# Patient Record
Sex: MALE | Race: WHITE | Employment: OTHER | URBAN - METROPOLITAN AREA
[De-identification: names, ages, dates, MRNs, and addresses within clinical notes are randomized per-mention and may not be internally consistent; named-entity substitution may affect disease eponyms.]

---

## 2017-03-01 ENCOUNTER — HOSPITAL ENCOUNTER (OUTPATIENT)
Dept: PAIN MANAGEMENT | Age: 67
Discharge: HOME | End: 2017-03-01
Payer: MEDICARE

## 2017-03-01 DIAGNOSIS — M46.92 CERVICAL SPONDYLITIS (HCC): ICD-10-CM

## 2017-03-01 DIAGNOSIS — M48.061 LUMBAR SPINAL STENOSIS: ICD-10-CM

## 2017-03-01 DIAGNOSIS — M47.896 OTHER OSTEOARTHRITIS OF SPINE, LUMBAR REGION: ICD-10-CM

## 2017-03-01 DIAGNOSIS — Z98.1 S/P CERVICAL SPINAL FUSION: ICD-10-CM

## 2017-03-01 DIAGNOSIS — M47.816 OSTEOARTHRITIS OF LUMBAR SPINE, UNSPECIFIED SPINAL OSTEOARTHRITIS COMPLICATION STATUS: ICD-10-CM

## 2017-03-01 DIAGNOSIS — Z51.81 ENCOUNTER FOR MEDICATION MONITORING: ICD-10-CM

## 2017-03-01 DIAGNOSIS — M47.26 OSTEOARTHRITIS OF SPINE WITH RADICULOPATHY, LUMBAR REGION: Primary | ICD-10-CM

## 2017-03-01 DIAGNOSIS — G89.29 ENCOUNTER FOR CHRONIC PAIN MANAGEMENT: ICD-10-CM

## 2017-03-01 DIAGNOSIS — M51.36 DEGENERATIVE DISC DISEASE, LUMBAR: ICD-10-CM

## 2017-03-01 DIAGNOSIS — M54.16 LUMBAR RADICULOPATHY: ICD-10-CM

## 2017-03-01 PROCEDURE — 99213 OFFICE O/P EST LOW 20 MIN: CPT | Performed by: NURSE PRACTITIONER

## 2017-03-01 PROCEDURE — 99213 OFFICE O/P EST LOW 20 MIN: CPT

## 2017-03-01 RX ORDER — MORPHINE SULFATE 60 MG/1
60 TABLET, FILM COATED, EXTENDED RELEASE ORAL EVERY 8 HOURS
Qty: 90 TABLET | Refills: 0 | Status: SHIPPED | OUTPATIENT
Start: 2017-03-01 | End: 2017-03-31 | Stop reason: SDUPTHER

## 2017-03-01 RX ORDER — OXYCODONE HYDROCHLORIDE AND ACETAMINOPHEN 5; 325 MG/1; MG/1
1 TABLET ORAL EVERY 8 HOURS
Qty: 90 TABLET | Refills: 0 | Status: SHIPPED | OUTPATIENT
Start: 2017-03-01 | End: 2017-03-31 | Stop reason: SDUPTHER

## 2017-03-31 ENCOUNTER — HOSPITAL ENCOUNTER (OUTPATIENT)
Dept: PAIN MANAGEMENT | Age: 67
Discharge: HOME | End: 2017-03-31
Payer: MEDICARE

## 2017-03-31 DIAGNOSIS — M47.816 OSTEOARTHRITIS OF LUMBAR SPINE, UNSPECIFIED SPINAL OSTEOARTHRITIS COMPLICATION STATUS: ICD-10-CM

## 2017-03-31 DIAGNOSIS — M51.36 DEGENERATIVE DISC DISEASE, LUMBAR: ICD-10-CM

## 2017-03-31 DIAGNOSIS — M47.896 OTHER OSTEOARTHRITIS OF SPINE, LUMBAR REGION: ICD-10-CM

## 2017-03-31 DIAGNOSIS — M54.16 LUMBAR RADICULOPATHY: ICD-10-CM

## 2017-03-31 DIAGNOSIS — Z98.1 S/P CERVICAL SPINAL FUSION: ICD-10-CM

## 2017-03-31 DIAGNOSIS — G89.29 ENCOUNTER FOR CHRONIC PAIN MANAGEMENT: ICD-10-CM

## 2017-03-31 DIAGNOSIS — M47.26 OSTEOARTHRITIS OF SPINE WITH RADICULOPATHY, LUMBAR REGION: Primary | ICD-10-CM

## 2017-03-31 DIAGNOSIS — M48.061 LUMBAR SPINAL STENOSIS: ICD-10-CM

## 2017-03-31 DIAGNOSIS — M46.92 CERVICAL SPONDYLITIS (HCC): ICD-10-CM

## 2017-03-31 PROCEDURE — 99213 OFFICE O/P EST LOW 20 MIN: CPT | Performed by: NURSE PRACTITIONER

## 2017-03-31 PROCEDURE — 99213 OFFICE O/P EST LOW 20 MIN: CPT

## 2017-03-31 RX ORDER — MORPHINE SULFATE 60 MG/1
60 TABLET, FILM COATED, EXTENDED RELEASE ORAL EVERY 8 HOURS
Qty: 90 TABLET | Refills: 0 | Status: SHIPPED | OUTPATIENT
Start: 2017-03-31 | End: 2017-04-28 | Stop reason: SDUPTHER

## 2017-03-31 RX ORDER — OXYCODONE HYDROCHLORIDE AND ACETAMINOPHEN 5; 325 MG/1; MG/1
1 TABLET ORAL EVERY 8 HOURS
Qty: 90 TABLET | Refills: 0 | Status: SHIPPED | OUTPATIENT
Start: 2017-03-31 | End: 2017-04-28 | Stop reason: SDUPTHER

## 2017-04-28 ENCOUNTER — HOSPITAL ENCOUNTER (OUTPATIENT)
Dept: PAIN MANAGEMENT | Age: 67
Discharge: HOME | End: 2017-04-28
Payer: MEDICARE

## 2017-04-28 DIAGNOSIS — M51.36 DEGENERATIVE DISC DISEASE, LUMBAR: ICD-10-CM

## 2017-04-28 DIAGNOSIS — M54.16 LUMBAR RADICULOPATHY: ICD-10-CM

## 2017-04-28 DIAGNOSIS — M47.26 OSTEOARTHRITIS OF SPINE WITH RADICULOPATHY, LUMBAR REGION: Primary | ICD-10-CM

## 2017-04-28 DIAGNOSIS — Z98.1 S/P CERVICAL SPINAL FUSION: ICD-10-CM

## 2017-04-28 DIAGNOSIS — M48.061 LUMBAR SPINAL STENOSIS: ICD-10-CM

## 2017-04-28 DIAGNOSIS — M46.92 CERVICAL SPONDYLITIS (HCC): ICD-10-CM

## 2017-04-28 DIAGNOSIS — Z51.81 ENCOUNTER FOR MEDICATION MONITORING: ICD-10-CM

## 2017-04-28 PROCEDURE — 99213 OFFICE O/P EST LOW 20 MIN: CPT | Performed by: NURSE PRACTITIONER

## 2017-04-28 PROCEDURE — 99213 OFFICE O/P EST LOW 20 MIN: CPT

## 2017-04-28 RX ORDER — MORPHINE SULFATE 60 MG/1
60 TABLET, FILM COATED, EXTENDED RELEASE ORAL EVERY 8 HOURS
Qty: 90 TABLET | Refills: 0 | Status: SHIPPED | OUTPATIENT
Start: 2017-04-30 | End: 2017-05-30 | Stop reason: SDUPTHER

## 2017-04-28 RX ORDER — OXYCODONE HYDROCHLORIDE AND ACETAMINOPHEN 5; 325 MG/1; MG/1
1 TABLET ORAL EVERY 8 HOURS
Qty: 90 TABLET | Refills: 0 | Status: SHIPPED | OUTPATIENT
Start: 2017-04-30 | End: 2017-05-30 | Stop reason: SDUPTHER

## 2017-05-10 ENCOUNTER — HOSPITAL ENCOUNTER (OUTPATIENT)
Age: 67
Discharge: HOME | End: 2017-05-10
Payer: MEDICARE

## 2017-05-10 LAB
ABSOLUTE EOS #: 0.04 K/UL (ref 0–0.4)
ABSOLUTE LYMPH #: 1.16 K/UL (ref 1–4.8)
ABSOLUTE MONO #: 0.24 K/UL (ref 0.1–1.3)
ALBUMIN SERPL-MCNC: 4.4 G/DL (ref 3.5–5.2)
ALBUMIN/GLOBULIN RATIO: ABNORMAL (ref 1–2.5)
ALP BLD-CCNC: 121 U/L (ref 40–129)
ALT SERPL-CCNC: 21 U/L (ref 5–41)
ANION GAP SERPL CALCULATED.3IONS-SCNC: 14 MMOL/L (ref 9–17)
AST SERPL-CCNC: 28 U/L
BASOPHILS # BLD: 1 %
BASOPHILS ABSOLUTE: 0.04 K/UL (ref 0–0.2)
BILIRUB SERPL-MCNC: 0.28 MG/DL (ref 0.3–1.2)
BUN BLDV-MCNC: 14 MG/DL (ref 8–23)
BUN/CREAT BLD: ABNORMAL (ref 9–20)
CALCIUM SERPL-MCNC: 9.1 MG/DL (ref 8.6–10.4)
CHLORIDE BLD-SCNC: 100 MMOL/L (ref 98–107)
CHOLESTEROL/HDL RATIO: 5.7
CHOLESTEROL: 102 MG/DL
CO2: 25 MMOL/L (ref 20–31)
CREAT SERPL-MCNC: 0.69 MG/DL (ref 0.7–1.2)
DIFFERENTIAL TYPE: ABNORMAL
EOSINOPHILS RELATIVE PERCENT: 1 %
ESTIMATED AVERAGE GLUCOSE: 140 MG/DL
GFR AFRICAN AMERICAN: >60 ML/MIN
GFR NON-AFRICAN AMERICAN: >60 ML/MIN
GFR SERPL CREATININE-BSD FRML MDRD: ABNORMAL ML/MIN/{1.73_M2}
GFR SERPL CREATININE-BSD FRML MDRD: ABNORMAL ML/MIN/{1.73_M2}
GLUCOSE BLD-MCNC: 153 MG/DL (ref 70–99)
HBA1C MFR BLD: 6.5 % (ref 4–6)
HCT VFR BLD CALC: 35.1 % (ref 41–53)
HDLC SERPL-MCNC: 18 MG/DL
HEMOGLOBIN: 10.8 G/DL (ref 13.5–17.5)
LDL CHOLESTEROL: 52 MG/DL (ref 0–130)
LYMPHOCYTES # BLD: 29 %
MCH RBC QN AUTO: 22.4 PG (ref 26–34)
MCHC RBC AUTO-ENTMCNC: 30.7 G/DL (ref 31–37)
MCV RBC AUTO: 72.9 FL (ref 80–100)
MONOCYTES # BLD: 6 %
MORPHOLOGY: ABNORMAL
PDW BLD-RTO: 16.6 % (ref 11.5–14.9)
PLATELET # BLD: 274 K/UL (ref 150–450)
PLATELET ESTIMATE: ABNORMAL
PMV BLD AUTO: 7.5 FL (ref 6–12)
POTASSIUM SERPL-SCNC: 4.1 MMOL/L (ref 3.7–5.3)
RBC # BLD: 4.81 M/UL (ref 4.5–5.9)
RBC # BLD: ABNORMAL 10*6/UL
SEG NEUTROPHILS: 63 %
SEGMENTED NEUTROPHILS ABSOLUTE COUNT: 2.52 K/UL (ref 1.3–9.1)
SODIUM BLD-SCNC: 139 MMOL/L (ref 135–144)
TOTAL PROTEIN: 8.1 G/DL (ref 6.4–8.3)
TRIGL SERPL-MCNC: 160 MG/DL
TSH SERPL DL<=0.05 MIU/L-ACNC: 0.85 MIU/L (ref 0.3–5)
VLDLC SERPL CALC-MCNC: ABNORMAL MG/DL (ref 1–30)
WBC # BLD: 4 K/UL (ref 3.5–11)
WBC # BLD: ABNORMAL 10*3/UL

## 2017-05-10 PROCEDURE — 84443 ASSAY THYROID STIM HORMONE: CPT

## 2017-05-10 PROCEDURE — 80061 LIPID PANEL: CPT

## 2017-05-10 PROCEDURE — 80053 COMPREHEN METABOLIC PANEL: CPT

## 2017-05-10 PROCEDURE — 85025 COMPLETE CBC W/AUTO DIFF WBC: CPT

## 2017-05-10 PROCEDURE — 83036 HEMOGLOBIN GLYCOSYLATED A1C: CPT

## 2017-05-10 PROCEDURE — 36415 COLL VENOUS BLD VENIPUNCTURE: CPT

## 2017-05-30 ENCOUNTER — HOSPITAL ENCOUNTER (OUTPATIENT)
Dept: PAIN MANAGEMENT | Age: 67
Discharge: HOME | End: 2017-05-30
Payer: MEDICARE

## 2017-05-30 DIAGNOSIS — Z51.81 ENCOUNTER FOR MEDICATION MONITORING: ICD-10-CM

## 2017-05-30 DIAGNOSIS — Z98.1 S/P CERVICAL SPINAL FUSION: ICD-10-CM

## 2017-05-30 DIAGNOSIS — M47.26 OSTEOARTHRITIS OF SPINE WITH RADICULOPATHY, LUMBAR REGION: Primary | ICD-10-CM

## 2017-05-30 DIAGNOSIS — M54.16 LUMBAR RADICULOPATHY: ICD-10-CM

## 2017-05-30 DIAGNOSIS — M48.061 LUMBAR SPINAL STENOSIS: ICD-10-CM

## 2017-05-30 DIAGNOSIS — G89.29 ENCOUNTER FOR CHRONIC PAIN MANAGEMENT: ICD-10-CM

## 2017-05-30 DIAGNOSIS — M51.36 DEGENERATIVE DISC DISEASE, LUMBAR: ICD-10-CM

## 2017-05-30 DIAGNOSIS — M46.92 CERVICAL SPONDYLITIS (HCC): ICD-10-CM

## 2017-05-30 DIAGNOSIS — M47.896 OTHER OSTEOARTHRITIS OF SPINE, LUMBAR REGION: ICD-10-CM

## 2017-05-30 DIAGNOSIS — M47.816 OSTEOARTHRITIS OF LUMBAR SPINE, UNSPECIFIED SPINAL OSTEOARTHRITIS COMPLICATION STATUS: ICD-10-CM

## 2017-05-30 PROCEDURE — 99213 OFFICE O/P EST LOW 20 MIN: CPT | Performed by: NURSE PRACTITIONER

## 2017-05-30 PROCEDURE — 99213 OFFICE O/P EST LOW 20 MIN: CPT

## 2017-05-30 RX ORDER — OXYCODONE HYDROCHLORIDE AND ACETAMINOPHEN 5; 325 MG/1; MG/1
1 TABLET ORAL EVERY 8 HOURS
Qty: 90 TABLET | Refills: 0 | Status: SHIPPED | OUTPATIENT
Start: 2017-05-30 | End: 2017-06-29 | Stop reason: SDUPTHER

## 2017-05-30 RX ORDER — MORPHINE SULFATE 60 MG/1
60 TABLET, FILM COATED, EXTENDED RELEASE ORAL EVERY 8 HOURS
Qty: 90 TABLET | Refills: 0 | Status: SHIPPED | OUTPATIENT
Start: 2017-05-30 | End: 2017-06-29 | Stop reason: SDUPTHER

## 2017-06-29 ENCOUNTER — HOSPITAL ENCOUNTER (OUTPATIENT)
Dept: PAIN MANAGEMENT | Age: 67
Discharge: HOME | End: 2017-06-29
Payer: MEDICARE

## 2017-06-29 DIAGNOSIS — M47.896 OTHER OSTEOARTHRITIS OF SPINE, LUMBAR REGION: ICD-10-CM

## 2017-06-29 DIAGNOSIS — M46.92 CERVICAL SPONDYLITIS (HCC): ICD-10-CM

## 2017-06-29 DIAGNOSIS — M47.26 OSTEOARTHRITIS OF SPINE WITH RADICULOPATHY, LUMBAR REGION: ICD-10-CM

## 2017-06-29 DIAGNOSIS — M51.36 DEGENERATIVE DISC DISEASE, LUMBAR: ICD-10-CM

## 2017-06-29 DIAGNOSIS — M54.16 LUMBAR RADICULOPATHY: ICD-10-CM

## 2017-06-29 DIAGNOSIS — Z98.1 S/P CERVICAL SPINAL FUSION: ICD-10-CM

## 2017-06-29 DIAGNOSIS — Z51.81 ENCOUNTER FOR MEDICATION MONITORING: ICD-10-CM

## 2017-06-29 DIAGNOSIS — G89.29 ENCOUNTER FOR CHRONIC PAIN MANAGEMENT: Primary | ICD-10-CM

## 2017-06-29 DIAGNOSIS — M48.061 LUMBAR SPINAL STENOSIS: ICD-10-CM

## 2017-06-29 DIAGNOSIS — M47.816 OSTEOARTHRITIS OF LUMBAR SPINE, UNSPECIFIED SPINAL OSTEOARTHRITIS COMPLICATION STATUS: ICD-10-CM

## 2017-06-29 PROCEDURE — 80307 DRUG TEST PRSMV CHEM ANLYZR: CPT

## 2017-06-29 PROCEDURE — 99213 OFFICE O/P EST LOW 20 MIN: CPT | Performed by: NURSE PRACTITIONER

## 2017-06-29 PROCEDURE — 99213 OFFICE O/P EST LOW 20 MIN: CPT

## 2017-06-29 RX ORDER — PREGABALIN 75 MG/1
75 CAPSULE ORAL 2 TIMES DAILY
COMMUNITY
End: 2017-08-28

## 2017-06-29 RX ORDER — PYRIDOXINE HCL (VITAMIN B6) 50 MG
50 TABLET ORAL DAILY
COMMUNITY
End: 2017-11-16 | Stop reason: ALTCHOICE

## 2017-06-29 RX ORDER — OXYCODONE HYDROCHLORIDE AND ACETAMINOPHEN 5; 325 MG/1; MG/1
1 TABLET ORAL EVERY 8 HOURS
Qty: 90 TABLET | Refills: 0 | Status: SHIPPED | OUTPATIENT
Start: 2017-06-29 | End: 2017-07-27 | Stop reason: SDUPTHER

## 2017-06-29 RX ORDER — MORPHINE SULFATE 60 MG/1
60 TABLET, FILM COATED, EXTENDED RELEASE ORAL EVERY 8 HOURS
Qty: 90 TABLET | Refills: 0 | Status: SHIPPED | OUTPATIENT
Start: 2017-06-29 | End: 2017-07-27 | Stop reason: SDUPTHER

## 2017-07-04 LAB
6-ACETYLMORPHINE, UR: NOT DETECTED
7-AMINOCLONAZEPAM, URINE: NOT DETECTED
ALPHA-OH-ALPRAZ, URINE: NOT DETECTED
ALPRAZOLAM, URINE: NOT DETECTED
AMPHETAMINES, URINE: NOT DETECTED
BARBITURATES, URINE: NOT DETECTED
BENZOYLECGONINE, UR: NOT DETECTED
BUPRENORPHINE URINE: NOT DETECTED
CARISOPRODOL, UR: NOT DETECTED
CLONAZEPAM, URINE: NOT DETECTED
CODEINE, URINE: NOT DETECTED
CREATININE URINE: 136.9 MG/DL (ref 20–400)
DIAZEPAM, URINE: NOT DETECTED
DRUGS EXPECTED, UR: NORMAL
EER HI RES INTERP UR: NORMAL
ETHYL GLUCURONIDE UR: NOT DETECTED
FENTANYL URINE: NOT DETECTED
HYDROCODONE, URINE: NOT DETECTED
HYDROMORPHONE, URINE: NOT DETECTED
LORAZEPAM, URINE: NOT DETECTED
MARIJUANA METAB, UR: NOT DETECTED
MDA, UR: NOT DETECTED
MDEA, EVE, UR: NOT DETECTED
MDMA URINE: NOT DETECTED
MEPERIDINE METAB, UR: NOT DETECTED
METHADONE, URINE: NOT DETECTED
METHAMPHETAMINE, URINE: NOT DETECTED
METHYLPHENIDATE: NOT DETECTED
MIDAZOLAM, URINE: NOT DETECTED
MORPHINE URINE: PRESENT
NORBUPRENORPHINE, URINE: NOT DETECTED
NORDIAZEPAM, URINE: NOT DETECTED
NORFENTANYL, URINE: NOT DETECTED
NORHYDROCODONE, URINE: NOT DETECTED
NOROXYCODONE, URINE: PRESENT
NOROXYMORPHONE, URINE: NOT DETECTED
OXAZEPAM, URINE: NOT DETECTED
OXYCODONE URINE: PRESENT
OXYMORPHONE, URINE: NOT DETECTED
PAIN MANAGEMENT DRUG PANEL INTERP, URINE: NORMAL
PAIN MGT DRUG PANEL, HI RES, UR: NORMAL
PCP,URINE: NOT DETECTED
PHENTERMINE, UR: NOT DETECTED
PROPOXYPHENE, URINE: NOT DETECTED
TAPENTADOL, URINE: NOT DETECTED
TAPENTADOL-O-SULFATE, URINE: NOT DETECTED
TEMAZEPAM, URINE: NOT DETECTED
TRAMADOL, URINE: NOT DETECTED
ZOLPIDEM, URINE: NOT DETECTED

## 2017-07-27 ENCOUNTER — HOSPITAL ENCOUNTER (OUTPATIENT)
Dept: PAIN MANAGEMENT | Age: 67
Discharge: HOME | End: 2017-07-27
Payer: MEDICARE

## 2017-07-27 VITALS
WEIGHT: 197 LBS | OXYGEN SATURATION: 100 % | BODY MASS INDEX: 29.18 KG/M2 | DIASTOLIC BLOOD PRESSURE: 83 MMHG | HEIGHT: 69 IN | HEART RATE: 107 BPM | SYSTOLIC BLOOD PRESSURE: 170 MMHG | RESPIRATION RATE: 20 BRPM | TEMPERATURE: 98 F

## 2017-07-27 DIAGNOSIS — M48.061 LUMBAR SPINAL STENOSIS: ICD-10-CM

## 2017-07-27 DIAGNOSIS — M51.36 DEGENERATIVE DISC DISEASE, LUMBAR: Primary | ICD-10-CM

## 2017-07-27 DIAGNOSIS — M54.16 LUMBAR RADICULOPATHY: ICD-10-CM

## 2017-07-27 DIAGNOSIS — Z98.1 S/P CERVICAL SPINAL FUSION: ICD-10-CM

## 2017-07-27 DIAGNOSIS — M46.92 CERVICAL SPONDYLITIS (HCC): ICD-10-CM

## 2017-07-27 DIAGNOSIS — M47.816 OSTEOARTHRITIS OF LUMBAR SPINE, UNSPECIFIED SPINAL OSTEOARTHRITIS COMPLICATION STATUS: ICD-10-CM

## 2017-07-27 DIAGNOSIS — G89.29 ENCOUNTER FOR CHRONIC PAIN MANAGEMENT: ICD-10-CM

## 2017-07-27 PROCEDURE — 99214 OFFICE O/P EST MOD 30 MIN: CPT | Performed by: PAIN MEDICINE

## 2017-07-27 PROCEDURE — 99213 OFFICE O/P EST LOW 20 MIN: CPT

## 2017-07-27 RX ORDER — OXYCODONE HYDROCHLORIDE AND ACETAMINOPHEN 5; 325 MG/1; MG/1
1 TABLET ORAL EVERY 8 HOURS
Qty: 90 TABLET | Refills: 0 | Status: SHIPPED | OUTPATIENT
Start: 2017-07-29 | End: 2017-08-28 | Stop reason: SDUPTHER

## 2017-07-27 RX ORDER — MORPHINE SULFATE 60 MG/1
60 TABLET, FILM COATED, EXTENDED RELEASE ORAL EVERY 8 HOURS
Qty: 90 TABLET | Refills: 0 | Status: SHIPPED | OUTPATIENT
Start: 2017-07-29 | End: 2017-08-28 | Stop reason: SDUPTHER

## 2017-07-27 ASSESSMENT — ENCOUNTER SYMPTOMS
BLURRED VISION: 0
SORE THROAT: 1
BACK PAIN: 1
SHORTNESS OF BREATH: 0
NAUSEA: 0
COUGH: 0
CONSTIPATION: 0
BOWEL INCONTINENCE: 0
VOMITING: 0
ABDOMINAL PAIN: 0
PHOTOPHOBIA: 0
GASTROINTESTINAL NEGATIVE: 1

## 2017-07-27 ASSESSMENT — PAIN SCALES - GENERAL: PAINLEVEL_OUTOF10: 4

## 2017-07-27 ASSESSMENT — PAIN DESCRIPTION - LOCATION: LOCATION: BACK;NECK;FOOT

## 2017-07-27 ASSESSMENT — PAIN DESCRIPTION - PAIN TYPE: TYPE: CHRONIC PAIN

## 2017-07-27 ASSESSMENT — PAIN DESCRIPTION - DESCRIPTORS: DESCRIPTORS: ACHING;BURNING

## 2017-07-27 ASSESSMENT — PAIN DESCRIPTION - ORIENTATION: ORIENTATION: RIGHT;LEFT

## 2017-08-28 ENCOUNTER — HOSPITAL ENCOUNTER (OUTPATIENT)
Dept: PAIN MANAGEMENT | Age: 67
Discharge: HOME | End: 2017-08-28
Payer: MEDICARE

## 2017-08-28 VITALS
WEIGHT: 197 LBS | RESPIRATION RATE: 20 BRPM | DIASTOLIC BLOOD PRESSURE: 71 MMHG | OXYGEN SATURATION: 96 % | SYSTOLIC BLOOD PRESSURE: 143 MMHG | HEART RATE: 103 BPM | BODY MASS INDEX: 29.18 KG/M2 | TEMPERATURE: 98.3 F | HEIGHT: 69 IN

## 2017-08-28 DIAGNOSIS — G89.29 ENCOUNTER FOR CHRONIC PAIN MANAGEMENT: ICD-10-CM

## 2017-08-28 DIAGNOSIS — Z98.1 S/P CERVICAL SPINAL FUSION: ICD-10-CM

## 2017-08-28 DIAGNOSIS — M47.26 OSTEOARTHRITIS OF SPINE WITH RADICULOPATHY, LUMBAR REGION: ICD-10-CM

## 2017-08-28 DIAGNOSIS — M54.16 LUMBAR RADICULOPATHY: ICD-10-CM

## 2017-08-28 DIAGNOSIS — M51.36 DEGENERATIVE DISC DISEASE, LUMBAR: Primary | ICD-10-CM

## 2017-08-28 DIAGNOSIS — M48.061 LUMBAR SPINAL STENOSIS: ICD-10-CM

## 2017-08-28 DIAGNOSIS — M46.92 CERVICAL SPONDYLITIS (HCC): ICD-10-CM

## 2017-08-28 DIAGNOSIS — M47.816 OSTEOARTHRITIS OF LUMBAR SPINE, UNSPECIFIED SPINAL OSTEOARTHRITIS COMPLICATION STATUS: ICD-10-CM

## 2017-08-28 DIAGNOSIS — Z51.81 ENCOUNTER FOR MEDICATION MONITORING: ICD-10-CM

## 2017-08-28 PROCEDURE — 99213 OFFICE O/P EST LOW 20 MIN: CPT | Performed by: NURSE PRACTITIONER

## 2017-08-28 PROCEDURE — 99213 OFFICE O/P EST LOW 20 MIN: CPT

## 2017-08-28 RX ORDER — OXYCODONE HYDROCHLORIDE AND ACETAMINOPHEN 5; 325 MG/1; MG/1
1 TABLET ORAL EVERY 8 HOURS
Qty: 90 TABLET | Refills: 0 | Status: SHIPPED | OUTPATIENT
Start: 2017-08-28 | End: 2017-09-27 | Stop reason: SDUPTHER

## 2017-08-28 RX ORDER — NEEDLES, FILTER 19GX1 1/2"
NEEDLE, DISPOSABLE MISCELLANEOUS
Refills: 4 | COMMUNITY
Start: 2017-06-03 | End: 2021-01-08 | Stop reason: SDUPTHER

## 2017-08-28 RX ORDER — MORPHINE SULFATE 60 MG/1
60 TABLET, FILM COATED, EXTENDED RELEASE ORAL EVERY 8 HOURS
Qty: 90 TABLET | Refills: 0 | Status: SHIPPED | OUTPATIENT
Start: 2017-08-28 | End: 2017-09-27 | Stop reason: SDUPTHER

## 2017-08-28 ASSESSMENT — ENCOUNTER SYMPTOMS
EYES NEGATIVE: 1
RESPIRATORY NEGATIVE: 1
BACK PAIN: 1
GASTROINTESTINAL NEGATIVE: 1

## 2017-09-27 ENCOUNTER — HOSPITAL ENCOUNTER (OUTPATIENT)
Dept: PAIN MANAGEMENT | Age: 67
Discharge: HOME | End: 2017-09-27
Payer: MEDICARE

## 2017-09-27 VITALS
HEIGHT: 69 IN | RESPIRATION RATE: 20 BRPM | OXYGEN SATURATION: 96 % | DIASTOLIC BLOOD PRESSURE: 79 MMHG | BODY MASS INDEX: 29.18 KG/M2 | HEART RATE: 107 BPM | WEIGHT: 197 LBS | TEMPERATURE: 98 F | SYSTOLIC BLOOD PRESSURE: 107 MMHG

## 2017-09-27 DIAGNOSIS — M47.816 OSTEOARTHRITIS OF LUMBAR SPINE, UNSPECIFIED SPINAL OSTEOARTHRITIS COMPLICATION STATUS: ICD-10-CM

## 2017-09-27 DIAGNOSIS — M51.36 DEGENERATIVE DISC DISEASE, LUMBAR: Primary | ICD-10-CM

## 2017-09-27 DIAGNOSIS — M47.26 OSTEOARTHRITIS OF SPINE WITH RADICULOPATHY, LUMBAR REGION: ICD-10-CM

## 2017-09-27 DIAGNOSIS — Z98.1 S/P CERVICAL SPINAL FUSION: ICD-10-CM

## 2017-09-27 DIAGNOSIS — M46.92 CERVICAL SPONDYLITIS (HCC): ICD-10-CM

## 2017-09-27 DIAGNOSIS — G89.29 ENCOUNTER FOR CHRONIC PAIN MANAGEMENT: ICD-10-CM

## 2017-09-27 DIAGNOSIS — Z51.81 ENCOUNTER FOR MEDICATION MONITORING: ICD-10-CM

## 2017-09-27 DIAGNOSIS — M54.16 LUMBAR RADICULOPATHY: ICD-10-CM

## 2017-09-27 PROCEDURE — 99213 OFFICE O/P EST LOW 20 MIN: CPT | Performed by: NURSE PRACTITIONER

## 2017-09-27 PROCEDURE — 99213 OFFICE O/P EST LOW 20 MIN: CPT

## 2017-09-27 RX ORDER — MORPHINE SULFATE 60 MG/1
60 TABLET, FILM COATED, EXTENDED RELEASE ORAL EVERY 8 HOURS
Qty: 90 TABLET | Refills: 0 | Status: SHIPPED | OUTPATIENT
Start: 2017-09-27 | End: 2017-10-27 | Stop reason: SDUPTHER

## 2017-09-27 RX ORDER — OXYCODONE HYDROCHLORIDE AND ACETAMINOPHEN 5; 325 MG/1; MG/1
1 TABLET ORAL EVERY 8 HOURS
Qty: 90 TABLET | Refills: 0 | Status: SHIPPED | OUTPATIENT
Start: 2017-09-27 | End: 2017-10-27 | Stop reason: SDUPTHER

## 2017-09-27 ASSESSMENT — ENCOUNTER SYMPTOMS
BACK PAIN: 1
GASTROINTESTINAL NEGATIVE: 1
RESPIRATORY NEGATIVE: 1
EYES NEGATIVE: 1

## 2017-10-23 ENCOUNTER — HOSPITAL ENCOUNTER (OUTPATIENT)
Age: 67
Discharge: HOME | End: 2017-10-23
Payer: MEDICARE

## 2017-10-23 LAB
ESTIMATED AVERAGE GLUCOSE: 131 MG/DL
HBA1C MFR BLD: 6.2 % (ref 4–6)

## 2017-10-23 PROCEDURE — 83036 HEMOGLOBIN GLYCOSYLATED A1C: CPT

## 2017-10-23 PROCEDURE — 36415 COLL VENOUS BLD VENIPUNCTURE: CPT

## 2017-10-27 ENCOUNTER — HOSPITAL ENCOUNTER (OUTPATIENT)
Dept: PAIN MANAGEMENT | Age: 67
Discharge: HOME | End: 2017-10-27
Payer: MEDICARE

## 2017-10-27 VITALS
HEART RATE: 101 BPM | DIASTOLIC BLOOD PRESSURE: 66 MMHG | WEIGHT: 197 LBS | HEIGHT: 69 IN | BODY MASS INDEX: 29.18 KG/M2 | RESPIRATION RATE: 18 BRPM | SYSTOLIC BLOOD PRESSURE: 107 MMHG

## 2017-10-27 DIAGNOSIS — M51.36 DEGENERATIVE DISC DISEASE, LUMBAR: Primary | Chronic | ICD-10-CM

## 2017-10-27 DIAGNOSIS — M48.061 SPINAL STENOSIS OF LUMBAR REGION WITHOUT NEUROGENIC CLAUDICATION: Chronic | ICD-10-CM

## 2017-10-27 DIAGNOSIS — M47.816 LUMBAR SPONDYLOSIS: Chronic | ICD-10-CM

## 2017-10-27 DIAGNOSIS — Z51.81 ENCOUNTER FOR MEDICATION MONITORING: Chronic | ICD-10-CM

## 2017-10-27 DIAGNOSIS — M47.26 OSTEOARTHRITIS OF SPINE WITH RADICULOPATHY, LUMBAR REGION: ICD-10-CM

## 2017-10-27 DIAGNOSIS — M54.16 LUMBAR RADICULOPATHY: Chronic | ICD-10-CM

## 2017-10-27 PROCEDURE — 99213 OFFICE O/P EST LOW 20 MIN: CPT | Performed by: NURSE PRACTITIONER

## 2017-10-27 PROCEDURE — 99213 OFFICE O/P EST LOW 20 MIN: CPT

## 2017-10-27 RX ORDER — OXYCODONE HYDROCHLORIDE AND ACETAMINOPHEN 5; 325 MG/1; MG/1
1 TABLET ORAL EVERY 8 HOURS
Qty: 90 TABLET | Refills: 0 | Status: SHIPPED | OUTPATIENT
Start: 2017-10-27 | End: 2017-11-16 | Stop reason: SDUPTHER

## 2017-10-27 RX ORDER — MORPHINE SULFATE 60 MG/1
60 TABLET, FILM COATED, EXTENDED RELEASE ORAL EVERY 8 HOURS
Qty: 90 TABLET | Refills: 0 | Status: SHIPPED | OUTPATIENT
Start: 2017-10-27 | End: 2017-11-16 | Stop reason: SDUPTHER

## 2017-10-27 ASSESSMENT — ENCOUNTER SYMPTOMS
SHORTNESS OF BREATH: 0
CONSTIPATION: 0
BACK PAIN: 1
COUGH: 0

## 2017-10-27 NOTE — PROGRESS NOTES
Porfirio Kwok  Review of OARRS does not show any aberrant prescription behavior. Medication is helping the patient stay active. Patient denies any side effects and reports adequate analgesia. No sign of misuse/abuse. Past Medical History:   Diagnosis Date    Arthritis     osteoarthritis    Hyperlipidemia        Past Surgical History:   Procedure Laterality Date    BACK SURGERY  1977    cervical spine three times    DENTAL SURGERY  10/2015    all teeth extracted    SHOULDER SURGERY Right        Allergies   Allergen Reactions    Iron      Pill- constipation, abdominal pain         Current Outpatient Prescriptions:     LYRICA 150 MG capsule, Take 1 capsule by mouth 2 times daily, Disp: 60 capsule, Rfl: 2    morphine (MS CONTIN) 60 MG extended release tablet, Take 1 tablet by mouth every 8 hours . , Disp: 90 tablet, Rfl: 0    oxyCODONE-acetaminophen (PERCOCET) 5-325 MG per tablet, Take 1 tablet by mouth every 8 hours . , Disp: 90 tablet, Rfl: 0    BD INTEGRA SYRINGE 25G X 1\" 3 ML MISC, , Disp: , Rfl: 4    pyridoxine (B-6) 50 MG tablet, Take 50 mg by mouth daily, Disp: , Rfl:     pantoprazole (PROTONIX) 40 MG tablet, TK 1 T PO  QD, Disp: , Rfl: 0    cyanocobalamin 1000 MCG/ML injection, Inject 1,000 mcg into the muscle once Once a week for 4 weeks than monthly started 9-1-16, Disp: , Rfl:     gemfibrozil (LOPID) 600 MG tablet, Take 600 mg by mouth 2 times daily (before meals). , Disp: , Rfl:     Family History   Problem Relation Age of Onset    Diabetes Mother     Heart Disease Father        Social History     Social History    Marital status:      Spouse name: N/A    Number of children: N/A    Years of education: N/A     Occupational History    disabled      Social History Main Topics    Smoking status: Former Smoker     Packs/day: 0.50     Years: 45.00     Types: Cigarettes    Smokeless tobacco: Never Used      Comment: on chantix 11-6-15   12-7-15 quit 2 weeks ago    Alcohol use No    Drug use: No    Sexual activity: Not on file     Other Topics Concern    Not on file     Social History Narrative    No narrative on file       Review of Systems:  Review of Systems   Constitution: Negative for chills and fever. Cardiovascular: Negative for chest pain. Respiratory: Negative for cough and shortness of breath. Musculoskeletal: Positive for arthritis and back pain. Negative for falls. Gastrointestinal: Negative for constipation. Neurological: Positive for numbness and paresthesias. Numbness down thighs, burning tingling in feet         Physical Exam:  /66   Pulse 101   Resp 18   Ht 5' 9\" (1.753 m)   Wt 197 lb (89.4 kg)   BMI 29.09 kg/m²     Physical Exam   Constitutional: He is oriented to person, place, and time and well-developed, well-nourished, and in no distress. Cardiovascular: Normal rate. Pulmonary/Chest: Effort normal.   Musculoskeletal: Normal range of motion. Lumbar back: He exhibits tenderness and pain. He exhibits normal range of motion. Neurological: He is alert and oriented to person, place, and time. Gait normal.   Skin: Skin is warm and dry. Psychiatric: Affect normal.       Record/Diagnostics Review:    Scanned into media   Lumbar MRI 2017:  Multilevel degenerative changes. Similar degeneration of central canal and neural foraminal narrowing is present without significant interval changes from 2013 comparison. 2013 MRI:  Multilevel degenerative changes. Severe bilsy foraminal narrowing.  Disc bulge also extends laterally, impinging on bilat L5 nerve roots    Assessment:  Problem List Items Addressed This Visit     Degenerative disc disease, lumbar - Primary (Chronic)    Relevant Medications    morphine (MS CONTIN) 60 MG extended release tablet    oxyCODONE-acetaminophen (PERCOCET) 5-325 MG per tablet    Lumbar spondylosis (Chronic)    Relevant Medications    morphine (MS CONTIN) 60 MG extended release tablet    oxyCODONE-acetaminophen (PERCOCET) 5-325 MG per tablet    Lumbar radiculopathy (Chronic)    Relevant Medications    LYRICA 150 MG capsule    Lumbar spinal stenosis (Chronic)    Encounter for medication monitoring (Chronic)    Osteoarthritis of spine with radiculopathy, lumbar region    Relevant Medications    LYRICA 150 MG capsule    morphine (MS CONTIN) 60 MG extended release tablet    oxyCODONE-acetaminophen (PERCOCET) 5-325 MG per tablet      Other Visit Diagnoses    None. Treatment Plan:  DISCUSSION: Treatment options discussed with patient and all questions answered to patient's satisfaction. TREATMENT OPTIONS:     Continue opioid therapy  Contract requirements met. Satisfactory pain management plan. Medication helps with personal goals and self care needs. Patient is stable on current regimen of meds and medication is effectively managing pain, we will continue current medications without changes. As always, we encourage daily stretching and strengthening exercises, and recommend minimizing use of pain medications unless patient cannot get through daily activities due to pain.   Follow up appointment made

## 2017-11-16 ENCOUNTER — HOSPITAL ENCOUNTER (OUTPATIENT)
Dept: PAIN MANAGEMENT | Age: 67
Discharge: HOME | End: 2017-11-16
Payer: MEDICARE

## 2017-11-16 VITALS
OXYGEN SATURATION: 95 % | SYSTOLIC BLOOD PRESSURE: 108 MMHG | HEIGHT: 69 IN | RESPIRATION RATE: 16 BRPM | DIASTOLIC BLOOD PRESSURE: 69 MMHG | TEMPERATURE: 98.4 F | WEIGHT: 197 LBS | HEART RATE: 109 BPM | BODY MASS INDEX: 29.18 KG/M2

## 2017-11-16 DIAGNOSIS — M54.16 LUMBAR RADICULOPATHY: ICD-10-CM

## 2017-11-16 DIAGNOSIS — Z98.1 S/P CERVICAL SPINAL FUSION: ICD-10-CM

## 2017-11-16 DIAGNOSIS — M47.26 OSTEOARTHRITIS OF SPINE WITH RADICULOPATHY, LUMBAR REGION: ICD-10-CM

## 2017-11-16 DIAGNOSIS — Z51.81 ENCOUNTER FOR MEDICATION MONITORING: ICD-10-CM

## 2017-11-16 DIAGNOSIS — M48.061 SPINAL STENOSIS OF LUMBAR REGION WITHOUT NEUROGENIC CLAUDICATION: ICD-10-CM

## 2017-11-16 DIAGNOSIS — M46.92 CERVICAL SPONDYLITIS (HCC): ICD-10-CM

## 2017-11-16 DIAGNOSIS — G89.29 ENCOUNTER FOR CHRONIC PAIN MANAGEMENT: ICD-10-CM

## 2017-11-16 DIAGNOSIS — M47.816 LUMBAR SPONDYLOSIS: ICD-10-CM

## 2017-11-16 DIAGNOSIS — M51.36 DEGENERATIVE DISC DISEASE, LUMBAR: Primary | ICD-10-CM

## 2017-11-16 DIAGNOSIS — M47.816 OSTEOARTHRITIS OF LUMBAR SPINE, UNSPECIFIED SPINAL OSTEOARTHRITIS COMPLICATION STATUS: ICD-10-CM

## 2017-11-16 PROCEDURE — 99213 OFFICE O/P EST LOW 20 MIN: CPT

## 2017-11-16 PROCEDURE — 99213 OFFICE O/P EST LOW 20 MIN: CPT | Performed by: NURSE PRACTITIONER

## 2017-11-16 RX ORDER — MORPHINE SULFATE 60 MG/1
60 TABLET, FILM COATED, EXTENDED RELEASE ORAL EVERY 8 HOURS
Qty: 90 TABLET | Refills: 0 | Status: SHIPPED | OUTPATIENT
Start: 2017-11-26 | End: 2017-12-20 | Stop reason: SDUPTHER

## 2017-11-16 RX ORDER — OXYCODONE HYDROCHLORIDE AND ACETAMINOPHEN 5; 325 MG/1; MG/1
1 TABLET ORAL EVERY 8 HOURS
Qty: 90 TABLET | Refills: 0 | Status: SHIPPED | OUTPATIENT
Start: 2017-11-26 | End: 2017-12-20 | Stop reason: SDUPTHER

## 2017-11-16 ASSESSMENT — ENCOUNTER SYMPTOMS
RESPIRATORY NEGATIVE: 1
GASTROINTESTINAL NEGATIVE: 1
BACK PAIN: 1

## 2017-11-16 NOTE — PROGRESS NOTES
[facility]  Progress  Note    Patient is here today to review medication contract. Chief Complaint: back pain        HPI:   Pt c/o low back pain for many years. There is no known injury or surgery to the area. Pain radiates down both legs, worse on right.side. last PT was over 4 years ago and he is not interested in repeating due to inefficacy. He also had a LESI in 2013 that did not help. He does home exercises every morning. No Ed visits. Sleep is fair,    Back Pain   This is a chronic problem. The current episode started more than 1 year ago. The problem occurs intermittently. The problem is unchanged. The pain is present in the lumbar spine. Quality: sharp at times. Radiates to: down legs. The pain is at a severity of 4/10. The pain is moderate. The pain is the same all the time. Exacerbated by: cold weather, movement  Stiffness is present all day. (Numbness feet) He has tried analgesics and bed rest for the symptoms. The treatment provided mild relief. Possible side effects, risk of tolerance and or dependence and alternative treatments discussed    Obtaining appropriate analgesic effect of treatment   No signs of potential drug abuse or diversion identified    Treatment goals:  Decrease pain to a 2  Functional status:       Aberrancy  None   Analgesia  Pain 4  Adverse  Effects : Bm daily, not on RX  ADL;s :  Home exercises, house hold tasks, dishes, vacuuming    Patient denies any new neurological symptoms. No bowel or bladder incontinence, no weakness, and no falling. Pill count: appropriate 32 ls contin and 32 percocet  Controlled Substances Monitoring:     Attestation: The Prescription Monitoring Report for this patient was reviewed today. VEE Blanchard)  Documentation: Existing medication contract., No signs of potential drug abuse or diversion identified. (VEE Blanchard)    Review of OARRS does not show any aberrant prescription behavior.  Medication is helping the and recommend minimizing use of pain medications unless patient cannot get through daily activities due to pain.   Follow up appointment made for 4 weeks

## 2017-12-20 ENCOUNTER — HOSPITAL ENCOUNTER (OUTPATIENT)
Dept: PAIN MANAGEMENT | Age: 67
Discharge: HOME | End: 2017-12-20
Payer: MEDICARE

## 2017-12-20 VITALS
RESPIRATION RATE: 16 BRPM | OXYGEN SATURATION: 97 % | TEMPERATURE: 98.4 F | HEART RATE: 92 BPM | SYSTOLIC BLOOD PRESSURE: 122 MMHG | BODY MASS INDEX: 29.18 KG/M2 | WEIGHT: 197 LBS | HEIGHT: 69 IN | DIASTOLIC BLOOD PRESSURE: 78 MMHG

## 2017-12-20 DIAGNOSIS — M54.16 LUMBAR RADICULOPATHY: ICD-10-CM

## 2017-12-20 DIAGNOSIS — M47.26 OSTEOARTHRITIS OF SPINE WITH RADICULOPATHY, LUMBAR REGION: ICD-10-CM

## 2017-12-20 DIAGNOSIS — M46.92 CERVICAL SPONDYLITIS (HCC): ICD-10-CM

## 2017-12-20 DIAGNOSIS — Z51.81 ENCOUNTER FOR MEDICATION MONITORING: ICD-10-CM

## 2017-12-20 DIAGNOSIS — M47.816 LUMBAR SPONDYLOSIS: ICD-10-CM

## 2017-12-20 DIAGNOSIS — M48.061 SPINAL STENOSIS OF LUMBAR REGION WITHOUT NEUROGENIC CLAUDICATION: ICD-10-CM

## 2017-12-20 DIAGNOSIS — M47.816 OSTEOARTHRITIS OF LUMBAR SPINE, UNSPECIFIED SPINAL OSTEOARTHRITIS COMPLICATION STATUS: ICD-10-CM

## 2017-12-20 DIAGNOSIS — G89.29 ENCOUNTER FOR CHRONIC PAIN MANAGEMENT: ICD-10-CM

## 2017-12-20 DIAGNOSIS — M51.36 DEGENERATIVE DISC DISEASE, LUMBAR: Primary | ICD-10-CM

## 2017-12-20 PROCEDURE — 99213 OFFICE O/P EST LOW 20 MIN: CPT

## 2017-12-20 PROCEDURE — 99213 OFFICE O/P EST LOW 20 MIN: CPT | Performed by: NURSE PRACTITIONER

## 2017-12-20 RX ORDER — OXYCODONE HYDROCHLORIDE AND ACETAMINOPHEN 5; 325 MG/1; MG/1
1 TABLET ORAL EVERY 8 HOURS
Qty: 90 TABLET | Refills: 0 | Status: SHIPPED | OUTPATIENT
Start: 2017-12-26 | End: 2018-01-25 | Stop reason: SDUPTHER

## 2017-12-20 RX ORDER — MORPHINE SULFATE 60 MG/1
60 TABLET, FILM COATED, EXTENDED RELEASE ORAL EVERY 8 HOURS
Qty: 90 TABLET | Refills: 0 | Status: SHIPPED | OUTPATIENT
Start: 2017-12-26 | End: 2018-01-25 | Stop reason: SDUPTHER

## 2017-12-20 ASSESSMENT — ENCOUNTER SYMPTOMS
BACK PAIN: 1
RESPIRATORY NEGATIVE: 1
GASTROINTESTINAL NEGATIVE: 1

## 2017-12-20 NOTE — PROGRESS NOTES
BILLY VICKERS CNP) Yogi Vickers, APRN)  Chronic Pain: Treatment objectives documented - patient is progressing appropriately. , Functional status reviewed - continues with improved or maintaining ADL's., Dose reduction has been attempted., Reviewed the patient's functional status and documentation, including the 4A's of chronic pain treatment. (Bryan Flores, APRN)  Morphine equivalent dose as reported on OARRS: 202.50  Review of OARRS does not show any aberrant prescription behavior. Medication is helping the patient stay active. Patient denies any side effects and reports adequate analgesia. No sign of misuse/abuse. Past Medical History:   Diagnosis Date    Arthritis     osteoarthritis    Hyperlipidemia        Past Surgical History:   Procedure Laterality Date    BACK SURGERY  1977    cervical spine three times    DENTAL SURGERY  10/2015    all teeth extracted    SHOULDER SURGERY Right        Allergies   Allergen Reactions    Iron      Pill- constipation, abdominal pain         Current Outpatient Prescriptions:     [START ON 12/26/2017] oxyCODONE-acetaminophen (PERCOCET) 5-325 MG per tablet, Take 1 tablet by mouth every 8 hours . Earliest Fill Date: 12/26/17, Disp: 90 tablet, Rfl: 0    [START ON 12/26/2017] morphine (MS CONTIN) 60 MG extended release tablet, Take 1 tablet by mouth every 8 hours . Earliest Fill Date: 12/26/17, Disp: 90 tablet, Rfl: 0    LYRICA 150 MG capsule, Take 1 capsule by mouth 2 times daily, Disp: 60 capsule, Rfl: 2    pantoprazole (PROTONIX) 40 MG tablet, TK 1 T PO  QD, Disp: , Rfl: 0    cyanocobalamin 1000 MCG/ML injection, Inject 1,000 mcg into the muscle once Once a week for 4 weeks than monthly started 9-1-16, Disp: , Rfl:     gemfibrozil (LOPID) 600 MG tablet, Take 600 mg by mouth 2 times daily (before meals). , Disp: , Rfl:     BD INTEGRA SYRINGE 25G X 1\" 3 ML MISC, , Disp: , Rfl: 4    Family History   Problem Relation Age of Onset    Diabetes Mother     Heart Disease Father Incoming Lab Results From [facility]     Component Results     Component Value Ref Range & Units Status Collected Lab   Pain Management Drug Panel Interp, Urine Consistent   Final 06/29/2017  8:30 AM [facility]   (NOTE)   ________________________________________________________________   DRUGS EXPECTED:   OXYCODONE   MORPHINE [6/29/17]   ________________________________________________________________   CONSISTENT with medications provided:   OXYCODONE: based on oxycodone, noroxycodone   MORPHINE : based on morphine   ________________________________________________________________   INTERPRETIVE INFORMATION:Pain Mgt [place], High Res/EMIT, Ur, Interp   Interpretation depends on accuracy and completeness of patient   medication information submitted by client. 6-Acetylmorphine, Ur Not Detected   Final 06/29/2017  8:30 AM [facility]   7-Aminoclonazepam, Urine Not Detected   Final 06/29/2017  8:30 AM [facility]   Alpha-OH-Alpraz, Urine Not Detected   Final 06/29/2017  8:30 AM [facility]   Alprazolam, Urine Not Detected   Final 06/29/2017  8:30 AM [facility]   Amphetamines, urine Not Detected   Final 06/29/2017  8:30 AM [facility]   Barbiturates, Ur Not Detected   Final 06/29/2017  8:30 AM [facility]   Benzoylecgonine, Ur Not Detected   Final 06/29/2017  8:30 AM [facility]   Buprenorphine Urine Not Detected   Final 06/29/2017  8:30 AM [facility]   Carisoprodol, Ur Not Detected   Final 06/29/2017  8:30 AM [facility]   (NOTE)   The carisoprodol immunoassay has cross-reactivity to carisoprodol   and meprobamate.     Clonazepam, Urine Not Detected   Final 06/29/2017  8:30 AM [facility]   Codeine, Urine Not Detected   Final 06/29/2017  8:30 AM [facility]   MDA, Ur Not Detected   Final 06/29/2017  8:30 AM [facility]   Diazepam, Urine Not Detected   Final 06/29/2017  8:30 AM [facility]   Ethyl Glucuronide Ur Not Detected   Final 06/29/2017  8:30 AM [facility]   Fentanyl, Ur Not Detected   Final 06/29/2017  8:30 AM [facility]   Hydrocodone, Urine Not Detected   Final 06/29/2017 Expected, Ur   Final 06/29/2017  8:30 AM [facility]   OXYCODONE, MORPHINE [id number]    Creatinine, Ur 136.9  20.0 - 400.0 mg/dL Final 06/29/2017  8:30 AM [facility]   Pain Mgt Drug Panel, Hi Res, Ur See Below   Final 06/29/2017  8:30 AM [facility]   (NOTE)   Methodology: Qualitative Enzyme Immunoassay and Qualitative Liquid   Chromatography-Time of Flight-Mass Spectrometry, Quantitative   Spectrophotometry   The absence of expected drug(s) and/or drug metabolite(s) may   indicate non-compliance, inappropriate timing of specimen   collection relative to drug administration, poor drug absorption,   diluted/adulterated urine, or limitations of testing. The   concentration must be greater than or equal to the cutoff to be   reported as present.  If specific drug concentrations are   required, contact the laboratory within two weeks of specimen   collection to request quantification by a second analytical   technique. Interpretive questions should be directed to the   laboratory. Results based on immunoassay detection that do not match clinical   expectations should be   interpreted with caution. Confirmatory testing by mass   spectrometry for immunoassay-based results is available, if   ordered within two weeks of specimen collection. Additional   charges apply. For medical purposes only; not valid for forensic use. This test was developed and its performance characteristics   determined by [employer]. The U.S. Food and Drug   Administration has not approved or cleared this test; however, FDA   clearance or approval is not currently required for clinical use. The results are not intended to be used as the sole means for   clinical diagnosis or patient management decisions. EER Hi Res Interp Ur See Note   Final 06/29/2017  8:30 AM [facility]   (NOTE)   Access [employer] Enhanced Report using either link below:   -Direct access: [email or web address]   -Enter Username, Password: [email or web address]   Username: [email or web address]   Password: [email or web address]   Performed by [place] [id number], [id number] [facility] [phone]   www. Aleena Gilbert MD, Lab. Director   Performed at [address] [place] [address] Dr Juana Tran. [address] [phone]    Testing Performed By       Assessment:    Problem List Items Addressed This Visit     Degenerative disc disease, lumbar - Primary (Chronic)    Relevant Medications    oxyCODONE-acetaminophen (PERCOCET) 5-325 MG per tablet (Start on 12/26/2017)    morphine (MS CONTIN) 60 MG extended release tablet (Start on 12/26/2017)    Lumbar spondylosis (Chronic)    Relevant Medications    oxyCODONE-acetaminophen (PERCOCET) 5-325 MG per tablet (Start on 12/26/2017)    morphine (MS CONTIN) 60 MG extended release tablet (Start on 12/26/2017)    Lumbar radiculopathy (Chronic)    Encounter for medication monitoring (Chronic)    Cervical spondylitis (HCC) (Chronic)    Relevant Medications    oxyCODONE-acetaminophen (PERCOCET) 5-325 MG per tablet (Start on 12/26/2017)    morphine (MS CONTIN) 60 MG extended release tablet (Start on 12/26/2017)    Spinal stenosis of lumbar region without neurogenic claudication (Chronic)    Osteoarthritis of spine with radiculopathy, lumbar region    Relevant Medications    oxyCODONE-acetaminophen (PERCOCET) 5-325 MG per tablet (Start on 12/26/2017)    morphine (MS CONTIN) 60 MG extended release tablet (Start on 12/26/2017)    Encounter for chronic pain management      Other Visit Diagnoses    None.        Treatment Plan:  DISCUSSION: Treatment options discussed with patient and all questions answered to patient's satisfaction.       [x] Ill effects of being on chronic pain medications such as sleep disturbances, respiratory depression, hormonal changes, withdrawal symptoms,  chronic opioid dependence and tolerance as well as risk of taking opioids with Benzodiazepines and taking opioids along with  alcohol, were discussed with patient. I had asked the patient to minimize medication use and utilize pain medications only for uncontrolled rest pain or pain with exertional activities. I advised patient not to self escalate pain medications without consulting with us. At each of patient's future visits we will try to taper pain medications, while adjusting the adjunct medications, and re-evaluating for Physical Therapy to improve spinal and joint strength. We will continue to have discussions to decrease pain medications as tolerated.      TREATMENT OPTIONS:   Return in 4 weeks  Continue opioid therapy. Script written for percocet, ms contin  [facility] handout prescription opioids  Contract requirements met. Satisfactory pain management plan. Medication helps with personal goals and self care needs. Patient is stable on current regimen of meds and medication is effectively managing pain, we will continue current medications without changes. As always, we encourage daily stretching and strengthening exercises, and recommend minimizing use of pain medications unless patient cannot get through daily activities due to pain.   Follow up appointment made for 4 weeks

## 2018-01-25 ENCOUNTER — HOSPITAL ENCOUNTER (OUTPATIENT)
Dept: PAIN MANAGEMENT | Age: 68
Discharge: HOME | End: 2018-01-25
Payer: MEDICARE

## 2018-01-25 VITALS
RESPIRATION RATE: 17 BRPM | WEIGHT: 196 LBS | TEMPERATURE: 98.4 F | HEART RATE: 102 BPM | DIASTOLIC BLOOD PRESSURE: 77 MMHG | SYSTOLIC BLOOD PRESSURE: 118 MMHG | OXYGEN SATURATION: 95 % | HEIGHT: 69 IN | BODY MASS INDEX: 29.03 KG/M2

## 2018-01-25 DIAGNOSIS — M47.816 LUMBAR SPONDYLOSIS: ICD-10-CM

## 2018-01-25 DIAGNOSIS — M46.92 CERVICAL SPONDYLITIS (HCC): ICD-10-CM

## 2018-01-25 DIAGNOSIS — M54.16 LUMBAR RADICULOPATHY: ICD-10-CM

## 2018-01-25 DIAGNOSIS — M47.26 OSTEOARTHRITIS OF SPINE WITH RADICULOPATHY, LUMBAR REGION: ICD-10-CM

## 2018-01-25 DIAGNOSIS — G89.29 ENCOUNTER FOR CHRONIC PAIN MANAGEMENT: ICD-10-CM

## 2018-01-25 DIAGNOSIS — M51.36 DEGENERATIVE DISC DISEASE, LUMBAR: ICD-10-CM

## 2018-01-25 DIAGNOSIS — Z98.1 S/P CERVICAL SPINAL FUSION: ICD-10-CM

## 2018-01-25 DIAGNOSIS — Z51.81 ENCOUNTER FOR MEDICATION MONITORING: ICD-10-CM

## 2018-01-25 DIAGNOSIS — M48.061 SPINAL STENOSIS OF LUMBAR REGION WITHOUT NEUROGENIC CLAUDICATION: ICD-10-CM

## 2018-01-25 DIAGNOSIS — M47.816 OSTEOARTHRITIS OF LUMBAR SPINE, UNSPECIFIED SPINAL OSTEOARTHRITIS COMPLICATION STATUS: Primary | ICD-10-CM

## 2018-01-25 PROCEDURE — 99213 OFFICE O/P EST LOW 20 MIN: CPT

## 2018-01-25 PROCEDURE — 99214 OFFICE O/P EST MOD 30 MIN: CPT | Performed by: PAIN MEDICINE

## 2018-01-25 RX ORDER — MORPHINE SULFATE 60 MG/1
60 TABLET, FILM COATED, EXTENDED RELEASE ORAL EVERY 8 HOURS
Qty: 90 TABLET | Refills: 0 | Status: SHIPPED | OUTPATIENT
Start: 2018-01-25 | End: 2018-02-22 | Stop reason: SDUPTHER

## 2018-01-25 RX ORDER — OXYCODONE HYDROCHLORIDE AND ACETAMINOPHEN 5; 325 MG/1; MG/1
1 TABLET ORAL EVERY 8 HOURS
Qty: 90 TABLET | Refills: 0 | Status: SHIPPED | OUTPATIENT
Start: 2018-01-25 | End: 2018-02-22 | Stop reason: SDUPTHER

## 2018-01-25 ASSESSMENT — ENCOUNTER SYMPTOMS
SHORTNESS OF BREATH: 0
BLURRED VISION: 0
NAUSEA: 0
HEARTBURN: 1
SINUS PAIN: 0
BACK PAIN: 1
COUGH: 0
BOWEL INCONTINENCE: 0
EYES NEGATIVE: 1
ABDOMINAL PAIN: 0
PHOTOPHOBIA: 0
VOMITING: 0
RESPIRATORY NEGATIVE: 1

## 2018-01-25 ASSESSMENT — PAIN DESCRIPTION - DIRECTION: RADIATING_TOWARDS: RIGHT LEG WORSE THAN LEFT

## 2018-01-25 ASSESSMENT — PAIN SCALES - GENERAL: PAINLEVEL_OUTOF10: 4

## 2018-01-25 ASSESSMENT — PAIN DESCRIPTION - ORIENTATION: ORIENTATION: RIGHT;LEFT

## 2018-01-25 ASSESSMENT — PAIN DESCRIPTION - PAIN TYPE: TYPE: CHRONIC PAIN

## 2018-01-25 ASSESSMENT — PAIN DESCRIPTION - FREQUENCY: FREQUENCY: CONTINUOUS

## 2018-01-25 ASSESSMENT — PAIN DESCRIPTION - ONSET: ONSET: ON-GOING

## 2018-01-25 ASSESSMENT — PAIN DESCRIPTION - PROGRESSION: CLINICAL_PROGRESSION: NOT CHANGED

## 2018-01-25 ASSESSMENT — PAIN DESCRIPTION - LOCATION: LOCATION: BACK;LEG

## 2018-01-25 ASSESSMENT — PAIN DESCRIPTION - DESCRIPTORS: DESCRIPTORS: CONSTANT;ACHING;NUMBNESS

## 2018-01-25 NOTE — PROGRESS NOTES
current activities of daily living ar possible due to medications and would like to continue them. OARRS compliant? yes  Concern for prescription abuse?no    Current Pain Assessment  Pain Assessment  Pain Assessment: 0-10  Pain Level: 4  Pain Type: Chronic pain  Pain Location: Back, Leg  Pain Orientation: Right, Left  Pain Radiating Towards: right leg worse than left  Pain Descriptors: Constant, Aching, Numbness  Pain Frequency: Continuous  Pain Onset: On-going  Clinical Progression: Not changed  Effect of Pain on Daily Activities: Pain increases w/bending & lifting  Patient's Stated Pain Goal: 2 (decrease pain & increase activiity)  Pain Intervention(s): Medication (see eMar), Rest, Repositioned                    ADVERSE MEDICATION EFFECTS:   Constipation: no  Bowel Regimen: Yes  Diet: common adult  Appetite:  ok  Sedation:  no  Urinary Retention: no    FOCUSED PAIN SCALE:  Highest : 5  Lowest :4  Average: Range-4  When and What  was your last procedure:    LESI 2013  Was your procedure effective:  no    ACTIVITY/SOCIAL/EMOTIONAL:  Sleep Pattern: 6 hours per night. difficulty falling asleep and nightime awakenings  Energy Level:  Tired/Fatigued  Currently attending Physical Therapy:  No  Home Exercises: never none  Mobility: walking increases the pain  Currently seeing a Psychiatrist or Psychologist:  No  Emotional Issues: normal   Mood: appropriate     ABERRANT BEHAVIORS SINCE LAST VISIT:  Have you ever been treated in another [address] [place] [address], years ago  Refills for prescriptions appropriate: yes  Lost rx/pills: no  Taking more medication than prescribed:  no  Are you receiving PAIN medications from  other doctors: no  Last Urine/Serum Drug Screen :6/29/17  Was Serum/UDS as anticipated?   yes  Brought pill bottles in :yes   Was Pill count appropriate? :yes   Are currently pregnant? not applicable  Recent ER visits: No             Past Medical History      Diagnosis Date    Arthritis     osteoarthritis  Spinal stenosis of lumbar region without neurogenic claudication    Encounter for chronic pain management        ASSESSMENT    Frankie Azevedo is a 74 y.o. male with     1. Degenerative disc disease, lumbar    2. Osteoarthritis of lumbar spine, unspecified spinal osteoarthritis complication status    3. Osteoarthritis of spine with radiculopathy, lumbar region    4. Encounter for medication monitoring    5. Encounter for chronic pain management    6. Lumbar spondylosis    7. Lumbar radiculopathy    8. Spinal stenosis of lumbar region without neurogenic claudication    9. Cervical spondylitis ([facility] [address]    10. S/P cervical spinal fusion           PLAN    We will continue current pain medications  Current medications are being tolerated without any Adverse side effects. Orders Placed This Encounter   Medications    oxyCODONE-acetaminophen (PERCOCET) 5-325 MG per tablet     Sig: Take 1 tablet by mouth every 8 hours for 30 days. Earliest Fill Date: 1/25/18     Dispense:  90 tablet     Refill:  0    morphine (MS CONTIN) 60 MG extended release tablet     Sig: Take 1 tablet by mouth every 8 hours for 30 days. Earliest Fill Date: 1/25/18     Dispense:  90 tablet     Refill:  0    LYRICA 150 MG capsule     Sig: Take 1 capsule by mouth 2 times daily for 30 days. Dispense:  60 capsule     Refill:  2     Urine drug screens have been appropriate. No aberrant activity noted. Analgesia is achieved. Activities of daily living are possible because of medications. Safe use of medications explained to patient. Counselling/Preventive measures for pain  Control:    [x]  Spine strengthening exercises are discussed with patient in detail. [x] Ill effects of being on chronic pain medications such as sleep disturbances, hormonal changes, withdrawal symptoms,  chronic opioid dependence and tolerance were discussed with patient.  I had asked the patient to minimize medication use and utilize pain medications only for uncontrolled rest pain or pain with exertional activities. I advised patient not to self escalate pain medications without consulting with us. At each of patient's future visits we will try to taper pain medications, while adjusting the adjunct medications, and re-evaluating for Physical Therapy to improve spinal and joint strength. We will continue to have discussions to decrease pain medications as tolerated. We discussed the same at today's visit and have not been to implement it, as the patient's pain is somewhat under control with current medications. Orders Placed This Encounter   Procedures    MRI Lumbar Spine Wo Contrast     Standing Status:   Future     Standing Expiration Date:   1/25/2019     Patient is complaining of increasing lumbar pain. He is on high doses of narcotics. He did physical therapy in the past without much pain relief. We also tried lumbar epidural steroid injections without much relief of pain. Hence at this time we'll order an MRI of the lumbar spine to assess the spine properly and then depending on the results we will plan further treatment. I also advised the patient that we'll be trying to decrease the dose of 4 is narcotics in future unless the MRI shows otherwise. Patient was also told if he does not want to come down on his medications in the [facility] physician to write his pain medications. We have decreased his morphine from 100mg. to 60 mg the past.  Decision Making Process : Patient's health history and referral records thoroughly reviewed before focused physical examination and discussion with patient. Over 50% of today's visit is spent on examining the patient and counseling. Level of complexity of date to be reviewed is Moderate. The chart date reviewed include the following: Imaging Reports. Summary of Care. Time spent reviewing with patient the below reports:   Medication safety, Treatment options.     Level of diagnosis and management options of

## 2018-02-16 ENCOUNTER — HOSPITAL ENCOUNTER (OUTPATIENT)
Age: 68
Discharge: HOME | End: 2018-02-16
Payer: MEDICARE

## 2018-02-16 ENCOUNTER — HOSPITAL ENCOUNTER (OUTPATIENT)
Dept: MRI IMAGING | Age: 68
Discharge: HOME | End: 2018-02-18
Payer: MEDICARE

## 2018-02-16 DIAGNOSIS — M47.816 OSTEOARTHRITIS OF LUMBAR SPINE, UNSPECIFIED SPINAL OSTEOARTHRITIS COMPLICATION STATUS: ICD-10-CM

## 2018-02-16 DIAGNOSIS — M51.36 DEGENERATIVE DISC DISEASE, LUMBAR: ICD-10-CM

## 2018-02-16 DIAGNOSIS — M54.16 LUMBAR RADICULOPATHY: ICD-10-CM

## 2018-02-16 DIAGNOSIS — M47.26 OSTEOARTHRITIS OF SPINE WITH RADICULOPATHY, LUMBAR REGION: ICD-10-CM

## 2018-02-16 DIAGNOSIS — M47.816 LUMBAR SPONDYLOSIS: ICD-10-CM

## 2018-02-16 DIAGNOSIS — Z51.81 ENCOUNTER FOR MEDICATION MONITORING: ICD-10-CM

## 2018-02-16 DIAGNOSIS — M48.061 SPINAL STENOSIS OF LUMBAR REGION WITHOUT NEUROGENIC CLAUDICATION: ICD-10-CM

## 2018-02-16 LAB
ABSOLUTE EOS #: 0.06 K/UL (ref 0–0.4)
ABSOLUTE IMMATURE GRANULOCYTE: ABNORMAL K/UL (ref 0–0.3)
ABSOLUTE LYMPH #: 1.77 K/UL (ref 1–4.8)
ABSOLUTE MONO #: 0.61 K/UL (ref 0.1–1.3)
BASOPHILS # BLD: 1 % (ref 0–2)
BASOPHILS ABSOLUTE: 0.06 K/UL (ref 0–0.2)
CREATININE URINE: 226.6 MG/DL (ref 39–259)
DIFFERENTIAL TYPE: ABNORMAL
EOSINOPHILS RELATIVE PERCENT: 1 % (ref 0–4)
HCT VFR BLD CALC: 28 % (ref 41–53)
HEMOGLOBIN: 7.9 G/DL (ref 13.5–17.5)
IMMATURE GRANULOCYTES: ABNORMAL %
LYMPHOCYTES # BLD: 29 % (ref 24–44)
MCH RBC QN AUTO: 17.4 PG (ref 26–34)
MCHC RBC AUTO-ENTMCNC: 28.2 G/DL (ref 31–37)
MCV RBC AUTO: 61.9 FL (ref 80–100)
MICROALBUMIN/CREAT 24H UR: 98 MG/L
MICROALBUMIN/CREAT UR-RTO: 43 MCG/MG CREAT
MONOCYTES # BLD: 10 % (ref 1–7)
MORPHOLOGY: ABNORMAL
NRBC AUTOMATED: ABNORMAL PER 100 WBC
PDW BLD-RTO: 19.3 % (ref 11.5–14.9)
PLATELET # BLD: 295 K/UL (ref 150–450)
PLATELET ESTIMATE: ABNORMAL
PMV BLD AUTO: 8.7 FL (ref 6–12)
RBC # BLD: 4.53 M/UL (ref 4.5–5.9)
RBC # BLD: ABNORMAL 10*6/UL
SEG NEUTROPHILS: 59 % (ref 36–66)
SEGMENTED NEUTROPHILS ABSOLUTE COUNT: 3.6 K/UL (ref 1.3–9.1)
WBC # BLD: 6.1 K/UL (ref 3.5–11)
WBC # BLD: ABNORMAL 10*3/UL

## 2018-02-16 PROCEDURE — 36415 COLL VENOUS BLD VENIPUNCTURE: CPT

## 2018-02-16 PROCEDURE — 72148 MRI LUMBAR SPINE W/O DYE: CPT

## 2018-02-16 PROCEDURE — 82043 UR ALBUMIN QUANTITATIVE: CPT

## 2018-02-16 PROCEDURE — 83036 HEMOGLOBIN GLYCOSYLATED A1C: CPT

## 2018-02-16 PROCEDURE — 85025 COMPLETE CBC W/AUTO DIFF WBC: CPT

## 2018-02-16 PROCEDURE — 82570 ASSAY OF URINE CREATININE: CPT

## 2018-02-18 LAB
ESTIMATED AVERAGE GLUCOSE: 114 MG/DL
HBA1C MFR BLD: 5.6 % (ref 4–6)

## 2018-02-19 LAB — PATHOLOGIST REVIEW: NORMAL

## 2018-02-22 ENCOUNTER — HOSPITAL ENCOUNTER (OUTPATIENT)
Dept: PAIN MANAGEMENT | Age: 68
Discharge: HOME | End: 2018-02-22
Payer: MEDICARE

## 2018-02-22 VITALS
SYSTOLIC BLOOD PRESSURE: 121 MMHG | RESPIRATION RATE: 20 BRPM | BODY MASS INDEX: 29.03 KG/M2 | TEMPERATURE: 97.8 F | WEIGHT: 196 LBS | HEIGHT: 69 IN | DIASTOLIC BLOOD PRESSURE: 78 MMHG | HEART RATE: 109 BPM | OXYGEN SATURATION: 98 %

## 2018-02-22 DIAGNOSIS — M48.061 SPINAL STENOSIS OF LUMBAR REGION WITHOUT NEUROGENIC CLAUDICATION: ICD-10-CM

## 2018-02-22 DIAGNOSIS — M47.816 OSTEOARTHRITIS OF LUMBAR SPINE, UNSPECIFIED SPINAL OSTEOARTHRITIS COMPLICATION STATUS: ICD-10-CM

## 2018-02-22 DIAGNOSIS — M46.92 CERVICAL SPONDYLITIS (HCC): ICD-10-CM

## 2018-02-22 DIAGNOSIS — M51.36 DEGENERATIVE DISC DISEASE, LUMBAR: ICD-10-CM

## 2018-02-22 DIAGNOSIS — Z51.81 ENCOUNTER FOR MEDICATION MONITORING: ICD-10-CM

## 2018-02-22 DIAGNOSIS — M47.26 OSTEOARTHRITIS OF SPINE WITH RADICULOPATHY, LUMBAR REGION: ICD-10-CM

## 2018-02-22 DIAGNOSIS — Z98.1 S/P CERVICAL SPINAL FUSION: ICD-10-CM

## 2018-02-22 DIAGNOSIS — G89.29 ENCOUNTER FOR CHRONIC PAIN MANAGEMENT: ICD-10-CM

## 2018-02-22 DIAGNOSIS — M47.816 LUMBAR SPONDYLOSIS: ICD-10-CM

## 2018-02-22 DIAGNOSIS — M54.16 LUMBAR RADICULOPATHY: ICD-10-CM

## 2018-02-22 PROCEDURE — 99214 OFFICE O/P EST MOD 30 MIN: CPT | Performed by: PAIN MEDICINE

## 2018-02-22 PROCEDURE — 99213 OFFICE O/P EST LOW 20 MIN: CPT

## 2018-02-22 RX ORDER — OXYCODONE HYDROCHLORIDE AND ACETAMINOPHEN 5; 325 MG/1; MG/1
1 TABLET ORAL EVERY 8 HOURS
Qty: 90 TABLET | Refills: 0 | Status: SHIPPED | OUTPATIENT
Start: 2018-02-24 | End: 2018-03-21 | Stop reason: SDUPTHER

## 2018-02-22 RX ORDER — MORPHINE SULFATE 60 MG/1
60 TABLET, FILM COATED, EXTENDED RELEASE ORAL EVERY 8 HOURS
Qty: 90 TABLET | Refills: 0 | Status: SHIPPED | OUTPATIENT
Start: 2018-02-24 | End: 2018-03-21 | Stop reason: SDUPTHER

## 2018-02-22 ASSESSMENT — ENCOUNTER SYMPTOMS
EYES NEGATIVE: 1
BACK PAIN: 1
ABDOMINAL PAIN: 0
VOMITING: 0
SORE THROAT: 0
PHOTOPHOBIA: 0
NAUSEA: 0
BLURRED VISION: 0
BOWEL INCONTINENCE: 0
RESPIRATORY NEGATIVE: 1
GASTROINTESTINAL NEGATIVE: 1
SHORTNESS OF BREATH: 0
SINUS PAIN: 0
COUGH: 0

## 2018-02-22 ASSESSMENT — PAIN DESCRIPTION - PROGRESSION: CLINICAL_PROGRESSION: NOT CHANGED

## 2018-02-22 ASSESSMENT — PAIN DESCRIPTION - ORIENTATION: ORIENTATION: RIGHT;LEFT;LOWER

## 2018-02-22 ASSESSMENT — PAIN DESCRIPTION - PAIN TYPE: TYPE: CHRONIC PAIN

## 2018-02-22 ASSESSMENT — PAIN DESCRIPTION - DESCRIPTORS: DESCRIPTORS: ACHING;NUMBNESS

## 2018-02-22 ASSESSMENT — PAIN DESCRIPTION - ONSET: ONSET: ON-GOING

## 2018-02-22 ASSESSMENT — PAIN DESCRIPTION - FREQUENCY: FREQUENCY: CONTINUOUS

## 2018-02-22 ASSESSMENT — PAIN SCALES - GENERAL: PAINLEVEL_OUTOF10: 3

## 2018-02-22 ASSESSMENT — PAIN DESCRIPTION - LOCATION: LOCATION: BACK;FOOT

## 2018-02-22 NOTE — PROGRESS NOTES
supple. No tracheal deviation present. No thyromegaly present. Cardiovascular: Normal rate and regular rhythm. Pulmonary/Chest: Effort normal. No respiratory distress. Abdominal: Soft. He exhibits no distension. Musculoskeletal: He exhibits no edema or deformity. Cervical back: He exhibits decreased range of motion, tenderness, pain and spasm. He exhibits no bony tenderness, no swelling and no deformity. Neurological: He is alert and oriented to person, place, and time. No cranial nerve deficit or sensory deficit. He exhibits normal muscle tone. Coordination and gait normal.   Reflex Scores:       Patellar reflexes are 1+ on the right side and 1+ on the left side. Achilles reflexes are 1+ on the right side and 1+ on the left side. Pain along the left thumb and hand. Skin: Skin is warm, dry and intact. Psychiatric: He has a normal mood and affect. His speech is normal and behavior is normal. Judgment and thought content normal. Cognition and memory are normal.   Nursing note and vitals reviewed. Right Ankle Exam     Tenderness   The patient is experiencing tenderness in the medial malleolus. Muscle Strength   The patient has normal right ankle strength. Left Ankle Exam     Muscle Strength   The patient has normal left ankle strength. Right Knee Exam     Muscle Strength     The patient has normal right knee strength. Left Knee Exam     Muscle Strength     The patient has normal left knee strength. Right Hip Exam     Muscle Strength   The patient has normal right hip strength. Left Hip Exam     Muscle Strength   The patient has normal left hip strength. Back Exam     Tenderness   The patient is experiencing tenderness in the lumbar, cervical and sacroiliac. Range of Motion   Back extension: Limited and painful. Back flexion: Limited and painful. Back lateral bend right: Limited and painful.    Back lateral bend left: Limited and painful. Back rotation right: Limited and painful. Back rotation left: Limited and painful. Muscle Strength   The patient has normal back strength. Tests   Straight leg raise right: positive  Straight leg raise left: negative    Reflexes   Patellar: normal    Other   Sensation: normal  Gait: normal   Erythema: no back redness  Scars: absent    Comments:  Mild thoracic kyphosis present and scoliosis absent  Alignment of her shoulders, scapulae and iliac crests--symmetric  Paraspinal tenderness:Present  Lumbar lordosis-----------Decreased  Movements of the lumbar spine indicated above are diminished range with pain   Facet loading---  : Right side--    Pain-Moderate                                   Left side----   Pain  Moderate  Phi's test -------------- Right side---negative                                   Left side-----negative  Examination of the cervical spine revealed loss of cervical lordosis. There is tenderness on palpation bilaterally in the paraspinal area. There is a well-healed scar both anteriorly and posteriorly from his previous surgeries. Movements of the cervical spine are restricted and painful. Nurses Notes and Vital Signs reviewed.     DATA  Labs:  Benzodiazepine Screen, Urine   Date Value Ref Range Status   09/12/2014 POSITIVE (A) NEG Final     Comment:           (Positive cutoff 200 ng/mL)                    2/16/2018  5:44 PM - Raimundo, [facility] Incoming Lab Results From [facility]     Component Results     Component Value Ref Range & Units Status Collected Lab   WBC 6.1  3.5 - 11.0 k/uL Final 02/16/2018  4:45  [facility]   RBC 4.53  4.5 - 5.9 m/uL Final 02/16/2018  4:45  [facility] [address] [facility]   Hemoglobin 7.9   13.5 - 17.5 g/dL Final 02/16/2018  4:45  [facility] [address] [facility]   Hematocrit 28.0   41 - 53 % Final 02/16/2018  4:45  [facility]   MCV 61.9   80 - 100 fL Final 02/16/2018  4:45  [facility] [address] [facility] approximately 3.1 cm.  Dedicated   imaging of the aorta is recommended.       L1-L2: Diffuse disc bulging is noted with a small proximal foraminal   protrusion on the right.  Annular tear is noted.  Similar findings were noted   on the prior.  Facet hypertrophic changes are noted.       L2-L3: Minimal disc bulging is noted diffusely. Luzmaria Child is a questionable   small left foraminal protrusion laterally.  There is minimal narrowing of the   left foramen.  Facet hypertrophic changes are noted.  Findings are stable       L3-L4: Mild disc bulging is noted diffusely.  Minimal endplate and mild facet   hypertrophic changes are noted.  There is borderline mild proximal foraminal   narrowing on the left.  This is stable.       L4-L5: Mild disc bulging is noted.  A small inferior foraminal protrusion on   the right is noted.  There is a slightly larger broad-based protrusion into   the left foramen.  Bilateral foraminal narrowing is noted.  Findings not   appear grossly changed.  Facet hypertrophic changes are noted       L5-S1: Minimal disc bulging is noted.  Facet hypertrophic changes are noted.       SI joint degenerative changes are noted.           Impression   No significant interval change in the multifocal degenerative disc disease   throughout the lumbar spine.  See above for details of each level       Suggested aortic aneurysm.  Dedicated imaging of the aorta is recommended       RECOMMENDATIONS:   Managing Abdominal Aortic Aneurysms       2.6-2.9 cm: Every 5 years*       3.0-3.4 cm: Every 3 years.       3.5-3.9 cm: Every 1 year.       4.0-4.4 cm: Every 1 year. Recommend vascular consultation.       4.5-5.4 cm: Every 6 months.  Recommend vascular consultation.       Greater than or equal to 5.5 cm: Referral to vascular surgeon.       *For abdominal aortas with maximum diameter of 2.6-2.9 cm meeting criteria   for AAA (>50% of proximal normal segment).       Reference:                   Patient Active Problem List

## 2018-03-14 ENCOUNTER — HOSPITAL ENCOUNTER (OUTPATIENT)
Dept: INPATIENT UNIT | Age: 68
Setting detail: THERAPIES SERIES
Discharge: HOME | End: 2018-03-14
Payer: MEDICARE

## 2018-03-14 VITALS
DIASTOLIC BLOOD PRESSURE: 71 MMHG | HEART RATE: 86 BPM | RESPIRATION RATE: 16 BRPM | SYSTOLIC BLOOD PRESSURE: 122 MMHG | TEMPERATURE: 99 F | OXYGEN SATURATION: 97 %

## 2018-03-14 PROCEDURE — 86900 BLOOD TYPING SEROLOGIC ABO: CPT

## 2018-03-14 PROCEDURE — 86901 BLOOD TYPING SEROLOGIC RH(D): CPT

## 2018-03-14 PROCEDURE — 86850 RBC ANTIBODY SCREEN: CPT

## 2018-03-14 PROCEDURE — 6370000000 HC RX 637 (ALT 250 FOR IP): Performed by: INTERNAL MEDICINE

## 2018-03-14 PROCEDURE — 96374 THER/PROPH/DIAG INJ IV PUSH: CPT

## 2018-03-14 PROCEDURE — 86920 COMPATIBILITY TEST SPIN: CPT

## 2018-03-14 PROCEDURE — 6360000002 HC RX W HCPCS: Performed by: INTERNAL MEDICINE

## 2018-03-14 PROCEDURE — 36415 COLL VENOUS BLD VENIPUNCTURE: CPT

## 2018-03-14 PROCEDURE — 36430 TRANSFUSION BLD/BLD COMPNT: CPT

## 2018-03-14 PROCEDURE — P9016 RBC LEUKOCYTES REDUCED: HCPCS

## 2018-03-14 RX ORDER — DIPHENHYDRAMINE HCL 25 MG
25 TABLET ORAL SEE ADMIN INSTRUCTIONS
Status: COMPLETED | OUTPATIENT
Start: 2018-03-14 | End: 2018-03-14

## 2018-03-14 RX ORDER — ACETAMINOPHEN 325 MG/1
650 TABLET ORAL SEE ADMIN INSTRUCTIONS
Status: COMPLETED | OUTPATIENT
Start: 2018-03-14 | End: 2018-03-14

## 2018-03-14 RX ORDER — FUROSEMIDE 10 MG/ML
20 INJECTION INTRAMUSCULAR; INTRAVENOUS PRN
Status: DISCONTINUED | OUTPATIENT
Start: 2018-03-14 | End: 2018-03-15 | Stop reason: HOSPADM

## 2018-03-14 RX ORDER — 0.9 % SODIUM CHLORIDE 0.9 %
250 INTRAVENOUS SOLUTION INTRAVENOUS ONCE
Status: DISCONTINUED | OUTPATIENT
Start: 2018-03-14 | End: 2018-03-15 | Stop reason: HOSPADM

## 2018-03-14 RX ADMIN — DIPHENHYDRAMINE HCL 25 MG: 25 TABLET ORAL at 09:53

## 2018-03-14 RX ADMIN — FUROSEMIDE 20 MG: 10 INJECTION, SOLUTION INTRAVENOUS at 12:36

## 2018-03-14 RX ADMIN — ACETAMINOPHEN 650 MG: 325 TABLET, FILM COATED ORAL at 09:53

## 2018-03-14 ASSESSMENT — PAIN DESCRIPTION - PAIN TYPE: TYPE: CHRONIC PAIN

## 2018-03-14 ASSESSMENT — PAIN SCALES - GENERAL
PAINLEVEL_OUTOF10: 3
PAINLEVEL_OUTOF10: 0

## 2018-03-14 ASSESSMENT — PAIN DESCRIPTION - LOCATION: LOCATION: BACK

## 2018-03-15 LAB
ABO/RH: NORMAL
ANTIBODY SCREEN: NEGATIVE
ARM BAND NUMBER: NORMAL
BLD PROD TYP BPU: NORMAL
BLD PROD TYP BPU: NORMAL
BLOOD BANK COMMENT: NORMAL
CROSSMATCH RESULT: NORMAL
CROSSMATCH RESULT: NORMAL
DISPENSE STATUS BLOOD BANK: NORMAL
DISPENSE STATUS BLOOD BANK: NORMAL
EXPIRATION DATE: NORMAL
TRANSFUSION STATUS: NORMAL
TRANSFUSION STATUS: NORMAL
UNIT DIVISION: 0
UNIT DIVISION: 0
UNIT NUMBER: NORMAL
UNIT NUMBER: NORMAL

## 2018-03-19 RX ORDER — EPINEPHRINE 1 MG/ML
0.3 INJECTION, SOLUTION, CONCENTRATE INTRAVENOUS PRN
Status: CANCELLED | OUTPATIENT
Start: 2018-03-19

## 2018-03-19 RX ORDER — 0.9 % SODIUM CHLORIDE 0.9 %
10 VIAL (ML) INJECTION ONCE
Status: CANCELLED | OUTPATIENT
Start: 2018-03-19 | End: 2018-03-19

## 2018-03-19 RX ORDER — HEPARIN SODIUM (PORCINE) LOCK FLUSH IV SOLN 100 UNIT/ML 100 UNIT/ML
500 SOLUTION INTRAVENOUS PRN
Status: CANCELLED | OUTPATIENT
Start: 2018-03-19

## 2018-03-19 RX ORDER — SODIUM CHLORIDE 0.9 % (FLUSH) 0.9 %
10 SYRINGE (ML) INJECTION PRN
Status: CANCELLED | OUTPATIENT
Start: 2018-03-19

## 2018-03-19 RX ORDER — SODIUM CHLORIDE 9 MG/ML
100 INJECTION, SOLUTION INTRAVENOUS CONTINUOUS
Status: CANCELLED | OUTPATIENT
Start: 2018-03-19

## 2018-03-19 RX ORDER — SODIUM CHLORIDE 0.9 % (FLUSH) 0.9 %
5 SYRINGE (ML) INJECTION PRN
Status: CANCELLED | OUTPATIENT
Start: 2018-03-19

## 2018-03-19 RX ORDER — SODIUM CHLORIDE 9 MG/ML
INJECTION, SOLUTION INTRAVENOUS ONCE
Status: CANCELLED | OUTPATIENT
Start: 2018-03-19 | End: 2018-03-19

## 2018-03-19 RX ORDER — DIPHENHYDRAMINE HYDROCHLORIDE 50 MG/ML
50 INJECTION INTRAMUSCULAR; INTRAVENOUS ONCE
Status: CANCELLED | OUTPATIENT
Start: 2018-03-19 | End: 2018-03-19

## 2018-03-19 RX ORDER — METHYLPREDNISOLONE SODIUM SUCCINATE 125 MG/2ML
125 INJECTION, POWDER, LYOPHILIZED, FOR SOLUTION INTRAMUSCULAR; INTRAVENOUS ONCE
Status: CANCELLED | OUTPATIENT
Start: 2018-03-19 | End: 2018-03-19

## 2018-03-21 ENCOUNTER — HOSPITAL ENCOUNTER (OUTPATIENT)
Dept: PAIN MANAGEMENT | Age: 68
Discharge: HOME | End: 2018-03-21
Payer: MEDICARE

## 2018-03-21 VITALS
OXYGEN SATURATION: 97 % | RESPIRATION RATE: 16 BRPM | DIASTOLIC BLOOD PRESSURE: 68 MMHG | HEART RATE: 110 BPM | TEMPERATURE: 98.8 F | SYSTOLIC BLOOD PRESSURE: 105 MMHG | BODY MASS INDEX: 28.58 KG/M2 | WEIGHT: 193 LBS | HEIGHT: 69 IN

## 2018-03-21 DIAGNOSIS — M48.061 SPINAL STENOSIS OF LUMBAR REGION WITHOUT NEUROGENIC CLAUDICATION: ICD-10-CM

## 2018-03-21 DIAGNOSIS — G89.29 ENCOUNTER FOR CHRONIC PAIN MANAGEMENT: ICD-10-CM

## 2018-03-21 DIAGNOSIS — Z98.1 S/P CERVICAL SPINAL FUSION: ICD-10-CM

## 2018-03-21 DIAGNOSIS — M47.26 OSTEOARTHRITIS OF SPINE WITH RADICULOPATHY, LUMBAR REGION: ICD-10-CM

## 2018-03-21 DIAGNOSIS — M54.16 LUMBAR RADICULOPATHY: ICD-10-CM

## 2018-03-21 DIAGNOSIS — M51.36 DEGENERATIVE DISC DISEASE, LUMBAR: Primary | ICD-10-CM

## 2018-03-21 DIAGNOSIS — M46.92 CERVICAL SPONDYLITIS (HCC): ICD-10-CM

## 2018-03-21 DIAGNOSIS — Z51.81 ENCOUNTER FOR MEDICATION MONITORING: ICD-10-CM

## 2018-03-21 DIAGNOSIS — M47.816 OSTEOARTHRITIS OF LUMBAR SPINE, UNSPECIFIED SPINAL OSTEOARTHRITIS COMPLICATION STATUS: ICD-10-CM

## 2018-03-21 DIAGNOSIS — M47.816 LUMBAR SPONDYLOSIS: ICD-10-CM

## 2018-03-21 PROCEDURE — 80307 DRUG TEST PRSMV CHEM ANLYZR: CPT

## 2018-03-21 PROCEDURE — 99213 OFFICE O/P EST LOW 20 MIN: CPT | Performed by: NURSE PRACTITIONER

## 2018-03-21 PROCEDURE — 99213 OFFICE O/P EST LOW 20 MIN: CPT

## 2018-03-21 RX ORDER — OXYCODONE HYDROCHLORIDE AND ACETAMINOPHEN 5; 325 MG/1; MG/1
1 TABLET ORAL EVERY 8 HOURS
Qty: 90 TABLET | Refills: 0 | Status: SHIPPED | OUTPATIENT
Start: 2018-03-21 | End: 2018-04-23 | Stop reason: SDUPTHER

## 2018-03-21 RX ORDER — MORPHINE SULFATE 60 MG/1
60 TABLET, FILM COATED, EXTENDED RELEASE ORAL EVERY 8 HOURS
Qty: 90 TABLET | Refills: 0 | Status: SHIPPED | OUTPATIENT
Start: 2018-03-26 | End: 2018-04-23 | Stop reason: SDUPTHER

## 2018-03-21 ASSESSMENT — ENCOUNTER SYMPTOMS
RESPIRATORY NEGATIVE: 1
GASTROINTESTINAL NEGATIVE: 1
BACK PAIN: 1

## 2018-03-21 NOTE — PROGRESS NOTES
identified. (Vlad Moran, APRN)  Chronic Pain: Treatment objectives documented - patient is progressing appropriately. , Functional status reviewed - continues with improved or maintaining ADL's., Reviewed the patient's functional status and documentation. , Dose reduction has been attempted. (Vlad Moran, APRN)  Review of [place] does not show any aberrant prescription behavior. Medication is helping the patient stay active. Patient denies any side effects and reports adequate analgesia. No sign of misuse/abuse. When was the last UDS: 6-29-17  Was the UDS appropriate: yes  Record/Diagnostics Review:      As above, I did review the imaging    7/4/2017  8:26 AM - Raimundo, [facility] Incoming Lab Results From [facility]     Component Results     Component Value Ref Range & Units Status Collected Lab   Pain Management Drug Panel Interp, Urine Consistent   Final 06/29/2017  8:30 AM [facility]   (NOTE)   ________________________________________________________________   DRUGS EXPECTED:   OXYCODONE   MORPHINE [6/29/17]   ________________________________________________________________   CONSISTENT with medications provided:   OXYCODONE: based on oxycodone, noroxycodone   MORPHINE : based on morphine   ________________________________________________________________   INTERPRETIVE INFORMATION:Pain Mgt Grigsby, High Res/EMIT, Ur, Interp   Interpretation depends on accuracy and completeness of patient   medication information submitted by client.     6-Acetylmorphine, Ur Not Detected   Final 06/29/2017  8:30 AM [facility]   7-Aminoclonazepam, Urine Not Detected   Final 06/29/2017  8:30 AM [facility]   Alpha-OH-Alpraz, Urine Not Detected   Final 06/29/2017  8:30 AM [facility]   Alprazolam, Urine Not Detected   Final 06/29/2017  8:30 AM [facility]   Amphetamines, urine Not Detected   Final 06/29/2017  8:30 AM [facility]   Barbiturates, Ur Not Detected   Final 06/29/2017  8:30 AM [facility]   Benzoylecgonine, Ur Not Detected   [facility]29[facility]2017  [facility] Bup[facility]orphine Urine Not Detected   Final 06/29/2017  8:30 AM [facility]   Carisoprodol, Ur Not Detected   Final 06/29/2017  8:30 AM [facility]   (NOTE)   The carisoprodol immunoassay has cross-reactivity to carisoprodol   and meprobamate.     Clonazepam, Urine Not Detected   Final 06/29/2017  8:30 AM [facility]   Codeine, Urine Not Detected   Final 06/29/2017  8:30 AM [facility]   MDA, Ur Not Detected   Final 06/29/2017  8:30 AM [facility]   Diazepam, Urine Not Detected   Final 06/29/2017  8:30 AM [facility]   Ethyl Glucuronide Ur Not Detected   Final 06/29/2017  8:30 AM [facility]   Fentanyl, Ur Not Detected   Final 06/29/2017  8:30 AM [facility]   Hydrocodone, Urine Not Detected   Final 06/29/2017  8:30 AM [facility]   Hydromorphone, Urine Not Detected   Final 06/29/2017  8:30 AM [facility]   Lorazepam, Urine Not Detected   Final 06/29/2017  8:30 AM [facility]   Marijuana Metab, Ur Not Detected   Final 06/29/2017  8:30 AM [facility]   MDEA, JAZMIN, Ur Not Detected   Final 06/29/2017  8:30 AM [facility]   MDMA URINE Not Detected   Final 06/29/2017  8:30 AM [facility]   Meperidine Metab, Ur Not Detected   Final 06/29/2017  8:30 AM [facility]   Methadone, Urine Not Detected   Final 06/29/2017  8:30 AM [facility]   Methamphetamine, Urine Not Detected   Final 06/29/2017  8:30 AM [facility]   Methylphenidate Not Detected   Final 06/29/2017  8:30 AM [facility]   Midazolam, Urine Not Detected   Final 06/29/2017  8:30 AM [facility]   Morphine Urine Present   Final 06/29/2017  8:30 AM [facility]   Norbuprenorphine, Urine Not Detected   Final 06/29/2017  8:30 AM [facility]   Nordiazepam, Urine Not Detected   Final 06/29/2017  8:30 AM [facility]   Norfentanyl, Urine Not Detected   Final 06/29/2017  8:30 AM [facility]   NORHYDROCODONE, URINE Not Detected   Final 06/29/2017  8:30 AM [facility]   Noroxycodone, Urine Present   Final 06/29/2017  8:30 AM [facility]   NOROXYMORPHONE, URINE Not Detected   Final 06/29/2017  8:30 AM [facility]   Oxazepam, Urine Not Detected   [facility]29[facility]2017  [facility]. will increase dose of lyrica from 2 to 3 times a day due to increased burning, numbness feet    UDT this vist, had percocet and ms contin this am at 7am     TREATMENT OPTIONS:   Return in 4 weeks  Contract requirements met. Continue opioid therapy. Script written for percocet, ms contin, lyrica  Patient is stable on current regimen of meds and medication is effectively managing pain, we will continue current medications without changes. As always, we encourage daily stretching and strengthening exercises, and recommend minimizing use of pain medications unless patient cannot get through daily activities due to pain.   Follow up appointment made for 4 weeks

## 2018-03-27 ENCOUNTER — HOSPITAL ENCOUNTER (OUTPATIENT)
Dept: INPATIENT UNIT | Age: 68
Setting detail: THERAPIES SERIES
Discharge: HOME | End: 2018-03-27
Payer: MEDICARE

## 2018-03-27 VITALS
SYSTOLIC BLOOD PRESSURE: 123 MMHG | HEART RATE: 94 BPM | RESPIRATION RATE: 16 BRPM | TEMPERATURE: 97.8 F | OXYGEN SATURATION: 96 % | DIASTOLIC BLOOD PRESSURE: 69 MMHG

## 2018-03-27 DIAGNOSIS — D50.8 OTHER IRON DEFICIENCY ANEMIA: ICD-10-CM

## 2018-03-27 LAB
6-ACETYLMORPHINE, UR: NOT DETECTED
7-AMINOCLONAZEPAM, URINE: NOT DETECTED
ALPHA-OH-ALPRAZ, URINE: NOT DETECTED
ALPRAZOLAM, URINE: NOT DETECTED
AMPHETAMINES, URINE: NOT DETECTED
BARBITURATES, URINE: NOT DETECTED
BENZOYLECGONINE, UR: NOT DETECTED
BUPRENORPHINE URINE: NOT DETECTED
CARISOPRODOL, UR: NOT DETECTED
CLONAZEPAM, URINE: NOT DETECTED
CODEINE, URINE: NOT DETECTED
CREATININE URINE: 197 MG/DL (ref 20–400)
DIAZEPAM, URINE: NOT DETECTED
DRUGS EXPECTED, UR: NORMAL
EER HI RES INTERP UR: NORMAL
ETHYL GLUCURONIDE UR: NOT DETECTED
FENTANYL URINE: NOT DETECTED
HYDROCODONE, URINE: NOT DETECTED
HYDROMORPHONE, URINE: NOT DETECTED
LORAZEPAM, URINE: NOT DETECTED
MARIJUANA METAB, UR: NOT DETECTED
MDA, UR: NOT DETECTED
MDEA, EVE, UR: NOT DETECTED
MDMA URINE: NOT DETECTED
MEPERIDINE METAB, UR: NOT DETECTED
METHADONE, URINE: NOT DETECTED
METHAMPHETAMINE, URINE: NOT DETECTED
METHYLPHENIDATE: NOT DETECTED
MIDAZOLAM, URINE: NOT DETECTED
MORPHINE URINE: PRESENT
NORBUPRENORPHINE, URINE: NOT DETECTED
NORDIAZEPAM, URINE: NOT DETECTED
NORFENTANYL, URINE: NOT DETECTED
NORHYDROCODONE, URINE: NOT DETECTED
NOROXYCODONE, URINE: PRESENT
NOROXYMORPHONE, URINE: NOT DETECTED
OXAZEPAM, URINE: NOT DETECTED
OXYCODONE URINE: PRESENT
OXYMORPHONE, URINE: NOT DETECTED
PAIN MANAGEMENT DRUG PANEL INTERP, URINE: NORMAL
PAIN MGT DRUG PANEL, HI RES, UR: NORMAL
PCP,URINE: NOT DETECTED
PHENTERMINE, UR: NOT DETECTED
PROPOXYPHENE, URINE: NOT DETECTED
TAPENTADOL, URINE: NOT DETECTED
TAPENTADOL-O-SULFATE, URINE: NOT DETECTED
TEMAZEPAM, URINE: NOT DETECTED
TRAMADOL, URINE: NOT DETECTED
ZOLPIDEM, URINE: NOT DETECTED

## 2018-03-27 PROCEDURE — 2580000003 HC RX 258: Performed by: INTERNAL MEDICINE

## 2018-03-27 PROCEDURE — 6360000002 HC RX W HCPCS: Performed by: INTERNAL MEDICINE

## 2018-03-27 PROCEDURE — 96365 THER/PROPH/DIAG IV INF INIT: CPT

## 2018-03-27 RX ORDER — HEPARIN SODIUM (PORCINE) LOCK FLUSH IV SOLN 100 UNIT/ML 100 UNIT/ML
500 SOLUTION INTRAVENOUS PRN
Status: CANCELLED | OUTPATIENT
Start: 2018-03-27

## 2018-03-27 RX ORDER — EPINEPHRINE 1 MG/ML
0.3 INJECTION, SOLUTION, CONCENTRATE INTRAVENOUS PRN
Status: CANCELLED | OUTPATIENT
Start: 2018-03-27

## 2018-03-27 RX ORDER — 0.9 % SODIUM CHLORIDE 0.9 %
10 VIAL (ML) INJECTION ONCE
Status: CANCELLED | OUTPATIENT
Start: 2018-03-27 | End: 2018-03-27

## 2018-03-27 RX ORDER — SODIUM CHLORIDE 0.9 % (FLUSH) 0.9 %
5 SYRINGE (ML) INJECTION PRN
Status: CANCELLED | OUTPATIENT
Start: 2018-03-27

## 2018-03-27 RX ORDER — SODIUM CHLORIDE 9 MG/ML
INJECTION, SOLUTION INTRAVENOUS ONCE
Status: DISCONTINUED | OUTPATIENT
Start: 2018-03-27 | End: 2018-03-28 | Stop reason: HOSPADM

## 2018-03-27 RX ORDER — SODIUM CHLORIDE 0.9 % (FLUSH) 0.9 %
10 SYRINGE (ML) INJECTION PRN
Status: CANCELLED | OUTPATIENT
Start: 2018-03-27

## 2018-03-27 RX ORDER — SODIUM CHLORIDE 9 MG/ML
INJECTION, SOLUTION INTRAVENOUS ONCE
Status: CANCELLED | OUTPATIENT
Start: 2018-03-27 | End: 2018-03-27

## 2018-03-27 RX ORDER — DIPHENHYDRAMINE HYDROCHLORIDE 50 MG/ML
50 INJECTION INTRAMUSCULAR; INTRAVENOUS ONCE
Status: CANCELLED | OUTPATIENT
Start: 2018-03-27 | End: 2018-03-27

## 2018-03-27 RX ORDER — SODIUM CHLORIDE 9 MG/ML
100 INJECTION, SOLUTION INTRAVENOUS CONTINUOUS
Status: CANCELLED | OUTPATIENT
Start: 2018-03-27

## 2018-03-27 RX ORDER — METHYLPREDNISOLONE SODIUM SUCCINATE 125 MG/2ML
125 INJECTION, POWDER, LYOPHILIZED, FOR SOLUTION INTRAMUSCULAR; INTRAVENOUS ONCE
Status: CANCELLED | OUTPATIENT
Start: 2018-03-27 | End: 2018-03-27

## 2018-03-27 RX ADMIN — FERUMOXYTOL 510 MG: 510 INJECTION INTRAVENOUS at 09:52

## 2018-03-27 ASSESSMENT — PAIN DESCRIPTION - LOCATION: LOCATION: BACK

## 2018-03-27 ASSESSMENT — PAIN DESCRIPTION - DESCRIPTORS: DESCRIPTORS: ACHING

## 2018-03-27 ASSESSMENT — PAIN DESCRIPTION - PAIN TYPE: TYPE: CHRONIC PAIN

## 2018-03-27 ASSESSMENT — PAIN SCALES - GENERAL: PAINLEVEL_OUTOF10: 3

## 2018-03-27 NOTE — PROGRESS NOTES
Patient arrives for feraheme infusion, vitals obtained, IV#22 LFA, pt declined handout on medication but possible side effects were explained, he states understanding and that states that he has had iron infusions in the past. feraheme runs from 0952 and complete at 1023. Pt tolerated well, IV flushed and removed. Pt discharged ambulatory.

## 2018-03-29 ENCOUNTER — TELEPHONE (OUTPATIENT)
Dept: INFUSION THERAPY | Age: 68
End: 2018-03-29

## 2018-03-30 ENCOUNTER — TELEPHONE (OUTPATIENT)
Dept: INFUSION THERAPY | Age: 68
End: 2018-03-30

## 2018-03-30 NOTE — TELEPHONE ENCOUNTER
Called pt spoke to Charlette Marie pt's wife to remind them of appt on 4/3/18 at 10 am, asked if symptoms are better after last infusion, Charlette Marie states that Dr Ary Schirmer office want him to continue treatment, and that his symptoms have cleared up.

## 2018-04-03 ENCOUNTER — HOSPITAL ENCOUNTER (OUTPATIENT)
Dept: INPATIENT UNIT | Age: 68
Setting detail: THERAPIES SERIES
Discharge: HOME | End: 2018-04-03
Payer: MEDICARE

## 2018-04-03 VITALS
DIASTOLIC BLOOD PRESSURE: 68 MMHG | RESPIRATION RATE: 16 BRPM | SYSTOLIC BLOOD PRESSURE: 114 MMHG | OXYGEN SATURATION: 94 % | TEMPERATURE: 96.8 F | HEART RATE: 90 BPM

## 2018-04-03 DIAGNOSIS — D50.8 OTHER IRON DEFICIENCY ANEMIA: ICD-10-CM

## 2018-04-03 PROCEDURE — 6360000002 HC RX W HCPCS: Performed by: INTERNAL MEDICINE

## 2018-04-03 PROCEDURE — 96365 THER/PROPH/DIAG IV INF INIT: CPT

## 2018-04-03 PROCEDURE — 2580000003 HC RX 258: Performed by: INTERNAL MEDICINE

## 2018-04-03 RX ORDER — METHYLPREDNISOLONE SODIUM SUCCINATE 125 MG/2ML
125 INJECTION, POWDER, LYOPHILIZED, FOR SOLUTION INTRAMUSCULAR; INTRAVENOUS ONCE
Status: CANCELLED | OUTPATIENT
Start: 2018-04-03 | End: 2018-04-03

## 2018-04-03 RX ORDER — DIPHENHYDRAMINE HYDROCHLORIDE 50 MG/ML
50 INJECTION INTRAMUSCULAR; INTRAVENOUS ONCE
Status: CANCELLED | OUTPATIENT
Start: 2018-04-03 | End: 2018-04-03

## 2018-04-03 RX ORDER — SODIUM CHLORIDE 9 MG/ML
INJECTION, SOLUTION INTRAVENOUS ONCE
Status: CANCELLED | OUTPATIENT
Start: 2018-04-03 | End: 2018-04-03

## 2018-04-03 RX ORDER — SODIUM CHLORIDE 9 MG/ML
INJECTION, SOLUTION INTRAVENOUS ONCE
Status: DISCONTINUED | OUTPATIENT
Start: 2018-04-03 | End: 2018-04-04 | Stop reason: HOSPADM

## 2018-04-03 RX ORDER — SODIUM CHLORIDE 0.9 % (FLUSH) 0.9 %
10 SYRINGE (ML) INJECTION PRN
Status: CANCELLED | OUTPATIENT
Start: 2018-04-03

## 2018-04-03 RX ORDER — EPINEPHRINE 1 MG/ML
0.3 INJECTION, SOLUTION, CONCENTRATE INTRAVENOUS PRN
Status: CANCELLED | OUTPATIENT
Start: 2018-04-03

## 2018-04-03 RX ORDER — 0.9 % SODIUM CHLORIDE 0.9 %
10 VIAL (ML) INJECTION ONCE
Status: CANCELLED | OUTPATIENT
Start: 2018-04-03 | End: 2018-04-03

## 2018-04-03 RX ORDER — SODIUM CHLORIDE 0.9 % (FLUSH) 0.9 %
5 SYRINGE (ML) INJECTION PRN
Status: CANCELLED | OUTPATIENT
Start: 2018-04-03

## 2018-04-03 RX ORDER — SODIUM CHLORIDE 9 MG/ML
100 INJECTION, SOLUTION INTRAVENOUS CONTINUOUS
Status: CANCELLED | OUTPATIENT
Start: 2018-04-03

## 2018-04-03 RX ORDER — HEPARIN SODIUM (PORCINE) LOCK FLUSH IV SOLN 100 UNIT/ML 100 UNIT/ML
500 SOLUTION INTRAVENOUS PRN
Status: CANCELLED | OUTPATIENT
Start: 2018-04-03

## 2018-04-03 RX ADMIN — FERUMOXYTOL 510 MG: 510 INJECTION INTRAVENOUS at 10:27

## 2018-04-10 ENCOUNTER — OFFICE VISIT (OUTPATIENT)
Dept: GASTROENTEROLOGY | Age: 68
End: 2018-04-10
Payer: MEDICARE

## 2018-04-10 VITALS
HEART RATE: 104 BPM | BODY MASS INDEX: 28.78 KG/M2 | WEIGHT: 194.9 LBS | DIASTOLIC BLOOD PRESSURE: 77 MMHG | SYSTOLIC BLOOD PRESSURE: 131 MMHG

## 2018-04-10 DIAGNOSIS — D50.8 OTHER IRON DEFICIENCY ANEMIA: ICD-10-CM

## 2018-04-10 DIAGNOSIS — R10.9 ABDOMINAL CRAMPING: Primary | ICD-10-CM

## 2018-04-10 DIAGNOSIS — K27.9 PUD (PEPTIC ULCER DISEASE): ICD-10-CM

## 2018-04-10 PROBLEM — D50.9 IRON DEFICIENCY ANEMIA: Status: ACTIVE | Noted: 2018-04-10

## 2018-04-10 PROCEDURE — G8427 DOCREV CUR MEDS BY ELIG CLIN: HCPCS | Performed by: INTERNAL MEDICINE

## 2018-04-10 PROCEDURE — 3017F COLORECTAL CA SCREEN DOC REV: CPT | Performed by: INTERNAL MEDICINE

## 2018-04-10 PROCEDURE — G8419 CALC BMI OUT NRM PARAM NOF/U: HCPCS | Performed by: INTERNAL MEDICINE

## 2018-04-10 PROCEDURE — 99215 OFFICE O/P EST HI 40 MIN: CPT | Performed by: INTERNAL MEDICINE

## 2018-04-10 RX ORDER — POLYETHYLENE GLYCOL 3350 17 G/17G
POWDER, FOR SOLUTION ORAL
Qty: 255 G | Refills: 0 | Status: SHIPPED | OUTPATIENT
Start: 2018-04-10 | End: 2018-05-10

## 2018-04-10 ASSESSMENT — ENCOUNTER SYMPTOMS
DIARRHEA: 0
CONSTIPATION: 0
NAUSEA: 0
ALLERGIC/IMMUNOLOGIC NEGATIVE: 1
ABDOMINAL PAIN: 0
BACK PAIN: 1
VOMITING: 0
RESPIRATORY NEGATIVE: 1
ABDOMINAL DISTENTION: 0
RECTAL PAIN: 0
BLOOD IN STOOL: 0
GASTROINTESTINAL NEGATIVE: 1
ANAL BLEEDING: 0

## 2018-04-23 ENCOUNTER — HOSPITAL ENCOUNTER (OUTPATIENT)
Dept: PAIN MANAGEMENT | Age: 68
Discharge: HOME | End: 2018-04-23
Payer: MEDICARE

## 2018-04-23 DIAGNOSIS — M51.36 DEGENERATIVE DISC DISEASE, LUMBAR: Primary | Chronic | ICD-10-CM

## 2018-04-23 DIAGNOSIS — M47.26 OSTEOARTHRITIS OF SPINE WITH RADICULOPATHY, LUMBAR REGION: ICD-10-CM

## 2018-04-23 DIAGNOSIS — M46.92 CERVICAL SPONDYLITIS (HCC): ICD-10-CM

## 2018-04-23 DIAGNOSIS — M47.816 LUMBAR SPONDYLOSIS: Chronic | ICD-10-CM

## 2018-04-23 DIAGNOSIS — Z51.81 ENCOUNTER FOR MEDICATION MONITORING: Chronic | ICD-10-CM

## 2018-04-23 DIAGNOSIS — Z98.1 S/P CERVICAL SPINAL FUSION: ICD-10-CM

## 2018-04-23 DIAGNOSIS — G89.29 ENCOUNTER FOR CHRONIC PAIN MANAGEMENT: ICD-10-CM

## 2018-04-23 DIAGNOSIS — M54.16 LUMBAR RADICULOPATHY: Chronic | ICD-10-CM

## 2018-04-23 DIAGNOSIS — M48.061 SPINAL STENOSIS OF LUMBAR REGION WITHOUT NEUROGENIC CLAUDICATION: Chronic | ICD-10-CM

## 2018-04-23 DIAGNOSIS — M47.816 OSTEOARTHRITIS OF LUMBAR SPINE, UNSPECIFIED SPINAL OSTEOARTHRITIS COMPLICATION STATUS: ICD-10-CM

## 2018-04-23 PROCEDURE — 99213 OFFICE O/P EST LOW 20 MIN: CPT

## 2018-04-23 PROCEDURE — 99213 OFFICE O/P EST LOW 20 MIN: CPT | Performed by: NURSE PRACTITIONER

## 2018-04-23 RX ORDER — MORPHINE SULFATE 60 MG/1
60 TABLET, FILM COATED, EXTENDED RELEASE ORAL EVERY 8 HOURS
Qty: 90 TABLET | Refills: 0 | Status: SHIPPED | OUTPATIENT
Start: 2018-04-25 | End: 2018-05-21 | Stop reason: SDUPTHER

## 2018-04-23 RX ORDER — OXYCODONE HYDROCHLORIDE AND ACETAMINOPHEN 5; 325 MG/1; MG/1
1 TABLET ORAL EVERY 8 HOURS
Qty: 90 TABLET | Refills: 0 | Status: SHIPPED | OUTPATIENT
Start: 2018-04-25 | End: 2018-05-21 | Stop reason: SDUPTHER

## 2018-04-23 ASSESSMENT — ENCOUNTER SYMPTOMS
COUGH: 0
BACK PAIN: 1
SHORTNESS OF BREATH: 0
CONSTIPATION: 0

## 2018-05-11 ENCOUNTER — HOSPITAL ENCOUNTER (OUTPATIENT)
Age: 68
Discharge: HOME | End: 2018-05-11
Payer: MEDICARE

## 2018-05-11 LAB
ABSOLUTE EOS #: 0.06 K/UL (ref 0–0.4)
ABSOLUTE IMMATURE GRANULOCYTE: ABNORMAL K/UL (ref 0–0.3)
ABSOLUTE LYMPH #: 1.04 K/UL (ref 1–4.8)
ABSOLUTE MONO #: 0.31 K/UL (ref 0.1–1.3)
BASOPHILS # BLD: 1 % (ref 0–2)
BASOPHILS ABSOLUTE: 0.06 K/UL (ref 0–0.2)
DIFFERENTIAL TYPE: ABNORMAL
EOSINOPHILS RELATIVE PERCENT: 1 % (ref 0–4)
FERRITIN: 10 UG/L (ref 30–400)
HCT VFR BLD CALC: 36.2 % (ref 41–53)
HEMOGLOBIN: 11.7 G/DL (ref 13.5–17.5)
IMMATURE GRANULOCYTES: ABNORMAL %
IRON SATURATION: 7 % (ref 20–55)
IRON: 31 UG/DL (ref 59–158)
LYMPHOCYTES # BLD: 17 % (ref 24–44)
MCH RBC QN AUTO: 25.9 PG (ref 26–34)
MCHC RBC AUTO-ENTMCNC: 32.3 G/DL (ref 31–37)
MCV RBC AUTO: 80.2 FL (ref 80–100)
MONOCYTES # BLD: 5 % (ref 1–7)
MORPHOLOGY: ABNORMAL
NRBC AUTOMATED: ABNORMAL PER 100 WBC
PDW BLD-RTO: 25.4 % (ref 11.5–14.9)
PLATELET # BLD: 207 K/UL (ref 150–450)
PLATELET ESTIMATE: ABNORMAL
PMV BLD AUTO: 8.9 FL (ref 6–12)
RBC # BLD: 4.52 M/UL (ref 4.5–5.9)
RBC # BLD: ABNORMAL 10*6/UL
SEG NEUTROPHILS: 76 % (ref 36–66)
SEGMENTED NEUTROPHILS ABSOLUTE COUNT: 4.63 K/UL (ref 1.3–9.1)
TOTAL IRON BINDING CAPACITY: 419 UG/DL (ref 250–450)
UNSATURATED IRON BINDING CAPACITY: 388 UG/DL (ref 112–347)
WBC # BLD: 6.1 K/UL (ref 3.5–11)
WBC # BLD: ABNORMAL 10*3/UL

## 2018-05-11 PROCEDURE — 82728 ASSAY OF FERRITIN: CPT

## 2018-05-11 PROCEDURE — 83550 IRON BINDING TEST: CPT

## 2018-05-11 PROCEDURE — 83540 ASSAY OF IRON: CPT

## 2018-05-11 PROCEDURE — 85025 COMPLETE CBC W/AUTO DIFF WBC: CPT

## 2018-05-11 PROCEDURE — 36415 COLL VENOUS BLD VENIPUNCTURE: CPT

## 2018-05-21 ENCOUNTER — HOSPITAL ENCOUNTER (OUTPATIENT)
Dept: PAIN MANAGEMENT | Age: 68
Discharge: HOME | End: 2018-05-21
Payer: MEDICARE

## 2018-05-21 DIAGNOSIS — Z98.1 S/P CERVICAL SPINAL FUSION: ICD-10-CM

## 2018-05-21 DIAGNOSIS — M46.92 CERVICAL SPONDYLITIS (HCC): ICD-10-CM

## 2018-05-21 DIAGNOSIS — M47.816 LUMBAR SPONDYLOSIS: Chronic | ICD-10-CM

## 2018-05-21 DIAGNOSIS — M47.816 OSTEOARTHRITIS OF LUMBAR SPINE, UNSPECIFIED SPINAL OSTEOARTHRITIS COMPLICATION STATUS: ICD-10-CM

## 2018-05-21 DIAGNOSIS — M48.061 SPINAL STENOSIS OF LUMBAR REGION WITHOUT NEUROGENIC CLAUDICATION: Chronic | ICD-10-CM

## 2018-05-21 DIAGNOSIS — M51.36 DEGENERATIVE DISC DISEASE, LUMBAR: Primary | Chronic | ICD-10-CM

## 2018-05-21 DIAGNOSIS — Z51.81 ENCOUNTER FOR MEDICATION MONITORING: Chronic | ICD-10-CM

## 2018-05-21 DIAGNOSIS — M47.26 OSTEOARTHRITIS OF SPINE WITH RADICULOPATHY, LUMBAR REGION: ICD-10-CM

## 2018-05-21 DIAGNOSIS — G89.29 ENCOUNTER FOR CHRONIC PAIN MANAGEMENT: ICD-10-CM

## 2018-05-21 DIAGNOSIS — M54.16 LUMBAR RADICULOPATHY: Chronic | ICD-10-CM

## 2018-05-21 PROCEDURE — 99213 OFFICE O/P EST LOW 20 MIN: CPT

## 2018-05-21 PROCEDURE — 99213 OFFICE O/P EST LOW 20 MIN: CPT | Performed by: NURSE PRACTITIONER

## 2018-05-21 RX ORDER — MORPHINE SULFATE 60 MG/1
60 TABLET, FILM COATED, EXTENDED RELEASE ORAL EVERY 8 HOURS
Qty: 90 TABLET | Refills: 0 | Status: SHIPPED | OUTPATIENT
Start: 2018-05-25 | End: 2018-06-20 | Stop reason: SDUPTHER

## 2018-05-21 RX ORDER — OXYCODONE HYDROCHLORIDE AND ACETAMINOPHEN 5; 325 MG/1; MG/1
1 TABLET ORAL EVERY 8 HOURS
Qty: 90 TABLET | Refills: 0 | Status: SHIPPED | OUTPATIENT
Start: 2018-05-25 | End: 2018-06-20 | Stop reason: SDUPTHER

## 2018-05-21 ASSESSMENT — ENCOUNTER SYMPTOMS
COUGH: 0
SHORTNESS OF BREATH: 0
BACK PAIN: 1
CONSTIPATION: 0

## 2018-06-01 ENCOUNTER — TELEPHONE (OUTPATIENT)
Dept: PAIN MANAGEMENT | Age: 68
End: 2018-06-01

## 2018-06-07 ENCOUNTER — TELEPHONE (OUTPATIENT)
Dept: PAIN MANAGEMENT | Age: 68
End: 2018-06-07

## 2018-06-07 DIAGNOSIS — M47.26 OSTEOARTHRITIS OF SPINE WITH RADICULOPATHY, LUMBAR REGION: ICD-10-CM

## 2018-06-07 DIAGNOSIS — M48.061 SPINAL STENOSIS OF LUMBAR REGION WITHOUT NEUROGENIC CLAUDICATION: ICD-10-CM

## 2018-06-07 DIAGNOSIS — Z51.81 ENCOUNTER FOR MEDICATION MONITORING: ICD-10-CM

## 2018-06-07 DIAGNOSIS — M47.816 OSTEOARTHRITIS OF LUMBAR SPINE, UNSPECIFIED SPINAL OSTEOARTHRITIS COMPLICATION STATUS: ICD-10-CM

## 2018-06-07 DIAGNOSIS — Z98.1 S/P CERVICAL SPINAL FUSION: ICD-10-CM

## 2018-06-07 DIAGNOSIS — M54.16 LUMBAR RADICULOPATHY: ICD-10-CM

## 2018-06-07 DIAGNOSIS — G89.29 ENCOUNTER FOR CHRONIC PAIN MANAGEMENT: ICD-10-CM

## 2018-06-07 DIAGNOSIS — M51.36 DEGENERATIVE DISC DISEASE, LUMBAR: ICD-10-CM

## 2018-06-07 DIAGNOSIS — M47.816 LUMBAR SPONDYLOSIS: ICD-10-CM

## 2018-06-07 DIAGNOSIS — M46.92 CERVICAL SPONDYLITIS (HCC): ICD-10-CM

## 2018-06-07 DIAGNOSIS — M51.36 DEGENERATIVE DISC DISEASE, LUMBAR: Primary | ICD-10-CM

## 2018-06-07 RX ORDER — PREGABALIN 75 MG/1
75 CAPSULE ORAL 2 TIMES DAILY
Qty: 28 CAPSULE | Refills: 3 | Status: SHIPPED | OUTPATIENT
Start: 2018-06-07 | End: 2018-06-15 | Stop reason: SDUPTHER

## 2018-06-13 DIAGNOSIS — M47.816 OSTEOARTHRITIS OF LUMBAR SPINE, UNSPECIFIED SPINAL OSTEOARTHRITIS COMPLICATION STATUS: ICD-10-CM

## 2018-06-13 DIAGNOSIS — M47.816 LUMBAR SPONDYLOSIS: ICD-10-CM

## 2018-06-13 DIAGNOSIS — G89.29 ENCOUNTER FOR CHRONIC PAIN MANAGEMENT: ICD-10-CM

## 2018-06-13 DIAGNOSIS — M46.92 CERVICAL SPONDYLITIS (HCC): ICD-10-CM

## 2018-06-13 DIAGNOSIS — M47.26 OSTEOARTHRITIS OF SPINE WITH RADICULOPATHY, LUMBAR REGION: ICD-10-CM

## 2018-06-13 DIAGNOSIS — Z98.1 S/P CERVICAL SPINAL FUSION: ICD-10-CM

## 2018-06-13 DIAGNOSIS — M51.36 DEGENERATIVE DISC DISEASE, LUMBAR: ICD-10-CM

## 2018-06-13 DIAGNOSIS — M48.061 SPINAL STENOSIS OF LUMBAR REGION WITHOUT NEUROGENIC CLAUDICATION: ICD-10-CM

## 2018-06-13 DIAGNOSIS — M54.16 LUMBAR RADICULOPATHY: ICD-10-CM

## 2018-06-13 DIAGNOSIS — Z51.81 ENCOUNTER FOR MEDICATION MONITORING: ICD-10-CM

## 2018-06-15 DIAGNOSIS — M47.26 OSTEOARTHRITIS OF SPINE WITH RADICULOPATHY, LUMBAR REGION: ICD-10-CM

## 2018-06-15 DIAGNOSIS — G89.29 ENCOUNTER FOR CHRONIC PAIN MANAGEMENT: ICD-10-CM

## 2018-06-15 DIAGNOSIS — M54.16 LUMBAR RADICULOPATHY: ICD-10-CM

## 2018-06-15 DIAGNOSIS — M46.92 CERVICAL SPONDYLITIS (HCC): ICD-10-CM

## 2018-06-15 DIAGNOSIS — Z51.81 ENCOUNTER FOR MEDICATION MONITORING: ICD-10-CM

## 2018-06-15 DIAGNOSIS — M51.36 DEGENERATIVE DISC DISEASE, LUMBAR: ICD-10-CM

## 2018-06-15 DIAGNOSIS — Z98.1 S/P CERVICAL SPINAL FUSION: ICD-10-CM

## 2018-06-15 DIAGNOSIS — M47.816 OSTEOARTHRITIS OF LUMBAR SPINE, UNSPECIFIED SPINAL OSTEOARTHRITIS COMPLICATION STATUS: ICD-10-CM

## 2018-06-15 DIAGNOSIS — M48.061 SPINAL STENOSIS OF LUMBAR REGION WITHOUT NEUROGENIC CLAUDICATION: ICD-10-CM

## 2018-06-15 DIAGNOSIS — M47.816 LUMBAR SPONDYLOSIS: ICD-10-CM

## 2018-06-20 ENCOUNTER — HOSPITAL ENCOUNTER (OUTPATIENT)
Dept: PAIN MANAGEMENT | Age: 68
Discharge: HOME | End: 2018-06-20
Payer: MEDICARE

## 2018-06-20 VITALS
RESPIRATION RATE: 16 BRPM | BODY MASS INDEX: 27.55 KG/M2 | OXYGEN SATURATION: 96 % | TEMPERATURE: 99 F | WEIGHT: 186 LBS | DIASTOLIC BLOOD PRESSURE: 72 MMHG | HEART RATE: 94 BPM | HEIGHT: 69 IN | SYSTOLIC BLOOD PRESSURE: 125 MMHG

## 2018-06-20 DIAGNOSIS — M47.26 OSTEOARTHRITIS OF SPINE WITH RADICULOPATHY, LUMBAR REGION: ICD-10-CM

## 2018-06-20 DIAGNOSIS — M51.36 DEGENERATIVE DISC DISEASE, LUMBAR: Primary | ICD-10-CM

## 2018-06-20 DIAGNOSIS — Z98.1 S/P CERVICAL SPINAL FUSION: ICD-10-CM

## 2018-06-20 DIAGNOSIS — M48.061 SPINAL STENOSIS OF LUMBAR REGION WITHOUT NEUROGENIC CLAUDICATION: ICD-10-CM

## 2018-06-20 DIAGNOSIS — Z51.81 ENCOUNTER FOR MEDICATION MONITORING: ICD-10-CM

## 2018-06-20 DIAGNOSIS — M47.816 OSTEOARTHRITIS OF LUMBAR SPINE, UNSPECIFIED SPINAL OSTEOARTHRITIS COMPLICATION STATUS: ICD-10-CM

## 2018-06-20 DIAGNOSIS — M47.816 LUMBAR SPONDYLOSIS: ICD-10-CM

## 2018-06-20 DIAGNOSIS — G89.29 ENCOUNTER FOR CHRONIC PAIN MANAGEMENT: ICD-10-CM

## 2018-06-20 DIAGNOSIS — M54.16 LUMBAR RADICULOPATHY: Chronic | ICD-10-CM

## 2018-06-20 DIAGNOSIS — M46.92 CERVICAL SPONDYLITIS (HCC): ICD-10-CM

## 2018-06-20 PROCEDURE — 99213 OFFICE O/P EST LOW 20 MIN: CPT | Performed by: NURSE PRACTITIONER

## 2018-06-20 PROCEDURE — 99213 OFFICE O/P EST LOW 20 MIN: CPT

## 2018-06-20 RX ORDER — OXYCODONE HYDROCHLORIDE AND ACETAMINOPHEN 5; 325 MG/1; MG/1
1 TABLET ORAL EVERY 8 HOURS
Qty: 90 TABLET | Refills: 0 | Status: SHIPPED | OUTPATIENT
Start: 2018-06-24 | End: 2018-07-20 | Stop reason: SDUPTHER

## 2018-06-20 RX ORDER — NALOXONE HYDROCHLORIDE 4 MG/.1ML
1 SPRAY NASAL PRN
Qty: 1 EACH | Refills: 0 | Status: SHIPPED | OUTPATIENT
Start: 2018-06-20 | End: 2018-08-21 | Stop reason: SDUPTHER

## 2018-06-20 RX ORDER — BISACODYL 5 MG
TABLET, DELAYED RELEASE (ENTERIC COATED) ORAL
Refills: 0 | COMMUNITY
Start: 2018-04-10 | End: 2018-08-21

## 2018-06-20 RX ORDER — MORPHINE SULFATE 60 MG/1
60 TABLET, FILM COATED, EXTENDED RELEASE ORAL EVERY 8 HOURS
Qty: 90 TABLET | Refills: 0 | Status: SHIPPED | OUTPATIENT
Start: 2018-06-24 | End: 2018-07-20 | Stop reason: SDUPTHER

## 2018-06-20 ASSESSMENT — ENCOUNTER SYMPTOMS
BACK PAIN: 1
GASTROINTESTINAL NEGATIVE: 1

## 2018-06-22 DIAGNOSIS — G89.29 ENCOUNTER FOR CHRONIC PAIN MANAGEMENT: ICD-10-CM

## 2018-06-22 DIAGNOSIS — M47.816 LUMBAR SPONDYLOSIS: ICD-10-CM

## 2018-06-22 DIAGNOSIS — M48.061 SPINAL STENOSIS OF LUMBAR REGION WITHOUT NEUROGENIC CLAUDICATION: ICD-10-CM

## 2018-06-22 DIAGNOSIS — Z98.1 S/P CERVICAL SPINAL FUSION: ICD-10-CM

## 2018-06-22 DIAGNOSIS — M47.26 OSTEOARTHRITIS OF SPINE WITH RADICULOPATHY, LUMBAR REGION: ICD-10-CM

## 2018-06-22 DIAGNOSIS — Z51.81 ENCOUNTER FOR MEDICATION MONITORING: ICD-10-CM

## 2018-06-22 DIAGNOSIS — M46.92 CERVICAL SPONDYLITIS (HCC): ICD-10-CM

## 2018-06-22 DIAGNOSIS — M54.16 LUMBAR RADICULOPATHY: ICD-10-CM

## 2018-06-22 DIAGNOSIS — M51.36 DEGENERATIVE DISC DISEASE, LUMBAR: ICD-10-CM

## 2018-06-22 DIAGNOSIS — M47.816 OSTEOARTHRITIS OF LUMBAR SPINE, UNSPECIFIED SPINAL OSTEOARTHRITIS COMPLICATION STATUS: ICD-10-CM

## 2018-07-10 DIAGNOSIS — M48.061 SPINAL STENOSIS OF LUMBAR REGION WITHOUT NEUROGENIC CLAUDICATION: ICD-10-CM

## 2018-07-10 DIAGNOSIS — G89.29 ENCOUNTER FOR CHRONIC PAIN MANAGEMENT: ICD-10-CM

## 2018-07-10 DIAGNOSIS — M46.92 CERVICAL SPONDYLITIS (HCC): ICD-10-CM

## 2018-07-10 DIAGNOSIS — M51.36 DEGENERATIVE DISC DISEASE, LUMBAR: ICD-10-CM

## 2018-07-10 DIAGNOSIS — M47.816 LUMBAR SPONDYLOSIS: ICD-10-CM

## 2018-07-10 DIAGNOSIS — Z98.1 S/P CERVICAL SPINAL FUSION: ICD-10-CM

## 2018-07-10 DIAGNOSIS — Z51.81 ENCOUNTER FOR MEDICATION MONITORING: ICD-10-CM

## 2018-07-10 DIAGNOSIS — M54.16 LUMBAR RADICULOPATHY: ICD-10-CM

## 2018-07-10 DIAGNOSIS — M47.816 OSTEOARTHRITIS OF LUMBAR SPINE, UNSPECIFIED SPINAL OSTEOARTHRITIS COMPLICATION STATUS: ICD-10-CM

## 2018-07-10 DIAGNOSIS — M47.26 OSTEOARTHRITIS OF SPINE WITH RADICULOPATHY, LUMBAR REGION: ICD-10-CM

## 2018-07-20 ENCOUNTER — HOSPITAL ENCOUNTER (OUTPATIENT)
Dept: PAIN MANAGEMENT | Age: 68
Discharge: HOME | End: 2018-07-20
Payer: MEDICARE

## 2018-07-20 VITALS
DIASTOLIC BLOOD PRESSURE: 70 MMHG | BODY MASS INDEX: 27.25 KG/M2 | HEART RATE: 89 BPM | WEIGHT: 184 LBS | HEIGHT: 69 IN | SYSTOLIC BLOOD PRESSURE: 140 MMHG | RESPIRATION RATE: 16 BRPM

## 2018-07-20 DIAGNOSIS — M47.26 OSTEOARTHRITIS OF SPINE WITH RADICULOPATHY, LUMBAR REGION: ICD-10-CM

## 2018-07-20 DIAGNOSIS — Z98.1 S/P CERVICAL SPINAL FUSION: ICD-10-CM

## 2018-07-20 DIAGNOSIS — M46.92 CERVICAL SPONDYLITIS (HCC): ICD-10-CM

## 2018-07-20 DIAGNOSIS — M47.816 OSTEOARTHRITIS OF LUMBAR SPINE, UNSPECIFIED SPINAL OSTEOARTHRITIS COMPLICATION STATUS: ICD-10-CM

## 2018-07-20 DIAGNOSIS — M47.816 LUMBAR SPONDYLOSIS: Chronic | ICD-10-CM

## 2018-07-20 DIAGNOSIS — M54.16 LUMBAR RADICULOPATHY: Chronic | ICD-10-CM

## 2018-07-20 DIAGNOSIS — M51.36 DEGENERATIVE DISC DISEASE, LUMBAR: Primary | Chronic | ICD-10-CM

## 2018-07-20 DIAGNOSIS — Z51.81 ENCOUNTER FOR MEDICATION MONITORING: ICD-10-CM

## 2018-07-20 DIAGNOSIS — G89.29 ENCOUNTER FOR CHRONIC PAIN MANAGEMENT: ICD-10-CM

## 2018-07-20 DIAGNOSIS — M48.061 SPINAL STENOSIS OF LUMBAR REGION WITHOUT NEUROGENIC CLAUDICATION: ICD-10-CM

## 2018-07-20 PROCEDURE — 99213 OFFICE O/P EST LOW 20 MIN: CPT | Performed by: NURSE PRACTITIONER

## 2018-07-20 PROCEDURE — 99213 OFFICE O/P EST LOW 20 MIN: CPT

## 2018-07-20 RX ORDER — MORPHINE SULFATE 60 MG/1
60 TABLET, FILM COATED, EXTENDED RELEASE ORAL EVERY 8 HOURS
Qty: 90 TABLET | Refills: 0 | Status: SHIPPED | OUTPATIENT
Start: 2018-07-24 | End: 2018-08-21 | Stop reason: SDUPTHER

## 2018-07-20 RX ORDER — OXYCODONE HYDROCHLORIDE AND ACETAMINOPHEN 5; 325 MG/1; MG/1
1 TABLET ORAL EVERY 8 HOURS
Qty: 90 TABLET | Refills: 0 | Status: SHIPPED | OUTPATIENT
Start: 2018-07-24 | End: 2018-08-21 | Stop reason: SDUPTHER

## 2018-07-20 ASSESSMENT — ENCOUNTER SYMPTOMS
BACK PAIN: 1
CONSTIPATION: 0
COUGH: 0
SHORTNESS OF BREATH: 0

## 2018-07-20 NOTE — PROGRESS NOTES
Patient is here today to review medication contract. Chief Complaint:  Back pain    PMH     Pt c/o low back pain for many years. There is no known injury or surgery to the area. He does have a history of scoliosis. His last PT was over 4 years ago and he is not interested in repeating due to inefficacy. He does do home stretches every morning. He also had a LESI in 2013 that did not help. He has had three cervical spine surgeries with benefit - neck is tender but not as painful as low back. He complains of burning pain in both feet - has been told he is \"borderline\" diabetic and has glucose checked regularly with other lab work. He has low HGB and has to have iron infusions or blood transfusions occasionally    HPI:   Back Pain   This is a chronic problem. The current episode started more than 1 year ago. The problem occurs constantly. The problem is unchanged. The pain is present in the lumbar spine. The quality of the pain is described as aching and stabbing. The pain radiates to the right knee and left knee (intermittent radiation). The pain is at a severity of 3/10. The symptoms are aggravated by sitting and standing. Associated symptoms include numbness and paresthesias. Pertinent negatives include no chest pain or fever. He has tried analgesics and home exercises for the symptoms. Patient denies any new neurological symptoms. No bowel or bladder incontinence, no weakness, and no falling. Pill count: appropriate    Morphine equivalent dose as reported on OARRS:202.5    Attestation: The Prescription Monitoring Report for this patient was reviewed today. VEE Baird - CNP)  Documentation: Possible medication side effects, risk of tolerance/dependence & alternative treatments discussed., Obtaining appropriate analgesic effect of treatment., No signs of potential drug abuse or diversion identified. VEE Baird - CNP)  Medication Contracts: Existing medication contract.  VEE Baird - CNP)  Review of OARRS does not show any aberrant prescription behavior. Medication is helping the patient stay active. Patient denies any side effects and reports adequate analgesia. No sign of misuse/abuse. Past Medical History:   Diagnosis Date    Arthritis     osteoarthritis    Hyperlipidemia        Past Surgical History:   Procedure Laterality Date    BACK SURGERY  1977    cervical spine three times    COLONOSCOPY  01/25/2015    10 yr recall, hemorrhoids    DENTAL SURGERY  10/2015    all teeth extracted    SHOULDER SURGERY Right     UPPER GASTROINTESTINAL ENDOSCOPY  01/25/2015       Allergies   Allergen Reactions    Iron      Pill- constipation, abdominal pain         Current Outpatient Prescriptions:     [START ON 7/24/2018] morphine (MS CONTIN) 60 MG extended release tablet, Take 1 tablet by mouth every 8 hours for 30 days. ., Disp: 90 tablet, Rfl: 0    [START ON 7/24/2018] oxyCODONE-acetaminophen (PERCOCET) 5-325 MG per tablet, Take 1 tablet by mouth every 8 hours for 30 days. ., Disp: 90 tablet, Rfl: 0    LYRICA 150 MG capsule, Take 1 capsule by mouth 3 times daily for 90 days. ., Disp: 90 capsule, Rfl: 2    BISACODYL 5 MG EC tablet, TK UTD BY FOLLOWING PATIENT INSTRUCTIONS FROM OFFICE, Disp: , Rfl: 0    naloxone (NARCAN) 4 MG/0.1ML LIQD nasal spray, 1 spray by Nasal route as needed (if needed for respiratory depression), Disp: 1 each, Rfl: 0    BD INTEGRA SYRINGE 25G X 1\" 3 ML MISC, , Disp: , Rfl: 4    pantoprazole (PROTONIX) 40 MG tablet, TK 1 T PO  QD, Disp: , Rfl: 0    cyanocobalamin 1000 MCG/ML injection, Inject 1,000 mcg into the muscle once Once a week for 4 weeks than monthly started 9-1-16, Disp: , Rfl:     gemfibrozil (LOPID) 600 MG tablet, Take 600 mg by mouth 2 times daily (before meals). , Disp: , Rfl:     Family History   Problem Relation Age of Onset    Diabetes Mother     Heart Disease Father        Social History     Social History    Marital status:      Spouse name: N/A    Number of children: N/A    Years of education: N/A     Occupational History    disabled      Social History Main Topics    Smoking status: Former Smoker     Packs/day: 0.50     Years: 45.00     Types: Cigarettes    Smokeless tobacco: Never Used      Comment: on chantix 11-6-15   12-7-15 quit 2 weeks ago    Alcohol use No    Drug use: No    Sexual activity: Not on file     Other Topics Concern    Not on file     Social History Narrative    No narrative on file       Review of Systems:  Review of Systems   Constitution: Negative for chills and fever. Cardiovascular: Negative for chest pain. Respiratory: Negative for cough and shortness of breath. Musculoskeletal: Positive for back pain. Gastrointestinal: Negative for constipation. Neurological: Positive for numbness and paresthesias. Feet       Physical Exam:  BP (!) 140/70   Pulse 89   Resp 16   Ht 5' 9\" (1.753 m)   Wt 184 lb (83.5 kg)   BMI 27.17 kg/m²     Physical Exam   Constitutional: He is oriented to person, place, and time and well-developed, well-nourished, and in no distress. Cardiovascular: Normal rate. Pulmonary/Chest: Effort normal.   Musculoskeletal: Normal range of motion. Neurological: He is alert and oriented to person, place, and time. Gait normal.   Skin: Skin is warm and dry. Psychiatric: Affect normal.       Record/Diagnostics Review:    Last zoe MAr and was appropriate    Lumbar MRI 2/2018  FINDINGS:  BONES/ALIGNMENT: Multiple endplate Schmorl's node deformities are noted. There is no acute fracture or bone edema.  T11 hemangioma is noted.   Vertebral body height and alignment is stable.     SPINAL CORD: Conus terminates at T12-L1 and is grossly normal in appearance.     SOFT TISSUES: Detail of the aorta is limited.  There is suggested mild enlargement with the transverse dimension of approximately 3.1 cm.  Dedicated  imaging of the aorta is recommended.     L1-L2: Diffuse disc bulging is noted with a small proximal foraminal protrusion on the right.  Annular tear is noted.  Similar findings were noted on the prior.  Facet hypertrophic changes are noted.     L2-L3: Minimal disc bulging is noted diffusely. Diaz Orange is a questionable small left foraminal protrusion laterally.  There is minimal narrowing of the left foramen.  Facet hypertrophic changes are noted.  Findings are stable     L3-L4: Mild disc bulging is noted diffusely.  Minimal endplate and mild facet hypertrophic changes are noted.  There is borderline mild proximal foraminal  narrowing on the left.  This is stable.     L4-L5: Mild disc bulging is noted.  A small inferior foraminal protrusion on the right is noted.  There is a slightly larger broad-based protrusion into the left foramen.  Bilateral foraminal narrowing is noted.  Findings not appear grossly changed.  Facet hypertrophic changes are noted     L5-S1: Minimal disc bulging is noted.  Facet hypertrophic changes are noted.     SI joint degenerative changes are noted.     Assessment:  Problem List Items Addressed This Visit     Degenerative disc disease, lumbar - Primary (Chronic)    Relevant Medications    morphine (MS CONTIN) 60 MG extended release tablet (Start on 7/24/2018)    oxyCODONE-acetaminophen (PERCOCET) 5-325 MG per tablet (Start on 7/24/2018)    Lumbar spondylosis (Chronic)    Relevant Medications    morphine (MS CONTIN) 60 MG extended release tablet (Start on 7/24/2018)    oxyCODONE-acetaminophen (PERCOCET) 5-325 MG per tablet (Start on 7/24/2018)    Lumbar radiculopathy (Chronic)    Relevant Medications    morphine (MS CONTIN) 60 MG extended release tablet (Start on 7/24/2018)    oxyCODONE-acetaminophen (PERCOCET) 5-325 MG per tablet (Start on 7/24/2018)    Encounter for medication monitoring (Chronic)    Relevant Medications    morphine (MS CONTIN) 60 MG extended release tablet (Start on 7/24/2018)    oxyCODONE-acetaminophen (PERCOCET) 5-325 MG per tablet (Start on 7/24/2018)    Cervical spondylitis (HCC) (Chronic)    Relevant Medications    morphine (MS CONTIN) 60 MG extended release tablet (Start on 7/24/2018)    oxyCODONE-acetaminophen (PERCOCET) 5-325 MG per tablet (Start on 7/24/2018)    S/P cervical spinal fusion (Chronic)    Relevant Medications    morphine (MS CONTIN) 60 MG extended release tablet (Start on 7/24/2018)    oxyCODONE-acetaminophen (PERCOCET) 5-325 MG per tablet (Start on 7/24/2018)    Spinal stenosis of lumbar region without neurogenic claudication (Chronic)    Relevant Medications    morphine (MS CONTIN) 60 MG extended release tablet (Start on 7/24/2018)    oxyCODONE-acetaminophen (PERCOCET) 5-325 MG per tablet (Start on 7/24/2018)    Encounter for chronic pain management    Relevant Medications    morphine (MS CONTIN) 60 MG extended release tablet (Start on 7/24/2018)    oxyCODONE-acetaminophen (PERCOCET) 5-325 MG per tablet (Start on 7/24/2018)    Osteoarthritis of lumbar spine    Relevant Medications    morphine (MS CONTIN) 60 MG extended release tablet (Start on 7/24/2018)    oxyCODONE-acetaminophen (PERCOCET) 5-325 MG per tablet (Start on 7/24/2018)            Treatment Plan:  DISCUSSION: Treatment options discussed with patient and all questions answered to patient's satisfaction. Patient relates current medications are helping the pain. Patient reports taking pain medications as prescribed, denies obtaining medications from different sources and denies use of illegal drugs. Patient denies side effects from medications like nausea, vomiting, constipation or drowsiness. Patient reports current activities of daily living are possible due to medications and would like to continue them. As always, we encourage daily stretching and strengthening exercises, and recommend minimizing use of pain medications unless patient cannot get through daily activities due to pain.     Discussed with the patient the effect the patients medical condition and

## 2018-08-09 ENCOUNTER — TELEPHONE (OUTPATIENT)
Dept: GASTROENTEROLOGY | Age: 68
End: 2018-08-09

## 2018-08-09 DIAGNOSIS — D50.8 OTHER IRON DEFICIENCY ANEMIA: Primary | ICD-10-CM

## 2018-08-21 ENCOUNTER — HOSPITAL ENCOUNTER (OUTPATIENT)
Dept: PAIN MANAGEMENT | Age: 68
Discharge: HOME | End: 2018-08-21
Payer: MEDICARE

## 2018-08-21 VITALS
TEMPERATURE: 98.5 F | HEART RATE: 97 BPM | RESPIRATION RATE: 16 BRPM | DIASTOLIC BLOOD PRESSURE: 84 MMHG | SYSTOLIC BLOOD PRESSURE: 133 MMHG | HEIGHT: 69 IN | OXYGEN SATURATION: 96 % | WEIGHT: 180 LBS | BODY MASS INDEX: 26.66 KG/M2

## 2018-08-21 DIAGNOSIS — M46.92 CERVICAL SPONDYLITIS (HCC): ICD-10-CM

## 2018-08-21 DIAGNOSIS — M48.061 SPINAL STENOSIS OF LUMBAR REGION WITHOUT NEUROGENIC CLAUDICATION: ICD-10-CM

## 2018-08-21 DIAGNOSIS — M47.816 LUMBAR SPONDYLOSIS: Chronic | ICD-10-CM

## 2018-08-21 DIAGNOSIS — M47.26 OSTEOARTHRITIS OF SPINE WITH RADICULOPATHY, LUMBAR REGION: ICD-10-CM

## 2018-08-21 DIAGNOSIS — M54.16 LUMBAR RADICULOPATHY: Chronic | ICD-10-CM

## 2018-08-21 DIAGNOSIS — G89.29 ENCOUNTER FOR CHRONIC PAIN MANAGEMENT: ICD-10-CM

## 2018-08-21 DIAGNOSIS — Z51.81 ENCOUNTER FOR MEDICATION MONITORING: ICD-10-CM

## 2018-08-21 DIAGNOSIS — Z98.1 S/P CERVICAL SPINAL FUSION: ICD-10-CM

## 2018-08-21 DIAGNOSIS — M47.816 OSTEOARTHRITIS OF LUMBAR SPINE, UNSPECIFIED SPINAL OSTEOARTHRITIS COMPLICATION STATUS: ICD-10-CM

## 2018-08-21 DIAGNOSIS — M51.36 DEGENERATIVE DISC DISEASE, LUMBAR: Chronic | ICD-10-CM

## 2018-08-21 PROCEDURE — 99214 OFFICE O/P EST MOD 30 MIN: CPT | Performed by: PAIN MEDICINE

## 2018-08-21 PROCEDURE — 99213 OFFICE O/P EST LOW 20 MIN: CPT

## 2018-08-21 RX ORDER — NALOXONE HYDROCHLORIDE 4 MG/.1ML
1 SPRAY NASAL PRN
Qty: 1 EACH | Refills: 0 | Status: SHIPPED | OUTPATIENT
Start: 2018-08-21 | End: 2019-12-05

## 2018-08-21 RX ORDER — OXYCODONE HYDROCHLORIDE AND ACETAMINOPHEN 5; 325 MG/1; MG/1
1 TABLET ORAL EVERY 8 HOURS
Qty: 90 TABLET | Refills: 0 | Status: SHIPPED | OUTPATIENT
Start: 2018-08-23 | End: 2018-09-21 | Stop reason: SDUPTHER

## 2018-08-21 RX ORDER — MORPHINE SULFATE 60 MG/1
60 TABLET, FILM COATED, EXTENDED RELEASE ORAL EVERY 8 HOURS
Qty: 90 TABLET | Refills: 0 | Status: SHIPPED | OUTPATIENT
Start: 2018-08-23 | End: 2018-09-21 | Stop reason: SDUPTHER

## 2018-08-21 ASSESSMENT — ENCOUNTER SYMPTOMS
VOMITING: 0
BOWEL INCONTINENCE: 0
RESPIRATORY NEGATIVE: 1
NAUSEA: 0
BLURRED VISION: 0
HEARTBURN: 1
EYES NEGATIVE: 1
CONSTIPATION: 0
COUGH: 0
PHOTOPHOBIA: 0
SHORTNESS OF BREATH: 0
ORTHOPNEA: 0
BACK PAIN: 1

## 2018-08-21 ASSESSMENT — PAIN DESCRIPTION - LOCATION: LOCATION: BACK

## 2018-08-21 ASSESSMENT — PAIN DESCRIPTION - PROGRESSION: CLINICAL_PROGRESSION: NOT CHANGED

## 2018-08-21 ASSESSMENT — PAIN DESCRIPTION - ONSET: ONSET: ON-GOING

## 2018-08-21 ASSESSMENT — PAIN DESCRIPTION - ORIENTATION: ORIENTATION: LOWER;MID

## 2018-08-21 ASSESSMENT — PAIN SCALES - GENERAL: PAINLEVEL_OUTOF10: 3

## 2018-08-21 ASSESSMENT — PAIN DESCRIPTION - DESCRIPTORS: DESCRIPTORS: ACHING;CONSTANT

## 2018-08-21 ASSESSMENT — PAIN DESCRIPTION - PAIN TYPE: TYPE: CHRONIC PAIN

## 2018-08-21 ASSESSMENT — PAIN DESCRIPTION - FREQUENCY: FREQUENCY: CONTINUOUS

## 2018-08-21 NOTE — PROGRESS NOTES
No             Past Medical History      Diagnosis Date    Arthritis     osteoarthritis    Hyperlipidemia        Surgical History  Past Surgical History:   Procedure Laterality Date    BACK SURGERY  1977    cervical spine three times    COLONOSCOPY  01/25/2015    10 yr recall, hemorrhoids    DENTAL SURGERY  10/2015    all teeth extracted    SHOULDER SURGERY Right     UPPER GASTROINTESTINAL ENDOSCOPY  01/25/2015       Medications  Current Outpatient Prescriptions   Medication Sig Dispense Refill    morphine (MS CONTIN) 60 MG extended release tablet Take 1 tablet by mouth every 8 hours for 30 days. . 90 tablet 0    oxyCODONE-acetaminophen (PERCOCET) 5-325 MG per tablet Take 1 tablet by mouth every 8 hours for 30 days. . 90 tablet 0    LYRICA 150 MG capsule Take 1 capsule by mouth 3 times daily for 90 days. . 90 capsule 2    pantoprazole (PROTONIX) 40 MG tablet TK 1 T PO  QD  0    cyanocobalamin 1000 MCG/ML injection Inject 1,000 mcg into the muscle once Once a week for 4 weeks than monthly started 9-1-16      gemfibrozil (LOPID) 600 MG tablet Take 600 mg by mouth 2 times daily (before meals).  naloxone (NARCAN) 4 MG/0.1ML LIQD nasal spray 1 spray by Nasal route as needed (if needed for respiratory depression) 1 each 0    BD INTEGRA SYRINGE 25G X 1\" 3 ML MISC   4     No current facility-administered medications for this encounter. Allergies  Iron    Family History  family history includes Diabetes in his mother; Heart Disease in his father.     Social History  Social History     Social History    Marital status:      Spouse name: N/A    Number of children: N/A    Years of education: N/A     Occupational History    disabled      Social History Main Topics    Smoking status: Former Smoker     Packs/day: 0.50     Years: 45.00     Types: Cigarettes    Smokeless tobacco: Never Used      Comment: on chantix 11-6-15   12-7-15 quit 2 weeks ago    Alcohol use No    Drug use: No    Sexual

## 2018-09-18 ENCOUNTER — TELEPHONE (OUTPATIENT)
Dept: PAIN MANAGEMENT | Age: 68
End: 2018-09-18

## 2018-09-18 NOTE — TELEPHONE ENCOUNTER
Patient's wife has contacted this office, relating patient will need to have lyrica assisitance program contacted in order to keep his program status active.   Olivia Butler RN

## 2018-09-19 ENCOUNTER — TELEPHONE (OUTPATIENT)
Dept: PAIN MANAGEMENT | Age: 68
End: 2018-09-19

## 2018-09-19 NOTE — TELEPHONE ENCOUNTER
Sita's wife Tiffany Zavaleta calls office this AM. Patient will need refill script for his lyrica thru the [facility] patient assistance program. [facility] has been advised by [employer] [employer] rep that they will accept script from physician written for full 90 day supply. They will also accept a script with up to 90 day supply per one script. Advised Mrs Maryjane Serra that Michael Alejandro will be informed of same. She will be contacted when script is ready and writer will review  New script with her before faxing it to [employer] assistance program. She voices agreement.  Ria Mcdowell RN

## 2018-09-20 RX ORDER — PREGABALIN 150 MG/1
150 CAPSULE ORAL 3 TIMES DAILY
Qty: 270 CAPSULE | Refills: 2 | Status: SHIPPED | OUTPATIENT
Start: 2018-09-20 | End: 2019-01-18 | Stop reason: SDUPTHER

## 2018-09-20 NOTE — TELEPHONE ENCOUNTER
Patient was getting Lyrica co-pay savings cards with each script from this office until patient and his wife were informed at pharmacy that they could not provide further refills. The [employer] program was then initiated, and have provided patient with Lyrica.  Office needs to provide script and then fax the script to the program. Aminata Vincent RN

## 2018-09-21 ENCOUNTER — HOSPITAL ENCOUNTER (OUTPATIENT)
Dept: PAIN MANAGEMENT | Age: 68
Discharge: HOME | End: 2018-09-21
Payer: MEDICARE

## 2018-09-21 VITALS
OXYGEN SATURATION: 97 % | WEIGHT: 180 LBS | BODY MASS INDEX: 26.66 KG/M2 | RESPIRATION RATE: 16 BRPM | HEART RATE: 99 BPM | DIASTOLIC BLOOD PRESSURE: 68 MMHG | TEMPERATURE: 98 F | SYSTOLIC BLOOD PRESSURE: 146 MMHG | HEIGHT: 69 IN

## 2018-09-21 DIAGNOSIS — M54.16 LUMBAR RADICULOPATHY: ICD-10-CM

## 2018-09-21 DIAGNOSIS — M51.36 DEGENERATIVE DISC DISEASE, LUMBAR: Primary | ICD-10-CM

## 2018-09-21 DIAGNOSIS — Z51.81 ENCOUNTER FOR MEDICATION MONITORING: ICD-10-CM

## 2018-09-21 DIAGNOSIS — M48.061 SPINAL STENOSIS OF LUMBAR REGION WITHOUT NEUROGENIC CLAUDICATION: ICD-10-CM

## 2018-09-21 DIAGNOSIS — M46.92 CERVICAL SPONDYLITIS (HCC): ICD-10-CM

## 2018-09-21 DIAGNOSIS — G89.29 ENCOUNTER FOR CHRONIC PAIN MANAGEMENT: ICD-10-CM

## 2018-09-21 DIAGNOSIS — M47.816 LUMBAR SPONDYLOSIS: Chronic | ICD-10-CM

## 2018-09-21 DIAGNOSIS — Z98.1 S/P CERVICAL SPINAL FUSION: ICD-10-CM

## 2018-09-21 DIAGNOSIS — M47.816 OSTEOARTHRITIS OF LUMBAR SPINE, UNSPECIFIED SPINAL OSTEOARTHRITIS COMPLICATION STATUS: ICD-10-CM

## 2018-09-21 DIAGNOSIS — M47.26 OSTEOARTHRITIS OF SPINE WITH RADICULOPATHY, LUMBAR REGION: ICD-10-CM

## 2018-09-21 PROCEDURE — 99213 OFFICE O/P EST LOW 20 MIN: CPT | Performed by: NURSE PRACTITIONER

## 2018-09-21 PROCEDURE — 99213 OFFICE O/P EST LOW 20 MIN: CPT

## 2018-09-21 RX ORDER — MORPHINE SULFATE 60 MG/1
60 TABLET, FILM COATED, EXTENDED RELEASE ORAL EVERY 8 HOURS
Qty: 90 TABLET | Refills: 0 | Status: SHIPPED | OUTPATIENT
Start: 2018-09-22 | End: 2018-10-19 | Stop reason: SDUPTHER

## 2018-09-21 RX ORDER — OXYCODONE HYDROCHLORIDE AND ACETAMINOPHEN 5; 325 MG/1; MG/1
1 TABLET ORAL EVERY 8 HOURS
Qty: 90 TABLET | Refills: 0 | Status: SHIPPED | OUTPATIENT
Start: 2018-09-22 | End: 2018-10-19 | Stop reason: SDUPTHER

## 2018-09-21 ASSESSMENT — ENCOUNTER SYMPTOMS
BACK PAIN: 1
GASTROINTESTINAL NEGATIVE: 1
RESPIRATORY NEGATIVE: 1

## 2018-09-21 NOTE — PROGRESS NOTES
any aberrant prescription behavior. Medication is helping the patient stay active. Patient denies any side effects and reports adequate analgesia. No sign of misuse/abuse. When was the last UDS:   3-21-18          Was the UDS appropriate:yes      Record/Diagnostics Review:      As above, I did review the imaging    3/27/2018 11:04 AM - Raimundo, Darynpn Incoming Lab Results From [employer]     Component Results     Component Value Ref Range & Units Status Collected Lab   Pain Management Drug Panel Interp, Urine Consistent   Final 03/21/2018 10:00 AM [facility]   (NOTE)   ________________________________________________________________   DRUGS EXPECTED:   MORPHINE [03/21/18]   OXYCODONE [03/21/18]   ________________________________________________________________   CONSISTENT with medications provided:   MORPHINE : based on morphine   OXYCODONE : based on oxycodone, noroxycodone   ________________________________________________________________   INTERPRETIVE INFORMATION: Pain Mgt [place], [place] Spec/EMIT, Ur,                            Interp   Interpretation depends on accuracy and completeness of patient   medication information submitted by client. 6-Acetylmorphine, Ur Not Detected   Final 03/21/2018 10:00 AM [facility]   7-Aminoclonazepam, Urine Not Detected   Final 03/21/2018 10:00 AM [facility]   Alpha-OH-Alpraz, Urine Not Detected   Final 03/21/2018 10:00 AM [facility]   Alprazolam, Urine Not Detected   Final 03/21/2018 10:00 AM [facility]   Amphetamines, urine Not Detected   Final 03/21/2018 10:00 AM [facility]   Barbiturates, Ur Not Detected   Final 03/21/2018 10:00 AM [facility]   Benzoylecgonine, Ur Not Detected   Final 03/21/2018 10:00 AM [facility]   Buprenorphine Urine Not Detected   Final 03/21/2018 10:00 AM [facility]   Carisoprodol, Ur Not Detected   Final 03/21/2018 10:00 AM [facility]   (NOTE)   The carisoprodol immunoassay has cross-reactivity to carisoprodol   and meprobamate.     Clonazepam, Urine Not Detected   Final 03/21/2018 10:00 AM [facility]   Codeine, Urine as the sole means for   clinical diagnosis or patient management decisions. EER Hi Res Interp Ur See Note   Final 03/21/2018 10:00 AM [facility]   (NOTE)   Access [facility] Enhanced Report using either link below:   -Direct access: [email or web address]   -Enter Username, Password: [email or web address]   Username: [email or web address]   Password: [email or web address]   Performed by [facility] [address], [address] [facility] [phone]   www. Nelly Piedra MD, Lab. Director   Performed at [facility]: STEFANIA TAYLOR [address] [place] [address], [address] [place] [address] [phone]            Past Medical History:   Diagnosis Date    Arthritis     osteoarthritis    Hyperlipidemia        Past Surgical History:   Procedure Laterality Date    BACK SURGERY  1977    cervical spine three times    COLONOSCOPY  01/25/2015    10 yr recall, hemorrhoids    DENTAL SURGERY  10/2015    all teeth extracted    SHOULDER SURGERY Right     UPPER GASTROINTESTINAL ENDOSCOPY  01/25/2015       Allergies   Allergen Reactions    Iron      Pill- constipation, abdominal pain         Current Outpatient Prescriptions:     [START ON 9/22/2018] morphine (MS CONTIN) 60 MG extended release tablet, Take 1 tablet by mouth every 8 hours for 30 days. Arminda Mew Date: 9/22/18, Disp: 90 tablet, Rfl: 0    [START ON 9/22/2018] oxyCODONE-acetaminophen (PERCOCET) 5-325 MG per tablet, Take 1 tablet by mouth every 8 hours for 30 days. Arminda Mew Date: 9/22/18, Disp: 90 tablet, Rfl: 0    pregabalin (LYRICA) 150 MG capsule, Take 1 capsule by mouth 3 times daily for 90 days. ., Disp: 270 capsule, Rfl: 2    pantoprazole (PROTONIX) 40 MG tablet, TK 1 T PO  QD, Disp: , Rfl: 0    cyanocobalamin 1000 MCG/ML injection, Inject 1,000 mcg into the muscle once Once a week for 4 weeks than monthly started 9-1-16, Disp: , Rfl:     gemfibrozil (LOPID) 600 MG tablet, Take 600 mg by mouth 2 times daily (before meals). , Disp: , Rfl:     naloxone (NARCAN) 4 MG/0.1ML LIQD nasal spray, 1 spray by Nasal route as needed (if needed for respiratory depression), Disp: 1 each, Rfl: 0    BD INTEGRA SYRINGE 25G X 1\" 3 ML MISC, , Disp: , Rfl: 4    Family History   Problem Relation Age of Onset    Diabetes Mother     Heart Disease Father        Social History     Social History    Marital status:      Spouse name: N/A    Number of children: N/A    Years of education: N/A     Occupational History    disabled      Social History Main Topics    Smoking status: Former Smoker     Packs/day: 0.50     Years: 45.00     Types: Cigarettes    Smokeless tobacco: Never Used      Comment: on chantix 11-6-15   12-7-15 quit 2 weeks ago    Alcohol use No    Drug use: No    Sexual activity: Not on file     Other Topics Concern    Not on file     Social History Narrative    No narrative on file       Review of Systems:  Review of Systems   Constitution: Negative. HENT: Negative. Eyes:        Glasses   Cardiovascular: Negative. Respiratory: Negative. Endocrine: Negative. Hematologic/Lymphatic: Negative. Skin: Positive for dry skin. Musculoskeletal: Positive for back pain and neck pain. Gastrointestinal: Negative. Genitourinary: Negative. Neurological: Positive for dizziness. Psychiatric/Behavioral: Negative. Physical Exam:  BP (!) 146/68   Pulse 99   Temp 98 °F (36.7 °C) (Oral)   Resp 16   Ht 5' 9\" (1.753 m)   Wt 180 lb (81.6 kg)   SpO2 97%   BMI 26.58 kg/m²     Physical Exam   Constitutional: He is oriented to person, place, and time and well-developed, well-nourished, and in no distress. Neck: Neck supple. Pulmonary/Chest: Effort normal.   Musculoskeletal:        Lumbar back: He exhibits decreased range of motion and tenderness. Back:    Posterior cervical scar   Neurological: He is alert and oriented to person, place, and time.    Reflex Scores:       Patellar reflexes are 1+ on the right side and 1+ on the left

## 2018-09-25 ENCOUNTER — TELEPHONE (OUTPATIENT)
Dept: PAIN MANAGEMENT | Age: 68
End: 2018-09-25

## 2018-10-19 ENCOUNTER — HOSPITAL ENCOUNTER (OUTPATIENT)
Dept: PAIN MANAGEMENT | Age: 68
Discharge: HOME | End: 2018-10-19
Payer: MEDICARE

## 2018-10-19 VITALS
RESPIRATION RATE: 16 BRPM | HEIGHT: 69 IN | BODY MASS INDEX: 27.4 KG/M2 | WEIGHT: 185 LBS | HEART RATE: 108 BPM | SYSTOLIC BLOOD PRESSURE: 139 MMHG | TEMPERATURE: 98.5 F | DIASTOLIC BLOOD PRESSURE: 80 MMHG | OXYGEN SATURATION: 95 %

## 2018-10-19 DIAGNOSIS — M47.26 OSTEOARTHRITIS OF SPINE WITH RADICULOPATHY, LUMBAR REGION: ICD-10-CM

## 2018-10-19 DIAGNOSIS — M54.16 LUMBAR RADICULOPATHY: ICD-10-CM

## 2018-10-19 DIAGNOSIS — M48.061 SPINAL STENOSIS OF LUMBAR REGION WITHOUT NEUROGENIC CLAUDICATION: ICD-10-CM

## 2018-10-19 DIAGNOSIS — M47.816 LUMBAR SPONDYLOSIS: ICD-10-CM

## 2018-10-19 DIAGNOSIS — Z98.1 S/P CERVICAL SPINAL FUSION: ICD-10-CM

## 2018-10-19 DIAGNOSIS — Z51.81 ENCOUNTER FOR MEDICATION MONITORING: ICD-10-CM

## 2018-10-19 DIAGNOSIS — M46.92 CERVICAL SPONDYLITIS (HCC): ICD-10-CM

## 2018-10-19 DIAGNOSIS — M51.36 DEGENERATIVE DISC DISEASE, LUMBAR: Primary | ICD-10-CM

## 2018-10-19 DIAGNOSIS — M47.816 OSTEOARTHRITIS OF LUMBAR SPINE, UNSPECIFIED SPINAL OSTEOARTHRITIS COMPLICATION STATUS: ICD-10-CM

## 2018-10-19 DIAGNOSIS — G89.29 ENCOUNTER FOR CHRONIC PAIN MANAGEMENT: ICD-10-CM

## 2018-10-19 PROCEDURE — 99213 OFFICE O/P EST LOW 20 MIN: CPT

## 2018-10-19 PROCEDURE — 99213 OFFICE O/P EST LOW 20 MIN: CPT | Performed by: NURSE PRACTITIONER

## 2018-10-19 RX ORDER — OXYCODONE HYDROCHLORIDE AND ACETAMINOPHEN 5; 325 MG/1; MG/1
1 TABLET ORAL EVERY 8 HOURS
Qty: 90 TABLET | Refills: 0 | Status: SHIPPED | OUTPATIENT
Start: 2018-10-22 | End: 2018-11-16 | Stop reason: SDUPTHER

## 2018-10-19 RX ORDER — MORPHINE SULFATE 60 MG/1
60 TABLET, FILM COATED, EXTENDED RELEASE ORAL EVERY 8 HOURS
Qty: 90 TABLET | Refills: 0 | Status: SHIPPED | OUTPATIENT
Start: 2018-10-22 | End: 2018-11-16 | Stop reason: SDUPTHER

## 2018-10-19 ASSESSMENT — ENCOUNTER SYMPTOMS
RESPIRATORY NEGATIVE: 1
GASTROINTESTINAL NEGATIVE: 1
BACK PAIN: 1

## 2018-11-16 ENCOUNTER — HOSPITAL ENCOUNTER (OUTPATIENT)
Dept: PAIN MANAGEMENT | Age: 68
Discharge: HOME | End: 2018-11-16
Payer: MEDICARE

## 2018-11-16 VITALS
RESPIRATION RATE: 16 BRPM | HEIGHT: 69 IN | WEIGHT: 185 LBS | BODY MASS INDEX: 27.4 KG/M2 | DIASTOLIC BLOOD PRESSURE: 80 MMHG | HEART RATE: 110 BPM | SYSTOLIC BLOOD PRESSURE: 138 MMHG

## 2018-11-16 DIAGNOSIS — Z51.81 ENCOUNTER FOR MEDICATION MONITORING: Chronic | ICD-10-CM

## 2018-11-16 DIAGNOSIS — M47.816 OSTEOARTHRITIS OF LUMBAR SPINE, UNSPECIFIED SPINAL OSTEOARTHRITIS COMPLICATION STATUS: ICD-10-CM

## 2018-11-16 DIAGNOSIS — M48.061 SPINAL STENOSIS OF LUMBAR REGION WITHOUT NEUROGENIC CLAUDICATION: ICD-10-CM

## 2018-11-16 DIAGNOSIS — M47.816 LUMBAR SPONDYLOSIS: Chronic | ICD-10-CM

## 2018-11-16 DIAGNOSIS — M54.16 LUMBAR RADICULOPATHY: Chronic | ICD-10-CM

## 2018-11-16 DIAGNOSIS — Z98.1 S/P CERVICAL SPINAL FUSION: ICD-10-CM

## 2018-11-16 DIAGNOSIS — G89.29 ENCOUNTER FOR CHRONIC PAIN MANAGEMENT: ICD-10-CM

## 2018-11-16 DIAGNOSIS — M51.36 DEGENERATIVE DISC DISEASE, LUMBAR: Primary | Chronic | ICD-10-CM

## 2018-11-16 DIAGNOSIS — M47.26 OSTEOARTHRITIS OF SPINE WITH RADICULOPATHY, LUMBAR REGION: ICD-10-CM

## 2018-11-16 DIAGNOSIS — M46.92 CERVICAL SPONDYLITIS (HCC): ICD-10-CM

## 2018-11-16 PROCEDURE — 99213 OFFICE O/P EST LOW 20 MIN: CPT

## 2018-11-16 PROCEDURE — 99213 OFFICE O/P EST LOW 20 MIN: CPT | Performed by: NURSE PRACTITIONER

## 2018-11-16 RX ORDER — MORPHINE SULFATE 60 MG/1
60 TABLET, FILM COATED, EXTENDED RELEASE ORAL EVERY 8 HOURS
Qty: 90 TABLET | Refills: 0 | Status: SHIPPED | OUTPATIENT
Start: 2018-11-21 | End: 2018-12-18 | Stop reason: SDUPTHER

## 2018-11-16 RX ORDER — OXYCODONE HYDROCHLORIDE AND ACETAMINOPHEN 5; 325 MG/1; MG/1
1 TABLET ORAL EVERY 8 HOURS
Qty: 90 TABLET | Refills: 0 | Status: SHIPPED | OUTPATIENT
Start: 2018-11-21 | End: 2018-12-18 | Stop reason: SDUPTHER

## 2018-11-16 ASSESSMENT — ENCOUNTER SYMPTOMS
BACK PAIN: 1
CONSTIPATION: 0
COUGH: 0
SHORTNESS OF BREATH: 0

## 2018-11-16 NOTE — PROGRESS NOTES
[facility]  Progress Note      Patient is here today to review medication contract. Chief Complaint:  Back pain    PMH    Pt c/o low back pain for many years. There is no known injury or surgery to the area. He does have a history of scoliosis. His last PT was over 4 years ago and he is not interested in repeating due to inefficacy. He does do home stretches every morning. He also had a LESI in 2013 that did not help. He has had three cervical spine surgeries with benefit - neck is tender but not as painful as low back. He complains of burning pain in both feet - has been told he is \"borderline\" diabetic and has glucose checked regularly with other lab work. He has low HGB and has to have iron infusions or blood transfusions occasionally He stays active babysitting grandchildren 2-3 times a week. HPI:   Back Pain   This is a chronic problem. The current episode started more than 1 year ago. The problem occurs constantly. The problem is unchanged. The pain is present in the lumbar spine. The quality of the pain is described as aching. The pain radiates to the left foot and right foot. The pain is at a severity of 5/10. The pain is moderate. The pain is worse during the day. The symptoms are aggravated by bending, sitting, standing and position. Associated symptoms include numbness and paresthesias. Pertinent negatives include no chest pain or fever. (Feet) Risk factors include lack of exercise. He has tried analgesics and bed rest for the symptoms. The treatment provided mild relief. Patient denies any new neurological symptoms. Nobowel or bladder incontinence, no weakness, and no falling. Pill count: appropriate    Morphine equivalent dose as reported on OARRS: 202.5    Pt does have narcan as a precaution for accidental overdose due his high MED, and has been instructed on its proper use      Attestation: The Prescription Monitoring Report for this patient was reviewed today. gemfibrozil (LOPID) 600 MG tablet, Take 600 mg by mouth 2 times daily (before meals). , Disp: , Rfl:     Family History   Problem Relation Age of Onset    Diabetes Mother     Heart Disease Father        Social History     Social History    Marital status:      Spouse name: N/A    Number of children: N/A    Years of education: N/A     Occupational History    disabled      Social History Main Topics    Smoking status: Former Smoker     Packs/day: 0.50     Years: 45.00     Types: Cigarettes    Smokeless tobacco: Never Used      Comment: on chantix 11-6-15   12-7-15 quit 2 weeks ago    Alcohol use No    Drug use: No    Sexual activity: Not on file     Other Topics Concern    Not on file     Social History Narrative    No narrative on file       Review of Systems:  Review of Systems   Constitution: Negative for chills and fever. Cardiovascular: Negative for chest pain. Respiratory: Negative for cough and shortness of breath. Musculoskeletal: Positive for back pain. Gastrointestinal: Negative for constipation. Neurological: Positive for numbness and paresthesias. Physical Exam:  /80   Pulse 110   Resp 16   Ht 5' 9\" (1.753 m)   Wt 185 lb (83.9 kg)   BMI 27.32 kg/m²     Physical Exam   Constitutional: He is oriented to person, place, and time. He appears well-developed. Cardiovascular: Normal rate. Pulmonary/Chest: Effort normal.   Abdominal: Normal appearance. Musculoskeletal:        Cervical back: He exhibits decreased range of motion and tenderness. Lumbar back: He exhibits tenderness. Neurological: He is alert and oriented to person, place, and time. Skin: Skin is warm. Psychiatric: He has a normal mood and affect. His behavior is normal. Thought content normal.       Record/Diagnostics Review:    Last zoe Mar and was appropriate     Lumbar MRI 2/2018  FINDINGS:  BONES/ALIGNMENT: Multiple endplate Schmorl's node deformities are noted.  There is no acute nausea, vomiting, constipation or drowsiness. Patient reports current activities of daily living are possible due to medications and would like to continue them. As always, we encourage daily stretching and strengthening exercises, and recommend minimizing use of pain medications unless patient cannot get through daily activities due to pain. Discussed with the patient the effect the patients medical condition and opioid medication may have on the patients ability to safely operate a vehicle. Pt verbalized understanding. TREATMENT OPTIONS:     Contract requirements met. Continue opioid therapy.  Script written for [place] ER percocet  Follow up appointment made for 4 weeks

## 2018-12-07 RX ORDER — EPINEPHRINE 1 MG/ML
0.3 INJECTION, SOLUTION, CONCENTRATE INTRAVENOUS PRN
Status: CANCELLED | OUTPATIENT
Start: 2018-12-07

## 2018-12-07 RX ORDER — SODIUM CHLORIDE 0.9 % (FLUSH) 0.9 %
5 SYRINGE (ML) INJECTION PRN
Status: CANCELLED | OUTPATIENT
Start: 2018-12-07

## 2018-12-07 RX ORDER — METHYLPREDNISOLONE SODIUM SUCCINATE 125 MG/2ML
125 INJECTION, POWDER, LYOPHILIZED, FOR SOLUTION INTRAMUSCULAR; INTRAVENOUS ONCE
Status: CANCELLED | OUTPATIENT
Start: 2018-12-07 | End: 2018-12-07

## 2018-12-07 RX ORDER — SODIUM CHLORIDE 0.9 % (FLUSH) 0.9 %
10 SYRINGE (ML) INJECTION PRN
Status: CANCELLED | OUTPATIENT
Start: 2018-12-07

## 2018-12-07 RX ORDER — 0.9 % SODIUM CHLORIDE 0.9 %
10 VIAL (ML) INJECTION ONCE
Status: CANCELLED | OUTPATIENT
Start: 2018-12-07 | End: 2018-12-07

## 2018-12-07 RX ORDER — SODIUM CHLORIDE 9 MG/ML
INJECTION, SOLUTION INTRAVENOUS ONCE
Status: CANCELLED | OUTPATIENT
Start: 2018-12-07 | End: 2018-12-07

## 2018-12-07 RX ORDER — SODIUM CHLORIDE 9 MG/ML
100 INJECTION, SOLUTION INTRAVENOUS CONTINUOUS
Status: CANCELLED | OUTPATIENT
Start: 2018-12-07

## 2018-12-07 RX ORDER — HEPARIN SODIUM (PORCINE) LOCK FLUSH IV SOLN 100 UNIT/ML 100 UNIT/ML
500 SOLUTION INTRAVENOUS PRN
Status: CANCELLED | OUTPATIENT
Start: 2018-12-07

## 2018-12-07 RX ORDER — SODIUM CHLORIDE 9 MG/ML
INJECTION, SOLUTION INTRAVENOUS CONTINUOUS
Status: CANCELLED | OUTPATIENT
Start: 2018-12-07

## 2018-12-07 RX ORDER — DIPHENHYDRAMINE HYDROCHLORIDE 50 MG/ML
50 INJECTION INTRAMUSCULAR; INTRAVENOUS ONCE
Status: CANCELLED | OUTPATIENT
Start: 2018-12-07 | End: 2018-12-07

## 2018-12-07 RX ORDER — DIPHENHYDRAMINE HYDROCHLORIDE 50 MG/ML
25 INJECTION INTRAMUSCULAR; INTRAVENOUS ONCE
Status: CANCELLED | OUTPATIENT
Start: 2018-12-07 | End: 2018-12-07

## 2018-12-11 ENCOUNTER — HOSPITAL ENCOUNTER (OUTPATIENT)
Dept: ONCOLOGY | Age: 68
Discharge: HOME | End: 2018-12-11
Attending: INTERNAL MEDICINE | Admitting: INTERNAL MEDICINE
Payer: MEDICARE

## 2018-12-11 VITALS
HEART RATE: 91 BPM | OXYGEN SATURATION: 96 % | RESPIRATION RATE: 16 BRPM | TEMPERATURE: 97.5 F | SYSTOLIC BLOOD PRESSURE: 129 MMHG | DIASTOLIC BLOOD PRESSURE: 75 MMHG

## 2018-12-11 DIAGNOSIS — D50.8 OTHER IRON DEFICIENCY ANEMIA: ICD-10-CM

## 2018-12-11 LAB — BLOOD BANK SPECIMEN: NORMAL

## 2018-12-11 PROCEDURE — 36430 TRANSFUSION BLD/BLD COMPNT: CPT

## 2018-12-11 PROCEDURE — P9016 RBC LEUKOCYTES REDUCED: HCPCS

## 2018-12-11 PROCEDURE — 86850 RBC ANTIBODY SCREEN: CPT

## 2018-12-11 PROCEDURE — 36415 COLL VENOUS BLD VENIPUNCTURE: CPT

## 2018-12-11 PROCEDURE — 6360000002 HC RX W HCPCS: Performed by: INTERNAL MEDICINE

## 2018-12-11 PROCEDURE — 96374 THER/PROPH/DIAG INJ IV PUSH: CPT

## 2018-12-11 PROCEDURE — 86901 BLOOD TYPING SEROLOGIC RH(D): CPT

## 2018-12-11 PROCEDURE — 6370000000 HC RX 637 (ALT 250 FOR IP): Performed by: INTERNAL MEDICINE

## 2018-12-11 PROCEDURE — 86920 COMPATIBILITY TEST SPIN: CPT

## 2018-12-11 PROCEDURE — 86900 BLOOD TYPING SEROLOGIC ABO: CPT

## 2018-12-11 RX ORDER — 0.9 % SODIUM CHLORIDE 0.9 %
250 INTRAVENOUS SOLUTION INTRAVENOUS ONCE
Status: DISCONTINUED | OUTPATIENT
Start: 2018-12-11 | End: 2018-12-11 | Stop reason: HOSPADM

## 2018-12-11 RX ORDER — FUROSEMIDE 10 MG/ML
20 INJECTION INTRAMUSCULAR; INTRAVENOUS ONCE
Status: COMPLETED | OUTPATIENT
Start: 2018-12-11 | End: 2018-12-11

## 2018-12-11 RX ORDER — ACETAMINOPHEN 325 MG/1
650 TABLET ORAL ONCE
Status: COMPLETED | OUTPATIENT
Start: 2018-12-11 | End: 2018-12-11

## 2018-12-11 RX ORDER — DIPHENHYDRAMINE HCL 25 MG
25 TABLET ORAL ONCE
Status: COMPLETED | OUTPATIENT
Start: 2018-12-11 | End: 2018-12-11

## 2018-12-11 RX ADMIN — DIPHENHYDRAMINE HCL 25 MG: 25 TABLET ORAL at 10:19

## 2018-12-11 RX ADMIN — FUROSEMIDE 20 MG: 10 INJECTION, SOLUTION INTRAMUSCULAR; INTRAVENOUS at 13:50

## 2018-12-11 RX ADMIN — ACETAMINOPHEN 650 MG: 325 TABLET ORAL at 10:19

## 2018-12-11 ASSESSMENT — PAIN SCALES - GENERAL
PAINLEVEL_OUTOF10: 0

## 2018-12-12 LAB
ABO/RH: NORMAL
ANTIBODY SCREEN: NEGATIVE
ARM BAND NUMBER: NORMAL
BLD PROD TYP BPU: NORMAL
BLD PROD TYP BPU: NORMAL
CROSSMATCH RESULT: NORMAL
CROSSMATCH RESULT: NORMAL
DISPENSE STATUS BLOOD BANK: NORMAL
DISPENSE STATUS BLOOD BANK: NORMAL
EXPIRATION DATE: NORMAL
TRANSFUSION STATUS: NORMAL
TRANSFUSION STATUS: NORMAL
UNIT DIVISION: 0
UNIT DIVISION: 0
UNIT NUMBER: NORMAL
UNIT NUMBER: NORMAL

## 2018-12-18 ENCOUNTER — HOSPITAL ENCOUNTER (OUTPATIENT)
Dept: PAIN MANAGEMENT | Age: 68
Discharge: HOME | End: 2018-12-18
Payer: MEDICARE

## 2018-12-18 VITALS
WEIGHT: 186 LBS | TEMPERATURE: 98.5 F | RESPIRATION RATE: 16 BRPM | BODY MASS INDEX: 27.55 KG/M2 | OXYGEN SATURATION: 95 % | HEART RATE: 110 BPM | SYSTOLIC BLOOD PRESSURE: 132 MMHG | HEIGHT: 69 IN | DIASTOLIC BLOOD PRESSURE: 74 MMHG

## 2018-12-18 DIAGNOSIS — Z51.81 ENCOUNTER FOR MEDICATION MONITORING: ICD-10-CM

## 2018-12-18 DIAGNOSIS — M47.816 LUMBAR SPONDYLOSIS: ICD-10-CM

## 2018-12-18 DIAGNOSIS — M54.16 LUMBAR RADICULOPATHY: ICD-10-CM

## 2018-12-18 DIAGNOSIS — M46.92 CERVICAL SPONDYLITIS (HCC): ICD-10-CM

## 2018-12-18 DIAGNOSIS — M47.816 OSTEOARTHRITIS OF LUMBAR SPINE, UNSPECIFIED SPINAL OSTEOARTHRITIS COMPLICATION STATUS: ICD-10-CM

## 2018-12-18 DIAGNOSIS — M47.26 OSTEOARTHRITIS OF SPINE WITH RADICULOPATHY, LUMBAR REGION: ICD-10-CM

## 2018-12-18 DIAGNOSIS — M51.36 DEGENERATIVE DISC DISEASE, LUMBAR: Primary | ICD-10-CM

## 2018-12-18 DIAGNOSIS — M48.061 SPINAL STENOSIS OF LUMBAR REGION WITHOUT NEUROGENIC CLAUDICATION: ICD-10-CM

## 2018-12-18 DIAGNOSIS — G89.29 ENCOUNTER FOR CHRONIC PAIN MANAGEMENT: ICD-10-CM

## 2018-12-18 DIAGNOSIS — Z98.1 S/P CERVICAL SPINAL FUSION: ICD-10-CM

## 2018-12-18 PROCEDURE — 99213 OFFICE O/P EST LOW 20 MIN: CPT | Performed by: NURSE PRACTITIONER

## 2018-12-18 PROCEDURE — 99213 OFFICE O/P EST LOW 20 MIN: CPT

## 2018-12-18 RX ORDER — MORPHINE SULFATE 60 MG/1
60 TABLET, FILM COATED, EXTENDED RELEASE ORAL EVERY 8 HOURS
Qty: 90 TABLET | Refills: 0 | Status: SHIPPED | OUTPATIENT
Start: 2018-12-21 | End: 2019-01-18 | Stop reason: SDUPTHER

## 2018-12-18 RX ORDER — OXYCODONE HYDROCHLORIDE AND ACETAMINOPHEN 5; 325 MG/1; MG/1
1 TABLET ORAL EVERY 8 HOURS
Qty: 90 TABLET | Refills: 0 | Status: SHIPPED | OUTPATIENT
Start: 2018-12-21 | End: 2019-01-18 | Stop reason: SDUPTHER

## 2018-12-18 ASSESSMENT — ENCOUNTER SYMPTOMS
RESPIRATORY NEGATIVE: 1
BACK PAIN: 1
HEARTBURN: 1

## 2018-12-18 NOTE — PROGRESS NOTES
[facility] PROGRESS NOTE      Patient here today to review Medication Agreement    Chief Complaint:low back pain      HPI: He c/o low back pain which has not changed. He has no history of lumbar surgery but has had 3 cervical surgeries  He states his gall bladder surgery was cancelled due lto low HGB, he received 2 units of blood and will be getting an iron transfusion then he will have to schedule the surgery. Sleep is fair. He has had no ED visits. Back Pain   This is a chronic problem. The current episode started more than 1 year ago. The problem occurs constantly. The problem is unchanged. The pain is present in the lumbar spine. Quality: dull. Radiates to: mostly right leg. The pain is at a severity of 3/10. The pain is mild. Worse during: evening. Exacerbated by: certain position. Stiffness is present all day. Associated symptoms include numbness. (Right leg and feet) He has tried analgesics for the symptoms. The treatment provided mild relief. Patient denies any new neurological symptoms. No bowel or bladder incontinence, no weakness, and no falling. Treatment goals:  Functional status: keep current level of pain      Aberrancy:none     Analgesia:pain 3    Adverse  Effects :hector BM daily    ADL;s :waks ,         Pill count: appropriate forgot Narcan, reminded to bring each visit    Morphine equivalent dose as reported on OARRS:202.50  Attestation: The Prescription Monitoring Report for this patient was reviewed today. VEE Ghosh - CNP)  Documentation: Obtaining appropriate analgesic effect of treatment., No signs of potential drug abuse or diversion identified. VEE Ghosh - CNP)  Chronic Pain: Treatment objectives documented - patient is progressing appropriately. , Functional status reviewed - continues with improved or maintaining ADL's., Reviewed the patient's functional status and documentation. , Dose reduction has been attempted.  VEE Ghosh - Disp: , Rfl:     gemfibrozil (LOPID) 600 MG tablet, Take 600 mg by mouth 2 times daily (before meals). , Disp: , Rfl:     naloxone (NARCAN) 4 MG/0.1ML LIQD nasal spray, 1 spray by Nasal route as needed (if needed for respiratory depression), Disp: 1 each, Rfl: 0    BD INTEGRA SYRINGE 25G X 1\" 3 ML MISC, , Disp: , Rfl: 4    Family History   Problem Relation Age of Onset    Diabetes Mother     Heart Disease Father        Social History     Social History    Marital status:      Spouse name: N/A    Number of children: N/A    Years of education: N/A     Occupational History    disabled      Social History Main Topics    Smoking status: Former Smoker     Packs/day: 0.50     Years: 45.00     Types: Cigarettes    Smokeless tobacco: Never Used      Comment: on chantix 11-6-15   12-7-15 quit 2 weeks ago    Alcohol use No    Drug use: No    Sexual activity: Not on file     Other Topics Concern    Not on file     Social History Narrative    No narrative on file       Review of Systems:  Review of Systems   Constitution: Negative. HENT: Negative. Eyes:        Glasses   Cardiovascular: Negative. Respiratory: Negative. Endocrine: Negative. Hematologic/Lymphatic: Negative. Skin: Negative. Musculoskeletal: Positive for back pain and joint pain. Gastrointestinal: Positive for heartburn. Genitourinary: Negative. Neurological: Positive for numbness. Psychiatric/Behavioral: Negative. Physical Exam:  /74   Pulse 110   Temp 98.5 °F (36.9 °C) (Oral)   Resp 16   Ht 5' 9\" (1.753 m)   Wt 186 lb (84.4 kg)   SpO2 95%   BMI 27.47 kg/m²     Physical Exam   Constitutional: He appears well-developed. HENT:   Head: Normocephalic. Neck: Normal range of motion. Neck supple. Pulmonary/Chest: Effort normal.   Musculoskeletal:        Lumbar back: He exhibits tenderness. Cervical scar   Neurological: He is alert. He has normal strength.    Reflex Scores:       Patellar

## 2019-01-03 ENCOUNTER — TELEPHONE (OUTPATIENT)
Dept: ONCOLOGY | Age: 69
End: 2019-01-03

## 2019-01-03 ENCOUNTER — HOSPITAL ENCOUNTER (OUTPATIENT)
Dept: INFUSION THERAPY | Age: 69
Discharge: HOME | End: 2019-01-03
Payer: MEDICARE

## 2019-01-03 VITALS
BODY MASS INDEX: 27.53 KG/M2 | DIASTOLIC BLOOD PRESSURE: 82 MMHG | RESPIRATION RATE: 17 BRPM | SYSTOLIC BLOOD PRESSURE: 139 MMHG | HEART RATE: 102 BPM | TEMPERATURE: 97.7 F | WEIGHT: 186.4 LBS

## 2019-01-03 DIAGNOSIS — D50.8 OTHER IRON DEFICIENCY ANEMIA: ICD-10-CM

## 2019-01-03 PROCEDURE — 96365 THER/PROPH/DIAG IV INF INIT: CPT

## 2019-01-03 PROCEDURE — 2580000003 HC RX 258: Performed by: INTERNAL MEDICINE

## 2019-01-03 PROCEDURE — 6370000000 HC RX 637 (ALT 250 FOR IP): Performed by: INTERNAL MEDICINE

## 2019-01-03 PROCEDURE — 96366 THER/PROPH/DIAG IV INF ADDON: CPT

## 2019-01-03 PROCEDURE — 6360000002 HC RX W HCPCS: Performed by: INTERNAL MEDICINE

## 2019-01-03 RX ORDER — 0.9 % SODIUM CHLORIDE 0.9 %
10 VIAL (ML) INJECTION ONCE
Status: CANCELLED | OUTPATIENT
Start: 2019-01-03 | End: 2019-01-03

## 2019-01-03 RX ORDER — SODIUM CHLORIDE 9 MG/ML
INJECTION, SOLUTION INTRAVENOUS ONCE
Status: CANCELLED | OUTPATIENT
Start: 2019-01-03 | End: 2019-01-03

## 2019-01-03 RX ORDER — METHYLPREDNISOLONE SODIUM SUCCINATE 125 MG/2ML
125 INJECTION, POWDER, LYOPHILIZED, FOR SOLUTION INTRAMUSCULAR; INTRAVENOUS ONCE
Status: CANCELLED | OUTPATIENT
Start: 2019-01-03 | End: 2019-01-03

## 2019-01-03 RX ORDER — EPINEPHRINE 1 MG/ML
0.3 INJECTION, SOLUTION, CONCENTRATE INTRAVENOUS PRN
Status: CANCELLED | OUTPATIENT
Start: 2019-01-03

## 2019-01-03 RX ORDER — DIPHENHYDRAMINE HCL 25 MG
25 TABLET ORAL ONCE
Status: COMPLETED | OUTPATIENT
Start: 2019-01-03 | End: 2019-01-03

## 2019-01-03 RX ORDER — DIPHENHYDRAMINE HYDROCHLORIDE 50 MG/ML
50 INJECTION INTRAMUSCULAR; INTRAVENOUS ONCE
Status: CANCELLED | OUTPATIENT
Start: 2019-01-03 | End: 2019-01-03

## 2019-01-03 RX ORDER — SODIUM CHLORIDE 9 MG/ML
INJECTION, SOLUTION INTRAVENOUS ONCE
Status: COMPLETED | OUTPATIENT
Start: 2019-01-03 | End: 2019-01-03

## 2019-01-03 RX ORDER — SODIUM CHLORIDE 0.9 % (FLUSH) 0.9 %
5 SYRINGE (ML) INJECTION PRN
Status: CANCELLED | OUTPATIENT
Start: 2019-01-03

## 2019-01-03 RX ORDER — SODIUM CHLORIDE 0.9 % (FLUSH) 0.9 %
10 SYRINGE (ML) INJECTION PRN
Status: CANCELLED | OUTPATIENT
Start: 2019-01-03

## 2019-01-03 RX ORDER — DIPHENHYDRAMINE HYDROCHLORIDE 50 MG/ML
25 INJECTION INTRAMUSCULAR; INTRAVENOUS ONCE
Status: CANCELLED | OUTPATIENT
Start: 2019-01-03 | End: 2019-01-03

## 2019-01-03 RX ORDER — SODIUM CHLORIDE 9 MG/ML
INJECTION, SOLUTION INTRAVENOUS CONTINUOUS
Status: CANCELLED | OUTPATIENT
Start: 2019-01-03

## 2019-01-03 RX ORDER — HEPARIN SODIUM (PORCINE) LOCK FLUSH IV SOLN 100 UNIT/ML 100 UNIT/ML
500 SOLUTION INTRAVENOUS PRN
Status: CANCELLED | OUTPATIENT
Start: 2019-01-03

## 2019-01-03 RX ADMIN — DIPHENHYDRAMINE HCL 25 MG: 25 TABLET ORAL at 08:30

## 2019-01-03 RX ADMIN — SODIUM CHLORIDE: 9 INJECTION, SOLUTION INTRAVENOUS at 08:21

## 2019-01-03 RX ADMIN — IRON DEXTRAN 25 MG: 50 INJECTION INTRAMUSCULAR; INTRAVENOUS at 08:30

## 2019-01-03 RX ADMIN — IRON DEXTRAN 975 MG: 50 INJECTION INTRAMUSCULAR; INTRAVENOUS at 09:35

## 2019-01-09 ENCOUNTER — HOSPITAL ENCOUNTER (OUTPATIENT)
Age: 69
Discharge: HOME | End: 2019-01-09
Payer: MEDICARE

## 2019-01-09 LAB
ABSOLUTE EOS #: 0.05 K/UL (ref 0–0.4)
ABSOLUTE IMMATURE GRANULOCYTE: ABNORMAL K/UL (ref 0–0.3)
ABSOLUTE LYMPH #: 1.38 K/UL (ref 1–4.8)
ABSOLUTE MONO #: 0.36 K/UL (ref 0.1–1.3)
BASOPHILS # BLD: 1 % (ref 0–2)
BASOPHILS ABSOLUTE: 0.05 K/UL (ref 0–0.2)
DIFFERENTIAL TYPE: ABNORMAL
EOSINOPHILS RELATIVE PERCENT: 1 % (ref 0–4)
HCT VFR BLD CALC: 29.7 % (ref 41–53)
HEMOGLOBIN: 8.9 G/DL (ref 13.5–17.5)
IMMATURE GRANULOCYTES: ABNORMAL %
LYMPHOCYTES # BLD: 27 % (ref 24–44)
MCH RBC QN AUTO: 20.3 PG (ref 26–34)
MCHC RBC AUTO-ENTMCNC: 30 G/DL (ref 31–37)
MCV RBC AUTO: 67.8 FL (ref 80–100)
MONOCYTES # BLD: 7 % (ref 1–7)
MORPHOLOGY: ABNORMAL
NRBC AUTOMATED: ABNORMAL PER 100 WBC
PDW BLD-RTO: 25 % (ref 11.5–14.9)
PLATELET # BLD: 247 K/UL (ref 150–450)
PLATELET ESTIMATE: ABNORMAL
PMV BLD AUTO: 8.6 FL (ref 6–12)
RBC # BLD: 4.38 M/UL (ref 4.5–5.9)
RBC # BLD: ABNORMAL 10*6/UL
SEG NEUTROPHILS: 64 % (ref 36–66)
SEGMENTED NEUTROPHILS ABSOLUTE COUNT: 3.26 K/UL (ref 1.3–9.1)
WBC # BLD: 5.1 K/UL (ref 3.5–11)
WBC # BLD: ABNORMAL 10*3/UL

## 2019-01-09 PROCEDURE — 85025 COMPLETE CBC W/AUTO DIFF WBC: CPT

## 2019-01-09 PROCEDURE — 36415 COLL VENOUS BLD VENIPUNCTURE: CPT

## 2019-01-17 ENCOUNTER — TELEPHONE (OUTPATIENT)
Dept: ONCOLOGY | Age: 69
End: 2019-01-17

## 2019-01-18 ENCOUNTER — HOSPITAL ENCOUNTER (OUTPATIENT)
Dept: PAIN MANAGEMENT | Age: 69
Discharge: HOME | End: 2019-01-18
Payer: MEDICARE

## 2019-01-18 VITALS
BODY MASS INDEX: 27.55 KG/M2 | HEART RATE: 94 BPM | DIASTOLIC BLOOD PRESSURE: 72 MMHG | HEIGHT: 69 IN | RESPIRATION RATE: 16 BRPM | WEIGHT: 186 LBS | SYSTOLIC BLOOD PRESSURE: 137 MMHG

## 2019-01-18 DIAGNOSIS — M47.816 OSTEOARTHRITIS OF LUMBAR SPINE, UNSPECIFIED SPINAL OSTEOARTHRITIS COMPLICATION STATUS: ICD-10-CM

## 2019-01-18 DIAGNOSIS — Z51.81 ENCOUNTER FOR MEDICATION MONITORING: Chronic | ICD-10-CM

## 2019-01-18 DIAGNOSIS — M51.36 DEGENERATIVE DISC DISEASE, LUMBAR: Primary | Chronic | ICD-10-CM

## 2019-01-18 DIAGNOSIS — M47.26 OSTEOARTHRITIS OF SPINE WITH RADICULOPATHY, LUMBAR REGION: ICD-10-CM

## 2019-01-18 DIAGNOSIS — M48.061 SPINAL STENOSIS OF LUMBAR REGION WITHOUT NEUROGENIC CLAUDICATION: Chronic | ICD-10-CM

## 2019-01-18 DIAGNOSIS — G89.29 ENCOUNTER FOR CHRONIC PAIN MANAGEMENT: ICD-10-CM

## 2019-01-18 DIAGNOSIS — M47.816 LUMBAR SPONDYLOSIS: Chronic | ICD-10-CM

## 2019-01-18 DIAGNOSIS — M46.92 CERVICAL SPONDYLITIS (HCC): ICD-10-CM

## 2019-01-18 DIAGNOSIS — Z98.1 S/P CERVICAL SPINAL FUSION: ICD-10-CM

## 2019-01-18 DIAGNOSIS — M54.16 LUMBAR RADICULOPATHY: Chronic | ICD-10-CM

## 2019-01-18 PROCEDURE — 99213 OFFICE O/P EST LOW 20 MIN: CPT | Performed by: NURSE PRACTITIONER

## 2019-01-18 PROCEDURE — 99213 OFFICE O/P EST LOW 20 MIN: CPT

## 2019-01-18 RX ORDER — OXYCODONE HYDROCHLORIDE AND ACETAMINOPHEN 5; 325 MG/1; MG/1
1 TABLET ORAL EVERY 8 HOURS
Qty: 90 TABLET | Refills: 0 | Status: SHIPPED | OUTPATIENT
Start: 2019-01-20 | End: 2019-02-14 | Stop reason: SDUPTHER

## 2019-01-18 RX ORDER — MORPHINE SULFATE 60 MG/1
60 TABLET, FILM COATED, EXTENDED RELEASE ORAL EVERY 8 HOURS
Qty: 90 TABLET | Refills: 0 | Status: SHIPPED | OUTPATIENT
Start: 2019-01-20 | End: 2019-01-23 | Stop reason: SDUPTHER

## 2019-01-18 RX ORDER — PREGABALIN 150 MG/1
150 CAPSULE ORAL 3 TIMES DAILY
Qty: 90 CAPSULE | Refills: 0 | Status: SHIPPED | OUTPATIENT
Start: 2019-01-18 | End: 2019-02-14 | Stop reason: SDUPTHER

## 2019-01-18 ASSESSMENT — ENCOUNTER SYMPTOMS
COUGH: 0
CONSTIPATION: 0
BACK PAIN: 1
SHORTNESS OF BREATH: 0

## 2019-01-24 ENCOUNTER — TELEPHONE (OUTPATIENT)
Dept: PAIN MANAGEMENT | Age: 69
End: 2019-01-24

## 2019-02-06 ENCOUNTER — HOSPITAL ENCOUNTER (OUTPATIENT)
Age: 69
Discharge: HOME | End: 2019-02-06
Payer: MEDICARE

## 2019-02-06 LAB
ABSOLUTE EOS #: 0.06 K/UL (ref 0–0.4)
ABSOLUTE IMMATURE GRANULOCYTE: ABNORMAL K/UL (ref 0–0.3)
ABSOLUTE LYMPH #: 1.45 K/UL (ref 1–4.8)
ABSOLUTE MONO #: 0.35 K/UL (ref 0.1–1.3)
BASOPHILS # BLD: 1 % (ref 0–2)
BASOPHILS ABSOLUTE: 0.06 K/UL (ref 0–0.2)
DIFFERENTIAL TYPE: ABNORMAL
EOSINOPHILS RELATIVE PERCENT: 1 % (ref 0–4)
FERRITIN: 18 UG/L (ref 30–400)
HCT VFR BLD CALC: 34 % (ref 41–53)
HEMOGLOBIN: 10.7 G/DL (ref 13.5–17.5)
IMMATURE GRANULOCYTES: ABNORMAL %
IRON SATURATION: 7 % (ref 20–55)
IRON: 27 UG/DL (ref 59–158)
LYMPHOCYTES # BLD: 25 % (ref 24–44)
MCH RBC QN AUTO: 23.7 PG (ref 26–34)
MCHC RBC AUTO-ENTMCNC: 31.5 G/DL (ref 31–37)
MCV RBC AUTO: 75.3 FL (ref 80–100)
MONOCYTES # BLD: 6 % (ref 1–7)
MORPHOLOGY: ABNORMAL
NRBC AUTOMATED: ABNORMAL PER 100 WBC
PDW BLD-RTO: 26.8 % (ref 11.5–14.9)
PLATELET # BLD: 253 K/UL (ref 150–450)
PLATELET ESTIMATE: ABNORMAL
PMV BLD AUTO: 8.7 FL (ref 6–12)
RBC # BLD: 4.51 M/UL (ref 4.5–5.9)
RBC # BLD: ABNORMAL 10*6/UL
SEG NEUTROPHILS: 67 % (ref 36–66)
SEGMENTED NEUTROPHILS ABSOLUTE COUNT: 3.88 K/UL (ref 1.3–9.1)
TOTAL IRON BINDING CAPACITY: 380 UG/DL (ref 250–450)
UNSATURATED IRON BINDING CAPACITY: 353 UG/DL (ref 112–347)
WBC # BLD: 5.8 K/UL (ref 3.5–11)
WBC # BLD: ABNORMAL 10*3/UL

## 2019-02-06 PROCEDURE — 83540 ASSAY OF IRON: CPT

## 2019-02-06 PROCEDURE — 36415 COLL VENOUS BLD VENIPUNCTURE: CPT

## 2019-02-06 PROCEDURE — 82728 ASSAY OF FERRITIN: CPT

## 2019-02-06 PROCEDURE — 85025 COMPLETE CBC W/AUTO DIFF WBC: CPT

## 2019-02-06 PROCEDURE — 83550 IRON BINDING TEST: CPT

## 2019-02-14 ENCOUNTER — HOSPITAL ENCOUNTER (OUTPATIENT)
Dept: PAIN MANAGEMENT | Age: 69
Discharge: HOME | End: 2019-02-14
Payer: MEDICARE

## 2019-02-14 VITALS
HEART RATE: 113 BPM | RESPIRATION RATE: 16 BRPM | HEIGHT: 69 IN | DIASTOLIC BLOOD PRESSURE: 74 MMHG | TEMPERATURE: 98.4 F | WEIGHT: 186 LBS | SYSTOLIC BLOOD PRESSURE: 133 MMHG | OXYGEN SATURATION: 95 % | BODY MASS INDEX: 27.55 KG/M2

## 2019-02-14 DIAGNOSIS — M47.26 OSTEOARTHRITIS OF SPINE WITH RADICULOPATHY, LUMBAR REGION: ICD-10-CM

## 2019-02-14 DIAGNOSIS — M51.36 DEGENERATIVE DISC DISEASE, LUMBAR: Chronic | ICD-10-CM

## 2019-02-14 DIAGNOSIS — M47.816 OSTEOARTHRITIS OF LUMBAR SPINE, UNSPECIFIED SPINAL OSTEOARTHRITIS COMPLICATION STATUS: ICD-10-CM

## 2019-02-14 DIAGNOSIS — G89.29 ENCOUNTER FOR CHRONIC PAIN MANAGEMENT: ICD-10-CM

## 2019-02-14 DIAGNOSIS — M48.061 SPINAL STENOSIS OF LUMBAR REGION WITHOUT NEUROGENIC CLAUDICATION: Chronic | ICD-10-CM

## 2019-02-14 DIAGNOSIS — Z51.81 ENCOUNTER FOR MEDICATION MONITORING: Chronic | ICD-10-CM

## 2019-02-14 DIAGNOSIS — M46.92 CERVICAL SPONDYLITIS (HCC): ICD-10-CM

## 2019-02-14 DIAGNOSIS — Z98.1 S/P CERVICAL SPINAL FUSION: ICD-10-CM

## 2019-02-14 DIAGNOSIS — M54.16 LUMBAR RADICULOPATHY: Chronic | ICD-10-CM

## 2019-02-14 DIAGNOSIS — M47.816 LUMBAR SPONDYLOSIS: Chronic | ICD-10-CM

## 2019-02-14 PROCEDURE — 80307 DRUG TEST PRSMV CHEM ANLYZR: CPT

## 2019-02-14 PROCEDURE — 99214 OFFICE O/P EST MOD 30 MIN: CPT | Performed by: PAIN MEDICINE

## 2019-02-14 PROCEDURE — 99213 OFFICE O/P EST LOW 20 MIN: CPT

## 2019-02-14 RX ORDER — MORPHINE SULFATE 60 MG/1
60 TABLET, FILM COATED, EXTENDED RELEASE ORAL EVERY 8 HOURS
Qty: 80 TABLET | Refills: 0 | Status: SHIPPED | OUTPATIENT
Start: 2019-02-17 | End: 2019-02-14 | Stop reason: SDUPTHER

## 2019-02-14 RX ORDER — PREGABALIN 150 MG/1
150 CAPSULE ORAL 3 TIMES DAILY
Qty: 90 CAPSULE | Refills: 0 | Status: SHIPPED | OUTPATIENT
Start: 2019-02-18 | End: 2019-03-15 | Stop reason: SDUPTHER

## 2019-02-14 RX ORDER — OXYCODONE HYDROCHLORIDE AND ACETAMINOPHEN 5; 325 MG/1; MG/1
1 TABLET ORAL EVERY 8 HOURS
Qty: 90 TABLET | Refills: 0 | Status: SHIPPED | OUTPATIENT
Start: 2019-02-17 | End: 2019-03-15 | Stop reason: SDUPTHER

## 2019-02-14 RX ORDER — MORPHINE SULFATE 60 MG/1
60 TABLET, FILM COATED, EXTENDED RELEASE ORAL EVERY 8 HOURS
Qty: 80 TABLET | Refills: 0 | Status: SHIPPED | OUTPATIENT
Start: 2019-02-18 | End: 2019-02-14 | Stop reason: SDUPTHER

## 2019-02-14 RX ORDER — MORPHINE SULFATE 60 MG/1
60 TABLET, FILM COATED, EXTENDED RELEASE ORAL EVERY 8 HOURS
Qty: 90 TABLET | Refills: 0 | Status: SHIPPED | OUTPATIENT
Start: 2019-02-18 | End: 2019-03-15 | Stop reason: SDUPTHER

## 2019-02-14 ASSESSMENT — ENCOUNTER SYMPTOMS
GASTROINTESTINAL NEGATIVE: 1
EYE PAIN: 0
ALLERGIC/IMMUNOLOGIC NEGATIVE: 1
COUGH: 0
ABDOMINAL PAIN: 0
BACK PAIN: 1
BOWEL INCONTINENCE: 0
RESPIRATORY NEGATIVE: 1
CONSTIPATION: 0
DIARRHEA: 0
SHORTNESS OF BREATH: 0
PHOTOPHOBIA: 0
NAUSEA: 0
EYES NEGATIVE: 1

## 2019-02-14 ASSESSMENT — PAIN DESCRIPTION - ORIENTATION: ORIENTATION: LOWER;LEFT;RIGHT

## 2019-02-14 ASSESSMENT — PAIN DESCRIPTION - PROGRESSION: CLINICAL_PROGRESSION: NOT CHANGED

## 2019-02-14 ASSESSMENT — PAIN DESCRIPTION - FREQUENCY: FREQUENCY: CONTINUOUS

## 2019-02-14 ASSESSMENT — PAIN SCALES - GENERAL: PAINLEVEL_OUTOF10: 3

## 2019-02-14 ASSESSMENT — PAIN DESCRIPTION - ONSET: ONSET: ON-GOING

## 2019-02-14 ASSESSMENT — PAIN DESCRIPTION - LOCATION: LOCATION: BACK;LEG

## 2019-02-14 ASSESSMENT — PAIN DESCRIPTION - DESCRIPTORS: DESCRIPTORS: ACHING;CONSTANT

## 2019-02-14 ASSESSMENT — PAIN DESCRIPTION - PAIN TYPE: TYPE: CHRONIC PAIN

## 2019-02-18 LAB
6-ACETYLMORPHINE, UR: NOT DETECTED
7-AMINOCLONAZEPAM, URINE: NOT DETECTED
ALPHA-OH-ALPRAZ, URINE: NOT DETECTED
ALPRAZOLAM, URINE: NOT DETECTED
AMPHETAMINES, URINE: NOT DETECTED
BARBITURATES, URINE: NOT DETECTED
BENZOYLECGONINE, UR: NOT DETECTED
BUPRENORPHINE URINE: NOT DETECTED
CARISOPRODOL, UR: NOT DETECTED
CLONAZEPAM, URINE: NOT DETECTED
CODEINE, URINE: NOT DETECTED
CREATININE URINE: 125.8 MG/DL (ref 20–400)
DIAZEPAM, URINE: NOT DETECTED
DRUGS EXPECTED, UR: NORMAL
EER HI RES INTERP UR: NORMAL
ETHYL GLUCURONIDE UR: NOT DETECTED
FENTANYL URINE: NOT DETECTED
HYDROCODONE, URINE: NOT DETECTED
HYDROMORPHONE, URINE: PRESENT
LORAZEPAM, URINE: NOT DETECTED
MARIJUANA METAB, UR: NOT DETECTED
MDA, UR: NOT DETECTED
MDEA, EVE, UR: NOT DETECTED
MDMA URINE: NOT DETECTED
MEPERIDINE METAB, UR: NOT DETECTED
METHADONE, URINE: NOT DETECTED
METHAMPHETAMINE, URINE: NOT DETECTED
METHYLPHENIDATE: NOT DETECTED
MIDAZOLAM, URINE: NOT DETECTED
MORPHINE URINE: PRESENT
NORBUPRENORPHINE, URINE: NOT DETECTED
NORDIAZEPAM, URINE: NOT DETECTED
NORFENTANYL, URINE: NOT DETECTED
NORHYDROCODONE, URINE: NOT DETECTED
NOROXYCODONE, URINE: PRESENT
NOROXYMORPHONE, URINE: PRESENT
OXAZEPAM, URINE: NOT DETECTED
OXYCODONE URINE: PRESENT
OXYMORPHONE, URINE: NOT DETECTED
PAIN MANAGEMENT DRUG PANEL INTERP, URINE: NORMAL
PAIN MGT DRUG PANEL, HI RES, UR: NORMAL
PCP,URINE: NOT DETECTED
PHENTERMINE, UR: NOT DETECTED
PROPOXYPHENE, URINE: NOT DETECTED
TAPENTADOL, URINE: NOT DETECTED
TAPENTADOL-O-SULFATE, URINE: NOT DETECTED
TEMAZEPAM, URINE: NOT DETECTED
TRAMADOL, URINE: NOT DETECTED
ZOLPIDEM, URINE: NOT DETECTED

## 2019-03-05 ENCOUNTER — HOSPITAL ENCOUNTER (OUTPATIENT)
Age: 69
Discharge: HOME | End: 2019-03-05
Payer: MEDICARE

## 2019-03-05 LAB
ANION GAP SERPL CALCULATED.3IONS-SCNC: 10 MMOL/L (ref 9–17)
BUN BLDV-MCNC: 17 MG/DL (ref 8–23)
BUN/CREAT BLD: ABNORMAL (ref 9–20)
CALCIUM SERPL-MCNC: 9.1 MG/DL (ref 8.6–10.4)
CHLORIDE BLD-SCNC: 102 MMOL/L (ref 98–107)
CO2: 28 MMOL/L (ref 20–31)
CREAT SERPL-MCNC: 0.65 MG/DL (ref 0.7–1.2)
GFR AFRICAN AMERICAN: >60 ML/MIN
GFR NON-AFRICAN AMERICAN: >60 ML/MIN
GFR SERPL CREATININE-BSD FRML MDRD: ABNORMAL ML/MIN/{1.73_M2}
GFR SERPL CREATININE-BSD FRML MDRD: ABNORMAL ML/MIN/{1.73_M2}
GLUCOSE BLD-MCNC: 105 MG/DL (ref 70–99)
HCT VFR BLD CALC: 32.9 % (ref 41–53)
HEMOGLOBIN: 10.2 G/DL (ref 13.5–17.5)
INR BLD: 1
MCH RBC QN AUTO: 23.1 PG (ref 26–34)
MCHC RBC AUTO-ENTMCNC: 30.9 G/DL (ref 31–37)
MCV RBC AUTO: 74.7 FL (ref 80–100)
NRBC AUTOMATED: ABNORMAL PER 100 WBC
PARTIAL THROMBOPLASTIN TIME: 30.7 SEC (ref 24–36)
PDW BLD-RTO: 20.3 % (ref 11.5–14.9)
PLATELET # BLD: 255 K/UL (ref 150–450)
PMV BLD AUTO: 8.1 FL (ref 6–12)
POTASSIUM SERPL-SCNC: 4.6 MMOL/L (ref 3.7–5.3)
PROTHROMBIN TIME: 13.4 SEC (ref 11.8–14.6)
RBC # BLD: 4.4 M/UL (ref 4.5–5.9)
SODIUM BLD-SCNC: 140 MMOL/L (ref 135–144)
WBC # BLD: 4.3 K/UL (ref 3.5–11)

## 2019-03-05 PROCEDURE — 80048 BASIC METABOLIC PNL TOTAL CA: CPT

## 2019-03-05 PROCEDURE — 85027 COMPLETE CBC AUTOMATED: CPT

## 2019-03-05 PROCEDURE — 85730 THROMBOPLASTIN TIME PARTIAL: CPT

## 2019-03-05 PROCEDURE — 36415 COLL VENOUS BLD VENIPUNCTURE: CPT

## 2019-03-05 PROCEDURE — 85610 PROTHROMBIN TIME: CPT

## 2019-03-15 ENCOUNTER — HOSPITAL ENCOUNTER (OUTPATIENT)
Dept: PAIN MANAGEMENT | Age: 69
Discharge: HOME | End: 2019-03-15
Payer: MEDICARE

## 2019-03-15 VITALS
HEIGHT: 69 IN | BODY MASS INDEX: 27.55 KG/M2 | HEART RATE: 100 BPM | SYSTOLIC BLOOD PRESSURE: 136 MMHG | WEIGHT: 186 LBS | RESPIRATION RATE: 16 BRPM | DIASTOLIC BLOOD PRESSURE: 79 MMHG

## 2019-03-15 DIAGNOSIS — M47.816 LUMBAR SPONDYLOSIS: Chronic | ICD-10-CM

## 2019-03-15 DIAGNOSIS — M48.061 SPINAL STENOSIS OF LUMBAR REGION WITHOUT NEUROGENIC CLAUDICATION: Chronic | ICD-10-CM

## 2019-03-15 DIAGNOSIS — Z98.1 S/P CERVICAL SPINAL FUSION: ICD-10-CM

## 2019-03-15 DIAGNOSIS — M51.36 DEGENERATIVE DISC DISEASE, LUMBAR: Primary | Chronic | ICD-10-CM

## 2019-03-15 DIAGNOSIS — G89.29 ENCOUNTER FOR CHRONIC PAIN MANAGEMENT: ICD-10-CM

## 2019-03-15 DIAGNOSIS — M46.92 CERVICAL SPONDYLITIS (HCC): ICD-10-CM

## 2019-03-15 DIAGNOSIS — Z51.81 ENCOUNTER FOR MEDICATION MONITORING: Chronic | ICD-10-CM

## 2019-03-15 DIAGNOSIS — M47.816 OSTEOARTHRITIS OF LUMBAR SPINE, UNSPECIFIED SPINAL OSTEOARTHRITIS COMPLICATION STATUS: ICD-10-CM

## 2019-03-15 DIAGNOSIS — M54.16 LUMBAR RADICULOPATHY: Chronic | ICD-10-CM

## 2019-03-15 DIAGNOSIS — M47.26 OSTEOARTHRITIS OF SPINE WITH RADICULOPATHY, LUMBAR REGION: ICD-10-CM

## 2019-03-15 PROCEDURE — 99213 OFFICE O/P EST LOW 20 MIN: CPT | Performed by: NURSE PRACTITIONER

## 2019-03-15 PROCEDURE — 99213 OFFICE O/P EST LOW 20 MIN: CPT

## 2019-03-15 RX ORDER — OXYCODONE HYDROCHLORIDE AND ACETAMINOPHEN 5; 325 MG/1; MG/1
1 TABLET ORAL EVERY 8 HOURS
Qty: 90 TABLET | Refills: 0 | Status: SHIPPED | OUTPATIENT
Start: 2019-03-20 | End: 2019-04-16 | Stop reason: SDUPTHER

## 2019-03-15 RX ORDER — MORPHINE SULFATE 60 MG/1
60 TABLET, FILM COATED, EXTENDED RELEASE ORAL EVERY 8 HOURS
Qty: 90 TABLET | Refills: 0 | Status: SHIPPED | OUTPATIENT
Start: 2019-03-20 | End: 2019-04-16 | Stop reason: SDUPTHER

## 2019-03-15 RX ORDER — PREGABALIN 150 MG/1
150 CAPSULE ORAL 3 TIMES DAILY
Qty: 90 CAPSULE | Refills: 0 | Status: SHIPPED | OUTPATIENT
Start: 2019-03-20 | End: 2019-04-16 | Stop reason: SDUPTHER

## 2019-03-15 ASSESSMENT — ENCOUNTER SYMPTOMS
COUGH: 0
CONSTIPATION: 0
BACK PAIN: 1
SHORTNESS OF BREATH: 0

## 2019-04-16 ENCOUNTER — HOSPITAL ENCOUNTER (OUTPATIENT)
Dept: PAIN MANAGEMENT | Age: 69
Discharge: HOME | End: 2019-04-16
Payer: MEDICARE

## 2019-04-16 VITALS
BODY MASS INDEX: 28.58 KG/M2 | DIASTOLIC BLOOD PRESSURE: 87 MMHG | HEART RATE: 106 BPM | TEMPERATURE: 98.2 F | HEIGHT: 69 IN | SYSTOLIC BLOOD PRESSURE: 145 MMHG | RESPIRATION RATE: 20 BRPM | WEIGHT: 193 LBS | OXYGEN SATURATION: 96 %

## 2019-04-16 DIAGNOSIS — M51.36 DEGENERATIVE DISC DISEASE, LUMBAR: Chronic | ICD-10-CM

## 2019-04-16 DIAGNOSIS — M46.92 CERVICAL SPONDYLITIS (HCC): Chronic | ICD-10-CM

## 2019-04-16 DIAGNOSIS — M54.16 LUMBAR RADICULOPATHY: Chronic | ICD-10-CM

## 2019-04-16 DIAGNOSIS — M47.896 OTHER OSTEOARTHRITIS OF SPINE, LUMBAR REGION: ICD-10-CM

## 2019-04-16 DIAGNOSIS — M47.816 LUMBAR SPONDYLOSIS: Chronic | ICD-10-CM

## 2019-04-16 DIAGNOSIS — Z98.1 S/P CERVICAL SPINAL FUSION: Chronic | ICD-10-CM

## 2019-04-16 DIAGNOSIS — G89.29 ENCOUNTER FOR CHRONIC PAIN MANAGEMENT: ICD-10-CM

## 2019-04-16 DIAGNOSIS — Z51.81 ENCOUNTER FOR MEDICATION MONITORING: ICD-10-CM

## 2019-04-16 DIAGNOSIS — M47.26 OSTEOARTHRITIS OF SPINE WITH RADICULOPATHY, LUMBAR REGION: ICD-10-CM

## 2019-04-16 DIAGNOSIS — M48.061 SPINAL STENOSIS OF LUMBAR REGION WITHOUT NEUROGENIC CLAUDICATION: Chronic | ICD-10-CM

## 2019-04-16 DIAGNOSIS — M47.816 OSTEOARTHRITIS OF LUMBAR SPINE, UNSPECIFIED SPINAL OSTEOARTHRITIS COMPLICATION STATUS: Primary | ICD-10-CM

## 2019-04-16 PROCEDURE — 99213 OFFICE O/P EST LOW 20 MIN: CPT

## 2019-04-16 PROCEDURE — 99213 OFFICE O/P EST LOW 20 MIN: CPT | Performed by: NURSE PRACTITIONER

## 2019-04-16 RX ORDER — OXYCODONE HYDROCHLORIDE AND ACETAMINOPHEN 5; 325 MG/1; MG/1
1 TABLET ORAL EVERY 8 HOURS
Qty: 90 TABLET | Refills: 0 | Status: SHIPPED | OUTPATIENT
Start: 2019-04-19 | End: 2019-05-16 | Stop reason: SDUPTHER

## 2019-04-16 RX ORDER — MORPHINE SULFATE 60 MG/1
60 TABLET, FILM COATED, EXTENDED RELEASE ORAL EVERY 8 HOURS
Qty: 90 TABLET | Refills: 0 | Status: SHIPPED | OUTPATIENT
Start: 2019-04-19 | End: 2019-05-16 | Stop reason: SDUPTHER

## 2019-04-16 RX ORDER — PREGABALIN 150 MG/1
150 CAPSULE ORAL 3 TIMES DAILY
Qty: 90 CAPSULE | Refills: 2 | Status: SHIPPED | OUTPATIENT
Start: 2019-04-24 | End: 2019-06-07 | Stop reason: SDUPTHER

## 2019-04-16 ASSESSMENT — ENCOUNTER SYMPTOMS: BACK PAIN: 1

## 2019-04-16 NOTE — PROGRESS NOTES
Magda 89 PROGRESS NOTE      Patient here today to review Medication Agreement    Chief Complaint:  Low back pain      HPI: He c/o low back pain which has not changed. No history lumbar surgery, He has had 3 cervical surgeries. He is doing some walking. He had cholecystecomy done  And umbilical hernia repair/last month. He is sleeping well. Back Pain   This is a chronic problem. The current episode started more than 1 year ago. The problem occurs constantly. The problem is unchanged. The pain is present in the lumbar spine. Quality: sharp. Radiates to: down legs. The pain is at a severity of 3/10. The pain is worse during the night. Exacerbated by: one position too long. Stiffness is present: varies. Associated symptoms include numbness. (Feet) He has tried analgesics for the symptoms. The treatment provided mild relief. Patient denies any new neurological symptoms. No bowel or bladder incontinence, no weakness, and no falling.           Treatment goals:  Functional status: keep current level of pain or below        Aberrancy:none      Analgesia:pain 3     Adverse  Effects :none BM daily     ADL;s :walks ,             Pill count:  NOT appropriate   Short #6 percocet, states did not get any pain medication post-op after cholecystectomy  , Brought narcan    Morphine equivalent dose as reported on OARRS: 212.50  Attestation: The Prescription Monitoring Report for this patient was reviewed today. (VEE Thayer CNP)  Chronic Pain Routine Monitoring: Obtaining appropriate analgesic effect of treatment., No signs of potential drug abuse or diversion identified: otherwise, see note documentation VEE Thayer - CNP)  Chronic Pain > 80 MEDD: Obtained or confirmed a written medication contract was on file. VEE Thayer - CNP)  Review of[place]ARRS does not show any aberrant prescription behavior. Medication is helping the patient stay active.  Patient denies any side effects and reports adequate analgesia. No sign of misuse/abuse. When was thelast UDS:  2-14-19           Was the UDS appropriate:yes      Record/Diagnostics Review:      As above, I did review the imaging        2/18/2019  7:36 PM - Raimundo, Conchita Incoming Lab Results From [facility]     Component Value Ref Range & Units Status Collected Lab   Pain Management Drug Panel Interp, Urine Consistent   Final 02/14/2019  9:45 AM [facility]   (NOTE)   ________________________________________________________________   DRUGS EXPECTED:   MORPHINE [2-]   PERCOCET (OXYCODONE) [2-]   ________________________________________________________________   CONSISTENT with medications provided:   MORPHINE : based on morphine, hydromorphone   PERCOCET (OXYCODONE) : based on oxycodone, noroxycodone,   noroxymorphone   ________________________________________________________________   Drugs Not Included in this Assay:   Acetaminophen   ________________________________________________________________   INTERPRETIVE INFORMATION: Pain Mgt [place], Mass Spec/EMIT, Ur,                            Interp   Interpretation depends on accuracy and completeness of patient   medication information submitted by client. 6-Acetylmorphine, Ur Not Detected   Final 02/14/2019  9:45 AM [facility]   7-Aminoclonazepam, Urine Not Detected   Final 02/14/2019  9:45 AM [facility]   Alpha-OH-Alpraz, Urine Not Detected   Final 02/14/2019  9:45 AM [facility]   Alprazolam, Urine Not Detected   Final 02/14/2019  9:45 AM [facility]   Amphetamines, urine Not Detected   Final 02/14/2019  9:45 AM [facility]   Barbiturates, Ur Not Detected   Final 02/14/2019  9:45 AM [facility]   Benzoylecgonine, Ur Not Detected   Final 02/14/2019  9:45 AM [facility]   Buprenorphine Urine Not Detected   Final 02/14/2019  9:45 AM [facility]   Carisoprodol, Ur Not Detected   Final 02/14/2019  9:45 AM [facility]   (NOTE)   The carisoprodol immunoassay has cross-reactivity to carisoprodol   and meprobamate.     Clonazepam, Urine Not Detected   Final 02/14/2019  9:45 AM [facility]   Codeine, Urine Not Detected   Final 02/14/2019  9:45 AM [facility]   MDA, Ur Not Detected   Final 02/14/2019  9:45 AM [facility]   Diazepam, Urine Not Detected   Final 02/14/2019  9:45 AM [facility]   Ethyl Glucuronide Ur Not Detected   Final 02/14/2019  9:45 AM [facility]   Fentanyl, Ur Not Detected   Final 02/14/2019  9:45 AM [facility]   Hydrocodone, Urine Not Detected   Final 02/14/2019  9:45 AM [facility]   Hydromorphone, Urine Present   Final 02/14/2019  9:45 AM [facility]   Lorazepam, Urine Not Detected   Final 02/14/2019  9:45 AM [facility]   Marijuana Metab, Ur Not Detected   Final 02/14/2019  9:45 AM [facility]   MDEA, JAZMIN, Ur Not Detected   Final 02/14/2019  9:45 AM [facility]   MDMA, Urine Not Detected   Final 02/14/2019  9:45 AM [facility]   Meperidine Metab, Ur Not Detected   Final 02/14/2019  9:45 AM [facility]   Methadone, Urine Not Detected   Final 02/14/2019  9:45 AM [facility]   Methamphetamine, Urine Not Detected   Final 02/14/2019  9:45 AM [facility]   Methylphenidate Not Detected   Final 02/14/2019  9:45 AM [facility]   Midazolam, Urine Not Detected   Final 02/14/2019  9:45 AM [facility]   Morphine Urine Present   Final 02/14/2019  9:45 AM [facility]   Norbuprenorphine, Urine Not Detected   Final 02/14/2019  9:45 AM [facility]   Nordiazepam, Urine Not Detected   Final 02/14/2019  9:45 AM [facility]   Norfentanyl, Urine Not Detected   Final 02/14/2019  9:45 AM [facility]   NORHYDROCODONE, URINE Not Detected   Final 02/14/2019  9:45 AM [facility]   Noroxycodone, Urine Present   Final 02/14/2019  9:45 AM [facility]   NOROXYMORPHONE, URINE Present   Final 02/14/2019  9:45 AM [facility]   Oxazepam, Urine Not Detected   Final 02/14/2019  9:45 AM [facility]   Oxycodone Urine Present   Final 02/14/2019  9:45 AM [facility]   Oxymorphone, Urine Not Detected   Final 02/14/2019  9:45 AM [facility]   PCP, Urine Not Detected   Final 02/14/2019  9:45 AM [facility]   Phentermine, Ur Not Detected   Final 02/14/2019  9:45 AM [facility]   Propoxyphene, Urine Not Detected   Final 02/14/2019  9:45 AM [facility]   Tapentadol-O-Sulfate, Urine Not Detected Final 02/14/2019  9:45 AM [facility]   Tapentadol, Urine Not Detected   Final 02/14/2019  9:45 AM [facility]   Temazepam, Urine Not Detected   Final 02/14/2019  9:45 AM [facility]   Tramadol, Urine Not Detected   Final 02/14/2019  9:45 AM [facility]   Zolpidem, Urine Not Detected   Final 02/14/2019  9:45 AM [facility]   Drugs Expected, Ur   Final 02/14/2019  9:45 AM [facility] [address] Lab   MORPHINE ER 2/14/2019 0700    PERCOCET 2/14/2019 0700   Creatinine, Ur 125.8  20.0 - 400.0 mg/dL Final 02/14/2019  9:45 AM [facility]   Pain Mgt Drug Panel, Hi Res, Ur See Below   Final 02/14/2019  9:45 AM [facility]   (NOTE)   Methodology: Qualitative Enzyme Immunoassay and Qualitative Liquid   Chromatography-Time of Flight-Mass Spectrometry or Tandem Mass   Spectrometry, Quantitative Spectrophotometry   The absence of expected drug(s) and/or drug metabolite(s) may   indicate non-compliance, inappropriate timing of specimen   collection relative to drug administration, poor drug absorption,   diluted/adulterated urine, or limitations of testing. The   concentration must be greater than or equal to the cutoff to be   reported as present.  If specific drug concentrations are   required, contact the laboratory within two weeks of specimen   collection to request quantification by a second analytical   technique. Interpretive questions should be directed to the   laboratory. Results based on immunoassay detection that do not match clinical   expectations should be   interpreted with caution. Confirmatory testing by mass   spectrometry for immunoassay-based results is available, if   ordered within two weeks of specimen collection. Additional   charges apply. For medical purposes only; not valid for forensic use. This test was developed and its performance characteristics   determined by [employer]. The U.S. Food and Drug   Administration has not approved or cleared this test; however, FDA   clearance or approval is not currently required for clinical use. The results are not intended to be used as the sole means for   clinical diagnosis or patient management decisions. EER Hi Res Interp Ur See Note   Final 02/14/2019  9:45 AM [facility]   (NOTE)   Access [facility] Enhanced Report using either link below:   -Direct access: [email or web address]   -Enter Username, Password: [email or web address]   Username: [email or web address]   Password: [email or web address]   Performed by [facility] [place] [address], [address] [facility] [phone]   www. Henrry Dyson MD, Lab. Director      Past Medical History:   Diagnosis Date    Anemia     Arthritis     osteoarthritis    Hyperlipidemia        Past Surgical History:   Procedure Laterality Date    BACK SURGERY  1977    cervical spine three times    CHOLECYSTECTOMY  03/22/2019    COLONOSCOPY  01/25/2015    10 yr recall, hemorrhoids    DENTAL SURGERY  10/2015    all teeth extracted    SHOULDER SURGERY Right     UMBILICAL HERNIA REPAIR  3022-19    UPPER GASTROINTESTINAL ENDOSCOPY  01/25/2015       Allergies   Allergen Reactions    Iron      Pill- constipation, abdominal pain         Current Outpatient Medications:     [START ON 4/19/2019] oxyCODONE-acetaminophen (PERCOCET) 5-325 MG per tablet, Take 1 tablet by mouth every 8 hours for 30 days. , Disp: 90 tablet, Rfl: 0    [START ON 4/19/2019] morphine (MS CONTIN) 60 MG extended release tablet, Take 1 tablet by mouth every 8 hours for 30 days. , Disp: 90 tablet, Rfl: 0    [START ON 4/24/2019] pregabalin (LYRICA) 150 MG capsule, Take 1 capsule by mouth 3 times daily for 30 days. , Disp: 90 capsule, Rfl: 2    pantoprazole (PROTONIX) 40 MG tablet, TK 1 T PO  QD, Disp: , Rfl: 0    cyanocobalamin 1000 MCG/ML injection, Inject 1,000 mcg into the muscle once Once a week for 4 weeks than monthly started 9-1-16, Disp: , Rfl:     gemfibrozil (LOPID) 600 MG tablet, Take 600 mg by mouth 2 times daily (before meals). , Disp: , Rfl:     naloxone (NARCAN) 4 MG/0.1ML LIQD nasal spray, 1 spray by Nasal route as needed (if needed for respiratory depression), Disp: 1 each, Rfl: 0    BD INTEGRA SYRINGE 25G X 1\" 3 ML MISC, , Disp: , Rfl: 4    Family History   Problem Relation Age of Onset    Diabetes Mother     Heart Disease Father        Social History     Socioeconomic History    Marital status:      Spouse name: Not on file    Number of children: Not on file    Years of education: Not on file    Highest education level: Not on file   Occupational History    Occupation: disabled   Social Needs    Financial resource strain: Not on file    Food insecurity:     Worry: Not on file     Inability: Not on file    Transportation needs:     Medical: Not on file     Non-medical: Not on file   Tobacco Use    Smoking status: Former Smoker     Packs/day: 0.50     Years: 45.00     Pack years: 22.50     Types: Cigarettes    Smokeless tobacco: Never Used    Tobacco comment: on chantix 11-6-15   12-7-15 quit 2 weeks ago   Substance and Sexual Activity    Alcohol use: No    Drug use: No    Sexual activity: Not on file   Lifestyle    Physical activity:     Days per week: Not on file     Minutes per session: Not on file    Stress: Not on file   Relationships    Social connections:     Talks on phone: Not on file     Gets together: Not on file     Attends Taoist service: Not on file     Active member of club or organization: Not on file     Attends meetings of clubs or organizations: Not on file     Relationship status: Not on file    Intimate partner violence:     Fear of current or ex partner: Not on file     Emotionally abused: Not on file     Physically abused: Not on file     Forced sexual activity: Not on file   Other Topics Concern    Not on file   Social History Narrative    Not on file       Review of Systems:  Review of Systems   Constitution: Negative. HENT: Negative. Eyes:        Glasses   Cardiovascular: Negative.     Endocrine:        Borderline diabetic Hematologic/Lymphatic: Negative. Skin: Negative. Musculoskeletal: Positive for back pain. Gastrointestinal:        Recent surgery choecystectomy   Genitourinary: Negative. Neurological: Positive for numbness. Psychiatric/Behavioral: Negative. Physical Exam:  BP (!) 145/87   Pulse 106   Temp 98.2 °F (36.8 °C) (Oral)   Resp 20   Ht 5' 9\" (1.753 m)   Wt 193 lb (87.5 kg)   SpO2 96%   BMI 28.50 kg/m²     Physical Exam   Constitutional: He appears well-developed. HENT:   Head: Normocephalic. Neck: Neck supple. Pulmonary/Chest: Effort normal.   Abdominal: Soft. Musculoskeletal:        Lumbar back: He exhibits decreased range of motion and tenderness. Cervical scar   Neurological: He is alert. He has normal strength. A sensory deficit is present. Reflex Scores:       Patellar reflexes are 1+ on the right side and 1+ on the left side. Achilles reflexes are 1+ on the right side and 1+ on the left side. Skin:        Psychiatric: He has a normal mood and affect.  His speech is normal and behavior is normal. Judgment and thought content normal. Cognition and memory are normal.         Assessment:    Problem List Items Addressed This Visit     S/P cervical spinal fusion (Chronic)    Relevant Medications    oxyCODONE-acetaminophen (PERCOCET) 5-325 MG per tablet (Start on 4/19/2019)    morphine (MS CONTIN) 60 MG extended release tablet (Start on 4/19/2019)    Osteoarthritis of spine with radiculopathy, lumbar region    Relevant Medications    oxyCODONE-acetaminophen (PERCOCET) 5-325 MG per tablet (Start on 4/19/2019)    morphine (MS CONTIN) 60 MG extended release tablet (Start on 4/19/2019)    pregabalin (LYRICA) 150 MG capsule (Start on 4/24/2019)    Osteoarthritis of lumbar spine - Primary    Relevant Medications    oxyCODONE-acetaminophen (PERCOCET) 5-325 MG per tablet (Start on 4/19/2019)    morphine (MS CONTIN) 60 MG extended release tablet (Start on 4/19/2019)    pregabalin (LYRICA) 150 MG capsule (Start on 4/24/2019)    Lumbar spondylosis (Chronic)    Relevant Medications    oxyCODONE-acetaminophen (PERCOCET) 5-325 MG per tablet (Start on 4/19/2019)    morphine (MS CONTIN) 60 MG extended release tablet (Start on 4/19/2019)    pregabalin (LYRICA) 150 MG capsule (Start on 4/24/2019)    Lumbar spinal stenosis (Chronic)    Relevant Medications    oxyCODONE-acetaminophen (PERCOCET) 5-325 MG per tablet (Start on 4/19/2019)    morphine (MS CONTIN) 60 MG extended release tablet (Start on 4/19/2019)    pregabalin (LYRICA) 150 MG capsule (Start on 4/24/2019)    Lumbar radiculopathy (Chronic)    Relevant Medications    oxyCODONE-acetaminophen (PERCOCET) 5-325 MG per tablet (Start on 4/19/2019)    morphine (MS CONTIN) 60 MG extended release tablet (Start on 4/19/2019)    pregabalin (LYRICA) 150 MG capsule (Start on 4/24/2019)    Encounter for medication monitoring (Chronic)    Relevant Medications    oxyCODONE-acetaminophen (PERCOCET) 5-325 MG per tablet (Start on 4/19/2019)    morphine (MS CONTIN) 60 MG extended release tablet (Start on 4/19/2019)    Encounter for chronic pain management    Relevant Medications    oxyCODONE-acetaminophen (PERCOCET) 5-325 MG per tablet (Start on 4/19/2019)    morphine (MS CONTIN) 60 MG extended release tablet (Start on 4/19/2019)    pregabalin (LYRICA) 150 MG capsule (Start on 4/24/2019)    Degenerative disc disease, lumbar (Chronic)    Relevant Medications    oxyCODONE-acetaminophen (PERCOCET) 5-325 MG per tablet (Start on 4/19/2019)    morphine (MS CONTIN) 60 MG extended release tablet (Start on 4/19/2019)    pregabalin (LYRICA) 150 MG capsule (Start on 4/24/2019)    Cervical spondylitis (Chronic)    Relevant Medications    oxyCODONE-acetaminophen (PERCOCET) 5-325 MG per tablet (Start on 4/19/2019)    morphine (MS CONTIN) 60 MG extended release tablet (Start on 4/19/2019)              Treatment Plan:  DISCUSSION: Treatment options discussed withpatient and all questions answered to patient's satisfaction. Possible side effects, risk of tolerance and or dependence and alternative treatments discussed    Obtaining appropriate analgesic effect of treatment   No signs of potential drug abuse or diversion identified    [x] Ill effects of being on chronic pain medications such as sleep disturbances, respiratory depression, hormonal changes, withdrawal symptoms, chronic opioid dependence and tolerance as well as risk of taking opioids with Benzodiazepines and taking opioids along with alcohol,  werediscussed with patient. I had asked the patient to minimize medication use and utilize pain medications only for uncontrolled rest pain or pain with exertional activities. I advised patient not to self-escalate painmedications without consulting with us. At each of patient's future visits we will try to taper pain medications, while adjusting the adjunct medications, and re-evaluating for Physical Therapy to improve spinal andjoint strength. We will continue to have discussions to decrease pain medications as tolerated. Counseled patient on effects their pain medication and /or their medical condition mayhave on their  ability to drive or operate machinery. Instructed not to drive or operate machinery if drowsy     I also discussed with the patient regarding the dangers of combining narcotic pain medication with tranquilizers, alcohol or illegal drugs or taking the medication any way other than prescribed. The dangers were discussed  including respiratory depression and death. Patient was told to tell  all  physicians regarding the medications he is getting from pain clinic. Patient is warned not to take any unprescribed medications over-the-countermedications that can depress breathing . Patient is advised to talk to the pharmacist or physicians if planning to take any over-the-counter medications before  takeing them.  Patient is strongly advised to avoid tranquilizers or relaxants, illegal drugs  or any medications that can depress breathing  Patient is also advised to tell us if there is any changes in their medications from other physicians.         TREATMENT OPTIONS:   Medication agreement updated  Return in 4 weeks  Medication Agreement Requirements Met  Continue Opioid therapy  Script written for ms continbrady  Follow up appointment made

## 2019-04-24 ENCOUNTER — HOSPITAL ENCOUNTER (OUTPATIENT)
Age: 69
Discharge: HOME | End: 2019-04-24
Payer: MEDICARE

## 2019-04-24 LAB
CHOLESTEROL/HDL RATIO: 4.9
CHOLESTEROL: 112 MG/DL
CREATININE URINE: 144.6 MG/DL (ref 39–259)
ESTIMATED AVERAGE GLUCOSE: 134 MG/DL
HBA1C MFR BLD: 6.3 % (ref 4–6)
HDLC SERPL-MCNC: 23 MG/DL
HEPATITIS C ANTIBODY: REACTIVE
LDL CHOLESTEROL: 59 MG/DL (ref 0–130)
MICROALBUMIN/CREAT 24H UR: 65 MG/L
MICROALBUMIN/CREAT UR-RTO: 45 MCG/MG CREAT
TRIGL SERPL-MCNC: 148 MG/DL
VLDLC SERPL CALC-MCNC: ABNORMAL MG/DL (ref 1–30)

## 2019-04-24 PROCEDURE — 36415 COLL VENOUS BLD VENIPUNCTURE: CPT

## 2019-04-24 PROCEDURE — 82570 ASSAY OF URINE CREATININE: CPT

## 2019-04-24 PROCEDURE — 82043 UR ALBUMIN QUANTITATIVE: CPT

## 2019-04-24 PROCEDURE — 80061 LIPID PANEL: CPT

## 2019-04-24 PROCEDURE — 86803 HEPATITIS C AB TEST: CPT

## 2019-04-24 PROCEDURE — 83036 HEMOGLOBIN GLYCOSYLATED A1C: CPT

## 2019-05-16 ENCOUNTER — HOSPITAL ENCOUNTER (OUTPATIENT)
Dept: PAIN MANAGEMENT | Age: 69
Discharge: HOME | End: 2019-05-16
Payer: MEDICARE

## 2019-05-16 VITALS
OXYGEN SATURATION: 95 % | RESPIRATION RATE: 20 BRPM | HEART RATE: 97 BPM | WEIGHT: 193 LBS | BODY MASS INDEX: 28.58 KG/M2 | HEIGHT: 69 IN | TEMPERATURE: 98.2 F | DIASTOLIC BLOOD PRESSURE: 82 MMHG | SYSTOLIC BLOOD PRESSURE: 134 MMHG

## 2019-05-16 DIAGNOSIS — Z98.1 S/P CERVICAL SPINAL FUSION: Chronic | ICD-10-CM

## 2019-05-16 DIAGNOSIS — M47.816 LUMBAR SPONDYLOSIS: Chronic | ICD-10-CM

## 2019-05-16 DIAGNOSIS — M48.061 SPINAL STENOSIS OF LUMBAR REGION WITHOUT NEUROGENIC CLAUDICATION: Chronic | ICD-10-CM

## 2019-05-16 DIAGNOSIS — G89.29 ENCOUNTER FOR CHRONIC PAIN MANAGEMENT: ICD-10-CM

## 2019-05-16 DIAGNOSIS — M47.816 OSTEOARTHRITIS OF LUMBAR SPINE, UNSPECIFIED SPINAL OSTEOARTHRITIS COMPLICATION STATUS: ICD-10-CM

## 2019-05-16 DIAGNOSIS — M54.16 LUMBAR RADICULOPATHY: Chronic | ICD-10-CM

## 2019-05-16 DIAGNOSIS — M47.26 OSTEOARTHRITIS OF SPINE WITH RADICULOPATHY, LUMBAR REGION: ICD-10-CM

## 2019-05-16 DIAGNOSIS — Z51.81 ENCOUNTER FOR MEDICATION MONITORING: Chronic | ICD-10-CM

## 2019-05-16 DIAGNOSIS — M51.36 DEGENERATIVE DISC DISEASE, LUMBAR: Primary | Chronic | ICD-10-CM

## 2019-05-16 DIAGNOSIS — M46.92 CERVICAL SPONDYLITIS (HCC): Chronic | ICD-10-CM

## 2019-05-16 PROCEDURE — 99213 OFFICE O/P EST LOW 20 MIN: CPT

## 2019-05-16 PROCEDURE — 99213 OFFICE O/P EST LOW 20 MIN: CPT | Performed by: NURSE PRACTITIONER

## 2019-05-16 RX ORDER — MORPHINE SULFATE 60 MG/1
60 TABLET, FILM COATED, EXTENDED RELEASE ORAL EVERY 8 HOURS
Qty: 90 TABLET | Refills: 0 | Status: SHIPPED | OUTPATIENT
Start: 2019-05-19 | End: 2019-06-17 | Stop reason: DRUGHIGH

## 2019-05-16 RX ORDER — OXYCODONE HYDROCHLORIDE AND ACETAMINOPHEN 5; 325 MG/1; MG/1
1 TABLET ORAL EVERY 8 HOURS
Qty: 90 TABLET | Refills: 0 | Status: SHIPPED | OUTPATIENT
Start: 2019-05-19 | End: 2019-06-17 | Stop reason: SDUPTHER

## 2019-05-16 ASSESSMENT — ENCOUNTER SYMPTOMS
BACK PAIN: 1
RESPIRATORY NEGATIVE: 1
GASTROINTESTINAL NEGATIVE: 1

## 2019-05-16 NOTE — PROGRESS NOTES
Magda 89 PROGRESS NOTE      Patient here today to review Medication Agreement    Chief Complaint:  Back pain      HPI: he c/o low back pain radiating down legs. No history lumbar surgery. Pt has not helped. He states injections did not help. He has history cervical surgeries. He states sleep well. No Ed visits. Back Pain   This is a chronic problem. The current episode started more than 1 year ago. The problem occurs constantly. The problem is unchanged. The pain is present in the lumbar spine. The quality of the pain is described as aching. Radiates to: down legs. The pain is at a severity of 3/10. The pain is mild. The pain is worse during the night (am). The symptoms are aggravated by standing. Stiffness is present all day. Paresthesias: numbness feet. Risk factors include sedentary lifestyle. He has tried bed rest and analgesics for the symptoms. The treatment provided mild relief. Patient denies any new neurological symptoms. No bowel or bladder incontinence, no weakness, and no falling. Treatment goals:  Functional status: keep pain current level or below      Aberrancy:   Any alcoholic beverages   no    Any illegal drugs   no      Analgesia:pain 3      Adverse  Effects :BM daily    ADL;s :home exercises        Pill count:  NOT appropriate short # 5 percocet  Brought narcan    Morphine equivalent dose as reported on OARRS: 202.50  Attestation: The Prescription Monitoring Report for this patient was reviewed today. (VEE Randhawa CNP)  Chronic Pain Routine Monitoring: Obtaining appropriate analgesic effect of treatment., No signs of potential drug abuse or diversion identified: otherwise, see note documentation VEE Randhawa CNP)  Chronic Pain > 80 MEDD: Obtained or confirmed a written medication contract was on file. VEE Randhawa CNP)  Review of[place]ARRS does not show any aberrant prescription behavior. Medication is helping the patient stay active. Patient denies any side effects and reports adequate analgesia. No sign of misuse/abuse.             When was thelast UDS:  2-14-19           Was the UDS appropriate:yes        Record/Diagnostics Review:       As above, I did review the imaging           2/18/2019  7:36 PM - Raimundo, Darynpn Incoming Lab Results From [facility]      Component Value Ref Range & Units Status Collected Lab   Pain Management Drug Panel Interp, Urine Consistent    Final 02/14/2019  9:45 AM [facility]   (NOTE)   ________________________________________________________________   DRUGS EXPECTED:   MORPHINE [2-]   PERCOCET (OXYCODONE) [2-]   ________________________________________________________________   CONSISTENT with medications provided:   MORPHINE : based on morphine, hydromorphone   PERCOCET (OXYCODONE) : based on oxycodone, noroxycodone,   noroxymorphone   ________________________________________________________________   Drugs Not Included in this Assay:   Acetaminophen   ________________________________________________________________   INTERPRETIVE INFORMATION: Pain Mgt [place], Mass Spec/EMIT, Ur,                            Interp   Interpretation depends on accuracy and completeness of patient   medication information submitted by client.     6-Acetylmorphine, Ur Not Detected    Final 02/14/2019  9:45 AM [facility]   7-Aminoclonazepam, Urine Not Detected    Final 02/14/2019  9:45 AM [facility]   Alpha-OH-Alpraz, Urine Not Detected    Final 02/14/2019  9:45 AM [facility]   Alprazolam, Urine Not Detected    Final 02/14/2019  9:45 AM [facility]   Amphetamines, urine Not Detected    Final 02/14/2019  9:45 AM [facility]   Barbiturates, Ur Not Detected    Final 02/14/2019  9:45 AM [facility]   Benzoylecgonine, Ur Not Detected    Final 02/14/2019  9:45 AM [facility]   Buprenorphine Urine Not Detected    Final 02/14/2019  9:45 AM [facility]   Carisoprodol, Ur Not Detected    Final 02/14/2019  9:45 AM [facility]   (NOTE)   The carisoprodol immunoassay has cross-reactivity to carisoprodol   and meprobamate.     Clonazepam, Urine Not Detected    Final 02/14/2019  9:45 AM [facility]   Codeine, Urine Not Detected    Final 02/14/2019  9:45 AM [facility]   MDA, Ur Not Detected    Final 02/14/2019  9:45 AM [facility]   Diazepam, Urine Not Detected    Final 02/14/2019  9:45 AM [facility]   Ethyl Glucuronide Ur Not Detected    Final 02/14/2019  9:45 AM [facility]   Fentanyl, Ur Not Detected    Final 02/14/2019  9:45 AM [facility]   Hydrocodone, Urine Not Detected    Final 02/14/2019  9:45 AM [facility]   Hydromorphone, Urine Present    Final 02/14/2019  9:45 AM [facility]   Lorazepam, Urine Not Detected    Final 02/14/2019  9:45 AM [facility]   Marijuana Metab, Ur Not Detected    Final 02/14/2019  9:45 AM [facility]   MDEA, JAZMIN, Ur Not Detected    Final 02/14/2019  9:45 AM [facility]   MDMA, Urine Not Detected    Final 02/14/2019  9:45 AM [facility]   Meperidine Metab, Ur Not Detected    Final 02/14/2019  9:45 AM [facility]   Methadone, Urine Not Detected    Final 02/14/2019  9:45 AM [facility]   Methamphetamine, Urine Not Detected    Final 02/14/2019  9:45 AM [facility]   Methylphenidate Not Detected    Final 02/14/2019  9:45 AM [facility]   Midazolam, Urine Not Detected    Final 02/14/2019  9:45 AM [facility]   Morphine Urine Present    Final 02/14/2019  9:45 AM [facility]   Norbuprenorphine, Urine Not Detected    Final 02/14/2019  9:45 AM [facility]   Nordiazepam, Urine Not Detected    Final 02/14/2019  9:45 AM [facility]   Norfentanyl, Urine Not Detected    Final 02/14/2019  9:45 AM [facility]   NORHYDROCODONE, URINE Not Detected    Final 02/14/2019  9:45 AM [facility]   Noroxycodone, Urine Present    Final 02/14/2019  9:45 AM [facility]   NOROXYMORPHONE, URINE Present    Final 02/14/2019  9:45 AM [facility]   Oxazepam, Urine Not Detected    Final 02/14/2019  9:45 AM [facility]   Oxycodone Urine Present    Final 02/14/2019  9:45 AM [facility]   Oxymorphone, Urine Not Detected    Final 02/14/2019  9:45 AM [facility]   PCP, Urine Not Detected    Final 02/14/2019  9:45 AM [facility]   Phentermine, Ur Not Detected    Final 02/14/2019  9:45 AM [facility]   Propoxyphene, Urine Not Detected    Final 02/14/2019  9:45 AM [facility]   Tapentadol-O-Sulfate, Urine Not Detected    Final 02/14/2019  9:45 AM [facility]   Tapentadol, Urine Not Detected    Final 02/14/2019  9:45 AM [facility]   Temazepam, Urine Not Detected    Final 02/14/2019  9:45 AM [facility]   Tramadol, Urine Not Detected    Final 02/14/2019  9:45 AM [facility]   Zolpidem, Urine Not Detected    Final 02/14/2019  9:45 AM [facility]   Drugs Expected, Ur     Final 02/14/2019  9:45 AM [facility] [address] Lab   MORPHINE ER 2/14/2019 0700    PERCOCET 2/14/2019 0700   Creatinine, Ur 125.8  20.0 - 400.0 mg/dL Final 02/14/2019  9:45 AM [facility]   Pain Mgt Drug Panel, Hi Res, Ur See Below    Final 02/14/2019  9:45 AM [facility]   (NOTE)   Methodology: Qualitative Enzyme Immunoassay and Qualitative Liquid   Chromatography-Time of Flight-Mass Spectrometry or Tandem Mass   Spectrometry, Quantitative Spectrophotometry   The absence of expected drug(s) and/or drug metabolite(s) may   indicate non-compliance, inappropriate timing of specimen   collection relative to drug administration, poor drug absorption,   diluted/adulterated urine, or limitations of testing. The   concentration must be greater than or equal to the cutoff to be   reported as present.  If specific drug concentrations are   required, contact the laboratory within two weeks of specimen   collection to request quantification by a second analytical   technique. Interpretive questions should be directed to the   laboratory. Results based on immunoassay detection that do not match clinical   expectations should be   interpreted with caution. Confirmatory testing by mass   spectrometry for immunoassay-based results is available, if   ordered within two weeks of specimen collection. Additional   charges apply. For medical purposes only; not valid for forensic use. This test was developed and its performance characteristics   determined by [employer]. The U.S.  Food and Drug   Administration has not approved or cleared this test; however, FDA   clearance or approval is not currently required for clinical use. The results are not intended to be used as the sole means for   clinical diagnosis or patient management decisions. EER Hi Res Interp Ur See Note    Final 02/14/2019  9:45 AM [facility]   (NOTE)   Access [facility] Enhanced Report using either link below:   -Direct access: [email or web address]   -Enter Username, Password: [email or web address]   Username: [email or web address]   Password: [email or web address]   Performed by [place] [address], [id number] [facility] [phone]   www. Bard Iván MD, Lab. Director                   Past Medical History:   Diagnosis Date    Anemia     Arthritis     osteoarthritis    Hyperlipidemia        Past Surgical History:   Procedure Laterality Date    BACK SURGERY  1977    cervical spine three times    CHOLECYSTECTOMY  03/22/2019    COLONOSCOPY  01/25/2015    10 yr recall, hemorrhoids    DENTAL SURGERY  10/2015    all teeth extracted    SHOULDER SURGERY Right     UMBILICAL HERNIA REPAIR  3022-19    UPPER GASTROINTESTINAL ENDOSCOPY  01/25/2015       Allergies   Allergen Reactions    Iron      Pill- constipation, abdominal pain         Current Outpatient Medications:     [START ON 5/19/2019] oxyCODONE-acetaminophen (PERCOCET) 5-325 MG per tablet, Take 1 tablet by mouth every 8 hours for 30 days. , Disp: 90 tablet, Rfl: 0    [START ON 5/19/2019] morphine (MS CONTIN) 60 MG extended release tablet, Take 1 tablet by mouth every 8 hours for 30 days. , Disp: 90 tablet, Rfl: 0    pregabalin (LYRICA) 150 MG capsule, Take 1 capsule by mouth 3 times daily for 30 days. , Disp: 90 capsule, Rfl: 2    pantoprazole (PROTONIX) 40 MG tablet, TK 1 T PO  QD, Disp: , Rfl: 0    cyanocobalamin 1000 MCG/ML injection, Inject 1,000 mcg into the muscle once Once a week for 4 weeks than monthly started 9-1-16, Disp: , Rfl:     gemfibrozil (LOPID) 600 MG tablet, Take 600 mg by mouth 2 times daily (before meals). , Disp: , Rfl:     naloxone (NARCAN) 4 MG/0.1ML LIQD nasal spray, 1 spray by Nasal route as needed (if needed for respiratory depression), Disp: 1 each, Rfl: 0    BD INTEGRA SYRINGE 25G X 1\" 3 ML MISC, , Disp: , Rfl: 4    Family History   Problem Relation Age of Onset    Diabetes Mother     Heart Disease Father        Social History     Socioeconomic History    Marital status:      Spouse name: Not on file    Number of children: Not on file    Years of education: Not on file    Highest education level: Not on file   Occupational History    Occupation: disabled   Social Needs    Financial resource strain: Not on file    Food insecurity:     Worry: Not on file     Inability: Not on file    Transportation needs:     Medical: Not on file     Non-medical: Not on file   Tobacco Use    Smoking status: Former Smoker     Packs/day: 0.50     Years: 45.00     Pack years: 22.50     Types: Cigarettes    Smokeless tobacco: Never Used    Tobacco comment: on chantix 11-6-15   12-7-15 quit 2 weeks ago   Substance and Sexual Activity    Alcohol use: No    Drug use: No    Sexual activity: Not on file   Lifestyle    Physical activity:     Days per week: Not on file     Minutes per session: Not on file    Stress: Not on file   Relationships    Social connections:     Talks on phone: Not on file     Gets together: Not on file     Attends Christian service: Not on file     Active member of club or organization: Not on file     Attends meetings of clubs or organizations: Not on file     Relationship status: Not on file    Intimate partner violence:     Fear of current or ex partner: Not on file     Emotionally abused: Not on file     Physically abused: Not on file     Forced sexual activity: Not on file   Other Topics Concern    Not on file   Social History Narrative    Not on file       Review of Systems:  Review of Systems   Constitution: Negative.    Eyes:        Glasses as sleep disturbances, respiratory depression, hormonal changes, withdrawal symptoms, chronic opioid dependence and tolerance as well as risk of taking opioids with Benzodiazepines and taking opioids along with alcohol,  werediscussed with patient. I had asked the patient to minimize medication use and utilize pain medications only for uncontrolled rest pain or pain with exertional activities. I advised patient not to self-escalate painmedications without consulting with us. At each of patient's future visits we will try to taper pain medications, while adjusting the adjunct medications, and re-evaluating for Physical Therapy to improve spinal andjoint strength. We will continue to have discussions to decrease pain medications as tolerated. Counseled patient on effects their pain medication and /or their medical condition mayhave on their  ability to drive or operate machinery. Instructed not to drive or operate machinery if drowsy     I also discussed with the patient regarding the dangers of combining narcotic pain medication with tranquilizers, alcohol or illegal drugs or taking the medication any way other than prescribed. The dangers were discussed  including respiratory depression and death. Patient was told to tell  all  physicians regarding the medications he is getting from pain clinic. Patient is warned not to take any unprescribed medications over-the-countermedications that can depress breathing . Patient is advised to talk to the pharmacist or physicians if planning to take any over-the-counter medications before  takeing them. Patient is strongly advised to avoid tranquilizers or  relaxants, illegal drugs  or any medications that can depress breathing  Patient is also advised to tell us if there is any changes in their medications from other physicians.     Patient was given a verbal warning not to self escalate pain medications, risks of respiratory depression or death discussed, another issue may result in discharge from the pain clinic  He states called here was haing problems after his hernia repair and gallbladder surgery and  States whoever he talked to knew he was taking extra     I told him not to be short again on his pain medication  TREATMENT OPTIONS:     Return in 4 weeks  Medication Agreement Requirements Met  Continue Opioid therapy  Script written for ms cesar abreu  Follow up appointment made

## 2019-06-07 DIAGNOSIS — M48.061 SPINAL STENOSIS OF LUMBAR REGION WITHOUT NEUROGENIC CLAUDICATION: Chronic | ICD-10-CM

## 2019-06-07 DIAGNOSIS — M54.16 LUMBAR RADICULOPATHY: Chronic | ICD-10-CM

## 2019-06-07 DIAGNOSIS — G89.29 ENCOUNTER FOR CHRONIC PAIN MANAGEMENT: ICD-10-CM

## 2019-06-07 DIAGNOSIS — M47.26 OSTEOARTHRITIS OF SPINE WITH RADICULOPATHY, LUMBAR REGION: ICD-10-CM

## 2019-06-07 DIAGNOSIS — M51.36 DEGENERATIVE DISC DISEASE, LUMBAR: Chronic | ICD-10-CM

## 2019-06-07 DIAGNOSIS — M47.816 OSTEOARTHRITIS OF LUMBAR SPINE, UNSPECIFIED SPINAL OSTEOARTHRITIS COMPLICATION STATUS: ICD-10-CM

## 2019-06-07 RX ORDER — PREGABALIN 150 MG/1
150 CAPSULE ORAL 3 TIMES DAILY
Qty: 90 CAPSULE | Refills: 2 | Status: SHIPPED | OUTPATIENT
Start: 2019-06-07 | End: 2019-08-16 | Stop reason: SDUPTHER

## 2019-06-17 ENCOUNTER — HOSPITAL ENCOUNTER (OUTPATIENT)
Dept: PAIN MANAGEMENT | Age: 69
Discharge: HOME | End: 2019-06-17
Payer: MEDICARE

## 2019-06-17 VITALS
RESPIRATION RATE: 18 BRPM | OXYGEN SATURATION: 94 % | HEART RATE: 112 BPM | DIASTOLIC BLOOD PRESSURE: 71 MMHG | SYSTOLIC BLOOD PRESSURE: 130 MMHG | TEMPERATURE: 99.8 F

## 2019-06-17 DIAGNOSIS — M46.92 CERVICAL SPONDYLITIS (HCC): Chronic | ICD-10-CM

## 2019-06-17 DIAGNOSIS — Z98.1 S/P CERVICAL SPINAL FUSION: Chronic | ICD-10-CM

## 2019-06-17 DIAGNOSIS — M51.36 DEGENERATIVE DISC DISEASE, LUMBAR: Chronic | ICD-10-CM

## 2019-06-17 DIAGNOSIS — M47.26 OSTEOARTHRITIS OF SPINE WITH RADICULOPATHY, LUMBAR REGION: Primary | ICD-10-CM

## 2019-06-17 DIAGNOSIS — G89.29 ENCOUNTER FOR CHRONIC PAIN MANAGEMENT: ICD-10-CM

## 2019-06-17 DIAGNOSIS — M47.816 LUMBAR SPONDYLOSIS: Chronic | ICD-10-CM

## 2019-06-17 DIAGNOSIS — M54.16 LUMBAR RADICULOPATHY: Chronic | ICD-10-CM

## 2019-06-17 DIAGNOSIS — Z51.81 ENCOUNTER FOR MEDICATION MONITORING: Chronic | ICD-10-CM

## 2019-06-17 DIAGNOSIS — M48.061 SPINAL STENOSIS OF LUMBAR REGION WITHOUT NEUROGENIC CLAUDICATION: Chronic | ICD-10-CM

## 2019-06-17 DIAGNOSIS — M47.816 OSTEOARTHRITIS OF LUMBAR SPINE, UNSPECIFIED SPINAL OSTEOARTHRITIS COMPLICATION STATUS: ICD-10-CM

## 2019-06-17 PROCEDURE — 99213 OFFICE O/P EST LOW 20 MIN: CPT

## 2019-06-17 PROCEDURE — 99214 OFFICE O/P EST MOD 30 MIN: CPT | Performed by: NURSE PRACTITIONER

## 2019-06-17 RX ORDER — MORPHINE SULFATE 60 MG/1
60 TABLET, FILM COATED, EXTENDED RELEASE ORAL 2 TIMES DAILY
Qty: 60 TABLET | Refills: 0 | Status: SHIPPED | OUTPATIENT
Start: 2019-06-18 | End: 2019-07-15 | Stop reason: SDUPTHER

## 2019-06-17 RX ORDER — MORPHINE SULFATE 30 MG/1
30 TABLET, FILM COATED, EXTENDED RELEASE ORAL ONCE
Qty: 30 TABLET | Refills: 0 | Status: SHIPPED | OUTPATIENT
Start: 2019-06-17 | End: 2019-06-17

## 2019-06-17 RX ORDER — MORPHINE SULFATE 30 MG/1
30 TABLET, FILM COATED, EXTENDED RELEASE ORAL EVERY 6 HOURS
Qty: 120 TABLET | Refills: 0 | Status: SHIPPED | OUTPATIENT
Start: 2019-06-18 | End: 2019-06-17

## 2019-06-17 RX ORDER — MORPHINE SULFATE 30 MG/1
30 TABLET, FILM COATED, EXTENDED RELEASE ORAL DAILY
Qty: 30 TABLET | Refills: 0 | Status: SHIPPED | OUTPATIENT
Start: 2019-06-17 | End: 2019-07-15 | Stop reason: SDUPTHER

## 2019-06-17 RX ORDER — OXYCODONE HYDROCHLORIDE AND ACETAMINOPHEN 5; 325 MG/1; MG/1
1 TABLET ORAL EVERY 8 HOURS
Qty: 90 TABLET | Refills: 0 | Status: SHIPPED | OUTPATIENT
Start: 2019-06-18 | End: 2019-07-15 | Stop reason: SDUPTHER

## 2019-06-17 ASSESSMENT — ENCOUNTER SYMPTOMS
SHORTNESS OF BREATH: 0
CONSTIPATION: 0
BACK PAIN: 1
COUGH: 0

## 2019-06-17 NOTE — PROGRESS NOTES
status. , No signs of potential drug abuse or diversion identified., Obtaining appropriate analgesic effect of treatment. Romeo Tobias, APRN - CNP)  Acute Pain Prescriptions: Prescription exceeds daily limit for a specific reason. See comments or note., Severe pain not adequately treated with lower dose. Arnaldocyndy Wilmar Tobias, APRN - CNP)  Chronic Pain > 80 MEDD: Obtained or confirmed \"Medication Contract\" on file. Godfrey Santiago, APRN - CNP)      Past Medical History:   Diagnosis Date    Anemia     Arthritis     osteoarthritis    Hyperlipidemia        Past Surgical History:   Procedure Laterality Date    BACK SURGERY  1977    cervical spine three times    CHOLECYSTECTOMY  03/22/2019    COLONOSCOPY  01/25/2015    10 yr recall, hemorrhoids    DENTAL SURGERY  10/2015    all teeth extracted    SHOULDER SURGERY Right     UMBILICAL HERNIA REPAIR  3022-19    UPPER GASTROINTESTINAL ENDOSCOPY  01/25/2015       Allergies   Allergen Reactions    Iron      Pill- constipation, abdominal pain         Current Outpatient Medications:     pregabalin (LYRICA) 150 MG capsule, Take 1 capsule by mouth 3 times daily for 30 days. , Disp: 90 capsule, Rfl: 2    oxyCODONE-acetaminophen (PERCOCET) 5-325 MG per tablet, Take 1 tablet by mouth every 8 hours for 30 days. , Disp: 90 tablet, Rfl: 0    morphine (MS CONTIN) 60 MG extended release tablet, Take 1 tablet by mouth every 8 hours for 30 days. , Disp: 90 tablet, Rfl: 0    naloxone (NARCAN) 4 MG/0.1ML LIQD nasal spray, 1 spray by Nasal route as needed (if needed for respiratory depression), Disp: 1 each, Rfl: 0    BD INTEGRA SYRINGE 25G X 1\" 3 ML MISC, , Disp: , Rfl: 4    pantoprazole (PROTONIX) 40 MG tablet, TK 1 T PO  QD, Disp: , Rfl: 0    cyanocobalamin 1000 MCG/ML injection, Inject 1,000 mcg into the muscle once Once a week for 4 weeks than monthly started 9-1-16, Disp: , Rfl:     gemfibrozil (LOPID) 600 MG tablet, Take 600 mg by mouth 2 times daily (before meals). , Disp: , Rfl:     Family History   Problem Relation Age of Onset    Diabetes Mother     Heart Disease Father        Social History     Socioeconomic History    Marital status:      Spouse name: Not on file    Number of children: Not on file    Years of education: Not on file    Highest education level: Not on file   Occupational History    Occupation: disabled   Social Needs    Financial resource strain: Not on file    Food insecurity:     Worry: Not on file     Inability: Not on file    Transportation needs:     Medical: Not on file     Non-medical: Not on file   Tobacco Use    Smoking status: Former Smoker     Packs/day: 0.50     Years: 45.00     Pack years: 22.50     Types: Cigarettes    Smokeless tobacco: Never Used    Tobacco comment: on chantix 11-6-15   12-7-15 quit 2 weeks ago   Substance and Sexual Activity    Alcohol use: No    Drug use: No    Sexual activity: Not on file   Lifestyle    Physical activity:     Days per week: Not on file     Minutes per session: Not on file    Stress: Not on file   Relationships    Social connections:     Talks on phone: Not on file     Gets together: Not on file     Attends Rastafarian service: Not on file     Active member of club or organization: Not on file     Attends meetings of clubs or organizations: Not on file     Relationship status: Not on file    Intimate partner violence:     Fear of current or ex partner: Not on file     Emotionally abused: Not on file     Physically abused: Not on file     Forced sexual activity: Not on file   Other Topics Concern    Not on file   Social History Narrative    Not on file       Review of Systems:  Review of Systems   Constitution: Negative for chills and fever. Cardiovascular: Negative for chest pain and irregular heartbeat. Respiratory: Negative for cough and shortness of breath. Musculoskeletal: Positive for back pain. Gastrointestinal: Negative for constipation. Neurological: Negative for disturbances in coordination and loss of balance. Physical Exam:  /71   Pulse 112   Temp 99.8 °F (37.7 °C) (Oral)   Resp 18   SpO2 94%     Physical Exam   Constitutional: He is oriented to person, place, and time. HENT:   Head: Normocephalic. Eyes: EOM are normal.   Neck: Normal range of motion. Pulmonary/Chest: Effort normal.   Musculoskeletal: Normal range of motion. Lumbar back: He exhibits tenderness and pain. Neurological: He is alert and oriented to person, place, and time. Skin: Skin is warm and dry.        Record/Diagnostics Review:    Last zoe 2/2019 and was appropriate     Assessment:  Problem List Items Addressed This Visit     Degenerative disc disease, lumbar (Chronic)    Relevant Medications    oxyCODONE-acetaminophen (PERCOCET) 5-325 MG per tablet (Start on 6/18/2019)    morphine (MS CONTIN) 60 MG extended release tablet (Start on 6/18/2019)    morphine (MS CONTIN) 30 MG extended release tablet    Lumbar spondylosis (Chronic)    Relevant Medications    oxyCODONE-acetaminophen (PERCOCET) 5-325 MG per tablet (Start on 6/18/2019)    morphine (MS CONTIN) 60 MG extended release tablet (Start on 6/18/2019)    morphine (MS CONTIN) 30 MG extended release tablet    Lumbar radiculopathy (Chronic)    Relevant Medications    oxyCODONE-acetaminophen (PERCOCET) 5-325 MG per tablet (Start on 6/18/2019)    morphine (MS CONTIN) 60 MG extended release tablet (Start on 6/18/2019)    Lumbar spinal stenosis (Chronic)    Relevant Medications    oxyCODONE-acetaminophen (PERCOCET) 5-325 MG per tablet (Start on 6/18/2019)    Encounter for medication monitoring (Chronic)    Relevant Medications    oxyCODONE-acetaminophen (PERCOCET) 5-325 MG per tablet (Start on 6/18/2019)    Cervical spondylitis (HCC) (Chronic)    Relevant Medications    oxyCODONE-acetaminophen (PERCOCET) 5-325 MG per tablet (Start on 6/18/2019)    S/P cervical spinal fusion (Chronic)    Relevant

## 2019-07-15 ENCOUNTER — HOSPITAL ENCOUNTER (OUTPATIENT)
Dept: PAIN MANAGEMENT | Age: 69
Discharge: HOME | End: 2019-07-15
Payer: MEDICARE

## 2019-07-15 VITALS
DIASTOLIC BLOOD PRESSURE: 77 MMHG | OXYGEN SATURATION: 97 % | HEART RATE: 99 BPM | RESPIRATION RATE: 18 BRPM | SYSTOLIC BLOOD PRESSURE: 147 MMHG | TEMPERATURE: 98.3 F

## 2019-07-15 DIAGNOSIS — M47.816 LUMBAR SPONDYLOSIS: Chronic | ICD-10-CM

## 2019-07-15 DIAGNOSIS — M47.26 OSTEOARTHRITIS OF SPINE WITH RADICULOPATHY, LUMBAR REGION: ICD-10-CM

## 2019-07-15 DIAGNOSIS — M46.92 CERVICAL SPONDYLITIS (HCC): Chronic | ICD-10-CM

## 2019-07-15 DIAGNOSIS — M48.061 SPINAL STENOSIS OF LUMBAR REGION WITHOUT NEUROGENIC CLAUDICATION: Chronic | ICD-10-CM

## 2019-07-15 DIAGNOSIS — M51.36 DEGENERATIVE DISC DISEASE, LUMBAR: Primary | Chronic | ICD-10-CM

## 2019-07-15 DIAGNOSIS — Z98.1 S/P CERVICAL SPINAL FUSION: Chronic | ICD-10-CM

## 2019-07-15 DIAGNOSIS — M54.16 LUMBAR RADICULOPATHY: Chronic | ICD-10-CM

## 2019-07-15 DIAGNOSIS — G89.29 ENCOUNTER FOR CHRONIC PAIN MANAGEMENT: ICD-10-CM

## 2019-07-15 DIAGNOSIS — M47.816 OSTEOARTHRITIS OF LUMBAR SPINE, UNSPECIFIED SPINAL OSTEOARTHRITIS COMPLICATION STATUS: ICD-10-CM

## 2019-07-15 DIAGNOSIS — Z51.81 ENCOUNTER FOR MEDICATION MONITORING: Chronic | ICD-10-CM

## 2019-07-15 PROCEDURE — 99213 OFFICE O/P EST LOW 20 MIN: CPT

## 2019-07-15 PROCEDURE — 99213 OFFICE O/P EST LOW 20 MIN: CPT | Performed by: NURSE PRACTITIONER

## 2019-07-15 RX ORDER — MORPHINE SULFATE 60 MG/1
60 TABLET, FILM COATED, EXTENDED RELEASE ORAL 2 TIMES DAILY
Qty: 60 TABLET | Refills: 0 | Status: SHIPPED | OUTPATIENT
Start: 2019-07-18 | End: 2019-08-15 | Stop reason: SDUPTHER

## 2019-07-15 RX ORDER — MORPHINE SULFATE 30 MG/1
30 TABLET, FILM COATED, EXTENDED RELEASE ORAL DAILY
Qty: 30 TABLET | Refills: 0 | Status: SHIPPED | OUTPATIENT
Start: 2019-07-18 | End: 2019-08-15

## 2019-07-15 RX ORDER — OXYCODONE HYDROCHLORIDE AND ACETAMINOPHEN 5; 325 MG/1; MG/1
1 TABLET ORAL EVERY 8 HOURS
Qty: 90 TABLET | Refills: 0 | Status: SHIPPED | OUTPATIENT
Start: 2019-07-18 | End: 2019-08-15 | Stop reason: SDUPTHER

## 2019-07-15 ASSESSMENT — ENCOUNTER SYMPTOMS
COUGH: 0
SHORTNESS OF BREATH: 0
CONSTIPATION: 0
BACK PAIN: 1

## 2019-07-15 NOTE — PROGRESS NOTES
analgesic effect of treatment. Haylee Tobias, APRN - CNP)  Acute Pain Prescriptions: Prescription exceeds daily limit for a specific reason. See comments or note., Severe pain not adequately treated with lower dose. Haylee Tobias, APRN - CNP)  Chronic Pain > 80 MEDD: Obtained or confirmed \"Medication Contract\" on file., Co-prescribed Naloxone. Silvana Mar, APRN - CNP)      Past Medical History:   Diagnosis Date    Anemia     Arthritis     osteoarthritis    Hyperlipidemia        Past Surgical History:   Procedure Laterality Date    BACK SURGERY  1977    cervical spine three times    CHOLECYSTECTOMY  03/22/2019    COLONOSCOPY  01/25/2015    10 yr recall, hemorrhoids    DENTAL SURGERY  10/2015    all teeth extracted    SHOULDER SURGERY Right     UMBILICAL HERNIA REPAIR  3022-19    UPPER GASTROINTESTINAL ENDOSCOPY  01/25/2015       Allergies   Allergen Reactions    Iron      Pill- constipation, abdominal pain         Current Outpatient Medications:     oxyCODONE-acetaminophen (PERCOCET) 5-325 MG per tablet, Take 1 tablet by mouth every 8 hours for 30 days. , Disp: 90 tablet, Rfl: 0    morphine (MS CONTIN) 60 MG extended release tablet, Take 1 tablet by mouth 2 times daily for 30 days. , Disp: 60 tablet, Rfl: 0    morphine (MS CONTIN) 30 MG extended release tablet, Take 1 tablet by mouth daily for 30 days. , Disp: 30 tablet, Rfl: 0    pregabalin (LYRICA) 150 MG capsule, Take 1 capsule by mouth 3 times daily for 30 days. , Disp: 90 capsule, Rfl: 2    naloxone (NARCAN) 4 MG/0.1ML LIQD nasal spray, 1 spray by Nasal route as needed (if needed for respiratory depression), Disp: 1 each, Rfl: 0    BD INTEGRA SYRINGE 25G X 1\" 3 ML MISC, , Disp: , Rfl: 4    pantoprazole (PROTONIX) 40 MG tablet, TK 1 T PO  QD, Disp: , Rfl: 0    cyanocobalamin 1000 MCG/ML injection, Inject 1,000 mcg into the muscle once Once a week for 4 weeks than monthly started 9-1-16, Disp: , Rfl:     gemfibrozil Gastrointestinal: Negative for constipation. Neurological: Positive for paresthesias and tingling. Negative for disturbances in coordination, loss of balance and numbness. Physical Exam:  BP (!) 147/77   Pulse 99   Temp 98.3 °F (36.8 °C) (Oral)   Resp 18   SpO2 97%     Physical Exam   Constitutional: He is oriented to person, place, and time. HENT:   Head: Normocephalic. Eyes: EOM are normal.   Neck: Normal range of motion. Pulmonary/Chest: Effort normal.   Musculoskeletal: Normal range of motion. Lumbar back: He exhibits tenderness and pain. Neurological: He is alert and oriented to person, place, and time. Skin: Skin is warm and dry. Record/Diagnostics Review:    Last zoe 2/2019 and was appropriate     FINDINGS:   BONES/ALIGNMENT: Multiple endplate Schmorl's node deformities are noted. There is no acute fracture or bone edema.  T11 hemangioma is noted.    Vertebral body height and alignment is stable.       SPINAL CORD: Conus terminates at T12-L1 and is grossly normal in appearance.       SOFT TISSUES: Detail of the aorta is limited.  There is suggested mild   enlargement with the transverse dimension of approximately 3.1 cm.  Dedicated   imaging of the aorta is recommended.       L1-L2: Diffuse disc bulging is noted with a small proximal foraminal   protrusion on the right.  Annular tear is noted.  Similar findings were noted   on the prior.  Facet hypertrophic changes are noted.       L2-L3: Minimal disc bulging is noted diffusely. [place]ade is a questionable   small left foraminal protrusion laterally.  There is minimal narrowing of the   left foramen.  Facet hypertrophic changes are noted.  Findings are stable       L3-L4: Mild disc bulging is noted diffusely.  Minimal endplate and mild facet   hypertrophic changes are noted.  There is borderline mild proximal foraminal   narrowing on the left.  This is stable.       L4-L5: Mild disc bulging is noted.  A small inferior

## 2019-07-20 ENCOUNTER — HOSPITAL ENCOUNTER (OUTPATIENT)
Age: 69
Discharge: HOME | End: 2019-07-20
Payer: MEDICARE

## 2019-07-20 PROCEDURE — 87522 HEPATITIS C REVRS TRNSCRPJ: CPT

## 2019-07-20 PROCEDURE — 36415 COLL VENOUS BLD VENIPUNCTURE: CPT

## 2019-07-23 LAB
DIRECT EXAM: ABNORMAL
Lab: ABNORMAL
SPECIMEN DESCRIPTION: ABNORMAL

## 2019-07-25 ENCOUNTER — TELEPHONE (OUTPATIENT)
Dept: GASTROENTEROLOGY | Age: 69
End: 2019-07-25

## 2019-08-15 ENCOUNTER — HOSPITAL ENCOUNTER (OUTPATIENT)
Dept: PAIN MANAGEMENT | Age: 69
Discharge: HOME | End: 2019-08-15
Payer: MEDICARE

## 2019-08-15 VITALS
RESPIRATION RATE: 16 BRPM | SYSTOLIC BLOOD PRESSURE: 130 MMHG | TEMPERATURE: 98.3 F | HEIGHT: 69 IN | BODY MASS INDEX: 29.03 KG/M2 | HEART RATE: 104 BPM | DIASTOLIC BLOOD PRESSURE: 73 MMHG | WEIGHT: 196 LBS | OXYGEN SATURATION: 95 %

## 2019-08-15 DIAGNOSIS — M47.816 OSTEOARTHRITIS OF LUMBAR SPINE, UNSPECIFIED SPINAL OSTEOARTHRITIS COMPLICATION STATUS: ICD-10-CM

## 2019-08-15 DIAGNOSIS — M51.36 DEGENERATIVE DISC DISEASE, LUMBAR: Primary | Chronic | ICD-10-CM

## 2019-08-15 DIAGNOSIS — Z98.1 S/P CERVICAL SPINAL FUSION: Chronic | ICD-10-CM

## 2019-08-15 DIAGNOSIS — M47.26 OSTEOARTHRITIS OF SPINE WITH RADICULOPATHY, LUMBAR REGION: ICD-10-CM

## 2019-08-15 DIAGNOSIS — M48.061 SPINAL STENOSIS OF LUMBAR REGION WITHOUT NEUROGENIC CLAUDICATION: Chronic | ICD-10-CM

## 2019-08-15 DIAGNOSIS — G89.29 ENCOUNTER FOR CHRONIC PAIN MANAGEMENT: ICD-10-CM

## 2019-08-15 DIAGNOSIS — Z51.81 ENCOUNTER FOR MEDICATION MONITORING: Chronic | ICD-10-CM

## 2019-08-15 DIAGNOSIS — M47.816 LUMBAR SPONDYLOSIS: Chronic | ICD-10-CM

## 2019-08-15 DIAGNOSIS — M46.92 CERVICAL SPONDYLITIS (HCC): Chronic | ICD-10-CM

## 2019-08-15 DIAGNOSIS — D50.8 OTHER IRON DEFICIENCY ANEMIA: ICD-10-CM

## 2019-08-15 DIAGNOSIS — M54.16 LUMBAR RADICULOPATHY: Chronic | ICD-10-CM

## 2019-08-15 PROCEDURE — 99213 OFFICE O/P EST LOW 20 MIN: CPT

## 2019-08-15 PROCEDURE — 99213 OFFICE O/P EST LOW 20 MIN: CPT | Performed by: NURSE PRACTITIONER

## 2019-08-15 RX ORDER — MORPHINE SULFATE 60 MG/1
60 TABLET, FILM COATED, EXTENDED RELEASE ORAL 2 TIMES DAILY
Qty: 60 TABLET | Refills: 0 | Status: SHIPPED | OUTPATIENT
Start: 2019-08-17 | End: 2019-09-10 | Stop reason: SDUPTHER

## 2019-08-15 RX ORDER — MORPHINE SULFATE 15 MG/1
15 TABLET, FILM COATED, EXTENDED RELEASE ORAL DAILY
Qty: 30 TABLET | Refills: 0 | Status: SHIPPED | OUTPATIENT
Start: 2019-08-17 | End: 2019-09-10 | Stop reason: SDUPTHER

## 2019-08-15 RX ORDER — OXYCODONE HYDROCHLORIDE AND ACETAMINOPHEN 5; 325 MG/1; MG/1
1 TABLET ORAL EVERY 8 HOURS
Qty: 90 TABLET | Refills: 0 | Status: SHIPPED | OUTPATIENT
Start: 2019-08-17 | End: 2019-09-10 | Stop reason: SDUPTHER

## 2019-08-15 ASSESSMENT — ENCOUNTER SYMPTOMS: BACK PAIN: 1

## 2019-08-15 NOTE — PROGRESS NOTES
Final 02/14/2019  9:45 AM [facility]   Propoxyphene, Urine Not Detected   Final 02/14/2019  9:45 AM [facility]   Tapentadol-O-Sulfate, Urine Not Detected   Final 02/14/2019  9:45 AM [facility]   Tapentadol, Urine Not Detected   Final 02/14/2019  9:45 AM [facility]   Temazepam, Urine Not Detected   Final 02/14/2019  9:45 AM [facility]   Tramadol, Urine Not Detected   Final 02/14/2019  9:45 AM [facility]   Zolpidem, Urine Not Detected   Final 02/14/2019  9:45 AM [facility]   Drugs Expected, Ur   Final 02/14/2019  9:45 AM [facility] [address] [facility]   MORPHINE ER 2/14/2019 0700    PERCOCET 2/14/2019 0700   Creatinine, Ur 125.8  20.0 - 400.0 mg/dL Final 02/14/2019  9:45 AM [facility]   Pain Mgt Drug Panel, Hi Res, Ur See Below   Final 02/14/2019  9:45 AM [facility]   (NOTE)   Methodology: Qualitative Enzyme Immunoassay and Qualitative Liquid   Chromatography-Time of Flight-Mass Spectrometry or Tandem Mass   Spectrometry, Quantitative Spectrophotometry   The absence of expected drug(s) and/or drug metabolite(s) may   indicate non-compliance, inappropriate timing of specimen   collection relative to drug administration, poor drug absorption,   diluted/adulterated urine, or limitations of testing. The   concentration must be greater than or equal to the cutoff to be   reported as present.  If specific drug concentrations are   required, contact the laboratory within two weeks of specimen   collection to request quantification by a second analytical   technique. Interpretive questions should be directed to the   laboratory. Results based on immunoassay detection that do not match clinical   expectations should be   interpreted with caution. Confirmatory testing by mass   spectrometry for immunoassay-based results is available, if   ordered within two weeks of specimen collection. Additional   charges apply. For medical purposes only; not valid for forensic use. This test was developed and its performance characteristics   determined by [facility]. The U.[place].  Food and Drug   Administration has not approved or cleared this test; however, FDA   clearance or approval is not currently required for clinical use. The results are not intended to be used as the sole means for   clinical diagnosis or patient management decisions. EER Hi Res Interp Ur See Note   Final 02/14/2019  9:45 AM [facility]   (NOTE)   Access [facility] Enhanced Report using either link below:   -Direct access: [email or web address]   -Enter Username, Password: [email or web address]   Username: [email or web address]   Password: [email or web address]   Performed by [place] [phone]   www. Calin Zaidi MD, Lab. Director          Past Medical History:   Diagnosis Date    Anemia     Arthritis     osteoarthritis    Hyperlipidemia        Past Surgical History:   Procedure Laterality Date    BACK SURGERY  1977    cervical spine three times    CHOLECYSTECTOMY  03/22/2019    COLONOSCOPY  01/25/2015    10 yr recall, hemorrhoids    DENTAL SURGERY  10/2015    all teeth extracted    SHOULDER SURGERY Right     UMBILICAL HERNIA REPAIR  3022-19    UPPER GASTROINTESTINAL ENDOSCOPY  01/25/2015       Allergies   Allergen Reactions    Iron      Pill- constipation, abdominal pain         Current Outpatient Medications:     oxyCODONE-acetaminophen (PERCOCET) 5-325 MG per tablet, Take 1 tablet by mouth every 8 hours for 30 days. , Disp: 90 tablet, Rfl: 0    morphine (MS CONTIN) 60 MG extended release tablet, Take 1 tablet by mouth 2 times daily for 30 days. , Disp: 60 tablet, Rfl: 0    morphine (MS CONTIN) 30 MG extended release tablet, Take 1 tablet by mouth daily for 30 days. , Disp: 30 tablet, Rfl: 0    pregabalin (LYRICA) 150 MG capsule, Take 1 capsule by mouth 3 times daily for 30 days. , Disp: 90 capsule, Rfl: 2    pantoprazole (PROTONIX) 40 MG tablet, TK 1 T PO  QD, Disp: , Rfl: 0    cyanocobalamin 1000 MCG/ML injection, Inject 1,000 mcg into the muscle once Once a week for 4 weeks than monthly started 9-1-16, Disp: , Rfl:     gemfibrozil (LOPID) 600 MG tablet, Take 600 mg by mouth 2 times daily (before meals). , Disp: , Rfl:     naloxone (NARCAN) 4 MG/0.1ML LIQD nasal spray, 1 spray by Nasal route as needed (if needed for respiratory depression), Disp: 1 each, Rfl: 0    BD INTEGRA SYRINGE 25G X 1\" 3 ML MISC, , Disp: , Rfl: 4    Family History   Problem Relation Age of Onset    Diabetes Mother     Heart Disease Father        Social History     Socioeconomic History    Marital status:      Spouse name: Not on file    Number of children: Not on file    Years of education: Not on file    Highest education level: Not on file   Occupational History    Occupation: disabled   Social Needs    Financial resource strain: Not on file    Food insecurity:     Worry: Not on file     Inability: Not on file    Transportation needs:     Medical: Not on file     Non-medical: Not on file   Tobacco Use    Smoking status: Former Smoker     Packs/day: 0.50     Years: 45.00     Pack years: 22.50     Types: Cigarettes    Smokeless tobacco: Never Used    Tobacco comment: on chantix 11-6-15   12-7-15 quit 2 weeks ago   Substance and Sexual Activity    Alcohol use: No    Drug use: No    Sexual activity: Not on file   Lifestyle    Physical activity:     Days per week: Not on file     Minutes per session: Not on file    Stress: Not on file   Relationships    Social connections:     Talks on phone: Not on file     Gets together: Not on file     Attends [employer] service: Not on file     Active member of club or organization: Not on file     Attends meetings of clubs or organizations: Not on file     Relationship status: Not on file    Intimate partner violence:     Fear of current or ex partner: Not on file     Emotionally abused: Not on file     Physically abused: Not on file     Forced sexual activity: Not on file   Other Topics Concern    Not on file   Social History

## 2019-08-16 RX ORDER — PREGABALIN 150 MG/1
150 CAPSULE ORAL 3 TIMES DAILY
Qty: 270 CAPSULE | Refills: 1 | Status: SHIPPED | OUTPATIENT
Start: 2019-08-16 | End: 2019-08-30 | Stop reason: SDUPTHER

## 2019-08-20 ENCOUNTER — HOSPITAL ENCOUNTER (OUTPATIENT)
Age: 69
Discharge: HOME | End: 2019-08-20
Payer: MEDICARE

## 2019-08-20 LAB
ABSOLUTE EOS #: 0.06 K/UL (ref 0–0.4)
ABSOLUTE IMMATURE GRANULOCYTE: ABNORMAL K/UL (ref 0–0.3)
ABSOLUTE LYMPH #: 1.77 K/UL (ref 1–4.8)
ABSOLUTE MONO #: 0.43 K/UL (ref 0.1–1.3)
BASOPHILS # BLD: 1 % (ref 0–2)
BASOPHILS ABSOLUTE: 0.06 K/UL (ref 0–0.2)
DIFFERENTIAL TYPE: ABNORMAL
EOSINOPHILS RELATIVE PERCENT: 1 % (ref 0–4)
FERRITIN: 5 UG/L (ref 30–400)
HCT VFR BLD CALC: 29.1 % (ref 41–53)
HEMOGLOBIN: 8.1 G/DL (ref 13.5–17.5)
IMMATURE GRANULOCYTES: ABNORMAL %
IRON SATURATION: 6 % (ref 20–55)
IRON: 29 UG/DL (ref 59–158)
LYMPHOCYTES # BLD: 29 % (ref 24–44)
MCH RBC QN AUTO: 17.2 PG (ref 26–34)
MCHC RBC AUTO-ENTMCNC: 27.7 G/DL (ref 31–37)
MCV RBC AUTO: 62.3 FL (ref 80–100)
MONOCYTES # BLD: 7 % (ref 1–7)
MORPHOLOGY: ABNORMAL
NRBC AUTOMATED: ABNORMAL PER 100 WBC
PDW BLD-RTO: 20.1 % (ref 11.5–14.9)
PLATELET # BLD: 290 K/UL (ref 150–450)
PLATELET ESTIMATE: ABNORMAL
PMV BLD AUTO: 8.7 FL (ref 6–12)
RBC # BLD: 4.68 M/UL (ref 4.5–5.9)
RBC # BLD: ABNORMAL 10*6/UL
SEG NEUTROPHILS: 62 % (ref 36–66)
SEGMENTED NEUTROPHILS ABSOLUTE COUNT: 3.78 K/UL (ref 1.3–9.1)
TOTAL IRON BINDING CAPACITY: 474 UG/DL (ref 250–450)
UNSATURATED IRON BINDING CAPACITY: 445 UG/DL (ref 112–347)
WBC # BLD: 6.1 K/UL (ref 3.5–11)
WBC # BLD: ABNORMAL 10*3/UL

## 2019-08-20 PROCEDURE — 85025 COMPLETE CBC W/AUTO DIFF WBC: CPT

## 2019-08-20 PROCEDURE — 36415 COLL VENOUS BLD VENIPUNCTURE: CPT

## 2019-08-20 PROCEDURE — 82728 ASSAY OF FERRITIN: CPT

## 2019-08-20 PROCEDURE — 83540 ASSAY OF IRON: CPT

## 2019-08-20 PROCEDURE — 83550 IRON BINDING TEST: CPT

## 2019-08-22 LAB — PATHOLOGIST REVIEW: NORMAL

## 2019-08-28 DIAGNOSIS — R10.9 ABDOMINAL CRAMPING: ICD-10-CM

## 2019-08-28 DIAGNOSIS — K27.9 PUD (PEPTIC ULCER DISEASE): ICD-10-CM

## 2019-08-28 DIAGNOSIS — D50.8 OTHER IRON DEFICIENCY ANEMIA: ICD-10-CM

## 2019-08-30 DIAGNOSIS — G89.29 ENCOUNTER FOR CHRONIC PAIN MANAGEMENT: ICD-10-CM

## 2019-08-30 DIAGNOSIS — M47.816 OSTEOARTHRITIS OF LUMBAR SPINE, UNSPECIFIED SPINAL OSTEOARTHRITIS COMPLICATION STATUS: ICD-10-CM

## 2019-08-30 DIAGNOSIS — M48.061 SPINAL STENOSIS OF LUMBAR REGION WITHOUT NEUROGENIC CLAUDICATION: Chronic | ICD-10-CM

## 2019-08-30 DIAGNOSIS — M54.16 LUMBAR RADICULOPATHY: Chronic | ICD-10-CM

## 2019-08-30 DIAGNOSIS — M51.36 DEGENERATIVE DISC DISEASE, LUMBAR: Chronic | ICD-10-CM

## 2019-08-30 DIAGNOSIS — M47.26 OSTEOARTHRITIS OF SPINE WITH RADICULOPATHY, LUMBAR REGION: ICD-10-CM

## 2019-08-30 RX ORDER — SODIUM CHLORIDE 9 MG/ML
INJECTION, SOLUTION INTRAVENOUS CONTINUOUS
Status: CANCELLED | OUTPATIENT
Start: 2019-08-30

## 2019-08-30 RX ORDER — METHYLPREDNISOLONE SODIUM SUCCINATE 125 MG/2ML
125 INJECTION, POWDER, LYOPHILIZED, FOR SOLUTION INTRAMUSCULAR; INTRAVENOUS ONCE
Status: CANCELLED | OUTPATIENT
Start: 2019-08-30

## 2019-08-30 RX ORDER — DIPHENHYDRAMINE HYDROCHLORIDE 50 MG/ML
50 INJECTION INTRAMUSCULAR; INTRAVENOUS ONCE
Status: CANCELLED | OUTPATIENT
Start: 2019-08-30

## 2019-08-30 RX ORDER — SODIUM CHLORIDE 0.9 % (FLUSH) 0.9 %
5 SYRINGE (ML) INJECTION PRN
Status: CANCELLED | OUTPATIENT
Start: 2019-08-30

## 2019-08-30 RX ORDER — EPINEPHRINE 1 MG/ML
0.3 INJECTION, SOLUTION, CONCENTRATE INTRAVENOUS PRN
Status: CANCELLED | OUTPATIENT
Start: 2019-08-30

## 2019-08-30 RX ORDER — SODIUM CHLORIDE 0.9 % (FLUSH) 0.9 %
10 SYRINGE (ML) INJECTION PRN
Status: CANCELLED | OUTPATIENT
Start: 2019-08-30

## 2019-08-30 RX ORDER — HEPARIN SODIUM (PORCINE) LOCK FLUSH IV SOLN 100 UNIT/ML 100 UNIT/ML
500 SOLUTION INTRAVENOUS PRN
Status: CANCELLED | OUTPATIENT
Start: 2019-08-30

## 2019-08-30 RX ORDER — PREGABALIN 150 MG/1
150 CAPSULE ORAL 3 TIMES DAILY
Qty: 270 CAPSULE | Refills: 2 | Status: SHIPPED | OUTPATIENT
Start: 2019-08-30 | End: 2019-12-02 | Stop reason: SDUPTHER

## 2019-09-03 ENCOUNTER — TELEPHONE (OUTPATIENT)
Dept: INFUSION THERAPY | Age: 69
End: 2019-09-03

## 2019-09-03 ENCOUNTER — HOSPITAL ENCOUNTER (OUTPATIENT)
Age: 69
Discharge: HOME | End: 2019-09-03
Payer: MEDICARE

## 2019-09-03 ENCOUNTER — OFFICE VISIT (OUTPATIENT)
Dept: GASTROENTEROLOGY | Age: 69
End: 2019-09-03
Payer: MEDICARE

## 2019-09-03 VITALS
WEIGHT: 205.3 LBS | DIASTOLIC BLOOD PRESSURE: 74 MMHG | SYSTOLIC BLOOD PRESSURE: 118 MMHG | HEART RATE: 128 BPM | BODY MASS INDEX: 30.32 KG/M2

## 2019-09-03 DIAGNOSIS — B18.2 HEP C W/O COMA, CHRONIC (HCC): ICD-10-CM

## 2019-09-03 DIAGNOSIS — K57.30 DIVERTICULOSIS OF COLON: ICD-10-CM

## 2019-09-03 DIAGNOSIS — K27.9 PUD (PEPTIC ULCER DISEASE): ICD-10-CM

## 2019-09-03 DIAGNOSIS — D50.8 OTHER IRON DEFICIENCY ANEMIA: Primary | ICD-10-CM

## 2019-09-03 PROCEDURE — 86850 RBC ANTIBODY SCREEN: CPT

## 2019-09-03 PROCEDURE — G8417 CALC BMI ABV UP PARAM F/U: HCPCS | Performed by: INTERNAL MEDICINE

## 2019-09-03 PROCEDURE — 86901 BLOOD TYPING SEROLOGIC RH(D): CPT

## 2019-09-03 PROCEDURE — 36415 COLL VENOUS BLD VENIPUNCTURE: CPT

## 2019-09-03 PROCEDURE — G8427 DOCREV CUR MEDS BY ELIG CLIN: HCPCS | Performed by: INTERNAL MEDICINE

## 2019-09-03 PROCEDURE — 86900 BLOOD TYPING SEROLOGIC ABO: CPT

## 2019-09-03 PROCEDURE — 99214 OFFICE O/P EST MOD 30 MIN: CPT | Performed by: INTERNAL MEDICINE

## 2019-09-03 PROCEDURE — 86920 COMPATIBILITY TEST SPIN: CPT

## 2019-09-03 RX ORDER — FUROSEMIDE 10 MG/ML
20 INJECTION INTRAMUSCULAR; INTRAVENOUS ONCE
Status: CANCELLED | OUTPATIENT
Start: 2019-09-03 | End: 2019-09-03

## 2019-09-03 RX ORDER — 0.9 % SODIUM CHLORIDE 0.9 %
250 INTRAVENOUS SOLUTION INTRAVENOUS ONCE
Status: CANCELLED | OUTPATIENT
Start: 2019-09-03 | End: 2019-09-03

## 2019-09-03 ASSESSMENT — ENCOUNTER SYMPTOMS
VOMITING: 0
RECTAL PAIN: 0
BACK PAIN: 1
ALLERGIC/IMMUNOLOGIC NEGATIVE: 1
NAUSEA: 0
CONSTIPATION: 0
DIARRHEA: 1
BLOOD IN STOOL: 0
TROUBLE SWALLOWING: 0
RESPIRATORY NEGATIVE: 1
ANAL BLEEDING: 0
ABDOMINAL PAIN: 1
ABDOMINAL DISTENTION: 0

## 2019-09-03 NOTE — PROGRESS NOTES
their success rates were also explained to him in detail. The side effect profile of these medicines were explained to him in detail as well  The significance of quitting Alcohol was also explained     he was explained about the importance of copmplaince  with the treatment and regular follow up    Will order necessary blood tests and Imaging studies for further work up   The patient has verbalized understanding and agreement to these. Pt was advised in detail about some life style and dietary modifications. He was advised about avoidance of caffeine, nicotine and chocolate. Pt was also told to stay away from any kind of fast foods, soda pops. He was also advised to avoid lots of spices, grease and fried food etc.     Instructions were also given about trying to arrange the timing, quality and quantity of food. Instructions were given about using ample amount of fiber including dietary and supplemental fiber either metamucil, bennafiber or citrucell etc.  Pt was advised about drinking ample amount of water without any colors or chemicals. Stress was given about regular exercise. Pt has verbalized understanding and agreement to these modifications.       More than half of patient's clinic visit time was spent in counseling about lifestyle and dietary modifications  Patient's  questions were answered in this regard as well  The patient has verbalized understanding and agreement

## 2019-09-04 ENCOUNTER — HOSPITAL ENCOUNTER (OUTPATIENT)
Dept: INFUSION THERAPY | Age: 69
Discharge: HOME | End: 2019-09-04
Payer: MEDICARE

## 2019-09-04 VITALS
DIASTOLIC BLOOD PRESSURE: 76 MMHG | TEMPERATURE: 98.3 F | HEART RATE: 101 BPM | RESPIRATION RATE: 16 BRPM | SYSTOLIC BLOOD PRESSURE: 155 MMHG

## 2019-09-04 PROCEDURE — 6370000000 HC RX 637 (ALT 250 FOR IP): Performed by: INTERNAL MEDICINE

## 2019-09-04 PROCEDURE — 36430 TRANSFUSION BLD/BLD COMPNT: CPT

## 2019-09-04 PROCEDURE — 86900 BLOOD TYPING SEROLOGIC ABO: CPT

## 2019-09-04 PROCEDURE — 2580000003 HC RX 258: Performed by: INTERNAL MEDICINE

## 2019-09-04 PROCEDURE — P9016 RBC LEUKOCYTES REDUCED: HCPCS

## 2019-09-04 RX ORDER — ACETAMINOPHEN 325 MG/1
650 TABLET ORAL ONCE
Status: COMPLETED | OUTPATIENT
Start: 2019-09-04 | End: 2019-09-04

## 2019-09-04 RX ORDER — DIPHENHYDRAMINE HCL 25 MG
25 TABLET ORAL ONCE
Status: COMPLETED | OUTPATIENT
Start: 2019-09-04 | End: 2019-09-04

## 2019-09-04 RX ORDER — 0.9 % SODIUM CHLORIDE 0.9 %
250 INTRAVENOUS SOLUTION INTRAVENOUS ONCE
Status: COMPLETED | OUTPATIENT
Start: 2019-09-04 | End: 2019-09-04

## 2019-09-04 RX ORDER — SODIUM, POTASSIUM,MAG SULFATES 17.5-3.13G
SOLUTION, RECONSTITUTED, ORAL ORAL
Qty: 1 BOTTLE | Refills: 0 | Status: ON HOLD | OUTPATIENT
Start: 2019-09-04 | End: 2019-10-08 | Stop reason: ALTCHOICE

## 2019-09-04 RX ADMIN — ACETAMINOPHEN 650 MG: 325 TABLET ORAL at 08:44

## 2019-09-04 RX ADMIN — SODIUM CHLORIDE 250 ML: 9 INJECTION, SOLUTION INTRAVENOUS at 08:40

## 2019-09-04 RX ADMIN — DIPHENHYDRAMINE HCL 25 MG: 25 TABLET ORAL at 08:44

## 2019-09-04 ASSESSMENT — PAIN DESCRIPTION - PAIN TYPE: TYPE: CHRONIC PAIN

## 2019-09-04 ASSESSMENT — PAIN DESCRIPTION - LOCATION: LOCATION: BACK

## 2019-09-04 ASSESSMENT — PAIN SCALES - GENERAL
PAINLEVEL_OUTOF10: 3
PAINLEVEL_OUTOF10: 3

## 2019-09-04 ASSESSMENT — PAIN DESCRIPTION - ORIENTATION: ORIENTATION: LOWER

## 2019-09-04 NOTE — PROGRESS NOTES
Pt here for 1 unit PRBC's, ordered per Dr. Eleni Stallings for hgb=8.1. Transfusion consent form signed by pt. Pre-meds given and blood transfusion initiated as directed. Transfusion completed without incident and pt d/c'd in stable condition. Pt has jim ordered and writer notified Lauryn Yates (infusion ). [facility] will send to [facility], pt informed will hear from  once approved.

## 2019-09-05 LAB
ABO/RH: NORMAL
ANTIBODY SCREEN: NEGATIVE
ARM BAND NUMBER: NORMAL
BLD PROD TYP BPU: NORMAL
CROSSMATCH RESULT: NORMAL
DISPENSE STATUS BLOOD BANK: NORMAL
EXPIRATION DATE: NORMAL
TRANSFUSION STATUS: NORMAL
UNIT DIVISION: 0
UNIT NUMBER: NORMAL

## 2019-09-09 ENCOUNTER — HOSPITAL ENCOUNTER (OUTPATIENT)
Dept: INFUSION THERAPY | Age: 69
Discharge: HOME | End: 2019-09-09
Payer: MEDICARE

## 2019-09-09 VITALS
DIASTOLIC BLOOD PRESSURE: 86 MMHG | TEMPERATURE: 99 F | HEART RATE: 100 BPM | RESPIRATION RATE: 16 BRPM | SYSTOLIC BLOOD PRESSURE: 145 MMHG

## 2019-09-09 DIAGNOSIS — D50.0 IRON DEFICIENCY ANEMIA DUE TO CHRONIC BLOOD LOSS: Primary | ICD-10-CM

## 2019-09-09 PROCEDURE — 96365 THER/PROPH/DIAG IV INF INIT: CPT

## 2019-09-09 PROCEDURE — 6360000002 HC RX W HCPCS: Performed by: INTERNAL MEDICINE

## 2019-09-09 PROCEDURE — 2580000003 HC RX 258: Performed by: INTERNAL MEDICINE

## 2019-09-09 RX ORDER — SODIUM CHLORIDE 0.9 % (FLUSH) 0.9 %
5 SYRINGE (ML) INJECTION PRN
Status: CANCELLED | OUTPATIENT
Start: 2019-09-16

## 2019-09-09 RX ORDER — METHYLPREDNISOLONE SODIUM SUCCINATE 125 MG/2ML
125 INJECTION, POWDER, LYOPHILIZED, FOR SOLUTION INTRAMUSCULAR; INTRAVENOUS ONCE
Status: CANCELLED | OUTPATIENT
Start: 2019-09-16

## 2019-09-09 RX ORDER — SODIUM CHLORIDE 9 MG/ML
INJECTION, SOLUTION INTRAVENOUS CONTINUOUS
Status: CANCELLED | OUTPATIENT
Start: 2019-09-16

## 2019-09-09 RX ORDER — DIPHENHYDRAMINE HYDROCHLORIDE 50 MG/ML
50 INJECTION INTRAMUSCULAR; INTRAVENOUS ONCE
Status: CANCELLED | OUTPATIENT
Start: 2019-09-16

## 2019-09-09 RX ORDER — SODIUM CHLORIDE 0.9 % (FLUSH) 0.9 %
10 SYRINGE (ML) INJECTION PRN
Status: CANCELLED | OUTPATIENT
Start: 2019-09-16

## 2019-09-09 RX ORDER — HEPARIN SODIUM (PORCINE) LOCK FLUSH IV SOLN 100 UNIT/ML 100 UNIT/ML
500 SOLUTION INTRAVENOUS PRN
Status: CANCELLED | OUTPATIENT
Start: 2019-09-16

## 2019-09-09 RX ORDER — EPINEPHRINE 1 MG/ML
0.3 INJECTION, SOLUTION, CONCENTRATE INTRAVENOUS PRN
Status: CANCELLED | OUTPATIENT
Start: 2019-09-16

## 2019-09-09 RX ORDER — SODIUM CHLORIDE 9 MG/ML
INJECTION, SOLUTION INTRAVENOUS CONTINUOUS
Status: DISCONTINUED | OUTPATIENT
Start: 2019-09-09 | End: 2019-09-10 | Stop reason: HOSPADM

## 2019-09-09 RX ADMIN — FERUMOXYTOL 510 MG: 510 INJECTION INTRAVENOUS at 13:24

## 2019-09-09 RX ADMIN — SODIUM CHLORIDE: 9 INJECTION, SOLUTION INTRAVENOUS at 13:24

## 2019-09-10 ENCOUNTER — HOSPITAL ENCOUNTER (OUTPATIENT)
Age: 69
Discharge: HOME | End: 2019-09-10
Payer: MEDICARE

## 2019-09-10 ENCOUNTER — HOSPITAL ENCOUNTER (OUTPATIENT)
Dept: PAIN MANAGEMENT | Age: 69
Discharge: HOME | End: 2019-09-10
Payer: MEDICARE

## 2019-09-10 ENCOUNTER — HOSPITAL ENCOUNTER (OUTPATIENT)
Dept: ULTRASOUND IMAGING | Age: 69
Discharge: HOME | End: 2019-09-12
Payer: MEDICARE

## 2019-09-10 VITALS
OXYGEN SATURATION: 94 % | TEMPERATURE: 97.5 F | DIASTOLIC BLOOD PRESSURE: 58 MMHG | SYSTOLIC BLOOD PRESSURE: 115 MMHG | BODY MASS INDEX: 30.36 KG/M2 | RESPIRATION RATE: 20 BRPM | HEART RATE: 104 BPM | HEIGHT: 69 IN | WEIGHT: 205 LBS

## 2019-09-10 DIAGNOSIS — M48.061 SPINAL STENOSIS OF LUMBAR REGION WITHOUT NEUROGENIC CLAUDICATION: ICD-10-CM

## 2019-09-10 DIAGNOSIS — M54.16 LUMBAR RADICULOPATHY: ICD-10-CM

## 2019-09-10 DIAGNOSIS — Z51.81 ENCOUNTER FOR MEDICATION MONITORING: ICD-10-CM

## 2019-09-10 DIAGNOSIS — M47.816 OSTEOARTHRITIS OF LUMBAR SPINE, UNSPECIFIED SPINAL OSTEOARTHRITIS COMPLICATION STATUS: ICD-10-CM

## 2019-09-10 DIAGNOSIS — M47.26 OSTEOARTHRITIS OF SPINE WITH RADICULOPATHY, LUMBAR REGION: ICD-10-CM

## 2019-09-10 DIAGNOSIS — M51.36 DEGENERATIVE DISC DISEASE, LUMBAR: Primary | ICD-10-CM

## 2019-09-10 DIAGNOSIS — B18.2 HEP C W/O COMA, CHRONIC (HCC): ICD-10-CM

## 2019-09-10 DIAGNOSIS — G89.29 ENCOUNTER FOR CHRONIC PAIN MANAGEMENT: ICD-10-CM

## 2019-09-10 DIAGNOSIS — M47.816 LUMBAR SPONDYLOSIS: ICD-10-CM

## 2019-09-10 DIAGNOSIS — M46.92 CERVICAL SPONDYLITIS (HCC): ICD-10-CM

## 2019-09-10 DIAGNOSIS — Z98.1 S/P CERVICAL SPINAL FUSION: ICD-10-CM

## 2019-09-10 LAB
ABSOLUTE EOS #: 0.05 K/UL (ref 0–0.4)
ABSOLUTE IMMATURE GRANULOCYTE: ABNORMAL K/UL (ref 0–0.3)
ABSOLUTE LYMPH #: 1.53 K/UL (ref 1–4.8)
ABSOLUTE MONO #: 0.41 K/UL (ref 0.1–1.3)
AFP: 1.2 UG/L
ALBUMIN SERPL-MCNC: 4.6 G/DL (ref 3.5–5.2)
ALBUMIN/GLOBULIN RATIO: ABNORMAL (ref 1–2.5)
ALP BLD-CCNC: 81 U/L (ref 40–129)
ALPHA-1 ANTITRYPSIN: 192 MG/DL (ref 90–200)
ALT SERPL-CCNC: 14 U/L (ref 5–41)
ANION GAP SERPL CALCULATED.3IONS-SCNC: 14 MMOL/L (ref 9–17)
AST SERPL-CCNC: 17 U/L
BASOPHILS # BLD: 1 % (ref 0–2)
BASOPHILS ABSOLUTE: 0.05 K/UL (ref 0–0.2)
BILIRUB SERPL-MCNC: 0.38 MG/DL (ref 0.3–1.2)
BUN BLDV-MCNC: 22 MG/DL (ref 8–23)
BUN/CREAT BLD: ABNORMAL (ref 9–20)
CALCIUM SERPL-MCNC: 9.2 MG/DL (ref 8.6–10.4)
CERULOPLASMIN: 25 MG/DL (ref 15–30)
CHLORIDE BLD-SCNC: 102 MMOL/L (ref 98–107)
CO2: 23 MMOL/L (ref 20–31)
CREAT SERPL-MCNC: 0.98 MG/DL (ref 0.7–1.2)
DIFFERENTIAL TYPE: ABNORMAL
EOSINOPHILS RELATIVE PERCENT: 1 % (ref 0–4)
FERRITIN: 62 UG/L (ref 30–400)
GFR AFRICAN AMERICAN: >60 ML/MIN
GFR NON-AFRICAN AMERICAN: >60 ML/MIN
GFR SERPL CREATININE-BSD FRML MDRD: ABNORMAL ML/MIN/{1.73_M2}
GFR SERPL CREATININE-BSD FRML MDRD: ABNORMAL ML/MIN/{1.73_M2}
GLUCOSE BLD-MCNC: 140 MG/DL (ref 70–99)
HAV AB SERPL IA-ACNC: NONREACTIVE
HBV SURFACE AB TITR SER: 182.6 MIU/ML
HCT VFR BLD CALC: 33.8 % (ref 41–53)
HEMOGLOBIN: 9.9 G/DL (ref 13.5–17.5)
HEPATITIS B CORE TOTAL ANTIBODY: REACTIVE
HEPATITIS B SURFACE ANTIGEN: NONREACTIVE
IMMATURE GRANULOCYTES: ABNORMAL %
LYMPHOCYTES # BLD: 30 % (ref 24–44)
MCH RBC QN AUTO: 18.6 PG (ref 26–34)
MCHC RBC AUTO-ENTMCNC: 29.2 G/DL (ref 31–37)
MCV RBC AUTO: 63.6 FL (ref 80–100)
MONOCYTES # BLD: 8 % (ref 1–7)
MORPHOLOGY: ABNORMAL
NRBC AUTOMATED: ABNORMAL PER 100 WBC
PATHOLOGIST REVIEW: NORMAL
PDW BLD-RTO: 21.3 % (ref 11.5–14.9)
PLATELET # BLD: 314 K/UL (ref 150–450)
PLATELET ESTIMATE: ABNORMAL
PMV BLD AUTO: 8.1 FL (ref 6–12)
POTASSIUM SERPL-SCNC: 4.1 MMOL/L (ref 3.7–5.3)
RBC # BLD: 5.31 M/UL (ref 4.5–5.9)
RBC # BLD: ABNORMAL 10*6/UL
SEG NEUTROPHILS: 60 % (ref 36–66)
SEGMENTED NEUTROPHILS ABSOLUTE COUNT: 3.06 K/UL (ref 1.3–9.1)
SODIUM BLD-SCNC: 139 MMOL/L (ref 135–144)
TOTAL PROTEIN: 8.1 G/DL (ref 6.4–8.3)
WBC # BLD: 5.1 K/UL (ref 3.5–11)
WBC # BLD: ABNORMAL 10*3/UL

## 2019-09-10 PROCEDURE — 86708 HEPATITIS A ANTIBODY: CPT

## 2019-09-10 PROCEDURE — 85025 COMPLETE CBC W/AUTO DIFF WBC: CPT

## 2019-09-10 PROCEDURE — 82390 ASSAY OF CERULOPLASMIN: CPT

## 2019-09-10 PROCEDURE — 82728 ASSAY OF FERRITIN: CPT

## 2019-09-10 PROCEDURE — 87902 NFCT AGT GNTYP ALYS HEP C: CPT

## 2019-09-10 PROCEDURE — 84450 TRANSFERASE (AST) (SGOT): CPT

## 2019-09-10 PROCEDURE — 86704 HEP B CORE ANTIBODY TOTAL: CPT

## 2019-09-10 PROCEDURE — 83883 ASSAY NEPHELOMETRY NOT SPEC: CPT

## 2019-09-10 PROCEDURE — 82103 ALPHA-1-ANTITRYPSIN TOTAL: CPT

## 2019-09-10 PROCEDURE — 86317 IMMUNOASSAY INFECTIOUS AGENT: CPT

## 2019-09-10 PROCEDURE — 84520 ASSAY OF UREA NITROGEN: CPT

## 2019-09-10 PROCEDURE — 83516 IMMUNOASSAY NONANTIBODY: CPT

## 2019-09-10 PROCEDURE — 87340 HEPATITIS B SURFACE AG IA: CPT

## 2019-09-10 PROCEDURE — 82105 ALPHA-FETOPROTEIN SERUM: CPT

## 2019-09-10 PROCEDURE — 36415 COLL VENOUS BLD VENIPUNCTURE: CPT

## 2019-09-10 PROCEDURE — 82977 ASSAY OF GGT: CPT

## 2019-09-10 PROCEDURE — 87522 HEPATITIS C REVRS TRNSCRPJ: CPT

## 2019-09-10 PROCEDURE — 76705 ECHO EXAM OF ABDOMEN: CPT

## 2019-09-10 PROCEDURE — 99213 OFFICE O/P EST LOW 20 MIN: CPT

## 2019-09-10 PROCEDURE — 84460 ALANINE AMINO (ALT) (SGPT): CPT

## 2019-09-10 PROCEDURE — 86038 ANTINUCLEAR ANTIBODIES: CPT

## 2019-09-10 PROCEDURE — 80053 COMPREHEN METABOLIC PANEL: CPT

## 2019-09-10 PROCEDURE — 99214 OFFICE O/P EST MOD 30 MIN: CPT | Performed by: PAIN MEDICINE

## 2019-09-10 RX ORDER — MORPHINE SULFATE 15 MG/1
15 TABLET, FILM COATED, EXTENDED RELEASE ORAL DAILY
Qty: 30 TABLET | Refills: 0 | Status: ON HOLD | OUTPATIENT
Start: 2019-09-16 | End: 2019-10-08

## 2019-09-10 RX ORDER — OXYCODONE HYDROCHLORIDE AND ACETAMINOPHEN 5; 325 MG/1; MG/1
1 TABLET ORAL EVERY 8 HOURS
Qty: 90 TABLET | Refills: 0 | Status: SHIPPED | OUTPATIENT
Start: 2019-09-16 | End: 2019-10-11 | Stop reason: SDUPTHER

## 2019-09-10 RX ORDER — HYDROXYZINE PAMOATE 25 MG/1
25 CAPSULE ORAL 2 TIMES DAILY PRN
Qty: 60 CAPSULE | Refills: 0 | Status: SHIPPED | OUTPATIENT
Start: 2019-09-10 | End: 2019-10-10

## 2019-09-10 RX ORDER — MORPHINE SULFATE 60 MG/1
60 TABLET, FILM COATED, EXTENDED RELEASE ORAL 2 TIMES DAILY
Qty: 60 TABLET | Refills: 0 | Status: SHIPPED | OUTPATIENT
Start: 2019-09-16 | End: 2019-10-11 | Stop reason: SDUPTHER

## 2019-09-10 ASSESSMENT — ACTIVITIES OF DAILY LIVING (ADL): EFFECT OF PAIN ON DAILY ACTIVITIES: PAIN INCREASES WITH WALKING, BENDING

## 2019-09-10 ASSESSMENT — ENCOUNTER SYMPTOMS
EYES NEGATIVE: 1
NAUSEA: 1
SHORTNESS OF BREATH: 0
SORE THROAT: 0
ABDOMINAL PAIN: 0
BLOOD IN STOOL: 0
EYE PAIN: 0
ALLERGIC/IMMUNOLOGIC NEGATIVE: 1
PHOTOPHOBIA: 0
DIARRHEA: 1
BOWEL INCONTINENCE: 0
BACK PAIN: 1
COUGH: 0

## 2019-09-10 ASSESSMENT — PAIN DESCRIPTION - PROGRESSION: CLINICAL_PROGRESSION: GRADUALLY WORSENING

## 2019-09-10 ASSESSMENT — PAIN SCALES - GENERAL: PAINLEVEL_OUTOF10: 4

## 2019-09-10 ASSESSMENT — PAIN DESCRIPTION - ORIENTATION: ORIENTATION: LOWER;RIGHT;LEFT

## 2019-09-10 ASSESSMENT — PAIN DESCRIPTION - ONSET: ONSET: ON-GOING

## 2019-09-10 ASSESSMENT — PAIN - FUNCTIONAL ASSESSMENT: PAIN_FUNCTIONAL_ASSESSMENT: PREVENTS OR INTERFERES SOME ACTIVE ACTIVITIES AND ADLS

## 2019-09-10 ASSESSMENT — PAIN DESCRIPTION - PAIN TYPE: TYPE: CHRONIC PAIN

## 2019-09-10 ASSESSMENT — PAIN DESCRIPTION - FREQUENCY: FREQUENCY: CONTINUOUS

## 2019-09-10 ASSESSMENT — PAIN DESCRIPTION - DESCRIPTORS: DESCRIPTORS: ACHING

## 2019-09-10 ASSESSMENT — PAIN DESCRIPTION - LOCATION: LOCATION: BACK

## 2019-09-10 NOTE — PROGRESS NOTES
lumbar injection    Was your procedure effective:  no    ACTIVITY/SOCIAL/EMOTIONAL:  Sleep Pattern: 4 hours per night. nightime awakenings  Energy Level:  Tired/Fatigued  Currently attending Physical Therapy:  No  Home Exercises: never none  Mobility: pain increases with activity/fatigue  Do you use assistive devices? No  Have you fallen in the last 30 days?:  No  Currently seeing a Psychiatrist or Psychologist:  No  Emotional Issues: normal   Mood: appropriate     ABERRANT BEHAVIORS SINCE LAST VISIT:  Have you ever been treated in another Pain Clinic no  Refills for prescriptions appropriate: no. Short #2 Percocet, brought medication in July bottle. Patient states left August at home and does not have any medication left in bottle  Lost rx/pills:no  Taking more medication than prescribed:  Yes. Patient states took few extra with MS Contin 15mg dose decreased  Are you receiving PAIN medications from  other doctors: no  Last Urine/Serum Drug Screen : 2-2019  Was Serum/UDS as anticipated? yes  Brought pill bottles in :yes   Was Pill count appropriate? :no. Short #2 Percocet   Are currently pregnant? no  Recent ER visits: No   recent transfusion for anemia             Past Medical History      Diagnosis Date    Anemia     Arthritis     osteoarthritis    [address] C w/o coma, chronic ([facility] [address]     Hyperlipidemia        Surgical History  Past Surgical History:   Procedure Laterality Date    BACK SURGERY  1977    cervical spine three times    CHOLECYSTECTOMY  03/22/2019    COLONOSCOPY  01/25/2015    10 yr recall, hemorrhoids    DENTAL SURGERY  10/2015    all teeth extracted    SHOULDER SURGERY Right     UMBILICAL HERNIA REPAIR  3022-19    UPPER GASTROINTESTINAL ENDOSCOPY  01/25/2015       Medications  Current Outpatient Medications   Medication Sig Dispense Refill    [START ON 9/16/2019] oxyCODONE-acetaminophen (PERCOCET) 5-325 MG per tablet Take 1 tablet by mouth every 8 hours for 30 days.  90 tablet 0    [START ON 2.6-2.9 cm meeting criteria   for AAA (>50% of proximal normal segment). Patient Active Problem List   Diagnosis    Degenerative disc disease, lumbar    Lumbar spondylosis    Lumbar radiculopathy    Lumbar spinal stenosis    Encounter for medication monitoring    Cervical spondylitis (HCC)    S/P cervical spinal fusion    Anemia    GERD (gastroesophageal reflux disease)    Osteoarthritis of spine with radiculopathy, lumbar region    Encounter for chronic pain management    Osteoarthritis of lumbar spine    Iron deficiency anemia    Hep C w/o coma, chronic ([address]        ASSESSMENT    Oscar Rod is a 81 y.o. male with     1. Degenerative disc disease, lumbar    2. Osteoarthritis of lumbar spine, unspecified spinal osteoarthritis complication status    3. Osteoarthritis of spine with radiculopathy, lumbar region    4. Lumbar radiculopathy    5. Spinal stenosis of lumbar region without neurogenic claudication    6. Encounter for chronic pain management    7. Lumbar spondylosis    8. Encounter for medication monitoring    9. Cervical spondylitis ([facility] [address]    10. S/P cervical spinal fusion           PLAN    We will continue current pain medications  Current medications are being tolerated without any Adverse side effects. Patient is experiencing some withdrawal symptoms at night especially. Hence we will give him some Vistaril to help to cope up with the withdrawal.  Orders Placed This Encounter   Medications    oxyCODONE-acetaminophen (PERCOCET) 5-325 MG per tablet     Sig: Take 1 tablet by mouth every 8 hours for 30 days. Dispense:  90 tablet     Refill:  0     Reduce doses taken as pain becomes manageable    morphine (MS CONTIN) 60 MG extended release tablet     Sig: Take 1 tablet by mouth 2 times daily for 30 days.      Dispense:  60 tablet     Refill:  0     Reduce doses taken as pain becomes manageable    morphine (MS CONTIN) 15 MG extended release tablet     Sig: Take 1 tablet by mouth over-the-counter medications before  takeing them. Patient is strongly advised to avoid tranquilizers or  Relaxants for any medications that can depress breathing or recreational drugs. Patient is also advised to tell us if there is any changes in his medications from other physicians. We discussed the same at today's visit and have not been to implement it, as the patient's pain is not under control with current medications. At this time patient is experiencing withdrawal effects from the medication still. Is also having other health problems which is making the effort to decrease the dose of narcotics lower difficult for him to withstand. His will continue current medications for now and gave him some Vistaril to help with the anxiety and insomnia. Once that he is more stable will try to decrease the dose further. Decision Making Process : Patient's health history and referral records thoroughly reviewed before focused physical examination and discussion with patient. I have spent 20 Over 50% of today's visit is spent on examining the patient and counseling and coordinating the care. Level of complexity of date to be reviewed is Moderate. The chart date reviewed include the following: Imaging Reports. Summary of Care. Time spent reviewing with patient the below reports:   Medication safety, Treatment options. Level of diagnosis and management options of this case is multiple: involving the following management options: Interventions as needed, medication management as appropriate, future visits, activity modification, heat/ice as needed, Urine drug screen as required. [x]The patient's questions were answered to the best of my abilities. This note was created using voice recognition software. There may be inaccuracies of transcription  that are inadvertently overlooked prior to the signature. There is any questions about the transcription please contact me.     Return in  4 weeks  with CNP  for further plan of treatment.     Electronically signed by James Jon MD on 9/10/2019 at 8:03 PM

## 2019-09-11 LAB — ANTI-NUCLEAR ANTIBODY (ANA): ABNORMAL

## 2019-09-12 LAB
MITOCHONDRIAL ANTIBODY: 7.8 UNITS (ref 0–20)
SMOOTH MUSCLE ANTIBODY: 9 UNITS (ref 0–19)

## 2019-09-13 LAB
ALANINE AMINOTRANSFERASE, FIBROMETER: 17 U/L (ref 5–50)
ALPHA-2-MACROGLOBULIN, FIBROMETER: 253 MG/DL (ref 131–293)
ASPARTATE AMINOTRANSFERASE, FIBROMETER: 25 U/L (ref 9–50)
CIRRHOMETER PATIENT SCORE: 0.01
EER FIBROMETER REPORT: ABNORMAL
FIBROMETER INTERPRETATION: ABNORMAL
FIBROMETER PATIENT SCORE: 0.46
FIBROMETER PLATELET COUNT: 306
FIBROMETER PROTHROMBIN INDEX: 95 % (ref 90–120)
FIBROSIS METAVIR CLASSIFICATION: ABNORMAL
GAMMA GLUTAMYL TRANSFERASE, FIBROMETER: 25 U/L (ref 7–51)
INFLAMETER METAVIR CLASSIFICATION: ABNORMAL
INFLAMETER PATIENT SCORE: 0.23
UREA NITROGEN, FIBROMETER: 21 MG/DL (ref 7–20)

## 2019-09-14 LAB
DIRECT EXAM: ABNORMAL
DIRECT EXAM: ABNORMAL
HCV QUANTITATIVE: NORMAL
HEPATITIS C GENOTYPE: NORMAL
Lab: ABNORMAL
SPECIMEN DESCRIPTION: ABNORMAL

## 2019-09-16 ENCOUNTER — HOSPITAL ENCOUNTER (OUTPATIENT)
Age: 69
Setting detail: SPECIMEN
Discharge: HOME | End: 2019-09-16
Payer: MEDICARE

## 2019-09-16 ENCOUNTER — HOSPITAL ENCOUNTER (OUTPATIENT)
Dept: INFUSION THERAPY | Age: 69
Discharge: HOME | End: 2019-09-16
Payer: MEDICARE

## 2019-09-16 VITALS
TEMPERATURE: 98.4 F | HEART RATE: 106 BPM | DIASTOLIC BLOOD PRESSURE: 99 MMHG | SYSTOLIC BLOOD PRESSURE: 155 MMHG | RESPIRATION RATE: 16 BRPM

## 2019-09-16 DIAGNOSIS — D50.8 OTHER IRON DEFICIENCY ANEMIA: ICD-10-CM

## 2019-09-16 DIAGNOSIS — B18.2 HEP C W/O COMA, CHRONIC (HCC): ICD-10-CM

## 2019-09-16 DIAGNOSIS — D50.0 IRON DEFICIENCY ANEMIA DUE TO CHRONIC BLOOD LOSS: Primary | ICD-10-CM

## 2019-09-16 DIAGNOSIS — K57.30 DIVERTICULOSIS OF COLON: ICD-10-CM

## 2019-09-16 DIAGNOSIS — K27.9 PUD (PEPTIC ULCER DISEASE): ICD-10-CM

## 2019-09-16 LAB
DATE, STOOL #1: ABNORMAL
DATE, STOOL #2: ABNORMAL
DATE, STOOL #3: ABNORMAL
HEMOCCULT SP1 STL QL: POSITIVE
HEMOCCULT SP2 STL QL: POSITIVE
HEMOCCULT SP3 STL QL: POSITIVE
TIME, STOOL #1: ABNORMAL
TIME, STOOL #2: ABNORMAL
TIME, STOOL #3: ABNORMAL

## 2019-09-16 PROCEDURE — 96365 THER/PROPH/DIAG IV INF INIT: CPT

## 2019-09-16 PROCEDURE — G0328 FECAL BLOOD SCRN IMMUNOASSAY: HCPCS

## 2019-09-16 PROCEDURE — 2580000003 HC RX 258: Performed by: INTERNAL MEDICINE

## 2019-09-16 PROCEDURE — 6360000002 HC RX W HCPCS: Performed by: INTERNAL MEDICINE

## 2019-09-16 RX ORDER — SODIUM CHLORIDE 9 MG/ML
INJECTION, SOLUTION INTRAVENOUS CONTINUOUS
Status: DISCONTINUED | OUTPATIENT
Start: 2019-09-16 | End: 2019-09-16 | Stop reason: HOSPADM

## 2019-09-16 RX ORDER — METHYLPREDNISOLONE SODIUM SUCCINATE 125 MG/2ML
125 INJECTION, POWDER, LYOPHILIZED, FOR SOLUTION INTRAMUSCULAR; INTRAVENOUS ONCE
Status: CANCELLED | OUTPATIENT
Start: 2019-09-16

## 2019-09-16 RX ORDER — HEPARIN SODIUM (PORCINE) LOCK FLUSH IV SOLN 100 UNIT/ML 100 UNIT/ML
500 SOLUTION INTRAVENOUS PRN
Status: CANCELLED | OUTPATIENT
Start: 2019-09-16

## 2019-09-16 RX ORDER — SODIUM CHLORIDE 0.9 % (FLUSH) 0.9 %
5 SYRINGE (ML) INJECTION PRN
Status: CANCELLED | OUTPATIENT
Start: 2019-09-16

## 2019-09-16 RX ORDER — DIPHENHYDRAMINE HYDROCHLORIDE 50 MG/ML
50 INJECTION INTRAMUSCULAR; INTRAVENOUS ONCE
Status: CANCELLED | OUTPATIENT
Start: 2019-09-16

## 2019-09-16 RX ORDER — EPINEPHRINE 1 MG/ML
0.3 INJECTION, SOLUTION, CONCENTRATE INTRAVENOUS PRN
Status: CANCELLED | OUTPATIENT
Start: 2019-09-16

## 2019-09-16 RX ORDER — SODIUM CHLORIDE 9 MG/ML
INJECTION, SOLUTION INTRAVENOUS CONTINUOUS
Status: CANCELLED | OUTPATIENT
Start: 2019-09-16

## 2019-09-16 RX ORDER — SODIUM CHLORIDE 0.9 % (FLUSH) 0.9 %
10 SYRINGE (ML) INJECTION PRN
Status: CANCELLED | OUTPATIENT
Start: 2019-09-16

## 2019-09-16 RX ADMIN — FERUMOXYTOL 510 MG: 510 INJECTION INTRAVENOUS at 09:37

## 2019-09-16 RX ADMIN — SODIUM CHLORIDE: 9 INJECTION, SOLUTION INTRAVENOUS at 09:36

## 2019-09-16 NOTE — PROGRESS NOTES
Pt here for #2 of 2 Fereheme. Tolerated infusion without incident. Pt kept for 30 min observation, no s/s of reaction noted and pt d/c'd in stable condition. Pt will return 10/3/19 for office visit with Dr. Stacy Alcazar.

## 2019-09-17 ENCOUNTER — TELEPHONE (OUTPATIENT)
Dept: GASTROENTEROLOGY | Age: 69
End: 2019-09-17

## 2019-09-17 DIAGNOSIS — B18.2 HEP C W/O COMA, CHRONIC (HCC): Primary | ICD-10-CM

## 2019-09-24 ENCOUNTER — NURSE ONLY (OUTPATIENT)
Dept: GASTROENTEROLOGY | Age: 69
End: 2019-09-24
Payer: MEDICARE

## 2019-09-24 VITALS
RESPIRATION RATE: 12 BRPM | SYSTOLIC BLOOD PRESSURE: 161 MMHG | TEMPERATURE: 98 F | DIASTOLIC BLOOD PRESSURE: 85 MMHG | HEART RATE: 70 BPM

## 2019-09-24 DIAGNOSIS — B18.2 HEP C W/O COMA, CHRONIC (HCC): Primary | ICD-10-CM

## 2019-09-24 PROCEDURE — 90632 HEPA VACCINE ADULT IM: CPT | Performed by: INTERNAL MEDICINE

## 2019-09-24 PROCEDURE — 90471 IMMUNIZATION ADMIN: CPT | Performed by: INTERNAL MEDICINE

## 2019-10-02 ENCOUNTER — TELEPHONE (OUTPATIENT)
Dept: GASTROENTEROLOGY | Age: 69
End: 2019-10-02

## 2019-10-03 ENCOUNTER — OFFICE VISIT (OUTPATIENT)
Dept: ONCOLOGY | Age: 69
End: 2019-10-03
Payer: MEDICARE

## 2019-10-03 VITALS
TEMPERATURE: 98.3 F | DIASTOLIC BLOOD PRESSURE: 89 MMHG | HEART RATE: 114 BPM | WEIGHT: 208.3 LBS | BODY MASS INDEX: 30.76 KG/M2 | SYSTOLIC BLOOD PRESSURE: 132 MMHG

## 2019-10-03 DIAGNOSIS — D50.8 OTHER IRON DEFICIENCY ANEMIA: ICD-10-CM

## 2019-10-03 PROCEDURE — 99214 OFFICE O/P EST MOD 30 MIN: CPT | Performed by: INTERNAL MEDICINE

## 2019-10-03 PROCEDURE — G8417 CALC BMI ABV UP PARAM F/U: HCPCS | Performed by: INTERNAL MEDICINE

## 2019-10-03 PROCEDURE — 4040F PNEUMOC VAC/ADMIN/RCVD: CPT | Performed by: INTERNAL MEDICINE

## 2019-10-03 PROCEDURE — 3017F COLORECTAL CA SCREEN DOC REV: CPT | Performed by: INTERNAL MEDICINE

## 2019-10-03 PROCEDURE — G8484 FLU IMMUNIZE NO ADMIN: HCPCS | Performed by: INTERNAL MEDICINE

## 2019-10-03 PROCEDURE — 1036F TOBACCO NON-USER: CPT | Performed by: INTERNAL MEDICINE

## 2019-10-03 PROCEDURE — 1123F ACP DISCUSS/DSCN MKR DOCD: CPT | Performed by: INTERNAL MEDICINE

## 2019-10-03 PROCEDURE — 99211 OFF/OP EST MAY X REQ PHY/QHP: CPT | Performed by: INTERNAL MEDICINE

## 2019-10-03 PROCEDURE — G8427 DOCREV CUR MEDS BY ELIG CLIN: HCPCS | Performed by: INTERNAL MEDICINE

## 2019-10-08 ENCOUNTER — ANESTHESIA EVENT (OUTPATIENT)
Dept: ENDOSCOPY | Age: 69
End: 2019-10-08
Payer: MEDICARE

## 2019-10-08 ENCOUNTER — ANESTHESIA (OUTPATIENT)
Dept: ENDOSCOPY | Age: 69
End: 2019-10-08
Payer: MEDICARE

## 2019-10-08 ENCOUNTER — HOSPITAL ENCOUNTER (OUTPATIENT)
Age: 69
Setting detail: OUTPATIENT SURGERY
Discharge: HOME | End: 2019-10-08
Attending: INTERNAL MEDICINE | Admitting: INTERNAL MEDICINE
Payer: MEDICARE

## 2019-10-08 VITALS
TEMPERATURE: 97 F | SYSTOLIC BLOOD PRESSURE: 138 MMHG | BODY MASS INDEX: 30.07 KG/M2 | DIASTOLIC BLOOD PRESSURE: 84 MMHG | HEIGHT: 69 IN | RESPIRATION RATE: 20 BRPM | WEIGHT: 203 LBS | HEART RATE: 80 BPM | OXYGEN SATURATION: 95 %

## 2019-10-08 VITALS — OXYGEN SATURATION: 97 % | SYSTOLIC BLOOD PRESSURE: 114 MMHG | DIASTOLIC BLOOD PRESSURE: 68 MMHG

## 2019-10-08 PROCEDURE — 88305 TISSUE EXAM BY PATHOLOGIST: CPT

## 2019-10-08 PROCEDURE — 3700000000 HC ANESTHESIA ATTENDED CARE: Performed by: INTERNAL MEDICINE

## 2019-10-08 PROCEDURE — 7100000000 HC PACU RECOVERY - FIRST 15 MIN: Performed by: INTERNAL MEDICINE

## 2019-10-08 PROCEDURE — 43239 EGD BIOPSY SINGLE/MULTIPLE: CPT | Performed by: INTERNAL MEDICINE

## 2019-10-08 PROCEDURE — 45380 COLONOSCOPY AND BIOPSY: CPT | Performed by: INTERNAL MEDICINE

## 2019-10-08 PROCEDURE — 2500000003 HC RX 250 WO HCPCS

## 2019-10-08 PROCEDURE — 2580000003 HC RX 258: Performed by: ANESTHESIOLOGY

## 2019-10-08 PROCEDURE — 7100000010 HC PHASE II RECOVERY - FIRST 15 MIN: Performed by: INTERNAL MEDICINE

## 2019-10-08 PROCEDURE — 3700000001 HC ADD 15 MINUTES (ANESTHESIA): Performed by: INTERNAL MEDICINE

## 2019-10-08 PROCEDURE — 7100000011 HC PHASE II RECOVERY - ADDTL 15 MIN: Performed by: INTERNAL MEDICINE

## 2019-10-08 PROCEDURE — 3609017100 HC EGD: Performed by: INTERNAL MEDICINE

## 2019-10-08 PROCEDURE — 2709999900 HC NON-CHARGEABLE SUPPLY: Performed by: INTERNAL MEDICINE

## 2019-10-08 PROCEDURE — 3609010300 HC COLONOSCOPY W/BIOPSY SINGLE/MULTIPLE: Performed by: INTERNAL MEDICINE

## 2019-10-08 PROCEDURE — 7100000031 HC ASPR PHASE II RECOVERY - ADDTL 15 MIN: Performed by: INTERNAL MEDICINE

## 2019-10-08 PROCEDURE — 7100000030 HC ASPR PHASE II RECOVERY - FIRST 15 MIN: Performed by: INTERNAL MEDICINE

## 2019-10-08 PROCEDURE — 3609012400 HC EGD TRANSORAL BIOPSY SINGLE/MULTIPLE: Performed by: INTERNAL MEDICINE

## 2019-10-08 PROCEDURE — 6360000002 HC RX W HCPCS

## 2019-10-08 PROCEDURE — 7100000001 HC PACU RECOVERY - ADDTL 15 MIN: Performed by: INTERNAL MEDICINE

## 2019-10-08 RX ORDER — SODIUM CHLORIDE, SODIUM LACTATE, POTASSIUM CHLORIDE, CALCIUM CHLORIDE 600; 310; 30; 20 MG/100ML; MG/100ML; MG/100ML; MG/100ML
INJECTION, SOLUTION INTRAVENOUS CONTINUOUS
Status: DISCONTINUED | OUTPATIENT
Start: 2019-10-08 | End: 2019-10-08 | Stop reason: HOSPADM

## 2019-10-08 RX ORDER — LIDOCAINE HYDROCHLORIDE 10 MG/ML
INJECTION, SOLUTION EPIDURAL; INFILTRATION; INTRACAUDAL; PERINEURAL PRN
Status: DISCONTINUED | OUTPATIENT
Start: 2019-10-08 | End: 2019-10-08 | Stop reason: SDUPTHER

## 2019-10-08 RX ORDER — PROPOFOL 10 MG/ML
INJECTION, EMULSION INTRAVENOUS PRN
Status: DISCONTINUED | OUTPATIENT
Start: 2019-10-08 | End: 2019-10-08 | Stop reason: SDUPTHER

## 2019-10-08 RX ADMIN — LIDOCAINE HYDROCHLORIDE 50 MG: 10 INJECTION, SOLUTION EPIDURAL; INFILTRATION; INTRACAUDAL at 09:08

## 2019-10-08 RX ADMIN — PROPOFOL 400 MG: 10 INJECTION, EMULSION INTRAVENOUS at 09:08

## 2019-10-08 RX ADMIN — SODIUM CHLORIDE, POTASSIUM CHLORIDE, SODIUM LACTATE AND CALCIUM CHLORIDE: 600; 310; 30; 20 INJECTION, SOLUTION INTRAVENOUS at 07:04

## 2019-10-08 ASSESSMENT — PULMONARY FUNCTION TESTS
PIF_VALUE: 1

## 2019-10-08 ASSESSMENT — PAIN DESCRIPTION - DESCRIPTORS: DESCRIPTORS: CRAMPING

## 2019-10-08 ASSESSMENT — PAIN SCALES - GENERAL: PAINLEVEL_OUTOF10: 0

## 2019-10-08 ASSESSMENT — PAIN DESCRIPTION - LOCATION: LOCATION: ABDOMEN

## 2019-10-08 ASSESSMENT — PAIN - FUNCTIONAL ASSESSMENT: PAIN_FUNCTIONAL_ASSESSMENT: 0-10

## 2019-10-08 ASSESSMENT — ENCOUNTER SYMPTOMS: STRIDOR: 0

## 2019-10-09 LAB — SURGICAL PATHOLOGY REPORT: NORMAL

## 2019-10-10 ENCOUNTER — HOSPITAL ENCOUNTER (OUTPATIENT)
Dept: CT IMAGING | Age: 69
Discharge: HOME | End: 2019-10-12
Payer: MEDICARE

## 2019-10-10 ENCOUNTER — HOSPITAL ENCOUNTER (OUTPATIENT)
Age: 69
Discharge: HOME | End: 2019-10-10
Payer: MEDICARE

## 2019-10-10 DIAGNOSIS — I71.40 ABDOMINAL AORTIC ANEURYSM WITHOUT RUPTURE: ICD-10-CM

## 2019-10-10 LAB
CREAT SERPL-MCNC: 0.61 MG/DL (ref 0.7–1.2)
GFR AFRICAN AMERICAN: >60 ML/MIN
GFR NON-AFRICAN AMERICAN: >60 ML/MIN
GFR SERPL CREATININE-BSD FRML MDRD: ABNORMAL ML/MIN/{1.73_M2}
GFR SERPL CREATININE-BSD FRML MDRD: ABNORMAL ML/MIN/{1.73_M2}

## 2019-10-10 PROCEDURE — 6360000004 HC RX CONTRAST MEDICATION: Performed by: SURGERY

## 2019-10-10 PROCEDURE — 82565 ASSAY OF CREATININE: CPT

## 2019-10-10 PROCEDURE — 2580000003 HC RX 258: Performed by: SURGERY

## 2019-10-10 PROCEDURE — 74174 CTA ABD&PLVS W/CONTRAST: CPT

## 2019-10-10 PROCEDURE — 36415 COLL VENOUS BLD VENIPUNCTURE: CPT

## 2019-10-10 RX ORDER — 0.9 % SODIUM CHLORIDE 0.9 %
80 INTRAVENOUS SOLUTION INTRAVENOUS ONCE
Status: COMPLETED | OUTPATIENT
Start: 2019-10-10 | End: 2019-10-10

## 2019-10-10 RX ORDER — SODIUM CHLORIDE 0.9 % (FLUSH) 0.9 %
10 SYRINGE (ML) INJECTION PRN
Status: DISCONTINUED | OUTPATIENT
Start: 2019-10-10 | End: 2019-10-13 | Stop reason: HOSPADM

## 2019-10-10 RX ADMIN — IOVERSOL 75 ML: 741 INJECTION INTRA-ARTERIAL; INTRAVENOUS at 10:24

## 2019-10-10 RX ADMIN — SODIUM CHLORIDE 80 ML: 9 INJECTION, SOLUTION INTRAVENOUS at 10:23

## 2019-10-10 RX ADMIN — Medication 10 ML: at 10:24

## 2019-10-11 ENCOUNTER — HOSPITAL ENCOUNTER (OUTPATIENT)
Dept: PAIN MANAGEMENT | Age: 69
Discharge: HOME | End: 2019-10-11
Payer: MEDICARE

## 2019-10-11 VITALS
DIASTOLIC BLOOD PRESSURE: 96 MMHG | OXYGEN SATURATION: 96 % | HEART RATE: 101 BPM | SYSTOLIC BLOOD PRESSURE: 151 MMHG | RESPIRATION RATE: 16 BRPM

## 2019-10-11 DIAGNOSIS — M47.816 LUMBAR SPONDYLOSIS: Primary | Chronic | ICD-10-CM

## 2019-10-11 DIAGNOSIS — G89.29 ENCOUNTER FOR CHRONIC PAIN MANAGEMENT: ICD-10-CM

## 2019-10-11 DIAGNOSIS — M54.16 LUMBAR RADICULOPATHY: Chronic | ICD-10-CM

## 2019-10-11 DIAGNOSIS — Z51.81 ENCOUNTER FOR MEDICATION MONITORING: Chronic | ICD-10-CM

## 2019-10-11 DIAGNOSIS — M47.816 OSTEOARTHRITIS OF LUMBAR SPINE, UNSPECIFIED SPINAL OSTEOARTHRITIS COMPLICATION STATUS: ICD-10-CM

## 2019-10-11 DIAGNOSIS — M51.36 DEGENERATIVE DISC DISEASE, LUMBAR: Chronic | ICD-10-CM

## 2019-10-11 DIAGNOSIS — Z98.1 S/P CERVICAL SPINAL FUSION: ICD-10-CM

## 2019-10-11 DIAGNOSIS — M46.92 CERVICAL SPONDYLITIS (HCC): Chronic | ICD-10-CM

## 2019-10-11 DIAGNOSIS — M47.26 OSTEOARTHRITIS OF SPINE WITH RADICULOPATHY, LUMBAR REGION: ICD-10-CM

## 2019-10-11 DIAGNOSIS — M48.061 SPINAL STENOSIS OF LUMBAR REGION WITHOUT NEUROGENIC CLAUDICATION: ICD-10-CM

## 2019-10-11 PROCEDURE — 99213 OFFICE O/P EST LOW 20 MIN: CPT | Performed by: NURSE PRACTITIONER

## 2019-10-11 PROCEDURE — 99213 OFFICE O/P EST LOW 20 MIN: CPT

## 2019-10-11 RX ORDER — MORPHINE SULFATE 15 MG/1
15 TABLET, FILM COATED, EXTENDED RELEASE ORAL DAILY
Qty: 30 TABLET | Refills: 0 | Status: SHIPPED | OUTPATIENT
Start: 2019-10-16 | End: 2019-11-13 | Stop reason: SDUPTHER

## 2019-10-11 RX ORDER — MORPHINE SULFATE 60 MG/1
60 TABLET, FILM COATED, EXTENDED RELEASE ORAL 2 TIMES DAILY
Qty: 60 TABLET | Refills: 0 | Status: SHIPPED | OUTPATIENT
Start: 2019-10-16 | End: 2019-11-13 | Stop reason: SDUPTHER

## 2019-10-11 RX ORDER — OXYCODONE HYDROCHLORIDE AND ACETAMINOPHEN 5; 325 MG/1; MG/1
1 TABLET ORAL EVERY 8 HOURS
Qty: 90 TABLET | Refills: 0 | Status: SHIPPED | OUTPATIENT
Start: 2019-10-16 | End: 2019-11-13 | Stop reason: SDUPTHER

## 2019-10-11 ASSESSMENT — ENCOUNTER SYMPTOMS
CONSTIPATION: 0
BACK PAIN: 1
SHORTNESS OF BREATH: 0
COUGH: 0

## 2019-11-07 ENCOUNTER — HOSPITAL ENCOUNTER (OUTPATIENT)
Age: 69
Discharge: HOME | End: 2019-11-07
Payer: MEDICARE

## 2019-11-07 DIAGNOSIS — D50.8 OTHER IRON DEFICIENCY ANEMIA: ICD-10-CM

## 2019-11-07 LAB
ABSOLUTE EOS #: 0.06 K/UL (ref 0–0.4)
ABSOLUTE IMMATURE GRANULOCYTE: ABNORMAL K/UL (ref 0–0.3)
ABSOLUTE LYMPH #: 1.65 K/UL (ref 1–4.8)
ABSOLUTE MONO #: 0.31 K/UL (ref 0.1–1.3)
ALBUMIN SERPL-MCNC: 4.3 G/DL (ref 3.5–5.2)
ALBUMIN/GLOBULIN RATIO: ABNORMAL (ref 1–2.5)
ALP BLD-CCNC: 115 U/L (ref 40–129)
ALT SERPL-CCNC: 34 U/L (ref 5–41)
ANION GAP SERPL CALCULATED.3IONS-SCNC: 14 MMOL/L (ref 9–17)
AST SERPL-CCNC: 28 U/L
BASOPHILS # BLD: 1 % (ref 0–2)
BASOPHILS ABSOLUTE: 0.06 K/UL (ref 0–0.2)
BILIRUB SERPL-MCNC: 0.41 MG/DL (ref 0.3–1.2)
BUN BLDV-MCNC: 14 MG/DL (ref 8–23)
BUN/CREAT BLD: ABNORMAL (ref 9–20)
CALCIUM SERPL-MCNC: 9.4 MG/DL (ref 8.6–10.4)
CHLORIDE BLD-SCNC: 100 MMOL/L (ref 98–107)
CO2: 25 MMOL/L (ref 20–31)
CREAT SERPL-MCNC: 0.66 MG/DL (ref 0.7–1.2)
DIFFERENTIAL TYPE: ABNORMAL
EOSINOPHILS RELATIVE PERCENT: 1 % (ref 0–4)
FERRITIN: 12 UG/L (ref 30–400)
GFR AFRICAN AMERICAN: >60 ML/MIN
GFR NON-AFRICAN AMERICAN: >60 ML/MIN
GFR SERPL CREATININE-BSD FRML MDRD: ABNORMAL ML/MIN/{1.73_M2}
GFR SERPL CREATININE-BSD FRML MDRD: ABNORMAL ML/MIN/{1.73_M2}
GLUCOSE BLD-MCNC: 240 MG/DL (ref 70–99)
HCT VFR BLD CALC: 39.8 % (ref 41–53)
HEMOGLOBIN: 13.1 G/DL (ref 13.5–17.5)
IMMATURE GRANULOCYTES: ABNORMAL %
IRON SATURATION: 8 % (ref 20–55)
IRON: 32 UG/DL (ref 59–158)
LYMPHOCYTES # BLD: 27 % (ref 24–44)
MCH RBC QN AUTO: 26.5 PG (ref 26–34)
MCHC RBC AUTO-ENTMCNC: 33 G/DL (ref 31–37)
MCV RBC AUTO: 80.2 FL (ref 80–100)
MONOCYTES # BLD: 5 % (ref 1–7)
MORPHOLOGY: ABNORMAL
MORPHOLOGY: ABNORMAL
NRBC AUTOMATED: ABNORMAL PER 100 WBC
PDW BLD-RTO: 22.5 % (ref 11.5–14.9)
PLATELET # BLD: 237 K/UL (ref 150–450)
PLATELET ESTIMATE: ABNORMAL
PMV BLD AUTO: 8.1 FL (ref 6–12)
POTASSIUM SERPL-SCNC: 4.7 MMOL/L (ref 3.7–5.3)
RBC # BLD: 4.95 M/UL (ref 4.5–5.9)
RBC # BLD: ABNORMAL 10*6/UL
SEG NEUTROPHILS: 66 % (ref 36–66)
SEGMENTED NEUTROPHILS ABSOLUTE COUNT: 4.02 K/UL (ref 1.3–9.1)
SODIUM BLD-SCNC: 139 MMOL/L (ref 135–144)
TOTAL IRON BINDING CAPACITY: 382 UG/DL (ref 250–450)
TOTAL PROTEIN: 7.8 G/DL (ref 6.4–8.3)
UNSATURATED IRON BINDING CAPACITY: 350 UG/DL (ref 112–347)
WBC # BLD: 6.1 K/UL (ref 3.5–11)
WBC # BLD: ABNORMAL 10*3/UL

## 2019-11-07 PROCEDURE — 85025 COMPLETE CBC W/AUTO DIFF WBC: CPT

## 2019-11-07 PROCEDURE — 80053 COMPREHEN METABOLIC PANEL: CPT

## 2019-11-07 PROCEDURE — 83550 IRON BINDING TEST: CPT

## 2019-11-07 PROCEDURE — 83540 ASSAY OF IRON: CPT

## 2019-11-07 PROCEDURE — 82728 ASSAY OF FERRITIN: CPT

## 2019-11-07 PROCEDURE — 36415 COLL VENOUS BLD VENIPUNCTURE: CPT

## 2019-11-11 PROBLEM — K63.5 COLON POLYPS: Status: ACTIVE | Noted: 2019-10-08

## 2019-11-13 ENCOUNTER — HOSPITAL ENCOUNTER (OUTPATIENT)
Dept: PAIN MANAGEMENT | Age: 69
Discharge: HOME | End: 2019-11-13
Payer: MEDICARE

## 2019-11-13 ENCOUNTER — HOSPITAL ENCOUNTER (OUTPATIENT)
Age: 69
Discharge: HOME | End: 2019-11-13
Payer: MEDICARE

## 2019-11-13 VITALS
OXYGEN SATURATION: 95 % | BODY MASS INDEX: 31.1 KG/M2 | RESPIRATION RATE: 16 BRPM | SYSTOLIC BLOOD PRESSURE: 153 MMHG | TEMPERATURE: 99.3 F | WEIGHT: 210 LBS | DIASTOLIC BLOOD PRESSURE: 99 MMHG | HEIGHT: 69 IN

## 2019-11-13 DIAGNOSIS — B18.2 HEP C W/O COMA, CHRONIC (HCC): ICD-10-CM

## 2019-11-13 DIAGNOSIS — M54.16 LUMBAR RADICULOPATHY: Chronic | ICD-10-CM

## 2019-11-13 DIAGNOSIS — G89.29 ENCOUNTER FOR CHRONIC PAIN MANAGEMENT: ICD-10-CM

## 2019-11-13 DIAGNOSIS — M46.92 CERVICAL SPONDYLITIS (HCC): Chronic | ICD-10-CM

## 2019-11-13 DIAGNOSIS — Z98.1 S/P CERVICAL SPINAL FUSION: Chronic | ICD-10-CM

## 2019-11-13 DIAGNOSIS — M48.061 SPINAL STENOSIS OF LUMBAR REGION WITHOUT NEUROGENIC CLAUDICATION: Chronic | ICD-10-CM

## 2019-11-13 DIAGNOSIS — M51.36 DEGENERATIVE DISC DISEASE, LUMBAR: Primary | Chronic | ICD-10-CM

## 2019-11-13 DIAGNOSIS — M47.816 OSTEOARTHRITIS OF LUMBAR SPINE, UNSPECIFIED SPINAL OSTEOARTHRITIS COMPLICATION STATUS: ICD-10-CM

## 2019-11-13 DIAGNOSIS — Z51.81 ENCOUNTER FOR MEDICATION MONITORING: Chronic | ICD-10-CM

## 2019-11-13 DIAGNOSIS — M47.26 OSTEOARTHRITIS OF SPINE WITH RADICULOPATHY, LUMBAR REGION: ICD-10-CM

## 2019-11-13 DIAGNOSIS — M47.816 LUMBAR SPONDYLOSIS: Chronic | ICD-10-CM

## 2019-11-13 LAB
ALBUMIN SERPL-MCNC: 4.5 G/DL (ref 3.5–5.2)
ALBUMIN/GLOBULIN RATIO: NORMAL (ref 1–2.5)
ALP BLD-CCNC: 119 U/L (ref 40–129)
ALT SERPL-CCNC: 33 U/L (ref 5–41)
AST SERPL-CCNC: 25 U/L
BILIRUB SERPL-MCNC: 0.39 MG/DL (ref 0.3–1.2)
BILIRUBIN DIRECT: 0.13 MG/DL
BILIRUBIN, INDIRECT: 0.26 MG/DL (ref 0–1)
GLOBULIN: NORMAL G/DL (ref 1.5–3.8)
TOTAL PROTEIN: 8 G/DL (ref 6.4–8.3)

## 2019-11-13 PROCEDURE — 99213 OFFICE O/P EST LOW 20 MIN: CPT | Performed by: NURSE PRACTITIONER

## 2019-11-13 PROCEDURE — 99213 OFFICE O/P EST LOW 20 MIN: CPT

## 2019-11-13 PROCEDURE — 36415 COLL VENOUS BLD VENIPUNCTURE: CPT

## 2019-11-13 PROCEDURE — 80076 HEPATIC FUNCTION PANEL: CPT

## 2019-11-13 RX ORDER — MORPHINE SULFATE 15 MG/1
15 TABLET, FILM COATED, EXTENDED RELEASE ORAL DAILY
Qty: 30 TABLET | Refills: 0 | Status: SHIPPED | OUTPATIENT
Start: 2019-11-13 | End: 2019-12-12 | Stop reason: SDUPTHER

## 2019-11-13 RX ORDER — GLECAPREVIR AND PIBRENTASVIR 40; 100 MG/1; MG/1
TABLET, FILM COATED ORAL
COMMUNITY
Start: 2019-11-04 | End: 2019-12-12

## 2019-11-13 RX ORDER — MORPHINE SULFATE 60 MG/1
60 TABLET, FILM COATED, EXTENDED RELEASE ORAL 2 TIMES DAILY
Qty: 60 TABLET | Refills: 0 | Status: SHIPPED | OUTPATIENT
Start: 2019-11-15 | End: 2019-12-12 | Stop reason: SDUPTHER

## 2019-11-13 RX ORDER — OXYCODONE HYDROCHLORIDE AND ACETAMINOPHEN 5; 325 MG/1; MG/1
1 TABLET ORAL EVERY 8 HOURS
Qty: 90 TABLET | Refills: 0 | Status: SHIPPED | OUTPATIENT
Start: 2019-11-15 | End: 2019-12-12 | Stop reason: SDUPTHER

## 2019-11-13 ASSESSMENT — ENCOUNTER SYMPTOMS
RESPIRATORY NEGATIVE: 1
GASTROINTESTINAL NEGATIVE: 1
ROS SKIN COMMENTS: FOREHEAD
BACK PAIN: 1

## 2019-11-14 ENCOUNTER — TELEPHONE (OUTPATIENT)
Dept: ONCOLOGY | Age: 69
End: 2019-11-14

## 2019-11-14 ENCOUNTER — OFFICE VISIT (OUTPATIENT)
Dept: ONCOLOGY | Age: 69
End: 2019-11-14
Payer: MEDICARE

## 2019-11-14 VITALS
DIASTOLIC BLOOD PRESSURE: 100 MMHG | HEART RATE: 120 BPM | SYSTOLIC BLOOD PRESSURE: 176 MMHG | WEIGHT: 208.8 LBS | BODY MASS INDEX: 30.83 KG/M2 | TEMPERATURE: 97.8 F

## 2019-11-14 DIAGNOSIS — D50.0 IRON DEFICIENCY ANEMIA DUE TO CHRONIC BLOOD LOSS: ICD-10-CM

## 2019-11-14 PROCEDURE — 99211 OFF/OP EST MAY X REQ PHY/QHP: CPT

## 2019-11-14 PROCEDURE — 99211 OFF/OP EST MAY X REQ PHY/QHP: CPT | Performed by: INTERNAL MEDICINE

## 2019-11-14 PROCEDURE — G8427 DOCREV CUR MEDS BY ELIG CLIN: HCPCS | Performed by: INTERNAL MEDICINE

## 2019-11-14 PROCEDURE — 1036F TOBACCO NON-USER: CPT | Performed by: INTERNAL MEDICINE

## 2019-11-14 PROCEDURE — 1123F ACP DISCUSS/DSCN MKR DOCD: CPT | Performed by: INTERNAL MEDICINE

## 2019-11-14 PROCEDURE — G8484 FLU IMMUNIZE NO ADMIN: HCPCS | Performed by: INTERNAL MEDICINE

## 2019-11-14 PROCEDURE — 3017F COLORECTAL CA SCREEN DOC REV: CPT | Performed by: INTERNAL MEDICINE

## 2019-11-14 PROCEDURE — 99214 OFFICE O/P EST MOD 30 MIN: CPT | Performed by: INTERNAL MEDICINE

## 2019-11-14 PROCEDURE — 4040F PNEUMOC VAC/ADMIN/RCVD: CPT | Performed by: INTERNAL MEDICINE

## 2019-11-14 PROCEDURE — G8417 CALC BMI ABV UP PARAM F/U: HCPCS | Performed by: INTERNAL MEDICINE

## 2019-11-29 ENCOUNTER — HOSPITAL ENCOUNTER (OUTPATIENT)
Age: 69
Discharge: HOME | End: 2019-11-29
Payer: MEDICARE

## 2019-11-29 DIAGNOSIS — D50.0 IRON DEFICIENCY ANEMIA DUE TO CHRONIC BLOOD LOSS: ICD-10-CM

## 2019-11-29 LAB
ABSOLUTE EOS #: 0.06 K/UL (ref 0–0.4)
ABSOLUTE IMMATURE GRANULOCYTE: ABNORMAL K/UL (ref 0–0.3)
ABSOLUTE LYMPH #: 1.32 K/UL (ref 1–4.8)
ABSOLUTE MONO #: 0.36 K/UL (ref 0.1–1.3)
BASOPHILS # BLD: 1 % (ref 0–2)
BASOPHILS ABSOLUTE: 0.06 K/UL (ref 0–0.2)
DIFFERENTIAL TYPE: ABNORMAL
EOSINOPHILS RELATIVE PERCENT: 1 % (ref 0–4)
FERRITIN: 8 UG/L (ref 30–400)
HCT VFR BLD CALC: 37.1 % (ref 41–53)
HEMOGLOBIN: 12.1 G/DL (ref 13.5–17.5)
IMMATURE GRANULOCYTES: ABNORMAL %
IRON SATURATION: 7 % (ref 20–55)
IRON: 31 UG/DL (ref 59–158)
LYMPHOCYTES # BLD: 22 % (ref 24–44)
MCH RBC QN AUTO: 25.3 PG (ref 26–34)
MCHC RBC AUTO-ENTMCNC: 32.5 G/DL (ref 31–37)
MCV RBC AUTO: 77.9 FL (ref 80–100)
MONOCYTES # BLD: 6 % (ref 1–7)
MORPHOLOGY: ABNORMAL
NRBC AUTOMATED: ABNORMAL PER 100 WBC
PDW BLD-RTO: 16.4 % (ref 11.5–14.9)
PLATELET # BLD: 250 K/UL (ref 150–450)
PLATELET ESTIMATE: ABNORMAL
PMV BLD AUTO: 7.6 FL (ref 6–12)
RBC # BLD: 4.76 M/UL (ref 4.5–5.9)
RBC # BLD: ABNORMAL 10*6/UL
SEG NEUTROPHILS: 70 % (ref 36–66)
SEGMENTED NEUTROPHILS ABSOLUTE COUNT: 4.2 K/UL (ref 1.3–9.1)
TOTAL IRON BINDING CAPACITY: 418 UG/DL (ref 250–450)
UNSATURATED IRON BINDING CAPACITY: 387 UG/DL (ref 112–347)
WBC # BLD: 6 K/UL (ref 3.5–11)
WBC # BLD: ABNORMAL 10*3/UL

## 2019-11-29 PROCEDURE — 82728 ASSAY OF FERRITIN: CPT

## 2019-11-29 PROCEDURE — 36415 COLL VENOUS BLD VENIPUNCTURE: CPT

## 2019-11-29 PROCEDURE — 85025 COMPLETE CBC W/AUTO DIFF WBC: CPT

## 2019-11-29 PROCEDURE — 83540 ASSAY OF IRON: CPT

## 2019-11-29 PROCEDURE — 83550 IRON BINDING TEST: CPT

## 2019-12-02 ENCOUNTER — TELEPHONE (OUTPATIENT)
Dept: PAIN MANAGEMENT | Age: 69
End: 2019-12-02

## 2019-12-02 DIAGNOSIS — G89.29 ENCOUNTER FOR CHRONIC PAIN MANAGEMENT: ICD-10-CM

## 2019-12-02 DIAGNOSIS — M48.061 SPINAL STENOSIS OF LUMBAR REGION WITHOUT NEUROGENIC CLAUDICATION: Chronic | ICD-10-CM

## 2019-12-02 DIAGNOSIS — M54.16 LUMBAR RADICULOPATHY: Chronic | ICD-10-CM

## 2019-12-02 DIAGNOSIS — M47.26 OSTEOARTHRITIS OF SPINE WITH RADICULOPATHY, LUMBAR REGION: ICD-10-CM

## 2019-12-02 DIAGNOSIS — M47.816 OSTEOARTHRITIS OF LUMBAR SPINE, UNSPECIFIED SPINAL OSTEOARTHRITIS COMPLICATION STATUS: ICD-10-CM

## 2019-12-02 DIAGNOSIS — M51.36 DEGENERATIVE DISC DISEASE, LUMBAR: Chronic | ICD-10-CM

## 2019-12-02 RX ORDER — PREGABALIN 150 MG/1
150 CAPSULE ORAL 3 TIMES DAILY
Qty: 270 CAPSULE | Refills: 2 | Status: SHIPPED | OUTPATIENT
Start: 2019-12-02 | End: 2020-03-11 | Stop reason: SDUPTHER

## 2019-12-03 ENCOUNTER — OFFICE VISIT (OUTPATIENT)
Dept: GASTROENTEROLOGY | Age: 69
End: 2019-12-03
Payer: MEDICARE

## 2019-12-03 VITALS
HEART RATE: 125 BPM | WEIGHT: 209.9 LBS | BODY MASS INDEX: 31 KG/M2 | SYSTOLIC BLOOD PRESSURE: 170 MMHG | DIASTOLIC BLOOD PRESSURE: 104 MMHG

## 2019-12-03 DIAGNOSIS — E66.8 MODERATE OBESITY: ICD-10-CM

## 2019-12-03 DIAGNOSIS — D36.9 TUBULAR ADENOMA: ICD-10-CM

## 2019-12-03 DIAGNOSIS — B18.2 HEP C W/O COMA, CHRONIC (HCC): Primary | ICD-10-CM

## 2019-12-03 PROCEDURE — G8427 DOCREV CUR MEDS BY ELIG CLIN: HCPCS | Performed by: INTERNAL MEDICINE

## 2019-12-03 PROCEDURE — 3017F COLORECTAL CA SCREEN DOC REV: CPT | Performed by: INTERNAL MEDICINE

## 2019-12-03 PROCEDURE — 99214 OFFICE O/P EST MOD 30 MIN: CPT | Performed by: INTERNAL MEDICINE

## 2019-12-03 PROCEDURE — G8417 CALC BMI ABV UP PARAM F/U: HCPCS | Performed by: INTERNAL MEDICINE

## 2019-12-03 PROCEDURE — 1036F TOBACCO NON-USER: CPT | Performed by: INTERNAL MEDICINE

## 2019-12-03 PROCEDURE — G8484 FLU IMMUNIZE NO ADMIN: HCPCS | Performed by: INTERNAL MEDICINE

## 2019-12-03 PROCEDURE — 1123F ACP DISCUSS/DSCN MKR DOCD: CPT | Performed by: INTERNAL MEDICINE

## 2019-12-03 PROCEDURE — 4040F PNEUMOC VAC/ADMIN/RCVD: CPT | Performed by: INTERNAL MEDICINE

## 2019-12-03 ASSESSMENT — ENCOUNTER SYMPTOMS
ABDOMINAL DISTENTION: 0
ALLERGIC/IMMUNOLOGIC NEGATIVE: 1
BACK PAIN: 1
TROUBLE SWALLOWING: 0
BLOOD IN STOOL: 0
GASTROINTESTINAL NEGATIVE: 1
CONSTIPATION: 0
ANAL BLEEDING: 0
DIARRHEA: 0
RECTAL PAIN: 0
VOMITING: 0
NAUSEA: 0
ABDOMINAL PAIN: 0
RESPIRATORY NEGATIVE: 1

## 2019-12-05 ENCOUNTER — OFFICE VISIT (OUTPATIENT)
Dept: ONCOLOGY | Age: 69
End: 2019-12-05
Payer: MEDICARE

## 2019-12-05 ENCOUNTER — TELEPHONE (OUTPATIENT)
Dept: ONCOLOGY | Age: 69
End: 2019-12-05

## 2019-12-05 VITALS
SYSTOLIC BLOOD PRESSURE: 163 MMHG | DIASTOLIC BLOOD PRESSURE: 96 MMHG | BODY MASS INDEX: 31.04 KG/M2 | HEART RATE: 125 BPM | WEIGHT: 210.2 LBS | TEMPERATURE: 98.8 F

## 2019-12-05 DIAGNOSIS — D50.8 OTHER IRON DEFICIENCY ANEMIA: ICD-10-CM

## 2019-12-05 PROCEDURE — 4040F PNEUMOC VAC/ADMIN/RCVD: CPT | Performed by: INTERNAL MEDICINE

## 2019-12-05 PROCEDURE — 1036F TOBACCO NON-USER: CPT | Performed by: INTERNAL MEDICINE

## 2019-12-05 PROCEDURE — G8484 FLU IMMUNIZE NO ADMIN: HCPCS | Performed by: INTERNAL MEDICINE

## 2019-12-05 PROCEDURE — 3017F COLORECTAL CA SCREEN DOC REV: CPT | Performed by: INTERNAL MEDICINE

## 2019-12-05 PROCEDURE — G8417 CALC BMI ABV UP PARAM F/U: HCPCS | Performed by: INTERNAL MEDICINE

## 2019-12-05 PROCEDURE — G8427 DOCREV CUR MEDS BY ELIG CLIN: HCPCS | Performed by: INTERNAL MEDICINE

## 2019-12-05 PROCEDURE — 99214 OFFICE O/P EST MOD 30 MIN: CPT | Performed by: INTERNAL MEDICINE

## 2019-12-05 PROCEDURE — 1123F ACP DISCUSS/DSCN MKR DOCD: CPT | Performed by: INTERNAL MEDICINE

## 2019-12-05 PROCEDURE — 99211 OFF/OP EST MAY X REQ PHY/QHP: CPT | Performed by: INTERNAL MEDICINE

## 2019-12-05 RX ORDER — METHYLPREDNISOLONE SODIUM SUCCINATE 125 MG/2ML
125 INJECTION, POWDER, LYOPHILIZED, FOR SOLUTION INTRAMUSCULAR; INTRAVENOUS ONCE
Status: CANCELLED | OUTPATIENT
Start: 2019-12-05

## 2019-12-05 RX ORDER — SODIUM CHLORIDE 9 MG/ML
INJECTION, SOLUTION INTRAVENOUS CONTINUOUS
Status: CANCELLED | OUTPATIENT
Start: 2019-12-05

## 2019-12-05 RX ORDER — DIPHENHYDRAMINE HYDROCHLORIDE 50 MG/ML
50 INJECTION INTRAMUSCULAR; INTRAVENOUS ONCE
Status: CANCELLED | OUTPATIENT
Start: 2019-12-05

## 2019-12-05 RX ORDER — SODIUM CHLORIDE 0.9 % (FLUSH) 0.9 %
10 SYRINGE (ML) INJECTION PRN
Status: CANCELLED | OUTPATIENT
Start: 2019-12-05

## 2019-12-05 RX ORDER — SODIUM CHLORIDE 0.9 % (FLUSH) 0.9 %
5 SYRINGE (ML) INJECTION PRN
Status: CANCELLED | OUTPATIENT
Start: 2019-12-05

## 2019-12-05 RX ORDER — HEPARIN SODIUM (PORCINE) LOCK FLUSH IV SOLN 100 UNIT/ML 100 UNIT/ML
500 SOLUTION INTRAVENOUS PRN
Status: CANCELLED | OUTPATIENT
Start: 2019-12-05

## 2019-12-05 RX ORDER — EPINEPHRINE 1 MG/ML
0.3 INJECTION, SOLUTION, CONCENTRATE INTRAVENOUS PRN
Status: CANCELLED | OUTPATIENT
Start: 2019-12-05

## 2019-12-10 ENCOUNTER — HOSPITAL ENCOUNTER (OUTPATIENT)
Dept: INPATIENT UNIT | Age: 69
Setting detail: THERAPIES SERIES
Discharge: HOME | End: 2019-12-10
Payer: MEDICARE

## 2019-12-10 VITALS
DIASTOLIC BLOOD PRESSURE: 99 MMHG | HEART RATE: 113 BPM | SYSTOLIC BLOOD PRESSURE: 172 MMHG | RESPIRATION RATE: 18 BRPM | TEMPERATURE: 98.1 F | OXYGEN SATURATION: 97 %

## 2019-12-10 DIAGNOSIS — D50.8 IRON DEFICIENCY ANEMIA SECONDARY TO INADEQUATE DIETARY IRON INTAKE: Primary | ICD-10-CM

## 2019-12-10 PROCEDURE — 2580000003 HC RX 258: Performed by: INTERNAL MEDICINE

## 2019-12-10 PROCEDURE — 96365 THER/PROPH/DIAG IV INF INIT: CPT

## 2019-12-10 PROCEDURE — 6360000002 HC RX W HCPCS: Performed by: INTERNAL MEDICINE

## 2019-12-10 RX ORDER — SODIUM CHLORIDE 0.9 % (FLUSH) 0.9 %
5 SYRINGE (ML) INJECTION PRN
Status: CANCELLED | OUTPATIENT
Start: 2019-12-17

## 2019-12-10 RX ORDER — METHYLPREDNISOLONE SODIUM SUCCINATE 125 MG/2ML
125 INJECTION, POWDER, LYOPHILIZED, FOR SOLUTION INTRAMUSCULAR; INTRAVENOUS ONCE
Status: CANCELLED | OUTPATIENT
Start: 2019-12-17

## 2019-12-10 RX ORDER — SODIUM CHLORIDE 0.9 % (FLUSH) 0.9 %
10 SYRINGE (ML) INJECTION PRN
Status: CANCELLED | OUTPATIENT
Start: 2019-12-17

## 2019-12-10 RX ORDER — HEPARIN SODIUM (PORCINE) LOCK FLUSH IV SOLN 100 UNIT/ML 100 UNIT/ML
500 SOLUTION INTRAVENOUS PRN
Status: CANCELLED | OUTPATIENT
Start: 2019-12-17

## 2019-12-10 RX ORDER — DIPHENHYDRAMINE HYDROCHLORIDE 50 MG/ML
50 INJECTION INTRAMUSCULAR; INTRAVENOUS ONCE
Status: CANCELLED | OUTPATIENT
Start: 2019-12-17

## 2019-12-10 RX ORDER — SODIUM CHLORIDE 9 MG/ML
INJECTION, SOLUTION INTRAVENOUS CONTINUOUS
Status: ACTIVE | OUTPATIENT
Start: 2019-12-10 | End: 2019-12-10

## 2019-12-10 RX ORDER — SODIUM CHLORIDE 9 MG/ML
INJECTION, SOLUTION INTRAVENOUS CONTINUOUS
Status: CANCELLED | OUTPATIENT
Start: 2019-12-17

## 2019-12-10 RX ORDER — EPINEPHRINE 1 MG/ML
0.3 INJECTION, SOLUTION, CONCENTRATE INTRAVENOUS PRN
Status: CANCELLED | OUTPATIENT
Start: 2019-12-17

## 2019-12-10 RX ADMIN — FERUMOXYTOL 510 MG: 510 INJECTION INTRAVENOUS at 10:18

## 2019-12-10 RX ADMIN — SODIUM CHLORIDE: 9 INJECTION, SOLUTION INTRAVENOUS at 10:14

## 2019-12-12 ENCOUNTER — HOSPITAL ENCOUNTER (OUTPATIENT)
Dept: PAIN MANAGEMENT | Age: 69
Discharge: HOME | End: 2019-12-12
Payer: MEDICARE

## 2019-12-12 VITALS
BODY MASS INDEX: 31.1 KG/M2 | SYSTOLIC BLOOD PRESSURE: 152 MMHG | HEIGHT: 69 IN | WEIGHT: 210 LBS | HEART RATE: 111 BPM | DIASTOLIC BLOOD PRESSURE: 106 MMHG | OXYGEN SATURATION: 95 % | TEMPERATURE: 98.5 F

## 2019-12-12 DIAGNOSIS — M54.16 LUMBAR RADICULOPATHY: Chronic | ICD-10-CM

## 2019-12-12 DIAGNOSIS — Z51.81 ENCOUNTER FOR MEDICATION MONITORING: Chronic | ICD-10-CM

## 2019-12-12 DIAGNOSIS — G89.29 ENCOUNTER FOR CHRONIC PAIN MANAGEMENT: ICD-10-CM

## 2019-12-12 DIAGNOSIS — M48.061 SPINAL STENOSIS OF LUMBAR REGION WITHOUT NEUROGENIC CLAUDICATION: Chronic | ICD-10-CM

## 2019-12-12 DIAGNOSIS — M51.36 DEGENERATIVE DISC DISEASE, LUMBAR: Primary | Chronic | ICD-10-CM

## 2019-12-12 DIAGNOSIS — Z98.1 S/P CERVICAL SPINAL FUSION: Chronic | ICD-10-CM

## 2019-12-12 DIAGNOSIS — M47.816 OSTEOARTHRITIS OF LUMBAR SPINE, UNSPECIFIED SPINAL OSTEOARTHRITIS COMPLICATION STATUS: ICD-10-CM

## 2019-12-12 DIAGNOSIS — M47.816 LUMBAR SPONDYLOSIS: Chronic | ICD-10-CM

## 2019-12-12 DIAGNOSIS — M47.26 OSTEOARTHRITIS OF SPINE WITH RADICULOPATHY, LUMBAR REGION: ICD-10-CM

## 2019-12-12 DIAGNOSIS — M46.92 CERVICAL SPONDYLITIS (HCC): Chronic | ICD-10-CM

## 2019-12-12 PROCEDURE — 99213 OFFICE O/P EST LOW 20 MIN: CPT | Performed by: NURSE PRACTITIONER

## 2019-12-12 PROCEDURE — 99213 OFFICE O/P EST LOW 20 MIN: CPT

## 2019-12-12 RX ORDER — MORPHINE SULFATE 60 MG/1
60 TABLET, FILM COATED, EXTENDED RELEASE ORAL 2 TIMES DAILY
Qty: 60 TABLET | Refills: 0 | Status: SHIPPED | OUTPATIENT
Start: 2019-12-15 | End: 2020-01-10 | Stop reason: SDUPTHER

## 2019-12-12 RX ORDER — MORPHINE SULFATE 15 MG/1
15 TABLET, FILM COATED, EXTENDED RELEASE ORAL DAILY
Qty: 30 TABLET | Refills: 0 | Status: SHIPPED | OUTPATIENT
Start: 2019-12-15 | End: 2020-01-10 | Stop reason: SDUPTHER

## 2019-12-12 RX ORDER — OXYCODONE HYDROCHLORIDE AND ACETAMINOPHEN 5; 325 MG/1; MG/1
1 TABLET ORAL EVERY 8 HOURS
Qty: 90 TABLET | Refills: 0 | Status: SHIPPED | OUTPATIENT
Start: 2019-12-15 | End: 2020-01-10 | Stop reason: SDUPTHER

## 2019-12-12 ASSESSMENT — ENCOUNTER SYMPTOMS
NAUSEA: 1
RESPIRATORY NEGATIVE: 1
BACK PAIN: 1
DIARRHEA: 1

## 2019-12-19 ENCOUNTER — HOSPITAL ENCOUNTER (OUTPATIENT)
Dept: INPATIENT UNIT | Age: 69
Setting detail: THERAPIES SERIES
Discharge: HOME | End: 2019-12-19
Payer: MEDICARE

## 2019-12-19 VITALS
RESPIRATION RATE: 17 BRPM | HEART RATE: 118 BPM | DIASTOLIC BLOOD PRESSURE: 83 MMHG | TEMPERATURE: 97.4 F | SYSTOLIC BLOOD PRESSURE: 142 MMHG | OXYGEN SATURATION: 93 %

## 2019-12-19 DIAGNOSIS — D50.8 IRON DEFICIENCY ANEMIA SECONDARY TO INADEQUATE DIETARY IRON INTAKE: Primary | ICD-10-CM

## 2019-12-19 PROCEDURE — 6360000002 HC RX W HCPCS: Performed by: INTERNAL MEDICINE

## 2019-12-19 PROCEDURE — 96365 THER/PROPH/DIAG IV INF INIT: CPT

## 2019-12-19 PROCEDURE — 2580000003 HC RX 258: Performed by: INTERNAL MEDICINE

## 2019-12-19 RX ORDER — SODIUM CHLORIDE 9 MG/ML
INJECTION, SOLUTION INTRAVENOUS CONTINUOUS
Status: ACTIVE | OUTPATIENT
Start: 2019-12-19 | End: 2019-12-19

## 2019-12-19 RX ORDER — SODIUM CHLORIDE 9 MG/ML
INJECTION, SOLUTION INTRAVENOUS CONTINUOUS
Status: CANCELLED | OUTPATIENT
Start: 2019-12-19

## 2019-12-19 RX ORDER — DIPHENHYDRAMINE HYDROCHLORIDE 50 MG/ML
50 INJECTION INTRAMUSCULAR; INTRAVENOUS ONCE
Status: CANCELLED | OUTPATIENT
Start: 2019-12-19

## 2019-12-19 RX ORDER — EPINEPHRINE 1 MG/ML
0.3 INJECTION, SOLUTION, CONCENTRATE INTRAVENOUS PRN
Status: CANCELLED | OUTPATIENT
Start: 2019-12-19

## 2019-12-19 RX ORDER — SODIUM CHLORIDE 0.9 % (FLUSH) 0.9 %
10 SYRINGE (ML) INJECTION PRN
Status: CANCELLED | OUTPATIENT
Start: 2019-12-19

## 2019-12-19 RX ORDER — HEPARIN SODIUM (PORCINE) LOCK FLUSH IV SOLN 100 UNIT/ML 100 UNIT/ML
500 SOLUTION INTRAVENOUS PRN
Status: CANCELLED | OUTPATIENT
Start: 2019-12-19

## 2019-12-19 RX ORDER — METHYLPREDNISOLONE SODIUM SUCCINATE 125 MG/2ML
125 INJECTION, POWDER, LYOPHILIZED, FOR SOLUTION INTRAMUSCULAR; INTRAVENOUS ONCE
Status: CANCELLED | OUTPATIENT
Start: 2019-12-19

## 2019-12-19 RX ORDER — SODIUM CHLORIDE 0.9 % (FLUSH) 0.9 %
5 SYRINGE (ML) INJECTION PRN
Status: CANCELLED | OUTPATIENT
Start: 2019-12-19

## 2019-12-19 RX ADMIN — FERUMOXYTOL 510 MG: 510 INJECTION INTRAVENOUS at 10:56

## 2019-12-19 RX ADMIN — SODIUM CHLORIDE: 9 INJECTION, SOLUTION INTRAVENOUS at 10:59

## 2020-01-10 ENCOUNTER — HOSPITAL ENCOUNTER (OUTPATIENT)
Dept: PAIN MANAGEMENT | Age: 70
Discharge: HOME | End: 2020-01-10
Payer: MEDICARE

## 2020-01-10 VITALS
RESPIRATION RATE: 16 BRPM | TEMPERATURE: 98.5 F | WEIGHT: 209 LBS | SYSTOLIC BLOOD PRESSURE: 144 MMHG | HEIGHT: 69 IN | DIASTOLIC BLOOD PRESSURE: 82 MMHG | BODY MASS INDEX: 30.96 KG/M2

## 2020-01-10 PROCEDURE — 99213 OFFICE O/P EST LOW 20 MIN: CPT | Performed by: NURSE PRACTITIONER

## 2020-01-10 PROCEDURE — 99213 OFFICE O/P EST LOW 20 MIN: CPT

## 2020-01-10 RX ORDER — OXYCODONE HYDROCHLORIDE AND ACETAMINOPHEN 5; 325 MG/1; MG/1
1 TABLET ORAL EVERY 8 HOURS
Qty: 90 TABLET | Refills: 0 | Status: SHIPPED | OUTPATIENT
Start: 2020-01-14 | End: 2020-02-12 | Stop reason: SDUPTHER

## 2020-01-10 RX ORDER — MORPHINE SULFATE 60 MG/1
60 TABLET, FILM COATED, EXTENDED RELEASE ORAL 2 TIMES DAILY
Qty: 60 TABLET | Refills: 0 | Status: SHIPPED | OUTPATIENT
Start: 2020-01-14 | End: 2020-02-12 | Stop reason: SDUPTHER

## 2020-01-10 RX ORDER — MORPHINE SULFATE 15 MG/1
15 TABLET, FILM COATED, EXTENDED RELEASE ORAL DAILY
Qty: 30 TABLET | Refills: 0 | Status: SHIPPED | OUTPATIENT
Start: 2020-01-14 | End: 2020-02-13

## 2020-01-10 ASSESSMENT — ENCOUNTER SYMPTOMS
GASTROINTESTINAL NEGATIVE: 1
BACK PAIN: 1
RESPIRATORY NEGATIVE: 1
ROS SKIN COMMENTS: FACE

## 2020-01-10 NOTE — DISCHARGE INSTR - COC
M47.26    Encounter for chronic pain management G89.29    Osteoarthritis of lumbar spine M47.816    Iron deficiency anemia D50.9    Hep C w/o coma, chronic (HCC) B18.2    Colon polyps K63.5    Hepatitis B core antibody positive R76.8       Isolation/Infection:   Isolation          No Isolation        Patient Infection Status     None to display          Nurse Assessment:  Last Vital Signs: There were no vitals taken for this visit. Last documented pain score (0-10 scale):    Last Weight:   Wt Readings from Last 1 Encounters:   12/12/19 210 lb (95.3 kg)     Mental Status:  {IP PT MENTAL STATUS:20030}    IV Access:  { KAMRYN IV ACCESS:830279501}    Nursing Mobility/ADLs:  Walking   {P DME NMAJ:627724142}  Transfer  {P DME IOQQ:147615356}  Bathing  {P DME JWKE:967568945}  Dressing  {P DME SQZD:710348167}  Toileting  {P DME DPLT:949984085}  Feeding  {[facility] DME ALEQ:177358407}  Med Admin  {[facility] DME MZJH:917544955}  Med Delivery   { KAMRYN MED Delivery:987428947}    Wound Care Documentation and Therapy:        Elimination:  Continence:   · Bowel: {YES / JK:33597}  · Bladder: {YES / XZ:48281}  Urinary Catheter: {Urinary Catheter:026846144}   Colostomy/Ileostomy/Ileal Conduit: {YES / CX:79848}       Date of Last BM: ***  No intake or output data in the 24 hours ending 01/10/20 0654  No intake/output data recorded.     Safety Concerns:     [employer] Safety Concerns:676437133}    Impairments/Disabilities:      [employer] Impairments/Disabilities:284482804}    Nutrition Therapy:  Current Nutrition Therapy:   [employer] Diet List:124810377}    Routes of Feeding: {[facility] DME Other Feedings:871146045}  Liquids: {Slp liquid thickness:12515}  Daily Fluid Restriction: {CHP DME Yes amt example:201692971}  Last Modified Barium Swallow with Video (Video Swallowing Test): {Done Not Done BIEX:921110197}    Treatments at the Time of Hospital Discharge:   Respiratory Treatments: ***  Oxygen Therapy:  {Therapy; copd oxygen:73879}  Ventilator:    { CC Vent OKLU:560320522}    Rehab Therapies: {THERAPEUTIC INTERVENTION:0934551174}  Weight Bearing Status/Restrictions: { CC Weight Bearin}  Other Medical Equipment (for information only, NOT a DME order):  {EQUIPMENT:078576604}  Other Treatments: ***    Patient's personal belongings (please select all that are sent with patient):  {[facility] DME Belongings:519589278}    RN SIGNATURE:  {Esignature:714056817}    CASE MANAGEMENT/SOCIAL WORK SECTION    Inpatient Status Date: ***    Readmission Risk Assessment Score:  Readmission Risk              Risk of Unplanned Readmission:        0           Discharging to Facility/ Agency   · Name:   · Address:  · Phone:  · Fax:    Dialysis Facility (if applicable)   · Name:  · Address:  · Dialysis Schedule:  · Phone:  · Fax:    / signature: {Esignature:789738490}    PHYSICIAN SECTION    Prognosis: {Prognosis:8488188658}    Condition at Discharge: [address] [place] [address] Patient Condition:588177863}    Rehab Potential (if transferring to Rehab): {Prognosis:1219096732}    Recommended Labs or Other Treatments After Discharge: ***    Physician Certification: I certify the above information and transfer of Sinai Pollock  is necessary for the continuing treatment of the diagnosis listed and that he requires {Admit to Appropriate Level of Care:28315} for {GREATER/LESS:250187729} 30 days.      Update Admission H&P: {CHP DME Changes in VAMIM:165296908}    PHYSICIAN SIGNATURE:  {Esignature:557790586}

## 2020-01-10 NOTE — PROGRESS NOTES
[facility] 89 PROGRESS NOTE      Patient here today to review Medication Agreement    Chief Complaint:  Back pain      HPI: He c/o back pain which has increased. He is on ms contin 60 mg am and 60 mg in the afternoon and 15 mg pm.   He states his pain has increased since his ms contin night time dose was decreased . No history of lumbar surgery. PT nor injections helped. He has had 3 cervical surgeries. He is not sleeping well. He states not as active this past month. He does very little exercising. He has had no ED visits. Back Pain   This is a chronic problem. The current episode started more than 1 year ago. The problem occurs constantly. The problem has been gradually worsening since onset. The pain is present in the lumbar spine. The quality of the pain is described as aching. Radiates to: down legs. The pain is at a severity of 4/10. The pain is moderate. The pain is worse during the night. Exacerbated by: certain  movements  Associated symptoms include headaches and numbness. Risk factors include sedentary lifestyle. He has tried analgesics for the symptoms. The treatment provided mild relief. Patient denies any new neurological symptoms. No bowel or bladder incontinence, no weakness, and no falling. reatment goals:  Functional status: decrease pain 2        Aberrancy:   Any alcoholic beverages  no     Any illegal drugs   no        Analgesia: pain 4        Adverse  Effects : BM daily,       ADL;s :home exercises  sometimes           Pill count:  NOT  Appropriate  Short # 3 ms contin 15 mg    Morphine equivalent dose as reported on [place]:157  Periodic Controlled Substance Monitoring: Possible medication side effects, risk of tolerance/dependence & alternative treatments discussed., No signs of potential drug abuse or diversion identified. , Assessed functional status., Obtaining appropriate analgesic effect of treatment.  VEE Brown - CNP)  Chronic Pain > 80 MEDD: Co-prescribed Naloxone., Obtained or confirmed \"Medication Contract\" on file. Deneen Ortiz, APRN - CNP)  Review of[place] does not show any aberrant prescription behavior. Medication is helping the patient stay active. Patient denies any side effects and reports adequate analgesia. No sign of misuse/abuse. As above, I did review the imaging       When was thelast UDS:     2-14-19        Was the UDS appropriate:yes        Record/Diagnostics Review:       As above, I did review the imaging     2/18/2019  7:36 PM - Raimundo, [facility] Incoming Lab Results From [employer]      Component Value Ref Range & Units Status Collected Lab   Pain Management Drug Panel Interp, Urine Consistent    Final 02/14/2019  9:45 AM [facility]   (NOTE)   ________________________________________________________________   DRUGS EXPECTED:   MORPHINE [2-]   PERCOCET (OXYCODONE) [2-]   ________________________________________________________________   CONSISTENT with medications provided:   MORPHINE : based on morphine, hydromorphone   PERCOCET (OXYCODONE) : based on oxycodone, noroxycodone,   noroxymorphone   ________________________________________________________________   Drugs Not Included in this Assay:   Acetaminophen   ________________________________________________________________   INTERPRETIVE INFORMATION: Pain Mgt [place], Mass Spec/EMIT, Ur,                            Interp   Interpretation depends on accuracy and completeness of patient   medication information submitted by client.     6-Acetylmorphine, Ur Not Detected    Final 02/14/2019  9:45 AM [facility]   7-Aminoclonazepam, Urine Not Detected    Final 02/14/2019  9:45 AM [facility]   Alpha-OH-Alpraz, Urine Not Detected    Final 02/14/2019  9:45 AM [facility]   Alprazolam, Urine Not Detected    Final 02/14/2019  9:45 AM [facility]   Amphetamines, urine Not Detected    Final 02/14/2019  9:45 AM [facility]   Barbiturates, Ur Not Detected    Final 02/14/2019  9:45 AM [facility]   Benzoylecgonine, Ur Not Detected 02/14/2019  9:45 AM [facility]   Oxymorphone, Urine Not Detected    Final 02/14/2019  9:45 AM [facility]   PCP, Urine Not Detected    Final 02/14/2019  9:45 AM [facility]   Phentermine, Ur Not Detected    Final 02/14/2019  9:45 AM [facility]   Propoxyphene, Urine Not Detected    Final 02/14/2019  9:45 AM [facility]   Tapentadol-O-Sulfate, Urine Not Detected    Final 02/14/2019  9:45 AM [facility]   Tapentadol, Urine Not Detected    Final 02/14/2019  9:45 AM [facility]   Temazepam, Urine Not Detected    Final 02/14/2019  9:45 AM [facility]   Tramadol, Urine Not Detected    Final 02/14/2019  9:45 AM [facility]   Zolpidem, Urine Not Detected    Final 02/14/2019  9:45 AM [facility]   Drugs Expected, Ur     Final 02/14/2019  9:45 AM [facility] [address] Lab   MORPHINE ER 2/14/2019 0700    PERCOCET 2/14/2019 0700   Creatinine, Ur 125.8  20.0 - 400.0 mg/dL Final 02/14/2019  9:45 AM [facility]   Pain Mgt Drug Panel, Hi Res, Ur See Below    Final 02/14/2019  9:45 AM [facility]   (NOTE)   Methodology: Qualitative Enzyme Immunoassay and Qualitative Liquid   Chromatography-Time of Flight-Mass Spectrometry or Tandem Mass   Spectrometry, Quantitative Spectrophotometry   The absence of expected drug(s) and/or drug metabolite(s) may   indicate non-compliance, inappropriate timing of specimen   collection relative to drug administration, poor drug absorption,   diluted/adulterated urine, or limitations of testing. The   concentration must be greater than or equal to the cutoff to be   reported as present.  If specific drug concentrations are   required, contact the laboratory within two weeks of specimen   collection to request quantification by a second analytical   technique. Interpretive questions should be directed to the   laboratory. Results based on immunoassay detection that do not match clinical   expectations should be   interpreted with caution. Confirmatory testing by mass   spectrometry for immunoassay-based results is available, if   ordered within two weeks of specimen collection. release tablet, Take 1 tablet by mouth 2 times daily for 30 days. , Disp: 60 tablet, Rfl: 0    [START ON 1/14/2020] morphine (MS CONTIN) 15 MG extended release tablet, Take 1 tablet by mouth daily for 30 days. Ms contin 60 mg am 15 mg 8 hours later and 60 mg 8 hours later, Disp: 30 tablet, Rfl: 0    pregabalin (LYRICA) 150 MG capsule, Take 1 capsule by mouth 3 times daily for 90 days. , Disp: 270 capsule, Rfl: 2    pantoprazole (PROTONIX) 40 MG tablet, TK 1 T PO  QD, Disp: , Rfl: 0    BD INTEGRA SYRINGE 25G X 1\" 3 ML MISC, , Disp: , Rfl: 4    gemfibrozil (LOPID) 600 MG tablet, Take 600 mg by mouth 2 times daily (before meals). , Disp: , Rfl:     Family History   Problem Relation Age of Onset    Diabetes Mother     Heart Disease Father        Social History     Socioeconomic History    Marital status:      Spouse name: Not on file    Number of children: Not on file    Years of education: Not on file    Highest education level: Not on file   Occupational History    Occupation: disabled   Social Needs    Financial resource strain: Not on file    Food insecurity:     Worry: Not on file     Inability: Not on file    Transportation needs:     Medical: Not on file     Non-medical: Not on file   Tobacco Use    Smoking status: Former Smoker     Packs/day: 0.50     Years: 45.00     Pack years: 22.50     Types: Cigarettes    Smokeless tobacco: Never Used    Tobacco comment: on chantix 11-6-15   12-7-15 quit 2 weeks ago   Substance and Sexual Activity    Alcohol use: No    Drug use: No    Sexual activity: Not on file   Lifestyle    Physical activity:     Days per week: Not on file     Minutes per session: Not on file    Stress: Not on file   Relationships    Social connections:     Talks on phone: Not on file     Gets together: Not on file     Attends [employer] service: Not on file     Active member of club or organization: Not on file     Attends meetings of clubs or organizations: Not on file Relationship status: Not on file    Intimate partner violence:     Fear of current or ex partner: Not on file     Emotionally abused: Not on file     Physically abused: Not on file     Forced sexual activity: Not on file   Other Topics Concern    Not on file   Social History Narrative    Not on file       Review of Systems:  Review of Systems   Constitution: Negative. HENT: Negative. Eyes:        Glasses   Cardiovascular: Negative. Respiratory: Negative. Endocrine:        Borderline diabetic   Hematologic/Lymphatic: Negative. Skin: Positive for rash. face   Musculoskeletal: Positive for back pain and joint pain. Gout: shoulder. Gastrointestinal: Negative. Genitourinary:        Had a few episodes pink colored urine   Neurological: Positive for headaches and numbness. Psychiatric/Behavioral: Negative. Physical Exam:  BP (!) 144/82   Temp 98.5 °F (36.9 °C) (Oral)   Resp 16   Ht 5' 9\" (1.753 m)   Wt 209 lb (94.8 kg)   BMI 30.86 kg/m²     Physical Exam  HENT:      Head: Normocephalic. Pulmonary:      Effort: Pulmonary effort is normal.   Musculoskeletal:      Lumbar back: He exhibits decreased range of motion and tenderness. Comments: Cervical scar   Skin:     General: Skin is warm and dry. Neurological:      Mental Status: He is alert. Sensory: Sensory deficit present. Motor: Motor function is intact. Deep Tendon Reflexes:      Reflex Scores:       Patellar reflexes are 1+ on the right side and 1+ on the left side. Achilles reflexes are 1+ on the right side and 1+ on the left side. Psychiatric:         Attention and Perception: Attention normal.         Mood and Affect: Mood normal.         Speech: Speech normal.         Behavior: Behavior normal.         Thought Content:  Thought content normal.         Cognition and Memory: Cognition normal.         Judgment: Judgment normal.           Assessment:      Problem List Items Addressed This Visit     S/P cervical spinal fusion (Chronic)    Relevant Medications    oxyCODONE-acetaminophen (PERCOCET) 5-325 MG per tablet (Start on 1/14/2020)    Osteoarthritis of spine with radiculopathy, lumbar region    Relevant Medications    oxyCODONE-acetaminophen (PERCOCET) 5-325 MG per tablet (Start on 1/14/2020)    morphine (MS CONTIN) 60 MG extended release tablet (Start on 1/14/2020)    morphine (MS CONTIN) 15 MG extended release tablet (Start on 1/14/2020)    Osteoarthritis of lumbar spine    Relevant Medications    oxyCODONE-acetaminophen (PERCOCET) 5-325 MG per tablet (Start on 1/14/2020)    morphine (MS CONTIN) 60 MG extended release tablet (Start on 1/14/2020)    morphine (MS CONTIN) 15 MG extended release tablet (Start on 1/14/2020)    Lumbar spondylosis - Primary (Chronic)    Relevant Medications    oxyCODONE-acetaminophen (PERCOCET) 5-325 MG per tablet (Start on 1/14/2020)    morphine (MS CONTIN) 60 MG extended release tablet (Start on 1/14/2020)    morphine (MS CONTIN) 15 MG extended release tablet (Start on 1/14/2020)    Lumbar spinal stenosis (Chronic)    Relevant Medications    oxyCODONE-acetaminophen (PERCOCET) 5-325 MG per tablet (Start on 1/14/2020)    morphine (MS CONTIN) 15 MG extended release tablet (Start on 1/14/2020)    Lumbar radiculopathy (Chronic)    Relevant Medications    oxyCODONE-acetaminophen (PERCOCET) 5-325 MG per tablet (Start on 1/14/2020)    morphine (MS CONTIN) 60 MG extended release tablet (Start on 1/14/2020)    morphine (MS CONTIN) 15 MG extended release tablet (Start on 1/14/2020)    GERD (gastroesophageal reflux disease)    Encounter for medication monitoring (Chronic)    Relevant Medications    oxyCODONE-acetaminophen (PERCOCET) 5-325 MG per tablet (Start on 1/14/2020)    Encounter for chronic pain management    Relevant Medications    oxyCODONE-acetaminophen (PERCOCET) 5-325 MG per tablet (Start on 1/14/2020)    Degenerative disc disease, lumbar (Chronic)    Relevant Medications    oxyCODONE-acetaminophen (PERCOCET) 5-325 MG per tablet (Start on 1/14/2020)    morphine (MS CONTIN) 60 MG extended release tablet (Start on 1/14/2020)    morphine (MS CONTIN) 15 MG extended release tablet (Start on 1/14/2020)    Cervical spondylitis (HCC) (Chronic)    Relevant Medications    oxyCODONE-acetaminophen (PERCOCET) 5-325 MG per tablet (Start on 1/14/2020)          Treatment Plan:  DISCUSSION: Treatment options discussed withpatient and all questions answered to patient's satisfaction. Possible side effects, risk of tolerance and or dependence and alternative treatments discussed    Obtaining appropriate analgesic effect of treatment   No signs of potential drug abuse or diversion identified    [x] Ill effects of being on chronic pain medications such as sleep disturbances, respiratory depression, hormonal changes, withdrawal symptoms, chronic opioid dependence and tolerance as well as risk of taking opioids with Benzodiazepines and taking opioids along with alcohol,  werediscussed with patient. I had asked the patient to minimize medication use and utilize pain medications only for uncontrolled rest pain or pain with exertional activities. I advised patient not to self-escalate painmedications without consulting with us. At each of patient's future visits we will try to taper pain medications, while adjusting the adjunct medications, and re-evaluating for Physical Therapy to improve spinal andjoint strength. We will continue to have discussions to decrease pain medications as tolerated. Counseled patient on effects their pain medication and /or their medical condition mayhave on their  ability to drive or operate machinery. Instructed not to drive or operate machinery if drowsy     I also discussed with the patient regarding the dangers of combining narcotic pain medication with tranquilizers, alcohol or illegal drugs or taking the medication any way other than prescribed.  The

## 2020-01-16 ENCOUNTER — HOSPITAL ENCOUNTER (OUTPATIENT)
Age: 70
Discharge: HOME | End: 2020-01-16
Payer: MEDICARE

## 2020-01-16 LAB
ABSOLUTE EOS #: 0.05 K/UL (ref 0–0.4)
ABSOLUTE IMMATURE GRANULOCYTE: ABNORMAL K/UL (ref 0–0.3)
ABSOLUTE LYMPH #: 2.28 K/UL (ref 1–4.8)
ABSOLUTE MONO #: 0.42 K/UL (ref 0.1–1.3)
ALBUMIN SERPL-MCNC: 4.2 G/DL (ref 3.5–5.2)
ALBUMIN/GLOBULIN RATIO: ABNORMAL (ref 1–2.5)
ALP BLD-CCNC: 77 U/L (ref 40–129)
ALT SERPL-CCNC: 21 U/L (ref 5–41)
ANION GAP SERPL CALCULATED.3IONS-SCNC: 13 MMOL/L (ref 9–17)
AST SERPL-CCNC: 18 U/L
BASOPHILS # BLD: 1 % (ref 0–2)
BASOPHILS ABSOLUTE: 0.05 K/UL (ref 0–0.2)
BILIRUB SERPL-MCNC: 0.34 MG/DL (ref 0.3–1.2)
BUN BLDV-MCNC: 18 MG/DL (ref 8–23)
BUN/CREAT BLD: ABNORMAL (ref 9–20)
CALCIUM SERPL-MCNC: 8.7 MG/DL (ref 8.6–10.4)
CHLORIDE BLD-SCNC: 98 MMOL/L (ref 98–107)
CO2: 25 MMOL/L (ref 20–31)
CREAT SERPL-MCNC: 0.83 MG/DL (ref 0.7–1.2)
DIFFERENTIAL TYPE: ABNORMAL
EOSINOPHILS RELATIVE PERCENT: 1 % (ref 0–4)
FERRITIN: 51 UG/L (ref 30–400)
GFR AFRICAN AMERICAN: >60 ML/MIN
GFR NON-AFRICAN AMERICAN: >60 ML/MIN
GFR SERPL CREATININE-BSD FRML MDRD: ABNORMAL ML/MIN/{1.73_M2}
GFR SERPL CREATININE-BSD FRML MDRD: ABNORMAL ML/MIN/{1.73_M2}
GLUCOSE BLD-MCNC: 188 MG/DL (ref 70–99)
HCT VFR BLD CALC: 41.9 % (ref 41–53)
HEMOGLOBIN: 13.8 G/DL (ref 13.5–17.5)
IMMATURE GRANULOCYTES: ABNORMAL %
IRON SATURATION: 24 % (ref 20–55)
IRON: 70 UG/DL (ref 59–158)
LYMPHOCYTES # BLD: 43 % (ref 24–44)
MCH RBC QN AUTO: 27.2 PG (ref 26–34)
MCHC RBC AUTO-ENTMCNC: 32.9 G/DL (ref 31–37)
MCV RBC AUTO: 82.6 FL (ref 80–100)
MONOCYTES # BLD: 8 % (ref 1–7)
MORPHOLOGY: ABNORMAL
NRBC AUTOMATED: ABNORMAL PER 100 WBC
PDW BLD-RTO: 22.1 % (ref 11.5–14.9)
PLATELET # BLD: 241 K/UL (ref 150–450)
PLATELET ESTIMATE: ABNORMAL
PMV BLD AUTO: 7.8 FL (ref 6–12)
POTASSIUM SERPL-SCNC: 4.4 MMOL/L (ref 3.7–5.3)
RBC # BLD: 5.07 M/UL (ref 4.5–5.9)
RBC # BLD: ABNORMAL 10*6/UL
SEG NEUTROPHILS: 47 % (ref 36–66)
SEGMENTED NEUTROPHILS ABSOLUTE COUNT: 2.5 K/UL (ref 1.3–9.1)
SODIUM BLD-SCNC: 136 MMOL/L (ref 135–144)
TOTAL IRON BINDING CAPACITY: 297 UG/DL (ref 250–450)
TOTAL PROTEIN: 7.4 G/DL (ref 6.4–8.3)
UNSATURATED IRON BINDING CAPACITY: 227 UG/DL (ref 112–347)
WBC # BLD: 5.3 K/UL (ref 3.5–11)
WBC # BLD: ABNORMAL 10*3/UL

## 2020-01-16 PROCEDURE — 85025 COMPLETE CBC W/AUTO DIFF WBC: CPT

## 2020-01-16 PROCEDURE — 83550 IRON BINDING TEST: CPT

## 2020-01-16 PROCEDURE — 80053 COMPREHEN METABOLIC PANEL: CPT

## 2020-01-16 PROCEDURE — 82728 ASSAY OF FERRITIN: CPT

## 2020-01-16 PROCEDURE — 36415 COLL VENOUS BLD VENIPUNCTURE: CPT

## 2020-01-16 PROCEDURE — 83540 ASSAY OF IRON: CPT

## 2020-01-23 ENCOUNTER — TELEPHONE (OUTPATIENT)
Dept: ONCOLOGY | Age: 70
End: 2020-01-23

## 2020-01-23 ENCOUNTER — OFFICE VISIT (OUTPATIENT)
Dept: ONCOLOGY | Age: 70
End: 2020-01-23
Payer: MEDICARE

## 2020-01-23 VITALS
WEIGHT: 216.6 LBS | HEART RATE: 117 BPM | SYSTOLIC BLOOD PRESSURE: 137 MMHG | DIASTOLIC BLOOD PRESSURE: 88 MMHG | BODY MASS INDEX: 31.99 KG/M2 | TEMPERATURE: 98.5 F

## 2020-01-23 PROCEDURE — 99213 OFFICE O/P EST LOW 20 MIN: CPT | Performed by: INTERNAL MEDICINE

## 2020-01-23 PROCEDURE — 3017F COLORECTAL CA SCREEN DOC REV: CPT | Performed by: INTERNAL MEDICINE

## 2020-01-23 PROCEDURE — G8417 CALC BMI ABV UP PARAM F/U: HCPCS | Performed by: INTERNAL MEDICINE

## 2020-01-23 PROCEDURE — 1036F TOBACCO NON-USER: CPT | Performed by: INTERNAL MEDICINE

## 2020-01-23 PROCEDURE — G8427 DOCREV CUR MEDS BY ELIG CLIN: HCPCS | Performed by: INTERNAL MEDICINE

## 2020-01-23 PROCEDURE — G8484 FLU IMMUNIZE NO ADMIN: HCPCS | Performed by: INTERNAL MEDICINE

## 2020-01-23 PROCEDURE — 99211 OFF/OP EST MAY X REQ PHY/QHP: CPT | Performed by: INTERNAL MEDICINE

## 2020-01-23 PROCEDURE — 1123F ACP DISCUSS/DSCN MKR DOCD: CPT | Performed by: INTERNAL MEDICINE

## 2020-01-23 PROCEDURE — 4040F PNEUMOC VAC/ADMIN/RCVD: CPT | Performed by: INTERNAL MEDICINE

## 2020-01-23 NOTE — PROGRESS NOTES
[facility] Oncology Progress Note  1/23/2020 2:03 PM  Subjective:   Admit Date: (Not on file)  PCP: Minerva Escobar MD   CC: Iron def anemia, fatigue  Interval History: Patient here for follow up feels well, no fatigue, no melena, hematochezia, BRBPR. S/P IV iron, Hgb up to 13.8 and ferritin is up to 51. States no complaints. Objective:   Vitals: /88   Pulse 117   Temp 98.5 °F (36.9 °C) (Oral)   Wt 216 lb 9.6 oz (98.2 kg)   BMI 31.99 kg/m²   General appearance: alert and cooperative with exam  HEENT: Head: Normocephalic, no lesions, without obvious abnormality. Neck: no adenopathy  Lungs: clear to auscultation bilaterally  Heart: regular rate and rhythm, S1, S2 normal, no murmur, click, rub or gallop  Abdomen: soft, non-tender; bowel sounds normal; no masses,  no organomegaly  Extremities: extremities normal, atraumatic, no cyanosis or edema  Neurologic: Mental status: Alert, oriented, thought content appropriate      CBC-iron studies reviewed    Assessment and Plan:   81year old male with history of bleeding ulcer back in 2015 and iron def, now with iron def anemia, and stool occult stools    -s/p IV iron  -Hgb up to 13.8,ferritin is up to 51  -EGD and colonoscopy 10/2019 did not show active bleeding, still no active source of iron def  -s/p GI in 12/3/19, concern for still blood loss, iron def, no plasns for repeat capsule video study , he is still on treatment for Hep C, states scopes are on hold at this time and focusing on HEP C treatment with Indra Frey.  -repeat iron studies are normal  -transfuse if Hbg is less than 7.0    -REPEAT STUDIES IN 8-12 weeks, wants to follow with Dr. Levon Peterson at [address] [place] [address], close to his home.      Marshall Baer MD

## 2020-02-12 ENCOUNTER — HOSPITAL ENCOUNTER (OUTPATIENT)
Dept: PAIN MANAGEMENT | Age: 70
Discharge: HOME | End: 2020-02-12
Payer: MEDICARE

## 2020-02-12 VITALS
BODY MASS INDEX: 30.51 KG/M2 | RESPIRATION RATE: 17 BRPM | WEIGHT: 206 LBS | HEART RATE: 120 BPM | DIASTOLIC BLOOD PRESSURE: 100 MMHG | SYSTOLIC BLOOD PRESSURE: 133 MMHG | TEMPERATURE: 98.1 F | HEIGHT: 69 IN

## 2020-02-12 PROCEDURE — 99213 OFFICE O/P EST LOW 20 MIN: CPT

## 2020-02-12 PROCEDURE — 99214 OFFICE O/P EST MOD 30 MIN: CPT | Performed by: PAIN MEDICINE

## 2020-02-12 RX ORDER — OXYCODONE HYDROCHLORIDE AND ACETAMINOPHEN 5; 325 MG/1; MG/1
1 TABLET ORAL EVERY 8 HOURS
Qty: 90 TABLET | Refills: 0 | Status: SHIPPED | OUTPATIENT
Start: 2020-02-13 | End: 2020-03-11 | Stop reason: SDUPTHER

## 2020-02-12 RX ORDER — MORPHINE SULFATE 60 MG/1
60 TABLET, FILM COATED, EXTENDED RELEASE ORAL 2 TIMES DAILY
Qty: 60 TABLET | Refills: 0 | Status: SHIPPED | OUTPATIENT
Start: 2020-02-13 | End: 2020-03-11 | Stop reason: SDUPTHER

## 2020-02-12 RX ORDER — HYDROXYZINE PAMOATE 25 MG/1
CAPSULE ORAL
COMMUNITY
End: 2021-04-27 | Stop reason: ALTCHOICE

## 2020-02-12 ASSESSMENT — PAIN DESCRIPTION - LOCATION: LOCATION: KNEE;FOOT

## 2020-02-12 ASSESSMENT — PAIN DESCRIPTION - DESCRIPTORS: DESCRIPTORS: CONSTANT;ACHING

## 2020-02-12 ASSESSMENT — ENCOUNTER SYMPTOMS
SHORTNESS OF BREATH: 0
ALLERGIC/IMMUNOLOGIC NEGATIVE: 1
EYE PAIN: 0
BACK PAIN: 1
RESPIRATORY NEGATIVE: 1
BOWEL INCONTINENCE: 0
NAUSEA: 0
CONSTIPATION: 0
EYE DISCHARGE: 0
COUGH: 0
SINUS PRESSURE: 0
VOMITING: 0
PHOTOPHOBIA: 0
ABDOMINAL PAIN: 0

## 2020-02-12 ASSESSMENT — PAIN DESCRIPTION - ONSET: ONSET: ON-GOING

## 2020-02-12 ASSESSMENT — PAIN DESCRIPTION - FREQUENCY: FREQUENCY: CONTINUOUS

## 2020-02-12 ASSESSMENT — PAIN - FUNCTIONAL ASSESSMENT: PAIN_FUNCTIONAL_ASSESSMENT: PREVENTS OR INTERFERES SOME ACTIVE ACTIVITIES AND ADLS

## 2020-02-12 ASSESSMENT — PAIN DESCRIPTION - ORIENTATION: ORIENTATION: RIGHT;LEFT;LOWER

## 2020-02-12 ASSESSMENT — PAIN DESCRIPTION - PROGRESSION: CLINICAL_PROGRESSION: GRADUALLY WORSENING

## 2020-02-12 ASSESSMENT — PAIN SCALES - GENERAL: PAINLEVEL_OUTOF10: 4

## 2020-02-12 ASSESSMENT — PAIN DESCRIPTION - PAIN TYPE: TYPE: CHRONIC PAIN

## 2020-03-11 ENCOUNTER — HOSPITAL ENCOUNTER (OUTPATIENT)
Dept: PAIN MANAGEMENT | Age: 70
Discharge: HOME | End: 2020-03-11
Payer: MEDICARE

## 2020-03-11 VITALS
WEIGHT: 210 LBS | SYSTOLIC BLOOD PRESSURE: 156 MMHG | TEMPERATURE: 98 F | DIASTOLIC BLOOD PRESSURE: 91 MMHG | HEART RATE: 127 BPM | RESPIRATION RATE: 20 BRPM | OXYGEN SATURATION: 95 % | BODY MASS INDEX: 31.01 KG/M2

## 2020-03-11 PROCEDURE — 80307 DRUG TEST PRSMV CHEM ANLYZR: CPT

## 2020-03-11 PROCEDURE — 99213 OFFICE O/P EST LOW 20 MIN: CPT

## 2020-03-11 PROCEDURE — 99213 OFFICE O/P EST LOW 20 MIN: CPT | Performed by: NURSE PRACTITIONER

## 2020-03-11 RX ORDER — PREGABALIN 150 MG/1
150 CAPSULE ORAL 3 TIMES DAILY
Qty: 90 CAPSULE | Refills: 0 | Status: SHIPPED | OUTPATIENT
Start: 2020-03-15 | End: 2020-04-09 | Stop reason: SDUPTHER

## 2020-03-11 RX ORDER — OXYCODONE HYDROCHLORIDE AND ACETAMINOPHEN 5; 325 MG/1; MG/1
1 TABLET ORAL EVERY 8 HOURS
Qty: 90 TABLET | Refills: 0 | Status: SHIPPED | OUTPATIENT
Start: 2020-03-15 | End: 2020-04-09 | Stop reason: SDUPTHER

## 2020-03-11 RX ORDER — MORPHINE SULFATE 60 MG/1
60 TABLET, FILM COATED, EXTENDED RELEASE ORAL 2 TIMES DAILY
Qty: 60 TABLET | Refills: 0 | Status: SHIPPED | OUTPATIENT
Start: 2020-03-15 | End: 2020-04-09 | Stop reason: SDUPTHER

## 2020-03-11 ASSESSMENT — ENCOUNTER SYMPTOMS
BACK PAIN: 1
DIARRHEA: 1
RESPIRATORY NEGATIVE: 1

## 2020-03-11 NOTE — PROGRESS NOTES
02/14/2019  9:45 AM [facility]   Phentermine, Ur Not Detected    Final 02/14/2019  9:45 AM [facility]   Propoxyphene, Urine Not Detected    Final 02/14/2019  9:45 AM [facility]   Tapentadol-O-Sulfate, Urine Not Detected    Final 02/14/2019  9:45 AM [facility]   Tapentadol, Urine Not Detected    Final 02/14/2019  9:45 AM [facility]   Temazepam, Urine Not Detected    Final 02/14/2019  9:45 AM [facility]   Tramadol, Urine Not Detected    Final 02/14/2019  9:45 AM [facility]   Zolpidem, Urine Not Detected    Final 02/14/2019  9:45 AM [facility]   Drugs Expected, Ur     Final 02/14/2019  9:45 AM [facility] [address] [facility]   MORPHINE ER 2/14/2019 0700    PERCOCET 2/14/2019 0700   Creatinine, Ur 125.8  20.0 - 400.0 mg/dL Final 02/14/2019  9:45 AM [facility]   Pain Mgt Drug Panel, Hi Res, Ur See Below    Final 02/14/2019  9:45 AM [facility]   (NOTE)   Methodology: Qualitative Enzyme Immunoassay and Qualitative Liquid   Chromatography-Time of Flight-Mass Spectrometry or Tandem Mass   Spectrometry, Quantitative Spectrophotometry   The absence of expected drug(s) and/or drug metabolite(s) may   indicate non-compliance, inappropriate timing of specimen   collection relative to drug administration, poor drug absorption,   diluted/adulterated urine, or limitations of testing. The   concentration must be greater than or equal to the cutoff to be   reported as present.  If specific drug concentrations are   required, contact the laboratory within two weeks of specimen   collection to request quantification by a second analytical   technique. Interpretive questions should be directed to the   laboratory. Results based on immunoassay detection that do not match clinical   expectations should be   interpreted with caution. Confirmatory testing by mass   spectrometry for immunoassay-based results is available, if   ordered within two weeks of specimen collection. Additional   charges apply. For medical purposes only; not valid for forensic use.    This test was developed and its performance characteristics   determined by [facility]. The U.S. Food and Drug   Administration has not approved or cleared this test; however, FDA   clearance or approval is not currently required for clinical use. The results are not intended to be used as the sole means for   clinical diagnosis or patient management decisions. EER Hi Res Interp Ur See Note    Final 02/14/2019  9:45 AM [facility]   (NOTE)   Access [facility] Enhanced Report using either link below:   -Direct access: [email or web address]   -Enter Username, Password: [email or web address]   Username: [email or web address]   Password: [email or web address]   Performed by [facility],   [place] [id number], [id number] [facility] [phone]            Past Medical History:   Diagnosis Date    Anemia     Arthritis     osteoarthritis    Colon polyps 10/08/2019    tubular adenoma x2    Hep C w/o coma, chronic (HCC)     Hepatitis B core antibody positive     Hyperlipidemia        Past Surgical History:   Procedure Laterality Date    BACK SURGERY  1977    cervical spine three times    CHOLECYSTECTOMY  03/22/2019    COLONOSCOPY  01/25/2015    10 yr recall, hemorrhoids    COLONOSCOPY N/A 10/8/2019    tubular adenoma x2    DENTAL SURGERY  10/2015    all teeth extracted    SHOULDER SURGERY Right     UMBILICAL HERNIA REPAIR  3022-19    UPPER GASTROINTESTINAL ENDOSCOPY  01/25/2015    UPPER GASTROINTESTINAL ENDOSCOPY N/A 10/8/2019    EGD BIOPSY performed by Jj Paul MD at [facility]       Allergies   Allergen Reactions    Iron      Pill- constipation, abdominal pain    Nsaids          Current Outpatient Medications:     [START ON 3/15/2020] morphine (MS CONTIN) 60 MG extended release tablet, Take 1 tablet by mouth 2 times daily for 30 days. , Disp: 60 tablet, Rfl: 0    [START ON 3/15/2020] oxyCODONE-acetaminophen (PERCOCET) 5-325 MG per tablet, Take 1 tablet by mouth every 8 hours for 30 days. , Disp: 90 tablet, Rfl: 0    [START ON 3/15/2020] Lumbar spondylosis (Chronic)    Relevant Medications    morphine (MS CONTIN) 60 MG extended release tablet (Start on 3/15/2020)    oxyCODONE-acetaminophen (PERCOCET) 5-325 MG per tablet (Start on 3/15/2020)    pregabalin (LYRICA) 150 MG capsule (Start on 3/15/2020)    Lumbar spinal stenosis (Chronic)    Relevant Medications    oxyCODONE-acetaminophen (PERCOCET) 5-325 MG per tablet (Start on 3/15/2020)    pregabalin (LYRICA) 150 MG capsule (Start on 3/15/2020)    Lumbar radiculopathy (Chronic)    Relevant Medications    morphine (MS CONTIN) 60 MG extended release tablet (Start on 3/15/2020)    oxyCODONE-acetaminophen (PERCOCET) 5-325 MG per tablet (Start on 3/15/2020)    pregabalin (LYRICA) 150 MG capsule (Start on 3/15/2020)    Encounter for medication monitoring (Chronic)    Relevant Medications    oxyCODONE-acetaminophen (PERCOCET) 5-325 MG per tablet (Start on 3/15/2020)    Encounter for chronic pain management    Relevant Medications    oxyCODONE-acetaminophen (PERCOCET) 5-325 MG per tablet (Start on 3/15/2020)    pregabalin (LYRICA) 150 MG capsule (Start on 3/15/2020)    Degenerative disc disease, lumbar - Primary (Chronic)    Relevant Medications    morphine (MS CONTIN) 60 MG extended release tablet (Start on 3/15/2020)    oxyCODONE-acetaminophen (PERCOCET) 5-325 MG per tablet (Start on 3/15/2020)    pregabalin (LYRICA) 150 MG capsule (Start on 3/15/2020)    Cervical spondylitis (HCC) (Chronic)    Relevant Medications    oxyCODONE-acetaminophen (PERCOCET) 5-325 MG per tablet (Start on 3/15/2020)            Physical Exam:  BP (!) 156/91   Pulse 127   Temp 98 °F (36.7 °C) (Oral)   Resp 20   Wt 210 lb (95.3 kg)   SpO2 95%   BMI 31.01 kg/m²     Physical Exam  HENT:      Head: Normocephalic. Pulmonary:      Effort: Pulmonary effort is normal.   Musculoskeletal:      Lumbar back: He exhibits decreased range of motion and tenderness.       Comments: Cervical scar   Skin:     General: Skin is warm and moist. Neurological:      Mental Status: He is alert. Sensory: Sensory deficit present. Motor: Motor function is intact. Deep Tendon Reflexes:      Reflex Scores:       Patellar reflexes are 1+ on the right side and 1+ on the left side. Achilles reflexes are 1+ on the right side and 1+ on the left side. Psychiatric:         Attention and Perception: Attention normal.         Speech: Speech normal.         Behavior: Behavior normal.         Thought Content:  Thought content normal.         Cognition and Memory: Cognition normal.         Judgment: Judgment normal.           Assessment:      Problem List Items Addressed This Visit     S/P cervical spinal fusion (Chronic)    Relevant Medications    oxyCODONE-acetaminophen (PERCOCET) 5-325 MG per tablet (Start on 3/15/2020)    Osteoarthritis of spine with radiculopathy, lumbar region    Relevant Medications    morphine (MS CONTIN) 60 MG extended release tablet (Start on 3/15/2020)    oxyCODONE-acetaminophen (PERCOCET) 5-325 MG per tablet (Start on 3/15/2020)    pregabalin (LYRICA) 150 MG capsule (Start on 3/15/2020)    Osteoarthritis of lumbar spine    Relevant Medications    morphine (MS CONTIN) 60 MG extended release tablet (Start on 3/15/2020)    oxyCODONE-acetaminophen (PERCOCET) 5-325 MG per tablet (Start on 3/15/2020)    pregabalin (LYRICA) 150 MG capsule (Start on 3/15/2020)    Lumbar spondylosis (Chronic)    Relevant Medications    morphine (MS CONTIN) 60 MG extended release tablet (Start on 3/15/2020)    oxyCODONE-acetaminophen (PERCOCET) 5-325 MG per tablet (Start on 3/15/2020)    pregabalin (LYRICA) 150 MG capsule (Start on 3/15/2020)    Lumbar spinal stenosis (Chronic)    Relevant Medications    oxyCODONE-acetaminophen (PERCOCET) 5-325 MG per tablet (Start on 3/15/2020)    pregabalin (LYRICA) 150 MG capsule (Start on 3/15/2020)    Lumbar radiculopathy (Chronic)    Relevant Medications    morphine (MS CONTIN) 60 MG extended release medications, and re-evaluating for Physical Therapy to improve spinal andjoint strength. We will continue to have discussions to decrease pain medications as tolerated. Counseled patient on effects their pain medication and /or their medical condition mayhave on their  ability to drive or operate machinery. Instructed not to drive or operate machinery if drowsy     I also discussed with the patient regarding the dangers of combining narcotic pain medication with tranquilizers, alcohol or illegal drugs or taking the medication any way other than prescribed. The dangers were discussed  including respiratory depression and death. Patient was told to tell  all  physicians regarding the medications he is getting from pain clinic. Patient is warned not to take any unprescribed medications over-the-countermedications that can depress breathing . Patient is advised to talk to the pharmacist or physicians if planning to take any over-the-counter medications before  takeing them. Patient is strongly advised to avoid tranquilizers or  relaxants, illegal drugs  or any medications that can depress breathing  Patient is also advised to tell us if there is any changes in their medications from other physicians.     UDT today, had percocet and ms contin at 2: 30 pm    Advised of elevated B/P    TREATMENT OPTIONS:     Return in 4 weeks  Medication Agreement Requirements Met  Continue Opioid therapy  Script written for  Percocet, ms contin, lyrica  Follow up appointment made

## 2020-03-14 LAB
6-ACETYLMORPHINE, UR: NOT DETECTED
7-AMINOCLONAZEPAM, URINE: NOT DETECTED
ALPHA-OH-ALPRAZ, URINE: NOT DETECTED
ALPRAZOLAM, URINE: NOT DETECTED
AMPHETAMINES, URINE: NOT DETECTED
BARBITURATES, URINE: NOT DETECTED
BENZOYLECGONINE, UR: NOT DETECTED
BUPRENORPHINE URINE: NOT DETECTED
CARISOPRODOL, UR: NOT DETECTED
CLONAZEPAM, URINE: NOT DETECTED
CODEINE, URINE: NOT DETECTED
CREATININE URINE: 144.2 MG/DL (ref 20–400)
DIAZEPAM, URINE: NOT DETECTED
DRUGS EXPECTED, UR: NORMAL
EER HI RES INTERP UR: NORMAL
ETHYL GLUCURONIDE UR: NOT DETECTED
FENTANYL URINE: NOT DETECTED
HYDROCODONE, URINE: NOT DETECTED
HYDROMORPHONE, URINE: NOT DETECTED
LORAZEPAM, URINE: NOT DETECTED
MARIJUANA METAB, UR: NOT DETECTED
MDA, UR: NOT DETECTED
MDEA, EVE, UR: NOT DETECTED
MDMA URINE: NOT DETECTED
MEPERIDINE METAB, UR: NOT DETECTED
METHADONE, URINE: NOT DETECTED
METHAMPHETAMINE, URINE: NOT DETECTED
METHYLPHENIDATE: NOT DETECTED
MIDAZOLAM, URINE: NOT DETECTED
MORPHINE URINE: PRESENT
NORBUPRENORPHINE, URINE: NOT DETECTED
NORDIAZEPAM, URINE: NOT DETECTED
NORFENTANYL, URINE: NOT DETECTED
NORHYDROCODONE, URINE: NOT DETECTED
NOROXYCODONE, URINE: NOT DETECTED
NOROXYMORPHONE, URINE: NOT DETECTED
OXAZEPAM, URINE: NOT DETECTED
OXYCODONE URINE: PRESENT
OXYMORPHONE, URINE: NOT DETECTED
PAIN MANAGEMENT DRUG PANEL INTERP, URINE: NORMAL
PAIN MGT DRUG PANEL, HI RES, UR: NORMAL
PCP,URINE: NOT DETECTED
PHENTERMINE, UR: NOT DETECTED
PROPOXYPHENE, URINE: NOT DETECTED
TAPENTADOL, URINE: NOT DETECTED
TAPENTADOL-O-SULFATE, URINE: NOT DETECTED
TEMAZEPAM, URINE: NOT DETECTED
TRAMADOL, URINE: NOT DETECTED
ZOLPIDEM, URINE: NOT DETECTED

## 2020-03-18 ENCOUNTER — HOSPITAL ENCOUNTER (OUTPATIENT)
Age: 70
Discharge: HOME | End: 2020-03-18
Payer: MEDICARE

## 2020-03-18 LAB
ABSOLUTE EOS #: 0 K/UL (ref 0–0.4)
ABSOLUTE IMMATURE GRANULOCYTE: ABNORMAL K/UL (ref 0–0.3)
ABSOLUTE LYMPH #: 1.6 K/UL (ref 1–4.8)
ABSOLUTE MONO #: 0.5 K/UL (ref 0.1–1.3)
ALBUMIN SERPL-MCNC: 4.1 G/DL (ref 3.5–5.2)
ALBUMIN/GLOBULIN RATIO: ABNORMAL (ref 1–2.5)
ALP BLD-CCNC: 86 U/L (ref 40–129)
ALT SERPL-CCNC: 21 U/L (ref 5–41)
ANION GAP SERPL CALCULATED.3IONS-SCNC: 16 MMOL/L (ref 9–17)
AST SERPL-CCNC: 25 U/L
BASOPHILS # BLD: 1 % (ref 0–2)
BASOPHILS ABSOLUTE: 0 K/UL (ref 0–0.2)
BILIRUB SERPL-MCNC: 0.27 MG/DL (ref 0.3–1.2)
BUN BLDV-MCNC: 23 MG/DL (ref 8–23)
BUN/CREAT BLD: ABNORMAL (ref 9–20)
CALCIUM SERPL-MCNC: 9.4 MG/DL (ref 8.6–10.4)
CHLORIDE BLD-SCNC: 99 MMOL/L (ref 98–107)
CO2: 22 MMOL/L (ref 20–31)
CREAT SERPL-MCNC: 0.84 MG/DL (ref 0.7–1.2)
DIFFERENTIAL TYPE: ABNORMAL
EOSINOPHILS RELATIVE PERCENT: 1 % (ref 0–4)
FERRITIN: 10 UG/L (ref 30–400)
GFR AFRICAN AMERICAN: >60 ML/MIN
GFR NON-AFRICAN AMERICAN: >60 ML/MIN
GFR SERPL CREATININE-BSD FRML MDRD: ABNORMAL ML/MIN/{1.73_M2}
GFR SERPL CREATININE-BSD FRML MDRD: ABNORMAL ML/MIN/{1.73_M2}
GLUCOSE BLD-MCNC: 301 MG/DL (ref 70–99)
HCT VFR BLD CALC: 37.9 % (ref 41–53)
HEMOGLOBIN: 12.3 G/DL (ref 13.5–17.5)
IMMATURE GRANULOCYTES: ABNORMAL %
IRON SATURATION: 9 % (ref 20–55)
IRON: 39 UG/DL (ref 59–158)
LYMPHOCYTES # BLD: 22 % (ref 24–44)
MCH RBC QN AUTO: 26.9 PG (ref 26–34)
MCHC RBC AUTO-ENTMCNC: 32.6 G/DL (ref 31–37)
MCV RBC AUTO: 82.4 FL (ref 80–100)
MONOCYTES # BLD: 6 % (ref 1–7)
NRBC AUTOMATED: ABNORMAL PER 100 WBC
PDW BLD-RTO: 14.9 % (ref 11.5–14.9)
PLATELET # BLD: 339 K/UL (ref 150–450)
PLATELET ESTIMATE: ABNORMAL
PMV BLD AUTO: 8.1 FL (ref 6–12)
POTASSIUM SERPL-SCNC: 4.3 MMOL/L (ref 3.7–5.3)
RBC # BLD: 4.59 M/UL (ref 4.5–5.9)
RBC # BLD: ABNORMAL 10*6/UL
SEG NEUTROPHILS: 70 % (ref 36–66)
SEGMENTED NEUTROPHILS ABSOLUTE COUNT: 5.2 K/UL (ref 1.3–9.1)
SODIUM BLD-SCNC: 137 MMOL/L (ref 135–144)
TOTAL IRON BINDING CAPACITY: 427 UG/DL (ref 250–450)
TOTAL PROTEIN: 7.7 G/DL (ref 6.4–8.3)
UNSATURATED IRON BINDING CAPACITY: 388 UG/DL (ref 112–347)
WBC # BLD: 7.4 K/UL (ref 3.5–11)
WBC # BLD: ABNORMAL 10*3/UL

## 2020-03-18 PROCEDURE — 82728 ASSAY OF FERRITIN: CPT

## 2020-03-18 PROCEDURE — 36415 COLL VENOUS BLD VENIPUNCTURE: CPT

## 2020-03-18 PROCEDURE — 83550 IRON BINDING TEST: CPT

## 2020-03-18 PROCEDURE — 85025 COMPLETE CBC W/AUTO DIFF WBC: CPT

## 2020-03-18 PROCEDURE — 83540 ASSAY OF IRON: CPT

## 2020-03-18 PROCEDURE — 80053 COMPREHEN METABOLIC PANEL: CPT

## 2020-03-25 ENCOUNTER — TELEPHONE (OUTPATIENT)
Dept: ONCOLOGY | Age: 70
End: 2020-03-25

## 2020-03-25 ENCOUNTER — HOSPITAL ENCOUNTER (OUTPATIENT)
Age: 70
Discharge: HOME | End: 2020-03-25
Payer: MEDICARE

## 2020-03-25 LAB
ESTIMATED AVERAGE GLUCOSE: 206 MG/DL
HBA1C MFR BLD: 8.8 % (ref 4–6)
TSH SERPL DL<=0.05 MIU/L-ACNC: 1.11 MIU/L (ref 0.3–5)

## 2020-03-25 PROCEDURE — 84443 ASSAY THYROID STIM HORMONE: CPT

## 2020-03-25 PROCEDURE — 36415 COLL VENOUS BLD VENIPUNCTURE: CPT

## 2020-03-25 PROCEDURE — 83036 HEMOGLOBIN GLYCOSYLATED A1C: CPT

## 2020-03-25 NOTE — TELEPHONE ENCOUNTER
Called Patient to see if he was still coming. Wife said she had everything mixed up and thought it was canceled. So I rescheduled him.

## 2020-04-01 ENCOUNTER — OFFICE VISIT (OUTPATIENT)
Dept: ONCOLOGY | Age: 70
End: 2020-04-01
Payer: MEDICARE

## 2020-04-01 ENCOUNTER — TELEPHONE (OUTPATIENT)
Dept: ONCOLOGY | Age: 70
End: 2020-04-01

## 2020-04-01 VITALS
SYSTOLIC BLOOD PRESSURE: 114 MMHG | WEIGHT: 212.2 LBS | TEMPERATURE: 98.2 F | RESPIRATION RATE: 20 BRPM | DIASTOLIC BLOOD PRESSURE: 67 MMHG | BODY MASS INDEX: 31.34 KG/M2 | HEART RATE: 121 BPM

## 2020-04-01 PROCEDURE — 3017F COLORECTAL CA SCREEN DOC REV: CPT | Performed by: INTERNAL MEDICINE

## 2020-04-01 PROCEDURE — 99214 OFFICE O/P EST MOD 30 MIN: CPT | Performed by: INTERNAL MEDICINE

## 2020-04-01 PROCEDURE — 99211 OFF/OP EST MAY X REQ PHY/QHP: CPT | Performed by: INTERNAL MEDICINE

## 2020-04-01 PROCEDURE — 1036F TOBACCO NON-USER: CPT | Performed by: INTERNAL MEDICINE

## 2020-04-01 PROCEDURE — 1123F ACP DISCUSS/DSCN MKR DOCD: CPT | Performed by: INTERNAL MEDICINE

## 2020-04-01 PROCEDURE — G8427 DOCREV CUR MEDS BY ELIG CLIN: HCPCS | Performed by: INTERNAL MEDICINE

## 2020-04-01 PROCEDURE — 4040F PNEUMOC VAC/ADMIN/RCVD: CPT | Performed by: INTERNAL MEDICINE

## 2020-04-01 PROCEDURE — G8417 CALC BMI ABV UP PARAM F/U: HCPCS | Performed by: INTERNAL MEDICINE

## 2020-04-01 RX ORDER — LISINOPRIL AND HYDROCHLOROTHIAZIDE 12.5; 1 MG/1; MG/1
1 TABLET ORAL DAILY
COMMUNITY
End: 2020-08-18 | Stop reason: RX

## 2020-04-01 RX ORDER — SODIUM CHLORIDE 9 MG/ML
INJECTION, SOLUTION INTRAVENOUS CONTINUOUS
Status: CANCELLED | OUTPATIENT
Start: 2020-04-08

## 2020-04-01 RX ORDER — HEPARIN SODIUM (PORCINE) LOCK FLUSH IV SOLN 100 UNIT/ML 100 UNIT/ML
500 SOLUTION INTRAVENOUS PRN
Status: CANCELLED | OUTPATIENT
Start: 2020-04-08

## 2020-04-01 RX ORDER — METHYLPREDNISOLONE SODIUM SUCCINATE 125 MG/2ML
125 INJECTION, POWDER, LYOPHILIZED, FOR SOLUTION INTRAMUSCULAR; INTRAVENOUS ONCE
Status: CANCELLED | OUTPATIENT
Start: 2020-04-08

## 2020-04-01 RX ORDER — SODIUM CHLORIDE 0.9 % (FLUSH) 0.9 %
10 SYRINGE (ML) INJECTION PRN
Status: CANCELLED | OUTPATIENT
Start: 2020-04-08

## 2020-04-01 RX ORDER — EPINEPHRINE 1 MG/ML
0.3 INJECTION, SOLUTION, CONCENTRATE INTRAVENOUS PRN
Status: CANCELLED | OUTPATIENT
Start: 2020-04-08

## 2020-04-01 RX ORDER — SODIUM CHLORIDE 0.9 % (FLUSH) 0.9 %
5 SYRINGE (ML) INJECTION PRN
Status: CANCELLED | OUTPATIENT
Start: 2020-04-08

## 2020-04-01 RX ORDER — DIPHENHYDRAMINE HYDROCHLORIDE 50 MG/ML
50 INJECTION INTRAMUSCULAR; INTRAVENOUS ONCE
Status: CANCELLED | OUTPATIENT
Start: 2020-04-08

## 2020-04-09 RX ORDER — OXYCODONE HYDROCHLORIDE AND ACETAMINOPHEN 5; 325 MG/1; MG/1
1 TABLET ORAL EVERY 8 HOURS
Qty: 90 TABLET | Refills: 0 | Status: SHIPPED | OUTPATIENT
Start: 2020-04-13 | End: 2020-05-11 | Stop reason: SDUPTHER

## 2020-04-09 RX ORDER — PREGABALIN 150 MG/1
150 CAPSULE ORAL 3 TIMES DAILY
Qty: 90 CAPSULE | Refills: 0 | Status: SHIPPED | OUTPATIENT
Start: 2020-04-13 | End: 2020-05-11 | Stop reason: SDUPTHER

## 2020-04-09 RX ORDER — MORPHINE SULFATE 60 MG/1
60 TABLET, FILM COATED, EXTENDED RELEASE ORAL 2 TIMES DAILY
Qty: 60 TABLET | Refills: 0 | Status: SHIPPED | OUTPATIENT
Start: 2020-04-13 | End: 2020-05-11 | Stop reason: SDUPTHER

## 2020-04-15 ENCOUNTER — HOSPITAL ENCOUNTER (OUTPATIENT)
Dept: INFUSION THERAPY | Age: 70
Discharge: HOME | End: 2020-04-15
Payer: MEDICARE

## 2020-04-15 VITALS
SYSTOLIC BLOOD PRESSURE: 120 MMHG | HEART RATE: 120 BPM | DIASTOLIC BLOOD PRESSURE: 67 MMHG | RESPIRATION RATE: 16 BRPM | OXYGEN SATURATION: 95 % | TEMPERATURE: 98.5 F

## 2020-04-15 DIAGNOSIS — D50.8 OTHER IRON DEFICIENCY ANEMIA: Primary | ICD-10-CM

## 2020-04-15 PROCEDURE — 96365 THER/PROPH/DIAG IV INF INIT: CPT

## 2020-04-15 PROCEDURE — 2580000003 HC RX 258: Performed by: INTERNAL MEDICINE

## 2020-04-15 PROCEDURE — 6360000002 HC RX W HCPCS: Performed by: INTERNAL MEDICINE

## 2020-04-15 RX ORDER — DIPHENHYDRAMINE HYDROCHLORIDE 50 MG/ML
50 INJECTION INTRAMUSCULAR; INTRAVENOUS ONCE
Status: CANCELLED | OUTPATIENT
Start: 2020-04-22

## 2020-04-15 RX ORDER — HEPARIN SODIUM (PORCINE) LOCK FLUSH IV SOLN 100 UNIT/ML 100 UNIT/ML
500 SOLUTION INTRAVENOUS PRN
Status: CANCELLED | OUTPATIENT
Start: 2020-04-22

## 2020-04-15 RX ORDER — SODIUM CHLORIDE 9 MG/ML
INJECTION, SOLUTION INTRAVENOUS CONTINUOUS
Status: ACTIVE | OUTPATIENT
Start: 2020-04-15 | End: 2020-04-15

## 2020-04-15 RX ORDER — SODIUM CHLORIDE 9 MG/ML
INJECTION, SOLUTION INTRAVENOUS CONTINUOUS
Status: CANCELLED | OUTPATIENT
Start: 2020-04-22

## 2020-04-15 RX ORDER — SODIUM CHLORIDE 0.9 % (FLUSH) 0.9 %
5 SYRINGE (ML) INJECTION PRN
Status: CANCELLED | OUTPATIENT
Start: 2020-04-22

## 2020-04-15 RX ORDER — SODIUM CHLORIDE 0.9 % (FLUSH) 0.9 %
10 SYRINGE (ML) INJECTION PRN
Status: CANCELLED | OUTPATIENT
Start: 2020-04-22

## 2020-04-15 RX ORDER — EPINEPHRINE 1 MG/ML
0.3 INJECTION, SOLUTION, CONCENTRATE INTRAVENOUS PRN
Status: CANCELLED | OUTPATIENT
Start: 2020-04-22

## 2020-04-15 RX ORDER — METHYLPREDNISOLONE SODIUM SUCCINATE 125 MG/2ML
125 INJECTION, POWDER, LYOPHILIZED, FOR SOLUTION INTRAMUSCULAR; INTRAVENOUS ONCE
Status: CANCELLED | OUTPATIENT
Start: 2020-04-22

## 2020-04-15 RX ADMIN — FERRIC CARBOXYMALTOSE INJECTION 750 MG: 50 INJECTION, SOLUTION INTRAVENOUS at 11:31

## 2020-04-15 RX ADMIN — SODIUM CHLORIDE: 9 INJECTION, SOLUTION INTRAVENOUS at 11:10

## 2020-04-15 NOTE — PROGRESS NOTES
Patient here for injectafer. Vitals stable. He tolerated infusion well and was discharged home in stable condition. He is due to return 4/22 for day 2 of 2 injectafer.

## 2020-04-22 ENCOUNTER — HOSPITAL ENCOUNTER (OUTPATIENT)
Dept: INFUSION THERAPY | Age: 70
Discharge: HOME | End: 2020-04-22
Payer: MEDICARE

## 2020-04-22 VITALS
HEART RATE: 108 BPM | DIASTOLIC BLOOD PRESSURE: 74 MMHG | SYSTOLIC BLOOD PRESSURE: 112 MMHG | RESPIRATION RATE: 16 BRPM | TEMPERATURE: 98.2 F

## 2020-04-22 DIAGNOSIS — D50.8 OTHER IRON DEFICIENCY ANEMIA: Primary | ICD-10-CM

## 2020-04-22 PROCEDURE — 2580000003 HC RX 258: Performed by: INTERNAL MEDICINE

## 2020-04-22 PROCEDURE — 96365 THER/PROPH/DIAG IV INF INIT: CPT

## 2020-04-22 PROCEDURE — 6360000002 HC RX W HCPCS: Performed by: INTERNAL MEDICINE

## 2020-04-22 RX ORDER — SODIUM CHLORIDE 9 MG/ML
INJECTION, SOLUTION INTRAVENOUS CONTINUOUS
Status: CANCELLED | OUTPATIENT
Start: 2020-04-22

## 2020-04-22 RX ORDER — DIPHENHYDRAMINE HYDROCHLORIDE 50 MG/ML
50 INJECTION INTRAMUSCULAR; INTRAVENOUS ONCE
Status: CANCELLED | OUTPATIENT
Start: 2020-04-22

## 2020-04-22 RX ORDER — METHYLPREDNISOLONE SODIUM SUCCINATE 125 MG/2ML
125 INJECTION, POWDER, LYOPHILIZED, FOR SOLUTION INTRAMUSCULAR; INTRAVENOUS ONCE
Status: CANCELLED | OUTPATIENT
Start: 2020-04-22

## 2020-04-22 RX ORDER — HEPARIN SODIUM (PORCINE) LOCK FLUSH IV SOLN 100 UNIT/ML 100 UNIT/ML
500 SOLUTION INTRAVENOUS PRN
Status: CANCELLED | OUTPATIENT
Start: 2020-04-22

## 2020-04-22 RX ORDER — SODIUM CHLORIDE 0.9 % (FLUSH) 0.9 %
5 SYRINGE (ML) INJECTION PRN
Status: CANCELLED | OUTPATIENT
Start: 2020-04-22

## 2020-04-22 RX ORDER — SODIUM CHLORIDE 0.9 % (FLUSH) 0.9 %
10 SYRINGE (ML) INJECTION PRN
Status: CANCELLED | OUTPATIENT
Start: 2020-04-22

## 2020-04-22 RX ORDER — SODIUM CHLORIDE 9 MG/ML
INJECTION, SOLUTION INTRAVENOUS CONTINUOUS
Status: DISCONTINUED | OUTPATIENT
Start: 2020-04-22 | End: 2020-04-23 | Stop reason: HOSPADM

## 2020-04-22 RX ORDER — EPINEPHRINE 1 MG/ML
0.3 INJECTION, SOLUTION, CONCENTRATE INTRAVENOUS PRN
Status: CANCELLED | OUTPATIENT
Start: 2020-04-22

## 2020-04-22 RX ADMIN — FERRIC CARBOXYMALTOSE INJECTION 750 MG: 50 INJECTION, SOLUTION INTRAVENOUS at 14:24

## 2020-04-22 RX ADMIN — SODIUM CHLORIDE: 9 INJECTION, SOLUTION INTRAVENOUS at 14:24

## 2020-04-22 NOTE — PROGRESS NOTES
Pt here for 2 of 2 Injectafer infusions. Pt was treated without incident and discharged in stable condition. Pt will return on 7-22-20 for follow up with Dr Edita Heath.

## 2020-04-30 ENCOUNTER — NURSE ONLY (OUTPATIENT)
Dept: GASTROENTEROLOGY | Age: 70
End: 2020-04-30
Payer: MEDICARE

## 2020-04-30 VITALS — DIASTOLIC BLOOD PRESSURE: 78 MMHG | HEART RATE: 64 BPM | SYSTOLIC BLOOD PRESSURE: 132 MMHG | TEMPERATURE: 98.2 F

## 2020-04-30 PROCEDURE — 90632 HEPA VACCINE ADULT IM: CPT | Performed by: INTERNAL MEDICINE

## 2020-04-30 PROCEDURE — 90471 IMMUNIZATION ADMIN: CPT | Performed by: INTERNAL MEDICINE

## 2020-05-11 ENCOUNTER — HOSPITAL ENCOUNTER (OUTPATIENT)
Dept: PAIN MANAGEMENT | Age: 70
Discharge: HOME | End: 2020-05-11
Payer: MEDICARE

## 2020-05-11 PROCEDURE — 99213 OFFICE O/P EST LOW 20 MIN: CPT | Performed by: NURSE PRACTITIONER

## 2020-05-11 PROCEDURE — 99213 OFFICE O/P EST LOW 20 MIN: CPT

## 2020-05-11 RX ORDER — OXYCODONE HYDROCHLORIDE AND ACETAMINOPHEN 5; 325 MG/1; MG/1
1 TABLET ORAL EVERY 8 HOURS
Qty: 90 TABLET | Refills: 0 | Status: SHIPPED | OUTPATIENT
Start: 2020-05-13 | End: 2020-06-10 | Stop reason: SDUPTHER

## 2020-05-11 RX ORDER — ISOPROPYL ALCOHOL 0.75 G/1
SWAB TOPICAL
COMMUNITY
Start: 2020-04-01 | End: 2020-10-14 | Stop reason: SDUPTHER

## 2020-05-11 RX ORDER — MORPHINE SULFATE 60 MG/1
60 TABLET, FILM COATED, EXTENDED RELEASE ORAL 2 TIMES DAILY
Qty: 60 TABLET | Refills: 0 | Status: SHIPPED | OUTPATIENT
Start: 2020-05-13 | End: 2020-06-10 | Stop reason: SDUPTHER

## 2020-05-11 RX ORDER — PREGABALIN 150 MG/1
150 CAPSULE ORAL 3 TIMES DAILY
Qty: 90 CAPSULE | Refills: 2 | Status: SHIPPED | OUTPATIENT
Start: 2020-05-13 | End: 2020-08-11 | Stop reason: SDUPTHER

## 2020-05-11 RX ORDER — GLUCOSAM/CHON-MSM1/C/MANG/BOSW 500-416.6
TABLET ORAL
COMMUNITY
Start: 2020-04-08 | End: 2020-10-14 | Stop reason: SDUPTHER

## 2020-05-11 RX ORDER — CALCIUM CITRATE/VITAMIN D3 200MG-6.25
TABLET ORAL
COMMUNITY
Start: 2020-04-08 | End: 2020-10-14 | Stop reason: SDUPTHER

## 2020-05-11 ASSESSMENT — ENCOUNTER SYMPTOMS
GASTROINTESTINAL NEGATIVE: 1
BACK PAIN: 1

## 2020-05-11 NOTE — PROGRESS NOTES
denies any side effects and reports adequate analgesia. No sign of misuse/abuse. When was thelast UDS:  3-           Was the UDS appropriate:  NO metabolites of oxycodone detected      Record/Diagnostics Review:      As above, I did review the imaging    3/14/2020 10:00 PM - Raimundo, Conchita Incoming Lab Results From [facility]     Component Value Ref Range & Units Status Collected Lab   Pain Management Drug Panel Interp, Urine Consistent   Final 03/11/2020  3:25 PM [facility]   (NOTE)   ________________________________________________________________   DRUGS EXPECTED:   OXYCODONE [3/11/20]   MORPHINE [3/11/20]   ________________________________________________________________   CONSISTENT with medications provided:   OXYCODONE : based on oxycodone   MORPHINE : based on morphine   ________________________________________________________________   INTERPRETIVE INFORMATION: Pain Mgt [place], Mass Spec/EMIT, Ur,                            Interp   Interpretation depends on accuracy and completeness of patient   medication information submitted by client. 6-Acetylmorphine, Ur Not Detected   Final 03/11/2020  3:25 PM [facility]   7-Aminoclonazepam, Urine Not Detected   Final 03/11/2020  3:25 PM [facility]   Alpha-OH-Alpraz, Urine Not Detected   Final 03/11/2020  3:25 PM [facility]   Alprazolam, Urine Not Detected   Final 03/11/2020  3:25 PM [facility]   Amphetamines, urine Not Detected   Final 03/11/2020  3:25 PM [facility]   Barbiturates, Ur Not Detected   Final 03/11/2020  3:25 PM [facility]   Benzoylecgonine, Ur Not Detected   Final 03/11/2020  3:25 PM [facility]   Buprenorphine Urine Not Detected   Final 03/11/2020  3:25 PM [facility]   Carisoprodol, Ur Not Detected   Final 03/11/2020  3:25 PM [facility]   (NOTE)   The carisoprodol immunoassay has cross-reactivity to carisoprodol   and meprobamate.     Clonazepam, Urine Not Detected   Final 03/11/2020  3:25 PM [facility]   Codeine, Urine Not Detected   Final 03/11/2020  3:25 PM [facility]   MDA, Ur Not Detected   Final 03/11/2020  3:25 PM [facility]   Diazepam, Urine Not Detected   Final 03/11/2020  3:25 PM [facility]   Ethyl Glucuronide Ur Not Detected   Final 03/11/2020  3:25 PM [facility]   Fentanyl, Ur Not Detected   Final 03/11/2020  3:25 PM [facility]   Hydrocodone, Urine Not Detected   Final 03/11/2020  3:25 PM [facility]   Hydromorphone, Urine Not Detected   Final 03/11/2020  3:25 PM [facility]   Lorazepam, Urine Not Detected   Final 03/11/2020  3:25 PM [facility]   Marijuana Metab, Ur Not Detected   Final 03/11/2020  3:25 PM [facility]   MDEA, JAZMIN, Ur Not Detected   Final 03/11/2020  3:25 PM [facility]   MDMA, Urine Not Detected   Final 03/11/2020  3:25 PM [facility]   Meperidine Metab, Ur Not Detected   Final 03/11/2020  3:25 PM [facility]   Methadone, Urine Not Detected   Final 03/11/2020  3:25 PM [facility]   Methamphetamine, Urine Not Detected   Final 03/11/2020  3:25 PM [facility]   Methylphenidate Not Detected   Final 03/11/2020  3:25 PM [facility]   Midazolam, Urine Not Detected   Final 03/11/2020  3:25 PM [facility]   Morphine Urine Present   Final 03/11/2020  3:25 PM [facility]   Norbuprenorphine, Urine Not Detected   Final 03/11/2020  3:25 PM [facility]   Nordiazepam, Urine Not Detected   Final 03/11/2020  3:25 PM [facility]   Norfentanyl, Urine Not Detected   Final 03/11/2020  3:25 PM [facility]   NORHYDROCODONE, URINE Not Detected   Final 03/11/2020  3:25 PM [facility]   Noroxycodone, Urine Not Detected   Final 03/11/2020  3:25 PM [facility]   NOROXYMORPHONE, URINE Not Detected   Final 03/11/2020  3:25 PM [facility]   Oxazepam, Urine Not Detected   Final 03/11/2020  3:25 PM [facility]   Oxycodone Urine Present   Final 03/11/2020  3:25 PM [facility]   Oxymorphone, Urine Not Detected   Final 03/11/2020  3:25 PM [facility]   PCP, Urine Not Detected   Final 03/11/2020  3:25 PM [facility]   Phentermine, Ur Not Detected   Final 03/11/2020  3:25 PM [facility]   Propoxyphene, Urine Not Detected   Final 03/11/2020  3:25 PM [facility]   Tapentadol-O-Sulfate, Urine Not Detected   Final 03/11/2020  3:25 PM [facility]   Tapentadol, Urine Not Detected   Final 03/11/2020  3:25 PM [facility]   Temazepam, Urine Not Detected   Final 03/11/2020  3:25 PM [facility]   Tramadol, Urine Not Detected   Final 03/11/2020  3:25 PM [facility]   Zolpidem, Urine Not Detected   Final 03/11/2020  3:25 PM [facility]   Drugs Expected, Ur   Final 03/11/2020  3:25  [address] [facility]   MORPHINE ON 3/11/20 AT 1430, OXYCODONE ON 3/11/20 AT 1430    Creatinine, Ur 144.2  20.0 - 400.0 mg/dL Final 03/11/2020  3:25 PM [facility]   Pain Mgt Drug Panel, Hi Res, Ur See Below   Final 03/11/2020  3:25 PM [facility]   (NOTE)   Methodology: Qualitative Enzyme Immunoassay and Qualitative Liquid   Chromatography-Time of Flight-Mass Spectrometry or Tandem Mass   Spectrometry, Quantitative Spectrophotometry   The absence of expected drug(s) and/or drug metabolite(s) may   indicate non-compliance, inappropriate timing of specimen   collection relative to drug administration, poor drug absorption,   diluted/adulterated urine, or limitations of testing. The   concentration must be greater than or equal to the cutoff to be   reported as present.  If specific drug concentrations are   required, contact the laboratory within two weeks of specimen   collection to request quantification by a second analytical   technique. Interpretive questions should be directed to the   laboratory. Results based on immunoassay detection that do not match clinical   expectations should be   interpreted with caution. Confirmatory testing by mass   spectrometry for immunoassay-based results is available, if   ordered within two weeks of specimen collection. Additional   charges apply. For medical purposes only; not valid for forensic use. This test was developed and its performance characteristics   determined by [facility]. The U.S. Food and Drug   Administration has not approved or cleared this test; however, FDA   clearance or approval is not currently required for clinical use.    The results are not intended to be used as the sole means for   clinical diagnosis or patient management

## 2020-05-12 ENCOUNTER — OFFICE VISIT (OUTPATIENT)
Dept: FAMILY MEDICINE CLINIC | Age: 70
End: 2020-05-12
Payer: MEDICARE

## 2020-05-12 VITALS
DIASTOLIC BLOOD PRESSURE: 82 MMHG | HEART RATE: 150 BPM | HEIGHT: 69 IN | WEIGHT: 209 LBS | BODY MASS INDEX: 30.96 KG/M2 | SYSTOLIC BLOOD PRESSURE: 124 MMHG | OXYGEN SATURATION: 96 %

## 2020-05-12 PROBLEM — E53.8 VITAMIN B 12 DEFICIENCY: Status: ACTIVE | Noted: 2020-05-12

## 2020-05-12 PROBLEM — G62.9 PERIPHERAL POLYNEUROPATHY: Status: ACTIVE | Noted: 2020-05-12

## 2020-05-12 PROBLEM — I10 ESSENTIAL HYPERTENSION: Status: ACTIVE | Noted: 2020-05-12

## 2020-05-12 PROBLEM — E78.5 HYPERLIPIDEMIA WITH TARGET LDL LESS THAN 100: Status: ACTIVE | Noted: 2020-05-12

## 2020-05-12 PROBLEM — R00.0 TACHYCARDIA: Status: ACTIVE | Noted: 2020-05-12

## 2020-05-12 PROBLEM — E55.9 VITAMIN D DEFICIENCY: Status: ACTIVE | Noted: 2020-05-12

## 2020-05-12 PROBLEM — E11.65 TYPE 2 DIABETES MELLITUS WITH HYPERGLYCEMIA, WITHOUT LONG-TERM CURRENT USE OF INSULIN (HCC): Status: ACTIVE | Noted: 2020-05-12

## 2020-05-12 PROCEDURE — 4040F PNEUMOC VAC/ADMIN/RCVD: CPT | Performed by: FAMILY MEDICINE

## 2020-05-12 PROCEDURE — 3052F HG A1C>EQUAL 8.0%<EQUAL 9.0%: CPT | Performed by: FAMILY MEDICINE

## 2020-05-12 PROCEDURE — G8417 CALC BMI ABV UP PARAM F/U: HCPCS | Performed by: FAMILY MEDICINE

## 2020-05-12 PROCEDURE — 1036F TOBACCO NON-USER: CPT | Performed by: FAMILY MEDICINE

## 2020-05-12 PROCEDURE — G8427 DOCREV CUR MEDS BY ELIG CLIN: HCPCS | Performed by: FAMILY MEDICINE

## 2020-05-12 PROCEDURE — 3017F COLORECTAL CA SCREEN DOC REV: CPT | Performed by: FAMILY MEDICINE

## 2020-05-12 PROCEDURE — 99204 OFFICE O/P NEW MOD 45 MIN: CPT | Performed by: FAMILY MEDICINE

## 2020-05-12 PROCEDURE — 1123F ACP DISCUSS/DSCN MKR DOCD: CPT | Performed by: FAMILY MEDICINE

## 2020-05-12 PROCEDURE — 2022F DILAT RTA XM EVC RTNOPTHY: CPT | Performed by: FAMILY MEDICINE

## 2020-05-12 RX ORDER — PRAVASTATIN SODIUM 40 MG
40 TABLET ORAL EVERY EVENING
Qty: 90 TABLET | Refills: 3 | Status: SHIPPED | OUTPATIENT
Start: 2020-05-12 | End: 2021-05-03

## 2020-05-12 RX ORDER — CANAGLIFLOZIN 100 MG/1
100 TABLET, FILM COATED ORAL
Qty: 90 TABLET | Refills: 5 | Status: SHIPPED | OUTPATIENT
Start: 2020-05-12 | End: 2020-08-18 | Stop reason: DRUGHIGH

## 2020-05-12 ASSESSMENT — ENCOUNTER SYMPTOMS
VOMITING: 0
CONSTIPATION: 0
ABDOMINAL DISTENTION: 0
DIARRHEA: 0
COUGH: 0
ABDOMINAL PAIN: 0
SHORTNESS OF BREATH: 0
BACK PAIN: 1
NAUSEA: 0
CHEST TIGHTNESS: 0
WHEEZING: 0

## 2020-05-12 ASSESSMENT — PATIENT HEALTH QUESTIONNAIRE - PHQ9
2. FEELING DOWN, DEPRESSED OR HOPELESS: 0
SUM OF ALL RESPONSES TO PHQ QUESTIONS 1-9: 0
1. LITTLE INTEREST OR PLEASURE IN DOING THINGS: 0
SUM OF ALL RESPONSES TO PHQ9 QUESTIONS 1 & 2: 0
SUM OF ALL RESPONSES TO PHQ QUESTIONS 1-9: 0

## 2020-05-12 NOTE — PROGRESS NOTES
Chief Complaint   Patient presents with    Established New Doctor    Hypertension    Diabetes    Hyperlipidemia        Prior PCP: at [facility]      Hypertension:    he  is not exercising, but staying active and is adherent to low salt diet. Blood pressure is well controlled at home. Cardiac symptoms none. Patient denies chest pain, chest pressure/discomfort, claudication, dyspnea, exertional chest pressure/discomfort, fatigue, irregular heart beat, lower extremity edema, near-syncope, orthopnea, palpitations, paroxysmal nocturnal dyspnea, syncope and tachypnea. Cardiovascular risk factors: advanced age (older than 54 for men, 72 for women), diabetes mellitus, dyslipidemia, hypertension, male gender and obesity (BMI >= 30 kg/m2). Use of agents associated with hypertension: none. History of target organ damage: none. Patient noticed during the encounter having tachycardia. He reports he is due to anxiety and being around doctors. Also noticed his heart rate being very high 150,  Previously his heart rate was 64      blood pressure is normal. BP controlled. Irineo Nielsen reports compliance with BP medications, and tolerates them well, denies side effects. BP Readings from Last 3 Encounters:   05/12/20 124/82   04/30/20 132/78   04/22/20 112/74        Pulse is Elevated, tachycardia noted    Pulse Readings from Last 3 Encounters:   05/12/20 150   04/30/20 64   04/22/20 108       Diabetes Mellitus Type II, Follow-up: Patient here for follow-up of Type 2 diabetes mellitus. Current symptoms/problems include hyperglycemia and paresthesia of the feet. Symptoms have been present for a few months. Patient was recently diagnosed with diabetes mellitus uncontrolled based on A1c and blood glucose in 300s. Patient reports he has suffered of neuropathy in his feet for a long time. He is on Lyrica for pain management .   Patient reports his \"feet are terribles\", getting a lot of cramps, has sensation of something biting him , pins and needles, and feels like he is stepping on river rocks in the morning. Patient says he does not feel Lyrica helps with the neuropathy. Patient says he did see podiatry in the past and he was told the pulses in his feet are good. Medication compliance:  compliant most of the time  Current diabetic medications include oral agent (monotherapy): Glucophage 1000 mg BID, was recently started on it. Eye exam current (within one year): no  Weight trend: fluctuating a bit  Wt Readings from Last 3 Encounters:   05/12/20 209 lb (94.8 kg)   04/01/20 212 lb 3.2 oz (96.3 kg)   03/11/20 210 lb (95.3 kg)       Prior visit with dietician: no, but he would like to start diabetic education  Current diet: in general, a \"healthy\" diet    Current exercise: no regular exercise  Barriers: impairment:  physical: Chronic pain    Current monitoring regimen: home blood tests - 2 times daily  Home blood sugar records: 216, 191, 203, 209, fasting   Before dinner 169, 163, 145, 185  Average home blood glucose is 189     Any episodes of hypoglycemia? no    Is He on ACE inhibitor or angiotensin II receptor blocker? Yes      reports that he quit smoking about 4 years ago. His smoking use included cigarettes. He has a 22.50 pack-year smoking history. He has never used smokeless tobacco.     Daily Aspirin? No, he has history of anemia for a long time, needing iron infusions and blood transfusions, he was started in the past that he might have bleeding ulcers. He follows with both GI specialist and hematologist oncologist for his anemia.   He does take PPI      Counseling given: Yes  Comment: on chantix 11-6-15   12-7-15        A1c is worsening, uncontrolled diabetes  Lab Results   Component Value Date    LABA1C 8.8 (H) 03/25/2020    LABA1C 6.3 (H) 04/24/2019    LABA1C 5.6 02/16/2018       Urine microabumin is high  Lab Results   Component Value Date    LABMICR 45 (H) 04/24/2019             Hyperlipidemia:  No new myalgias General: Lids are normal. No scleral icterus. Right eye: No discharge. Left eye: No discharge. Conjunctiva/sclera: Conjunctivae normal.   Neck:      Musculoskeletal: Normal range of motion and neck supple. Thyroid: No thyromegaly. Cardiovascular:      Rate and Rhythm: Regular rhythm. Tachycardia present. Pulses:           Dorsalis pedis pulses are 2+ on the right side and 2+ on the left side. Posterior tibial pulses are 2+ on the right side and 2+ on the left side. Heart sounds: Normal heart sounds. No murmur. Comments: Patient says he gets anxious and that is why his heart is racing  Pulmonary:      Effort: Pulmonary effort is normal. No respiratory distress. Breath sounds: Normal breath sounds. No wheezing or rales. Chest:      Chest wall: No tenderness. Abdominal:      General: Bowel sounds are normal. There is no distension. Palpations: Abdomen is soft. There is no hepatomegaly or splenomegaly. Tenderness: There is no abdominal tenderness. Musculoskeletal: Normal range of motion. General: No tenderness. Right lower leg: No edema. Left lower leg: No edema. Right foot: Normal range of motion. No deformity, bunion, Charcot foot, foot drop or prominent metatarsal heads. Left foot: Normal range of motion. No deformity, bunion, Charcot foot, foot drop or prominent metatarsal heads. Feet:      Right foot:      Protective Sensation: 5 sites tested. 0 sites sensed. Skin integrity: Skin integrity normal.      Toenail Condition: Right toenails are normal.      Left foot:      Protective Sensation: 5 sites tested. 0 sites sensed. Skin integrity: Skin integrity normal.      Toenail Condition: Left toenails are normal.      Comments:  On the right great toenail, in the middle,there is a black spot, looks like subunguial hematoma, patient was advised to monitor it and if it \"does not move\", to let me know and we will Reason:   Specialty Services Required     Number of Visits Requested:   1    EKG 12 Lead     Standing Status:   Future     Standing Expiration Date:   5/12/2021     Order Specific Question:   Reason for Exam?     Answer: Tachycardia    HM DIABETES FOOT EXAM         Medications Discontinued During This Encounter   Medication Reason    gemfibrozil (LOPID) 600 MG tablet Alternate therapy         Osmany Valentin received counseling on the following healthy behaviors: nutrition, exercise, medication adherence and weight loss  Reviewed prior labs and health maintenance  Continue current medications, diet and exercise. Discussed use, benefit, and side effects of prescribed medications. Barriers to medication compliance addressed. Patient given educational materials - see patient instructions  Was a self-tracking handout given in paper form or via [employer]? Yes    Requested Prescriptions     Signed Prescriptions Disp Refills    canagliflozin (INVOKANA) 100 MG TABS tablet 90 tablet 5     Sig: Take 1 tablet by mouth every morning (before breakfast) For diabetes    pravastatin (PRAVACHOL) 40 MG tablet 90 tablet 3     Sig: Take 1 tablet by mouth every evening Stop Gemfibrozil       All patient questions answered. Patient voiced understanding. Quality Measures    Body mass index is 30.86 kg/m². Elevated. Weight control planned discussed Healthy diet and regular exercise. BP: 124/82 Blood pressure is normal. Treatment plan consists of No treatment change needed. The patient's past medical, surgical, social, and family history as well as his   current medications and allergies were reviewed as documented in today's encounter. Medications, labs, diagnostic studies, consultations and follow-up as documented in this encounter. Return in about 4 weeks (around 6/9/2020) for Face-to-face, AWM. Patient was seen with total face to face time of 45 minutes due to establishing care.  More than 50% of this visit was

## 2020-05-12 NOTE — PATIENT INSTRUCTIONS
good, quick way to make sure that you have a balanced meal. It also helps you spread carbs throughout the day. ? Divide your plate by types of foods. Put non-starchy vegetables on half the plate, meat or other protein food on one-quarter of the plate, and a grain or starchy vegetable in the final quarter of the plate. To this you can add a small piece of fruit and 1 cup of milk or yogurt, depending on how many carbs you are supposed to eat at a meal.  · Try to eat about the same amount of carbs at each meal. Do not \"save up\" your daily allowance of carbs to eat at one meal.  · Proteins have very little or no carbs per serving. Examples of proteins are beef, chicken, turkey, fish, eggs, tofu, cheese, cottage cheese, and peanut butter. A serving size of meat is 3 ounces, which is about the size of a deck of cards. Examples of meat substitute serving sizes (equal to 1 ounce of meat) are 1/4 cup of cottage cheese, 1 egg, 1 tablespoon of peanut butter, and ½ cup of tofu. How can you eat out and still eat healthy? · Learn to estimate the serving sizes of foods that have carbohydrate. If you measure food at home, it will be easier to estimate the amount in a serving of restaurant food. · If the meal you order has too much carbohydrate (such as potatoes, corn, or baked beans), ask to have a low-carbohydrate food instead. Ask for a salad or green vegetables. · If you use insulin, check your blood sugar before and after eating out to help you plan how much to eat in the future. · If you eat more carbohydrate at a meal than you had planned, take a walk or do other exercise. This will help lower your blood sugar. What else should you know? · Limit saturated fat, such as the fat from meat and dairy products. This is a healthy choice because people who have diabetes are at higher risk of heart disease. So choose lean cuts of meat and nonfat or low-fat dairy products.  Use olive or canola oil instead of butter or shortening weight safely means balancing protein, fat, and carbs with every meal and snack. And low-carb diets don't always provide the vitamins, minerals, and fiber you need. If you have a serious medical condition, talk to your doctor before you try any diet. These conditions include kidney disease, heart disease, type 2 diabetes, high cholesterol, and high blood pressure. If you are pregnant, it may not be safe for your baby if you are on a low-carb diet. How can you lose weight safely? You might have heard that a diet plan helped another person lose weight. But that doesn't mean that it will work for you. It is very hard to stay on a diet that includes lots of big changes in your eating habits. If you want to get to a healthy weight and stay there, making healthy lifestyle changes will often work better than dieting. These steps can help. · Make a plan for change. Work with your doctor to create a plan that is right for you. · See a dietitian. He or she can show you how to make healthy changes in your eating habits. · Manage stress. If you have a lot of stress in your life, it can be hard to focus on making healthy changes to your daily habits. · Track your food and activity. You are likely to do better at losing weight if you keep track of what you eat and what you do. Follow-up care is a key part of your treatment and safety. Be sure to make and go to all appointments, and call your doctor if you are having problems. It's also a good idea to know your test results and keep a list of the medicines you take. Where can you learn more? Go to [email or web address] [email or web address].NOZA. org and sign in to your directworx account. Enter A121 in the [facility] box to learn more about \"Learning About Low-Carbohydrate Diets for Weight Loss. \"     If you do not have an account, please click on the \"Sign Up Now\" link. Current as of: August 21, 2019Content Version: 12.4  © 5556-5063 [employer], [facility].   Care instructions adapted under license by [facility] ([place] campus). If you have questions about a medical condition or this instruction, always ask your healthcare professional. [address] any warranty or liability for your use of this information.

## 2020-05-13 ENCOUNTER — HOSPITAL ENCOUNTER (OUTPATIENT)
Age: 70
Discharge: HOME | End: 2020-05-13
Payer: MEDICARE

## 2020-05-13 ENCOUNTER — TELEPHONE (OUTPATIENT)
Dept: FAMILY MEDICINE CLINIC | Age: 70
End: 2020-05-13

## 2020-05-13 PROBLEM — R94.31 ABNORMAL EKG: Status: ACTIVE | Noted: 2020-05-13

## 2020-05-13 PROCEDURE — 93005 ELECTROCARDIOGRAM TRACING: CPT

## 2020-05-14 LAB
EKG ATRIAL RATE: 114 BPM
EKG P AXIS: 53 DEGREES
EKG P-R INTERVAL: 134 MS
EKG Q-T INTERVAL: 314 MS
EKG QRS DURATION: 80 MS
EKG QTC CALCULATION (BAZETT): 432 MS
EKG R AXIS: 72 DEGREES
EKG T AXIS: 46 DEGREES
EKG VENTRICULAR RATE: 114 BPM

## 2020-05-14 PROCEDURE — 93010 ELECTROCARDIOGRAM REPORT: CPT | Performed by: INTERNAL MEDICINE

## 2020-05-22 ENCOUNTER — OFFICE VISIT (OUTPATIENT)
Dept: GASTROENTEROLOGY | Age: 70
End: 2020-05-22
Payer: MEDICARE

## 2020-05-22 VITALS — WEIGHT: 203.3 LBS | BODY MASS INDEX: 30.02 KG/M2

## 2020-05-22 PROBLEM — R10.30 LOWER ABDOMINAL PAIN: Status: ACTIVE | Noted: 2020-05-22

## 2020-05-22 PROCEDURE — 3017F COLORECTAL CA SCREEN DOC REV: CPT | Performed by: INTERNAL MEDICINE

## 2020-05-22 PROCEDURE — G8417 CALC BMI ABV UP PARAM F/U: HCPCS | Performed by: INTERNAL MEDICINE

## 2020-05-22 PROCEDURE — 99214 OFFICE O/P EST MOD 30 MIN: CPT | Performed by: INTERNAL MEDICINE

## 2020-05-22 PROCEDURE — G8427 DOCREV CUR MEDS BY ELIG CLIN: HCPCS | Performed by: INTERNAL MEDICINE

## 2020-05-22 PROCEDURE — 1036F TOBACCO NON-USER: CPT | Performed by: INTERNAL MEDICINE

## 2020-05-22 PROCEDURE — 1123F ACP DISCUSS/DSCN MKR DOCD: CPT | Performed by: INTERNAL MEDICINE

## 2020-05-22 PROCEDURE — 4040F PNEUMOC VAC/ADMIN/RCVD: CPT | Performed by: INTERNAL MEDICINE

## 2020-05-22 ASSESSMENT — ENCOUNTER SYMPTOMS
BLOOD IN STOOL: 0
VOMITING: 0
NAUSEA: 0
TROUBLE SWALLOWING: 0
ALLERGIC/IMMUNOLOGIC NEGATIVE: 1
RECTAL PAIN: 0
CONSTIPATION: 0
RESPIRATORY NEGATIVE: 1
ANAL BLEEDING: 0
DIARRHEA: 0
ABDOMINAL PAIN: 1
ABDOMINAL DISTENTION: 1
BACK PAIN: 1

## 2020-05-22 NOTE — PROGRESS NOTES
ILLNESS: Fredy Borjas is a 81 y.o. male with a past history remarkable for , referred for evaluation of   Chief Complaint   Patient presents with    Hepatitis C     Finished [place] in Dec 2019    Abdominal Pain     said cramping with diarrhea   . Past Medical,Family, and Social History reviewed and does contribute to the patient presenting condition. Patient's PMH/PSH,SH,PSYCH Hx, MEDs, ALLERGIES, and ROS were all reviewed and updated in the appropriate sections.     PAST MEDICAL HISTORY:  Past Medical History:   Diagnosis Date    Anemia     Arthritis     osteoarthritis    Colon polyps 10/08/2019    tubular adenoma x2    Essential hypertension 5/12/2020    Hep C w/o coma, chronic (HCC)     Hepatitis B core antibody positive     Hyperlipidemia     Type 2 diabetes mellitus with hyperglycemia, without long-term current use of insulin ([facility] [address] 5/12/2020       Past Surgical History:   Procedure Laterality Date    BACK SURGERY  1977    cervical spine three times    CHOLECYSTECTOMY  03/22/2019    COLONOSCOPY  01/25/2015    10 yr recall, hemorrhoids    COLONOSCOPY N/A 10/8/2019    tubular adenoma x2    DENTAL SURGERY  10/2015    all teeth extracted    SHOULDER SURGERY Right     UMBILICAL HERNIA REPAIR  3022-19    UPPER GASTROINTESTINAL ENDOSCOPY  01/25/2015    UPPER GASTROINTESTINAL ENDOSCOPY N/A 10/8/2019    EGD BIOPSY performed by Perla Herrera MD at [address]:    Current Outpatient Medications:     metoprolol tartrate (LOPRESSOR) 25 MG tablet, Take 1 tablet by mouth 2 times daily, Disp: 60 tablet, Rfl: 5    canagliflozin (INVOKANA) 100 MG TABS tablet, Take 1 tablet by mouth every morning (before breakfast) For diabetes, Disp: 90 tablet, Rfl: 5    pravastatin (PRAVACHOL) 40 MG tablet, Take 1 tablet by mouth every evening Stop Gemfibrozil, Disp: 90 tablet, Rfl: 3    metFORMIN (GLUCOPHAGE) 1000 MG tablet, TK 1 T PO BID, Disp: , Rfl:     Alcohol Swabs (B-D SINGLE USE Smoking status: Former Smoker     Packs/day: 0.50     Years: 45.00     Pack years: 22.50     Types: Cigarettes     Last attempt to quit: 2015     Years since quittin.4    Smokeless tobacco: Never Used    Tobacco comment: on chantix 11-6-15   12-7-15   Substance and Sexual Activity    Alcohol use: No    Drug use: No    Sexual activity: Yes     Partners: Female   Lifestyle    Physical activity     Days per week: Not on file     Minutes per session: Not on file    Stress: Not on file   Relationships    Social connections     Talks on phone: Not on file     Gets together: Not on file     Attends Jain service: Not on file     Active member of club or organization: Not on file     Attends meetings of clubs or organizations: Not on file     Relationship status: Not on file    Intimate partner violence     Fear of current or ex partner: Not on file     Emotionally abused: Not on file     Physically abused: Not on file     Forced sexual activity: Not on file   Other Topics Concern    Not on file   Social History Narrative    Not on file         REVIEW OF SYSTEMS:         Review of Systems   Constitutional: Negative. Negative for fatigue and fever. HENT: Negative. Negative for trouble swallowing. Eyes: Positive for visual disturbance. Respiratory: Negative. Cardiovascular: Negative. Gastrointestinal: Positive for abdominal distention and abdominal pain. Negative for anal bleeding, blood in stool, constipation, diarrhea, nausea, rectal pain and vomiting. Endocrine: Negative. Genitourinary: Negative. Musculoskeletal: Positive for back pain. Skin: Negative. Allergic/Immunologic: Negative. Neurological: Positive for headaches. Negative for dizziness, weakness and light-headedness. Hematological: Negative. Psychiatric/Behavioral: Positive for sleep disturbance. The patient is nervous/anxious.         PHYSICAL EXAMINATION: Vital signs reviewed per the nursing

## 2020-06-09 ENCOUNTER — TELEPHONE (OUTPATIENT)
Dept: GASTROENTEROLOGY | Age: 70
End: 2020-06-09

## 2020-06-10 ENCOUNTER — HOSPITAL ENCOUNTER (OUTPATIENT)
Dept: PAIN MANAGEMENT | Age: 70
Discharge: HOME | End: 2020-06-10
Payer: MEDICARE

## 2020-06-10 PROCEDURE — 99213 OFFICE O/P EST LOW 20 MIN: CPT

## 2020-06-10 PROCEDURE — 99442 PR PHYS/QHP TELEPHONE EVALUATION 11-20 MIN: CPT | Performed by: NURSE PRACTITIONER

## 2020-06-10 RX ORDER — MORPHINE SULFATE 60 MG/1
60 TABLET, FILM COATED, EXTENDED RELEASE ORAL 2 TIMES DAILY
Qty: 60 TABLET | Refills: 0 | Status: SHIPPED | OUTPATIENT
Start: 2020-06-12 | End: 2020-07-10 | Stop reason: SDUPTHER

## 2020-06-10 RX ORDER — OXYCODONE HYDROCHLORIDE AND ACETAMINOPHEN 5; 325 MG/1; MG/1
1 TABLET ORAL EVERY 8 HOURS
Qty: 90 TABLET | Refills: 0 | Status: SHIPPED | OUTPATIENT
Start: 2020-06-12 | End: 2020-07-10 | Stop reason: SDUPTHER

## 2020-06-10 ASSESSMENT — ENCOUNTER SYMPTOMS
RESPIRATORY NEGATIVE: 1
BACK PAIN: 1
EYES NEGATIVE: 1
GASTROINTESTINAL NEGATIVE: 1

## 2020-06-10 NOTE — PROGRESS NOTES
[facility]       Allergies   Allergen Reactions    Asa [Aspirin]       iron deficiency anemia , requiring iron infusions and transfusions    Iron      Pill- constipation, abdominal pain    Nsaids       iron deficiency anemia, history of bleeding ulcers          Current Outpatient Medications:     metoprolol tartrate (LOPRESSOR) 25 MG tablet, Take 1 tablet by mouth 2 times daily, Disp: 60 tablet, Rfl: 5    canagliflozin (INVOKANA) 100 MG TABS tablet, Take 1 tablet by mouth every morning (before breakfast) For diabetes, Disp: 90 tablet, Rfl: 5    pravastatin (PRAVACHOL) 40 MG tablet, Take 1 tablet by mouth every evening Stop Gemfibrozil, Disp: 90 tablet, Rfl: 3    metFORMIN (GLUCOPHAGE) 1000 MG tablet, TK 1 T PO BID, Disp: , Rfl:     morphine (MS CONTIN) 60 MG extended release tablet, Take 1 tablet by mouth 2 times daily for 30 days. , Disp: 60 tablet, Rfl: 0    oxyCODONE-acetaminophen (PERCOCET) 5-325 MG per tablet, Take 1 tablet by mouth every 8 hours for 30 days. , Disp: 90 tablet, Rfl: 0    pregabalin (LYRICA) 150 MG capsule, Take 1 capsule by mouth 3 times daily for 90 days. , Disp: 90 capsule, Rfl: 2    lisinopril-hydroCHLOROthiazide (PRINZIDE;ZESTORETIC) 10-12.5 MG per tablet, Take 1 tablet by mouth daily, Disp: , Rfl:     pantoprazole (PROTONIX) 40 MG tablet, TK 1 T PO  QD, Disp: , Rfl: 0    Alcohol Swabs (B-D SINGLE USE SWABS REGULAR) PADS, USE TO CHECK BLOOD SUGAR TWICE A DAY, Disp: , Rfl:     Blood Glucose Monitoring Suppl (TRUE METRIX METER) w/Device KIT, USE AS DIRECTED TO TEST BLOOD SUGAR, Disp: , Rfl:     TRUE METRIX BLOOD GLUCOSE TEST strip, CHECK BLOOD SUGAR BID, Disp: , Rfl:     TRUEplus Lancets 30G MISC, USE TO CHECK BLOOD SUGAR BID, Disp: , Rfl:     hydrOXYzine (VISTARIL) 25 MG capsule, hydroxyzine pamoate 25 mg capsule, Disp: , Rfl:     BD INTEGRA SYRINGE 25G X 1\" 3 ML MISC, , Disp: , Rfl: 4    Family History   Problem Relation Age of Onset    Diabetes Mother     Heart Disease as tolerated. Counseled patient on effects their pain medication and /or their medical condition mayhave on their  ability to drive or operate machinery. Instructed not to drive or operate machinery if drowsy     I also discussed with the patient regarding the dangers of combining narcotic pain medication with tranquilizers, alcohol or illegal drugs or taking the medication any way other than prescribed. The dangers were discussed  including respiratory depression and death. Patient was told to tell  all  physicians regarding the medications he is getting from pain clinic. Patient is warned not to take any unprescribed medications over-the-countermedications that can depress breathing . Patient is advised to talk to the pharmacist or physicians if planning to take any over-the-counter medications before  takeing them. Patient is strongly advised to avoid tranquilizers or  relaxants, illegal drugs  or any medications that can depress breathing  Patient is also advised to tell us if there is any changes in their medications from other physicians.     Location:  patient  at   home    , provider working from home  Phone call: 11 minutes    TREATMENT OPTIONS:       Medication Agreement Requirements Met  Continue Opioid therapy  Script written for ms continbrady  Follow up appointment made

## 2020-06-17 ENCOUNTER — TELEPHONE (OUTPATIENT)
Dept: GASTROENTEROLOGY | Age: 70
End: 2020-06-17

## 2020-06-19 ENCOUNTER — INITIAL CONSULT (OUTPATIENT)
Dept: BARIATRICS/WEIGHT MGMT | Age: 70
End: 2020-06-19
Payer: MEDICARE

## 2020-06-19 VITALS
DIASTOLIC BLOOD PRESSURE: 68 MMHG | HEART RATE: 68 BPM | HEIGHT: 69 IN | SYSTOLIC BLOOD PRESSURE: 112 MMHG | TEMPERATURE: 97.2 F | RESPIRATION RATE: 20 BRPM | WEIGHT: 203 LBS | BODY MASS INDEX: 30.07 KG/M2

## 2020-06-19 PROCEDURE — G8427 DOCREV CUR MEDS BY ELIG CLIN: HCPCS | Performed by: SURGERY

## 2020-06-19 PROCEDURE — 99204 OFFICE O/P NEW MOD 45 MIN: CPT | Performed by: SURGERY

## 2020-06-19 PROCEDURE — 1123F ACP DISCUSS/DSCN MKR DOCD: CPT | Performed by: SURGERY

## 2020-06-19 PROCEDURE — G8417 CALC BMI ABV UP PARAM F/U: HCPCS | Performed by: SURGERY

## 2020-06-19 PROCEDURE — 1036F TOBACCO NON-USER: CPT | Performed by: SURGERY

## 2020-06-19 PROCEDURE — 3017F COLORECTAL CA SCREEN DOC REV: CPT | Performed by: SURGERY

## 2020-06-19 PROCEDURE — 4040F PNEUMOC VAC/ADMIN/RCVD: CPT | Performed by: SURGERY

## 2020-06-19 NOTE — Clinical Note
We are getting [facility] set up for incisional hernia repair.   Thank you,  Stephen Ren

## 2020-06-28 NOTE — PROGRESS NOTES
[facility]  [facility]  [address] [place] [address] [place]  [address] [place] [id number]  Dept: [phone]    ROBOTIC & MINIMALLY INVASIVE SURGERY  PROGRESS NOTE INITIAL EVALUATION     Patient: Caitlin Moreno        Service Date: 6/19/2020      HPI:     CC: Incisional Hernia    The patient is a pleasant 81y.o. year old male  with an incisional hernia after cholecystectomy, who stands Height: 5' 9.02\" (175.3 cm) tall with a weight of Weight: 203 lb (92.1 kg) , resulting in a BMI of Body mass index is 29.96 kg/m². He had cholecystectomy approximately 1 year prior. The incision site at his umbilicus has enlarged. He states it is now causing discomfort. No nausea, vomiting, fevers/chills, diarrhea or constipation. He would like the hernia repair. He does have GERD, however, would like to focus on the incisional hernia at this time. The patient denies  a history of myocardial infarction, deep vein thrombosis, pulmonary embolism, renal failure and stroke.         Medical History:  Past Medical History:   Diagnosis Date    Anemia     Arthritis     osteoarthritis    Colon polyps 10/08/2019    tubular adenoma x2    Essential hypertension 5/12/2020    Hep C w/o coma, chronic (HCC)     Hepatitis B core antibody positive     Hyperlipidemia     Type 2 diabetes mellitus with hyperglycemia, without long-term current use of insulin ([facility] [address] 5/12/2020       Surgical History:  Past Surgical History:   Procedure Laterality Date    BACK SURGERY  1977    cervical spine three times    CHOLECYSTECTOMY  03/22/2019    COLONOSCOPY  01/25/2015    10 yr recall, hemorrhoids    COLONOSCOPY N/A 10/8/2019    tubular adenoma x2    DENTAL SURGERY  10/2015    all teeth extracted    SHOULDER SURGERY Right     UMBILICAL HERNIA REPAIR  3022-19    UPPER GASTROINTESTINAL ENDOSCOPY  01/25/2015    UPPER GASTROINTESTINAL ENDOSCOPY N/A 10/8/2019    EGD BIOPSY performed by Yvette Ramirez MD at [facility] History:      Problem Relation Age of Onset    Diabetes Mother     Heart Disease Father        Social History:   Social History     Tobacco Use    Smoking status: Former Smoker     Packs/day: 0.50     Years: 45.00     Pack years: 22.50     Types: Cigarettes     Last attempt to quit: 2015     Years since quittin.5    Smokeless tobacco: Never Used    Tobacco comment: on chantix 11-6-15   12-7-15   Substance Use Topics    Alcohol use: No    Drug use: No       Current Med List:  Current Outpatient Medications   Medication Sig Dispense Refill    metFORMIN (GLUCOPHAGE) 1000 MG tablet Take 1 tablet by mouth 2 times daily (with meals) 180 tablet 1    morphine (MS CONTIN) 60 MG extended release tablet Take 1 tablet by mouth 2 times daily for 30 days. 60 tablet 0    oxyCODONE-acetaminophen (PERCOCET) 5-325 MG per tablet Take 1 tablet by mouth every 8 hours for 30 days. 90 tablet 0    metoprolol tartrate (LOPRESSOR) 25 MG tablet Take 1 tablet by mouth 2 times daily 60 tablet 5    canagliflozin (INVOKANA) 100 MG TABS tablet Take 1 tablet by mouth every morning (before breakfast) For diabetes 90 tablet 5    pravastatin (PRAVACHOL) 40 MG tablet Take 1 tablet by mouth every evening Stop Gemfibrozil 90 tablet 3    Alcohol Swabs (B-D SINGLE USE SWABS REGULAR) PADS USE TO CHECK BLOOD SUGAR TWICE A DAY      Blood Glucose Monitoring Suppl (TRUE METRIX METER) w/Device KIT USE AS DIRECTED TO TEST BLOOD SUGAR      TRUE METRIX BLOOD GLUCOSE TEST strip CHECK BLOOD SUGAR BID      TRUEplus Lancets 30G MISC USE TO CHECK BLOOD SUGAR BID      pregabalin (LYRICA) 150 MG capsule Take 1 capsule by mouth 3 times daily for 90 days.  90 capsule 2    lisinopril-hydroCHLOROthiazide (PRINZIDE;ZESTORETIC) 10-12.5 MG per tablet Take 1 tablet by mouth daily      hydrOXYzine (VISTARIL) 25 MG capsule hydroxyzine pamoate 25 mg capsule      BD INTEGRA SYRINGE 25G X 1\" 3 ML MISC   4    pantoprazole (PROTONIX) 40 MG tablet TK 1 T PO QD  0     No current facility-administered medications for this visit. Allergies   Allergen Reactions    Asa [Aspirin]       iron deficiency anemia , requiring iron infusions and transfusions    Iron      Pill- constipation, abdominal pain    Nsaids       iron deficiency anemia, history of bleeding ulcers        SOCIAL:      This patient is alone for the evaluation today. [] HIV Risk Factors (i.e.) intravenous drug abuser; at risk sexual behavior; received blood products    [] TB Risk Factors (i.e.) Medically underserved, institutional care, foreign born, endemic area; exposure to active case    [] Hepatitis B&C Risk Factors (i.e.) Received blood transfusion prior to 1992; recreational drug use; high risk sexual behaviors; tattoos or body piercings; contact with blood or needle sticks in the workplace    Comprehension    Ability to grasp concepts and respond to questions:   [x] High   [] Medium   [] Low    Motivation    [x] Asks Questions; eager to learn   [] Needs education   [] Extreme anxiety    [] uncooperative   [] Denies need for education    English Speaking Ability    [x] Speaks English well   [x] Reads English well   [x] Understands spoken english    [x] Understands written English   [x] No need for interpretive support      [] Might benefit from interpretive support   []  required for all services     REVIEW OF SYSTEMS: (Negative unless marked otherwise)   Do you or have you had any of the following?   Cardiovascular YES NO Respiratory YES NO   High Blood Pressure   []   [] COPD   []   []   Heart Attack   []   [] TB/Positive skin Test   []   []   Congestive Heart Failure   []   [] Obstructive Sleep Apnea   []   []   Coronary Artery Disease   []   [] Asthma   []   [x]   Circulation Problems   []   []      Activity Intolerance   []   [] Gastrointestinal YES NO   Peripheral Vascular Disease   []   [] Gastric Problems   []   []        Colorectal problems   []   []   Hematological YES NO Ulcer disease   []   []   Bleeding Tendencies   []   [] Liver disease   []   []   Blood Transfusion last 30d   []   [] Gallstones   []   []   Anemia   [x]   [] Refulx or Heartburn   [x]   []   Blood Clots   []   []      High Cholesterol   []   [] Muscoloskeletal YES NO   High Triglycerides   []   [] Joint Limitations   []   []      Muscle Weakness   []   []   Eyes, Ears, Nose, Throat YES NO Multiple Sclerosis   []   []   Cataracts   []   [] Arthritis   []   []   Glasses   [x]   []      Blurred Vision   []   [] Cancer   []   []   Hearing Aids   []   [] Type:     Ringing in Ears   [x]   []      Difficulty Swallowing   []   [] Encodrine YES NO      Diabetes   [x]   []   Neurological YES NO Thyroid   []   []   Stroke   []   []      Seizure   []   [] Psychiatric Disorder YES NO   Dizziness/Blackouts/Fainting   []   [] Depression   []   []   Memory Impairement   []   [] Bipolar   []   []   Parkinson's   []   [] Anxiety disorder   []   []           Genitourinary/Gyn YES NO Skin Intact   [x]   []   Urinary Infection   []   [] Rash  x   Stones   []   []      Kidney Disease   []   []      Incontinent   []   []                                PRESENT ILLNESS:     Weight Parameters  Weight 203 lb (92.1 kg)   Height 5' 9.02\" (1.753 m)   BMI Body mass index is 29.96 kg/m².    IBW     EBW               IMMUNIZATION STATUS  Immunization History   Administered Date(s) Administered    Hepatitis A Adult (Havrix, Vaqta) 09/24/2019, 04/30/2020    Influenza Virus Vaccine 10/01/2015, 11/01/2016    Pneumococcal Conjugate 13-valent Claudia Julien) 02/02/2016    Zoster Live (Zostavax) 11/01/2015       FALLS ASSESSMENT    [x] LOW RISK FOR FALLS    [] MODERATE RISK FOR FALLS    [] Difficulty walking/selfcare    [] Falls in the past 2 months    [] Suspicion of Clinician    [] Other:      SMOKING CESSATION     [x] Not needed     [] Instructed to stop smoking    [] Pamphlet community resources given     VTE SCREEN    [] Family hx DVT/PE  /   [] Personal hx of DVT/PE    [x] Denies any family or personal hx of DVT/PE    Physician Review    [x] Past medical, family, & social history reviewed and discussed with patient. Review of surgery and post-surgical changes (by surgeon for surgical patients only)    [x] Lifelong diet expectations reviewed with patient    PHYSICAL EXAMINATION:      /68 (Site: Right Upper Arm, Position: Sitting, Cuff Size: Medium Adult)   Pulse 68   Temp 97.2 °F (36.2 °C) (Infrared)   Resp 20   Ht 5' 9.02\" (1.753 m)   Wt 203 lb (92.1 kg)   BMI 29.96 kg/m²     Constitutional:  Vital signs are normal. The patient appears well-developed   HEENT:      Head: Normocephalic. Atraumatic     Eyes: pupils are equal and reactive. No scleral icterus is present. Neck: No mass and no thyromegaly present. Cardiovascular: Normal rate, regular rhythm, S1 normal and S2 normal.  Bilateral pulses present. Pulmonary/Chest: Effort normal and breath sounds normal. No retractions. Abdominal: Soft. Normal appearance. There is no organomegaly. No tenderness. There is no rigidity, no rebound, no guarding and no Bradley's sign. Reducible incisional hernia at umbilicus  Musculoskeletal:      Right lower leg: Normal. No tenderness and no edema. Left lower leg: Normal. No tenderness and no edema. Lymphadenopathy:     No cervical adenopathy, No Exrtemity Adenopathy. Neurological: The patient is alert and oriented. Moving all four extremities equally, sensation grossly intact bilateral.  Skin: Skin is warm, dry and intact. Psychiatric: The patient has a normal mood and affect.  Speech is normal and behavior is normal. Judgment and thought content normal. Cognition and memory are normal.     RECOMMENDATIONS:     We spent a great deal of time discussing the risks and benefits of Robotic/Laparoscopic Incisional Hernia repair with mesh, possible open, including but not limited to injury to intra-abdominal organs, breakdown of the hernia or

## 2020-07-07 ENCOUNTER — HOSPITAL ENCOUNTER (OUTPATIENT)
Age: 70
Discharge: HOME | End: 2020-07-07
Payer: MEDICARE

## 2020-07-07 ENCOUNTER — OFFICE VISIT (OUTPATIENT)
Dept: FAMILY MEDICINE CLINIC | Age: 70
End: 2020-07-07
Payer: MEDICARE

## 2020-07-07 VITALS
HEIGHT: 69 IN | TEMPERATURE: 97.9 F | WEIGHT: 205 LBS | OXYGEN SATURATION: 95 % | BODY MASS INDEX: 30.36 KG/M2 | HEART RATE: 86 BPM | DIASTOLIC BLOOD PRESSURE: 60 MMHG | SYSTOLIC BLOOD PRESSURE: 110 MMHG

## 2020-07-07 PROBLEM — R00.0 SINUS TACHYCARDIA: Status: RESOLVED | Noted: 2020-05-12 | Resolved: 2020-07-07

## 2020-07-07 PROBLEM — R10.30 LOWER ABDOMINAL PAIN: Status: RESOLVED | Noted: 2020-05-22 | Resolved: 2020-07-07

## 2020-07-07 LAB
ABSOLUTE EOS #: 0 K/UL (ref 0–0.4)
ABSOLUTE IMMATURE GRANULOCYTE: ABNORMAL K/UL (ref 0–0.3)
ABSOLUTE LYMPH #: 1.4 K/UL (ref 1–4.8)
ABSOLUTE MONO #: 0.5 K/UL (ref 0.1–1.3)
ALBUMIN SERPL-MCNC: 3.8 G/DL (ref 3.5–5.2)
ALBUMIN/GLOBULIN RATIO: ABNORMAL (ref 1–2.5)
ALP BLD-CCNC: 87 U/L (ref 40–129)
ALT SERPL-CCNC: 16 U/L (ref 5–41)
ANION GAP SERPL CALCULATED.3IONS-SCNC: 11 MMOL/L (ref 9–17)
AST SERPL-CCNC: 18 U/L
BASOPHILS # BLD: 1 % (ref 0–2)
BASOPHILS ABSOLUTE: 0 K/UL (ref 0–0.2)
BILIRUB SERPL-MCNC: 0.25 MG/DL (ref 0.3–1.2)
BUN BLDV-MCNC: 21 MG/DL (ref 8–23)
BUN/CREAT BLD: ABNORMAL (ref 9–20)
CALCIUM SERPL-MCNC: 9.3 MG/DL (ref 8.6–10.4)
CHLORIDE BLD-SCNC: 102 MMOL/L (ref 98–107)
CO2: 25 MMOL/L (ref 20–31)
CREAT SERPL-MCNC: 0.82 MG/DL (ref 0.7–1.2)
DIFFERENTIAL TYPE: ABNORMAL
EOSINOPHILS RELATIVE PERCENT: 1 % (ref 0–4)
FERRITIN: 10 UG/L (ref 30–400)
FOLATE: 10.3 NG/ML
GFR AFRICAN AMERICAN: >60 ML/MIN
GFR NON-AFRICAN AMERICAN: >60 ML/MIN
GFR SERPL CREATININE-BSD FRML MDRD: ABNORMAL ML/MIN/{1.73_M2}
GFR SERPL CREATININE-BSD FRML MDRD: ABNORMAL ML/MIN/{1.73_M2}
GLUCOSE BLD-MCNC: 159 MG/DL (ref 70–99)
HCT VFR BLD CALC: 36.5 % (ref 41–53)
HEMOGLOBIN: 11.5 G/DL (ref 13.5–17.5)
IMMATURE GRANULOCYTES: ABNORMAL %
IRON SATURATION: 8 % (ref 20–55)
IRON: 28 UG/DL (ref 59–158)
LYMPHOCYTES # BLD: 21 % (ref 24–44)
MCH RBC QN AUTO: 25.1 PG (ref 26–34)
MCHC RBC AUTO-ENTMCNC: 31.5 G/DL (ref 31–37)
MCV RBC AUTO: 79.6 FL (ref 80–100)
MONOCYTES # BLD: 7 % (ref 1–7)
NRBC AUTOMATED: ABNORMAL PER 100 WBC
PDW BLD-RTO: 18 % (ref 11.5–14.9)
PLATELET # BLD: 270 K/UL (ref 150–450)
PLATELET ESTIMATE: ABNORMAL
PMV BLD AUTO: 8.3 FL (ref 6–12)
POTASSIUM SERPL-SCNC: 4.4 MMOL/L (ref 3.7–5.3)
RBC # BLD: 4.59 M/UL (ref 4.5–5.9)
RBC # BLD: ABNORMAL 10*6/UL
SEG NEUTROPHILS: 70 % (ref 36–66)
SEGMENTED NEUTROPHILS ABSOLUTE COUNT: 4.9 K/UL (ref 1.3–9.1)
SODIUM BLD-SCNC: 138 MMOL/L (ref 135–144)
TOTAL IRON BINDING CAPACITY: 349 UG/DL (ref 250–450)
TOTAL PROTEIN: 6.7 G/DL (ref 6.4–8.3)
UNSATURATED IRON BINDING CAPACITY: 321 UG/DL (ref 112–347)
VITAMIN B-12: 214 PG/ML (ref 232–1245)
WBC # BLD: 6.8 K/UL (ref 3.5–11)
WBC # BLD: ABNORMAL 10*3/UL

## 2020-07-07 PROCEDURE — 85025 COMPLETE CBC W/AUTO DIFF WBC: CPT

## 2020-07-07 PROCEDURE — 1123F ACP DISCUSS/DSCN MKR DOCD: CPT | Performed by: FAMILY MEDICINE

## 2020-07-07 PROCEDURE — G8417 CALC BMI ABV UP PARAM F/U: HCPCS | Performed by: FAMILY MEDICINE

## 2020-07-07 PROCEDURE — 83550 IRON BINDING TEST: CPT

## 2020-07-07 PROCEDURE — 82728 ASSAY OF FERRITIN: CPT

## 2020-07-07 PROCEDURE — 36415 COLL VENOUS BLD VENIPUNCTURE: CPT

## 2020-07-07 PROCEDURE — 82746 ASSAY OF FOLIC ACID SERUM: CPT

## 2020-07-07 PROCEDURE — 80053 COMPREHEN METABOLIC PANEL: CPT

## 2020-07-07 PROCEDURE — 1036F TOBACCO NON-USER: CPT | Performed by: FAMILY MEDICINE

## 2020-07-07 PROCEDURE — 83540 ASSAY OF IRON: CPT

## 2020-07-07 PROCEDURE — 2022F DILAT RTA XM EVC RTNOPTHY: CPT | Performed by: FAMILY MEDICINE

## 2020-07-07 PROCEDURE — G8427 DOCREV CUR MEDS BY ELIG CLIN: HCPCS | Performed by: FAMILY MEDICINE

## 2020-07-07 PROCEDURE — 82607 VITAMIN B-12: CPT

## 2020-07-07 PROCEDURE — 3052F HG A1C>EQUAL 8.0%<EQUAL 9.0%: CPT | Performed by: FAMILY MEDICINE

## 2020-07-07 PROCEDURE — 4040F PNEUMOC VAC/ADMIN/RCVD: CPT | Performed by: FAMILY MEDICINE

## 2020-07-07 PROCEDURE — 99214 OFFICE O/P EST MOD 30 MIN: CPT | Performed by: FAMILY MEDICINE

## 2020-07-07 PROCEDURE — 3017F COLORECTAL CA SCREEN DOC REV: CPT | Performed by: FAMILY MEDICINE

## 2020-07-07 ASSESSMENT — ENCOUNTER SYMPTOMS
COUGH: 0
ABDOMINAL PAIN: 0
CHEST TIGHTNESS: 0
CONSTIPATION: 0
SHORTNESS OF BREATH: 0
WHEEZING: 0
BACK PAIN: 1
ABDOMINAL DISTENTION: 1
VOMITING: 0
DIARRHEA: 0
NAUSEA: 0

## 2020-07-07 NOTE — PROGRESS NOTES
Chief Complaint   Patient presents with    Hypertension    Other     didnt do lab work     Diabetes       Patient is here to follow-up on chronic  medical problems. Diabetes Mellitus Type II, Follow-up:    Current symptoms/problems include hyperglycemia and paresthesia of the feet. Symptoms have been present for several months. Patient was recently diagnosed with diabetes type 2 when his A1c went up to 8.8 and his home blood glucose as high as 300s. Lab Results   Component Value Date    LABA1C 8.8 (H) 03/25/2020    LABA1C 6.3 (H) 04/24/2019    LABA1C 5.6 02/16/2018       Known diabetic complications: peripheral neuropathy . Patient reports having neuropathy in his feet for a long time for which he has been taking Lyrica for pain management. He feels that Lyrica is not helping anymore. Prior blood work also shows low vitamin B12, he never completed the blood work I have ordered for him. Cardiovascular risk factors: advanced age (older than 54 for men, 72 for women), diabetes mellitus, dyslipidemia, hypertension, male gender and obesity (BMI >= 30 kg/m2)    Medication compliance:  compliant all of the time  Current diabetic medications include oral agents (dual therapy):  Metformin and Invokana    Eye exam current (within one year): yes  Weight trend: stable  Wt Readings from Last 3 Encounters:   07/07/20 205 lb (93 kg)   06/19/20 203 lb (92.1 kg)   05/22/20 203 lb 4.8 oz (92.2 kg)       Prior visit with dietician: no, I did refer him to diabetic education but he never went. Current diet: diabetic, low fat/ cholesterol, low salt  Current exercise: walking, but not exercising    Barriers: impairment:  physical: Chronic pain    Current monitoring regimen: home blood tests - 2 times daily  Home blood sugar records: fasting range: 150s through 180s  Any episodes of hypoglycemia? no    Is He on ACE inhibitor or angiotensin II receptor blocker? Yes      reports that he quit smoking about 4 years ago. His smoking use included cigarettes. He has a 22.50 pack-year smoking history. He has never used smokeless tobacco.     Counseling given: Yes  Comment: on chantix 11-6-15   12-7-15      Daily Aspirin? No, contraindicated, patient has significant history of iron deficiency, requiring iron infusions and transfusions throughout the years, he does have appointment with hematologist.  Will not start him on aspirin due to very high risk of bleeding    A1c is high, uncontrolled diabetes  Lab Results   Component Value Date    LABA1C 8.8 (H) 03/25/2020    LABA1C 6.3 (H) 04/24/2019    LABA1C 5.6 02/16/2018       Urine microabumin is Elevated. Lab Results   Component Value Date    LABMICR 45 (H) 04/24/2019          Hypertension:   Elvia Sierra  is not  exercising, but staying active, and is adherent to low salt diet. Blood pressure is well controlled at home. Cardiac symptoms  fatigue. Patient denies  chest pain, chest pressure/discomfort, claudication, dyspnea, exertional chest pressure/discomfort, irregular heart beat, lower extremity edema, near-syncope, orthopnea, palpitations, paroxysmal nocturnal dyspnea, syncope and tachypnea. Use of agents associated with hypertension: none. History of target organ damage: Likely coronary artery disease. EKG on 5/13/2020 showed possible anterior infarct, she was referred to cardiologist.  Dr. Arteaga Simpler him to do Stress test and [facility], he never scheduled them. Needs clearance for ventral incisional hernia repair      BP controlled. Elvia Sierra reports compliance with BP medications, and tolerates them well, denies side effects. BP Readings from Last 3 Encounters:   07/07/20 110/60   06/19/20 112/68   05/12/20 124/82        Pulse is Normal.    Pulse Readings from Last 3 Encounters:   07/07/20 86   06/19/20 68   05/12/20 150         Hyperlipidemia:  No new myalgias or GI upset on pravastatin (Pravachol). Medication compliance: compliant all of the time.  Patient is  following a low fat, low cholesterol diet. LDL is Normal.    Lab Results   Component Value Date    LDLCHOLESTEROL 59 04/24/2019     Lab Results   Component Value Date    CHOLHDLRATIO 4.9 04/24/2019    CHOLHDLRATIO 5.7 (H) 05/10/2017    CHOLHDLRATIO 8.8 (H) 08/01/2016         Has been on Medicare disabiliy for 7 yrs  Having Medicare since 75 yo    Patient has history of hepatitis C. Patient says he received treatment for hepatitis C and he follows with  [facility]  Denies nausea, vomiting, diarrhea or constipation. Denies abdominal pain. He is load was still high at [id number] on 9/10/2019    Evan Neely has Vitamin D deficiency. Evan Neely  is not taking Vitamin D supplementation   he feels tired. Lab Results   Component Value Date    VITD25 14.6 (L) 04/25/2014     Vitamin B12 deficiency   Evan Neely  is not taking Vitamin B12 supplementation. Evan Neely reports  numbness and tingling , and neuropathy in his feet not controlled by Lyrica anymore, he also says I am always tired . Lab Results   Component Value Date    FRMYSZHH04 581 08/01/2016     Anemia    Patient does follow-up with hematologist oncologist and he has appointment. He denies any black stools or blood in the stool at this time. He does have history of iron infusions and transfusions over the years due to severe anemia. Lab Results   Component Value Date    WBC 6.8 07/07/2020    HGB 11.5 (L) 07/07/2020    HCT 36.5 (L) 07/07/2020    MCV 79.6 (L) 07/07/2020     07/07/2020         Current Outpatient Medications   Medication Sig Dispense Refill    metFORMIN (GLUCOPHAGE) 1000 MG tablet Take 1 tablet by mouth 2 times daily (with meals) 180 tablet 1    morphine (MS CONTIN) 60 MG extended release tablet Take 1 tablet by mouth 2 times daily for 30 days. 60 tablet 0    oxyCODONE-acetaminophen (PERCOCET) 5-325 MG per tablet Take 1 tablet by mouth every 8 hours for 30 days.  90 tablet 0    metoprolol tartrate (LOPRESSOR) 25 MG tablet Take 1 tablet by mouth 2 times daily 60 tablet 5    canagliflozin (INVOKANA) 100 MG TABS tablet Take 1 tablet by mouth every morning (before breakfast) For diabetes 90 tablet 5    pravastatin (PRAVACHOL) 40 MG tablet Take 1 tablet by mouth every evening Stop Gemfibrozil 90 tablet 3    Alcohol Swabs (B-D SINGLE USE SWABS REGULAR) PADS USE TO CHECK BLOOD SUGAR TWICE A DAY      Blood Glucose Monitoring Suppl (TRUE METRIX METER) w/Device KIT USE AS DIRECTED TO TEST BLOOD SUGAR      TRUE METRIX BLOOD GLUCOSE TEST strip CHECK BLOOD SUGAR BID      TRUEplus Lancets 30G MISC USE TO CHECK BLOOD SUGAR BID      pregabalin (LYRICA) 150 MG capsule Take 1 capsule by mouth 3 times daily for 90 days. 90 capsule 2    lisinopril-hydroCHLOROthiazide (PRINZIDE;ZESTORETIC) 10-12.5 MG per tablet Take 1 tablet by mouth daily      hydrOXYzine (VISTARIL) 25 MG capsule hydroxyzine pamoate 25 mg capsule      BD INTEGRA SYRINGE 25G X 1\" 3 ML MISC   4    pantoprazole (PROTONIX) 40 MG tablet TK 1 T PO  QD  0     No current facility-administered medications for this visit. Rest of complaints with corresponding details per ROS      The patient'spast medical, surgical, social, and family history as well as his current medications and allergies were reviewed as documented in today's encounter. Social History     Tobacco Use    Smoking status: Former Smoker     Packs/day: 0.50     Years: 45.00     Pack years: 22.50     Types: Cigarettes     Last attempt to quit: 2015     Years since quittin.5    Smokeless tobacco: Never Used    Tobacco comment: on chantix 11-6-15   12-7-15   Substance Use Topics    Alcohol use: No    Drug use: No       Counseling given: Yes  Comment: on chantix 11-6-15   12-7-15                  Review of Systems   Constitutional: Positive for fatigue and unexpected weight change. Negative for activity change, appetite change, chills, diaphoresis and fever. Eyes: Positive for visual disturbance.    Respiratory: Negative for cough, tenderness. Abdominal:      General: Bowel sounds are normal. There is distension. Palpations: Abdomen is soft. There is no hepatomegaly or splenomegaly. Tenderness: There is no abdominal tenderness. Hernia: A hernia is present. Hernia is present in the ventral area. Comments: Obese abdomen. Large incisional ventral hernia on both sides of the midline surgical scar, reducible, nontender   Musculoskeletal: Normal range of motion. General: No tenderness. Lymphadenopathy:      Cervical: No cervical adenopathy. Skin:     General: Skin is warm and dry. Capillary Refill: Capillary refill takes less than 2 seconds. Findings: No rash. Neurological:      Mental Status: He is alert and oriented to person, place, and time. Deep Tendon Reflexes: Reflexes are normal and symmetric. Psychiatric:         Mood and Affect: Mood normal.         Behavior: Behavior normal.         Thought Content: Thought content normal.         Judgment: Judgment normal.       ASSESSMENT AND PLAN    1. Type 2 diabetes mellitus with hyperglycemia, without long-term current use of insulin (HCC)  Likely Improving   - CBC Auto Differential; Future  - Hemoglobin A1C; Future  - Vitamin B12 & Folate; Future  - Comprehensive Metabolic Panel; Future  - Microalbumin / Creatinine Urine Ratio; Future    -advised home blood glucose testing 1-2 times daily  -daily feet exam, Foot care: advised to wash feet daily, pat dry and apply lotion at night, avoiding between toes. Need to look at feet daily and report to a physician any signs of inflammation or skin damage  -annual dilated eye exam  -Low carb, low fat diet, increase fruits and vegetables, and exercise 4-5 times a day 30-40 minutes a day discussed  -continue current treatment  -continue ACEI and statin      2. Essential hypertension  Well controlled. Continue current treatment. Will recheck labs.    Discussed low salt diet and BP and pulse monitoring daily    - CBC Auto Differential; Future  - Comprehensive Metabolic Panel; Future    3. Hyperlipidemia with target LDL less than 100  Well controlled. Continue current treatment-pravastatin. Will recheck labs. - Lipid Panel; Future    4. Hep C w/o coma, chronic (HCC)  Likely improved  S/p antiviral treatment  Will recheck the load  - Hepatitis C Antibody; Future  Patient is immune against hepatitis B antibodies 182.60 on 9/10/2019    5. Vitamin D deficiency  Likely worsening  - Vitamin D 25 Hydroxy; Future    6. Vitamin B 12 deficiency  Likely worsening  - Vitamin B12 & Folate; Future    7. Anemia, unspecified type  Stable  - Iron and TIBC; Future  - Ferritin; Future  - Path Review, Smear; Future  Check E79 and folic acid levels  Follow-up with new hematologist oncologist as scheduled    We called [facility] cardiology and they advised the patient to call [facility] to schedule his stress test and echo 2D    We gave him contact info  The patient verbalizes understanding and agrees with the plan. Data Unavailable    Orders Placed This Encounter   Procedures    CBC Auto Differential     Standing Status:   Future     Standing Expiration Date:   12/7/2020    Hemoglobin A1C     Standing Status:   Future     Standing Expiration Date:   12/7/2020    Lipid Panel     Standing Status:   Future     Standing Expiration Date:   12/7/2020     Order Specific Question:   Is Patient Fasting?/# of Hours     Answer:   8-10 Hours, water ok to drink    Vitamin B12 & Folate     Standing Status:   Future     Standing Expiration Date:   12/7/2020    Vitamin D 25 Hydroxy     Standing Status:   Future     Standing Expiration Date:   12/7/2020    Iron and TIBC     Standing Status:   Future     Standing Expiration Date:   12/7/2020     Order Specific Question:   Is Patient Fasting? Answer:   yes     Order Specific Question:   No of Hours?      Answer:   8    Ferritin     Standing Status:   Future     Standing than 50% of this visit was counseling and education. Future Appointments   Date Time Provider Pippa Cazares   7/9/2020  8:30 AM STC NUC MED WHOLE BODY INJ RM STCZ NUC MED STC Radiolog   7/9/2020  9:00 AM STC NUC MED PORTABLE TC RM STCZ NUC MED STC Radiolog   7/9/2020 11:00 AM STC STRESS RM A STCZ STRESS [facility]   7/9/2020 12:30 PM STC NUC MED PORTABLE TC RM STCZ NUC MED STC Radiolog   7/10/2020  9:30 AM Rocio Pimentel APRN - CNP ST[facility]Z PAINMGT [facility]   7/22/2020 11:30 AM Emma Garcia MD [address] [place] [address]   8/13/2020 10:00 AM [facility] Gallagher MD [facility]   8/28/2020 12:00 PM Isabella Vidal MD [facility] [address] [facility]   11/10/2020  9:30 AM Romeo Gallagher MD [address]       This note was completed by using the assistance of a speech-recognition program. However, inadvertent computerized transcription errors may be present. Although every effort was made to ensure accuracy, no guarantees can be provided that every mistake has been identified and corrected by editing .     Electronically signed by Romeo Gallagher MD on 7/7/2020 at 7:25 PM

## 2020-07-07 NOTE — PATIENT INSTRUCTIONS
carbohydrate counting. · If you are not sure how to count carbohydrate grams, use the Plate Method to plan meals. It is a good, quick way to make sure that you have a balanced meal. It also helps you spread carbs throughout the day. ? Divide your plate by types of foods. Put non-starchy vegetables on half the plate, meat or other protein food on one-quarter of the plate, and a grain or starchy vegetable in the final quarter of the plate. To this you can add a small piece of fruit and 1 cup of milk or yogurt, depending on how many carbs you are supposed to eat at a meal.  · Try to eat about the same amount of carbs at each meal. Do not \"save up\" your daily allowance of carbs to eat at one meal.  · Proteins have very little or no carbs per serving. Examples of proteins are beef, chicken, turkey, fish, eggs, tofu, cheese, cottage cheese, and peanut butter. A serving size of meat is 3 ounces, which is about the size of a deck of cards. Examples of meat substitute serving sizes (equal to 1 ounce of meat) are 1/4 cup of cottage cheese, 1 egg, 1 tablespoon of peanut butter, and ½ cup of tofu. How can you eat out and still eat healthy? · Learn to estimate the serving sizes of foods that have carbohydrate. If you measure food at home, it will be easier to estimate the amount in a serving of restaurant food. · If the meal you order has too much carbohydrate (such as potatoes, corn, or baked beans), ask to have a low-carbohydrate food instead. Ask for a salad or green vegetables. · If you use insulin, check your blood sugar before and after eating out to help you plan how much to eat in the future. · If you eat more carbohydrate at a meal than you had planned, take a walk or do other exercise. This will help lower your blood sugar. What else should you know? · Limit saturated fat, such as the fat from meat and dairy products.  This is a healthy choice because people who have diabetes are at higher risk of heart disease. So choose lean cuts of meat and nonfat or low-fat dairy products. Use olive or canola oil instead of butter or shortening when cooking. · Don't skip meals. Your blood sugar may drop too low if you skip meals and take insulin or certain medicines for diabetes. · Check with your doctor before you drink alcohol. Alcohol can cause your blood sugar to drop too low. Alcohol can also cause a bad reaction if you take certain diabetes medicines. Follow-up care is a key part of your treatment and safety. Be sure to make and go to all appointments, and call your doctor if you are having problems. It's also a good idea to know your test results and keep a list of the medicines you take. Where can you learn more? Go to https://[employer] org and sign in to your [employer] account. Enter U767 in the [facility] box to learn more about \"Learning About Diabetes Food Guidelines. \"     If you do not have an account, please click on the \"Sign Up Now\" link. Current as of: December 20, 2019               Content Version: 12.5  © 1604-2176 Healthwise, Incorporated. Care instructions adapted under license by [facility] ([facility]). If you have questions about a medical condition or this instruction, always ask your healthcare professional. [address] any warranty or liability for your use of this information.

## 2020-07-07 NOTE — PROGRESS NOTES
Visit Information    Have you changed or started any medications since your last visit including any over-the-counter medicines, vitamins, or herbal medicines? no   Are you having any side effects from any of your medications? -  no  Have you stopped taking any of your medications? Is so, why? -  no    Have you seen any other physician or provider since your last visit? No  Have you had any other diagnostic tests since your last visit? No  Have you been seen in the emergency room and/or had an admission to a hospital since we last saw you? No  Have you had your routine dental cleaning in the past 6 months? no    Have you activated your [employer] account? If not, what are your barriers?  No:      Patient Care Team:  Leann Molina MD as PCP - General (Family Medicine)  Leann Molina MD as PCP - [address] [place] [address]  [facility] Provider  Will Martinez MD as Consulting Physician (Gastroenterology)  Linwood Main MD as Consulting Physician (Pain Management)  Maria Moya MD as Consulting Physician (Hematology and Oncology)  Su Lomax DO as Consulting Physician (Bariatric Surgery)  Michael Godoy DO as Consulting Physician (Cardiology)    Medical History Review  Past Medical, Family, and Social History reviewed and does contribute to the patient presenting condition    Health Maintenance   Topic Date Due    DTaP/Tdap/Td vaccine (1 - Tdap) 08/16/1971    Shingles Vaccine (2 of 3) 12/27/2015    Pneumococcal 65+ years Vaccine (2 of 2 - PPSV23) 08/16/2017    Annual Wellness Visit (AWV)  06/18/2019    Diabetic microalbuminuria test  04/24/2020    Lipid screen  04/24/2020    A1C test (Diabetic or Prediabetic)  06/25/2020    Flu vaccine (1) 09/01/2020    Potassium monitoring  03/18/2021    Creatinine monitoring  03/18/2021    Diabetic foot exam  05/12/2021    Diabetic retinal exam  05/13/2021    Colon cancer screen colonoscopy  10/08/2024    Hepatitis A vaccine  Completed    AAA screen  Completed    Hib vaccine  Aged Out    Meningococcal (ACWY) vaccine  Aged Out

## 2020-07-09 ENCOUNTER — HOSPITAL ENCOUNTER (OUTPATIENT)
Dept: NUCLEAR MEDICINE | Age: 70
Discharge: HOME | End: 2020-07-11
Payer: MEDICARE

## 2020-07-09 ENCOUNTER — HOSPITAL ENCOUNTER (OUTPATIENT)
Dept: NON INVASIVE DIAGNOSTICS | Age: 70
Discharge: HOME | End: 2020-07-09
Payer: MEDICARE

## 2020-07-09 VITALS
OXYGEN SATURATION: 93 % | WEIGHT: 204 LBS | SYSTOLIC BLOOD PRESSURE: 132 MMHG | DIASTOLIC BLOOD PRESSURE: 72 MMHG | HEIGHT: 69 IN | BODY MASS INDEX: 30.21 KG/M2 | HEART RATE: 95 BPM | RESPIRATION RATE: 16 BRPM

## 2020-07-09 LAB
LV EF: 70 %
LVEF MODALITY: NORMAL

## 2020-07-09 PROCEDURE — A9500 TC99M SESTAMIBI: HCPCS | Performed by: INTERNAL MEDICINE

## 2020-07-09 PROCEDURE — 93017 CV STRESS TEST TRACING ONLY: CPT

## 2020-07-09 PROCEDURE — 2580000003 HC RX 258: Performed by: INTERNAL MEDICINE

## 2020-07-09 PROCEDURE — 78452 HT MUSCLE IMAGE SPECT MULT: CPT

## 2020-07-09 PROCEDURE — 6360000002 HC RX W HCPCS: Performed by: INTERNAL MEDICINE

## 2020-07-09 PROCEDURE — 3430000000 HC RX DIAGNOSTIC RADIOPHARMACEUTICAL: Performed by: INTERNAL MEDICINE

## 2020-07-09 RX ORDER — SODIUM CHLORIDE 9 MG/ML
500 INJECTION, SOLUTION INTRAVENOUS CONTINUOUS PRN
Status: ACTIVE | OUTPATIENT
Start: 2020-07-09 | End: 2020-07-09

## 2020-07-09 RX ORDER — AMINOPHYLLINE DIHYDRATE 25 MG/ML
50 INJECTION, SOLUTION INTRAVENOUS PRN
Status: ACTIVE | OUTPATIENT
Start: 2020-07-09 | End: 2020-07-09

## 2020-07-09 RX ORDER — ATROPINE SULFATE 0.1 MG/ML
0.5 INJECTION INTRAVENOUS EVERY 5 MIN PRN
Status: ACTIVE | OUTPATIENT
Start: 2020-07-09 | End: 2020-07-09

## 2020-07-09 RX ORDER — METOPROLOL TARTRATE 5 MG/5ML
5 INJECTION INTRAVENOUS EVERY 5 MIN PRN
Status: ACTIVE | OUTPATIENT
Start: 2020-07-09 | End: 2020-07-09

## 2020-07-09 RX ORDER — ALBUTEROL SULFATE 90 UG/1
2 AEROSOL, METERED RESPIRATORY (INHALATION) PRN
Status: ACTIVE | OUTPATIENT
Start: 2020-07-09 | End: 2020-07-09

## 2020-07-09 RX ORDER — SODIUM CHLORIDE 0.9 % (FLUSH) 0.9 %
10 SYRINGE (ML) INJECTION PRN
Status: ACTIVE | OUTPATIENT
Start: 2020-07-09 | End: 2020-07-09

## 2020-07-09 RX ORDER — NITROGLYCERIN 0.4 MG/1
0.4 TABLET SUBLINGUAL EVERY 5 MIN PRN
Status: ACTIVE | OUTPATIENT
Start: 2020-07-09 | End: 2020-07-09

## 2020-07-09 RX ORDER — SODIUM CHLORIDE 0.9 % (FLUSH) 0.9 %
10 SYRINGE (ML) INJECTION PRN
Status: DISCONTINUED | OUTPATIENT
Start: 2020-07-09 | End: 2020-07-12 | Stop reason: HOSPADM

## 2020-07-09 RX ADMIN — Medication 10 ML: at 08:21

## 2020-07-09 RX ADMIN — Medication 10 ML: at 09:47

## 2020-07-09 RX ADMIN — Medication 10 ML: at 09:30

## 2020-07-09 RX ADMIN — TETRAKIS(2-METHOXYISOBUTYLISOCYANIDE)COPPER(I) TETRAFLUOROBORATE 12.8 MILLICURIE: 1 INJECTION, POWDER, LYOPHILIZED, FOR SOLUTION INTRAVENOUS at 08:21

## 2020-07-09 RX ADMIN — TETRAKIS(2-METHOXYISOBUTYLISOCYANIDE)COPPER(I) TETRAFLUOROBORATE 34.1 MILLICURIE: 1 INJECTION, POWDER, LYOPHILIZED, FOR SOLUTION INTRAVENOUS at 09:48

## 2020-07-09 RX ADMIN — REGADENOSON 0.4 MG: 0.08 INJECTION, SOLUTION INTRAVENOUS at 09:47

## 2020-07-09 NOTE — PROGRESS NOTES
PATIENT RETURNED TO BASELINE, /64, HR 98.  IV REMOVED AND PATIENT TO HAVE FINAL STRESS SCANS TO FOLLOW.

## 2020-07-09 NOTE — PROGRESS NOTES
PATIENT TO STRESS LAB, EXPLAINED LEXISCAN AND CONSENT SIGNED, PATIENT PLACED ON EKG AND VITALS MACHINE, IV FLUSHED WITH NORMAL SALINE, PATIENT DENIES CHEST PAIN AND SHORTNESS OF BREATH AT THIS TIME, PATIENT RESTING IN BED.   /72, HR 95, SPAO2 93% RA, NSR ON MONITOR

## 2020-07-09 NOTE — PROCEDURES
[address] [place], [place] [address]                              CARDIAC STRESS TEST    PATIENT NAME: Jared Hurtado                     :        1950  MED REC NO:   [id number]                              ROOM:  ACCOUNT NO:   [de-identified]                           ADMIT DATE: 2020  PROVIDER:     Victor Manuel Haynes    DATE OF STUDY:  2020    ORDERING PROVIDER:  QUAN ACLDERON DO    INTERPRETING PHYSICIAN:  FAMILIA Roth MD    LEXISCAN STRESS TEST    INDICATION:  CHEST PAIN    INTERPRETATION:  Resting heart rate:  95 bpm.  Resting blood pressure:  132/72 mmHg. Resting EKG:  Normal.  Sinus rhythm. Heart rate of 95 bpm.  Stress heart response:  Normal response. Blood pressure response:  Appropriate. Stress EKGs:  No changes seen. No chest pain during stress. No ischemic Ekg changes. IMPRESSION:  NEGATIVE LEXISCAN STRESS TEST. THE NUCLEAR SCAN TO FOLLOW.       [place]    D: 2020 14:17:44       T: 2020 14:19:42     AS/TEX  Job#: [id number]     Doc#: Unknown    CC:    (Retain this field even if not dictated or not decipherable)

## 2020-07-10 ENCOUNTER — HOSPITAL ENCOUNTER (OUTPATIENT)
Dept: PAIN MANAGEMENT | Age: 70
Discharge: HOME | End: 2020-07-10
Payer: MEDICARE

## 2020-07-10 PROCEDURE — 99213 OFFICE O/P EST LOW 20 MIN: CPT

## 2020-07-10 PROCEDURE — 99213 OFFICE O/P EST LOW 20 MIN: CPT | Performed by: NURSE PRACTITIONER

## 2020-07-10 RX ORDER — MORPHINE SULFATE 60 MG/1
60 TABLET, FILM COATED, EXTENDED RELEASE ORAL 2 TIMES DAILY
Qty: 60 TABLET | Refills: 0 | Status: SHIPPED | OUTPATIENT
Start: 2020-07-12 | End: 2020-08-11 | Stop reason: SDUPTHER

## 2020-07-10 RX ORDER — OXYCODONE HYDROCHLORIDE AND ACETAMINOPHEN 5; 325 MG/1; MG/1
1 TABLET ORAL EVERY 8 HOURS
Qty: 90 TABLET | Refills: 0 | Status: SHIPPED | OUTPATIENT
Start: 2020-07-12 | End: 2020-08-11 | Stop reason: SDUPTHER

## 2020-07-10 ASSESSMENT — ENCOUNTER SYMPTOMS
GASTROINTESTINAL NEGATIVE: 1
BACK PAIN: 1
RESPIRATORY NEGATIVE: 1

## 2020-07-10 NOTE — PROGRESS NOTES
patient stay active. Patient denies any side effects and reports adequate analgesia. No sign of misuse/abuse.         When was thelast UDS:     3-        Was the UDS appropriate:yes      Record/Diagnostics Review:      As above, I did review the imaging    3/14/2020 10:00 PM - Raimundo, Darynpn Incoming Lab Results From [facility]     Component Value Ref Range & Units Status Collected Lab   Pain Management Drug Panel Interp, Urine Consistent   Final 03/11/2020  3:25 PM [facility]   (NOTE)   ________________________________________________________________   DRUGS EXPECTED:   OXYCODONE [3/11/20]   MORPHINE [3/11/20]   ________________________________________________________________   CONSISTENT with medications provided:   OXYCODONE : based on oxycodone   MORPHINE : based on morphine   ________________________________________________________________   INTERPRETIVE INFORMATION: Pain Mgt [place], [place] Spec/EMIT, Ur,                            Interp   Interpretation depends on accuracy and completeness of patient   medication information submitted by            Past Medical History:   Diagnosis Date    Anemia     Arthritis     osteoarthritis    Colon polyps 10/08/2019    tubular adenoma x2    Essential hypertension 5/12/2020    Hep C w/o coma, chronic ([address] 2019    Received antiviral treatment by Dr. Robert Salinas Hepatitis B core antibody positive     Hyperlipidemia     Type 2 diabetes mellitus with hyperglycemia, without long-term current use of insulin ([address] 5/12/2020       Past Surgical History:   Procedure Laterality Date    BACK SURGERY  1977    cervical spine three times    CHOLECYSTECTOMY  03/22/2019    COLONOSCOPY  01/25/2015    10 yr recall, hemorrhoids    COLONOSCOPY N/A 10/8/2019    tubular adenoma x2    DENTAL SURGERY  10/2015    all teeth extracted    SHOULDER SURGERY Right     UMBILICAL HERNIA REPAIR  3022-19    UPPER GASTROINTESTINAL ENDOSCOPY  01/25/2015    UPPER GASTROINTESTINAL ENDOSCOPY N/A 10/8/2019 EGD BIOPSY performed by Gildardo Ramires [facility] at [facility]       Allergies   Allergen Reactions   Christopher Lucie [Aspirin]       iron deficiency anemia , requiring iron infusions and transfusions    Iron      Pill- constipation, abdominal pain    Nsaids       iron deficiency anemia, history of bleeding ulcers          Current Outpatient Medications:     metFORMIN (GLUCOPHAGE) 1000 MG tablet, Take 1 tablet by mouth 2 times daily (with meals), Disp: 180 tablet, Rfl: 1    morphine (MS CONTIN) 60 MG extended release tablet, Take 1 tablet by mouth 2 times daily for 30 days. , Disp: 60 tablet, Rfl: 0    oxyCODONE-acetaminophen (PERCOCET) 5-325 MG per tablet, Take 1 tablet by mouth every 8 hours for 30 days. , Disp: 90 tablet, Rfl: 0    metoprolol tartrate (LOPRESSOR) 25 MG tablet, Take 1 tablet by mouth 2 times daily, Disp: 60 tablet, Rfl: 5    canagliflozin (INVOKANA) 100 MG TABS tablet, Take 1 tablet by mouth every morning (before breakfast) For diabetes, Disp: 90 tablet, Rfl: 5    pravastatin (PRAVACHOL) 40 MG tablet, Take 1 tablet by mouth every evening Stop Gemfibrozil, Disp: 90 tablet, Rfl: 3    Alcohol Swabs (B-D SINGLE USE SWABS REGULAR) PADS, USE TO CHECK BLOOD SUGAR TWICE A DAY, Disp: , Rfl:     TRUE METRIX BLOOD GLUCOSE TEST strip, CHECK BLOOD SUGAR BID, Disp: , Rfl:     pregabalin (LYRICA) 150 MG capsule, Take 1 capsule by mouth 3 times daily for 90 days. , Disp: 90 capsule, Rfl: 2    lisinopril-hydroCHLOROthiazide (PRINZIDE;ZESTORETIC) 10-12.5 MG per tablet, Take 1 tablet by mouth daily, Disp: , Rfl:     pantoprazole (PROTONIX) 40 MG tablet, TK 1 T PO  QD, Disp: , Rfl: 0    Blood Glucose Monitoring Suppl (TRUE METRIX METER) w/Device KIT, USE AS DIRECTED TO TEST BLOOD SUGAR, Disp: , Rfl:     TRUEplus Lancets 30G MISC, USE TO CHECK BLOOD SUGAR BID, Disp: , Rfl:     hydrOXYzine (VISTARIL) 25 MG capsule, hydroxyzine pamoate 25 mg capsule, Disp: , Rfl:     BD INTEGRA SYRINGE 25G X 1\" 3 ML MISC, , Disp: , Rfl: Constitution: Negative. HENT: Negative. Eyes:        Glasses   Cardiovascular: Negative. Respiratory: Negative. Endocrine:        Diabetic   Hematologic/Lymphatic: Negative. Skin: Negative. Musculoskeletal: Positive for back pain and joint pain. Shoulder pain   Gastrointestinal: Negative. Genitourinary: Negative. Neurological: Negative. Psychiatric/Behavioral: Negative. Physical Exam:    Physical Exam  HENT:      Head: Normocephalic. Skin:         Neurological:      Mental Status: He is alert. Psychiatric:         Mood and Affect: Mood normal.         Behavior: Behavior normal.           Assessment:  Problem List Items Addressed This Visit     S/P cervical spinal fusion - Primary (Chronic)    Peripheral polyneuropathy    Osteoarthritis of spine with radiculopathy, lumbar region    Osteoarthritis of lumbar spine    Lumbar spondylosis (Chronic)    Lumbar spinal stenosis (Chronic)    Lumbar radiculopathy (Chronic)    Encounter for medication monitoring (Chronic)    Degenerative disc disease, lumbar (Chronic)    Cervical spondylitis (HCC) (Chronic)                Treatment Plan:  DISCUSSION: Treatment options discussed withpatient and all questions answered to patient's satisfaction. Possible side effects, risk of tolerance and or dependence and alternative treatments discussed    Obtaining appropriate analgesic effect of treatment   No signs of potential drug abuse or diversion identified    [x] Ill effects of being on chronic pain medications such as sleep disturbances, respiratory depression, hormonal changes, withdrawal symptoms, chronic opioid dependence and tolerance as well as risk of taking opioids with Benzodiazepines and taking opioids along with alcohol,  werediscussed with patient. I had asked the patient to minimize medication use and utilize pain medications only for uncontrolled rest pain or pain with exertional activities.  I advised patient not to self-escalate painmedications without consulting with us. At each of patient's future visits we will try to taper pain medications, while adjusting the adjunct medications, and re-evaluating for Physical Therapy to improve spinal andjoint strength. We will continue to have discussions to decrease pain medications as tolerated. Counseled patient on effects their pain medication and /or their medical condition mayhave on their  ability to drive or operate machinery. Instructed not to drive or operate machinery if drowsy     I also discussed with the patient regarding the dangers of combining narcotic pain medication with tranquilizers, alcohol or illegal drugs or taking the medication any way other than prescribed. The dangers were discussed  including respiratory depression and death. Patient was told to tell  all  physicians regarding the medications he is getting from pain clinic. Patient is warned not to take any unprescribed medications over-the-countermedications that can depress breathing . Patient is advised to talk to the pharmacist or physicians if planning to take any over-the-counter medications before  takeing them. Patient is strongly advised to avoid tranquilizers or  relaxants, illegal drugs  or any medications that can depress breathing  Patient is also advised to tell us if there is any changes in their medications from other physicians.     Location:  patient  at  home     , provider working from home    TREATMENT OPTIONS:       Medication Agreement Requirements Met  Continue Opioid therapy  Script written for ms cesar abreu  Follow up appointment made

## 2020-07-13 ENCOUNTER — HOSPITAL ENCOUNTER (OUTPATIENT)
Age: 70
Discharge: HOME | End: 2020-07-13
Payer: MEDICARE

## 2020-07-13 ENCOUNTER — TELEPHONE (OUTPATIENT)
Dept: INFUSION THERAPY | Age: 70
End: 2020-07-13

## 2020-07-13 PROBLEM — E11.29 TYPE 2 DIABETES MELLITUS WITH MICROALBUMINURIA, WITHOUT LONG-TERM CURRENT USE OF INSULIN (HCC): Status: ACTIVE | Noted: 2020-07-13

## 2020-07-13 PROBLEM — R80.9 TYPE 2 DIABETES MELLITUS WITH MICROALBUMINURIA, WITHOUT LONG-TERM CURRENT USE OF INSULIN (HCC): Status: ACTIVE | Noted: 2020-07-13

## 2020-07-13 LAB
ABSOLUTE EOS #: 0.09 K/UL (ref 0–0.4)
ABSOLUTE IMMATURE GRANULOCYTE: ABNORMAL K/UL (ref 0–0.3)
ABSOLUTE LYMPH #: 1.89 K/UL (ref 1–4.8)
ABSOLUTE MONO #: 0.45 K/UL (ref 0.1–1.3)
ALBUMIN SERPL-MCNC: 3.9 G/DL (ref 3.5–5.2)
ALBUMIN/GLOBULIN RATIO: ABNORMAL (ref 1–2.5)
ALP BLD-CCNC: 82 U/L (ref 40–129)
ALT SERPL-CCNC: 16 U/L (ref 5–41)
ANION GAP SERPL CALCULATED.3IONS-SCNC: 9 MMOL/L (ref 9–17)
AST SERPL-CCNC: 16 U/L
BASOPHILS # BLD: 1 % (ref 0–2)
BASOPHILS ABSOLUTE: 0.09 K/UL (ref 0–0.2)
BILIRUB SERPL-MCNC: 0.39 MG/DL (ref 0.3–1.2)
BUN BLDV-MCNC: 21 MG/DL (ref 8–23)
BUN/CREAT BLD: ABNORMAL (ref 9–20)
CALCIUM SERPL-MCNC: 9.2 MG/DL (ref 8.6–10.4)
CHLORIDE BLD-SCNC: 103 MMOL/L (ref 98–107)
CHOLESTEROL/HDL RATIO: 4
CHOLESTEROL: 92 MG/DL
CO2: 26 MMOL/L (ref 20–31)
CREAT SERPL-MCNC: 1.04 MG/DL (ref 0.7–1.2)
CREATININE URINE: 113.9 MG/DL (ref 39–259)
DIFFERENTIAL TYPE: ABNORMAL
EOSINOPHILS RELATIVE PERCENT: 1 % (ref 0–4)
ESTIMATED AVERAGE GLUCOSE: 131 MG/DL
FERRITIN: 8 UG/L (ref 30–400)
FOLATE: 10.6 NG/ML
GFR AFRICAN AMERICAN: >60 ML/MIN
GFR NON-AFRICAN AMERICAN: >60 ML/MIN
GFR SERPL CREATININE-BSD FRML MDRD: ABNORMAL ML/MIN/{1.73_M2}
GFR SERPL CREATININE-BSD FRML MDRD: ABNORMAL ML/MIN/{1.73_M2}
GLUCOSE BLD-MCNC: 134 MG/DL (ref 70–99)
HBA1C MFR BLD: 6.2 % (ref 4–6)
HCT VFR BLD CALC: 34.9 % (ref 41–53)
HDLC SERPL-MCNC: 23 MG/DL
HEMOGLOBIN: 11.2 G/DL (ref 13.5–17.5)
HEPATITIS C ANTIBODY: REACTIVE
IMMATURE GRANULOCYTES: ABNORMAL %
IRON SATURATION: 7 % (ref 20–55)
IRON: 26 UG/DL (ref 59–158)
LDL CHOLESTEROL: 13 MG/DL (ref 0–130)
LYMPHOCYTES # BLD: 21 % (ref 24–44)
MCH RBC QN AUTO: 25.2 PG (ref 26–34)
MCHC RBC AUTO-ENTMCNC: 32 G/DL (ref 31–37)
MCV RBC AUTO: 78.6 FL (ref 80–100)
MICROALBUMIN/CREAT 24H UR: 23 MG/L
MICROALBUMIN/CREAT UR-RTO: 20 MCG/MG CREAT
MONOCYTES # BLD: 5 % (ref 1–7)
MORPHOLOGY: ABNORMAL
NRBC AUTOMATED: ABNORMAL PER 100 WBC
PATHOLOGIST REVIEW: NORMAL
PDW BLD-RTO: 18 % (ref 11.5–14.9)
PLATELET # BLD: 327 K/UL (ref 150–450)
PLATELET ESTIMATE: ABNORMAL
PMV BLD AUTO: 7 FL (ref 6–12)
POTASSIUM SERPL-SCNC: 5.1 MMOL/L (ref 3.7–5.3)
RBC # BLD: 4.45 M/UL (ref 4.5–5.9)
RBC # BLD: ABNORMAL 10*6/UL
SEG NEUTROPHILS: 72 % (ref 36–66)
SEGMENTED NEUTROPHILS ABSOLUTE COUNT: 6.48 K/UL (ref 1.3–9.1)
SODIUM BLD-SCNC: 138 MMOL/L (ref 135–144)
TOTAL IRON BINDING CAPACITY: 357 UG/DL (ref 250–450)
TOTAL PROTEIN: 7.1 G/DL (ref 6.4–8.3)
TRIGL SERPL-MCNC: 279 MG/DL
UNSATURATED IRON BINDING CAPACITY: 331 UG/DL (ref 112–347)
VITAMIN B-12: 218 PG/ML (ref 232–1245)
VITAMIN D 25-HYDROXY: 11.3 NG/ML (ref 30–100)
VLDLC SERPL CALC-MCNC: ABNORMAL MG/DL (ref 1–30)
WBC # BLD: 9 K/UL (ref 3.5–11)
WBC # BLD: ABNORMAL 10*3/UL

## 2020-07-13 PROCEDURE — 82746 ASSAY OF FOLIC ACID SERUM: CPT

## 2020-07-13 PROCEDURE — 82043 UR ALBUMIN QUANTITATIVE: CPT

## 2020-07-13 PROCEDURE — 36415 COLL VENOUS BLD VENIPUNCTURE: CPT

## 2020-07-13 PROCEDURE — 82728 ASSAY OF FERRITIN: CPT

## 2020-07-13 PROCEDURE — 85025 COMPLETE CBC W/AUTO DIFF WBC: CPT

## 2020-07-13 PROCEDURE — 80053 COMPREHEN METABOLIC PANEL: CPT

## 2020-07-13 PROCEDURE — 83540 ASSAY OF IRON: CPT

## 2020-07-13 PROCEDURE — 83550 IRON BINDING TEST: CPT

## 2020-07-13 PROCEDURE — 82570 ASSAY OF URINE CREATININE: CPT

## 2020-07-13 PROCEDURE — 86803 HEPATITIS C AB TEST: CPT

## 2020-07-13 PROCEDURE — 83036 HEMOGLOBIN GLYCOSYLATED A1C: CPT

## 2020-07-13 PROCEDURE — 82306 VITAMIN D 25 HYDROXY: CPT

## 2020-07-13 PROCEDURE — 82607 VITAMIN B-12: CPT

## 2020-07-13 PROCEDURE — 80061 LIPID PANEL: CPT

## 2020-07-13 RX ORDER — ERGOCALCIFEROL 1.25 MG/1
50000 CAPSULE ORAL WEEKLY
Qty: 12 CAPSULE | Refills: 0 | Status: SHIPPED | OUTPATIENT
Start: 2020-07-13 | End: 2020-10-07 | Stop reason: ALTCHOICE

## 2020-07-14 RX ORDER — SODIUM CHLORIDE 0.9 % (FLUSH) 0.9 %
10 SYRINGE (ML) INJECTION PRN
Status: CANCELLED | OUTPATIENT
Start: 2020-07-22

## 2020-07-14 RX ORDER — HEPARIN SODIUM (PORCINE) LOCK FLUSH IV SOLN 100 UNIT/ML 100 UNIT/ML
500 SOLUTION INTRAVENOUS PRN
Status: CANCELLED | OUTPATIENT
Start: 2020-07-22

## 2020-07-14 RX ORDER — SODIUM CHLORIDE 9 MG/ML
INJECTION, SOLUTION INTRAVENOUS CONTINUOUS
Status: CANCELLED | OUTPATIENT
Start: 2020-07-22

## 2020-07-14 RX ORDER — EPINEPHRINE 1 MG/ML
0.3 INJECTION, SOLUTION, CONCENTRATE INTRAVENOUS PRN
Status: CANCELLED | OUTPATIENT
Start: 2020-07-22

## 2020-07-14 RX ORDER — METHYLPREDNISOLONE SODIUM SUCCINATE 125 MG/2ML
125 INJECTION, POWDER, LYOPHILIZED, FOR SOLUTION INTRAMUSCULAR; INTRAVENOUS ONCE
Status: CANCELLED | OUTPATIENT
Start: 2020-07-22

## 2020-07-14 RX ORDER — DIPHENHYDRAMINE HYDROCHLORIDE 50 MG/ML
50 INJECTION INTRAMUSCULAR; INTRAVENOUS ONCE
Status: CANCELLED | OUTPATIENT
Start: 2020-07-22

## 2020-07-14 RX ORDER — SODIUM CHLORIDE 0.9 % (FLUSH) 0.9 %
5 SYRINGE (ML) INJECTION PRN
Status: CANCELLED | OUTPATIENT
Start: 2020-07-22

## 2020-07-20 ENCOUNTER — TELEPHONE (OUTPATIENT)
Dept: FAMILY MEDICINE CLINIC | Age: 70
End: 2020-07-20

## 2020-07-20 NOTE — TELEPHONE ENCOUNTER
Patient's wife called, someone from the office called and spoke to the patient regarding the patient needing to get his B-12 injections scheduled. Patient let the office know that his wife gives him these injections and the office was ok with that. However, the medication & needles were never called into the pharmacy for the patient. Patients pharmacy is [facility] on [address]. Also, pt's August appt is scheduled as a AMW exam, pt's wife states that patients previous pcp billed him for an AMW in Feb or March of this year. Patient does not want Dr Alyssia Rivera not to get paid for an appt if it is not scheduled properly. Please schedule appt type correctly for his appt.

## 2020-07-21 RX ORDER — CYANOCOBALAMIN 1000 UG/ML
1000 INJECTION INTRAMUSCULAR; SUBCUTANEOUS DAILY
Qty: 7 ML | Refills: 0 | Status: SHIPPED | OUTPATIENT
Start: 2020-07-21 | End: 2020-09-21 | Stop reason: DRUGHIGH

## 2020-07-21 RX ORDER — SYRINGE W-NEEDLE,DISPOSAB,3 ML 25GX5/8"
SYRINGE, EMPTY DISPOSABLE MISCELLANEOUS
Qty: 50 EACH | Refills: 2 | Status: SHIPPED | OUTPATIENT
Start: 2020-07-21 | End: 2021-01-08 | Stop reason: SDUPTHER

## 2020-07-21 NOTE — TELEPHONE ENCOUNTER
Pt had Medicare done at [facility]. Wife is going to call over and have it faxed to office.  Pt already picked up RX

## 2020-07-21 NOTE — TELEPHONE ENCOUNTER
Vitamin D was called in but not the B12. Can you please send in a script. Thank you! Wife said she will give those at home. He will need the 25 G syringes as well.      RX: [facility]

## 2020-07-21 NOTE — TELEPHONE ENCOUNTER
Please obtain report for the Medicare visit, it is once a year, where did he have it? Please see prior note in this encounter, and change our next appointment to usual visit and not Medicare      Please let the patient know to  prescription from pharmacy. Requested Prescriptions     Signed Prescriptions Disp Refills    cyanocobalamin 1000 MCG/ML injection 7 mL 0     Sig: Inject 1 mL into the muscle daily for 7 days For 7 days. Call for refill of the next step which will be weekly x4, and then monthly for life     Authorizing Provider: [address], [address], ([address] 3CC/25GX1\") 25G X 1\" 3 ML MISC 50 each 2     Sig: To be used with B12 injections     Authorizing Provider: Birgit Gaitan [address] [place] [address] [phone] Criss Michelle [phone]  [address] [place] [id number]  Phone: [phone] Fax: [phone]    Thank you!         FYI    Future Appointments   Date Time Provider Pippa Rickisti   7/22/2020  9:15 AM Reanna Alicia MD [address] [place] [address]   7/22/2020 10:00 AM [facility] MED ONC CHAIR [address] [facility]   7/29/2020  1:00 PM [facility] MED ONC CHAIR 09 [facility]   8/11/2020  9:30 AM Kavita Patrick, APRN - CNP [id number]   8/13/2020 10:00 AM MD [facility]up [place] [email or web address]   8/28/2020 12:00 PM Desiree Marie MD [facility] [address] [place] [address]   11/10/2020  9:30 AM Juan Sloan MD fp [email or web address]       Controlled Substances Monitoring:

## 2020-07-22 ENCOUNTER — TELEPHONE (OUTPATIENT)
Dept: ONCOLOGY | Age: 70
End: 2020-07-22

## 2020-07-22 ENCOUNTER — OFFICE VISIT (OUTPATIENT)
Dept: ONCOLOGY | Age: 70
End: 2020-07-22
Payer: MEDICARE

## 2020-07-22 ENCOUNTER — HOSPITAL ENCOUNTER (OUTPATIENT)
Dept: INFUSION THERAPY | Age: 70
Discharge: HOME | End: 2020-07-22
Payer: MEDICARE

## 2020-07-22 VITALS
WEIGHT: 205 LBS | DIASTOLIC BLOOD PRESSURE: 65 MMHG | BODY MASS INDEX: 30.27 KG/M2 | SYSTOLIC BLOOD PRESSURE: 102 MMHG | TEMPERATURE: 96.8 F | HEART RATE: 87 BPM

## 2020-07-22 DIAGNOSIS — D50.8 IRON DEFICIENCY ANEMIA SECONDARY TO INADEQUATE DIETARY IRON INTAKE: Primary | ICD-10-CM

## 2020-07-22 PROCEDURE — 1036F TOBACCO NON-USER: CPT | Performed by: INTERNAL MEDICINE

## 2020-07-22 PROCEDURE — G8427 DOCREV CUR MEDS BY ELIG CLIN: HCPCS | Performed by: INTERNAL MEDICINE

## 2020-07-22 PROCEDURE — 3017F COLORECTAL CA SCREEN DOC REV: CPT | Performed by: INTERNAL MEDICINE

## 2020-07-22 PROCEDURE — 4040F PNEUMOC VAC/ADMIN/RCVD: CPT | Performed by: INTERNAL MEDICINE

## 2020-07-22 PROCEDURE — G8417 CALC BMI ABV UP PARAM F/U: HCPCS | Performed by: INTERNAL MEDICINE

## 2020-07-22 PROCEDURE — 1123F ACP DISCUSS/DSCN MKR DOCD: CPT | Performed by: INTERNAL MEDICINE

## 2020-07-22 PROCEDURE — 2580000003 HC RX 258: Performed by: INTERNAL MEDICINE

## 2020-07-22 PROCEDURE — 6360000002 HC RX W HCPCS: Performed by: INTERNAL MEDICINE

## 2020-07-22 PROCEDURE — 96365 THER/PROPH/DIAG IV INF INIT: CPT

## 2020-07-22 PROCEDURE — 99214 OFFICE O/P EST MOD 30 MIN: CPT | Performed by: INTERNAL MEDICINE

## 2020-07-22 RX ORDER — SODIUM CHLORIDE 9 MG/ML
INJECTION, SOLUTION INTRAVENOUS CONTINUOUS
Status: ACTIVE | OUTPATIENT
Start: 2020-07-22 | End: 2020-07-22

## 2020-07-22 RX ORDER — SODIUM CHLORIDE 0.9 % (FLUSH) 0.9 %
5 SYRINGE (ML) INJECTION PRN
Status: CANCELLED | OUTPATIENT
Start: 2020-07-22

## 2020-07-22 RX ORDER — SODIUM CHLORIDE 0.9 % (FLUSH) 0.9 %
10 SYRINGE (ML) INJECTION PRN
Status: CANCELLED | OUTPATIENT
Start: 2020-07-29

## 2020-07-22 RX ORDER — EPINEPHRINE 1 MG/ML
0.3 INJECTION, SOLUTION, CONCENTRATE INTRAVENOUS PRN
Status: CANCELLED | OUTPATIENT
Start: 2020-07-29

## 2020-07-22 RX ORDER — SODIUM CHLORIDE 9 MG/ML
INJECTION, SOLUTION INTRAVENOUS CONTINUOUS
Status: CANCELLED | OUTPATIENT
Start: 2020-07-29

## 2020-07-22 RX ORDER — DIPHENHYDRAMINE HYDROCHLORIDE 50 MG/ML
50 INJECTION INTRAMUSCULAR; INTRAVENOUS ONCE
Status: CANCELLED | OUTPATIENT
Start: 2020-07-29

## 2020-07-22 RX ORDER — DIPHENHYDRAMINE HYDROCHLORIDE 50 MG/ML
50 INJECTION INTRAMUSCULAR; INTRAVENOUS ONCE
Status: CANCELLED | OUTPATIENT
Start: 2020-07-22

## 2020-07-22 RX ORDER — HEPARIN SODIUM (PORCINE) LOCK FLUSH IV SOLN 100 UNIT/ML 100 UNIT/ML
500 SOLUTION INTRAVENOUS PRN
Status: CANCELLED | OUTPATIENT
Start: 2020-07-22

## 2020-07-22 RX ORDER — SODIUM CHLORIDE 0.9 % (FLUSH) 0.9 %
5 SYRINGE (ML) INJECTION PRN
Status: CANCELLED | OUTPATIENT
Start: 2020-07-29

## 2020-07-22 RX ORDER — SODIUM CHLORIDE 9 MG/ML
INJECTION, SOLUTION INTRAVENOUS CONTINUOUS
Status: CANCELLED | OUTPATIENT
Start: 2020-07-22

## 2020-07-22 RX ORDER — METHYLPREDNISOLONE SODIUM SUCCINATE 125 MG/2ML
125 INJECTION, POWDER, LYOPHILIZED, FOR SOLUTION INTRAMUSCULAR; INTRAVENOUS ONCE
Status: CANCELLED | OUTPATIENT
Start: 2020-07-29

## 2020-07-22 RX ORDER — EPINEPHRINE 1 MG/ML
0.3 INJECTION, SOLUTION, CONCENTRATE INTRAVENOUS PRN
Status: CANCELLED | OUTPATIENT
Start: 2020-07-22

## 2020-07-22 RX ORDER — SODIUM CHLORIDE 0.9 % (FLUSH) 0.9 %
10 SYRINGE (ML) INJECTION PRN
Status: CANCELLED | OUTPATIENT
Start: 2020-07-22

## 2020-07-22 RX ORDER — METHYLPREDNISOLONE SODIUM SUCCINATE 125 MG/2ML
125 INJECTION, POWDER, LYOPHILIZED, FOR SOLUTION INTRAMUSCULAR; INTRAVENOUS ONCE
Status: CANCELLED | OUTPATIENT
Start: 2020-07-22

## 2020-07-22 RX ORDER — HEPARIN SODIUM (PORCINE) LOCK FLUSH IV SOLN 100 UNIT/ML 100 UNIT/ML
500 SOLUTION INTRAVENOUS PRN
Status: CANCELLED | OUTPATIENT
Start: 2020-07-29

## 2020-07-22 RX ADMIN — FERRIC CARBOXYMALTOSE INJECTION 750 MG: 50 INJECTION, SOLUTION INTRAVENOUS at 10:40

## 2020-07-22 RX ADMIN — SODIUM CHLORIDE: 9 INJECTION, SOLUTION INTRAVENOUS at 10:36

## 2020-07-22 NOTE — TELEPHONE ENCOUNTER
Pt to get injectafer as planned, return on 9/16/2020 to see Dr Latanya Elder to be done prior to appt, lab slips and occult blood card given to pt w/instruction, pt understood and stated will have done at [facility].

## 2020-07-22 NOTE — PROGRESS NOTES
Pt here for injectafer. Pt was treated without incident and discharged in stable condition. Pt will return 7/29 for injectafer.

## 2020-07-22 NOTE — PROGRESS NOTES
am concerned about his continued bleeding and I am asking him to reschedule GI follow up to be sooner for iron deficiency work up. Given B12 deficiency there may also be a component of malabsorption. Biopsies done in the past did not suggest celiac disease. Patient EGD does not show any varices. I am ordering for stool testing to be done along with additional lab work. Return in 2 months. Andrea Donato MD    This note is created with the assistance of a speech recognition program.  While intending to generate a document that actually reflects the content of the visit, the document can still have some errors including those of syntax and sound a like substitutions which may escape proof reading. It such instances, actual meaning can be extrapolated by contextual diversion.

## 2020-07-23 ENCOUNTER — TELEPHONE (OUTPATIENT)
Dept: ONCOLOGY | Age: 70
End: 2020-07-23

## 2020-07-23 NOTE — TELEPHONE ENCOUNTER
Call received from patient's spouse stating patient had a little n/v and headache last night after his IV iron infusion. Writer spoke directly to patient and he stated he feels much better today. Writer informed patient to drink plenty of fluids and to call office with any further issues. Writer explained that iv iron can cause an upset stomach and elevated BP, which could have been the cause for his headache. Patient communicated understanding.  Benigno Lieberman

## 2020-07-25 ENCOUNTER — HOSPITAL ENCOUNTER (OUTPATIENT)
Age: 70
Discharge: HOME | End: 2020-07-25
Payer: MEDICARE

## 2020-07-25 LAB
ABSOLUTE EOS #: 0 K/UL (ref 0–0.4)
ABSOLUTE IMMATURE GRANULOCYTE: ABNORMAL K/UL (ref 0–0.3)
ABSOLUTE LYMPH #: 1.6 K/UL (ref 1–4.8)
ABSOLUTE MONO #: 0.4 K/UL (ref 0.1–1.3)
ALBUMIN SERPL-MCNC: 3.8 G/DL (ref 3.5–5.2)
ALBUMIN/GLOBULIN RATIO: NORMAL (ref 1–2.5)
ALP BLD-CCNC: 82 U/L (ref 40–129)
ALT SERPL-CCNC: 16 U/L (ref 5–41)
ANION GAP SERPL CALCULATED.3IONS-SCNC: 13 MMOL/L (ref 9–17)
AST SERPL-CCNC: 18 U/L
BASOPHILS # BLD: 1 % (ref 0–2)
BASOPHILS ABSOLUTE: 0.1 K/UL (ref 0–0.2)
BILIRUB SERPL-MCNC: 0.37 MG/DL (ref 0.3–1.2)
BILIRUBIN DIRECT: 0.12 MG/DL
BILIRUBIN, INDIRECT: 0.25 MG/DL (ref 0–1)
BUN BLDV-MCNC: 17 MG/DL (ref 8–23)
CHLORIDE BLD-SCNC: 105 MMOL/L (ref 98–107)
CO2: 21 MMOL/L (ref 20–31)
CREAT SERPL-MCNC: 0.77 MG/DL (ref 0.7–1.2)
DIFFERENTIAL TYPE: ABNORMAL
EOSINOPHILS RELATIVE PERCENT: 1 % (ref 0–4)
GFR AFRICAN AMERICAN: >60 ML/MIN
GFR NON-AFRICAN AMERICAN: >60 ML/MIN
GFR SERPL CREATININE-BSD FRML MDRD: NORMAL ML/MIN/{1.73_M2}
GFR SERPL CREATININE-BSD FRML MDRD: NORMAL ML/MIN/{1.73_M2}
GLOBULIN: NORMAL G/DL (ref 1.5–3.8)
HCT VFR BLD CALC: 36.3 % (ref 41–53)
HEMOGLOBIN: 11.1 G/DL (ref 13.5–17.5)
IMMATURE GRANULOCYTES: ABNORMAL %
LYMPHOCYTES # BLD: 21 % (ref 24–44)
MCH RBC QN AUTO: 24 PG (ref 26–34)
MCHC RBC AUTO-ENTMCNC: 30.7 G/DL (ref 31–37)
MCV RBC AUTO: 78.1 FL (ref 80–100)
MONOCYTES # BLD: 5 % (ref 1–7)
NRBC AUTOMATED: ABNORMAL PER 100 WBC
PDW BLD-RTO: 18.4 % (ref 11.5–14.9)
PLATELET # BLD: 302 K/UL (ref 150–450)
PLATELET ESTIMATE: ABNORMAL
PMV BLD AUTO: 8.3 FL (ref 6–12)
POTASSIUM SERPL-SCNC: 3.7 MMOL/L (ref 3.7–5.3)
RBC # BLD: 4.64 M/UL (ref 4.5–5.9)
RBC # BLD: ABNORMAL 10*6/UL
SEG NEUTROPHILS: 72 % (ref 36–66)
SEGMENTED NEUTROPHILS ABSOLUTE COUNT: 5.5 K/UL (ref 1.3–9.1)
SODIUM BLD-SCNC: 139 MMOL/L (ref 135–144)
TOTAL PROTEIN: 7.1 G/DL (ref 6.4–8.3)
WBC # BLD: 7.6 K/UL (ref 3.5–11)
WBC # BLD: ABNORMAL 10*3/UL

## 2020-07-25 PROCEDURE — 84520 ASSAY OF UREA NITROGEN: CPT

## 2020-07-25 PROCEDURE — 80076 HEPATIC FUNCTION PANEL: CPT

## 2020-07-25 PROCEDURE — 36415 COLL VENOUS BLD VENIPUNCTURE: CPT

## 2020-07-25 PROCEDURE — 87522 HEPATITIS C REVRS TRNSCRPJ: CPT

## 2020-07-25 PROCEDURE — 82565 ASSAY OF CREATININE: CPT

## 2020-07-25 PROCEDURE — 80051 ELECTROLYTE PANEL: CPT

## 2020-07-25 PROCEDURE — 85025 COMPLETE CBC W/AUTO DIFF WBC: CPT

## 2020-07-28 ENCOUNTER — OFFICE VISIT (OUTPATIENT)
Dept: GASTROENTEROLOGY | Age: 70
End: 2020-07-28
Payer: MEDICARE

## 2020-07-28 VITALS — BODY MASS INDEX: 30.16 KG/M2 | WEIGHT: 204.2 LBS

## 2020-07-28 PROBLEM — R10.9 ABDOMINAL DISCOMFORT: Status: ACTIVE | Noted: 2020-07-28

## 2020-07-28 PROBLEM — K58.0 IRRITABLE BOWEL SYNDROME WITH DIARRHEA: Status: ACTIVE | Noted: 2020-07-28

## 2020-07-28 PROBLEM — R19.7 DIARRHEA: Status: ACTIVE | Noted: 2020-07-28

## 2020-07-28 PROBLEM — B18.2 CHRONIC HEPATITIS C WITHOUT HEPATIC COMA (HCC): Status: ACTIVE | Noted: 2020-07-28

## 2020-07-28 PROCEDURE — G8427 DOCREV CUR MEDS BY ELIG CLIN: HCPCS | Performed by: INTERNAL MEDICINE

## 2020-07-28 PROCEDURE — 1123F ACP DISCUSS/DSCN MKR DOCD: CPT | Performed by: INTERNAL MEDICINE

## 2020-07-28 PROCEDURE — 3017F COLORECTAL CA SCREEN DOC REV: CPT | Performed by: INTERNAL MEDICINE

## 2020-07-28 PROCEDURE — 99214 OFFICE O/P EST MOD 30 MIN: CPT | Performed by: INTERNAL MEDICINE

## 2020-07-28 PROCEDURE — 1036F TOBACCO NON-USER: CPT | Performed by: INTERNAL MEDICINE

## 2020-07-28 PROCEDURE — 4040F PNEUMOC VAC/ADMIN/RCVD: CPT | Performed by: INTERNAL MEDICINE

## 2020-07-28 PROCEDURE — G8417 CALC BMI ABV UP PARAM F/U: HCPCS | Performed by: INTERNAL MEDICINE

## 2020-07-28 ASSESSMENT — ENCOUNTER SYMPTOMS
ANAL BLEEDING: 0
BLOOD IN STOOL: 0
NAUSEA: 1
ABDOMINAL PAIN: 1
BACK PAIN: 1
TROUBLE SWALLOWING: 0
RESPIRATORY NEGATIVE: 1
SORE THROAT: 1
CONSTIPATION: 0
VOMITING: 0
DIARRHEA: 1
ALLERGIC/IMMUNOLOGIC NEGATIVE: 1
RECTAL PAIN: 0
ABDOMINAL DISTENTION: 1

## 2020-07-28 NOTE — PROGRESS NOTES
GI OFFICE FOLLOW UP    Skip Robles is a 81 y.o. male evaluated via on 7/28/2020. Consent:  He and/or health care decision maker is aware that that he may receive a bill for this telephone service, depending on his insurance coverage, and has provided verbal consent to proceed: YES      INTERVAL HISTORY:   No referring provider defined for this encounter. Chief Complaint   Patient presents with    Diarrhea     c/o diarrhea daily and abd pain and bloating    Hepatitis C     waiting for RNA is in process       1. Diarrhea, unspecified type    2. Chronic hepatitis C without hepatic coma ([facility] [address]    3. Irritable bowel syndrome with diarrhea    4. Abdominal discomfort      Seen my office as a follow-up  Patient history significant for anemia      In the past had GI work-up done including EGD colonoscopies    Has been followed by hematology oncology as well    Has been treated in my office for hepatitis C    His current PCR is pending    He has some abdominal discomfort had previous gallbladder surgery has developed hernias where laparoscopy was done around his umbilical areas has seen a surgeon currently going under clearance for the surgery? Has been evaluated by Dr. Nicho Brooks in the past    He has received IV iron from hematology    Denies any overt bleeding    Most recent stool for occult blood is pending    Hemoglobin level is between 11 and 12 however      HISTORY OF PRESENT ILLNESS: Rony Oliver is a 81 y.o. male with a past history remarkable for , referred for evaluation of   Chief Complaint   Patient presents with    Diarrhea     c/o diarrhea daily and abd pain and bloating    Hepatitis C     waiting for RNA is in process   . Past Medical,Family, and Social History reviewed and does contribute to the patient presenting condition.     Patient's PMH/PSH,SH,PSYCH Hx, MEDs, ALLERGIES, and ROS were all reviewed and updated in the appropriate sections. PAST MEDICAL HISTORY:  Past Medical History:   Diagnosis Date    Anemia     Arthritis     osteoarthritis    Colon polyps 10/08/2019    tubular adenoma x2    Essential hypertension 5/12/2020    Hep C w/o coma, chronic ([facility] [address] 2019    Received antiviral treatment by Dr. Monalisa Garcias Hepatitis B core antibody positive     Hyperlipidemia     Type 2 diabetes mellitus with hyperglycemia, without long-term current use of insulin ([facility] [address] 5/12/2020    Type 2 diabetes mellitus with microalbuminuria, without long-term current use of insulin ([facility] [address] 7/13/2020       Past Surgical History:   Procedure Laterality Date    BACK SURGERY  1977    cervical spine three times    CHOLECYSTECTOMY  03/22/2019    COLONOSCOPY  01/25/2015    10 yr recall, hemorrhoids    COLONOSCOPY N/A 10/8/2019    tubular adenoma x2    DENTAL SURGERY  10/2015    all teeth extracted    SHOULDER SURGERY Right     UMBILICAL HERNIA REPAIR  3022-19    UPPER GASTROINTESTINAL ENDOSCOPY  01/25/2015    UPPER GASTROINTESTINAL ENDOSCOPY N/A 10/8/2019    EGD BIOPSY performed by Maged Lopez MD at [address]:    Current Outpatient Medications:     cyanocobalamin 1000 MCG/ML injection, Inject 1 mL into the muscle daily for 7 days For 7 days. Call for refill of the next step which will be weekly x4, and then monthly for life, Disp: 7 mL, Rfl: 0    Syringe/Needle, Disp, (SYRINGE 3CC/25GX1\") 25G X 1\" 3 ML MISC, To be used with B12 injections, Disp: 50 each, Rfl: 2    vitamin D (ERGOCALCIFEROL) 1.25 MG (93489 UT) CAPS capsule, Take 1 capsule by mouth once a week, Disp: 12 capsule, Rfl: 0    morphine (MS CONTIN) 60 MG extended release tablet, Take 1 tablet by mouth 2 times daily for 30 days. , Disp: 60 tablet, Rfl: 0    oxyCODONE-acetaminophen (PERCOCET) 5-325 MG per tablet, Take 1 tablet by mouth every 8 hours for 30 days. , Disp: 90 tablet, Rfl: 0    metFORMIN (GLUCOPHAGE) 1000 MG tablet, Take 1 tablet by mouth 2 times daily (with meals), Disp: 180 tablet, Rfl: 1    metoprolol tartrate (LOPRESSOR) 25 MG tablet, Take 1 tablet by mouth 2 times daily, Disp: 60 tablet, Rfl: 5    canagliflozin (INVOKANA) 100 MG TABS tablet, Take 1 tablet by mouth every morning (before breakfast) For diabetes, Disp: 90 tablet, Rfl: 5    pravastatin (PRAVACHOL) 40 MG tablet, Take 1 tablet by mouth every evening Stop Gemfibrozil, Disp: 90 tablet, Rfl: 3    Alcohol Swabs (B-D SINGLE USE SWABS REGULAR) PADS, USE TO CHECK BLOOD SUGAR TWICE A DAY, Disp: , Rfl:     Blood Glucose Monitoring Suppl (TRUE METRIX METER) w/Device KIT, USE AS DIRECTED TO TEST BLOOD SUGAR, Disp: , Rfl:     TRUE METRIX BLOOD GLUCOSE TEST strip, CHECK BLOOD SUGAR BID, Disp: , Rfl:     TRUEplus Lancets 30G MISC, USE TO CHECK BLOOD SUGAR BID, Disp: , Rfl:     pregabalin (LYRICA) 150 MG capsule, Take 1 capsule by mouth 3 times daily for 90 days. , Disp: 90 capsule, Rfl: 2    lisinopril-hydroCHLOROthiazide (PRINZIDE;ZESTORETIC) 10-12.5 MG per tablet, Take 1 tablet by mouth daily, Disp: , Rfl:     hydrOXYzine (VISTARIL) 25 MG capsule, hydroxyzine pamoate 25 mg capsule, Disp: , Rfl:     BD INTEGRA SYRINGE 25G X 1\" 3 ML MISC, , Disp: , Rfl: 4    pantoprazole (PROTONIX) 40 MG tablet, TK 1 T PO  QD, Disp: , Rfl: 0    ALLERGIES:   Allergies   Allergen Reactions    Asa [Aspirin]       iron deficiency anemia , requiring iron infusions and transfusions    Iron      Pill- constipation, abdominal pain    Nsaids       iron deficiency anemia, history of bleeding ulcers        FAMILY HISTORY:       Problem Relation Age of Onset    Diabetes Mother     Heart Disease Father          SOCIAL HISTORY:   Social History     Socioeconomic History    Marital status:      Spouse name: Not on file    Number of children: Not on file    Years of education: Not on file    Highest education level: Not on file   Occupational History    Occupation: Positive for headaches. Negative for dizziness, weakness and light-headedness. Hematological: Negative. Psychiatric/Behavioral: Positive for sleep disturbance. The patient is nervous/anxious. Reviewed and agree  PHYSICAL EXAMINATION: Vital signs reviewed per the nursing documentation. Wt 204 lb 3.2 oz (92.6 kg)   BMI 30.16 kg/m²   Body mass index is 30.16 kg/m². Physical Exam  Nursing note reviewed. Constitutional:       Appearance: He is well-developed. Comments: Anxious    HENT:      Head: Normocephalic and atraumatic. Eyes:      Conjunctiva/sclera: Conjunctivae normal.      Pupils: Pupils are equal, round, and reactive to light. Neck:      Musculoskeletal: Normal range of motion and neck supple. Cardiovascular:      Rate and Rhythm: Normal rate and regular rhythm. Pulmonary:      Effort: Pulmonary effort is normal.      Breath sounds: Normal breath sounds. Abdominal:      General: Bowel sounds are normal.      Palpations: Abdomen is soft. Comments: NON TENDER, NON DISTENTED  LIVER SPLEEN ARE NOT  PALPABLE  BOWEL SOUNDS ARE POSITIVE   Has hernias by the umbilical areas   Genitourinary:     Rectum: Normal.   Musculoskeletal: Normal range of motion. Skin:     General: Skin is warm. Neurological:      Mental Status: He is alert and oriented to person, place, and time. Deep Tendon Reflexes: Reflexes are normal and symmetric.            LABORATORY DATA: Reviewed  Lab Results   Component Value Date    WBC 7.6 07/25/2020    HGB 11.1 (L) 07/25/2020    HCT 36.3 (L) 07/25/2020    MCV 78.1 (L) 07/25/2020     07/25/2020     07/25/2020    K 3.7 07/25/2020     07/25/2020    CO2 21 07/25/2020    BUN 17 07/25/2020    CREATININE 0.77 07/25/2020    LABALBU 3.8 07/25/2020    BILITOT 0.37 07/25/2020    ALKPHOS 82 07/25/2020    AST 18 07/25/2020    ALT 16 07/25/2020    INR 1.0 03/05/2019         Lab Results   Component Value Date    RBC 4.64 07/25/2020    HGB 11.1 (L) 07/25/2020    MCV 78.1 (L) 07/25/2020    MCH 24.0 (L) 07/25/2020    MCHC 30.7 (L) 07/25/2020    RDW 18.4 (H) 07/25/2020    MPV 8.3 07/25/2020    BASOPCT 1 07/25/2020    LYMPHSABS 1.60 07/25/2020    MONOSABS 0.40 07/25/2020    NEUTROABS 5.50 07/25/2020    EOSABS 0.00 07/25/2020    BASOSABS 0.10 07/25/2020         DIAGNOSTIC TESTING:     Nm Myocardial Spect Rest Exercise Or Rx    Result Date: 7/9/2020  EXAMINATION: MYOCARDIAL PERFUSION IMAGING 7/9/2020 8:21 am TECHNIQUE: Rest dose:  12.8 mCi Tc-99m sestamibi intravenously Stress dose:  34.1 mCi Tc-99m sestamibi intravenously Under cardiology supervision, 0.4 mg Lexiscan was infused intravenously prior to injection of the stress dose. SPECT imaging was acquired following injection of the sestamibi. ECG gating was obtained following the stress acquisition. COMPARISON: None Available. HISTORY: ORDERING SYSTEM PROVIDED HISTORY: Abnormal EKG TECHNOLOGIST PROVIDED HISTORY: Reason for Exam: EKG abnormalities Reason for Exam: Shortness of breath Reason for Exam: Pre-op Procedure Type->Rx Reason for Exam: EKG abnormalities, shortness of breath, pre-op Acuity: Unknown Type of Exam: Unknown 73-year-old male with abnormal EKG; preoperative exam; shortness of breath FINDINGS: The patient achieved a maximum heart rate of 120 beats per minute, 78 % of the maximum age predicted heart rate of 153 beats per minute. Perfusion: There is no scintigraphic evidence for a reversible or fixed perfusion defect to suggest reversible ischemia or infarct. Mild thinning of the apical wall. Function: The gated SPECT data demonstrates normal left ventricular size and normal wall motion. Left ventricular ejection fraction:  Greater than 70% TID score:  1.13 (threshold value of 1.39 is used for Lexiscan stress with Tc-99m). There is no stress-induced cavitary dilatation to suggest compensated triple vessel disease.  End diastolic volume:  60BR Scores are visually adjusted to account for potential dilation 4. Small wall motion abnormality involving 1-2 segments and only 1 coronary bed. Low Risk (Less than 1% annual death or MI) 1. Normal or small myocardial perfusion defect at rest or with stress encumbering less than 5% of the myocardium. Assessment  1. Diarrhea, unspecified type    2. Chronic hepatitis C without hepatic coma ([facility] [address]    3. Irritable bowel syndrome with diarrhea    4. Abdominal discomfort        Plan    Check PCR    Check stools for OB X 3    He had EGD and Colonoscopy  Last year    F/u HGB    Iron Rx per Hematology    Hernia surgery w.u being done for clearance    Pt was advised in detail about some life style and dietary modifications. He was advised about avoidance of caffeine, nicotine and chocolate. Pt was also told to stay away from any kind of fast foods, soda pops. He was also advised to avoid lots of spices, grease and fried food etc.     Instructions were also given about trying to arrange the timing, quality and quantity of food. Instructions were given about using ample amount of fiber including dietary and supplemental fiber either metamucil, bennafiber or citrucell etc.  Pt was advised about drinking ample amount of water without any colors or chemicals. Stress was given about regular exercise. Pt has verbalized understanding and agreement to these modifications.       The patient was instructed to start taking some OTC Probiotics products   These are available over the counter at the Pharmacy stores and [employer]  He was explained about the beneficial effects they have in the GI track  They will help to establish the good bacterial kimberli and will help with the digestion and bowel movements  The patient has verbalized understanding and agreement to this plan      I communicated with the patient and/or health care decision maker about   Details of this discussion including any medical advice provided:YES    More than half of patient's clinic visit time was spent in counseling about lifestyle and dietary modifications  Patient's  questions were answered in this regard as well  The patient has verbalized understanding and agreement     I affirm this is a Patient Initiated Episode with an Established Patient who has not had a related appointment within my department in the past 7 days or scheduled within the next 24 hours. Total Time: minutes: 21-30 minutes    Note: not billable if this call serves to triage the patient into an appointment for the relevant concern      Thank you for allowing me to participate in the care of Mr. Willem Swain. For any further questions please do not hesitate to contact me. I have reviewed and agree with the ROS entered by the MA/LPN.          Johnny Paredes MD, [facility]  Board Certified in Gastroenterology and [address] [place] [address] Gastroenterology  Office #: [phone]

## 2020-07-29 ENCOUNTER — TELEPHONE (OUTPATIENT)
Dept: INFUSION THERAPY | Age: 70
End: 2020-07-29

## 2020-07-29 LAB
DIRECT EXAM: NORMAL
Lab: NORMAL
SPECIMEN DESCRIPTION: NORMAL

## 2020-07-29 NOTE — TELEPHONE ENCOUNTER
Pt's wife called in stating pt has been nauseated since receiving iron infusion last week. Had 1 episode of vomiting that night and dry heaves since last night with intermittent headache. States he saw his gastroenterologist yesterday and was told to start probiotics. Instructions given to contact his GI office.

## 2020-07-30 RX ORDER — HEPARIN SODIUM 5000 [USP'U]/ML
5000 INJECTION, SOLUTION INTRAVENOUS; SUBCUTANEOUS ONCE
Status: COMPLETED | OUTPATIENT
Start: 2020-08-07 | End: 2020-08-07

## 2020-07-30 RX ORDER — CYANOCOBALAMIN 1000 UG/ML
1000 INJECTION INTRAMUSCULAR; SUBCUTANEOUS EVERY MORNING
COMMUNITY
End: 2020-09-21

## 2020-07-30 NOTE — PROGRESS NOTES
PAT phone interview completed with pt and his wife, Rocky Nelson. One visitor and masking policy reviewed with both. Surgery and COVID testing date/time/location verified with them. Pt denies any CP or SOB episodes. Wife states that pt does snore while sleeping. Pt has been on narcotic pain medication x 10 years and stated that he must take his morning medication to be able to ambulate the morning of surgery. Will review with anesthesia.

## 2020-07-31 ENCOUNTER — ANESTHESIA EVENT (OUTPATIENT)
Dept: OPERATING ROOM | Age: 70
End: 2020-07-31
Payer: MEDICARE

## 2020-08-03 ENCOUNTER — HOSPITAL ENCOUNTER (OUTPATIENT)
Dept: PREADMISSION TESTING | Age: 70
Setting detail: SPECIMEN
Discharge: HOME | End: 2020-08-07
Payer: MEDICARE

## 2020-08-03 PROCEDURE — U0003 INFECTIOUS AGENT DETECTION BY NUCLEIC ACID (DNA OR RNA); SEVERE ACUTE RESPIRATORY SYNDROME CORONAVIRUS 2 (SARS-COV-2) (CORONAVIRUS DISEASE [COVID-19]), AMPLIFIED PROBE TECHNIQUE, MAKING USE OF HIGH THROUGHPUT TECHNOLOGIES AS DESCRIBED BY CMS-2020-01-R: HCPCS

## 2020-08-05 LAB — SARS-COV-2, NAA: NOT DETECTED

## 2020-08-06 ENCOUNTER — TELEPHONE (OUTPATIENT)
Dept: PRIMARY CARE CLINIC | Age: 70
End: 2020-08-06

## 2020-08-07 ENCOUNTER — HOSPITAL ENCOUNTER (OUTPATIENT)
Age: 70
Setting detail: OBSERVATION
Discharge: HOME | End: 2020-08-08
Attending: SURGERY | Admitting: SURGERY
Payer: MEDICARE

## 2020-08-07 ENCOUNTER — ANESTHESIA (OUTPATIENT)
Dept: OPERATING ROOM | Age: 70
End: 2020-08-07
Payer: MEDICARE

## 2020-08-07 VITALS — SYSTOLIC BLOOD PRESSURE: 148 MMHG | OXYGEN SATURATION: 95 % | DIASTOLIC BLOOD PRESSURE: 84 MMHG | TEMPERATURE: 100.2 F

## 2020-08-07 PROBLEM — Z98.890 STATUS POST HERNIA REPAIR: Status: ACTIVE | Noted: 2020-08-07

## 2020-08-07 PROBLEM — Z87.19 STATUS POST HERNIA REPAIR: Status: ACTIVE | Noted: 2020-08-07

## 2020-08-07 LAB
GLUCOSE BLD-MCNC: 125 MG/DL (ref 75–110)
GLUCOSE BLD-MCNC: 149 MG/DL (ref 75–110)
GLUCOSE BLD-MCNC: 152 MG/DL (ref 75–110)
GLUCOSE BLD-MCNC: 183 MG/DL (ref 75–110)
INR BLD: 0.9
PROTHROMBIN TIME: 9.3 SEC (ref 9.4–12.6)

## 2020-08-07 PROCEDURE — 2580000003 HC RX 258: Performed by: ANESTHESIOLOGY

## 2020-08-07 PROCEDURE — 49655 PR LAP, INCISIONAL HERNIA REPAIR,INCARCERATED: CPT | Performed by: SURGERY

## 2020-08-07 PROCEDURE — 36415 COLL VENOUS BLD VENIPUNCTURE: CPT

## 2020-08-07 PROCEDURE — 6360000002 HC RX W HCPCS

## 2020-08-07 PROCEDURE — C1781 MESH (IMPLANTABLE): HCPCS | Performed by: SURGERY

## 2020-08-07 PROCEDURE — S2900 ROBOTIC SURGICAL SYSTEM: HCPCS | Performed by: SURGERY

## 2020-08-07 PROCEDURE — 6360000002 HC RX W HCPCS: Performed by: ANESTHESIOLOGY

## 2020-08-07 PROCEDURE — 6370000000 HC RX 637 (ALT 250 FOR IP): Performed by: STUDENT IN AN ORGANIZED HEALTH CARE EDUCATION/TRAINING PROGRAM

## 2020-08-07 PROCEDURE — 2500000003 HC RX 250 WO HCPCS: Performed by: ANESTHESIOLOGY

## 2020-08-07 PROCEDURE — 6370000000 HC RX 637 (ALT 250 FOR IP): Performed by: SURGERY

## 2020-08-07 PROCEDURE — 6360000002 HC RX W HCPCS: Performed by: STUDENT IN AN ORGANIZED HEALTH CARE EDUCATION/TRAINING PROGRAM

## 2020-08-07 PROCEDURE — 1210000000 HC MED SURG R&B

## 2020-08-07 PROCEDURE — 7100000001 HC PACU RECOVERY - ADDTL 15 MIN: Performed by: SURGERY

## 2020-08-07 PROCEDURE — C1760 CLOSURE DEV, VASC: HCPCS | Performed by: SURGERY

## 2020-08-07 PROCEDURE — C9290 INJ, BUPIVACAINE LIPOSOME: HCPCS | Performed by: ANESTHESIOLOGY

## 2020-08-07 PROCEDURE — 7100000000 HC PACU RECOVERY - FIRST 15 MIN: Performed by: SURGERY

## 2020-08-07 PROCEDURE — 3600000009 HC SURGERY ROBOT BASE: Performed by: SURGERY

## 2020-08-07 PROCEDURE — 2709999900 HC NON-CHARGEABLE SUPPLY: Performed by: SURGERY

## 2020-08-07 PROCEDURE — 3600000019 HC SURGERY ROBOT ADDTL 15MIN: Performed by: SURGERY

## 2020-08-07 PROCEDURE — 85610 PROTHROMBIN TIME: CPT

## 2020-08-07 PROCEDURE — G0378 HOSPITAL OBSERVATION PER HR: HCPCS

## 2020-08-07 PROCEDURE — 2580000003 HC RX 258: Performed by: STUDENT IN AN ORGANIZED HEALTH CARE EDUCATION/TRAINING PROGRAM

## 2020-08-07 PROCEDURE — 88302 TISSUE EXAM BY PATHOLOGIST: CPT

## 2020-08-07 PROCEDURE — 64488 TAP BLOCK BI INJECTION: CPT | Performed by: ANESTHESIOLOGY

## 2020-08-07 PROCEDURE — 3700000000 HC ANESTHESIA ATTENDED CARE: Performed by: SURGERY

## 2020-08-07 PROCEDURE — 3700000001 HC ADD 15 MINUTES (ANESTHESIA): Performed by: SURGERY

## 2020-08-07 PROCEDURE — 6360000002 HC RX W HCPCS: Performed by: SURGERY

## 2020-08-07 PROCEDURE — 94760 N-INVAS EAR/PLS OXIMETRY 1: CPT

## 2020-08-07 PROCEDURE — 82947 ASSAY GLUCOSE BLOOD QUANT: CPT

## 2020-08-07 DEVICE — MESH SURG DIA6IN UNCOATED M WT MFIL PROPYLENE CIR HYDRGEL: Type: IMPLANTABLE DEVICE | Site: ABDOMEN | Status: FUNCTIONAL

## 2020-08-07 RX ORDER — GLYCOPYRROLATE 1 MG/5 ML
SYRINGE (ML) INTRAVENOUS PRN
Status: DISCONTINUED | OUTPATIENT
Start: 2020-08-07 | End: 2020-08-07 | Stop reason: SDUPTHER

## 2020-08-07 RX ORDER — PROPOFOL 10 MG/ML
INJECTION, EMULSION INTRAVENOUS PRN
Status: DISCONTINUED | OUTPATIENT
Start: 2020-08-07 | End: 2020-08-07 | Stop reason: SDUPTHER

## 2020-08-07 RX ORDER — CYCLOBENZAPRINE HCL 10 MG
10 TABLET ORAL 3 TIMES DAILY
Status: DISCONTINUED | OUTPATIENT
Start: 2020-08-07 | End: 2020-08-08 | Stop reason: HOSPADM

## 2020-08-07 RX ORDER — OXYCODONE HYDROCHLORIDE AND ACETAMINOPHEN 5; 325 MG/1; MG/1
1 TABLET ORAL PRN
Status: DISCONTINUED | OUTPATIENT
Start: 2020-08-07 | End: 2020-08-07

## 2020-08-07 RX ORDER — PREGABALIN 75 MG/1
150 CAPSULE ORAL 3 TIMES DAILY
Status: DISCONTINUED | OUTPATIENT
Start: 2020-08-07 | End: 2020-08-08 | Stop reason: HOSPADM

## 2020-08-07 RX ORDER — FENTANYL CITRATE 50 UG/ML
INJECTION, SOLUTION INTRAMUSCULAR; INTRAVENOUS PRN
Status: DISCONTINUED | OUTPATIENT
Start: 2020-08-07 | End: 2020-08-07 | Stop reason: SDUPTHER

## 2020-08-07 RX ORDER — OXYCODONE HYDROCHLORIDE AND ACETAMINOPHEN 5; 325 MG/1; MG/1
2 TABLET ORAL PRN
Status: DISCONTINUED | OUTPATIENT
Start: 2020-08-07 | End: 2020-08-07

## 2020-08-07 RX ORDER — SODIUM CHLORIDE, SODIUM LACTATE, POTASSIUM CHLORIDE, CALCIUM CHLORIDE 600; 310; 30; 20 MG/100ML; MG/100ML; MG/100ML; MG/100ML
INJECTION, SOLUTION INTRAVENOUS CONTINUOUS
Status: DISCONTINUED | OUTPATIENT
Start: 2020-08-07 | End: 2020-08-07

## 2020-08-07 RX ORDER — DEXAMETHASONE SODIUM PHOSPHATE 10 MG/ML
INJECTION, SOLUTION INTRAMUSCULAR; INTRAVENOUS PRN
Status: DISCONTINUED | OUTPATIENT
Start: 2020-08-07 | End: 2020-08-07 | Stop reason: SDUPTHER

## 2020-08-07 RX ORDER — SODIUM CHLORIDE 0.9 % (FLUSH) 0.9 %
10 SYRINGE (ML) INJECTION PRN
Status: DISCONTINUED | OUTPATIENT
Start: 2020-08-07 | End: 2020-08-07

## 2020-08-07 RX ORDER — PANTOPRAZOLE SODIUM 40 MG/1
40 TABLET, DELAYED RELEASE ORAL
Status: DISCONTINUED | OUTPATIENT
Start: 2020-08-08 | End: 2020-08-08 | Stop reason: HOSPADM

## 2020-08-07 RX ORDER — 0.9 % SODIUM CHLORIDE 0.9 %
500 INTRAVENOUS SOLUTION INTRAVENOUS
Status: DISCONTINUED | OUTPATIENT
Start: 2020-08-07 | End: 2020-08-07

## 2020-08-07 RX ORDER — EPHEDRINE SULFATE/0.9% NACL/PF 50 MG/5 ML
SYRINGE (ML) INTRAVENOUS PRN
Status: DISCONTINUED | OUTPATIENT
Start: 2020-08-07 | End: 2020-08-07 | Stop reason: SDUPTHER

## 2020-08-07 RX ORDER — ONDANSETRON 2 MG/ML
INJECTION INTRAMUSCULAR; INTRAVENOUS PRN
Status: DISCONTINUED | OUTPATIENT
Start: 2020-08-07 | End: 2020-08-07 | Stop reason: SDUPTHER

## 2020-08-07 RX ORDER — PROMETHAZINE HYDROCHLORIDE 25 MG/ML
12.5 INJECTION, SOLUTION INTRAMUSCULAR; INTRAVENOUS
Status: DISCONTINUED | OUTPATIENT
Start: 2020-08-07 | End: 2020-08-07

## 2020-08-07 RX ORDER — CYCLOBENZAPRINE HCL 10 MG
5 TABLET ORAL 3 TIMES DAILY PRN
Qty: 21 TABLET | Refills: 0 | Status: SHIPPED | OUTPATIENT
Start: 2020-08-07 | End: 2020-08-17

## 2020-08-07 RX ORDER — MIDAZOLAM HYDROCHLORIDE 1 MG/ML
2 INJECTION INTRAMUSCULAR; INTRAVENOUS ONCE
Status: CANCELLED | OUTPATIENT
Start: 2020-08-07 | End: 2020-08-07

## 2020-08-07 RX ORDER — ACETAMINOPHEN 500 MG
1000 TABLET ORAL EVERY 8 HOURS SCHEDULED
Status: DISCONTINUED | OUTPATIENT
Start: 2020-08-07 | End: 2020-08-08 | Stop reason: HOSPADM

## 2020-08-07 RX ORDER — MEPERIDINE HYDROCHLORIDE 50 MG/ML
12.5 INJECTION INTRAMUSCULAR; INTRAVENOUS; SUBCUTANEOUS EVERY 5 MIN PRN
Status: DISCONTINUED | OUTPATIENT
Start: 2020-08-07 | End: 2020-08-07

## 2020-08-07 RX ORDER — SODIUM CHLORIDE 0.9 % (FLUSH) 0.9 %
10 SYRINGE (ML) INJECTION PRN
Status: DISCONTINUED | OUTPATIENT
Start: 2020-08-07 | End: 2020-08-08 | Stop reason: HOSPADM

## 2020-08-07 RX ORDER — BUPIVACAINE HYDROCHLORIDE 2.5 MG/ML
INJECTION, SOLUTION EPIDURAL; INFILTRATION; INTRACAUDAL
Status: DISCONTINUED
Start: 2020-08-07 | End: 2020-08-07 | Stop reason: WASHOUT

## 2020-08-07 RX ORDER — LIDOCAINE HYDROCHLORIDE 10 MG/ML
1 INJECTION, SOLUTION EPIDURAL; INFILTRATION; INTRACAUDAL; PERINEURAL
Status: DISCONTINUED | OUTPATIENT
Start: 2020-08-07 | End: 2020-08-07

## 2020-08-07 RX ORDER — MIDAZOLAM HYDROCHLORIDE 1 MG/ML
INJECTION INTRAMUSCULAR; INTRAVENOUS PRN
Status: DISCONTINUED | OUTPATIENT
Start: 2020-08-07 | End: 2020-08-07 | Stop reason: SDUPTHER

## 2020-08-07 RX ORDER — SODIUM CHLORIDE 0.9 % (FLUSH) 0.9 %
10 SYRINGE (ML) INJECTION EVERY 12 HOURS SCHEDULED
Status: DISCONTINUED | OUTPATIENT
Start: 2020-08-07 | End: 2020-08-07

## 2020-08-07 RX ORDER — LISINOPRIL AND HYDROCHLOROTHIAZIDE 12.5; 1 MG/1; MG/1
1 TABLET ORAL DAILY
Status: DISCONTINUED | OUTPATIENT
Start: 2020-08-07 | End: 2020-08-08 | Stop reason: HOSPADM

## 2020-08-07 RX ORDER — DEXTROSE MONOHYDRATE 50 MG/ML
100 INJECTION, SOLUTION INTRAVENOUS PRN
Status: DISCONTINUED | OUTPATIENT
Start: 2020-08-07 | End: 2020-08-08 | Stop reason: HOSPADM

## 2020-08-07 RX ORDER — FENTANYL CITRATE 50 UG/ML
INJECTION, SOLUTION INTRAMUSCULAR; INTRAVENOUS
Status: COMPLETED
Start: 2020-08-07 | End: 2020-08-07

## 2020-08-07 RX ORDER — DEXTROSE MONOHYDRATE 25 G/50ML
12.5 INJECTION, SOLUTION INTRAVENOUS PRN
Status: DISCONTINUED | OUTPATIENT
Start: 2020-08-07 | End: 2020-08-08 | Stop reason: HOSPADM

## 2020-08-07 RX ORDER — FENTANYL CITRATE 50 UG/ML
100 INJECTION, SOLUTION INTRAMUSCULAR; INTRAVENOUS ONCE
Status: CANCELLED | OUTPATIENT
Start: 2020-08-07

## 2020-08-07 RX ORDER — HEPARIN SODIUM 5000 [USP'U]/ML
5000 INJECTION, SOLUTION INTRAVENOUS; SUBCUTANEOUS EVERY 8 HOURS SCHEDULED
Status: DISCONTINUED | OUTPATIENT
Start: 2020-08-07 | End: 2020-08-08 | Stop reason: HOSPADM

## 2020-08-07 RX ORDER — PRAVASTATIN SODIUM 20 MG
40 TABLET ORAL EVERY EVENING
Status: DISCONTINUED | OUTPATIENT
Start: 2020-08-07 | End: 2020-08-08 | Stop reason: HOSPADM

## 2020-08-07 RX ORDER — DIPHENHYDRAMINE HYDROCHLORIDE 50 MG/ML
12.5 INJECTION INTRAMUSCULAR; INTRAVENOUS
Status: DISCONTINUED | OUTPATIENT
Start: 2020-08-07 | End: 2020-08-07

## 2020-08-07 RX ORDER — SODIUM CHLORIDE 0.9 % (FLUSH) 0.9 %
10 SYRINGE (ML) INJECTION EVERY 12 HOURS SCHEDULED
Status: DISCONTINUED | OUTPATIENT
Start: 2020-08-07 | End: 2020-08-08 | Stop reason: HOSPADM

## 2020-08-07 RX ORDER — HEPARIN SODIUM 5000 [USP'U]/ML
INJECTION, SOLUTION INTRAVENOUS; SUBCUTANEOUS
Status: COMPLETED
Start: 2020-08-07 | End: 2020-08-07

## 2020-08-07 RX ORDER — BUPIVACAINE HYDROCHLORIDE 5 MG/ML
INJECTION, SOLUTION EPIDURAL; INTRACAUDAL
Status: COMPLETED
Start: 2020-08-07 | End: 2020-08-07

## 2020-08-07 RX ORDER — ROCURONIUM BROMIDE 10 MG/ML
INJECTION, SOLUTION INTRAVENOUS PRN
Status: DISCONTINUED | OUTPATIENT
Start: 2020-08-07 | End: 2020-08-07 | Stop reason: SDUPTHER

## 2020-08-07 RX ORDER — NEOSTIGMINE METHYLSULFATE 5 MG/5 ML
SYRINGE (ML) INTRAVENOUS PRN
Status: DISCONTINUED | OUTPATIENT
Start: 2020-08-07 | End: 2020-08-07 | Stop reason: SDUPTHER

## 2020-08-07 RX ORDER — MORPHINE SULFATE 2 MG/ML
2 INJECTION, SOLUTION INTRAMUSCULAR; INTRAVENOUS EVERY 5 MIN PRN
Status: DISCONTINUED | OUTPATIENT
Start: 2020-08-07 | End: 2020-08-07

## 2020-08-07 RX ORDER — BUPIVACAINE HYDROCHLORIDE 5 MG/ML
INJECTION, SOLUTION PERINEURAL
Status: DISCONTINUED
Start: 2020-08-07 | End: 2020-08-07 | Stop reason: WASHOUT

## 2020-08-07 RX ORDER — ONDANSETRON 2 MG/ML
4 INJECTION INTRAMUSCULAR; INTRAVENOUS EVERY 6 HOURS PRN
Status: DISCONTINUED | OUTPATIENT
Start: 2020-08-07 | End: 2020-08-08 | Stop reason: HOSPADM

## 2020-08-07 RX ORDER — MIDAZOLAM HYDROCHLORIDE 1 MG/ML
INJECTION INTRAMUSCULAR; INTRAVENOUS
Status: COMPLETED
Start: 2020-08-07 | End: 2020-08-07

## 2020-08-07 RX ORDER — NICOTINE POLACRILEX 4 MG
15 LOZENGE BUCCAL PRN
Status: DISCONTINUED | OUTPATIENT
Start: 2020-08-07 | End: 2020-08-08 | Stop reason: HOSPADM

## 2020-08-07 RX ORDER — BUPIVACAINE HYDROCHLORIDE 5 MG/ML
INJECTION, SOLUTION EPIDURAL; INTRACAUDAL
Status: COMPLETED | OUTPATIENT
Start: 2020-08-07 | End: 2020-08-07

## 2020-08-07 RX ORDER — LIDOCAINE HYDROCHLORIDE 10 MG/ML
INJECTION, SOLUTION EPIDURAL; INFILTRATION; INTRACAUDAL; PERINEURAL PRN
Status: DISCONTINUED | OUTPATIENT
Start: 2020-08-07 | End: 2020-08-07 | Stop reason: SDUPTHER

## 2020-08-07 RX ORDER — OXYCODONE HYDROCHLORIDE AND ACETAMINOPHEN 5; 325 MG/1; MG/1
1 TABLET ORAL EVERY 8 HOURS
Status: DISCONTINUED | OUTPATIENT
Start: 2020-08-07 | End: 2020-08-08 | Stop reason: HOSPADM

## 2020-08-07 RX ORDER — LABETALOL 20 MG/4 ML (5 MG/ML) INTRAVENOUS SYRINGE
5 EVERY 10 MIN PRN
Status: DISCONTINUED | OUTPATIENT
Start: 2020-08-07 | End: 2020-08-07

## 2020-08-07 RX ORDER — PROMETHAZINE HYDROCHLORIDE 25 MG/1
25 TABLET ORAL 4 TIMES DAILY PRN
Qty: 30 TABLET | Refills: 0 | Status: SHIPPED | OUTPATIENT
Start: 2020-08-07 | End: 2020-08-15

## 2020-08-07 RX ORDER — MORPHINE SULFATE 30 MG/1
60 TABLET, FILM COATED, EXTENDED RELEASE ORAL 2 TIMES DAILY
Status: DISCONTINUED | OUTPATIENT
Start: 2020-08-07 | End: 2020-08-08 | Stop reason: HOSPADM

## 2020-08-07 RX ORDER — ONDANSETRON 2 MG/ML
4 INJECTION INTRAMUSCULAR; INTRAVENOUS
Status: DISCONTINUED | OUTPATIENT
Start: 2020-08-07 | End: 2020-08-07

## 2020-08-07 RX ADMIN — ONDANSETRON 4 MG: 2 INJECTION, SOLUTION INTRAMUSCULAR; INTRAVENOUS at 12:53

## 2020-08-07 RX ADMIN — PREGABALIN 150 MG: 75 CAPSULE ORAL at 14:47

## 2020-08-07 RX ADMIN — SODIUM CHLORIDE, PRESERVATIVE FREE 10 ML: 5 INJECTION INTRAVENOUS at 20:21

## 2020-08-07 RX ADMIN — SODIUM CHLORIDE, POTASSIUM CHLORIDE, SODIUM LACTATE AND CALCIUM CHLORIDE: 600; 310; 30; 20 INJECTION, SOLUTION INTRAVENOUS at 09:00

## 2020-08-07 RX ADMIN — CYCLOBENZAPRINE HYDROCHLORIDE 10 MG: 10 TABLET, FILM COATED ORAL at 20:23

## 2020-08-07 RX ADMIN — INSULIN LISPRO 2 UNITS: 100 INJECTION, SOLUTION INTRAVENOUS; SUBCUTANEOUS at 21:01

## 2020-08-07 RX ADMIN — MIDAZOLAM HYDROCHLORIDE 2 MG: 1 INJECTION, SOLUTION INTRAMUSCULAR; INTRAVENOUS at 09:10

## 2020-08-07 RX ADMIN — PROPOFOL 150 MG: 10 INJECTION, EMULSION INTRAVENOUS at 10:51

## 2020-08-07 RX ADMIN — CEFAZOLIN 2 G: 10 INJECTION, POWDER, FOR SOLUTION INTRAVENOUS at 10:57

## 2020-08-07 RX ADMIN — HYDROMORPHONE HYDROCHLORIDE 0.5 MG: 1 INJECTION, SOLUTION INTRAMUSCULAR; INTRAVENOUS; SUBCUTANEOUS at 13:42

## 2020-08-07 RX ADMIN — HEPARIN SODIUM 5000 UNITS: 5000 INJECTION, SOLUTION INTRAVENOUS; SUBCUTANEOUS at 09:23

## 2020-08-07 RX ADMIN — Medication 0.4 MG: at 13:05

## 2020-08-07 RX ADMIN — Medication 0.5 MG: at 13:55

## 2020-08-07 RX ADMIN — ACETAMINOPHEN 1000 MG: 500 TABLET ORAL at 14:47

## 2020-08-07 RX ADMIN — OXYCODONE HYDROCHLORIDE AND ACETAMINOPHEN 1 TABLET: 5; 325 TABLET ORAL at 14:47

## 2020-08-07 RX ADMIN — METOPROLOL TARTRATE 25 MG: 25 TABLET, FILM COATED ORAL at 20:23

## 2020-08-07 RX ADMIN — HYDROMORPHONE HYDROCHLORIDE 0.5 MG: 1 INJECTION, SOLUTION INTRAMUSCULAR; INTRAVENOUS; SUBCUTANEOUS at 14:07

## 2020-08-07 RX ADMIN — INSULIN LISPRO 3 UNITS: 100 INJECTION, SOLUTION INTRAVENOUS; SUBCUTANEOUS at 17:05

## 2020-08-07 RX ADMIN — Medication 0.5 MG: at 14:07

## 2020-08-07 RX ADMIN — FENTANYL CITRATE 100 MCG: 50 INJECTION, SOLUTION INTRAMUSCULAR; INTRAVENOUS at 09:11

## 2020-08-07 RX ADMIN — BUPIVACAINE 20 ML: 13.3 INJECTION, SUSPENSION, LIPOSOMAL INFILTRATION at 09:16

## 2020-08-07 RX ADMIN — OXYCODONE HYDROCHLORIDE AND ACETAMINOPHEN 1 TABLET: 5; 325 TABLET ORAL at 23:12

## 2020-08-07 RX ADMIN — MORPHINE SULFATE 60 MG: 30 TABLET, EXTENDED RELEASE ORAL at 21:56

## 2020-08-07 RX ADMIN — Medication 10 MG: at 11:10

## 2020-08-07 RX ADMIN — HEPARIN SODIUM 5000 UNITS: 5000 INJECTION INTRAVENOUS; SUBCUTANEOUS at 09:23

## 2020-08-07 RX ADMIN — Medication 0.5 MG: at 13:32

## 2020-08-07 RX ADMIN — BENZOCAINE AND MENTHOL 1 LOZENGE: 15; 3.6 LOZENGE ORAL at 21:01

## 2020-08-07 RX ADMIN — HYDROMORPHONE HYDROCHLORIDE 0.5 MG: 1 INJECTION, SOLUTION INTRAMUSCULAR; INTRAVENOUS; SUBCUTANEOUS at 13:55

## 2020-08-07 RX ADMIN — FENTANYL CITRATE 100 MCG: 50 INJECTION INTRAMUSCULAR; INTRAVENOUS at 10:51

## 2020-08-07 RX ADMIN — PRAVASTATIN SODIUM 40 MG: 20 TABLET ORAL at 21:57

## 2020-08-07 RX ADMIN — HYDROMORPHONE HYDROCHLORIDE 0.5 MG: 1 INJECTION, SOLUTION INTRAMUSCULAR; INTRAVENOUS; SUBCUTANEOUS at 20:18

## 2020-08-07 RX ADMIN — BUPIVACAINE HYDROCHLORIDE 20 ML: 5 INJECTION, SOLUTION EPIDURAL; INTRACAUDAL; PERINEURAL at 09:16

## 2020-08-07 RX ADMIN — Medication 2 MG: at 13:06

## 2020-08-07 RX ADMIN — PREGABALIN 150 MG: 75 CAPSULE ORAL at 20:25

## 2020-08-07 RX ADMIN — HYDROMORPHONE HYDROCHLORIDE 0.5 MG: 1 INJECTION, SOLUTION INTRAMUSCULAR; INTRAVENOUS; SUBCUTANEOUS at 17:05

## 2020-08-07 RX ADMIN — CYCLOBENZAPRINE HYDROCHLORIDE 10 MG: 10 TABLET, FILM COATED ORAL at 14:47

## 2020-08-07 RX ADMIN — DEXAMETHASONE SODIUM PHOSPHATE 10 MG: 10 INJECTION, SOLUTION INTRAMUSCULAR; INTRAVENOUS at 11:16

## 2020-08-07 RX ADMIN — ROCURONIUM BROMIDE 50 MG: 10 INJECTION INTRAVENOUS at 10:52

## 2020-08-07 RX ADMIN — HYDROMORPHONE HYDROCHLORIDE 0.5 MG: 1 INJECTION, SOLUTION INTRAMUSCULAR; INTRAVENOUS; SUBCUTANEOUS at 13:32

## 2020-08-07 RX ADMIN — LISINOPRIL AND HYDROCHLOROTHIAZIDE 1 TABLET: 12.5; 1 TABLET ORAL at 14:47

## 2020-08-07 RX ADMIN — ACETAMINOPHEN 1000 MG: 500 TABLET ORAL at 21:56

## 2020-08-07 RX ADMIN — Medication 0.5 MG: at 13:42

## 2020-08-07 RX ADMIN — LIDOCAINE HYDROCHLORIDE 50 MG: 10 INJECTION, SOLUTION EPIDURAL; INFILTRATION; INTRACAUDAL; PERINEURAL at 10:51

## 2020-08-07 RX ADMIN — MIDAZOLAM HYDROCHLORIDE 2 MG: 1 INJECTION, SOLUTION INTRAMUSCULAR; INTRAVENOUS at 10:49

## 2020-08-07 ASSESSMENT — PULMONARY FUNCTION TESTS
PIF_VALUE: 26
PIF_VALUE: 26
PIF_VALUE: 19
PIF_VALUE: 37
PIF_VALUE: 29
PIF_VALUE: 26
PIF_VALUE: 31
PIF_VALUE: 31
PIF_VALUE: 19
PIF_VALUE: 26
PIF_VALUE: 35
PIF_VALUE: 26
PIF_VALUE: 28
PIF_VALUE: 27
PIF_VALUE: 23
PIF_VALUE: 26
PIF_VALUE: 37
PIF_VALUE: 1
PIF_VALUE: 19
PIF_VALUE: 26
PIF_VALUE: 26
PIF_VALUE: 22
PIF_VALUE: 16
PIF_VALUE: 37
PIF_VALUE: 27
PIF_VALUE: 27
PIF_VALUE: 1
PIF_VALUE: 31
PIF_VALUE: 3
PIF_VALUE: 2
PIF_VALUE: 31
PIF_VALUE: 18
PIF_VALUE: 17
PIF_VALUE: 37
PIF_VALUE: 27
PIF_VALUE: 19
PIF_VALUE: 17
PIF_VALUE: 20
PIF_VALUE: 26
PIF_VALUE: 27
PIF_VALUE: 27
PIF_VALUE: 26
PIF_VALUE: 27
PIF_VALUE: 26
PIF_VALUE: 19
PIF_VALUE: 27
PIF_VALUE: 31
PIF_VALUE: 24
PIF_VALUE: 27
PIF_VALUE: 27
PIF_VALUE: 29
PIF_VALUE: 37
PIF_VALUE: 32
PIF_VALUE: 35
PIF_VALUE: 29
PIF_VALUE: 19
PIF_VALUE: 21
PIF_VALUE: 37
PIF_VALUE: 22
PIF_VALUE: 31
PIF_VALUE: 26
PIF_VALUE: 37
PIF_VALUE: 27
PIF_VALUE: 3
PIF_VALUE: 37
PIF_VALUE: 27
PIF_VALUE: 23
PIF_VALUE: 3
PIF_VALUE: 1
PIF_VALUE: 18
PIF_VALUE: 37
PIF_VALUE: 18
PIF_VALUE: 26
PIF_VALUE: 27
PIF_VALUE: 22
PIF_VALUE: 20
PIF_VALUE: 35
PIF_VALUE: 32
PIF_VALUE: 26
PIF_VALUE: 1
PIF_VALUE: 27
PIF_VALUE: 21
PIF_VALUE: 26
PIF_VALUE: 35
PIF_VALUE: 33
PIF_VALUE: 18
PIF_VALUE: 1
PIF_VALUE: 26
PIF_VALUE: 3
PIF_VALUE: 37
PIF_VALUE: 26
PIF_VALUE: 30
PIF_VALUE: 26
PIF_VALUE: 27
PIF_VALUE: 27
PIF_VALUE: 26
PIF_VALUE: 29
PIF_VALUE: 2
PIF_VALUE: 27
PIF_VALUE: 26
PIF_VALUE: 37
PIF_VALUE: 19
PIF_VALUE: 26
PIF_VALUE: 29
PIF_VALUE: 31
PIF_VALUE: 1
PIF_VALUE: 26
PIF_VALUE: 37
PIF_VALUE: 35
PIF_VALUE: 37
PIF_VALUE: 27
PIF_VALUE: 3
PIF_VALUE: 2
PIF_VALUE: 26
PIF_VALUE: 31
PIF_VALUE: 26
PIF_VALUE: 27
PIF_VALUE: 2
PIF_VALUE: 18
PIF_VALUE: 37
PIF_VALUE: 27
PIF_VALUE: 1
PIF_VALUE: 26
PIF_VALUE: 19
PIF_VALUE: 31
PIF_VALUE: 27
PIF_VALUE: 19
PIF_VALUE: 37
PIF_VALUE: 37
PIF_VALUE: 11
PIF_VALUE: 27
PIF_VALUE: 27
PIF_VALUE: 26
PIF_VALUE: 27
PIF_VALUE: 35
PIF_VALUE: 26
PIF_VALUE: 27
PIF_VALUE: 35
PIF_VALUE: 35
PIF_VALUE: 26
PIF_VALUE: 27
PIF_VALUE: 38
PIF_VALUE: 27
PIF_VALUE: 22
PIF_VALUE: 27

## 2020-08-07 ASSESSMENT — PAIN DESCRIPTION - PROGRESSION
CLINICAL_PROGRESSION: NOT CHANGED
CLINICAL_PROGRESSION: NOT CHANGED

## 2020-08-07 ASSESSMENT — PAIN - FUNCTIONAL ASSESSMENT
PAIN_FUNCTIONAL_ASSESSMENT: PREVENTS OR INTERFERES SOME ACTIVE ACTIVITIES AND ADLS
PAIN_FUNCTIONAL_ASSESSMENT: 0-10
PAIN_FUNCTIONAL_ASSESSMENT: PREVENTS OR INTERFERES SOME ACTIVE ACTIVITIES AND ADLS

## 2020-08-07 ASSESSMENT — PAIN DESCRIPTION - LOCATION
LOCATION: ABDOMEN

## 2020-08-07 ASSESSMENT — PAIN DESCRIPTION - PAIN TYPE
TYPE: SURGICAL PAIN

## 2020-08-07 ASSESSMENT — PAIN DESCRIPTION - DESCRIPTORS
DESCRIPTORS: ACHING
DESCRIPTORS: CONSTANT
DESCRIPTORS: ACHING
DESCRIPTORS: CONSTANT

## 2020-08-07 ASSESSMENT — PAIN DESCRIPTION - ORIENTATION
ORIENTATION: MID

## 2020-08-07 ASSESSMENT — PAIN SCALES - GENERAL
PAINLEVEL_OUTOF10: 8
PAINLEVEL_OUTOF10: 10
PAINLEVEL_OUTOF10: 8
PAINLEVEL_OUTOF10: 6
PAINLEVEL_OUTOF10: 8
PAINLEVEL_OUTOF10: 7
PAINLEVEL_OUTOF10: 6
PAINLEVEL_OUTOF10: 4
PAINLEVEL_OUTOF10: 8
PAINLEVEL_OUTOF10: 7
PAINLEVEL_OUTOF10: 8
PAINLEVEL_OUTOF10: 8
PAINLEVEL_OUTOF10: 7
PAINLEVEL_OUTOF10: 10
PAINLEVEL_OUTOF10: 8
PAINLEVEL_OUTOF10: 8
PAINLEVEL_OUTOF10: 7
PAINLEVEL_OUTOF10: 6

## 2020-08-07 ASSESSMENT — PAIN DESCRIPTION - FREQUENCY
FREQUENCY: CONTINUOUS

## 2020-08-07 ASSESSMENT — PAIN DESCRIPTION - ONSET
ONSET: ON-GOING
ONSET: ON-GOING

## 2020-08-07 NOTE — H&P
MD        lidocaine PF 1 % injection 1 mL  1 mL Intradermal Once PRN Fletcher Redman MD        ceFAZolin (ANCEF) IVPB             morphine (PF) injection 2 mg  2 mg Intravenous Q5 Min PRN Fletcher Redman MD        HYDROmorphone (DILAUDID) injection 0.5 mg  0.5 mg Intravenous Q5 Min PRN Fletcher Redman MD        HYDROmorphone (DILAUDID) injection 0.25 mg  0.25 mg Intravenous Q5 Min PRN Fletcher Redman MD        HYDROmorphone (DILAUDID) injection 0.5 mg  0.5 mg Intravenous Q5 Min PRN Fletcher Redman MD        oxyCODONE-acetaminophen (PERCOCET) 5-325 MG per tablet 1 tablet  1 tablet Oral PRN Fletcher Redman MD        Or    oxyCODONE-acetaminophen (PERCOCET) 5-325 MG per tablet 2 tablet  2 tablet Oral PRN Fletcher Redman MD        diphenhydrAMINE (BENADRYL) injection 12.5 mg  12.5 mg Intravenous Once PRN Fletcher Redman MD        0.9 % sodium chloride bolus  500 mL Intravenous Once PRN Fletcher Redman MD        ondansetron (ZOFRAN) injection 4 mg  4 mg Intravenous Once PRN Fletcher Redman MD        promethazine (PHENERGAN) injection 12.5 mg  12.5 mg Intravenous Q15 Min PRN Fletcher Redman MD        labetalol (NORMODYNE;TRANDATE) injection syringe 5 mg  5 mg Intravenous Q10 Min PRN Fletcher Redman MD        meperidine (DEMEROL) injection 12.5 mg  12.5 mg Intravenous Q5 Min PRN Fletcher Redman MD        bupivacaine (PF) (MARCAINE) 0.25 % injection                Objective      Physical Exam  /61   Pulse 80   Temp 99.2 °F (37.3 °C) (Temporal)   Resp 12   Ht 5' 8.5\" (1.74 m)   Wt 202 lb (91.6 kg)   SpO2 98%   BMI 30.27 kg/m²    Constitutional:  Vital signs are normal. The patient appears well-developed and well-nourished. HEENT:   Head: Normocephalic. Atraumatic  Eyes: pupils are equal and reactive. No scleral icterus is present. Neck: No mass and no thyromegaly present.    Cardiovascular: Normal rate, regular rhythm, S1 normal and S2 normal. Pulmonary/Chest: Effort normal and breath sounds normal.   Abdominal: Soft. Normal appearance. There is no organomegaly. No tenderness. There is no rigidity, no rebound, no guarding and no Bradley's sign. Musculoskeletal:        Right lower leg: Normal. No tenderness and no edema. Left lower leg: Normal. No tenderness and no edema. Lymphadenopathy:     No cervical adenopathy, No Exrtemity Adenopathy. Neurological: The patient is alert and oriented. Skin: Skin is warm, dry and intact. Psychiatric: The patient has a normal mood and affect.  Speech is normal and behavior is normal. Judgment and thought content normal. Cognition and memory are normal.     Assessment     Patient Active Problem List   Diagnosis    Degenerative disc disease, lumbar    Lumbar spondylosis    Lumbar radiculopathy    Lumbar spinal stenosis    Encounter for medication monitoring    Cervical spondylitis (HCC)    S/P cervical spinal fusion    Anemia    GERD (gastroesophageal reflux disease)    Osteoarthritis of spine with radiculopathy, lumbar region    Encounter for chronic pain management    Osteoarthritis of lumbar spine    Iron deficiency anemia    Hep C w/o coma, chronic (HCC)    Colon polyps    Hepatitis B core antibody positive    Peripheral polyneuropathy    Essential hypertension    Type 2 diabetes mellitus with hyperglycemia, without long-term current use of insulin (HCC)    Hyperlipidemia with target LDL less than 100    Vitamin D deficiency    Vitamin B 12 deficiency    Abnormal EKG    Type 2 diabetes mellitus with microalbuminuria, without long-term current use of insulin (HCC)    Abdominal discomfort    Irritable bowel syndrome with diarrhea    Chronic hepatitis C without hepatic coma ([facility] [address]    Diarrhea       Plan   Robotic Incisional hernia repair  Consent on chart  Patient had stress test and was cleared

## 2020-08-07 NOTE — ANESTHESIA POSTPROCEDURE EVALUATION
Department of Anesthesiology  Postprocedure Note    Patient: Trinidad Cranker  MRN: [id number]  YOB: 1950  Date of evaluation: 8/7/2020  Time:  1:53 PM     Procedure Summary     Date:  08/07/20 Room / Location:  [facility]    Anesthesia Start:  8361 Anesthesia Stop:  9110    Procedure: HERNIA INCISIONAL REPAIR LAPAROSCOPIC ROBOTIC WITH MESH (N/A ) Diagnosis:  (INCISIONAL HERNIA)    Surgeon:  Zandra Mccullough DO Responsible Provider:  Mary Castañeda MD    Anesthesia Type:  general, regional ASA Status:  3          Anesthesia Type: general, regional    Be Phase I: Be Score: 9    Be Phase II:      Last vitals: Reviewed and per EMR flowsheets.        Anesthesia Post Evaluation    Patient location during evaluation: PACU  Patient participation: complete - patient participated  Level of consciousness: awake and alert  Airway patency: patent  Nausea & Vomiting: no nausea and no vomiting  Complications: no  Cardiovascular status: hemodynamically stable  Respiratory status: nasal cannula and spontaneous ventilation  Hydration status: euvolemic

## 2020-08-07 NOTE — BRIEF OP NOTE
Brief Postoperative Note      Patient: Sonia Tolbert  YOB: 1950  MRN: [id number]    Date of Procedure: 8/7/2020    Pre-Op Diagnosis: INCISIONAL HERNIA    Post-Op Diagnosis: Same       Procedure(s): HERNIA INCISIONAL REPAIR LAPAROSCOPIC ROBOTIC WITH MESH    Surgeon(s):  Erica Abel DO    Assistant:  Resident: Miah Zapata MD    Anesthesia: General    Estimated Blood Loss (mL): 5cc    Fluids: 1L crystalloid intra op    UOP: 1L    Complications: None    Specimens:   ID Type Source Tests Collected by Time Destination   A : hernia sac Tissue Tissue SURGICAL PATHOLOGY Erica Abel DO 8/7/2020 1250        Implants:  Implant Name Type Inv.  Item Serial No.  Lot No. LRB No. Used Action   MESH SURG PATCH LEYLA [address] CIR 6IN Mesh MESH SURG PATCH LEYLA [address] CIR 6IN  [employer] [id number] N/A 1 Implanted         Drains:   Urethral Catheter Non-latex 16 fr (Active)       Findings: Incisional hernia repair with mesh    Electronically signed by Miah Zapata MD on 8/7/2020 at 1:16 PM

## 2020-08-07 NOTE — ANESTHESIA PRE PROCEDURE
Department of Anesthesiology  Preprocedure Note       Name:  Annika Vaca   Age:  81 y.o.  :  1950                                          MRN:  [id number]         Date:  2020      Surgeon: Meron Friday):  eRx Gamez DO    Procedure: Procedure(s): HERNIA INCISIONAL REPAIR LAPAROSCOPIC ROBOTIC WITH MESH    Medications prior to admission:   Prior to Admission medications    Medication Sig Start Date End Date Taking? Authorizing Provider   cyanocobalamin 1000 MCG/ML injection Inject 1,000 mcg into the muscle every morning Every day x7 days (started ) then once a month   Yes Historical Provider, MD   vitamin D (ERGOCALCIFEROL) 1.25 MG (26076 UT) CAPS capsule Take 1 capsule by mouth once a week 20  Yes Jaime Hayes MD   morphine (MS CONTIN) 60 MG extended release tablet Take 1 tablet by mouth 2 times daily for 30 days. 20 Yes VEE Hernandez CNP   oxyCODONE-acetaminophen (PERCOCET) 5-325 MG per tablet Take 1 tablet by mouth every 8 hours for 30 days. 20 Yes VEE Hernandez CNP   metFORMIN (GLUCOPHAGE) 1000 MG tablet Take 1 tablet by mouth 2 times daily (with meals) 20  Yes Jaime Hayes MD   metoprolol tartrate (LOPRESSOR) 25 MG tablet Take 1 tablet by mouth 2 times daily 20  Yes Jaime Hayes MD   canagliflozin (INVOKANA) 100 MG TABS tablet Take 1 tablet by mouth every morning (before breakfast) For diabetes 20  Yes Jaime Hayes MD   pravastatin (PRAVACHOL) 40 MG tablet Take 1 tablet by mouth every evening Stop Gemfibrozil 20  Yes Jaime Hayes MD   pregabalin (LYRICA) 150 MG capsule Take 1 capsule by mouth 3 times daily for 90 days.  20 Yes VEE Hernandez CNP   lisinopril-hydroCHLOROthiazide (PRINZIDE;ZESTORETIC) 10-12.5 MG per tablet Take 1 tablet by mouth daily   Yes Historical Provider, MD   pantoprazole (PROTONIX) 40 MG tablet TK 1 T PO  QD 16  Yes Historical Provider, MD Syringe/Needle, Disp, (SYRINGE 3CC/25GX1\") 25G X 1\" 3 ML MISC To be used with B12 injections 7/21/20   Myna Frankel, MD   Alcohol Swabs (B-D SINGLE USE SWABS REGULAR) PADS USE TO CHECK BLOOD SUGAR TWICE A DAY 4/1/20   Historical Provider, MD   Blood Glucose Monitoring Suppl (TRUE METRIX METER) w/Device KIT USE AS DIRECTED TO TEST BLOOD SUGAR 4/8/20   Historical Provider, MD   TRUE METRIX BLOOD GLUCOSE TEST strip CHECK BLOOD SUGAR BID 4/8/20   Historical Provider, MD   TRUEplus Lancets 30G [address] [facility] USE TO CHECK BLOOD SUGAR BID 4/8/20   Historical Provider, MD   hydrOXYzine (VISTARIL) 25 MG capsule hydroxyzine pamoate 25 mg capsule    Historical Provider, MD   BD INTEGRA SYRINGE 25G X 1\" 3 ML MISC  6/3/17   Historical Provider, MD       Current medications:    Current Facility-Administered Medications   Medication Dose Route Frequency Provider Last Rate Last Dose    ceFAZolin (ANCEF) 2 g in dextrose 5 % 50 mL IVPB  2 g Intravenous Once Peola Reggie, DO        lactated ringers infusion   Intravenous Continuous Chevy Gonzales MD        sodium chloride flush 0.9 % injection 10 mL  10 mL Intravenous 2 times per day Chevy Gonzales MD        sodium chloride flush 0.9 % injection 10 mL  10 mL Intravenous PRN Chevy Gonzales MD        lidocaine PF 1 % injection 1 mL  1 mL Intradermal Once PRN Chevy Gonzales MD        ceFAZolin (ANCEF) IVPB             heparin (porcine) injection 5,000 Units  5,000 Units Subcutaneous Once Peola Reggie, DO           Allergies:     Allergies   Allergen Reactions    Asa [Aspirin]       iron deficiency anemia , requiring iron infusions and transfusions    Iron      Pill- constipation, abdominal pain    Nsaids       iron deficiency anemia, history of bleeding ulcers        Problem List:    Patient Active Problem List   Diagnosis Code    Degenerative disc disease, lumbar M51.36    Lumbar spondylosis M47.816    Lumbar radiculopathy M54.16    Lumbar spinal stenosis M48.061    Encounter for medication monitoring Z51.81    Cervical spondylitis (HCC) M46.92    S/P cervical spinal fusion Z98.1    Anemia D64.9    GERD (gastroesophageal reflux disease) K21.9    Osteoarthritis of spine with radiculopathy, lumbar region M47.26    Encounter for chronic pain management G89.29    Osteoarthritis of lumbar spine M47.816    Iron deficiency anemia D50.9    Hep C w/o coma, chronic (HCC) B18.2    Colon polyps K63.5    Hepatitis B core antibody positive R76.8    Peripheral polyneuropathy G62.9    Essential hypertension I10    Type 2 diabetes mellitus with hyperglycemia, without long-term current use of insulin (HCC) E11.65    Hyperlipidemia with target LDL less than 100 E78.5    Vitamin D deficiency E55.9    Vitamin B 12 deficiency E53.8    Abnormal EKG R94.31    Type 2 diabetes mellitus with microalbuminuria, without long-term current use of insulin (HCC) E11.29, R80.9    Abdominal discomfort R10.9    Irritable bowel syndrome with diarrhea K58.0    Chronic hepatitis C without hepatic coma (HCC) B18.2    Diarrhea R19.7       Past Medical History:        Diagnosis Date    Anemia     Arthritis     osteoarthritis    Colon polyps 10/08/2019    tubular adenoma x2    Essential hypertension 5/12/2020    Hep C w/o coma, chronic ([address] 2019    Received antiviral treatment by Dr. Margarita Stubbs Hepatitis B core antibody positive     History of blood transfusion     Hyperlipidemia     Iron (Fe) deficiency anemia     Type 2 diabetes mellitus with hyperglycemia, without long-term current use of insulin ([address] 5/12/2020    Type 2 diabetes mellitus with microalbuminuria, without long-term current use of insulin ([address] 7/13/2020    Wears dentures     Wears glasses        Past Surgical History:        Procedure Laterality Date    BACK SURGERY  1977    cervical spine three times    CHOLECYSTECTOMY  03/22/2019    COLONOSCOPY  01/25/2015    10 yr recall, hemorrhoids    COLONOSCOPY N/A 10/8/2019    tubular adenoma x2    DENTAL SURGERY  10/2015    all teeth extracted    SHOULDER SURGERY Right     UMBILICAL HERNIA REPAIR  3022-19    UPPER GASTROINTESTINAL ENDOSCOPY  2015    UPPER GASTROINTESTINAL ENDOSCOPY N/A 10/8/2019    EGD BIOPSY performed by Yvan Oconnor MD at [facility]       Social History:    Social History     Tobacco Use    Smoking status: Former Smoker     Packs/day: 0.50     Years: 45.00     Pack years: 22.50     Types: Cigarettes     Last attempt to quit: 2015     Years since quittin.6    Smokeless tobacco: Never Used    Tobacco comment: on chantix 11-6-15   12-7-15   Substance Use Topics    Alcohol use: No                                Counseling given: Not Answered  Comment: on chantix 11-6-15   12-7-15      Vital Signs (Current):   Vitals:    20 1033 20 0800   BP:  126/87   Pulse:  81   Resp:  16   Temp:  99.2 °F (37.3 °C)   TempSrc:  Temporal   SpO2:  96%   Weight: 204 lb (92.5 kg) 202 lb (91.6 kg)   Height: 5' 9\" (1.753 m) 5' 8.5\" (1.74 m)                                              BP Readings from Last 3 Encounters:   20 126/87   20 102/65   20 132/72       NPO Status: Time of last liquid consumption: 600                        Time of last solid consumption:                         Date of last liquid consumption: 20                        Date of last solid food consumption: 20    BMI:   Wt Readings from Last 3 Encounters:   20 202 lb (91.6 kg)   20 204 lb 3.2 oz (92.6 kg)   20 205 lb (93 kg)     Body mass index is 30.27 kg/m².     CBC:   Lab Results   Component Value Date    WBC 7.6 2020    RBC 4.64 2020    HGB 11.1 2020    HCT 36.3 2020    MCV 78.1 2020    RDW 18.4 2020     2020       CMP:   Lab Results   Component Value Date     2020    K 3.7 2020     2020    CO2 21 2020    BUN 17 07/25/2020    CREATININE 0.77 07/25/2020    GFRAA >60 07/25/2020    LABGLOM >60 07/25/2020    GLUCOSE 134 07/13/2020    PROT 7.1 07/25/2020    CALCIUM 9.2 07/13/2020    BILITOT 0.37 07/25/2020    ALKPHOS 82 07/25/2020    AST 18 07/25/2020    ALT 16 07/25/2020       POC Tests:   Recent Labs     08/07/20  0820   POCGLU 125*       Coags:   Lab Results   Component Value Date    PROTIME 13.4 03/05/2019    INR 1.0 03/05/2019    APTT 30.7 03/05/2019       HCG (If Applicable): No results found for: PREGTESTUR, PREGSERUM, HCG, HCGQUANT     ABGs: No results found for: PHART, PO2ART, EZJ0ARJ, JZA1EDN, BEART, M9KELBGW     Type & Screen (If Applicable):  No results found for: LABABO, LABRH    Drug/Infectious Status (If Applicable):  Lab Results   Component Value Date    HEPCAB REACTIVE 07/13/2020       COVID-19 Screening (If Applicable):   Lab Results   Component Value Date    COVID19 Not Detected 08/03/2020         Anesthesia Evaluation  Patient summary reviewed and Nursing notes reviewed  Airway: Mallampati: I  TM distance: >3 FB   Neck ROM: full  Mouth opening: > = 3 FB Dental:    (+) upper dentures  Comment: -LOWER EDENTULOUS    Pulmonary:normal exam    (+) COPD:                            ROS comment: -QUIT SMOKING 2015 - 43 PACK YEARS   Cardiovascular:    (+) hypertension:,       ECG reviewed        Stress test reviewed             ROS comment: -STRESS TEST 2020 - EF-70%, NO ISCHEMIA     Neuro/Psych:                ROS comment: -NUMBNESS TINGLING BILATERAL FEET  -DDD  -SPINAL STENOSIS  -S/P CERVICAL NECK FUSION - GOOD NECK MOBILITY GI/Hepatic/Renal:   (+) hepatitis: C, liver disease:,           Endo/Other:    (+) Diabetes, : arthritis:., .                  ROS comment: -NPO AFTER MIDNIGHT  -ALLERGIES - NSAIDS, IRON Abdominal:           Vascular:           ROS comment: -ANEMIA. Anesthesia Plan      general and regional     ASA 3     (GETA, RECTUS SHEATH BLOCK)  Induction: intravenous.     MIPS:

## 2020-08-08 VITALS
HEART RATE: 82 BPM | RESPIRATION RATE: 16 BRPM | OXYGEN SATURATION: 95 % | BODY MASS INDEX: 29.92 KG/M2 | HEIGHT: 69 IN | SYSTOLIC BLOOD PRESSURE: 109 MMHG | WEIGHT: 202 LBS | DIASTOLIC BLOOD PRESSURE: 68 MMHG | TEMPERATURE: 97.6 F

## 2020-08-08 LAB
ABSOLUTE EOS #: 0 K/UL (ref 0–0.4)
ABSOLUTE IMMATURE GRANULOCYTE: ABNORMAL K/UL (ref 0–0.3)
ABSOLUTE LYMPH #: 1.07 K/UL (ref 1–4.8)
ABSOLUTE MONO #: 0.49 K/UL (ref 0.1–1.2)
BASOPHILS # BLD: 0 % (ref 0–2)
BASOPHILS ABSOLUTE: 0 K/UL (ref 0–0.2)
DIFFERENTIAL TYPE: ABNORMAL
EOSINOPHILS RELATIVE PERCENT: 0 % (ref 1–4)
GLUCOSE BLD-MCNC: 163 MG/DL (ref 75–110)
HCT VFR BLD CALC: 38.6 % (ref 41–53)
HEMOGLOBIN: 12.3 G/DL (ref 13.5–17.5)
IMMATURE GRANULOCYTES: ABNORMAL %
LYMPHOCYTES # BLD: 13 % (ref 24–44)
MCH RBC QN AUTO: 25.4 PG (ref 26–34)
MCHC RBC AUTO-ENTMCNC: 31.8 G/DL (ref 31–37)
MCV RBC AUTO: 79.9 FL (ref 80–100)
MONOCYTES # BLD: 6 % (ref 2–11)
MORPHOLOGY: ABNORMAL
NRBC AUTOMATED: ABNORMAL PER 100 WBC
PDW BLD-RTO: 23.1 % (ref 12.5–15.4)
PLATELET # BLD: 265 K/UL (ref 140–450)
PLATELET ESTIMATE: ABNORMAL
PMV BLD AUTO: 7.6 FL (ref 6–12)
RBC # BLD: 4.83 M/UL (ref 4.5–5.9)
RBC # BLD: ABNORMAL 10*6/UL
SEG NEUTROPHILS: 81 % (ref 36–66)
SEGMENTED NEUTROPHILS ABSOLUTE COUNT: 6.64 K/UL (ref 1.8–7.7)
WBC # BLD: 8.2 K/UL (ref 3.5–11)
WBC # BLD: ABNORMAL 10*3/UL

## 2020-08-08 PROCEDURE — 99024 POSTOP FOLLOW-UP VISIT: CPT | Performed by: SURGERY

## 2020-08-08 PROCEDURE — 6360000002 HC RX W HCPCS: Performed by: STUDENT IN AN ORGANIZED HEALTH CARE EDUCATION/TRAINING PROGRAM

## 2020-08-08 PROCEDURE — 2580000003 HC RX 258: Performed by: STUDENT IN AN ORGANIZED HEALTH CARE EDUCATION/TRAINING PROGRAM

## 2020-08-08 PROCEDURE — 6370000000 HC RX 637 (ALT 250 FOR IP): Performed by: SURGERY

## 2020-08-08 PROCEDURE — 85025 COMPLETE CBC W/AUTO DIFF WBC: CPT

## 2020-08-08 PROCEDURE — 82947 ASSAY GLUCOSE BLOOD QUANT: CPT

## 2020-08-08 PROCEDURE — 96374 THER/PROPH/DIAG INJ IV PUSH: CPT

## 2020-08-08 PROCEDURE — 6370000000 HC RX 637 (ALT 250 FOR IP): Performed by: STUDENT IN AN ORGANIZED HEALTH CARE EDUCATION/TRAINING PROGRAM

## 2020-08-08 PROCEDURE — 36415 COLL VENOUS BLD VENIPUNCTURE: CPT

## 2020-08-08 PROCEDURE — G0378 HOSPITAL OBSERVATION PER HR: HCPCS

## 2020-08-08 PROCEDURE — 96375 TX/PRO/DX INJ NEW DRUG ADDON: CPT

## 2020-08-08 RX ADMIN — HYDROMORPHONE HYDROCHLORIDE 0.5 MG: 1 INJECTION, SOLUTION INTRAMUSCULAR; INTRAVENOUS; SUBCUTANEOUS at 12:34

## 2020-08-08 RX ADMIN — PANTOPRAZOLE SODIUM 40 MG: 40 TABLET, DELAYED RELEASE ORAL at 06:01

## 2020-08-08 RX ADMIN — MORPHINE SULFATE 60 MG: 30 TABLET, EXTENDED RELEASE ORAL at 09:59

## 2020-08-08 RX ADMIN — OXYCODONE HYDROCHLORIDE AND ACETAMINOPHEN 1 TABLET: 5; 325 TABLET ORAL at 07:35

## 2020-08-08 RX ADMIN — PREGABALIN 150 MG: 75 CAPSULE ORAL at 09:59

## 2020-08-08 RX ADMIN — CYCLOBENZAPRINE HYDROCHLORIDE 10 MG: 10 TABLET, FILM COATED ORAL at 09:59

## 2020-08-08 RX ADMIN — Medication 10 ML: at 12:34

## 2020-08-08 RX ADMIN — ACETAMINOPHEN 1000 MG: 500 TABLET ORAL at 06:01

## 2020-08-08 ASSESSMENT — PAIN SCALES - GENERAL
PAINLEVEL_OUTOF10: 8
PAINLEVEL_OUTOF10: 5
PAINLEVEL_OUTOF10: 6
PAINLEVEL_OUTOF10: 7
PAINLEVEL_OUTOF10: 5
PAINLEVEL_OUTOF10: 6

## 2020-08-08 NOTE — PROGRESS NOTES
[facility] [address] ICU  [address] [facility] [address]  Dept: [phone]  Loc: [phone]    8/8/2020    CC: post hernia    History:  81year old male 1 day post robotic incisional hernia repair. Pain controlled. Ambulating, voiding and tolerating diet.   No chest pain or shortness of breath    Review of Systems:    General  Negative for: [] Weight Change   [x] Fatigue   [x] Fevers & Chills    [] Appetite change [] Other:    Positive for: [] Weight Change   [] Fatigue   [] Fevers & Chills    [] Appetite change [] Other:   Cardiac  Negative for: [x] Chest Pain   [] Difficulty Breathing   [] Leg Cramps [x] Edema of Lower Extremeties    [] Left   [] Right      Positive for: [] Chest Pain   [] Difficulty Breathing   [] Leg Cramps [] Edema of Lower Extremeties    [] Left   [] Right   Pulmonary  Negative for: [x] Shortness of Breath [] Wheeze [x] Cough  [] Calf Pain     Positive for: [] Shortness of Breath [] Wheeze [] Cough  [] Calf Pain   Gastro-Intestinal Negative for: [] Heartburn   [] Reflux   [] Dysphagia   [] Melena   [] BRBPR  [x] Vomiting   [x] Abdominal Pain   [x] Diarrhea  [] Hernia    [x] Constipation  [] Other:     Positive for: [] Heartburn   [] Reflux   [] Dysphagia   [] Melena   [] BRBPR  [] Vomiting   [] Abdominal Pain   [] Diarrhea  [] Hernia    [] Constipation  [] Other:    Muskuloskeletal Negative for: [] Joint pain   [] Back pain   [] Knee Pain   [x] Muscle weakness   [x] Edema [] Other:     Positive for: [] Joint pain   [] Back pain   [] Knee Pain   [] Muscle weakness  [] Edema [] Other:    Neurologic Negative for: [x] Syncope   [x] Insomnia   [] Being treated for depression  [] Other:     Positive for: [] Syncope   [] Insomnia   [] Being treated for depression  [] Other:    Skin Negative for: [x] Wound:   [] Open   [] Draining   [] Incisional     [x] Rash   [] Hair Loss  [] Other:     Positive for: [] Wound:   [] Open   [] Draining    [] Incisional  [] Rash   [] Hair Loss  [] Other:      Physical Exam:  /68   Pulse 82   Temp 97.6 °F (36.4 °C) (Oral)   Resp 16   Ht 5' 8.5\" (1.74 m)   Wt 202 lb (91.6 kg)   SpO2 95%   BMI 30.27 kg/m²   Constitutional:  Vital signs are normal. The patient appears well-developed and well-nourished. HEENT:   Head: Normocephalic. Atraumatic  Eyes: pupils are equal and reactive. No scleral icterus is present. Neck: No mass and no thyromegaly present. Cardiovascular: Normal rate, regular rhythm, S1 normal and S2 normal.  Radial pulses present   Pulmonary/Chest: Effort normal and breath sounds normal. No retractions  Abdominal: Soft. Normal appearance. There is no organomegaly. No tenderness. There is no rigidity, no rebound, no guarding and no Bradley's sign. Musculoskeletal:        Right lower leg: Normal. No tenderness and no edema. Left lower leg: Normal. No tenderness and no edema. Neurological: The patient is alert and oriented. Moving all 4 extremities, sensation grossly intact bilateral  Skin: Skin is warm, dry and intact. Incisions CDI  Psychiatric: The patient has a normal mood and affect.  Speech is normal and behavior is normal. Judgment and thought content normal. Cognition and memory are normal.     Assessment:  POD 1 hernia    Plan:  Discharge home

## 2020-08-08 NOTE — DISCHARGE INSTR - ACTIVITY
Use incentive spirometer 10 times hourly while awake to help with coughing and deep breathing   Take short frequent walks around your home, avoid prolonged sitting or lying in bed. Change your position frequently.  Do not lift anything heavier than 10 pounds

## 2020-08-08 NOTE — PLAN OF CARE
Problem: Pain:  Goal: Pain level will decrease  Description: Pain level will decrease  8/8/2020 0329 by Palak Garibay RN  Outcome: Ongoing   Patient c/o abdominal pain this shift. Scheduled and prn pain meds given (See Mar). Will continue to monitor. Problem: Skin Integrity:  Goal: Will show no infection signs and symptoms  Description: Will show no infection signs and symptoms  8/8/2020 0329 by Palak Garibay RN  Outcome: Ongoing   Incision wounds c/d/I and HELDER. Patient able to turn self and hob self regulated. Will continue to monitor. Problem: Falls - Risk of:  Goal: Will remain free from falls  Description: Will remain free from falls  8/8/2020 0329 by Palak Garibay RN  Outcome: Ongoing   Pt assessed as a fall risk this shift. Remains free from falls and accidental injury at this time. Fall precautions in place, including falling star sign and fall risk band on pt. Floor free from obstacles, and bed is locked and in lowest position. Adequate lighting provided. Pt encouraged to call before getting OOB for any need. Bed alarm activated. Will continue to monitor needs during hourly rounding, and reinforce education on use of call light.

## 2020-08-10 LAB — SURGICAL PATHOLOGY REPORT: NORMAL

## 2020-08-11 ENCOUNTER — HOSPITAL ENCOUNTER (OUTPATIENT)
Dept: PAIN MANAGEMENT | Age: 70
Discharge: HOME | End: 2020-08-11
Payer: MEDICARE

## 2020-08-11 PROCEDURE — 99213 OFFICE O/P EST LOW 20 MIN: CPT

## 2020-08-11 PROCEDURE — 99213 OFFICE O/P EST LOW 20 MIN: CPT | Performed by: NURSE PRACTITIONER

## 2020-08-11 RX ORDER — PREGABALIN 150 MG/1
150 CAPSULE ORAL 3 TIMES DAILY
Qty: 90 CAPSULE | Refills: 2 | Status: SHIPPED | OUTPATIENT
Start: 2020-08-11 | End: 2020-11-06 | Stop reason: SDUPTHER

## 2020-08-11 RX ORDER — OXYCODONE HYDROCHLORIDE AND ACETAMINOPHEN 5; 325 MG/1; MG/1
1 TABLET ORAL EVERY 8 HOURS
Qty: 90 TABLET | Refills: 0 | Status: SHIPPED | OUTPATIENT
Start: 2020-08-11 | End: 2020-09-09 | Stop reason: SDUPTHER

## 2020-08-11 RX ORDER — MORPHINE SULFATE 60 MG/1
60 TABLET, FILM COATED, EXTENDED RELEASE ORAL 2 TIMES DAILY
Qty: 60 TABLET | Refills: 0 | Status: SHIPPED | OUTPATIENT
Start: 2020-08-11 | End: 2020-09-09 | Stop reason: SDUPTHER

## 2020-08-11 ASSESSMENT — ENCOUNTER SYMPTOMS
RESPIRATORY NEGATIVE: 1
BACK PAIN: 1
HEARTBURN: 1

## 2020-08-11 NOTE — PROGRESS NOTES
Was the UDS appropriate:yes      Record/Diagnostics Review:      As above, I did review the imaging    3/14/2020 10:00 PM - Raimundo, [facility] Incoming Lab Results From [facility]     Component  Value  Ref Range & Units  Status  Collected  Lab    Pain Management Drug Panel Interp, Urine  Consistent   Final  03/11/2020  3:25 PM  [facility]    (NOTE)   ________________________________________________________________   DRUGS EXPECTED:   OXYCODONE [3/11/20]   MORPHINE [3/11/20]   ________________________________________________________________   CONSISTENT with medications provided:   OXYCODONE : based on oxycodone   MORPHINE : based on morphine   ________________________________________________________________   INTERPRETIVE INFORMATION: Pain Mgt [place], [place] Spec/EMIT, Ur,                            Interp   Interpretation depends on accuracy and completeness of patient   medication information submitted by client.            Past Medical History:   Diagnosis Date    Anemia     Arthritis     osteoarthritis    Colon polyps 10/08/2019    tubular adenoma x2    Essential hypertension 5/12/2020    Hep C w/o coma, chronic ([address] 2019    Received antiviral treatment by Dr. Zulma Crowley Hepatitis B core antibody positive     History of blood transfusion     Hyperlipidemia     Iron (Fe) deficiency anemia     Type 2 diabetes mellitus with hyperglycemia, without long-term current use of insulin ([address] 5/12/2020    Type 2 diabetes mellitus with microalbuminuria, without long-term current use of insulin ([address] 7/13/2020    Wears dentures     Wears glasses        Past Surgical History:   Procedure Laterality Date    BACK SURGERY  1977    cervical spine three times    CHOLECYSTECTOMY  03/22/2019    COLONOSCOPY  01/25/2015    10 yr recall, hemorrhoids    COLONOSCOPY N/A 10/8/2019    tubular adenoma x2    DENTAL SURGERY  10/2015    all teeth extracted    HERNIA REPAIR  08/07/2020    lap robotic with mesh    HERNIA REPAIR N/A 8/7/2020 TK 1 T PO  QD, Disp: , Rfl: 0    cyclobenzaprine (FLEXERIL) 10 MG tablet, Take 0.5 tablets by mouth 3 times daily as needed for Muscle spasms, Disp: 21 tablet, Rfl: 0    promethazine (PHENERGAN) 25 MG tablet, Take 1 tablet by mouth 4 times daily as needed for Nausea, Disp: 30 tablet, Rfl: 0    Syringe/Needle, Disp, (SYRINGE 3CC/25GX1\") 25G X 1\" 3 ML MISC, To be used with B12 injections, Disp: 50 each, Rfl: 2    Alcohol Swabs (B-D SINGLE USE SWABS REGULAR) PADS, USE TO CHECK BLOOD SUGAR TWICE A DAY, Disp: , Rfl:     Blood Glucose Monitoring Suppl (TRUE METRIX METER) w/Device KIT, USE AS DIRECTED TO TEST BLOOD SUGAR, Disp: , Rfl:     TRUE METRIX BLOOD GLUCOSE TEST strip, CHECK BLOOD SUGAR BID, Disp: , Rfl:     TRUEplus Lancets 30G MISC, USE TO CHECK BLOOD SUGAR BID, Disp: , Rfl:     hydrOXYzine (VISTARIL) 25 MG capsule, hydroxyzine pamoate 25 mg capsule, Disp: , Rfl:     BD INTEGRA SYRINGE 25G X 1\" 3 ML MISC, , Disp: , Rfl: 4    Family History   Problem Relation Age of Onset    Diabetes Mother     Heart Disease Father        Social History     Socioeconomic History    Marital status:      Spouse name: Not on file    Number of children: Not on file    Years of education: Not on file    Highest education level: Not on file   Occupational History    Occupation: disabled   Social Needs    Financial resource strain: Not on file    Food insecurity     Worry: Not on file     Inability: Not on file   [employer] needs     Medical: Not on file     Non-medical: Not on file   Tobacco Use    Smoking status: Former Smoker     Packs/day: 0.50     Years: 45.00     Pack years: 22.50     Types: Cigarettes     Last attempt to quit: 2015     Years since quittin.6    Smokeless tobacco: Never Used    Tobacco comment: on chantix 11-6-15   12-7-15   Substance and Sexual Activity    Alcohol use: No    Drug use: No    Sexual activity: Yes     Partners: Female   Lifestyle    Physical activity Days per week: Not on file     Minutes per session: Not on file    Stress: Not on file   Relationships    Social connections     Talks on phone: Not on file     Gets together: Not on file     Attends Religion service: Not on file     Active member of club or organization: Not on file     Attends meetings of clubs or organizations: Not on file     Relationship status: Not on file    Intimate partner violence     Fear of current or ex partner: Not on file     Emotionally abused: Not on file     Physically abused: Not on file     Forced sexual activity: Not on file   Other Topics Concern    Not on file   Social History Narrative    Not on file         Review of Systems:  Review of Systems   Constitution: Negative. HENT: Negative. Eyes:        Glasses   Cardiovascular: Negative. Respiratory: Negative. Endocrine:        Diabetic 120-165 range blood sugar   Hematologic/Lymphatic: Negative. Skin: Negative. Musculoskeletal: Positive for back pain. Gastrointestinal: Positive for heartburn. Genitourinary: Negative. Neurological: Positive for numbness. Psychiatric/Behavioral: Negative. Physical Exam:    Physical Exam  Pulmonary:      Effort: Pulmonary effort is normal.   Skin:         Neurological:      Mental Status: He is alert. Psychiatric:         Mood and Affect: Mood normal.         Thought Content:  Thought content normal.           Assessment:    Problem List     S/P cervical spinal fusion - Primary (Chronic)    Relevant Medications    oxyCODONE-acetaminophen (PERCOCET) 5-325 MG per tablet    Peripheral polyneuropathy    Relevant Medications    hydrOXYzine (VISTARIL) 25 MG capsule    cyclobenzaprine (FLEXERIL) 10 MG tablet    pregabalin (LYRICA) 150 MG capsule    Osteoarthritis of spine with radiculopathy, lumbar region    Relevant Medications    hydrOXYzine (VISTARIL) 25 MG capsule    cyclobenzaprine (FLEXERIL) 10 MG tablet    morphine (MS CONTIN) 60 MG extended release tablet discussed    Obtaining appropriate analgesic effect of treatment   No signs of potential drug abuse or diversion identified    [x] Ill effects of being on chronic pain medications such as sleep disturbances, respiratory depression, hormonal changes, withdrawal symptoms, chronic opioid dependence and tolerance as well as risk of taking opioids with Benzodiazepines and taking opioids along with alcohol,  werediscussed with patient. I had asked the patient to minimize medication use and utilize pain medications only for uncontrolled rest pain or pain with exertional activities. I advised patient not to self-escalate painmedications without consulting with us. At each of patient's future visits we will try to taper pain medications, while adjusting the adjunct medications, and re-evaluating for Physical Therapy to improve spinal andjoint strength. We will continue to have discussions to decrease pain medications as tolerated. Counseled patient on effects their pain medication and /or their medical condition mayhave on their  ability to drive or operate machinery. Instructed not to drive or operate machinery if drowsy     I also discussed with the patient regarding the dangers of combining narcotic pain medication with tranquilizers, alcohol or illegal drugs or taking the medication any way other than prescribed. The dangers were discussed  including respiratory depression and death. Patient was told to tell  all  physicians regarding the medications he is getting from pain clinic. Patient is warned not to take any unprescribed medications over-the-countermedications that can depress breathing . Patient is advised to talk to the pharmacist or physicians if planning to take any over-the-counter medications before  takeing them.  Patient is strongly advised to avoid tranquilizers or  relaxants, illegal drugs  or any medications that can depress breathing  Patient is also advised to tell us if there is any changes in their medications from other physicians.     Location:  patient  at  home   ,provider working from home    TREATMENT OPTIONS:       Medication Agreement Requirements Met  Continue Opioid therapy  Script written for  Lyrica, ms contin, percocet  Follow up appointment made

## 2020-08-18 ENCOUNTER — TELEPHONE (OUTPATIENT)
Dept: FAMILY MEDICINE CLINIC | Age: 70
End: 2020-08-18

## 2020-08-18 RX ORDER — LISINOPRIL 10 MG/1
10 TABLET ORAL DAILY
Qty: 90 TABLET | Refills: 1 | Status: SHIPPED | OUTPATIENT
Start: 2020-08-18 | End: 2020-12-02 | Stop reason: ALTCHOICE

## 2020-08-18 RX ORDER — HYDROCHLOROTHIAZIDE 12.5 MG/1
12.5 CAPSULE, GELATIN COATED ORAL EVERY MORNING
Qty: 90 CAPSULE | Refills: 1 | Status: SHIPPED | OUTPATIENT
Start: 2020-08-18 | End: 2020-12-02 | Stop reason: ALTCHOICE

## 2020-08-18 RX ORDER — GLIPIZIDE 5 MG/1
5 TABLET ORAL EVERY MORNING
Qty: 90 TABLET | Refills: 3 | Status: SHIPPED | OUTPATIENT
Start: 2020-08-18 | End: 2021-04-16 | Stop reason: SDUPTHER

## 2020-08-18 NOTE — TELEPHONE ENCOUNTER
Lab Results   Component Value Date    LABA1C 6.2 (H) 07/13/2020    LABA1C 8.8 (H) 03/25/2020    LABA1C 6.3 (H) 04/24/2019       Continue metformin 100 mg twice a day  Stop Invokana when he finishes the refill    Monitor blood glucose every morning and if the blood glucose goes above 160 to let us know, then we will add glipizide which is a generic  One. I will send prescription at that time, his last A1c was extraordinarily great 6.2.   Stay away from pop, starchy food, sweets, bread or any alcoholic beverages if he drinks any

## 2020-08-18 NOTE — TELEPHONE ENCOUNTER
Also, patient had Medicare visit, please check with patient well and please obtain the report to be scanned in the chart

## 2020-08-18 NOTE — TELEPHONE ENCOUNTER
Wife called in to ask if you could change invokana to something different. It cost $250 for invokana and they are on a fixed income and cannot afford that.  Please advise

## 2020-08-18 NOTE — TELEPHONE ENCOUNTER
Done  Orders Placed This Encounter   Medications    metFORMIN (GLUCOPHAGE) 1000 MG tablet     Sig: Take 1 tablet by mouth 2 times daily (with meals)     Dispense:  180 tablet     Refill:  1    glipiZIDE (GLUCOTROL) 5 MG tablet     Sig: Take 1 tablet by mouth every morning Keep fasting morning blood glucose .   If blood glucose below 80, you need to stop glipizide     Dispense:  90 tablet     Refill:  3

## 2020-08-18 NOTE — TELEPHONE ENCOUNTER
Send to [facility]     Obtained number for office where AMW was done will call and request

## 2020-08-18 NOTE — TELEPHONE ENCOUNTER
Patient is on lisinopril hydrochlorothiazide. I can send in 2 separate prescriptions, one for lisinopril 10 mg and one for hydrochlorothiazide 12.5 mg is that okay? What pharmacy? Check after 5 PM    Current Outpatient Medications on File Prior to Visit   Medication Sig Dispense Refill    morphine (MS CONTIN) 60 MG extended release tablet Take 1 tablet by mouth 2 times daily for 30 days. 60 tablet 0    oxyCODONE-acetaminophen (PERCOCET) 5-325 MG per tablet Take 1 tablet by mouth every 8 hours for 30 days. 90 tablet 0    pregabalin (LYRICA) 150 MG capsule Take 1 capsule by mouth 3 times daily for 90 days.  90 capsule 2    cyanocobalamin 1000 MCG/ML injection Inject 1,000 mcg into the muscle every morning Every day x7 days (started 7/25) then once a month      Syringe/Needle, Disp, (SYRINGE 3CC/25GX1\") 25G X 1\" 3 ML MISC To be used with B12 injections 50 each 2    vitamin D (ERGOCALCIFEROL) 1.25 MG (63687 UT) CAPS capsule Take 1 capsule by mouth once a week 12 capsule 0    metFORMIN (GLUCOPHAGE) 1000 MG tablet Take 1 tablet by mouth 2 times daily (with meals) 180 tablet 1    metoprolol tartrate (LOPRESSOR) 25 MG tablet Take 1 tablet by mouth 2 times daily 60 tablet 5    canagliflozin (INVOKANA) 100 MG TABS tablet Take 1 tablet by mouth every morning (before breakfast) For diabetes 90 tablet 5    pravastatin (PRAVACHOL) 40 MG tablet Take 1 tablet by mouth every evening Stop Gemfibrozil 90 tablet 3    Alcohol Swabs (B-D SINGLE USE SWABS REGULAR) PADS USE TO CHECK BLOOD SUGAR TWICE A DAY      Blood Glucose Monitoring Suppl (TRUE METRIX METER) w/Device KIT USE AS DIRECTED TO TEST BLOOD SUGAR      TRUE METRIX BLOOD GLUCOSE TEST strip CHECK BLOOD SUGAR BID      TRUEplus Lancets 30G MISC USE TO CHECK BLOOD SUGAR BID      lisinopril-hydroCHLOROthiazide (PRINZIDE;ZESTORETIC) 10-12.5 MG per tablet Take 1 tablet by mouth daily      hydrOXYzine (VISTARIL) 25 MG capsule hydroxyzine pamoate 25 mg capsule      BD INTEGRA SYRINGE 25G X 1\" 3 ML MISC   4    pantoprazole (PROTONIX) 40 MG tablet TK 1 T PO  QD  0     No current facility-administered medications on file prior to visit.

## 2020-08-19 ENCOUNTER — OFFICE VISIT (OUTPATIENT)
Dept: BARIATRICS/WEIGHT MGMT | Age: 70
End: 2020-08-19

## 2020-08-19 VITALS
SYSTOLIC BLOOD PRESSURE: 110 MMHG | HEART RATE: 80 BPM | DIASTOLIC BLOOD PRESSURE: 68 MMHG | WEIGHT: 195 LBS | BODY MASS INDEX: 28.88 KG/M2 | TEMPERATURE: 97.2 F | HEIGHT: 69 IN

## 2020-08-19 PROCEDURE — 99024 POSTOP FOLLOW-UP VISIT: CPT | Performed by: SURGERY

## 2020-08-23 NOTE — PROGRESS NOTES
[facility]  [facility] INVASIVE BARIATRIC SURG  [address] [place] [address] [place]  [address]  [facility] [id number]  Dept: [phone]    8/19/2020    CC: Post hernia Repair    History:  78year old male post robotic ventral hernia repair. He is doing well. No nausea, vomiting, fevers/chills. No chest pain or shortness of breath. Ambulating, voiding and having stools.     Review of Systems:    General  Negative for: [] Weight Change   [x] Fatigue   [x] Fevers & Chills    [] Appetite change [] Other:    Positive for: [] Weight Change   [] Fatigue   [] Fevers & Chills    [] Appetite change [] Other:   Cardiac  Negative for: [x] Chest Pain   [x] Difficulty Breathing   [] Leg Cramps [x] Edema of Lower Extremeties    [] Left   [] Right      Positive for: [] Chest Pain   [] Difficulty Breathing   [] Leg Cramps [] Edema of Lower Extremeties    [] Left   [] Right   Pulmonary  Negative for: [x] Shortness of Breath [] Wheeze [x] Cough  [x] Calf Pain     Positive for: [] Shortness of Breath [] Wheeze [] Cough  [] Calf Pain   Gastro-Intestinal Negative for: [] Heartburn   [] Reflux   [] Dysphagia   [] Melena   [] BRBPR  [] Vomiting   [x] Abdominal Pain   [x] Diarrhea  [x] Hernia    [x] Constipation  [] Other:     Positive for: [] Heartburn   [] Reflux   [] Dysphagia   [] Melena   [] BRBPR  [] Vomiting   [] Abdominal Pain   [] Diarrhea  [] Hernia    [] Constipation  [] Other:    Muskuloskeletal Negative for: [] Joint pain   [] Back pain   [] Knee Pain   [x] Muscle weakness   [x] Edema [] Other:     Positive for: [] Joint pain   [] Back pain   [] Knee Pain   [] Muscle weakness  [] Edema [] Other:    Neurologic Negative for: [x] Syncope   [] Insomnia   [] Being treated for depression  [] Other:     Positive for: [] Syncope   [] Insomnia   [] Being treated for depression  [] Other:    Skin Negative for: [x] Wound:   [] Open   [] Draining   [] Incisional     [x] Rash   [] Hair Loss  [] Other:     Positive for: [] Wound:   [] Open   [] Draining    [] Incisional  [] Rash   [] Hair Loss  [] Other:      Physical Exam:  /68   Pulse 80   Temp 97.2 °F (36.2 °C)   Ht 5' 9\" (1.753 m)   Wt 195 lb (88.5 kg)   BMI 28.80 kg/m²     Constitutional:  Vital signs are normal. The patient appears well-developed and well-nourished. HEENT:   Head: Normocephalic. Atraumatic  Eyes: pupils are equal and reactive. No scleral icterus is present. Neck: No mass and no thyromegaly present. Cardiovascular: Normal rate, regular rhythm, S1 normal and S2 normal.  Radial pulses present   Pulmonary/Chest: Effort normal and breath sounds normal. No retractions  Abdominal: Soft. Normal appearance. There is no organomegaly. No tenderness. There is no rigidity, no rebound, no guarding and no Bradley's sign. Musculoskeletal:        Right lower leg: Normal. No tenderness and no edema. Left lower leg: Normal. No tenderness and no edema. Incisions CDI  Neurological: The patient is alert and oriented. Moving all 4 extremities, sensation grossly intact bilateral  Skin: Skin is warm, dry and intact. Psychiatric: The patient has a normal mood and affect.  Speech is normal and behavior is normal. Judgment and thought content normal. Cognition and memory are normal.     Assessment:  1 week post ventral hernia repair    Plan:  No lifting over 20 lbs for 1 month  Do not submerge incisions until healed  With covid scenario he will call back in 1 month if having questions or difficulties  Appears to be recovering well

## 2020-08-25 RX ORDER — PANTOPRAZOLE SODIUM 40 MG/1
40 TABLET, DELAYED RELEASE ORAL DAILY
Qty: 90 TABLET | Refills: 1 | Status: SHIPPED | OUTPATIENT
Start: 2020-08-25 | End: 2021-02-18 | Stop reason: SDUPTHER

## 2020-08-25 NOTE — TELEPHONE ENCOUNTER
Please Approve or Refuse.   Send to Pharmacy per Pt's Request:      Next Visit Date:  9/23/2020   Last Visit Date: 7/7/2020    Hemoglobin A1C (%)   Date Value   07/13/2020 6.2 (H)   03/25/2020 8.8 (H)   04/24/2019 6.3 (H)             ( goal A1C is < 7)   BP Readings from Last 3 Encounters:   08/19/20 110/68   08/08/20 109/68   08/07/20 (!) 148/84          (goal 120/80)  BUN   Date Value Ref Range Status   07/25/2020 17 8 - 23 mg/dL Final     CREATININE   Date Value Ref Range Status   07/25/2020 0.77 0.70 - 1.20 mg/dL Final     Potassium   Date Value Ref Range Status   07/25/2020 3.7 3.7 - 5.3 mmol/L Final

## 2020-09-07 ASSESSMENT — ENCOUNTER SYMPTOMS
RESPIRATORY NEGATIVE: 1
PHOTOPHOBIA: 0
BACK PAIN: 1
SINUS PRESSURE: 0
ABDOMINAL PAIN: 0
EYE DISCHARGE: 0
CONSTIPATION: 0
ALLERGIC/IMMUNOLOGIC NEGATIVE: 1
BOWEL INCONTINENCE: 0
COUGH: 0
EYE PAIN: 0
NAUSEA: 0
SHORTNESS OF BREATH: 0
VOMITING: 0

## 2020-09-07 NOTE — PROGRESS NOTES
None     Non-medical: None   Tobacco Use    Smoking status: Former Smoker     Packs/day: 0.50     Years: 45.00     Pack years: 22.50     Types: Cigarettes     Last attempt to quit: 2015     Years since quittin.7    Smokeless tobacco: Never Used    Tobacco comment: on chantix 11-6-15   12-7-15   Substance and Sexual Activity    Alcohol use: No    Drug use: No    Sexual activity: Yes     Partners: Female   Lifestyle    Physical activity     Days per week: None     Minutes per session: None    Stress: None   Relationships    Social connections     Talks on phone: None     Gets together: None     Attends Gnosticist service: None     Active member of club or organization: None     Attends meetings of clubs or organizations: None     Relationship status: None    Intimate partner violence     Fear of current or ex partner: None     Emotionally abused: None     Physically abused: None     Forced sexual activity: None   Other Topics Concern    None   Social History Narrative    None       Allergies   Allergen Reactions    Asa [Aspirin]       iron deficiency anemia , requiring iron infusions and transfusions    Iron      Pill- constipation, abdominal pain    Nsaids       iron deficiency anemia, history of bleeding ulcers        Current Outpatient Medications on File Prior to Encounter   Medication Sig Dispense Refill    pantoprazole (PROTONIX) 40 MG tablet Take 1 tablet by mouth daily 90 tablet 1    lisinopril (PRINIVIL;ZESTRIL) 10 MG tablet Take 1 tablet by mouth daily 90 tablet 1    hydroCHLOROthiazide (MICROZIDE) 12.5 MG capsule Take 1 capsule by mouth every morning 90 capsule 1    metFORMIN (GLUCOPHAGE) 1000 MG tablet Take 1 tablet by mouth 2 times daily (with meals) 180 tablet 1    glipiZIDE (GLUCOTROL) 5 MG tablet Take 1 tablet by mouth every morning Keep fasting morning blood glucose .   If blood glucose below 80, you need to stop glipizide 90 tablet 3    pregabalin (LYRICA) 150 MG capsule Take 1 capsule by mouth 3 times daily for 90 days. 90 capsule 2    cyanocobalamin 1000 MCG/ML injection Inject 1,000 mcg into the muscle every morning Every day x7 days (started 7/25) then once a month      Syringe/Needle, Disp, (SYRINGE 3CC/25GX1\") 25G X 1\" 3 ML MISC To be used with B12 injections 50 each 2    vitamin D (ERGOCALCIFEROL) 1.25 MG (77016 UT) CAPS capsule Take 1 capsule by mouth once a week 12 capsule 0    metoprolol tartrate (LOPRESSOR) 25 MG tablet Take 1 tablet by mouth 2 times daily 60 tablet 5    pravastatin (PRAVACHOL) 40 MG tablet Take 1 tablet by mouth every evening Stop Gemfibrozil 90 tablet 3    Alcohol Swabs (B-D SINGLE USE SWABS REGULAR) PADS USE TO CHECK BLOOD SUGAR TWICE A DAY      Blood Glucose Monitoring Suppl (TRUE METRIX METER) w/Device KIT USE AS DIRECTED TO TEST BLOOD SUGAR      TRUE METRIX BLOOD GLUCOSE TEST strip CHECK BLOOD SUGAR BID      TRUEplus Lancets 30G MISC USE TO CHECK BLOOD SUGAR BID      hydrOXYzine (VISTARIL) 25 MG capsule hydroxyzine pamoate 25 mg capsule      BD INTEGRA SYRINGE 25G X 1\" 3 ML MISC   4     No current facility-administered medications on file prior to encounter. Review of Systems   Constitutional: Negative. Negative for activity change, appetite change, chills, fatigue and fever. HENT: Positive for postnasal drip. Negative for congestion, dental problem and sinus pressure. Eyes: Negative for photophobia, pain and discharge. Respiratory: Negative. Negative for cough and shortness of breath. Cardiovascular: Positive for leg swelling. Negative for chest pain and palpitations. Gastrointestinal: Negative for abdominal pain, bowel incontinence, constipation, nausea and vomiting. Endocrine: Positive for polydipsia. Negative for cold intolerance and polyuria. Genitourinary: Negative. Negative for bladder incontinence, dysuria, frequency, hematuria and urgency.    Musculoskeletal: Positive for arthralgias, back pain, neck pain and neck stiffness. Skin: Negative. Negative for rash and wound. Allergic/Immunologic: Negative. Negative for food allergies. Neurological: Negative. Negative for tingling, tremors, seizures, weakness, numbness and headaches. Hematological: Negative. Does not bruise/bleed easily. Psychiatric/Behavioral: Negative. Negative for behavioral problems, self-injury, sleep disturbance and suicidal ideas. The patient is not nervous/anxious. Physical Exam  Skin:         Neurological:      Mental Status: He is alert and oriented to person, place, and time. Psychiatric:         Mood and Affect: Mood normal.            DATA:  LAB.:  3/14/2020 10:00 PM - [facility], [facility] Incoming Lab Results From [email or web address]     Component  Value  Ref Range & Units  Status  Collected  Lab    Pain Management Drug Panel Interp, Urine  Consistent   Final  03/11/2020  3:25 PM  [facility]    (NOTE)   ________________________________________________________________   DRUGS EXPECTED:   OXYCODONE [3/11/20]   MORPHINE [3/11/20]   ________________________________________________________________   CONSISTENT with medications provided:   OXYCODONE : based on oxycodone   MORPHINE : based on morphine   ________________________________________________________________        X-Ray reports:  EXAMINATION:    MRI OF THE LUMBAR SPINE WITHOUT CONTRAST, 2/16/2018 9:36 pm        TECHNIQUE:    Multiplanar multisequence MRI of the lumbar spine was performed without the    administration of intravenous contrast.        COMPARISON:    March 2010        HISTORY:    ORDERING SYSTEM PROVIDED HISTORY: Degenerative disc disease, lumbar    TECHNOLOGIST PROVIDED HISTORY:    Ordering Physician Provided Reason for Exam: LUMBAR DDD, LUMBAR    OSTEOARTHRITIS WITH RADICULOPATHY, SPINAL STENOSIS OF LUMBAR REGION WITHOUT    NEUROGENIC CLAUDICATION    Additional signs and symptoms: PATIENT COMPLAINS OF MID TO LOWER BACK PAIN    AND BUTTOCK PAIN.  AND THAT HIS FEET FALL ASLEEP. SYMPTOMS FOR THE PAST 8 TO    10 YEARS. NO KNOWN INJURY. NO PREVIOUS LUMBAR SURGERIES. FINDINGS:    BONES/ALIGNMENT: Multiple endplate Schmorl's node deformities are noted. There is no acute fracture or bone edema. T11 hemangioma is noted. Vertebral body height and alignment is stable. SPINAL CORD: Conus terminates at T12-L1 and is grossly normal in appearance. SOFT TISSUES: Detail of the aorta is limited. There is suggested mild    enlargement with the transverse dimension of approximately 3.1 cm. Dedicated    imaging of the aorta is recommended. L1-L2: Diffuse disc bulging is noted with a small proximal foraminal    protrusion on the right. Annular tear is noted. Similar findings were noted    on the prior. Facet hypertrophic changes are noted. L2-L3: Minimal disc bulging is noted diffusely. There is a questionable    small left foraminal protrusion laterally. There is minimal narrowing of the    left foramen. Facet hypertrophic changes are noted. Findings are stable        L3-L4: Mild disc bulging is noted diffusely. Minimal endplate and mild facet    hypertrophic changes are noted. There is borderline mild proximal foraminal    narrowing on the left. This is stable. L4-L5: Mild disc bulging is noted. A small inferior foraminal protrusion on    the right is noted. There is a slightly larger broad-based protrusion into    the left foramen. Bilateral foraminal narrowing is noted. Findings not    appear grossly changed. Facet hypertrophic changes are noted        L5-S1: Minimal disc bulging is noted. Facet hypertrophic changes are noted. SI joint degenerative changes are noted. Impression    No significant interval change in the multifocal degenerative disc disease    throughout the lumbar spine. See above for details of each level        Suggested aortic aneurysm.   Dedicated imaging of the aorta is recommended Clinical  impression:  1. Lumbar spondylosis    2. Osteoarthritis of lumbar spine, unspecified spinal osteoarthritis complication status    3. Spinal stenosis of lumbar region without neurogenic claudication    4. Lumbar radiculopathy    5. Degenerative disc disease, lumbar    6. Cervical spondylitis ([address]    7. S/P cervical spinal fusion    8. Peripheral polyneuropathy    9. Encounter for chronic pain management    10. Encounter for medication monitoring    11. Osteoarthritis of spine with radiculopathy, lumbar region        Plan of care: We will continue current pain medications  Current medications are being tolerated without any Adverse side effects. Orders Placed This Encounter   Medications    morphine (MS CONTIN) 60 MG extended release tablet     Sig: Take 1 tablet by mouth 2 times daily for 30 days. Dispense:  60 tablet     Refill:  0     Reduce doses taken as pain becomes manageable    oxyCODONE-acetaminophen (PERCOCET) 5-325 MG per tablet     Sig: Take 1 tablet by mouth every 8 hours for 30 days. Dispense:  90 tablet     Refill:  0     Reduce doses taken as pain becomes manageable     Urine drug screens have been appropriate. No aberrant activity noted. Analgesia is achieved. Activities of daily living are possible because of medications. Safe use of medications explained to patient. PDMP Monitoring:    Last PDMP Temo Sherman as Reviewed [facility]):  Review User Review Instant Review Result   Zenia Verma 9/11/2020  6:14 AM Reviewed PDMP [1]     Counselling/Preventive measures for pain  Control:    [x]  Spine strengthening exercises are discussed with patient in detail. [x] Ill effects of being on chronic pain medications such as sleep disturbances, hormonal changes, withdrawal symptoms,  chronic opioid dependence and tolerance were discussed with patient.  I had asked the patient to minimize medication use and utilize pain medications only for uncontrolled rest pain or pain with exertional activities. I advised patient not to self escalate pain medications without consulting with us. At each of patient's future visits we will try to taper pain medications, while adjusting the adjunct medications, and re-evaluating for Physical Therapy to improve spinal and joint strength. We will continue to have discussions to decrease pain medications as tolerated. I also discussed with the patient regarding the dangers of combining narcotic pain medication with tranquilizers, alcohol or illegal drugs or taking the medication any other than prescribed. The dangers including the respiratory depression and death. Patient was told to tell  to all  physicians regarding the medications he is getting from pain clinic. Patient is warned not to take any unprescribed medications over-the-counter medications that can depress breathing . Patient is advised to talk to the pharmacist or physicians if planning to take any over-the-counter medications before  takeing them. Patient is strongly advised to avoid tranquilizers or  Relaxants for any medications that can depress breathing or recreational drugs. Patient is also advised to tell us if there is any changes in his medications from other physicians. We discussed the same at today's visit and have not been to implement it, as the patient's pain is not under control with current medications. Decision Making Process : Patient's health history and referral records thoroughly reviewed before focused physical examination and discussion with patient. I have spent 25 mins. Over 50% of today's visit is spent on examining the patient and counseling and coordinating the care. Level of complexity of date to be reviewed is Moderate. The chart date reviewed include the following: Imaging Reports. Summary of Care. Time spent reviewing with patient the below reports:   Medication safety, Treatment options.     Level of diagnosis and management options of this case

## 2020-09-08 ENCOUNTER — HOSPITAL ENCOUNTER (OUTPATIENT)
Age: 70
Discharge: HOME | End: 2020-09-08
Payer: MEDICARE

## 2020-09-08 LAB
ABSOLUTE EOS #: 0.1 K/UL (ref 0–0.4)
ABSOLUTE IMMATURE GRANULOCYTE: ABNORMAL K/UL (ref 0–0.3)
ABSOLUTE LYMPH #: 1.8 K/UL (ref 1–4.8)
ABSOLUTE MONO #: 0.4 K/UL (ref 0.1–1.3)
ALBUMIN SERPL-MCNC: 4 G/DL (ref 3.5–5.2)
ALBUMIN/GLOBULIN RATIO: ABNORMAL (ref 1–2.5)
ALP BLD-CCNC: 81 U/L (ref 40–129)
ALT SERPL-CCNC: 15 U/L (ref 5–41)
ANION GAP SERPL CALCULATED.3IONS-SCNC: 11 MMOL/L (ref 9–17)
AST SERPL-CCNC: 20 U/L
BASOPHILS # BLD: 1 % (ref 0–2)
BASOPHILS ABSOLUTE: 0.1 K/UL (ref 0–0.2)
BILIRUB SERPL-MCNC: 0.35 MG/DL (ref 0.3–1.2)
BUN BLDV-MCNC: 12 MG/DL (ref 8–23)
BUN/CREAT BLD: ABNORMAL (ref 9–20)
CALCIUM SERPL-MCNC: 9.6 MG/DL (ref 8.6–10.4)
CHLORIDE BLD-SCNC: 103 MMOL/L (ref 98–107)
CO2: 25 MMOL/L (ref 20–31)
CREAT SERPL-MCNC: 0.68 MG/DL (ref 0.7–1.2)
DIFFERENTIAL TYPE: ABNORMAL
EOSINOPHILS RELATIVE PERCENT: 1 % (ref 0–4)
FERRITIN: 33 UG/L (ref 30–400)
FOLATE: 13.1 NG/ML
GFR AFRICAN AMERICAN: >60 ML/MIN
GFR NON-AFRICAN AMERICAN: >60 ML/MIN
GFR SERPL CREATININE-BSD FRML MDRD: ABNORMAL ML/MIN/{1.73_M2}
GFR SERPL CREATININE-BSD FRML MDRD: ABNORMAL ML/MIN/{1.73_M2}
GLUCOSE BLD-MCNC: 168 MG/DL (ref 70–99)
HCT VFR BLD CALC: 41.4 % (ref 41–53)
HEMOGLOBIN: 13 G/DL (ref 13.5–17.5)
IMMATURE GRANULOCYTES: ABNORMAL %
LYMPHOCYTES # BLD: 28 % (ref 24–44)
MCH RBC QN AUTO: 24.8 PG (ref 26–34)
MCHC RBC AUTO-ENTMCNC: 31.3 G/DL (ref 31–37)
MCV RBC AUTO: 79.1 FL (ref 80–100)
MONOCYTES # BLD: 6 % (ref 1–7)
NRBC AUTOMATED: ABNORMAL PER 100 WBC
PDW BLD-RTO: 20.3 % (ref 11.5–14.9)
PLATELET # BLD: 261 K/UL (ref 150–450)
PLATELET ESTIMATE: ABNORMAL
PMV BLD AUTO: 7.5 FL (ref 6–12)
POTASSIUM SERPL-SCNC: 4.3 MMOL/L (ref 3.7–5.3)
RBC # BLD: 5.23 M/UL (ref 4.5–5.9)
RBC # BLD: ABNORMAL 10*6/UL
SEG NEUTROPHILS: 64 % (ref 36–66)
SEGMENTED NEUTROPHILS ABSOLUTE COUNT: 4 K/UL (ref 1.3–9.1)
SODIUM BLD-SCNC: 139 MMOL/L (ref 135–144)
TOTAL PROTEIN: 7.1 G/DL (ref 6.4–8.3)
VITAMIN B-12: 528 PG/ML (ref 232–1245)
WBC # BLD: 6.3 K/UL (ref 3.5–11)
WBC # BLD: ABNORMAL 10*3/UL

## 2020-09-08 PROCEDURE — 83550 IRON BINDING TEST: CPT

## 2020-09-08 PROCEDURE — 82607 VITAMIN B-12: CPT

## 2020-09-08 PROCEDURE — 82746 ASSAY OF FOLIC ACID SERUM: CPT

## 2020-09-08 PROCEDURE — 80053 COMPREHEN METABOLIC PANEL: CPT

## 2020-09-08 PROCEDURE — 85025 COMPLETE CBC W/AUTO DIFF WBC: CPT

## 2020-09-08 PROCEDURE — 82728 ASSAY OF FERRITIN: CPT

## 2020-09-08 PROCEDURE — 83540 ASSAY OF IRON: CPT

## 2020-09-08 PROCEDURE — 36415 COLL VENOUS BLD VENIPUNCTURE: CPT

## 2020-09-09 ENCOUNTER — HOSPITAL ENCOUNTER (OUTPATIENT)
Dept: PAIN MANAGEMENT | Age: 70
Discharge: HOME | End: 2020-09-09
Payer: MEDICARE

## 2020-09-09 LAB
IRON SATURATION: 11 % (ref 20–55)
IRON: 36 UG/DL (ref 59–158)
TOTAL IRON BINDING CAPACITY: 315 UG/DL (ref 250–450)
UNSATURATED IRON BINDING CAPACITY: 279 UG/DL (ref 112–347)

## 2020-09-09 PROCEDURE — 99213 OFFICE O/P EST LOW 20 MIN: CPT

## 2020-09-09 PROCEDURE — 99214 OFFICE O/P EST MOD 30 MIN: CPT | Performed by: PAIN MEDICINE

## 2020-09-09 RX ORDER — MORPHINE SULFATE 60 MG/1
60 TABLET, FILM COATED, EXTENDED RELEASE ORAL 2 TIMES DAILY
Qty: 60 TABLET | Refills: 0 | Status: SHIPPED | OUTPATIENT
Start: 2020-09-10 | End: 2020-10-07 | Stop reason: SDUPTHER

## 2020-09-09 RX ORDER — OXYCODONE HYDROCHLORIDE AND ACETAMINOPHEN 5; 325 MG/1; MG/1
1 TABLET ORAL EVERY 8 HOURS
Qty: 90 TABLET | Refills: 0 | Status: SHIPPED | OUTPATIENT
Start: 2020-09-10 | End: 2020-10-07 | Stop reason: SDUPTHER

## 2020-09-16 ENCOUNTER — TELEPHONE (OUTPATIENT)
Dept: ONCOLOGY | Age: 70
End: 2020-09-16

## 2020-09-16 ENCOUNTER — OFFICE VISIT (OUTPATIENT)
Dept: ONCOLOGY | Age: 70
End: 2020-09-16
Payer: MEDICARE

## 2020-09-16 ENCOUNTER — HOSPITAL ENCOUNTER (OUTPATIENT)
Age: 70
Discharge: HOME | End: 2020-09-16
Payer: MEDICARE

## 2020-09-16 VITALS
WEIGHT: 203 LBS | TEMPERATURE: 97.7 F | DIASTOLIC BLOOD PRESSURE: 80 MMHG | HEART RATE: 81 BPM | SYSTOLIC BLOOD PRESSURE: 128 MMHG | RESPIRATION RATE: 16 BRPM | BODY MASS INDEX: 29.98 KG/M2

## 2020-09-16 DIAGNOSIS — D50.8 IRON DEFICIENCY ANEMIA SECONDARY TO INADEQUATE DIETARY IRON INTAKE: ICD-10-CM

## 2020-09-16 DIAGNOSIS — B18.2 HEP C W/O COMA, CHRONIC (HCC): ICD-10-CM

## 2020-09-16 DIAGNOSIS — D64.9 ANEMIA, UNSPECIFIED TYPE: ICD-10-CM

## 2020-09-16 DIAGNOSIS — K92.2 GASTROINTESTINAL HEMORRHAGE, UNSPECIFIED GASTROINTESTINAL HEMORRHAGE TYPE: ICD-10-CM

## 2020-09-16 DIAGNOSIS — E53.8 B12 DEFICIENCY: ICD-10-CM

## 2020-09-16 PROCEDURE — G8417 CALC BMI ABV UP PARAM F/U: HCPCS | Performed by: INTERNAL MEDICINE

## 2020-09-16 PROCEDURE — 99211 OFF/OP EST MAY X REQ PHY/QHP: CPT | Performed by: INTERNAL MEDICINE

## 2020-09-16 PROCEDURE — 3017F COLORECTAL CA SCREEN DOC REV: CPT | Performed by: INTERNAL MEDICINE

## 2020-09-16 PROCEDURE — 99214 OFFICE O/P EST MOD 30 MIN: CPT | Performed by: INTERNAL MEDICINE

## 2020-09-16 PROCEDURE — G0328 FECAL BLOOD SCRN IMMUNOASSAY: HCPCS

## 2020-09-16 PROCEDURE — 1123F ACP DISCUSS/DSCN MKR DOCD: CPT | Performed by: INTERNAL MEDICINE

## 2020-09-16 PROCEDURE — 4040F PNEUMOC VAC/ADMIN/RCVD: CPT | Performed by: INTERNAL MEDICINE

## 2020-09-16 PROCEDURE — 1036F TOBACCO NON-USER: CPT | Performed by: INTERNAL MEDICINE

## 2020-09-16 PROCEDURE — G8427 DOCREV CUR MEDS BY ELIG CLIN: HCPCS | Performed by: INTERNAL MEDICINE

## 2020-09-16 RX ORDER — SODIUM CHLORIDE 0.9 % (FLUSH) 0.9 %
10 SYRINGE (ML) INJECTION PRN
Status: CANCELLED | OUTPATIENT
Start: 2020-09-16

## 2020-09-16 RX ORDER — SODIUM CHLORIDE 0.9 % (FLUSH) 0.9 %
5 SYRINGE (ML) INJECTION PRN
Status: CANCELLED | OUTPATIENT
Start: 2020-09-16

## 2020-09-16 RX ORDER — HEPARIN SODIUM (PORCINE) LOCK FLUSH IV SOLN 100 UNIT/ML 100 UNIT/ML
500 SOLUTION INTRAVENOUS PRN
Status: CANCELLED | OUTPATIENT
Start: 2020-09-16

## 2020-09-16 RX ORDER — EPINEPHRINE 1 MG/ML
0.3 INJECTION, SOLUTION, CONCENTRATE INTRAVENOUS PRN
Status: CANCELLED | OUTPATIENT
Start: 2020-09-16

## 2020-09-16 RX ORDER — METHYLPREDNISOLONE SODIUM SUCCINATE 125 MG/2ML
125 INJECTION, POWDER, LYOPHILIZED, FOR SOLUTION INTRAMUSCULAR; INTRAVENOUS ONCE
Status: CANCELLED | OUTPATIENT
Start: 2020-09-16

## 2020-09-16 RX ORDER — SODIUM CHLORIDE 9 MG/ML
INJECTION, SOLUTION INTRAVENOUS CONTINUOUS
Status: CANCELLED | OUTPATIENT
Start: 2020-09-16

## 2020-09-16 RX ORDER — DIPHENHYDRAMINE HYDROCHLORIDE 50 MG/ML
50 INJECTION INTRAMUSCULAR; INTRAVENOUS ONCE
Status: CANCELLED | OUTPATIENT
Start: 2020-09-16

## 2020-09-16 NOTE — TELEPHONE ENCOUNTER
Pt was seen today by Dr. José Shoemaker. Per AVS, pt is scheduled for injectafer x2. Follow up scheduled for 12-02-20. Pt will have labs drawn last week of Nov. At [facility].

## 2020-09-16 NOTE — PROGRESS NOTES
Subjective:   PCP: Sophia Hutchinson MD       Chief Complaint   Patient presents with    Follow-up     review status of disease    Diarrhea    Numbness     feet    Pain     back         INTERIM HISTORY: The patient presents for follow up for iron deficiency anemia and to review lab work. He reports he had hernia repair in the interim, he had some soreness from surgery but has fully recovered and is now able to resume activities. He was seen by GI for diarrhea and was started on probiotics about 4 weeks ago, it has decreased in frequency and foul smell is significantly improved. His next GI follow up is next week. He only received 1 dose of IV iron, he was ill for 2nd dose and didn't reschedule. He continues with B12 injections with no issue. General: No fever or night sweats. Weight is stable. ENT: No double or blurred vision, no hearing problem, no dysphagia or sore throat   Respiratory: No chest pain, no shortness of breath, no cough or hemoptysis. Cardiovascular: Denies chest pain, PND or orthopnea. No L E swelling or palpitations. Gastrointestinal: No nausea or vomiting, abdominal pain, or constipation. +hematochezia - resolved; +diarrhea and foul smell improved  Genitourinary: Denies dysuria, hematuria, frequency, urgency or incontinence. Neurological: Denies headaches, decreased LOC, no sensory or motor focal deficits. Musculoskeletal: No arthralgia no back pain or joint swelling. Skin: There are no rashes or bleeding. Psych: Denies hallucinations or intentions to harm self      Objective:   Vitals: /80   Pulse 81   Temp 97.7 °F (36.5 °C) (Oral)   Resp 16   Wt 203 lb (92.1 kg)   BMI 29.98 kg/m²   General appearance: alert and cooperative with exam  HEENT: Head: Normocephalic, no lesions, without obvious abnormality.   Neck: no adenopathy  Lungs: clear to auscultation bilaterally  Heart: regular rate and rhythm, S1, S2 normal, no murmur, click, rub or gallop  Abdomen: soft, non-tender; bowel sounds normal; no masses,  no organomegaly  Extremities: extremities normal, atraumatic, no cyanosis or edema  Neurologic: Mental status: Alert, oriented, thought content appropriate      REVIEW OF LABORATORY DATA:   Lab Results   Component Value Date    WBC 6.3 09/08/2020    HGB 13.0 (L) 09/08/2020    HCT 41.4 09/08/2020    MCV 79.1 (L) 09/08/2020     09/08/2020       Chemistry        Component Value Date/Time     09/08/2020 0944    K 4.3 09/08/2020 0944     09/08/2020 0944    CO2 25 09/08/2020 0944    BUN 12 09/08/2020 0944    CREATININE 0.68 (L) 09/08/2020 0944        Component Value Date/Time    CALCIUM 9.6 09/08/2020 0944    ALKPHOS 81 09/08/2020 0944    AST 20 09/08/2020 0944    ALT 15 09/08/2020 0944    BILITOT 0.35 09/08/2020 0944        Lab Results   Component Value Date    [id number] 470 09/08/2020         Lab Results   Component Value Date    IRON 36 (L) 09/08/2020    TIBC 315 09/08/2020    FERRITIN 33 09/08/2020       Assessment and Plan:   81year old male with history of bleeding ulcer back in 2015 and iron def, now with iron def anemia, and stool occult stools    -s/p IV iron with improvement in iron stores and hemoglobin  -EGD and colonoscopy 10/2019 did not show active bleeding, still no active source of iron def  -s/p GI in 12/3/19, concern for still blood loss, iron def, no plasns for repeat capsule video study, he has completed treatment for hepatitis C with [facility]ad[facility] Neola.  -repeat iron studies are consistent with iron deficiency  -transfuse if Hbg is less than 7.0    -B12 deficiency, patient started on B12 shots 7/2020    PLAN:   His lab work was reviewed and discussed, his HGB has improved but iron remains low and B12 has improved and is in range. His occult stool test from today is pending, I suspect GI bleed based on lab work.  We reivewed his previous GI work up done about a year ago, no active bleeding seen at that time, he continues to follow with GI and will discuss next week. According to patient he did not complete the capsule endoscopy, if that the case would recommend completing capsule endoscopy. we discussed his recent hernia repair, he appears well recovered. Continue with B12 unchanged and I am writing for Injectafer. Return in 10 weeks with repeat lab work. Addendum:    FOBT is +ve recommend GI w/u to evaluate for gi bleed . Renetta Krishna MD    This note is created with the assistance of a speech recognition program.  While intending to generate a document that actually reflects the content of the visit, the document can still have some errors including those of syntax and sound a like substitutions which may escape proof reading. It such instances, actual meaning can be extrapolated by contextual diversion.

## 2020-09-21 RX ORDER — CYANOCOBALAMIN 1000 UG/ML
1000 INJECTION INTRAMUSCULAR; SUBCUTANEOUS
Qty: 4 ML | Refills: 0 | Status: SHIPPED | OUTPATIENT
Start: 2020-09-21 | End: 2021-01-08 | Stop reason: SDUPTHER

## 2020-09-21 NOTE — TELEPHONE ENCOUNTER
Last seen 7/7/20    Next Visit Date:  Future Appointments   Date Time Provider Pippa Debora   9/23/2020  8:00 AM STV PB MED ONC CHAIR 09 [facility] [place]   9/23/2020 11:30 AM Aaliyah Shore MD [facility]   9/24/2020  1:15 PM [facility]   9/30/2020  1:00 PM STV PB MED ONC CHAIR 17 STVZ LINO [place]   10/7/2020 11:30 AM Nuris Verduzco APRN - CNP [address]   11/10/2020  9:30 [facility]   12/2/2020  1:15 PM MD Raghav De Paz Maintenance   Topic Date Due    DTaP/Tdap/Td vaccine (1 - Tdap) 08/16/1971    Shingles Vaccine (2 of 3) 12/27/2015    Pneumococcal 65+ years Vaccine (2 of 2 - PPSV23) 08/16/2017    Annual Wellness Visit (AWV)  06/18/2019    Flu vaccine (1) 09/01/2020    A1C test (Diabetic or Prediabetic)  10/13/2020    Diabetic foot exam  05/12/2021    Diabetic retinal exam  05/13/2021    Lipid screen  07/13/2021    Potassium monitoring  09/08/2021    Creatinine monitoring  09/08/2021    Colon cancer screen colonoscopy  10/08/2024    Hepatitis A vaccine  Completed    AAA screen  Completed    Hib vaccine  Aged Out    Meningococcal (ACWY) vaccine  Aged Out       Hemoglobin A1C (%)   Date Value   07/13/2020 6.2 (H)   03/25/2020 8.8 (H)   04/24/2019 6.3 (H)             ( goal A1C is < 7)   Microalb/Crt.  Ratio (mcg/mg creat)   Date Value   07/13/2020 20 (H)     LDL Cholesterol (mg/dL)   Date Value   07/13/2020 13       (goal LDL is <100)   AST (U/L)   Date Value   09/08/2020 20     ALT (U/L)   Date Value   09/08/2020 15     BUN (mg/dL)   Date Value   09/08/2020 12     BP Readings from Last 3 Encounters:   09/16/20 128/80   08/19/20 110/68   08/08/20 109/68          (goal 120/80)    All Future Testing planned in [facility]  Lab Frequency Next Occurrence   Hepatic Function Panel Once 12/03/2019   BUN & Creatinine Once 12/03/2019   Electrolyte Panel Once 12/03/2019   CBC Auto Differential Once 12/03/2019   Hepatitis B Surface Antibody Once 05/13/2021   CBC Auto Differential Once 09/16/2020   Iron and TIBC Once 09/16/2020   Vitamin B12 & Folate Once 09/16/2020   Comprehensive Metabolic Panel Once 55/24/5108   CBC Auto Differential     Comprehensive Metabolic Panel     CBC Auto Differential     Comprehensive Metabolic Panel                 Patient Active Problem List:     Degenerative disc disease, lumbar     Lumbar spondylosis     Lumbar radiculopathy     Lumbar spinal stenosis     Encounter for medication monitoring     Cervical spondylitis (HCC)     S/P cervical spinal fusion     Anemia     GERD (gastroesophageal reflux disease)     Osteoarthritis of spine with radiculopathy, lumbar region     Encounter for chronic pain management     Osteoarthritis of lumbar spine     Iron deficiency anemia     Hep C w/o coma, chronic (HCC)     Colon polyps     Hepatitis B core antibody positive     Peripheral polyneuropathy     Essential hypertension     Type 2 diabetes mellitus with hyperglycemia, without long-term current use of insulin (HCC)     Hyperlipidemia with target LDL less than 100     Vitamin D deficiency     Vitamin B 12 deficiency     Abnormal EKG     Type 2 diabetes mellitus with microalbuminuria, without long-term current use of insulin (HCC)     Abdominal discomfort     Irritable bowel syndrome with diarrhea     Chronic hepatitis C without hepatic coma (HCC)     Diarrhea     Status post hernia repair     Incisional hernia without obstruction or gangrene

## 2020-09-23 ENCOUNTER — HOSPITAL ENCOUNTER (OUTPATIENT)
Dept: INFUSION THERAPY | Age: 70
Discharge: HOME | End: 2020-09-23
Payer: MEDICARE

## 2020-09-23 ENCOUNTER — TELEMEDICINE (OUTPATIENT)
Dept: FAMILY MEDICINE CLINIC | Age: 70
End: 2020-09-23
Payer: MEDICARE

## 2020-09-23 VITALS
DIASTOLIC BLOOD PRESSURE: 66 MMHG | SYSTOLIC BLOOD PRESSURE: 122 MMHG | HEIGHT: 69 IN | BODY MASS INDEX: 29.77 KG/M2 | WEIGHT: 201 LBS

## 2020-09-23 VITALS
TEMPERATURE: 98.4 F | SYSTOLIC BLOOD PRESSURE: 111 MMHG | HEART RATE: 86 BPM | DIASTOLIC BLOOD PRESSURE: 69 MMHG | RESPIRATION RATE: 18 BRPM

## 2020-09-23 DIAGNOSIS — D50.8 OTHER IRON DEFICIENCY ANEMIA: Primary | ICD-10-CM

## 2020-09-23 PROCEDURE — 6360000002 HC RX W HCPCS: Performed by: INTERNAL MEDICINE

## 2020-09-23 PROCEDURE — 3017F COLORECTAL CA SCREEN DOC REV: CPT | Performed by: FAMILY MEDICINE

## 2020-09-23 PROCEDURE — G0438 PPPS, INITIAL VISIT: HCPCS | Performed by: FAMILY MEDICINE

## 2020-09-23 PROCEDURE — 4040F PNEUMOC VAC/ADMIN/RCVD: CPT | Performed by: FAMILY MEDICINE

## 2020-09-23 PROCEDURE — 1123F ACP DISCUSS/DSCN MKR DOCD: CPT | Performed by: FAMILY MEDICINE

## 2020-09-23 PROCEDURE — 2580000003 HC RX 258: Performed by: INTERNAL MEDICINE

## 2020-09-23 PROCEDURE — 96365 THER/PROPH/DIAG IV INF INIT: CPT

## 2020-09-23 RX ORDER — SODIUM CHLORIDE 9 MG/ML
INJECTION, SOLUTION INTRAVENOUS CONTINUOUS
Status: CANCELLED | OUTPATIENT
Start: 2020-09-30

## 2020-09-23 RX ORDER — EPINEPHRINE 1 MG/ML
0.3 INJECTION, SOLUTION, CONCENTRATE INTRAVENOUS PRN
Status: CANCELLED | OUTPATIENT
Start: 2020-09-30

## 2020-09-23 RX ORDER — METHYLPREDNISOLONE SODIUM SUCCINATE 125 MG/2ML
125 INJECTION, POWDER, LYOPHILIZED, FOR SOLUTION INTRAMUSCULAR; INTRAVENOUS ONCE
Status: CANCELLED | OUTPATIENT
Start: 2020-09-30

## 2020-09-23 RX ORDER — HEPARIN SODIUM (PORCINE) LOCK FLUSH IV SOLN 100 UNIT/ML 100 UNIT/ML
500 SOLUTION INTRAVENOUS PRN
Status: CANCELLED | OUTPATIENT
Start: 2020-09-30

## 2020-09-23 RX ORDER — SODIUM CHLORIDE 0.9 % (FLUSH) 0.9 %
10 SYRINGE (ML) INJECTION PRN
Status: CANCELLED | OUTPATIENT
Start: 2020-09-30

## 2020-09-23 RX ORDER — SODIUM CHLORIDE 0.9 % (FLUSH) 0.9 %
5 SYRINGE (ML) INJECTION PRN
Status: CANCELLED | OUTPATIENT
Start: 2020-09-30

## 2020-09-23 RX ORDER — SODIUM CHLORIDE 9 MG/ML
INJECTION, SOLUTION INTRAVENOUS CONTINUOUS
Status: ACTIVE | OUTPATIENT
Start: 2020-09-23 | End: 2020-09-23

## 2020-09-23 RX ORDER — DIPHENHYDRAMINE HYDROCHLORIDE 50 MG/ML
50 INJECTION INTRAMUSCULAR; INTRAVENOUS ONCE
Status: CANCELLED | OUTPATIENT
Start: 2020-09-30

## 2020-09-23 RX ADMIN — FERRIC CARBOXYMALTOSE INJECTION 750 MG: 50 INJECTION, SOLUTION INTRAVENOUS at 08:15

## 2020-09-23 RX ADMIN — SODIUM CHLORIDE: 9 INJECTION, SOLUTION INTRAVENOUS at 08:02

## 2020-09-23 ASSESSMENT — PATIENT HEALTH QUESTIONNAIRE - PHQ9
SUM OF ALL RESPONSES TO PHQ QUESTIONS 1-9: 0
2. FEELING DOWN, DEPRESSED OR HOPELESS: 0
SUM OF ALL RESPONSES TO PHQ QUESTIONS 1-9: 0
SUM OF ALL RESPONSES TO PHQ9 QUESTIONS 1 & 2: 0
1. LITTLE INTEREST OR PLEASURE IN DOING THINGS: 0

## 2020-09-23 ASSESSMENT — LIFESTYLE VARIABLES: HOW OFTEN DO YOU HAVE A DRINK CONTAINING ALCOHOL: 0

## 2020-09-23 NOTE — PROGRESS NOTES
TELEHEALTH EVALUATION -- Audio (During DZCTM-29 public health emergency) using telephone  Patient's wife, Katty Quintana is present during the encounter      Medicare Annual Wellness Visit  Are Name: Nick Harman Date: 2020   MRN: [id number] Sex: Male   Age: 78 y.o. Ethnicity: Non-/Non    : 1950 Race: Nba Rosas is here for Medicare AWV (Medicare/Depression Screening) and Rash (face)    Screenings for behavioral, psychosocial and functional/safety risks, and cognitive dysfunction are all negative except as indicated below. These results, as well as other patient data from the [address] form, are documented in Flowsheets linked to this Encounter. Allergies   Allergen Reactions    Asa [Aspirin]       iron deficiency anemia , requiring iron infusions and transfusions    Iron      Pill- constipation, abdominal pain    Nsaids       iron deficiency anemia, history of bleeding ulcers        Prior to Visit Medications    Medication Sig Taking? Authorizing Provider   cyanocobalamin 1000 MCG/ML injection Inject 1 mL into the muscle every 7 days Call for next refill which will be monthly for life Yes Leann Molina MD   morphine (MS CONTIN) 60 MG extended release tablet Take 1 tablet by mouth 2 times daily for 30 days. Yes Linwood Main MD   oxyCODONE-acetaminophen (PERCOCET) 5-325 MG per tablet Take 1 tablet by mouth every 8 hours for 30 days.  Yes Linwood Main MD   pantoprazole (PROTONIX) 40 MG tablet Take 1 tablet by mouth daily Yes Leann Molina MD   lisinopril (PRINIVIL;ZESTRIL) 10 MG tablet Take 1 tablet by mouth daily Yes Leann Molina MD   hydroCHLOROthiazide (MICROZIDE) 12.5 MG capsule Take 1 capsule by mouth every morning Yes Leann Molina MD   metFORMIN (GLUCOPHAGE) 1000 MG tablet Take 1 tablet by mouth 2 times daily (with meals) Yes Leann Molina MD   glipiZIDE (GLUCOTROL) 5 MG tablet Take 1 tablet by mouth every morning Keep fasting morning blood glucose . If blood glucose below 80, you need to stop glipizide Yes Tobi Lindquist MD   pregabalin (LYRICA) 150 MG capsule Take 1 capsule by mouth 3 times daily for 90 days.  Yes VEE Moreno - CNP   Syringe/Needle, Disp, (SYRINGE 3CC/25GX1\") 25G X 1\" 3 ML MISC To be used with B12 injections Yes Tobi Lindquist MD   vitamin D (ERGOCALCIFEROL) 1.25 MG (42236 UT) CAPS capsule Take 1 capsule by mouth once a week Yes Tobi Lindquist MD   metoprolol tartrate (LOPRESSOR) 25 MG tablet Take 1 tablet by mouth 2 times daily Yes Tobi Lindquist MD   pravastatin (PRAVACHOL) 40 MG tablet Take 1 tablet by mouth every evening Stop Gemfibrozil Yes Tobi Lindquist MD   Alcohol Swabs (B-D SINGLE USE SWABS REGULAR) PADS USE TO CHECK BLOOD SUGAR TWICE A DAY Yes Historical Provider, MD   Blood Glucose Monitoring Suppl (TRUE METRIX METER) w/Device KIT USE AS DIRECTED TO TEST BLOOD SUGAR Yes Historical Provider, MD   TRUE METRIX BLOOD GLUCOSE TEST strip CHECK BLOOD SUGAR BID Yes Historical Provider, MD   TRUEplus Lancets 30G MISC USE TO CHECK BLOOD SUGAR BID Yes Historical Provider, MD   hydrOXYzine (VISTARIL) 25 MG capsule hydroxyzine pamoate 25 mg capsule Yes Historical Provider, MD   BD INTEGRA SYRINGE 25G X 1\" 3 ML MISC  Yes Historical Provider, MD         Past Medical History:   Diagnosis Date    Anemia     Arthritis     osteoarthritis    Colon polyps 10/08/2019    tubular adenoma x2    Essential hypertension 5/12/2020    Hep C w/o coma, chronic ([facility] 2019    Received antiviral treatment by Dr. Lali Urban Hepatitis B core antibody positive     History of blood transfusion     Hyperlipidemia     Iron (Fe) deficiency anemia     Type 2 diabetes mellitus with hyperglycemia, without long-term current use of insulin ([facility] [address] 5/12/2020    Type 2 diabetes mellitus with microalbuminuria, without long-term current use of insulin ([facility] [address] 7/13/2020    Wears dentures  Wears glasses        Past Surgical History:   Procedure Laterality Date    BACK SURGERY  1977    cervical spine three times    CHOLECYSTECTOMY  03/22/2019    COLONOSCOPY  01/25/2015    10 yr recall, hemorrhoids    COLONOSCOPY N/A 10/8/2019    tubular adenoma x2    DENTAL SURGERY  10/2015    all teeth extracted    HERNIA REPAIR  08/07/2020    lap robotic with mesh    HERNIA REPAIR N/A 8/7/2020    HERNIA INCISIONAL REPAIR LAPAROSCOPIC ROBOTIC WITH MESH performed [facility] Erica Abel DO [facility]     UMBILICAL HERNIA REPAIR  3022-19    UPPER GASTROINTESTINAL ENDOSCOPY  01/25/2015    UPPER GASTROINTESTINAL ENDOSCOPY N/A 10/8/2019    EGD BIOPSY performed by Marcela France MD at [facility]         Family History   Problem Relation Age of Onset    Diabetes Mother     Heart Disease Father        CareTeam (Including outside providers/suppliers regularly involved in providing care):   Patient Care Team:  Alberto Escamilla MD as PCP - General (Family Medicine)  Alberto Escamilla MD as PCP - [facility] Empaneled Provider  Marcela France MD as Consulting Physician (Gastroenterology)  Jennifer Fiore MD as Consulting Physician (Pain Management)  Eura Apley, MD as Consulting Physician (Hematology and Oncology)  Erica Abel DO as Consulting Physician (Bariatric Surgery)  Stephen Salcido DO as Consulting Physician (Cardiology)    Vital signs taken today during the encounter shows overweight per BMI otherwise normal  /66   Ht 5' 9\" (1.753 m)   Wt 201 lb (91.2 kg)   BMI 29.68 kg/m²     Wt Readings from Last 3 Encounters:   09/23/20 201 lb (91.2 kg)   09/16/20 203 lb (92.1 kg)   08/19/20 195 lb (88.5 kg)      Patient-Reported Vitals 9/23/2020   Patient-Reported Weight 201 lbs   Patient-Reported Height 5 ft 9 in   Patient-Reported Systolic 418   Patient-Reported Diastolic 66      Body mass index is 29.68 kg/m².     Based upon direct observation of the patient, evaluation of cognition reveals recent and remote memory intact. Patient and his wife reports rash on the right side of the face, spreading, looks like pimples but it is not acne, did not try anything, comes and goes. He has not changed any soaps or products  I advised hydrocortisone 1% eczema cream from over-the-counter daily or twice a day for 1 week only, if does not improve, to call for an appointment. The patient and his wife agree        Patient's complete Health Risk Assessment and screening values have been reviewed and are found in Flowsheets. The following problems were reviewed today and where indicated follow up appointments were made and/or referrals ordered. Positive Risk Factor Screenings with Interventions:     General Health:  General  In general, how would you say your health is?: Fair  In the past 7 days, have you experienced any of the following? New or Increased Pain, New or Increased Fatigue, Loneliness, Social Isolation, Stress or Anger?: None of These  Do you get the social and emotional support that you need?: Yes(lives with his wife, great nieces, son and grandaughter)  Do you have a Living Will?: (!) No  General Health Risk Interventions:  · No Living Will: referred to [facility] Habits/Nutrition:  Health Habits/Nutrition  Do you exercise for at least 20 minutes 2-3 times per week?: Yes(walking in the morning, stretching)  Have you lost any weight without trying in the past 3 months?: (!) Yes  Do you eat fewer than 2 meals per day?: No  Have you seen a dentist within the past year?: (!) No(I have dentures)  Body mass index is 29.68 kg/m².   Health Habits/Nutrition Interventions:  · Dental exam overdue:  patient declines dental evaluation  · weight loss reported by the patient, apparently the weight has been up and down    Safety:  Safety  Do you have working smoke detectors?: Yes  Have all throw rugs been removed or fastened?: Yes  Do you have non-slip mats or surfaces in all bathtubs/showers?: Yes  Do all of your stairways have a railing or banister?: (!) No(I don't have stairs)  Are your doorways, halls and stairs free of clutter?: Yes  Do you always fasten your seatbelt when you are in a car?: Yes  Safety Interventions:  · Home safety tips provided    Personalized Preventive Plan   Current Health Maintenance Status  Immunization History   Administered Date(s) Administered    Hepatitis A Adult (Havrix, Vaqta) 09/24/2019, 04/30/2020    Influenza Virus Vaccine 10/01/2015, 11/01/2016    Pneumococcal Conjugate 13-valent (Alyson Miranda) 02/02/2016    Zoster Live (Zostavax) 11/01/2015        Health Maintenance   Topic Date Due    DTaP/Tdap/Td vaccine (1 - Tdap) 08/16/1971    Shingles Vaccine (2 of 3) 12/27/2015    Pneumococcal 65+ years Vaccine (2 of 2 - PPSV23) 08/16/2017    Annual Wellness Visit (AWV)  06/18/2019    Flu vaccine (1) 09/01/2020    A1C test (Diabetic or Prediabetic)  10/13/2020    Diabetic foot exam  05/12/2021    Diabetic retinal exam  05/13/2021    Lipid screen  07/13/2021    Potassium monitoring  09/08/2021    Creatinine monitoring  09/08/2021    Colon cancer screen colonoscopy  10/08/2024    Hepatitis A vaccine  Completed    AAA screen  Completed    Hib vaccine  Aged Out    Meningococcal (ACWY) vaccine  Aged Out     Recommendations for [employer] Due: see orders and patient instructions/AVS.  . Recommended screening schedule for the next 5-10 years is provided to the patient in written form: see Patient Instructions/AVS.    Evita Parikh was seen today for medicare awv and rash.     Diagnoses and all orders for this visit:    Routine general medical examination at a health care facility    ACP (advance care planning)       Merc Referral to [facility] Clinical Specialist          Diabetes is greatly improved  Lab Results   Component Value Date    LABA1C 6.2 (H) 07/13/2020    LABA1C 8.8 (H) 03/25/2020    LABA1C 6.3 (H) 04/24/2019          3mo in the office, 30 mins for diabetes and hypertension  Patient needs NURSE VISIT for high-dose flu shot and Pneumovax 23 anytime he can come in      Future Appointments   Date Time Provider Pippa Cazares   9/24/2020  1:15 PM Suzanne Lamb MD [facility]   9/25/2020  2:00 PM SCHEDULE, [facility] fp sc [facility]   9/30/2020  1:00 PM STV PB MED ONC CHAIR 17 [address] [address] [place]   10/7/2020 11:30 AM Lizett Fitzpatrick APRN - CNP [address]   11/10/2020  9:30 AM Mckenna Mcgovern MD [facility]   12/2/2020  1:15 PM Kennedi Zhang MD [address] [place] [address] 22   9/24/2021 11:30 AM Mckenna Mcgovern MD [facility] [place] is a 78 y.o. male being evaluated by a Virtual Visit (phone) encounter to address concerns as mentioned above. A caregiver, his wife, Rex Burgos, was present      Due to this being a TeleHealth encounter (During [facility]- public health emergency), evaluation of the following organ systems was limited: Vitals/Constitutional/EENT/Resp/CV/GI//MS/Neuro/Skin/Heme-Lymph-Imm. Pursuant to the emergency declaration under the [address] [place] [address], [address] [place] [address] authority and the [employer] Act, this Virtual Visit was conducted with patient's (and/or legal guardian's) consent, to reduce the patient's risk of exposure to COVID-19 and provide necessary medical care. The patient (and/or legal guardian) has also been advised to contact this office for worsening conditions or problems, and seek emergency medical treatment and/or call 911 if deemed necessary. Patient identification was verified at the start of the visit: Yes    Services were provided through phone to substitute for in-person clinic visit. Patient and provider were located at their individual homes. --Mckenna Mcgovern MD on 9/23/2020 at 12:17 PM    An electronic signature was used to authenticate this note.

## 2020-09-23 NOTE — PROGRESS NOTES
Pt here for 1 of 2 injectafer infusions. Pt was treated without incident and d/c'd in stable condition. Pt returns 9/30/20 for 2nd infusion.

## 2020-09-23 NOTE — PATIENT INSTRUCTIONS
Personalized Preventive Plan for Yesy Holguin - 9/23/2020  Medicare offers a range of preventive health benefits. Some of the tests and screenings are paid in full while other may be subject to a deductible, co-insurance, and/or copay. Some of these benefits include a comprehensive review of your medical history including lifestyle, illnesses that may run in your family, and various assessments and screenings as appropriate. After reviewing your medical record and screening and assessments performed today your provider may have ordered immunizations, labs, imaging, and/or referrals for you. A list of these orders (if applicable) as well as your Preventive Care list are included within your After Visit Summary for your review. Other Preventive Recommendations:    · A preventive eye exam performed by an eye specialist is recommended every 1-2 years to screen for glaucoma; cataracts, macular degeneration, and other eye disorders. · A preventive dental visit is recommended every 6 months. · Try to get at least 150 minutes of exercise per week or 10,000 steps per day on a pedometer . · Order or download the FREE \"Exercise & Physical Activity: Your Everyday Guide\" from [employer]. Call 2-243.349.9875 or search [employer] online. · You need 8145-8166 mg of calcium and 4294-0213 IU of vitamin D per day. It is possible to meet your calcium requirement with diet alone, but a vitamin D supplement is usually necessary to meet this goal.  · When exposed to the sun, use a sunscreen that protects against both UVA and UVB radiation with an SPF of 30 or greater. Reapply every 2 to 3 hours or after sweating, drying off with a towel, or swimming. · Always wear a seat belt when traveling in a car. Always wear a helmet when riding a bicycle or motorcycle. Heart-Healthy Diet   Sodium, Fat, and Cholesterol Controlled Diet       What Is a Heart Healthy Diet?    A heart-healthy diet is one that limits sodium , certain types of fat , and cholesterol . This type of diet is recommended for:   People with any form of cardiovascular disease (eg, coronary heart disease , peripheral vascular disease , previous heart attack , previous stroke )   People with risk factors for cardiovascular disease, such as high blood pressure , high cholesterol , or diabetes   Anyone who wants to lower their risk of developing cardiovascular disease   Sodium    Sodium is a mineral found in many foods. In general, most people consume much more sodium than they need. Diets high in sodium can increase blood pressure and lead to edema (water retention). On a heart-healthy diet, you should consume no more than 2,300 mg (milligrams) of sodium per dayabout the amount in one teaspoon of table salt. The foods highest in sodium include table salt (about 50% sodium), processed foods, convenience foods, and preserved foods. Cholesterol    Cholesterol is a fat-like, waxy substance in your blood. Our bodies make some cholesterol. It is also found in animal products, with the highest amounts in fatty meat, egg yolks, whole milk, cheese, shellfish, and organ meats. On a heart-healthy diet, you should limit your cholesterol intake to less than 200 mg per day. It is normal and important to have some cholesterol in your bloodstream. But too much cholesterol can cause plaque to build up within your arteries, which can eventually lead to a heart attack or stroke. The two types of cholesterol that are most commonly referred to are:   Low-density lipoprotein (LDL) cholesterol  Also known as bad cholesterol, this is the cholesterol that tends to build up along your arteries. Bad cholesterol levels are increased by eating fats that are saturated or hydrogenated. Optimal level of this cholesterol is less than 100. Over 130 starts to get risky for heart disease.    High-density lipoprotein (HDL) cholesterol  Also known as good cholesterol, this type of cholesterol actually carries cholesterol away from your arteries and may, therefore, help lower your risk of having a heart attack. You want this level to be high (ideally greater than 60). It is a risk to have a level less than 40. You can raise this good cholesterol by eating olive oil, canola oil, avocados, or nuts. Exercise raises this level, too. Fat    Fat is calorie dense and packs a lot of calories into a small amount of food. Even though fats should be limited due to their high calorie content, not all fats are bad. In fact, some fats are quite healthful. Fat can be broken down into four main types. The good-for-you fats are:   Monounsaturated fat  found in oils such as olive and canola, avocados, and nuts and natural nut butters; can decrease cholesterol levels, while keeping levels of HDL cholesterol high   Polyunsaturated fat  found in oils such as safflower, sunflower, soybean, corn, and sesame; can decrease total cholesterol and LDL cholesterol   Omega-3 fatty acids  particularly those found in fatty fish (such as salmon, trout, tuna, mackerel, herring, and sardines); can decrease risk of arrhythmias, decrease triglyceride levels, and slightly lower blood pressure   The fats that you want to limit are:   Saturated fat  found in animal products, many fast foods, and a few vegetables; increases total blood cholesterol, including LDL levels   Animal fats that are saturated include: butter, lard, whole-milk dairy products, meat fat, and poultry skin   Vegetable fats that are saturated include: hydrogenated shortening, palm oil, coconut oil, cocoa butter   Hydrogenated or trans fat  found in margarine and vegetable shortening, most shelf stable snack foods, and fried foods; increases LDL and decreases HDL     It is generally recommended that you limit your total fat for the day to less than 30% of your total calories.  If you follow an 1800-calorie heart healthy diet, for example, this would mean 60 grams of fat or less per day. Saturated fat and trans fat in your diet raises your blood cholesterol the most, much more than dietary cholesterol does. For this reason, on a heart-healthy diet, less than 7% of your calories should come from saturated fat and ideally 0% from trans fat. On an 1800-calorie diet, this translates into less than 14 grams of saturated fat per day, leaving 46 grams of fat to come from mono- and polyunsaturated fats.    Food Choices on a Heart Healthy Diet   Food Category   Foods Recommended   Foods to Avoid   Grains   Breads and rolls without salted tops Most dry and cooked cereals Unsalted crackers and breadsticks Low-sodium or homemade breadcrumbs or stuffing All rice and pastas   Breads, rolls, and crackers with salted tops High-fat baked goods (eg, muffins, donuts, pastries) Quick breads, self-rising flour, and biscuit mixes Regular bread crumbs Instant hot cereals Commercially prepared rice, pasta, or stuffing mixes   Vegetables   Most fresh, frozen, and low-sodium canned vegetables Low-sodium and salt-free vegetable juices Canned vegetables if unsalted or rinsed   Regular canned vegetables and juices, including sauerkraut and pickled vegetables Frozen vegetables with sauces Commercially prepared potato and vegetable mixes   Fruits   Most fresh, frozen, and canned fruits All fruit juices   Fruits processed with salt or sodium   Milk   Nonfat or low-fat (1%) milk Nonfat or low-fat yogurt Cottage cheese, low-fat ricotta, cheeses labeled as low-fat and low-sodium   Whole milk Reduced-fat (2%) milk Malted and chocolate milk Full fat yogurt Most cheeses (unless low-fat and low salt) Buttermilk (no more than 1 cup per week)   Meats and Beans   Lean cuts of fresh or frozen beef, veal, lamb, or pork (look for the word loin) Fresh or frozen poultry without the skin Fresh or frozen fish and some shellfish Egg whites and egg substitutes (Limit whole eggs to three per week) Tofu Nuts or seeds (unsalted, dry-roasted), low-sodium peanut butter Dried peas, beans, and lentils   Any smoked, cured, salted, or canned meat, fish, or poultry (including slater, chipped beef, cold cuts, hot dogs, sausages, sardines, and anchovies) Poultry skins Breaded and/or fried fish or meats Canned peas, beans, and lentils Salted nuts   Fats and Oils   Olive oil and canola oil Low-sodium, low-fat salad dressings and mayonnaise   Butter, margarine, coconut and palm oils, slater fat   Snacks, Sweets, and Condiments   Low-sodium or unsalted versions of broths, soups, soy sauce, and condiments Pepper, herbs, and spices; vinegar, lemon, or lime juice Low-fat frozen desserts (yogurt, sherbet, fruit bars) Sugar, cocoa powder, honey, syrup, jam, and preserves Low-fat, trans-fat free cookies, cakes, and pies Raul and animal crackers, fig bars, danae snaps   High-fat desserts Broth, soups, gravies, and sauces, made from instant mixes or other high-sodium ingredients Salted snack foods Canned olives Meat tenderizers, seasoning salt, and most flavored vinegars   Beverages   Low-sodium carbonated beverages Tea and coffee in moderation Soy milk   Commercially softened water   Suggestions   Make whole grains, fruits, and vegetables the base of your diet. Choose heart-healthy fats such as canola, olive, and flaxseed oil, and foods high in heart-healthy fats, such as nuts, seeds, soybeans, tofu, and fish. Eat fish at least twice per week; the fish highest in omega-3 fatty acids and lowest in mercury include salmon, herring, mackerel, sardines, and canned chunk light tuna. If you eat fish less than twice per week or have high triglycerides, talk to your doctor about taking fish oil supplements. Read food labels.    For products low in fat and cholesterol, look for fat free, low-fat, cholesterol free, saturated fat free, and trans fat freeAlso scan the Nutrition Facts Label, which lists saturated fat, trans fat, and LDL cholesterol   Omega-3 fatty acids  particularly those found in fatty fish (such as salmon, trout, tuna, mackerel, herring, and sardines); can decrease risk of arrhythmias, decrease triglyceride levels, and slightly lower blood pressure   The fats that you want to limit are:   Saturated fat  found in animal products, many fast foods, and a few vegetables; increases total blood cholesterol, including LDL levels   Animal fats that are saturated include: butter, lard, whole-milk dairy products, meat fat, and poultry skin   Vegetable fats that are saturated include: hydrogenated shortening, palm oil, coconut oil, cocoa butter   Hydrogenated or trans fat  found in margarine and vegetable shortening, most shelf stable snack foods, and fried foods; increases LDL and decreases HDL     It is generally recommended that you limit your total fat for the day to less than 30% of your total calories. If you follow an 1800-calorie heart healthy diet, for example, this would mean 60 grams of fat or less per day. Saturated fat and trans fat in your diet raises your blood cholesterol the most, much more than dietary cholesterol does. For this reason, on a heart-healthy diet, less than 7% of your calories should come from saturated fat and ideally 0% from trans fat. On an 1800-calorie diet, this translates into less than 14 grams of saturated fat per day, leaving 46 grams of fat to come from mono- and polyunsaturated fats.    Food Choices on a Heart Healthy Diet   Food Category   Foods Recommended   Foods to Avoid   Grains   Breads and rolls without salted tops Most dry and cooked cereals Unsalted crackers and breadsticks Low-sodium or homemade breadcrumbs or stuffing All rice and pastas   Breads, rolls, and crackers with salted tops High-fat baked goods (eg, muffins, donuts, pastries) Quick breads, self-rising flour, and biscuit mixes Regular bread crumbs Instant hot cereals Commercially prepared rice, pasta, or stuffing mixes Vegetables   Most fresh, frozen, and low-sodium canned vegetables Low-sodium and salt-free vegetable juices Canned vegetables if unsalted or rinsed   Regular canned vegetables and juices, including sauerkraut and pickled vegetables Frozen vegetables with sauces Commercially prepared potato and vegetable mixes   Fruits   Most fresh, frozen, and canned fruits All fruit juices   Fruits processed with salt or sodium   Milk   Nonfat or low-fat (1%) milk Nonfat or low-fat yogurt Cottage cheese, low-fat ricotta, cheeses labeled as low-fat and low-sodium   Whole milk Reduced-fat (2%) milk Malted and chocolate milk Full fat yogurt Most cheeses (unless low-fat and low salt) Buttermilk (no more than 1 cup per week)   Meats and Beans   Lean cuts of fresh or frozen beef, veal, lamb, or pork (look for the word loin) Fresh or frozen poultry without the skin Fresh or frozen fish and some shellfish Egg whites and egg substitutes (Limit whole eggs to three per week) Tofu Nuts or seeds (unsalted, dry-roasted), low-sodium peanut butter Dried peas, beans, and lentils   Any smoked, cured, salted, or canned meat, fish, or poultry (including slater, chipped beef, cold cuts, hot dogs, sausages, sardines, and anchovies) Poultry skins Breaded and/or fried fish or meats Canned peas, beans, and lentils Salted nuts   Fats and Oils   Olive oil and canola oil Low-sodium, low-fat salad dressings and mayonnaise   Butter, margarine, coconut and palm oils, slater fat   Snacks, Sweets, and Condiments   Low-sodium or unsalted versions of broths, soups, soy sauce, and condiments Pepper, herbs, and spices; vinegar, lemon, or lime juice Low-fat frozen desserts (yogurt, sherbet, fruit bars) Sugar, cocoa powder, honey, syrup, jam, and preserves Low-fat, trans-fat free cookies, cakes, and pies Raul and animal crackers, fig bars, danae snaps   High-fat desserts Broth, soups, gravies, and sauces, made from instant mixes or other high-sodium ingredients Salted snack foods Canned olives Meat tenderizers, seasoning salt, and most flavored vinegars   Beverages   Low-sodium carbonated beverages Tea and coffee in moderation Soy milk   Commercially softened water   Suggestions   Make whole grains, fruits, and vegetables the base of your diet. Choose heart-healthy fats such as canola, olive, and flaxseed oil, and foods high in heart-healthy fats, such as nuts, seeds, soybeans, tofu, and fish. Eat fish at least twice per week; the fish highest in omega-3 fatty acids and lowest in mercury include salmon, herring, mackerel, sardines, and canned chunk light tuna. If you eat fish less than twice per week or have high triglycerides, talk to your doctor about taking fish oil supplements. Read food labels. For products low in fat and cholesterol, look for fat free, low-fat, cholesterol free, saturated fat free, and trans fat freeAlso scan the Nutrition Facts Label, which lists saturated fat, trans fat, and cholesterol amounts. For products low in sodium, look for sodium free, very low sodium, low sodium, no added salt, and unsalted   Skip the salt when cooking or at the table; if food needs more flavor, get creative and try out different herbs and spices. Garlic and onion also add substantial flavor to foods. Trim any visible fat off meat and poultry before cooking, and drain the fat off after rodriguez. Use cooking methods that require little or no added fat, such as grilling, boiling, baking, poaching, broiling, roasting, steaming, stir-frying, and sauting. Avoid fast food and convenience food. They tend to be high in saturated and trans fat and have a lot of added salt. Talk to a registered dietitian for individualized diet advice. Last Reviewed: March 2011 Amanda Goodson MS, MPH, RD   Updated: 3/29/2011   ·     Heart-Healthy Diet   Sodium, Fat, and Cholesterol Controlled Diet       What Is a Heart Healthy Diet?    A heart-healthy diet is one that limits sodium , certain types of fat , and cholesterol . This type of diet is recommended for:   People with any form of cardiovascular disease (eg, coronary heart disease , peripheral vascular disease , previous heart attack , previous stroke )   People with risk factors for cardiovascular disease, such as high blood pressure , high cholesterol , or diabetes   Anyone who wants to lower their risk of developing cardiovascular disease   Sodium    Sodium is a mineral found in many foods. In general, most people consume much more sodium than they need. Diets high in sodium can increase blood pressure and lead to edema (water retention). On a heart-healthy diet, you should consume no more than 2,300 mg (milligrams) of sodium per dayabout the amount in one teaspoon of table salt. The foods highest in sodium include table salt (about 50% sodium), processed foods, convenience foods, and preserved foods. Cholesterol    Cholesterol is a fat-like, waxy substance in your blood. Our bodies make some cholesterol. It is also found in animal products, with the highest amounts in fatty meat, egg yolks, whole milk, cheese, shellfish, and organ meats. On a heart-healthy diet, you should limit your cholesterol intake to less than 200 mg per day. It is normal and important to have some cholesterol in your bloodstream. But too much cholesterol can cause plaque to build up within your arteries, which can eventually lead to a heart attack or stroke. The two types of cholesterol that are most commonly referred to are:   Low-density lipoprotein (LDL) cholesterol  Also known as bad cholesterol, this is the cholesterol that tends to build up along your arteries. Bad cholesterol levels are increased by eating fats that are saturated or hydrogenated. Optimal level of this cholesterol is less than 100. Over 130 starts to get risky for heart disease.    High-density lipoprotein (HDL) cholesterol  Also known as good cholesterol, this type of cholesterol actually carries cholesterol away from your arteries and may, therefore, help lower your risk of having a heart attack. You want this level to be high (ideally greater than 60). It is a risk to have a level less than 40. You can raise this good cholesterol by eating olive oil, canola oil, avocados, or nuts. Exercise raises this level, too. Fat    Fat is calorie dense and packs a lot of calories into a small amount of food. Even though fats should be limited due to their high calorie content, not all fats are bad. In fact, some fats are quite healthful. Fat can be broken down into four main types. The good-for-you fats are:   Monounsaturated fat  found in oils such as olive and canola, avocados, and nuts and natural nut butters; can decrease cholesterol levels, while keeping levels of HDL cholesterol high   Polyunsaturated fat  found in oils such as safflower, sunflower, soybean, corn, and sesame; can decrease total cholesterol and LDL cholesterol   Omega-3 fatty acids  particularly those found in fatty fish (such as salmon, trout, tuna, mackerel, herring, and sardines); can decrease risk of arrhythmias, decrease triglyceride levels, and slightly lower blood pressure   The fats that you want to limit are:   Saturated fat  found in animal products, many fast foods, and a few vegetables; increases total blood cholesterol, including LDL levels   Animal fats that are saturated include: butter, lard, whole-milk dairy products, meat fat, and poultry skin   Vegetable fats that are saturated include: hydrogenated shortening, palm oil, coconut oil, cocoa butter   Hydrogenated or trans fat  found in margarine and vegetable shortening, most shelf stable snack foods, and fried foods; increases LDL and decreases HDL     It is generally recommended that you limit your total fat for the day to less than 30% of your total calories.  If you follow an 1800-calorie heart healthy diet, for example, this would mean 60 grams of fat or less per day. Saturated fat and trans fat in your diet raises your blood cholesterol the most, much more than dietary cholesterol does. For this reason, on a heart-healthy diet, less than 7% of your calories should come from saturated fat and ideally 0% from trans fat. On an 1800-calorie diet, this translates into less than 14 grams of saturated fat per day, leaving 46 grams of fat to come from mono- and polyunsaturated fats.    Food Choices on a Heart Healthy Diet   Food Category   Foods Recommended   Foods to Avoid   Grains   Breads and rolls without salted tops Most dry and cooked cereals Unsalted crackers and breadsticks Low-sodium or homemade breadcrumbs or stuffing All rice and pastas   Breads, rolls, and crackers with salted tops High-fat baked goods (eg, muffins, donuts, pastries) Quick breads, self-rising flour, and biscuit mixes Regular bread crumbs Instant hot cereals Commercially prepared rice, pasta, or stuffing mixes   Vegetables   Most fresh, frozen, and low-sodium canned vegetables Low-sodium and salt-free vegetable juices Canned vegetables if unsalted or rinsed   Regular canned vegetables and juices, including sauerkraut and pickled vegetables Frozen vegetables with sauces Commercially prepared potato and vegetable mixes   Fruits   Most fresh, frozen, and canned fruits All fruit juices   Fruits processed with salt or sodium   Milk   Nonfat or low-fat (1%) milk Nonfat or low-fat yogurt Cottage cheese, low-fat ricotta, cheeses labeled as low-fat and low-sodium   Whole milk Reduced-fat (2%) milk Malted and chocolate milk Full fat yogurt Most cheeses (unless low-fat and low salt) Buttermilk (no more than 1 cup per week)   Meats and Beans   Lean cuts of fresh or frozen beef, veal, lamb, or pork (look for the word loin) Fresh or frozen poultry without the skin Fresh or frozen fish and some shellfish Egg whites and egg substitutes (Limit whole eggs to three per week) Tofu Nuts or seeds (unsalted, dry-roasted), low-sodium peanut butter Dried peas, beans, and lentils   Any smoked, cured, salted, or canned meat, fish, or poultry (including slater, chipped beef, cold cuts, hot dogs, sausages, sardines, and anchovies) Poultry skins Breaded and/or fried fish or meats Canned peas, beans, and lentils Salted nuts   Fats and Oils   Olive oil and canola oil Low-sodium, low-fat salad dressings and mayonnaise   Butter, margarine, coconut and palm oils, slater fat   Snacks, Sweets, and Condiments   Low-sodium or unsalted versions of broths, soups, soy sauce, and condiments Pepper, herbs, and spices; vinegar, lemon, or lime juice Low-fat frozen desserts (yogurt, sherbet, fruit bars) Sugar, cocoa powder, honey, syrup, jam, and preserves Low-fat, trans-fat free cookies, cakes, and pies Raul and animal crackers, fig bars, daane snaps   High-fat desserts Broth, soups, gravies, and sauces, made from instant mixes or other high-sodium ingredients Salted snack foods Canned olives Meat tenderizers, seasoning salt, and most flavored vinegars   Beverages   Low-sodium carbonated beverages Tea and coffee in moderation Soy milk   Commercially softened water   Suggestions   Make whole grains, fruits, and vegetables the base of your diet. Choose heart-healthy fats such as canola, olive, and flaxseed oil, and foods high in heart-healthy fats, such as nuts, seeds, soybeans, tofu, and fish. Eat fish at least twice per week; the fish highest in omega-3 fatty acids and lowest in mercury include salmon, herring, mackerel, sardines, and canned chunk light tuna. If you eat fish less than twice per week or have high triglycerides, talk to your doctor about taking fish oil supplements. Read food labels. For products low in fat and cholesterol, look for fat free, low-fat, cholesterol free, saturated fat free, and trans fat freeAlso scan the Nutrition Facts Label, which lists saturated fat, trans fat, and cholesterol amounts. For products low in sodium, look for sodium free, very low sodium, low sodium, no added salt, and unsalted   Skip the salt when cooking or at the table; if food needs more flavor, get creative and try out different herbs and spices. Garlic and onion also add substantial flavor to foods. Trim any visible fat off meat and poultry before cooking, and drain the fat off after rodriguez. Use cooking methods that require little or no added fat, such as grilling, boiling, baking, poaching, broiling, roasting, steaming, stir-frying, and sauting. Avoid fast food and convenience food. They tend to be high in saturated and trans fat and have a lot of added salt. Talk to a registered dietitian for individualized diet advice. Last Reviewed: March 2011 Reese Reis MS, MPH, RD   Updated: 3/29/2011   ·     Preventing Osteoporosis: After Your Visit  Your Care Instructions  Osteoporosis means the bones are weak and thin enough that they can break easily. The older you are, the more likely you are to get osteoporosis. But with plenty of calcium, vitamin D, and exercise, you can help prevent osteoporosis. The preteen and teen years are a key time for bone building. With the help of calcium, vitamin D, and exercise in those early years and beyond, the bones reach their peak density and strength by age 27. After age 27, your bones naturally start to thin and weaken. The stronger your bones are at around age 27, the lower your risk for osteoporosis. But no matter what your age and risk are, your bones still need calcium, vitamin D, and exercise to stay strong. Also avoid smoking, and limit alcohol. Smoking and heavy alcohol use can make your bones thinner. Talk to your doctor about any special risks you might have, such as having a close relative with osteoporosis or taking a medicine that can weaken bones. Your doctor can tell you the best ways to protect your bones from thinning.   Follow-up care is a key part of your treatment and safety. Be sure to make and go to all appointments, and call your doctor if you are having problems. It's also a good idea to know your test results and keep a list of the medicines you take. How can you care for yourself at home? Get enough calcium and vitamin D. The [place] of Medicine recommends adults younger than age 46 need 1,000 mg of calcium and 600 IU of vitamin D each day. Women ages 46 to 79 need 1,200 mg of calcium and 600 IU of vitamin D each day. Men ages 46 to 79 need 1,000 mg of calcium and 600 IU of vitamin D each day. Adults 71 and older need 1,200 mg of calcium and 800 IU of vitamin D each day. Eat foods rich in calcium, like yogurt, cheese, milk, and dark green vegetables. Eat foods rich in vitamin D, like eggs, fatty fish, cereal, and fortified milk. Get some sunshine. Your body uses sunshine to make its own vitamin D. The safest time to be out in the sun is before 10 a.m. or after 3 p.m. Avoid getting sunburned. Sunburn can increase your risk of skin cancer. Talk to your doctor about taking a calcium plus vitamin D supplement. Ask about what type of calcium is right for you, and how much to take at a time. Adults ages 23 to 48 should not get more than 2,500 mg of calcium and 4,000 IU of vitamin D each day, whether it is from supplements and/or food. Adults ages 46 and older should not get more than 2,000 mg of calcium and 4,000 IU of vitamin D each day from supplements and/or food. Get regular bone-building exercise. Weight-bearing and resistance exercises keep bones healthy by working the muscles and bones against gravity. Start out at an exercise level that feels right for you. Add a little at a time until you can do the following:  Do 30 minutes of weight-bearing exercise on most days of the week. Walking, jogging, stair climbing, and dancing are good choices. Do resistance exercises with weights or elastic bands 2 to 3 days a week. Limit alcohol.  Drink no more will make it easy to get the recommended amount of fruits and vegetables each day. Try yogurt topped with fruit and nuts for a snack or healthy dessert. Add lettuce, tomato, cucumber, and onion to sandwiches. Combine a ready-made pizza crust with low-fat mozzarella cheese and lots of vegetable toppings. Try using tomatoes, squash, spinach, broccoli, carrots, cauliflower, and onions. Have a variety of cut-up vegetables with a low-fat dip as an appetizer instead of chips and dip. Sprinkle sunflower seeds or chopped almonds over salads. Or try adding chopped walnuts or almonds to cooked vegetables. Try some vegetarian meals using beans and peas. Add garbanzo or kidney beans to salads. Make burritos and tacos with mashed flynn beans or black beans    © 8971-7868 Healthwise, [place]. Care instructions adapted under license by [facility]. This care instruction is for use with your licensed healthcare professional. If you have questions about a medical condition or this instruction, always ask your healthcare professional. [place] [address] any warranty or liability for your use of this information. Content Version: 9.4.21509; Last Revised: March 15, 2012              ·     High-Fiber Diet     What Is Fiber? Dietary fiber is a form of carbohydrate found in plants that cannot be digested by humans. All plants contain fiber, including fruits, vegetables, grains, and legumes. Fiber is often classified into two categories: soluble and insoluble. Soluble fiber draws water into the bowel and can help slow digestion. Examples of foods that are high in soluble fiber include oatmeal, oat bran, barley, legumes (eg, beans and peas), apples, and strawberries. Insoluble fiber speeds digestion and can add bulk to the stool. Examples of foods that are high in insoluble fiber include whole-wheat products, wheat bran, cauliflower, green beans, and potatoes. Why Follow a High-Fiber Diet? A high-fiber diet is often recommended to prevent and treat constipation , hemorrhoids , diverticulitis , and irritable bowel syndrome . Eating a high-fiber diet can also help improve your cholesterol levels, lower your risk of coronary heart disease , reduce your risk of type 2 diabetes , and lower your weight. For people with type 1 or 2 diabetes, a high-fiber diet can also help stabilize blood sugar levels. How Much Fiber Should I Eat? A high-fiber diet should contain  20-35 grams  of fiber a day. This is actually the amount recommended for the general adult population; however, most Americans eat only 15 grams of fiber per day. Digestion of Fiber   Eating a higher fiber diet than usual can take some getting used to by your body's digestive system. To avoid the side effects of sudden increases in dietary fiber (eg, gas, cramping, bloating, and diarrhea), increase fiber gradually and be sure to drink plenty of fluids every day. Tips for Increasing Fiber Intake   Whenever possible, choose whole grains over refined grains (eg, brown rice instead of white rice, whole-wheat bread instead of white bread). Include a variety of grains in your diet, such as wheat, rye, barley, oats, quinoa, and bulgur. Eat more vegetarian-based meals. Here are some ideas: black bean burgers, eggplant lasagna, and veggie tofu stir-moreno. Choose high-fiber snacks, such as fruits, popcorn, whole-grain crackers, and nuts. Make whole-grain cereal or whole-grain toast part of your daily breakfast regime. When eating out, whether ordering a sandwich or dinner, ask for extra vegetables. When baking, replace part of the white flour with whole-wheat flour. Whole-wheat flour is particularly easy to incorporate into a recipe.     High-Fiber Diet Eating Guide   Food Category   Foods Recommended   Notes   Grains   Whole-grain breads, muffins, bagels, or deedee bread Rye bread Whole-wheat crackers or crisp breads Whole-grain or fiber. Choose snacks with at least 2 grams of fiber per serving. Last Reviewed: March 2011 Souleymane Doty MS, MPH, RD   Updated: 3/29/2011   ·     Safer Sex: After Your Visit  Your Care Instructions  Safer sex is a way to reduce your risk of getting an infection spread through sex. It can also help prevent pregnancy. Most infections that are spread through sex, also called sexually transmitted infections or STIs, can be cured. But some can decrease your chances of getting pregnant if they are not treated early. Others, such as herpes, have no cure. And some, such as HIV, can be deadly. Several products can help you practice safer sex and reduce your chance of STIs. One of the best is a condom. There are condoms for men and for women. The female condom is a tube of soft plastic with a closed end that is placed deep into the vagina. You can use a special rubber sheet (dental dam) for protection during oral sex. Latex gloves can keep your hands from touching blood, semen, or other body fluids that can carry infections. Remember that birth control methods such as diaphragms, IUDs, foams, and birth control pills do not stop you from getting STIs. Follow-up care is a key part of your treatment and safety. Be sure to make and go to all appointments, and call your doctor if you are having problems. Its also a good idea to know your test results and keep a list of the medicines you take. How can you care for yourself at home? Think about getting shots to prevent hepatitis A and hepatitis B. These two diseases can be spread through sex. You also can get hepatitis A if you eat infected food. Use condoms or female condoms each time and every time you have sex. Learn the right way to use a male condom:  Condoms come in several sizes. Make sure you use the right size. A condom that is too small can break easily. A condom that is too big can slip off during sex. Use a new condom each time you have sex.   Be careful not to poke a hole in the condom when you open the wrapper. Squeeze the tip of the condom to keep out air. Pull down the loose skin (foreskin) from the head of an uncircumcised penis. While squeezing the tip of the condom, unroll it all the way down to the base of the firm penis. Never use petroleum jelly (such as Vaseline), grease, hand lotion, baby oil, or anything with oil in it. These products can make holes in the condom. After sex, hold the condom on your penis as you remove your penis from your partner. This will keep semen from spilling out of the condom. Learn to use a female condom:  You can put in a female condom up to 8 hours before sex. Squeeze the smaller ring at the closed end and insert it deep into the vagina. The larger ring at the open end should stay outside the vagina. During sex, make sure the penis goes into the condom. After the penis is removed, close the open end of the condom by twisting it. Remove the condom. Do not use a female condom and male condom at the same time. Do not have sex with anyone who has symptoms of an STI, such as sores on the genitals or mouth. The herpes virus that causes cold sores can spread to and from the penis and vagina. Do not drink a lot of alcohol or use drugs before sex. This can cause you to let down your guard and not practice safer sex. Having one sex partner (who does not have STIs and does not have sex with anyone else) is a sure way to avoid STIs. Talk to your partner before you have sex. Find out if he or she has or is at risk for any STI. Keep in mind that a person may be able to spread an STI even if he or she does not have symptoms. You and your partner may want to get an HIV test. You should get tested again 6 months later. © 5609-6172 Healthwise, Incorporated. Care instructions adapted under license by [facility].  This care instruction is for use with your licensed healthcare professional. If you have questions about a 8:00 AM glass of juice   Live a Healthy Life   Many actions that will keep your body strong will do the same for your mind. For example:   Talk to Your Doctor About Herbs and Supplements    Malnutrition and vitamin deficiencies can impair your mental function. For example, vitamin B12 deficiency can cause a range of symptoms, including confusion. But, what if your nutritional needs are being met? Can herbs and supplements still offer a benefit? Researchers have investigated a range of natural remedies, such as ginkgo , ginseng , and the supplement phosphatidylserine (PS). So far, though, the evidence is inconsistent as to whether these products can improve memory or thinking. If you are interested in taking herbs and supplements, talk to your doctor first because they may interact with other medicines that you are taking. Exercise Regularly    Among the many benefits of regular exercise are increased blood flow to the brain and decreased risk of certain diseases that can interfere with memory function. One study found that even moderate exercise has a beneficial effect. Examples of \"moderate\" exercise include:   Playing 18 holes of golf once a week, without a cart   Playing tennis twice a week   Walking one mile per day   Manage Stress    It can be tough to remember what is important when your mind is cluttered. Make time for relaxation. Choose activities that calm you down, and make it routine. Manage Chronic Conditions    Side effects of high blood pressure , diabetes, and heart disease can interfere with mental function. Many of the lifestyle steps discussed here can help manage these conditions. Strive to eat a healthy diet, exercise regularly, get stress under control, and follow your doctor's advice for your condition. Minimize Medications    Talk to your doctor about the medicines that you take. Some may be unnecessary. Also, healthy lifestyle habits may lower the need for certain drugs.      Last Reviewed: April 2010 Shanna Groves MD   Updated: 4/13/2010   ·     [address] a [facility]       As we get older, changes in balance, gait, strength, vision, hearing, and cognition make even the most youthful senior more prone to accidents. Falls are one of the leading health risks for older people. This increased risk of falling is related to:   Aging process (eg, decreased muscle strength, slowed reflexes)   Higher incidence of chronic health problems (eg, arthritis, diabetes) that may limit mobility, agility or sensory awareness   Side effects of medicine (eg, dizziness, blurred vision)especially medicines like prescription pain medicines and drugs used to treat mental health conditions   Depending on the brittleness of your bones, the consequences of a fall can be serious and long lasting. Home Life   Research by the Association of Aging [facility]) shows that some home accidents among older adults can be prevented by making simple lifestyle changes and basic modifications and repairs to the home environment. Here are some lifestyle changes that experts recommend:   Have your hearing and vision checked regularly. Be sure to wear prescription glasses that are right for you. Speak to your doctor or pharmacist about the possible side effects of your medicines. A number of medicines can cause dizziness. If you have problems with sleep, talk to your doctor. Limit your intake of alcohol. If necessary, use a cane or walker to help maintain your balance. Wear supportive, rubber-soled shoes, even at home. If you live in a region that gets wintry weather, you may want to put special cleats on your shoes to prevent you from slipping on the snow and ice. Exercise regularly to help maintain muscle tone, agility, and balance. Always hold the banister when going up or down stairs. Also, use  bars when getting in or out of the bath or shower, or using the toilet.    To avoid dizziness, get up slowly from a lying down position. Sit up first, dangling your legs for a minute or two before rising to a standing position. Overall Home Safety Check   According to the Consumer Product Safety Commision's \"Older Consumer Home Safety Checklist,\" it is important to check for potential hazards in each room. And remember, proper lighting is an essential factor in home safety. If you cannot see clearly, you are more likely to fall. Important questions to ask yourself include:   Are lamp, electric, extension, and telephone cords placed out of the flow of traffic and maintained in good condition? Have frayed cords been replaced? Are all small rugs and runners slip resistant? If not, you can secure them to the floor with a special double-sided carpet tape. Are smoke detectors properly locatedone on every floor of your home and one outside of every sleeping area? Are they in good working order? Are batteries replaced at least once a year? Do you have a well-maintained carbon monoxide detector outside every sleeping are in your home? Does your furniture layout leave plenty of space to maneuver between and around chairs, tables, beds, and sofas? Are hallways, stairs and passages between rooms well lit? Can you reach a lamp without getting out of bed? Are floor surfaces well maintained? Shag rugs, high-pile carpeting, tile floors, and polished wood floors can be particularly slippery. Stairs should always have handrails and be carpeted or fitted with a non-skid tread. Is your telephone easily reachable. Is the cord safely tucked away? Room by Room   According to the Association of Aging, bathrooms and [place] are the two most potentially hazardous rooms in your home. In the Kitchen    Be sure your stove is in proper working order and always make sure burners and the oven are off before you go out or go to sleep.     Keep pots on the back burners, turn handles away from the front of the stove, and keep stove clean and free of grease build-up. Kitchen ventilation systems and range exhausts should be working properly. Keep flammable objects such as towels and pot holders away from the cooking area except when in use. Make sure kitchen curtains are tied back. Move cords and appliances away from the sink and hot surfaces. If extension cords are needed, install wiring guides so they do not hang over the sink, range, or working areas. Look for coffee pots, kettles and toaster ovens with automatic shut-offs. Keep a mop handy in the kitchen so you can wipe up spills instantly. You should also have a small fire extinguisher. Arrange your kitchen with frequently used items on lower shelves to avoid the need to stand on a stepstool to reach them. Make sure countertops are well-lit to avoid injuries while cutting and preparing food. In the Bathroom    Use a non-slip mat or decals in the tub and shower, since wet, soapy tile or porcelain surfaces are extremely slippery. Make sure bathroom rugs are non-skid or tape them firmly to the floor. Bathtubs should have at least one, preferably two, grab bars, firmly attached to structural supports in the wall. (Do not use built-in soap holders or glass shower doors as grab bars.)    Tub seats fitted with non-slip material on the legs allow you to wash sitting down. For people with limited mobility, bathtub transfer benches allow you to slide safely into the tub. Raised toilet seats and toilet safety rails are helpful for those with knee or hip problems. In the [facility]    Make sure you use a nightlight and that the area around your bed is clear of potential obstacles. Be careful with electric blankets and never go to sleep with a heating pad, which can cause serious burns even if on a low setting. Use fire-resistant mattress covers and pillows, and NEVER smoke in bed. Keep a phone next to the bed that is programmed to dial 911 at the push of a button.       If you have a chronic condition, you may want to sign on with an automatic call-in service. Typically the system includes a small pendant that connects directly to an emergency medical voice-response system. You should also make arrangements to stay in contact with someonefriend, neighbor, family memberon a regular schedule. Fire Prevention   According to the [employer]. (Smoke Alarms for Every) [address] [place] [address], senior citizens are one of the two highest risk groups for death and serious injuries due to residential fires. When cooking, wear short-sleeved items, never a bulky long-sleeved robe. The [facility]'s Safety Checklist for Older Consumers emphasizes the importance of checking basements, garages, workshops and storage areas for fire hazards, such as volatile liquids, piles of old rags or clothing and overloaded circuits. Never smoke in bed or when lying down on a couch or recliner chair. Small portable electric or kerosene heaters are responsible for many home fires and should be used cautiously if at all. If you do use one, be sure to keep them away from flammable materials. In case of fire, make sure you have a pre-established emergency exit plan. Have a professional check your fireplace and other fuel-burning appliances yearly. Helping [employer]   Baby boomers entering the herzog years will continue to see the development of new products to help older adults live safely and independently in spite of age-related changes. Making Life More Livable  , by Isadora Hussein, lists over 1,000 products for \"living well in the mature years,\" such as bathing and mobility aids, household security devices, ergonomically designed knives and peelers, and faucet valves and knobs for temperature control. Medical supply stores and organizations are good sources of information about products that improve your quality of life and insure your safety.      Last Reviewed: November 2009 Alyssia Magallon MD   Updated: 3/7/2011     ·        Advance Care Planning: Care Instructions  Your Care Instructions     It can be hard to live with an illness that cannot be cured. But if your health is getting worse, you may want to make decisions about end-of-life care. Planning for the end of your life does not mean that you are giving up. It is a way to make sure that your wishes are met. Clearly stating your wishes can make it easier for your loved ones. Making plans while you are still able may also ease your mind and make your final days less stressful and more meaningful. Follow-up care is a key part of your treatment and safety. Be sure to make and go to all appointments, and call your doctor if you are having problems. It's also a good idea to know your test results and keep a list of the medicines you take. What can you do to plan for the end of life? You can bring these issues up with your doctor. You do not need to wait until your doctor starts the conversation. You might start with \"I would not be willing to live with . Linn Gross \" When you complete this sentence it helps your doctor understand your wishes. Talk openly and honestly with your doctor. This is the best way to understand the decisions you will need to make as your health changes. Know that you can always change your mind. Ask your doctor about commonly used life-support measures. These include tube feedings, breathing machines, and fluids given through a vein (IV). Understanding these treatments will help you decide whether you want them. You may choose to have these life-supporting treatments for a limited time. This allows a trial period to see whether they will help you. You may also decide that you want your doctor to take only certain measures to keep you alive. It is important to spell out these conditions so that your doctor and family understand them. Talk to your doctor about how long you are likely to live.  He or she may be able to give you an idea of what usually happens with your specific illness. Think about preparing papers that state your wishes. This way there will not be any confusion about what you want. You can change your instructions at any time. Which papers should you prepare? Advance directives are legal papers that tell doctors how you want to be cared for at the end of your life. You do not need a  to write these papers. Ask your doctor or your state health department for information on how to write your advance directives. They may have the forms for each of these types of papers. Make sure your doctor has a copy of these on file, and give a copy to a family member or close friend. Consider a do-not-resuscitate order (DNR). This order asks that no extra treatments be done if your heart stops or you stop breathing. Extra treatments may include cardiopulmonary resuscitation (CPR), electrical shock to restart your heart, or a machine to breathe for you. If you decide to have a DNR order, ask your doctor to explain and write it. Place the order in your home where everyone can easily see it. Consider a living will. A living will explains your wishes about life support and other treatments at the end of your life if you become unable to speak for yourself. Living dixon tell doctors to use or not use treatments that would keep you alive. You must have one or two witnesses or a notary present when you sign this form. A living will may be called something else in your state. Consider a medical power of . This form allows you to name a person to make decisions about your care if you are not able to. Most people ask a close friend or family member. Talk to this person about the kinds of treatments you want and those that you do not want. Make sure this person understands your wishes. A medical power of  may be called something else in your state. These legal papers are simple to change.  Tell your doctor what you want to change, and ask him or her to make a note in your medical file. Give your family updated copies of the papers. Where can you learn more? Go to [email or web address] and sign in to your [employer] account. Enter P184 in the [facility] box to learn more about \"Advance Care Planning: Care Instructions. \"     If you do not have an account, please click on the \"Sign Up Now\" link. Current as of: December 9, 2019               Content Version: 12.5  © 9729-4522 [employer]. Care instructions adapted under license by [employer]. If you have questions about a medical condition or this instruction, always ask your healthcare professional. [address] any warranty or liability for your use of this information. ·        Learning About Living Perroy  What is a living will? A living will, also called a declaration, is a legal form. It tells your family and your doctor your wishes when you can't speak for yourself. It's used by the health professionals who will treat you as you near the end of your life or if you get seriously hurt or ill. If you put your wishes in writing, your loved ones and others will know what kind of care you want. They won't need to guess. This can ease your mind and be helpful to others. And you can change or cancel your living will at any time. A living will is not the same as an estate or property will. An estate will explains what you want to happen with your money and property after you die. How do you use it? A living will is used to describe the kinds of treatment or life support you want as you near the end of your life or if you get seriously hurt or ill. Keep these facts in mind about living dixon. Your living will is used only if you can't speak or make decisions for yourself. Most often, one or more doctors must certify that you can't speak or decide for yourself before your living will takes effect.   If you get better and can speak for yourself again, you can accept or refuse any treatment. It doesn't matter what you said in your living will. Some states may limit your right to refuse treatment in certain cases. For example, you may need to clearly state in your living will that you don't want artificial hydration and nutrition, such as being fed through a tube. Is a living will a legal document? A living will is a legal document. Each state has its own laws about living dixon. And a living will may be called something else in your state. Here are some things to know about living dixon. You don't need an  to complete a living will. But legal advice can be helpful if your state's laws are unclear. It can also help if your health history is complicated or your family can't agree on what should be in your living will. You can change your living will at any time. Some people find that their wishes about end-of-life care change as their health changes. If you make big changes to your living will, complete a new form. If you move to another state, make sure that your living will is legal in the state where you now live. In most cases, doctors will respect your wishes even if you have a form from a different state. You might use a universal form that has been approved by many states. This kind of form can sometimes be filled out and stored online. Your digital copy will then be available wherever you have a connection to the internet. The doctors and nurses who need to treat you can find it right away. Your state may offer an online registry. This is another place where you can store your living will online. It's a good idea to get your living will notarized. This means using a person called a  to watch two people sign, or witness, your living will. What should you know when you create a living will?   Here are some questions to ask yourself as you make your living will:  Do you know enough about life support methods that might be used? If not, talk to your doctor so you know what might be done if you can't breathe on your own, your heart stops, or you can't swallow. What things would you still want to be able to do after you receive life-support methods? Would you want to be able to walk? To speak? To eat on your own? To live without the help of machines? Do you want certain Sabianism practices performed if you become very ill? If you have a choice, where do you want to be cared for? In your home? At a hospital or nursing home? If you have a choice at the end of your life, where would you prefer to die? At home? In a hospital or nursing home? Somewhere else? Would you prefer to be buried or cremated? Do you want your organs to be donated after you die? What should you do with your living will? Make sure that your family members and your health care agent have copies of your living will (also called a declaration). Give your doctor a copy of your living will. Ask him or her to keep it as part of your medical record. If you have more than one doctor, make sure that each one has a copy. Put a copy of your living will where it can be easily found. For example, some people may put a copy on their refrigerator door. If you are using a digital copy, be sure your doctor, family members, and health care agent know how to find and access it. Where can you learn more? Go to https://[email or web address] and sign in to your [employer] account. Enter M156 in the [facility] box to learn more about \"Learning About Living Perroy. \"     If you do not have an account, please click on the \"Sign Up Now\" link. Current as of: December 9, 2019               Content Version: 12.5  © 7044-6766 Healthwise, Incorporated. Care instructions adapted under license by [facility] ([facility]).  If you have questions about a medical condition or this instruction, always ask your healthcare professional. Mehnaz Sharma living will, you may not get the care you want. Decisions may be made by family members who disagree about your medical care. Or decisions may be made by a medical professional who doesn't know you well. In some cases, a  makes the decisions. When you name a health care agent, it is very clear who has the power to make health decisions for you. How do you name a health care agent? You name your health care agent on a legal form. This form is usually called a medical power of . Ask your hospital, state bar association, or office on aging where to find these forms. You must sign the form to make it legal. Some states require you to get the form notarized. This means that a person called a  watches you sign the form and then he or she signs the form. Some states also require that two or more witnesses sign the form. Be sure to tell your family members and doctors who your health care agent is. Where can you learn more? Go to [email or web address]://[email or web address].[email or web address] and sign in to your [facility] account. Enter [id number] in the [address] box to learn more about \"Learning About [address]. \"     If you do not have an account, please click on the \"Sign Up Now\" link. Current as of: December 9, 2019               Content Version: 12.5  © 9456-6889 Healthwise, Incorporated. Care instructions adapted under license by [facility] [facility] If you have questions about a medical condition or this instruction, always ask your healthcare professional. [place] [address] any warranty or liability for your use of this information.     ·

## 2020-09-24 ENCOUNTER — TELEPHONE (OUTPATIENT)
Dept: GASTROENTEROLOGY | Age: 70
End: 2020-09-24

## 2020-09-24 ENCOUNTER — TELEPHONE (OUTPATIENT)
Dept: SPIRITUAL SERVICES | Age: 70
End: 2020-09-24

## 2020-09-24 NOTE — TELEPHONE ENCOUNTER
Writer spoke to patient's spouse and she wanted the ACP documents mailed to both of the. Those were mailed today. The [facility] office will call in a week or so to follow up.

## 2020-09-28 ENCOUNTER — NURSE ONLY (OUTPATIENT)
Dept: FAMILY MEDICINE CLINIC | Age: 70
End: 2020-09-28
Payer: MEDICARE

## 2020-09-28 PROCEDURE — G0009 ADMIN PNEUMOCOCCAL VACCINE: HCPCS | Performed by: FAMILY MEDICINE

## 2020-09-28 PROCEDURE — G0008 ADMIN INFLUENZA VIRUS VAC: HCPCS | Performed by: FAMILY MEDICINE

## 2020-09-28 PROCEDURE — 90732 PPSV23 VACC 2 YRS+ SUBQ/IM: CPT | Performed by: FAMILY MEDICINE

## 2020-09-28 PROCEDURE — 90694 VACC AIIV4 NO PRSRV 0.5ML IM: CPT | Performed by: FAMILY MEDICINE

## 2020-09-28 NOTE — PROGRESS NOTES
Vaccine Information Sheet, \"Influenza - Inactivated\"  given to Ludy Weems, or parent/legal guardian of  Ludy Weems and verbalized understanding. Patient responses:    Have you ever had a reaction to a flu vaccine? No  Do you have any current illness? No  Have you ever had Guillian [place] Syndrome? No  Do you have a serious allergy to any of the following: Neomycin, Polymyxin, Thimerosal, eggs or egg products? No    Flu vaccine given per order. Please see immunization tab. Risks and benefits explained. Current VIS given.

## 2020-09-30 ENCOUNTER — HOSPITAL ENCOUNTER (OUTPATIENT)
Dept: INFUSION THERAPY | Age: 70
Discharge: HOME | End: 2020-09-30
Payer: MEDICARE

## 2020-09-30 VITALS
RESPIRATION RATE: 18 BRPM | SYSTOLIC BLOOD PRESSURE: 121 MMHG | DIASTOLIC BLOOD PRESSURE: 76 MMHG | TEMPERATURE: 98.8 F | HEART RATE: 97 BPM

## 2020-09-30 DIAGNOSIS — D50.8 OTHER IRON DEFICIENCY ANEMIA: Primary | ICD-10-CM

## 2020-09-30 PROCEDURE — 96365 THER/PROPH/DIAG IV INF INIT: CPT

## 2020-09-30 PROCEDURE — 2580000003 HC RX 258: Performed by: INTERNAL MEDICINE

## 2020-09-30 PROCEDURE — 6360000002 HC RX W HCPCS: Performed by: INTERNAL MEDICINE

## 2020-09-30 RX ORDER — EPINEPHRINE 1 MG/ML
0.3 INJECTION, SOLUTION, CONCENTRATE INTRAVENOUS PRN
Status: CANCELLED | OUTPATIENT
Start: 2020-09-30

## 2020-09-30 RX ORDER — DIPHENHYDRAMINE HYDROCHLORIDE 50 MG/ML
50 INJECTION INTRAMUSCULAR; INTRAVENOUS ONCE
Status: CANCELLED | OUTPATIENT
Start: 2020-09-30

## 2020-09-30 RX ORDER — SODIUM CHLORIDE 0.9 % (FLUSH) 0.9 %
5 SYRINGE (ML) INJECTION PRN
Status: CANCELLED | OUTPATIENT
Start: 2020-09-30

## 2020-09-30 RX ORDER — METHYLPREDNISOLONE SODIUM SUCCINATE 125 MG/2ML
125 INJECTION, POWDER, LYOPHILIZED, FOR SOLUTION INTRAMUSCULAR; INTRAVENOUS ONCE
Status: CANCELLED | OUTPATIENT
Start: 2020-09-30

## 2020-09-30 RX ORDER — SODIUM CHLORIDE 0.9 % (FLUSH) 0.9 %
10 SYRINGE (ML) INJECTION PRN
Status: CANCELLED | OUTPATIENT
Start: 2020-09-30

## 2020-09-30 RX ORDER — SODIUM CHLORIDE 9 MG/ML
INJECTION, SOLUTION INTRAVENOUS CONTINUOUS
Status: CANCELLED | OUTPATIENT
Start: 2020-09-30

## 2020-09-30 RX ORDER — HEPARIN SODIUM (PORCINE) LOCK FLUSH IV SOLN 100 UNIT/ML 100 UNIT/ML
500 SOLUTION INTRAVENOUS PRN
Status: CANCELLED | OUTPATIENT
Start: 2020-09-30

## 2020-09-30 RX ORDER — SODIUM CHLORIDE 9 MG/ML
INJECTION, SOLUTION INTRAVENOUS CONTINUOUS
Status: DISCONTINUED | OUTPATIENT
Start: 2020-09-30 | End: 2020-10-01 | Stop reason: HOSPADM

## 2020-09-30 RX ADMIN — SODIUM CHLORIDE: 9 INJECTION, SOLUTION INTRAVENOUS at 13:26

## 2020-09-30 RX ADMIN — FERRIC CARBOXYMALTOSE INJECTION 750 MG: 50 INJECTION, SOLUTION INTRAVENOUS at 13:26

## 2020-10-06 ENCOUNTER — TELEPHONE (OUTPATIENT)
Dept: SPIRITUAL SERVICES | Age: 70
End: 2020-10-06

## 2020-10-06 NOTE — TELEPHONE ENCOUNTER
Writer spoke to patient's spouse and they have received the ACP documents, but have not had time to look at them. The [facility] office will call next Tuesday, October 13 to discuss them.

## 2020-10-07 ENCOUNTER — HOSPITAL ENCOUNTER (OUTPATIENT)
Dept: PAIN MANAGEMENT | Age: 70
Discharge: HOME | End: 2020-10-07
Payer: MEDICARE

## 2020-10-07 PROCEDURE — 99213 OFFICE O/P EST LOW 20 MIN: CPT | Performed by: NURSE PRACTITIONER

## 2020-10-07 PROCEDURE — 99213 OFFICE O/P EST LOW 20 MIN: CPT

## 2020-10-07 RX ORDER — OXYCODONE HYDROCHLORIDE AND ACETAMINOPHEN 5; 325 MG/1; MG/1
1 TABLET ORAL EVERY 8 HOURS
Qty: 90 TABLET | Refills: 0 | Status: SHIPPED | OUTPATIENT
Start: 2020-10-10 | End: 2020-11-06 | Stop reason: SDUPTHER

## 2020-10-07 RX ORDER — MORPHINE SULFATE 60 MG/1
60 TABLET, FILM COATED, EXTENDED RELEASE ORAL 2 TIMES DAILY
Qty: 60 TABLET | Refills: 0 | Status: SHIPPED | OUTPATIENT
Start: 2020-10-10 | End: 2020-11-06 | Stop reason: SDUPTHER

## 2020-10-07 ASSESSMENT — ENCOUNTER SYMPTOMS
GASTROINTESTINAL NEGATIVE: 1
BACK PAIN: 1
RESPIRATORY NEGATIVE: 1

## 2020-10-07 NOTE — PROGRESS NOTES
[address] PROGRESS NOTE      Patient v kavya visit to   review Medication Agreement    Chief Complaint:  Back pain    He c/o low back pain which radiates down 1 leg, which varies, He reports the pain is harder. He has had 3 cervical surgeries. He reports PT made his pain worse and lumbar injections did not help. He reports his sleep is good. He does some stretching. He has had no ED visits. Back Pain   This is a chronic problem. The current episode started more than 1 year ago. The problem occurs constantly. The problem has been waxing and waning since onset. The pain is present in the lumbar spine. The quality of the pain is described as aching. The pain does not radiate. The pain is at a severity of 4/10. The pain is moderate. The pain is the same all the time. Exacerbated by: certain movements. He has tried analgesics for the symptoms. The treatment provided mild relief. Patient denies any new neurological symptoms. No bowel or bladder incontinence, no weakness, and no falling. Any new diagnostic workup: [x] no  [] yes [] Xray [] CT scan [] MRI [] DEXA scan     [] Other                    Treatment goals:  Functional status: reduce pain to a 2-3      Aberrancy:   Any alcoholic beverages   no    Any illegal drugs   no      Analgesia:4      Adverse  Effects :none    ADL;s :stretching        Pill count: appropriate fill date 10- # 5 ms contin and #8 percocet tabs left    Morphine equivalent dose as reported on OARRS:142.50 has narcan at home  Periodic Controlled Substance Monitoring: Possible medication side effects, risk of tolerance/dependence & alternative treatments discussed., No signs of potential drug abuse or diversion identified. , Assessed functional status., Obtaining appropriate analgesic effect of treatment. (Aniceto Sandoval, VEE - CNP)  Chronic Pain > 80 MEDD: Co-prescribed Naloxone., Obtained or confirmed \"Medication Contract\" on file.  VEE Lee - CNP)  Review ofOARRS does not show any aberrant prescription behavior. Medication is helping the patient stay active. Patient denies any side effects and reports adequate analgesia. No sign of misuse/abuse. When was thelast UDS: 3-            Was the UDS appropriate:yes      Record/Diagnostics Review:      As above, I did review the imaging    3/14/2020 10:00 PM - Raimundo, Mhpn Incoming Lab Results From [facility]     Component  Value  Ref Range & Units  Status  Collected  Lab    Pain Management Drug Panel Interp, Urine  Consistent   Final  03/11/2020  3:25 PM  [facility]    (NOTE)   ________________________________________________________________   DRUGS EXPECTED:   OXYCODONE [3/11/20]   MORPHINE [3/11/20]   ________________________________________________________________   CONSISTENT with medications provided:   OXYCODONE : based on oxycodone   MORPHINE : based on morphine   ________________________________________________________________   INTERPRETIVE INFORMATION: Pain Mgt [place], [place] Spec/EMIT, Ur,                            Interp   Interpretation depends on accuracy and completeness of patient   medication information submitted by client.             Past Medical History:   Diagnosis Date    Anemia     Arthritis     osteoarthritis    Colon polyps 10/08/2019    tubular adenoma x2    Essential hypertension 5/12/2020    Hep C w/o coma, chronic [facility] [address] 2019    Received antiviral treatment by Dr. Peraza Durant Hepatitis B core antibody positive     History of blood transfusion     Hyperlipidemia     Iron (Fe) deficiency anemia     Type 2 diabetes mellitus with hyperglycemia, without long-term current use of insulin ([facility] [address] 5/12/2020    Type 2 diabetes mellitus with microalbuminuria, without long-term current use of insulin [facility] 7/13/2020    Wears dentures     Wears glasses        Past Surgical History:   Procedure Laterality Date    BACK SURGERY  1977    cervical spine three times    CHOLECYSTECTOMY 03/22/2019    COLONOSCOPY  01/25/2015    10 yr recall, hemorrhoids    COLONOSCOPY N/A 10/8/2019    tubular adenoma x2    DENTAL SURGERY  10/2015    all teeth extracted    HERNIA REPAIR  08/07/2020    lap robotic with mesh    HERNIA REPAIR N/A 8/7/2020    HERNIA INCISIONAL REPAIR LAPAROSCOPIC ROBOTIC WITH MESH performed by Mendoza Zhang DO at [facility]     UMBILICAL HERNIA REPAIR  3022-19    UPPER GASTROINTESTINAL ENDOSCOPY  01/25/2015    UPPER GASTROINTESTINAL ENDOSCOPY N/A 10/8/2019    EGD BIOPSY [facility]one MD at [facility]       Allergies   Allergen Reactions   Candy Lown [Aspirin]       iron deficiency anemia , requiring iron infusions and transfusions    Iron      Pill- constipation, abdominal pain    Nsaids       iron deficiency anemia, history of bleeding ulcers          Current Outpatient Medications:     morphine (MS CONTIN) 60 MG extended release tablet, Take 1 tablet by mouth 2 times daily for 30 days. , Disp: 60 tablet, Rfl: 0    oxyCODONE-acetaminophen (PERCOCET) 5-325 MG per tablet, Take 1 tablet by mouth every 8 hours for 30 days. , Disp: 90 tablet, Rfl: 0    pantoprazole (PROTONIX) 40 MG tablet, Take 1 tablet by mouth daily, Disp: 90 tablet, Rfl: 1    lisinopril (PRINIVIL;ZESTRIL) 10 MG tablet, Take 1 tablet by mouth daily, Disp: 90 tablet, Rfl: 1    hydroCHLOROthiazide (MICROZIDE) 12.5 MG capsule, Take 1 capsule by mouth every morning, Disp: 90 capsule, Rfl: 1    metFORMIN (GLUCOPHAGE) 1000 MG tablet, Take 1 tablet by mouth 2 times daily (with meals), Disp: 180 tablet, Rfl: 1    glipiZIDE (GLUCOTROL) 5 MG tablet, Take 1 tablet by mouth every morning Keep fasting morning blood glucose . If blood glucose below 80, you need to stop glipizide, Disp: 90 tablet, Rfl: 3    pregabalin (LYRICA) 150 MG capsule, Take 1 capsule by mouth 3 times daily for 90 days. , Disp: 90 capsule, Rfl: 2    metoprolol tartrate (LOPRESSOR) 25 MG tablet, Take 1 tablet by mouth 2 times daily, Disp: 60 tablet, Rfl: 5    pravastatin (PRAVACHOL) 40 MG tablet, Take 1 tablet by mouth every evening Stop Gemfibrozil, Disp: 90 tablet, Rfl: 3    cyanocobalamin 1000 MCG/ML injection, Inject 1 mL into the muscle every 7 days Call for next refill which will be monthly for life, Disp: 4 mL, Rfl: 0    Syringe/Needle, Disp, (SYRINGE 3CC/25GX1\") 25G X 1\" 3 ML MISC, To be used with B12 injections, Disp: 50 each, Rfl: 2    Alcohol Swabs (B-D SINGLE USE SWABS REGULAR) PADS, USE TO CHECK BLOOD SUGAR TWICE A DAY, Disp: , Rfl:     Blood Glucose Monitoring Suppl (TRUE METRIX METER) w/Device KIT, USE AS DIRECTED TO TEST BLOOD SUGAR, Disp: , Rfl:     TRUE METRIX BLOOD GLUCOSE TEST strip, CHECK BLOOD SUGAR BID, Disp: , Rfl:     TRUEplus Lancets 30G MISC, USE TO CHECK BLOOD SUGAR BID, Disp: , Rfl:     hydrOXYzine (VISTARIL) 25 MG capsule, hydroxyzine pamoate 25 mg capsule, Disp: , Rfl:     BD INTEGRA SYRINGE 25G X 1\" 3 ML MISC, , Disp: , Rfl: 4    Family History   Problem Relation Age of Onset    Diabetes Mother     Heart Disease Father        Social History     Socioeconomic History    Marital status:      Spouse name: Not on file    Number of children: Not on file    Years of education: Not on file    Highest education level: Not on file   Occupational History    Occupation: disabled   Social Needs    Financial resource strain: Not on file    Food insecurity     Worry: Not on file     Inability: Not on file   [employer] needs     Medical: Not on file     Non-medical: Not on file   Tobacco Use    Smoking status: Former Smoker     Packs/day: 0.50     Years: 45.00     Pack years: 22.50     Types: Cigarettes     Last attempt to quit: 2015     Years since quittin.8    Smokeless tobacco: Never Used    Tobacco comment: on chantix 11-6-15   12-7-15   Substance and Sexual Activity    Alcohol use: No    Drug use: No    Sexual activity: Yes     Partners: Female   Lifestyle    Physical activity     Days per week: Not on file     Minutes per session: Not on file    Stress: Not on file   Relationships    Social connections     Talks on phone: Not on file     Gets together: Not on file     Attends Yarsanism service: Not on file     Active member of club or organization: Not on file     Attends meetings of clubs or organizations: Not on file     Relationship status: Not on file    Intimate partner violence     Fear of current or ex partner: Not on file     Emotionally abused: Not on file     Physically abused: Not on file     Forced sexual activity: Not on file   Other Topics Concern    Not on file   Social History Narrative    Not on file         Review of Systems:  Review of Systems   Constitution: Negative. HENT: Negative. Eyes:        Glasses   Respiratory: Negative. Endocrine:        Blood sugar 114 this am   Hematologic/Lymphatic: Negative. Skin: Positive for rash. Face and right cheek   Musculoskeletal: Positive for back pain. Shoulder pain   Gastrointestinal: Negative. Genitourinary: Negative. Neurological: Negative. Psychiatric/Behavioral: Negative. Physical Exam:    Physical Exam  HENT:      Head: Normocephalic. Pulmonary:      Effort: Pulmonary effort is normal.   Skin:         Neurological:      Mental Status: He is alert. Psychiatric:         Mood and Affect: Mood normal.         Thought Content:  Thought content normal.           Assessment:    Problem List Items Addressed This Visit     S/P cervical spinal fusion - Primary (Chronic)    Peripheral polyneuropathy    Osteoarthritis of spine with radiculopathy, lumbar region    Lumbar spondylosis (Chronic)    Lumbar spinal stenosis (Chronic)    Lumbar radiculopathy (Chronic)    Encounter for medication monitoring (Chronic)    Encounter for chronic pain management    Degenerative disc disease, lumbar (Chronic)    Cervical spondylitis (HCC) (Chronic) Treatment Plan:  DISCUSSION: Treatment options discussed withpatient and all questions answered to patient's satisfaction. Possible side effects, risk of tolerance and or dependence and alternative treatments discussed    Obtaining appropriate analgesic effect of treatment   No signs of potential drug abuse or diversion identified    [x] Ill effects of being on chronic pain medications such as sleep disturbances, respiratory depression, hormonal changes, withdrawal symptoms, chronic opioid dependence and tolerance as well as risk of taking opioids with Benzodiazepines and taking opioids along with alcohol,  werediscussed with patient. I had asked the patient to minimize medication use and utilize pain medications only for uncontrolled rest pain or pain with exertional activities. I advised patient not to self-escalate painmedications without consulting with us. At each of patient's future visits we will try to taper pain medications, while adjusting the adjunct medications, and re-evaluating for Physical Therapy to improve spinal andjoint strength. We will continue to have discussions to decrease pain medications as tolerated. Counseled patient on effects their pain medication and /or their medical condition mayhave on their  ability to drive or operate machinery. Instructed not to drive or operate machinery if drowsy     I also discussed with the patient regarding the dangers of combining narcotic pain medication with tranquilizers, alcohol or illegal drugs or taking the medication any way other than prescribed. The dangers were discussed  including respiratory depression and death. Patient was told to tell  all  physicians regarding the medications he is getting from pain clinic. Patient is warned not to take any unprescribed medications over-the-countermedications that can depress breathing .  Patient is advised to talk to the pharmacist or physicians if planning to take any over-the-counter medications before  takeing them. Patient is strongly advised to avoid tranquilizers or  relaxants, illegal drugs  or any medications that can depress breathing  Patient is also advised to tell us if there is any changes in their medications from other physicians.     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

## 2020-10-14 ENCOUNTER — TELEPHONE (OUTPATIENT)
Dept: SPIRITUAL SERVICES | Age: 70
End: 2020-10-14

## 2020-10-14 RX ORDER — ISOPROPYL ALCOHOL 0.75 G/1
SWAB TOPICAL
Qty: 200 EACH | Refills: 3 | Status: SHIPPED | OUTPATIENT
Start: 2020-10-14 | End: 2021-05-03 | Stop reason: SDUPTHER

## 2020-10-14 RX ORDER — GLUCOSAM/CHON-MSM1/C/MANG/BOSW 500-416.6
TABLET ORAL
Qty: 200 EACH | Refills: 3 | Status: SHIPPED | OUTPATIENT
Start: 2020-10-14 | End: 2021-05-03 | Stop reason: SDUPTHER

## 2020-10-14 RX ORDER — CALCIUM CITRATE/VITAMIN D3 200MG-6.25
TABLET ORAL
Qty: 200 EACH | Refills: 3 | Status: SHIPPED | OUTPATIENT
Start: 2020-10-14 | End: 2021-05-03 | Stop reason: SDUPTHER

## 2020-10-14 NOTE — TELEPHONE ENCOUNTER
Writer spoke to the patient and he wants to fill out the ACP documents independently. Writer stated that he would need to have them witnessed by two people or notarized and to give his Doctor a copy.

## 2020-10-14 NOTE — TELEPHONE ENCOUNTER
Please Approve or Refuse.   Send to Pharmacy per Pt's Request:      Next Visit Date:  11/10/2020   Last Visit Date: 9/23/2020    Hemoglobin A1C (%)   Date Value   07/13/2020 6.2 (H)   03/25/2020 8.8 (H)   04/24/2019 6.3 (H)             ( goal A1C is < 7)   BP Readings from Last 3 Encounters:   09/30/20 121/76   09/23/20 111/69   09/23/20 122/66          (goal 120/80)  BUN   Date Value Ref Range Status   09/08/2020 12 8 - 23 mg/dL Final     CREATININE   Date Value Ref Range Status   09/08/2020 0.68 (L) 0.70 - 1.20 mg/dL Final     Potassium   Date Value Ref Range Status   09/08/2020 4.3 3.7 - 5.3 mmol/L Final

## 2020-11-03 PROBLEM — M47.816 OSTEOARTHRITIS OF LUMBAR SPINE: Status: RESOLVED | Noted: 2020-11-03 | Resolved: 2020-11-03

## 2020-11-06 ENCOUNTER — HOSPITAL ENCOUNTER (OUTPATIENT)
Dept: PAIN MANAGEMENT | Age: 70
Discharge: HOME | End: 2020-11-06
Payer: MEDICARE

## 2020-11-06 PROBLEM — M47.816 OSTEOARTHRITIS OF LUMBAR SPINE: Status: ACTIVE | Noted: 2020-11-03

## 2020-11-06 PROCEDURE — 99213 OFFICE O/P EST LOW 20 MIN: CPT

## 2020-11-06 PROCEDURE — 99213 OFFICE O/P EST LOW 20 MIN: CPT | Performed by: NURSE PRACTITIONER

## 2020-11-06 RX ORDER — PREGABALIN 150 MG/1
150 CAPSULE ORAL 3 TIMES DAILY
Qty: 90 CAPSULE | Refills: 2 | Status: SHIPPED | OUTPATIENT
Start: 2020-11-06 | End: 2021-02-04 | Stop reason: SDUPTHER

## 2020-11-06 RX ORDER — MORPHINE SULFATE 60 MG/1
60 TABLET, FILM COATED, EXTENDED RELEASE ORAL 2 TIMES DAILY
Qty: 60 TABLET | Refills: 0 | Status: SHIPPED | OUTPATIENT
Start: 2020-11-09 | End: 2020-12-07 | Stop reason: SDUPTHER

## 2020-11-06 RX ORDER — OXYCODONE HYDROCHLORIDE AND ACETAMINOPHEN 5; 325 MG/1; MG/1
1 TABLET ORAL EVERY 8 HOURS
Qty: 90 TABLET | Refills: 0 | Status: SHIPPED | OUTPATIENT
Start: 2020-11-09 | End: 2020-12-07 | Stop reason: SDUPTHER

## 2020-11-06 ASSESSMENT — ENCOUNTER SYMPTOMS
SHORTNESS OF BREATH: 0
CONSTIPATION: 0
BACK PAIN: 1
COUGH: 0

## 2020-11-06 NOTE — PROGRESS NOTES
5/12/2020    Type 2 diabetes mellitus with microalbuminuria, without long-term current use of insulin ([facility] [address] 7/13/2020    Wears dentures     Wears glasses        Past Surgical History:   Procedure Laterality Date    BACK SURGERY  1977    cervical spine three times    CHOLECYSTECTOMY  03/22/2019    COLONOSCOPY  01/25/2015    10 yr recall, hemorrhoids    COLONOSCOPY N/A 10/8/2019    tubular adenoma x2    DENTAL SURGERY  10/2015    all teeth extracted    HERNIA REPAIR  08/07/2020    lap robotic with mesh    HERNIA REPAIR N/A 8/7/2020    HERNIA INCISIONAL REPAIR LAPAROSCOPIC ROBOTIC WITH MESH performed by Alla Eastman DO at [facility]     UMBILICAL HERNIA REPAIR  3022-19    UPPER GASTROINTESTINAL ENDOSCOPY  01/25/2015    UPPER GASTROINTESTINAL ENDOSCOPY N/A 10/8/2019    EGD BIOPSY performed by New Griffiths MD at [facility]       Allergies   Allergen Reactions   Nir Spry [Aspirin]       iron deficiency anemia , requiring iron infusions and transfusions    Iron      Pill- constipation, abdominal pain    Nsaids       iron deficiency anemia, history of bleeding ulcers          Current Outpatient Medications:     TRUE METRIX BLOOD GLUCOSE TEST strip, CHECK BLOOD SUGAR BID, Disp: 200 each, Rfl: 3    TRUEplus Lancets 30G MISC, Test blood glucose twice a day, Disp: 200 each, Rfl: 3    Alcohol Swabs (B-D SINGLE USE SWABS REGULAR) PADS, USE TO CHECK BLOOD SUGAR TWICE A DAY, Disp: 200 each, Rfl: 3    morphine (MS CONTIN) 60 MG extended release tablet, Take 1 tablet by mouth 2 times daily for 30 days. , Disp: 60 tablet, Rfl: 0    oxyCODONE-acetaminophen (PERCOCET) 5-325 MG per tablet, Take 1 tablet by mouth every 8 hours for 30 days. , Disp: 90 tablet, Rfl: 0    cyanocobalamin 1000 MCG/ML injection, Inject 1 mL into the muscle every 7 days Call for next refill which will be monthly for life, Disp: 4 mL, Rfl: 0    pantoprazole (PROTONIX) 40 MG tablet, Take 1 tablet by mouth daily, Disp: 90 tablet, Rfl: 1    lisinopril (PRINIVIL;ZESTRIL) 10 MG tablet, Take 1 tablet by mouth daily, Disp: 90 tablet, Rfl: 1    hydroCHLOROthiazide (MICROZIDE) 12.5 MG capsule, Take 1 capsule by mouth every morning, Disp: 90 capsule, Rfl: 1    metFORMIN (GLUCOPHAGE) 1000 MG tablet, Take 1 tablet by mouth 2 times daily (with meals), Disp: 180 tablet, Rfl: 1    glipiZIDE (GLUCOTROL) 5 MG tablet, Take 1 tablet by mouth every morning Keep fasting morning blood glucose . If blood glucose below 80, you need to stop glipizide, Disp: 90 tablet, Rfl: 3    pregabalin (LYRICA) 150 MG capsule, Take 1 capsule by mouth 3 times daily for 90 days. , Disp: 90 capsule, Rfl: 2    Syringe/Needle, Disp, (SYRINGE 3CC/25GX1\") 25G X 1\" 3 ML MISC, To be used with B12 injections, Disp: 50 each, Rfl: 2    metoprolol tartrate (LOPRESSOR) 25 MG tablet, Take 1 tablet by mouth 2 times daily, Disp: 60 tablet, Rfl: 5    pravastatin (PRAVACHOL) 40 MG tablet, Take 1 tablet by mouth every evening Stop Gemfibrozil, Disp: 90 tablet, Rfl: 3    Blood Glucose Monitoring Suppl (TRUE METRIX METER) w/Device KIT, USE AS DIRECTED TO TEST BLOOD SUGAR, Disp: , Rfl:     hydrOXYzine (VISTARIL) 25 MG capsule, hydroxyzine pamoate 25 mg capsule, Disp: , Rfl:     BD INTEGRA SYRINGE 25G X 1\" 3 ML MISC, , Disp: , Rfl: 4    Family History   Problem Relation Age of Onset    Diabetes Mother     Heart Disease Father        Social History     Socioeconomic History    Marital status:      Spouse name: Not on file    Number of children: Not on file    Years of education: Not on file    Highest education level: Not on file   Occupational History    Occupation: disabled   Social Needs    Financial resource strain: Not on file    Food insecurity     Worry: Not on file     Inability: Not on file   [employer] needs     Medical: Not on file     Non-medical: Not on file   Tobacco Use    Smoking status: Former Smoker     Packs/day: 0.50 Years: 45.00     Pack years: 22.50     Types: Cigarettes     Last attempt to quit: 2015     Years since quittin.9    Smokeless tobacco: Never Used    Tobacco comment: on chantix 11-6-15   12-7-15   Substance and Sexual Activity    Alcohol use: No    Drug use: No    Sexual activity: Yes     Partners: Female   Lifestyle    Physical activity     Days per week: Not on file     Minutes per session: Not on file    Stress: Not on file   Relationships    Social connections     Talks on phone: Not on file     Gets together: Not on file     Attends Christianity service: Not on file     Active member of club or organization: Not on file     Attends meetings of clubs or organizations: Not on file     Relationship status: Not on file    Intimate partner violence     Fear of current or ex partner: Not on file     Emotionally abused: Not on file     Physically abused: Not on file     Forced sexual activity: Not on file   Other Topics Concern    Not on file   Social History Narrative    Not on file       Review of Systems:  Review of Systems   Constitution: Negative for chills and fever. Cardiovascular: Negative for chest pain and palpitations. Respiratory: Negative for cough and shortness of breath. Musculoskeletal: Positive for back pain. Gastrointestinal: Negative for constipation. Neurological: Positive for numbness and tingling. Negative for disturbances in coordination and loss of balance. Physical Exam:  There were no vitals taken for this visit. Physical Exam  HENT:      Head: Normocephalic. Pulmonary:      Effort: Pulmonary effort is normal.   Neurological:      Mental Status: He is alert.    Psychiatric:         Mood and Affect: Mood normal.         Behavior: Behavior normal.         Record/Diagnostics Review:    Last zoe 3/2020 and was appropriate     Assessment:  Problem List Items Addressed This Visit     Degenerative disc disease, lumbar (Chronic)    Relevant Medications morphine (MS CONTIN) 60 MG extended release tablet (Start on 11/9/2020)    oxyCODONE-acetaminophen (PERCOCET) 5-325 MG per tablet (Start on 11/9/2020)    pregabalin (LYRICA) 150 MG capsule    Lumbar spondylosis (Chronic)    Relevant Medications    morphine (MS CONTIN) 60 MG extended release tablet (Start on 11/9/2020)    oxyCODONE-acetaminophen (PERCOCET) 5-325 MG per tablet (Start on 11/9/2020)    pregabalin (LYRICA) 150 MG capsule    Lumbar radiculopathy (Chronic)    Relevant Medications    morphine (MS CONTIN) 60 MG extended release tablet (Start on 11/9/2020)    oxyCODONE-acetaminophen (PERCOCET) 5-325 MG per tablet (Start on 11/9/2020)    pregabalin (LYRICA) 150 MG capsule    Lumbar spinal stenosis (Chronic)    Relevant Medications    oxyCODONE-acetaminophen (PERCOCET) 5-325 MG per tablet (Start on 11/9/2020)    pregabalin (LYRICA) 150 MG capsule    Encounter for medication monitoring (Chronic)    Relevant Medications    oxyCODONE-acetaminophen (PERCOCET) 5-325 MG per tablet (Start on 11/9/2020)    Cervical spondylitis ([address] (Chronic)    Relevant Medications    oxyCODONE-acetaminophen (PERCOCET) 5-325 MG per tablet (Start on 11/9/2020)    S/P cervical spinal fusion (Chronic)    Relevant Medications    oxyCODONE-acetaminophen (PERCOCET) 5-325 MG per tablet (Start on 11/9/2020)    Osteoarthritis of spine with radiculopathy, lumbar region    Relevant Medications    morphine (MS CONTIN) 60 MG extended release tablet (Start on 11/9/2020)    oxyCODONE-acetaminophen (PERCOCET) 5-325 MG per tablet (Start on 11/9/2020)    pregabalin (LYRICA) 150 MG capsule    Encounter for chronic pain management    Relevant Medications    oxyCODONE-acetaminophen (PERCOCET) 5-325 MG per tablet (Start on 11/9/2020)    pregabalin (LYRICA) 150 MG capsule      Other Visit Diagnoses     Osteoarthritis of lumbar spine, unspecified spinal osteoarthritis complication status        Relevant Medications    morphine (MS CONTIN) 60 MG extended release tablet (Start on 11/9/2020)    oxyCODONE-acetaminophen (PERCOCET) 5-325 MG per tablet (Start on 11/9/2020)    pregabalin (LYRICA) 150 MG capsule             Treatment Plan:  Patient relates current medications are helping the pain. Patient reports taking pain medications as prescribed, denies obtaining medications from different sources and denies use of illegal drugs. Patient denies side effects from medications like nausea, vomiting, constipation or drowsiness. Patient reports current activities of daily living are possible due to medications and would like to continue them. As always, we encourage daily stretching and strengthening exercises, and recommend minimizing use of pain medications unless patient cannot get through daily activities due to pain. Contract requirements met. Continue opioid therapy. Script written for MSER and percocet  Follow up appointment made for 4 weeks    Walker Jim is a 78 y.o. male being evaluated by a Virtual Visit (video visit) encounter to address concerns as mentioned above. A caregiver was present when appropriate. Due to this being a TeleHealth encounter (During DIHRZ-17 public health emergency), evaluation of the following organ systems was limited: Vitals/Constitutional/EENT/Resp/CV/GI//MS/Neuro/Skin/Heme-Lymph-Imm. Pursuant to the emergency declaration under the [address] [place] [address], [address] [place] [address] authority and the [employer] and [employer] Act, this Virtual Visit was conducted with patient's (and/or legal guardian's) consent, to reduce the patient's risk of exposure to COVID-19 and provide necessary medical care. The patient (and/or legal guardian) has also been advised to contact this office for worsening conditions or problems, and seek emergency medical treatment and/or call 911 if deemed necessary.      Patient identification was verified at the start of the visit: Yes    Total time

## 2020-11-10 NOTE — TELEPHONE ENCOUNTER
Next Visit Date:  Future Appointments   Date Time Provider Pippa Cazares   12/2/2020  1:15 PM Delgado Stringer  [facility]   12/7/2020 11:30 AM Pily Montemayor APRN - CNP [address]   12/29/2020  2:30 PM Jacy Mcrae MD [id number]   9/24/2021 11:30 AM Jacy Mcrae MD fp sc [address] Maintenance   Topic Date Due    DTaP/Tdap/Td vaccine (1 - Tdap) [birth year]/1971    Shingles Vaccine (2 of 3) 12/27/2015    A1C test (Diabetic or Prediabetic)  10/13/2020    Diabetic foot exam  05/12/2021    Diabetic retinal exam  05/13/2021    Lipid screen  07/13/2021    Potassium monitoring  09/08/2021    Creatinine monitoring  09/08/2021    Annual Wellness Visit (AWV)  09/24/2021    Colon cancer screen colonoscopy  10/08/2024    Hepatitis A vaccine  Completed    Flu vaccine  Completed    Pneumococcal 65+ years Vaccine  Completed    AAA screen  Completed    Hib vaccine  Aged Out    Meningococcal (ACWY) vaccine  Aged Out       Hemoglobin A1C (%)   Date Value   07/13/2020 6.2 (H)   03/25/2020 8.8 (H)   04/24/2019 6.3 (H)             ( goal A1C is < 7)   Microalb/Crt.  Ratio (mcg/mg creat)   Date Value   07/13/2020 20 (H)     LDL Cholesterol (mg/dL)   Date Value   07/13/2020 13   04/24/2019 59       (goal LDL is <100)   AST (U/L)   Date Value   09/08/2020 20     ALT (U/L)   Date Value   09/08/2020 15     BUN (mg/dL)   Date Value   09/08/2020 12     BP Readings from Last 3 Encounters:   09/30/20 121/76   09/23/20 111/69   09/23/20 122/66          (goal 120/80)    All Future Testing planned in [facility]  Lab Frequency Next Occurrence   Hepatitis B Surface Antibody Once 05/13/2021   CBC Auto Differential Once 09/16/2020   Iron and TIBC Once 09/16/2020   Vitamin B12 & Folate Once 09/16/2020   Comprehensive Metabolic Panel Once 35/93/9869   CBC Auto Differential     Comprehensive Metabolic Panel     CBC Auto Differential     Comprehensive Metabolic Panel                 Patient

## 2020-11-30 ENCOUNTER — HOSPITAL ENCOUNTER (OUTPATIENT)
Age: 70
Discharge: HOME | End: 2020-11-30
Payer: MEDICARE

## 2020-11-30 LAB
ABSOLUTE EOS #: 0.1 K/UL (ref 0–0.4)
ABSOLUTE IMMATURE GRANULOCYTE: ABNORMAL K/UL (ref 0–0.3)
ABSOLUTE LYMPH #: 2.2 K/UL (ref 1–4.8)
ABSOLUTE MONO #: 0.6 K/UL (ref 0.1–1.3)
ALBUMIN SERPL-MCNC: 4.3 G/DL (ref 3.5–5.2)
ALBUMIN/GLOBULIN RATIO: NORMAL (ref 1–2.5)
ALP BLD-CCNC: 82 U/L (ref 40–129)
ALT SERPL-CCNC: 26 U/L (ref 5–41)
ANION GAP SERPL CALCULATED.3IONS-SCNC: 10 MMOL/L (ref 9–17)
AST SERPL-CCNC: 20 U/L
BASOPHILS # BLD: 1 % (ref 0–2)
BASOPHILS ABSOLUTE: 0.1 K/UL (ref 0–0.2)
BILIRUB SERPL-MCNC: 0.32 MG/DL (ref 0.3–1.2)
BUN BLDV-MCNC: 17 MG/DL (ref 8–23)
BUN/CREAT BLD: NORMAL (ref 9–20)
CALCIUM SERPL-MCNC: 9.7 MG/DL (ref 8.6–10.4)
CHLORIDE BLD-SCNC: 102 MMOL/L (ref 98–107)
CO2: 28 MMOL/L (ref 20–31)
CREAT SERPL-MCNC: 0.73 MG/DL (ref 0.7–1.2)
DIFFERENTIAL TYPE: ABNORMAL
EOSINOPHILS RELATIVE PERCENT: 1 % (ref 0–4)
FOLATE: 9 NG/ML
GFR AFRICAN AMERICAN: >60 ML/MIN
GFR NON-AFRICAN AMERICAN: >60 ML/MIN
GFR SERPL CREATININE-BSD FRML MDRD: NORMAL ML/MIN/{1.73_M2}
GFR SERPL CREATININE-BSD FRML MDRD: NORMAL ML/MIN/{1.73_M2}
GLUCOSE BLD-MCNC: 89 MG/DL (ref 70–99)
HCT VFR BLD CALC: 39 % (ref 41–53)
HEMOGLOBIN: 13.2 G/DL (ref 13.5–17.5)
IMMATURE GRANULOCYTES: ABNORMAL %
IRON SATURATION: 19 % (ref 20–55)
IRON: 64 UG/DL (ref 59–158)
LYMPHOCYTES # BLD: 23 % (ref 24–44)
MCH RBC QN AUTO: 29.9 PG (ref 26–34)
MCHC RBC AUTO-ENTMCNC: 34 G/DL (ref 31–37)
MCV RBC AUTO: 88.1 FL (ref 80–100)
MONOCYTES # BLD: 6 % (ref 1–7)
NRBC AUTOMATED: ABNORMAL PER 100 WBC
PDW BLD-RTO: 17.4 % (ref 11.5–14.9)
PLATELET # BLD: 267 K/UL (ref 150–450)
PLATELET ESTIMATE: ABNORMAL
PMV BLD AUTO: 7 FL (ref 6–12)
POTASSIUM SERPL-SCNC: 3.9 MMOL/L (ref 3.7–5.3)
RBC # BLD: 4.42 M/UL (ref 4.5–5.9)
RBC # BLD: ABNORMAL 10*6/UL
SEG NEUTROPHILS: 69 % (ref 36–66)
SEGMENTED NEUTROPHILS ABSOLUTE COUNT: 6.6 K/UL (ref 1.3–9.1)
SODIUM BLD-SCNC: 140 MMOL/L (ref 135–144)
TOTAL IRON BINDING CAPACITY: 338 UG/DL (ref 250–450)
TOTAL PROTEIN: 6.9 G/DL (ref 6.4–8.3)
UNSATURATED IRON BINDING CAPACITY: 274 UG/DL (ref 112–347)
VITAMIN B-12: 391 PG/ML (ref 232–1245)
WBC # BLD: 9.5 K/UL (ref 3.5–11)
WBC # BLD: ABNORMAL 10*3/UL

## 2020-11-30 PROCEDURE — 85025 COMPLETE CBC W/AUTO DIFF WBC: CPT

## 2020-11-30 PROCEDURE — 83540 ASSAY OF IRON: CPT

## 2020-11-30 PROCEDURE — 82607 VITAMIN B-12: CPT

## 2020-11-30 PROCEDURE — 80053 COMPREHEN METABOLIC PANEL: CPT

## 2020-11-30 PROCEDURE — 36415 COLL VENOUS BLD VENIPUNCTURE: CPT

## 2020-11-30 PROCEDURE — 82746 ASSAY OF FOLIC ACID SERUM: CPT

## 2020-11-30 PROCEDURE — 83550 IRON BINDING TEST: CPT

## 2020-12-02 ENCOUNTER — OFFICE VISIT (OUTPATIENT)
Dept: ONCOLOGY | Age: 70
End: 2020-12-02
Payer: MEDICARE

## 2020-12-02 ENCOUNTER — TELEPHONE (OUTPATIENT)
Dept: ONCOLOGY | Age: 70
End: 2020-12-02

## 2020-12-02 VITALS
HEART RATE: 84 BPM | DIASTOLIC BLOOD PRESSURE: 73 MMHG | TEMPERATURE: 98.2 F | BODY MASS INDEX: 31.25 KG/M2 | SYSTOLIC BLOOD PRESSURE: 126 MMHG | WEIGHT: 211.6 LBS

## 2020-12-02 PROCEDURE — 1123F ACP DISCUSS/DSCN MKR DOCD: CPT | Performed by: INTERNAL MEDICINE

## 2020-12-02 PROCEDURE — G8427 DOCREV CUR MEDS BY ELIG CLIN: HCPCS | Performed by: INTERNAL MEDICINE

## 2020-12-02 PROCEDURE — G8484 FLU IMMUNIZE NO ADMIN: HCPCS | Performed by: INTERNAL MEDICINE

## 2020-12-02 PROCEDURE — 99211 OFF/OP EST MAY X REQ PHY/QHP: CPT | Performed by: INTERNAL MEDICINE

## 2020-12-02 PROCEDURE — 99214 OFFICE O/P EST MOD 30 MIN: CPT | Performed by: INTERNAL MEDICINE

## 2020-12-02 PROCEDURE — 1036F TOBACCO NON-USER: CPT | Performed by: INTERNAL MEDICINE

## 2020-12-02 PROCEDURE — 3017F COLORECTAL CA SCREEN DOC REV: CPT | Performed by: INTERNAL MEDICINE

## 2020-12-02 PROCEDURE — G8417 CALC BMI ABV UP PARAM F/U: HCPCS | Performed by: INTERNAL MEDICINE

## 2020-12-02 PROCEDURE — 4040F PNEUMOC VAC/ADMIN/RCVD: CPT | Performed by: INTERNAL MEDICINE

## 2020-12-02 RX ORDER — LISINOPRIL AND HYDROCHLOROTHIAZIDE 12.5; 1 MG/1; MG/1
TABLET ORAL
COMMUNITY
Start: 2020-11-07 | End: 2020-12-07 | Stop reason: SDUPTHER

## 2020-12-02 NOTE — TELEPHONE ENCOUNTER
AVS from 12/2/20     rv in 2 months with labs couple of days prior     rv scheduled for 2/3/21 @ 10am  Pt will have labs done one week prior    Pt was given AVS and appt schedule

## 2020-12-02 NOTE — PROGRESS NOTES
Subjective:   PCP: Matheus Handley MD       Chief Complaint   Patient presents with    Follow-up     review status of disease    Discuss Labs         INTERIM HISTORY: Patient presents to the clinic accompanied by his wife to discuss results of his lab work-up. Patient labs again show iron deficiency. Patient received IV iron few months back without any complications. Patient stool occult blood test is positive. Patient expresses frustration about not able to find source of the bleed. General: No fever or night sweats. Weight is stable. ENT: No double or blurred vision, no hearing problem, no dysphagia or sore throat   Respiratory: No chest pain, no shortness of breath, no cough or hemoptysis. Cardiovascular: Denies chest pain, PND or orthopnea. No L E swelling or palpitations. Gastrointestinal: No nausea or vomiting, abdominal pain, or constipation. +hematochezia - resolved; +diarrhea and foul smell improved  Genitourinary: Denies dysuria, hematuria, frequency, urgency or incontinence. Neurological: Denies headaches, decreased LOC, no sensory or motor focal deficits. Musculoskeletal: No arthralgia no back pain or joint swelling. Skin: There are no rashes or bleeding. Psych: Denies hallucinations or intentions to harm self      Objective:   Vitals: /73   Pulse 84   Temp 98.2 °F (36.8 °C) (Oral)   Wt 211 lb 9.6 oz (96 kg)   BMI 31.25 kg/m²   General appearance: alert and cooperative with exam  HEENT: Head: Normocephalic, no lesions, without obvious abnormality.   Neck: no adenopathy  Lungs: clear to auscultation bilaterally  Heart: regular rate and rhythm, S1, S2 normal, no murmur, click, rub or gallop  Abdomen: soft, non-tender; bowel sounds normal; no masses,  no organomegaly  Extremities: extremities normal, atraumatic, no cyanosis or edema  Neurologic: Mental status: Alert, oriented, thought content appropriate      REVIEW OF LABORATORY DATA:   Lab Results Component Value Date    WBC 9.5 11/30/2020    HGB 13.2 (L) 11/30/2020    HCT 39.0 (L) 11/30/2020    MCV 88.1 11/30/2020     11/30/2020       Chemistry        Component Value Date/Time     11/30/2020 1107    K 3.9 11/30/2020 1107     11/30/2020 1107    CO2 28 11/30/2020 1107    BUN 17 11/30/2020 1107    CREATININE 0.73 11/30/2020 1107        Component Value Date/Time    CALCIUM 9.7 11/30/2020 1107    ALKPHOS 82 11/30/2020 1107    AST 20 11/30/2020 1107    ALT 26 11/30/2020 1107    BILITOT 0.32 11/30/2020 1107        Lab Results   Component Value Date    KNLWEHOH61 789 11/30/2020         Lab Results   Component Value Date    IRON 64 11/30/2020    TIBC 338 11/30/2020    FERRITIN 33 09/08/2020       Assessment and Plan:   81year old male with history of bleeding ulcer back in 2015 and iron def, now with iron def anemia, and stool occult stools    -s/p IV iron with improvement in iron stores and hemoglobin  -EGD and colonoscopy 10/2019 did not show active bleeding, still no active source of iron def  -s/p GI in 12/3/19, concern for still blood loss, iron def, no plasns for repeat capsule video study, he has completed treatment for hepatitis C with Andrea Miranda.  -repeat iron studies are consistent with iron deficiency  -transfuse if Hbg is less than 7.0    -B12 deficiency, patient started on B12 shots 7/2020    PLAN:   Reviewed results of lab work-up and other relevant clinical data  Patient tolerated IV iron infusion without any complications. Stool occult blood test is positive. Iron studies show iron saturation of 11% and low normal ferritin. Despite of getting IV iron patient continues to be iron deficient. Given positive stool occult blood testing I am concerned about chronic ongoing GI bleed. Work-up in the past has been nonrevealing. Patient has not had a repeat capsule endoscopy. According to the patient last capsule endoscopy was not complete. I will reach out to the GI clinic discussed patient's treatment plan  We will transfuse if hemoglobin is less than 7. Continue B12 supplements. Patient and his wife had multiple questions which answered to the best of ability. Bita Fernandez MD    This note is created with the assistance of a speech recognition program.  While intending to generate a document that actually reflects the content of the visit, the document can still have some errors including those of syntax and sound a like substitutions which may escape proof reading. It such instances, actual meaning can be extrapolated by contextual diversion.

## 2020-12-07 ENCOUNTER — HOSPITAL ENCOUNTER (OUTPATIENT)
Dept: PAIN MANAGEMENT | Age: 70
Discharge: HOME | End: 2020-12-07
Payer: MEDICARE

## 2020-12-07 PROCEDURE — 99213 OFFICE O/P EST LOW 20 MIN: CPT

## 2020-12-07 PROCEDURE — 99441 PR PHYS/QHP TELEPHONE EVALUATION 5-10 MIN: CPT | Performed by: NURSE PRACTITIONER

## 2020-12-07 RX ORDER — OXYCODONE HYDROCHLORIDE AND ACETAMINOPHEN 5; 325 MG/1; MG/1
1 TABLET ORAL EVERY 8 HOURS
Qty: 90 TABLET | Refills: 0 | Status: SHIPPED | OUTPATIENT
Start: 2020-12-09 | End: 2021-01-08 | Stop reason: SDUPTHER

## 2020-12-07 RX ORDER — MORPHINE SULFATE 60 MG/1
60 TABLET, FILM COATED, EXTENDED RELEASE ORAL 2 TIMES DAILY
Qty: 60 TABLET | Refills: 0 | Status: SHIPPED | OUTPATIENT
Start: 2020-12-09 | End: 2021-01-08 | Stop reason: SDUPTHER

## 2020-12-07 RX ORDER — LISINOPRIL AND HYDROCHLOROTHIAZIDE 12.5; 1 MG/1; MG/1
TABLET ORAL
Qty: 90 TABLET | Refills: 3 | Status: SHIPPED | OUTPATIENT
Start: 2020-12-07 | End: 2021-11-08

## 2020-12-07 ASSESSMENT — ENCOUNTER SYMPTOMS
DIARRHEA: 1
BACK PAIN: 1
RESPIRATORY NEGATIVE: 1
ROS SKIN COMMENTS: FOREHEAD

## 2020-12-07 NOTE — TELEPHONE ENCOUNTER
Please Approve or Refuse.   Send to Pharmacy per Pt's Request:      Next Visit Date:  12/29/2020   Last Visit Date: 9/23/2020    Hemoglobin A1C (%)   Date Value   07/13/2020 6.2 (H)   03/25/2020 8.8 (H)   04/24/2019 6.3 (H)             ( goal A1C is < 7)   BP Readings from Last 3 Encounters:   12/02/20 126/73   09/30/20 121/76   09/23/20 111/69          (goal 120/80)  BUN   Date Value Ref Range Status   11/30/2020 17 8 - 23 mg/dL Final     CREATININE   Date Value Ref Range Status   11/30/2020 0.73 0.70 - 1.20 mg/dL Final     Potassium   Date Value Ref Range Status   11/30/2020 3.9 3.7 - 5.3 mmol/L Final

## 2020-12-07 NOTE — PROGRESS NOTES
Magda 89 PROGRESS NOTE      Patient phone call to   review Medication Agreement    Chief Complaint:  Back pain      He c/o back pain radiating down legs, He c/o pain in huis feet from the neuropathy. He has had 3 cervical and no lumbar surgeries, His sleep is fair, about 6 hours. No Ed visits. Back Pain   This is a chronic problem. The problem occurs constantly. The problem has been gradually worsening since onset. The pain is present in the lumbar spine. The quality of the pain is described as aching (sharp). Radiates to: legs. The pain is at a severity of 4/10. The pain is moderate. The pain is the same all the time. Exacerbated by: certain positiion. Associated symptoms include numbness. (Numbness legs and feet and buttocks) He has tried analgesics for the symptoms. The treatment provided mild relief. Patient denies any new neurological symptoms. No bowel or bladder incontinence, no weakness, and no falling. Any new diagnostic workup: [x] no  [] yes [] Xray [] CT scan [] MRI [] DEXA scan     [] Other          EXAMINATION:    MRI OF THE LUMBAR SPINE WITHOUT CONTRAST, 2/16/2018 9:36 pm         TECHNIQUE:    Multiplanar multisequence MRI of the lumbar spine was performed without the    administration of intravenous contrast.         COMPARISON:    March 2010         HISTORY:    ORDERING SYSTEM PROVIDED HISTORY: Degenerative disc disease, lumbar    TECHNOLOGIST PROVIDED HISTORY:    Ordering Physician Provided Reason for Exam: LUMBAR DDD, LUMBAR    OSTEOARTHRITIS WITH RADICULOPATHY, SPINAL STENOSIS OF LUMBAR REGION WITHOUT    NEUROGENIC CLAUDICATION    Additional signs and symptoms: PATIENT COMPLAINS OF MID TO LOWER BACK PAIN    AND BUTTOCK PAIN. AND THAT HIS FEET FALL ASLEEP. SYMPTOMS FOR THE PAST 8 TO    10 YEARS. NO KNOWN INJURY.  NO PREVIOUS LUMBAR SURGERIES.      FINDINGS:    BONES/ALIGNMENT: Multiple endplate Schmorl's node deformities are noted.     There is no acute fracture or bone edema.  T11 hemangioma is noted. Vertebral body height and alignment is stable.         SPINAL CORD: Conus terminates at T12-L1 and is grossly normal in appearance.         SOFT TISSUES: Detail of the aorta is limited.  There is suggested mild    enlargement with the transverse dimension of approximately 3.1 cm.  Dedicated    imaging of the aorta is recommended.         L1-L2: Diffuse disc bulging is noted with a small proximal foraminal    protrusion on the right.  Annular tear is noted.  Similar findings were noted    on the prior.  Facet hypertrophic changes are noted.         L2-L3: Minimal disc bulging is noted diffusely. Klarissa Scrivener is a questionable    small left foraminal protrusion laterally.  There is minimal narrowing of the    left foramen.  Facet hypertrophic changes are noted.  Findings are stable         L3-L4: Mild disc bulging is noted diffusely.  Minimal endplate and mild facet    hypertrophic changes are noted.  There is borderline mild proximal foraminal    narrowing on the left.  This is stable.         L4-L5: Mild disc bulging is noted.  A small inferior foraminal protrusion on    the right is noted.  There is a slightly larger broad-based protrusion into    the left foramen.  Bilateral foraminal narrowing is noted.  Findings not    appear grossly changed.  Facet hypertrophic changes are noted         L5-S1: Minimal disc bulging is noted.  Facet hypertrophic changes are noted.         SI joint degenerative changes are noted.              Impression    No significant interval change in the multifocal degenerative disc disease    throughout the lumbar spine.  See above for details of each level         Suggested aortic aneurysm.  Dedicated imaging of the aorta is recommended         RECOMMENDATIONS:    Managing Abdominal Aortic Aneurysms         2.6-2.9 cm: Every 5 years*         3.0-3.4 cm: Every 3 years.         3.5-3.9 cm: Every 1 year.         4.0-4.4 cm: Every 1 year. Recommend vascular consultation.         4.5-5.4 cm: Every 6 months. Recommend vascular consultation.         Greater than or equal to 5.5 cm: Referral to vascular surgeon.         For abdominal aortas with maximum diameter of 2.6-2.9 cm meeting criteria    for AAA (>50% of proximal normal segment).         Reference:         J Vasc Surg. 2009 Oct;50(4 Suppl):S2-49                     Treatment goals:  Functional status: get rid of pain      Aberrancy:   Any alcoholic beverages   no    Any illegal drugs  no       Analgesia:4      Adverse  Effects :none    ADL;s :back exercises      Pill count: appropriate  Fill date 12-9-2020    Morphine equivalent dose as reported on [place]RS: 142.50  Periodic Controlled Substance Monitoring: Obtaining appropriate analgesic effect of treatment. , Possible medication side effects, risk of tolerance/dependence & alternative treatments discussed., No signs of potential drug abuse or diversion identified. , Assessed functional status. (VEE Lucas - CNP)  Chronic Pain > 80 MEDD: Co-prescribed Naloxone., Obtained or confirmed \"Medication Contract\" on file. VEE Lucas - CNP)  Review of[place]ARRS does not show any aberrant prescription behavior. Medication is helping the patient stay active. Patient denies any side effects and reports adequate analgesia. No sign of misuse/abuse.         When was thelast UDS:   3-          Was the UDS appropriate:yes      Record/Diagnostics Review:      As above, I did review the imaging    3/14/2020 10:00 PM - Raimundo, Darynpn Incoming Lab Results From [email or web address]     Component  Value  Ref Range & Units  Status  Collected  Lab    Pain Management Drug Panel Interp, Urine  Consistent   Final  03/11/2020  3:25 PM  [facility]    (NOTE)   ________________________________________________________________   DRUGS EXPECTED:   OXYCODONE [3/11/20]   MORPHINE [3/11/20]   ________________________________________________________________   CONSISTENT with medications provided:   OXYCODONE : based on oxycodone   MORPHINE : based on morphine   ________________________________________________________________   INTERPRETIVE INFORMATION: Pain Mgt Grigs[place], Mass Spec/EMIT, Ur,                            Interp            Past Medical History:   Diagnosis Date    Anemia     Arthritis     osteoarthritis    Colon polyps 10/08/2019    tubular adenoma x2    Essential hypertension 5/12/2020    Hep C w/o coma, chronic [facility] [address] 2019    Received antiviral treatment by Dr. Jay Horn Hepatitis B core antibody positive     History of blood transfusion     Hyperlipidemia     Iron (Fe) deficiency anemia     Type 2 diabetes mellitus with hyperglycemia, without long-term current use of insulin ([facility] [address] 5/12/2020    Type 2 diabetes mellitus with microalbuminuria, without long-term current use of insulin ([facility] [address] 7/13/2020    Wears dentures     Wears glasses        Past Surgical History:   Procedure Laterality Date    BACK SURGERY  1977    cervical spine three times    CHOLECYSTECTOMY  03/22/2019    COLONOSCOPY  01/25/2015    10 yr recall, hemorrhoids    COLONOSCOPY N/A 10/8/2019    tubular adenoma x2    DENTAL SURGERY  10/2015    all teeth extracted    HERNIA REPAIR  08/07/2020    lap robotic with mesh    HERNIA REPAIR N/A 8/7/2020    HERNIA INCISIONAL REPAIR LAPAROSCOPIC ROBOTIC WITH MESH performed by Ximena Christianson DO at [place] [id number]    UPPER GASTROINTESTINAL ENDOSCOPY  01/25/2015    UPPER GASTROINTESTINAL ENDOSCOPY N/A 10/8/2019    EGD BIOPSY performed by Shann Spurling, MD at [address] [place] [address] [Aspirin]       iron deficiency anemia , requiring iron infusions and transfusions    Iron      Pill- constipation, abdominal pain    Nsaids       iron deficiency anemia, history of bleeding ulcers          Current Outpatient Medications:     lisinopril-hydroCHLOROthiazide (PRINZIDE;ZESTORETIC) 10-12.5 MG per tablet, TK 1 T PO QD, Disp: , Rfl:     metoprolol tartrate (LOPRESSOR) 25 MG tablet, Take 1 tablet by mouth 2 times daily, Disp: 60 tablet, Rfl: 5    Probiotic Product (PROBIOTIC-10 PO), Take by mouth, Disp: , Rfl:     morphine (MS CONTIN) 60 MG extended release tablet, Take 1 tablet by mouth 2 times daily for 30 days. , Disp: 60 tablet, Rfl: 0    oxyCODONE-acetaminophen (PERCOCET) 5-325 MG per tablet, Take 1 tablet by mouth every 8 hours for 30 days. , Disp: 90 tablet, Rfl: 0    pregabalin (LYRICA) 150 MG capsule, Take 1 capsule by mouth 3 times daily for 90 days. , Disp: 90 capsule, Rfl: 2    pantoprazole (PROTONIX) 40 MG tablet, Take 1 tablet by mouth daily, Disp: 90 tablet, Rfl: 1    metFORMIN (GLUCOPHAGE) 1000 MG tablet, Take 1 tablet by mouth 2 times daily (with meals), Disp: 180 tablet, Rfl: 1    glipiZIDE (GLUCOTROL) 5 MG tablet, Take 1 tablet by mouth every morning Keep fasting morning blood glucose .   If blood glucose below 80, you need to stop glipizide, Disp: 90 tablet, Rfl: 3    pravastatin (PRAVACHOL) 40 MG tablet, Take 1 tablet by mouth every evening Stop Gemfibrozil, Disp: 90 tablet, Rfl: 3    TRUE METRIX BLOOD GLUCOSE TEST strip, CHECK BLOOD SUGAR BID, Disp: 200 each, Rfl: 3    TRUEplus Lancets 30G MISC, Test blood glucose twice a day, Disp: 200 each, Rfl: 3    Alcohol Swabs (B-D SINGLE USE SWABS REGULAR) PADS, USE TO CHECK BLOOD SUGAR TWICE A DAY, Disp: 200 each, Rfl: 3    cyanocobalamin 1000 MCG/ML injection, Inject 1 mL into the muscle every 7 days Call for next refill which will be monthly for life, Disp: 4 mL, Rfl: 0    Syringe/Needle, Disp, (SYRINGE 3CC/25GX1\") 25G X 1\" 3 ML MISC, To be used with B12 injections, Disp: 50 each, Rfl: 2    Blood Glucose Monitoring Suppl (TRUE METRIX METER) w/Device KIT, USE AS DIRECTED TO TEST BLOOD SUGAR, Disp: , Rfl:     hydrOXYzine (VISTARIL) 25 MG capsule, hydroxyzine pamoate 25 mg capsule, Disp: , Rfl:   BD INTEGRA SYRINGE 25G X 1\" 3 ML MISC, , Disp: , Rfl: 4    Family History   Problem Relation Age of Onset    Diabetes Mother     Heart Disease Father        Social History     Socioeconomic History    Marital status:      Spouse name: Not on file    Number of children: Not on file    Years of education: Not on file    Highest education level: Not on file   Occupational History    Occupation: disabled   Social Needs    Financial resource strain: Not on file    Food insecurity     Worry: Not on file     Inability: Not on file   [employer] needs     Medical: Not on file     Non-medical: Not on file   Tobacco Use    Smoking status: Former Smoker     Packs/day: 0.50     Years: 45.00     Pack years: 22.50     Types: Cigarettes     Last attempt to quit: 2015     Years since quittin.0    Smokeless tobacco: Never Used    Tobacco comment: on chantix 11-6-15   12-7-15   Substance and Sexual Activity    Alcohol use: No    Drug use: No    Sexual activity: Yes     Partners: Female   Lifestyle    Physical activity     Days per week: Not on file     Minutes per session: Not on file    Stress: Not on file   Relationships    Social connections     Talks on phone: Not on file     Gets together: Not on file     Attends Jew service: Not on file     Active member of club or organization: Not on file     Attends meetings of clubs or organizations: Not on file     Relationship status: Not on file    Intimate partner violence     Fear of current or ex partner: Not on file     Emotionally abused: Not on file     Physically abused: Not on file     Forced sexual activity: Not on file   Other Topics Concern    Not on file   Social History Narrative    Not on file         Review of Systems:  Review of Systems   Constitution: Negative. Eyes:        Glasses   Cardiovascular: Negative. Respiratory: Negative.     Endocrine:        Blood sugars under control   Hematologic/Lymphatic: Bruises/bleeds easily. Skin: Positive for rash. forehead   Musculoskeletal: Positive for back pain and joint pain. Shoulder pain   Gastrointestinal: Positive for diarrhea. Genitourinary: Negative. Neurological: Positive for numbness. Psychiatric/Behavioral: Negative. Physical Exam:  Physical Exam  Skin:         Neurological:      Mental Status: He is alert and oriented to person, place, and time. Psychiatric:         Mood and Affect: Mood normal.           Assessment:      Problem List Items Addressed This Visit     S/P cervical spinal fusion - Primary (Chronic)    Peripheral polyneuropathy    Osteoarthritis of spine with radiculopathy, lumbar region    Lumbar spondylosis (Chronic)    Lumbar spinal stenosis (Chronic)    Lumbar radiculopathy (Chronic)    Encounter for medication monitoring (Chronic)    Encounter for chronic pain management            Treatment Plan:  DISCUSSION: Treatment options discussed withpatient and all questions answered to patient's satisfaction. Possible side effects, risk of tolerance and or dependence and alternative treatments discussed    Obtaining appropriate analgesic effect of treatment   No signs of potential drug abuse or diversion identified    [x] Ill effects of being on chronic pain medications such as sleep disturbances, respiratory depression, hormonal changes, withdrawal symptoms, chronic opioid dependence and tolerance as well as risk of taking opioids with Benzodiazepines and taking opioids along with alcohol,  werediscussed with patient. I had asked the patient to minimize medication use and utilize pain medications only for uncontrolled rest pain or pain with exertional activities. I advised patient not to self-escalate painmedications without consulting with us.   At each of patient's future visits we will try to taper pain medications, while adjusting the adjunct medications, and re-evaluating for Physical Therapy to improve spinal andjoint strength. We will continue to have discussions to decrease pain medications as tolerated. Counseled patient on effects their pain medication and /or their medical condition mayhave on their  ability to drive or operate machinery. Instructed not to drive or operate machinery if drowsy     I also discussed with the patient regarding the dangers of combining narcotic pain medication with tranquilizers, alcohol or illegal drugs or taking the medication any way other than prescribed. The dangers were discussed  including respiratory depression and death. Patient was told to tell  all  physicians regarding the medications he is getting from pain clinic. Patient is warned not to take any unprescribed medications over-the-countermedications that can depress breathing . Patient is advised to talk to the pharmacist or physicians if planning to take any over-the-counter medications before  takeing them. Patient is strongly advised to avoid tranquilizers or  relaxants, illegal drugs  or any medications that can depress breathing  Patient is also advised to tell us if there is any changes in their medications from other physicians.     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

## 2020-12-29 ENCOUNTER — TELEPHONE (OUTPATIENT)
Dept: GASTROENTEROLOGY | Age: 70
End: 2020-12-29

## 2020-12-29 ENCOUNTER — TELEMEDICINE (OUTPATIENT)
Dept: FAMILY MEDICINE CLINIC | Age: 70
End: 2020-12-29
Payer: MEDICARE

## 2020-12-29 PROBLEM — R51.9 WORSENING HEADACHES: Status: ACTIVE | Noted: 2020-12-29

## 2020-12-29 PROBLEM — R19.5 POSITIVE FIT (FECAL IMMUNOCHEMICAL TEST): Status: ACTIVE | Noted: 2020-12-29

## 2020-12-29 PROCEDURE — 2022F DILAT RTA XM EVC RTNOPTHY: CPT | Performed by: FAMILY MEDICINE

## 2020-12-29 PROCEDURE — 1123F ACP DISCUSS/DSCN MKR DOCD: CPT | Performed by: FAMILY MEDICINE

## 2020-12-29 PROCEDURE — 3017F COLORECTAL CA SCREEN DOC REV: CPT | Performed by: FAMILY MEDICINE

## 2020-12-29 PROCEDURE — 3044F HG A1C LEVEL LT 7.0%: CPT | Performed by: FAMILY MEDICINE

## 2020-12-29 PROCEDURE — G8427 DOCREV CUR MEDS BY ELIG CLIN: HCPCS | Performed by: FAMILY MEDICINE

## 2020-12-29 PROCEDURE — 4040F PNEUMOC VAC/ADMIN/RCVD: CPT | Performed by: FAMILY MEDICINE

## 2020-12-29 PROCEDURE — 99214 OFFICE O/P EST MOD 30 MIN: CPT | Performed by: FAMILY MEDICINE

## 2020-12-29 RX ORDER — BACLOFEN 10 MG/1
10 TABLET ORAL 3 TIMES DAILY PRN
Qty: 90 TABLET | Refills: 0 | Status: SHIPPED | OUTPATIENT
Start: 2020-12-29 | End: 2021-01-25

## 2020-12-29 RX ORDER — LIDOCAINE 50 MG/G
1 PATCH TOPICAL DAILY
Qty: 30 PATCH | Refills: 3 | Status: SHIPPED | OUTPATIENT
Start: 2020-12-29 | End: 2022-01-03

## 2020-12-29 ASSESSMENT — ENCOUNTER SYMPTOMS
CONSTIPATION: 0
CHEST TIGHTNESS: 0
VOMITING: 0
ABDOMINAL DISTENTION: 0
NAUSEA: 0
COUGH: 0
SHORTNESS OF BREATH: 0
ABDOMINAL PAIN: 1
DIARRHEA: 1
WHEEZING: 0

## 2020-12-29 NOTE — PROGRESS NOTES
2020    TELEHEALTH EVALUATION -- Audio/Visual (During LFZRM-42 public health emergency)    HPI:    Martin Barnett (:  1950) has requested an audio/video evaluation for the following concern(s):Diabetes, Hypertension, Hyperlipidemia, Diarrhea, and Headache      Patient's wife, Marii Schafer, was present during the visit    Has known diabetes mellitus type 2 recently diagnosed based on A1c of 8.8 on 3/25/2020  Home Blood Glucose 130 on average, 131, 116  Not taking glipizide since Blood Glucose is not over 140. Patient says he did not need to take the glipizide at home at all since the last appointment . Has been eating low-carb diet and he feels his Blood Glucose are well controlled. Has been taking lisinopril, pravastatin  He is not on aspirin due to prior transfusions and infusions of iron due to  iron deficiency anemia   Patient reports worsening neuropathy in the feet, Lyrica dosage was decreased by pain management he says.    He is also taking the B12 shots, but still has neuropathy and pain in his feet    Lab Results   Component Value Date    LABA1C 6.2 (H) 2020    LABA1C 8.8 (H) 2020    LABA1C 6.3 (H) 2019   Patient also has nephropathy  Recent urine microalbumin improved, previously it was 45 on 2019  Lab Results   Component Value Date    LABMICR 20 (H) 2020         Hypertension: he  is not exercising, but staying active and is adherent to low salt diet. Blood pressure is well controlled at home. Cardiac symptoms fatigue. Patient denies chest pain, chest pressure/discomfort, claudication, dyspnea, exertional chest pressure/discomfort, irregular heart beat, lower extremity edema, near-syncope, orthopnea, palpitations, paroxysmal nocturnal dyspnea, syncope and tachypnea. Cardiovascular risk factors: advanced age (older than 54 for men, 72 for women), diabetes mellitus, dyslipidemia, hypertension, male gender, microalbuminuria and obesity (BMI >= 30 kg/m2). Use of agents associated with hypertension: none. History of target organ damage: Likely coronary artery disease. Patient was referred to cardiologist due to abnormal EKG on 5/13/2020 which showed possible anterior infarct  He was supposed to do a stress test, not sure if he ever completed it due to the coronavirus pandemic      BP controlled. Getachew Shearer reports compliance with BP medications, and tolerates them well, denies side effects. 138/73  BP Readings from Last 3 Encounters:   12/02/20 126/73   09/30/20 121/76   09/23/20 111/69        Pulse is Normal.    Pulse Readings from Last 3 Encounters:   12/02/20 84   09/30/20 97   09/23/20 86     Having diarrhea, runny bowel movements about 4 times a day, for several months,  and headaches. He denies abdominal pain, nausea and vomiting  He does not see blood in the stool but recent stool test was positive for blood and he is worried about as he required blood transfusions and iron infusions due to anemia.   He also follows with both GI specialist and hematologist oncologist      9/16/2020  3:56 PM - Conchita Eubanks Incoming Lab Results From [facility]    Component Value Ref Range & Units Status Collected Lab   Occult Blood, Stool #1 POSITIVEAbnormal   NEGATIVE Final 09/16/2020 10:58  [address]   Date, Stool #1 NOT LABELED   Final 09/16/2020 10:58 AM [facility] Time, Stool #1 NOT LABELED   Final 09/16/2020 10:58 AM [facility]   Occult Blood, Stool #2 POSITIVEAbnormal   NEGATIVE Final 09/16/2020 10:58  [address]   Date, Stool #2 NOT REPORTED   Final 09/16/2020 10:58  [address]   Time, Stool #2 NOT REPORTED   Final 09/16/2020 10:58  [facility]   Occult Blood, Stool #3 POSITIVEAbnormal   NEGATIVE            Weight is increasing he did not lose weight , gained 6 pounds in 5 months  Wt Readings from Last 3 Encounters:   12/02/20 211 lb 9.6 oz (96 kg)   09/23/20 201 lb (91.2 kg)   09/16/20 203 lb (92.1 kg)     07/07/20 205 lb (93 kg)         Patient reports new onset of headaches, about 4-5 per week, located on the forehead, sometimes radiate to the neck which feels tense, for a few months, worsening  Patient reports the headaches are lasting for a few hours until he takes Tylenol which helps a little  Had eye exam and got new glasses  Denies nausea, or blurry vision  Patient does have history of prior neck surgery with plate, he cannot get MRIs. Vitamin B12 deficiency   Noel Allen  is  taking Vitamin B12 supplementation. B12 is improving  Noel Allen reports  numbness and tingling and fatigue. Lab Results   Component Value Date    [id number] 344 11/30/2020     Noel Allen has Vitamin D deficiency. Noel Allen  is  Not taking Vitamin D supplementation   he feels tired. Lab Results   Component Value Date    VITD25 11.3 (L) 07/13/2020         Hyperlipidemia:  No new myalgias or GI upset on pravastatin (Pravachol). Medication compliance: compliant all of the time. Patient is  following a low fat, low cholesterol diet.     LDL is  Normal ,with high triglycerides  Lab Results   Component Value Date    LDLCHOLESTEROL 13 07/13/2020     Lab Results   Component Value Date    TRIG 279 (H) 07/13/2020    TRIG 148 04/24/2019    TRIG 160 (H) 05/10/2017           Review of Systems Constitutional: Positive for fatigue and unexpected weight change (gained). Negative for activity change, appetite change, chills, diaphoresis and fever. Eyes: Positive for visual disturbance (stable, chronic, has new glasses). Respiratory: Negative for cough, chest tightness, shortness of breath and wheezing. Cardiovascular: Negative for chest pain, palpitations and leg swelling. Gastrointestinal: Positive for abdominal pain (cramps) and diarrhea. Negative for abdominal distention, constipation, nausea and vomiting. Endocrine: Negative for cold intolerance, heat intolerance, polydipsia, polyphagia and polyuria. Musculoskeletal: Positive for neck pain (chronic, has h/o prior surgery with plate). Neurological: Positive for numbness (feet, worsening since Lyrica decreased ) and headaches. Negative for dizziness, tremors, facial asymmetry and weakness. Prior to Visit Medications    Medication Sig Taking? Authorizing Provider   morphine (MS CONTIN) 60 MG extended release tablet Take 1 tablet by mouth 2 times daily for 30 days. Yes VEE Hagan CNP   oxyCODONE-acetaminophen (PERCOCET) 5-325 MG per tablet Take 1 tablet by mouth every 8 hours for 30 days. Yes VEE Hagan CNP   lisinopril-hydroCHLOROthiazide (PRINZIDE;ZESTORETIC) 10-12.5 MG per tablet TK 1 T PO QD Yes Lynann Apgar, MD   metoprolol tartrate (LOPRESSOR) 25 MG tablet Take 1 tablet by mouth 2 times daily Yes Lynann Apgar, MD   Probiotic Product (PROBIOTIC-10 PO) Take by mouth Yes Historical Provider, MD   pregabalin (LYRICA) 150 MG capsule Take 1 capsule by mouth 3 times daily for 90 days.  Yes VEE Strong CNP   TRUE METRIX BLOOD GLUCOSE TEST strip CHECK BLOOD SUGAR BID Yes Lynann Apgar, MD   TRUEplus Lancets 30G MISC Test blood glucose twice a day Yes Lynann Apgar, MD Alcohol Swabs (B-D SINGLE USE SWABS REGULAR) PADS USE TO CHECK BLOOD SUGAR TWICE A DAY Yes Jacy Whelan MD   cyanocobalamin 1000 MCG/ML injection Inject 1 mL into the muscle every 7 days Call for next refill which will be monthly for life Yes Jacy Whelan MD   pantoprazole (PROTONIX) 40 MG tablet Take 1 tablet by mouth daily Yes Jacy Whelan MD   metFORMIN (GLUCOPHAGE) 1000 MG tablet Take 1 tablet by mouth 2 times daily (with meals) Yes Jacy Whelan MD   glipiZIDE (GLUCOTROL) 5 MG tablet Take 1 tablet by mouth every morning Keep fasting morning blood glucose .   If blood glucose below 80, you need to stop glipizide Yes Jacy Whelan MD   Syringe/Needle, Disp, (SYRINGE 3CC/25GX1\") 25G X 1\" 3 ML MISC To be used with B12 injections Yes Jacy Whelan MD   pravastatin (PRAVACHOL) 40 MG tablet Take 1 tablet by mouth every evening Stop Gemfibrozil Yes Jacy Whelan MD   Blood Glucose Monitoring Suppl (TRUE METRIX METER) w/Device KIT USE AS DIRECTED TO TEST BLOOD SUGAR Yes Historical Provider, MD   hydrOXYzine (VISTARIL) 25 MG capsule hydroxyzine pamoate 25 mg capsule Yes Historical Provider, MD   BD INTEGRA SYRINGE 25G X 1\" 3 ML MISC  Yes Historical Provider, MD       Social History     Tobacco Use    Smoking status: Former Smoker     Packs/day: 0.50     Years: 45.00     Pack years: 22.50     Types: Cigarettes     Quit date: 2015     Years since quittin.0    Smokeless tobacco: Never Used    Tobacco comment: on chantix 11-6-15   12-7-15   Substance Use Topics    Alcohol use: No    Drug use: No          PHYSICAL EXAMINATION:    Vital Signs: (As obtained by patient/caregiver or practitioner observation)  -vital signs stable and within normal limits except obesity per BMI was BMI 31.25 kg/M2  Patient-Reported Vitals 2020   Patient-Reported Weight 210   Patient-Reported Height 59   Patient-Reported Systolic 168   Patient-Reported Diastolic 73 Patient-Reported Pulse 90   Patient-Reported Temperature 97.9           Intensity of pain is 4-5/10      Constitutional: [x] Appears well-developed and well-nourished [x] No apparent distress      [x] Abnormal-obese      Mental status  [x] Alert and awake  [x] Oriented to person/place/time [x]Able to follow commands      Eyes:  EOM    [x]  Normal  [] Abnormal-  Sclera  [x]  Normal  [] Abnormal -         Discharge [x]  None visible  [] Abnormal -    HENT:   [x] Normocephalic, atraumatic. [] Abnormal   [x] Mouth/Throat: Mucous membranes are moist.     External Ears [x] Normal  [] Abnormal-     Neck: [x] No visualized mass     Pulmonary/Chest: [x] Respiratory effort normal.  [x] No visualized signs of difficulty breathing or respiratory distress        [] Abnormal        Musculoskeletal:   [x] Normal gait with no signs of ataxia         [x] Normal range of motion of neck        [] Abnormal-       Neurological:        [x] No Facial Asymmetry (Cranial nerve 7 motor function) (limited exam to video visit)          [x] No gaze palsy        [] Abnormal-            Skin:        [x] No significant exanthematous lesions or discoloration noted on facial skin         [] Abnormal-            Psychiatric:      [x] No Hallucinations       [x]Mood is normal      [x]Behavior is normal      [x]Judgment is normal      [x]Thought content is normal       [] Abnormal-     Other pertinent observable physical exam findings- none    Due to this being a TeleHealth encounter, evaluation of the following organ systems is limited: Vitals/Constitutional/EENT/Resp/CV/GI//MS/Neuro/Skin/Heme-Lymph-Imm. Most recent labs reviewed with the patient and all questions fully answered.    Iron deficiency anemia  Hypertriglyceridemia   Vitamin D deficiency  Otherwise labs within normal limits        Lab Results   Component Value Date    WBC 9.5 11/30/2020    HGB 13.2 (L) 11/30/2020    HCT 39.0 (L) 11/30/2020    MCV 88.1 11/30/2020  11/30/2020       Lab Results   Component Value Date     11/30/2020    K 3.9 11/30/2020     11/30/2020    CO2 28 11/30/2020    BUN 17 11/30/2020    CREATININE 0.73 11/30/2020    GLUCOSE 89 11/30/2020    CALCIUM 9.7 11/30/2020        Lab Results   Component Value Date    ALT 26 11/30/2020    AST 20 11/30/2020    ALKPHOS 82 11/30/2020    BILITOT 0.32 11/30/2020       Lab Results   Component Value Date    TSH 1.11 03/25/2020       Lab Results   Component Value Date    CHOL 92 07/13/2020    CHOL 112 04/24/2019    CHOL 102 05/10/2017     Lab Results   Component Value Date    TRIG 279 (H) 07/13/2020    TRIG 148 04/24/2019    TRIG 160 (H) 05/10/2017     Lab Results   Component Value Date    HDL 23 (L) 07/13/2020    HDL 23 (L) 04/24/2019    HDL 18 (L) 05/10/2017     Lab Results   Component Value Date    LDLCHOLESTEROL 13 07/13/2020    LDLCHOLESTEROL 59 04/24/2019    LDLCHOLESTEROL 52 05/10/2017       Lab Results   Component Value Date    CHOLHDLRATIO 4.0 07/13/2020    CHOLHDLRATIO 4.9 04/24/2019    CHOLHDLRATIO 5.7 (H) 05/10/2017       Lab Results   Component Value Date    LABA1C 6.2 (H) 07/13/2020    LABA1C 8.8 (H) 03/25/2020    LABA1C 6.3 (H) 04/24/2019         Lab Results   Component Value Date    [id number] 391 11/30/2020       Lab Results   Component Value Date    FOLATE 9.0 11/30/2020       Lab Results   Component Value Date    IRON 64 11/30/2020    TIBC 338 11/30/2020    FERRITIN 33 09/08/2020       Lab Results   Component Value Date    VITD25 11.3 (L) 07/13/2020       ASSESSMENT/PLAN:    1. Type 2 diabetes mellitus with diabetic polyneuropathy, without long-term current use of insulin (HCC)  Improved  Lab Results   Component Value Date    LABA1C 6.2 (H) 07/13/2020    LABA1C 8.8 (H) 03/25/2020    LABA1C 6.3 (H) 04/24/2019       - CBC; Future  - TSH without Reflex; Future  - Comprehensive Metabolic Panel;  Future  - Hemoglobin A1C; Future -start  lidocaine (LIDODERM) 5 %; Place 1 patch onto the skin daily 12 hours on, 12 hours off. Dispense: 30 patch; Refill: 3  -advised home blood glucose testing  daily  -daily feet exam, Foot care: advised to wash feet daily, pat dry and apply lotion at night, avoiding between toes. Need to look at feet daily and report to a physician any signs of inflammation or skin damage  -annual dilated eye exam  -Low carb, low fat diet, increase fruits and vegetables, and exercise 4-5 times a day 30-40 minutes a day discussed  -continue current treatment  -continue ACEI and statin      2. Essential hypertension  Well controlled. Continue current treatment. Will recheck labs. Discussed low salt diet and BP and pulse monitoring daily    - CBC; Future  - TSH without Reflex; Future  - Comprehensive Metabolic Panel; Future    3. Worsening headaches  worsening  -start  baclofen (LIORESAL) 10 MG tablet; Take 1 tablet by mouth 3 times daily as needed (MUSCLE SPASMS) Causes sedation, do not drive while taking this medication  Dispense: 90 tablet; Refill: 0  - [facility] Brandt [facility]; Future  Cannot get MRI  4. Positive FIT (fecal immunochemical test)    - Ambulatory referral to Gastroenterology  Needs colonoscopy     5. Vitamin D deficiency  Unsure if improving or not. Will recheck level  Continue supplementation.    - Vitamin D 25 Hydroxy; Future    6. Hyperlipidemia with target LDL less than 100  LDL controlled  High triglycerides  Continue Pravachol        Kailash Castillo received counseling on the following healthy behaviors: nutrition, exercise, medication adherence and weight loss    Reviewed prior labs and health maintenance. Continue current medications, diet and exercise. Discussed use, benefit, and side effects of prescribed medications. Barriers to medication compliance addressed. Patient given educational materials - see patient instructions. All patient questions answered. Patient voiced understanding. Return in about 3 months (around 3/29/2021) for ALWAYS NEEDS 30 MIN. APPT, **POC A1C, DM2, HTN, HLP, LABS F/U, w/ref to GI&neurology, needs stress test done per cardio  Data Unavailable      Future Appointments   Date Time Provider Pippa Cazares   1/5/2021  2:30 PM Steven Delgado MD [facility]   1/8/2021 10:00 AM Syliva Gowers, APRN - CNP [address]   2/3/2021 10:00 AM Sulma Oconnor MD [address] [place] [address]   9/24/2021 11:30 AM [facility]        Total time spent during this visit 30 minutes including face-to-face, counseling, charting review, and non-face-to-face time. Sapphire Eastman is a 78 y.o. male being evaluated by a Virtual Visit (video visit) encounter to address concerns as mentioned above. Due to this being a TeleHealth encounter (During XJZVF-71 public health emergency), evaluation of the following organ systems was limited: Vitals/Constitutional/EENT/Resp/CV/GI//MS/Neuro/Skin/Heme-Lymph-Imm. Pursuant to the emergency declaration under the [address] [place] [address], [address] [place] [address] authority and the [employer] and [employer] Act, this Virtual Visit was conducted with patient's (and/or legal guardian's) consent, to reduce the patient's risk of exposure to COVID-19 and provide necessary medical care. The patient (and/or legal guardian) has also been advised to contact this office for worsening conditions or problems, and seek emergency medical treatment and/or call 911 if deemed necessary. Patient's wife, Adelina Gitelman was present during the visit. Services were provided through a video synchronous discussion virtually to substitute for in-person clinic visit. Patient was located at his home, provider was located in the office, at the primary practice location.

## 2020-12-29 NOTE — PATIENT INSTRUCTIONS
Patient Education        Diabetes Blood Sugar Emergencies: Your Action Plan  How can you prevent a blood sugar emergency? An important part of living with diabetes is keeping your blood sugar in your target range. You'll need to know what to do if it's too high or too low. Managing your blood sugar levels helps you avoid emergencies. This care sheet will teach you about the signs of high and low blood sugar. It will help you make an action plan with your doctor for when these signs occur. Low blood sugar is more likely to happen if you take certain medicines for diabetes. It can also happen if you skip a meal, drink alcohol, or exercise more than usual.  You may get high blood sugar if you eat differently than you normally do. One example is eating more carbohydrate than usual. Having a cold, the flu, or other sudden illness can also cause high blood sugar levels. Levels can also rise if you miss a dose of medicine. Any change in how you take your medicine may affect your blood sugar level. So it's important to work with your doctor before you make any changes. Check your blood sugar  Work with your doctor to fill in the blank spaces below that apply to you. Track your levels, know your target range, and write down ways you can get your blood sugar back in your target range. A log book can help you track your levels. Take the book to all of your medical appointments. · Check your blood sugar _____ times a day, at these times:________________________________________________. (For example: Before meals, at bedtime, before exercise, during exercise, other.)  · Your blood sugar target range before a meal is ___________________. Your blood sugar target range after a meal is _______________________. · Do this___________________________________________________to get your blood sugar back within your safe range if your blood sugar results are _________________________________________. (For example: Less than 70 or above 250 mg/dL.)  Call your doctor when your blood sugar results are ___________________________________. (For example: Less than 70 or above 250 mg/dL.)  What are the symptoms of low and high blood sugar? Common symptoms of low blood sugar are sweating and feeling shaky, weak, hungry, or confused. Symptoms can start quickly. Common symptoms of high blood sugar are feeling very thirsty or very hungry. You may also pass urine more often than usual. You may have blurry vision and may lose weight without trying. But some people may have high or low blood sugar without having any symptoms. That's a good reason to check your blood sugar on a regular schedule. What should you do if you have symptoms? Work with your doctor to fill in the blank spaces below that apply to you. Low blood sugar  If you have symptoms of low blood sugar, check your blood sugar. If it's below _____ ( for example, below 70), eat or drink a quick-sugar food that has about 15 grams of carbohydrate. Your goal is to get your level back to your safe range. Check your blood sugar again 15 minutes later. If it's still not in your target range, take another 15 grams of carbohydrate and check your blood sugar again in 15 minutes. Repeat this until you reach your target. Then go back to your regular testing schedule. Children usually need less than 15 grams of carbohydrate. Check with your doctor or diabetes educator for the amount that is right for your child. When you have low blood sugar, it's best to stop or reduce any physical activity until your blood sugar is back in your target range and is stable. If you must stay active, eat or drink 30 grams of carbohydrate. Then check your blood sugar again in 15 minutes. If it's not in your target range, take another 30 grams of carbohydrates. Check your blood sugar again in 15 minutes. Keep doing this until you reach your target. You can then go back to your regular testing schedule. If your symptoms or blood sugar levels are getting worse or have not improved after 15 minutes, seek medical care right away. Here are some examples of quick-sugar foods with 15 grams of carbohydrate:  · 3 or 4 glucose tablets  · 1 tablespoon (3 teaspoons) table sugar  · ½ cup to ¾ cup (4 to 6 ounces) of fruit juice or regular (not diet) soda  · Hard candy (such as 6 Life Savers)  High blood sugar  If you have symptoms of high blood sugar, check your blood sugar. Your goal is to get your level back to your target range. If it's above ______ ( for example, above 250), follow these steps:  · If you missed a dose of your diabetes medicine, take it now. Take only the amount of medicine that you have been prescribed. Do not take more or less medicine. · Give yourself insulin if your doctor has prescribed it for high blood sugar. · Test for ketones, if the doctor told you to do so. If the results of the ketone test show a moderate-to-large amount of ketones, call the doctor for advice. · Wait 30 minutes after you take the extra insulin or the missed medicine. Check your blood sugar again. If your symptoms or blood sugar levels are getting worse or have not improved after taking these steps, seek medical care right away. Follow-up care is a key part of your treatment and safety. Be sure to make and go to all appointments, and call your doctor if you are having problems. It's also a good idea to know your test results and keep a list of the medicines you take. Where can you learn more? Go to [email or web address] and sign in to your [employer] account. Enter E312 in the [facility] box to learn more about \"Diabetes Blood Sugar Emergencies: Your Action Plan. \"     If you do not have an account, please click on the \"Sign Up Now\" link. Current as of: December 20, 2019               Content Version: 12.6  © 0389-5798 [employer], [employer]. Care instructions adapted under license by [facility] [place] ([facility]). If you have questions about a medical condition or this instruction, always ask your healthcare professional. [address] any warranty or liability for your use of this information. Patient Education        Learning About Diabetes Food Guidelines  Your Care Instructions     Meal planning is important to manage diabetes. It helps keep your blood sugar at a target level (which you set with your doctor). You don't have to eat special foods. You can eat what your family eats, including sweets once in a while. But you do have to pay attention to how often you eat and how much you eat of certain foods. You may want to work with a dietitian or a certified diabetes educator (CDE) to help you plan meals and snacks. A dietitian or CDE can also help you lose weight if that is one of your goals. What should you know about eating carbs? Managing the amount of carbohydrate (carbs) you eat is an important part of healthy meals when you have diabetes. Carbohydrate is found in many foods. · Learn which foods have carbs. And learn the amounts of carbs in different foods. ? Bread, cereal, pasta, and rice have about 15 grams of carbs in a serving. A serving is 1 slice of bread (1 ounce), ½ cup of cooked cereal, or 1/3 cup of cooked pasta or rice. ? Fruits have 15 grams of carbs in a serving. A serving is 1 small fresh fruit, such as an apple or orange; ½ of a banana; ½ cup of cooked or canned fruit; ½ cup of fruit juice; 1 cup of melon or raspberries; or 2 tablespoons of dried fruit. ? Milk and no-sugar-added yogurt have 15 grams of carbs in a serving. A serving is 1 cup of milk or 2/3 cup of no-sugar-added yogurt. ? Starchy vegetables have 15 grams of carbs in a serving. A serving is ½ cup of mashed potatoes or sweet potato; 1 cup winter squash; ½ of a small baked potato; ½ cup of cooked beans; or ½ cup cooked corn or green peas. · Learn how much carbs to eat each day and at each meal. A dietitian or CDE can teach you how to keep track of the amount of carbs you eat. This is called carbohydrate counting. · If you are not sure how to count carbohydrate grams, use the Plate Method to plan meals. It is a good, quick way to make sure that you have a balanced meal. It also helps you spread carbs throughout the day. ? Divide your plate by types of foods. Put non-starchy vegetables on half the plate, meat or other protein food on one-quarter of the plate, and a grain or starchy vegetable in the final quarter of the plate.  To this you can add a small piece of fruit and 1 cup of milk or yogurt, depending on how many carbs you are supposed to eat at a meal.  · Try to eat about the same amount of carbs at each meal. Do not \"save up\" your daily allowance of carbs to eat at one meal. · Proteins have very little or no carbs per serving. Examples of proteins are beef, chicken, turkey, fish, eggs, tofu, cheese, cottage cheese, and peanut butter. A serving size of meat is 3 ounces, which is about the size of a deck of cards. Examples of meat substitute serving sizes (equal to 1 ounce of meat) are 1/4 cup of cottage cheese, 1 egg, 1 tablespoon of peanut butter, and ½ cup of tofu. How can you eat out and still eat healthy? · Learn to estimate the serving sizes of foods that have carbohydrate. If you measure food at home, it will be easier to estimate the amount in a serving of restaurant food. · If the meal you order has too much carbohydrate (such as potatoes, corn, or baked beans), ask to have a low-carbohydrate food instead. Ask for a salad or green vegetables. · If you use insulin, check your blood sugar before and after eating out to help you plan how much to eat in the future. · If you eat more carbohydrate at a meal than you had planned, take a walk or do other exercise. This will help lower your blood sugar. What else should you know? · Limit saturated fat, such as the fat from meat and dairy products. This is a healthy choice because people who have diabetes are at higher risk of heart disease. So choose lean cuts of meat and nonfat or low-fat dairy products. Use olive or canola oil instead of butter or shortening when cooking. · Don't skip meals. Your blood sugar may drop too low if you skip meals and take insulin or certain medicines for diabetes. · Check with your doctor before you drink alcohol. Alcohol can cause your blood sugar to drop too low. Alcohol can also cause a bad reaction if you take certain diabetes medicines. Follow-up care is a key part of your treatment and safety. Be sure to make and go to all appointments, and call your doctor if you are having problems. It's also a good idea to know your test results and keep a list of the medicines you take.

## 2020-12-30 NOTE — TELEPHONE ENCOUNTER
Spoke to wife and did prepare Align sample ready for her to  in med room.   She agrees that she will come to office today before 4:30pm.

## 2021-01-05 ENCOUNTER — TELEPHONE (OUTPATIENT)
Dept: GASTROENTEROLOGY | Age: 71
End: 2021-01-05

## 2021-01-05 ENCOUNTER — VIRTUAL VISIT (OUTPATIENT)
Dept: GASTROENTEROLOGY | Age: 71
End: 2021-01-05
Payer: MEDICARE

## 2021-01-05 DIAGNOSIS — K21.9 GASTROESOPHAGEAL REFLUX DISEASE, UNSPECIFIED WHETHER ESOPHAGITIS PRESENT: ICD-10-CM

## 2021-01-05 DIAGNOSIS — R19.5 POSITIVE FIT (FECAL IMMUNOCHEMICAL TEST): ICD-10-CM

## 2021-01-05 DIAGNOSIS — Z86.19 HISTORY OF HEPATITIS C: ICD-10-CM

## 2021-01-05 DIAGNOSIS — D50.8 OTHER IRON DEFICIENCY ANEMIA: Primary | ICD-10-CM

## 2021-01-05 DIAGNOSIS — K58.0 IRRITABLE BOWEL SYNDROME WITH DIARRHEA: ICD-10-CM

## 2021-01-05 DIAGNOSIS — R19.7 DIARRHEA, UNSPECIFIED TYPE: ICD-10-CM

## 2021-01-05 PROBLEM — D64.9 ABSOLUTE ANEMIA: Status: ACTIVE | Noted: 2021-01-05

## 2021-01-05 PROCEDURE — 1123F ACP DISCUSS/DSCN MKR DOCD: CPT | Performed by: INTERNAL MEDICINE

## 2021-01-05 PROCEDURE — 4040F PNEUMOC VAC/ADMIN/RCVD: CPT | Performed by: INTERNAL MEDICINE

## 2021-01-05 PROCEDURE — G8427 DOCREV CUR MEDS BY ELIG CLIN: HCPCS | Performed by: INTERNAL MEDICINE

## 2021-01-05 PROCEDURE — 1036F TOBACCO NON-USER: CPT | Performed by: INTERNAL MEDICINE

## 2021-01-05 PROCEDURE — 99213 OFFICE O/P EST LOW 20 MIN: CPT | Performed by: INTERNAL MEDICINE

## 2021-01-05 PROCEDURE — G8484 FLU IMMUNIZE NO ADMIN: HCPCS | Performed by: INTERNAL MEDICINE

## 2021-01-05 PROCEDURE — 3017F COLORECTAL CA SCREEN DOC REV: CPT | Performed by: INTERNAL MEDICINE

## 2021-01-05 PROCEDURE — G8417 CALC BMI ABV UP PARAM F/U: HCPCS | Performed by: INTERNAL MEDICINE

## 2021-01-05 RX ORDER — LOPERAMIDE HYDROCHLORIDE 2 MG/1
2 CAPSULE ORAL 4 TIMES DAILY PRN
Qty: 112 CAPSULE | Refills: 2 | Status: SHIPPED | OUTPATIENT
Start: 2021-01-05

## 2021-01-05 RX ORDER — CHOLESTYRAMINE 4 G/9G
1 POWDER, FOR SUSPENSION ORAL 2 TIMES DAILY
Qty: 90 PACKET | Refills: 3 | Status: SHIPPED | OUTPATIENT
Start: 2021-01-05 | End: 2021-04-27 | Stop reason: ALTCHOICE

## 2021-01-05 ASSESSMENT — ENCOUNTER SYMPTOMS
VOMITING: 0
NAUSEA: 1
ALLERGIC/IMMUNOLOGIC NEGATIVE: 1
BLOOD IN STOOL: 0
BACK PAIN: 1
ANAL BLEEDING: 0
ABDOMINAL DISTENTION: 1
DIARRHEA: 1
CONSTIPATION: 0
SORE THROAT: 1
RESPIRATORY NEGATIVE: 1
RECTAL PAIN: 0
ABDOMINAL PAIN: 1
TROUBLE SWALLOWING: 0

## 2021-01-05 NOTE — PROGRESS NOTES
GI OFFICE FOLLOW UP    Noman Mosquera is a 78 y.o. male evaluated via on 2021. Consent:  He and/or health care decision maker is aware that that he may receive a bill for this telephone service, depending on his insurance coverage, and has provided verbal consent to proceed: YES      INTERVAL HISTORY:   No referring provider defined for this encounter. Chief Complaint   Patient presents with    Diarrhea     Still having diarrhea and had a pisitive fit test for blood. Patient states he doesn't see the blood. He states he usues OTC Imodium and can slow it down that way but would like Loperimide as it is costling him a lot out of pocket as he takes 3 a day. 1. Other iron deficiency anemia    2. Diarrhea, unspecified type    3. Gastroesophageal reflux disease, unspecified whether esophagitis present    4. Irritable bowel syndrome with diarrhea    5. History of hepatitis C    6.  Positive FIT (fecal immunochemical test)        This patient evaluated via Doxy video visit on 2021  He was accompanied by his wife during the interview  He has history significant for above issues  Has been treated for hepatitis C in the past in July last year his PCR was negative  He has history for anemia had EGD and colonoscopy done in 2019 with removal of multiple polyps  No overt evidence of bleeding was noted  He has been followed by hematology oncology has been on IV iron his current hemoglobin is relatively stable at 13.2  However is found to be positive for occult blood  He had attempted capsule battery  said some issues  Patient has some significant diarrhea after eating has been on Imodium  Denies any overt bleeding weight loss loss of appetite etc.  Has some anxiety issues abdominal bloating and gas symptoms  Previous records were reviewed with him HISTORY OF PRESENT ILLNESS: Sony Escobar is a 78 y.o. male with a past history remarkable for , referred for evaluation of   Chief Complaint   Patient presents with    Diarrhea     Still having diarrhea and had a pisitive fit test for blood. Patient states he doesn't see the blood. He states he usues OTC Imodium and can slow it down that way but would like Loperimide as it is costling him a lot out of pocket as he takes 3 a day. .    Past Medical,Family, and Social History reviewed and does contribute to the patient presenting condition. Patient's PMH/PSH,SH,PSYCH Hx, MEDs, ALLERGIES, and ROS were all reviewed and updated in the appropriate sections.     PAST MEDICAL HISTORY:  Past Medical History:   Diagnosis Date    Anemia     Arthritis     osteoarthritis    Colon polyps 10/08/2019    tubular adenoma x2    Essential hypertension 05/12/2020    Hepatitis B core antibody positive     History of blood transfusion     History of hepatitis C     Hyperlipidemia     Iron (Fe) deficiency anemia     Positive FIT (fecal immunochemical test)     Type 2 diabetes mellitus with hyperglycemia, without long-term current use of insulin ([address] 05/12/2020    Type 2 diabetes mellitus with microalbuminuria, without long-term current use of insulin ([address] 07/13/2020    Wears dentures     Wears glasses        Past Surgical History:   Procedure Laterality Date    CERVICAL SPINE SURGERY  1977    cervical spine three times, has plate    CHOLECYSTECTOMY  03/22/2019    COLONOSCOPY  01/25/2015    10 yr recall, hemorrhoids    COLONOSCOPY N/A 10/08/2019    tubular adenoma x2    DENTAL SURGERY  10/2015    all teeth extracted    HERNIA REPAIR  08/07/2020    lap robotic with mesh    HERNIA REPAIR N/A 08/07/2020    HERNIA INCISIONAL REPAIR LAPAROSCOPIC ROBOTIC WITH MESH performed by Bert Tellez DO at [facility] Right    [address] [place] [address]  [id number]  UPPER GASTROINTESTINAL ENDOSCOPY  01/25/2015    UPPER GASTROINTESTINAL ENDOSCOPY N/A 10/08/2019    EGD BIOPSY performed by Dionicio Robin MD at [address]:    Current Outpatient Medications:     loperamide (RA ANTI-DIARRHEAL) 2 MG capsule, Take 1 capsule by mouth 4 times daily as needed for Diarrhea, Disp: 112 capsule, Rfl: 2    cholestyramine (QUESTRAN) 4 g packet, Take 1 packet by mouth 2 times daily, Disp: 90 packet, Rfl: 3    baclofen (LIORESAL) 10 MG tablet, Take 1 tablet by mouth 3 times daily as needed (MUSCLE SPASMS) Causes sedation, do not drive while taking this medication, Disp: 90 tablet, Rfl: 0    lidocaine (LIDODERM) 5 %, Place 1 patch onto the skin daily 12 hours on, 12 hours off., Disp: 30 patch, Rfl: 3    morphine (MS CONTIN) 60 MG extended release tablet, Take 1 tablet by mouth 2 times daily for 30 days. , Disp: 60 tablet, Rfl: 0    oxyCODONE-acetaminophen (PERCOCET) 5-325 MG per tablet, Take 1 tablet by mouth every 8 hours for 30 days. , Disp: 90 tablet, Rfl: 0    lisinopril-hydroCHLOROthiazide (PRINZIDE;ZESTORETIC) 10-12.5 MG per tablet, TK 1 T PO QD, Disp: 90 tablet, Rfl: 3    metoprolol tartrate (LOPRESSOR) 25 MG tablet, Take 1 tablet by mouth 2 times daily, Disp: 60 tablet, Rfl: 5    Probiotic Product (PROBIOTIC-10 PO), Take by mouth, Disp: , Rfl:     pregabalin (LYRICA) 150 MG capsule, Take 1 capsule by mouth 3 times daily for 90 days. , Disp: 90 capsule, Rfl: 2    TRUE METRIX BLOOD GLUCOSE TEST strip, CHECK BLOOD SUGAR BID, Disp: 200 each, Rfl: 3    TRUEplus Lancets 30G MISC, Test blood glucose twice a day, Disp: 200 each, Rfl: 3    Alcohol Swabs (B-D SINGLE USE SWABS REGULAR) PADS, USE TO CHECK BLOOD SUGAR TWICE A DAY, Disp: 200 each, Rfl: 3    cyanocobalamin 1000 MCG/ML injection, Inject 1 mL into the muscle every 7 days Call for next refill which will be monthly for life, Disp: 4 mL, Rfl: 0   pantoprazole (PROTONIX) 40 MG tablet, Take 1 tablet by mouth daily, Disp: 90 tablet, Rfl: 1    metFORMIN (GLUCOPHAGE) 1000 MG tablet, Take 1 tablet by mouth 2 times daily (with meals), Disp: 180 tablet, Rfl: 1    glipiZIDE (GLUCOTROL) 5 MG tablet, Take 1 tablet by mouth every morning Keep fasting morning blood glucose .   If blood glucose below 80, you need to stop glipizide, Disp: 90 tablet, Rfl: 3    Syringe/Needle, Disp, (SYRINGE 3CC/25GX1\") 25G X 1\" 3 ML MISC, To be used with B12 injections, Disp: 50 each, Rfl: 2    pravastatin (PRAVACHOL) 40 MG tablet, Take 1 tablet by mouth every evening Stop Gemfibrozil, Disp: 90 tablet, Rfl: 3    Blood Glucose Monitoring Suppl (TRUE METRIX METER) w/Device KIT, USE AS DIRECTED TO TEST BLOOD SUGAR, Disp: , Rfl:     hydrOXYzine (VISTARIL) 25 MG capsule, hydroxyzine pamoate 25 mg capsule, Disp: , Rfl:     BD INTEGRA SYRINGE 25G X 1\" 3 ML MISC, , Disp: , Rfl: 4    ALLERGIES:   Allergies   Allergen Reactions    Asa [Aspirin]       iron deficiency anemia , requiring iron infusions and transfusions    Iron      Pill- constipation, abdominal pain    Nsaids       iron deficiency anemia, history of bleeding ulcers        FAMILY HISTORY:       Problem Relation Age of Onset    Diabetes Mother     Heart Disease Father          SOCIAL HISTORY:   Social History     Socioeconomic History    Marital status:      Spouse name: Not on file    Number of children: Not on file    Years of education: Not on file    Highest education level: Not on file   Occupational History    Occupation: disabled   Social Needs    Financial resource strain: Not on file    Food insecurity     Worry: Not on file     Inability: Not on file   [employer] needs     Medical: Not on file     Non-medical: Not on file   Tobacco Use    Smoking status: Former Smoker     Packs/day: 0.50     Years: 45.00     Pack years: 22.50     Types: Cigarettes     Quit date: 12/7/2015 Years since quittin.0    Smokeless tobacco: Never Used    Tobacco comment: on chantix 11-6-15   12-7-15   Substance and Sexual Activity    Alcohol use: No    Drug use: No    Sexual activity: Yes     Partners: Female   Lifestyle    Physical activity     Days per week: Not on file     Minutes per session: Not on file    Stress: Not on file   Relationships    Social connections     Talks on phone: Not on file     Gets together: Not on file     Attends [employer] service: Not on file     Active member of club or organization: Not on file     Attends meetings of clubs or organizations: Not on file     Relationship status: Not on file    Intimate partner violence     Fear of current or ex partner: Not on file     Emotionally abused: Not on file     Physically abused: Not on file     Forced sexual activity: Not on file   Other Topics Concern    Not on file   Social History Narrative    Not on file         REVIEW OF SYSTEMS:         Review of Systems   Constitutional: Positive for fatigue. Negative for fever. HENT: Positive for sore throat (tickle in throat). Negative for trouble swallowing. Eyes: Positive for visual disturbance. Respiratory: Negative. Cardiovascular: Negative. Gastrointestinal: Positive for abdominal distention, abdominal pain, diarrhea (daily) and nausea. Negative for anal bleeding, blood in stool, constipation, rectal pain and vomiting. Endocrine: Negative. Genitourinary: Negative. Musculoskeletal: Positive for back pain. Skin: Negative. Allergic/Immunologic: Negative. Neurological: Positive for headaches. Negative for dizziness, weakness and light-headedness. Hematological: Negative. Psychiatric/Behavioral: Positive for sleep disturbance. The patient is nervous/anxious. PHYSICAL EXAMINATION: Vital signs reviewed per the nursing documentation. There were no vitals taken for this visit. There is no height or weight on file to calculate BMI. Physical Exam      LABORATORY DATA: Reviewed  Lab Results   Component Value Date    WBC 9.5 11/30/2020    HGB 13.2 (L) 11/30/2020    HCT 39.0 (L) 11/30/2020    MCV 88.1 11/30/2020     11/30/2020     11/30/2020    K 3.9 11/30/2020     11/30/2020    CO2 28 11/30/2020    BUN 17 11/30/2020    CREATININE 0.73 11/30/2020    LABALBU 4.3 11/30/2020    BILITOT 0.32 11/30/2020    ALKPHOS 82 11/30/2020    AST 20 11/30/2020    ALT 26 11/30/2020    INR 0.9 08/07/2020         Lab Results   Component Value Date    RBC 4.42 (L) 11/30/2020    HGB 13.2 (L) 11/30/2020    MCV 88.1 11/30/2020    MCH 29.9 11/30/2020    MCHC 34.0 11/30/2020    RDW 17.4 (H) 11/30/2020    MPV 7.0 11/30/2020    BASOPCT 1 11/30/2020    LYMPHSABS 2.20 11/30/2020    MONOSABS 0.60 11/30/2020    NEUTROABS 6.60 11/30/2020    EOSABS 0.10 11/30/2020    BASOSABS 0.10 11/30/2020         DIAGNOSTIC TESTING:     No results found. Assessment  1. Other iron deficiency anemia    2. Diarrhea, unspecified type    3. Gastroesophageal reflux disease, unspecified whether esophagitis present    4. Irritable bowel syndrome with diarrhea    5. History of hepatitis C    6. Positive FIT (fecal immunochemical test)        Plan    We will schedule for capsule endoscopy on him again    Continue iron as per hematology recommendations    Check CBC in 2 months    Follow me after that    I will prescribe this patient Questran 2 packets a day instructions were given how to use it avoid taking it when he has constipation    Do not take this with any other medications etc.    Patient had multiple concerns and questions were answered to his satisfaction and also discussed with his wife    Pt was advised in detail about some life style and dietary modifications. He was advised about avoidance of caffeine, nicotine and chocolate. Pt was also told to stay away from any kind of fast foods, soda pops.  He was also advised to avoid lots of spices, grease and fried food etc. Instructions were also given about trying to arrange the timing, quality and quantity of food. Instructions were given about using ample amount of fiber including dietary and supplemental fiber either metamucil, bennafiber or citrucell etc.  Pt was advised about drinking ample amount of water without any colors or chemicals. Stress was given about regular exercise. Pt has verbalized understanding and agreement to these modifications. The patient was instructed to start taking some OTC Probiotics products   These are available over the counter at the Pharmacy stores and Grocery stores  He was explained about the beneficial effects they have in the GI track  They will help to establish the good bacterial kimberli and will help with the digestion and bowel movements  The patient has verbalized understanding and agreement to this plan    More than half of patient's clinic visit time was spent in counseling about lifestyle and dietary modifications  Patient's  questions were answered in this regard as well  The patient has verbalized understanding and agreement     I communicated with the patient and/or health care decision maker about   Details of this discussion including any medical advice provided:YES      I affirm this is a Patient Initiated Episode with an Established Patient who has not had a related appointment within my department in the past 7 days or scheduled within the next 24 hours. Total Time: minutes: 21-30 minutes    Note: not billable if this call serves to triage the patient into an appointment for the relevant concern      Thank you for allowing me to participate in the care of Mr. America Pham. For any further questions please do not hesitate to contact me. I have reviewed and agree with the ROS entered by the MA/EDWARD.          Jose Rafael Castellon MD, [facility]  Board Certified in Gastroenterology and [address] [place] [address] Gastroenterology  Office #: [phone]

## 2021-01-05 NOTE — TELEPHONE ENCOUNTER
Follow up after VV today with Dr OLIVIER Jones unable to speak with patient - no answer and no voicemail. Will mail copy of lab order to address in chart.

## 2021-01-07 ENCOUNTER — TELEPHONE (OUTPATIENT)
Dept: GASTROENTEROLOGY | Age: 71
End: 2021-01-07

## 2021-01-07 NOTE — TELEPHONE ENCOUNTER
Per Dr Darion Poole, pt to have Capsule Endoscopy. Pt has Medicare insurance. Spoke to pt's wife, scheduled pt for Jan 21st d/t availability of equipment. Did send instructions via oDesk and wife agrees to review and will touch base with her to answer any questions.

## 2021-01-08 ENCOUNTER — HOSPITAL ENCOUNTER (OUTPATIENT)
Dept: PAIN MANAGEMENT | Age: 71
Discharge: HOME | End: 2021-01-08
Payer: MEDICARE

## 2021-01-08 DIAGNOSIS — G62.9 PERIPHERAL POLYNEUROPATHY: ICD-10-CM

## 2021-01-08 DIAGNOSIS — Z51.81 ENCOUNTER FOR MEDICATION MONITORING: Chronic | ICD-10-CM

## 2021-01-08 DIAGNOSIS — G89.29 ENCOUNTER FOR CHRONIC PAIN MANAGEMENT: ICD-10-CM

## 2021-01-08 DIAGNOSIS — M51.36 DEGENERATIVE DISC DISEASE, LUMBAR: Chronic | ICD-10-CM

## 2021-01-08 DIAGNOSIS — M47.26 OSTEOARTHRITIS OF SPINE WITH RADICULOPATHY, LUMBAR REGION: ICD-10-CM

## 2021-01-08 DIAGNOSIS — M46.92 CERVICAL SPONDYLITIS (HCC): Chronic | ICD-10-CM

## 2021-01-08 DIAGNOSIS — Z98.1 S/P CERVICAL SPINAL FUSION: Primary | Chronic | ICD-10-CM

## 2021-01-08 DIAGNOSIS — M47.816 OSTEOARTHRITIS OF LUMBAR SPINE, UNSPECIFIED SPINAL OSTEOARTHRITIS COMPLICATION STATUS: ICD-10-CM

## 2021-01-08 DIAGNOSIS — E53.8 VITAMIN B 12 DEFICIENCY: ICD-10-CM

## 2021-01-08 DIAGNOSIS — M54.16 LUMBAR RADICULOPATHY: Chronic | ICD-10-CM

## 2021-01-08 DIAGNOSIS — M47.816 LUMBAR SPONDYLOSIS: Chronic | ICD-10-CM

## 2021-01-08 DIAGNOSIS — M48.061 SPINAL STENOSIS OF LUMBAR REGION WITHOUT NEUROGENIC CLAUDICATION: Chronic | ICD-10-CM

## 2021-01-08 PROCEDURE — 99442 PR PHYS/QHP TELEPHONE EVALUATION 11-20 MIN: CPT | Performed by: NURSE PRACTITIONER

## 2021-01-08 PROCEDURE — 99213 OFFICE O/P EST LOW 20 MIN: CPT

## 2021-01-08 RX ORDER — CYANOCOBALAMIN 1000 UG/ML
1000 INJECTION INTRAMUSCULAR; SUBCUTANEOUS
Qty: 4 ML | Refills: 0 | Status: SHIPPED | OUTPATIENT
Start: 2021-01-08 | End: 2021-06-09 | Stop reason: SDUPTHER

## 2021-01-08 RX ORDER — MORPHINE SULFATE 60 MG/1
60 TABLET, FILM COATED, EXTENDED RELEASE ORAL 2 TIMES DAILY
Qty: 60 TABLET | Refills: 0 | Status: SHIPPED | OUTPATIENT
Start: 2021-01-08 | End: 2021-02-04 | Stop reason: SDUPTHER

## 2021-01-08 RX ORDER — SYRINGE W-NEEDLE,DISPOSAB,3 ML 25GX5/8"
SYRINGE, EMPTY DISPOSABLE MISCELLANEOUS
Qty: 50 EACH | Refills: 2 | Status: SHIPPED | OUTPATIENT
Start: 2021-01-08

## 2021-01-08 RX ORDER — OXYCODONE HYDROCHLORIDE AND ACETAMINOPHEN 5; 325 MG/1; MG/1
1 TABLET ORAL EVERY 8 HOURS
Qty: 90 TABLET | Refills: 0 | Status: SHIPPED | OUTPATIENT
Start: 2021-01-08 | End: 2021-02-04 | Stop reason: SDUPTHER

## 2021-01-08 ASSESSMENT — ENCOUNTER SYMPTOMS
GASTROINTESTINAL NEGATIVE: 1
BACK PAIN: 1
COUGH: 1

## 2021-01-08 NOTE — PROGRESS NOTES
Magda 89 PROGRESS NOTE      Patient  completed []  video visit   [x]   phone call:11      Minutes :   to  review Medication Agreement    Location:  Provider:  working from    [x]    home    []   [facility] - ANALIA MCKEON , patient at home     Chief Complaint: low back pain    He c/o low back pain which radiates down his legs, He has no history of lumbar surgery but has had 3 cervical surgeries, He states not sleeping well lately,  He has peripheral neuropathy and has numbness in his feet,  His blood sugars are ranging 114-120, No Ed visits,    Back Pain  This is a chronic problem. The problem occurs constantly. The problem is unchanged. The pain is present in the lumbar spine. The quality of the pain is described as aching. Radiates to: legs. The pain is at a severity of 4/10. The pain is moderate. The pain is the same all the time. Exacerbated by: certain positions. Associated symptoms include headaches and numbness. He has tried analgesics (rest) for the symptoms.          Treatment goals:  Functional status: to be normal      Aberrancy:   Any alcoholic beverages   no         Any illegal drugs   no      Analgesia:    4                 Adverse  Effects :none      ADL;s :stretching    Data:    When was thelast UDS:    3-         Was the UDS appropriate:yes but no metabolite for oxycodone      Record/Diagnostics Review:      As above, I did review the imaging     3/14/2020 10:00 PM - Raimundo, Conchita Incoming Lab Results From [place]    Component Value Ref Range & Units Status Collected Lab   Pain Management Drug Panel Interp, Urine Consistent   Final 03/11/2020  3:25 PM [facility]   (NOTE)   ________________________________________________________________   DRUGS EXPECTED:   OXYCODONE [3/11/20]   MORPHINE [3/11/20]   ________________________________________________________________   CONSISTENT with medications provided:   OXYCODONE : based on oxycodone   MORPHINE : based on morphine ________________________________________________________________   INTERPRETIVE INFORMATION: Pain Mgt Grigsby, Mass Spec/EMIT, Ur,                            Interp   Interpretation depends on accuracy and completeness of patient                 EXAMINATION:   MRI OF THE LUMBAR SPINE WITHOUT CONTRAST, 2/16/2018 9:36 pm       TECHNIQUE:   Multiplanar multisequence MRI of the lumbar spine was performed without the   administration of intravenous contrast.       COMPARISON:   March 2010       HISTORY:   ORDERING SYSTEM PROVIDED HISTORY: Degenerative disc disease, lumbar   TECHNOLOGIST PROVIDED HISTORY:   Ordering Physician Provided Reason for Exam: LUMBAR DDD, LUMBAR   OSTEOARTHRITIS WITH RADICULOPATHY, SPINAL STENOSIS OF LUMBAR REGION WITHOUT   NEUROGENIC CLAUDICATION   Additional signs and symptoms: PATIENT COMPLAINS OF MID TO LOWER BACK PAIN   AND BUTTOCK PAIN. AND THAT HIS FEET FALL ASLEEP. SYMPTOMS FOR THE PAST 8 TO   10 YEARS. NO KNOWN INJURY.  NO PREVIOUS LUMBAR SURGERIES.     FINDINGS:   BONES/ALIGNMENT: Multiple endplate Schmorl's node deformities are noted. There is no acute fracture or bone edema.  T11 hemangioma is noted.    Vertebral body height and alignment is stable.       SPINAL CORD: Conus terminates at T12-L1 and is grossly normal in appearance.       SOFT TISSUES: Detail of the aorta is limited.  There is suggested mild   enlargement with the transverse dimension of approximately 3.1 cm.  Dedicated   imaging of the aorta is recommended.       L1-L2: Diffuse disc bulging is noted with a small proximal foraminal   protrusion on the right.  Annular tear is noted.  Similar findings were noted   on the prior.  Facet hypertrophic changes are noted.       L2-L3: Minimal disc bulging is noted diffusely. Abner Ades is a questionable   small left foraminal protrusion laterally.  There is minimal narrowing of the   left foramen.  Facet hypertrophic changes are noted.  Findings are stable     Chronic Pain > 80 MEDD: Co-prescribed Naloxone., Obtained or confirmed \"Medication Contract\" on file. Mayur Vila, APRN - CNP)  Review ofOARRS does not show any aberrant prescription behavior. Medication is helping the patient stay active. Patient denies any side effects and reports adequate analgesia. No sign of misuse/abuse.             Past Medical History:   Diagnosis Date    Anemia     Arthritis     osteoarthritis    Colon polyps 10/08/2019    tubular adenoma x2    Encounter for chronic pain management     Essential hypertension 05/12/2020    Hepatitis B core antibody positive     History of blood transfusion     History of hepatitis C     Hyperlipidemia     Iron (Fe) deficiency anemia     Positive FIT (fecal immunochemical test)     Type 2 diabetes mellitus with hyperglycemia, without long-term current use of insulin ([address] 05/12/2020    Type 2 diabetes mellitus with microalbuminuria, without long-term current use of insulin ([address] 07/13/2020    Wears dentures     Wears glasses        Past Surgical History:   Procedure Laterality Date    CERVICAL SPINE SURGERY  1977    cervical spine three times, has plate    CHOLECYSTECTOMY  03/22/2019    COLONOSCOPY  01/25/2015    10 yr recall, hemorrhoids    COLONOSCOPY N/A 10/08/2019    tubular adenoma x2    DENTAL SURGERY  10/2015    all teeth extracted    HERNIA REPAIR  08/07/2020    lap robotic with mesh    HERNIA REPAIR N/A 08/07/2020    HERNIA INCISIONAL REPAIR LAPAROSCOPIC ROBOTIC WITH MESH performed by Devan Morgan DO at [facility]     UMBILICAL HERNIA REPAIR  3022-19    UPPER GASTROINTESTINAL ENDOSCOPY  01/25/2015    UPPER GASTROINTESTINAL ENDOSCOPY N/A 10/08/2019    EGD BIOPSY performed by Marilee Alcantar MD at [facility]       Allergies   Allergen Reactions   Sol Gee [Aspirin]       iron deficiency anemia , requiring iron infusions and transfusions    Iron      Pill- constipation, abdominal pain  Nsaids       iron deficiency anemia, history of bleeding ulcers          Current Outpatient Medications:     morphine (MS CONTIN) 60 MG extended release tablet, Take 1 tablet by mouth 2 times daily for 30 days. , Disp: 60 tablet, Rfl: 0    oxyCODONE-acetaminophen (PERCOCET) 5-325 MG per tablet, Take 1 tablet by mouth every 8 hours for 30 days. , Disp: 90 tablet, Rfl: 0    metoprolol tartrate (LOPRESSOR) 25 MG tablet, Take 1 tablet by mouth 2 times daily, Disp: 60 tablet, Rfl: 5    Probiotic Product (PROBIOTIC-10 PO), Take by mouth, Disp: , Rfl:     pregabalin (LYRICA) 150 MG capsule, Take 1 capsule by mouth 3 times daily for 90 days. , Disp: 90 capsule, Rfl: 2    pantoprazole (PROTONIX) 40 MG tablet, Take 1 tablet by mouth daily, Disp: 90 tablet, Rfl: 1    metFORMIN (GLUCOPHAGE) 1000 MG tablet, Take 1 tablet by mouth 2 times daily (with meals), Disp: 180 tablet, Rfl: 1    glipiZIDE (GLUCOTROL) 5 MG tablet, Take 1 tablet by mouth every morning Keep fasting morning blood glucose .   If blood glucose below 80, you need to stop glipizide, Disp: 90 tablet, Rfl: 3    pravastatin (PRAVACHOL) 40 MG tablet, Take 1 tablet by mouth every evening Stop Gemfibrozil, Disp: 90 tablet, Rfl: 3    loperamide (RA ANTI-DIARRHEAL) 2 MG capsule, Take 1 capsule by mouth 4 times daily as needed for Diarrhea, Disp: 112 capsule, Rfl: 2    cholestyramine (QUESTRAN) 4 g packet, Take 1 packet by mouth 2 times daily, Disp: 90 packet, Rfl: 3    baclofen (LIORESAL) 10 MG tablet, Take 1 tablet by mouth 3 times daily as needed (MUSCLE SPASMS) Causes sedation, do not drive while taking this medication, Disp: 90 tablet, Rfl: 0    lidocaine (LIDODERM) 5 %, Place 1 patch onto the skin daily 12 hours on, 12 hours off., Disp: 30 patch, Rfl: 3    lisinopril-hydroCHLOROthiazide (PRINZIDE;ZESTORETIC) 10-12.5 MG per tablet, TK 1 T PO QD, Disp: 90 tablet, Rfl: 3   TRUE METRIX BLOOD GLUCOSE TEST strip, CHECK BLOOD SUGAR BID, Disp: 200 each, Rfl: 3    TRUEplus Lancets 30G MISC, Test blood glucose twice a day, Disp: 200 each, Rfl: 3    Alcohol Swabs (B-D SINGLE USE SWABS REGULAR) PADS, USE TO CHECK BLOOD SUGAR TWICE A DAY, Disp: 200 each, Rfl: 3    cyanocobalamin 1000 MCG/ML injection, Inject 1 mL into the muscle every 7 days Call for next refill which will be monthly for life, Disp: 4 mL, Rfl: 0    Syringe/Needle, Disp, (SYRINGE 3CC/25GX1\") 25G X 1\" 3 ML MISC, To be used with B12 injections, Disp: 50 each, Rfl: 2    Blood Glucose Monitoring Suppl (TRUE METRIX METER) w/Device KIT, USE AS DIRECTED TO TEST BLOOD SUGAR, Disp: , Rfl:     hydrOXYzine (VISTARIL) 25 MG capsule, hydroxyzine pamoate 25 mg capsule, Disp: , Rfl:     BD INTEGRA SYRINGE 25G X 1\" 3 ML MISC, , Disp: , Rfl: 4    Family History   Problem Relation Age of Onset    Diabetes Mother     Heart Disease Father        Social History     Socioeconomic History    Marital status:      Spouse name: Not on file    Number of children: Not on file    Years of education: Not on file    Highest education level: Not on file   Occupational History    Occupation: disabled   Social Needs    Financial resource strain: Not on file    Food insecurity     Worry: Not on file     Inability: Not on file   [facility] needs     Medical: Not on file     Non-medical: Not on file   Tobacco Use    Smoking status: Former Smoker     Packs/day: 0.50     Years: 45.00     Pack years: 22.50     Types: Cigarettes     Quit date: 2015     Years since quittin.0    Smokeless tobacco: Never Used    Tobacco comment: on chantix 11-6-15   12-7-15   Substance and Sexual Activity    Alcohol use: No    Drug use: No    Sexual activity: Yes     Partners: Female   Lifestyle    Physical activity     Days per week: Not on file     Minutes per session: Not on file    Stress: Not on file   Relationships  Social connections     Talks on phone: Not on file     Gets together: Not on file     Attends Restorationist service: Not on file     Active member of club or organization: Not on file     Attends meetings of clubs or organizations: Not on file     Relationship status: Not on file    Intimate partner violence     Fear of current or ex partner: Not on file     Emotionally abused: Not on file     Physically abused: Not on file     Forced sexual activity: Not on file   Other Topics Concern    Not on file   Social History Narrative    Not on file         Review of Systems:  Review of Systems   Constitution: Negative. HENT: Negative. Eyes:        Glasses   Respiratory: Positive for cough. Endocrine:        Blood sugar 114-120   Hematologic/Lymphatic: Negative. Skin: Positive for rash. Musculoskeletal: Positive for back pain and joint pain. Shoulders   Gastrointestinal: Negative. Genitourinary: Negative. Neurological: Positive for headaches and numbness. Psychiatric/Behavioral: Negative. Physical Exam:    Physical Exam  Skin:         Neurological:      Mental Status: He is alert and oriented to person, place, and time. Psychiatric:         Mood and Affect: Mood normal.         Thought Content: Thought content normal.           Assessment:    Problem List Items Addressed This Visit     S/P cervical spinal fusion - Primary (Chronic)    Peripheral polyneuropathy    Osteoarthritis of spine with radiculopathy, lumbar region    Osteoarthritis of lumbar spine    Lumbar spondylosis (Chronic)    Lumbar spinal stenosis (Chronic)    Lumbar radiculopathy (Chronic)    Encounter for medication monitoring (Chronic)    Encounter for chronic pain management              Treatment Plan:  DISCUSSION: Treatment options discussed withpatient and all questions answered to patient's satisfaction.      Possible side effects, risk of tolerance and or dependence and alternative treatments discussed

## 2021-01-11 ENCOUNTER — HOSPITAL ENCOUNTER (OUTPATIENT)
Age: 71
Discharge: HOME | End: 2021-01-11
Payer: MEDICARE

## 2021-01-11 DIAGNOSIS — I10 ESSENTIAL HYPERTENSION: ICD-10-CM

## 2021-01-11 DIAGNOSIS — E55.9 VITAMIN D DEFICIENCY: ICD-10-CM

## 2021-01-11 DIAGNOSIS — E55.9 VITAMIN D DEFICIENCY: Primary | ICD-10-CM

## 2021-01-11 DIAGNOSIS — E11.42 TYPE 2 DIABETES MELLITUS WITH DIABETIC POLYNEUROPATHY, WITHOUT LONG-TERM CURRENT USE OF INSULIN (HCC): ICD-10-CM

## 2021-01-11 LAB
ALBUMIN SERPL-MCNC: 3.8 G/DL (ref 3.5–5.2)
ALBUMIN/GLOBULIN RATIO: ABNORMAL (ref 1–2.5)
ALP BLD-CCNC: 79 U/L (ref 40–129)
ALT SERPL-CCNC: 18 U/L (ref 5–41)
ANION GAP SERPL CALCULATED.3IONS-SCNC: 9 MMOL/L (ref 9–17)
AST SERPL-CCNC: 19 U/L
BILIRUB SERPL-MCNC: 0.27 MG/DL (ref 0.3–1.2)
BUN BLDV-MCNC: 16 MG/DL (ref 8–23)
BUN/CREAT BLD: ABNORMAL (ref 9–20)
CALCIUM SERPL-MCNC: 9 MG/DL (ref 8.6–10.4)
CHLORIDE BLD-SCNC: 101 MMOL/L (ref 98–107)
CO2: 26 MMOL/L (ref 20–31)
CREAT SERPL-MCNC: 0.75 MG/DL (ref 0.7–1.2)
ESTIMATED AVERAGE GLUCOSE: 123 MG/DL
GFR AFRICAN AMERICAN: >60 ML/MIN
GFR NON-AFRICAN AMERICAN: >60 ML/MIN
GFR SERPL CREATININE-BSD FRML MDRD: ABNORMAL ML/MIN/{1.73_M2}
GFR SERPL CREATININE-BSD FRML MDRD: ABNORMAL ML/MIN/{1.73_M2}
GLUCOSE BLD-MCNC: 149 MG/DL (ref 70–99)
HBA1C MFR BLD: 5.9 % (ref 4–6)
HCT VFR BLD CALC: 35.8 % (ref 41–53)
HEMOGLOBIN: 11.6 G/DL (ref 13.5–17.5)
MCH RBC QN AUTO: 27.2 PG (ref 26–34)
MCHC RBC AUTO-ENTMCNC: 32.4 G/DL (ref 31–37)
MCV RBC AUTO: 83.8 FL (ref 80–100)
NRBC AUTOMATED: ABNORMAL PER 100 WBC
PDW BLD-RTO: 15.1 % (ref 11.5–14.9)
PLATELET # BLD: 242 K/UL (ref 150–450)
PMV BLD AUTO: 7.1 FL (ref 6–12)
POTASSIUM SERPL-SCNC: 4.7 MMOL/L (ref 3.7–5.3)
RBC # BLD: 4.28 M/UL (ref 4.5–5.9)
SODIUM BLD-SCNC: 136 MMOL/L (ref 135–144)
TOTAL PROTEIN: 6.9 G/DL (ref 6.4–8.3)
TSH SERPL DL<=0.05 MIU/L-ACNC: 0.94 MIU/L (ref 0.3–5)
VITAMIN D 25-HYDROXY: 11.2 NG/ML (ref 30–100)
WBC # BLD: 6.9 K/UL (ref 3.5–11)

## 2021-01-11 PROCEDURE — 80053 COMPREHEN METABOLIC PANEL: CPT

## 2021-01-11 PROCEDURE — 36415 COLL VENOUS BLD VENIPUNCTURE: CPT

## 2021-01-11 PROCEDURE — 84443 ASSAY THYROID STIM HORMONE: CPT

## 2021-01-11 PROCEDURE — 82306 VITAMIN D 25 HYDROXY: CPT

## 2021-01-11 PROCEDURE — 83036 HEMOGLOBIN GLYCOSYLATED A1C: CPT

## 2021-01-11 PROCEDURE — 85027 COMPLETE CBC AUTOMATED: CPT

## 2021-01-11 RX ORDER — ERGOCALCIFEROL 1.25 MG/1
50000 CAPSULE ORAL WEEKLY
Qty: 12 CAPSULE | Refills: 0 | Status: SHIPPED | OUTPATIENT
Start: 2021-01-11 | End: 2021-04-07 | Stop reason: ALTCHOICE

## 2021-01-11 NOTE — RESULT ENCOUNTER NOTE
ABNORMAL. Please notify patient & needs to schedule his follow up appt. Return in about 3 months (around 3/29/2021) for ALWAYS NEEDS 30 MIN. APPT, **POC A1C, DM2, HTN, HLP, LABS F/U, w/ref to GI&neurology, needs stress test done per cardio    IMPROVED DIABETES, A1C 5.9, Blood Glucose 149  VERY LOW VITAMIN D, Vitamin D very low, new prescription for high-dose vitamin D weekly for 3 months sent at the pharmacy, needs to take it with food.    ANEMIA IS WORSENING, follow up WITH HEME/ONC  Otherwise labs within normal limits          Future Appointments  1/21/2021  8:00 AM    SCHEDULE, [facility]   [facility]  2/3/2021   10:00 AM   Willa Will MD          [address] [place] [address]  2/4/2021   2:30 PM    ELANA Catherine* [address] [facility] [address]  3/19/2021  10:00 AM   Madi Ruiz MD [address] [place] [address]  9/24/2021  11:30 AM   Delonte Dyson MD     [facility]

## 2021-01-21 ENCOUNTER — NURSE ONLY (OUTPATIENT)
Dept: GASTROENTEROLOGY | Age: 71
End: 2021-01-21
Payer: MEDICARE

## 2021-01-21 VITALS — TEMPERATURE: 97.8 F

## 2021-01-21 DIAGNOSIS — D64.9 ANEMIA, UNSPECIFIED TYPE: ICD-10-CM

## 2021-01-21 NOTE — PROGRESS NOTES
Patient presents for capsule endoscopy. He read and signed consent form. Reviewed procedure and diet restrictions, questions answered. Belt applied to abdomen, , transmitter attached, 6 drops simethicone given, patient swallowed capsule at 8:00am without difficulty. Patient will call with any questions and return at 4:00pm. Patient instructed, no MRI until positive verification that they expell capsule. Olympus capsule lot # [id number] expiration date 11/24/2021.

## 2021-01-25 ENCOUNTER — HOSPITAL ENCOUNTER (OUTPATIENT)
Age: 71
Discharge: HOME | End: 2021-01-25
Payer: MEDICARE

## 2021-01-25 ENCOUNTER — TELEPHONE (OUTPATIENT)
Dept: GASTROENTEROLOGY | Age: 71
End: 2021-01-25

## 2021-01-25 DIAGNOSIS — E53.8 B12 DEFICIENCY: ICD-10-CM

## 2021-01-25 DIAGNOSIS — D50.8 IRON DEFICIENCY ANEMIA SECONDARY TO INADEQUATE DIETARY IRON INTAKE: ICD-10-CM

## 2021-01-25 DIAGNOSIS — D64.9 ANEMIA, UNSPECIFIED TYPE: ICD-10-CM

## 2021-01-25 DIAGNOSIS — R51.9 WORSENING HEADACHES: ICD-10-CM

## 2021-01-25 DIAGNOSIS — D50.0 IRON DEFICIENCY ANEMIA DUE TO CHRONIC BLOOD LOSS: ICD-10-CM

## 2021-01-25 LAB
ABSOLUTE EOS #: 0.07 K/UL (ref 0–0.4)
ABSOLUTE IMMATURE GRANULOCYTE: ABNORMAL K/UL (ref 0–0.3)
ABSOLUTE LYMPH #: 1.63 K/UL (ref 1–4.8)
ABSOLUTE MONO #: 0.48 K/UL (ref 0.1–1.3)
ANION GAP SERPL CALCULATED.3IONS-SCNC: 10 MMOL/L (ref 9–17)
BASOPHILS # BLD: 1 % (ref 0–2)
BASOPHILS ABSOLUTE: 0.07 K/UL (ref 0–0.2)
BUN BLDV-MCNC: 15 MG/DL (ref 8–23)
BUN/CREAT BLD: ABNORMAL (ref 9–20)
CALCIUM SERPL-MCNC: 9.2 MG/DL (ref 8.6–10.4)
CHLORIDE BLD-SCNC: 97 MMOL/L (ref 98–107)
CO2: 27 MMOL/L (ref 20–31)
CREAT SERPL-MCNC: 0.8 MG/DL (ref 0.7–1.2)
DIFFERENTIAL TYPE: ABNORMAL
EOSINOPHILS RELATIVE PERCENT: 1 % (ref 0–4)
FERRITIN: 9 UG/L (ref 30–400)
FOLATE: 8.3 NG/ML
GFR AFRICAN AMERICAN: >60 ML/MIN
GFR NON-AFRICAN AMERICAN: >60 ML/MIN
GFR SERPL CREATININE-BSD FRML MDRD: ABNORMAL ML/MIN/{1.73_M2}
GFR SERPL CREATININE-BSD FRML MDRD: ABNORMAL ML/MIN/{1.73_M2}
GLUCOSE BLD-MCNC: 188 MG/DL (ref 70–99)
HCT VFR BLD CALC: 35.6 % (ref 41–53)
HEMOGLOBIN: 11.2 G/DL (ref 13.5–17.5)
IMMATURE GRANULOCYTES: ABNORMAL %
IRON SATURATION: 8 % (ref 20–55)
IRON: 29 UG/DL (ref 59–158)
LYMPHOCYTES # BLD: 24 % (ref 24–44)
MCH RBC QN AUTO: 25.6 PG (ref 26–34)
MCHC RBC AUTO-ENTMCNC: 31.4 G/DL (ref 31–37)
MCV RBC AUTO: 81.5 FL (ref 80–100)
MONOCYTES # BLD: 7 % (ref 1–7)
MORPHOLOGY: ABNORMAL
NRBC AUTOMATED: ABNORMAL PER 100 WBC
PDW BLD-RTO: 15.2 % (ref 11.5–14.9)
PLATELET # BLD: 274 K/UL (ref 150–450)
PLATELET ESTIMATE: ABNORMAL
PMV BLD AUTO: 7.4 FL (ref 6–12)
POTASSIUM SERPL-SCNC: 4.3 MMOL/L (ref 3.7–5.3)
RBC # BLD: 4.37 M/UL (ref 4.5–5.9)
RBC # BLD: ABNORMAL 10*6/UL
SEG NEUTROPHILS: 67 % (ref 36–66)
SEGMENTED NEUTROPHILS ABSOLUTE COUNT: 4.55 K/UL (ref 1.3–9.1)
SODIUM BLD-SCNC: 134 MMOL/L (ref 135–144)
TOTAL IRON BINDING CAPACITY: 371 UG/DL (ref 250–450)
UNSATURATED IRON BINDING CAPACITY: 342 UG/DL (ref 112–347)
VITAMIN B-12: 469 PG/ML (ref 232–1245)
WBC # BLD: 6.8 K/UL (ref 3.5–11)
WBC # BLD: ABNORMAL 10*3/UL

## 2021-01-25 PROCEDURE — 83540 ASSAY OF IRON: CPT

## 2021-01-25 PROCEDURE — 36415 COLL VENOUS BLD VENIPUNCTURE: CPT

## 2021-01-25 PROCEDURE — 82728 ASSAY OF FERRITIN: CPT

## 2021-01-25 PROCEDURE — 85025 COMPLETE CBC W/AUTO DIFF WBC: CPT

## 2021-01-25 PROCEDURE — 83550 IRON BINDING TEST: CPT

## 2021-01-25 PROCEDURE — 82746 ASSAY OF FOLIC ACID SERUM: CPT

## 2021-01-25 PROCEDURE — 82607 VITAMIN B-12: CPT

## 2021-01-25 PROCEDURE — 80048 BASIC METABOLIC PNL TOTAL CA: CPT

## 2021-01-25 RX ORDER — BACLOFEN 10 MG/1
TABLET ORAL
Qty: 90 TABLET | Refills: 0 | Status: SHIPPED | OUTPATIENT
Start: 2021-01-25 | End: 2021-04-10 | Stop reason: SDUPTHER

## 2021-01-25 NOTE — TELEPHONE ENCOUNTER
Pts last colon in October 2019 with Dr Pramod Alford; Recall :2-3 years due to polyps. Not due until October 2021. Recall in pt chart.

## 2021-02-03 ENCOUNTER — OFFICE VISIT (OUTPATIENT)
Dept: ONCOLOGY | Age: 71
End: 2021-02-03
Payer: MEDICARE

## 2021-02-03 VITALS
BODY MASS INDEX: 31.94 KG/M2 | WEIGHT: 216.3 LBS | TEMPERATURE: 98.2 F | HEART RATE: 94 BPM | SYSTOLIC BLOOD PRESSURE: 155 MMHG | DIASTOLIC BLOOD PRESSURE: 71 MMHG

## 2021-02-03 DIAGNOSIS — E53.8 B12 DEFICIENCY: ICD-10-CM

## 2021-02-03 DIAGNOSIS — D50.8 IRON DEFICIENCY ANEMIA SECONDARY TO INADEQUATE DIETARY IRON INTAKE: ICD-10-CM

## 2021-02-03 DIAGNOSIS — D50.0 IRON DEFICIENCY ANEMIA DUE TO CHRONIC BLOOD LOSS: Primary | ICD-10-CM

## 2021-02-03 DIAGNOSIS — K92.2 GASTROINTESTINAL HEMORRHAGE, UNSPECIFIED GASTROINTESTINAL HEMORRHAGE TYPE: ICD-10-CM

## 2021-02-03 DIAGNOSIS — B18.2 HEP C W/O COMA, CHRONIC (HCC): ICD-10-CM

## 2021-02-03 PROCEDURE — 3017F COLORECTAL CA SCREEN DOC REV: CPT | Performed by: INTERNAL MEDICINE

## 2021-02-03 PROCEDURE — G8427 DOCREV CUR MEDS BY ELIG CLIN: HCPCS | Performed by: INTERNAL MEDICINE

## 2021-02-03 PROCEDURE — 1123F ACP DISCUSS/DSCN MKR DOCD: CPT | Performed by: INTERNAL MEDICINE

## 2021-02-03 PROCEDURE — 99211 OFF/OP EST MAY X REQ PHY/QHP: CPT | Performed by: INTERNAL MEDICINE

## 2021-02-03 PROCEDURE — 4040F PNEUMOC VAC/ADMIN/RCVD: CPT | Performed by: INTERNAL MEDICINE

## 2021-02-03 PROCEDURE — G8484 FLU IMMUNIZE NO ADMIN: HCPCS | Performed by: INTERNAL MEDICINE

## 2021-02-03 PROCEDURE — 99214 OFFICE O/P EST MOD 30 MIN: CPT | Performed by: INTERNAL MEDICINE

## 2021-02-03 PROCEDURE — 1036F TOBACCO NON-USER: CPT | Performed by: INTERNAL MEDICINE

## 2021-02-03 PROCEDURE — G8417 CALC BMI ABV UP PARAM F/U: HCPCS | Performed by: INTERNAL MEDICINE

## 2021-02-03 RX ORDER — SODIUM CHLORIDE 0.9 % (FLUSH) 0.9 %
10 SYRINGE (ML) INJECTION PRN
Status: CANCELLED | OUTPATIENT
Start: 2021-02-03

## 2021-02-03 RX ORDER — SODIUM CHLORIDE 0.9 % (FLUSH) 0.9 %
5 SYRINGE (ML) INJECTION PRN
Status: CANCELLED | OUTPATIENT
Start: 2021-02-03

## 2021-02-03 RX ORDER — HEPARIN SODIUM (PORCINE) LOCK FLUSH IV SOLN 100 UNIT/ML 100 UNIT/ML
500 SOLUTION INTRAVENOUS PRN
Status: CANCELLED | OUTPATIENT
Start: 2021-02-03

## 2021-02-03 RX ORDER — EPINEPHRINE 1 MG/ML
0.3 INJECTION, SOLUTION, CONCENTRATE INTRAVENOUS PRN
Status: CANCELLED | OUTPATIENT
Start: 2021-02-03

## 2021-02-03 RX ORDER — SODIUM CHLORIDE 9 MG/ML
INJECTION, SOLUTION INTRAVENOUS CONTINUOUS
Status: CANCELLED | OUTPATIENT
Start: 2021-02-03

## 2021-02-03 RX ORDER — DIPHENHYDRAMINE HYDROCHLORIDE 50 MG/ML
50 INJECTION INTRAMUSCULAR; INTRAVENOUS ONCE
Status: CANCELLED | OUTPATIENT
Start: 2021-02-03 | End: 2021-02-03

## 2021-02-03 RX ORDER — METHYLPREDNISOLONE SODIUM SUCCINATE 125 MG/2ML
125 INJECTION, POWDER, LYOPHILIZED, FOR SOLUTION INTRAMUSCULAR; INTRAVENOUS ONCE
Status: CANCELLED | OUTPATIENT
Start: 2021-02-03 | End: 2021-02-03

## 2021-02-03 NOTE — PROGRESS NOTES
Subjective:   PCP: Annetta Bucio MD       Chief Complaint   Patient presents with    Follow-up     review status of disease    Discuss Labs         INTERIM HISTORY: Patient presents to the clinic for hematology follow up and discuss lab work. He underwent capsule endoscopy 2 weeks ago and has not received results. He notes increase in fatigue. He had virtual visit with PCP last week. Denies any bloody bowel movements. Patient cannot take oral iron. Denies fever chills    REVIEW OF SYSTEMS:   General: No fever or night sweats. Weight is stable. +fatigue   ENT: No double or blurred vision, no hearing problem, no dysphagia or sore throat   Respiratory: No chest pain, no shortness of breath, no cough or hemoptysis. Cardiovascular: Denies chest pain, PND or orthopnea. No L E swelling or palpitations. Gastrointestinal: No nausea or vomiting, abdominal pain, or constipation. +hematochezia - resolved; +diarrhea and foul smell improved  Genitourinary: Denies dysuria, hematuria, frequency, urgency or incontinence. Neurological: Denies headaches, decreased LOC, no sensory or motor focal deficits. Musculoskeletal: No arthralgia no back pain or joint swelling. Skin: There are no rashes or bleeding. Psych: Denies hallucinations or intentions to harm self      PHYSICAL EXAM:   Vitals: BP (!) 155/71   Pulse 94   Temp 98.2 °F (36.8 °C) (Oral)   Wt 216 lb 4.8 oz (98.1 kg)   BMI 31.94 kg/m²   General appearance: alert and cooperative with exam  HEENT: Head: Normocephalic, no lesions, without obvious abnormality.   Neck: no adenopathy  Lungs: clear to auscultation bilaterally  Heart: regular rate and rhythm, S1, S2 normal, no murmur, click, rub or gallop  Abdomen: soft, non-tender; bowel sounds normal; no masses,  no organomegaly  Extremities: extremities normal, atraumatic, no cyanosis or edema  Neurologic: Mental status: Alert, oriented, thought content appropriate      REVIEW OF LABORATORY DATA:   Lab Results   Component Value Date    WBC 6.8 01/25/2021    HGB 11.2 (L) 01/25/2021    HCT 35.6 (L) 01/25/2021    MCV 81.5 01/25/2021     01/25/2021       Chemistry        Component Value Date/Time     (L) 01/25/2021 0830    K 4.3 01/25/2021 0830    CL 97 (L) 01/25/2021 0830    CO2 27 01/25/2021 0830    BUN 15 01/25/2021 0830    CREATININE 0.80 01/25/2021 0830        Component Value Date/Time    CALCIUM 9.2 01/25/2021 0830    ALKPHOS 79 01/11/2021 0833    AST 19 01/11/2021 0833    ALT 18 01/11/2021 0833    BILITOT 0.27 (L) 01/11/2021 0833        Lab Results   Component Value Date    EKXZEGAK17 197 01/25/2021         Lab Results   Component Value Date    IRON 29 (L) 01/25/2021    TIBC 371 01/25/2021    FERRITIN 9 (L) 01/25/2021         IMPRESSION:   81year old male with history of bleeding ulcer back in 2015 and iron def, now with iron def anemia, and stool occult stools    -s/p IV iron with improvement in iron stores and hemoglobin  -EGD and colonoscopy 10/2019 did not show active bleeding, still no active source of iron def  -s/p GI in 12/3/19, concern for still blood loss, iron def, no plasns for repeat capsule video study, he has completed treatment for hepatitis C with Tiffani Tariq.  -repeat iron studies are consistent with iron deficiency  -transfuse if Hbg is less than 7.0    -B12 deficiency, patient started on B12 shots 7/2020    PLAN:   Personally reviewed results of lab work-up and other relevant clinical data. Reviewed recommendations from the GI office. Patient underwent capsule endoscopy about 3 weeks ago. We are awaiting results. Patient iron stores have dropped significantly. He is also complaining of being tired. Patient cannot tolerate oral iron. Will order IV iron for the patient. Continue B12 supplementation  Follow-up on results of capsule endoscopy to determine etiology of iron deficiency. Patient has had multiple stool occult blood positive testing  We will order for Injectafer.

## 2021-02-04 ENCOUNTER — HOSPITAL ENCOUNTER (OUTPATIENT)
Dept: PAIN MANAGEMENT | Age: 71
Discharge: HOME | End: 2021-02-04
Payer: MEDICARE

## 2021-02-04 DIAGNOSIS — G89.29 ENCOUNTER FOR CHRONIC PAIN MANAGEMENT: ICD-10-CM

## 2021-02-04 DIAGNOSIS — M48.061 SPINAL STENOSIS OF LUMBAR REGION WITHOUT NEUROGENIC CLAUDICATION: Chronic | ICD-10-CM

## 2021-02-04 DIAGNOSIS — M54.16 LUMBAR RADICULOPATHY: Chronic | ICD-10-CM

## 2021-02-04 DIAGNOSIS — M47.816 LUMBAR SPONDYLOSIS: Chronic | ICD-10-CM

## 2021-02-04 DIAGNOSIS — Z98.1 S/P CERVICAL SPINAL FUSION: Chronic | ICD-10-CM

## 2021-02-04 DIAGNOSIS — M47.816 OSTEOARTHRITIS OF LUMBAR SPINE, UNSPECIFIED SPINAL OSTEOARTHRITIS COMPLICATION STATUS: ICD-10-CM

## 2021-02-04 DIAGNOSIS — M51.36 DEGENERATIVE DISC DISEASE, LUMBAR: Chronic | ICD-10-CM

## 2021-02-04 DIAGNOSIS — Z51.81 ENCOUNTER FOR MEDICATION MONITORING: Chronic | ICD-10-CM

## 2021-02-04 DIAGNOSIS — M46.92 CERVICAL SPONDYLITIS (HCC): Chronic | ICD-10-CM

## 2021-02-04 DIAGNOSIS — M47.26 OSTEOARTHRITIS OF SPINE WITH RADICULOPATHY, LUMBAR REGION: ICD-10-CM

## 2021-02-04 PROCEDURE — 99213 OFFICE O/P EST LOW 20 MIN: CPT | Performed by: NURSE PRACTITIONER

## 2021-02-04 PROCEDURE — 99213 OFFICE O/P EST LOW 20 MIN: CPT

## 2021-02-04 RX ORDER — OXYCODONE HYDROCHLORIDE AND ACETAMINOPHEN 5; 325 MG/1; MG/1
1 TABLET ORAL EVERY 8 HOURS
Qty: 90 TABLET | Refills: 0 | Status: SHIPPED | OUTPATIENT
Start: 2021-02-07 | End: 2021-03-08 | Stop reason: SDUPTHER

## 2021-02-04 RX ORDER — PREGABALIN 150 MG/1
150 CAPSULE ORAL 3 TIMES DAILY
Qty: 90 CAPSULE | Refills: 2 | Status: SHIPPED | OUTPATIENT
Start: 2021-02-04 | End: 2021-05-12 | Stop reason: SDUPTHER

## 2021-02-04 RX ORDER — MORPHINE SULFATE 60 MG/1
60 TABLET, FILM COATED, EXTENDED RELEASE ORAL 2 TIMES DAILY
Qty: 60 TABLET | Refills: 0 | Status: SHIPPED | OUTPATIENT
Start: 2021-02-07 | End: 2021-03-08 | Stop reason: SDUPTHER

## 2021-02-04 ASSESSMENT — ENCOUNTER SYMPTOMS
BACK PAIN: 1
SHORTNESS OF BREATH: 0
CONSTIPATION: 0
COUGH: 0

## 2021-02-04 NOTE — PROGRESS NOTES
Patient completed a video visit today to review medication contract. Chief Complaint: back pain    PMH Pt c/o low back pain for many years. There is no known injury or surgery to the area. He does have a history of scoliosis. His last PT was over 4 years ago and he is not interested in repeating due to inefficacy. He does do home stretches every morning. He also had a LESI in 2013 that did not help. Back Pain  This is a chronic problem. The current episode started more than 1 year ago. The problem occurs constantly. The problem is unchanged. The pain is present in the lumbar spine. The quality of the pain is described as burning. Radiates to: into right buttock. The pain is at a severity of 4/10. The pain is moderate. The symptoms are aggravated by position and standing (walking). Associated symptoms include tingling. Pertinent negatives include no chest pain, fever, numbness or paresthesias. He has tried analgesics and bed rest for the symptoms. The treatment provided mild relief. Patient denies any new neurological symptoms. No bowel or bladder incontinence, no weakness, and no falling. Pill count: appropriate due 2/7    Morphine equivalent: 142.5    Periodic Controlled Substance Monitoring: Possible medication side effects, risk of tolerance/dependence & alternative treatments discussed., No signs of potential drug abuse or diversion identified., Obtaining appropriate analgesic effect of treatment.  VEE Morgan - CNP)      Past Medical History:   Diagnosis Date    Anemia     Arthritis     osteoarthritis    Colon polyps 10/08/2019    tubular adenoma x2    Encounter for chronic pain management     Essential hypertension 05/12/2020    Hepatitis B core antibody positive     History of blood transfusion     History of hepatitis C     Hyperlipidemia     Iron (Fe) deficiency anemia     Positive FIT (fecal immunochemical test)  Type 2 diabetes mellitus with hyperglycemia, without long-term current use of insulin ([facility] [address] 05/12/2020    Type 2 diabetes mellitus with microalbuminuria, without long-term current use of insulin ([facility] [address] 07/13/2020    Wears dentures     Wears glasses        Past Surgical History:   Procedure Laterality Date    CERVICAL SPINE SURGERY  1977    cervical spine three times, has plate    CHOLECYSTECTOMY  03/22/2019    COLONOSCOPY  01/25/2015    10 yr recall, hemorrhoids    COLONOSCOPY N/A 10/08/2019    tubular adenoma x2    DENTAL SURGERY  10/2015    all teeth extracted    HERNIA REPAIR  08/07/2020    lap robotic with mesh    HERNIA REPAIR N/A 08/07/2020    HERNIA INCISIONAL REPAIR LAPAROSCOPIC ROBOTIC WITH MESH performed by Cory Grace DO at [facility]     UMBILICAL HERNIA REPAIR  3022-19    UPPER GASTROINTESTINAL ENDOSCOPY  01/25/2015    UPPER GASTROINTESTINAL ENDOSCOPY N/A 10/08/2019    EGD BIOPSY performed by Brian Villagran MD at [facility]       Allergies   Allergen Reactions   Rona Samuel [Aspirin]       iron deficiency anemia , requiring iron infusions and transfusions    Iron      Pill- constipation, abdominal pain    Nsaids       iron deficiency anemia, history of bleeding ulcers          Current Outpatient Medications:     baclofen (LIORESAL) 10 MG tablet, TAKE 1 TABLET BY MOUTH THREE TIMES DAILY AS NEEDED FOR MUSCLE SPASMS,CAUSES SEDATION,DO NOT DRIVE WHILE TAKING THIS, Disp: 90 tablet, Rfl: 0    vitamin D (ERGOCALCIFEROL) 1.25 MG (89789 UT) CAPS capsule, Take 1 capsule by mouth once a week, Disp: 12 capsule, Rfl: 0    oxyCODONE-acetaminophen (PERCOCET) 5-325 MG per tablet, Take 1 tablet by mouth every 8 hours for 30 days. , Disp: 90 tablet, Rfl: 0    morphine (MS CONTIN) 60 MG extended release tablet, Take 1 tablet by mouth 2 times daily for 30 days. , Disp: 60 tablet, Rfl: 0   Syringe/Needle, Disp, (SYRINGE 3CC/25GX1\") 25G X 1\" 3 ML MISC, To be used with B12 injections, Disp: 50 each, Rfl: 2    cyanocobalamin 1000 MCG/ML injection, Inject 1 mL into the muscle every 7 days Call for next refill which will be monthly for life, Disp: 4 mL, Rfl: 0    loperamide (RA ANTI-DIARRHEAL) 2 MG capsule, Take 1 capsule by mouth 4 times daily as needed for Diarrhea, Disp: 112 capsule, Rfl: 2    cholestyramine (QUESTRAN) 4 g packet, Take 1 packet by mouth 2 times daily, Disp: 90 packet, Rfl: 3    lidocaine (LIDODERM) 5 %, Place 1 patch onto the skin daily 12 hours on, 12 hours off., Disp: 30 patch, Rfl: 3    lisinopril-hydroCHLOROthiazide (PRINZIDE;ZESTORETIC) 10-12.5 MG per tablet, TK 1 T PO QD, Disp: 90 tablet, Rfl: 3    metoprolol tartrate (LOPRESSOR) 25 MG tablet, Take 1 tablet by mouth 2 times daily, Disp: 60 tablet, Rfl: 5    Probiotic Product (PROBIOTIC-10 PO), Take by mouth, Disp: , Rfl:     pregabalin (LYRICA) 150 MG capsule, Take 1 capsule by mouth 3 times daily for 90 days. , Disp: 90 capsule, Rfl: 2    TRUE METRIX BLOOD GLUCOSE TEST strip, CHECK BLOOD SUGAR BID, Disp: 200 each, Rfl: 3    TRUEplus Lancets 30G MISC, Test blood glucose twice a day, Disp: 200 each, Rfl: 3    Alcohol Swabs (B-D SINGLE USE SWABS REGULAR) PADS, USE TO CHECK BLOOD SUGAR TWICE A DAY, Disp: 200 each, Rfl: 3    pantoprazole (PROTONIX) 40 MG tablet, Take 1 tablet by mouth daily, Disp: 90 tablet, Rfl: 1    metFORMIN (GLUCOPHAGE) 1000 MG tablet, Take 1 tablet by mouth 2 times daily (with meals), Disp: 180 tablet, Rfl: 1    glipiZIDE (GLUCOTROL) 5 MG tablet, Take 1 tablet by mouth every morning Keep fasting morning blood glucose .   If blood glucose below 80, you need to stop glipizide, Disp: 90 tablet, Rfl: 3    pravastatin (PRAVACHOL) 40 MG tablet, Take 1 tablet by mouth every evening Stop Gemfibrozil, Disp: 90 tablet, Rfl: 3   Blood Glucose Monitoring Suppl (TRUE METRIX METER) w/Device KIT, USE AS DIRECTED TO TEST BLOOD SUGAR, Disp: , Rfl:     hydrOXYzine (VISTARIL) 25 MG capsule, hydroxyzine pamoate 25 mg capsule, Disp: , Rfl:     Family History   Problem Relation Age of Onset    Diabetes Mother     Heart Disease Father        Social History     Socioeconomic History    Marital status:      Spouse name: Not on file    Number of children: Not on file    Years of education: Not on file    Highest education level: Not on file   Occupational History    Occupation: disabled   Social Needs    Financial resource strain: Not on file    Food insecurity     Worry: Not on file     Inability: Not on file   [employer] needs     Medical: Not on file     Non-medical: Not on file   Tobacco Use    Smoking status: Former Smoker     Packs/day: 0.50     Years: 45.00     Pack years: 22.50     Types: Cigarettes     Quit date: 2015     Years since quittin.1    Smokeless tobacco: Never Used    Tobacco comment: on chantix 11-6-15   12-7-15   Substance and Sexual Activity    Alcohol use: No    Drug use: No    Sexual activity: Yes     Partners: Female   Lifestyle    Physical activity     Days per week: Not on file     Minutes per session: Not on file    Stress: Not on file   Relationships    Social connections     Talks on phone: Not on file     Gets together: Not on file     Attends Religion service: Not on file     Active member of club or organization: Not on file     Attends meetings of clubs or organizations: Not on file     Relationship status: Not on file    Intimate partner violence     Fear of current or ex partner: Not on file     Emotionally abused: Not on file     Physically abused: Not on file     Forced sexual activity: Not on file   Other Topics Concern    Not on file   Social History Narrative    Not on file       Review of Systems:  Review of Systems   Constitution: Negative for chills and fever. Cardiovascular: Negative for chest pain and palpitations. Respiratory: Negative for cough and shortness of breath. Musculoskeletal: Positive for back pain. Gastrointestinal: Negative for constipation. Neurological: Positive for tingling. Negative for disturbances in coordination, loss of balance, numbness and paresthesias. Physical Exam:  There were no vitals taken for this visit. Physical Exam  HENT:      Head: Normocephalic. Pulmonary:      Effort: Pulmonary effort is normal.   Neurological:      Mental Status: He is alert.    Psychiatric:         Mood and Affect: Mood normal.         Behavior: Behavior normal.         Record/Diagnostics Review:    Last zoe 3/2020 and was appropriate     Assessment:  Problem List Items Addressed This Visit     Degenerative disc disease, lumbar (Chronic)    Relevant Medications    oxyCODONE-acetaminophen (PERCOCET) 5-325 MG per tablet (Start on 2/7/2021)    morphine (MS CONTIN) 60 MG extended release tablet (Start on 2/7/2021)    pregabalin (LYRICA) 150 MG capsule    Lumbar spondylosis (Chronic)    Relevant Medications    oxyCODONE-acetaminophen (PERCOCET) 5-325 MG per tablet (Start on 2/7/2021)    morphine (MS CONTIN) 60 MG extended release tablet (Start on 2/7/2021)    pregabalin (LYRICA) 150 MG capsule    Lumbar radiculopathy (Chronic)    Relevant Medications    oxyCODONE-acetaminophen (PERCOCET) 5-325 MG per tablet (Start on 2/7/2021)    morphine (MS CONTIN) 60 MG extended release tablet (Start on 2/7/2021)    pregabalin (LYRICA) 150 MG capsule    Lumbar spinal stenosis (Chronic)    Relevant Medications    oxyCODONE-acetaminophen (PERCOCET) 5-325 MG per tablet (Start on 2/7/2021)    pregabalin (LYRICA) 150 MG capsule    Encounter for medication monitoring (Chronic)    Relevant Medications    oxyCODONE-acetaminophen (PERCOCET) 5-325 MG per tablet (Start on 2/7/2021)    Cervical spondylitis (HCC) (Chronic)    Relevant Medications oxyCODONE-acetaminophen (PERCOCET) 5-325 MG per tablet (Start on 2/7/2021)    S/P cervical spinal fusion (Chronic)    Relevant Medications    oxyCODONE-acetaminophen (PERCOCET) 5-325 MG per tablet (Start on 2/7/2021)    Osteoarthritis of spine with radiculopathy, lumbar region    Relevant Medications    oxyCODONE-acetaminophen (PERCOCET) 5-325 MG per tablet (Start on 2/7/2021)    morphine (MS CONTIN) 60 MG extended release tablet (Start on 2/7/2021)    pregabalin (LYRICA) 150 MG capsule    Encounter for chronic pain management    Relevant Medications    oxyCODONE-acetaminophen (PERCOCET) 5-325 MG per tablet (Start on 2/7/2021)    pregabalin (LYRICA) 150 MG capsule    Osteoarthritis of lumbar spine    Relevant Medications    oxyCODONE-acetaminophen (PERCOCET) 5-325 MG per tablet (Start on 2/7/2021)    morphine (MS CONTIN) 60 MG extended release tablet (Start on 2/7/2021)    pregabalin (LYRICA) 150 MG capsule             Treatment Plan:  Patient relates current medications are helping the pain. Patient reports taking pain medications as prescribed, denies obtaining medications from different sources and denies use of illegal drugs. Patient denies side effects from medications like nausea, vomiting, constipation or drowsiness. Patient reports current activities of daily living are possible due to medications and would like to continue them. As always, we encourage daily stretching and strengthening exercises, and recommend minimizing use of pain medications unless patient cannot get through daily activities due to pain. Contract requirements met. Continue opioid therapy.  Script written for percocet  Follow up appointment made for 4 weeks Chris Hidalgo is a 78 y.o. male being evaluated by a Virtual Visit (video visit) encounter to address concerns as mentioned above. A caregiver was present when appropriate. Due to this being a TeleHealth encounter ([facility]), evaluation of the following organ systems was limited: Vitals/Constitutional/EENT/Resp/CV/GI//MS/Neuro/Skin/Heme-Lymph-Imm. Pursuant to the emergency declaration under the [address] [place] [address] and the [employer] Act, this Virtual Visit was conducted with patient's (and/or legal guardian's) consent, to reduce the patient's risk of exposure to COVID-19 and provide necessary medical care. The patient (and/or legal guardian) has also been advised to contact this office for worsening conditions or problems, and seek emergency medical treatment and/or call 911 if deemed necessary. Patient identification was verified at the start of the visit: Yes    Total time spent for this encounter: Not billed by time    Services were provided through a video synchronous discussion virtually to substitute for in-person clinic visit. Patient and provider were located at their individual homes. --VEE Brizuela CNP on 2/4/2021 at 3:29 PM    An electronic signature was used to authenticate this note.

## 2021-02-08 ENCOUNTER — TELEPHONE (OUTPATIENT)
Dept: ONCOLOGY | Age: 71
End: 2021-02-08

## 2021-02-08 NOTE — TELEPHONE ENCOUNTER
Called pt on 2/3/21 to schedule injectafer  No answer, no machine to leave message    Paperwork is in the peniding/LM box

## 2021-02-12 ENCOUNTER — HOSPITAL ENCOUNTER (OUTPATIENT)
Dept: INFUSION THERAPY | Age: 71
Discharge: HOME | End: 2021-02-12
Payer: MEDICARE

## 2021-02-12 VITALS
TEMPERATURE: 98.2 F | RESPIRATION RATE: 16 BRPM | HEART RATE: 85 BPM | SYSTOLIC BLOOD PRESSURE: 112 MMHG | DIASTOLIC BLOOD PRESSURE: 57 MMHG

## 2021-02-12 DIAGNOSIS — D50.0 IRON DEFICIENCY ANEMIA DUE TO CHRONIC BLOOD LOSS: Primary | ICD-10-CM

## 2021-02-12 DIAGNOSIS — B18.2 CHRONIC HEPATITIS C WITHOUT HEPATIC COMA (HCC): ICD-10-CM

## 2021-02-12 DIAGNOSIS — R19.5 POSITIVE FIT (FECAL IMMUNOCHEMICAL TEST): ICD-10-CM

## 2021-02-12 PROCEDURE — 96365 THER/PROPH/DIAG IV INF INIT: CPT

## 2021-02-12 PROCEDURE — 2580000003 HC RX 258: Performed by: INTERNAL MEDICINE

## 2021-02-12 PROCEDURE — 6360000002 HC RX W HCPCS: Performed by: INTERNAL MEDICINE

## 2021-02-12 RX ORDER — SODIUM CHLORIDE 9 MG/ML
INJECTION, SOLUTION INTRAVENOUS CONTINUOUS
Status: CANCELLED | OUTPATIENT
Start: 2021-02-19

## 2021-02-12 RX ORDER — DIPHENHYDRAMINE HYDROCHLORIDE 50 MG/ML
50 INJECTION INTRAMUSCULAR; INTRAVENOUS ONCE
Status: CANCELLED | OUTPATIENT
Start: 2021-02-19 | End: 2021-02-19

## 2021-02-12 RX ORDER — SODIUM CHLORIDE 9 MG/ML
INJECTION, SOLUTION INTRAVENOUS CONTINUOUS
Status: DISCONTINUED | OUTPATIENT
Start: 2021-02-12 | End: 2021-02-13 | Stop reason: HOSPADM

## 2021-02-12 RX ORDER — HEPARIN SODIUM (PORCINE) LOCK FLUSH IV SOLN 100 UNIT/ML 100 UNIT/ML
500 SOLUTION INTRAVENOUS PRN
Status: CANCELLED | OUTPATIENT
Start: 2021-02-19

## 2021-02-12 RX ORDER — EPINEPHRINE 1 MG/ML
0.3 INJECTION, SOLUTION, CONCENTRATE INTRAVENOUS PRN
Status: CANCELLED | OUTPATIENT
Start: 2021-02-19

## 2021-02-12 RX ORDER — SODIUM CHLORIDE 0.9 % (FLUSH) 0.9 %
5 SYRINGE (ML) INJECTION PRN
Status: CANCELLED | OUTPATIENT
Start: 2021-02-19

## 2021-02-12 RX ORDER — SODIUM CHLORIDE 0.9 % (FLUSH) 0.9 %
10 SYRINGE (ML) INJECTION PRN
Status: CANCELLED | OUTPATIENT
Start: 2021-02-19

## 2021-02-12 RX ORDER — METHYLPREDNISOLONE SODIUM SUCCINATE 125 MG/2ML
125 INJECTION, POWDER, LYOPHILIZED, FOR SOLUTION INTRAMUSCULAR; INTRAVENOUS ONCE
Status: CANCELLED | OUTPATIENT
Start: 2021-02-19 | End: 2021-02-19

## 2021-02-12 RX ADMIN — FERRIC CARBOXYMALTOSE INJECTION 750 MG: 50 INJECTION, SOLUTION INTRAVENOUS at 13:24

## 2021-02-12 RX ADMIN — SODIUM CHLORIDE: 9 INJECTION, SOLUTION INTRAVENOUS at 13:14

## 2021-02-12 NOTE — PROGRESS NOTES
Patient here for injectafer. Vitals stable. He tolerated treatment well and was discharged home in stable condition. He is due to return 2/19 for day 2 of 2 injectafer.

## 2021-02-18 DIAGNOSIS — K21.9 GASTROESOPHAGEAL REFLUX DISEASE WITHOUT ESOPHAGITIS: ICD-10-CM

## 2021-02-19 ENCOUNTER — HOSPITAL ENCOUNTER (OUTPATIENT)
Dept: INFUSION THERAPY | Age: 71
Discharge: HOME | End: 2021-02-19
Payer: MEDICARE

## 2021-02-19 VITALS
RESPIRATION RATE: 18 BRPM | HEART RATE: 84 BPM | TEMPERATURE: 98.1 F | DIASTOLIC BLOOD PRESSURE: 71 MMHG | SYSTOLIC BLOOD PRESSURE: 130 MMHG

## 2021-02-19 DIAGNOSIS — R19.5 POSITIVE FIT (FECAL IMMUNOCHEMICAL TEST): ICD-10-CM

## 2021-02-19 DIAGNOSIS — B18.2 CHRONIC HEPATITIS C WITHOUT HEPATIC COMA (HCC): ICD-10-CM

## 2021-02-19 DIAGNOSIS — D50.0 IRON DEFICIENCY ANEMIA DUE TO CHRONIC BLOOD LOSS: Primary | ICD-10-CM

## 2021-02-19 PROCEDURE — 6360000002 HC RX W HCPCS: Performed by: INTERNAL MEDICINE

## 2021-02-19 PROCEDURE — 2580000003 HC RX 258: Performed by: INTERNAL MEDICINE

## 2021-02-19 PROCEDURE — 96365 THER/PROPH/DIAG IV INF INIT: CPT

## 2021-02-19 RX ORDER — SODIUM CHLORIDE 9 MG/ML
INJECTION, SOLUTION INTRAVENOUS CONTINUOUS
Status: CANCELLED | OUTPATIENT
Start: 2021-02-19

## 2021-02-19 RX ORDER — HEPARIN SODIUM (PORCINE) LOCK FLUSH IV SOLN 100 UNIT/ML 100 UNIT/ML
500 SOLUTION INTRAVENOUS PRN
Status: CANCELLED | OUTPATIENT
Start: 2021-02-19

## 2021-02-19 RX ORDER — SODIUM CHLORIDE 0.9 % (FLUSH) 0.9 %
5 SYRINGE (ML) INJECTION PRN
Status: CANCELLED | OUTPATIENT
Start: 2021-02-19

## 2021-02-19 RX ORDER — PANTOPRAZOLE SODIUM 40 MG/1
40 TABLET, DELAYED RELEASE ORAL DAILY
Qty: 90 TABLET | Refills: 1 | Status: SHIPPED | OUTPATIENT
Start: 2021-02-19 | End: 2021-09-24 | Stop reason: SDUPTHER

## 2021-02-19 RX ORDER — DIPHENHYDRAMINE HYDROCHLORIDE 50 MG/ML
50 INJECTION INTRAMUSCULAR; INTRAVENOUS ONCE
Status: CANCELLED | OUTPATIENT
Start: 2021-02-19 | End: 2021-02-19

## 2021-02-19 RX ORDER — EPINEPHRINE 1 MG/ML
0.3 INJECTION, SOLUTION, CONCENTRATE INTRAVENOUS PRN
Status: CANCELLED | OUTPATIENT
Start: 2021-02-19

## 2021-02-19 RX ORDER — SODIUM CHLORIDE 9 MG/ML
INJECTION, SOLUTION INTRAVENOUS CONTINUOUS
Status: ACTIVE | OUTPATIENT
Start: 2021-02-19 | End: 2021-02-19

## 2021-02-19 RX ORDER — METHYLPREDNISOLONE SODIUM SUCCINATE 125 MG/2ML
125 INJECTION, POWDER, LYOPHILIZED, FOR SOLUTION INTRAMUSCULAR; INTRAVENOUS ONCE
Status: CANCELLED | OUTPATIENT
Start: 2021-02-19 | End: 2021-02-19

## 2021-02-19 RX ORDER — SODIUM CHLORIDE 0.9 % (FLUSH) 0.9 %
10 SYRINGE (ML) INJECTION PRN
Status: CANCELLED | OUTPATIENT
Start: 2021-02-19

## 2021-02-19 RX ADMIN — SODIUM CHLORIDE: 9 INJECTION, SOLUTION INTRAVENOUS at 10:09

## 2021-02-19 RX ADMIN — FERRIC CARBOXYMALTOSE INJECTION 750 MG: 50 INJECTION, SOLUTION INTRAVENOUS at 10:19

## 2021-02-19 NOTE — PROGRESS NOTES
Pt here for #2 of 2 iron infusions. Denies any problems. Tolerates very well. Will follow up 5/12 with Dr Lorelei Gan. Discharged in stable condition.

## 2021-02-22 PROCEDURE — 91110 GI TRC IMG INTRAL ESOPH-ILE: CPT | Performed by: INTERNAL MEDICINE

## 2021-02-23 DIAGNOSIS — K21.9 GASTROESOPHAGEAL REFLUX DISEASE, UNSPECIFIED WHETHER ESOPHAGITIS PRESENT: ICD-10-CM

## 2021-02-23 DIAGNOSIS — K58.0 IRRITABLE BOWEL SYNDROME WITH DIARRHEA: ICD-10-CM

## 2021-02-23 DIAGNOSIS — D50.8 OTHER IRON DEFICIENCY ANEMIA: ICD-10-CM

## 2021-02-23 DIAGNOSIS — R19.7 DIARRHEA, UNSPECIFIED TYPE: ICD-10-CM

## 2021-02-23 DIAGNOSIS — R19.5 POSITIVE FIT (FECAL IMMUNOCHEMICAL TEST): ICD-10-CM

## 2021-02-23 DIAGNOSIS — Z86.19 HISTORY OF HEPATITIS C: ICD-10-CM

## 2021-02-28 ENCOUNTER — HOSPITAL ENCOUNTER (EMERGENCY)
Age: 71
Discharge: HOME | End: 2021-02-28
Attending: STUDENT IN AN ORGANIZED HEALTH CARE EDUCATION/TRAINING PROGRAM
Payer: MEDICARE

## 2021-02-28 ENCOUNTER — APPOINTMENT (OUTPATIENT)
Dept: CT IMAGING | Age: 71
End: 2021-02-28
Payer: MEDICARE

## 2021-02-28 VITALS
HEIGHT: 69 IN | TEMPERATURE: 97.9 F | HEART RATE: 84 BPM | RESPIRATION RATE: 14 BRPM | WEIGHT: 210 LBS | SYSTOLIC BLOOD PRESSURE: 118 MMHG | OXYGEN SATURATION: 95 % | BODY MASS INDEX: 31.1 KG/M2 | DIASTOLIC BLOOD PRESSURE: 72 MMHG

## 2021-02-28 DIAGNOSIS — R31.0 GROSS HEMATURIA: Primary | ICD-10-CM

## 2021-02-28 LAB
-: ABNORMAL
ABSOLUTE EOS #: 0.06 K/UL (ref 0–0.4)
ABSOLUTE IMMATURE GRANULOCYTE: ABNORMAL K/UL (ref 0–0.3)
ABSOLUTE LYMPH #: 1.86 K/UL (ref 1–4.8)
ABSOLUTE MONO #: 0.35 K/UL (ref 0.1–1.3)
AMORPHOUS: ABNORMAL
ANION GAP SERPL CALCULATED.3IONS-SCNC: 10 MMOL/L (ref 9–17)
BACTERIA: ABNORMAL
BASOPHILS # BLD: 1 % (ref 0–2)
BASOPHILS ABSOLUTE: 0.06 K/UL (ref 0–0.2)
BILIRUBIN URINE: ABNORMAL
BUN BLDV-MCNC: 16 MG/DL (ref 8–23)
BUN/CREAT BLD: ABNORMAL (ref 9–20)
CALCIUM SERPL-MCNC: 8 MG/DL (ref 8.6–10.4)
CASTS UA: ABNORMAL /LPF
CHLORIDE BLD-SCNC: 104 MMOL/L (ref 98–107)
CO2: 23 MMOL/L (ref 20–31)
COLOR: ABNORMAL
COMMENT UA: ABNORMAL
CREAT SERPL-MCNC: 0.6 MG/DL (ref 0.7–1.2)
CRYSTALS, UA: ABNORMAL /HPF
DIFFERENTIAL TYPE: ABNORMAL
EOSINOPHILS RELATIVE PERCENT: 1 % (ref 0–4)
EPITHELIAL CELLS UA: ABNORMAL /HPF
GFR AFRICAN AMERICAN: >60 ML/MIN
GFR NON-AFRICAN AMERICAN: >60 ML/MIN
GFR SERPL CREATININE-BSD FRML MDRD: ABNORMAL ML/MIN/{1.73_M2}
GFR SERPL CREATININE-BSD FRML MDRD: ABNORMAL ML/MIN/{1.73_M2}
GLUCOSE BLD-MCNC: 95 MG/DL (ref 70–99)
GLUCOSE URINE: NEGATIVE
HCT VFR BLD CALC: 35.4 % (ref 41–53)
HEMOGLOBIN: 11.4 G/DL (ref 13.5–17.5)
IMMATURE GRANULOCYTES: ABNORMAL %
INR BLD: 0.9
KETONES, URINE: ABNORMAL
LEUKOCYTE ESTERASE, URINE: ABNORMAL
LYMPHOCYTES # BLD: 32 % (ref 24–44)
MCH RBC QN AUTO: 27 PG (ref 26–34)
MCHC RBC AUTO-ENTMCNC: 32.2 G/DL (ref 31–37)
MCV RBC AUTO: 83.7 FL (ref 80–100)
MONOCYTES # BLD: 6 % (ref 1–7)
MORPHOLOGY: ABNORMAL
MUCUS: ABNORMAL
NITRITE, URINE: NEGATIVE
NRBC AUTOMATED: ABNORMAL PER 100 WBC
OTHER OBSERVATIONS UA: ABNORMAL
PARTIAL THROMBOPLASTIN TIME: 24.5 SEC (ref 24–36)
PDW BLD-RTO: 27.5 % (ref 11.5–14.9)
PH UA: 5.5 (ref 5–8)
PLATELET # BLD: 235 K/UL (ref 150–450)
PLATELET ESTIMATE: ABNORMAL
PMV BLD AUTO: 7.3 FL (ref 6–12)
POTASSIUM SERPL-SCNC: 4.1 MMOL/L (ref 3.7–5.3)
PROTEIN UA: ABNORMAL
PROTHROMBIN TIME: 12.3 SEC (ref 11.8–14.6)
RBC # BLD: 4.23 M/UL (ref 4.5–5.9)
RBC # BLD: ABNORMAL 10*6/UL
RBC UA: ABNORMAL /HPF
RENAL EPITHELIAL, UA: ABNORMAL /HPF
SEG NEUTROPHILS: 60 % (ref 36–66)
SEGMENTED NEUTROPHILS ABSOLUTE COUNT: 3.47 K/UL (ref 1.3–9.1)
SODIUM BLD-SCNC: 137 MMOL/L (ref 135–144)
SPECIFIC GRAVITY UA: 1.02 (ref 1–1.03)
TRICHOMONAS: ABNORMAL
TURBIDITY: ABNORMAL
URINE HGB: ABNORMAL
UROBILINOGEN, URINE: NORMAL
WBC # BLD: 5.8 K/UL (ref 3.5–11)
WBC # BLD: ABNORMAL 10*3/UL
WBC UA: ABNORMAL /HPF
YEAST: ABNORMAL

## 2021-02-28 PROCEDURE — 85610 PROTHROMBIN TIME: CPT

## 2021-02-28 PROCEDURE — 36415 COLL VENOUS BLD VENIPUNCTURE: CPT

## 2021-02-28 PROCEDURE — 99283 EMERGENCY DEPT VISIT LOW MDM: CPT

## 2021-02-28 PROCEDURE — 85730 THROMBOPLASTIN TIME PARTIAL: CPT

## 2021-02-28 PROCEDURE — 87086 URINE CULTURE/COLONY COUNT: CPT

## 2021-02-28 PROCEDURE — 74177 CT ABD & PELVIS W/CONTRAST: CPT

## 2021-02-28 PROCEDURE — 6360000004 HC RX CONTRAST MEDICATION: Performed by: STUDENT IN AN ORGANIZED HEALTH CARE EDUCATION/TRAINING PROGRAM

## 2021-02-28 PROCEDURE — 2580000003 HC RX 258: Performed by: STUDENT IN AN ORGANIZED HEALTH CARE EDUCATION/TRAINING PROGRAM

## 2021-02-28 PROCEDURE — 87077 CULTURE AEROBIC IDENTIFY: CPT

## 2021-02-28 PROCEDURE — 87186 SC STD MICRODIL/AGAR DIL: CPT

## 2021-02-28 PROCEDURE — 80048 BASIC METABOLIC PNL TOTAL CA: CPT

## 2021-02-28 PROCEDURE — 85025 COMPLETE CBC W/AUTO DIFF WBC: CPT

## 2021-02-28 PROCEDURE — 81001 URINALYSIS AUTO W/SCOPE: CPT

## 2021-02-28 RX ORDER — 0.9 % SODIUM CHLORIDE 0.9 %
80 INTRAVENOUS SOLUTION INTRAVENOUS ONCE
Status: COMPLETED | OUTPATIENT
Start: 2021-02-28 | End: 2021-02-28

## 2021-02-28 RX ORDER — CEPHALEXIN 500 MG/1
500 CAPSULE ORAL 2 TIMES DAILY
Qty: 14 CAPSULE | Refills: 0 | Status: SHIPPED | OUTPATIENT
Start: 2021-02-28 | End: 2021-03-07

## 2021-02-28 RX ORDER — SODIUM CHLORIDE 0.9 % (FLUSH) 0.9 %
10 SYRINGE (ML) INJECTION PRN
Status: DISCONTINUED | OUTPATIENT
Start: 2021-02-28 | End: 2021-02-28 | Stop reason: HOSPADM

## 2021-02-28 RX ADMIN — Medication 10 ML: at 16:07

## 2021-02-28 RX ADMIN — SODIUM CHLORIDE 80 ML: 9 INJECTION, SOLUTION INTRAVENOUS at 16:07

## 2021-02-28 RX ADMIN — IOPAMIDOL 75 ML: 755 INJECTION, SOLUTION INTRAVENOUS at 16:06

## 2021-02-28 ASSESSMENT — ENCOUNTER SYMPTOMS
EYE REDNESS: 0
COLOR CHANGE: 0
NAUSEA: 0
COUGH: 0
EYE PAIN: 0
RHINORRHEA: 0
SHORTNESS OF BREATH: 0
SORE THROAT: 0
BACK PAIN: 0
VOMITING: 0
FACIAL SWELLING: 0
DIARRHEA: 0
ABDOMINAL PAIN: 0

## 2021-02-28 NOTE — ED PROVIDER NOTES
EMERGENCY DEPARTMENT ENCOUNTER    Pt Name: Rashawn Gallagher  MRN: [id number]  [place] 1950  Date of evaluation: 2/28/21  CHIEF COMPLAINT       Chief Complaint   Patient presents with    Hematuria     X 2 days; bright red in color; hx of hematuria; pt reports that he has a hx of anemia and his Hgb is often low. Pt denies further complaints - denies anticoagulant use. Wife present. HISTORY OF PRESENT ILLNESS   HPI  56-year-old male history of diabetes, hypertension, GERD presents for evaluation of hematuria. Patient reports some trace blood in his urine over the past week or so and then had some gross hematuria earlier today. Mild associated urgency. No fever chills. No abdominal pain. No history of similar symptoms. Does have a family history of bladder cancer. No recent systemic symptoms. No recent weight loss. No other bleeding diathesis. Symptoms are moderate and intermittent. REVIEW OF SYSTEMS     Review of Systems   Constitutional: Negative for chills and fatigue. HENT: Negative for facial swelling, nosebleeds, rhinorrhea and sore throat. Eyes: Negative for pain and redness. Respiratory: Negative for cough and shortness of breath. Cardiovascular: Negative for chest pain and leg swelling. Gastrointestinal: Negative for abdominal pain, diarrhea, nausea and vomiting. Genitourinary: Positive for hematuria. Negative for flank pain. Musculoskeletal: Negative for arthralgias and back pain. Skin: Negative for color change and rash. Neurological: Negative for dizziness, tremors, facial asymmetry, speech difficulty, weakness and numbness.      PASTMEDICAL HISTORY     Past Medical History:   Diagnosis Date    Anemia     Arthritis     osteoarthritis    Colon polyps 10/08/2019    tubular adenoma x2    Encounter for chronic pain management     Essential hypertension 05/12/2020    Hepatitis B core antibody positive     History of blood transfusion     History of hepatitis C  Hyperlipidemia     Iron (Fe) deficiency anemia     Positive FIT (fecal immunochemical test)     Type 2 diabetes mellitus with hyperglycemia, without long-term current use of insulin ([facility] [address] 05/12/2020    Type 2 diabetes mellitus with microalbuminuria, without long-term current use of insulin ([facility] [address] 07/13/2020    Wears dentures     Wears glasses      Past Problem List  Patient Active Problem List   Diagnosis Code    Degenerative disc disease, lumbar M51.36    Lumbar spondylosis M47.816    Lumbar radiculopathy M54.16    Lumbar spinal stenosis M48.061    Encounter for medication monitoring Z51.81    Cervical spondylitis ([facility] [address] M46.92    S/P cervical spinal fusion Z98.1    Anemia D64.9    GERD (gastroesophageal reflux disease) K21.9    Osteoarthritis of spine with radiculopathy, lumbar region M47.26    Encounter for chronic pain management G89.29    Osteoarthritis of lumbar spine M47.816    Iron deficiency anemia D50.9    Colon polyps K63.5    Hepatitis B core antibody positive R76.8    Peripheral polyneuropathy G62.9    Essential hypertension I10    Type 2 diabetes mellitus with hyperglycemia, without long-term current use of insulin (HCC) E11.65    Hyperlipidemia with target LDL less than 100 E78.5    Vitamin D deficiency E55.9    Vitamin B 12 deficiency E53.8    Abnormal EKG R94.31    Type 2 diabetes mellitus with microalbuminuria, without long-term current use of insulin (HCC) E11.29, R80.9    Abdominal discomfort R10.9    Irritable bowel syndrome with diarrhea K58.0    Chronic hepatitis C without hepatic coma (HCC) B18.2    Diarrhea R19.7    Status post hernia repair Z98.890, Z87.19    Incisional hernia without obstruction or gangrene K43.2    Worsening headaches R51.9    Positive FIT (fecal immunochemical test) R19.5    History of hepatitis C Z86.19    Absolute anemia D64.9     SURGICAL HISTORY       Past Surgical History:   Procedure Laterality Date  CERVICAL SPINE SURGERY  1977    cervical spine three times, has plate    CHOLECYSTECTOMY  03/22/2019    COLONOSCOPY  01/25/2015    10 yr recall, hemorrhoids    COLONOSCOPY N/A 10/08/2019    tubular adenoma x2    DENTAL SURGERY  10/2015    all teeth extracted    HERNIA REPAIR  08/07/2020    lap robotic with mesh    HERNIA REPAIR N/A 08/07/2020    HERNIA INCISIONAL REPAIR LAPAROSCOPIC ROBOTIC WITH MESH performed by Amanda Scott DO at [facility]     UMBILICAL HERNIA REPAIR  3022-19    UPPER GASTROINTESTINAL ENDOSCOPY  01/25/2015    UPPER GASTROINTESTINAL ENDOSCOPY N/A 10/08/2019    EGD BIOPSY performed by Darnell Vaughan MD at [facility] Medication List as of 2/28/2021  4:49 PM      CONTINUE these medications which have NOT CHANGED    Details   pantoprazole (PROTONIX) 40 MG tablet Take 1 tablet by mouth daily, Disp-90 tablet, R-1Normal      oxyCODONE-acetaminophen (PERCOCET) 5-325 MG per tablet Take 1 tablet by mouth every 8 hours for 30 days. , Disp-90 tablet, R-0Normal      morphine (MS CONTIN) 60 MG extended release tablet Take 1 tablet by mouth 2 times daily for 30 days. , Disp-60 tablet, R-0Normal      pregabalin (LYRICA) 150 MG capsule Take 1 capsule by mouth 3 times daily for 90 days. , Disp-90 capsule, R-2Normal      baclofen (LIORESAL) 10 MG tablet TAKE 1 TABLET BY MOUTH THREE TIMES DAILY AS NEEDED FOR MUSCLE SPASMS,CAUSES SEDATION,DO NOT DRIVE WHILE TAKING THIS, Disp-90 tablet, R-0Normal      vitamin D (ERGOCALCIFEROL) 1.25 MG (12540 UT) CAPS capsule Take 1 capsule by mouth once a week, Disp-12 capsule, R-0Normal      Syringe/Needle, Disp, (SYRINGE 3CC/25GX1\") 25G X 1\" 3 ML MISC Disp-50 each, R-2, NormalTo be used with B12 injections      cyanocobalamin 1000 MCG/ML injection Inject 1 mL into the muscle every 7 days Call for next refill which will be monthly for life, Disp-4 mL, R-0Normal loperamide (RA ANTI-DIARRHEAL) 2 MG capsule Take 1 capsule by mouth 4 times daily as needed for Diarrhea, Disp-112 capsule, R-2Normal      cholestyramine (QUESTRAN) 4 g packet Take 1 packet by mouth 2 times daily, Disp-90 packet, R-3Normal      lidocaine (LIDODERM) 5 % Place 1 patch onto the skin daily 12 hours on, 12 hours off., Disp-30 patch, R-3Normal      lisinopril-hydroCHLOROthiazide (PRINZIDE;ZESTORETIC) 10-12.5 MG per tablet TK 1 T PO QD, Disp-90 tablet,R-3Normal      metoprolol tartrate (LOPRESSOR) 25 MG tablet Take 1 tablet by mouth 2 times daily, Disp-60 tablet,R-5Normal      Probiotic Product (PROBIOTIC-10 PO) Take by mouthHistorical Med      TRUE METRIX BLOOD GLUCOSE TEST strip CHECK BLOOD SUGAR BID, Disp-200 each,R-3,DAWNormal      TRUEplus Lancets 30G MISC Disp-200 each,R-3, NormalTest blood glucose twice a day      Alcohol Swabs (B-D SINGLE USE SWABS REGULAR) PADS Disp-200 each,R-3, NormalUSE TO CHECK BLOOD SUGAR TWICE A DAY      metFORMIN (GLUCOPHAGE) 1000 MG tablet Take 1 tablet by mouth 2 times daily (with meals), Disp-180 tablet,R-1Normal      glipiZIDE (GLUCOTROL) 5 MG tablet Take 1 tablet by mouth every morning Keep fasting morning blood glucose . If blood glucose below 80, you need to stop glipizide, Disp-90 tablet,R-3Normal      pravastatin (PRAVACHOL) 40 MG tablet Take 1 tablet by mouth every evening Stop Gemfibrozil, Disp-90 tablet, R-3Normal      Blood Glucose Monitoring Suppl (TRUE METRIX METER) w/Device KIT USE AS DIRECTED TO TEST BLOOD SUGARHistorical Med      hydrOXYzine (VISTARIL) 25 MG capsule hydroxyzine pamoate 25 mg capsuleHistorical Med           ALLERGIES     is allergic to asa [aspirin]; iron; and nsaids. FAMILY HISTORY     He indicated that his mother is . He indicated that his father is .      SOCIAL HISTORY       Social History     Tobacco Use    Smoking status: Former Smoker     Packs/day: 0.50     Years: 45.00     Pack years: 22.50 Types: Cigarettes     Quit date: 2015     Years since quittin.2    Smokeless tobacco: Never Used    Tobacco comment: on chantix 11-6-15   12-7-15   Substance Use Topics    Alcohol use: No    Drug use: No     PHYSICAL EXAM     INITIAL VITALS: /72   Pulse 84   Temp 97.9 °F (36.6 °C) (Oral)   Resp 14   Ht 5' 9\" (1.753 m)   Wt 210 lb (95.3 kg)   SpO2 95%   BMI 31.01 kg/m²    Physical Exam  Constitutional:       Appearance: Normal appearance. HENT:      Head: Normocephalic and atraumatic. Nose: Nose normal.   Eyes:      Extraocular Movements: Extraocular movements intact. Pupils: Pupils are equal, round, and reactive to light. Neck:      Musculoskeletal: Normal range of motion. No neck rigidity. Cardiovascular:      Rate and Rhythm: Normal rate and regular rhythm. Pulmonary:      Effort: Pulmonary effort is normal. No respiratory distress. Breath sounds: Normal breath sounds. Abdominal:      General: Abdomen is flat. There is no distension. Palpations: Abdomen is soft. There is no mass. Musculoskeletal: Normal range of motion. General: No swelling. Skin:     General: Skin is warm and dry. Neurological:      General: No focal deficit present. Mental Status: He is alert. Mental status is at baseline. Cranial Nerves: No cranial nerve deficit. MEDICAL DECISION MAKIN-year-old male presents for evaluation of gross hematuria over the past day. Blood work including CBC, coags to assess for acute blood loss anemia, coagulopathy, thrombocytopenia. CT scan to assess for structural abnormality in the genitourinary tract. Concern for underlying malignancy. Urine showed RBCs and WBC was with some leukoesterase. We will send a culture and start antibiotics. CT scan showed no kidney lesions but did show some thickening of the bladder. Discussed these findings with patient and the need to follow-up closely with urology for further work-up of potential underlying bladder malignancy. Kidney function was normal the patient was adequately voiding. Patient expressed understanding and discharged home. CRITICAL CARE:       PROCEDURES:    Procedures    DIAGNOSTIC RESULTS   EKG:All EKG's are interpreted by the Emergency Department Physician who either signs or Co-signs this chart in the absence of a cardiologist.        RADIOLOGY:All plain film, CT, MRI, and formal ultrasound images (except ED bedside ultrasound) are read by the radiologist, see reports below, unless otherwisenoted in MDM or here. CT ABDOMEN PELVIS W IV CONTRAST Additional Contrast? None   Final Result   No acute intra-abdominal or intrapelvic abnormalities are noted. Diffuse fatty infiltration of the liver. Urinary bladder is decompressed with mildly diffuse wall thickening. Stable 3 cm abdominal aortic aneurysm. Recommend follow-up every 3 years           LABS: All lab results were reviewed by myself, and all abnormals are listed below. Labs Reviewed   URINE RT REFLEX TO CULTURE - Abnormal; Notable for the following components:       Result Value    Color, UA RED (*)     Turbidity UA CLOUDY (*)     Bilirubin Urine   (*)     Value: Presumptive positive. Unable to confirm due to unavailability of reagent.     Ketones, Urine TRACE (*)     Urine Hgb LARGE (*)     Protein, UA 2+ (*)     Leukocyte Esterase, Urine SMALL (*)     All other components within normal limits   MICROSCOPIC URINALYSIS - Abnormal; Notable for the following components:    Bacteria, UA FEW (*)     Other Observations UA Microscopic performed on uncentrifuged urine -  (*)     All other components within normal limits BASIC METABOLIC PANEL W/ REFLEX TO MG FOR LOW K - Abnormal; Notable for the following components:    CREATININE 0.60 (*)     Calcium 8.0 (*)     All other components within normal limits   CBC WITH AUTO DIFFERENTIAL - Abnormal; Notable for the following components:    RBC 4.23 (*)     Hemoglobin 11.4 (*)     Hematocrit 35.4 (*)     RDW 27.5 (*)     All other components within normal limits   CULTURE, URINE   PROTIME-INR   APTT       EMERGENCY DEPARTMENTCOURSE:         Vitals:    Vitals:    02/28/21 1325 02/28/21 1500 02/28/21 1515 02/28/21 1530   BP: (!) 147/75 112/68 118/72    Pulse: 84      Resp: 14      Temp: 97.9 °F (36.6 °C)      TempSrc: Oral      SpO2: 98% 95% 96% 95%   Weight: 210 lb (95.3 kg)      Height: 5' 9\" (1.753 m)          The patient was given the following medications while in the emergency department:  Orders Placed This Encounter   Medications    0.9 % sodium chloride bolus    sodium chloride flush 0.9 % injection 10 mL    iopamidol (ISOVUE-370) 76 % injection 75 mL    cephALEXin (KEFLEX) 500 MG capsule     Sig: Take 1 capsule by mouth 2 times daily for 7 days     Dispense:  14 capsule     Refill:  0     CONSULTS:  None    FINAL IMPRESSION      1.  Gross hematuria          DISPOSITION/PLAN   DISPOSITION Decision To Discharge 02/28/2021 04:47:49 PM      PATIENT REFERRED TO:  Reyna Araujo MD  [address] [place] [address]  [phone]    Call in 1 day      DISCHARGE MEDICATIONS:  Discharge Medication List as of 2/28/2021  4:49 PM      START taking these medications    Details   cephALEXin (KEFLEX) 500 MG capsule Take 1 capsule by mouth 2 times daily for 7 days, Disp-14 capsule, R-0Print           Savanah Leyva MD  Attending Emergency Physician                    Savanah Leyva MD  02/28/21 2661

## 2021-03-01 ENCOUNTER — TELEPHONE (OUTPATIENT)
Dept: FAMILY MEDICINE CLINIC | Age: 71
End: 2021-03-01

## 2021-03-02 LAB
CULTURE: ABNORMAL
Lab: ABNORMAL
SPECIMEN DESCRIPTION: ABNORMAL

## 2021-03-04 ASSESSMENT — ENCOUNTER SYMPTOMS
COUGH: 0
VOMITING: 0
PHOTOPHOBIA: 0
BOWEL INCONTINENCE: 0
SINUS PRESSURE: 0
ABDOMINAL PAIN: 0
NAUSEA: 0
ALLERGIC/IMMUNOLOGIC NEGATIVE: 1
CONSTIPATION: 0
EYE PAIN: 0
SHORTNESS OF BREATH: 0
BACK PAIN: 1
EYE DISCHARGE: 0
RESPIRATORY NEGATIVE: 1

## 2021-03-04 NOTE — PROGRESS NOTES
Noman Mosquera is a 78 y.o. male evaluated on 3/8/2021. Modality of virtual service provided -via Video+audio   Consent:  Patient and/or health care decision maker is aware that that patient may receive a bill for this telephone service, depending on one's insurance coverage, and has provided verbal consent to proceed: Yes    Patient identification was verified at the start of the visit: Yes    Chief complaint: Noman Mosquera is 78 y.o.,  male, with  with chief complaint of pain involving the low back    Patient is complaining of pain involving the low back area with pain radiating to both lower extremities. He also complaining of pain in the neck as well as in the mid back. Patient reports he had 3 surgeries in the leg in the past.  Previous lumbar epidural steroid injection did not give him much relief. Patient also had a ventral herniorrhaphy with mesh and it is difficult for him to do exercise. Patient reports he has hematuria recently and is going to see urologist.    Back Pain  This is a chronic problem. The current episode started more than 1 year ago. The problem occurs rarely. The problem is unchanged. The pain is present in the lumbar spine and sacro-iliac. The quality of the pain is described as aching Callie Fleischer). Radiates to: To both lower extremities mostly to the knees and at times to the feet. The pain is at a severity of 4/10 (2-5). The pain is moderate. The pain is the same all the time. The symptoms are aggravated by bending, standing and position (Walking and ADLs and lifting). Associated symptoms include numbness, tingling and weakness. Pertinent negatives include no abdominal pain, bladder incontinence, bowel incontinence, chest pain, dysuria, fever or headaches. Risk factors include lack of exercise and sedentary lifestyle. Neck Pain   This is a chronic problem. The current episode started more than 1 year ago. The problem occurs constantly. The problem has been unchanged.  The pain is present in the left side, midline and right side. The quality of the pain is described as aching. The pain is at a severity of 3/10 (3-5). The pain is moderate. The symptoms are aggravated by bending, position, stress and twisting (Movements). The pain is worse during the day. Stiffness is present in the morning. Associated symptoms include numbness, tingling and weakness. Pertinent negatives include no chest pain, fever, headaches or photophobia. Alleviating factors:rest   Lifestyle changes experienced with pain: Wakes from sleep, Prevents or limits ADLs, Increases w/activity., Increases w/prolonged sitting/standing/walking  Mood changes,none  Patient currently unemployed. Physical therapy did not help the pain. Are you under psychological counseling at present: No  Goals for treatment include:  Decrease in pain  Enjoy daily and recreational activities, return to previous status. Patient relates current medications are helping the pain. Patient reports taking pain medications as prescribed, denies obtaining medications from different sources and denies use of illegal drugs. Patient denies side effects from medications like nausea, vomiting, constipation or drowsiness. Patient reports current activities of daily living ar possible due to medications and would like to continue them.        ACTIVITY/SOCIAL/EMOTIONAL:  Sleep Pattern: 5 hours per night. nightime awakenings and difficulty falling back asleep if awakened  Home Exercises: daily no regular exercise  Activity:unchanged  Emotional Issues: normal.   Currently seeing a Psychiatrist or Psychologist:  No     ADVERSE MEDICATION EFFECTS:   Nausea and vomiting: no   Constipation: no-Undercontrol-: yes  Dizziness/drowsy/sleepy--no  Urinary Retention: no    ABERRANT BEHAVIORS SINCE LAST VISIT  Lost rx/pills:------------------------------------------ no  Taking  medication as prescribed: ----------- yes  Urine Drug Screen --------------------------------- yes             Date------------------------------------------------- 3/11/2020              Results as expected ---------------------yes    Recent ER visits: -------------------------------------Yes  Pill count is appropriate: ---------------------------yes   Refills for prescriptions appropriate:---------- yes      Past Medical History:   Diagnosis Date    Anemia     Arthritis     osteoarthritis    Colon polyps 10/08/2019    tubular adenoma x2    Encounter for chronic pain management     Essential hypertension 05/12/2020    Hepatitis B core antibody positive     History of blood transfusion     History of hepatitis C     Hyperlipidemia     Iron (Fe) deficiency anemia     Positive FIT (fecal immunochemical test)     Type 2 diabetes mellitus with hyperglycemia, without long-term current use of insulin ([address] 05/12/2020    Type 2 diabetes mellitus with microalbuminuria, without long-term current use of insulin ([address] 07/13/2020    Wears dentures     Wears glasses        Past Surgical History:   Procedure Laterality Date    CERVICAL SPINE SURGERY  1977    cervical spine three times, has plate    CHOLECYSTECTOMY  03/22/2019    COLONOSCOPY  01/25/2015    10 yr recall, hemorrhoids    COLONOSCOPY N/A 10/08/2019    tubular adenoma x2    DENTAL SURGERY  10/2015    all teeth extracted    HERNIA REPAIR  08/07/2020    lap robotic with mesh    HERNIA REPAIR N/A 08/07/2020    HERNIA INCISIONAL REPAIR LAPAROSCOPIC ROBOTIC WITH MESH performed by Charlesetta Cushing, DO at [facility]     UMBILICAL [address]  3023-19    UPPER GASTROINTESTINAL ENDOSCOPY  01/25/2015    UPPER GASTROINTESTINAL ENDOSCOPY N/A 10/08/2019    EGD BIOPSY performed by Zach Hale MD at [facility]       Family History   Problem Relation Age of Onset    Diabetes Mother     Heart Disease Father        Social History     Socioeconomic History    Marital status:      Spouse name: None    Number of children: None    Years of education: None    Highest education level: None   Occupational History    Occupation: disabled   Social Needs    Financial resource strain: None    Food insecurity     Worry: None     Inability: None    Transportation needs     Medical: None     Non-medical: None   Tobacco Use    Smoking status: Former Smoker     Packs/day: 0.50     Years: 45.00     Pack years: 22.50     Types: Cigarettes     Quit date: 2015     Years since quittin.2    Smokeless tobacco: Never Used    Tobacco comment: on chantix 11-6-15   12-7-15   Substance and Sexual Activity    Alcohol use: No    Drug use: No    Sexual activity: Yes     Partners: Female   Lifestyle    Physical activity     Days per week: None     Minutes per session: None    Stress: None   Relationships    Social connections     Talks on phone: None     Gets together: None     Attends L[employer] service: None     Active member of club or organization: None     Attends meetings of clubs or organizations: None     Relationship status: None    Intimate partner violence     Fear of current or ex partner: None     Emotionally abused: None     Physically abused: None     Forced sexual activity: None   Other Topics Concern    None   Social History Narrative    None       Allergies   Allergen Reactions    Asa [Aspirin]       iron deficiency anemia , requiring iron infusions and transfusions    Iron      Pill- constipation, abdominal pain    Nsaids       iron deficiency anemia, history of bleeding ulcers        Current Outpatient Medications on File Prior to Encounter   Medication Sig Dispense Refill    pantoprazole (PROTONIX) 40 MG tablet Take 1 tablet by mouth daily 90 tablet 1    pregabalin (LYRICA) 150 MG capsule Take 1 capsule by mouth 3 times daily for 90 days.  90 capsule 2    baclofen (LIORESAL) 10 MG tablet TAKE 1 TABLET BY MOUTH THREE TIMES DAILY AS NEEDED FOR MUSCLE SPASMS,CAUSES SEDATION,DO NOT DRIVE WHILE TAKING THIS 90 tablet 0    vitamin D (ERGOCALCIFEROL) 1.25 MG (30466 UT) CAPS capsule Take 1 capsule by mouth once a week 12 capsule 0    Syringe/Needle, Disp, (SYRINGE 3CC/25GX1\") 25G X 1\" 3 ML MISC To be used with B12 injections 50 each 2    cyanocobalamin 1000 MCG/ML injection Inject 1 mL into the muscle every 7 days Call for next refill which will be monthly for life 4 mL 0    loperamide (RA ANTI-DIARRHEAL) 2 MG capsule Take 1 capsule by mouth 4 times daily as needed for Diarrhea 112 capsule 2    cholestyramine (QUESTRAN) 4 g packet Take 1 packet by mouth 2 times daily 90 packet 3    lidocaine (LIDODERM) 5 % Place 1 patch onto the skin daily 12 hours on, 12 hours off. 30 patch 3    lisinopril-hydroCHLOROthiazide (PRINZIDE;ZESTORETIC) 10-12.5 MG per tablet TK 1 T PO QD 90 tablet 3    metoprolol tartrate (LOPRESSOR) 25 MG tablet Take 1 tablet by mouth 2 times daily 60 tablet 5    Probiotic Product (PROBIOTIC-10 PO) Take by mouth      TRUE METRIX BLOOD GLUCOSE TEST strip CHECK BLOOD SUGAR  each 3    TRUEplus Lancets 30G MISC Test blood glucose twice a day 200 each 3    Alcohol Swabs (B-D SINGLE USE SWABS REGULAR) PADS USE TO CHECK BLOOD SUGAR TWICE A  each 3    metFORMIN (GLUCOPHAGE) 1000 MG tablet Take 1 tablet by mouth 2 times daily (with meals) 180 tablet 1    glipiZIDE (GLUCOTROL) 5 MG tablet Take 1 tablet by mouth every morning Keep fasting morning blood glucose . If blood glucose below 80, you need to stop glipizide 90 tablet 3    pravastatin (PRAVACHOL) 40 MG tablet Take 1 tablet by mouth every evening Stop Gemfibrozil 90 tablet 3    Blood Glucose Monitoring Suppl (TRUE METRIX METER) w/Device KIT USE AS DIRECTED TO TEST BLOOD SUGAR      hydrOXYzine (VISTARIL) 25 MG capsule hydroxyzine pamoate 25 mg capsule       No current facility-administered medications on file prior to encounter. Review of Systems   Constitutional: Negative.   Negative for activity change, appetite change, chills, fatigue, fever and unexpected weight change. HENT: Positive for postnasal drip. Negative for congestion, dental problem, hearing loss and sinus pressure. Eyes: Negative for photophobia, pain, discharge and visual disturbance. Respiratory: Negative. Negative for cough and shortness of breath. Cardiovascular: Positive for leg swelling. Negative for chest pain and palpitations. Gastrointestinal: Negative for abdominal pain, bowel incontinence, constipation, nausea and vomiting. Endocrine: Positive for polydipsia. Negative for cold intolerance and polyuria. Genitourinary: Negative. Negative for bladder incontinence, dysuria, frequency, hematuria and urgency. Musculoskeletal: Positive for arthralgias, back pain, neck pain and neck stiffness. Skin: Negative. Negative for rash and wound. Allergic/Immunologic: Negative. Negative for food allergies. Neurological: Positive for tingling, weakness and numbness. Negative for tremors, seizures and headaches. Hematological: Negative. Does not bruise/bleed easily. Psychiatric/Behavioral: Negative. Negative for behavioral problems, self-injury, sleep disturbance and suicidal ideas. The patient is not nervous/anxious. Patient denies any change in his health since his last visit  Physical Exam  Neurological:      Mental Status: He is alert and oriented to person, place, and time.    Psychiatric:         Mood and Affect: Mood normal.            DATA:  LAB.:  3/14/2020 10:00 PM - Conchita Eubanks Incoming Lab Results From [employer]    Component Value Ref Range & Units Status Collected Lab   Pain Management Drug Panel Interp, Urine Consistent   Final 03/11/2020  3:25 PM [facility]   (NOTE)   ________________________________________________________________   DRUGS EXPECTED:   OXYCODONE [3/11/20]   MORPHINE [3/11/20]   ________________________________________________________________   CONSISTENT with medications provided:   OXYCODONE : based on oxycodone   MORPHINE : based on morphine   ________________________________________________________________       X-Ray reports:  EXAMINATION:    MRI OF THE LUMBAR SPINE WITHOUT CONTRAST, 2/16/2018 9:36 pm        TECHNIQUE:    Multiplanar multisequence MRI of the lumbar spine was performed without the    administration of intravenous contrast.        COMPARISON:    March 2010        HISTORY:    ORDERING SYSTEM PROVIDED HISTORY: Degenerative disc disease, lumbar    TECHNOLOGIST PROVIDED HISTORY:    Ordering Physician Provided Reason for Exam: LUMBAR DDD, LUMBAR    OSTEOARTHRITIS WITH RADICULOPATHY, SPINAL STENOSIS OF LUMBAR REGION WITHOUT    NEUROGENIC CLAUDICATION    Additional signs and symptoms: PATIENT COMPLAINS OF MID TO LOWER BACK PAIN    AND BUTTOCK PAIN. AND THAT HIS FEET FALL ASLEEP. SYMPTOMS FOR THE PAST 8 TO    10 YEARS. NO KNOWN INJURY. NO PREVIOUS LUMBAR SURGERIES. FINDINGS:    BONES/ALIGNMENT: Multiple endplate Schmorl's node deformities are noted. There is no acute fracture or bone edema. T11 hemangioma is noted. Vertebral body height and alignment is stable. SPINAL CORD: Conus terminates at T12-L1 and is grossly normal in appearance. SOFT TISSUES: Detail of the aorta is limited. There is suggested mild    enlargement with the transverse dimension of approximately 3.1 cm. Dedicated    imaging of the aorta is recommended. L1-L2: Diffuse disc bulging is noted with a small proximal foraminal    protrusion on the right. Annular tear is noted. Similar findings were noted    on the prior. Facet hypertrophic changes are noted. L2-L3: Minimal disc bulging is noted diffusely. There is a questionable    small left foraminal protrusion laterally. There is minimal narrowing of the    left foramen. Facet hypertrophic changes are noted. Findings are stable        L3-L4: Mild disc bulging is noted diffusely.   Minimal endplate and mild facet    hypertrophic changes are noted. There is borderline mild proximal foraminal    narrowing on the left. This is stable. L4-L5: Mild disc bulging is noted. A small inferior foraminal protrusion on    the right is noted. There is a slightly larger broad-based protrusion into    the left foramen. Bilateral foraminal narrowing is noted. Findings not    appear grossly changed. Facet hypertrophic changes are noted        L5-S1: Minimal disc bulging is noted. Facet hypertrophic changes are noted. SI joint degenerative changes are noted. Impression    No significant interval change in the multifocal degenerative disc disease    throughout the lumbar spine. See above for details of each level        Suggested aortic aneurysm. Dedicated imaging of the aorta is recommended        Clinical  impression:  1. Degenerative disc disease, lumbar    2. Osteoarthritis of lumbar spine, unspecified spinal osteoarthritis complication status    3. Osteoarthritis of spine with radiculopathy, lumbar region    4. Lumbar spondylosis    5. Lumbar radiculopathy    6. Spinal stenosis of lumbar region without neurogenic claudication    7. Cervical spondylitis ([address]    8. S/P cervical spinal fusion    9. Peripheral polyneuropathy    10. Encounter for medication monitoring    11. Encounter for chronic pain management        Plan of care: We will continue current pain medications  Current medications are being tolerated without any Adverse side effects. Orders Placed This Encounter   Medications    oxyCODONE-acetaminophen (PERCOCET) 5-325 MG per tablet     Sig: Take 1 tablet by mouth every 8 hours for 30 days. Dispense:  90 tablet     Refill:  0     Reduce doses taken as pain becomes manageable    morphine (MS CONTIN) 60 MG extended release tablet     Sig: Take 1 tablet by mouth 2 times daily for 30 days.      Dispense:  60 tablet     Refill:  0     Reduce doses taken as pain becomes manageable     Urine drug screens have been appropriate. No aberrant activity noted. Analgesia is achieved. Activities of daily living are possible because of medications. Safe use of medications explained to patient. PDMP Monitoring:    Last PDMP Rose Guardado as Reviewed [facility]):  Review User Review Instant Review Result   Domingo Quintanilla 3/4/2021  8:52 AM Reviewed PDMP [1]     Counselling/Preventive measures for pain  Control:    [x]  Spine strengthening exercises are discussed with patient in detail. [x] Ill effects of being on chronic pain medications such as sleep disturbances, hormonal changes, withdrawal symptoms,  chronic opioid dependence and tolerance were discussed with patient. I had asked the patient to minimize medication use and utilize pain medications only for uncontrolled rest pain or pain with exertional activities. I advised patient not to self escalate pain medications without consulting with us. At each of patient's future visits we will try to taper pain medications, while adjusting the adjunct medications, and re-evaluating for Physical Therapy to improve spinal and joint strength. We will continue to have discussions to decrease pain medications as tolerated. I also discussed with the patient regarding the dangers of combining narcotic pain medication with tranquilizers, alcohol or illegal drugs or taking the medication any other than prescribed. The dangers including the respiratory depression and death. Patient was told to tell  to all  physicians regarding the medications he is getting from pain clinic. Patient is warned not to take any unprescribed medications over-the-counter medications that can depress breathing . Patient is advised to talk to the pharmacist or physicians if planning to take any over-the-counter medications before  takeing them. Patient is strongly advised to avoid tranquilizers or  Relaxants for any medications that can depress breathing or recreational drugs.  Patient is also advised to tell us if there is any changes in his medications from other physicians. We discussed the same at today's visit and have not been to implement it, as the patient's pain is not under control with current medications. Decision Making Process : Patient's health history and referral records thoroughly reviewed before focused physical examination and discussion with patient. I have spent 20 mins. Over 50% of today's visit is spent on examining the patient and counseling and coordinating the care. Level of complexity of date to be reviewed is Moderate. The chart date reviewed include the following: Imaging Reports. Summary of Care. Time spent reviewing with patient the below reports:   Medication safety, Treatment options. Level of diagnosis and management options of this case is multiple: involving the following management options: Interventions as needed, medication management as appropriate, future visits, activity modification, heat/ice as needed, Urine drug screen as required. [x]The patient's questions were answered to the best of my abilities. This note was created using voice recognition software. There may be inaccuracies of transcription  that are inadvertently overlooked prior to the signature. There is any questions about the transcription please contact me. Return in  4 weeks  with physician / CNP  for further plan of treatment. Due to the COVID-19 pandemic and the appropriate interventions by Russell Cazares, our non-urgent pain management patients will not be seen in the office at this time for their protection and the protection of our staff.  To offer continuity of care, their prescriptions will be escribed this month after a careful chart review and review of their OARRS report  Pursuant to the emergency declaration under the [employer] [address] authority and the [facility] Act, this Virtual Visit was conducted, with patient's consent, to reduce the patient's risk of exposure to COVID-19 and provide continuity of care for an established patient. Services were provided through a video synchronous discussion virtually to substitute for in-person appointment. \"  Documentation:  I communicated with the patient and/or health care decision maker about plan of care  Details of this discussion including any medical advice provided: Total Time: minutes: 21-30 minutes    I affirm this is a Patient Initiated Episode with an Established Patient who has not had a related appointment within my department in the past 7 days or scheduled within the next 24 hours.     Electronically signed by Damian Lopez MD on 3/8/2021 at 6:44 PM

## 2021-03-08 ENCOUNTER — HOSPITAL ENCOUNTER (OUTPATIENT)
Dept: PAIN MANAGEMENT | Age: 71
Discharge: HOME | End: 2021-03-08
Payer: MEDICARE

## 2021-03-08 ENCOUNTER — OFFICE VISIT (OUTPATIENT)
Dept: UROLOGY | Age: 71
End: 2021-03-08
Payer: MEDICARE

## 2021-03-08 VITALS
DIASTOLIC BLOOD PRESSURE: 65 MMHG | RESPIRATION RATE: 18 BRPM | SYSTOLIC BLOOD PRESSURE: 139 MMHG | HEART RATE: 94 BPM | HEIGHT: 69 IN | TEMPERATURE: 98 F | WEIGHT: 210 LBS | BODY MASS INDEX: 31.1 KG/M2

## 2021-03-08 DIAGNOSIS — M48.061 SPINAL STENOSIS OF LUMBAR REGION WITHOUT NEUROGENIC CLAUDICATION: ICD-10-CM

## 2021-03-08 DIAGNOSIS — M47.26 OSTEOARTHRITIS OF SPINE WITH RADICULOPATHY, LUMBAR REGION: ICD-10-CM

## 2021-03-08 DIAGNOSIS — R35.1 NOCTURIA: ICD-10-CM

## 2021-03-08 DIAGNOSIS — Z51.81 ENCOUNTER FOR MEDICATION MONITORING: ICD-10-CM

## 2021-03-08 DIAGNOSIS — M46.92 CERVICAL SPONDYLITIS (HCC): ICD-10-CM

## 2021-03-08 DIAGNOSIS — M51.36 DEGENERATIVE DISC DISEASE, LUMBAR: Primary | ICD-10-CM

## 2021-03-08 DIAGNOSIS — G62.9 PERIPHERAL POLYNEUROPATHY: ICD-10-CM

## 2021-03-08 DIAGNOSIS — M47.816 OSTEOARTHRITIS OF LUMBAR SPINE, UNSPECIFIED SPINAL OSTEOARTHRITIS COMPLICATION STATUS: ICD-10-CM

## 2021-03-08 DIAGNOSIS — R31.0 GROSS HEMATURIA: Primary | ICD-10-CM

## 2021-03-08 DIAGNOSIS — M54.16 LUMBAR RADICULOPATHY: ICD-10-CM

## 2021-03-08 DIAGNOSIS — G89.29 ENCOUNTER FOR CHRONIC PAIN MANAGEMENT: ICD-10-CM

## 2021-03-08 DIAGNOSIS — M47.816 LUMBAR SPONDYLOSIS: ICD-10-CM

## 2021-03-08 DIAGNOSIS — Z98.1 S/P CERVICAL SPINAL FUSION: ICD-10-CM

## 2021-03-08 DIAGNOSIS — R35.0 FREQUENCY OF URINATION: ICD-10-CM

## 2021-03-08 DIAGNOSIS — N30.01 ACUTE CYSTITIS WITH HEMATURIA: ICD-10-CM

## 2021-03-08 LAB
APPEARANCE FLUID: CLEAR
BILIRUBIN, POC: ABNORMAL
BLOOD URINE, POC: ABNORMAL
CLARITY, POC: ABNORMAL
COLOR, POC: CLEAR
GLUCOSE URINE, POC: NEGATIVE
KETONES, POC: ABNORMAL
LEUKOCYTE EST, POC: NEGATIVE
NITRITE, POC: POSITIVE
PH, POC: ABNORMAL
PROTEIN, POC: ABNORMAL
SPECIFIC GRAVITY, POC: ABNORMAL
UROBILINOGEN, POC: ABNORMAL

## 2021-03-08 PROCEDURE — 1123F ACP DISCUSS/DSCN MKR DOCD: CPT | Performed by: UROLOGY

## 2021-03-08 PROCEDURE — 99213 OFFICE O/P EST LOW 20 MIN: CPT

## 2021-03-08 PROCEDURE — 99204 OFFICE O/P NEW MOD 45 MIN: CPT | Performed by: UROLOGY

## 2021-03-08 PROCEDURE — 1036F TOBACCO NON-USER: CPT | Performed by: UROLOGY

## 2021-03-08 PROCEDURE — G8427 DOCREV CUR MEDS BY ELIG CLIN: HCPCS | Performed by: UROLOGY

## 2021-03-08 PROCEDURE — 4040F PNEUMOC VAC/ADMIN/RCVD: CPT | Performed by: UROLOGY

## 2021-03-08 PROCEDURE — 3017F COLORECTAL CA SCREEN DOC REV: CPT | Performed by: UROLOGY

## 2021-03-08 PROCEDURE — G8417 CALC BMI ABV UP PARAM F/U: HCPCS | Performed by: UROLOGY

## 2021-03-08 PROCEDURE — 81002 URINALYSIS NONAUTO W/O SCOPE: CPT | Performed by: UROLOGY

## 2021-03-08 PROCEDURE — G8484 FLU IMMUNIZE NO ADMIN: HCPCS | Performed by: UROLOGY

## 2021-03-08 RX ORDER — OXYCODONE HYDROCHLORIDE AND ACETAMINOPHEN 5; 325 MG/1; MG/1
1 TABLET ORAL EVERY 8 HOURS
Qty: 90 TABLET | Refills: 0 | Status: SHIPPED | OUTPATIENT
Start: 2021-03-09 | End: 2021-04-07 | Stop reason: SDUPTHER

## 2021-03-08 RX ORDER — MORPHINE SULFATE 60 MG/1
60 TABLET, FILM COATED, EXTENDED RELEASE ORAL 2 TIMES DAILY
Qty: 60 TABLET | Refills: 0 | Status: SHIPPED | OUTPATIENT
Start: 2021-03-09 | End: 2021-04-07 | Stop reason: SDUPTHER

## 2021-03-08 ASSESSMENT — ENCOUNTER SYMPTOMS
CONSTIPATION: 0
ABDOMINAL PAIN: 0
DIARRHEA: 0
SHORTNESS OF BREATH: 0
WHEEZING: 0
VOMITING: 0
COUGH: 0
NAUSEA: 0
BACK PAIN: 0
EYE PAIN: 0

## 2021-03-08 NOTE — PROGRESS NOTES
[address] [address]  Dept: [phone]  Dept Fax: [phone] [facility] Urology Office Note - New patient    Patient:  Kim Catalan  YOB: 1950  Date: 3/8/2021    The patient is a 78 y.o. male who presents todayfor evaluation of the following problems:   Chief Complaint   Patient presents with    New Patient     hematuria st ed  f/u    referred by Aubree Scott MD.      HPI  This is a very pleasant 62-year-old gentleman who had painless gross hematuria recently. He did have a CT of the abdomen pelvis with contrast which did not show any upper urinary tract abnormalities. He has had 1 previous episode of blood in the urine. He did also have a urinary tract infection and was treated with antibiotics. His hematuria has cleared up. The patient is a former smoker but quit about 3 to 4 years ago. (Patient's old records have been requested, reviewed and summarized in today's note.)    Summary of old records: N/A    Additional History: N/A    Procedures Today: N/A    Last several PSA's:  No results found for: PSA  Last total testosterone:  No results found for: TESTOSTERONE  Urinalysis today:  No results found for this visit on 03/08/21. AUA Symptom Score (3/8/2021):   INCOMPLETE EMPTYING: How often have you had the sensation of not emptying your bladder?: Not at all  FREQUENCY: How often do you have to urinate less than every two hours?: Not at all  INTERMITTENCY: How often have you found you stopped and started again several times when you urinated?: Not at all  URGENCY: How often have you found it difficult to postpone urination?: Not at all  WEAK STREAM: How often have you had a weak urinary stream?: Not at all  STRAINING: How often have you had to strain to start  urination?: Not at all  NOCTURIA: How many times did you typically get up at night to uriniate?: 4 Times  TOTAL I-PSS SCORE[de-identified] 4 Last BUN and creatinine:  Lab Results   Component Value Date    BUN 16 02/28/2021     Lab Results   Component Value Date    CREATININE 0.60 (L) 02/28/2021       Additional Lab/Culture results: none    Imaging Reviewed during this Office Visit: CT a/p with contrast; no upper tract abnormality  (results were independently reviewed by physician and radiology report verified)    PAST MEDICAL, FAMILY AND SOCIAL HISTORY:  Past Medical History:   Diagnosis Date    Anemia     Arthritis     osteoarthritis    Colon polyps 10/08/2019    tubular adenoma x2    Encounter for chronic pain management     Essential hypertension 05/12/2020    Hepatitis B core antibody positive     History of blood transfusion     History of hepatitis C     Hyperlipidemia     Iron (Fe) deficiency anemia     Positive FIT (fecal immunochemical test)     Type 2 diabetes mellitus with hyperglycemia, without long-term current use of insulin ([address] 05/12/2020    Type 2 diabetes mellitus with microalbuminuria, without long-term current use of insulin ([address] 07/13/2020    Wears dentures     Wears glasses      Past Surgical History:   Procedure Laterality Date    CERVICAL SPINE SURGERY  1977    cervical spine three times, has plate    CHOLECYSTECTOMY  03/22/2019    COLONOSCOPY  01/25/2015    10 yr recall, hemorrhoids    COLONOSCOPY N/A 10/08/2019    tubular adenoma x2    DENTAL SURGERY  10/2015    all teeth extracted    HERNIA REPAIR  08/07/2020    lap robotic with mesh    HERNIA REPAIR N/A 08/07/2020    HERNIA INCISIONAL REPAIR LAPAROSCOPIC ROBOTIC WITH MESH performed by Devan [facility] DO at [facility] Right     UMBILICAL HERNIA REPAIR  3022-19    UPPER GASTROINTESTINAL ENDOSCOPY  01/25/2015    UPPER GASTROINTESTINAL ENDOSCOPY N/A 10/08/2019    EGD BIOPSY performed by Marilee Alcantar MD at [facility]     Family History   Problem Relation Age of Onset    Diabetes Mother     Heart Disease Father Outpatient Medications Marked as Taking for the 3/8/21 encounter (Office Visit) with Jazmyn Sanchez MD   Medication Sig Dispense Refill    [START ON 3/9/2021] oxyCODONE-acetaminophen (PERCOCET) 5-325 MG per tablet Take 1 tablet by mouth every 8 hours for 30 days. 90 tablet 0    [START ON 3/9/2021] morphine (MS CONTIN) 60 MG extended release tablet Take 1 tablet by mouth 2 times daily for 30 days. 60 tablet 0    pantoprazole (PROTONIX) 40 MG tablet Take 1 tablet by mouth daily 90 tablet 1    pregabalin (LYRICA) 150 MG capsule Take 1 capsule by mouth 3 times daily for 90 days. 90 capsule 2    baclofen (LIORESAL) 10 MG tablet TAKE 1 TABLET BY MOUTH THREE TIMES DAILY AS NEEDED FOR MUSCLE SPASMS,CAUSES SEDATION,DO NOT DRIVE WHILE TAKING THIS 90 tablet 0    vitamin D (ERGOCALCIFEROL) 1.25 MG (60560 UT) CAPS capsule Take 1 capsule by mouth once a week 12 capsule 0    Syringe/Needle, Disp, (SYRINGE 3CC/25GX1\") 25G X 1\" 3 ML MISC To be used with B12 injections 50 each 2    cyanocobalamin 1000 MCG/ML injection Inject 1 mL into the muscle every 7 days Call for next refill which will be monthly for life 4 mL 0    loperamide (RA ANTI-DIARRHEAL) 2 MG capsule Take 1 capsule by mouth 4 times daily as needed for Diarrhea 112 capsule 2    cholestyramine (QUESTRAN) 4 g packet Take 1 packet by mouth 2 times daily 90 packet 3    lidocaine (LIDODERM) 5 % Place 1 patch onto the skin daily 12 hours on, 12 hours off.  30 patch 3    lisinopril-hydroCHLOROthiazide (PRINZIDE;ZESTORETIC) 10-12.5 MG per tablet TK 1 T PO QD 90 tablet 3    metoprolol tartrate (LOPRESSOR) 25 MG tablet Take 1 tablet by mouth 2 times daily 60 tablet 5    Probiotic Product (PROBIOTIC-10 PO) Take by mouth      TRUE METRIX BLOOD GLUCOSE TEST strip CHECK BLOOD SUGAR  each 3    TRUEplus Lancets 30G MISC Test blood glucose twice a day 200 each 3    Alcohol Swabs (B-D SINGLE USE SWABS REGULAR) PADS USE TO CHECK BLOOD SUGAR TWICE A  each 3    metFORMIN (GLUCOPHAGE) 1000 MG tablet Take 1 tablet by mouth 2 times daily (with meals) 180 tablet 1    glipiZIDE (GLUCOTROL) 5 MG tablet Take 1 tablet by mouth every morning Keep fasting morning blood glucose . If blood glucose below 80, you need to stop glipizide 90 tablet 3    pravastatin (PRAVACHOL) 40 MG tablet Take 1 tablet by mouth every evening Stop Gemfibrozil 90 tablet 3    Blood Glucose Monitoring Suppl (TRUE METRIX METER) w/Device KIT USE AS DIRECTED TO TEST BLOOD SUGAR          Asa [aspirin], Iron, and Nsaids  Social History     Tobacco Use   Smoking Status Former Smoker    Packs/day: 0.50    Years: 45.00    Pack years: 22.50    Types: Cigarettes    Quit date: 2015    Years since quittin.2   Smokeless Tobacco Never Used   Tobacco Comment    on chantix 11-6-15   12-7-15      (If patient a smoker, smoking cessation counseling offered)   Social History     Substance and Sexual Activity   Alcohol Use No       REVIEW OF SYSTEMS:  Review of Systems    Physical Exam:    This a 78 y.o. male   Vitals:    21 1307   BP: 139/65   Pulse: 94   Resp: 18   Temp: 98 °F (36.7 °C)     Body mass index is 31.01 kg/m². Physical Exam  Constitutional: Patient in no acute distress. Neuro: Alert and oriented to person, place and time. Psych: Mood normal, affect normal  Skin: No rash noted  HEENT: Head: Normocephalic and atraumatic  Conjunctivae and EOM are normal. Pupils are equal, round  Nose: Normal  Right External Ear: Normal; Left External Ear: Normal  Mouth: Mucosa Moist  Neck: Supple  Lungs:Respiratory effort is normal  Cardiovascular: Warm & Pink  Abdomen: Soft, non-tender, non-distendedwith no CVA,  No flank tenderness,  Orhepatosplenomegaly   Lymphatics: No palpable lymphadenopathy. Bladder non-tender and not distended. Musculoskeletal: Normal gait and station        Assessment and Plan      1. Gross hematuria    2. Acute cystitis with hematuria    3. Nocturia    4.  Frequency of urination           Plan:  Cysto to complete hematuria w/u  CT shows no upper tract abnormalities   PSA         Prescriptions Ordered:  No orders of the defined types were placed in this encounter. Orders Placed:  Orders Placed This Encounter   Procedures    PSA, Diagnostic     Standing Status:   Future     Standing Expiration Date:   3/3/2022             José Dean MD    Agree with the ROS entered by the MA.

## 2021-03-08 NOTE — PROGRESS NOTES
Review of Systems   Constitutional: Negative for appetite change, chills, fatigue and fever. Eyes: Negative for pain and visual disturbance. Respiratory: Negative for cough, shortness of breath and wheezing. Cardiovascular: Negative for chest pain and leg swelling. Gastrointestinal: Negative for abdominal pain, constipation, diarrhea, nausea and vomiting. Genitourinary: Positive for hematuria. Negative for difficulty urinating, dysuria, frequency, penile pain and testicular pain. Musculoskeletal: Negative for back pain and myalgias. Neurological: Negative for dizziness, tremors, weakness, light-headedness, numbness and headaches. Hematological: Negative for adenopathy. Does not bruise/bleed easily.

## 2021-03-17 ENCOUNTER — TELEPHONE (OUTPATIENT)
Dept: UROLOGY | Age: 71
End: 2021-03-17

## 2021-03-17 ENCOUNTER — HOSPITAL ENCOUNTER (OUTPATIENT)
Age: 71
Discharge: HOME | End: 2021-03-17
Payer: MEDICARE

## 2021-03-17 DIAGNOSIS — R31.0 GROSS HEMATURIA: ICD-10-CM

## 2021-03-17 LAB — PROSTATE SPECIFIC ANTIGEN: 0.62 UG/L

## 2021-03-17 PROCEDURE — 84153 ASSAY OF PSA TOTAL: CPT

## 2021-03-17 PROCEDURE — 36415 COLL VENOUS BLD VENIPUNCTURE: CPT

## 2021-03-17 NOTE — TELEPHONE ENCOUNTER
Hematuria is coming back but able to empty. They have been instructed to call with fever, burning, blood clots or inability to empty his bladder, or worsening symptoms/additional questions. All  wife's questions answered. Cysto as Schedule for 4/5/21.

## 2021-03-17 NOTE — TELEPHONE ENCOUNTER
Pt wife called stating \" pt is done with ABX from hospital. Hematuria was clearing up,but hematuria has been noticed again. Should he be seen sooner. \" informed pt's  wife pt  has a scheduled Cysto on 4/5 to look inside his bladder to determine the cause of hematuria. Per Dr. Sigifredo Lima note on 3/08 CT shows no upper tract abnormalities. Cysto is required. Pt wife did not let writer explain. Writer will discuss with Rusty Nicole CNP. Pt wife stated \" are you telling me this because Dr. Sigifredo Lima told you or are you just saying this from experience. I worked in the [address] [place] [address] and we never asked the providers. I need you to  ask a MD or NP. \" Writer informed pt wife will discuss with CNP. Pt wife requested a call back. Call ended.

## 2021-03-19 DIAGNOSIS — E11.65 TYPE 2 DIABETES MELLITUS WITH HYPERGLYCEMIA, WITHOUT LONG-TERM CURRENT USE OF INSULIN (HCC): ICD-10-CM

## 2021-04-05 ENCOUNTER — PROCEDURE VISIT (OUTPATIENT)
Dept: UROLOGY | Age: 71
End: 2021-04-05
Payer: MEDICARE

## 2021-04-05 VITALS
WEIGHT: 210 LBS | HEIGHT: 69 IN | SYSTOLIC BLOOD PRESSURE: 107 MMHG | BODY MASS INDEX: 31.1 KG/M2 | HEART RATE: 93 BPM | DIASTOLIC BLOOD PRESSURE: 67 MMHG | TEMPERATURE: 97.7 F

## 2021-04-05 DIAGNOSIS — R31.0 GROSS HEMATURIA: Primary | ICD-10-CM

## 2021-04-05 PROCEDURE — 52000 CYSTOURETHROSCOPY: CPT | Performed by: UROLOGY

## 2021-04-05 RX ORDER — LISINOPRIL 10 MG/1
TABLET ORAL
COMMUNITY
Start: 2021-03-15 | End: 2021-04-07

## 2021-04-05 NOTE — PROGRESS NOTES
Cystoscopy Operative Note (4/5/21)  Surgeon: Kia Priest MD  Anesthesia: Urethral 2% Xylocaine   Indications: Hematuria  Position: Dorsal Lithotomy    Findings:   Risks and Benefits discussed with patient prior to procedure. The patient was prepped and draped in the usual sterile fashion. The flexible cystoscope was advanced through the urethra and into the bladder. The bladder was thoroughly inspected and the following was noted:    Residual Urine: none  Urethra: normal appearing urethra with no masses, tenderness or lesions  Prostate: partially obstructing lateral lobes of prostate; median lobe present? no.   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 No bladder diverticulum. There was none trabeculation noted. Ureters: Clear efflux from both ureters. Orifices with normal configuration and location. The cystoscope was removed. The patient tolerated the procedure well. Agree with the ROS entered by the MA. Plan: TURBT. We will plan to possibly place a stent at the time of tumor resection due to proximity to the right ureteral orifice.

## 2021-04-07 ENCOUNTER — HOSPITAL ENCOUNTER (OUTPATIENT)
Dept: PAIN MANAGEMENT | Age: 71
Discharge: HOME | End: 2021-04-07
Payer: MEDICARE

## 2021-04-07 DIAGNOSIS — M47.816 LUMBAR SPONDYLOSIS: Chronic | ICD-10-CM

## 2021-04-07 DIAGNOSIS — Z98.1 S/P CERVICAL SPINAL FUSION: Primary | Chronic | ICD-10-CM

## 2021-04-07 DIAGNOSIS — M54.16 LUMBAR RADICULOPATHY: Chronic | ICD-10-CM

## 2021-04-07 DIAGNOSIS — Z51.81 ENCOUNTER FOR MEDICATION MONITORING: Chronic | ICD-10-CM

## 2021-04-07 DIAGNOSIS — M51.36 DEGENERATIVE DISC DISEASE, LUMBAR: Chronic | ICD-10-CM

## 2021-04-07 DIAGNOSIS — G62.9 PERIPHERAL POLYNEUROPATHY: ICD-10-CM

## 2021-04-07 DIAGNOSIS — M46.92 CERVICAL SPONDYLITIS (HCC): ICD-10-CM

## 2021-04-07 DIAGNOSIS — M48.061 SPINAL STENOSIS OF LUMBAR REGION WITHOUT NEUROGENIC CLAUDICATION: Chronic | ICD-10-CM

## 2021-04-07 DIAGNOSIS — M47.816 OSTEOARTHRITIS OF LUMBAR SPINE, UNSPECIFIED SPINAL OSTEOARTHRITIS COMPLICATION STATUS: ICD-10-CM

## 2021-04-07 DIAGNOSIS — M47.26 OSTEOARTHRITIS OF SPINE WITH RADICULOPATHY, LUMBAR REGION: ICD-10-CM

## 2021-04-07 DIAGNOSIS — G89.29 ENCOUNTER FOR CHRONIC PAIN MANAGEMENT: ICD-10-CM

## 2021-04-07 PROCEDURE — 99213 OFFICE O/P EST LOW 20 MIN: CPT | Performed by: NURSE PRACTITIONER

## 2021-04-07 PROCEDURE — 99213 OFFICE O/P EST LOW 20 MIN: CPT

## 2021-04-07 RX ORDER — MORPHINE SULFATE 60 MG/1
60 TABLET, FILM COATED, EXTENDED RELEASE ORAL 2 TIMES DAILY
Qty: 60 TABLET | Refills: 0 | Status: SHIPPED | OUTPATIENT
Start: 2021-04-08 | End: 2021-05-05 | Stop reason: SDUPTHER

## 2021-04-07 RX ORDER — OXYCODONE HYDROCHLORIDE AND ACETAMINOPHEN 5; 325 MG/1; MG/1
1 TABLET ORAL EVERY 8 HOURS
Qty: 90 TABLET | Refills: 0 | Status: SHIPPED | OUTPATIENT
Start: 2021-04-08 | End: 2021-05-05 | Stop reason: SDUPTHER

## 2021-04-07 ASSESSMENT — ENCOUNTER SYMPTOMS
ROS SKIN COMMENTS: FOREHEAD
GASTROINTESTINAL NEGATIVE: 1
RESPIRATORY NEGATIVE: 1
BACK PAIN: 1

## 2021-04-07 NOTE — PROGRESS NOTES
minimal narrowing of the   left foramen.  Facet hypertrophic changes are noted.  Findings are stable       L3-L4: Mild disc bulging is noted diffusely.  Minimal endplate and mild facet   hypertrophic changes are noted.  There is borderline mild proximal foraminal   narrowing on the left.  This is stable.       L4-L5: Mild disc bulging is noted.  A small inferior foraminal protrusion on   the right is noted.  There is a slightly larger broad-based protrusion into   the left foramen.  Bilateral foraminal narrowing is noted.  Findings not   appear grossly changed.  Facet hypertrophic changes are noted       L5-S1: Minimal disc bulging is noted.  Facet hypertrophic changes are noted.       SI joint degenerative changes are noted.           Impression   No significant interval change in the multifocal degenerative disc disease   throughout the lumbar spine.  See above for details of each level       Suggested aortic aneurysm.  Dedicated imaging of the aorta is recommended       RECOMMENDATIONS:   Managing Abdominal Aortic Aneurysms       2.6-2.9 cm: Every 5 years*       3.0-3.4 cm: Every 3 years.       3.5-3.9 cm: Every 1 year.       4.0-4.4 cm: Every 1 year. Recommend vascular consultation.       4.5-5.4 cm: Every 6 months. Recommend vascular consultation.       Greater than or equal to 5.5 cm: Referral to vascular surgeon.       *For abdominal aortas with maximum diameter of 2.6-2.9 cm meeting criteria   for AAA (>50% of proximal normal segment).       Reference:       J Vasc Surg. 2009 Oct;50(4 Suppl):S2-49                          Pill count: appropriate    fill date : 4-8-2021    Morphine equivalent dose as reported on OARRS:142.50  Periodic Controlled Substance Monitoring: Possible medication side effects, risk of tolerance/dependence & alternative treatments discussed., No signs of potential drug abuse or diversion identified. , Assessed functional status., Obtaining appropriate analgesic effect of treatment. (Arnoldo Leong, VEE - CNP)  Chronic Pain > 80 MEDD: Co-prescribed Naloxone., Obtained or confirmed \"Medication Contract\" on file. VEE Louis CNP)  Review ofOARRS does not show any aberrant prescription behavior. Medication is helping the patient stay active. Patient denies any side effects and reports adequate analgesia. No sign of misuse/abuse.             Past Medical History:   Diagnosis Date    Anemia     Arthritis     osteoarthritis    Colon polyps 10/08/2019    tubular adenoma x2    Encounter for chronic pain management     Essential hypertension 05/12/2020    Hepatitis B core antibody positive     History of blood transfusion     History of hepatitis C     Hyperlipidemia     Iron (Fe) deficiency anemia     Positive FIT (fecal immunochemical test)     Type 2 diabetes mellitus with hyperglycemia, without long-term current use of insulin ([address] 05/12/2020    Type 2 diabetes mellitus with microalbuminuria, without long-term current use of insulin ([address] 07/13/2020    Wears dentures     Wears glasses        Past Surgical History:   Procedure Laterality Date    CERVICAL SPINE SURGERY  1977    cervical spine three times, has plate    CHOLECYSTECTOMY  03/22/2019    COLONOSCOPY  01/25/2015    10 yr recall, hemorrhoids    COLONOSCOPY N/A 10/08/2019    tubular adenoma x2    DENTAL SURGERY  10/2015    all teeth extracted    HERNIA REPAIR  08/07/2020    lap robotic with mesh    HERNIA REPAIR N/A 08/07/2020    HERNIA INCISIONAL REPAIR LAPAROSCOPIC ROBOTIC WITH MESH performed by Kristine Plasencia DO [facility] Right     UMBILICAL HERNIA REPAIR  3022-19    UPPER GASTROINTESTINAL ENDOSCOPY  01/25/2015    UPPER GASTROINTESTINAL ENDOSCOPY N/A 10/08/2019    EGD BIOPSY performed by Justin Dodd MD at [address] [place] [address] ENDO       Allergies   Allergen Reactions   [place] [Aspirin]       iron deficiency anemia , requiring iron infusions and transfusions    Iron      Pill- constipation, abdominal pain    Nsaids       iron deficiency anemia, history of bleeding ulcers          Current Outpatient Medications:     metFORMIN (GLUCOPHAGE) 1000 MG tablet, TAKE 1 TABLET BY MOUTH TWICE DAILY WITH MEALS, Disp: 180 tablet, Rfl: 1    oxyCODONE-acetaminophen (PERCOCET) 5-325 MG per tablet, Take 1 tablet by mouth every 8 hours for 30 days. , Disp: 90 tablet, Rfl: 0    morphine (MS CONTIN) 60 MG extended release tablet, Take 1 tablet by mouth 2 times daily for 30 days. , Disp: 60 tablet, Rfl: 0    pantoprazole (PROTONIX) 40 MG tablet, Take 1 tablet by mouth daily, Disp: 90 tablet, Rfl: 1    pregabalin (LYRICA) 150 MG capsule, Take 1 capsule by mouth 3 times daily for 90 days. , Disp: 90 capsule, Rfl: 2    cholestyramine (QUESTRAN) 4 g packet, Take 1 packet by mouth 2 times daily, Disp: 90 packet, Rfl: 3    lisinopril-hydroCHLOROthiazide (PRINZIDE;ZESTORETIC) 10-12.5 MG per tablet, TK 1 T PO QD, Disp: 90 tablet, Rfl: 3    metoprolol tartrate (LOPRESSOR) 25 MG tablet, Take 1 tablet by mouth 2 times daily, Disp: 60 tablet, Rfl: 5    Probiotic Product (PROBIOTIC-10 PO), Take by mouth, Disp: , Rfl:     glipiZIDE (GLUCOTROL) 5 MG tablet, Take 1 tablet by mouth every morning Keep fasting morning blood glucose .   If blood glucose below 80, you need to stop glipizide, Disp: 90 tablet, Rfl: 3    pravastatin (PRAVACHOL) 40 MG tablet, Take 1 tablet by mouth every evening Stop Gemfibrozil, Disp: 90 tablet, Rfl: 3    baclofen (LIORESAL) 10 MG tablet, TAKE 1 TABLET BY MOUTH THREE TIMES DAILY AS NEEDED FOR MUSCLE SPASMS,CAUSES SEDATION,DO NOT DRIVE WHILE TAKING THIS, Disp: 90 tablet, Rfl: 0    Syringe/Needle, Disp, (SYRINGE 3CC/25GX1\") 25G X 1\" 3 ML MISC, To be used with B12 injections, Disp: 50 each, Rfl: 2    cyanocobalamin 1000 MCG/ML injection, Inject 1 mL into the muscle every 7 days Call for next refill which will be monthly for life, Disp: 4 mL, Rfl: 0    loperamide (RA ANTI-DIARRHEAL) 2 MG capsule, Take 1 capsule by mouth 4 times daily as needed for Diarrhea, Disp: 112 capsule, Rfl: 2    lidocaine (LIDODERM) 5 %, Place 1 patch onto the skin daily 12 hours on, 12 hours off., Disp: 30 patch, Rfl: 3    TRUE METRIX BLOOD GLUCOSE TEST strip, CHECK BLOOD SUGAR BID, Disp: 200 each, Rfl: 3    TRUEplus Lancets 30G MISC, Test blood glucose twice a day, Disp: 200 each, Rfl: 3    Alcohol Swabs (B-D SINGLE USE SWABS REGULAR) PADS, USE TO CHECK BLOOD SUGAR TWICE A DAY, Disp: 200 each, Rfl: 3    Blood Glucose Monitoring Suppl (TRUE METRIX METER) w/Device KIT, USE AS DIRECTED TO TEST BLOOD SUGAR, Disp: , Rfl:     hydrOXYzine (VISTARIL) 25 MG capsule, hydroxyzine pamoate 25 mg capsule, Disp: , Rfl:     Family History   Problem Relation Age of Onset    Diabetes Mother     Heart Disease Father        Social History     Socioeconomic History    Marital status:      Spouse name: Not on file    Number of children: Not on file    Years of education: Not on file    Highest education level: Not on file   Occupational History    Occupation: disabled   Social Needs    Financial resource strain: Not on file    Food insecurity     Worry: Not on file     Inability: Not on file   [email or web address] needs     Medical: Not on file     Non-medical: Not on file   Tobacco Use    Smoking status: Former Smoker     Packs/day: 0.50     Years: 45.00     Pack years: 22.50     Types: Cigarettes     Quit date: 2015     Years since quittin.3    Smokeless tobacco: Never Used    Tobacco comment: on chantix 11-6-15   12-7-15   Substance and Sexual Activity    Alcohol use: No    Drug use: No    Sexual activity: Yes     Partners: Female   Lifestyle    Physical activity     Days per week: Not on file     Minutes per session: Not on file    Stress: Not on file   Relationships    Social connections     Talks on phone: Not on file     Gets together: Not on file     Attends Protestant service: Not on file     Active member of club or organization: Not on file     Attends meetings of clubs or organizations: Not on file     Relationship status: Not on file    Intimate partner violence     Fear of current or ex partner: Not on file     Emotionally abused: Not on file     Physically abused: Not on file     Forced sexual activity: Not on file   Other Topics Concern    Not on file   Social History Narrative    Not on file         Review of Systems:  Review of Systems   Constitution: Negative. HENT: Negative. Eyes:        Glasses   Cardiovascular: Negative. Respiratory: Negative. Endocrine:        Diabetic, blood sugar 112   Hematologic/Lymphatic: Negative. Skin: Positive for rash. forehead   Musculoskeletal: Positive for back pain and joint pain. Shoulders   Gastrointestinal: Negative. Neurological: Positive for headaches and numbness. Psychiatric/Behavioral: Negative. Physical Exam:  There were no vitals taken for this visit. Physical Exam  HENT:      Head: Normocephalic. Pulmonary:      Effort: Pulmonary effort is normal.   Skin:         Neurological:      Mental Status: He is alert and oriented to person, place, and time. Psychiatric:         Mood and Affect: Mood normal.         Thought Content:  Thought content normal.           Assessment:    Problem List Items Addressed This Visit     S/P cervical spinal fusion - Primary (Chronic)    Peripheral polyneuropathy    Osteoarthritis of spine with radiculopathy, lumbar region    Osteoarthritis of lumbar spine    Relevant Orders    DRUG SCREEN, PAIN    Lumbar spondylosis (Chronic)    Relevant Orders    DRUG SCREEN, PAIN    Lumbar spinal stenosis (Chronic)    Lumbar radiculopathy (Chronic)    Encounter for medication monitoring (Chronic)    Relevant Orders    DRUG SCREEN, PAIN    Encounter for chronic pain management    Degenerative disc disease, lumbar (Chronic)    Relevant Orders    DRUG SCREEN, PAIN              Treatment Plan: them. Patient is strongly advised to avoid tranquilizers or  relaxants, illegal drugs  or any medications that can depress breathing  Patient is also advised to tell us if there is any changes in their medications from other physicians.       1, UDT, instructed to get done by tomorrow    TREATMENT OPTIONS:     UDT  Medication Agreement Requirements Met  Continue Opioid therapy  Script written for  Ms continbrady  Follow up appointment made

## 2021-04-08 ENCOUNTER — TELEPHONE (OUTPATIENT)
Dept: UROLOGY | Age: 71
End: 2021-04-08

## 2021-04-08 NOTE — TELEPHONE ENCOUNTER
cysto, TURBT *possible (Rt) stent placement*  @ [facility] 4/29/21 8:00am **STOP BLOOD THINNERS 4/22/21**    PAT same day update  covid @ [facility] 4/25/21 11:40am           Spoke with wife, procedure info emailed.

## 2021-04-09 ENCOUNTER — HOSPITAL ENCOUNTER (OUTPATIENT)
Age: 71
Discharge: HOME | End: 2021-04-09
Payer: MEDICARE

## 2021-04-09 DIAGNOSIS — M47.816 OSTEOARTHRITIS OF LUMBAR SPINE, UNSPECIFIED SPINAL OSTEOARTHRITIS COMPLICATION STATUS: ICD-10-CM

## 2021-04-09 DIAGNOSIS — M47.816 LUMBAR SPONDYLOSIS: Chronic | ICD-10-CM

## 2021-04-09 DIAGNOSIS — Z51.81 ENCOUNTER FOR MEDICATION MONITORING: Chronic | ICD-10-CM

## 2021-04-09 DIAGNOSIS — M51.36 DEGENERATIVE DISC DISEASE, LUMBAR: Chronic | ICD-10-CM

## 2021-04-09 PROCEDURE — 80307 DRUG TEST PRSMV CHEM ANLYZR: CPT

## 2021-04-10 DIAGNOSIS — R51.9 WORSENING HEADACHES: ICD-10-CM

## 2021-04-12 ENCOUNTER — TELEPHONE (OUTPATIENT)
Dept: GASTROENTEROLOGY | Age: 71
End: 2021-04-12

## 2021-04-12 RX ORDER — BACLOFEN 10 MG/1
10 TABLET ORAL 3 TIMES DAILY PRN
Qty: 90 TABLET | Refills: 0 | Status: SHIPPED | OUTPATIENT
Start: 2021-04-12 | End: 2021-05-10

## 2021-04-13 LAB
6-ACETYLMORPHINE, UR: NOT DETECTED
7-AMINOCLONAZEPAM, URINE: NOT DETECTED
ALPHA-OH-ALPRAZ, URINE: NOT DETECTED
ALPHA-OH-MIDAZOLAM, URINE: NOT DETECTED
ALPRAZOLAM, URINE: NOT DETECTED
AMPHETAMINES, URINE: NOT DETECTED
BARBITURATES, URINE: NOT DETECTED
BENZOYLECGONINE, UR: NOT DETECTED
BUPRENORPHINE URINE: NOT DETECTED
CARISOPRODOL, UR: NOT DETECTED
CLONAZEPAM, URINE: NOT DETECTED
CODEINE, URINE: NOT DETECTED
CREATININE URINE: 232.2 MG/DL (ref 20–400)
DIAZEPAM, URINE: NOT DETECTED
DRUGS EXPECTED, UR: NORMAL
EER HI RES INTERP UR: NORMAL
ETHYL GLUCURONIDE UR: NOT DETECTED
FENTANYL URINE: NOT DETECTED
GABAPENTIN: NOT DETECTED
HYDROCODONE, URINE: NOT DETECTED
HYDROMORPHONE, URINE: PRESENT
LORAZEPAM, URINE: NOT DETECTED
MARIJUANA METAB, UR: NOT DETECTED
MDA, UR: NOT DETECTED
MDEA, EVE, UR: NOT DETECTED
MDMA URINE: NOT DETECTED
MEPERIDINE METAB, UR: NOT DETECTED
METHADONE, URINE: NOT DETECTED
METHAMPHETAMINE, URINE: NOT DETECTED
METHYLPHENIDATE: NOT DETECTED
MIDAZOLAM, URINE: NOT DETECTED
MORPHINE URINE: PRESENT
NALOXONE URINE: NOT DETECTED
NORBUPRENORPHINE, URINE: NOT DETECTED
NORDIAZEPAM, URINE: NOT DETECTED
NORFENTANYL, URINE: NOT DETECTED
NORHYDROCODONE, URINE: NOT DETECTED
NOROXYCODONE, URINE: PRESENT
NOROXYMORPHONE, URINE: NOT DETECTED
OXAZEPAM, URINE: NOT DETECTED
OXYCODONE URINE: PRESENT
OXYMORPHONE, URINE: PRESENT
PAIN MANAGEMENT DRUG PANEL INTERP, URINE: NORMAL
PAIN MGT DRUG PANEL, HI RES, UR: NORMAL
PCP,URINE: NOT DETECTED
PHENTERMINE, UR: NOT DETECTED
PREGABALIN: PRESENT
TAPENTADOL, URINE: NOT DETECTED
TAPENTADOL-O-SULFATE, URINE: NOT DETECTED
TEMAZEPAM, URINE: NOT DETECTED
TRAMADOL, URINE: NOT DETECTED
ZOLPIDEM METABOLITE (ZCA), URINE: NOT DETECTED
ZOLPIDEM, URINE: NOT DETECTED

## 2021-04-16 ENCOUNTER — PATIENT MESSAGE (OUTPATIENT)
Dept: FAMILY MEDICINE CLINIC | Age: 71
End: 2021-04-16

## 2021-04-16 DIAGNOSIS — E11.65 TYPE 2 DIABETES MELLITUS WITH HYPERGLYCEMIA, WITHOUT LONG-TERM CURRENT USE OF INSULIN (HCC): ICD-10-CM

## 2021-04-16 RX ORDER — GLIPIZIDE 5 MG/1
5 TABLET ORAL EVERY MORNING
Qty: 90 TABLET | Refills: 3 | Status: SHIPPED | OUTPATIENT
Start: 2021-04-16 | End: 2022-02-25 | Stop reason: ALTCHOICE

## 2021-04-16 NOTE — TELEPHONE ENCOUNTER
From: Evan Cabrales  To: Lam Lucero MD  Sent: 4/16/2021 11:38 AM EDT  Subject: Prescription Question    Hello,   On [facility] bottle of Glidizide 5mg, it shows 3 refills, however, old pharmacy says none left? Anyway, I sent a request to Dr ANAYA for a refill to be sent to [facility] on [address]. Lilian Franco only has 2 pills left. I would of called sooner but didn't with bottle showing refills. Can it please be called in today?  Thank you, Esdras Quartixies wife  [phone]

## 2021-04-25 ENCOUNTER — HOSPITAL ENCOUNTER (OUTPATIENT)
Dept: LAB | Age: 71
Setting detail: SPECIMEN
Discharge: HOME | End: 2021-04-25
Payer: MEDICARE

## 2021-04-25 DIAGNOSIS — Z01.818 PRE-OP TESTING: Primary | ICD-10-CM

## 2021-04-25 LAB
SARS-COV-2: NORMAL
SARS-COV-2: NOT DETECTED
SOURCE: NORMAL

## 2021-04-25 PROCEDURE — U0005 INFEC AGEN DETEC AMPLI PROBE: HCPCS

## 2021-04-25 PROCEDURE — U0003 INFECTIOUS AGENT DETECTION BY NUCLEIC ACID (DNA OR RNA); SEVERE ACUTE RESPIRATORY SYNDROME CORONAVIRUS 2 (SARS-COV-2) (CORONAVIRUS DISEASE [COVID-19]), AMPLIFIED PROBE TECHNIQUE, MAKING USE OF HIGH THROUGHPUT TECHNOLOGIES AS DESCRIBED BY CMS-2020-01-R: HCPCS

## 2021-04-29 ENCOUNTER — ANESTHESIA EVENT (OUTPATIENT)
Dept: OPERATING ROOM | Age: 71
End: 2021-04-29
Payer: MEDICARE

## 2021-04-29 ENCOUNTER — HOSPITAL ENCOUNTER (OUTPATIENT)
Age: 71
Setting detail: OUTPATIENT SURGERY
Discharge: HOME | End: 2021-04-29
Attending: UROLOGY | Admitting: UROLOGY
Payer: MEDICARE

## 2021-04-29 ENCOUNTER — ANESTHESIA (OUTPATIENT)
Dept: OPERATING ROOM | Age: 71
End: 2021-04-29
Payer: MEDICARE

## 2021-04-29 ENCOUNTER — APPOINTMENT (OUTPATIENT)
Dept: GENERAL RADIOLOGY | Age: 71
End: 2021-04-29
Attending: UROLOGY
Payer: MEDICARE

## 2021-04-29 VITALS — DIASTOLIC BLOOD PRESSURE: 76 MMHG | TEMPERATURE: 97.9 F | OXYGEN SATURATION: 98 % | SYSTOLIC BLOOD PRESSURE: 105 MMHG

## 2021-04-29 VITALS
HEART RATE: 90 BPM | HEIGHT: 69 IN | TEMPERATURE: 99 F | OXYGEN SATURATION: 93 % | BODY MASS INDEX: 30.96 KG/M2 | SYSTOLIC BLOOD PRESSURE: 132 MMHG | WEIGHT: 209 LBS | RESPIRATION RATE: 18 BRPM | DIASTOLIC BLOOD PRESSURE: 61 MMHG

## 2021-04-29 DIAGNOSIS — D49.4 BLADDER TUMOR: Primary | ICD-10-CM

## 2021-04-29 LAB
ANION GAP: 10 MMOL/L (ref 7–16)
GFR NON-AFRICAN AMERICAN: >60 ML/MIN
GFR SERPL CREATININE-BSD FRML MDRD: >60 ML/MIN
GFR SERPL CREATININE-BSD FRML MDRD: NORMAL ML/MIN/{1.73_M2}
GLUCOSE BLD-MCNC: 144 MG/DL (ref 75–110)
GLUCOSE BLD-MCNC: 162 MG/DL (ref 74–100)
POC BUN: 16 MG/DL (ref 8–26)
POC CHLORIDE: 103 MMOL/L (ref 98–107)
POC CREATININE: 0.89 MG/DL (ref 0.51–1.19)
POC HEMATOCRIT: 32 % (ref 41–53)
POC HEMOGLOBIN: 10.7 G/DL (ref 13.5–17.5)
POC IONIZED CALCIUM: 1.16 MMOL/L (ref 1.15–1.33)
POC LACTIC ACID: 4.44 MMOL/L (ref 0.56–1.39)
POC POTASSIUM: 4.1 MMOL/L (ref 3.5–4.5)
POC POTASSIUM: 5.4 MMOL/L (ref 3.5–4.5)
POC SODIUM: 140 MMOL/L (ref 138–146)
POC TCO2: 28 MMOL/L (ref 22–30)

## 2021-04-29 PROCEDURE — 82947 ASSAY GLUCOSE BLOOD QUANT: CPT

## 2021-04-29 PROCEDURE — 2709999900 HC NON-CHARGEABLE SUPPLY: Performed by: UROLOGY

## 2021-04-29 PROCEDURE — 82330 ASSAY OF CALCIUM: CPT

## 2021-04-29 PROCEDURE — 85014 HEMATOCRIT: CPT

## 2021-04-29 PROCEDURE — 6360000002 HC RX W HCPCS: Performed by: NURSE ANESTHETIST, CERTIFIED REGISTERED

## 2021-04-29 PROCEDURE — 88307 TISSUE EXAM BY PATHOLOGIST: CPT

## 2021-04-29 PROCEDURE — 7100000000 HC PACU RECOVERY - FIRST 15 MIN: Performed by: UROLOGY

## 2021-04-29 PROCEDURE — 82565 ASSAY OF CREATININE: CPT

## 2021-04-29 PROCEDURE — 3600000004 HC SURGERY LEVEL 4 BASE: Performed by: UROLOGY

## 2021-04-29 PROCEDURE — 3700000001 HC ADD 15 MINUTES (ANESTHESIA): Performed by: UROLOGY

## 2021-04-29 PROCEDURE — C2617 STENT, NON-COR, TEM W/O DEL: HCPCS | Performed by: UROLOGY

## 2021-04-29 PROCEDURE — 2720000010 HC SURG SUPPLY STERILE: Performed by: UROLOGY

## 2021-04-29 PROCEDURE — 2580000003 HC RX 258: Performed by: NURSE ANESTHETIST, CERTIFIED REGISTERED

## 2021-04-29 PROCEDURE — 6370000000 HC RX 637 (ALT 250 FOR IP): Performed by: STUDENT IN AN ORGANIZED HEALTH CARE EDUCATION/TRAINING PROGRAM

## 2021-04-29 PROCEDURE — 3600000014 HC SURGERY LEVEL 4 ADDTL 15MIN: Performed by: UROLOGY

## 2021-04-29 PROCEDURE — 2580000003 HC RX 258: Performed by: UROLOGY

## 2021-04-29 PROCEDURE — 80051 ELECTROLYTE PANEL: CPT

## 2021-04-29 PROCEDURE — 6370000000 HC RX 637 (ALT 250 FOR IP): Performed by: ANESTHESIOLOGY

## 2021-04-29 PROCEDURE — C1769 GUIDE WIRE: HCPCS | Performed by: UROLOGY

## 2021-04-29 PROCEDURE — 84520 ASSAY OF UREA NITROGEN: CPT

## 2021-04-29 PROCEDURE — 3700000000 HC ANESTHESIA ATTENDED CARE: Performed by: UROLOGY

## 2021-04-29 PROCEDURE — 7100000041 HC SPAR PHASE II RECOVERY - ADDTL 15 MIN: Performed by: UROLOGY

## 2021-04-29 PROCEDURE — 6360000002 HC RX W HCPCS: Performed by: ANESTHESIOLOGY

## 2021-04-29 PROCEDURE — 7100000040 HC SPAR PHASE II RECOVERY - FIRST 15 MIN: Performed by: UROLOGY

## 2021-04-29 PROCEDURE — 84132 ASSAY OF SERUM POTASSIUM: CPT

## 2021-04-29 PROCEDURE — 7100000001 HC PACU RECOVERY - ADDTL 15 MIN: Performed by: UROLOGY

## 2021-04-29 PROCEDURE — 6360000002 HC RX W HCPCS

## 2021-04-29 PROCEDURE — 83605 ASSAY OF LACTIC ACID: CPT

## 2021-04-29 PROCEDURE — 2500000003 HC RX 250 WO HCPCS: Performed by: NURSE ANESTHETIST, CERTIFIED REGISTERED

## 2021-04-29 RX ORDER — CEFAZOLIN SODIUM 1 G/3ML
INJECTION, POWDER, FOR SOLUTION INTRAMUSCULAR; INTRAVENOUS PRN
Status: DISCONTINUED | OUTPATIENT
Start: 2021-04-29 | End: 2021-04-29 | Stop reason: SDUPTHER

## 2021-04-29 RX ORDER — TRAMADOL HYDROCHLORIDE 50 MG/1
50 TABLET ORAL ONCE
Status: COMPLETED | OUTPATIENT
Start: 2021-04-29 | End: 2021-04-29

## 2021-04-29 RX ORDER — MAGNESIUM HYDROXIDE 1200 MG/15ML
LIQUID ORAL CONTINUOUS PRN
Status: COMPLETED | OUTPATIENT
Start: 2021-04-29 | End: 2021-04-29

## 2021-04-29 RX ORDER — CEPHALEXIN 500 MG/1
500 CAPSULE ORAL 3 TIMES DAILY
Qty: 15 CAPSULE | Refills: 0 | Status: SHIPPED | OUTPATIENT
Start: 2021-04-29 | End: 2021-05-04

## 2021-04-29 RX ORDER — PHENYLEPHRINE HYDROCHLORIDE 10 MG/ML
INJECTION INTRAVENOUS PRN
Status: DISCONTINUED | OUTPATIENT
Start: 2021-04-29 | End: 2021-04-29 | Stop reason: SDUPTHER

## 2021-04-29 RX ORDER — SODIUM CHLORIDE, SODIUM LACTATE, POTASSIUM CHLORIDE, CALCIUM CHLORIDE 600; 310; 30; 20 MG/100ML; MG/100ML; MG/100ML; MG/100ML
INJECTION, SOLUTION INTRAVENOUS CONTINUOUS PRN
Status: DISCONTINUED | OUTPATIENT
Start: 2021-04-29 | End: 2021-04-29 | Stop reason: SDUPTHER

## 2021-04-29 RX ORDER — TRAMADOL HYDROCHLORIDE 50 MG/1
50 TABLET ORAL EVERY 4 HOURS PRN
Qty: 15 TABLET | Refills: 0 | Status: SHIPPED | OUTPATIENT
Start: 2021-04-29 | End: 2021-05-02

## 2021-04-29 RX ORDER — ROCURONIUM BROMIDE 10 MG/ML
INJECTION, SOLUTION INTRAVENOUS PRN
Status: DISCONTINUED | OUTPATIENT
Start: 2021-04-29 | End: 2021-04-29 | Stop reason: SDUPTHER

## 2021-04-29 RX ORDER — LIDOCAINE HYDROCHLORIDE 10 MG/ML
INJECTION, SOLUTION EPIDURAL; INFILTRATION; INTRACAUDAL; PERINEURAL PRN
Status: DISCONTINUED | OUTPATIENT
Start: 2021-04-29 | End: 2021-04-29 | Stop reason: SDUPTHER

## 2021-04-29 RX ORDER — DEXAMETHASONE SODIUM PHOSPHATE 10 MG/ML
INJECTION INTRAMUSCULAR; INTRAVENOUS PRN
Status: DISCONTINUED | OUTPATIENT
Start: 2021-04-29 | End: 2021-04-29 | Stop reason: SDUPTHER

## 2021-04-29 RX ORDER — MIDAZOLAM HYDROCHLORIDE 1 MG/ML
INJECTION INTRAMUSCULAR; INTRAVENOUS PRN
Status: DISCONTINUED | OUTPATIENT
Start: 2021-04-29 | End: 2021-04-29 | Stop reason: SDUPTHER

## 2021-04-29 RX ORDER — FENTANYL CITRATE 50 UG/ML
INJECTION, SOLUTION INTRAMUSCULAR; INTRAVENOUS PRN
Status: DISCONTINUED | OUTPATIENT
Start: 2021-04-29 | End: 2021-04-29 | Stop reason: SDUPTHER

## 2021-04-29 RX ORDER — PROPOFOL 10 MG/ML
INJECTION, EMULSION INTRAVENOUS PRN
Status: DISCONTINUED | OUTPATIENT
Start: 2021-04-29 | End: 2021-04-29 | Stop reason: SDUPTHER

## 2021-04-29 RX ORDER — ONDANSETRON 2 MG/ML
INJECTION INTRAMUSCULAR; INTRAVENOUS PRN
Status: DISCONTINUED | OUTPATIENT
Start: 2021-04-29 | End: 2021-04-29 | Stop reason: SDUPTHER

## 2021-04-29 RX ORDER — EPHEDRINE SULFATE/0.9% NACL/PF 50 MG/5 ML
SYRINGE (ML) INTRAVENOUS PRN
Status: DISCONTINUED | OUTPATIENT
Start: 2021-04-29 | End: 2021-04-29 | Stop reason: SDUPTHER

## 2021-04-29 RX ADMIN — ROCURONIUM BROMIDE 40 MG: 10 INJECTION INTRAVENOUS at 09:19

## 2021-04-29 RX ADMIN — FENTANYL CITRATE 50 MCG: 50 INJECTION, SOLUTION INTRAMUSCULAR; INTRAVENOUS at 08:42

## 2021-04-29 RX ADMIN — FENTANYL CITRATE 25 MCG: 50 INJECTION, SOLUTION INTRAMUSCULAR; INTRAVENOUS at 09:17

## 2021-04-29 RX ADMIN — SODIUM CHLORIDE, POTASSIUM CHLORIDE, SODIUM LACTATE AND CALCIUM CHLORIDE: 600; 310; 30; 20 INJECTION, SOLUTION INTRAVENOUS at 08:00

## 2021-04-29 RX ADMIN — HYDROMORPHONE HYDROCHLORIDE 0.5 MG: 1 INJECTION, SOLUTION INTRAMUSCULAR; INTRAVENOUS; SUBCUTANEOUS at 09:58

## 2021-04-29 RX ADMIN — ONDANSETRON 4 MG: 2 INJECTION INTRAMUSCULAR; INTRAVENOUS at 09:30

## 2021-04-29 RX ADMIN — PHENYLEPHRINE HYDROCHLORIDE 100 MCG: 10 INJECTION INTRAVENOUS at 08:44

## 2021-04-29 RX ADMIN — CEFAZOLIN 2000 MG: 1 INJECTION, POWDER, FOR SOLUTION INTRAMUSCULAR; INTRAVENOUS at 08:58

## 2021-04-29 RX ADMIN — Medication 10 MG: at 09:13

## 2021-04-29 RX ADMIN — HYOSCYAMINE SULFATE 125 MCG: 0.12 TABLET ORAL; SUBLINGUAL at 10:30

## 2021-04-29 RX ADMIN — SUGAMMADEX 200 MG: 100 INJECTION, SOLUTION INTRAVENOUS at 09:40

## 2021-04-29 RX ADMIN — PHENYLEPHRINE HYDROCHLORIDE 100 MCG: 10 INJECTION INTRAVENOUS at 08:50

## 2021-04-29 RX ADMIN — PHENYLEPHRINE HYDROCHLORIDE 100 MCG: 10 INJECTION INTRAVENOUS at 09:35

## 2021-04-29 RX ADMIN — FENTANYL CITRATE 25 MCG: 50 INJECTION, SOLUTION INTRAMUSCULAR; INTRAVENOUS at 09:11

## 2021-04-29 RX ADMIN — FENTANYL CITRATE 50 MCG: 50 INJECTION, SOLUTION INTRAMUSCULAR; INTRAVENOUS at 08:56

## 2021-04-29 RX ADMIN — FENTANYL CITRATE 25 MCG: 50 INJECTION, SOLUTION INTRAMUSCULAR; INTRAVENOUS at 09:53

## 2021-04-29 RX ADMIN — Medication 10 MG: at 09:05

## 2021-04-29 RX ADMIN — SODIUM CHLORIDE, POTASSIUM CHLORIDE, SODIUM LACTATE AND CALCIUM CHLORIDE: 600; 310; 30; 20 INJECTION, SOLUTION INTRAVENOUS at 09:41

## 2021-04-29 RX ADMIN — FENTANYL CITRATE 25 MCG: 50 INJECTION, SOLUTION INTRAMUSCULAR; INTRAVENOUS at 09:47

## 2021-04-29 RX ADMIN — MIDAZOLAM HYDROCHLORIDE 1 MG: 1 INJECTION, SOLUTION INTRAMUSCULAR; INTRAVENOUS at 08:34

## 2021-04-29 RX ADMIN — TRAMADOL HYDROCHLORIDE 50 MG: 50 TABLET, FILM COATED ORAL at 10:37

## 2021-04-29 RX ADMIN — PROPOFOL 150 MG: 10 INJECTION, EMULSION INTRAVENOUS at 08:38

## 2021-04-29 RX ADMIN — PHENYLEPHRINE HYDROCHLORIDE 200 MCG: 10 INJECTION INTRAVENOUS at 08:56

## 2021-04-29 RX ADMIN — DEXAMETHASONE SODIUM PHOSPHATE 10 MG: 10 INJECTION INTRAMUSCULAR; INTRAVENOUS at 09:28

## 2021-04-29 RX ADMIN — PHENYLEPHRINE HYDROCHLORIDE 200 MCG: 10 INJECTION INTRAVENOUS at 09:02

## 2021-04-29 RX ADMIN — LIDOCAINE HYDROCHLORIDE 50 MG: 10 INJECTION, SOLUTION EPIDURAL; INFILTRATION; INTRACAUDAL; PERINEURAL at 08:38

## 2021-04-29 RX ADMIN — HYDROMORPHONE HYDROCHLORIDE 0.5 MG: 1 INJECTION, SOLUTION INTRAMUSCULAR; INTRAVENOUS; SUBCUTANEOUS at 10:15

## 2021-04-29 RX ADMIN — PHENYLEPHRINE HYDROCHLORIDE 50 MCG: 10 INJECTION INTRAVENOUS at 09:05

## 2021-04-29 RX ADMIN — PHENYLEPHRINE HYDROCHLORIDE 100 MCG: 10 INJECTION INTRAVENOUS at 08:53

## 2021-04-29 RX ADMIN — MIDAZOLAM HYDROCHLORIDE 1 MG: 1 INJECTION, SOLUTION INTRAMUSCULAR; INTRAVENOUS at 08:38

## 2021-04-29 ASSESSMENT — PULMONARY FUNCTION TESTS
PIF_VALUE: 2
PIF_VALUE: 0
PIF_VALUE: 5
PIF_VALUE: 6
PIF_VALUE: 4
PIF_VALUE: 2
PIF_VALUE: 23
PIF_VALUE: 24
PIF_VALUE: 6
PIF_VALUE: 23
PIF_VALUE: 5
PIF_VALUE: 23
PIF_VALUE: 4
PIF_VALUE: 23
PIF_VALUE: 21
PIF_VALUE: 6
PIF_VALUE: 2
PIF_VALUE: 22
PIF_VALUE: 5
PIF_VALUE: 23
PIF_VALUE: 7
PIF_VALUE: 5
PIF_VALUE: 4
PIF_VALUE: 3
PIF_VALUE: 4
PIF_VALUE: 8
PIF_VALUE: 24
PIF_VALUE: 24
PIF_VALUE: 22
PIF_VALUE: 5
PIF_VALUE: 5

## 2021-04-29 ASSESSMENT — PAIN DESCRIPTION - LOCATION
LOCATION: PENIS

## 2021-04-29 ASSESSMENT — PAIN DESCRIPTION - PAIN TYPE
TYPE: SURGICAL PAIN

## 2021-04-29 ASSESSMENT — PAIN SCALES - GENERAL
PAINLEVEL_OUTOF10: 10
PAINLEVEL_OUTOF10: 10
PAINLEVEL_OUTOF10: 7
PAINLEVEL_OUTOF10: 5
PAINLEVEL_OUTOF10: 7

## 2021-04-29 ASSESSMENT — PAIN - FUNCTIONAL ASSESSMENT: PAIN_FUNCTIONAL_ASSESSMENT: 0-10

## 2021-04-29 NOTE — ANESTHESIA PRE PROCEDURE
Department of Anesthesiology  Preprocedure Note       Name:  Greta Rodriguez   Age:  78 y.o.  :  1950                                          MRN:  [id number]         Date:  2021      Surgeon: Nickie Cary):  Carole Arroyo MD    Procedure: Procedure(s):  CYSTOSCOPY TUR BLADDER TUMOR, GYRUS, POSSIBLE RIGHT STENT PLACEMENT    Medications prior to admission:   Prior to Admission medications    Medication Sig Start Date End Date Taking? Authorizing Provider   glipiZIDE (GLUCOTROL) 5 MG tablet Take 1 tablet by mouth every morning Keep fasting morning blood glucose . If blood glucose below 80, you need to stop glipizide 21  Yes Lizeth Gonsales MD   baclofen (LIORESAL) 10 MG tablet Take 1 tablet by mouth 3 times daily as needed (muscle spasms) 21  Yes Lizeth Gonsales MD   oxyCODONE-acetaminophen (PERCOCET) 5-325 MG per tablet Take 1 tablet by mouth every 8 hours for 30 days. 21 Yes VEE Reed CNP   morphine (MS CONTIN) 60 MG extended release tablet Take 1 tablet by mouth 2 times daily for 30 days. 21 Yes VEE Reed CNP   metFORMIN (GLUCOPHAGE) 1000 MG tablet TAKE 1 TABLET BY MOUTH TWICE DAILY WITH MEALS 3/19/21  Yes Lizeth Gonsales MD   pantoprazole (PROTONIX) 40 MG tablet Take 1 tablet by mouth daily 21  Yes Lizeth Gonsales MD   pregabalin (LYRICA) 150 MG capsule Take 1 capsule by mouth 3 times daily for 90 days.  21 Yes VEE Bob CNP   Syringe/Needle, Disp, (SYRINGE 3CC/25GX1\") 25G X 1\" 3 ML MISC To be used with B12 injections 21  Yes Lizeth Gonsales MD   cyanocobalamin 1000 MCG/ML injection Inject 1 mL into the muscle every 7 days Call for next refill which will be monthly for life  Patient taking differently: Inject 1,000 mcg into the muscle every 30 days Call for next refill which will be monthly for life 21  Yes Lizeth Gonsales MD   loperamide (RA ANTI-DIARRHEAL) 2 MG capsule Take 1 capsule by mouth 4 times daily as needed for Diarrhea 1/5/21  Yes Reina Layne MD   lidocaine (LIDODERM) 5 % Place 1 patch onto the skin daily 12 hours on, 12 hours off. 12/29/20  Yes Celeste Schofield MD   lisinopril-hydroCHLOROthiazide (PRINZIDE;ZESTORETIC) 10-12.5 MG per tablet TK 1 T PO QD 12/7/20  Yes Celeste Schofield MD   metoprolol tartrate (LOPRESSOR) 25 MG tablet Take 1 tablet by mouth 2 times daily 11/10/20  Yes Celeste Schofield MD   Probiotic Product (PROBIOTIC-10 PO) Take by mouth daily    Yes Historical Provider, MD   TRUE METRIX BLOOD GLUCOSE TEST strip CHECK BLOOD SUGAR BID 10/14/20  Yes Celeste Schofield MD   TRUEplus Lancets 30G MISC Test blood glucose twice a day 10/14/20  Yes Celeste Schofield MD   Alcohol Swabs (B-D SINGLE USE SWABS REGULAR) PADS USE TO CHECK BLOOD SUGAR TWICE A DAY 10/14/20  Yes Celeste Schofield MD   pravastatin (PRAVACHOL) 40 MG tablet Take 1 tablet by mouth every evening Stop Gemfibrozil 5/12/20  Yes Celeste Schofield MD   Blood Glucose Monitoring Suppl (TRUE METRIX METER) w/Device KIT USE AS DIRECTED TO TEST BLOOD SUGAR 4/8/20  Yes Historical Provider, MD       Current medications:    No current facility-administered medications for this encounter. Allergies:     Allergies   Allergen Reactions    Asa [Aspirin]       iron deficiency anemia , requiring iron infusions and transfusions    Iron      Pill- constipation, abdominal pain    Nsaids       iron deficiency anemia, history of bleeding ulcers        Problem List:    Patient Active Problem List   Diagnosis Code    Degenerative disc disease, lumbar M51.36    Lumbar spondylosis M47.816    Lumbar radiculopathy M54.16    Lumbar spinal stenosis M48.061    Encounter for medication monitoring Z51.81    Cervical spondylitis ([address] M46.92    S/P cervical spinal fusion Z98.1    Anemia D64.9    GERD (gastroesophageal reflux disease) K21.9    Osteoarthritis of spine with radiculopathy, lumbar region M47.26    Encounter for chronic pain management G89.29    Osteoarthritis of lumbar spine M47.816    Iron deficiency anemia D50.9    Colon polyps K63.5    Hepatitis B core antibody positive R76.8    Peripheral polyneuropathy G62.9    Essential hypertension I10    Type 2 diabetes mellitus with hyperglycemia, without long-term current use of insulin (HCC) E11.65    Hyperlipidemia with target LDL less than 100 E78.5    Vitamin D deficiency E55.9    Vitamin B 12 deficiency E53.8    Abnormal EKG R94.31    Type 2 diabetes mellitus with microalbuminuria, without long-term current use of insulin (HCC) E11.29, R80.9    Abdominal discomfort R10.9    Irritable bowel syndrome with diarrhea K58.0    Chronic hepatitis C without hepatic coma (HCC) B18.2    Diarrhea R19.7    Status post hernia repair Z98.890, Z87.19    Incisional hernia without obstruction or gangrene K43.2    Worsening headaches R51.9    Positive FIT (fecal immunochemical test) R19.5    History of hepatitis C Z86.19    Absolute anemia D64.9       Past Medical History:        Diagnosis Date    Anemia     Arthritis     osteoarthritis    Colon polyps 10/08/2019    tubular adenoma x2    Encounter for chronic pain management     Essential hypertension 05/12/2020    Hepatitis B core antibody positive     History of blood transfusion     History of hepatitis C     Hyperlipidemia     Iron (Fe) deficiency anemia     Positive FIT (fecal immunochemical test)     Type 2 diabetes mellitus with microalbuminuria, without long-term current use of insulin ([facility] [address] 07/13/2020    Wears dentures     Wears glasses        Past Surgical History:        Procedure Laterality Date    CERVICAL SPINE SURGERY  1977    cervical spine three times, has plate    CHOLECYSTECTOMY  03/22/2019    COLONOSCOPY  01/25/2015    10 yr recall, hemorrhoids    COLONOSCOPY N/A 10/08/2019    tubular adenoma x2    DENTAL SURGERY  10/2015    all teeth extracted    HERNIA REPAIR N/A 2020    HERNIA INCISIONAL REPAIR LAPAROSCOPIC ROBOTIC WITH MESH performed by Taryn Solorzano DO at [facility] Right     UMBILICAL HERNIA REPAIR  3022-19    UPPER GASTROINTESTINAL ENDOSCOPY  2015    UPPER GASTROINTESTINAL ENDOSCOPY N/A 10/08/2019    EGD BIOPSY performed by Arlet Lomax MD at [facility]       Social History:    Social History     Tobacco Use    Smoking status: Former Smoker     Packs/day: 0.50     Years: 45.00     Pack years: 22.50     Types: Cigarettes     Quit date: 2015     Years since quittin.3    Smokeless tobacco: Never Used    Tobacco comment: on chantix 11-6-15   12-7-15   Substance Use Topics    Alcohol use: No                                Counseling given: Not Answered  Comment: on chantix 11-6-15   12-7-15      Vital Signs (Current):   Vitals:    21 1400 21 0744   BP:  135/80   Pulse:  79   Resp:  20   Temp:  97 °F (36.1 °C)   TempSrc:  Temporal   SpO2:  95%   Weight: 209 lb (94.8 kg)    Height: 5' 9\" (1.753 m)                                               BP Readings from Last 3 Encounters:   21 135/80   21 107/67   21 139/65       NPO Status: Time of last liquid consumption:                         Time of last solid consumption:                         Date of last liquid consumption: 21                        Date of last solid food consumption: 21    BMI:   Wt Readings from Last 3 Encounters:   21 209 lb (94.8 kg)   21 210 lb (95.3 kg)   21 210 lb (95.3 kg)     Body mass index is 30.86 kg/m².     CBC:   Lab Results   Component Value Date    WBC 5.8 2021    RBC 4.23 2021    HGB 11.4 2021    HCT 35.4 2021    MCV 83.7 2021    RDW 27.5 2021     2021       CMP:   Lab Results   Component Value Date     2021    K 4.1 2021     2021    CO2 23 2021    BUN 16 2021 CREATININE 0.89 04/29/2021    CREATININE 0.60 02/28/2021    GFRAA >60 02/28/2021    LABGLOM >60 04/29/2021    GLUCOSE 95 02/28/2021    PROT 6.9 01/11/2021    CALCIUM 8.0 02/28/2021    BILITOT 0.27 01/11/2021    ALKPHOS 79 01/11/2021    AST 19 01/11/2021    ALT 18 01/11/2021       POC Tests:   Recent Labs     04/29/21  0753 04/29/21  0804   POCGLU 162*  --    POCNA 140  --    POCK 5.4* 4.1   POCCL 103  --    POCBUN 16  --    POCHEMO 10.7*  --    POCHCT 32*  --        Coags:   Lab Results   Component Value Date    PROTIME 12.3 02/28/2021    INR 0.9 02/28/2021    APTT 24.5 02/28/2021       HCG (If Applicable): No results found for: PREGTESTUR, PREGSERUM, HCG, HCGQUANT     ABGs: No results found for: PHART, PO2ART, HZW5VHV, QTG8POO, BEART, [id number]     Type & Screen (If Applicable):  No results found for: LABABO, LABRH    Drug/Infectious Status (If Applicable):  Lab Results   Component Value Date    HEPCAB REACTIVE 07/13/2020       COVID-19 Screening (If Applicable):   Lab Results   Component Value Date    COVID19 Not Detected 04/25/2021    COVID19 Not Detected 08/03/2020           Anesthesia Evaluation  Patient summary reviewed and Nursing notes reviewed no history of anesthetic complications:   Airway: Mallampati: II  TM distance: >3 FB   Neck ROM: full  Mouth opening: > = 3 FB Dental:          Pulmonary:normal exam                               Cardiovascular:  Exercise tolerance: good (>4 METS),   (+) hypertension:, hyperlipidemia      ECG reviewed  Rhythm: regular  Rate: normal    Stress test reviewed                Neuro/Psych:   (+) neuromuscular disease:, headaches:,             GI/Hepatic/Renal:   (+) GERD:, hepatitis: C, liver disease:,           Endo/Other:    (+) DiabetesType II DM, , : arthritis: OA., .                 Abdominal:           Vascular:                                      Anesthesia Plan      general     ASA 3       Induction: intravenous.     MIPS: Postoperative opioids intended and Prophylactic antiemetics administered. Anesthetic plan and risks discussed with patient.       Plan discussed with CRNA and surgical team.                  Homero Gunn MD   4/29/2021

## 2021-04-29 NOTE — OP NOTE
Dr. You Koenig MD        [address] [place]  04/29/21    Patient:  Jann Fuller  MRN: [id number]  YOB: 1950    Surgeon: Dr. You Koenig MD  Assistant: Alexis Lund MD    Pre-op Diagnosis: BLADDER TUMOR  Post-op Diagnosis: Same    Procedure:   1. Cystoscopy with TURBT, Medium (2-5cm)  2. Right ureteral stent placement and subsequent removal    Intraoperative Findings:  - Right lateral wall tumor adjacent to the ureteral orifice on the right side  - Right ureteral stent was placed to spare the right UO, but after resection we did not feel it necessary to keep the stent in place given the tumor was adjacent to the right ureteral orifice. Anesthesia: General  Complications: None  OR Blood Loss: Minimal  Fluids: Cystalloids  Specimens:  ID Type Source Tests Collected by Time Destination   A : BLADDER TUMOR Tissue Bladder SURGICAL PATHOLOGY Alexis Lund MD 4/29/2021 5254        Indication:  78year old male with history of bladder tumor near the right ureteral orifice. He presents for treatment. Narrative of the Procedure:    After informed consent was obtain, the patient was brought back to operating room and placed on the operative table in the supine position. EPC cuffs were placed and the machine was turned on. Anesthesia was induced and antibiotics were given. The patient was positioned in dorsolithotomy position and the genitals were prepped and draped in sterile fashion. A time out occurred in which two patient identifiers were used. The continuous sheath scope with the visual obturator was gently placed within the urethra and advanced into the bladder. A pan-cystoscopy was performed and showed tumor on the right lateral wall which was adjacent to the right ureteral orifice, but not touching it. We first placed a glidewire into the right ureter and advanced a 6F x 26cm stent in the right ureter.     The obturator was removed and the resectoscope was placed through the sheath. Systematically the tumor was removed until all visible tumor was removed. Hemostasis was achieved. The bladder was re-surveyed and there was no evidence of bladder perforation. A Urovac was used to remove all specimen. Repeat cystoscopy demonstrated no further free-floating specimen, no evidence of residual tumor, and no evidence of bleeding. This concluded the case. The instruments were removed. A 22F 3 way Tate was placed. Dr. Romeo Vallejo was present and scrubbed for all critical portions of the procedure. The patient was the awakened, extubated, and discharged back to the the PACU in good and stable condition.     Follow-Up:   - Remove Tate on Monday  - Follow up in 1-2 weeks for pathology results    Lieutenant Ace  Electronically signed on 4/29/2021 at 6:52 PM

## 2021-04-29 NOTE — ANESTHESIA PRE PROCEDURE
Department of Anesthesiology  Preprocedure Note       Name:  Zachary Alaniz   Age:  78 y.o.  :  1950                                          MRN:  [id number]         Date:  2021      Surgeon: Betsey Michelle):  Kia Priest MD    Procedure: Procedure(s): HERNIA INCISIONAL REPAIR LAPAROSCOPIC ROBOTIC WITH MESH    Medications prior to admission:   Prior to Admission medications    Medication Sig Start Date End Date Taking? Authorizing Provider   glipiZIDE (GLUCOTROL) 5 MG tablet Take 1 tablet by mouth every morning Keep fasting morning blood glucose . If blood glucose below 80, you need to stop glipizide 21   Gema Méndez MD   baclofen (LIORESAL) 10 MG tablet Take 1 tablet by mouth 3 times daily as needed (muscle spasms) 21   Gema Méndez MD   oxyCODONE-acetaminophen (PERCOCET) 5-325 MG per tablet Take 1 tablet by mouth every 8 hours for 30 days. 21  VEE Louis CNP   morphine (MS CONTIN) 60 MG extended release tablet Take 1 tablet by mouth 2 times daily for 30 days. 21  VEE Louis CNP   metFORMIN (GLUCOPHAGE) 1000 MG tablet TAKE 1 TABLET BY MOUTH TWICE DAILY WITH MEALS 3/19/21   Gema Méndez MD   pantoprazole (PROTONIX) 40 MG tablet Take 1 tablet by mouth daily 21   Gema Méndez MD   pregabalin (LYRICA) 150 MG capsule Take 1 capsule by mouth 3 times daily for 90 days.  21  VEE Pratt CNP   Syringe/Needle, Disp, (SYRINGE 3CC/25GX1\") 25G X 1\" 3 ML MISC To be used with B12 injections 21   Gema Méndez MD   cyanocobalamin 1000 MCG/ML injection Inject 1 mL into the muscle every 7 days Call for next refill which will be monthly for life  Patient taking differently: Inject 1,000 mcg into the muscle every 30 days Call for next refill which will be monthly for life 21   Gema Méndez MD   loperamide (RA ANTI-DIARRHEAL) 2 MG capsule Take 1 capsule by mouth 4 times daily as needed for Diarrhea 1/5/21   Vance Kinsey MD   lidocaine (LIDODERM) 5 % Place 1 patch onto the skin daily 12 hours on, 12 hours off. 12/29/20   Lima Mejia MD   lisinopril-hydroCHLOROthiazide (PRINZIDE;ZESTORETIC) 10-12.5 MG per tablet TK 1 T PO QD 12/7/20   Lima Mejia MD   metoprolol tartrate (LOPRESSOR) 25 MG tablet Take 1 tablet by mouth 2 times daily 11/10/20   Lima Mejia MD   Probiotic Product (PROBIOTIC-10 PO) Take by mouth daily     Historical Provider, MD   TRUE METRIX BLOOD GLUCOSE TEST strip CHECK BLOOD SUGAR BID 10/14/20   Lima Mejia MD   TRUEplus Lancets 30G MISC Test blood glucose twice a day 10/14/20   Lima Mejia MD   Alcohol Swabs (B-D SINGLE USE SWABS REGULAR) PADS USE TO CHECK BLOOD SUGAR TWICE A DAY 10/14/20   Lima Mejia MD   pravastatin (PRAVACHOL) 40 MG tablet Take 1 tablet by mouth every evening Stop Gemfibrozil 5/12/20   Lima Mejia MD   Blood Glucose Monitoring Suppl (TRUE METRIX METER) w/Device KIT USE AS DIRECTED TO TEST BLOOD SUGAR 4/8/20   Historical Provider, MD       Current medications:    No current facility-administered medications for this visit. No current outpatient medications on file. Facility-Administered Medications Ordered in Other Visits   Medication Dose Route Frequency Provider Last Rate Last Admin    ceFAZolin (ANCEF) 2000 mg in dextrose 5 % 50 mL IVPB  2,000 mg Intravenous Once Dwayne Sauer MD        fentaNYL (SUBLIMAZE) injection    PRN Cheri Rangel, APRN - CRNA   50 mcg at 04/29/21 0842    phenylephrine (VAZCULEP) injection    PRN Cheri Rangel APRN - CRNA   100 mcg at 04/29/21 0844       Allergies:     Allergies   Allergen Reactions    Asa [Aspirin]       iron deficiency anemia , requiring iron infusions and transfusions    Iron      Pill- constipation, abdominal pain    Nsaids       iron deficiency anemia, history of bleeding ulcers        Problem List:    Patient Active Problem List Diagnosis Code    Degenerative disc disease, lumbar M51.36    Lumbar spondylosis M47.816    Lumbar radiculopathy M54.16    Lumbar spinal stenosis M48.061    Encounter for medication monitoring Z51.81    Cervical spondylitis (HCC) M46.92    S/P cervical spinal fusion Z98.1    Anemia D64.9    GERD (gastroesophageal reflux disease) K21.9    Osteoarthritis of spine with radiculopathy, lumbar region M47.26    Encounter for chronic pain management G89.29    Osteoarthritis of lumbar spine M47.816    Iron deficiency anemia D50.9    Colon polyps K63.5    Hepatitis B core antibody positive R76.8    Peripheral polyneuropathy G62.9    Essential hypertension I10    Type 2 diabetes mellitus with hyperglycemia, without long-term current use of insulin (HCC) E11.65    Hyperlipidemia with target LDL less than 100 E78.5    Vitamin D deficiency E55.9    Vitamin B 12 deficiency E53.8    Abnormal EKG R94.31    Type 2 diabetes mellitus with microalbuminuria, without long-term current use of insulin (HCC) E11.29, R80.9    Abdominal discomfort R10.9    Irritable bowel syndrome with diarrhea K58.0    Chronic hepatitis C without hepatic coma (HCC) B18.2    Diarrhea R19.7    Status post hernia repair Z98.890, Z87.19    Incisional hernia without obstruction or gangrene K43.2    Worsening headaches R51.9    Positive FIT (fecal immunochemical test) R19.5    History of hepatitis C Z86.19    Absolute anemia D64.9       Past Medical History:        Diagnosis Date    Anemia     Arthritis     osteoarthritis    Colon polyps 10/08/2019    tubular adenoma x2    Encounter for chronic pain management     Essential hypertension 05/12/2020    Hepatitis B core antibody positive     History of blood transfusion     History of hepatitis C     Hyperlipidemia     Iron (Fe) deficiency anemia     Positive FIT (fecal immunochemical test)     Type 2 diabetes mellitus with microalbuminuria, without long-term current use of insulin ([facility] [address] 2020    Wears dentures     Wears glasses        Past Surgical History:        Procedure Laterality Date    CERVICAL SPINE SURGERY  1977    cervical spine three times, has plate    CHOLECYSTECTOMY  2019    COLONOSCOPY  2015    10 yr recall, hemorrhoids    COLONOSCOPY N/A 10/08/2019    tubular adenoma x2    DENTAL SURGERY  10/2015    all teeth extracted    HERNIA REPAIR N/A 2020    HERNIA INCISIONAL REPAIR LAPAROSCOPIC ROBOTIC WITH MESH performed by Geetha Wilkins DO at [facility] Right     UMBILICAL HERNIA REPAIR  3022-19    UPPER GASTROINTESTINAL ENDOSCOPY  2015    UPPER GASTROINTESTINAL ENDOSCOPY N/A 10/08/2019    EGD BIOPSY performed by Nahun Cheung MD at [facility]       Social History:    Social History     Tobacco Use    Smoking status: Former Smoker     Packs/day: 0.50     Years: 45.00     Pack years: 22.50     Types: Cigarettes     Quit date: 2015     Years since quittin.3    Smokeless tobacco: Never Used    Tobacco comment: on chantix 11-6-15   12-7-15   Substance Use Topics    Alcohol use: No                                Counseling given: Not Answered  Comment: on chantix 11-6-15   12-7-15      Vital Signs (Current): There were no vitals filed for this visit.                                            BP Readings from Last 3 Encounters:   21 135/80   21 (!) 78/56   21 107/67       NPO Status:                                                                                 BMI:   Wt Readings from Last 3 Encounters:   21 209 lb (94.8 kg)   21 210 lb (95.3 kg)   21 210 lb (95.3 kg)     There is no height or weight on file to calculate BMI.    CBC:   Lab Results   Component Value Date    WBC 5.8 2021    RBC 4.23 2021    HGB 11.4 2021    HCT 35.4 2021    MCV 83.7 2021    RDW 27.5 2021     2021       CMP:   Lab Results Component Value Date     02/28/2021    K 4.1 02/28/2021     02/28/2021    CO2 23 02/28/2021    BUN 16 02/28/2021    CREATININE 0.89 04/29/2021    CREATININE 0.60 02/28/2021    GFRAA >60 02/28/2021    LABGLOM >60 04/29/2021    GLUCOSE 95 02/28/2021    PROT 6.9 01/11/2021    CALCIUM 8.0 02/28/2021    BILITOT 0.27 01/11/2021    ALKPHOS 79 01/11/2021    AST 19 01/11/2021    ALT 18 01/11/2021       POC Tests:   Recent Labs     04/29/21  0753 04/29/21  0804   POCGLU 162*  --    POCNA 140  --    POCK 5.4* 4.1   POCCL 103  --    POCBUN 16  --    POCHEMO 10.7*  --    POCHCT 32*  --        Coags:   Lab Results   Component Value Date    PROTIME 12.3 02/28/2021    INR 0.9 02/28/2021    APTT 24.5 02/28/2021       HCG (If Applicable): No results found for: PREGTESTUR, PREGSERUM, HCG, HCGQUANT     ABGs: No results found for: PHART, PO2ART, NTQ9SIE, QEA6PUS, BEART, W1IPNGTW     Type & Screen (If Applicable):  No results found for: LABABO, LABRH    Drug/Infectious Status (If Applicable):  Lab Results   Component Value Date    HEPCAB REACTIVE 07/13/2020       COVID-19 Screening (If Applicable):   Lab Results   Component Value Date    COVID19 Not Detected 04/25/2021    COVID19 Not Detected 08/03/2020         Anesthesia Evaluation  Patient summary reviewed and Nursing notes reviewed  Airway: Mallampati: I  TM distance: >3 FB   Neck ROM: full  Mouth opening: > = 3 FB Dental:    (+) edentulous  Comment: -LOWER EDENTULOUS    Pulmonary: breath sounds clear to auscultation  (+) COPD:                            ROS comment: -QUIT SMOKING 2015 - 42 PACK YEARS   Cardiovascular:  Exercise tolerance: good (>4 METS),   (+) hypertension:, hyperlipidemia      ECG reviewed  Rhythm: regular  Rate: normal    Stress test reviewed             ROS comment: -STRESS TEST 2020 - EF-70%, NO ISCHEMIA     Neuro/Psych:   Negative Neuro/Psych ROS               ROS comment: -NUMBNESS TINGLING BILATERAL FEET  -DDD  -SPINAL STENOSIS  -S/P CERVICAL NECK FUSION - GOOD NECK MOBILITY GI/Hepatic/Renal:   (+) hepatitis: C, liver disease:,           Endo/Other:    (+) DiabetesType II DM, , : arthritis:., .                  ROS comment: -NPO AFTER MIDNIGHT  -ALLERGIES - NSAIDS, IRON Abdominal:       Abdomen: soft. Vascular: negative vascular ROS. ROS comment: -ANEMIA.                                        Ruben Carbajal, VEE - CRNA   4/29/2021

## 2021-04-29 NOTE — H&P
History and Physical    Patient:  Jann Fuller  MRN: [id number]  YOB: 1950    CHIEF COMPLAINT:  Bladder tumor    HISTORY OF PRESENT ILLNESS:   The patient is a 78 y.o. male who presents with bladder tumor near right UO. He presents for TURBT and possible R stent placement. Patient's old records, notes and chart reviewed and summarized above. Past Medical History:    Past Medical History:   Diagnosis Date    Anemia     Arthritis     osteoarthritis    Colon polyps 10/08/2019    tubular adenoma x2    Encounter for chronic pain management     Essential hypertension 05/12/2020    Hepatitis B core antibody positive     History of blood transfusion     History of hepatitis C     Hyperlipidemia     Iron (Fe) deficiency anemia     Positive FIT (fecal immunochemical test)     Type 2 diabetes mellitus with microalbuminuria, without long-term current use of insulin ([facility] [address] 07/13/2020    Wears dentures     Wears glasses        Past Surgical History:    Past Surgical History:   Procedure Laterality Date    CERVICAL SPINE SURGERY  1977    cervical spine three times, has plate    CHOLECYSTECTOMY  03/22/2019    COLONOSCOPY  01/25/2015    10 yr recall, hemorrhoids    COLONOSCOPY N/A 10/08/2019    tubular adenoma x2    DENTAL SURGERY  10/2015    all teeth extracted    HERNIA REPAIR N/A 08/07/2020    HERNIA INCISIONAL REPAIR LAPAROSCOPIC ROBOTIC WITH MESH performed by Martín Benitez DO at [address]  [address]    UPPER GASTROINTESTINAL ENDOSCOPY  01/25/2015    UPPER GASTROINTESTINAL ENDOSCOPY N/A 10/08/2019    EGD BIOPSY performed by Juanita Zaman MD at [facility] ENDO     Medications Prior to Admission:    Prior to Admission medications    Medication Sig Start Date End Date Taking? Authorizing Provider   glipiZIDE (GLUCOTROL) 5 MG tablet Take 1 tablet by mouth every morning Keep fasting morning blood glucose .   If blood glucose below 80, you need to stop glipizide 4/16/21  Yes America Low MD   baclofen (LIORESAL) 10 MG tablet Take 1 tablet by mouth 3 times daily as needed (muscle spasms) 4/12/21  Yes America Low MD   oxyCODONE-acetaminophen (PERCOCET) 5-325 MG per tablet Take 1 tablet by mouth every 8 hours for 30 days. 4/8/21 5/8/21 Yes VEE Mueller CNP   morphine (MS CONTIN) 60 MG extended release tablet Take 1 tablet by mouth 2 times daily for 30 days. 4/8/21 5/8/21 Yes VEE Mueller CNP   metFORMIN (GLUCOPHAGE) 1000 MG tablet TAKE 1 TABLET BY MOUTH TWICE DAILY WITH MEALS 3/19/21  Yes America Low MD   pantoprazole (PROTONIX) 40 MG tablet Take 1 tablet by mouth daily 2/19/21  Yes America Low MD   pregabalin (LYRICA) 150 MG capsule Take 1 capsule by mouth 3 times daily for 90 days.  2/4/21 5/5/21 Yes VEE Garsia CNP   Syringe/Needle, Disp, (SYRINGE 3CC/25GX1\") 25G X 1\" 3 ML MISC To be used with B12 injections 1/8/21  Yes America Low MD   cyanocobalamin 1000 MCG/ML injection Inject 1 mL into the muscle every 7 days Call for next refill which will be monthly for life  Patient taking differently: Inject 1,000 mcg into the muscle every 30 days Call for next refill which will be monthly for life 1/8/21  Yes America Low MD   loperamide (RA ANTI-DIARRHEAL) 2 MG capsule Take 1 capsule by mouth 4 times daily as needed for Diarrhea 1/5/21  Yes Dorothea Machuca MD   lidocaine (LIDODERM) 5 % Place 1 patch onto the skin daily 12 hours on, 12 hours off. 12/29/20  Yes America Low MD   lisinopril-hydroCHLOROthiazide (PRINZIDE;ZESTORETIC) 10-12.5 MG per tablet TK 1 T PO QD 12/7/20  Yes America Low MD   metoprolol tartrate (LOPRESSOR) 25 MG tablet Take 1 tablet by mouth 2 times daily 11/10/20  Yes America Low MD   Probiotic Product (PROBIOTIC-10 PO) Take by mouth daily    Yes Historical Provider, MD   TRUE METRIX BLOOD GLUCOSE TEST strip CHECK BLOOD SUGAR BID 10/14/20  Yes Mariella Scott MD   TRUEplus Lancets 30G MISC Test blood glucose twice a day 10/14/20  Yes Mariella Scott MD   Alcohol Swabs (B-D SINGLE USE SWABS REGULAR) PADS USE TO CHECK BLOOD SUGAR TWICE A DAY 10/14/20  Yes Mariella Scott MD   pravastatin (PRAVACHOL) 40 MG tablet Take 1 tablet by mouth every evening Stop Gemfibrozil 20  Yes Mariella Scott MD   Blood Glucose Monitoring Suppl (TRUE METRIX METER) w/Device KIT USE AS DIRECTED TO TEST BLOOD SUGAR 20  Yes Historical Provider, MD       Allergies:  Asa [aspirin], Iron, and Nsaids    Social History:    Social History     Socioeconomic History    Marital status:      Spouse name: Not on file    Number of children: Not on file    Years of education: Not on file    Highest education level: Not on file   Occupational History    Occupation: disabled   Social Needs    Financial resource strain: Not on file    Food insecurity     Worry: Not on file     Inability: Not on file   [employer] needs     Medical: Not on file     Non-medical: Not on file   Tobacco Use    Smoking status: Former Smoker     Packs/day: 0.50     Years: 45.00     Pack years: 22.50     Types: Cigarettes     Quit date: 2015     Years since quittin.3    Smokeless tobacco: Never Used    Tobacco comment: on chantix 11-6-15   12-7-15   Substance and Sexual Activity    Alcohol use: No    Drug use: No    Sexual activity: Yes     Partners: Female   Lifestyle    Physical activity     Days per week: Not on file     Minutes per session: Not on file    Stress: Not on file   Relationships    Social connections     Talks on phone: Not on file     Gets together: Not on file     Attends Yarsani service: Not on file     Active member of club or organization: Not on file     Attends meetings of clubs or organizations: Not on file     Relationship status: Not on file    Intimate partner violence     Fear of current or ex partner: Not on file     Emotionally abused: Not on file     Physically abused: Not on file     Forced sexual activity: Not on file   Other Topics Concern    Not on file   Social History Narrative    Not on file       Family History:    Family History   Problem Relation Age of Onset    Diabetes Mother     Heart Disease Father        REVIEW OF SYSTEMS:  Constitutional: negative  Eyes: negative  Respiratory: negative  Cardiovascular: negative  Gastrointestinal: negative  Genitourinary: see HPI  Musculoskeletal: negative  Skin: negative   Neurological: negative  Hematological/Lymphatic: negative  Psychological: negative    Physical Exam:      Patient Vitals for the past 24 hrs:   BP Temp Temp src Pulse Resp SpO2   04/29/21 0744 135/80 97 °F (36.1 °C) Temporal 79 20 95 %     Constitutional: Patient in no acute distress; Neuro: alert and oriented to person place and time. Psych: Mood and affect normal.  Skin: Normal  Lungs: Respiratory effort normal, CTA  Cardiovascular:  Normal peripheral pulses  Abdomen: Soft, non-tender, non-distended  Bladder non-tender and not distended. LABS:   No results for input(s): WBC, HGB, HCT, MCV, PLT in the last 72 hours. Recent Labs     04/29/21  0753   CREATININE 0.89     Lab Results   Component Value Date    PSA 0.62 03/17/2021           Urinalysis: No results for input(s): COLORU, PHUR, LABCAST, WBCUA, RBCUA, MUCUS, TRICHOMONAS, YEAST, BACTERIA, CLARITYU, SPECGRAV, LEUKOCYTESUR, UROBILINOGEN, BILIRUBINUR, BLOODU in the last 72 hours.     Invalid input(s): NITRATE, GLUCOSEUKETONESUAMORPHOUS     -----------------------------------------------------------------      Assessment and Plan   Impression:    Patient Active Problem List   Diagnosis    Degenerative disc disease, lumbar    Lumbar spondylosis    Lumbar radiculopathy    Lumbar spinal stenosis    Encounter for medication monitoring    Cervical spondylitis (HCC)    S/P cervical spinal fusion    Anemia    GERD (gastroesophageal reflux disease)    Osteoarthritis of spine with radiculopathy, lumbar region    Encounter for chronic pain management    Osteoarthritis of lumbar spine    Iron deficiency anemia    Colon polyps    Hepatitis B core antibody positive    Peripheral polyneuropathy    Essential hypertension    Type 2 diabetes mellitus with hyperglycemia, without long-term current use of insulin (HCC)    Hyperlipidemia with target LDL less than 100    Vitamin D deficiency    Vitamin B 12 deficiency    Abnormal EKG    Type 2 diabetes mellitus with microalbuminuria, without long-term current use of insulin (HCC)    Abdominal discomfort    Irritable bowel syndrome with diarrhea    Chronic hepatitis C without hepatic coma (HCC)    Diarrhea    Status post hernia repair    Incisional hernia without obstruction or gangrene    Worsening headaches    Positive FIT (fecal immunochemical test)    History of hepatitis C    Absolute anemia       Plan:     - OR today for TURBT, possible R stent placement  - Consent obtained  - COVID negative    Jin Silva  8:16 AM 4/29/2021

## 2021-04-30 LAB — SURGICAL PATHOLOGY REPORT: NORMAL

## 2021-05-03 ENCOUNTER — OFFICE VISIT (OUTPATIENT)
Dept: UROLOGY | Age: 71
End: 2021-05-03
Payer: MEDICARE

## 2021-05-03 ENCOUNTER — TELEPHONE (OUTPATIENT)
Dept: UROLOGY | Age: 71
End: 2021-05-03

## 2021-05-03 VITALS — WEIGHT: 209 LBS | HEIGHT: 69 IN | BODY MASS INDEX: 30.96 KG/M2 | TEMPERATURE: 96.6 F

## 2021-05-03 DIAGNOSIS — E11.65 TYPE 2 DIABETES MELLITUS WITH HYPERGLYCEMIA, WITHOUT LONG-TERM CURRENT USE OF INSULIN (HCC): ICD-10-CM

## 2021-05-03 DIAGNOSIS — R39.89 BLADDER PAIN: ICD-10-CM

## 2021-05-03 DIAGNOSIS — I10 ESSENTIAL HYPERTENSION: Primary | ICD-10-CM

## 2021-05-03 DIAGNOSIS — R94.31 ABNORMAL EKG: ICD-10-CM

## 2021-05-03 DIAGNOSIS — R00.0 SINUS TACHYCARDIA: ICD-10-CM

## 2021-05-03 DIAGNOSIS — C67.2 MALIGNANT NEOPLASM OF LATERAL WALL OF URINARY BLADDER (HCC): Primary | ICD-10-CM

## 2021-05-03 DIAGNOSIS — E78.5 HYPERLIPIDEMIA WITH TARGET LDL LESS THAN 100: ICD-10-CM

## 2021-05-03 PROCEDURE — 99214 OFFICE O/P EST MOD 30 MIN: CPT | Performed by: UROLOGY

## 2021-05-03 PROCEDURE — 3017F COLORECTAL CA SCREEN DOC REV: CPT | Performed by: UROLOGY

## 2021-05-03 PROCEDURE — 4040F PNEUMOC VAC/ADMIN/RCVD: CPT | Performed by: UROLOGY

## 2021-05-03 PROCEDURE — G8417 CALC BMI ABV UP PARAM F/U: HCPCS | Performed by: UROLOGY

## 2021-05-03 PROCEDURE — 1036F TOBACCO NON-USER: CPT | Performed by: UROLOGY

## 2021-05-03 PROCEDURE — G8427 DOCREV CUR MEDS BY ELIG CLIN: HCPCS | Performed by: UROLOGY

## 2021-05-03 PROCEDURE — 1123F ACP DISCUSS/DSCN MKR DOCD: CPT | Performed by: UROLOGY

## 2021-05-03 RX ORDER — PRAVASTATIN SODIUM 40 MG
40 TABLET ORAL EVERY EVENING
Qty: 90 TABLET | Refills: 3 | Status: SHIPPED | OUTPATIENT
Start: 2021-05-03 | End: 2021-05-11 | Stop reason: SDUPTHER

## 2021-05-03 ASSESSMENT — ENCOUNTER SYMPTOMS
COUGH: 1
EYE PAIN: 0
BACK PAIN: 0
DIARRHEA: 0
NAUSEA: 0
CONSTIPATION: 0
VOMITING: 0
EYE REDNESS: 0
WHEEZING: 0
SHORTNESS OF BREATH: 0
ABDOMINAL PAIN: 0

## 2021-05-03 NOTE — PROGRESS NOTES
[address] [address]  Dept: [address] [facility] Urology Office Note - Established    Patient:  Jaylen Stringer  YOB: 1950  Date: 5/3/2021    The patient is a 78 y.o. male who presents todayfor evaluation of the following problems:   Chief Complaint   Patient presents with    Results     TURBT results       HPI  Daryl Rodriguez is a 22-year-old gentleman who recently underwent TURBT. His pathology report does demonstrate low-grade papillary urothelial carcinoma, noninvasive. He is having a significant amount of discomfort from the catheter. Otherwise he has done well since his resection. Summary of old records: N/A    Additional History: N/A    Procedures Today: N/A    Urinalysis today:  No results found for this visit on 05/03/21.   Last several PSA's:  Lab Results   Component Value Date    PSA 0.62 03/17/2021     Last total testosterone:  No results found for: TESTOSTERONE    AUA Symptom Score (5/3/2021):                               Last BUN and creatinine:  Lab Results   Component Value Date    BUN 16 02/28/2021     Lab Results   Component Value Date    CREATININE 0.89 04/29/2021       Additional Lab/Culture results: none    Imaging Reviewed during this Office Visit: none  (results were independently reviewed by physician and radiology report verified)    PAST MEDICAL, FAMILY AND SOCIAL HISTORY UPDATE:  Past Medical History:   Diagnosis Date    Anemia     Arthritis     osteoarthritis    Colon polyps 10/08/2019    tubular adenoma x2    Encounter for chronic pain management     Essential hypertension 05/12/2020    Hepatitis B core antibody positive     History of blood transfusion     History of hepatitis C     Hyperlipidemia     Iron (Fe) deficiency anemia     Positive FIT (fecal immunochemical test)     Type 2 diabetes mellitus with microalbuminuria, without long-term current use of insulin ([facility] 07/13/2020    Wears dentures     Wears glasses      Past Surgical History:   Procedure Laterality Date    BLADDER TUMOR EXCISION  04/29/2021    CYSTOSCOPY TUR BLADDER TUMOR, GYRUS, RIGHT STENT PLACEMENT AND RIGHT STENT REMOVAL    CERVICAL SPINE SURGERY  1977    cervical spine three times, has plate    CHOLECYSTECTOMY  03/22/2019    COLONOSCOPY  01/25/2015    10 yr recall, hemorrhoids    COLONOSCOPY N/A 10/08/2019    tubular adenoma x2    CYSTOSCOPY Right 4/29/2021    CYSTOSCOPY TUR BLADDER TUMOR, GYRUS, RIGHT STENT PLACEMENT AND RIGHT STENT REMOVAL performed by Danielle Ham MD at [address] [facility] [address]  10/2015    all teeth extracted    HERNIA REPAIR N/A 08/07/2020    HERNIA INCISIONAL REPAIR LAPAROSCOPIC ROBOTIC WITH MESH performed by Blanche Dubose DO at [facility]     UMBILICAL HERNIA REPAIR  3022-19    UPPER GASTROINTESTINAL ENDOSCOPY  01/25/2015    UPPER GASTROINTESTINAL ENDOSCOPY N/A 10/08/2019    EGD BIOPSY performed by Pete Tang MD at [address] [facility]     Family History   Problem Relation Age of Onset    Diabetes Mother     Heart Disease Father      Outpatient Medications Marked as Taking for the 5/3/21 encounter (Office Visit) with Danielle Ham MD   Medication Sig Dispense Refill    cephALEXin (KEFLEX) 500 MG capsule Take 1 capsule by mouth 3 times daily for 5 days 15 capsule 0    glipiZIDE (GLUCOTROL) 5 MG tablet Take 1 tablet by mouth every morning Keep fasting morning blood glucose . If blood glucose below 80, you need to stop glipizide 90 tablet 3    baclofen (LIORESAL) 10 MG tablet Take 1 tablet by mouth 3 times daily as needed (muscle spasms) 90 tablet 0    oxyCODONE-acetaminophen (PERCOCET) 5-325 MG per tablet Take 1 tablet by mouth every 8 hours for 30 days. 90 tablet 0    morphine (MS CONTIN) 60 MG extended release tablet Take 1 tablet by mouth 2 times daily for 30 days.  60 tablet 0    metFORMIN (GLUCOPHAGE) 1000 MG tablet TAKE 1 TABLET BY MOUTH TWICE DAILY WITH MEALS 180 tablet 1    pantoprazole (PROTONIX) 40 MG tablet Take 1 tablet by mouth daily 90 tablet 1    pregabalin (LYRICA) 150 MG capsule Take 1 capsule by mouth 3 times daily for 90 days. 90 capsule 2    Syringe/Needle, Disp, (SYRINGE 3CC/25GX1\") 25G X 1\" 3 ML MISC To be used with B12 injections 50 each 2    cyanocobalamin 1000 MCG/ML injection Inject 1 mL into the muscle every 7 days Call for next refill which will be monthly for life (Patient taking differently: Inject 1,000 mcg into the muscle every 30 days Call for next refill which will be monthly for life) 4 mL 0    loperamide (RA ANTI-DIARRHEAL) 2 MG capsule Take 1 capsule by mouth 4 times daily as needed for Diarrhea 112 capsule 2    lidocaine (LIDODERM) 5 % Place 1 patch onto the skin daily 12 hours on, 12 hours off.  30 patch 3    lisinopril-hydroCHLOROthiazide (PRINZIDE;ZESTORETIC) 10-12.5 MG per tablet TK 1 T PO QD 90 tablet 3    metoprolol tartrate (LOPRESSOR) 25 MG tablet Take 1 tablet by mouth 2 times daily 60 tablet 5    Probiotic Product (PROBIOTIC-10 PO) Take by mouth daily       TRUE METRIX BLOOD GLUCOSE TEST strip CHECK BLOOD SUGAR  each 3    TRUEplus Lancets 30G MISC Test blood glucose twice a day 200 each 3    Alcohol Swabs (B-D SINGLE USE SWABS REGULAR) PADS USE TO CHECK BLOOD SUGAR TWICE A  each 3    pravastatin (PRAVACHOL) 40 MG tablet Take 1 tablet by mouth every evening Stop Gemfibrozil 90 tablet 3    Blood Glucose Monitoring Suppl (TRUE METRIX METER) w/Device KIT USE AS DIRECTED TO TEST BLOOD SUGAR         Asa [aspirin], Iron, and Nsaids  Social History     Tobacco Use   Smoking Status Former Smoker    Packs/day: 0.50    Years: 45.00    Pack years: 22.50    Types: Cigarettes    Quit date: 2015    Years since quittin.4   Smokeless Tobacco Never Used   Tobacco Comment    on chantix 11-6-15   12-7-15     (Ifpatient a smoker, smoking cessation counseling

## 2021-05-03 NOTE — PROGRESS NOTES
Review of Systems   Constitutional: Negative for appetite change, chills and fatigue. Eyes: Negative for pain, redness and visual disturbance. Respiratory: Positive for cough. Negative for shortness of breath and wheezing. Cardiovascular: Negative for chest pain and leg swelling. Gastrointestinal: Negative for abdominal pain, constipation, diarrhea, nausea and vomiting. Genitourinary: Negative for difficulty urinating, dysuria, flank pain, frequency, hematuria and urgency. Musculoskeletal: Negative for back pain, joint swelling and myalgias. Skin: Negative for rash and wound. Neurological: Negative for dizziness, weakness and numbness. Hematological: Does not bruise/bleed easily.

## 2021-05-04 RX ORDER — CALCIUM CITRATE/VITAMIN D3 200MG-6.25
TABLET ORAL
Qty: 200 EACH | Refills: 3 | Status: SHIPPED | OUTPATIENT
Start: 2021-05-04 | End: 2021-05-18 | Stop reason: SDUPTHER

## 2021-05-04 RX ORDER — GLUCOSAM/CHON-MSM1/C/MANG/BOSW 500-416.6
TABLET ORAL
Qty: 200 EACH | Refills: 3 | Status: SHIPPED | OUTPATIENT
Start: 2021-05-04 | End: 2022-05-18 | Stop reason: ALTCHOICE

## 2021-05-04 RX ORDER — ISOPROPYL ALCOHOL 0.75 G/1
SWAB TOPICAL
Qty: 200 EACH | Refills: 3 | Status: SHIPPED | OUTPATIENT
Start: 2021-05-04

## 2021-05-05 ENCOUNTER — HOSPITAL ENCOUNTER (OUTPATIENT)
Dept: PAIN MANAGEMENT | Age: 71
Discharge: HOME | End: 2021-05-05
Payer: MEDICARE

## 2021-05-05 DIAGNOSIS — M47.816 LUMBAR SPONDYLOSIS: Chronic | ICD-10-CM

## 2021-05-05 DIAGNOSIS — Z51.81 ENCOUNTER FOR MEDICATION MONITORING: Chronic | ICD-10-CM

## 2021-05-05 DIAGNOSIS — M48.061 SPINAL STENOSIS OF LUMBAR REGION WITHOUT NEUROGENIC CLAUDICATION: Chronic | ICD-10-CM

## 2021-05-05 DIAGNOSIS — M47.26 OSTEOARTHRITIS OF SPINE WITH RADICULOPATHY, LUMBAR REGION: ICD-10-CM

## 2021-05-05 DIAGNOSIS — Z98.1 S/P CERVICAL SPINAL FUSION: Primary | Chronic | ICD-10-CM

## 2021-05-05 DIAGNOSIS — G89.29 ENCOUNTER FOR CHRONIC PAIN MANAGEMENT: ICD-10-CM

## 2021-05-05 DIAGNOSIS — M46.92 CERVICAL SPONDYLITIS (HCC): Chronic | ICD-10-CM

## 2021-05-05 DIAGNOSIS — M47.816 OSTEOARTHRITIS OF LUMBAR SPINE, UNSPECIFIED SPINAL OSTEOARTHRITIS COMPLICATION STATUS: ICD-10-CM

## 2021-05-05 DIAGNOSIS — M51.36 DEGENERATIVE DISC DISEASE, LUMBAR: Chronic | ICD-10-CM

## 2021-05-05 DIAGNOSIS — M54.16 LUMBAR RADICULOPATHY: Chronic | ICD-10-CM

## 2021-05-05 DIAGNOSIS — G62.9 PERIPHERAL POLYNEUROPATHY: ICD-10-CM

## 2021-05-05 PROCEDURE — 99213 OFFICE O/P EST LOW 20 MIN: CPT | Performed by: NURSE PRACTITIONER

## 2021-05-05 PROCEDURE — 99213 OFFICE O/P EST LOW 20 MIN: CPT

## 2021-05-05 RX ORDER — MORPHINE SULFATE 60 MG/1
60 TABLET, FILM COATED, EXTENDED RELEASE ORAL 2 TIMES DAILY
Qty: 60 TABLET | Refills: 0 | Status: SHIPPED | OUTPATIENT
Start: 2021-05-08 | End: 2021-06-07 | Stop reason: SDUPTHER

## 2021-05-05 RX ORDER — OXYCODONE HYDROCHLORIDE AND ACETAMINOPHEN 5; 325 MG/1; MG/1
1 TABLET ORAL EVERY 8 HOURS
Qty: 90 TABLET | Refills: 0 | Status: SHIPPED | OUTPATIENT
Start: 2021-05-08 | End: 2021-06-07 | Stop reason: SDUPTHER

## 2021-05-05 ASSESSMENT — ENCOUNTER SYMPTOMS
BACK PAIN: 1
RESPIRATORY NEGATIVE: 1
GASTROINTESTINAL NEGATIVE: 1

## 2021-05-05 NOTE — PROGRESS NOTES
Magda 89 PROGRESS NOTE      Patient  completed [x]  video visit   []   phone call:         Minutes :       [x]    to  review Medication Agreement    []  Follow up after procedure   []  Discuss treatment options      Location:  Provider:  working from    [x]    home    []   [facility] - ANALIA MCKEON ,   patient at  home       Chief Complaint:  Low back pain    He c/o low back  Pain radiating  down buttocks and  back of legs. His pain is unchanged. He has no history of lumbar surgery but has had cervicals urgery, His sleep is okay, He had resection of bladder tumor on 4-, He will follow up  With h=is Urologist. No Ed visits,    Back Pain  This is a chronic problem. The problem occurs constantly. The problem is unchanged. The pain is present in the lumbar spine. Quality: sharp. Radiates to: legs. The pain is at a severity of 4/10. The pain is moderate. The pain is the same all the time. Exacerbated by: certain movements. Associated symptoms include numbness and tingling. (Numbness feet, tingling legs) Risk factors include history of cancer. He has tried analgesics for the symptoms.                Treatment goals:  Functional status: get rid of pain      Aberrancy:   Any alcoholic beverages  no          Any illegal drugs   no      Analgesia:    4              Adverse  Effects :no    ADL;s :some decrease    Data:    When was thelast UDS:  4-          Was the UDS appropriate:  [x] yes []   no      Record/Diagnostics Review:      As above, I did review the imaging         4/13/2021 12:49 PM - Raimundo, M[facility] Incoming Lab Results From [facility]    Component Value Ref Range & Units Status Collected Lab   Pain Management Drug Panel Interp, Urine Inconsistent   Final 04/09/2021  9:31 AM [facility]   (NOTE)   ________________________________________________________________   DRUGS EXPECTED:   MORPHINE   OXYCODONE   ________________________________________________________________   CONSISTENT with medications provided:   MORPHINE: based on morphine, hydromorphone   OXYCODONE: based on oxycodone, noroxycodone, oxymorphone   ________________________________________________________________   INCONSISTENT with medications provided:   Pregabalin   ________________________________________________________________   INTERPRETIVE INFORMATION: Targeted drug profile Interp        EXAMINATION:   MRI OF THE LUMBAR SPINE WITHOUT CONTRAST, 2/16/2018 9:36 pm       TECHNIQUE:   Multiplanar multisequence MRI of the lumbar spine was performed without the   administration of intravenous contrast.       COMPARISON:   March 2010       HISTORY:   ORDERING SYSTEM PROVIDED HISTORY: Degenerative disc disease, lumbar   TECHNOLOGIST PROVIDED HISTORY:   Ordering Physician Provided Reason for Exam: LUMBAR DDD, LUMBAR   OSTEOARTHRITIS WITH RADICULOPATHY, SPINAL STENOSIS OF LUMBAR REGION WITHOUT   NEUROGENIC CLAUDICATION   Additional signs and symptoms: PATIENT COMPLAINS OF MID TO LOWER BACK PAIN   AND BUTTOCK PAIN. AND THAT HIS FEET FALL ASLEEP. SYMPTOMS FOR THE PAST 8 TO   10 YEARS. NO KNOWN INJURY.  NO PREVIOUS LUMBAR SURGERIES.     FINDINGS:   BONES/ALIGNMENT: Multiple endplate Schmorl's node deformities are noted. There is no acute fracture or bone edema.  T11 hemangioma is noted.    Vertebral body height and alignment is stable.       SPINAL CORD: Conus terminates at T12-L1 and is grossly normal in appearance.       SOFT TISSUES: Detail of the aorta is limited.  There is suggested mild   enlargement with the transverse dimension of approximately 3.1 cm.  Dedicated   imaging of the aorta is recommended.       L1-L2: Diffuse disc bulging is noted with a small proximal foraminal   protrusion on the right.  Annular tear is noted.  Similar findings were noted   on the prior.  Facet hypertrophic changes are noted.       L2-L3: Minimal disc bulging is noted diffusely. [place] is a questionable   small left foraminal protrusion laterally. [place] is minimal narrowing of the   left foramen.  Facet hypertrophic changes are noted.  Findings are stable       L3-L4: Mild disc bulging is noted diffusely.  Minimal endplate and mild facet   hypertrophic changes are noted.  There is borderline mild proximal foraminal   narrowing on the left.  This is stable.       L4-L5: Mild disc bulging is noted.  A small inferior foraminal protrusion on   the right is noted.  There is a slightly larger broad-based protrusion into   the left foramen.  Bilateral foraminal narrowing is noted.  Findings not   appear grossly changed.  Facet hypertrophic changes are noted       L5-S1: Minimal disc bulging is noted.  Facet hypertrophic changes are noted.       SI joint degenerative changes are noted.           Impression   No significant interval change in the multifocal degenerative disc disease   throughout the lumbar spine.  See above for details of each level       Suggested aortic aneurysm.  Dedicated imaging of the aorta is recommended       RECOMMENDATIONS:   Managing Abdominal Aortic Aneurysms       2.6-2.9 cm: Every 5 years*       3.0-3.4 cm: Every 3 years.       3.5-3.9 cm: Every 1 year.       4.0-4.4 cm: Every 1 year. Recommend vascular consultation.       4.5-5.4 cm: Every 6 months. Recommend vascular consultation.       Greater than or equal to 5.5 cm: Referral to vascular surgeon.       *For abdominal aortas with maximum diameter of 2.6-2.9 cm meeting criteria   for AAA (>50% of proximal normal segment).       Reference:       J Vasc Surg. 2009 Oct;50(4 Suppl):S2-49             Pill count: appropriate    fill date :    Morphine equivalent dose as reported on [place]ARRS:  Periodic Controlled Substance Monitoring: Possible medication side effects, risk of tolerance/dependence & alternative treatments discussed., No signs of potential drug abuse or diversion identified. , Assessed functional status., Obtaining appropriate analgesic effect of treatment.  David Aguirre, APRN - CNP) Chronic Pain > 80 MEDD: Co-prescribed Naloxone., Obtained or confirmed \"Medication Contract\" on file. Rocío Robert, APRN - CNP)  Review ofOARRS does not show any aberrant prescription behavior. Medication is helping the patient stay active. Patient denies any side effects and reports adequate analgesia. No sign of misuse/abuse.             Past Medical History:   Diagnosis Date    Anemia     Arthritis     osteoarthritis    Colon polyps 10/08/2019    tubular adenoma x2    Encounter for chronic pain management     Essential hypertension 05/12/2020    Hepatitis B core antibody positive     History of blood transfusion     History of hepatitis C     Hyperlipidemia     Iron (Fe) deficiency anemia     Positive FIT (fecal immunochemical test)     Type 2 diabetes mellitus with microalbuminuria, without long-term current use of insulin ([facility] [address] 07/13/2020    Wears dentures     Wears glasses        Past Surgical History:   Procedure Laterality Date    BLADDER TUMOR EXCISION  04/29/2021    CYSTOSCOPY TUR BLADDER TUMOR, GYRUS, RIGHT STENT PLACEMENT AND RIGHT STENT REMOVAL    CERVICAL SPINE SURGERY  1977    cervical spine three times, has plate    CHOLECYSTECTOMY  03/22/2019    COLONOSCOPY  01/25/2015    10 yr recall, hemorrhoids    COLONOSCOPY N/A 10/08/2019    tubular adenoma x2    CYSTOSCOPY Right 4/29/2021    CYSTOSCOPY TUR BLADDER TUMOR, GYRUS, RIGHT STENT PLACEMENT AND RIGHT STENT REMOVAL performed by Kerrie Mason MD at [facility]  10/2015    all teeth extracted    HERNIA REPAIR N/A 08/07/2020    HERNIA INCISIONAL REPAIR LAPAROSCOPIC ROBOTIC WITH MESH performed by Sparkle Gunn DO at [facility] Right     UMBILICAL [address] [place] [address]    UPPER GASTROINTESTINAL ENDOSCOPY  01/25/2015    UPPER GASTROINTESTINAL ENDOSCOPY N/A 10/08/2019    EGD BIOPSY performed by Khalida Velarde MD at [address] [place] [address] [Aspirin] iron deficiency anemia , requiring iron infusions and transfusions    Iron      Pill- constipation, abdominal pain    Nsaids       iron deficiency anemia, history of bleeding ulcers          Current Outpatient Medications:     metoprolol tartrate (LOPRESSOR) 25 MG tablet, TAKE 1 TABLET BY MOUTH TWICE DAILY, Disp: 180 tablet, Rfl: 3    pravastatin (PRAVACHOL) 40 MG tablet, TAKE 1 TABLET BY MOUTH EVERY EVENING. STOP GEMFIBROZIL, Disp: 90 tablet, Rfl: 3    glipiZIDE (GLUCOTROL) 5 MG tablet, Take 1 tablet by mouth every morning Keep fasting morning blood glucose . If blood glucose below 80, you need to stop glipizide, Disp: 90 tablet, Rfl: 3    oxyCODONE-acetaminophen (PERCOCET) 5-325 MG per tablet, Take 1 tablet by mouth every 8 hours for 30 days. , Disp: 90 tablet, Rfl: 0    morphine (MS CONTIN) 60 MG extended release tablet, Take 1 tablet by mouth 2 times daily for 30 days. , Disp: 60 tablet, Rfl: 0    metFORMIN (GLUCOPHAGE) 1000 MG tablet, TAKE 1 TABLET BY MOUTH TWICE DAILY WITH MEALS, Disp: 180 tablet, Rfl: 1    pantoprazole (PROTONIX) 40 MG tablet, Take 1 tablet by mouth daily, Disp: 90 tablet, Rfl: 1    pregabalin (LYRICA) 150 MG capsule, Take 1 capsule by mouth 3 times daily for 90 days. , Disp: 90 capsule, Rfl: 2    cyanocobalamin 1000 MCG/ML injection, Inject 1 mL into the muscle every 7 days Call for next refill which will be monthly for life (Patient taking differently: Inject 1,000 mcg into the muscle every 30 days Call for next refill which will be monthly for life), Disp: 4 mL, Rfl: 0    lisinopril-hydroCHLOROthiazide (PRINZIDE;ZESTORETIC) 10-12.5 MG per tablet, TK 1 T PO QD, Disp: 90 tablet, Rfl: 3    Probiotic Product (PROBIOTIC-10 PO), Take by mouth daily , Disp: , Rfl:     TRUE METRIX BLOOD GLUCOSE TEST strip, CHECK BLOOD SUGAR BID, Disp: 200 each, Rfl: 3    TRUEplus Lancets 30G MISC, Test blood glucose twice a day, Disp: 200 each, Rfl: 3    Alcohol Swabs (B-D SINGLE USE SWABS REGULAR) PADS, USE TO CHECK BLOOD SUGAR TWICE A DAY, Disp: 200 each, Rfl: 3    baclofen (LIORESAL) 10 MG tablet, Take 1 tablet by mouth 3 times daily as needed (muscle spasms), Disp: 90 tablet, Rfl: 0    Syringe/Needle, Disp, (SYRINGE 3CC/25GX1\") 25G X 1\" 3 ML MISC, To be used with B12 injections, Disp: 50 each, Rfl: 2    loperamide (RA ANTI-DIARRHEAL) 2 MG capsule, Take 1 capsule by mouth 4 times daily as needed for Diarrhea, Disp: 112 capsule, Rfl: 2    lidocaine (LIDODERM) 5 %, Place 1 patch onto the skin daily 12 hours on, 12 hours off., Disp: 30 patch, Rfl: 3    Blood Glucose Monitoring Suppl (TRUE METRIX METER) w/Device KIT, USE AS DIRECTED TO TEST BLOOD SUGAR, Disp: , Rfl:     Family History   Problem Relation Age of Onset    Diabetes Mother     Heart Disease Father        Social History     Socioeconomic History    Marital status:      Spouse name: Not on file    Number of children: Not on file    Years of education: Not on file    Highest education level: Not on file   Occupational History    Occupation: disabled   Social Needs    Financial resource strain: Not on file    Food insecurity     Worry: Not on file     Inability: Not on file   [employer] needs     Medical: Not on file     Non-medical: Not on file   Tobacco Use    Smoking status: Former Smoker     Packs/day: 0.50     Years: 45.00     Pack years: 22.50     Types: Cigarettes     Quit date: 2015     Years since quittin.4    Smokeless tobacco: Never Used    Tobacco comment: on chantix 11-6-15   12-7-15   Substance and Sexual Activity    Alcohol use: No    Drug use: No    Sexual activity: Yes     Partners: Female   Lifestyle    Physical activity     Days per week: Not on file     Minutes per session: Not on file    Stress: Not on file   Relationships    Social connections     Talks on phone: Not on file     Gets together: Not on file     Attends [employer] service: Not on file     Active member of club or tolerance and or dependence and alternative treatments discussed    Obtaining appropriate analgesic effect of treatment   No signs of potential drug abuse or diversion identified    [x] Ill effects of being on chronic pain medications such as sleep disturbances, respiratory depression, hormonal changes, withdrawal symptoms, chronic opioid dependence and tolerance as well as risk of taking opioids with Benzodiazepines and taking opioids along with alcohol,  werediscussed with patient. I had asked the patient to minimize medication use and utilize pain medications only for uncontrolled rest pain or pain with exertional activities. I advised patient not to self-escalate painmedications without consulting with us. At each of patient's future visits we will try to taper pain medications, while adjusting the adjunct medications, and re-evaluating for Physical Therapy to improve spinal andjoint strength. We will continue to have discussions to decrease pain medications as tolerated. Counseled patient on effects their pain medication and /or their medical condition mayhave on their  ability to drive or operate machinery. Instructed not to drive or operate machinery if drowsy     I also discussed with the patient regarding the dangers of combining narcotic pain medication with tranquilizers, alcohol or illegal drugs or taking the medication any way other than prescribed. The dangers were discussed  including respiratory depression and death. Patient was told to tell  all  physicians regarding the medications he is getting from pain clinic. Patient is warned not to take any unprescribed medications over-the-countermedications that can depress breathing . Patient is advised to talk to the pharmacist or physicians if planning to take any over-the-counter medications before  takeing them.  Patient is strongly advised to avoid tranquilizers or  relaxants, illegal drugs  or any medications that can depress breathing  Patient is also advised to tell us if there is any changes in their medications from other physicians.             TREATMENT OPTIONS:       Medication Agreement Requirements Met  Continue Opioid therapy  Script written for  MS contin percocet  Follow up appointment made

## 2021-05-08 DIAGNOSIS — R51.9 WORSENING HEADACHES: ICD-10-CM

## 2021-05-09 LAB
EKG ATRIAL RATE: 76 BPM
EKG P AXIS: 27 DEGREES
EKG P-R INTERVAL: 148 MS
EKG Q-T INTERVAL: 384 MS
EKG QRS DURATION: 72 MS
EKG QTC CALCULATION (BAZETT): 432 MS
EKG R AXIS: 35 DEGREES
EKG T AXIS: 64 DEGREES
EKG VENTRICULAR RATE: 76 BPM

## 2021-05-10 ENCOUNTER — TELEPHONE (OUTPATIENT)
Dept: ONCOLOGY | Age: 71
End: 2021-05-10

## 2021-05-10 ENCOUNTER — PATIENT MESSAGE (OUTPATIENT)
Dept: FAMILY MEDICINE CLINIC | Age: 71
End: 2021-05-10

## 2021-05-10 DIAGNOSIS — I10 ESSENTIAL HYPERTENSION: ICD-10-CM

## 2021-05-10 DIAGNOSIS — D50.8 IRON DEFICIENCY ANEMIA SECONDARY TO INADEQUATE DIETARY IRON INTAKE: Primary | ICD-10-CM

## 2021-05-10 DIAGNOSIS — E78.5 HYPERLIPIDEMIA WITH TARGET LDL LESS THAN 100: ICD-10-CM

## 2021-05-10 RX ORDER — BACLOFEN 10 MG/1
TABLET ORAL
Qty: 90 TABLET | Refills: 0 | Status: SHIPPED | OUTPATIENT
Start: 2021-05-10 | End: 2021-07-26 | Stop reason: SDUPTHER

## 2021-05-10 NOTE — TELEPHONE ENCOUNTER
Tried calling patient on cell phone and home phone to remind patient to have labs done. Patient does not have voicemail to leave a message. Will try calling again later.

## 2021-05-11 RX ORDER — PRAVASTATIN SODIUM 40 MG
40 TABLET ORAL EVERY EVENING
Qty: 90 TABLET | Refills: 3 | Status: SHIPPED | OUTPATIENT
Start: 2021-05-11 | End: 2022-06-06

## 2021-05-11 NOTE — TELEPHONE ENCOUNTER
From: Dora Jimenez  To: Benedict Oseguera MD  Sent: 5/10/2021 7:30 PM EDT  Subject: Prescription Question    Ezio Maurer needs these two MR'Q refilled please. He has 3 days left of his meds.    His pharmacy is [facility] on [address] [place] [address],   [phone]  Thank you, Lawrence Peterson, spouse

## 2021-05-12 ENCOUNTER — HOSPITAL ENCOUNTER (OUTPATIENT)
Age: 71
Discharge: HOME | End: 2021-05-12
Payer: MEDICARE

## 2021-05-12 ENCOUNTER — TELEPHONE (OUTPATIENT)
Dept: PAIN MANAGEMENT | Age: 71
End: 2021-05-12

## 2021-05-12 DIAGNOSIS — M51.36 DEGENERATIVE DISC DISEASE, LUMBAR: Chronic | ICD-10-CM

## 2021-05-12 DIAGNOSIS — M54.16 LUMBAR RADICULOPATHY: Chronic | ICD-10-CM

## 2021-05-12 DIAGNOSIS — M47.26 OSTEOARTHRITIS OF SPINE WITH RADICULOPATHY, LUMBAR REGION: ICD-10-CM

## 2021-05-12 DIAGNOSIS — M48.061 SPINAL STENOSIS OF LUMBAR REGION WITHOUT NEUROGENIC CLAUDICATION: Chronic | ICD-10-CM

## 2021-05-12 DIAGNOSIS — D50.8 IRON DEFICIENCY ANEMIA SECONDARY TO INADEQUATE DIETARY IRON INTAKE: ICD-10-CM

## 2021-05-12 DIAGNOSIS — G89.29 ENCOUNTER FOR CHRONIC PAIN MANAGEMENT: ICD-10-CM

## 2021-05-12 DIAGNOSIS — M47.816 OSTEOARTHRITIS OF LUMBAR SPINE, UNSPECIFIED SPINAL OSTEOARTHRITIS COMPLICATION STATUS: ICD-10-CM

## 2021-05-12 LAB
ABSOLUTE EOS #: 0.08 K/UL (ref 0–0.4)
ABSOLUTE IMMATURE GRANULOCYTE: ABNORMAL K/UL (ref 0–0.3)
ABSOLUTE LYMPH #: 1.42 K/UL (ref 1–4.8)
ABSOLUTE MONO #: 0.4 K/UL (ref 0.1–1.3)
ALBUMIN SERPL-MCNC: 4.2 G/DL (ref 3.5–5.2)
ALBUMIN/GLOBULIN RATIO: ABNORMAL (ref 1–2.5)
ALP BLD-CCNC: 85 U/L (ref 40–129)
ALT SERPL-CCNC: 12 U/L (ref 5–41)
ANION GAP SERPL CALCULATED.3IONS-SCNC: 11 MMOL/L (ref 9–17)
AST SERPL-CCNC: 13 U/L
BASOPHILS # BLD: 1 % (ref 0–2)
BASOPHILS ABSOLUTE: 0.08 K/UL (ref 0–0.2)
BILIRUB SERPL-MCNC: 0.22 MG/DL (ref 0.3–1.2)
BUN BLDV-MCNC: 15 MG/DL (ref 8–23)
BUN/CREAT BLD: ABNORMAL (ref 9–20)
CALCIUM SERPL-MCNC: 9 MG/DL (ref 8.6–10.4)
CHLORIDE BLD-SCNC: 105 MMOL/L (ref 98–107)
CO2: 26 MMOL/L (ref 20–31)
CREAT SERPL-MCNC: 0.82 MG/DL (ref 0.7–1.2)
DIFFERENTIAL TYPE: ABNORMAL
EOSINOPHILS RELATIVE PERCENT: 1 % (ref 0–4)
FERRITIN: 7 UG/L (ref 30–400)
GFR AFRICAN AMERICAN: >60 ML/MIN
GFR NON-AFRICAN AMERICAN: >60 ML/MIN
GFR SERPL CREATININE-BSD FRML MDRD: ABNORMAL ML/MIN/{1.73_M2}
GFR SERPL CREATININE-BSD FRML MDRD: ABNORMAL ML/MIN/{1.73_M2}
GLUCOSE BLD-MCNC: 239 MG/DL (ref 70–99)
HCT VFR BLD CALC: 30.8 % (ref 41–53)
HEMOGLOBIN: 9.4 G/DL (ref 13.5–17.5)
IMMATURE GRANULOCYTES: ABNORMAL %
IRON SATURATION: 5 % (ref 20–55)
IRON: 19 UG/DL (ref 59–158)
LYMPHOCYTES # BLD: 18 % (ref 24–44)
MCH RBC QN AUTO: 22.3 PG (ref 26–34)
MCHC RBC AUTO-ENTMCNC: 30.6 G/DL (ref 31–37)
MCV RBC AUTO: 72.8 FL (ref 80–100)
MONOCYTES # BLD: 5 % (ref 1–7)
MORPHOLOGY: ABNORMAL
NRBC AUTOMATED: ABNORMAL PER 100 WBC
PDW BLD-RTO: 21 % (ref 11.5–14.9)
PLATELET # BLD: 326 K/UL (ref 150–450)
PLATELET ESTIMATE: ABNORMAL
PMV BLD AUTO: 8 FL (ref 6–12)
POTASSIUM SERPL-SCNC: 4.7 MMOL/L (ref 3.7–5.3)
RBC # BLD: 4.23 M/UL (ref 4.5–5.9)
RBC # BLD: ABNORMAL 10*6/UL
SEG NEUTROPHILS: 75 % (ref 36–66)
SEGMENTED NEUTROPHILS ABSOLUTE COUNT: 5.92 K/UL (ref 1.3–9.1)
SODIUM BLD-SCNC: 142 MMOL/L (ref 135–144)
TOTAL IRON BINDING CAPACITY: 378 UG/DL (ref 250–450)
TOTAL PROTEIN: 7.2 G/DL (ref 6.4–8.3)
UNSATURATED IRON BINDING CAPACITY: 359 UG/DL (ref 112–347)
WBC # BLD: 7.9 K/UL (ref 3.5–11)
WBC # BLD: ABNORMAL 10*3/UL

## 2021-05-12 PROCEDURE — 82728 ASSAY OF FERRITIN: CPT

## 2021-05-12 PROCEDURE — 80053 COMPREHEN METABOLIC PANEL: CPT

## 2021-05-12 PROCEDURE — 36415 COLL VENOUS BLD VENIPUNCTURE: CPT

## 2021-05-12 PROCEDURE — 83550 IRON BINDING TEST: CPT

## 2021-05-12 PROCEDURE — 83540 ASSAY OF IRON: CPT

## 2021-05-12 PROCEDURE — 85025 COMPLETE CBC W/AUTO DIFF WBC: CPT

## 2021-05-12 RX ORDER — PREGABALIN 150 MG/1
150 CAPSULE ORAL 3 TIMES DAILY
Qty: 90 CAPSULE | Refills: 2 | Status: SHIPPED | OUTPATIENT
Start: 2021-05-12 | End: 2021-08-03 | Stop reason: SDUPTHER

## 2021-05-12 NOTE — TELEPHONE ENCOUNTER
Patient's wife calls asking for a refill on patients Lyrica. Patient had an appointment with Annia Hilton CNP on 5/5/2021.  Will ask her to refill for this patient

## 2021-05-14 ENCOUNTER — OFFICE VISIT (OUTPATIENT)
Dept: ONCOLOGY | Age: 71
End: 2021-05-14
Payer: MEDICARE

## 2021-05-14 ENCOUNTER — TELEPHONE (OUTPATIENT)
Dept: ONCOLOGY | Age: 71
End: 2021-05-14

## 2021-05-14 VITALS
HEART RATE: 89 BPM | WEIGHT: 213.1 LBS | BODY MASS INDEX: 31.47 KG/M2 | TEMPERATURE: 98.3 F | SYSTOLIC BLOOD PRESSURE: 119 MMHG | DIASTOLIC BLOOD PRESSURE: 71 MMHG

## 2021-05-14 DIAGNOSIS — K92.2 GASTROINTESTINAL HEMORRHAGE, UNSPECIFIED GASTROINTESTINAL HEMORRHAGE TYPE: ICD-10-CM

## 2021-05-14 DIAGNOSIS — E53.8 B12 DEFICIENCY: ICD-10-CM

## 2021-05-14 DIAGNOSIS — D50.8 IRON DEFICIENCY ANEMIA SECONDARY TO INADEQUATE DIETARY IRON INTAKE: ICD-10-CM

## 2021-05-14 DIAGNOSIS — B18.2 HEP C W/O COMA, CHRONIC (HCC): ICD-10-CM

## 2021-05-14 DIAGNOSIS — D50.0 IRON DEFICIENCY ANEMIA DUE TO CHRONIC BLOOD LOSS: Primary | ICD-10-CM

## 2021-05-14 DIAGNOSIS — C68.9 UROTHELIAL CANCER (HCC): ICD-10-CM

## 2021-05-14 PROCEDURE — 99211 OFF/OP EST MAY X REQ PHY/QHP: CPT | Performed by: INTERNAL MEDICINE

## 2021-05-14 PROCEDURE — 1036F TOBACCO NON-USER: CPT | Performed by: INTERNAL MEDICINE

## 2021-05-14 PROCEDURE — 3017F COLORECTAL CA SCREEN DOC REV: CPT | Performed by: INTERNAL MEDICINE

## 2021-05-14 PROCEDURE — G8417 CALC BMI ABV UP PARAM F/U: HCPCS | Performed by: INTERNAL MEDICINE

## 2021-05-14 PROCEDURE — 4040F PNEUMOC VAC/ADMIN/RCVD: CPT | Performed by: INTERNAL MEDICINE

## 2021-05-14 PROCEDURE — G8427 DOCREV CUR MEDS BY ELIG CLIN: HCPCS | Performed by: INTERNAL MEDICINE

## 2021-05-14 PROCEDURE — 99214 OFFICE O/P EST MOD 30 MIN: CPT | Performed by: INTERNAL MEDICINE

## 2021-05-14 PROCEDURE — 1123F ACP DISCUSS/DSCN MKR DOCD: CPT | Performed by: INTERNAL MEDICINE

## 2021-05-14 RX ORDER — HEPARIN SODIUM (PORCINE) LOCK FLUSH IV SOLN 100 UNIT/ML 100 UNIT/ML
500 SOLUTION INTRAVENOUS PRN
Status: CANCELLED | OUTPATIENT
Start: 2021-05-14

## 2021-05-14 RX ORDER — METHYLPREDNISOLONE SODIUM SUCCINATE 125 MG/2ML
125 INJECTION, POWDER, LYOPHILIZED, FOR SOLUTION INTRAMUSCULAR; INTRAVENOUS ONCE
Status: CANCELLED | OUTPATIENT
Start: 2021-05-14 | End: 2021-05-14

## 2021-05-14 RX ORDER — SODIUM CHLORIDE 9 MG/ML
25 INJECTION, SOLUTION INTRAVENOUS PRN
Status: CANCELLED | OUTPATIENT
Start: 2021-05-14

## 2021-05-14 RX ORDER — SODIUM CHLORIDE 9 MG/ML
INJECTION, SOLUTION INTRAVENOUS CONTINUOUS
Status: CANCELLED | OUTPATIENT
Start: 2021-05-14

## 2021-05-14 RX ORDER — EPINEPHRINE 1 MG/ML
0.3 INJECTION, SOLUTION, CONCENTRATE INTRAVENOUS PRN
Status: CANCELLED | OUTPATIENT
Start: 2021-05-14

## 2021-05-14 RX ORDER — SODIUM CHLORIDE 0.9 % (FLUSH) 0.9 %
5-40 SYRINGE (ML) INJECTION PRN
Status: CANCELLED | OUTPATIENT
Start: 2021-05-14

## 2021-05-14 RX ORDER — DIPHENHYDRAMINE HYDROCHLORIDE 50 MG/ML
50 INJECTION INTRAMUSCULAR; INTRAVENOUS ONCE
Status: CANCELLED | OUTPATIENT
Start: 2021-05-14 | End: 2021-05-14

## 2021-05-14 NOTE — PROGRESS NOTES
Subjective:   PCP: Marin Collins MD       Chief Complaint   Patient presents with    Follow-up     review status of disease    Discuss Labs         INTERIM HISTORY: Patient presents to the clinic for hematology follow up and discuss lab work. He reports he feels improved over the interim. He developed very mild hematuria recently and underwent cystoscopy 04/29/2021, non-invasive bladder cancer was seen, TURBT was done and he plans for continued follow up. He has not had GI follow up for EGD results. He has frequent diarrhea that he manages with Imodium and denies any dark stool. REVIEW OF SYSTEMS:   General: No fever or night sweats. Weight is stable. +fatigue   ENT: No double or blurred vision, no hearing problem, no dysphagia or sore throat   Respiratory: No chest pain, no shortness of breath, no cough or hemoptysis. Cardiovascular: Denies chest pain, PND or orthopnea. No L E swelling or palpitations. Gastrointestinal: No nausea or vomiting, abdominal pain, or constipation. + diarrhea  Genitourinary: Denies dysuria, frequency, urgency or incontinence. +hematuria - resolved  Neurological: Denies headaches, decreased LOC, no sensory or motor focal deficits. Musculoskeletal: No arthralgia no back pain or joint swelling. Skin: There are no rashes or bleeding. Psych: Denies hallucinations or intentions to harm self      PHYSICAL EXAM:   Vitals: /71   Pulse 89   Temp 98.3 °F (36.8 °C) (Oral)   Wt 213 lb 1.6 oz (96.7 kg)   BMI 31.47 kg/m²   General appearance: alert and cooperative with exam  HEENT: Head: Normocephalic, no lesions, without obvious abnormality.   Neck: no adenopathy  Lungs: clear to auscultation bilaterally  Heart: regular rate and rhythm, S1, S2 normal, no murmur, click, rub or gallop  Abdomen: soft, non-tender; bowel sounds normal; no masses,  no organomegaly  Extremities: extremities normal, atraumatic, no cyanosis or edema  Neurologic: Mental status: Alert, oriented, thought content appropriate      REVIEW OF LABORATORY DATA:   Lab Results   Component Value Date    WBC 7.9 05/12/2021    HGB 9.4 (L) 05/12/2021    HCT 30.8 (L) 05/12/2021    MCV 72.8 (L) 05/12/2021     05/12/2021       Chemistry        Component Value Date/Time     05/12/2021 0928    K 4.7 05/12/2021 0928     05/12/2021 0928    CO2 26 05/12/2021 0928    BUN 15 05/12/2021 0928    CREATININE 0.82 05/12/2021 0928        Component Value Date/Time    CALCIUM 9.0 05/12/2021 0928    ALKPHOS 85 05/12/2021 0928    AST 13 05/12/2021 0928    ALT 12 05/12/2021 0928    BILITOT 0.22 (L) 05/12/2021 0928        Lab Results   Component Value Date    TQHAOODK22 151 01/25/2021         Lab Results   Component Value Date    IRON 19 (L) 05/12/2021    TIBC 378 05/12/2021    FERRITIN 7 (L) 05/12/2021         IMPRESSION:   81year old male with history of bleeding ulcer back in 2015 and iron def, now with iron def anemia, and stool occult stools    -s/p IV iron with improvement in iron stores and hemoglobin  -EGD and colonoscopy 10/2019 did not show active bleeding, still no active source of iron def  -s/p GI in 12/3/19, concern for still blood loss, iron def, no plasns for repeat capsule video study, he has completed treatment for hepatitis C with Algernon Grammes.  -repeat iron studies are consistent with iron deficiency  -transfuse if Hbg is less than 7.0    -B12 deficiency, patient started on B12 shots 7/2020  -Non-invasive urothelial carcinoma, low grade, TURBT, 04/2021    PLAN:   His lab work was reviewed and discussed, his HGB and ferritin have decreased, electrolytes are in range. He did undergo follow up EGD, small ulcer was seen in the small intestine but not bleeding, I asked the patient to schedule follow up for recommendations. We discussed his hematuria and recent nonmuscle invasive urothelial carcinoma findings with urology, he will continue to follow. I am ordering for IV iron.  Return in 2 months with repeat lab work. Frances Garcia MD    This note is created with the assistance of a speech recognition program.  While intending to generate a document that actually reflects the content of the visit, the document can still have some errors including those of syntax and sound a like substitutions which may escape proof reading. It such instances, actual meaning can be extrapolated by contextual diversion.

## 2021-05-14 NOTE — TELEPHONE ENCOUNTER
AVS from 5/14/21     Iv iron < injectafer x 2     rv in 2 months with labs couple of days prior     AVS given to  to follow precert    RV scheduled 7/16/21 @ 11am    PT was given AVS and an appt schedule    Electronically signed by Paul Reeves on 5/14/2021 at 2:00 PM

## 2021-05-21 ENCOUNTER — HOSPITAL ENCOUNTER (OUTPATIENT)
Dept: INFUSION THERAPY | Age: 71
Discharge: HOME | End: 2021-05-21
Payer: MEDICARE

## 2021-05-21 VITALS
RESPIRATION RATE: 18 BRPM | HEART RATE: 85 BPM | TEMPERATURE: 98 F | DIASTOLIC BLOOD PRESSURE: 66 MMHG | SYSTOLIC BLOOD PRESSURE: 125 MMHG

## 2021-05-21 DIAGNOSIS — D50.0 IRON DEFICIENCY ANEMIA DUE TO CHRONIC BLOOD LOSS: Primary | ICD-10-CM

## 2021-05-21 PROCEDURE — 2580000003 HC RX 258: Performed by: INTERNAL MEDICINE

## 2021-05-21 PROCEDURE — 96365 THER/PROPH/DIAG IV INF INIT: CPT

## 2021-05-21 PROCEDURE — 6360000002 HC RX W HCPCS: Performed by: INTERNAL MEDICINE

## 2021-05-21 RX ORDER — SODIUM CHLORIDE 9 MG/ML
25 INJECTION, SOLUTION INTRAVENOUS PRN
Status: CANCELLED | OUTPATIENT
Start: 2021-05-28

## 2021-05-21 RX ORDER — METHYLPREDNISOLONE SODIUM SUCCINATE 125 MG/2ML
125 INJECTION, POWDER, LYOPHILIZED, FOR SOLUTION INTRAMUSCULAR; INTRAVENOUS ONCE
Status: CANCELLED | OUTPATIENT
Start: 2021-05-28 | End: 2021-05-28

## 2021-05-21 RX ORDER — EPINEPHRINE 1 MG/ML
0.3 INJECTION, SOLUTION, CONCENTRATE INTRAVENOUS PRN
Status: CANCELLED | OUTPATIENT
Start: 2021-05-28

## 2021-05-21 RX ORDER — SODIUM CHLORIDE 0.9 % (FLUSH) 0.9 %
5-40 SYRINGE (ML) INJECTION PRN
Status: CANCELLED | OUTPATIENT
Start: 2021-05-28

## 2021-05-21 RX ORDER — DIPHENHYDRAMINE HYDROCHLORIDE 50 MG/ML
50 INJECTION INTRAMUSCULAR; INTRAVENOUS ONCE
Status: CANCELLED | OUTPATIENT
Start: 2021-05-28 | End: 2021-05-28

## 2021-05-21 RX ORDER — HEPARIN SODIUM (PORCINE) LOCK FLUSH IV SOLN 100 UNIT/ML 100 UNIT/ML
500 SOLUTION INTRAVENOUS PRN
Status: CANCELLED | OUTPATIENT
Start: 2021-05-28

## 2021-05-21 RX ORDER — SODIUM CHLORIDE 9 MG/ML
INJECTION, SOLUTION INTRAVENOUS CONTINUOUS
Status: DISCONTINUED | OUTPATIENT
Start: 2021-05-21 | End: 2021-05-22 | Stop reason: HOSPADM

## 2021-05-21 RX ORDER — SODIUM CHLORIDE 9 MG/ML
INJECTION, SOLUTION INTRAVENOUS CONTINUOUS
Status: CANCELLED | OUTPATIENT
Start: 2021-05-28

## 2021-05-21 RX ADMIN — FERRIC CARBOXYMALTOSE INJECTION 750 MG: 50 INJECTION, SOLUTION INTRAVENOUS at 13:23

## 2021-05-21 RX ADMIN — SODIUM CHLORIDE: 9 INJECTION, SOLUTION INTRAVENOUS at 13:09

## 2021-05-21 ASSESSMENT — PAIN SCALES - GENERAL: PAINLEVEL_OUTOF10: 4

## 2021-05-21 ASSESSMENT — PAIN DESCRIPTION - LOCATION: LOCATION: BACK

## 2021-05-21 NOTE — PROGRESS NOTES
Patient here for injectafer. Vitals stable. He tolerated treatment well and was discharged home in stable condition. He is due to return 5/28 for next treatment.

## 2021-05-28 ENCOUNTER — HOSPITAL ENCOUNTER (OUTPATIENT)
Dept: INFUSION THERAPY | Age: 71
Discharge: HOME | End: 2021-05-28
Payer: MEDICARE

## 2021-05-28 VITALS
SYSTOLIC BLOOD PRESSURE: 101 MMHG | TEMPERATURE: 98.6 F | DIASTOLIC BLOOD PRESSURE: 65 MMHG | RESPIRATION RATE: 18 BRPM | HEART RATE: 85 BPM

## 2021-05-28 DIAGNOSIS — D50.0 IRON DEFICIENCY ANEMIA DUE TO CHRONIC BLOOD LOSS: Primary | ICD-10-CM

## 2021-05-28 PROCEDURE — 6360000002 HC RX W HCPCS: Performed by: INTERNAL MEDICINE

## 2021-05-28 PROCEDURE — 96365 THER/PROPH/DIAG IV INF INIT: CPT

## 2021-05-28 PROCEDURE — 2580000003 HC RX 258: Performed by: INTERNAL MEDICINE

## 2021-05-28 RX ORDER — SODIUM CHLORIDE 0.9 % (FLUSH) 0.9 %
5-40 SYRINGE (ML) INJECTION PRN
Status: CANCELLED | OUTPATIENT
Start: 2021-05-28

## 2021-05-28 RX ORDER — METHYLPREDNISOLONE SODIUM SUCCINATE 125 MG/2ML
125 INJECTION, POWDER, LYOPHILIZED, FOR SOLUTION INTRAMUSCULAR; INTRAVENOUS ONCE
Status: CANCELLED | OUTPATIENT
Start: 2021-05-28 | End: 2021-05-28

## 2021-05-28 RX ORDER — EPINEPHRINE 1 MG/ML
0.3 INJECTION, SOLUTION, CONCENTRATE INTRAVENOUS PRN
Status: CANCELLED | OUTPATIENT
Start: 2021-05-28

## 2021-05-28 RX ORDER — SODIUM CHLORIDE 9 MG/ML
INJECTION, SOLUTION INTRAVENOUS CONTINUOUS
Status: ACTIVE | OUTPATIENT
Start: 2021-05-28 | End: 2021-05-28

## 2021-05-28 RX ORDER — SODIUM CHLORIDE 9 MG/ML
INJECTION, SOLUTION INTRAVENOUS CONTINUOUS
Status: CANCELLED | OUTPATIENT
Start: 2021-05-28

## 2021-05-28 RX ORDER — DIPHENHYDRAMINE HYDROCHLORIDE 50 MG/ML
50 INJECTION INTRAMUSCULAR; INTRAVENOUS ONCE
Status: CANCELLED | OUTPATIENT
Start: 2021-05-28 | End: 2021-05-28

## 2021-05-28 RX ORDER — HEPARIN SODIUM (PORCINE) LOCK FLUSH IV SOLN 100 UNIT/ML 100 UNIT/ML
500 SOLUTION INTRAVENOUS PRN
Status: CANCELLED | OUTPATIENT
Start: 2021-05-28

## 2021-05-28 RX ORDER — SODIUM CHLORIDE 9 MG/ML
25 INJECTION, SOLUTION INTRAVENOUS PRN
Status: CANCELLED | OUTPATIENT
Start: 2021-05-28

## 2021-05-28 RX ADMIN — SODIUM CHLORIDE: 9 INJECTION, SOLUTION INTRAVENOUS at 09:13

## 2021-05-28 RX ADMIN — FERRIC CARBOXYMALTOSE INJECTION 750 MG: 50 INJECTION, SOLUTION INTRAVENOUS at 09:16

## 2021-05-28 NOTE — PROGRESS NOTES
Pt here for 2/2 injectafer infusion. Denies any problems with 1st infusion. Pt observed for 30 min post infusion. Pt was treated without incident and d/c'd in stable condition. Returns 7/16/21 for MD follow up.

## 2021-06-07 ENCOUNTER — HOSPITAL ENCOUNTER (OUTPATIENT)
Dept: PAIN MANAGEMENT | Age: 71
Discharge: HOME | End: 2021-06-07
Payer: MEDICARE

## 2021-06-07 DIAGNOSIS — Z51.81 ENCOUNTER FOR MEDICATION MONITORING: Chronic | ICD-10-CM

## 2021-06-07 DIAGNOSIS — G62.9 PERIPHERAL POLYNEUROPATHY: ICD-10-CM

## 2021-06-07 DIAGNOSIS — G89.29 ENCOUNTER FOR CHRONIC PAIN MANAGEMENT: ICD-10-CM

## 2021-06-07 DIAGNOSIS — M47.816 LUMBAR SPONDYLOSIS: Chronic | ICD-10-CM

## 2021-06-07 DIAGNOSIS — M47.816 OSTEOARTHRITIS OF LUMBAR SPINE, UNSPECIFIED SPINAL OSTEOARTHRITIS COMPLICATION STATUS: ICD-10-CM

## 2021-06-07 DIAGNOSIS — M54.16 LUMBAR RADICULOPATHY: Chronic | ICD-10-CM

## 2021-06-07 DIAGNOSIS — Z98.1 S/P CERVICAL SPINAL FUSION: Primary | Chronic | ICD-10-CM

## 2021-06-07 DIAGNOSIS — M47.26 OSTEOARTHRITIS OF SPINE WITH RADICULOPATHY, LUMBAR REGION: ICD-10-CM

## 2021-06-07 DIAGNOSIS — M48.061 SPINAL STENOSIS OF LUMBAR REGION WITHOUT NEUROGENIC CLAUDICATION: Chronic | ICD-10-CM

## 2021-06-07 DIAGNOSIS — M51.36 DEGENERATIVE DISC DISEASE, LUMBAR: Chronic | ICD-10-CM

## 2021-06-07 DIAGNOSIS — M46.92 CERVICAL SPONDYLITIS (HCC): Chronic | ICD-10-CM

## 2021-06-07 PROCEDURE — 99213 OFFICE O/P EST LOW 20 MIN: CPT | Performed by: NURSE PRACTITIONER

## 2021-06-07 PROCEDURE — 99213 OFFICE O/P EST LOW 20 MIN: CPT

## 2021-06-07 RX ORDER — MORPHINE SULFATE 60 MG/1
60 TABLET, FILM COATED, EXTENDED RELEASE ORAL 2 TIMES DAILY
Qty: 60 TABLET | Refills: 0 | Status: SHIPPED | OUTPATIENT
Start: 2021-06-07 | End: 2021-07-06 | Stop reason: SDUPTHER

## 2021-06-07 RX ORDER — OXYCODONE HYDROCHLORIDE AND ACETAMINOPHEN 5; 325 MG/1; MG/1
1 TABLET ORAL EVERY 8 HOURS
Qty: 90 TABLET | Refills: 0 | Status: SHIPPED | OUTPATIENT
Start: 2021-06-07 | End: 2021-07-06 | Stop reason: SDUPTHER

## 2021-06-07 ASSESSMENT — ENCOUNTER SYMPTOMS
BACK PAIN: 1
RESPIRATORY NEGATIVE: 1
GASTROINTESTINAL NEGATIVE: 1

## 2021-06-07 NOTE — PROGRESS NOTES
[address]da 89 PROGRESS NOTE      Patient  completed [x]  video visit   []   phone call:         Minutes :       [x]    to  review Medication Agreement    []  Follow up after procedure   []  Discuss treatment options      Location:  Provider:  working from    [x]    home    []   [facility] - ANALIA MCKEON ,   patient at  home       Chief Complaint: low back pain    He c/o  Low back pain radiating down his legs. His pain is unchanged. He  Has had no lumbar surgery but has had 3 cervical surgeries. He states is a little more active. His sleep is good. No ED visits. He  Gets iron infusions when his hgb  Gets below a 7. Back Pain  This is a chronic problem. The problem occurs constantly. The problem is unchanged. The pain is present in the lumbar spine. The quality of the pain is described as aching (burning in feet). Radiates to: legs. The pain is at a severity of 4/10. The pain is moderate. The pain is the same all the time. Exacerbated by: certain  position. Associated symptoms include numbness. (Burning feet) He has tried analgesics for the symptoms. The treatment provided mild relief.          Treatment goals:  Functional status: get off some pain meds    Aberrancy:   Any alcoholic beverages     no      Any illegal drugs   no      Analgesia:   4                  Adverse  Effects :no    ADL;s :active around house    Data:    When was thelast UDS:  4-9-2021          Was the UDS appropriate:  [x] yes []   no      Record/Diagnostics Review:      As above, I did review the imaging     4/13/2021 12:49 PM - Raimundo, M[facility]pn Incoming Lab Results From [facility]    Component Value Ref Range & Units Status Collected Lab   Pain Management Drug Panel Interp, Urine Inconsistent   Final 04/09/2021  9:31 AM [facility]   (NOTE)   ________________________________________________________________   DRUGS EXPECTED:   MORPHINE   OXYCODONE   ________________________________________________________________   CONSISTENT with medications provided:   MORPHINE: based on morphine, hydromorphone   OXYCODONE: based on oxycodone, noroxycodone, oxymorphone   ________________________________________________________________   INCONSISTENT with medications provided:   Pregabalin   ________________________________________________________________   INTERPRETIVE INFORMATION: Targeted drug profile Interp   Interpretation depends on accuracy and completeness of patient   medication information submitted by client. EXAMINATION:   MRI OF THE LUMBAR SPINE WITHOUT CONTRAST, 2/16/2018 9:36 pm       TECHNIQUE:   Multiplanar multisequence MRI of the lumbar spine was performed without the   administration of intravenous contrast.       COMPARISON:   March 2010       HISTORY:   ORDERING SYSTEM PROVIDED HISTORY: Degenerative disc disease, lumbar   TECHNOLOGIST PROVIDED HISTORY:   Ordering Physician Provided Reason for Exam: LUMBAR DDD, LUMBAR   OSTEOARTHRITIS WITH RADICULOPATHY, SPINAL STENOSIS OF LUMBAR REGION WITHOUT   NEUROGENIC CLAUDICATION   Additional signs and symptoms: PATIENT COMPLAINS OF MID TO LOWER BACK PAIN   AND BUTTOCK PAIN. AND THAT HIS FEET FALL ASLEEP. SYMPTOMS FOR THE PAST 8 TO   10 YEARS. NO KNOWN INJURY.  NO PREVIOUS LUMBAR SURGERIES.     FINDINGS:   BONES/ALIGNMENT: Multiple endplate Schmorl's node deformities are noted. There is no acute fracture or bone edema.  T11 hemangioma is noted.    Vertebral body height and alignment is stable.       SPINAL CORD: Conus terminates at T12-L1 and is grossly normal in appearance.       SOFT TISSUES: Detail of the aorta is limited.  There is suggested mild   enlargement with the transverse dimension of approximately 3.1 cm.  Dedicated   imaging of the aorta is recommended.       L1-L2: Diffuse disc bulging is noted with a small proximal foraminal   protrusion on the right.  Annular tear is noted.  Similar findings were noted   on the prior.  Facet hypertrophic changes are noted.       L2-L3: Minimal disc bulging is noted diffusely. Gabbi Pilon is a questionable   small left foraminal protrusion laterally.  There is minimal narrowing of the   left foramen.  Facet hypertrophic changes are noted.  Findings are stable       L3-L4: Mild disc bulging is noted diffusely.  Minimal endplate and mild facet   hypertrophic changes are noted.  There is borderline mild proximal foraminal   narrowing on the left.  This is stable.       L4-L5: Mild disc bulging is noted.  A small inferior foraminal protrusion on   the right is noted.  There is a slightly larger broad-based protrusion into   the left foramen.  Bilateral foraminal narrowing is noted.  Findings not   appear grossly changed.  Facet hypertrophic changes are noted       L5-S1: Minimal disc bulging is noted.  Facet hypertrophic changes are noted.       SI joint degenerative changes are noted.           Impression   No significant interval change in the multifocal degenerative disc disease   throughout the lumbar spine.  See above for details of each level       Suggested aortic aneurysm.  Dedicated imaging of the aorta is recommended       RECOMMENDATIONS:   Managing Abdominal Aortic Aneurysms       2.6-2.9 cm: Every 5 years*       3.0-3.4 cm: Every 3 years.       3.5-3.9 cm: Every 1 year.       4.0-4.4 cm: Every 1 year. Recommend vascular consultation.       4.5-5.4 cm: Every 6 months. Recommend vascular consultation.       Greater than or equal to 5.5 cm: Referral to vascular surgeon.       *For abdominal aortas with maximum diameter of 2.6-2.9 cm meeting criteria   for AAA (>50% of proximal normal segment).       Reference:       J Vasc Surg.  2009 Oct;50(4 Suppl):S2-49                     Pill count: appropriate    fill date :6-7-2021    Morphine equivalent dose as reported on [place]ARRS:  142.50 has narcan at home  Periodic Controlled Substance Monitoring: Possible medication side effects, risk of tolerance/dependence & alternative treatments discussed., No signs of potential drug abuse or diversion identified. , Assessed functional status., Obtaining appropriate analgesic effect of treatment. (Jovani Lombardo, VEE - CNP)  Chronic Pain > 80 MEDD: Co-prescribed Naloxone., Obtained or confirmed \"Medication Contract\" on file. VEE Lewis - CNP)  Review of[place]ARRS does not show any aberrant prescription behavior. Medication is helping the patient stay active. Patient denies any side effects and reports adequate analgesia. No sign of misuse/abuse.             Past Medical History:   Diagnosis Date    Anemia     Arthritis     osteoarthritis    Colon polyps 10/08/2019    tubular adenoma x2    Encounter for chronic pain management     Essential hypertension 05/12/2020    Hepatitis B core antibody positive     History of blood transfusion     History of hepatitis C     Hyperlipidemia     Iron (Fe) deficiency anemia     Positive FIT (fecal immunochemical test)     Type 2 diabetes mellitus with microalbuminuria, without long-term current use of insulin ([facility] [address] 07/13/2020    Wears dentures     Wears glasses        Past Surgical History:   Procedure Laterality Date    BLADDER TUMOR EXCISION  04/29/2021    CYSTOSCOPY TUR BLADDER TUMOR, GYRUS, RIGHT STENT PLACEMENT AND RIGHT STENT REMOVAL    CERVICAL SPINE SURGERY  1977    cervical spine three times, has plate    CHOLECYSTECTOMY  03/22/2019    COLONOSCOPY  01/25/2015    10 yr recall, hemorrhoids    COLONOSCOPY N/A 10/08/2019    tubular adenoma x2    CYSTOSCOPY Right 4/29/2021    CYSTOSCOPY TUR BLADDER TUMOR, GYRUS, RIGHT STENT PLACEMENT AND RIGHT STENT REMOVAL performed by Alesha Resendiz MD at [address] [place] [address]  10/2015    all teeth extracted    HERNIA REPAIR N/A 08/07/2020    HERNIA INCISIONAL REPAIR LAPAROSCOPIC ROBOTIC WITH MESH performed by Navya Croft DO at [address]  [address]    UPPER GASTROINTESTINAL ENDOSCOPY  01/25/2015    UPPER GASTROINTESTINAL ENDOSCOPY N/A 10/08/2019    EGD BIOPSY performed by Shai Lee MD at [facility]       Allergies   Allergen Reactions   Climmie Alken [Aspirin]       iron deficiency anemia , requiring iron infusions and transfusions    Iron      Pill- constipation, abdominal pain    Nsaids       iron deficiency anemia, history of bleeding ulcers          Current Outpatient Medications:     pregabalin (LYRICA) 150 MG capsule, Take 1 capsule by mouth 3 times daily for 90 days. , Disp: 90 capsule, Rfl: 2    pravastatin (PRAVACHOL) 40 MG tablet, Take 1 tablet by mouth every evening Stop Gemfibrozil, Disp: 90 tablet, Rfl: 3    metoprolol tartrate (LOPRESSOR) 25 MG tablet, Take 1 tablet by mouth 2 times daily, Disp: 180 tablet, Rfl: 3    oxyCODONE-acetaminophen (PERCOCET) 5-325 MG per tablet, Take 1 tablet by mouth every 8 hours for 30 days. , Disp: 90 tablet, Rfl: 0    morphine (MS CONTIN) 60 MG extended release tablet, Take 1 tablet by mouth 2 times daily for 30 days. , Disp: 60 tablet, Rfl: 0    glipiZIDE (GLUCOTROL) 5 MG tablet, Take 1 tablet by mouth every morning Keep fasting morning blood glucose .   If blood glucose below 80, you need to stop glipizide, Disp: 90 tablet, Rfl: 3    metFORMIN (GLUCOPHAGE) 1000 MG tablet, TAKE 1 TABLET BY MOUTH TWICE DAILY WITH MEALS, Disp: 180 tablet, Rfl: 1    pantoprazole (PROTONIX) 40 MG tablet, Take 1 tablet by mouth daily, Disp: 90 tablet, Rfl: 1    cyanocobalamin 1000 MCG/ML injection, Inject 1 mL into the muscle every 7 days Call for next refill which will be monthly for life (Patient taking differently: Inject 1,000 mcg into the muscle every 30 days Call for next refill which will be monthly for life), Disp: 4 mL, Rfl: 0    lisinopril-hydroCHLOROthiazide (PRINZIDE;ZESTORETIC) 10-12.5 MG per tablet, TK 1 T PO QD, Disp: 90 tablet, Rfl: 3    Probiotic Product (PROBIOTIC-10 PO), Take by mouth daily , Disp: , Rfl:     blood glucose test strips (ASCENSIA AUTODISC VI;ONE TOUCH ULTRA TEST VI) strip, 1 each by In Vitro route daily Testing daily.  needs true Metrix test strips, Disp: 100 strip, Rfl: 3    baclofen (LIORESAL) 10 MG tablet, TAKE ONE TABLET BY MOUTH THREE TIMES A DAY AS NEEDED FOR MUSCLE SPASMS, Disp: 90 tablet, Rfl: 0    TRUEplus Lancets 30G MISC, Test blood glucose twice a day, Disp: 200 each, Rfl: 3    Alcohol Swabs (B-D SINGLE USE SWABS REGULAR) PADS, USE TO CHECK BLOOD SUGAR TWICE A DAY, Disp: 200 each, Rfl: 3    Syringe/Needle, Disp, (SYRINGE 3CC/25GX1\") 25G X 1\" 3 ML MISC, To be used with B12 injections, Disp: 50 each, Rfl: 2    loperamide (RA ANTI-DIARRHEAL) 2 MG capsule, Take 1 capsule by mouth 4 times daily as needed for Diarrhea, Disp: 112 capsule, Rfl: 2    lidocaine (LIDODERM) 5 %, Place 1 patch onto the skin daily 12 hours on, 12 hours off., Disp: 30 patch, Rfl: 3    Blood Glucose Monitoring Suppl (TRUE METRIX METER) w/Device KIT, USE AS DIRECTED TO TEST BLOOD SUGAR, Disp: , Rfl:     Family History   Problem Relation Age of Onset    Diabetes Mother     Heart Disease Father        Social History     Socioeconomic History    Marital status:      Spouse name: Not on file    Number of children: Not on file    Years of education: Not on file    Highest education level: Not on file   Occupational History    Occupation: disabled   Tobacco Use    Smoking status: Former Smoker     Packs/day: 0.50     Years: 45.00     Pack years: 22.50     Types: Cigarettes     Quit date: 2015     Years since quittin.5    Smokeless tobacco: Never Used    Tobacco comment: on chantix 11-6-15   12-7-15   Vaping Use    Vaping Use: Never used   Substance and Sexual Activity    Alcohol use: No    Drug use: No    Sexual activity: Yes     Partners: Female   Other Topics Concern    Not on file   Social History Narrative    Not on file     Social Determinants of Health     Financial Resource Strain:     Difficulty of Paying Living Expenses:    Food Insecurity:     Worried About Running Out of Food in the Last Year:     [address] in the Last Year:    Transportation Needs:     Lack of Transportation (Medical):  Lack of Transportation (Non-Medical):    Physical Activity:     Days of Exercise per Week:     Minutes of Exercise per Session:    Stress:     Feeling of Stress :    Social Connections:     Frequency of Communication with Friends and Family:     Frequency of Social Gatherings with Friends and Family:     Attends [employer] Services:     Active Member of Clubs or Organizations:     Attends Club or Organization Meetings:     Marital Status:    Intimate Partner Violence:     Fear of Current or Ex-Partner:     Emotionally Abused:     Physically Abused:     Sexually Abused:          Review of Systems:  Review of Systems   Constitutional: Negative. HENT: Negative. Eyes:        Glasses   Cardiovascular: Negative. Respiratory: Negative. Endocrine: Negative. Hematologic/Lymphatic: Negative. Skin: Negative. Musculoskeletal: Positive for back pain. Gastrointestinal: Negative. Genitourinary: Negative. Neurological: Positive for dizziness and numbness. Psychiatric/Behavioral: Negative. Physical Exam:  There were no vitals taken for this visit. Physical Exam  HENT:      Head: Normocephalic. Pulmonary:      Effort: Pulmonary effort is normal.   Skin:         Neurological:      Mental Status: He is alert. Psychiatric:         Mood and Affect: Mood normal.         Behavior: Behavior normal.         Thought Content:  Thought content normal.           Assessment:    Problem List Items Addressed This Visit     S/P cervical spinal fusion - Primary (Chronic)    Relevant Medications    oxyCODONE-acetaminophen (PERCOCET) 5-325 MG per tablet    Peripheral polyneuropathy    Osteoarthritis of spine with radiculopathy, lumbar region    Relevant Medications    oxyCODONE-acetaminophen (PERCOCET) 5-325 MG per tablet morphine (MS CONTIN) 60 MG extended release tablet    Osteoarthritis of lumbar spine    Relevant Medications    oxyCODONE-acetaminophen (PERCOCET) 5-325 MG per tablet    morphine (MS CONTIN) 60 MG extended release tablet    Lumbar spondylosis (Chronic)    Relevant Medications    oxyCODONE-acetaminophen (PERCOCET) 5-325 MG per tablet    morphine (MS CONTIN) 60 MG extended release tablet    Lumbar spinal stenosis (Chronic)    Relevant Medications    oxyCODONE-acetaminophen (PERCOCET) 5-325 MG per tablet    Lumbar radiculopathy (Chronic)    Relevant Medications    oxyCODONE-acetaminophen (PERCOCET) 5-325 MG per tablet    morphine (MS CONTIN) 60 MG extended release tablet    Encounter for medication monitoring (Chronic)    Relevant Medications    oxyCODONE-acetaminophen (PERCOCET) 5-325 MG per tablet    Encounter for chronic pain management    Relevant Medications    oxyCODONE-acetaminophen (PERCOCET) 5-325 MG per tablet    Degenerative disc disease, lumbar (Chronic)    Relevant Medications    oxyCODONE-acetaminophen (PERCOCET) 5-325 MG per tablet    morphine (MS CONTIN) 60 MG extended release tablet    Cervical spondylitis (HCC) (Chronic)    Relevant Medications    oxyCODONE-acetaminophen (PERCOCET) 5-325 MG per tablet              Treatment Plan:  DISCUSSION: Treatment options discussed withpatient and all questions answered to patient's satisfaction. Possible side effects, risk of tolerance and or dependence and alternative treatments discussed    Obtaining appropriate analgesic effect of treatment   No signs of potential drug abuse or diversion identified    [x] Ill effects of being on chronic pain medications such as sleep disturbances, respiratory depression, hormonal changes, withdrawal symptoms, chronic opioid dependence and tolerance as well as risk of taking opioids with Benzodiazepines and taking opioids along with alcohol,  werediscussed with patient.  I had asked the patient to minimize medication use

## 2021-06-09 ENCOUNTER — PATIENT MESSAGE (OUTPATIENT)
Dept: FAMILY MEDICINE CLINIC | Age: 71
End: 2021-06-09

## 2021-06-09 DIAGNOSIS — E53.8 VITAMIN B 12 DEFICIENCY: ICD-10-CM

## 2021-06-09 RX ORDER — CYANOCOBALAMIN 1000 UG/ML
1000 INJECTION INTRAMUSCULAR; SUBCUTANEOUS
Qty: 3 ML | Refills: 3 | Status: SHIPPED | OUTPATIENT
Start: 2021-06-09 | End: 2022-05-16

## 2021-06-09 NOTE — TELEPHONE ENCOUNTER
From: Pernell Bernal  To: Leora Stevens MD  Sent: 6/9/2021 12:10 PM EDT  Subject: Prescription Question    Can you please call in [facility] B~12 injection medicine to [address]   Thank you, Kristopher Chery for   [employer]

## 2021-06-17 ENCOUNTER — PATIENT MESSAGE (OUTPATIENT)
Dept: FAMILY MEDICINE CLINIC | Age: 71
End: 2021-06-17

## 2021-06-17 DIAGNOSIS — E11.65 TYPE 2 DIABETES MELLITUS WITH HYPERGLYCEMIA, WITHOUT LONG-TERM CURRENT USE OF INSULIN (HCC): ICD-10-CM

## 2021-06-18 NOTE — TELEPHONE ENCOUNTER
From: Homar Hawkins  To: America Low MD  Sent: 6/17/2021 6:48 PM EDT  Subject: Prescription Question    Can you please call a refill in for [facility] Metformin 1,000mg for 3 months supply at a time.    [address] [place] [address]  Thank you,  Roney Gusman for [facility]

## 2021-07-06 ENCOUNTER — HOSPITAL ENCOUNTER (OUTPATIENT)
Dept: PAIN MANAGEMENT | Age: 71
Discharge: HOME | End: 2021-07-06
Payer: MEDICARE

## 2021-07-06 DIAGNOSIS — M47.816 OSTEOARTHRITIS OF LUMBAR SPINE, UNSPECIFIED SPINAL OSTEOARTHRITIS COMPLICATION STATUS: ICD-10-CM

## 2021-07-06 DIAGNOSIS — M47.26 OSTEOARTHRITIS OF SPINE WITH RADICULOPATHY, LUMBAR REGION: ICD-10-CM

## 2021-07-06 DIAGNOSIS — Z51.81 ENCOUNTER FOR MEDICATION MONITORING: Chronic | ICD-10-CM

## 2021-07-06 DIAGNOSIS — Z98.1 S/P CERVICAL SPINAL FUSION: Chronic | ICD-10-CM

## 2021-07-06 DIAGNOSIS — M47.816 LUMBAR SPONDYLOSIS: Chronic | ICD-10-CM

## 2021-07-06 DIAGNOSIS — G89.29 ENCOUNTER FOR CHRONIC PAIN MANAGEMENT: ICD-10-CM

## 2021-07-06 DIAGNOSIS — M46.92 CERVICAL SPONDYLITIS (HCC): Chronic | ICD-10-CM

## 2021-07-06 DIAGNOSIS — M54.16 LUMBAR RADICULOPATHY: Chronic | ICD-10-CM

## 2021-07-06 DIAGNOSIS — M51.36 DEGENERATIVE DISC DISEASE, LUMBAR: Chronic | ICD-10-CM

## 2021-07-06 DIAGNOSIS — M48.061 SPINAL STENOSIS OF LUMBAR REGION WITHOUT NEUROGENIC CLAUDICATION: Chronic | ICD-10-CM

## 2021-07-06 PROCEDURE — 99213 OFFICE O/P EST LOW 20 MIN: CPT | Performed by: NURSE PRACTITIONER

## 2021-07-06 PROCEDURE — 99213 OFFICE O/P EST LOW 20 MIN: CPT

## 2021-07-06 RX ORDER — OXYCODONE HYDROCHLORIDE AND ACETAMINOPHEN 5; 325 MG/1; MG/1
1 TABLET ORAL EVERY 8 HOURS
Qty: 90 TABLET | Refills: 0 | Status: SHIPPED | OUTPATIENT
Start: 2021-07-07 | End: 2021-08-03 | Stop reason: SDUPTHER

## 2021-07-06 RX ORDER — MORPHINE SULFATE 60 MG/1
60 TABLET, FILM COATED, EXTENDED RELEASE ORAL 2 TIMES DAILY
Qty: 60 TABLET | Refills: 0 | Status: SHIPPED | OUTPATIENT
Start: 2021-07-07 | End: 2021-08-03 | Stop reason: SDUPTHER

## 2021-07-06 ASSESSMENT — ENCOUNTER SYMPTOMS
BACK PAIN: 1
SHORTNESS OF BREATH: 0
COUGH: 0
CONSTIPATION: 0

## 2021-07-06 NOTE — PROGRESS NOTES
Patient completed a video visit today to review medication contract. Chief Complaint: back pain    PMH  Pt c/o low back pain for many years. There is no known injury or surgery to the area. He does have a history of scoliosis. His last PT was over 4 years ago with no benefit and he is not interested in repeating. He does do home stretches every morning. He also had a LESI in 2013 that did not help. He has non-invasive bladder cancer and had resection of a tumor in April. He continues to follow with oncology and hematology - he continues Injectafer infusions for anemia. Back Pain  This is a chronic problem. The current episode started more than 1 year ago. The problem occurs constantly. The problem is unchanged. The pain is present in the lumbar spine. The quality of the pain is described as aching. Radiates to: down both legs to the feet. The pain is at a severity of 4/10. The symptoms are aggravated by position and standing (walking). Associated symptoms include numbness and tingling. Pertinent negatives include no chest pain, fever or paresthesias. He has tried analgesics and bed rest for the symptoms. The treatment provided mild relief. Patient denies any new neurological symptoms. No bowel or bladder incontinence, no weakness, and no falling. Pill count: appropriate due 7/7    Morphine equivalent: 142.5    Periodic Controlled Substance Monitoring: Possible medication side effects, risk of tolerance/dependence & alternative treatments discussed., No signs of potential drug abuse or diversion identified., Obtaining appropriate analgesic effect of treatment.  Meryle Littler, VEE - CNP)      Past Medical History:   Diagnosis Date    Anemia     Arthritis     osteoarthritis    Colon polyps 10/08/2019    tubular adenoma x2    Encounter for chronic pain management     Essential hypertension 05/12/2020    Hepatitis B core antibody positive     History of blood transfusion     History of hepatitis C     Hyperlipidemia     Iron (Fe) deficiency anemia     Positive FIT (fecal immunochemical test)     Type 2 diabetes mellitus with microalbuminuria, without long-term current use of insulin ([facility] [address] 07/13/2020    Wears dentures     Wears glasses        Past Surgical History:   Procedure Laterality Date    BLADDER TUMOR EXCISION  04/29/2021    CYSTOSCOPY TUR BLADDER TUMOR, GYRUS, RIGHT STENT PLACEMENT AND RIGHT STENT REMOVAL    CERVICAL SPINE SURGERY  1977    cervical spine three times, has plate    CHOLECYSTECTOMY  03/22/2019    COLONOSCOPY  01/25/2015    10 yr recall, hemorrhoids    COLONOSCOPY N/A 10/08/2019    tubular adenoma x2    CYSTOSCOPY Right 4/29/2021    CYSTOSCOPY TUR BLADDER TUMOR, GYRUS, RIGHT STENT PLACEMENT AND RIGHT STENT REMOVAL performed by Rosendo Gutiérrez MD at [address] [place] [address]  10/2015    all teeth extracted    HERNIA REPAIR N/A 08/07/2020    HERNIA INCISIONAL REPAIR LAPAROSCOPIC ROBOTIC WITH MESH performed by Shelley Kaba DO at [facility] Right    [address]  [address] [place] [address]    UPPER GASTROINTESTINAL ENDOSCOPY  01/25/2015    UPPER GASTROINTESTINAL ENDOSCOPY N/A 10/08/2019    EGD BIOPSY performed by Jannette Knowles MD at [facility]       Allergies   Allergen Reactions   Hurshel Angry [Aspirin]       iron deficiency anemia , requiring iron infusions and transfusions    Iron      Pill- constipation, abdominal pain    Nsaids       iron deficiency anemia, history of bleeding ulcers          Current Outpatient Medications:     metFORMIN (GLUCOPHAGE) 1000 MG tablet, TAKE 1 TABLET BY MOUTH TWICE DAILY WITH MEALS, Disp: 180 tablet, Rfl: 1    cyanocobalamin 1000 MCG/ML injection, Inject 1 mL into the muscle every 30 days Call for next refill which will be monthly for life, Disp: 3 mL, Rfl: 3    oxyCODONE-acetaminophen (PERCOCET) 5-325 MG per tablet, Take 1 tablet by mouth every 8 hours for 30 days. , Disp: 90 tablet, Rfl: 0   morphine (MS CONTIN) 60 MG extended release tablet, Take 1 tablet by mouth 2 times daily for 30 days. , Disp: 60 tablet, Rfl: 0    blood glucose test strips (ASCENSIA AUTODISC VI;ONE TOUCH ULTRA TEST VI) strip, 1 each by In Vitro route daily Testing daily. needs true Metrix test strips, Disp: 100 strip, Rfl: 3    pregabalin (LYRICA) 150 MG capsule, Take 1 capsule by mouth 3 times daily for 90 days. , Disp: 90 capsule, Rfl: 2    pravastatin (PRAVACHOL) 40 MG tablet, Take 1 tablet by mouth every evening Stop Gemfibrozil, Disp: 90 tablet, Rfl: 3    metoprolol tartrate (LOPRESSOR) 25 MG tablet, Take 1 tablet by mouth 2 times daily, Disp: 180 tablet, Rfl: 3    baclofen (LIORESAL) 10 MG tablet, TAKE ONE TABLET BY MOUTH THREE TIMES A DAY AS NEEDED FOR MUSCLE SPASMS, Disp: 90 tablet, Rfl: 0    TRUEplus Lancets 30G MISC, Test blood glucose twice a day, Disp: 200 each, Rfl: 3    Alcohol Swabs (B-D SINGLE USE SWABS REGULAR) PADS, USE TO CHECK BLOOD SUGAR TWICE A DAY, Disp: 200 each, Rfl: 3    glipiZIDE (GLUCOTROL) 5 MG tablet, Take 1 tablet by mouth every morning Keep fasting morning blood glucose .   If blood glucose below 80, you need to stop glipizide, Disp: 90 tablet, Rfl: 3    pantoprazole (PROTONIX) 40 MG tablet, Take 1 tablet by mouth daily, Disp: 90 tablet, Rfl: 1    Syringe/Needle, Disp, (SYRINGE 3CC/25GX1\") 25G X 1\" 3 ML MISC, To be used with B12 injections, Disp: 50 each, Rfl: 2    loperamide (RA ANTI-DIARRHEAL) 2 MG capsule, Take 1 capsule by mouth 4 times daily as needed for Diarrhea, Disp: 112 capsule, Rfl: 2    lidocaine (LIDODERM) 5 %, Place 1 patch onto the skin daily 12 hours on, 12 hours off., Disp: 30 patch, Rfl: 3    lisinopril-hydroCHLOROthiazide (PRINZIDE;ZESTORETIC) 10-12.5 MG per tablet, TK 1 T PO QD, Disp: 90 tablet, Rfl: 3    Probiotic Product (PROBIOTIC-10 PO), Take by mouth daily , Disp: , Rfl:     Blood Glucose Monitoring Suppl (TRUE METRIX METER) w/Device KIT, USE AS DIRECTED TO TEST BLOOD SUGAR, Disp: , Rfl:     Family History   Problem Relation Age of Onset    Diabetes Mother     Heart Disease Father        Social History     Socioeconomic History    Marital status:      Spouse name: Not on file    Number of children: Not on file    Years of education: Not on file    Highest education level: Not on file   Occupational History    Occupation: disabled   Tobacco Use    Smoking status: Former Smoker     Packs/day: 0.50     Years: 45.00     Pack years: 22.50     Types: Cigarettes     Quit date: 2015     Years since quittin.5    Smokeless tobacco: Never Used    Tobacco comment: on chantix 11-6-15   12-7-15   Vaping Use    Vaping Use: Never used   Substance and Sexual Activity    Alcohol use: No    Drug use: No    Sexual activity: Yes     Partners: Female   Other Topics Concern    Not on file   Social History Narrative    Not on file     Social Determinants of Health     Financial Resource Strain:     Difficulty of Paying Living Expenses:    Food Insecurity:     Worried About Running Out of Food in the Last Year:     Ran Out of Food in the Last Year:    Transportation Needs:     Lack of Transportation (Medical):  Lack of Transportation (Non-Medical):    Physical Activity:     Days of Exercise per Week:     Minutes of Exercise per Session:    Stress:     Feeling of Stress :    Social Connections:     Frequency of Communication with Friends and Family:     Frequency of Social Gatherings with Friends and Family:     Attends [employer] Services:     Active Member of Clubs or Organizations:     Attends Club or Organization Meetings:     Marital Status:    Intimate Partner Violence:     Fear of Current or Ex-Partner:     Emotionally Abused:     Physically Abused:     Sexually Abused:        Review of Systems:  Review of Systems   Constitutional: Negative for chills and fever. Cardiovascular: Negative for chest pain and palpitations.    Respiratory: Negative for cough and shortness of breath. Musculoskeletal: Positive for back pain. Gastrointestinal: Negative for constipation. Neurological: Positive for numbness and tingling. Negative for disturbances in coordination, loss of balance and paresthesias. Physical Exam:  There were no vitals taken for this visit. Physical Exam  HENT:      Head: Normocephalic. Pulmonary:      Effort: Pulmonary effort is normal.   Neurological:      Mental Status: He is alert.    Psychiatric:         Mood and Affect: Mood normal.         Behavior: Behavior normal.         Record/Diagnostics Review:    Last zoe 4/2021 and was appropriate    ABERRANT BEHAVIORS SINCE LAST VISIT  Lost rx/pills:------------------------------------------ no  Taking  medication as prescribed: ----------- yes  Urine Drug Screen ---------------------------------  yes             Date------------------------------------------------- 4/2021              Results as expected ---------------------yes     Recent ER visits: -------------------------------------no  Pill count is appropriate: ---------------------------yes   Refills for prescriptions appropriate:---------- yes     Assessment:  Problem List Items Addressed This Visit     Degenerative disc disease, lumbar (Chronic)    Relevant Medications    oxyCODONE-acetaminophen (PERCOCET) 5-325 MG per tablet (Start on 7/7/2021)    morphine (MS CONTIN) 60 MG extended release tablet (Start on 7/7/2021)    Lumbar spondylosis (Chronic)    Relevant Medications    oxyCODONE-acetaminophen (PERCOCET) 5-325 MG per tablet (Start on 7/7/2021)    morphine (MS CONTIN) 60 MG extended release tablet (Start on 7/7/2021)    Lumbar radiculopathy (Chronic)    Relevant Medications    oxyCODONE-acetaminophen (PERCOCET) 5-325 MG per tablet (Start on 7/7/2021)    morphine (MS CONTIN) 60 MG extended release tablet (Start on 7/7/2021)    Lumbar spinal stenosis (Chronic)    Relevant Medications    oxyCODONE-acetaminophen (PERCOCET) 5-325 MG per tablet (Start on 7/7/2021)    Encounter for medication monitoring (Chronic)    Relevant Medications    oxyCODONE-acetaminophen (PERCOCET) 5-325 MG per tablet (Start on 7/7/2021)    Cervical spondylitis (HCC) (Chronic)    Relevant Medications    oxyCODONE-acetaminophen (PERCOCET) 5-325 MG per tablet (Start on 7/7/2021)    S/P cervical spinal fusion (Chronic)    Relevant Medications    oxyCODONE-acetaminophen (PERCOCET) 5-325 MG per tablet (Start on 7/7/2021)    Osteoarthritis of spine with radiculopathy, lumbar region    Relevant Medications    oxyCODONE-acetaminophen (PERCOCET) 5-325 MG per tablet (Start on 7/7/2021)    morphine (MS CONTIN) 60 MG extended release tablet (Start on 7/7/2021)    Encounter for chronic pain management    Relevant Medications    oxyCODONE-acetaminophen (PERCOCET) 5-325 MG per tablet (Start on 7/7/2021)    Osteoarthritis of lumbar spine    Relevant Medications    oxyCODONE-acetaminophen (PERCOCET) 5-325 MG per tablet (Start on 7/7/2021)    morphine (MS CONTIN) 60 MG extended release tablet (Start on 7/7/2021)             Treatment Plan:  Patient relates current medications are helping the pain. Patient reports taking pain medications as prescribed, denies obtaining medications from different sources and denies use of illegal drugs. Patient denies side effects from medications like nausea, vomiting, constipation or drowsiness. Patient reports current activities of daily living are possible due to medications and would like to continue them. As always, we encourage daily stretching and strengthening exercises, and recommend minimizing use of pain medications unless patient cannot get through daily activities due to pain. Contract requirements met. Continue opioid therapy. Script written for percocet and MSER  Follow up appointment made for 4 weeks    Hardy Gallo, was evaluated through a synchronous (real-time) audio-video encounter.  The patient (or guardian if applicable) is aware that this is a billable service. Verbal consent to proceed has been obtained within the past 12 months. The visit was conducted pursuant to the emergency declaration under the [address] [place] [address] and the [employer] and [employer] General Act. Patient identification was verified, and a caregiver was present when appropriate. The patient was located in a state where the provider was credentialed to provide care. Total time spent for this encounter: Not billed by time    --VEE Blankenship CNP on 7/6/2021 at 9:09 AM    An electronic signature was used to authenticate this note.

## 2021-07-09 ENCOUNTER — TELEPHONE (OUTPATIENT)
Dept: ONCOLOGY | Age: 71
End: 2021-07-09

## 2021-07-09 NOTE — TELEPHONE ENCOUNTER
Left message for patient to remind of lab work needed prior to upcoming appointment. Advised that if the labs are done a at [facility] the orders are in the chart, or if the labs were already done or orders needed faxed to the lab of their choice to call our office.

## 2021-07-10 ENCOUNTER — HOSPITAL ENCOUNTER (OUTPATIENT)
Age: 71
Discharge: HOME | End: 2021-07-10
Payer: MEDICARE

## 2021-07-10 DIAGNOSIS — D50.0 IRON DEFICIENCY ANEMIA DUE TO CHRONIC BLOOD LOSS: ICD-10-CM

## 2021-07-10 DIAGNOSIS — Z11.59 ENCOUNTER FOR SCREENING FOR OTHER VIRAL DISEASES: ICD-10-CM

## 2021-07-10 LAB
ABSOLUTE EOS #: 0.13 K/UL (ref 0–0.4)
ABSOLUTE IMMATURE GRANULOCYTE: ABNORMAL K/UL (ref 0–0.3)
ABSOLUTE LYMPH #: 1.32 K/UL (ref 1–4.8)
ABSOLUTE MONO #: 0.44 K/UL (ref 0.1–1.3)
ANION GAP SERPL CALCULATED.3IONS-SCNC: 9 MMOL/L (ref 9–17)
BASOPHILS # BLD: 1 % (ref 0–2)
BASOPHILS ABSOLUTE: 0.06 K/UL (ref 0–0.2)
BUN BLDV-MCNC: 17 MG/DL (ref 8–23)
BUN/CREAT BLD: ABNORMAL (ref 9–20)
CALCIUM SERPL-MCNC: 8.8 MG/DL (ref 8.6–10.4)
CHLORIDE BLD-SCNC: 105 MMOL/L (ref 98–107)
CO2: 26 MMOL/L (ref 20–31)
CREAT SERPL-MCNC: 0.71 MG/DL (ref 0.7–1.2)
DIFFERENTIAL TYPE: ABNORMAL
EOSINOPHILS RELATIVE PERCENT: 2 % (ref 0–4)
FERRITIN: 24 UG/L (ref 30–400)
FOLATE: 15.3 NG/ML
GFR AFRICAN AMERICAN: >60 ML/MIN
GFR NON-AFRICAN AMERICAN: >60 ML/MIN
GFR SERPL CREATININE-BSD FRML MDRD: ABNORMAL ML/MIN/{1.73_M2}
GFR SERPL CREATININE-BSD FRML MDRD: ABNORMAL ML/MIN/{1.73_M2}
GLUCOSE BLD-MCNC: 129 MG/DL (ref 70–99)
HCT VFR BLD CALC: 35.5 % (ref 41–53)
HEMOGLOBIN: 11.8 G/DL (ref 13.5–17.5)
IMMATURE GRANULOCYTES: ABNORMAL %
IRON SATURATION: 16 % (ref 20–55)
IRON: 54 UG/DL (ref 59–158)
LYMPHOCYTES # BLD: 21 % (ref 24–44)
MCH RBC QN AUTO: 27.4 PG (ref 26–34)
MCHC RBC AUTO-ENTMCNC: 33.2 G/DL (ref 31–37)
MCV RBC AUTO: 82.7 FL (ref 80–100)
MONOCYTES # BLD: 7 % (ref 1–7)
MORPHOLOGY: ABNORMAL
NRBC AUTOMATED: ABNORMAL PER 100 WBC
PDW BLD-RTO: 23.1 % (ref 11.5–14.9)
PLATELET # BLD: 229 K/UL (ref 150–450)
PLATELET ESTIMATE: ABNORMAL
PMV BLD AUTO: 6.8 FL (ref 6–12)
POTASSIUM SERPL-SCNC: 4.5 MMOL/L (ref 3.7–5.3)
RBC # BLD: 4.29 M/UL (ref 4.5–5.9)
RBC # BLD: ABNORMAL 10*6/UL
SEG NEUTROPHILS: 69 % (ref 36–66)
SEGMENTED NEUTROPHILS ABSOLUTE COUNT: 4.35 K/UL (ref 1.3–9.1)
SODIUM BLD-SCNC: 140 MMOL/L (ref 135–144)
TOTAL IRON BINDING CAPACITY: 340 UG/DL (ref 250–450)
UNSATURATED IRON BINDING CAPACITY: 286 UG/DL (ref 112–347)
VITAMIN B-12: 589 PG/ML (ref 232–1245)
WBC # BLD: 6.3 K/UL (ref 3.5–11)
WBC # BLD: ABNORMAL 10*3/UL

## 2021-07-10 PROCEDURE — 83550 IRON BINDING TEST: CPT

## 2021-07-10 PROCEDURE — 36415 COLL VENOUS BLD VENIPUNCTURE: CPT

## 2021-07-10 PROCEDURE — 80048 BASIC METABOLIC PNL TOTAL CA: CPT

## 2021-07-10 PROCEDURE — 85025 COMPLETE CBC W/AUTO DIFF WBC: CPT

## 2021-07-10 PROCEDURE — 83540 ASSAY OF IRON: CPT

## 2021-07-10 PROCEDURE — 82607 VITAMIN B-12: CPT

## 2021-07-10 PROCEDURE — 82746 ASSAY OF FOLIC ACID SERUM: CPT

## 2021-07-10 PROCEDURE — 82728 ASSAY OF FERRITIN: CPT

## 2021-07-16 ENCOUNTER — TELEPHONE (OUTPATIENT)
Dept: ONCOLOGY | Age: 71
End: 2021-07-16

## 2021-07-25 NOTE — RESULT ENCOUNTER NOTE
ABNORMAL. Please notify patient. Low iron  Patient needs to be scheduled the appointment with hematologist oncologist which he missed  I will suggest to start iron p.o., I will send the prescription to the pharmacy.   He is up-to-date with colonoscopy and EGD done in 2019 and cystoscopy done in 2021      Future Appointments  8/3/2021   10:20 AM   [address] [place] [address], * [id number] [facility]  8/9/2021   9:15 AM    Renetta Aguirre MD         .  URO           [facility]  9/24/2021  11:30 AM   Desiree Ramon MD     [facility]

## 2021-07-26 DIAGNOSIS — R51.9 WORSENING HEADACHES: ICD-10-CM

## 2021-07-26 RX ORDER — BACLOFEN 10 MG/1
TABLET ORAL
Qty: 90 TABLET | Refills: 0 | Status: SHIPPED | OUTPATIENT
Start: 2021-07-26 | End: 2021-08-27 | Stop reason: SDUPTHER

## 2021-07-26 NOTE — TELEPHONE ENCOUNTER
Please Approve or Refuse.   Send to Pharmacy per Pt's Request:     Next Visit Date:  9/24/2021   Last Visit Date: 12/29/2020    Hemoglobin A1C (%)   Date Value   01/11/2021 5.9   07/13/2020 6.2 (H)   03/25/2020 8.8 (H)             ( goal A1C is < 7)   BP Readings from Last 3 Encounters:   05/28/21 101/65   05/21/21 125/66   05/14/21 119/71          (goal 120/80)  BUN   Date Value Ref Range Status   07/10/2021 17 8 - 23 mg/dL Final     CREATININE   Date Value Ref Range Status   07/10/2021 0.71 0.70 - 1.20 mg/dL Final     Potassium   Date Value Ref Range Status   07/10/2021 4.5 3.7 - 5.3 mmol/L Final

## 2021-08-03 ENCOUNTER — HOSPITAL ENCOUNTER (OUTPATIENT)
Dept: PAIN MANAGEMENT | Age: 71
Discharge: HOME | End: 2021-08-03
Payer: MEDICARE

## 2021-08-03 DIAGNOSIS — M46.92 CERVICAL SPONDYLITIS (HCC): Chronic | ICD-10-CM

## 2021-08-03 DIAGNOSIS — M51.36 DEGENERATIVE DISC DISEASE, LUMBAR: Chronic | ICD-10-CM

## 2021-08-03 DIAGNOSIS — M47.816 OSTEOARTHRITIS OF LUMBAR SPINE, UNSPECIFIED SPINAL OSTEOARTHRITIS COMPLICATION STATUS: ICD-10-CM

## 2021-08-03 DIAGNOSIS — M48.061 SPINAL STENOSIS OF LUMBAR REGION WITHOUT NEUROGENIC CLAUDICATION: Chronic | ICD-10-CM

## 2021-08-03 DIAGNOSIS — Z51.81 ENCOUNTER FOR MEDICATION MONITORING: Chronic | ICD-10-CM

## 2021-08-03 DIAGNOSIS — G89.29 ENCOUNTER FOR CHRONIC PAIN MANAGEMENT: ICD-10-CM

## 2021-08-03 DIAGNOSIS — M54.16 LUMBAR RADICULOPATHY: Chronic | ICD-10-CM

## 2021-08-03 DIAGNOSIS — M47.816 LUMBAR SPONDYLOSIS: Chronic | ICD-10-CM

## 2021-08-03 DIAGNOSIS — Z98.1 S/P CERVICAL SPINAL FUSION: Chronic | ICD-10-CM

## 2021-08-03 DIAGNOSIS — M47.26 OSTEOARTHRITIS OF SPINE WITH RADICULOPATHY, LUMBAR REGION: ICD-10-CM

## 2021-08-03 PROCEDURE — 99213 OFFICE O/P EST LOW 20 MIN: CPT

## 2021-08-03 PROCEDURE — 99212 OFFICE O/P EST SF 10 MIN: CPT | Performed by: NURSE PRACTITIONER

## 2021-08-03 RX ORDER — PREGABALIN 150 MG/1
150 CAPSULE ORAL 3 TIMES DAILY
Qty: 90 CAPSULE | Refills: 2 | Status: SHIPPED | OUTPATIENT
Start: 2021-08-03 | End: 2021-11-03 | Stop reason: SDUPTHER

## 2021-08-03 RX ORDER — OXYCODONE HYDROCHLORIDE AND ACETAMINOPHEN 5; 325 MG/1; MG/1
1 TABLET ORAL EVERY 8 HOURS
Qty: 90 TABLET | Refills: 0 | Status: SHIPPED | OUTPATIENT
Start: 2021-08-06 | End: 2021-09-03 | Stop reason: SDUPTHER

## 2021-08-03 RX ORDER — MORPHINE SULFATE 60 MG/1
60 TABLET, FILM COATED, EXTENDED RELEASE ORAL 2 TIMES DAILY
Qty: 60 TABLET | Refills: 0 | Status: SHIPPED | OUTPATIENT
Start: 2021-08-06 | End: 2021-09-03 | Stop reason: SDUPTHER

## 2021-08-03 ASSESSMENT — ENCOUNTER SYMPTOMS
CONSTIPATION: 0
SHORTNESS OF BREATH: 0
BACK PAIN: 1
COUGH: 0

## 2021-08-03 NOTE — PROGRESS NOTES
Patient completed a video visit today to review medication contract. Chief Complaint: back pain    PMH  Pt c/o low back pain for many years. There is no known injury or surgery to the area. He does have a history of scoliosis. His last PT was over 4 years ago with no benefit and he is not interested in repeating. He does do home stretches every morning. He also had a LESI in 2013 that did not help. He has non-invasive bladder cancer and had resection of a tumor in April. He continues to follow with oncology and hematology - he continues Injectafer infusions for anemia. Back Pain  This is a chronic problem. The current episode started more than 1 year ago. The problem occurs constantly. The problem is unchanged. The pain is present in the lumbar spine. The quality of the pain is described as stabbing. Radiates to: into buttocks. The pain is at a severity of 3/10. The pain is mild. The symptoms are aggravated by position and standing (walking). Associated symptoms include numbness and tingling. Pertinent negatives include no chest pain or fever. He has tried analgesics and bed rest for the symptoms. The treatment provided mild relief. Patient denies any new neurological symptoms. No bowel or bladder incontinence, no weakness, and no falling. Pill count: appropriate due 8/6    Morphine equivalent: 142.5    Periodic Controlled Substance Monitoring: Possible medication side effects, risk of tolerance/dependence & alternative treatments discussed., No signs of potential drug abuse or diversion identified., Obtaining appropriate analgesic effect of treatment.  Miah Garza, APRN - CNP)      Past Medical History:   Diagnosis Date    Anemia     Arthritis     osteoarthritis    Colon polyps 10/08/2019    tubular adenoma x2    Encounter for chronic pain management     Essential hypertension 05/12/2020    Hepatitis B core antibody positive     History of blood transfusion     History of hepatitis C     Hyperlipidemia     Iron (Fe) deficiency anemia     Positive FIT (fecal immunochemical test)     Type 2 diabetes mellitus with microalbuminuria, without long-term current use of insulin ([facility] [address] 07/13/2020    Wears dentures     Wears glasses        Past Surgical History:   Procedure Laterality Date    BLADDER TUMOR EXCISION  04/29/2021    CYSTOSCOPY TUR BLADDER TUMOR, GYRUS, RIGHT STENT PLACEMENT AND RIGHT STENT REMOVAL    CERVICAL SPINE SURGERY  1977    cervical spine three times, has plate    CHOLECYSTECTOMY  03/22/2019    COLONOSCOPY  01/25/2015    10 yr recall, hemorrhoids    COLONOSCOPY N/A 10/08/2019    tubular adenoma x2    CYSTOSCOPY Right 4/29/2021    CYSTOSCOPY TUR BLADDER TUMOR, GYRUS, RIGHT STENT PLACEMENT AND RIGHT STENT REMOVAL performed by Iman Schofield MD at [address]  10/2015    all teeth extracted    HERNIA REPAIR N/A 08/07/2020    HERNIA INCISIONAL REPAIR LAPAROSCOPIC ROBOTIC WITH MESH performed by Arun Duval DO at [facility] Right    [address]  [address] [place] [address]    UPPER GASTROINTESTINAL ENDOSCOPY  01/25/2015    UPPER GASTROINTESTINAL ENDOSCOPY N/A 10/08/2019    EGD BIOPSY performed by Adela Cunha MD at [address] [facility] ENDO       Allergies   Allergen Reactions   Dunacn Delude [Aspirin]       iron deficiency anemia , requiring iron infusions and transfusions    Iron      Pill- constipation, abdominal pain    Nsaids       iron deficiency anemia, history of bleeding ulcers          Current Outpatient Medications:     baclofen (LIORESAL) 10 MG tablet, TAKE ONE TABLET BY MOUTH THREE TIMES A DAY AS NEEDED FOR MUSCLE SPASMS, Disp: 90 tablet, Rfl: 0    oxyCODONE-acetaminophen (PERCOCET) 5-325 MG per tablet, Take 1 tablet by mouth every 8 hours for 30 days. , Disp: 90 tablet, Rfl: 0    morphine (MS CONTIN) 60 MG extended release tablet, Take 1 tablet by mouth 2 times daily for 30 days. , Disp: 60 tablet, Rfl: 0    metFORMIN TEST BLOOD SUGAR, Disp: , Rfl:     Family History   Problem Relation Age of Onset    Diabetes Mother     Heart Disease Father        Social History     Socioeconomic History    Marital status:      Spouse name: Not on file    Number of children: Not on file    Years of education: Not on file    Highest education level: Not on file   Occupational History    Occupation: disabled   Tobacco Use    Smoking status: Former Smoker     Packs/day: 0.50     Years: 45.00     Pack years: 22.50     Types: Cigarettes     Quit date: 2015     Years since quittin.6    Smokeless tobacco: Never Used    Tobacco comment: on chantix 11-6-15   12-7-15   Vaping Use    Vaping Use: Never used   Substance and Sexual Activity    Alcohol use: No    Drug use: No    Sexual activity: Yes     Partners: Female   Other Topics Concern    Not on file   Social History Narrative    Not on file     Social Determinants of Health     Financial Resource Strain:     Difficulty of Paying Living Expenses:    Food Insecurity:     Worried About Running Out of Food in the Last Year:     Ran Out of Food in the Last Year:    Transportation Needs:     Lack of Transportation (Medical):  Lack of Transportation (Non-Medical):    Physical Activity:     Days of Exercise per Week:     Minutes of Exercise per Session:    Stress:     Feeling of Stress :    Social Connections:     Frequency of Communication with Friends and Family:     Frequency of Social Gatherings with Friends and Family:     Attends Baptism Services:     Active Member of Clubs or Organizations:     Attends Club or Organization Meetings:     Marital Status:    Intimate Partner Violence:     Fear of Current or Ex-Partner:     Emotionally Abused:     Physically Abused:     Sexually Abused:        Review of Systems:  Review of Systems   Constitutional: Negative for chills and fever. Cardiovascular: Negative for chest pain and palpitations.    Respiratory: pain.     Contract requirements met. Continue opioid therapy. Script written for MSER and percocet  Follow up appointment made for 4 weeks    Mercedes Burgess, was evaluated through a synchronous (real-time) audio-video encounter. The patient (or guardian if applicable) is aware that this is a billable service. Verbal consent to proceed has been obtained within the past 12 months. The visit was conducted pursuant to the emergency declaration under the [address] [place] [address] and the [employer] and [employer] General Act. Patient identification was verified, and a caregiver was present when appropriate. The patient was located in a state where the provider was credentialed to provide care. Total time spent for this encounter: Not billed by time    --Winnie Spurling, APRN - CNP on 8/3/2021 at 10:43 AM    An electronic signature was used to authenticate this note.

## 2021-08-06 ENCOUNTER — TELEPHONE (OUTPATIENT)
Dept: PAIN MANAGEMENT | Age: 71
End: 2021-08-06

## 2021-08-06 NOTE — TELEPHONE ENCOUNTER
Patient's wife called and states patient is in need of a PA for his MS Contin 60mgm. Patient saw Riccardo Reyes CNP and will forward to her.

## 2021-08-06 NOTE — TELEPHONE ENCOUNTER
Fazal Hussein from [address] [place] [address]. left a message stating they received a PA for the patient's Morphine, but they need additional information in order to the complete the PA. They asked if the prescribed could call them back at [phone] and refer to reference #[id number].

## 2021-08-09 ENCOUNTER — PROCEDURE VISIT (OUTPATIENT)
Dept: UROLOGY | Age: 71
End: 2021-08-09
Payer: MEDICARE

## 2021-08-09 VITALS
TEMPERATURE: 97.5 F | HEART RATE: 82 BPM | BODY MASS INDEX: 31.55 KG/M2 | DIASTOLIC BLOOD PRESSURE: 71 MMHG | WEIGHT: 213 LBS | SYSTOLIC BLOOD PRESSURE: 119 MMHG | HEIGHT: 69 IN

## 2021-08-09 DIAGNOSIS — C67.2 MALIGNANT NEOPLASM OF LATERAL WALL OF URINARY BLADDER (HCC): Primary | ICD-10-CM

## 2021-08-09 PROCEDURE — 52000 CYSTOURETHROSCOPY: CPT | Performed by: UROLOGY

## 2021-08-09 NOTE — PROGRESS NOTES
Cystoscopy Operative Note (8/9/21)  Surgeon: Alma Engel MD  Anesthesia: Urethral 2% Xylocaine   Indications: Bladder Cancer   Position: Dorsal Lithotomy    Findings:   The patient was prepped and draped in the usual sterile fashion. The flexible cystoscope was advanced through the urethra and into the bladder. The bladder was thoroughly inspected and the following was noted:      Urethra: normal appearing urethra with no masses, tenderness or lesions  Prostate: partially obstructing lateral lobes of prostate   Bladder: Multiple subcentimeter tumors, 1 at the dome and 1 anteriorly. Also suspicious lesion adjacent to right ureteral orifice. Ureters: Clear efflux from both ureters. Orifices with normal configuration and location. The cystoscope was removed. The patient tolerated the procedure well. Plan: TURBT at [facility].

## 2021-08-11 ENCOUNTER — VIRTUAL VISIT (OUTPATIENT)
Dept: ONCOLOGY | Age: 71
End: 2021-08-11
Payer: MEDICARE

## 2021-08-11 ENCOUNTER — TELEPHONE (OUTPATIENT)
Dept: ONCOLOGY | Age: 71
End: 2021-08-11

## 2021-08-11 DIAGNOSIS — D50.8 IRON DEFICIENCY ANEMIA SECONDARY TO INADEQUATE DIETARY IRON INTAKE: Primary | ICD-10-CM

## 2021-08-11 DIAGNOSIS — K92.2 GASTROINTESTINAL HEMORRHAGE, UNSPECIFIED GASTROINTESTINAL HEMORRHAGE TYPE: ICD-10-CM

## 2021-08-11 DIAGNOSIS — E53.8 B12 DEFICIENCY: ICD-10-CM

## 2021-08-11 DIAGNOSIS — B18.2 HEP C W/O COMA, CHRONIC (HCC): ICD-10-CM

## 2021-08-11 DIAGNOSIS — D64.9 ANEMIA, UNSPECIFIED TYPE: Primary | ICD-10-CM

## 2021-08-11 DIAGNOSIS — C68.9 UROTHELIAL CANCER (HCC): ICD-10-CM

## 2021-08-11 DIAGNOSIS — D50.0 IRON DEFICIENCY ANEMIA DUE TO CHRONIC BLOOD LOSS: ICD-10-CM

## 2021-08-11 PROCEDURE — 99214 OFFICE O/P EST MOD 30 MIN: CPT | Performed by: INTERNAL MEDICINE

## 2021-08-11 PROCEDURE — VIRTUALHLTH VIRTUAL HEALTH SAME DAY: Performed by: INTERNAL MEDICINE

## 2021-08-11 RX ORDER — SODIUM CHLORIDE 9 MG/ML
INJECTION, SOLUTION INTRAVENOUS CONTINUOUS
Status: CANCELLED | OUTPATIENT
Start: 2021-08-11

## 2021-08-11 RX ORDER — DIPHENHYDRAMINE HYDROCHLORIDE 50 MG/ML
50 INJECTION INTRAMUSCULAR; INTRAVENOUS ONCE
Status: CANCELLED | OUTPATIENT
Start: 2021-08-11 | End: 2021-08-11

## 2021-08-11 RX ORDER — EPINEPHRINE 1 MG/ML
0.3 INJECTION, SOLUTION, CONCENTRATE INTRAVENOUS PRN
Status: CANCELLED | OUTPATIENT
Start: 2021-08-11

## 2021-08-11 RX ORDER — HEPARIN SODIUM (PORCINE) LOCK FLUSH IV SOLN 100 UNIT/ML 100 UNIT/ML
500 SOLUTION INTRAVENOUS PRN
Status: CANCELLED | OUTPATIENT
Start: 2021-08-11

## 2021-08-11 RX ORDER — SODIUM CHLORIDE 0.9 % (FLUSH) 0.9 %
5-40 SYRINGE (ML) INJECTION PRN
Status: CANCELLED | OUTPATIENT
Start: 2021-08-11

## 2021-08-11 RX ORDER — SODIUM CHLORIDE 9 MG/ML
25 INJECTION, SOLUTION INTRAVENOUS PRN
Status: CANCELLED | OUTPATIENT
Start: 2021-08-11

## 2021-08-11 RX ORDER — METHYLPREDNISOLONE SODIUM SUCCINATE 125 MG/2ML
125 INJECTION, POWDER, LYOPHILIZED, FOR SOLUTION INTRAMUSCULAR; INTRAVENOUS ONCE
Status: CANCELLED | OUTPATIENT
Start: 2021-08-11 | End: 2021-08-11

## 2021-08-11 NOTE — PROGRESS NOTES
Subjective:   PCP: Matheus Handley MD       Chief Complaint   Patient presents with    Follow-up     review status of disease    Other     Found 3 more tumors in bladder    Discuss Labs         INTERIM HISTORY:    Patient seen in clinic via virtual visit due to ongoing coronavirus pandemic. Patient complains of being tired. Iron studies have improved but still low iron stores. Hemoglobin is improved. Patient continues to follow-up with GI. Patient is also scheduled to follow-up with urology for surveillance cystoscopy. During this visit patient's allergy, social, medical, surgical history and medications were reviewed and updated. REVIEW OF SYSTEMS:   General: No fever or night sweats. Weight is stable. +fatigue   ENT: No double or blurred vision, no hearing problem, no dysphagia or sore throat   Respiratory: No chest pain, no shortness of breath, no cough or hemoptysis. Cardiovascular: Denies chest pain, PND or orthopnea. No L E swelling or palpitations. Gastrointestinal: No nausea or vomiting, abdominal pain, or constipation. + diarrhea  Genitourinary: Denies dysuria, frequency, urgency or incontinence. +hematuria - resolved  Neurological: Denies headaches, decreased LOC, no sensory or motor focal deficits. Musculoskeletal: No arthralgia no back pain or joint swelling. Skin: There are no rashes or bleeding.   Psych: Denies hallucinations or intentions to harm self            PHYSICAL EXAMINATION:    Vital Signs: (As obtained by patient/caregiver or practitioner observation)    Blood pressure-  Heart rate-    Respiratory rate-    Temperature-  Pulse oximetry-     Constitutional: [x] Appears well-developed and well-nourished [x] No apparent distress      [] Abnormal-   Mental status  [x] Alert and awake  [x] Oriented to person/place/time [x]Able to follow commands      Eyes:  EOM    [x]  Normal  [] Abnormal-  Sclera  [x]  Normal  [] Abnormal -         Discharge [x]  None visible  [] Abnormal -    HENT:    [x] Normocephalic, atraumatic. [] Abnormal   [x] Mouth/Throat: Mucous membranes are moist.     External Ears [x] Normal  [] Abnormal-     Neck: [x] No visualized mass     Pulmonary/Chest: [x] Respiratory effort normal.  [x] No visualized signs of difficulty breathing or respiratory distress        [] Abnormal-      Musculoskeletal:   [] Normal gait with no signs of ataxia         [x] Normal range of motion of neck        [] Abnormal-       Neurological:        [x] No Facial Asymmetry (Cranial nerve 7 motor function) (limited exam to video visit)          [x] No gaze palsy        [] Abnormal-         Skin:        [x] No significant exanthematous lesions or discoloration noted on facial skin         [] Abnormal-            Psychiatric:       [x] Normal Affect [x] No Hallucinations        [] Abnormal-     Other pertinent observable physical exam findings-     Due to this being a TeleHealth encounter, evaluation of the following organ systems is limited: Vitals/Constitutional/EENT/Resp/CV/GI//MS/Neuro/Skin/Heme-Lymph-Imm.           REVIEW OF LABORATORY DATA:   Lab Results   Component Value Date    WBC 6.3 07/10/2021    HGB 11.8 (L) 07/10/2021    HCT 35.5 (L) 07/10/2021    MCV 82.7 07/10/2021     07/10/2021       Chemistry        Component Value Date/Time     07/10/2021 1047    K 4.5 07/10/2021 1047     07/10/2021 1047    CO2 26 07/10/2021 1047    BUN 17 07/10/2021 1047    CREATININE 0.71 07/10/2021 1047        Component Value Date/Time    CALCIUM 8.8 07/10/2021 1047    ALKPHOS 85 05/12/2021 0928    AST 13 05/12/2021 0928    ALT 12 05/12/2021 0928    BILITOT 0.22 (L) 05/12/2021 0928        Lab Results   Component Value Date    JENZWPYP16 820 07/10/2021         Lab Results   Component Value Date    IRON 54 (L) 07/10/2021    TIBC 340 07/10/2021    FERRITIN 24 (L) 07/10/2021         IMPRESSION:   81year old male with history of bleeding ulcer back in 2015 and iron def, now with

## 2021-08-11 NOTE — TELEPHONE ENCOUNTER
AVS from 8/11/21    injectafer x 2 infusio=ion  rv in 3 months with labs prior    AVS given to  to follow precert    RV scheduled 11/10/ @ 3:45pm    PT was given AVS and an appt schedule    Electronically signed by Eddy Valentin on 8/11/2021 at 3:22 PM

## 2021-08-18 ENCOUNTER — HOSPITAL ENCOUNTER (OUTPATIENT)
Dept: INFUSION THERAPY | Age: 71
Discharge: HOME | End: 2021-08-18
Payer: MEDICARE

## 2021-08-18 VITALS
HEART RATE: 79 BPM | SYSTOLIC BLOOD PRESSURE: 111 MMHG | DIASTOLIC BLOOD PRESSURE: 65 MMHG | RESPIRATION RATE: 16 BRPM | TEMPERATURE: 99.1 F

## 2021-08-18 DIAGNOSIS — D50.8 IRON DEFICIENCY ANEMIA SECONDARY TO INADEQUATE DIETARY IRON INTAKE: Primary | ICD-10-CM

## 2021-08-18 PROCEDURE — 2580000003 HC RX 258: Performed by: INTERNAL MEDICINE

## 2021-08-18 PROCEDURE — 6360000002 HC RX W HCPCS: Performed by: INTERNAL MEDICINE

## 2021-08-18 PROCEDURE — 96365 THER/PROPH/DIAG IV INF INIT: CPT

## 2021-08-18 RX ORDER — DIPHENHYDRAMINE HYDROCHLORIDE 50 MG/ML
50 INJECTION INTRAMUSCULAR; INTRAVENOUS ONCE
Status: CANCELLED | OUTPATIENT
Start: 2021-08-25 | End: 2021-08-25

## 2021-08-18 RX ORDER — SODIUM CHLORIDE 9 MG/ML
INJECTION, SOLUTION INTRAVENOUS CONTINUOUS
Status: CANCELLED | OUTPATIENT
Start: 2021-08-25

## 2021-08-18 RX ORDER — METHYLPREDNISOLONE SODIUM SUCCINATE 125 MG/2ML
125 INJECTION, POWDER, LYOPHILIZED, FOR SOLUTION INTRAMUSCULAR; INTRAVENOUS ONCE
Status: CANCELLED | OUTPATIENT
Start: 2021-08-18 | End: 2021-08-18

## 2021-08-18 RX ORDER — SODIUM CHLORIDE 9 MG/ML
INJECTION, SOLUTION INTRAVENOUS CONTINUOUS
Status: ACTIVE | OUTPATIENT
Start: 2021-08-18 | End: 2021-08-18

## 2021-08-18 RX ORDER — DIPHENHYDRAMINE HYDROCHLORIDE 50 MG/ML
50 INJECTION INTRAMUSCULAR; INTRAVENOUS ONCE
Status: CANCELLED | OUTPATIENT
Start: 2021-08-18 | End: 2021-08-18

## 2021-08-18 RX ORDER — SODIUM CHLORIDE 9 MG/ML
INJECTION, SOLUTION INTRAVENOUS CONTINUOUS
Status: CANCELLED | OUTPATIENT
Start: 2021-08-18

## 2021-08-18 RX ORDER — SODIUM CHLORIDE 0.9 % (FLUSH) 0.9 %
5-40 SYRINGE (ML) INJECTION PRN
Status: CANCELLED | OUTPATIENT
Start: 2021-08-18

## 2021-08-18 RX ORDER — SODIUM CHLORIDE 0.9 % (FLUSH) 0.9 %
5-40 SYRINGE (ML) INJECTION PRN
Status: CANCELLED | OUTPATIENT
Start: 2021-08-25

## 2021-08-18 RX ORDER — SODIUM CHLORIDE 9 MG/ML
25 INJECTION, SOLUTION INTRAVENOUS PRN
Status: CANCELLED | OUTPATIENT
Start: 2021-08-18

## 2021-08-18 RX ORDER — METHYLPREDNISOLONE SODIUM SUCCINATE 125 MG/2ML
125 INJECTION, POWDER, LYOPHILIZED, FOR SOLUTION INTRAMUSCULAR; INTRAVENOUS ONCE
Status: CANCELLED | OUTPATIENT
Start: 2021-08-25 | End: 2021-08-25

## 2021-08-18 RX ORDER — HEPARIN SODIUM (PORCINE) LOCK FLUSH IV SOLN 100 UNIT/ML 100 UNIT/ML
500 SOLUTION INTRAVENOUS PRN
Status: CANCELLED | OUTPATIENT
Start: 2021-08-25

## 2021-08-18 RX ORDER — SODIUM CHLORIDE 9 MG/ML
25 INJECTION, SOLUTION INTRAVENOUS PRN
Status: CANCELLED | OUTPATIENT
Start: 2021-08-25

## 2021-08-18 RX ORDER — EPINEPHRINE 1 MG/ML
0.3 INJECTION, SOLUTION, CONCENTRATE INTRAVENOUS PRN
Status: CANCELLED | OUTPATIENT
Start: 2021-08-18

## 2021-08-18 RX ORDER — EPINEPHRINE 1 MG/ML
0.3 INJECTION, SOLUTION, CONCENTRATE INTRAVENOUS PRN
Status: CANCELLED | OUTPATIENT
Start: 2021-08-25

## 2021-08-18 RX ORDER — HEPARIN SODIUM (PORCINE) LOCK FLUSH IV SOLN 100 UNIT/ML 100 UNIT/ML
500 SOLUTION INTRAVENOUS PRN
Status: CANCELLED | OUTPATIENT
Start: 2021-08-18

## 2021-08-18 RX ADMIN — FERRIC CARBOXYMALTOSE INJECTION 750 MG: 50 INJECTION, SOLUTION INTRAVENOUS at 10:20

## 2021-08-18 RX ADMIN — SODIUM CHLORIDE: 9 INJECTION, SOLUTION INTRAVENOUS at 10:11

## 2021-08-18 NOTE — PROGRESS NOTES
Pt here for injectafer 1/2. Tolerated treatment without incident.  Discharged stable and ambulatory,

## 2021-08-24 ENCOUNTER — TELEPHONE (OUTPATIENT)
Dept: UROLOGY | Age: 71
End: 2021-08-24

## 2021-08-24 NOTE — TELEPHONE ENCOUNTER
STV OR 9/9/21     Arrival time 0830  Procedure time 1030  PAT 8/31 1030 STV  **Stop blood thinners 9/2/21**  COVID Vaccinated  All information given to [place] over the phone. All information emailed to [place]. Instructed to call the office with any other questions or concerns.

## 2021-08-25 ENCOUNTER — HOSPITAL ENCOUNTER (OUTPATIENT)
Dept: INFUSION THERAPY | Age: 71
Discharge: HOME | End: 2021-08-25
Payer: MEDICARE

## 2021-08-25 ENCOUNTER — TELEPHONE (OUTPATIENT)
Dept: FAMILY MEDICINE CLINIC | Age: 71
End: 2021-08-25

## 2021-08-25 VITALS
SYSTOLIC BLOOD PRESSURE: 110 MMHG | HEART RATE: 82 BPM | RESPIRATION RATE: 16 BRPM | DIASTOLIC BLOOD PRESSURE: 66 MMHG | TEMPERATURE: 98.7 F

## 2021-08-25 DIAGNOSIS — D50.8 IRON DEFICIENCY ANEMIA SECONDARY TO INADEQUATE DIETARY IRON INTAKE: Primary | ICD-10-CM

## 2021-08-25 PROCEDURE — 6360000002 HC RX W HCPCS: Performed by: INTERNAL MEDICINE

## 2021-08-25 PROCEDURE — 2580000003 HC RX 258: Performed by: INTERNAL MEDICINE

## 2021-08-25 PROCEDURE — 96365 THER/PROPH/DIAG IV INF INIT: CPT

## 2021-08-25 RX ORDER — HEPARIN SODIUM (PORCINE) LOCK FLUSH IV SOLN 100 UNIT/ML 100 UNIT/ML
500 SOLUTION INTRAVENOUS PRN
Status: CANCELLED | OUTPATIENT
Start: 2021-08-25

## 2021-08-25 RX ORDER — SODIUM CHLORIDE 9 MG/ML
INJECTION, SOLUTION INTRAVENOUS CONTINUOUS
Status: CANCELLED | OUTPATIENT
Start: 2021-09-01

## 2021-08-25 RX ORDER — METHYLPREDNISOLONE SODIUM SUCCINATE 125 MG/2ML
125 INJECTION, POWDER, LYOPHILIZED, FOR SOLUTION INTRAMUSCULAR; INTRAVENOUS ONCE
Status: CANCELLED | OUTPATIENT
Start: 2021-08-25 | End: 2021-08-25

## 2021-08-25 RX ORDER — EPINEPHRINE 1 MG/ML
0.3 INJECTION, SOLUTION, CONCENTRATE INTRAVENOUS PRN
Status: CANCELLED | OUTPATIENT
Start: 2021-08-25

## 2021-08-25 RX ORDER — DIPHENHYDRAMINE HYDROCHLORIDE 50 MG/ML
50 INJECTION INTRAMUSCULAR; INTRAVENOUS ONCE
Status: CANCELLED | OUTPATIENT
Start: 2021-08-25 | End: 2021-08-25

## 2021-08-25 RX ORDER — DIPHENHYDRAMINE HYDROCHLORIDE 50 MG/ML
50 INJECTION INTRAMUSCULAR; INTRAVENOUS ONCE
Status: CANCELLED | OUTPATIENT
Start: 2021-09-01 | End: 2021-09-01

## 2021-08-25 RX ORDER — SODIUM CHLORIDE 0.9 % (FLUSH) 0.9 %
5-40 SYRINGE (ML) INJECTION PRN
Status: CANCELLED | OUTPATIENT
Start: 2021-08-25

## 2021-08-25 RX ORDER — SODIUM CHLORIDE 9 MG/ML
25 INJECTION, SOLUTION INTRAVENOUS PRN
Status: CANCELLED | OUTPATIENT
Start: 2021-09-01

## 2021-08-25 RX ORDER — SODIUM CHLORIDE 9 MG/ML
25 INJECTION, SOLUTION INTRAVENOUS PRN
Status: CANCELLED | OUTPATIENT
Start: 2021-08-25

## 2021-08-25 RX ORDER — SODIUM CHLORIDE 9 MG/ML
INJECTION, SOLUTION INTRAVENOUS CONTINUOUS
Status: ACTIVE | OUTPATIENT
Start: 2021-08-25 | End: 2021-08-25

## 2021-08-25 RX ORDER — HEPARIN SODIUM (PORCINE) LOCK FLUSH IV SOLN 100 UNIT/ML 100 UNIT/ML
500 SOLUTION INTRAVENOUS PRN
Status: CANCELLED | OUTPATIENT
Start: 2021-09-01

## 2021-08-25 RX ORDER — SODIUM CHLORIDE 0.9 % (FLUSH) 0.9 %
5-40 SYRINGE (ML) INJECTION PRN
Status: CANCELLED | OUTPATIENT
Start: 2021-09-01

## 2021-08-25 RX ORDER — SODIUM CHLORIDE 9 MG/ML
INJECTION, SOLUTION INTRAVENOUS CONTINUOUS
Status: CANCELLED | OUTPATIENT
Start: 2021-08-25

## 2021-08-25 RX ORDER — METHYLPREDNISOLONE SODIUM SUCCINATE 125 MG/2ML
125 INJECTION, POWDER, LYOPHILIZED, FOR SOLUTION INTRAMUSCULAR; INTRAVENOUS ONCE
Status: CANCELLED | OUTPATIENT
Start: 2021-09-01 | End: 2021-09-01

## 2021-08-25 RX ORDER — EPINEPHRINE 1 MG/ML
0.3 INJECTION, SOLUTION, CONCENTRATE INTRAVENOUS PRN
Status: CANCELLED | OUTPATIENT
Start: 2021-09-01

## 2021-08-25 RX ADMIN — SODIUM CHLORIDE: 9 INJECTION, SOLUTION INTRAVENOUS at 10:06

## 2021-08-25 RX ADMIN — FERRIC CARBOXYMALTOSE INJECTION 750 MG: 50 INJECTION, SOLUTION INTRAVENOUS at 10:09

## 2021-08-25 NOTE — PROGRESS NOTES
Patient here for injectafer. Vitals stable. He tolerated treatment well and was discharged home in stable condition.

## 2021-08-25 NOTE — TELEPHONE ENCOUNTER
Patient does not have any stents, cystoscopy and TURP is recommended  Urology needs clearance to hold off blood thinner  Patient is already scheduled for preop testing and 9/1/2021 clearance for surgery  Await evaluation    Future Appointments   Date Time Provider Pippa Debora   8/31/2021 10:30 AM [facility] PAT RM 2 [facility]Z PAT [facility]   9/1/2021  2:15 PM Phyllis Arroyo MD [facility]   9/3/2021 10:00 AM VEE [facility] - CNP [address]   9/20/2021 11:40 AM Vladimir Brown MD [facility]   9/24/2021 11:30 AM Nadja Suero MD [facility]   11/10/2021  4:15 PM Efrem Donohue MD [address] [place] [address]

## 2021-08-26 RX ORDER — SODIUM CHLORIDE, SODIUM LACTATE, POTASSIUM CHLORIDE, CALCIUM CHLORIDE 600; 310; 30; 20 MG/100ML; MG/100ML; MG/100ML; MG/100ML
1000 INJECTION, SOLUTION INTRAVENOUS CONTINUOUS
Status: CANCELLED | OUTPATIENT
Start: 2021-08-26

## 2021-08-27 ENCOUNTER — PATIENT MESSAGE (OUTPATIENT)
Dept: FAMILY MEDICINE CLINIC | Age: 71
End: 2021-08-27

## 2021-08-27 ENCOUNTER — TELEPHONE (OUTPATIENT)
Dept: UROLOGY | Age: 71
End: 2021-08-27

## 2021-08-27 DIAGNOSIS — R51.9 WORSENING HEADACHES: ICD-10-CM

## 2021-08-27 RX ORDER — BACLOFEN 10 MG/1
TABLET ORAL
Qty: 90 TABLET | Refills: 0 | Status: SHIPPED | OUTPATIENT
Start: 2021-08-27 | End: 2021-09-27 | Stop reason: SDUPTHER

## 2021-08-31 ENCOUNTER — HOSPITAL ENCOUNTER (OUTPATIENT)
Dept: PREADMISSION TESTING | Age: 71
Discharge: HOME | End: 2021-09-04
Payer: MEDICARE

## 2021-08-31 VITALS
HEART RATE: 87 BPM | TEMPERATURE: 98.1 F | RESPIRATION RATE: 18 BRPM | OXYGEN SATURATION: 97 % | SYSTOLIC BLOOD PRESSURE: 130 MMHG | BODY MASS INDEX: 31.4 KG/M2 | HEIGHT: 69 IN | DIASTOLIC BLOOD PRESSURE: 79 MMHG | WEIGHT: 212 LBS

## 2021-08-31 LAB
ANION GAP SERPL CALCULATED.3IONS-SCNC: 14 MMOL/L (ref 9–17)
BUN BLDV-MCNC: 13 MG/DL (ref 8–23)
CHLORIDE BLD-SCNC: 106 MMOL/L (ref 98–107)
CO2: 23 MMOL/L (ref 20–31)
CREAT SERPL-MCNC: 0.6 MG/DL (ref 0.7–1.2)
GFR AFRICAN AMERICAN: >60 ML/MIN
GFR NON-AFRICAN AMERICAN: >60 ML/MIN
GFR SERPL CREATININE-BSD FRML MDRD: ABNORMAL ML/MIN/{1.73_M2}
GFR SERPL CREATININE-BSD FRML MDRD: ABNORMAL ML/MIN/{1.73_M2}
GLUCOSE BLD-MCNC: 76 MG/DL (ref 70–99)
HCT VFR BLD CALC: 40 % (ref 40.7–50.3)
HEMOGLOBIN: 12.3 G/DL (ref 13–17)
MCH RBC QN AUTO: 26.7 PG (ref 25.2–33.5)
MCHC RBC AUTO-ENTMCNC: 30.8 G/DL (ref 28.4–34.8)
MCV RBC AUTO: 86.8 FL (ref 82.6–102.9)
NRBC AUTOMATED: 0.3 PER 100 WBC
PDW BLD-RTO: 21.1 % (ref 11.8–14.4)
PLATELET # BLD: 232 K/UL (ref 138–453)
PMV BLD AUTO: 10.6 FL (ref 8.1–13.5)
POTASSIUM SERPL-SCNC: 4.8 MMOL/L (ref 3.7–5.3)
RBC # BLD: 4.61 M/UL (ref 4.21–5.77)
SODIUM BLD-SCNC: 143 MMOL/L (ref 135–144)
WBC # BLD: 6.7 K/UL (ref 3.5–11.3)

## 2021-08-31 PROCEDURE — 36415 COLL VENOUS BLD VENIPUNCTURE: CPT

## 2021-08-31 PROCEDURE — 84520 ASSAY OF UREA NITROGEN: CPT

## 2021-08-31 PROCEDURE — 87086 URINE CULTURE/COLONY COUNT: CPT

## 2021-08-31 PROCEDURE — 85027 COMPLETE CBC AUTOMATED: CPT

## 2021-08-31 PROCEDURE — 82565 ASSAY OF CREATININE: CPT

## 2021-08-31 PROCEDURE — 82947 ASSAY GLUCOSE BLOOD QUANT: CPT

## 2021-08-31 PROCEDURE — 80051 ELECTROLYTE PANEL: CPT

## 2021-08-31 SDOH — ECONOMIC STABILITY: FOOD INSECURITY: WITHIN THE PAST 12 MONTHS, YOU WORRIED THAT YOUR FOOD WOULD RUN OUT BEFORE YOU GOT MONEY TO BUY MORE.: OFTEN TRUE

## 2021-08-31 SDOH — ECONOMIC STABILITY: FOOD INSECURITY: WITHIN THE PAST 12 MONTHS, THE FOOD YOU BOUGHT JUST DIDN'T LAST AND YOU DIDN'T HAVE MONEY TO GET MORE.: OFTEN TRUE

## 2021-08-31 ASSESSMENT — PAIN DESCRIPTION - DESCRIPTORS: DESCRIPTORS: ACHING

## 2021-08-31 ASSESSMENT — PAIN DESCRIPTION - FREQUENCY: FREQUENCY: CONTINUOUS

## 2021-08-31 ASSESSMENT — PAIN DESCRIPTION - ORIENTATION: ORIENTATION: LOWER

## 2021-08-31 ASSESSMENT — PAIN DESCRIPTION - PAIN TYPE: TYPE: CHRONIC PAIN

## 2021-08-31 ASSESSMENT — SOCIAL DETERMINANTS OF HEALTH (SDOH): HOW HARD IS IT FOR YOU TO PAY FOR THE VERY BASICS LIKE FOOD, HOUSING, MEDICAL CARE, AND HEATING?: VERY HARD

## 2021-08-31 ASSESSMENT — PAIN SCALES - GENERAL: PAINLEVEL_OUTOF10: 3

## 2021-08-31 ASSESSMENT — PATIENT HEALTH QUESTIONNAIRE - PHQ9
1. LITTLE INTEREST OR PLEASURE IN DOING THINGS: 0
SUM OF ALL RESPONSES TO PHQ9 QUESTIONS 1 & 2: 0
2. FEELING DOWN, DEPRESSED OR HOPELESS: 0
SUM OF ALL RESPONSES TO PHQ QUESTIONS 1-9: 0

## 2021-08-31 ASSESSMENT — PAIN DESCRIPTION - PROGRESSION: CLINICAL_PROGRESSION: NOT CHANGED

## 2021-08-31 ASSESSMENT — PAIN DESCRIPTION - LOCATION: LOCATION: BACK

## 2021-08-31 ASSESSMENT — PAIN DESCRIPTION - ONSET: ONSET: ON-GOING

## 2021-08-31 NOTE — PROGRESS NOTES
Anesthesia Focused Assessment    Has patient ever tested positive for COVID? Patient has been vaccinated. STOP-BANG Sleep Apnea Questionnaire    SNORE loudly (heard through closed doors)? No  TIRED, fatigued, sleepy during daytime? No  OBSERVED stopping breathing during sleep? No  High blood PRESSURE being treated? Yes    BMI over 35? No  AGE over 48? Yes  NECK circumference over 16\"? No  GENDER (male)? Yes             Total 3  High risk 5-8  Intermediate risk 3-4  Low risk 0-2    Obstructive Sleep Apnea: denies  If YES, machine used: no     Type 1 DM:   no  T2DM:  yes    Coronary Artery Disease:  No, \"low risk stress test\" 7/2020  Hypertension:  yes    Active smoker:  1/2 ppd for 45 years, quit 2015. Drinks Alcohol:  no    Dentition:     Defib / AICD / Pacemaker: no      Renal Failure/dialysis:  no    Patient was evaluated in PAT & anesthesia guidelines were applied. NPO guidelines, medication instructions and scheduled arrival time were reviewed with patient. Hx of anesthesia complications:  no  Family hx of anesthesia complications:  no                                                                                                                     Anesthesia contacted:   no  Medical or cardiac clearance ordered: clearance pending, will request copy for chart. He had surgery in April without complication according to patient.     Chasidy Baez PA-C  8/31/21  11:44 AM

## 2021-09-01 ENCOUNTER — VIRTUAL VISIT (OUTPATIENT)
Dept: FAMILY MEDICINE CLINIC | Age: 71
End: 2021-09-01
Payer: MEDICARE

## 2021-09-01 DIAGNOSIS — E78.5 HYPERLIPIDEMIA WITH TARGET LDL LESS THAN 100: ICD-10-CM

## 2021-09-01 DIAGNOSIS — C67.2 MALIGNANT NEOPLASM OF LATERAL WALL OF URINARY BLADDER (HCC): ICD-10-CM

## 2021-09-01 DIAGNOSIS — I10 ESSENTIAL HYPERTENSION: ICD-10-CM

## 2021-09-01 DIAGNOSIS — Z01.818 PRE-OPERATIVE CLEARANCE: Primary | ICD-10-CM

## 2021-09-01 DIAGNOSIS — E11.42 TYPE 2 DIABETES MELLITUS WITH DIABETIC POLYNEUROPATHY, WITHOUT LONG-TERM CURRENT USE OF INSULIN (HCC): ICD-10-CM

## 2021-09-01 DIAGNOSIS — Z87.891 PERSONAL HISTORY OF TOBACCO USE: ICD-10-CM

## 2021-09-01 LAB
CULTURE: NO GROWTH
Lab: NORMAL
SPECIMEN DESCRIPTION: NORMAL

## 2021-09-01 PROCEDURE — 99214 OFFICE O/P EST MOD 30 MIN: CPT | Performed by: FAMILY MEDICINE

## 2021-09-01 PROCEDURE — 3017F COLORECTAL CA SCREEN DOC REV: CPT | Performed by: FAMILY MEDICINE

## 2021-09-01 PROCEDURE — 4040F PNEUMOC VAC/ADMIN/RCVD: CPT | Performed by: FAMILY MEDICINE

## 2021-09-01 PROCEDURE — 3044F HG A1C LEVEL LT 7.0%: CPT | Performed by: FAMILY MEDICINE

## 2021-09-01 PROCEDURE — 1123F ACP DISCUSS/DSCN MKR DOCD: CPT | Performed by: FAMILY MEDICINE

## 2021-09-01 PROCEDURE — 2022F DILAT RTA XM EVC RTNOPTHY: CPT | Performed by: FAMILY MEDICINE

## 2021-09-01 PROCEDURE — G8427 DOCREV CUR MEDS BY ELIG CLIN: HCPCS | Performed by: FAMILY MEDICINE

## 2021-09-01 ASSESSMENT — ENCOUNTER SYMPTOMS
SINUS PRESSURE: 0
WHEEZING: 0
BACK PAIN: 1
ANAL BLEEDING: 0
SHORTNESS OF BREATH: 0
PHOTOPHOBIA: 0
COUGH: 0
ABDOMINAL DISTENTION: 0

## 2021-09-01 NOTE — PROGRESS NOTES
[place] [address]   [address] [facility] [address]  Phone: [phone], Fax: [phone]    TELEHEALTH EVALUATION -- Audio/Visual (During NBWDE-43 public health emergency)    Patient ID verified by me prior to start of this visit    Sigifredo Henderson (:  1950) has requested an audio/video evaluation for the following concern(s):  Chief Complaint   Patient presents with    Diabetes    Forms     clearance Dr. Do Brooke      HPI:  Sigifredo Henderson is an established patient of Mark Vila MD  . Patient has a history of diabetes controlled is due for A1c. Patient reports his sugars are running normal.  Last A1c is 5.96 months before. Patient needs clearance for surgery was diagnosed with malignant neoplasm of urinary bladder. Patient reports he has diagnostic cystoscopy done few months before and has feels scrapings done. He needs another surgery which is scheduled by the end of this month. He needs clearance. Patient denies any history of coronary artery disease, has abnormal EKG in the past and seen by cardiologist.  He has stress test done last year which was normal.  He denies any chest pain shortness of breath is able to walk more than 3 blocks without short of breath. Hypertension controlled on current treatment. Hyperlipidemia on statins. Patient is also due for CT lung screening due to smoking history. []Negative depression screening. []1-4 = Minimal depression   []5-9 = Mild depression   []10-14 = Moderate depression   []15-19 = Moderately severe depression   []20-27 = Severe depression  PHQ Scores 2021   PHQ2 Score 0 0 0   PHQ9 Score 0 0 0     Review of Systems   Constitutional: Negative for activity change, appetite change, diaphoresis and unexpected weight change. HENT: Negative for congestion, ear pain, postnasal drip and sinus pressure. Eyes: Negative for photophobia and visual disturbance.    Respiratory: Negative for cough, shortness of breath and wheezing. Cardiovascular: Negative for chest pain, palpitations and leg swelling. Gastrointestinal: Negative for abdominal distention and anal bleeding. Endocrine: Negative for polyuria. Genitourinary: Positive for hematuria. Negative for frequency and testicular pain. Musculoskeletal: Positive for arthralgias, back pain and gait problem. Negative for joint swelling, myalgias and neck pain. Neurological: Negative for dizziness, weakness, light-headedness and numbness. Psychiatric/Behavioral: Negative for agitation, decreased concentration, dysphoric mood and sleep disturbance. The patient is not nervous/anxious.         Patient Active Problem List    Diagnosis Date Noted    Absolute anemia 01/05/2021    History of hepatitis C     Worsening headaches 12/29/2020    Positive FIT (fecal immunochemical test) 12/29/2020    Osteoarthritis of lumbar spine 11/03/2020    Incisional hernia without obstruction or gangrene     Status post hernia repair 08/07/2020    Abdominal discomfort 07/28/2020    Irritable bowel syndrome with diarrhea 07/28/2020    Chronic hepatitis C without hepatic coma ([facility] [address] 07/28/2020    Diarrhea 07/28/2020    Type 2 diabetes mellitus with microalbuminuria, without long-term current use of insulin ([facility] [address] 07/13/2020    Abnormal EKG 05/13/2020    Peripheral polyneuropathy 05/12/2020    Essential hypertension 05/12/2020    Type 2 diabetes mellitus with hyperglycemia, without long-term current use of insulin ([address] 05/12/2020    Hyperlipidemia with target LDL less than 100 05/12/2020    Vitamin D deficiency 05/12/2020    Vitamin B 12 deficiency 05/12/2020    Hepatitis B core antibody positive     Colon polyps 10/08/2019    Iron deficiency anemia 04/10/2018    Encounter for chronic pain management     Osteoarthritis of spine with radiculopathy, lumbar region     GERD (gastroesophageal reflux disease) 08/31/2015    Anemia tablet TAKE 1 TABLET BY MOUTH TWICE DAILY WITH MEALS 180 tablet 1    cyanocobalamin 1000 MCG/ML injection Inject 1 mL into the muscle every 30 days Call for next refill which will be monthly for life 3 mL 3    blood glucose test strips (ASCENSIA AUTODISC VI;ONE TOUCH ULTRA TEST VI) strip 1 each by In Vitro route daily Testing daily. needs true Metrix test strips 100 strip 3    pravastatin (PRAVACHOL) 40 MG tablet Take 1 tablet by mouth every evening Stop Gemfibrozil 90 tablet 3    metoprolol tartrate (LOPRESSOR) 25 MG tablet Take 1 tablet by mouth 2 times daily 180 tablet 3    TRUEplus Lancets 30G MISC Test blood glucose twice a day 200 each 3    Alcohol Swabs (B-D SINGLE USE SWABS REGULAR) PADS USE TO CHECK BLOOD SUGAR TWICE A  each 3    glipiZIDE (GLUCOTROL) 5 MG tablet Take 1 tablet by mouth every morning Keep fasting morning blood glucose . If blood glucose below 80, you need to stop glipizide 90 tablet 3    pantoprazole (PROTONIX) 40 MG tablet Take 1 tablet by mouth daily 90 tablet 1    Syringe/Needle, Disp, (SYRINGE 3CC/25GX1\") 25G X 1\" 3 ML MISC To be used with B12 injections 50 each 2    loperamide (RA ANTI-DIARRHEAL) 2 MG capsule Take 1 capsule by mouth 4 times daily as needed for Diarrhea 112 capsule 2    lidocaine (LIDODERM) 5 % Place 1 patch onto the skin daily 12 hours on, 12 hours off. 30 patch 3    lisinopril-hydroCHLOROthiazide (PRINZIDE;ZESTORETIC) 10-12.5 MG per tablet TK 1 T PO QD 90 tablet 3    Probiotic Product (PROBIOTIC-10 PO) Take by mouth daily       Blood Glucose Monitoring Suppl (TRUE METRIX METER) w/Device KIT USE AS DIRECTED TO TEST BLOOD SUGAR       No current facility-administered medications for this visit.        Allergies   Allergen Reactions    Asa [Aspirin]       iron deficiency anemia , requiring iron infusions and transfusions    Iron      Pill- constipation, abdominal pain    Nsaids       iron deficiency anemia, history of bleeding ulcers Social History     Tobacco Use    Smoking status: Former Smoker     Packs/day: 0.50     Years: 45.00     Pack years: 22.50     Types: Cigarettes     Quit date: 2015     Years since quittin.7    Smokeless tobacco: Never Used    Tobacco comment: on chantix 11-6-15   12-7-15   Vaping Use    Vaping Use: Never used   Substance Use Topics    Alcohol use: No    Drug use: No        PHYSICAL EXAMINATION:  Vital Signs: (As obtained by patient/caregiver or practitioner observation)  Patient-Reported Vitals 2021   Patient-Reported Weight 210 lb   Patient-Reported Height 5'9   Patient-Reported Systolic 098   Patient-Reported Diastolic 79   Patient-Reported Pulse -   Patient-Reported Temperature -        Constitutional: [x] Appears well-developed and well-nourished [x] No apparent distress      [] Abnormal-   Mental status  [x] Alert and awake  [x] Oriented to person/place/time [x]Able to follow commands      Eyes:  EOM    [x]  Normal  [] Abnormal-  Sclera  [x]  Normal  [] Abnormal -         Discharge [x]  None visible  [] Abnormal -    HENT:   [x] Normocephalic, atraumatic.   [] Abnormal   [x] Mouth/Throat: Mucous membranes are moist.     External Ears [x] Normal  [] Abnormal-     Neck: [x] No visualized mass     Pulmonary/Chest: [x] Respiratory effort normal.  [x] No visualized signs of difficulty breathing or respiratory distress        [] Abnormal     Musculoskeletal:   [x] Normal gait with no signs of ataxia         [x] Normal range of motion of neck        [] Abnormal-     Neurological:        [x] No Facial Asymmetry (Cranial nerve 7 motor function) (limited exam to video visit)          [x] No gaze palsy        [] Abnormal-     Skin:        [x] No significant exanthematous lesions or discoloration noted on facial skin         [] Abnormal-     Psychiatric:       [x] Normal Affect [x] No Hallucinations        [x] Abnormal- Anxious, with pressured speech    Other pertinent observable physical exam findings-   Lab Results   Component Value Date    WBC 6.7 08/31/2021    HGB 12.3 (L) 08/31/2021    HCT 40.0 (L) 08/31/2021    MCV 86.8 08/31/2021     08/31/2021     Lab Results   Component Value Date     08/31/2021    K 4.8 08/31/2021     08/31/2021    CO2 23 08/31/2021    BUN 13 08/31/2021    CREATININE 0.60 08/31/2021    GLUCOSE 76 08/31/2021    CALCIUM 8.8 07/10/2021        Due to this being a TeleHealth encounter, evaluation of the following organ systems is limited: Vitals/Constitutional/EENT/Resp/CV/GI//MS/Neuro/Skin/Heme-Lymph-Imm. ASSESSMENT/PLAN:  1. Pre-operative clearance    Clearance pending due to to A1c results. Once I get the results can clear patient  2. Type 2 diabetes mellitus with diabetic polyneuropathy, without long-term current use of insulin (HCC)  Controlled continue same medications repeat A1c monitor blood sugars  - Hemoglobin A1C; Future    3. Essential hypertension  Controlled continue same medications    4. Malignant neoplasm involving organ by direct extension from urinary bladder [facility])  Planning for surgery    5. Personal history of tobacco use  Discussed. Due for CT lung screening  - CT lung screen [Initial/Annual]; Future    6. Hyperlipidemia with target LDL less than 100  Continue statins repeat lipid panel  - Lipid Panel; Future    Controlled Substance Monitoring:  Acute and Chronic Pain Monitoring:   RX Monitoring 8/3/2021   Attestation -   Acute Pain Prescriptions -   Periodic Controlled Substance Monitoring Possible medication side effects, risk of tolerance/dependence & alternative treatments discussed. ;No signs of potential drug abuse or diversion identified.;Obtaining appropriate analgesic effect of treatment. Chronic Pain > 50 MEDD -   Chronic Pain > 80 MEDD -   Chronic Pain > 120 MEDD Co-prescribed Naloxone. Orders Placed This Encounter   Procedures    CT lung screen [Initial/Annual]     Age: 73 y.o.    Smoking History:   Social History Tobacco Use      Smoking status: Former Smoker        Packs/day: 0.50        Years: 45.00        Pack years: 22.5        Types: Cigarettes        Quit date: 2015        Years since quittin.7      Smokeless tobacco: Never Used      Tobacco comment: on chantix 11-6-15   12-7-15    Vaping Use      Vaping Use: Never used    Alcohol use: No    Drug use: No    Pack years: 22.5  Last CT lung screen: No previous lung cancer screening exam     Standing Status:   Future     Standing Expiration Date:   2022     Order Specific Question:   Is there documentation of shared decision making? Answer:   Yes     Order Specific Question:   Does the patient show any signs or symptoms of lung cancer? Answer:   No     Order Specific Question:   Is this the first (baseline) CT or an annual exam?     Answer:   Baseline [1]     Order Specific Question:   Is this a low dose CT or a routine CT? Answer:   Low Dose CT [1]     Order Specific Question:   Smoking Status? Answer: Former Smoker [4]     Order Specific Question:   Date quit smoking? (must be within 15 years)     Answer:   2015     Order Specific Question:   Smoking packs per day? Answer:   0.5     Order Specific Question:   Years smoking? Answer:   45    Hemoglobin A1C     Standing Status:   Future     Standing Expiration Date:   2022    Lipid Panel     Standing Status:   Future     Standing Expiration Date:   2022     Order Specific Question:   Is Patient Fasting?/# of Hours     Answer:   yes, 8-10 hours      No orders of the defined types were placed in this encounter. There are no discontinued medications. Linwood Terrazas received counseling on the following healthy behaviors: nutrition, exercise, medication adherence and tobacco cessation  Reviewed prior labs and health maintenance. Continue current medications, diet and exercise. Discussed use, benefit, and side effects of prescribed medications.  Barriers to medication compliance addressed. Patient given educational materials - see patient instructions. All patient questions answered. Patient voiced understanding. No follow-ups on file. Den Klein is a 73 y.o. male patient  being evaluated by a Virtual Visit (video visit) encounter to address concerns as mentioned above. A caregiver was present when appropriate. Due to this being a TeleHealth encounter (During CPTEM-29 public health emergency), evaluation of the following organ systems was limited:Vitals/Constitutional/EENT/Resp/CV/GI//MS/Neuro/Skin/Heme-Lymph-Imm. Services were provided through a video synchronous discussion virtually to substitute for in-person clinic visit. This is a telehealth visit that was performed with the originating site at Patient Location: home and provider Location of [place], [place]. Verbal consent to participate in video visit was obtained. Patient ID verified by me prior to start of this visit  I discussed with the patient the nature of our telehealth visits via interactive/real-time audio/video that:  - I would evaluate the patient and recommend diagnostics and treatments based on my assessment  - Our sessions are not being recorded and that personal health information is protected  - Our team would provide follow up care in person if/when the patient needs it. Pursuant to the emergency declaration under the [facility] [address] authority and the [employer] and [employer] Act, this Virtual Visit was conducted with patient's (and/or legal guardian's) consent, to reduce the patient's risk of exposure to COVID-19 and provide necessary medical care. The patient (and/or legal guardian) has also been advised to contact this office for worsening conditions or problems, and seek emergency medical treatment and/or call 911 if deemed necessary.     This note was completed by using the assistance of a speech-recognition program. However, inadvertent computerized transcription errors may be present. Although every effort was made to ensure accuracy, no guarantees can be provided that every mistake has been identified and corrected by editing.   Electronically signed by Alissa Quinteros MD on 9/1/21 at 2:16 PM EDT

## 2021-09-03 ENCOUNTER — HOSPITAL ENCOUNTER (OUTPATIENT)
Dept: PAIN MANAGEMENT | Age: 71
Discharge: HOME | End: 2021-09-03
Payer: MEDICARE

## 2021-09-03 DIAGNOSIS — M46.92 CERVICAL SPONDYLITIS (HCC): Chronic | ICD-10-CM

## 2021-09-03 DIAGNOSIS — Z98.1 S/P CERVICAL SPINAL FUSION: Chronic | ICD-10-CM

## 2021-09-03 DIAGNOSIS — M48.061 SPINAL STENOSIS OF LUMBAR REGION WITHOUT NEUROGENIC CLAUDICATION: Chronic | ICD-10-CM

## 2021-09-03 DIAGNOSIS — G89.29 ENCOUNTER FOR CHRONIC PAIN MANAGEMENT: ICD-10-CM

## 2021-09-03 DIAGNOSIS — M47.816 OSTEOARTHRITIS OF LUMBAR SPINE, UNSPECIFIED SPINAL OSTEOARTHRITIS COMPLICATION STATUS: ICD-10-CM

## 2021-09-03 DIAGNOSIS — M51.36 DEGENERATIVE DISC DISEASE, LUMBAR: Chronic | ICD-10-CM

## 2021-09-03 DIAGNOSIS — M47.816 LUMBAR SPONDYLOSIS: Chronic | ICD-10-CM

## 2021-09-03 DIAGNOSIS — Z51.81 ENCOUNTER FOR MEDICATION MONITORING: Chronic | ICD-10-CM

## 2021-09-03 DIAGNOSIS — M54.16 LUMBAR RADICULOPATHY: Chronic | ICD-10-CM

## 2021-09-03 DIAGNOSIS — M47.26 OSTEOARTHRITIS OF SPINE WITH RADICULOPATHY, LUMBAR REGION: ICD-10-CM

## 2021-09-03 PROCEDURE — 99213 OFFICE O/P EST LOW 20 MIN: CPT

## 2021-09-03 PROCEDURE — 99213 OFFICE O/P EST LOW 20 MIN: CPT | Performed by: NURSE PRACTITIONER

## 2021-09-03 RX ORDER — OXYCODONE HYDROCHLORIDE AND ACETAMINOPHEN 5; 325 MG/1; MG/1
1 TABLET ORAL EVERY 8 HOURS
Qty: 90 TABLET | Refills: 0 | Status: SHIPPED | OUTPATIENT
Start: 2021-09-05 | End: 2021-10-04 | Stop reason: SDUPTHER

## 2021-09-03 RX ORDER — MORPHINE SULFATE 60 MG/1
60 TABLET, FILM COATED, EXTENDED RELEASE ORAL 2 TIMES DAILY
Qty: 60 TABLET | Refills: 0 | Status: SHIPPED | OUTPATIENT
Start: 2021-09-05 | End: 2021-10-04 | Stop reason: SDUPTHER

## 2021-09-03 ASSESSMENT — ENCOUNTER SYMPTOMS
BACK PAIN: 1
COUGH: 0
CONSTIPATION: 0
SHORTNESS OF BREATH: 0

## 2021-09-03 NOTE — PROGRESS NOTES
Patient completed a video visit today to review medication contract. Chief Complaint: back pain    PMH  Pt c/o low back pain for many years. There is no known injury or surgery to the area. He does have a history of scoliosis. His last PT was over 4 years ago with no benefit and he is not interested in repeating. He does do home stretches every morning. He also had a LESI in 2013 that did not help. He has non-invasive bladder cancer and had resection of a tumor in April. He continues to follow with oncology and hematology - he continues Injectafer infusions for anemia.      He will have another bladder surgery next week. Back Pain  This is a chronic problem. The current episode started more than 1 year ago. The problem occurs constantly. The problem is unchanged. The pain is present in the lumbar spine. The quality of the pain is described as aching. Radiates to: into buttocks. The pain is at a severity of 3/10. The pain is mild. The symptoms are aggravated by position and standing (walking, activity). Pertinent negatives include no chest pain, fever, numbness, paresthesias or tingling. He has tried analgesics and bed rest for the symptoms. The treatment provided mild relief. Patient denies any new neurological symptoms. No bowel or bladder incontinence, no weakness, and no falling. Pill count: appropriate due 9/5    Morphine equivalent: 142.5    Periodic Controlled Substance Monitoring: Possible medication side effects, risk of tolerance/dependence & alternative treatments discussed., No signs of potential drug abuse or diversion identified., Obtaining appropriate analgesic effect of treatment.  Yoan Colunga, VEE - CNP)      Past Medical History:   Diagnosis Date    Anemia     Arthritis     osteoarthritis    Bladder tumor     Colon polyps 10/08/2019    tubular adenoma x2    COPD (chronic obstructive pulmonary disease) (HCC)     Diabetic neuropathy (HCC)     Diarrhea     Encounter for chronic pain management     Essential hypertension 05/12/2020    Heartburn     Hepatitis B core antibody positive     History of bleeding peptic ulcer     History of blood transfusion     no reactions    History of hepatitis C     History of migraine headaches     History of stress test 07/2020    \"low risk\"    Hyperlipidemia     Iron (Fe) deficiency anemia     Poor historian     states his wife takes care of all medical information    Positive FIT (fecal immunochemical test)     Type 2 diabetes mellitus with microalbuminuria, without long-term current use of insulin ([facility] [address] 07/13/2020    Under care of team 08/31/2021    pcp-Dr Bentley-shannen fuentes-last virtual visit july 2021    Under care of team 08/31/2021    hematology-Dr Caprice Saavedra 32 virtual visit aug 2021    Wears dentures     Wears glasses        Past Surgical History:   Procedure Laterality Date    BLADDER TUMOR EXCISION  04/29/2021    CYSTOSCOPY TUR BLADDER TUMOR, GYRUS, RIGHT STENT PLACEMENT AND RIGHT STENT REMOVAL    CERVICAL SPINE SURGERY  1977    cervical spine three times, has plate    CHOLECYSTECTOMY  03/22/2019    COLONOSCOPY  01/25/2015    10 yr recall, hemorrhoids    COLONOSCOPY N/A 10/08/2019    tubular adenoma x2    CYSTOSCOPY Right 4/29/2021    CYSTOSCOPY TUR BLADDER TUMOR, GYRUS, RIGHT STENT PLACEMENT AND RIGHT STENT REMOVAL performed by Kameron Kline MD at [address] [place] [address]  10/2015    all teeth extracted    HERNIA REPAIR N/A 08/07/2020    HERNIA INCISIONAL REPAIR LAPAROSCOPIC ROBOTIC WITH MESH performed [facility] Haydee Tyler DO at [facility] Right     UMBILICAL HERNIA REPAIR  3022-19    UPPER GASTROINTESTINAL ENDOSCOPY  01/25/2015    UPPER GASTROINTESTINAL ENDOSCOPY N/A 10/08/2019    EGD BIOPSY performed by Alexandr Graf MD at [facility]       Allergies   Allergen Reactions   Edvin Theo [Aspirin]       iron deficiency anemia , requiring iron infusions and transfusions  Iron      Pill- constipation, abdominal pain    Nsaids       iron deficiency anemia, history of bleeding ulcers          Current Outpatient Medications:     baclofen (LIORESAL) 10 MG tablet, TAKE ONE TABLET BY MOUTH THREE TIMES A DAY AS NEEDED FOR MUSCLE SPASMS, Disp: 90 tablet, Rfl: 0    oxyCODONE-acetaminophen (PERCOCET) 5-325 MG per tablet, Take 1 tablet by mouth every 8 hours for 30 days. , Disp: 90 tablet, Rfl: 0    morphine (MS CONTIN) 60 MG extended release tablet, Take 1 tablet by mouth 2 times daily for 30 days. , Disp: 60 tablet, Rfl: 0    pregabalin (LYRICA) 150 MG capsule, Take 1 capsule by mouth 3 times daily for 90 days. , Disp: 90 capsule, Rfl: 2    metFORMIN (GLUCOPHAGE) 1000 MG tablet, TAKE 1 TABLET BY MOUTH TWICE DAILY WITH MEALS, Disp: 180 tablet, Rfl: 1    cyanocobalamin 1000 MCG/ML injection, Inject 1 mL into the muscle every 30 days Call for next refill which will be monthly for life, Disp: 3 mL, Rfl: 3    blood glucose test strips (ASCENSIA AUTODISC VI;ONE TOUCH ULTRA TEST VI) strip, 1 each by In Vitro route daily Testing daily. needs true Metrix test strips, Disp: 100 strip, Rfl: 3    pravastatin (PRAVACHOL) 40 MG tablet, Take 1 tablet by mouth every evening Stop Gemfibrozil, Disp: 90 tablet, Rfl: 3    metoprolol tartrate (LOPRESSOR) 25 MG tablet, Take 1 tablet by mouth 2 times daily, Disp: 180 tablet, Rfl: 3    TRUEplus Lancets 30G MISC, Test blood glucose twice a day, Disp: 200 each, Rfl: 3    Alcohol Swabs (B-D SINGLE USE SWABS REGULAR) PADS, USE TO CHECK BLOOD SUGAR TWICE A DAY, Disp: 200 each, Rfl: 3    glipiZIDE (GLUCOTROL) 5 MG tablet, Take 1 tablet by mouth every morning Keep fasting morning blood glucose .   If blood glucose below 80, you need to stop glipizide, Disp: 90 tablet, Rfl: 3    pantoprazole (PROTONIX) 40 MG tablet, Take 1 tablet by mouth daily, Disp: 90 tablet, Rfl: 1    Syringe/Needle, Disp, (SYRINGE 3CC/25GX1\") 25G X 1\" 3 ML MISC, To be used with B12 Session:    Stress:     Feeling of Stress :    Social Connections:     Frequency of Communication with Friends and Family:     Frequency of Social Gatherings with Friends and Family:     Attends [employer] Services:     Active Member of Clubs or Organizations:     Attends Club or Organization Meetings:     Marital Status:    Intimate Partner Violence:     Fear of Current or Ex-Partner:     Emotionally Abused:     Physically Abused:     Sexually Abused:        Review of Systems:  Review of Systems   Constitutional: Negative for chills and fever. Cardiovascular: Negative for chest pain and palpitations. Respiratory: Negative for cough and shortness of breath. Musculoskeletal: Positive for back pain. Gastrointestinal: Negative for constipation. Neurological: Negative for disturbances in coordination, loss of balance, numbness, paresthesias and tingling. Physical Exam:  There were no vitals taken for this visit. Physical Exam  HENT:      Head: Normocephalic. Pulmonary:      Effort: Pulmonary effort is normal.   Neurological:      Mental Status: He is alert.    Psychiatric:         Mood and Affect: Mood normal.         Behavior: Behavior normal.         Record/Diagnostics Review:    Last zoe 4/2021 and was appropriate     ABERRANT BEHAVIORS SINCE LAST VISIT  Lost rx/pills:------------------------------------------ no  Taking  medication as prescribed: ----------- yes  Urine Drug Screen ---------------------------------Azell Caprice             Date------------------------------------------------- 4/2021              Results as expected ---------------------yes     Recent ER visits: -------------------------------------no  Pill count is appropriate: ---------------------------yes   Refills for prescriptions appropriate:---------- yes     Assessment:  Problem List Items Addressed This Visit     Degenerative disc disease, lumbar (Chronic)    Relevant Medications    oxyCODONE-acetaminophen (PERCOCET) 5-325 MG per tablet (Start on 9/5/2021)    morphine (MS CONTIN) 60 MG extended release tablet (Start on 9/5/2021)    Lumbar spondylosis (Chronic)    Relevant Medications    oxyCODONE-acetaminophen (PERCOCET) 5-325 MG per tablet (Start on 9/5/2021)    morphine (MS CONTIN) 60 MG extended release tablet (Start on 9/5/2021)    Lumbar radiculopathy (Chronic)    Relevant Medications    oxyCODONE-acetaminophen (PERCOCET) 5-325 MG per tablet (Start on 9/5/2021)    morphine (MS CONTIN) 60 MG extended release tablet (Start on 9/5/2021)    Lumbar spinal stenosis (Chronic)    Relevant Medications    oxyCODONE-acetaminophen (PERCOCET) 5-325 MG per tablet (Start on 9/5/2021)    Encounter for medication monitoring (Chronic)    Relevant Medications    oxyCODONE-acetaminophen (PERCOCET) 5-325 MG per tablet (Start on 9/5/2021)    Cervical spondylitis (HCC) (Chronic)    Relevant Medications    oxyCODONE-acetaminophen (PERCOCET) 5-325 MG per tablet (Start on 9/5/2021)    S/P cervical spinal fusion (Chronic)    Relevant Medications    oxyCODONE-acetaminophen (PERCOCET) 5-325 MG per tablet (Start on 9/5/2021)    Osteoarthritis of spine with radiculopathy, lumbar region    Relevant Medications    oxyCODONE-acetaminophen (PERCOCET) 5-325 MG per tablet (Start on 9/5/2021)    morphine (MS CONTIN) 60 MG extended release tablet (Start on 9/5/2021)    Encounter for chronic pain management    Relevant Medications    oxyCODONE-acetaminophen (PERCOCET) 5-325 MG per tablet (Start on 9/5/2021)    Osteoarthritis of lumbar spine    Relevant Medications    oxyCODONE-acetaminophen (PERCOCET) 5-325 MG per tablet (Start on 9/5/2021)    morphine (MS CONTIN) 60 MG extended release tablet (Start on 9/5/2021)             Treatment Plan:  Patient relates current medications are helping the pain. Patient reports taking pain medications as prescribed, denies obtaining medications from different sources and denies use of illegal drugs.  Patient denies side effects from medications like nausea, vomiting, constipation or drowsiness. Patient reports current activities of daily living are possible due to medications and would like to continue them. As always, we encourage daily stretching and strengthening exercises, and recommend minimizing use of pain medications unless patient cannot get through daily activities due to pain. Contract requirements met. Continue opioid therapy. Script written for MSER and percocet  Follow up appointment made for 4 weeks    Zay Moyer, was evaluated through a synchronous (real-time) audio-video encounter. The patient (or guardian if applicable) is aware that this is a billable service. Verbal consent to proceed has been obtained within the past 12 months. The visit was conducted pursuant to the emergency declaration under the [address] [place] [address], [address] [place] [address] authority and the [employer] and [employer] General Act. Patient identification was verified, and a caregiver was present when appropriate. The patient was located in a state where the provider was credentialed to provide care. Total time spent for this encounter: Not billed by time    --VEE Farr CNP on 9/3/2021 at 10:15 AM    An electronic signature was used to authenticate this note.

## 2021-09-09 ENCOUNTER — ANESTHESIA (OUTPATIENT)
Dept: OPERATING ROOM | Age: 71
End: 2021-09-09
Payer: MEDICARE

## 2021-09-09 ENCOUNTER — ANESTHESIA EVENT (OUTPATIENT)
Dept: OPERATING ROOM | Age: 71
End: 2021-09-09
Payer: MEDICARE

## 2021-09-09 ENCOUNTER — HOSPITAL ENCOUNTER (OUTPATIENT)
Age: 71
Setting detail: OUTPATIENT SURGERY
Discharge: HOME | End: 2021-09-09
Attending: UROLOGY | Admitting: UROLOGY
Payer: MEDICARE

## 2021-09-09 VITALS — SYSTOLIC BLOOD PRESSURE: 175 MMHG | OXYGEN SATURATION: 100 % | DIASTOLIC BLOOD PRESSURE: 105 MMHG | TEMPERATURE: 96.5 F

## 2021-09-09 VITALS
SYSTOLIC BLOOD PRESSURE: 149 MMHG | DIASTOLIC BLOOD PRESSURE: 91 MMHG | RESPIRATION RATE: 20 BRPM | TEMPERATURE: 97.2 F | WEIGHT: 210 LBS | HEART RATE: 71 BPM | BODY MASS INDEX: 31.1 KG/M2 | HEIGHT: 69 IN | OXYGEN SATURATION: 96 %

## 2021-09-09 LAB
GLUCOSE BLD-MCNC: 117 MG/DL (ref 75–110)
GLUCOSE BLD-MCNC: 133 MG/DL (ref 74–100)
POC POTASSIUM: 4.6 MMOL/L (ref 3.5–4.5)

## 2021-09-09 PROCEDURE — 7100000000 HC PACU RECOVERY - FIRST 15 MIN: Performed by: UROLOGY

## 2021-09-09 PROCEDURE — 2580000003 HC RX 258: Performed by: UROLOGY

## 2021-09-09 PROCEDURE — 2500000003 HC RX 250 WO HCPCS

## 2021-09-09 PROCEDURE — 84132 ASSAY OF SERUM POTASSIUM: CPT

## 2021-09-09 PROCEDURE — 2720000010 HC SURG SUPPLY STERILE: Performed by: UROLOGY

## 2021-09-09 PROCEDURE — 6360000002 HC RX W HCPCS

## 2021-09-09 PROCEDURE — 7100000001 HC PACU RECOVERY - ADDTL 15 MIN: Performed by: UROLOGY

## 2021-09-09 PROCEDURE — 3700000000 HC ANESTHESIA ATTENDED CARE: Performed by: UROLOGY

## 2021-09-09 PROCEDURE — 2500000003 HC RX 250 WO HCPCS: Performed by: NURSE ANESTHETIST, CERTIFIED REGISTERED

## 2021-09-09 PROCEDURE — 6360000002 HC RX W HCPCS: Performed by: STUDENT IN AN ORGANIZED HEALTH CARE EDUCATION/TRAINING PROGRAM

## 2021-09-09 PROCEDURE — 82947 ASSAY GLUCOSE BLOOD QUANT: CPT

## 2021-09-09 PROCEDURE — 3600000014 HC SURGERY LEVEL 4 ADDTL 15MIN: Performed by: UROLOGY

## 2021-09-09 PROCEDURE — 2580000003 HC RX 258: Performed by: ANESTHESIOLOGY

## 2021-09-09 PROCEDURE — 88307 TISSUE EXAM BY PATHOLOGIST: CPT

## 2021-09-09 PROCEDURE — 2709999900 HC NON-CHARGEABLE SUPPLY: Performed by: UROLOGY

## 2021-09-09 PROCEDURE — 6360000002 HC RX W HCPCS: Performed by: NURSE ANESTHETIST, CERTIFIED REGISTERED

## 2021-09-09 PROCEDURE — 3700000001 HC ADD 15 MINUTES (ANESTHESIA): Performed by: UROLOGY

## 2021-09-09 PROCEDURE — 7100000010 HC PHASE II RECOVERY - FIRST 15 MIN: Performed by: UROLOGY

## 2021-09-09 PROCEDURE — 3600000004 HC SURGERY LEVEL 4 BASE: Performed by: UROLOGY

## 2021-09-09 RX ORDER — CEPHALEXIN 500 MG/1
500 CAPSULE ORAL 3 TIMES DAILY
Qty: 9 CAPSULE | Refills: 0 | Status: SHIPPED | OUTPATIENT
Start: 2021-09-09 | End: 2021-09-12

## 2021-09-09 RX ORDER — HYDROCODONE BITARTRATE AND ACETAMINOPHEN 5; 325 MG/1; MG/1
1 TABLET ORAL
Status: DISCONTINUED | OUTPATIENT
Start: 2021-09-09 | End: 2021-09-09 | Stop reason: HOSPADM

## 2021-09-09 RX ORDER — ONDANSETRON 2 MG/ML
INJECTION INTRAMUSCULAR; INTRAVENOUS PRN
Status: DISCONTINUED | OUTPATIENT
Start: 2021-09-09 | End: 2021-09-09 | Stop reason: SDUPTHER

## 2021-09-09 RX ORDER — DEXAMETHASONE SODIUM PHOSPHATE 10 MG/ML
INJECTION INTRAMUSCULAR; INTRAVENOUS PRN
Status: DISCONTINUED | OUTPATIENT
Start: 2021-09-09 | End: 2021-09-09 | Stop reason: SDUPTHER

## 2021-09-09 RX ORDER — DIPHENHYDRAMINE HYDROCHLORIDE 50 MG/ML
12.5 INJECTION INTRAMUSCULAR; INTRAVENOUS
Status: DISCONTINUED | OUTPATIENT
Start: 2021-09-09 | End: 2021-09-09 | Stop reason: HOSPADM

## 2021-09-09 RX ORDER — NEOSTIGMINE METHYLSULFATE 5 MG/5 ML
SYRINGE (ML) INTRAVENOUS PRN
Status: DISCONTINUED | OUTPATIENT
Start: 2021-09-09 | End: 2021-09-09 | Stop reason: SDUPTHER

## 2021-09-09 RX ORDER — FENTANYL CITRATE 50 UG/ML
INJECTION, SOLUTION INTRAMUSCULAR; INTRAVENOUS PRN
Status: DISCONTINUED | OUTPATIENT
Start: 2021-09-09 | End: 2021-09-09 | Stop reason: SDUPTHER

## 2021-09-09 RX ORDER — LIDOCAINE HYDROCHLORIDE 10 MG/ML
INJECTION, SOLUTION EPIDURAL; INFILTRATION; INTRACAUDAL; PERINEURAL PRN
Status: DISCONTINUED | OUTPATIENT
Start: 2021-09-09 | End: 2021-09-09 | Stop reason: SDUPTHER

## 2021-09-09 RX ORDER — METOCLOPRAMIDE HYDROCHLORIDE 5 MG/ML
10 INJECTION INTRAMUSCULAR; INTRAVENOUS
Status: DISCONTINUED | OUTPATIENT
Start: 2021-09-09 | End: 2021-09-09 | Stop reason: HOSPADM

## 2021-09-09 RX ORDER — SODIUM CHLORIDE, SODIUM LACTATE, POTASSIUM CHLORIDE, CALCIUM CHLORIDE 600; 310; 30; 20 MG/100ML; MG/100ML; MG/100ML; MG/100ML
1000 INJECTION, SOLUTION INTRAVENOUS CONTINUOUS
Status: DISCONTINUED | OUTPATIENT
Start: 2021-09-09 | End: 2021-09-09 | Stop reason: HOSPADM

## 2021-09-09 RX ORDER — HYDRALAZINE HYDROCHLORIDE 20 MG/ML
5 INJECTION INTRAMUSCULAR; INTRAVENOUS EVERY 10 MIN PRN
Status: DISCONTINUED | OUTPATIENT
Start: 2021-09-09 | End: 2021-09-09 | Stop reason: HOSPADM

## 2021-09-09 RX ORDER — MAGNESIUM HYDROXIDE 1200 MG/15ML
LIQUID ORAL CONTINUOUS PRN
Status: COMPLETED | OUTPATIENT
Start: 2021-09-09 | End: 2021-09-09

## 2021-09-09 RX ORDER — PROMETHAZINE HYDROCHLORIDE 25 MG/ML
6.25 INJECTION, SOLUTION INTRAMUSCULAR; INTRAVENOUS
Status: DISCONTINUED | OUTPATIENT
Start: 2021-09-09 | End: 2021-09-09 | Stop reason: HOSPADM

## 2021-09-09 RX ORDER — MEPERIDINE HYDROCHLORIDE 50 MG/ML
12.5 INJECTION INTRAMUSCULAR; INTRAVENOUS; SUBCUTANEOUS EVERY 5 MIN PRN
Status: DISCONTINUED | OUTPATIENT
Start: 2021-09-09 | End: 2021-09-09 | Stop reason: HOSPADM

## 2021-09-09 RX ORDER — ROCURONIUM BROMIDE 10 MG/ML
INJECTION, SOLUTION INTRAVENOUS PRN
Status: DISCONTINUED | OUTPATIENT
Start: 2021-09-09 | End: 2021-09-09 | Stop reason: SDUPTHER

## 2021-09-09 RX ORDER — PROPOFOL 10 MG/ML
INJECTION, EMULSION INTRAVENOUS PRN
Status: DISCONTINUED | OUTPATIENT
Start: 2021-09-09 | End: 2021-09-09 | Stop reason: SDUPTHER

## 2021-09-09 RX ORDER — GLYCOPYRROLATE 1 MG/5 ML
SYRINGE (ML) INTRAVENOUS PRN
Status: DISCONTINUED | OUTPATIENT
Start: 2021-09-09 | End: 2021-09-09 | Stop reason: SDUPTHER

## 2021-09-09 RX ADMIN — PROPOFOL 200 MG: 10 INJECTION, EMULSION INTRAVENOUS at 10:24

## 2021-09-09 RX ADMIN — SUGAMMADEX 200 MG: 100 INJECTION, SOLUTION INTRAVENOUS at 11:05

## 2021-09-09 RX ADMIN — SODIUM CHLORIDE, POTASSIUM CHLORIDE, SODIUM LACTATE AND CALCIUM CHLORIDE 1000 ML: 600; 310; 30; 20 INJECTION, SOLUTION INTRAVENOUS at 09:13

## 2021-09-09 RX ADMIN — CEFAZOLIN SODIUM 2000 MG: 10 INJECTION, POWDER, FOR SOLUTION INTRAVENOUS at 10:35

## 2021-09-09 RX ADMIN — DEXAMETHASONE SODIUM PHOSPHATE 5 MG: 10 INJECTION INTRAMUSCULAR; INTRAVENOUS at 10:42

## 2021-09-09 RX ADMIN — Medication 4 MG: at 10:58

## 2021-09-09 RX ADMIN — LIDOCAINE HYDROCHLORIDE 50 MG: 10 INJECTION, SOLUTION EPIDURAL; INFILTRATION; INTRACAUDAL; PERINEURAL at 10:24

## 2021-09-09 RX ADMIN — FENTANYL CITRATE 50 MCG: 50 INJECTION, SOLUTION INTRAMUSCULAR; INTRAVENOUS at 10:24

## 2021-09-09 RX ADMIN — ROCURONIUM BROMIDE 50 MG: 10 INJECTION INTRAVENOUS at 10:24

## 2021-09-09 RX ADMIN — Medication 0.8 MG: at 10:58

## 2021-09-09 RX ADMIN — FENTANYL CITRATE 50 MCG: 50 INJECTION, SOLUTION INTRAMUSCULAR; INTRAVENOUS at 11:11

## 2021-09-09 RX ADMIN — ONDANSETRON 4 MG: 2 INJECTION, SOLUTION INTRAMUSCULAR; INTRAVENOUS at 10:58

## 2021-09-09 ASSESSMENT — PULMONARY FUNCTION TESTS
PIF_VALUE: 1
PIF_VALUE: 0
PIF_VALUE: 1
PIF_VALUE: 19
PIF_VALUE: 19
PIF_VALUE: 21
PIF_VALUE: 21
PIF_VALUE: 18
PIF_VALUE: 18
PIF_VALUE: 0
PIF_VALUE: 0
PIF_VALUE: 1
PIF_VALUE: 20
PIF_VALUE: 19
PIF_VALUE: 18
PIF_VALUE: 20
PIF_VALUE: 1
PIF_VALUE: 19
PIF_VALUE: 1
PIF_VALUE: 0
PIF_VALUE: 21
PIF_VALUE: 19
PIF_VALUE: 30
PIF_VALUE: 0
PIF_VALUE: 19
PIF_VALUE: 1
PIF_VALUE: 0
PIF_VALUE: 18
PIF_VALUE: 19
PIF_VALUE: 19
PIF_VALUE: 24
PIF_VALUE: 1
PIF_VALUE: 19
PIF_VALUE: 20
PIF_VALUE: 2
PIF_VALUE: 25
PIF_VALUE: 31
PIF_VALUE: 18
PIF_VALUE: 20
PIF_VALUE: 0
PIF_VALUE: 18
PIF_VALUE: 17
PIF_VALUE: 19
PIF_VALUE: 1
PIF_VALUE: 1
PIF_VALUE: 19
PIF_VALUE: 18
PIF_VALUE: 0
PIF_VALUE: 0

## 2021-09-09 ASSESSMENT — PAIN - FUNCTIONAL ASSESSMENT: PAIN_FUNCTIONAL_ASSESSMENT: 0-10

## 2021-09-09 ASSESSMENT — PAIN SCALES - GENERAL
PAINLEVEL_OUTOF10: 0

## 2021-09-09 NOTE — ANESTHESIA PRE PROCEDURE
Department of Anesthesiology  Preprocedure Note       Name:  Sigifredo Henderson   Age:  73 y.o.  :  1950                                          MRN:  [id number]         Date:  2021      Surgeon: Jorge Wei):  Latonia Mari MD    Procedure: Procedure(s):  CYSTOSCOPY, TRANSURETHRAL RESECTION BLADDER TUMOR    Medications prior to admission:   Prior to Admission medications    Medication Sig Start Date End Date Taking? Authorizing Provider   oxyCODONE-acetaminophen (PERCOCET) 5-325 MG per tablet Take 1 tablet by mouth every 8 hours for 30 days. 9/5/21 10/5/21  VEE Michel CNP   morphine (MS CONTIN) 60 MG extended release tablet Take 1 tablet by mouth 2 times daily for 30 days. 9/5/21 10/5/21  VEE Michel CNP   baclofen (LIORESAL) 10 MG tablet TAKE ONE TABLET BY MOUTH THREE TIMES A DAY AS NEEDED FOR MUSCLE SPASMS 21   Mark Vila MD   pregabalin (LYRICA) 150 MG capsule Take 1 capsule by mouth 3 times daily for 90 days. 8/3/21 11/1/21  VEE Michel CNP   metFORMIN (GLUCOPHAGE) 1000 MG tablet TAKE 1 TABLET BY MOUTH TWICE DAILY WITH MEALS 21   Mark Vila MD   cyanocobalamin 1000 MCG/ML injection Inject 1 mL into the muscle every 30 days Call for next refill which will be monthly for life 21   Mark Vila MD   blood glucose test strips (ASCENSIA AUTODISC VI;ONE TOUCH ULTRA TEST VI) strip 1 each by In Vitro route daily Testing daily.  needs true Metrix test strips 21  Mark Vila MD   pravastatin (PRAVACHOL) 40 MG tablet Take 1 tablet by mouth every evening Stop Gemfibrozil 21   Mark Vila MD   metoprolol tartrate (LOPRESSOR) 25 MG tablet Take 1 tablet by mouth 2 times daily 21   Mark Vila MD   TRUEplus Lancets 30G MISC Test blood glucose twice a day 21   Mark Vila MD   Alcohol Swabs (B-D SINGLE USE SWABS REGULAR) PADS USE TO CHECK BLOOD SUGAR TWICE A DAY 21 Teddy Macias MD   glipiZIDE (GLUCOTROL) 5 MG tablet Take 1 tablet by mouth every morning Keep fasting morning blood glucose . If blood glucose below 80, you need to stop glipizide 4/16/21   Teddy Macias MD   pantoprazole (PROTONIX) 40 MG tablet Take 1 tablet by mouth daily 2/19/21   Teddy Macias MD   Syringe/Needle, Disp, (SYRINGE 3CC/25GX1\") 25G X 1\" 3 ML MISC To be used with B12 injections 1/8/21   Teddy Macias MD   loperamide (RA ANTI-DIARRHEAL) 2 MG capsule Take 1 capsule by mouth 4 times daily as needed for Diarrhea 1/5/21   Ester Sanchez MD   lidocaine (LIDODERM) 5 % Place 1 patch onto the skin daily 12 hours on, 12 hours off. 12/29/20   Teddy Macias MD   lisinopril-hydroCHLOROthiazide (PRINZIDE;ZESTORETIC) 10-12.5 MG per tablet TK 1 T PO QD 12/7/20   Teddy Macias MD   Probiotic Product (PROBIOTIC-10 PO) Take by mouth daily     Historical Provider, MD   Blood Glucose Monitoring Suppl (TRUE METRIX METER) w/Device KIT USE AS DIRECTED TO TEST BLOOD SUGAR 4/8/20   Historical Provider, MD       Current medications:    Current Facility-Administered Medications   Medication Dose Route Frequency Provider Last Rate Last Admin    lactated ringers infusion 1,000 mL  1,000 mL IntraVENous Continuous Anthony Hudson MD           Allergies:     Allergies   Allergen Reactions    Asa [Aspirin]       iron deficiency anemia , requiring iron infusions and transfusions    Iron      Pill- constipation, abdominal pain    Nsaids       iron deficiency anemia, history of bleeding ulcers        Problem List:    Patient Active Problem List   Diagnosis Code    Degenerative disc disease, lumbar M51.36    Lumbar spondylosis M47.816    Lumbar radiculopathy M54.16    Lumbar spinal stenosis M48.061    Encounter for medication monitoring Z51.81    Cervical spondylitis ([facility] [address] M46.92    S/P cervical spinal fusion Z98.1    Anemia D64.9    GERD (gastroesophageal reflux disease) K21.9    Osteoarthritis of spine with radiculopathy, lumbar region M47.26    Encounter for chronic pain management G89.29    Osteoarthritis of lumbar spine M47.816    Iron deficiency anemia D50.9    Colon polyps K63.5    Hepatitis B core antibody positive R76.8    Peripheral polyneuropathy G62.9    Essential hypertension I10    Type 2 diabetes mellitus with hyperglycemia, without long-term current use of insulin (HCC) E11.65    Hyperlipidemia with target LDL less than 100 E78.5    Vitamin D deficiency E55.9    Vitamin B 12 deficiency E53.8    Abnormal EKG R94.31    Type 2 diabetes mellitus with diabetic polyneuropathy, without long-term current use of insulin (HCC) E11.42    Abdominal discomfort R10.9    Irritable bowel syndrome with diarrhea K58.0    Chronic hepatitis C without hepatic coma (HCC) B18.2    Diarrhea R19.7    Status post hernia repair Z98.890, Z87.19    Incisional hernia without obstruction or gangrene K43.2    Worsening headaches R51.9    Positive FIT (fecal immunochemical test) R19.5    History of hepatitis C Z86.19    Absolute anemia D64.9    Malignant neoplasm of lateral wall of urinary bladder (HCC) C67.2       Past Medical History:        Diagnosis Date    Anemia     Arthritis     osteoarthritis    Bladder tumor     Colon polyps 10/08/2019    tubular adenoma x2    COPD (chronic obstructive pulmonary disease) (HCC)     Diabetic neuropathy (HCC)     Diarrhea     Encounter for chronic pain management     Essential hypertension 05/12/2020    Heartburn     Hepatitis B core antibody positive     History of bleeding peptic ulcer     History of blood transfusion     no reactions    History of hepatitis C     History of migraine headaches     History of stress test 07/2020    \"low risk\"    Hyperlipidemia     Iron (Fe) deficiency anemia     Poor historian     states his wife takes care of all medical information    Positive FIT (fecal immunochemical test)     Type 2 diabetes mellitus with microalbuminuria, without long-term current use of insulin ([facility] [address] 2020    Under care of team 2021    pcp-Dr Oumou Villareal virtual visit 2021    Under care of team 2021    hematology-Dr Caprice Saavedra 32 virtual visit aug 2021   Olivia Patricia Wears dentures     Wears glasses        Past Surgical History:        Procedure Laterality Date    BLADDER TUMOR EXCISION  2021    CYSTOSCOPY TUR BLADDER TUMOR, GYRUS, RIGHT STENT PLACEMENT AND RIGHT STENT REMOVAL    CERVICAL SPINE SURGERY  1977    cervical spine three times, has plate    CHOLECYSTECTOMY  2019    COLONOSCOPY  2015    10 yr recall, hemorrhoids    COLONOSCOPY N/A 10/08/2019    tubular adenoma x2    CYSTOSCOPY Right 2021    CYSTOSCOPY TUR BLADDER TUMOR, GYRUS, RIGHT STENT PLACEMENT AND RIGHT STENT REMOVAL performed by Drea Phillips MD at [address] [place] [address]  10/2015    all teeth extracted    HERNIA REPAIR N/A 2020    HERNIA INCISIONAL REPAIR LAPAROSCOPIC ROBOTIC WITH MESH performed by Alisha Harris DO at [facility] Right    [address] [place] [address]    UPPER GASTROINTESTINAL ENDOSCOPY  2015    UPPER GASTROINTESTINAL ENDOSCOPY N/A 10/08/2019    EGD BIOPSY performed by Surya Rivera MD at [facility]       Social History:    Social History     Tobacco Use    Smoking status: Former Smoker     Packs/day: 0.50     Years: 45.00     Pack years: 22.50     Types: Cigarettes     Quit date: 2015     Years since quittin.7    Smokeless tobacco: Never Used    Tobacco comment: on chantix 11-6-15   12-7-15   Substance Use Topics    Alcohol use: No                                Counseling given: Not Answered  Comment: on chantix 11-6-15   12-7-15      Vital Signs (Current): There were no vitals filed for this visit.                                            BP Readings from Last 3 Encounters:   21 130/79   21 110/66 08/18/21 111/65       NPO Status: Time of last liquid consumption: 1830                        Time of last solid consumption: 1830                        Date of last liquid consumption: 09/08/21                        Date of last solid food consumption: 09/08/21    BMI:   Wt Readings from Last 3 Encounters:   08/31/21 212 lb (96.2 kg)   08/09/21 213 lb (96.6 kg)   05/14/21 213 lb 1.6 oz (96.7 kg)     There is no height or weight on file to calculate BMI.    CBC:   Lab Results   Component Value Date    WBC 6.7 08/31/2021    RBC 4.61 08/31/2021    HGB 12.3 08/31/2021    HCT 40.0 08/31/2021    MCV 86.8 08/31/2021    RDW 21.1 08/31/2021     08/31/2021       CMP:   Lab Results   Component Value Date     08/31/2021    K 4.8 08/31/2021     08/31/2021    CO2 23 08/31/2021    BUN 13 08/31/2021    CREATININE 0.60 08/31/2021    GFRAA >60 08/31/2021    LABGLOM >60 08/31/2021    GLUCOSE 76 08/31/2021    PROT 7.2 05/12/2021    CALCIUM 8.8 07/10/2021    BILITOT 0.22 05/12/2021    ALKPHOS 85 05/12/2021    AST 13 05/12/2021    ALT 12 05/12/2021       POC Tests: No results for input(s): POCGLU, POCNA, POCK, POCCL, POCBUN, POCHEMO, POCHCT in the last 72 hours.     Coags:   Lab Results   Component Value Date    PROTIME 12.3 02/28/2021    INR 0.9 02/28/2021    APTT 24.5 02/28/2021       HCG (If Applicable): No results found for: PREGTESTUR, PREGSERUM, HCG, HCGQUANT     ABGs: No results found for: PHART, PO2ART, MTO1QCZ, JEX6VJO, BEART, S5VNJJEF     Type & Screen (If Applicable):  No results found for: LABABO, LABRH    Drug/Infectious Status (If Applicable):  Lab Results   Component Value Date    HEPCAB REACTIVE 07/13/2020       COVID-19 Screening (If Applicable):   Lab Results   Component Value Date    COVID19 Not Detected 04/25/2021    COVID19 Not Detected 08/03/2020           Anesthesia Evaluation  Patient summary reviewed and Nursing notes reviewed no history of anesthetic complications:   Airway: Mallampati: I  TM distance: >3 FB   Neck ROM: full  Mouth opening: > = 3 FB Dental:    (+) edentulous      Pulmonary:normal exam    (+) COPD:                             Cardiovascular:    (+) hypertension:,                   Neuro/Psych:   (+) neuromuscular disease (peripheral neuropathy):,             GI/Hepatic/Renal:   (+) GERD:, hepatitis: C, liver disease:,           Endo/Other:    (+) DiabetesType II DM, , malignancy/cancer (bladder cancer). Abdominal:             Vascular: Other Findings:           Anesthesia Plan      general     ASA 3       Induction: intravenous. MIPS: Postoperative opioids intended and Prophylactic antiemetics administered. Anesthetic plan and risks discussed with patient. Plan discussed with CRNA.                   Monique Izaguirre MD   9/9/2021

## 2021-09-09 NOTE — H&P
Hayder Shirley MD, Gabino Ramos MD, Day Edmond MD, Mayela Damon MD, Jamaica Doran MD, Audrey Key MD, & Isa Spaulding MD    Urology History & Physical      Patient:  Ivan Melendez  MRN: [id number]  YOB: 1950    CHIEF COMPLAINT:  Bladder tumor    HISTORY OF PRESENT ILLNESS:   Ivan Melendez is a 73 y.o. male who presents with above. Here for TURBT    Patient's old records, notes and chart reviewed and summarized above.     Past Medical History:    Past Medical History:   Diagnosis Date    Anemia     Arthritis     osteoarthritis    Bladder tumor     Colon polyps 10/08/2019    tubular adenoma x2    COPD (chronic obstructive pulmonary disease) (HCC)     Diabetic neuropathy (HCC)     Diarrhea     Encounter for chronic pain management     Essential hypertension 05/12/2020    Heartburn     Hepatitis B core antibody positive     History of bleeding peptic ulcer     History of blood transfusion     no reactions    History of hepatitis C     History of migraine headaches     History of stress test 07/2020    \"low risk\"    Hyperlipidemia     Iron (Fe) deficiency anemia     Poor historian     states his wife takes care of all medical information    Positive FIT (fecal immunochemical test)     Type 2 diabetes mellitus with microalbuminuria, without long-term current use of insulin ([facility] [address] 07/13/2020    Under care of team 08/31/2021    pcp-Dr Yamila fuentes-last virtual visit july 2021    Under care of team 08/31/2021    hematology-Dr Caprice [address] virtual visit aug 2021    Wears dentures     Wears glasses        Past Surgical History:    Past Surgical History:   Procedure Laterality Date    BLADDER TUMOR EXCISION  04/29/2021    CYSTOSCOPY TUR BLADDER TUMOR, GYRUS, RIGHT STENT PLACEMENT AND RIGHT STENT REMOVAL    CERVICAL SPINE SURGERY  1977    cervical spine three times, has plate    CHOLECYSTECTOMY  03/22/2019    COLONOSCOPY  01/25/2015    10 yr recall, hemorrhoids    COLONOSCOPY N/A 10/08/2019    tubular adenoma x2    CYSTOSCOPY Right 2021    CYSTOSCOPY TUR BLADDER TUMOR, GYRUS, RIGHT STENT PLACEMENT AND RIGHT STENT REMOVAL performed by Kameron Kline MD at [address] [place] [address]  10/2015    all teeth extracted    HERNIA REPAIR N/A 2020    HERNIA INCISIONAL REPAIR LAPAROSCOPIC ROBOTIC WITH MESH performed by Haydee Tyler DO at [facility]     UMBILICAL HERNIA REPAIR  3022-19    UPPER GASTROINTESTINAL ENDOSCOPY  2015    UPPER GASTROINTESTINAL ENDOSCOPY N/A 10/08/2019    EGD BIOPSY performed by Alexandr Graf MD at [facility]       Medications:      Current Facility-Administered Medications:     lactated ringers infusion 1,000 mL, 1,000 mL, IntraVENous, Continuous, Ava Grullon MD    Allergies:     Allergies   Allergen Reactions    Asa [Aspirin]       iron deficiency anemia , requiring iron infusions and transfusions    Iron      Pill- constipation, abdominal pain    Nsaids       iron deficiency anemia, history of bleeding ulcers        Social History:   Social History     Socioeconomic History    Marital status:      Spouse name: Not on file    Number of children: Not on file    Years of education: Not on file    Highest education level: Not on file   Occupational History    Occupation: disabled   Tobacco Use    Smoking status: Former Smoker     Packs/day: 0.50     Years: 45.00     Pack years: 22.50     Types: Cigarettes     Quit date: 2015     Years since quittin.7    Smokeless tobacco: Never Used    Tobacco comment: on chantix 11-6-15   12-7-15   Vaping Use    Vaping Use: Never used   Substance and Sexual Activity    Alcohol use: No    Drug use: No    Sexual activity: Yes     Partners: Female   Other Topics Concern    Not on file   Social History Narrative    Not on file     Social Determinants of Health     Financial Resource Strain: High Risk    Difficulty of Paying Living Expenses: Very hard   Food Insecurity: Food Insecurity Present    Worried About Running Out of Food in the Last Year: Often true    Jennifer of Food in the Last Year: Often true   Transportation Needs:     Lack of Transportation (Medical):  Lack of Transportation (Non-Medical):    Physical Activity:     Days of Exercise per Week:     Minutes of Exercise per Session:    Stress:     Feeling of Stress :    Social Connections:     Frequency of Communication with Friends and Family:     Frequency of Social Gatherings with Friends and Family:     Attends Synagogue Services:     Active Member of Clubs or Organizations:     Attends Club or Organization Meetings:     Marital Status:    Intimate Partner Violence:     Fear of Current or Ex-Partner:     Emotionally Abused:     Physically Abused:     Sexually Abused:        Family History:    Family History   Problem Relation Age of Onset    Diabetes Mother     Heart Disease Father        REVIEW OF SYSTEMS:  A comprehensive 14 point review of systems was obtained. Constitutional: No fatigue  Eyes: No blurry vision  Ears, nose, mouth, throat, face: No ringing in the ears; no facial droop. Respiratory: No cough or cold. Cardiovascular: No palpitations  Gastrointestinal: No diarrhea or constipation. Genitourinary: No burning with urination  Integument/Skin: No rashes  Hematologic/Lymphatic: No easy bruising  Musculoskeletal: No muscle pains  Neurologic: No weakness in the extremities. Psychiatric: No depression or suicidal thoughts. Endocrine: No heat or cold intolerances. Allergic/Immunologic: No current seasonal allergies; no skin hives. Physical Exam:    There were no vitals filed for this visit. Constitutional: Patient in no acute distress. Neuro: alert and oriented to person place and time. Head: Atraumatic and normocephalic. Neck: Trachea midline   Ext: 2+ radial pulses bilaterally.   Psych: Mood and affect normal.  Skin: No rashes or bruising present. Lungs: Respiratory effort normal.  Cardiovascular:  Regular rhythm. Abdomen: Soft, non-tender, non-distended. Labs:  No results for input(s): WBC, HGB, HCT, MCV, PLT in the last 72 hours. No results for input(s): NA, K, CL, CO2, PHOS, BUN, CREATININE in the last 72 hours. Invalid input(s): CA    No results for input(s): COLORU, PHUR, LABCAST, WBCUA, RBCUA, MUCUS, TRICHOMONAS, YEAST, BACTERIA, CLARITYU, SPECGRAV, LEUKOCYTESUR, UROBILINOGEN, Manuela Senters in the last 72 hours. Invalid input(s): NITRATE, GLUCOSEUKETONESUAMORPHOUS    Culture Results:        -----------------------------------------------------------------  Imaging Results:  No results found.     Assessment and Plan   Assessment:  Jocelin Cuellar is a 73 y.o. male who presents with:  Bladder tumor    Plan:   OR for TURBT    Ashwin Fern Blinks, MD [facility] Department of Urology   9:05 AM 9/9/2021

## 2021-09-09 NOTE — ANESTHESIA POSTPROCEDURE EVALUATION
POST- ANESTHESIA EVALUATION       Pt Name: Rand Fabry  MRN: [id number]  YOB: 1950  Date of evaluation: 9/9/2021  Time:  11:41 AM      BP (!) 150/88   Pulse 69   Temp 96.6 °F (35.9 °C) (Temporal)   Resp 14   Ht 5' 9\" (1.753 m)   Wt 210 lb (95.3 kg)   SpO2 93%   BMI 31.01 kg/m²      Consciousness Level  Awake  Cardiopulmonary Status  Stable  Pain Adequately Treated YES  Nausea / Vomiting  NO  Adequate Hydration  YES  Anesthesia Related Complications NONE      Electronically signed by Jairo Berrios MD on 9/9/2021 at 11:41 AM       Department of Anesthesiology  Postprocedure Note    Patient: Rand Fabry  MRN: [id number]  YOB: 1950  Date of evaluation: 9/9/2021  Time:  11:41 AM     Procedure Summary     Date: 09/09/21 Room / Location: [address]    Anesthesia Start: 1020 Anesthesia Stop: 9780    Procedure: CYSTOSCOPY, TRANSURETHRAL RESECTION BLADDER TUMOR (N/A ) Diagnosis: (BLADDER TUMOR)    Surgeons: Ted Rodriguez MD Responsible Provider: Jaior Berrios MD    Anesthesia Type: general ASA Status: 3          Anesthesia Type: general    Be Phase I: Be Score: 10    Be Phase II:      Last vitals: Reviewed and per EMR flowsheets.        Anesthesia Post Evaluation

## 2021-09-09 NOTE — OP NOTE
Operative Note      Patient: Camila Jackson  YOB: 1950  MRN: [id number]    Date of Procedure: 9/9/2021    Pre-Op Diagnosis: BLADDER TUMOR    Post-Op Diagnosis: Same       Procedure(s):  CYSTOSCOPY, TRANSURETHRAL RESECTION BLADDER TUMOR - MEDIUM (3 CM)    Surgeon(s):  Samara Argueta MD    Assistant:   Resident: Lucero Norwood MD    Anesthesia: General    Estimated Blood Loss (mL): Minimal    Complications: None    Specimens:   ID Type Source Tests Collected by Time Destination   A : BLADDER TUMOR Tissue Bladder SURGICAL PATHOLOGY Samara Argueta MD 9/9/2021 1056        Implants:  * No implants in log *      Drains:   Urethral Catheter Triple-lumen 22 fr (Active)       Findings: Multifocal recurrence of bladder tumor    Indications:  Patient is a pleasant 22-year-old male with a history of low-grade noninvasive bladder urothelial carcinoma diagnosed 4/30/2021 on TURBT. He was found to have recurrence on surveillance cystoscopy and presents for reresection. Risks, benefits, and alternatives were discussed with the patient and he elected to proceed. Informed consent was obtained. Detailed Description of Procedure:   Patient was taken to the operating room and transferred to the operating room table. General anesthesia was induced appropriately and preoperative antibiotics consisting of Ancef 2 g IV were administered. EPC cuffs were placed and confirmed to be working. Patient was placed in dorsolithotomy position and prepped and draped in the usual sterile fashion. Surgical timeout was performed. We began by assembling a 25 Russian rigid visual obturator with 12 degree lens and advancing this per urethra into the bladder. Anterior and posterior urethra were normal.  Prostate was noted to be mildly obstructing.   Once in the bladder, we switched to a resectoscope with 30 degree lens and pan cystoscopy revealed multifocal recurrence of tumor with small subcentimeter tumors at the dome and posterior wall as well as recurrence along the previous resection site just adjacent to the right ureteral orifice. We proceeded to resect the recurrence along the previous resection site down to muscle grossly. We then cauterized the satellite lesions. All tumor was removed using the scope and there was no other visualized tumor. Hemostasis was observed with electrocautery. Bladder was then drained and the scope was removed intact. We did not leave a Tate catheter. Patient tolerated procedure well without complication and was taken to PACU in good and stable condition. Dr. Renetta Lopez was present for all critical portions of the procedure. Plan:  Patient will follow up in 2 weeks for discussion of pathology.     Electronically signed by Pernell Sandoval MD on 9/9/2021 at 11:01 AM

## 2021-09-13 LAB — SURGICAL PATHOLOGY REPORT: NORMAL

## 2021-09-15 NOTE — RESULT ENCOUNTER NOTE
Management per orthopedics, has appointment coming up, has known malignancy of the urinary bladder    Future Appointments  9/20/2021  11:40 AM   MD Tim Grijalva. C URO           [facility]  9/24/2021  11:30 AM   Matheus Handley MD      sc               [facility]  10/4/2021  2:30 PM    Leann Rubio MD   [address]  11/10/2021 4:15 PM    Tomas Streeter MD          [facility]

## 2021-09-17 ASSESSMENT — LIFESTYLE VARIABLES
AUDIT TOTAL SCORE: INCOMPLETE
AUDIT-C TOTAL SCORE: INCOMPLETE
HOW OFTEN DO YOU HAVE A DRINK CONTAINING ALCOHOL: 0
HOW OFTEN DO YOU HAVE A DRINK CONTAINING ALCOHOL: NEVER

## 2021-09-17 ASSESSMENT — PATIENT HEALTH QUESTIONNAIRE - PHQ9
SUM OF ALL RESPONSES TO PHQ9 QUESTIONS 1 & 2: 0
SUM OF ALL RESPONSES TO PHQ QUESTIONS 1-9: 0
1. LITTLE INTEREST OR PLEASURE IN DOING THINGS: 0
2. FEELING DOWN, DEPRESSED OR HOPELESS: 0
SUM OF ALL RESPONSES TO PHQ QUESTIONS 1-9: 0
SUM OF ALL RESPONSES TO PHQ QUESTIONS 1-9: 0

## 2021-09-20 ENCOUNTER — OFFICE VISIT (OUTPATIENT)
Dept: UROLOGY | Age: 71
End: 2021-09-20

## 2021-09-20 VITALS
TEMPERATURE: 97.2 F | DIASTOLIC BLOOD PRESSURE: 79 MMHG | HEART RATE: 78 BPM | SYSTOLIC BLOOD PRESSURE: 126 MMHG | BODY MASS INDEX: 30.66 KG/M2 | HEIGHT: 69 IN | WEIGHT: 207 LBS

## 2021-09-20 DIAGNOSIS — C67.2 MALIGNANT NEOPLASM OF LATERAL WALL OF URINARY BLADDER (HCC): Primary | ICD-10-CM

## 2021-09-20 DIAGNOSIS — R31.0 GROSS HEMATURIA: ICD-10-CM

## 2021-09-20 DIAGNOSIS — R39.89 BLADDER PAIN: ICD-10-CM

## 2021-09-20 PROCEDURE — 99999 PR OFFICE/OUTPT VISIT,PROCEDURE ONLY: CPT | Performed by: UROLOGY

## 2021-09-20 ASSESSMENT — ENCOUNTER SYMPTOMS
EYE PAIN: 0
BACK PAIN: 1
SHORTNESS OF BREATH: 0
VOMITING: 0
COUGH: 0
ABDOMINAL PAIN: 0
NAUSEA: 0
EYE REDNESS: 0
CONSTIPATION: 0
DIARRHEA: 1

## 2021-09-20 NOTE — PROGRESS NOTES
[address] [address]  Dept: [address] [facility] Urology Office Note - Established    Patient:  Agata Dave  YOB: 1950  Date: 9/20/2021    The patient is a 73 y.o. male who presents todayfor evaluation of the following problems:   Chief Complaint   Patient presents with    Follow-up     TURBT results        HPI  Is a very pleasant 22-year-old gentleman with a history of bladder cancer. He recently underwent a TURBT. His pathology report does demonstrate low-grade urothelial carcinoma, noninvasive. He does have some pressure with urination and some discomfort but this is tolerable. He is otherwise doing well. He is here to discuss his pathology result. His last PSA was in March of this year and it was less than 1. Summary of old records: N/A    Additional History: N/A    Procedures Today: N/A    Urinalysis today:  No results found for this visit on 09/20/21. Last several PSA's:  Lab Results   Component Value Date    PSA 0.62 03/17/2021     Last total testosterone:  No results found for: TESTOSTERONE    AUA Symptom Score (9/20/2021):   INCOMPLETE EMPTYING: How often have you had the sensation of not emptying your bladder?: Not at all  FREQUENCY: How often do you have to urinate less than every two hours?: Less than 1 to 5 times  INTERMITTENCY: How often have you found you stopped and started again several times when you urinated?: Not at all  URGENCY: How often have you found it difficult to postpone urination?: Not at all  WEAK STREAM: How often have you had a weak urinary stream?: Less than 1 to 5 times  STRAINING: How often have you had to strain to start  urination?: Not at all  NOCTURIA: How many times did you typically get up at night to uriniate?: 1 Time  TOTAL I-PSS SCORE[de-identified] 3  How would you feel if you were to spend the rest of your life with your urinary condition?: Mostly Dissatisfied    Last BUN and creatinine:  Lab Results   Component Value Date    BUN 13 08/31/2021     Lab Results   Component Value Date    CREATININE 0.60 (L) 08/31/2021       Additional Lab/Culture results: none    Imaging Reviewed during this Office Visit: none  (results were independently reviewed by physician and radiology report verified)    PAST MEDICAL, FAMILY AND SOCIAL HISTORY UPDATE:  Past Medical History:   Diagnosis Date    Anemia     Arthritis     osteoarthritis    Bladder tumor     Colon polyps 10/08/2019    tubular adenoma x2    [facility] obstructive pulmonary disease) [facility] [address]     Diabetic neuropathy ([facility] [address]     Diarrhea     Encounter for chronic pain management     Essential hypertension 05/12/2020    Heartburn     Hepatitis B core antibody positive     History of bleeding peptic ulcer     History of blood transfusion     no reactions    History of hepatitis C     History of migraine headaches     History of stress test 07/2020    \"low risk\"    Hyperlipidemia     Iron (Fe) deficiency anemia     Poor historian     states his wife takes care of all medical information    Positive FIT (fecal immunochemical test)     Type 2 diabetes mellitus with microalbuminuria, without long-term current use of insulin ([facility] [address] 07/13/2020    Under care of team 08/31/2021    pcp-Dr Yamila fuentes-last virtual visit july 2021    Under care of team 08/31/2021    hematology-Dr [address] virtual visit aug 2021    Wears dentures     Wears glasses      Past Surgical History:   Procedure Laterality Date    BLADDER TUMOR EXCISION  04/29/2021    CYSTOSCOPY TUR BLADDER TUMOR, GYRUS, RIGHT STENT PLACEMENT AND RIGHT STENT REMOVAL    CERVICAL SPINE SURGERY  1977    cervical spine three times, has plate    CHOLECYSTECTOMY  03/22/2019    COLONOSCOPY  01/25/2015    10 yr recall, hemorrhoids    COLONOSCOPY N/A 10/08/2019    tubular adenoma x2    CYSTOSCOPY Right 4/29/2021    CYSTOSCOPY TUR BLADDER Alert and oriented to person, place and time. Assessment and Plan      1. Malignant neoplasm of lateral wall of urinary bladder (HCC)    2. Gross hematuria    3. Bladder pain           Plan:   F/u 3 mo cystoscopy      Return in about 3 months (around 12/20/2021) for Cystoscopy. Prescriptions Ordered:  No orders of the defined types were placed in this encounter. Orders Placed:  No orders of the defined types were placed in this encounter. Roxie Ramos MD    Agree with the ROS entered by the MA.

## 2021-09-20 NOTE — PROGRESS NOTES
Review of Systems   Constitutional: Negative for appetite change, chills and fatigue. Eyes: Negative for pain, redness and visual disturbance. Respiratory: Negative for cough and shortness of breath. Gastrointestinal: Positive for diarrhea. Negative for abdominal pain, constipation, nausea and vomiting. Genitourinary: Negative for difficulty urinating, dysuria, flank pain, frequency, hematuria and urgency. Musculoskeletal: Positive for back pain. Negative for joint swelling and myalgias. Skin: Negative for rash and wound. Neurological: Negative for dizziness, weakness and numbness. Hematological: Does not bruise/bleed easily.

## 2021-09-24 ENCOUNTER — OFFICE VISIT (OUTPATIENT)
Dept: FAMILY MEDICINE CLINIC | Age: 71
End: 2021-09-24
Payer: MEDICARE

## 2021-09-24 VITALS
BODY MASS INDEX: 31.16 KG/M2 | HEIGHT: 69 IN | DIASTOLIC BLOOD PRESSURE: 82 MMHG | WEIGHT: 210.4 LBS | OXYGEN SATURATION: 96 % | SYSTOLIC BLOOD PRESSURE: 132 MMHG | HEART RATE: 87 BPM | TEMPERATURE: 97.3 F

## 2021-09-24 DIAGNOSIS — Z23 NEED FOR INFLUENZA VACCINATION: ICD-10-CM

## 2021-09-24 DIAGNOSIS — E78.5 HYPERLIPIDEMIA WITH TARGET LDL LESS THAN 100: ICD-10-CM

## 2021-09-24 DIAGNOSIS — Z00.00 ROUTINE GENERAL MEDICAL EXAMINATION AT A HEALTH CARE FACILITY: Primary | ICD-10-CM

## 2021-09-24 DIAGNOSIS — K21.9 GASTROESOPHAGEAL REFLUX DISEASE WITHOUT ESOPHAGITIS: ICD-10-CM

## 2021-09-24 DIAGNOSIS — E55.9 VITAMIN D DEFICIENCY: ICD-10-CM

## 2021-09-24 DIAGNOSIS — E11.42 TYPE 2 DIABETES MELLITUS WITH DIABETIC POLYNEUROPATHY, WITHOUT LONG-TERM CURRENT USE OF INSULIN (HCC): ICD-10-CM

## 2021-09-24 DIAGNOSIS — I10 ESSENTIAL HYPERTENSION: ICD-10-CM

## 2021-09-24 LAB — HBA1C MFR BLD: 5 %

## 2021-09-24 PROCEDURE — G0008 ADMIN INFLUENZA VIRUS VAC: HCPCS | Performed by: FAMILY MEDICINE

## 2021-09-24 PROCEDURE — 4040F PNEUMOC VAC/ADMIN/RCVD: CPT | Performed by: FAMILY MEDICINE

## 2021-09-24 PROCEDURE — 90694 VACC AIIV4 NO PRSRV 0.5ML IM: CPT | Performed by: FAMILY MEDICINE

## 2021-09-24 PROCEDURE — 83036 HEMOGLOBIN GLYCOSYLATED A1C: CPT | Performed by: FAMILY MEDICINE

## 2021-09-24 PROCEDURE — G0438 PPPS, INITIAL VISIT: HCPCS | Performed by: FAMILY MEDICINE

## 2021-09-24 PROCEDURE — 3044F HG A1C LEVEL LT 7.0%: CPT | Performed by: FAMILY MEDICINE

## 2021-09-24 PROCEDURE — 3017F COLORECTAL CA SCREEN DOC REV: CPT | Performed by: FAMILY MEDICINE

## 2021-09-24 PROCEDURE — 1123F ACP DISCUSS/DSCN MKR DOCD: CPT | Performed by: FAMILY MEDICINE

## 2021-09-24 RX ORDER — PANTOPRAZOLE SODIUM 40 MG/1
40 TABLET, DELAYED RELEASE ORAL DAILY
Qty: 90 TABLET | Refills: 1 | Status: SHIPPED | OUTPATIENT
Start: 2021-09-24 | End: 2022-04-13

## 2021-09-24 ASSESSMENT — VISUAL ACUITY
OD_CC: 20/25
OS_CC: 20/40

## 2021-09-24 NOTE — RESULT ENCOUNTER NOTE
Addressed during office visit today, *A1c 5, within normal limits   Continue current treatment discussed during visit

## 2021-09-24 NOTE — PROGRESS NOTES
Medicare Annual Wellness Visit  Name: Aretha Martinez Date: 2021   MRN: [id number] Sex: Male   Age: 73 y.o. Ethnicity: Non- / Non    : 1950 Race: White (non-)      Nichole Mitchell is here for Medicare AWV and Immunizations (YES HAD COVID-19 )    Screenings for behavioral, psychosocial and functional/safety risks, and cognitive dysfunction are all negative except as indicated below. These results, as well as other patient data from the [address] form, are documented in Flowsheets linked to this Encounter. Allergies   Allergen Reactions    Asa [Aspirin]       iron deficiency anemia , requiring iron infusions and transfusions    Iron      Pill- constipation, abdominal pain    Nsaids       iron deficiency anemia, history of bleeding ulcers        Prior to Visit Medications    Medication Sig Taking? Authorizing Provider   pantoprazole (PROTONIX) 40 MG tablet Take 1 tablet by mouth daily Yes Diana Camara MD   metFORMIN (GLUCOPHAGE) 1000 MG tablet TAKE 1 TABLET BY MOUTH TWICE DAILY WITH MEALS Yes Diana Camara MD   oxyCODONE-acetaminophen (PERCOCET) 5-325 MG per tablet Take 1 tablet by mouth every 8 hours for 30 days. Yes VEE Manriquez CNP   morphine (MS CONTIN) 60 MG extended release tablet Take 1 tablet by mouth 2 times daily for 30 days. Yes VEE Manriquez CNP   baclofen (LIORESAL) 10 MG tablet TAKE ONE TABLET BY MOUTH THREE TIMES A DAY AS NEEDED FOR MUSCLE SPASMS Yes Diana Camara MD   pregabalin (LYRICA) 150 MG capsule Take 1 capsule by mouth 3 times daily for 90 days. Yes VEE Manriquez CNP   cyanocobalamin 1000 MCG/ML injection Inject 1 mL into the muscle every 30 days Call for next refill which will be monthly for life Yes Diana Camara MD   blood glucose test strips (ASCENSIA AUTODISC VI;ONE TOUCH ULTRA TEST VI) strip 1 each by In Vitro route daily Testing daily.  needs true Metrix test strips Yes David Cheek MD   pravastatin (PRAVACHOL) 40 MG tablet Take 1 tablet by mouth every evening Stop Gemfibrozil Yes David Cheek MD   metoprolol tartrate (LOPRESSOR) 25 MG tablet Take 1 tablet by mouth 2 times daily Yes David Cheek MD   TRUEplus Lancets 30G MISC Test blood glucose twice a day Yes David Cheek MD   Alcohol Swabs (B-D SINGLE USE SWABS REGULAR) PADS USE TO CHECK BLOOD SUGAR TWICE A DAY Yes David Cheek MD   glipiZIDE (GLUCOTROL) 5 MG tablet Take 1 tablet by mouth every morning Keep fasting morning blood glucose . If blood glucose below 80, you need to stop glipizide Yes David Cheek MD   Syringe/Needle, Disp, (SYRINGE 3CC/25GX1\") 25G X 1\" 3 ML MISC To be used with B12 injections Yes David Cheek MD   loperamide (RA ANTI-DIARRHEAL) 2 MG capsule Take 1 capsule by mouth 4 times daily as needed for Diarrhea Yes Surya Rivera MD   lidocaine (LIDODERM) 5 % Place 1 patch onto the skin daily 12 hours on, 12 hours off.  Yes David Cheek MD   lisinopril-hydroCHLOROthiazide (PRINZIDE;ZESTORETIC) 10-12.5 MG per tablet TK 1 T PO QD Yes David Cheek MD   Probiotic Product (PROBIOTIC-10 PO) Take by mouth daily  Yes Historical Provider, MD   Blood Glucose Monitoring Suppl (TRUE METRIX METER) w/Device KIT USE AS DIRECTED TO TEST BLOOD SUGAR Yes Historical Provider, MD         Past Medical History:   Diagnosis Date    Anemia     Arthritis     osteoarthritis    Bladder tumor     Colon polyps 10/08/2019    tubular adenoma x2    [facility]     Diabetic neuropathy ([facility]     Diarrhea     Encounter for chronic pain management     Essential hypertension 05/12/2020    Heartburn     Hepatitis B core antibody positive     History of bleeding peptic ulcer     History of blood transfusion     no reactions    History of hepatitis C     History of migraine headaches     History of stress test 07/2020 \"low risk\"    Hyperlipidemia     Iron (Fe) deficiency anemia     Poor historian     states his wife takes care of all medical information    Positive FIT (fecal immunochemical test)     Type 2 diabetes mellitus with microalbuminuria, without long-term current use of insulin ([facility] 07/13/2020    Under care of team 08/31/2021    pcp-Dr Marlo Flower virtual visit july 2021    Under care of team 08/31/2021    hematology-Dr Caprice Saavedra  virtual visit aug 2021   Floydene Ada Wears dentures     Wears glasses        Past Surgical History:   Procedure Laterality Date    BLADDER TUMOR EXCISION  04/29/2021    CYSTOSCOPY TUR BLADDER TUMOR, GYRUS, RIGHT STENT PLACEMENT AND RIGHT STENT REMOVAL    CERVICAL SPINE SURGERY  1977    cervical spine three times, has plate    CHOLECYSTECTOMY  03/22/2019    COLONOSCOPY  01/25/2015    10 yr recall, hemorrhoids    COLONOSCOPY N/A 10/08/2019    tubular adenoma x2    CYSTOSCOPY Right 4/29/2021    CYSTOSCOPY TUR BLADDER TUMOR, GYRUS, RIGHT STENT PLACEMENT AND RIGHT STENT REMOVAL performed by Kameron Kline MD at [address] [facility]  09/09/2021    CYSTOSCOPY, TRANSURETHRAL RESECTION BLADDER TUMOR    CYSTOSCOPY N/A 9/9/2021    CYSTOSCOPY, TRANSURETHRAL RESECTION BLADDER TUMOR performed by Kameron Kline MD at [address]   10/2015    all teeth extracted    HERNIA REPAIR N/A 08/07/2020    HERNIA INCISIONAL REPAIR LAPAROSCOPIC ROBOTIC WITH MESH performed by Haydee Tyler DO at [facility] Right    [address] [address]    UPPER GASTROINTESTINAL ENDOSCOPY  01/25/2015    UPPER GASTROINTESTINAL ENDOSCOPY N/A 10/08/2019    EGD BIOPSY performed by Alexandr Graf MD at [facility] History   Problem Relation Age of Onset    Diabetes Mother     Heart Disease Father        CareTeam (Including outside providers/suppliers regularly involved in providing care):   Patient Care Team:  Matheus Handley MD as PCP - General (Family Medicine)  Colleen Joe MD as PCP - [facility] Empaneled Provider  Luanna Hamman, MD as Consulting Physician (Gastroenterology)  Artem Hurley MD as Consulting Physician (Pain Management)  Naaman Duane, MD as Consulting Physician (Hematology and Oncology)  Yola Bennett DO as Consulting Physician (Bariatric Surgery)  Dino Batista DO as Consulting Physician (Cardiology)  Ted Rodriguez MD as Consulting Physician (Urology)    Vital signs within normal limits except obesity per BMI    Wt Readings from Last 3 Encounters:   09/24/21 210 lb 6.4 oz (95.4 kg)   09/20/21 207 lb (93.9 kg)   09/09/21 210 lb (95.3 kg)     Vitals:    09/24/21 1131   BP: 132/82   Pulse: 87   Temp: 97.3 °F (36.3 °C)   SpO2: 96%   Weight: 210 lb 6.4 oz (95.4 kg)   Height: 5' 9\" (1.753 m)     Body mass index is 31.07 kg/m². Patient comes with Massimo Marinelli his wife  Based upon direct observation of the patient, evaluation of cognition reveals recent and remote memory intact.         Physical exam  General Appearance: alert and oriented to person, place and time, well-developed and well-nourished, in no acute distress  ENT: bilateral tympanic membrane normal  Pulmonary/Chest: clear to auscultation bilaterally- no wheezes, rales or rhonchi, normal air movement, no respiratory distress  Cardiovascular: normal rate, regular rhythm and normal S1 and S2  Abdomen: soft, non-tender, non-distended, normal bowel sounds, no masses or organomegaly  Extremities: no edema  FOOT EXAM  Visual inspection:  Deformity/amputation: absent  Skin lesions/pre-ulcerative calluses: present -calluses on the great toes, small, soaking in Epsom salt advised    Edema: right- negative, left- negative    Sensory exam:  Monofilament sensation: abnormal -decrease sensation up to midfoot bilaterally, unable to feel on the plantar aspects in more than five-point, up to the heel bilaterally symmetric consistent with polyneuropathy    Plus at least one of the following:  Pulses: normal, 2 out of 4 dorsalis pedis  Pinprick: Impaired  Proprioception: Intact  Toenails aspect: normal      Patient's complete Health Risk Assessment and screening values have been reviewed and are found in Flowsheets. The following problems were reviewed today and where indicated follow up appointments were made and/or referrals ordered. Positive Risk Factor Screenings with Interventions:            General Health and ACP:  General  In general, how would you say your health is?: Good  In the past 7 days, have you experienced any of the following? New or Increased Pain, New or Increased Fatigue, Loneliness, Social Isolation, Stress or Anger?: None of These  Do you get the social and emotional support that you need?: Yes  Do you have a Living Will?: (!) No  Advance Directives     Power of [address] [place] [address] Street Will ACP-Advance Directive ACP-Power of     Not on File Not on File Not on File Not on File      General Health Risk Interventions:  · No Living Will: Advance Care Planning addressed with patient today and FORMS GIVEN, discussed with both, they want to look over the forms, counseling given, and they will tell me if they need any help. Health Habits/Nutrition:  Health Habits/Nutrition  Do you exercise for at least 20 minutes 2-3 times per week?: (!) No  Have you lost any weight without trying in the past 3 months?: No  Do you eat only one meal per day?: No  Have you seen the dentist within the past year?: N/A - wear dentures  Body mass index: (!) 31.07  Health Habits/Nutrition Interventions:  · Inadequate physical activity:  patient agrees to increase physical activity as follows: He will increase activity on his own  · Mild obesity per BMI.   Low-carb diet, increase activity on his own, gained 5 lbs in 1 yr  Wt Readings from Last 3 Encounters:   09/24/21 210 lb 6.4 oz (95.4 kg)   09/20/21 207 lb (93.9 kg)   09/09/21 210 lb (95.3 kg)     07/07/20 205 lb (93 kg) ·     Hearing/Vision:   Hearing Screening    125Hz 250Hz 500Hz 1000Hz 2000Hz 3000Hz 4000Hz 6000Hz 8000Hz   Right ear:            Left ear:               Visual Acuity Screening    Right eye Left eye Both eyes   Without correction:      With correction: 20/25 20/40 20/25     Hearing/Vision  Do you or your family notice any trouble with your hearing that hasn't been managed with hearing aids?: No  Do you have difficulty driving, watching TV, or doing any of your daily activities because of your eyesight?: No  Have you had an eye exam within the past year?: (!) No  Hearing/Vision Interventions:  · Vision concerns:  patient encouraged to make appointment with his/her eye specialist      Personalized Preventive Plan   Current Health Maintenance Status  Immunization History   Administered Date(s) Administered    COVID-19, J&J, PF, 0.5 mL 04/09/2021    Hepatitis A Adult (Havrix, Vaqta) 09/24/2019, 04/30/2020    Influenza Virus Vaccine 10/01/2015, 11/01/2016    Influenza, Quadv, adjuvanted, 65 yrs +, IM, PF (Fluad) 09/28/2020, 09/24/2021    Pneumococcal Conjugate 13-valent ([id number]) 02/02/2016    Pneumococcal Polysaccharide ([id number]) 09/28/2020    Zoster Live (Zostavax) 11/01/2015        Health Maintenance   Topic Date Due    DTaP/Tdap/Td vaccine (1 - Tdap) Never done    Low dose CT lung screening  Never done    Shingles Vaccine (2 of 3) 12/27/2015    Diabetic retinal exam  05/13/2021    Diabetic microalbuminuria test  07/13/2021    Lipid screen  07/13/2021    Annual Wellness Visit (AWV)  09/24/2021    A1C test (Diabetic or Prediabetic)  12/24/2021    Creatinine monitoring  08/31/2022    Potassium monitoring  09/09/2022    Diabetic foot exam  09/24/2022    Colon cancer screen colonoscopy  10/08/2024    Hepatitis A vaccine  Completed    Flu vaccine  Completed    Pneumococcal 65+ years Vaccine  Completed    COVID-19 Vaccine  Completed    AAA screen  Completed    Hib vaccine  Ambreen Jace Sandhu Meningococcal (ACWY) vaccine  Aged Out     Recommendations for Preventive Services Due: see orders and patient instructions/AVS.  . Recommended screening schedule for the next 5-10 years is provided to the patient in written form: see Patient Instructions/AVS.    Sharyle Batter was seen today for medicare awv and immunizations. Diagnoses and all orders for this visit:    Routine general medical examination at a health care facility    Gastroesophageal reflux disease without esophagitis  Improved with meds  Refill given  -     pantoprazole (PROTONIX) 40 MG tablet; Take 1 tablet by mouth daily    Type 2 diabetes mellitus with diabetic polyneuropathy, without long-term current use of insulin ([facility])  improved  Lab Results   Component Value Date    LABA1C 5.0 09/24/2021    LABA1C 5.9 01/11/2021    LABA1C 6.2 (H) 07/13/2020       -      DIABETES FOOT EXAM  -     POCT glycosylated hemoglobin (Hb A1C) 5, improved Well controlled. -     metFORMIN (GLUCOPHAGE) 1000 MG tablet; TAKE 1 TABLET BY MOUTH TWICE DAILY WITH MEALS  -     CBC; Future  -     Comprehensive Metabolic Panel; Future  -     TSH without Reflex; Future  -     Vitamin B12 & Folate; Future  -   Start   folic acid-pyridoxine-cyanocobalamin (FOLTABS) 0.8-10-0. 115 MG TABS tablet; Take 1 tablet by mouth daily-he would benefit for polyneuropathy  -advised home blood glucose testing  daily or a few times a week  -daily feet exam, Foot care: advised to wash feet daily, pat dry and apply lotion at night, avoiding between toes.  Need to look at feet daily and report to a physician any signs of inflammation or skin damage  -annual dilated eye exam  -Low carb, low fat diet, increase fruits and vegetables, and exercise 4-5 times a day 30-40 minutes a day discussed  -continue current treatment  -continue Aspirin  -continue ACEI/lisinopril, and statin pravastatin 40 mg  I added urine microalbumin      Need for influenza vaccination  -     INFLUENZA, QUADV, ADJUVANTED, 65 YRS =, IM, PF, PREFILL SYR, 0.5ML (FLUAD)    Hyperlipidemia with target LDL less than 100  -     Lipid, Fasting; Future  Continue statin/pravastatin 40 mg    Essential hypertension  Well controlled. Continue current treatment. Will recheck labs. -     CBC; Future  -     Comprehensive Metabolic Panel; Future  -     Magnesium; Future    Vitamin D deficiency    Unsure if improving or not. Will recheck level  Continue supplementation. He says he cannot take the high doses vitamin D    -     Vitamin D 25 Hydroxy; Future  Lab Results   Component Value Date    VITD25 11.2 (L) 01/11/2021                  RECURRENT BLADDER CANCER Low grade, just had cystoscopy   Reprinted referral to CT lung screening,  and advised patient to schedule appointment with specialist.  The patient verbalizes understanding and agrees with the plan.            Future Appointments   Date Time Provider Pippa Cazares   10/4/2021  2:30 PM Bruno Bang MD [address]   11/10/2021  4:15 PM Nohemy Butler MD [address] [place] [address]   12/20/2021  9:15 AM Samara Argueta MD .  [facility]   2[facility]2022 10:30 [facility]

## 2021-09-24 NOTE — PATIENT INSTRUCTIONS
Personalized Preventive Plan for Alex Anguiano - 9/24/2021  Medicare offers a range of preventive health benefits. Some of the tests and screenings are paid in full while other may be subject to a deductible, co-insurance, and/or copay. Some of these benefits include a comprehensive review of your medical history including lifestyle, illnesses that may run in your family, and various assessments and screenings as appropriate. After reviewing your medical record and screening and assessments performed today your provider may have ordered immunizations, labs, imaging, and/or referrals for you. A list of these orders (if applicable) as well as your Preventive Care list are included within your After Visit Summary for your review. Other Preventive Recommendations:    · A preventive eye exam performed by an eye specialist is recommended every 1-2 years to screen for glaucoma; cataracts, macular degeneration, and other eye disorders. · A preventive dental visit is recommended every 6 months. · Try to get at least 150 minutes of exercise per week or 10,000 steps per day on a pedometer . · Order or download the FREE \"Exercise & Physical Activity: Your Everyday Guide\" from [employer]. Call 0-984.234.9370 or search [employer] online. · You need 7176-0525 mg of calcium and 7384-2554 IU of vitamin D per day. It is possible to meet your calcium requirement with diet alone, but a vitamin D supplement is usually necessary to meet this goal.  · When exposed to the sun, use a sunscreen that protects against both UVA and UVB radiation with an SPF of 30 or greater. Reapply every 2 to 3 hours or after sweating, drying off with a towel, or swimming. · Always wear a seat belt when traveling in a car. Always wear a helmet when riding a bicycle or motorcycle.

## 2021-09-24 NOTE — PROGRESS NOTES
Visit Information    Have you changed or started any medications since your last visit including any over-the-counter medicines, vitamins, or herbal medicines? no   Have you stopped taking any of your medications? Is so, why? -  no  Are you having any side effects from any of your medications? - no    Have you seen any other physician or provider since your last visit? yes -    Have you had any other diagnostic tests since your last visit?  no   Have you been seen in the emergency room and/or had an admission in a hospital since we last saw you?  no   Have you had your routine dental cleaning in the past 6 months?  no     Do you have an active MyChart account? If no, what is the barrier?   Yes    Patient Care Team:  Karo Doran MD as PCP - General (Family Medicine)  Karo Doran MD as PCP - [facility]  Madhav Carbajal MD as Consulting Physician (Gastroenterology)  Jessie Cespedes MD as Consulting Physician (Pain Management)  Jorge Hernandez MD as Consulting Physician (Hematology and Oncology)  Jaelyn Henry DO as Consulting Physician (Bariatric Surgery)  Dali Rankin DO as Consulting Physician (Cardiology)    Medical History Review  Past Medical, Family, and Social History reviewed and does contribute to the patient presenting condition    Health Maintenance   Topic Date Due    DTaP/Tdap/Td vaccine (1 - Tdap) Never done    Low dose CT lung screening  Never done    Shingles Vaccine (2 of 3) 12/27/2015    A1C test (Diabetic or Prediabetic)  04/11/2021    Diabetic foot exam  05/12/2021    Diabetic retinal exam  05/13/2021    Diabetic microalbuminuria test  07/13/2021    Lipid screen  07/13/2021    Flu vaccine (1) 09/01/2021    Annual Wellness Visit (AWV)  09/24/2021    Creatinine monitoring  08/31/2022    Potassium monitoring  09/09/2022    Colon cancer screen colonoscopy  10/08/2024    Hepatitis A vaccine  Completed    Pneumococcal 65+ years Vaccine  Completed    COVID-19 Vaccine  Completed    AAA screen  Completed    Hib vaccine  Aged Out    Meningococcal (ACWY) vaccine  Aged Out

## 2021-09-27 ENCOUNTER — PATIENT MESSAGE (OUTPATIENT)
Dept: FAMILY MEDICINE CLINIC | Age: 71
End: 2021-09-27

## 2021-09-27 DIAGNOSIS — M51.36 DEGENERATIVE DISC DISEASE, LUMBAR: Primary | ICD-10-CM

## 2021-09-27 DIAGNOSIS — M47.816 LUMBAR SPONDYLOSIS: ICD-10-CM

## 2021-09-27 DIAGNOSIS — R51.9 WORSENING HEADACHES: ICD-10-CM

## 2021-09-27 RX ORDER — BACLOFEN 10 MG/1
10 TABLET ORAL 3 TIMES DAILY PRN
Qty: 90 TABLET | Refills: 0 | Status: SHIPPED | OUTPATIENT
Start: 2021-09-27 | End: 2021-11-08

## 2021-09-27 ASSESSMENT — ENCOUNTER SYMPTOMS
ABDOMINAL PAIN: 0
PHOTOPHOBIA: 0
COUGH: 0
BACK PAIN: 1
EYE PAIN: 0
NAUSEA: 0
ALLERGIC/IMMUNOLOGIC NEGATIVE: 1
SHORTNESS OF BREATH: 0
SINUS PRESSURE: 0
CONSTIPATION: 0
RESPIRATORY NEGATIVE: 1
BOWEL INCONTINENCE: 0
VOMITING: 0
EYE DISCHARGE: 0

## 2021-09-27 NOTE — PROGRESS NOTES
Marta Knowles is a 73 y.o. male evaluated on 10/4/2021. Modality of virtual service provided -via Video+audio   Consent:  Patient and/or health care decision maker is aware that that patient may receive a bill for this telephone service, depending on one's insurance coverage, and has provided verbal consent to proceed: Yes    Patient identification was verified at the start of the visit: Yes    Chief complaint: Marta Knowles is 73 y.o.,  male, with  with chief complaint of pain involving low  back and neck    Patient is complaining of pain involving the low back area with pain radiating to both lower extremities. Patient also has pain in the cervical region. Patient apparently had 3 surgeries in the cervical region in the past with fusion. Patient reports he has been diagnosed with carcinoma of the bladder and he had undergone resection of the tumor x2. He is being followed by urologist.  Patient also had a ventral herniorrhaphy in the past and he reports he cannot do many of the back exercises. Overall he reports his back pain is and neck pain are essentially unchanged from his previous visit. Patient also reports he has peripheral neuropathy and he has severe pain in his feet is \"killing him\". Back Pain  This is a chronic problem. The current episode started more than 1 year ago. The problem occurs rarely. The problem is unchanged. The pain is present in the lumbar spine and sacro-iliac. The quality of the pain is described as aching Thor Guille). Radiates to: To both lower extremities mostly to the knees and at times to the feet. The pain is at a severity of 4/10 (2-5). The pain is moderate. The pain is the same all the time. The symptoms are aggravated by bending, standing and position (Walking and ADLs and lifting). Associated symptoms include numbness, tingling and weakness.  Pertinent negatives include no abdominal pain, bladder incontinence, bowel incontinence, chest pain, dysuria, fever or headaches. Risk factors include lack of exercise and sedentary lifestyle. Neck Pain   This is a chronic problem. The current episode started more than 1 year ago. The problem occurs constantly. The problem has been unchanged. The pain is present in the left side, midline and right side. The quality of the pain is described as aching. The pain is at a severity of 2/10 (3-5). The pain is mild. The symptoms are aggravated by bending, position, stress and twisting (Movements). The pain is worse during the day. Stiffness is present in the morning. Associated symptoms include numbness, tingling and weakness. Pertinent negatives include no chest pain, fever, headaches or photophobia. Alleviating factors:rest   Lifestyle changes experienced with pain: Wakes from sleep, Prevents or limits ADLs, Increases w/activity, Increases w/prolonged sitting/standing/walking  Mood changes,none  Patient currently unemployed. Physical therapy did not help the pain. Are you under psychological counseling at present: No  Goals for treatment include:  Decrease in pain  Enjoy daily and recreational activities, return to previous status. Patient relates current medications are helping the pain. Patient reports taking pain medications as prescribed, denies obtaining medications from different sources and denies use of illegal drugs. Patient denies side effects from medications like nausea, vomiting, constipation or drowsiness. Patient reports current activities of daily living ar possible due to medications and would like to continue them.        ACTIVITY/SOCIAL/EMOTIONAL:  Sleep Pattern: 6 hours per night. nightime awakenings  Home Exercises:  no regular exercise  Activity:unchanged  Emotional Issues: normal.   Currently seeing a Psychiatrist or Psychologist:  No     ADVERSE MEDICATION EFFECTS:   Nausea and vomiting: no   Constipation: no-Undercontrol-yes  Dizziness/drowsy/sleepy--no  Urinary Retention: no    ABERRANT BEHAVIORS SINCE LAST VISIT  Lost rx/pills:------------------------------------------ no  Taking  medication as prescribed: ----------- yes  Urine Drug Screen ---------------------------------  yes             Date----------------------------------------------- 4/13/2021              Results as expected ---------------------yes    Recent ER visits: -------------------------------------No  Pill count is appropriate: ---------------------------yes   Refills for prescriptions appropriate:---------- yes      Past Medical History:   Diagnosis Date    Anemia     Arthritis     osteoarthritis    Bladder tumor     Colon polyps 10/08/2019    tubular adenoma x2    [facility]     Diabetic neuropathy ([facility]     Diarrhea     Encounter for chronic pain management     Essential hypertension 05/12/2020    Heartburn     Hepatitis B core antibody positive     History of bleeding peptic ulcer     History of blood transfusion     no reactions    History of hepatitis C     History of migraine headaches     History of stress test 07/2020    \"low risk\"    Hyperlipidemia     Iron (Fe) deficiency anemia     Poor historian     states his wife takes care of all medical information    Positive FIT (fecal immunochemical test)     Type 2 diabetes mellitus with microalbuminuria, without long-term current use of insulin ([facility] 07/13/2020    Under care of team 08/31/2021    pcp-Dr Bentley-shannen fuentes-last virtual visit july 2021    Under care of team 08/31/2021    hematology-Dr Caprice Saavedra [address] virtual visit aug 2021    Wears dentures     Wears glasses        Past Surgical History:   Procedure Laterality Date    BLADDER TUMOR EXCISION  04/29/2021    CYSTOSCOPY TUR BLADDER TUMOR, GYRUS, RIGHT STENT PLACEMENT AND RIGHT STENT REMOVAL    CERVICAL SPINE SURGERY  1977    cervical spine three times, has plate    CHOLECYSTECTOMY  03/22/2019    COLONOSCOPY  01/25/2015    10 yr recall, hemorrhoids    COLONOSCOPY N/A 10/08/2019    tubular adenoma x2    CYSTOSCOPY Right 2021    CYSTOSCOPY TUR BLADDER TUMOR, GYRUS, RIGHT STENT PLACEMENT AND RIGHT STENT REMOVAL performed by Otto Epps MD at [place] [address]  2021    CYSTOSCOPY, TRANSURETHRAL RESECTION BLADDER TUMOR    CYSTOSCOPY N/A 2021    CYSTOSCOPY, TRANSURETHRAL RESECTION BLADDER TUMOR performed by Otto Epps MD at [facility] [address]  10/2015    all teeth extracted    HERNIA REPAIR N/A 2020    HERNIA INCISIONAL REPAIR LAPAROSCOPIC ROBOTIC WITH MESH performed by Jocy Latham DO at [facility] Right     UMBILICAL [address]  [address]19    UPPER GASTROINTESTINAL ENDOSCOPY  2015    UPPER GASTROINTESTINAL ENDOSCOPY N/A 10/08/2019    EGD BIOPSY performed by Andria Velasquez MD at [facility]       Family History   Problem Relation Age of Onset    Diabetes Mother     Heart Disease Father        Social History     Socioeconomic History    Marital status:      Spouse name: None    Number of children: None    Years of education: None    Highest education level: None   Occupational History    Occupation: disabled   Tobacco Use    Smoking status: Former Smoker     Packs/day: 0.50     Years: 45.00     Pack years: 22.50     Types: Cigarettes     Quit date: 2015     Years since quittin.8    Smokeless tobacco: Never Used    Tobacco comment: on chantix 11-6-15   12-7-15   Vaping Use    Vaping Use: Never used   Substance and Sexual Activity    Alcohol use: No    Drug use: No    Sexual activity: Yes     Partners: Female   Other Topics Concern    None   Social History Narrative    None     Social Determinants of Health     Financial Resource Strain: High Risk    Difficulty of Paying Living Expenses: Very hard   Food Insecurity: Food Insecurity Present    Worried About Running Out of Food in the Last Year: Often true    Jennifer of Food in the Last Year: Often true   Transportation Needs:     Lack of Transportation (Medical):  Lack of Transportation (Non-Medical):    Physical Activity:     Days of Exercise per Week:     Minutes of Exercise per Session:    Stress:     Feeling of Stress :    Social Connections:     Frequency of Communication with Friends and Family:     Frequency of Social Gatherings with Friends and Family:     Attends Sabianist Services:     Active Member of Clubs or Organizations:     Attends Club or Organization Meetings:     Marital Status:    Intimate Partner Violence:     Fear of Current or Ex-Partner:     Emotionally Abused:     Physically Abused:     Sexually Abused: Allergies   Allergen Reactions    Asa [Aspirin]       iron deficiency anemia , requiring iron infusions and transfusions    Iron      Pill- constipation, abdominal pain    Nsaids       iron deficiency anemia, history of bleeding ulcers        Current Outpatient Medications on File Prior to Encounter   Medication Sig Dispense Refill    baclofen (LIORESAL) 10 MG tablet Take 1 tablet by mouth 3 times daily as needed (muscle spasms) TAKE ONE TABLET BY MOUTH THREE TIMES A DAY AS NEEDED FOR MUSCLE SPASMS 90 tablet 0    pantoprazole (PROTONIX) 40 MG tablet Take 1 tablet by mouth daily 90 tablet 1    metFORMIN (GLUCOPHAGE) 1000 MG tablet TAKE 1 TABLET BY MOUTH TWICE DAILY WITH MEALS 440 tablet 1    folic acid-pyridoxine-cyanocobalamin (FOLTABS) 0.8-10-0. 115 MG TABS tablet Take 1 tablet by mouth daily 90 tablet 3    pregabalin (LYRICA) 150 MG capsule Take 1 capsule by mouth 3 times daily for 90 days. 90 capsule 2    cyanocobalamin 1000 MCG/ML injection Inject 1 mL into the muscle every 30 days Call for next refill which will be monthly for life 3 mL 3    blood glucose test strips (ASCENSIA AUTODISC VI;ONE TOUCH ULTRA TEST VI) strip 1 each by In Vitro route daily Testing daily.  needs true Metrix test strips 100 strip 3    pravastatin (PRAVACHOL) 40 MG tablet Take 1 tablet by mouth every evening Stop Gemfibrozil 90 tablet 3    metoprolol tartrate (LOPRESSOR) 25 MG tablet Take 1 tablet by mouth 2 times daily 180 tablet 3    TRUEplus Lancets 30G MISC Test blood glucose twice a day 200 each 3    Alcohol Swabs (B-D SINGLE USE SWABS REGULAR) PADS USE TO CHECK BLOOD SUGAR TWICE A  each 3    glipiZIDE (GLUCOTROL) 5 MG tablet Take 1 tablet by mouth every morning Keep fasting morning blood glucose . If blood glucose below 80, you need to stop glipizide 90 tablet 3    Syringe/Needle, Disp, (SYRINGE 3CC/25GX1\") 25G X 1\" 3 ML MISC To be used with B12 injections 50 each 2    loperamide (RA ANTI-DIARRHEAL) 2 MG capsule Take 1 capsule by mouth 4 times daily as needed for Diarrhea 112 capsule 2    lidocaine (LIDODERM) 5 % Place 1 patch onto the skin daily 12 hours on, 12 hours off. 30 patch 3    lisinopril-hydroCHLOROthiazide (PRINZIDE;ZESTORETIC) 10-12.5 MG per tablet TK 1 T PO QD 90 tablet 3    Probiotic Product (PROBIOTIC-10 PO) Take by mouth daily       Blood Glucose Monitoring Suppl (TRUE METRIX METER) w/Device KIT USE AS DIRECTED TO TEST BLOOD SUGAR       No current facility-administered medications on file prior to encounter. Review of Systems   Constitutional: Negative. Negative for activity change, appetite change, chills, fatigue, fever and unexpected weight change. HENT: Positive for postnasal drip. Negative for congestion, dental problem, hearing loss, sinus pressure and sore throat. Eyes: Negative for photophobia, pain, discharge and visual disturbance. Respiratory: Negative. Negative for cough and shortness of breath. Cardiovascular: Positive for leg swelling. Negative for chest pain and palpitations. Gastrointestinal: Negative for abdominal pain, bowel incontinence, constipation, nausea and vomiting. Endocrine: Positive for polydipsia. Negative for cold intolerance and polyuria.         History of diabetes mellitus with peripheral neuropathy   Genitourinary: Negative. Negative for bladder incontinence, dysuria, frequency, hematuria and urgency. Musculoskeletal: Positive for arthralgias, back pain, neck pain and neck stiffness. Skin: Negative. Negative for rash and wound. Allergic/Immunologic: Negative. Negative for food allergies. Neurological: Positive for tingling, weakness and numbness. Negative for tremors, seizures and headaches. Hematological: Negative. Does not bruise/bleed easily. Psychiatric/Behavioral: Negative. Negative for behavioral problems, self-injury, sleep disturbance and suicidal ideas. The patient is not nervous/anxious. Physical Exam  Constitutional:       Appearance: Normal appearance. Skin:         Neurological:      Mental Status: He is alert and oriented to person, place, and time.    Psychiatric:         Mood and Affect: Mood normal.        Ortho Exam     DATA:  LAB.:  4/13/2021 12:49 PM - Raim[facility], [facility] Incoming Lab Results From [facility]    Component Value Ref Range & Units Status Collected Lab   Pain Management Drug Panel Interp, Urine Inconsistent   Final 04/09/2021  9:31 AM [facility]   (NOTE)   ________________________________________________________________   DRUGS EXPECTED:   MORPHINE   OXYCODONE   ________________________________________________________________   CONSISTENT with medications provided:   MORPHINE: based on morphine, hydromorphone   OXYCODONE: based on oxycodone, noroxycodone, oxymorphone   ________________________________________________________________   INCONSISTENT with medications provided:   Pregabalin        X-Ray reports:  EXAMINATION:    MRI OF THE LUMBAR SPINE WITHOUT CONTRAST, 2/16/2018 9:36 pm        TECHNIQUE:    Multiplanar multisequence MRI of the lumbar spine was performed without the    administration of intravenous contrast.        COMPARISON:    March 2010        HISTORY:    ORDERING SYSTEM PROVIDED HISTORY: Degenerative disc disease, lumbar    TECHNOLOGIST PROVIDED HISTORY:    Ordering Physician Provided Reason for Exam: LUMBAR DDD, LUMBAR    OSTEOARTHRITIS WITH RADICULOPATHY, SPINAL STENOSIS OF LUMBAR REGION WITHOUT    NEUROGENIC CLAUDICATION    Additional signs and symptoms: PATIENT COMPLAINS OF MID TO LOWER BACK PAIN    AND BUTTOCK PAIN. AND THAT HIS FEET FALL ASLEEP. SYMPTOMS FOR THE PAST 8 TO    10 YEARS. NO KNOWN INJURY. NO PREVIOUS LUMBAR SURGERIES. FINDINGS:    BONES/ALIGNMENT: Multiple endplate Schmorl's node deformities are noted. There is no acute fracture or bone edema. T11 hemangioma is noted. Vertebral body height and alignment is stable. SPINAL CORD: Conus terminates at T12-L1 and is grossly normal in appearance. SOFT TISSUES: Detail of the aorta is limited. There is suggested mild    enlargement with the transverse dimension of approximately 3.1 cm. Dedicated    imaging of the aorta is recommended. L1-L2: Diffuse disc bulging is noted with a small proximal foraminal    protrusion on the right. Annular tear is noted. Similar findings were noted    on the prior. Facet hypertrophic changes are noted. L2-L3: Minimal disc bulging is noted diffusely. There is a questionable    small left foraminal protrusion laterally. There is minimal narrowing of the    left foramen. Facet hypertrophic changes are noted. Findings are stable        L3-L4: Mild disc bulging is noted diffusely. Minimal endplate and mild facet    hypertrophic changes are noted. There is borderline mild proximal foraminal    narrowing on the left. This is stable. L4-L5: Mild disc bulging is noted. A small inferior foraminal protrusion on    the right is noted. There is a slightly larger broad-based protrusion into    the left foramen. Bilateral foraminal narrowing is noted. Findings not    appear grossly changed. Facet hypertrophic changes are noted        L5-S1: Minimal disc bulging is noted. Facet hypertrophic changes are noted. SI joint degenerative changes are noted. Impression    No significant interval change in the multifocal degenerative disc disease    throughout the lumbar spine. See above for details of each level        Suggested aortic aneurysm. Dedicated imaging of the aorta is recommended         CT OF THE ABDOMEN AND PELVIS WITH CONTRAST 2/28/2021 12:58 pm       TECHNIQUE:   CT of the abdomen and pelvis was performed with the administration of   intravenous contrast. Multiplanar reformatted images are provided for review. Dose modulation, iterative reconstruction, and/or weight based adjustment of   the mA/kV was utilized to reduce the radiation dose to as low as reasonably   achievable.       COMPARISON:   10/10/2019       HISTORY:   ORDERING SYSTEM PROVIDED HISTORY: gross hematuria   TECHNOLOGIST PROVIDED HISTORY:       gross hematuria   Decision Support Exception->Emergency Medical Condition (MA)   Reason for Exam: gross hematuria for two days. no pain patient states he just   feels like he has to urinate all the time   Acuity: Acute   Type of Exam: Initial   Relevant Medical/Surgical History: history of IBS, HTN, DM . [facility] Remedies ... Surgeries :   hernia repair, cholecystectomy       FINDINGS:   Lower Chest: Mild bibasal scarring.       Organs: Liver is normal in size with mildly decrease in density. No focal   masses identified. No evidence of intrahepatic ductal dilatation.    Spleen   is normal size.  The gallbladder is surgically absent.  Both adrenal glands   are normal.  Pancreas is normal in appearance. . The kidneys are  normal in   size and attenuation without evidence of hydronephrosis or renal calculi.       GI/Bowel: The visualized bowel and mesentery show no mass lesions. Mild   colonic diverticulosis. No evidence of diverticulitis.  Appendix is normal       Pelvis: No intrapelvic mass is identified.  Bladder is decompressed with   mildly diffuse wall thickening.  Rectum is intact.  Stable prostatic calculi.       Peritoneum/Retroperitoneum: No free fluid. No lymphadenopathy. No evidence of   pneumoperitoneum.       Bones/Soft Tissues: Status post ventral wall hernia repair. .  The abdominal   and pelvic walls are otherwise unremarkable.  Degenerative changes seen in   the visualized spine .  No acute bony abnormalities. Vascular calcifications   are seen compatible with atherosclerotic disease.  Stable 3 cm abdominal   aortic aneurysm.           Impression   No acute intra-abdominal or intrapelvic abnormalities are noted.       Diffuse fatty infiltration of the liver.       Urinary bladder is decompressed with mildly diffuse wall thickening.       Stable 3 cm abdominal aortic aneurysm.  Recommend follow-up every 3 years         Clinical  impression:  1. S/P cervical spinal fusion    2. Cervical spondylitis ([address]    3. Lumbar spondylosis    4. Osteoarthritis of spine with radiculopathy, lumbar region    5. Spinal stenosis of lumbar region without neurogenic claudication    6. Lumbar radiculopathy    7. Degenerative disc disease, lumbar    8. Peripheral polyneuropathy    9. Encounter for chronic pain management    10. Encounter for medication monitoring    11. Osteoarthritis of lumbar spine, unspecified spinal osteoarthritis complication status        Plan of care: We will continue current pain medications  Current medications are being tolerated without any Adverse side effects. Orders Placed This Encounter   Medications    oxyCODONE-acetaminophen (PERCOCET) 5-325 MG per tablet     Sig: Take 1 tablet by mouth every 8 hours for 30 days. Dispense:  90 tablet     Refill:  0     Reduce doses taken as pain becomes manageable    morphine (MS CONTIN) 60 MG extended release tablet     Sig: Take 1 tablet by mouth 2 times daily for 30 days.      Dispense:  60 tablet     Refill:  0     Reduce doses taken as pain becomes manageable     Urine drug screens have been appropriate. No aberrant activity noted. Analgesia is achieved. Activities of daily living are possible because of medications. Safe use of medications explained to patient. PDMP Monitoring:    Last PDMP Guadalupe Muss as Reviewed [facility]):  Review User Review Instant Review Result   Eboni Keith 9/27/2021  5:36 AM Reviewed PDMP [1]     Counselling/Preventive measures for pain  Control:    [x]  Spine strengthening exercises are discussed with patient in detail. [x] Ill effects of being on chronic pain medications such as sleep disturbances, hormonal changes, withdrawal symptoms,  chronic opioid dependence and tolerance were discussed with patient. I had asked the patient to minimize medication use and utilize pain medications only for uncontrolled rest pain or pain with exertional activities. I advised patient not to self escalate pain medications without consulting with us. At each of patient's future visits we will try to taper pain medications, while adjusting the adjunct medications, and re-evaluating for Physical Therapy to improve spinal and joint strength. We will continue to have discussions to decrease pain medications as tolerated. I also discussed with the patient regarding the dangers of combining narcotic pain medication with tranquilizers, alcohol or illegal drugs or taking the medication any other than prescribed. The dangers including the respiratory depression and death. Patient was told to tell  to all  physicians regarding the medications he is getting from pain clinic. Patient is warned not to take any unprescribed medications over-the-counter medications that can depress breathing . Patient is advised to talk to the pharmacist or physicians if planning to take any over-the-counter medications before  takeing them. Patient is strongly advised to avoid tranquilizers or  Relaxants for any medications that can depress breathing or recreational drugs.  Patient is also advised to tell us if there is any changes in his medications from other physicians. We discussed the same at today's visit and have not been to implement it, as the patient's pain is not under control with current medications. Decision Making Process : Patient's health history and referral records thoroughly reviewed before focused physical examination and discussion with patient. Over 50% of today's visit is spent on examining the patient and counseling and coordinating the care. Level of complexity of date to be reviewed is Moderate. The chart date reviewed include the following: Imaging Reports. Summary of Care. Time spent reviewing with patient the below reports:   Medication safety, Treatment options. Level of diagnosis and management options of this case is multiple: involving the following management options: Interventions as needed, medication management as appropriate, future visits, activity modification, heat/ice as needed, Urine drug screen as required. [x]The patient's questions were answered to the best of my abilities. This note was created using voice recognition software. There may be inaccuracies of transcription  that are inadvertently overlooked prior to the signature. There is any questions about the transcription please contact me. Return in  4 weeks  with physician / CNP  for further plan of treatment. Due to the COVID-19 pandemic and the appropriate interventions by Russell Cazares, our non-urgent pain management patients will not be seen in the office at this time for their protection and the protection of our staff.  To offer continuity of care, their prescriptions will be escribed this month after a careful chart review and review of their OARRS report  Pursuant to the emergency declaration under the [employer] and [facility], [address] [place] [address] authority and the [employer] and [employer] Act, this Virtual Visit was conducted, with patient's consent, to reduce the patient's risk of exposure to COVID-19 and provide continuity of care for an established patient. Services were provided through a video synchronous discussion virtually to substitute for in-person appointment. \"  Documentation:  I communicated with the patient and/or health care decision maker about plan of care  Details of this discussion including any medical advice provided: Total Time: minutes: 21-30 minutes    I affirm this is a Patient Initiated Episode with an Established Patient who has not had a related appointment within my department in the past 7 days or scheduled within the next 24 hours.     Electronically signed by Kenneth Leon MD on 10/4/2021 at 7:38 PM

## 2021-10-04 ENCOUNTER — HOSPITAL ENCOUNTER (OUTPATIENT)
Dept: PAIN MANAGEMENT | Age: 71
Discharge: HOME | End: 2021-10-04
Payer: MEDICARE

## 2021-10-04 DIAGNOSIS — Z98.1 S/P CERVICAL SPINAL FUSION: Primary | ICD-10-CM

## 2021-10-04 DIAGNOSIS — M46.92 CERVICAL SPONDYLITIS (HCC): ICD-10-CM

## 2021-10-04 DIAGNOSIS — M47.26 OSTEOARTHRITIS OF SPINE WITH RADICULOPATHY, LUMBAR REGION: ICD-10-CM

## 2021-10-04 DIAGNOSIS — M51.36 DEGENERATIVE DISC DISEASE, LUMBAR: ICD-10-CM

## 2021-10-04 DIAGNOSIS — G62.9 PERIPHERAL POLYNEUROPATHY: ICD-10-CM

## 2021-10-04 DIAGNOSIS — Z51.81 ENCOUNTER FOR MEDICATION MONITORING: ICD-10-CM

## 2021-10-04 DIAGNOSIS — M47.816 LUMBAR SPONDYLOSIS: ICD-10-CM

## 2021-10-04 DIAGNOSIS — M54.16 LUMBAR RADICULOPATHY: ICD-10-CM

## 2021-10-04 DIAGNOSIS — G89.29 ENCOUNTER FOR CHRONIC PAIN MANAGEMENT: ICD-10-CM

## 2021-10-04 DIAGNOSIS — M47.816 OSTEOARTHRITIS OF LUMBAR SPINE, UNSPECIFIED SPINAL OSTEOARTHRITIS COMPLICATION STATUS: ICD-10-CM

## 2021-10-04 DIAGNOSIS — M48.061 SPINAL STENOSIS OF LUMBAR REGION WITHOUT NEUROGENIC CLAUDICATION: ICD-10-CM

## 2021-10-04 PROCEDURE — 99213 OFFICE O/P EST LOW 20 MIN: CPT

## 2021-10-04 PROCEDURE — 99214 OFFICE O/P EST MOD 30 MIN: CPT | Performed by: PAIN MEDICINE

## 2021-10-04 RX ORDER — OXYCODONE HYDROCHLORIDE AND ACETAMINOPHEN 5; 325 MG/1; MG/1
1 TABLET ORAL EVERY 8 HOURS
Qty: 90 TABLET | Refills: 0 | Status: SHIPPED | OUTPATIENT
Start: 2021-10-05 | End: 2021-11-03 | Stop reason: SDUPTHER

## 2021-10-04 RX ORDER — MORPHINE SULFATE 60 MG/1
60 TABLET, FILM COATED, EXTENDED RELEASE ORAL 2 TIMES DAILY
Qty: 60 TABLET | Refills: 0 | Status: SHIPPED | OUTPATIENT
Start: 2021-10-05 | End: 2021-11-03 | Stop reason: SDUPTHER

## 2021-10-04 ASSESSMENT — ENCOUNTER SYMPTOMS: SORE THROAT: 0

## 2021-10-23 ENCOUNTER — HOSPITAL ENCOUNTER (OUTPATIENT)
Age: 71
Discharge: HOME | End: 2021-10-23
Payer: MEDICARE

## 2021-10-23 DIAGNOSIS — I10 ESSENTIAL HYPERTENSION: ICD-10-CM

## 2021-10-23 DIAGNOSIS — E78.5 HYPERLIPIDEMIA WITH TARGET LDL LESS THAN 100: ICD-10-CM

## 2021-10-23 DIAGNOSIS — E53.8 B12 DEFICIENCY: ICD-10-CM

## 2021-10-23 DIAGNOSIS — C68.9 UROTHELIAL CANCER (HCC): ICD-10-CM

## 2021-10-23 DIAGNOSIS — E11.42 TYPE 2 DIABETES MELLITUS WITH DIABETIC POLYNEUROPATHY, WITHOUT LONG-TERM CURRENT USE OF INSULIN (HCC): ICD-10-CM

## 2021-10-23 DIAGNOSIS — B18.2 HEP C W/O COMA, CHRONIC (HCC): ICD-10-CM

## 2021-10-23 DIAGNOSIS — E55.9 VITAMIN D DEFICIENCY: ICD-10-CM

## 2021-10-23 DIAGNOSIS — D50.0 IRON DEFICIENCY ANEMIA DUE TO CHRONIC BLOOD LOSS: ICD-10-CM

## 2021-10-23 DIAGNOSIS — D64.9 ANEMIA, UNSPECIFIED TYPE: ICD-10-CM

## 2021-10-23 DIAGNOSIS — K92.2 GASTROINTESTINAL HEMORRHAGE, UNSPECIFIED GASTROINTESTINAL HEMORRHAGE TYPE: ICD-10-CM

## 2021-10-23 LAB
ABSOLUTE EOS #: 0.1 K/UL (ref 0–0.4)
ABSOLUTE IMMATURE GRANULOCYTE: ABNORMAL K/UL (ref 0–0.3)
ABSOLUTE LYMPH #: 1.6 K/UL (ref 1–4.8)
ABSOLUTE MONO #: 0.3 K/UL (ref 0.1–1.3)
ALBUMIN SERPL-MCNC: 4 G/DL (ref 3.5–5.2)
ALBUMIN/GLOBULIN RATIO: ABNORMAL (ref 1–2.5)
ALP BLD-CCNC: 83 U/L (ref 40–129)
ALT SERPL-CCNC: 20 U/L (ref 5–41)
ANION GAP SERPL CALCULATED.3IONS-SCNC: 11 MMOL/L (ref 9–17)
AST SERPL-CCNC: 17 U/L
BASOPHILS # BLD: 1 % (ref 0–2)
BASOPHILS ABSOLUTE: 0 K/UL (ref 0–0.2)
BILIRUB SERPL-MCNC: 0.18 MG/DL (ref 0.3–1.2)
BUN BLDV-MCNC: 12 MG/DL (ref 8–23)
BUN/CREAT BLD: ABNORMAL (ref 9–20)
CALCIUM SERPL-MCNC: 8.6 MG/DL (ref 8.6–10.4)
CHLORIDE BLD-SCNC: 104 MMOL/L (ref 98–107)
CHOLESTEROL, FASTING: 115 MG/DL
CHOLESTEROL/HDL RATIO: 5.2
CO2: 27 MMOL/L (ref 20–31)
CREAT SERPL-MCNC: 0.76 MG/DL (ref 0.7–1.2)
DIFFERENTIAL TYPE: ABNORMAL
EOSINOPHILS RELATIVE PERCENT: 1 % (ref 0–4)
FERRITIN: 15 UG/L (ref 30–400)
FOLATE: 10.5 NG/ML
FOLATE: 10.5 NG/ML
GFR AFRICAN AMERICAN: >60 ML/MIN
GFR NON-AFRICAN AMERICAN: >60 ML/MIN
GFR SERPL CREATININE-BSD FRML MDRD: ABNORMAL ML/MIN/{1.73_M2}
GFR SERPL CREATININE-BSD FRML MDRD: ABNORMAL ML/MIN/{1.73_M2}
GLUCOSE BLD-MCNC: 152 MG/DL (ref 70–99)
HCT VFR BLD CALC: 33.6 % (ref 41–53)
HCT VFR BLD CALC: 33.6 % (ref 41–53)
HDLC SERPL-MCNC: 22 MG/DL
HEMOGLOBIN: 11.3 G/DL (ref 13.5–17.5)
HEMOGLOBIN: 11.3 G/DL (ref 13.5–17.5)
IMMATURE GRANULOCYTES: ABNORMAL %
IRON SATURATION: 9 % (ref 20–55)
IRON: 28 UG/DL (ref 59–158)
LDL CHOLESTEROL: ABNORMAL MG/DL (ref 0–130)
LYMPHOCYTES # BLD: 26 % (ref 24–44)
MAGNESIUM: 1.6 MG/DL (ref 1.6–2.6)
MCH RBC QN AUTO: 28.8 PG (ref 26–34)
MCH RBC QN AUTO: 28.8 PG (ref 26–34)
MCHC RBC AUTO-ENTMCNC: 33.8 G/DL (ref 31–37)
MCHC RBC AUTO-ENTMCNC: 33.8 G/DL (ref 31–37)
MCV RBC AUTO: 85.1 FL (ref 80–100)
MCV RBC AUTO: 85.1 FL (ref 80–100)
MONOCYTES # BLD: 5 % (ref 1–7)
NRBC AUTOMATED: ABNORMAL PER 100 WBC
NRBC AUTOMATED: ABNORMAL PER 100 WBC
PDW BLD-RTO: 18 % (ref 11.5–14.9)
PDW BLD-RTO: 18 % (ref 11.5–14.9)
PLATELET # BLD: 332 K/UL (ref 150–450)
PLATELET # BLD: 332 K/UL (ref 150–450)
PLATELET ESTIMATE: ABNORMAL
PMV BLD AUTO: 6.9 FL (ref 6–12)
PMV BLD AUTO: 6.9 FL (ref 6–12)
POTASSIUM SERPL-SCNC: 4.3 MMOL/L (ref 3.7–5.3)
RBC # BLD: 3.94 M/UL (ref 4.5–5.9)
RBC # BLD: 3.94 M/UL (ref 4.5–5.9)
RBC # BLD: ABNORMAL 10*6/UL
SEG NEUTROPHILS: 67 % (ref 36–66)
SEGMENTED NEUTROPHILS ABSOLUTE COUNT: 4.2 K/UL (ref 1.3–9.1)
SODIUM BLD-SCNC: 142 MMOL/L (ref 135–144)
TOTAL IRON BINDING CAPACITY: 319 UG/DL (ref 250–450)
TOTAL PROTEIN: 6.7 G/DL (ref 6.4–8.3)
TRIGLYCERIDE, FASTING: 486 MG/DL
TSH SERPL DL<=0.05 MIU/L-ACNC: 1.68 MIU/L (ref 0.3–5)
UNSATURATED IRON BINDING CAPACITY: 291 UG/DL (ref 112–347)
VITAMIN B-12: 375 PG/ML (ref 232–1245)
VITAMIN B-12: 375 PG/ML (ref 232–1245)
VITAMIN D 25-HYDROXY: 13.6 NG/ML (ref 30–100)
VLDLC SERPL CALC-MCNC: ABNORMAL MG/DL (ref 1–30)
WBC # BLD: 6.2 K/UL (ref 3.5–11)
WBC # BLD: 6.2 K/UL (ref 3.5–11)
WBC # BLD: ABNORMAL 10*3/UL

## 2021-10-23 PROCEDURE — 82607 VITAMIN B-12: CPT

## 2021-10-23 PROCEDURE — 80061 LIPID PANEL: CPT

## 2021-10-23 PROCEDURE — 82306 VITAMIN D 25 HYDROXY: CPT

## 2021-10-23 PROCEDURE — 83721 ASSAY OF BLOOD LIPOPROTEIN: CPT

## 2021-10-23 PROCEDURE — 84443 ASSAY THYROID STIM HORMONE: CPT

## 2021-10-23 PROCEDURE — 82746 ASSAY OF FOLIC ACID SERUM: CPT

## 2021-10-23 PROCEDURE — 85027 COMPLETE CBC AUTOMATED: CPT

## 2021-10-23 PROCEDURE — 82728 ASSAY OF FERRITIN: CPT

## 2021-10-23 PROCEDURE — 83550 IRON BINDING TEST: CPT

## 2021-10-23 PROCEDURE — 83540 ASSAY OF IRON: CPT

## 2021-10-23 PROCEDURE — 36415 COLL VENOUS BLD VENIPUNCTURE: CPT

## 2021-10-23 PROCEDURE — 80053 COMPREHEN METABOLIC PANEL: CPT

## 2021-10-23 PROCEDURE — 83735 ASSAY OF MAGNESIUM: CPT

## 2021-10-23 PROCEDURE — 85025 COMPLETE CBC W/AUTO DIFF WBC: CPT

## 2021-10-24 DIAGNOSIS — E55.9 VITAMIN D DEFICIENCY: Primary | ICD-10-CM

## 2021-10-24 LAB — LDL CHOLESTEROL DIRECT: 41 MG/DL

## 2021-10-24 NOTE — RESULT ENCOUNTER NOTE
Please notify patient:   Vitamin D very low, he did tell me he cannot tolerate high dosage, he needs to buy vitamin D from over-the-counter 2000 units, take every day with food.   High triglycerides to cut down sweets  Blood glucose 152 low-carb diet advised  Magnesium is low  Anemia is worsening, follow-up with the blood doctor on 11/10/2021    Otherwise labs within normal limits  continue current treatment    Future Appointments  11/3/2021  9:40 AM    VEE Al - CNP   [id number] [facility]  11/10/2021 4:15 PM    Angelina Henry MD          [address]  12/13/2021 10:00 AM   Tamika Valencia MD         [facility]           [facility]  2/25/2022  10:30 AM   Joanna Kaufman MD     [facility]

## 2021-11-03 ENCOUNTER — HOSPITAL ENCOUNTER (OUTPATIENT)
Dept: PAIN MANAGEMENT | Age: 71
Discharge: HOME | End: 2021-11-03
Payer: MEDICARE

## 2021-11-03 DIAGNOSIS — G62.9 PERIPHERAL POLYNEUROPATHY: ICD-10-CM

## 2021-11-03 DIAGNOSIS — M47.816 OSTEOARTHRITIS OF LUMBAR SPINE, UNSPECIFIED SPINAL OSTEOARTHRITIS COMPLICATION STATUS: ICD-10-CM

## 2021-11-03 DIAGNOSIS — M47.816 LUMBAR SPONDYLOSIS: Chronic | ICD-10-CM

## 2021-11-03 DIAGNOSIS — M54.16 LUMBAR RADICULOPATHY: Chronic | ICD-10-CM

## 2021-11-03 DIAGNOSIS — M48.061 SPINAL STENOSIS OF LUMBAR REGION WITHOUT NEUROGENIC CLAUDICATION: Chronic | ICD-10-CM

## 2021-11-03 DIAGNOSIS — Z98.1 S/P CERVICAL SPINAL FUSION: Chronic | ICD-10-CM

## 2021-11-03 DIAGNOSIS — M47.26 OSTEOARTHRITIS OF SPINE WITH RADICULOPATHY, LUMBAR REGION: ICD-10-CM

## 2021-11-03 DIAGNOSIS — Z51.81 ENCOUNTER FOR MEDICATION MONITORING: Chronic | ICD-10-CM

## 2021-11-03 DIAGNOSIS — G89.29 ENCOUNTER FOR CHRONIC PAIN MANAGEMENT: ICD-10-CM

## 2021-11-03 DIAGNOSIS — M51.36 DEGENERATIVE DISC DISEASE, LUMBAR: Primary | Chronic | ICD-10-CM

## 2021-11-03 DIAGNOSIS — M46.92 CERVICAL SPONDYLITIS (HCC): Chronic | ICD-10-CM

## 2021-11-03 PROCEDURE — 99213 OFFICE O/P EST LOW 20 MIN: CPT | Performed by: NURSE PRACTITIONER

## 2021-11-03 PROCEDURE — 99213 OFFICE O/P EST LOW 20 MIN: CPT

## 2021-11-03 RX ORDER — PREGABALIN 150 MG/1
150 CAPSULE ORAL 3 TIMES DAILY
Qty: 90 CAPSULE | Refills: 2 | Status: SHIPPED | OUTPATIENT
Start: 2021-11-04 | End: 2022-02-01 | Stop reason: SDUPTHER

## 2021-11-03 RX ORDER — MORPHINE SULFATE 60 MG/1
60 TABLET, FILM COATED, EXTENDED RELEASE ORAL 2 TIMES DAILY
Qty: 60 TABLET | Refills: 0 | Status: SHIPPED | OUTPATIENT
Start: 2021-11-04 | End: 2021-11-30 | Stop reason: SDUPTHER

## 2021-11-03 RX ORDER — OXYCODONE HYDROCHLORIDE AND ACETAMINOPHEN 5; 325 MG/1; MG/1
1 TABLET ORAL EVERY 8 HOURS
Qty: 90 TABLET | Refills: 0 | Status: SHIPPED | OUTPATIENT
Start: 2021-11-04 | End: 2021-11-30 | Stop reason: SDUPTHER

## 2021-11-03 ASSESSMENT — ENCOUNTER SYMPTOMS
GASTROINTESTINAL NEGATIVE: 1
RESPIRATORY NEGATIVE: 1
BACK PAIN: 1

## 2021-11-03 NOTE — PROGRESS NOTES
[address] PROGRESS NOTE      Patient  completed [x]  video visit   []   phone call:         Minutes :       [x]    to  review Medication Agreement    []  Follow up after procedure   []  Discuss treatment options      Location:  Provider:  working from    [x]    home    []   [facility] - ANALIA MCKEON ,   patient at  home         Chief Complaint: low back pain    He c/o low back pain  Radiating down his legs, His pain is not changed. He has neuropathy in his feet, it feels like he is walking on rocks. He is on lyrica. He has history of 3 cervical surgeries. His pain is maintained with percocet and,ms contin. He was diagnosed with bladder cancer and follows with Urology. He reports he wakes up due to pain. He does home exercises. Back Pain  This is a chronic problem. The problem occurs constantly. The problem is unchanged. The pain is present in the lumbar spine. The quality of the pain is described as aching (dull). The pain is at a severity of 4/10. The pain is moderate. The pain is the same all the time. Exacerbated by: too much activity. Associated symptoms include numbness. (Numbness feet) Risk factors include history of cancer. He has tried analgesics (certain positions) for the symptoms.    Treatment goals:  Functional status: reduce pain2      Aberrancy:   Any alcoholic beverages     no       Any illegal drugs   no      Analgesia:   4                  Adverse  Effects :no    ADL;s :some stretching      Data:    When was thelast UDS:    4-9-2021        Was the UDS appropriate:  [x] yes []   no      Record/Diagnostics Review:      As above, I did review the imaging     4/13/2021 12:49 PM - Conchita Eubanks Incoming Lab Results From [facility]    Component Value Ref Range & Units Status Collected Lab   Pain Management Drug Panel Interp, Urine Inconsistent   Final 04/09/2021  9:31 AM [facility]   (NOTE)   ________________________________________________________________   DRUGS EXPECTED:   MORPHINE OXYCODONE   ________________________________________________________________   CONSISTENT with medications provided:   MORPHINE: based on morphine, hydromorphone   OXYCODONE: based on oxycodone, noroxycodone, oxymorphone   ________________________________________________________________   INCONSISTENT with medications provided:   Pregabalin   ________________________________________________________________   INTERPRETIVE INFORMATION: Targeted drug profile Interp   Interpretation depends on accuracy and completeness of patient   medication information submitted by client. 6-Acetylmorphine, Ur Not Detected   Final       EXAMINATION:   MRI OF THE LUMBAR SPINE WITHOUT CONTRAST, 2/16/2018 9:36 pm       TECHNIQUE:   Multiplanar multisequence MRI of the lumbar spine was performed without the   administration of intravenous contrast.       COMPARISON:   March 2010       HISTORY:   ORDERING SYSTEM PROVIDED HISTORY: Degenerative disc disease, lumbar   TECHNOLOGIST PROVIDED HISTORY:   Ordering Physician Provided Reason for Exam: LUMBAR DDD, LUMBAR   OSTEOARTHRITIS WITH RADICULOPATHY, SPINAL STENOSIS OF LUMBAR REGION WITHOUT   NEUROGENIC CLAUDICATION   Additional signs and symptoms: PATIENT COMPLAINS OF MID TO LOWER BACK PAIN   AND BUTTOCK PAIN. AND THAT HIS FEET FALL ASLEEP. SYMPTOMS FOR THE PAST 8 TO   10 YEARS. NO KNOWN INJURY.  NO PREVIOUS LUMBAR SURGERIES.     FINDINGS:   BONES/ALIGNMENT: Multiple endplate Schmorl's node deformities are noted. There is no acute fracture or bone edema.  T11 hemangioma is noted.    Vertebral body height and alignment is stable.       SPINAL CORD: Conus terminates at T12-L1 and is grossly normal in appearance.       SOFT TISSUES: Detail of the aorta is limited.  There is suggested mild   enlargement with the transverse dimension of approximately 3.1 cm.  Dedicated   imaging of the aorta is recommended.       L1-L2: Diffuse disc bulging is noted with a small proximal foraminal protrusion on the right.  Annular tear is noted.  Similar findings were noted   on the prior.  Facet hypertrophic changes are noted.       L2-L3: Minimal disc bulging is noted diffusely. Gregery Mar is a questionable   small left foraminal protrusion laterally.  There is minimal narrowing of the   left foramen.  Facet hypertrophic changes are noted.  Findings are stable       L3-L4: Mild disc bulging is noted diffusely.  Minimal endplate and mild facet   hypertrophic changes are noted.  There is borderline mild proximal foraminal   narrowing on the left.  This is stable.       L4-L5: Mild disc bulging is noted.  A small inferior foraminal protrusion on   the right is noted.  There is a slightly larger broad-based protrusion into   the left foramen.  Bilateral foraminal narrowing is noted.  Findings not   appear grossly changed.  Facet hypertrophic changes are noted       L5-S1: Minimal disc bulging is noted.  Facet hypertrophic changes are noted.       SI joint degenerative changes are noted.           Impression   No significant interval change in the multifocal degenerative disc disease   throughout the lumbar spine.  See above for details of each level       Suggested aortic aneurysm.  Dedicated imaging of the aorta is recommended       RECOMMENDATIONS:   Managing Abdominal Aortic Aneurysms       2.6-2.9 cm: Every 5 years*       3.0-3.4 cm: Every 3 years.       3.5-3.9 cm: Every 1 year.       4.0-4.4 cm: Every 1 year. Recommend vascular consultation.       4.5-5.4 cm: Every 6 months. Recommend vascular consultation.       Greater than or equal to 5.5 cm: Referral to vascular surgeon.       *For abdominal aortas with maximum diameter of 2.6-2.9 cm meeting criteria   for AAA (>50% of proximal normal segment).       Reference:       J Vasc Surg.  2009 Oct;50(4 Suppl):S2-49                           Pill count: appropriate    fill date :11-4-2021    Morphine equivalent dose as reported on [place]ARRS:  142.50  Periodic Controlled Substance Monitoring: Possible medication side effects, risk of tolerance/dependence & alternative treatments discussed., No signs of potential drug abuse or diversion identified. , Assessed functional status., Obtaining appropriate analgesic effect of treatment. (Tameka Lujan, APRN - CNP)  Chronic Pain > 80 MEDD: Co-prescribed Naloxone., Obtained or confirmed \"Medication Contract\" on file. Tameka Lujan, APRN - CNP)  Review ofOARRS does not show any aberrant prescription behavior. Medication is helping the patient stay active. Patient denies any side effects and reports adequate analgesia. No sign of misuse/abuse.             Past Medical History:   Diagnosis Date    Anemia     Arthritis     osteoarthritis    Bladder tumor     Colon polyps 10/08/2019    tubular adenoma x2    COPD (chronic obstructive pulmonary disease) (HCC)     Diabetic neuropathy (HCC)     Diarrhea     Encounter for chronic pain management     Essential hypertension 05/12/2020    Heartburn     Hepatitis B core antibody positive     History of bleeding peptic ulcer     History of blood transfusion     no reactions    History of hepatitis C     History of migraine headaches     History of stress test 07/2020    \"low risk\"    Hyperlipidemia     Iron (Fe) deficiency anemia     Poor historian     states his wife takes care of all medical information    Positive FIT (fecal immunochemical test)     Type 2 diabetes mellitus with microalbuminuria, without long-term current use of insulin ([facility] [address] 07/13/2020    Under care of team 08/31/2021    pcp-Dr Yamila fuentes-last virtual visit july 2021    Under care of team 08/31/2021    hematology-Dr Caprice Saavedra 32 virtual visit aug 2021    Wears dentures     Wears glasses        Past Surgical History:   Procedure Laterality Date    BLADDER TUMOR EXCISION  04/29/2021    CYSTOSCOPY TUR BLADDER TUMOR, GYRUS, RIGHT STENT PLACEMENT AND RIGHT STENT REMOVAL    CERVICAL SPINE SURGERY 1977    cervical spine three times, has plate    CHOLECYSTECTOMY  03/22/2019    COLONOSCOPY  01/25/2015    10 yr recall, hemorrhoids    COLONOSCOPY N/A 10/08/2019    tubular adenoma x2    CYSTOSCOPY Right 4/29/2021    CYSTOSCOPY TUR BLADDER TUMOR, GYRUS, RIGHT STENT PLACEMENT AND RIGHT STENT REMOVAL performed by Tylor Alamo MD at [address] [facility]   09/09/2021    CYSTOSCOPY, TRANSURETHRAL RESECTION BLADDER TUMOR    CYSTOSCOPY N/A 9/9/2021    CYSTOSCOPY, TRANSURETHRAL RESECTION BLADDER TUMOR performed by Tylor Alamo MD at [address] [facility] [address]  10/2015    all teeth extracted    HERNIA REPAIR N/A 08/07/2020    HERNIA INCISIONAL REPAIR LAPAROSCOPIC ROBOTIC WITH MESH performed by Augusto Mackey DO at [facility] Right    [address]  [address]    UPPER GASTROINTESTINAL ENDOSCOPY  01/25/2015    UPPER GASTROINTESTINAL ENDOSCOPY N/A 10/08/2019    EGD BIOPSY performed by Justin Benitez MD at [facility]       Allergies   Allergen Reactions   Marotoniel Lara [Aspirin]       iron deficiency anemia , requiring iron infusions and transfusions    Iron      Pill- constipation, abdominal pain    Nsaids       iron deficiency anemia, history of bleeding ulcers          Current Outpatient Medications:     cyanocobalamin ([employer] VITAMIN B12) 1000 MCG tablet, Take 2 tablets by mouth daily, Disp: 60 tablet, Rfl: 3    oxyCODONE-acetaminophen (PERCOCET) 5-325 MG per tablet, Take 1 tablet by mouth every 8 hours for 30 days. , Disp: 90 tablet, Rfl: 0    morphine (MS CONTIN) 60 MG extended release tablet, Take 1 tablet by mouth 2 times daily for 30 days. , Disp: 60 tablet, Rfl: 0    baclofen (LIORESAL) 10 MG tablet, Take 1 tablet by mouth 3 times daily as needed (muscle spasms) TAKE ONE TABLET BY MOUTH THREE TIMES A DAY AS NEEDED FOR MUSCLE SPASMS, Disp: 90 tablet, Rfl: 0    pantoprazole (PROTONIX) 40 MG tablet, Take 1 tablet by mouth daily, Disp: 90 tablet, Rfl: 1    metFORMIN (GLUCOPHAGE) 1000 MG tablet, TAKE 1 TABLET BY MOUTH TWICE DAILY WITH MEALS, Disp: 180 tablet, Rfl: 1    folic acid-pyridoxine-cyanocobalamin (FOLTABS) 0.8-10-0. 115 MG TABS tablet, Take 1 tablet by mouth daily, Disp: 90 tablet, Rfl: 3    pregabalin (LYRICA) 150 MG capsule, Take 1 capsule by mouth 3 times daily for 90 days. , Disp: 90 capsule, Rfl: 2    cyanocobalamin 1000 MCG/ML injection, Inject 1 mL into the muscle every 30 days Call for next refill which will be monthly for life, Disp: 3 mL, Rfl: 3    blood glucose test strips (ASCENSIA AUTODISC VI;ONE TOUCH ULTRA TEST VI) strip, 1 each by In Vitro route daily Testing daily. needs true Metrix test strips, Disp: 100 strip, Rfl: 3    pravastatin (PRAVACHOL) 40 MG tablet, Take 1 tablet by mouth every evening Stop Gemfibrozil, Disp: 90 tablet, Rfl: 3    metoprolol tartrate (LOPRESSOR) 25 MG tablet, Take 1 tablet by mouth 2 times daily, Disp: 180 tablet, Rfl: 3    TRUEplus Lancets 30G MISC, Test blood glucose twice a day, Disp: 200 each, Rfl: 3    Alcohol Swabs (B-D SINGLE USE SWABS REGULAR) PADS, USE TO CHECK BLOOD SUGAR TWICE A DAY, Disp: 200 each, Rfl: 3    glipiZIDE (GLUCOTROL) 5 MG tablet, Take 1 tablet by mouth every morning Keep fasting morning blood glucose .   If blood glucose below 80, you need to stop glipizide, Disp: 90 tablet, Rfl: 3    Syringe/Needle, Disp, (SYRINGE 3CC/25GX1\") 25G X 1\" 3 ML MISC, To be used with B12 injections, Disp: 50 each, Rfl: 2    loperamide (RA ANTI-DIARRHEAL) 2 MG capsule, Take 1 capsule by mouth 4 times daily as needed for Diarrhea, Disp: 112 capsule, Rfl: 2    lidocaine (LIDODERM) 5 %, Place 1 patch onto the skin daily 12 hours on, 12 hours off., Disp: 30 patch, Rfl: 3    lisinopril-hydroCHLOROthiazide (PRINZIDE;ZESTORETIC) 10-12.5 MG per tablet, TK 1 T PO QD, Disp: 90 tablet, Rfl: 3    Probiotic Product (PROBIOTIC-10 PO), Take by mouth daily , Disp: , Rfl:     Blood Glucose Monitoring Suppl (TRUE METRIX METER) w/Device KIT, USE AS DIRECTED TO TEST BLOOD SUGAR, Disp: , Rfl:     Family History   Problem Relation Age of Onset    Diabetes Mother     Heart Disease Father        Social History     Socioeconomic History    Marital status:      Spouse name: Not on file    Number of children: Not on file    Years of education: Not on file    Highest education level: Not on file   Occupational History    Occupation: disabled   Tobacco Use    Smoking status: Former Smoker     Packs/day: 0.50     Years: 45.00     Pack years: 22.50     Types: Cigarettes     Quit date: 2015     Years since quittin.9    Smokeless tobacco: Never Used    Tobacco comment: on chantix 11-6-15   12-7-15   Vaping Use    Vaping Use: Never used   Substance and Sexual Activity    Alcohol use: No    Drug use: No    Sexual activity: Yes     Partners: Female   Other Topics Concern    Not on file   Social History Narrative    Not on file     Social Determinants of Health     Financial Resource Strain: High Risk    Difficulty of Paying Living Expenses: Very hard   Food Insecurity: Food Insecurity Present    Worried About Running Out of Food in the Last Year: Often true    Jennifer of Food in the Last Year: Often true   Transportation Needs:     Lack of Transportation (Medical):      Lack of Transportation (Non-Medical):    Physical Activity:     Days of Exercise per Week:     Minutes of Exercise per Session:    Stress:     Feeling of Stress :    Social Connections:     Frequency of Communication with Friends and Family:     Frequency of Social Gatherings with Friends and Family:     Attends Caodaism Services:     Active Member of Clubs or Organizations:     Attends Club or Organization Meetings:     Marital Status:    Intimate Partner Violence:     Fear of Current or Ex-Partner:     Emotionally Abused:     Physically Abused:     Sexually Abused:          Review of Systems:  Review of Systems   Constitutional: Negative. HENT: Negative. Eyes:        Glasses   Cardiovascular: Negative. Respiratory: Negative. Endocrine:        Diabetic:  Blood sugar 120-140   Hematologic/Lymphatic: Negative. Skin: Negative. Musculoskeletal: Positive for back pain and joint pain. Knee pain   Gastrointestinal: Negative. Genitourinary:        Bladder cancer   Neurological: Positive for numbness. Psychiatric/Behavioral: Negative. Physical Exam:  There were no vitals taken for this visit. Physical Exam  HENT:      Head: Normocephalic. Pulmonary:      Effort: Pulmonary effort is normal.   Skin:         Neurological:      Mental Status: He is alert and oriented to person, place, and time. Psychiatric:         Mood and Affect: Mood normal.         Thought Content: Thought content normal.           Assessment:      Problem List Items Addressed This Visit     S/P cervical spinal fusion (Chronic)    Peripheral polyneuropathy    Osteoarthritis of spine with radiculopathy, lumbar region    Lumbar spondylosis (Chronic)    Lumbar spinal stenosis (Chronic)    Lumbar radiculopathy (Chronic)    Encounter for medication monitoring (Chronic)    Degenerative disc disease, lumbar - Primary (Chronic)    Cervical spondylitis (HCC) (Chronic)          Treatment Plan:  DISCUSSION: Treatment options discussed withpatient and all questions answered to patient's satisfaction. Possible side effects, risk of tolerance and or dependence and alternative treatments discussed    Obtaining appropriate analgesic effect of treatment   No signs of potential drug abuse or diversion identified    [x] Ill effects of being on chronic pain medications such as sleep disturbances, respiratory depression, hormonal changes, withdrawal symptoms, chronic opioid dependence and tolerance as well as risk of taking opioids with Benzodiazepines and taking opioids along with alcohol,  werediscussed with patient.  I had asked the patient to minimize medication use and utilize pain medications only for uncontrolled rest pain or pain with exertional activities. I advised patient not to self-escalate painmedications without consulting with us. At each of patient's future visits we will try to taper pain medications, while adjusting the adjunct medications, and re-evaluating for Physical Therapy to improve spinal andjoint strength. We will continue to have discussions to decrease pain medications as tolerated. Counseled patient on effects their pain medication and /or their medical condition mayhave on their  ability to drive or operate machinery. Instructed not to drive or operate machinery if drowsy     I also discussed with the patient regarding the dangers of combining narcotic pain medication with tranquilizers, alcohol or illegal drugs or taking the medication any way other than prescribed. The dangers were discussed  including respiratory depression and death. Patient was told to tell  all  physicians regarding the medications he is getting from pain clinic. Patient is warned not to take any unprescribed medications over-the-countermedications that can depress breathing . Patient is advised to talk to the pharmacist or physicians if planning to take any over-the-counter medications before  takeing them. Patient is strongly advised to avoid tranquilizers or  relaxants, illegal drugs  or any medications that can depress breathing  Patient is also advised to tell us if there is any changes in their medications from other physicians.             TREATMENT OPTIONS:       Medication Agreement Requirements Met  Continue Opioid therapy  Script written for  Ms contin, percocet, lyrica  Follow up appointment made

## 2021-11-07 DIAGNOSIS — M51.36 DEGENERATIVE DISC DISEASE, LUMBAR: ICD-10-CM

## 2021-11-07 DIAGNOSIS — M47.816 LUMBAR SPONDYLOSIS: ICD-10-CM

## 2021-11-08 ENCOUNTER — PATIENT MESSAGE (OUTPATIENT)
Dept: FAMILY MEDICINE CLINIC | Age: 71
End: 2021-11-08

## 2021-11-08 DIAGNOSIS — M51.36 DEGENERATIVE DISC DISEASE, LUMBAR: ICD-10-CM

## 2021-11-08 DIAGNOSIS — M47.816 LUMBAR SPONDYLOSIS: ICD-10-CM

## 2021-11-08 RX ORDER — LISINOPRIL AND HYDROCHLOROTHIAZIDE 12.5; 1 MG/1; MG/1
TABLET ORAL
Qty: 90 TABLET | Refills: 3 | Status: SHIPPED
Start: 2021-11-08 | End: 2022-02-25 | Stop reason: DRUGHIGH

## 2021-11-08 RX ORDER — BACLOFEN 10 MG/1
10 TABLET ORAL 3 TIMES DAILY PRN
Qty: 270 TABLET | Refills: 0 | Status: SHIPPED | OUTPATIENT
Start: 2021-11-08 | End: 2021-12-08 | Stop reason: SDUPTHER

## 2021-11-08 RX ORDER — BACLOFEN 10 MG/1
TABLET ORAL
Qty: 90 TABLET | Refills: 0 | Status: SHIPPED | OUTPATIENT
Start: 2021-11-08 | End: 2021-11-08 | Stop reason: SDUPTHER

## 2021-11-08 NOTE — TELEPHONE ENCOUNTER
From: Anita Elder  To: Dr. Altmna Vee: 2021 3:17 PM EST  Subject: Prescription Question    Hello,   Can you please call in a new RX for Anita Mercadoharley,  1950. Call to [facility] [address] [phone], please. It would be greatly appreciated, last fill date 21, Thang Aguilar for , Anita Elder. Thank you,  Carmen Arevalo  [phone]~Koby's new phone number.  We stopped [phone]

## 2021-11-10 ENCOUNTER — VIRTUAL VISIT (OUTPATIENT)
Dept: ONCOLOGY | Age: 71
End: 2021-11-10
Payer: MEDICARE

## 2021-11-10 DIAGNOSIS — E53.8 B12 DEFICIENCY: ICD-10-CM

## 2021-11-10 DIAGNOSIS — D50.0 IRON DEFICIENCY ANEMIA DUE TO CHRONIC BLOOD LOSS: ICD-10-CM

## 2021-11-10 DIAGNOSIS — D64.9 ANEMIA, UNSPECIFIED TYPE: Primary | ICD-10-CM

## 2021-11-10 DIAGNOSIS — E55.9 VITAMIN D DEFICIENCY: Primary | ICD-10-CM

## 2021-11-10 DIAGNOSIS — K92.2 GASTROINTESTINAL HEMORRHAGE, UNSPECIFIED GASTROINTESTINAL HEMORRHAGE TYPE: ICD-10-CM

## 2021-11-10 PROCEDURE — G8427 DOCREV CUR MEDS BY ELIG CLIN: HCPCS | Performed by: INTERNAL MEDICINE

## 2021-11-10 PROCEDURE — 1123F ACP DISCUSS/DSCN MKR DOCD: CPT | Performed by: INTERNAL MEDICINE

## 2021-11-10 PROCEDURE — 3017F COLORECTAL CA SCREEN DOC REV: CPT | Performed by: INTERNAL MEDICINE

## 2021-11-10 PROCEDURE — 4040F PNEUMOC VAC/ADMIN/RCVD: CPT | Performed by: INTERNAL MEDICINE

## 2021-11-10 PROCEDURE — 99214 OFFICE O/P EST MOD 30 MIN: CPT | Performed by: INTERNAL MEDICINE

## 2021-11-11 ENCOUNTER — TELEPHONE (OUTPATIENT)
Dept: ONCOLOGY | Age: 71
End: 2021-11-11

## 2021-11-11 RX ORDER — ERGOCALCIFEROL 1.25 MG/1
CAPSULE ORAL
Qty: 4 CAPSULE | Refills: 2 | Status: SHIPPED | OUTPATIENT
Start: 2021-11-11 | End: 2022-02-25

## 2021-11-11 NOTE — TELEPHONE ENCOUNTER
AVS from 11/10/21     injectafer as soon as approved   rv in 2 months with labs couple of days prior       *vv is sched for [email or web address]  W/labs 1 wee prior vitb12,folate,cbc  *avs given to chemo sched for p/c on injectafer      PT was mailed AVS and an appt schedule

## 2021-11-15 ENCOUNTER — TELEPHONE (OUTPATIENT)
Dept: ONCOLOGY | Age: 71
End: 2021-11-15

## 2021-11-15 NOTE — PROGRESS NOTES
Subjective:   PCP: Felix Emery MD       Chief Complaint   Patient presents with    Follow-up     review status of disease    Discuss Labs         INTERIM HISTORY:    Patient presents to the clinic via virtual visit due to ongoing coronavirus pandemic to discuss results of his lab work-up and the relevant clinical data. Patient since last office visit underwent cystoscopy which revealed no invasive recurrent bladder cancer. Complains of hematuria. Iron stores are low. Hemoglobin is 11.3    During this visit patient's allergy, social, medical, surgical history and medications were reviewed and updated. REVIEW OF SYSTEMS:   General: No fever or night sweats. Weight is stable. +fatigue   ENT: No double or blurred vision, no hearing problem, no dysphagia or sore throat   Respiratory: No chest pain, no shortness of breath, no cough or hemoptysis. Cardiovascular: Denies chest pain, PND or orthopnea. No L E swelling or palpitations. Gastrointestinal: No nausea or vomiting, abdominal pain, or constipation. + diarrhea  Genitourinary: Denies dysuria, frequency, urgency or incontinence. +hematuria -on and off  Neurological: Denies headaches, decreased LOC, no sensory or motor focal deficits. Musculoskeletal: No arthralgia no back pain or joint swelling. Skin: There are no rashes or bleeding.   Psych: Denies hallucinations or intentions to harm self            PHYSICAL EXAMINATION:    Vital Signs: (As obtained by patient/caregiver or practitioner observation)    Blood pressure-  Heart rate-    Respiratory rate-    Temperature-  Pulse oximetry-     Constitutional: [x] Appears well-developed and well-nourished [x] No apparent distress      [] Abnormal-   Mental status  [x] Alert and awake  [x] Oriented to person/place/time [x]Able to follow commands      Eyes:  EOM    [x]  Normal  [] Abnormal-  Sclera  [x]  Normal  [] Abnormal -         Discharge [x]  None visible  [] Abnormal -    HENT:    [x] Normocephalic, atraumatic. [] Abnormal   [x] Mouth/Throat: Mucous membranes are moist.     External Ears [x] Normal  [] Abnormal-     Neck: [x] No visualized mass     Pulmonary/Chest: [x] Respiratory effort normal.  [x] No visualized signs of difficulty breathing or respiratory distress        [] Abnormal-      Musculoskeletal:   [] Normal gait with no signs of ataxia         [x] Normal range of motion of neck        [] Abnormal-       Neurological:        [x] No Facial Asymmetry (Cranial nerve 7 motor function) (limited exam to video visit)          [x] No gaze palsy        [] Abnormal-         Skin:        [x] No significant exanthematous lesions or discoloration noted on facial skin         [] Abnormal-            Psychiatric:       [x] Normal Affect [x] No Hallucinations        [] Abnormal-     Other pertinent observable physical exam findings-     Due to this being a TeleHealth encounter, evaluation of the following organ systems is limited: Vitals/Constitutional/EENT/Resp/CV/GI//MS/Neuro/Skin/Heme-Lymph-Imm.           REVIEW OF LABORATORY DATA:   Lab Results   Component Value Date    WBC 6.2 10/23/2021    WBC 6.2 10/23/2021    HGB 11.3 (L) 10/23/2021    HGB 11.3 (L) 10/23/2021    HCT 33.6 (L) 10/23/2021    HCT 33.6 (L) 10/23/2021    MCV 85.1 10/23/2021    MCV 85.1 10/23/2021     10/23/2021     10/23/2021       Chemistry        Component Value Date/Time     10/23/2021 0839    K 4.3 10/23/2021 0839     10/23/2021 0839    CO2 27 10/23/2021 0839    BUN 12 10/23/2021 0839    CREATININE 0.76 10/23/2021 0839        Component Value Date/Time    CALCIUM 8.6 10/23/2021 0839    ALKPHOS 83 10/23/2021 0839    AST 17 10/23/2021 0839    ALT 20 10/23/2021 0839    BILITOT 0.18 (L) 10/23/2021 0839        Lab Results   Component Value Date    YNBKUMFN35 375 10/23/2021    QTSGLKXR22 375 10/23/2021         Lab Results   Component Value Date    IRON 28 (L) 10/23/2021    TIBC 319 10/23/2021    FERRITIN 15 pursuant to the emergency declaration under the [facility] authority and the [employer] Act. Patient identification was verified, and a caregiver was present when appropriate. The patient was located in a state where the provider was credentialed to provide care. Total time spent for this encounter: Not billed by time    --Joby Espinoza MD on 11/15/2021 at 6:16 AM    An electronic signature was used to authenticate this note.

## 2021-11-17 ENCOUNTER — HOSPITAL ENCOUNTER (OUTPATIENT)
Dept: INFUSION THERAPY | Age: 71
Discharge: HOME | End: 2021-11-17
Payer: MEDICARE

## 2021-11-17 VITALS
DIASTOLIC BLOOD PRESSURE: 62 MMHG | TEMPERATURE: 98.9 F | SYSTOLIC BLOOD PRESSURE: 108 MMHG | HEART RATE: 86 BPM | RESPIRATION RATE: 16 BRPM

## 2021-11-17 DIAGNOSIS — D50.0 IRON DEFICIENCY ANEMIA DUE TO CHRONIC BLOOD LOSS: Primary | ICD-10-CM

## 2021-11-17 PROCEDURE — 2580000003 HC RX 258: Performed by: INTERNAL MEDICINE

## 2021-11-17 PROCEDURE — 96365 THER/PROPH/DIAG IV INF INIT: CPT

## 2021-11-17 PROCEDURE — 6360000002 HC RX W HCPCS: Performed by: INTERNAL MEDICINE

## 2021-11-17 RX ORDER — METHYLPREDNISOLONE SODIUM SUCCINATE 125 MG/2ML
125 INJECTION, POWDER, LYOPHILIZED, FOR SOLUTION INTRAMUSCULAR; INTRAVENOUS ONCE
Status: CANCELLED | OUTPATIENT
Start: 2021-11-24 | End: 2021-11-24

## 2021-11-17 RX ORDER — SODIUM CHLORIDE 9 MG/ML
25 INJECTION, SOLUTION INTRAVENOUS PRN
Status: CANCELLED | OUTPATIENT
Start: 2021-11-17

## 2021-11-17 RX ORDER — SODIUM CHLORIDE 0.9 % (FLUSH) 0.9 %
5-40 SYRINGE (ML) INJECTION PRN
Status: CANCELLED | OUTPATIENT
Start: 2021-11-24

## 2021-11-17 RX ORDER — EPINEPHRINE 1 MG/ML
0.3 INJECTION, SOLUTION, CONCENTRATE INTRAVENOUS PRN
Status: CANCELLED | OUTPATIENT
Start: 2021-11-17

## 2021-11-17 RX ORDER — METHYLPREDNISOLONE SODIUM SUCCINATE 125 MG/2ML
125 INJECTION, POWDER, LYOPHILIZED, FOR SOLUTION INTRAMUSCULAR; INTRAVENOUS ONCE
Status: CANCELLED | OUTPATIENT
Start: 2021-11-17 | End: 2021-11-17

## 2021-11-17 RX ORDER — SODIUM CHLORIDE 9 MG/ML
INJECTION, SOLUTION INTRAVENOUS CONTINUOUS
Status: CANCELLED | OUTPATIENT
Start: 2021-11-24

## 2021-11-17 RX ORDER — EPINEPHRINE 1 MG/ML
0.3 INJECTION, SOLUTION, CONCENTRATE INTRAVENOUS PRN
Status: CANCELLED | OUTPATIENT
Start: 2021-11-24

## 2021-11-17 RX ORDER — HEPARIN SODIUM (PORCINE) LOCK FLUSH IV SOLN 100 UNIT/ML 100 UNIT/ML
500 SOLUTION INTRAVENOUS PRN
Status: CANCELLED | OUTPATIENT
Start: 2021-11-24

## 2021-11-17 RX ORDER — SODIUM CHLORIDE 9 MG/ML
INJECTION, SOLUTION INTRAVENOUS CONTINUOUS
Status: CANCELLED | OUTPATIENT
Start: 2021-11-17

## 2021-11-17 RX ORDER — HEPARIN SODIUM (PORCINE) LOCK FLUSH IV SOLN 100 UNIT/ML 100 UNIT/ML
500 SOLUTION INTRAVENOUS PRN
Status: CANCELLED | OUTPATIENT
Start: 2021-11-17

## 2021-11-17 RX ORDER — DIPHENHYDRAMINE HYDROCHLORIDE 50 MG/ML
50 INJECTION INTRAMUSCULAR; INTRAVENOUS ONCE
Status: CANCELLED | OUTPATIENT
Start: 2021-11-17 | End: 2021-11-17

## 2021-11-17 RX ORDER — SODIUM CHLORIDE 0.9 % (FLUSH) 0.9 %
5-40 SYRINGE (ML) INJECTION PRN
Status: CANCELLED | OUTPATIENT
Start: 2021-11-17

## 2021-11-17 RX ORDER — SODIUM CHLORIDE 9 MG/ML
INJECTION, SOLUTION INTRAVENOUS CONTINUOUS
Status: ACTIVE | OUTPATIENT
Start: 2021-11-17 | End: 2021-11-17

## 2021-11-17 RX ORDER — SODIUM CHLORIDE 9 MG/ML
25 INJECTION, SOLUTION INTRAVENOUS PRN
Status: CANCELLED | OUTPATIENT
Start: 2021-11-24

## 2021-11-17 RX ORDER — DIPHENHYDRAMINE HYDROCHLORIDE 50 MG/ML
50 INJECTION INTRAMUSCULAR; INTRAVENOUS ONCE
Status: CANCELLED | OUTPATIENT
Start: 2021-11-24 | End: 2021-11-24

## 2021-11-17 RX ADMIN — FERRIC CARBOXYMALTOSE INJECTION 750 MG: 50 INJECTION, SOLUTION INTRAVENOUS at 09:14

## 2021-11-17 RX ADMIN — SODIUM CHLORIDE: 9 INJECTION, SOLUTION INTRAVENOUS at 09:13

## 2021-11-17 NOTE — PROGRESS NOTES
Patient here for injectafer. Vitals stable. He denies any issues today. He tolerated injection well and was discharged home in stable condition. He is due to return 11/23 for next injectafer.

## 2021-11-18 ENCOUNTER — TELEPHONE (OUTPATIENT)
Dept: ONCOLOGY | Age: 71
End: 2021-11-18

## 2021-11-23 ENCOUNTER — HOSPITAL ENCOUNTER (OUTPATIENT)
Dept: INFUSION THERAPY | Age: 71
Discharge: HOME | End: 2021-11-23
Payer: MEDICARE

## 2021-11-23 VITALS
HEART RATE: 98 BPM | DIASTOLIC BLOOD PRESSURE: 72 MMHG | TEMPERATURE: 98.1 F | SYSTOLIC BLOOD PRESSURE: 125 MMHG | RESPIRATION RATE: 16 BRPM

## 2021-11-23 DIAGNOSIS — D50.0 IRON DEFICIENCY ANEMIA DUE TO CHRONIC BLOOD LOSS: Primary | ICD-10-CM

## 2021-11-23 PROCEDURE — 2580000003 HC RX 258: Performed by: INTERNAL MEDICINE

## 2021-11-23 PROCEDURE — 6360000002 HC RX W HCPCS: Performed by: INTERNAL MEDICINE

## 2021-11-23 PROCEDURE — 96365 THER/PROPH/DIAG IV INF INIT: CPT

## 2021-11-23 RX ORDER — DIPHENHYDRAMINE HYDROCHLORIDE 50 MG/ML
50 INJECTION INTRAMUSCULAR; INTRAVENOUS ONCE
Status: CANCELLED | OUTPATIENT
Start: 2021-11-24 | End: 2021-11-24

## 2021-11-23 RX ORDER — SODIUM CHLORIDE 9 MG/ML
INJECTION, SOLUTION INTRAVENOUS CONTINUOUS
Status: DISCONTINUED | OUTPATIENT
Start: 2021-11-23 | End: 2021-11-24 | Stop reason: HOSPADM

## 2021-11-23 RX ORDER — SODIUM CHLORIDE 0.9 % (FLUSH) 0.9 %
5-40 SYRINGE (ML) INJECTION PRN
Status: CANCELLED | OUTPATIENT
Start: 2021-11-24

## 2021-11-23 RX ORDER — SODIUM CHLORIDE 9 MG/ML
INJECTION, SOLUTION INTRAVENOUS CONTINUOUS
Status: CANCELLED | OUTPATIENT
Start: 2021-11-24

## 2021-11-23 RX ORDER — SODIUM CHLORIDE 9 MG/ML
25 INJECTION, SOLUTION INTRAVENOUS PRN
Status: CANCELLED | OUTPATIENT
Start: 2021-11-24

## 2021-11-23 RX ORDER — METHYLPREDNISOLONE SODIUM SUCCINATE 125 MG/2ML
125 INJECTION, POWDER, LYOPHILIZED, FOR SOLUTION INTRAMUSCULAR; INTRAVENOUS ONCE
Status: CANCELLED | OUTPATIENT
Start: 2021-11-24 | End: 2021-11-24

## 2021-11-23 RX ORDER — HEPARIN SODIUM (PORCINE) LOCK FLUSH IV SOLN 100 UNIT/ML 100 UNIT/ML
500 SOLUTION INTRAVENOUS PRN
Status: CANCELLED | OUTPATIENT
Start: 2021-11-24

## 2021-11-23 RX ORDER — EPINEPHRINE 1 MG/ML
0.3 INJECTION, SOLUTION, CONCENTRATE INTRAVENOUS PRN
Status: CANCELLED | OUTPATIENT
Start: 2021-11-24

## 2021-11-23 RX ADMIN — FERRIC CARBOXYMALTOSE INJECTION 750 MG: 50 INJECTION, SOLUTION INTRAVENOUS at 15:13

## 2021-11-23 RX ADMIN — SODIUM CHLORIDE: 9 INJECTION, SOLUTION INTRAVENOUS at 14:57

## 2021-11-23 NOTE — PROGRESS NOTES
Patient here for injectafer. Vitals stable. Denies any issues at this time. He tolerated treatment well and was discharged home in stable condition. He has a VV with MD on 1/12.

## 2021-11-30 ENCOUNTER — HOSPITAL ENCOUNTER (OUTPATIENT)
Dept: PAIN MANAGEMENT | Age: 71
Discharge: HOME | End: 2021-11-30
Payer: MEDICARE

## 2021-11-30 DIAGNOSIS — Z98.1 S/P CERVICAL SPINAL FUSION: Chronic | ICD-10-CM

## 2021-11-30 DIAGNOSIS — G89.29 ENCOUNTER FOR CHRONIC PAIN MANAGEMENT: ICD-10-CM

## 2021-11-30 DIAGNOSIS — M51.36 DEGENERATIVE DISC DISEASE, LUMBAR: Chronic | ICD-10-CM

## 2021-11-30 DIAGNOSIS — Z51.81 ENCOUNTER FOR MEDICATION MONITORING: Chronic | ICD-10-CM

## 2021-11-30 DIAGNOSIS — M47.816 OSTEOARTHRITIS OF LUMBAR SPINE, UNSPECIFIED SPINAL OSTEOARTHRITIS COMPLICATION STATUS: ICD-10-CM

## 2021-11-30 DIAGNOSIS — M47.26 OSTEOARTHRITIS OF SPINE WITH RADICULOPATHY, LUMBAR REGION: ICD-10-CM

## 2021-11-30 DIAGNOSIS — M46.92 CERVICAL SPONDYLITIS (HCC): Chronic | ICD-10-CM

## 2021-11-30 DIAGNOSIS — M48.061 SPINAL STENOSIS OF LUMBAR REGION WITHOUT NEUROGENIC CLAUDICATION: Chronic | ICD-10-CM

## 2021-11-30 DIAGNOSIS — M54.16 LUMBAR RADICULOPATHY: Chronic | ICD-10-CM

## 2021-11-30 DIAGNOSIS — M47.816 LUMBAR SPONDYLOSIS: Chronic | ICD-10-CM

## 2021-11-30 PROCEDURE — 99213 OFFICE O/P EST LOW 20 MIN: CPT | Performed by: NURSE PRACTITIONER

## 2021-11-30 PROCEDURE — 99213 OFFICE O/P EST LOW 20 MIN: CPT

## 2021-11-30 RX ORDER — OXYCODONE HYDROCHLORIDE AND ACETAMINOPHEN 5; 325 MG/1; MG/1
1 TABLET ORAL EVERY 8 HOURS
Qty: 90 TABLET | Refills: 0 | Status: SHIPPED | OUTPATIENT
Start: 2021-12-04 | End: 2022-01-03 | Stop reason: SDUPTHER

## 2021-11-30 RX ORDER — MORPHINE SULFATE 60 MG/1
60 TABLET, FILM COATED, EXTENDED RELEASE ORAL 2 TIMES DAILY
Qty: 60 TABLET | Refills: 0 | Status: SHIPPED | OUTPATIENT
Start: 2021-12-04 | End: 2022-01-03 | Stop reason: SDUPTHER

## 2021-11-30 ASSESSMENT — ENCOUNTER SYMPTOMS
SHORTNESS OF BREATH: 0
BACK PAIN: 1
COUGH: 0
CONSTIPATION: 0

## 2021-11-30 NOTE — PROGRESS NOTES
Patient completed a telephone visit today to review medication contract. Chief Complaint: back pain    PMH  Pt c/o low back pain for many years. There is no known injury or surgery to the area. He does have a history of scoliosis. His last PT was over 4 years ago with no benefit and he is not interested in repeating. He does do home stretches every morning. He also had a LESI in 2013 that did not help. He has non-invasive bladder cancer and had resection of a tumor in April. He continues to follow with oncology and hematology - he continues Injectafer infusions for anemia.       Back Pain  This is a chronic problem. The current episode started more than 1 year ago. The problem occurs constantly. The problem is unchanged. The pain is present in the lumbar spine. The quality of the pain is described as aching. Radiates to: down both legs to the knees. The pain is at a severity of 3/10. The pain is mild. The symptoms are aggravated by standing and position. Pertinent negatives include no chest pain, fever, numbness, paresthesias or tingling. He has tried analgesics and bed rest for the symptoms. The treatment provided mild relief. Patient denies any new neurological symptoms. No bowel or bladder incontinence, no weakness, and no falling. Pill count: appropriate due 12/4    Morphine equivalent: 142.5    Periodic Controlled Substance Monitoring: Possible medication side effects, risk of tolerance/dependence & alternative treatments discussed., No signs of potential drug abuse or diversion identified., Obtaining appropriate analgesic effect of treatment. Aixa Tobias, APRN - CNP)  Chronic Pain > 80 MEDD: Co-prescribed Naloxone.  Anival Chatman, APRCRYSTAL - CNP)      Past Medical History:   Diagnosis Date    Anemia     Arthritis     osteoarthritis    Bladder tumor     Colon polyps 10/08/2019    tubular adenoma x2    COPD (chronic obstructive pulmonary disease) (HCC)     Diabetic neuropathy ([facility]     Diarrhea     Encounter for chronic pain management     Essential hypertension 05/12/2020    Heartburn     Hepatitis B core antibody positive     History of bleeding peptic ulcer     History of blood transfusion     no reactions    History of hepatitis C     History of migraine headaches     History of stress test 07/2020    \"low risk\"    Hyperlipidemia     Iron (Fe) deficiency anemia     Poor historian     states his wife takes care of all medical information    Positive FIT (fecal immunochemical test)     Type 2 diabetes mellitus with microalbuminuria, without long-term current use of insulin ([facility] 07/13/2020    Under care of team 08/31/2021    pcp-Dr Bentley-shannen fuentes-last virtual visit july 2021    Under care of team 08/31/2021    hematology-Dr Caprice Saavedra 32 virtual visit aug 2021    Wears dentures     Wears glasses        Past Surgical History:   Procedure Laterality Date    BLADDER TUMOR EXCISION  04/29/2021    CYSTOSCOPY TUR BLADDER TUMOR, GYRUS, RIGHT STENT PLACEMENT AND RIGHT STENT REMOVAL    CERVICAL SPINE SURGERY  1977    cervical spine three times, has plate    CHOLECYSTECTOMY  03/22/2019    COLONOSCOPY  01/25/2015    10 yr recall, hemorrhoids    COLONOSCOPY N/A 10/08/2019    tubular adenoma x2    CYSTOSCOPY Right 4/29/2021    CYSTOSCOPY TUR BLADDER TUMOR, GYRUS, RIGHT STENT PLACEMENT AND RIGHT STENT REMOVAL performed by Hany Mcqueen MD at [facility]  09/09/2021    CYSTOSCOPY, TRANSURETHRAL RESECTION BLADDER TUMOR    CYSTOSCOPY N/A 9/9/2021    CYSTOSCOPY, TRANSURETHRAL RESECTION BLADDER TUMOR performed by Hany Mcqueen MD at [facility]  10/2015    all teeth extracted    HERNIA REPAIR N/A 08/07/2020    HERNIA INCISIONAL REPAIR LAPAROSCOPIC ROBOTIC WITH MESH performed by Amaris Oconnor DO at [facility] Right     UMBILICAL HERNIA REPAIR  3022-19    UPPER GASTROINTESTINAL ENDOSCOPY  01/25/2015    UPPER GASTROINTESTINAL ENDOSCOPY N/A 10/08/2019    EGD BIOPSY performed by Ekaterina Le MD at [facility]       Allergies   Allergen Reactions   Liu Alvarez [Aspirin]       iron deficiency anemia , requiring iron infusions and transfusions    Iron      Pill- constipation, abdominal pain    Nsaids       iron deficiency anemia, history of bleeding ulcers          Current Outpatient Medications:     vitamin D (ERGOCALCIFEROL) 1.25 MG (99140 UT) CAPS capsule, TAKE ONE CAPSULE BY MOUTH ONCE WEEKLY, Disp: 4 capsule, Rfl: 2    lisinopril-hydroCHLOROthiazide (PRINZIDE;ZESTORETIC) 10-12.5 MG per tablet, TAKE ONE TABLET BY MOUTH DAILY, Disp: 90 tablet, Rfl: 3    baclofen (LIORESAL) 10 MG tablet, Take 1 tablet by mouth 3 times daily as needed (back pain), Disp: 270 tablet, Rfl: 0    Cholecalciferol (VITAMIN D3) 1.25 MG (12450 UT) TABS, Take by mouth weekly, Disp: , Rfl:     morphine (MS CONTIN) 60 MG extended release tablet, Take 1 tablet by mouth 2 times daily for 30 days. , Disp: 60 tablet, Rfl: 0    pregabalin (LYRICA) 150 MG capsule, Take 1 capsule by mouth 3 times daily for 90 days. , Disp: 90 capsule, Rfl: 2    oxyCODONE-acetaminophen (PERCOCET) 5-325 MG per tablet, Take 1 tablet by mouth every 8 hours for 30 days. , Disp: 90 tablet, Rfl: 0    pantoprazole (PROTONIX) 40 MG tablet, Take 1 tablet by mouth daily, Disp: 90 tablet, Rfl: 1    metFORMIN (GLUCOPHAGE) 1000 MG tablet, TAKE 1 TABLET BY MOUTH TWICE DAILY WITH MEALS, Disp: 180 tablet, Rfl: 1    folic acid-pyridoxine-cyanocobalamin (FOLTABS) 0.8-10-0. 115 MG TABS tablet, Take 1 tablet by mouth daily, Disp: 90 tablet, Rfl: 3    cyanocobalamin 1000 MCG/ML injection, Inject 1 mL into the muscle every 30 days Call for next refill which will be monthly for life, Disp: 3 mL, Rfl: 3    blood glucose test strips (ASCENSIA AUTODISC VI;ONE TOUCH ULTRA TEST VI) strip, 1 each by In Vitro route daily Testing daily.  needs true Metrix test strips, Disp: 100 strip, Rfl: 3   pravastatin (PRAVACHOL) 40 MG tablet, Take 1 tablet by mouth every evening Stop Gemfibrozil, Disp: 90 tablet, Rfl: 3    metoprolol tartrate (LOPRESSOR) 25 MG tablet, Take 1 tablet by mouth 2 times daily, Disp: 180 tablet, Rfl: 3    TRUEplus Lancets 30G MISC, Test blood glucose twice a day, Disp: 200 each, Rfl: 3    Alcohol Swabs (B-D SINGLE USE SWABS REGULAR) PADS, USE TO CHECK BLOOD SUGAR TWICE A DAY, Disp: 200 each, Rfl: 3    glipiZIDE (GLUCOTROL) 5 MG tablet, Take 1 tablet by mouth every morning Keep fasting morning blood glucose .   If blood glucose below 80, you need to stop glipizide, Disp: 90 tablet, Rfl: 3    Syringe/Needle, Disp, (SYRINGE 3CC/25GX1\") 25G X 1\" 3 ML MISC, To be used with B12 injections, Disp: 50 each, Rfl: 2    loperamide (RA ANTI-DIARRHEAL) 2 MG capsule, Take 1 capsule by mouth 4 times daily as needed for Diarrhea, Disp: 112 capsule, Rfl: 2    lidocaine (LIDODERM) 5 %, Place 1 patch onto the skin daily 12 hours on, 12 hours off., Disp: 30 patch, Rfl: 3    Probiotic Product (PROBIOTIC-10 PO), Take by mouth daily , Disp: , Rfl:     Blood Glucose Monitoring Suppl (TRUE METRIX METER) w/Device KIT, USE AS DIRECTED TO TEST BLOOD SUGAR, Disp: , Rfl:     Family History   Problem Relation Age of Onset    Diabetes Mother     Heart Disease Father        Social History     Socioeconomic History    Marital status:      Spouse name: Not on file    Number of children: Not on file    Years of education: Not on file    Highest education level: Not on file   Occupational History    Occupation: disabled   Tobacco Use    Smoking status: Former Smoker     Packs/day: 0.50     Years: 45.00     Pack years: 22.50     Types: Cigarettes     Quit date: 2015     Years since quittin.9    Smokeless tobacco: Never Used    Tobacco comment: on chantix 11-6-15   12-7-15   Vaping Use    Vaping Use: Never used   Substance and Sexual Activity    Alcohol use: No    Drug use: No    Sexual activity: Yes     Partners: Female   Other Topics Concern    Not on file   Social History Narrative    Not on file     Social Determinants of Health     Financial Resource Strain: High Risk    Difficulty of Paying Living Expenses: Very hard   Food Insecurity: Food Insecurity Present    Worried About Running Out of Food in the Last Year: Often true    Jennifer of Food in the Last Year: Often true   Transportation Needs:     Lack of Transportation (Medical): Not on file    Lack of Transportation (Non-Medical): Not on file   Physical Activity:     Days of Exercise per Week: Not on file    Minutes of Exercise per Session: Not on file   Stress:     Feeling of Stress : Not on file   Social Connections:     Frequency of Communication with Friends and Family: Not on file    Frequency of Social Gatherings with Friends and Family: Not on file    Attends [employer] Services: Not on file    Active Member of [address] [place] [address] [employer] or Organizations: Not on file    Attends Club or Organization Meetings: Not on file    Marital Status: Not on file   Intimate Partner Violence:     Fear of Current or Ex-Partner: Not on file    Emotionally Abused: Not on file    Physically Abused: Not on file    Sexually Abused: Not on file   Housing Stability:     Unable to Pay for Housing in the Last Year: Not on file    Number of [place] in the Last Year: Not on file    Unstable Housing in the Last Year: Not on file       Review of Systems:  Review of Systems   Constitutional: Negative for chills and fever. Cardiovascular: Negative for chest pain and palpitations. Respiratory: Negative for cough and shortness of breath. Musculoskeletal: Positive for back pain. Gastrointestinal: Negative for constipation. Neurological: Negative for disturbances in coordination, loss of balance, numbness, paresthesias and tingling. Physical Exam:  There were no vitals taken for this visit.     Physical Exam  Neurological:      Mental Status: He is alert.    Psychiatric:         Mood and Affect: Mood normal.         Record/Diagnostics Review:    Last zoe 4/2021 and was appropriate     ABERRANT BEHAVIORS SINCE LAST VISIT  Lost rx/pills:------------------------------------------ no  Taking  medication as prescribed: ----------- yes  Urine Drug Screen ---------------------------------Sisto Senior             Date------------------------------------------------- 4/2021              Results as expected ---------------------yes     Recent ER visits: -------------------------------------no  Pill count is appropriate: ---------------------------yes   Refills for prescriptions appropriate:---------- yes     Assessment:  Problem List Items Addressed This Visit     Degenerative disc disease, lumbar (Chronic)    Relevant Medications    morphine (MS CONTIN) 60 MG extended release tablet (Start on 12/4/2021)    oxyCODONE-acetaminophen (PERCOCET) 5-325 MG per tablet (Start on 12/4/2021)    Lumbar spondylosis (Chronic)    Relevant Medications    morphine (MS CONTIN) 60 MG extended release tablet (Start on 12/4/2021)    oxyCODONE-acetaminophen (PERCOCET) 5-325 MG per tablet (Start on 12/4/2021)    Lumbar radiculopathy (Chronic)    Relevant Medications    morphine (MS CONTIN) 60 MG extended release tablet (Start on 12/4/2021)    oxyCODONE-acetaminophen (PERCOCET) 5-325 MG per tablet (Start on 12/4/2021)    Lumbar spinal stenosis (Chronic)    Relevant Medications    oxyCODONE-acetaminophen (PERCOCET) 5-325 MG per tablet (Start on 12/4/2021)    Cervical spondylitis (HCC) (Chronic)    Relevant Medications    oxyCODONE-acetaminophen (PERCOCET) 5-325 MG per tablet (Start on 12/4/2021)    S/P cervical spinal fusion (Chronic)    Relevant Medications    oxyCODONE-acetaminophen (PERCOCET) 5-325 MG per tablet (Start on 12/4/2021)    Encounter for chronic pain management    Relevant Medications    oxyCODONE-acetaminophen (PERCOCET) 5-325 MG per tablet (Start on 12/4/2021) Osteoarthritis of spine with radiculopathy, lumbar region    Relevant Medications    morphine (MS CONTIN) 60 MG extended release tablet (Start on 12/4/2021)    oxyCODONE-acetaminophen (PERCOCET) 5-325 MG per tablet (Start on 12/4/2021)    Osteoarthritis of lumbar spine    Relevant Medications    morphine (MS CONTIN) 60 MG extended release tablet (Start on 12/4/2021)    oxyCODONE-acetaminophen (PERCOCET) 5-325 MG per tablet (Start on 12/4/2021)      Other Visit Diagnoses     Encounter for medication monitoring  (Chronic)       Relevant Medications    oxyCODONE-acetaminophen (PERCOCET) 5-325 MG per tablet (Start on 12/4/2021)             Treatment Plan:  Patient relates current medications are helping the pain. Patient reports taking pain medications as prescribed, denies obtaining medications from different sources and denies use of illegal drugs. Patient denies side effects from medications like nausea, vomiting, constipation or drowsiness. Patient reports current activities of daily living are possible due to medications and would like to continue them. As always, we encourage daily stretching and strengthening exercises, and recommend minimizing use of pain medications unless patient cannot get through daily activities due to pain. Contract requirements met. Continue opioid therapy. Script written for percocet  Follow up appointment made for 4 weeks    Aayush President, was evaluated through a synchronous (real-time) audio-video encounter. The patient (or guardian if applicable) is aware that this is a billable service. Verbal consent to proceed has been obtained within the past 12 months. The visit was conducted pursuant to the emergency declaration under the [facility], [address] [place] [address] authority and the [employer] General Act. Patient identification was verified, and a caregiver was present when appropriate.  The patient was located in a state where the provider was credentialed to provide care. Total time spent for this encounter: 20 minutes    --VEE Ashraf CNP on 11/30/2021 at 2:54 PM    An electronic signature was used to authenticate this note.

## 2021-12-07 ENCOUNTER — PATIENT MESSAGE (OUTPATIENT)
Dept: FAMILY MEDICINE CLINIC | Age: 71
End: 2021-12-07

## 2021-12-07 DIAGNOSIS — M51.36 DEGENERATIVE DISC DISEASE, LUMBAR: ICD-10-CM

## 2021-12-07 DIAGNOSIS — Z98.1 S/P CERVICAL SPINAL FUSION: ICD-10-CM

## 2021-12-07 DIAGNOSIS — M54.16 LUMBAR RADICULOPATHY: Primary | ICD-10-CM

## 2021-12-07 DIAGNOSIS — M46.92 CERVICAL SPONDYLITIS (HCC): ICD-10-CM

## 2021-12-07 DIAGNOSIS — M47.816 LUMBAR SPONDYLOSIS: ICD-10-CM

## 2021-12-08 RX ORDER — BACLOFEN 10 MG/1
10 TABLET ORAL 3 TIMES DAILY PRN
Qty: 270 TABLET | Refills: 1 | Status: SHIPPED | OUTPATIENT
Start: 2021-12-08 | End: 2022-03-07 | Stop reason: SDUPTHER

## 2021-12-08 NOTE — TELEPHONE ENCOUNTER
From: Desmond Shay  To: Dr. Elizabeth Standard: 12/7/2021 11:58 PM EST  Subject: Refill Baclofen 10mg     Hello Dr Rochelle Pena   Can you please refill Kobys Baclofen? [address] [place] [phone].  11-.  Thank you, Holly Allison, wife of Alondra Garcia

## 2021-12-13 ENCOUNTER — PROCEDURE VISIT (OUTPATIENT)
Dept: UROLOGY | Age: 71
End: 2021-12-13
Payer: MEDICARE

## 2021-12-13 VITALS
BODY MASS INDEX: 31.1 KG/M2 | WEIGHT: 210 LBS | DIASTOLIC BLOOD PRESSURE: 74 MMHG | SYSTOLIC BLOOD PRESSURE: 134 MMHG | HEART RATE: 85 BPM | TEMPERATURE: 96.7 F | HEIGHT: 69 IN

## 2021-12-13 DIAGNOSIS — C67.2 MALIGNANT NEOPLASM OF LATERAL WALL OF URINARY BLADDER (HCC): Primary | ICD-10-CM

## 2021-12-13 PROCEDURE — 52000 CYSTOURETHROSCOPY: CPT | Performed by: UROLOGY

## 2021-12-13 NOTE — PROGRESS NOTES
Cystoscopy Operative Note (12/13/21)  Surgeon: Frannie Pedersen MD  Anesthesia: Urethral 2% Xylocaine   Indications: Bladder Cancer   Position: Dorsal Lithotomy    Findings:   Risks and Benefits discussed with patient prior to procedure. The patient was prepped and draped in the usual sterile fashion. The flexible cystoscope was advanced through the urethra and into the bladder. The bladder was thoroughly inspected and the following was noted:    Residual Urine: none  Urethra: normal appearing urethra with no masses, tenderness or lesions  Prostate: partially obstructing lateral lobes of prostate; median lobe present? no.   Bladder: Multifocal small bladder tumors. .  No bladder diverticulum. There was none trabeculation noted. Ureters: Clear efflux from both ureters. Orifices with normal configuration and location. The cystoscope was removed. The patient tolerated the procedure well. Agree with the ROS entered by the MA.     Plan: Cystoscopy with bladder biopsy and fulguration MAC

## 2021-12-16 ENCOUNTER — TELEPHONE (OUTPATIENT)
Dept: UROLOGY | Age: 71
End: 2021-12-16

## 2021-12-16 NOTE — TELEPHONE ENCOUNTER
Cysto, bladder bx & fulguration @ ST 1/7/22 2:00pm **STOP BLOOS THINNERS 1/1/22**   PAT @ ST 12/23/21 10:00am   Vaccinated- J&J          Spoke with Larue Duane, procedure info emailed.

## 2021-12-21 RX ORDER — SODIUM CHLORIDE, SODIUM LACTATE, POTASSIUM CHLORIDE, CALCIUM CHLORIDE 600; 310; 30; 20 MG/100ML; MG/100ML; MG/100ML; MG/100ML
1000 INJECTION, SOLUTION INTRAVENOUS CONTINUOUS
Status: CANCELLED | OUTPATIENT
Start: 2021-12-21

## 2021-12-27 ENCOUNTER — HOSPITAL ENCOUNTER (OUTPATIENT)
Dept: PREADMISSION TESTING | Age: 71
Discharge: HOME | End: 2021-12-27

## 2021-12-30 ENCOUNTER — HOSPITAL ENCOUNTER (OUTPATIENT)
Dept: LAB | Age: 71
Setting detail: SPECIMEN
Discharge: HOME | End: 2021-12-30
Payer: MEDICARE

## 2021-12-30 DIAGNOSIS — Z20.822 COVID-19 RULED OUT BY LABORATORY TESTING: Primary | ICD-10-CM

## 2021-12-30 PROCEDURE — U0003 INFECTIOUS AGENT DETECTION BY NUCLEIC ACID (DNA OR RNA); SEVERE ACUTE RESPIRATORY SYNDROME CORONAVIRUS 2 (SARS-COV-2) (CORONAVIRUS DISEASE [COVID-19]), AMPLIFIED PROBE TECHNIQUE, MAKING USE OF HIGH THROUGHPUT TECHNOLOGIES AS DESCRIBED BY CMS-2020-01-R: HCPCS

## 2021-12-30 PROCEDURE — U0005 INFEC AGEN DETEC AMPLI PROBE: HCPCS

## 2021-12-31 LAB
SARS-COV-2: NORMAL
SARS-COV-2: NOT DETECTED
SOURCE: NORMAL

## 2022-01-03 ENCOUNTER — HOSPITAL ENCOUNTER (OUTPATIENT)
Dept: PREADMISSION TESTING | Age: 72
Discharge: HOME | End: 2022-01-07
Payer: MEDICARE

## 2022-01-03 ENCOUNTER — HOSPITAL ENCOUNTER (OUTPATIENT)
Dept: PAIN MANAGEMENT | Age: 72
Discharge: HOME | End: 2022-01-03
Payer: MEDICARE

## 2022-01-03 VITALS
TEMPERATURE: 99 F | RESPIRATION RATE: 18 BRPM | BODY MASS INDEX: 31.99 KG/M2 | HEART RATE: 103 BPM | WEIGHT: 216 LBS | DIASTOLIC BLOOD PRESSURE: 64 MMHG | HEIGHT: 69 IN | SYSTOLIC BLOOD PRESSURE: 114 MMHG | OXYGEN SATURATION: 100 %

## 2022-01-03 DIAGNOSIS — M51.36 DEGENERATIVE DISC DISEASE, LUMBAR: Chronic | ICD-10-CM

## 2022-01-03 DIAGNOSIS — M54.16 LUMBAR RADICULOPATHY: Chronic | ICD-10-CM

## 2022-01-03 DIAGNOSIS — M47.26 OSTEOARTHRITIS OF SPINE WITH RADICULOPATHY, LUMBAR REGION: ICD-10-CM

## 2022-01-03 DIAGNOSIS — M48.061 SPINAL STENOSIS OF LUMBAR REGION WITHOUT NEUROGENIC CLAUDICATION: Chronic | ICD-10-CM

## 2022-01-03 DIAGNOSIS — G89.29 ENCOUNTER FOR CHRONIC PAIN MANAGEMENT: ICD-10-CM

## 2022-01-03 DIAGNOSIS — M46.92 CERVICAL SPONDYLITIS (HCC): Chronic | ICD-10-CM

## 2022-01-03 DIAGNOSIS — Z51.81 ENCOUNTER FOR MEDICATION MONITORING: Chronic | ICD-10-CM

## 2022-01-03 DIAGNOSIS — M47.816 OSTEOARTHRITIS OF LUMBAR SPINE, UNSPECIFIED SPINAL OSTEOARTHRITIS COMPLICATION STATUS: ICD-10-CM

## 2022-01-03 DIAGNOSIS — Z98.1 S/P CERVICAL SPINAL FUSION: Chronic | ICD-10-CM

## 2022-01-03 DIAGNOSIS — M47.816 LUMBAR SPONDYLOSIS: Chronic | ICD-10-CM

## 2022-01-03 LAB
ANION GAP SERPL CALCULATED.3IONS-SCNC: 14 MMOL/L (ref 9–17)
BUN BLDV-MCNC: 18 MG/DL (ref 8–23)
CHLORIDE BLD-SCNC: 101 MMOL/L (ref 98–107)
CO2: 21 MMOL/L (ref 20–31)
CREAT SERPL-MCNC: 0.94 MG/DL (ref 0.7–1.2)
GFR AFRICAN AMERICAN: >60 ML/MIN
GFR NON-AFRICAN AMERICAN: >60 ML/MIN
GFR SERPL CREATININE-BSD FRML MDRD: NORMAL ML/MIN/{1.73_M2}
GFR SERPL CREATININE-BSD FRML MDRD: NORMAL ML/MIN/{1.73_M2}
GLUCOSE BLD-MCNC: 143 MG/DL (ref 70–99)
HCT VFR BLD CALC: 37.1 % (ref 40.7–50.3)
HEMOGLOBIN: 10.9 G/DL (ref 13–17)
MCH RBC QN AUTO: 26.4 PG (ref 25.2–33.5)
MCHC RBC AUTO-ENTMCNC: 29.4 G/DL (ref 28.4–34.8)
MCV RBC AUTO: 89.8 FL (ref 82.6–102.9)
NRBC AUTOMATED: 0 PER 100 WBC
PDW BLD-RTO: 17.2 % (ref 11.8–14.4)
PLATELET # BLD: 239 K/UL (ref 138–453)
PMV BLD AUTO: 9.9 FL (ref 8.1–13.5)
POTASSIUM SERPL-SCNC: 4.5 MMOL/L (ref 3.7–5.3)
RBC # BLD: 4.13 M/UL (ref 4.21–5.77)
SODIUM BLD-SCNC: 136 MMOL/L (ref 135–144)
WBC # BLD: 5.2 K/UL (ref 3.5–11.3)

## 2022-01-03 PROCEDURE — 82565 ASSAY OF CREATININE: CPT

## 2022-01-03 PROCEDURE — 87086 URINE CULTURE/COLONY COUNT: CPT

## 2022-01-03 PROCEDURE — 82947 ASSAY GLUCOSE BLOOD QUANT: CPT

## 2022-01-03 PROCEDURE — 99213 OFFICE O/P EST LOW 20 MIN: CPT | Performed by: NURSE PRACTITIONER

## 2022-01-03 PROCEDURE — 99213 OFFICE O/P EST LOW 20 MIN: CPT

## 2022-01-03 PROCEDURE — 84520 ASSAY OF UREA NITROGEN: CPT

## 2022-01-03 PROCEDURE — 36415 COLL VENOUS BLD VENIPUNCTURE: CPT

## 2022-01-03 PROCEDURE — 85027 COMPLETE CBC AUTOMATED: CPT

## 2022-01-03 PROCEDURE — 80051 ELECTROLYTE PANEL: CPT

## 2022-01-03 PROCEDURE — 93005 ELECTROCARDIOGRAM TRACING: CPT | Performed by: ANESTHESIOLOGY

## 2022-01-03 RX ORDER — OXYCODONE HYDROCHLORIDE AND ACETAMINOPHEN 5; 325 MG/1; MG/1
1 TABLET ORAL EVERY 8 HOURS
Qty: 90 TABLET | Refills: 0 | Status: SHIPPED | OUTPATIENT
Start: 2022-01-03 | End: 2022-02-01 | Stop reason: SDUPTHER

## 2022-01-03 RX ORDER — MORPHINE SULFATE 60 MG/1
60 TABLET, FILM COATED, EXTENDED RELEASE ORAL 2 TIMES DAILY
Qty: 60 TABLET | Refills: 0 | Status: SHIPPED | OUTPATIENT
Start: 2022-01-03 | End: 2022-02-01 | Stop reason: SDUPTHER

## 2022-01-03 ASSESSMENT — ENCOUNTER SYMPTOMS
SHORTNESS OF BREATH: 0
COUGH: 0
CONSTIPATION: 0
BACK PAIN: 1

## 2022-01-03 ASSESSMENT — PAIN SCALES - GENERAL: PAINLEVEL_OUTOF10: 4

## 2022-01-03 ASSESSMENT — PAIN DESCRIPTION - LOCATION: LOCATION: BACK

## 2022-01-03 ASSESSMENT — PAIN DESCRIPTION - PAIN TYPE: TYPE: CHRONIC PAIN

## 2022-01-03 NOTE — H&P
History and Physical    Pt Name: Edgardo Fuentes  MRN: [id number]  YOB: 1950  Date of evaluation: 1/3/2022    SUBJECTIVE:   History of Chief Complaint:    Patient presents for PAT appointment. He has a history of bladder tumor, cancer. He says that initially he presented with hematuria and was found to have a large bladder tumor. Patient had cystoscopy and TURBT at the time, then again due to small lesions noted on follow up cystoscopy. Patient says that most recent cystoscopy also noted lesions, but \"smaller. \"   He has been scheduled for cystoscopy, bladder biopsy with fulguration. Past Medical History    has a past medical history of Anemia, Arthritis, Bladder cancer ([address] Bladder tumor, Chronic back pain, Chronic neck pain, Colon polyps, Diabetic neuropathy ([address] Diarrhea, Encounter for chronic pain management, Essential hypertension, Heartburn, Hematuria, Hepatitis B core antibody positive, History of bleeding peptic ulcer, History of blood transfusion, History of hepatitis C, History of migraine headaches, History of stress test, Hyperlipidemia, Iron (Fe) deficiency anemia, Poor historian, Positive FIT (fecal immunochemical test), Type 2 diabetes mellitus with microalbuminuria, without long-term current use of insulin ([address] Under care of team, Under care of team, Wears dentures, and Wears glasses. Past Surgical History   has a past surgical history that includes Cervical spine surgery (1977); shoulder surgery (Right); Dental surgery (10/2015); Colonoscopy (01/25/2015); Upper gastrointestinal endoscopy (01/25/2015); Cholecystectomy (03/22/2019); Umbilical hernia repair (9241-23); Upper gastrointestinal endoscopy (N/A, 10/08/2019); Colonoscopy (N/A, 10/08/2019); hernia repair (N/A, 08/07/2020); bladder tumor excision (04/29/2021); Cystoscopy (Right, 4/29/2021); Cystoscopy (09/09/2021); and Cystoscopy (N/A, 9/9/2021).   Medications  Prior to Admission medications    Medication Sig Start Date End Date Taking? Authorizing Provider   morphine (MS CONTIN) 60 MG extended release tablet Take 1 tablet by mouth 2 times daily for 30 days. 12/4/21 1/3/22 Yes VEE James CNP   oxyCODONE-acetaminophen (PERCOCET) 5-325 MG per tablet Take 1 tablet by mouth every 8 hours for 30 days. 12/4/21 1/3/22 Yes VEE James CNP   vitamin D (ERGOCALCIFEROL) 1.25 MG (85427 UT) CAPS capsule TAKE ONE CAPSULE BY MOUTH ONCE WEEKLY 11/11/21  Yes Leslie Marcano MD   lisinopril-hydroCHLOROthiazide (PRINZIDE;ZESTORETIC) 10-12.5 MG per tablet TAKE ONE TABLET BY MOUTH DAILY 11/8/21  Yes Leslie Marcano MD   pregabalin (LYRICA) 150 MG capsule Take 1 capsule by mouth 3 times daily for 90 days. 11/4/21 2/2/22 Yes VEE Jauregui CNP   pantoprazole (PROTONIX) 40 MG tablet Take 1 tablet by mouth daily 9/24/21  Yes Leslie Marcano MD   metFORMIN (GLUCOPHAGE) 1000 MG tablet TAKE 1 TABLET BY MOUTH TWICE DAILY WITH MEALS 9/24/21  Yes Leslie Marcano MD   blood glucose test strips (ASCENSIA AUTODISC VI;ONE TOUCH ULTRA TEST VI) strip 1 each by In Vitro route daily Testing daily. needs true Metrix test strips 5/18/21 5/18/22 Yes Leslie Marcano MD   pravastatin (PRAVACHOL) 40 MG tablet Take 1 tablet by mouth every evening Stop Gemfibrozil 5/11/21  Yes Leslie Marcano MD   metoprolol tartrate (LOPRESSOR) 25 MG tablet Take 1 tablet by mouth 2 times daily 5/11/21  Yes Leslie Marcano MD   Alcohol Swabs (B-D SINGLE USE SWABS REGULAR) PADS USE TO CHECK BLOOD SUGAR TWICE A DAY 5/4/21  Yes Leslie Marcano MD   glipiZIDE (GLUCOTROL) 5 MG tablet Take 1 tablet by mouth every morning Keep fasting morning blood glucose .   If blood glucose below 80, you need to stop glipizide 4/16/21  Yes Leslie Marcano MD   Syringe/Needle, Disp, (SYRINGE 3CC/25GX1\") 25G X 1\" 3 ML MISC To be used with B12 injections 1/8/21  Yes Leslie Marcano MD   Probiotic Product (PROBIOTIC-10 PO) Take by mouth his pulse is 103. His respiration is 18 and oxygen saturation is 100%. CONSTITUTIONAL:alert & cooperative, no acute distress. Very pleasant and talkative. SKIN:  Warm and dry, no rashes on exposed areas of skin  HEAD:  Normocephalic, atraumatic   EYES: EOMs intact. Wearing glasses. EARS:  Hearing grossly WNL. NOSE:  Nares patent. No rhinorrhea. MOUTH/THROAT:  dentures  NECK:supple, no lymphadenopathy  LUNGS: Clear to auscultation bilaterally, no wheezes. CARDIOVASCULAR: Regular rhythm without murmur. Very mild tachycardia. ABDOMEN: soft, non tender, non distended. EXTREMITIES: no edema bilateral lower extremities. Testing:   EK22  Labs pending: drawn 1/3/2022     IMPRESSIONS:   1. Bladder cancer  2.  has a past medical history of Anemia, Arthritis, Bladder cancer ([facility] (2021), Bladder tumor, Chronic back pain, Chronic neck pain, Colon polyps (10/08/2019), Diabetic neuropathy ([facility] Diarrhea, Encounter for chronic pain management, Essential hypertension (2020), Heartburn, Hematuria, Hepatitis B core antibody positive, History of bleeding peptic ulcer, History of blood transfusion, History of hepatitis C, History of migraine headaches, History of stress test (2020), Hyperlipidemia, Iron (Fe) deficiency anemia, Poor historian, Positive FIT (fecal immunochemical test), Type 2 diabetes mellitus with microalbuminuria, without long-term current use of insulin ([facility] (2020), Under care of team (2021), Under care of team (2021), Wears dentures, and Wears glasses. PLANS:   1.  Cystoscopy, bladder biopsy    MIGNON Rebollar PA-C  Electronically signed 1/3/2022 at 9:24 AM

## 2022-01-03 NOTE — PROGRESS NOTES
Anesthesia Focused Assessment    Has patient ever tested positive for COVID? Patient has been vaccinated. STOP-BANG Sleep Apnea Questionnaire    SNORE loudly (heard through closed doors)? No  TIRED, fatigued, sleepy during daytime? No  OBSERVED stopping breathing during sleep? No  High blood PRESSURE being treated? Yes    BMI over 35? No  AGE over 48? Yes  NECK circumference over 16\"? No  GENDER (male)? Yes             Total 3  High risk 5-8  Intermediate risk 3-4  Low risk 0-2    Obstructive Sleep Apnea: denies  If YES, machine used: no     Type 1 DM:   no  T2DM:  yes    Coronary Artery Disease:  No, \"low risk stress test\" 7/2020  Hypertension:  yes    Active smoker:  1/2 ppd for 45 years, quit 2015. Drinks Alcohol:  no    Dentition: dentures    Defib / AICD / Pacemaker: no      Renal Failure/dialysis:  no    Patient was evaluated in PAT & anesthesia guidelines were applied. NPO guidelines, medication instructions and scheduled arrival time were reviewed with patient. Hx of anesthesia complications:  no  Family hx of anesthesia complications:  no                                                                                                                     Anesthesia contacted:   no  Medical or cardiac clearance ordered: clearance is in paper chart from OR several months ago. Patient denies any health changes since that time. He reports that he has had procedure in April and September without complication.     Shagufta Simpson PA-C  1/3/22  9:25 AM

## 2022-01-03 NOTE — PROGRESS NOTES
Patient completed a telephone visit today to review medication contract. Chief Complaint: back pain    PMH   Pt c/o low back pain for many years. MRI with multifocal degenerative disc disease throughout the lumbar spine. There is no known injury or surgery to the area. He does have a history of scoliosis. His last PT was over 4 years ago with no benefit and he is not interested in repeating. He does do home stretches every morning. He also had a LESI in 2013 that did not help. He has non-invasive bladder cancer and had resection of a tumor in April. He continues to follow with oncology and hematology - he continues Injectafer infusions for anemia.       Completed pre-admission testing today for bladder biopsy this Friday    Back Pain  This is a chronic problem. The current episode started more than 1 year ago. The problem occurs constantly. The problem is unchanged. The pain is present in the lumbar spine. The quality of the pain is described as aching. Radiates to: into buttocks. The pain is at a severity of 3/10. The pain is mild. The symptoms are aggravated by position and standing (walking). Pertinent negatives include no chest pain, fever, numbness, paresthesias or tingling. He has tried analgesics and bed rest for the symptoms. The treatment provided mild relief. Patient denies any new neurological symptoms. No bowel or bladder incontinence, no weakness, and no falling. Pill count: appropriate due today    Morphine equivalent: 142.5    Periodic Controlled Substance Monitoring: Possible medication side effects, risk of tolerance/dependence & alternative treatments discussed. ,No signs of potential drug abuse or diversion identified. ,Obtaining appropriate analgesic effect of treatment. VEE Vang - CNP)  Chronic Pain > 80 MEDD: Co-prescribed Naloxone.  EVE Alatorre - CNP)      Past Medical History:   Diagnosis Date    Anemia     Arthritis     osteoarthritis    Bladder cancer ([facility] 03/2021    bladder    Bladder tumor     Chronic back pain     Chronic neck pain     Colon polyps 10/08/2019    tubular adenoma x2    Diabetic neuropathy ([facility]     Diarrhea     Encounter for chronic pain management     [facility] Dr. Ramakrishna Palomo E visit 01/03/2022    Essential hypertension 05/12/2020    managed by Dr. Varun Lynn PCP    Heartburn     Hematuria     Hepatitis B core antibody positive     History of bleeding peptic ulcer     History of blood transfusion     no reactions    History of hepatitis C     History of migraine headaches     History of stress test 07/2020    \"low risk\"    Hyperlipidemia     Iron (Fe) deficiency anemia     Poor historian     states his wife takes care of all medical information    Positive FIT (fecal immunochemical test)     Type 2 diabetes mellitus with microalbuminuria, without long-term current use of insulin ([facility] 07/13/2020    managed by Dr. Bret Dubose last e-visit 11/2021    Under care of team 08/31/2021    pcp-Dr Leann Rosenberg virtual visit 11/2021    Under care of team 08/31/2021    hematology-Dr Caprice Saavedra [address] virtual visit 11/2021    Wears dentures     Wears glasses        Past Surgical History:   Procedure Laterality Date    BLADDER TUMOR EXCISION  04/29/2021    CYSTOSCOPY TUR BLADDER TUMOR, GYRUS, RIGHT STENT PLACEMENT AND RIGHT STENT REMOVAL    CERVICAL SPINE SURGERY  1977    cervical spine three times, has plate    CHOLECYSTECTOMY  03/22/2019    COLONOSCOPY  01/25/2015    10 yr recall, hemorrhoids    COLONOSCOPY N/A 10/08/2019    tubular adenoma x2    CYSTOSCOPY Right 4/29/2021    CYSTOSCOPY TUR BLADDER TUMOR, GYRUS, RIGHT STENT PLACEMENT AND RIGHT STENT REMOVAL performed by Cecile Brar MD at [address] [place] [address]  09/09/2021    CYSTOSCOPY, TRANSURETHRAL RESECTION BLADDER TUMOR    CYSTOSCOPY N/A 9/9/2021    CYSTOSCOPY, TRANSURETHRAL RESECTION BLADDER TUMOR performed by Fernanda Hodge Luis Daniel Diaz MD at [address]  10/2015    all teeth extracted    HERNIA REPAIR N/A 08/07/2020    HERNIA INCISIONAL REPAIR LAPAROSCOPIC ROBOTIC WITH MESH performed by Thomas Knox DO at [facility] Right     UMBILICAL HERNIA REPAIR  3022-19    UPPER GASTROINTESTINAL ENDOSCOPY  01/25/2015    UPPER GASTROINTESTINAL ENDOSCOPY N/A 10/08/2019    EGD BIOPSY performed by Melly Álvarez MD at [facility] ENDO       Allergies   Allergen Reactions   Mavis Ginger [Aspirin]       iron deficiency anemia , requiring iron infusions and transfusions    Iron      Pill- constipation, abdominal pain    Nsaids       iron deficiency anemia, history of bleeding ulcers          Current Outpatient Medications:     baclofen (LIORESAL) 10 MG tablet, Take 1 tablet by mouth 3 times daily as needed (back pain), Disp: 270 tablet, Rfl: 1    morphine (MS CONTIN) 60 MG extended release tablet, Take 1 tablet by mouth 2 times daily for 30 days. , Disp: 60 tablet, Rfl: 0    oxyCODONE-acetaminophen (PERCOCET) 5-325 MG per tablet, Take 1 tablet by mouth every 8 hours for 30 days. , Disp: 90 tablet, Rfl: 0    vitamin D (ERGOCALCIFEROL) 1.25 MG (67424 UT) CAPS capsule, TAKE ONE CAPSULE BY MOUTH ONCE WEEKLY, Disp: 4 capsule, Rfl: 2    lisinopril-hydroCHLOROthiazide (PRINZIDE;ZESTORETIC) 10-12.5 MG per tablet, TAKE ONE TABLET BY MOUTH DAILY, Disp: 90 tablet, Rfl: 3    pregabalin (LYRICA) 150 MG capsule, Take 1 capsule by mouth 3 times daily for 90 days. , Disp: 90 capsule, Rfl: 2    pantoprazole (PROTONIX) 40 MG tablet, Take 1 tablet by mouth daily, Disp: 90 tablet, Rfl: 1    metFORMIN (GLUCOPHAGE) 1000 MG tablet, TAKE 1 TABLET BY MOUTH TWICE DAILY WITH MEALS, Disp: 180 tablet, Rfl: 1    cyanocobalamin 1000 MCG/ML injection, Inject 1 mL into the muscle every 30 days Call for next refill which will be monthly for life, Disp: 3 mL, Rfl: 3    blood glucose test strips (ASCENSIA AUTODISC VI;ONE TOUCH ULTRA TEST VI) strip, 1 each by In Vitro route daily Testing daily. needs true Metrix test strips, Disp: 100 strip, Rfl: 3    pravastatin (PRAVACHOL) 40 MG tablet, Take 1 tablet by mouth every evening Stop Gemfibrozil, Disp: 90 tablet, Rfl: 3    metoprolol tartrate (LOPRESSOR) 25 MG tablet, Take 1 tablet by mouth 2 times daily, Disp: 180 tablet, Rfl: 3    TRUEplus Lancets 30G MISC, Test blood glucose twice a day, Disp: 200 each, Rfl: 3    Alcohol Swabs (B-D SINGLE USE SWABS REGULAR) PADS, USE TO CHECK BLOOD SUGAR TWICE A DAY, Disp: 200 each, Rfl: 3    glipiZIDE (GLUCOTROL) 5 MG tablet, Take 1 tablet by mouth every morning Keep fasting morning blood glucose .   If blood glucose below 80, you need to stop glipizide, Disp: 90 tablet, Rfl: 3    Syringe/Needle, Disp, (SYRINGE 3CC/25GX1\") 25G X 1\" 3 ML MISC, To be used with B12 injections, Disp: 50 each, Rfl: 2    loperamide (RA ANTI-DIARRHEAL) 2 MG capsule, Take 1 capsule by mouth 4 times daily as needed for Diarrhea, Disp: 112 capsule, Rfl: 2    Probiotic Product (PROBIOTIC-10 PO), Take by mouth daily , Disp: , Rfl:     Blood Glucose Monitoring Suppl (TRUE METRIX METER) w/Device KIT, USE AS DIRECTED TO TEST BLOOD SUGAR, Disp: , Rfl:     Family History   Problem Relation Age of Onset    Diabetes Mother     Heart Disease Father        Social History     Socioeconomic History    Marital status:      Spouse name: Not on file    Number of children: Not on file    Years of education: Not on file    Highest education level: Not on file   Occupational History    Occupation: disabled   Tobacco Use    Smoking status: Former Smoker     Packs/day: 0.50     Years: 45.00     Pack years: 22.50     Types: Cigarettes     Quit date: 2015     Years since quittin.0    Smokeless tobacco: Never Used    Tobacco comment: on chantix 11-6-15   12-7-15   Vaping Use    Vaping Use: Never used   Substance and Sexual Activity    Alcohol use: No    Drug use: No    Sexual activity: Yes     Partners: Female   Other Topics Concern    Not on file   Social History Narrative    Not on file     Social Determinants of Health     Financial Resource Strain: High Risk    Difficulty of Paying Living Expenses: Very hard   Food Insecurity: Food Insecurity Present    Worried About Running Out of Food in the Last Year: Often true    Jennifer of Food in the Last Year: Often true   Transportation Needs:     Lack of Transportation (Medical): Not on file    Lack of Transportation (Non-Medical): Not on file   Physical Activity:     Days of Exercise per Week: Not on file    Minutes of Exercise per Session: Not on file   Stress:     Feeling of Stress : Not on file   Social Connections:     Frequency of Communication with Friends and Family: Not on file    Frequency of Social Gatherings with Friends and Family: Not on file    Attends Buddhism Services: Not on file    Active Member of [address] [place] [address] or Organizations: Not on file    Attends Club or Organization Meetings: Not on file    Marital Status: Not on file   Intimate Partner Violence:     Fear of Current or Ex-Partner: Not on file    Emotionally Abused: Not on file    Physically Abused: Not on file    Sexually Abused: Not on file   Housing Stability:     Unable to Pay for Housing in the Last Year: Not on file    Number of [place] in the Last Year: Not on file    Unstable Housing in the Last Year: Not on file       Review of Systems:  Review of Systems   Constitutional: Negative for chills and fever. Cardiovascular: Negative for chest pain and palpitations. Respiratory: Negative for cough and shortness of breath. Musculoskeletal: Positive for back pain. Gastrointestinal: Negative for constipation. Neurological: Negative for disturbances in coordination, loss of balance, numbness, paresthesias and tingling. Physical Exam:  There were no vitals taken for this visit.     Physical Exam  Neurological:      Mental Status: drugs. Patient denies side effects from medications like nausea, vomiting, constipation or drowsiness. Patient reports current activities of daily living are possible due to medications and would like to continue them. As always, we encourage daily stretching and strengthening exercises, and recommend minimizing use of pain medications unless patient cannot get through daily activities due to pain. Contract requirements met. Continue opioid therapy. Script written for MSER and percocet  Follow up appointment made for 4 weeks    Dora Patelmamie, was evaluated through a synchronous (real-time) audio-video encounter. The patient (or guardian if applicable) is aware that this is a billable service. Verbal consent to proceed has been obtained within the past 12 months. The visit was conducted pursuant to the emergency declaration under the [address] [place] [address], [address] [place] [address] authority and the [employer] and [employer] General Act. Patient identification was verified, and a caregiver was present when appropriate. The patient was located in a state where the provider was credentialed to provide care. Total time spent for this encounter: 20 minutes    --VEE Pa CNP on 1/3/2022 at 11:58 AM    An electronic signature was used to authenticate this note.

## 2022-01-04 LAB
CULTURE: NORMAL
EKG ATRIAL RATE: 95 BPM
EKG P AXIS: 43 DEGREES
EKG P-R INTERVAL: 144 MS
EKG Q-T INTERVAL: 338 MS
EKG QRS DURATION: 78 MS
EKG QTC CALCULATION (BAZETT): 424 MS
EKG R AXIS: 43 DEGREES
EKG T AXIS: 51 DEGREES
EKG VENTRICULAR RATE: 95 BPM
Lab: NORMAL
SPECIMEN DESCRIPTION: NORMAL

## 2022-01-04 NOTE — PROGRESS NOTES
Visit Information    Have you changed or started any medications since your last visit including any over-the-counter medicines, vitamins, or herbal medicines? no   Are you having any side effects from any of your medications? -  no  Have you stopped taking any of your medications? Is so, why? -  no    Have you seen any other physician or provider since your last visit? Yes - Records Obtained  Have you had any other diagnostic tests since your last visit? Yes - Records Obtained  Have you been seen in the emergency room and/or had an admission to a hospital since we last saw you? No  Have you had your routine dental cleaning in the past 6 months? no    Have you activated your [employer] account? If not, what are your barriers?  Yes     Patient Care Team:  Isidro Gonzalez MD as PCP - General (Family Medicine)  Isidro Gonzalez MD as PCP - [facility]  Meseret Edge MD as Consulting Physician (Gastroenterology)  Karen Beltran MD as Consulting Physician (Pain Management)  Jerrod Bull MD as Consulting Physician (Hematology and Oncology)  Lily Arce DO as Consulting Physician (Bariatric Surgery)  Joshua Nick DO as Consulting Physician (Cardiology)  Cecile Brar MD as Consulting Physician (Urology)    Medical History Review  Past Medical, Family, and Social History reviewed and does contribute to the patient presenting condition    Health Maintenance   Topic Date Due    DTaP/Tdap/Td vaccine (1 - Tdap) Never done    Low dose CT lung screening  Never done    Shingles Vaccine (2 of 3) 12/27/2015    Diabetic retinal exam  05/13/2021    COVID-19 Vaccine (2 - Booster for Lisa series) 06/04/2021    Diabetic microalbuminuria test  07/13/2021    A1C test (Diabetic or Prediabetic)  12/24/2021    Depression Screen  09/17/2022    Diabetic foot exam  09/24/2022    Annual Wellness Visit (AWV)  09/25/2022    Lipid screen  10/23/2022    Potassium monitoring  01/03/2023    Creatinine monitoring  01/03/2023    Colon cancer screen colonoscopy  10/08/2024    Hepatitis A vaccine  Completed    Flu vaccine  Completed    Pneumococcal 65+ years Vaccine  Completed    AAA screen  Completed    Hib vaccine  Aged Out    Meningococcal (ACWY) vaccine  Aged Out

## 2022-01-05 ENCOUNTER — VIRTUAL VISIT (OUTPATIENT)
Dept: FAMILY MEDICINE CLINIC | Age: 72
End: 2022-01-05
Payer: MEDICARE

## 2022-01-05 DIAGNOSIS — J02.9 ACUTE VIRAL PHARYNGITIS: ICD-10-CM

## 2022-01-05 DIAGNOSIS — J01.40 SUBACUTE PANSINUSITIS: Primary | ICD-10-CM

## 2022-01-05 PROCEDURE — 99442 PR PHYS/QHP TELEPHONE EVALUATION 11-20 MIN: CPT | Performed by: FAMILY MEDICINE

## 2022-01-05 RX ORDER — FLUTICASONE PROPIONATE 50 MCG
2 SPRAY, SUSPENSION (ML) NASAL DAILY
Qty: 16 G | Refills: 0 | Status: SHIPPED | OUTPATIENT
Start: 2022-01-05

## 2022-01-05 RX ORDER — AMOXICILLIN 500 MG/1
1 TABLET, FILM COATED ORAL EVERY 8 HOURS
Qty: 30 TABLET | Refills: 0 | Status: SHIPPED | OUTPATIENT
Start: 2022-01-05 | End: 2022-01-15

## 2022-01-05 NOTE — PROGRESS NOTES
Rufina Jiménez is a 73 y.o. male evaluated via telephone on 1/5/2022. Chief Complaint   Patient presents with    Cough    Congestion    Pharyngitis    Other     has an upcoming appt for surgery won't clear while being sick        Consent:  He and/or health care decision maker is aware that that he may receive a bill for this telephone service, depending on his insurance coverage, and has provided verbal consent to proceed: Yes      Documentation:  I communicated with the patient and/or health care decision maker about . Patient is scheduled for sick call, complaining of sore throat, sinus congestion, postnasal drip. Patient reports he has upcoming surgery coming on Friday for his bladder and he does not want to get postponed. He denies any fever chills. Patient had Covid test done twice last week which was negative. He tried over-the-counter Claritin without any relief. Details of this discussion including any medical advice provided:   1. Subacute pansinusitis  Amoxicillin for 10 days.  - Amoxicillin 500 MG TABS; Take 1 tablet by mouth every 8 hours for 10 days  Dispense: 30 tablet; Refill: 0  - fluticasone (FLONASE) 50 MCG/ACT nasal spray; 2 sprays by Nasal route daily  Dispense: 16 g; Refill: 0    2. Acute viral pharyngitis  Use Flonase. - Amoxicillin 500 MG TABS; Take 1 tablet by mouth every 8 hours for 10 days  Dispense: 30 tablet; Refill: 0  - fluticasone (FLONASE) 50 MCG/ACT nasal spray; 2 sprays by Nasal route daily  Dispense: 16 g; Refill: 0        I affirm this is a Patient Initiated Episode with a Patient who has not had a related appointment within my department in the past 7 days or scheduled within the next 24 hours.     Patient identification was verified at the start of the visit: Yes    Total Time: minutes: 11-20 minutes    The visit was conducted pursuant to the emergency declaration under the [facility] waiver authority and the Coronavirus Preparedness and Response Supplemental Appropriations Act. Patient identification was verified, and a caregiver was present when appropriate. The patient was located in a state where the provider was credentialed to provide care.     Note: not billable if this call serves to triage the patient into an appointment for the relevant concern      Geoff Lyn MD

## 2022-01-10 ENCOUNTER — PATIENT MESSAGE (OUTPATIENT)
Dept: FAMILY MEDICINE CLINIC | Age: 72
End: 2022-01-10

## 2022-01-10 DIAGNOSIS — E11.42 TYPE 2 DIABETES MELLITUS WITH DIABETIC POLYNEUROPATHY, WITHOUT LONG-TERM CURRENT USE OF INSULIN (HCC): ICD-10-CM

## 2022-01-10 DIAGNOSIS — U07.1 COVID-19 VIRUS INFECTION: Primary | ICD-10-CM

## 2022-01-10 RX ORDER — BLOOD-GLUCOSE METER
KIT MISCELLANEOUS
Qty: 1 KIT | Refills: 0 | Status: SHIPPED | OUTPATIENT
Start: 2022-01-10 | End: 2022-03-28 | Stop reason: SDUPTHER

## 2022-01-10 RX ORDER — GLUCOSAMINE HCL/CHONDROITIN SU 500-400 MG
CAPSULE ORAL
Qty: 100 STRIP | Refills: 3 | Status: SHIPPED | OUTPATIENT
Start: 2022-01-10 | End: 2022-03-28 | Stop reason: SDUPTHER

## 2022-01-10 NOTE — TELEPHONE ENCOUNTER
From: Homar Hawkins  To: Dr. Romero Power: 1/10/2022 6:28 AM EST  Subject: Blood Glucose Machine    Hi Dr Zara Jeronimo,   Thank you for calling off My surgery, I tested positive for Covid from a home test.  The reason I write is to ask if I am able to get a new Glucose Monitoring Machine. Isabelle had this one since I was diagnosed with DM. I keep getting error~5 msg every time I take it. I go to [address] [place] [address] in [facility].   Thank you,  Venancio Javier, spouse to  Tasha Cadena

## 2022-01-11 ENCOUNTER — HOSPITAL ENCOUNTER (OUTPATIENT)
Age: 72
Discharge: HOME | End: 2022-01-11
Payer: MEDICARE

## 2022-01-11 DIAGNOSIS — K92.2 GASTROINTESTINAL HEMORRHAGE, UNSPECIFIED GASTROINTESTINAL HEMORRHAGE TYPE: ICD-10-CM

## 2022-01-11 DIAGNOSIS — D50.0 IRON DEFICIENCY ANEMIA DUE TO CHRONIC BLOOD LOSS: ICD-10-CM

## 2022-01-11 DIAGNOSIS — D64.9 ANEMIA, UNSPECIFIED TYPE: ICD-10-CM

## 2022-01-11 DIAGNOSIS — E53.8 B12 DEFICIENCY: ICD-10-CM

## 2022-01-11 LAB
ABSOLUTE EOS #: 0.1 K/UL (ref 0–0.4)
ABSOLUTE IMMATURE GRANULOCYTE: ABNORMAL K/UL (ref 0–0.3)
ABSOLUTE LYMPH #: 1.2 K/UL (ref 1–4.8)
ABSOLUTE MONO #: 0.3 K/UL (ref 0.1–1.3)
BASOPHILS # BLD: 1 % (ref 0–2)
BASOPHILS ABSOLUTE: 0 K/UL (ref 0–0.2)
DIFFERENTIAL TYPE: ABNORMAL
EOSINOPHILS RELATIVE PERCENT: 1 % (ref 0–4)
FERRITIN: 39 UG/L (ref 30–400)
FOLATE: 14.7 NG/ML
HCT VFR BLD CALC: 27.9 % (ref 41–53)
HEMOGLOBIN: 9.2 G/DL (ref 13.5–17.5)
IMMATURE GRANULOCYTES: ABNORMAL %
IRON SATURATION: 8 % (ref 20–55)
IRON: 26 UG/DL (ref 59–158)
LYMPHOCYTES # BLD: 28 % (ref 24–44)
MCH RBC QN AUTO: 26.9 PG (ref 26–34)
MCHC RBC AUTO-ENTMCNC: 33.1 G/DL (ref 31–37)
MCV RBC AUTO: 81.1 FL (ref 80–100)
MONOCYTES # BLD: 6 % (ref 1–7)
NRBC AUTOMATED: ABNORMAL PER 100 WBC
PDW BLD-RTO: 19.4 % (ref 11.5–14.9)
PLATELET # BLD: 283 K/UL (ref 150–450)
PLATELET ESTIMATE: ABNORMAL
PMV BLD AUTO: 7.8 FL (ref 6–12)
RBC # BLD: 3.44 M/UL (ref 4.5–5.9)
RBC # BLD: ABNORMAL 10*6/UL
SEG NEUTROPHILS: 64 % (ref 36–66)
SEGMENTED NEUTROPHILS ABSOLUTE COUNT: 2.6 K/UL (ref 1.3–9.1)
TOTAL IRON BINDING CAPACITY: 346 UG/DL (ref 250–450)
UNSATURATED IRON BINDING CAPACITY: 320 UG/DL (ref 112–347)
VITAMIN B-12: 1690 PG/ML (ref 232–1245)
WBC # BLD: 4.1 K/UL (ref 3.5–11)
WBC # BLD: ABNORMAL 10*3/UL

## 2022-01-11 PROCEDURE — 82746 ASSAY OF FOLIC ACID SERUM: CPT

## 2022-01-11 PROCEDURE — 36415 COLL VENOUS BLD VENIPUNCTURE: CPT

## 2022-01-11 PROCEDURE — 82607 VITAMIN B-12: CPT

## 2022-01-11 PROCEDURE — 83550 IRON BINDING TEST: CPT

## 2022-01-11 PROCEDURE — 83540 ASSAY OF IRON: CPT

## 2022-01-11 PROCEDURE — 82728 ASSAY OF FERRITIN: CPT

## 2022-01-11 PROCEDURE — 85025 COMPLETE CBC W/AUTO DIFF WBC: CPT

## 2022-01-12 ENCOUNTER — VIRTUAL VISIT (OUTPATIENT)
Dept: ONCOLOGY | Age: 72
End: 2022-01-12
Payer: MEDICARE

## 2022-01-12 DIAGNOSIS — E53.8 B12 DEFICIENCY: ICD-10-CM

## 2022-01-12 DIAGNOSIS — C67.2 MALIGNANT NEOPLASM OF LATERAL WALL OF URINARY BLADDER (HCC): ICD-10-CM

## 2022-01-12 DIAGNOSIS — D50.8 OTHER IRON DEFICIENCY ANEMIA: Primary | ICD-10-CM

## 2022-01-12 DIAGNOSIS — D50.0 IRON DEFICIENCY ANEMIA DUE TO CHRONIC BLOOD LOSS: Primary | ICD-10-CM

## 2022-01-12 DIAGNOSIS — K92.2 GASTROINTESTINAL HEMORRHAGE, UNSPECIFIED GASTROINTESTINAL HEMORRHAGE TYPE: ICD-10-CM

## 2022-01-12 PROCEDURE — 99214 OFFICE O/P EST MOD 30 MIN: CPT | Performed by: INTERNAL MEDICINE

## 2022-01-12 RX ORDER — ONDANSETRON 2 MG/ML
8 INJECTION INTRAMUSCULAR; INTRAVENOUS
Status: CANCELLED | OUTPATIENT
Start: 2022-01-12

## 2022-01-12 RX ORDER — ACETAMINOPHEN 325 MG/1
650 TABLET ORAL
Status: CANCELLED | OUTPATIENT
Start: 2022-01-12

## 2022-01-12 RX ORDER — SODIUM CHLORIDE 0.9 % (FLUSH) 0.9 %
5-40 SYRINGE (ML) INJECTION PRN
Status: CANCELLED | OUTPATIENT
Start: 2022-01-12

## 2022-01-12 RX ORDER — SODIUM CHLORIDE 9 MG/ML
INJECTION, SOLUTION INTRAVENOUS CONTINUOUS
Status: CANCELLED | OUTPATIENT
Start: 2022-01-12

## 2022-01-12 RX ORDER — ALBUTEROL SULFATE 90 UG/1
4 AEROSOL, METERED RESPIRATORY (INHALATION) PRN
Status: CANCELLED | OUTPATIENT
Start: 2022-01-12

## 2022-01-12 RX ORDER — DIPHENHYDRAMINE HYDROCHLORIDE 50 MG/ML
50 INJECTION INTRAMUSCULAR; INTRAVENOUS
Status: CANCELLED | OUTPATIENT
Start: 2022-01-12

## 2022-01-12 RX ORDER — EPINEPHRINE 1 MG/ML
0.3 INJECTION, SOLUTION, CONCENTRATE INTRAVENOUS PRN
Status: CANCELLED | OUTPATIENT
Start: 2022-01-12

## 2022-01-12 RX ORDER — SODIUM CHLORIDE 9 MG/ML
25 INJECTION, SOLUTION INTRAVENOUS PRN
Status: CANCELLED | OUTPATIENT
Start: 2022-01-12

## 2022-01-12 RX ORDER — HEPARIN SODIUM (PORCINE) LOCK FLUSH IV SOLN 100 UNIT/ML 100 UNIT/ML
500 SOLUTION INTRAVENOUS PRN
Status: CANCELLED | OUTPATIENT
Start: 2022-01-12

## 2022-01-12 NOTE — PROGRESS NOTES
Subjective:   PCP: Bijan Richardson MD       Chief Complaint   Patient presents with    Other     review status of disease     Discuss Labs    Other     next \"bladder cancer scrapping\" is delayed d/t COVID +          INTERIM HISTORY:    Patient seen in clinic via virtual visit due to ongoing coronavirus pandemic. Patient received his last iron infusion back in November. Patient ferritin has improved but hemoglobin has declined. Patient also underwent cystoscopy: With the patient he was found to have a recurrent bladder tumor. We do not have the path report in the system yet. Patient was diagnosed with COVID-19 and subsequently TURBT was postponed. Patient did have a couple of episodes of dark bowel movements. He is not on any oral iron. During this visit patient's allergy, social, medical, surgical history and medications were reviewed and updated. REVIEW OF SYSTEMS:   General: No fever or night sweats. Weight is stable. +fatigue   ENT: No double or blurred vision, no hearing problem, no dysphagia or sore throat   Respiratory: No chest pain, no shortness of breath, no cough or hemoptysis. Cardiovascular: Denies chest pain, PND or orthopnea. No L E swelling or palpitations. Gastrointestinal: No nausea or vomiting, abdominal pain, or constipation. + diarrhea  Genitourinary: Denies dysuria, frequency, urgency or incontinence. +hematuria -on and off  Neurological: Denies headaches, decreased LOC, no sensory or motor focal deficits. Musculoskeletal: No arthralgia no back pain or joint swelling. Skin: There are no rashes or bleeding.   Psych: Denies hallucinations or intentions to harm self            PHYSICAL EXAMINATION:    Vital Signs: (As obtained by patient/caregiver or practitioner observation)    Blood pressure-  Heart rate-    Respiratory rate-    Temperature-  Pulse oximetry-     Constitutional: [x] Appears well-developed and well-nourished [x] No apparent distress      [] Abnormal-   Mental status  [x] Alert and awake  [x] Oriented to person/place/time [x]Able to follow commands      Eyes:  EOM    [x]  Normal  [] Abnormal-  Sclera  [x]  Normal  [] Abnormal -         Discharge [x]  None visible  [] Abnormal -    HENT:    [x] Normocephalic, atraumatic. [] Abnormal   [x] Mouth/Throat: Mucous membranes are moist.     External Ears [x] Normal  [] Abnormal-     Neck: [x] No visualized mass     Pulmonary/Chest: [x] Respiratory effort normal.  [x] No visualized signs of difficulty breathing or respiratory distress        [] Abnormal-      Musculoskeletal:   [] Normal gait with no signs of ataxia         [x] Normal range of motion of neck        [] Abnormal-       Neurological:        [x] No Facial Asymmetry (Cranial nerve 7 motor function) (limited exam to video visit)          [x] No gaze palsy        [] Abnormal-         Skin:        [x] No significant exanthematous lesions or discoloration noted on facial skin         [] Abnormal-            Psychiatric:       [x] Normal Affect [x] No Hallucinations        [] Abnormal-     Other pertinent observable physical exam findings-     Due to this being a TeleHealth encounter, evaluation of the following organ systems is limited: Vitals/Constitutional/EENT/Resp/CV/GI//MS/Neuro/Skin/Heme-Lymph-Imm.           REVIEW OF LABORATORY DATA:   Lab Results   Component Value Date    WBC 4.1 01/11/2022    HGB 9.2 (L) 01/11/2022    HCT 27.9 (L) 01/11/2022    MCV 81.1 01/11/2022     01/11/2022       Chemistry        Component Value Date/Time     01/03/2022 0925    K 4.5 01/03/2022 0925     01/03/2022 0925    CO2 21 01/03/2022 0925    BUN 18 01/03/2022 0925    CREATININE 0.94 01/03/2022 0925        Component Value Date/Time    CALCIUM 8.6 10/23/2021 0839    ALKPHOS 83 10/23/2021 0839    AST 17 10/23/2021 0839    ALT 20 10/23/2021 0839    BILITOT 0.18 (L) 10/23/2021 0839        Lab Results   Component Value Date    KRPEDDPB48 1690 (H) 01/11/2022         Lab Results   Component Value Date    IRON 26 (L) 01/11/2022    TIBC 346 01/11/2022    FERRITIN 39 01/11/2022         IMPRESSION:   81year old male with history of bleeding ulcer back in 2015 and iron def, now with iron def anemia, and stool occult stools    -s/p IV iron with improvement in iron stores and hemoglobin  -EGD and colonoscopy 10/2019 did not show active bleeding, still no active source of iron def  -s/p GI in 12/3/19, concern for still blood loss, iron def, no plasns for repeat capsule video study, he has completed treatment for hepatitis C with [facility].  -repeat iron studies are consistent with iron deficiency  -transfuse if Hbg is less than 7.0    -B12 deficiency, patient started on B12 shots 7/2020    -Non-invasive urothelial carcinoma, low grade, TURBT, 04/2021  -Surveillance cystoscopy and TURBT 9/2021    PLAN:   Personally reviewed results of lab work-up and other relevant clinical data. Patient continues to require IV iron. Ferritin has improved but hemoglobin has declined. Will order IV iron infusions for the patient using Injectafer  Continue surveillance with urology regarding management of nonmuscle invasive bladder cancer. His TURBT was postponed due to COVID infection. Patient is recovering from COVID infection and is not ready to feel better  Continue folic acid supplementation  Continue B12 supplementation  Return to clinic in 2 months with repeat iron studies    Aleyda Dobson MD, MD    This note is created with the assistance of a speech recognition program.  While intending to generate a document that actually reflects the content of the visit, the document can still have some errors including those of syntax and sound a like substitutions which may escape proof reading. It such instances, actual meaning can be extrapolated by contextual diversion. Pastora Morocho, was evaluated through a synchronous (real-time) audio-video encounter.  The patient (or guardian if applicable) is aware that this is a billable service. Verbal consent to proceed has been obtained within the past 12 months. The visit was conducted pursuant to the emergency declaration under the [address] [place] [address] and the [employer] and [employer] General Act. Patient identification was verified, and a caregiver was present when appropriate. The patient was located in a state where the provider was credentialed to provide care. Total time spent for this encounter: Not billed by time    --Gauri Vega MD on 1/12/2022 at 5:50 PM    An electronic signature was used to authenticate this note.

## 2022-01-13 ENCOUNTER — TELEPHONE (OUTPATIENT)
Dept: ONCOLOGY | Age: 72
End: 2022-01-13

## 2022-01-13 NOTE — TELEPHONE ENCOUNTER
AVS from 1/13/22    Injectafer infusion x 2 now  Return was in 2 months with labs 1 week prior    AVS given to  to follow precert    RV scheduled 3/16/22 @ 2:15pm    PT will have labs drawn one week prior to return visit    PT was called and sent mychart message about appt    Electronically signed by Francisco Mackenzie on 1/13/2022 at 9:22 AM

## 2022-01-19 ENCOUNTER — TELEPHONE (OUTPATIENT)
Dept: UROLOGY | Age: 72
End: 2022-01-19

## 2022-01-19 NOTE — TELEPHONE ENCOUNTER
Cysto, bladder bx & fulguration @ [facility] 2/16/22 8:00am **STOP BLOOD THINNERS 2/9/22**   PAT @ [facility] 2/9/22 10:00am   Vaccinated [facility]  covid + 1/6/22        Spoke with patient and wife, procedure info emailed.

## 2022-01-20 RX ORDER — EPINEPHRINE 1 MG/ML
0.3 INJECTION, SOLUTION, CONCENTRATE INTRAVENOUS PRN
Status: CANCELLED | OUTPATIENT
Start: 2022-01-20

## 2022-01-20 RX ORDER — SODIUM CHLORIDE 9 MG/ML
25 INJECTION, SOLUTION INTRAVENOUS PRN
Status: CANCELLED | OUTPATIENT
Start: 2022-01-20

## 2022-01-20 RX ORDER — ACETAMINOPHEN 325 MG/1
650 TABLET ORAL
Status: CANCELLED | OUTPATIENT
Start: 2022-01-20

## 2022-01-20 RX ORDER — SODIUM CHLORIDE 9 MG/ML
INJECTION, SOLUTION INTRAVENOUS CONTINUOUS
Status: CANCELLED | OUTPATIENT
Start: 2022-01-20

## 2022-01-20 RX ORDER — DIPHENHYDRAMINE HYDROCHLORIDE 50 MG/ML
50 INJECTION INTRAMUSCULAR; INTRAVENOUS
Status: CANCELLED | OUTPATIENT
Start: 2022-01-20

## 2022-01-20 RX ORDER — ONDANSETRON 2 MG/ML
8 INJECTION INTRAMUSCULAR; INTRAVENOUS
Status: CANCELLED | OUTPATIENT
Start: 2022-01-20

## 2022-01-20 RX ORDER — SODIUM CHLORIDE 0.9 % (FLUSH) 0.9 %
5-40 SYRINGE (ML) INJECTION PRN
Status: CANCELLED | OUTPATIENT
Start: 2022-01-20

## 2022-01-20 RX ORDER — ALBUTEROL SULFATE 90 UG/1
4 AEROSOL, METERED RESPIRATORY (INHALATION) PRN
Status: CANCELLED | OUTPATIENT
Start: 2022-01-20

## 2022-01-20 RX ORDER — HEPARIN SODIUM (PORCINE) LOCK FLUSH IV SOLN 100 UNIT/ML 100 UNIT/ML
500 SOLUTION INTRAVENOUS PRN
Status: CANCELLED | OUTPATIENT
Start: 2022-01-20

## 2022-01-31 ENCOUNTER — TELEPHONE (OUTPATIENT)
Dept: INFUSION THERAPY | Age: 72
End: 2022-01-31

## 2022-01-31 ENCOUNTER — HOSPITAL ENCOUNTER (OUTPATIENT)
Dept: INFUSION THERAPY | Age: 72
End: 2022-01-31
Payer: MEDICARE

## 2022-01-31 ENCOUNTER — HOSPITAL ENCOUNTER (OUTPATIENT)
Dept: INFUSION THERAPY | Age: 72
Discharge: HOME | End: 2022-01-31
Payer: MEDICARE

## 2022-01-31 VITALS
SYSTOLIC BLOOD PRESSURE: 107 MMHG | RESPIRATION RATE: 16 BRPM | DIASTOLIC BLOOD PRESSURE: 66 MMHG | HEART RATE: 88 BPM | TEMPERATURE: 98.6 F

## 2022-01-31 DIAGNOSIS — D50.0 IRON DEFICIENCY ANEMIA DUE TO CHRONIC BLOOD LOSS: Primary | ICD-10-CM

## 2022-01-31 PROCEDURE — 2580000003 HC RX 258: Performed by: INTERNAL MEDICINE

## 2022-01-31 PROCEDURE — 6360000002 HC RX W HCPCS: Performed by: INTERNAL MEDICINE

## 2022-01-31 PROCEDURE — 96365 THER/PROPH/DIAG IV INF INIT: CPT

## 2022-01-31 RX ORDER — SODIUM CHLORIDE 9 MG/ML
25 INJECTION, SOLUTION INTRAVENOUS PRN
Status: CANCELLED | OUTPATIENT
Start: 2022-02-07

## 2022-01-31 RX ORDER — ONDANSETRON 2 MG/ML
8 INJECTION INTRAMUSCULAR; INTRAVENOUS
Status: CANCELLED | OUTPATIENT
Start: 2022-01-31

## 2022-01-31 RX ORDER — SODIUM CHLORIDE 9 MG/ML
INJECTION, SOLUTION INTRAVENOUS CONTINUOUS
Status: CANCELLED | OUTPATIENT
Start: 2022-02-07

## 2022-01-31 RX ORDER — HEPARIN SODIUM (PORCINE) LOCK FLUSH IV SOLN 100 UNIT/ML 100 UNIT/ML
500 SOLUTION INTRAVENOUS PRN
Status: CANCELLED | OUTPATIENT
Start: 2022-02-07

## 2022-01-31 RX ORDER — ALBUTEROL SULFATE 90 UG/1
4 AEROSOL, METERED RESPIRATORY (INHALATION) PRN
Status: CANCELLED | OUTPATIENT
Start: 2022-02-07

## 2022-01-31 RX ORDER — DIPHENHYDRAMINE HYDROCHLORIDE 50 MG/ML
50 INJECTION INTRAMUSCULAR; INTRAVENOUS
Status: CANCELLED | OUTPATIENT
Start: 2022-01-31

## 2022-01-31 RX ORDER — SODIUM CHLORIDE 0.9 % (FLUSH) 0.9 %
5-40 SYRINGE (ML) INJECTION PRN
Status: CANCELLED | OUTPATIENT
Start: 2022-01-31

## 2022-01-31 RX ORDER — EPINEPHRINE 1 MG/ML
0.3 INJECTION, SOLUTION, CONCENTRATE INTRAVENOUS PRN
Status: CANCELLED | OUTPATIENT
Start: 2022-02-07

## 2022-01-31 RX ORDER — SODIUM CHLORIDE 9 MG/ML
INJECTION, SOLUTION INTRAVENOUS CONTINUOUS
Status: CANCELLED | OUTPATIENT
Start: 2022-01-31

## 2022-01-31 RX ORDER — SODIUM CHLORIDE 0.9 % (FLUSH) 0.9 %
5-40 SYRINGE (ML) INJECTION PRN
Status: CANCELLED | OUTPATIENT
Start: 2022-02-07

## 2022-01-31 RX ORDER — SODIUM CHLORIDE 9 MG/ML
INJECTION, SOLUTION INTRAVENOUS CONTINUOUS
Status: DISCONTINUED | OUTPATIENT
Start: 2022-01-31 | End: 2022-02-01 | Stop reason: HOSPADM

## 2022-01-31 RX ORDER — HEPARIN SODIUM (PORCINE) LOCK FLUSH IV SOLN 100 UNIT/ML 100 UNIT/ML
500 SOLUTION INTRAVENOUS PRN
Status: CANCELLED | OUTPATIENT
Start: 2022-01-31

## 2022-01-31 RX ORDER — ACETAMINOPHEN 325 MG/1
650 TABLET ORAL
Status: CANCELLED | OUTPATIENT
Start: 2022-01-31

## 2022-01-31 RX ORDER — ACETAMINOPHEN 325 MG/1
650 TABLET ORAL
Status: CANCELLED | OUTPATIENT
Start: 2022-02-07

## 2022-01-31 RX ORDER — ALBUTEROL SULFATE 90 UG/1
4 AEROSOL, METERED RESPIRATORY (INHALATION) PRN
Status: CANCELLED | OUTPATIENT
Start: 2022-01-31

## 2022-01-31 RX ORDER — ONDANSETRON 2 MG/ML
8 INJECTION INTRAMUSCULAR; INTRAVENOUS
Status: CANCELLED | OUTPATIENT
Start: 2022-02-07

## 2022-01-31 RX ORDER — SODIUM CHLORIDE 9 MG/ML
25 INJECTION, SOLUTION INTRAVENOUS PRN
Status: CANCELLED | OUTPATIENT
Start: 2022-01-31

## 2022-01-31 RX ORDER — DIPHENHYDRAMINE HYDROCHLORIDE 50 MG/ML
50 INJECTION INTRAMUSCULAR; INTRAVENOUS
Status: CANCELLED | OUTPATIENT
Start: 2022-02-07

## 2022-01-31 RX ADMIN — SODIUM CHLORIDE: 9 INJECTION, SOLUTION INTRAVENOUS at 12:58

## 2022-01-31 RX ADMIN — FERRIC CARBOXYMALTOSE INJECTION 750 MG: 50 INJECTION, SOLUTION INTRAVENOUS at 13:22

## 2022-01-31 ASSESSMENT — PAIN DESCRIPTION - LOCATION: LOCATION: BACK

## 2022-01-31 ASSESSMENT — PAIN SCALES - GENERAL: PAINLEVEL_OUTOF10: 4

## 2022-01-31 NOTE — TELEPHONE ENCOUNTER
Pt's wife called stating pt scheduled today for 1st dose of Venofer. States wants to cancel appts for Venofer bc pt prefers injectafer. Pt has rec'd Injectafer in past.  Pt's wife states she called and spoke with their insurance company whom explained that Kirill Martinez would be approved since he has had it in the past, insurance recommends we request Injectafer for continuity of care and mariam urgent. Updated Aditya Abel in pharmacy, [facility] in pre-cert, Dimitri in scheduling and Carlos Asif in infusion.

## 2022-01-31 NOTE — PROGRESS NOTES
Patient here for injectafer. Vitals stable. Denies any new issues at this time. He tolerated treatment well and was discharged home in stable condition. He is due to return 2/7 for day 2 of 2 injectafer.

## 2022-02-01 ENCOUNTER — HOSPITAL ENCOUNTER (OUTPATIENT)
Dept: PAIN MANAGEMENT | Age: 72
Discharge: HOME | End: 2022-02-01
Payer: MEDICARE

## 2022-02-01 VITALS
OXYGEN SATURATION: 99 % | HEART RATE: 94 BPM | BODY MASS INDEX: 31.4 KG/M2 | DIASTOLIC BLOOD PRESSURE: 80 MMHG | HEIGHT: 69 IN | WEIGHT: 212 LBS | SYSTOLIC BLOOD PRESSURE: 130 MMHG | RESPIRATION RATE: 16 BRPM | TEMPERATURE: 97.1 F

## 2022-02-01 DIAGNOSIS — Z98.1 S/P CERVICAL SPINAL FUSION: Chronic | ICD-10-CM

## 2022-02-01 DIAGNOSIS — M54.16 LUMBAR RADICULOPATHY: Chronic | ICD-10-CM

## 2022-02-01 DIAGNOSIS — Z51.81 ENCOUNTER FOR MEDICATION MONITORING: Chronic | ICD-10-CM

## 2022-02-01 DIAGNOSIS — M48.061 SPINAL STENOSIS OF LUMBAR REGION WITHOUT NEUROGENIC CLAUDICATION: Chronic | ICD-10-CM

## 2022-02-01 DIAGNOSIS — M47.26 OSTEOARTHRITIS OF SPINE WITH RADICULOPATHY, LUMBAR REGION: ICD-10-CM

## 2022-02-01 DIAGNOSIS — G89.29 ENCOUNTER FOR CHRONIC PAIN MANAGEMENT: ICD-10-CM

## 2022-02-01 DIAGNOSIS — M46.92 CERVICAL SPONDYLITIS (HCC): Chronic | ICD-10-CM

## 2022-02-01 DIAGNOSIS — M47.816 LUMBAR SPONDYLOSIS: Chronic | ICD-10-CM

## 2022-02-01 DIAGNOSIS — M51.36 DEGENERATIVE DISC DISEASE, LUMBAR: Chronic | ICD-10-CM

## 2022-02-01 DIAGNOSIS — M47.816 OSTEOARTHRITIS OF LUMBAR SPINE, UNSPECIFIED SPINAL OSTEOARTHRITIS COMPLICATION STATUS: ICD-10-CM

## 2022-02-01 PROCEDURE — 99213 OFFICE O/P EST LOW 20 MIN: CPT

## 2022-02-01 PROCEDURE — 99213 OFFICE O/P EST LOW 20 MIN: CPT | Performed by: NURSE PRACTITIONER

## 2022-02-01 RX ORDER — OXYCODONE HYDROCHLORIDE AND ACETAMINOPHEN 5; 325 MG/1; MG/1
1 TABLET ORAL EVERY 8 HOURS
Qty: 90 TABLET | Refills: 0 | Status: SHIPPED | OUTPATIENT
Start: 2022-02-01 | End: 2022-02-28 | Stop reason: SDUPTHER

## 2022-02-01 RX ORDER — MORPHINE SULFATE 60 MG/1
60 TABLET, FILM COATED, EXTENDED RELEASE ORAL 2 TIMES DAILY
Qty: 60 TABLET | Refills: 0 | Status: SHIPPED | OUTPATIENT
Start: 2022-02-01 | End: 2022-02-28 | Stop reason: SDUPTHER

## 2022-02-01 RX ORDER — PREGABALIN 150 MG/1
150 CAPSULE ORAL 3 TIMES DAILY
Qty: 90 CAPSULE | Refills: 2 | Status: SHIPPED | OUTPATIENT
Start: 2022-02-01 | End: 2022-05-18 | Stop reason: SDUPTHER

## 2022-02-01 ASSESSMENT — ENCOUNTER SYMPTOMS
SHORTNESS OF BREATH: 0
COUGH: 0
CONSTIPATION: 0
BACK PAIN: 1

## 2022-02-01 NOTE — PROGRESS NOTES
Chief Complaint: back pain    PMH  Pt c/o low back pain for many years. MRI with multifocal degenerative disc disease throughout the lumbar spine. There is no known injury or surgery to the area. He does have a history of scoliosis. His last PT was over 4 years ago with no benefit and he is not interested in repeating. He does do home stretches every morning. He also had a LESI in 2013 that did not help. He has non-invasive bladder cancer and had resection of a tumor in April. He continues to follow with oncology and hematology - he continues Injectafer infusions for anemia.       Will have another bladder surgery next week. Back Pain  This is a chronic problem. The current episode started more than 1 year ago. The problem occurs constantly. The problem is unchanged. The pain is present in the lumbar spine. The quality of the pain is described as aching. Radiates to: down left leg to the knee. The pain is at a severity of 4/10. The symptoms are aggravated by position and standing (walking). Associated symptoms include numbness, paresthesias and tingling. Pertinent negatives include no chest pain or fever. He has tried analgesics and bed rest for the symptoms. The treatment provided mild relief. Patient denies any new neurological symptoms. No bowel or bladder incontinence, no weakness, and no falling. Pill count: appropriate due 2/2    Morphine equivalent: 142.5    Periodic Controlled Substance Monitoring: Possible medication side effects, risk of tolerance/dependence & alternative treatments discussed. ,No signs of potential drug abuse or diversion identified. ,Obtaining appropriate analgesic effect of treatment. Nina Tobias, APRN - CNP)  Chronic Pain > 80 MEDD: Co-prescribed Naloxone. ,Obtained or confirmed \"Medication Contract\" on file.  Efe Arechiga, VEE - CNP)      Past Medical History:   Diagnosis Date    Anemia     Arthritis     osteoarthritis    Bladder cancer ([facility] 03/2021    bladder    Bladder tumor     Chronic back pain     Chronic neck pain     Colon polyps 10/08/2019    tubular adenoma x2    Diabetic neuropathy ([address]     Diarrhea     Encounter for chronic pain management     [facility] Dr. Jessica Davis E visit 01/03/2022    Essential hypertension 05/12/2020    managed by Dr. Zara Jeronimo PCP    Heartburn     Hematuria     Hepatitis B core antibody positive     History of bleeding peptic ulcer     History of blood transfusion     no reactions    History of hepatitis C     History of migraine headaches     History of stress test 07/2020    \"low risk\"    Hyperlipidemia     Iron (Fe) deficiency anemia     Poor historian     states his wife takes care of all medical information    Positive FIT (fecal immunochemical test)     Type 2 diabetes mellitus with microalbuminuria, without long-term current use of insulin ([facility] [address] 07/13/2020    managed by Dr. Arun Welch last e-visit 11/2021    Under care of team 08/31/2021    pcp-Dr Nathan Pritchard virtual visit 11/2021    Under care of team 08/31/2021    hematology-Dr Caprice Saavedra  virtual visit 11/2021    Wears dentures     Wears glasses        Past Surgical History:   Procedure Laterality Date    BLADDER TUMOR EXCISION  04/29/2021    CYSTOSCOPY TUR BLADDER TUMOR, GYRUS, RIGHT STENT PLACEMENT AND RIGHT STENT REMOVAL    CERVICAL SPINE SURGERY  1977    cervical spine three times, has plate    CHOLECYSTECTOMY  03/22/2019    COLONOSCOPY  01/25/2015    10 yr recall, hemorrhoids    COLONOSCOPY N/A 10/08/2019    tubular adenoma x2    CYSTOSCOPY Right 4/29/2021    CYSTOSCOPY TUR BLADDER TUMOR, GYRUS, RIGHT STENT PLACEMENT AND RIGHT STENT REMOVAL performed by Flaca Larry MD at [place] [address]  09/09/2021    CYSTOSCOPY, TRANSURETHRAL RESECTION BLADDER TUMOR    CYSTOSCOPY N/A 9/9/2021    CYSTOSCOPY, TRANSURETHRAL RESECTION BLADDER TUMOR performed by Flaca Larry MD at [place] [address]  DENTAL SURGERY  10/2015    all teeth extracted    HERNIA REPAIR N/A 08/07/2020    HERNIA INCISIONAL REPAIR LAPAROSCOPIC ROBOTIC WITH MESH performed by Lily Arce DO at [facility] Right     UMBILICAL HERNIA REPAIR  3022-19    UPPER GASTROINTESTINAL ENDOSCOPY  01/25/2015    UPPER GASTROINTESTINAL ENDOSCOPY N/A 10/08/2019    EGD BIOPSY performed by Meseret Edge MD at [address] [place] [address] [Aspirin]       iron deficiency anemia , requiring iron infusions and transfusions    Iron      Pill- constipation, abdominal pain    Nsaids       iron deficiency anemia, history of bleeding ulcers          Current Outpatient Medications:     glucose monitoring (FREESTYLE FREEDOM) kit, Please supply Patient with a glucose monitoring kit that insurance will cover. , Disp: 1 kit, Rfl: 0    blood glucose monitor strips, Testing once a day. Please dispense according to patients insurance., Disp: 100 strip, Rfl: 3    fluticasone (FLONASE) 50 MCG/ACT nasal spray, 2 sprays by Nasal route daily, Disp: 16 g, Rfl: 0    morphine (MS CONTIN) 60 MG extended release tablet, Take 1 tablet by mouth 2 times daily for 30 days. , Disp: 60 tablet, Rfl: 0    oxyCODONE-acetaminophen (PERCOCET) 5-325 MG per tablet, Take 1 tablet by mouth every 8 hours for 30 days. , Disp: 90 tablet, Rfl: 0    baclofen (LIORESAL) 10 MG tablet, Take 1 tablet by mouth 3 times daily as needed (back pain), Disp: 270 tablet, Rfl: 1    vitamin D (ERGOCALCIFEROL) 1.25 MG (23826 UT) CAPS capsule, TAKE ONE CAPSULE BY MOUTH ONCE WEEKLY, Disp: 4 capsule, Rfl: 2    lisinopril-hydroCHLOROthiazide (PRINZIDE;ZESTORETIC) 10-12.5 MG per tablet, TAKE ONE TABLET BY MOUTH DAILY, Disp: 90 tablet, Rfl: 3    pregabalin (LYRICA) 150 MG capsule, Take 1 capsule by mouth 3 times daily for 90 days. , Disp: 90 capsule, Rfl: 2    pantoprazole (PROTONIX) 40 MG tablet, Take 1 tablet by mouth daily, Disp: 90 tablet, Rfl: 1    metFORMIN (GLUCOPHAGE) 1000 MG tablet, TAKE 1 TABLET BY MOUTH TWICE DAILY WITH MEALS, Disp: 180 tablet, Rfl: 1    cyanocobalamin 1000 MCG/ML injection, Inject 1 mL into the muscle every 30 days Call for next refill which will be monthly for life, Disp: 3 mL, Rfl: 3    pravastatin (PRAVACHOL) 40 MG tablet, Take 1 tablet by mouth every evening Stop Gemfibrozil, Disp: 90 tablet, Rfl: 3    metoprolol tartrate (LOPRESSOR) 25 MG tablet, Take 1 tablet by mouth 2 times daily, Disp: 180 tablet, Rfl: 3    TRUEplus Lancets 30G MISC, Test blood glucose twice a day, Disp: 200 each, Rfl: 3    Alcohol Swabs (B-D SINGLE USE SWABS REGULAR) PADS, USE TO CHECK BLOOD SUGAR TWICE A DAY, Disp: 200 each, Rfl: 3    glipiZIDE (GLUCOTROL) 5 MG tablet, Take 1 tablet by mouth every morning Keep fasting morning blood glucose . If blood glucose below 80, you need to stop glipizide, Disp: 90 tablet, Rfl: 3    Syringe/Needle, Disp, (SYRINGE 3CC/25GX1\") 25G X 1\" 3 ML MISC, To be used with B12 injections, Disp: 50 each, Rfl: 2    loperamide (RA ANTI-DIARRHEAL) 2 MG capsule, Take 1 capsule by mouth 4 times daily as needed for Diarrhea, Disp: 112 capsule, Rfl: 2    Probiotic Product (PROBIOTIC-10 PO), Take by mouth daily , Disp: , Rfl:     Blood Glucose Monitoring Suppl (TRUE METRIX METER) w/Device KIT, USE AS DIRECTED TO TEST BLOOD SUGAR, Disp: , Rfl:     Lancets 30G MISC, Testing once a day.   Please dispense according to patients insurance., Disp: 100 each, Rfl: 3    Family History   Problem Relation Age of Onset    Diabetes Mother     Heart Disease Father        Social History     Socioeconomic History    Marital status:      Spouse name: Not on file    Number of children: Not on file    Years of education: Not on file    Highest education level: Not on file   Occupational History    Occupation: disabled   Tobacco Use    Smoking status: Former Smoker     Packs/day: 0.50     Years: 45.00     Pack years: 22. 50     Types: Cigarettes     Quit date: 2015     Years since quittin.1    Smokeless tobacco: Never Used    Tobacco comment: on chantix 11-6-15   12-7-15   Vaping Use    Vaping Use: Never used   Substance and Sexual Activity    Alcohol use: No    Drug use: No    Sexual activity: Yes     Partners: Female   Other Topics Concern    Not on file   Social History Narrative    Not on file     Social Determinants of Health     Financial Resource Strain: High Risk    Difficulty of Paying Living Expenses: Very hard   Food Insecurity: Food Insecurity Present    Worried About Running Out of Food in the Last Year: Often true    Jennifer of Food in the Last Year: Often true   Transportation Needs:     Lack of Transportation (Medical): Not on file    Lack of Transportation (Non-Medical): Not on file   Physical Activity:     Days of Exercise per Week: Not on file    Minutes of Exercise per Session: Not on file   Stress:     Feeling of Stress : Not on file   Social Connections:     Frequency of Communication with Friends and Family: Not on file    Frequency of Social Gatherings with Friends and Family: Not on file    Attends Sikh Services: Not on file    Active Member of [address] [place] [address] or Organizations: Not on file    Attends Club or Organization Meetings: Not on file    Marital Status: Not on file   Intimate Partner Violence:     Fear of Current or Ex-Partner: Not on file    Emotionally Abused: Not on file    Physically Abused: Not on file    Sexually Abused: Not on file   Housing Stability:     Unable to Pay for Housing in the Last Year: Not on file    Number of [place] in the Last Year: Not on file    Unstable Housing in the Last Year: Not on file       Review of Systems:  Review of Systems   Constitutional: Negative for chills and fever. Cardiovascular: Negative for chest pain and palpitations. Respiratory: Negative for cough and shortness of breath.     Musculoskeletal: Positive for back pain. Gastrointestinal: Negative for constipation. Neurological: Positive for numbness, paresthesias and tingling. Negative for disturbances in coordination and loss of balance. Physical Exam:  /80   Pulse 94   Temp 97.1 °F (36.2 °C) (Infrared)   Resp 16   Ht 5' 9\" (1.753 m)   Wt 212 lb (96.2 kg)   SpO2 99%   BMI 31.31 kg/m²     Physical Exam  Constitutional:        HENT:      Head: Normocephalic. Pulmonary:      Effort: Pulmonary effort is normal.   Musculoskeletal:         General: Normal range of motion. Cervical back: Normal range of motion. Lumbar back: Tenderness present. Skin:     General: Skin is warm and dry. Neurological:      Mental Status: He is alert and oriented to person, place, and time.          Record/Diagnostics Review:    Last zoe 4/2021 and was appropriate     Assessment:  Problem List Items Addressed This Visit     Degenerative disc disease, lumbar (Chronic)    Relevant Medications    morphine (MS CONTIN) 60 MG extended release tablet    oxyCODONE-acetaminophen (PERCOCET) 5-325 MG per tablet    pregabalin (LYRICA) 150 MG capsule    Lumbar spondylosis (Chronic)    Relevant Medications    morphine (MS CONTIN) 60 MG extended release tablet    oxyCODONE-acetaminophen (PERCOCET) 5-325 MG per tablet    pregabalin (LYRICA) 150 MG capsule    Lumbar radiculopathy (Chronic)    Relevant Medications    morphine (MS CONTIN) 60 MG extended release tablet    oxyCODONE-acetaminophen (PERCOCET) 5-325 MG per tablet    pregabalin (LYRICA) 150 MG capsule    Lumbar spinal stenosis (Chronic)    Relevant Medications    oxyCODONE-acetaminophen (PERCOCET) 5-325 MG per tablet    pregabalin (LYRICA) 150 MG capsule    Cervical spondylitis (HCC) (Chronic)    Relevant Medications    oxyCODONE-acetaminophen (PERCOCET) 5-325 MG per tablet    S/P cervical spinal fusion (Chronic)    Relevant Medications    oxyCODONE-acetaminophen (PERCOCET) 5-325 MG per tablet    Encounter for chronic pain management    Relevant Medications    oxyCODONE-acetaminophen (PERCOCET) 5-325 MG per tablet    pregabalin (LYRICA) 150 MG capsule    Osteoarthritis of spine with radiculopathy, lumbar region    Relevant Medications    morphine (MS CONTIN) 60 MG extended release tablet    oxyCODONE-acetaminophen (PERCOCET) 5-325 MG per tablet    pregabalin (LYRICA) 150 MG capsule    Osteoarthritis of lumbar spine    Relevant Medications    morphine (MS CONTIN) 60 MG extended release tablet    oxyCODONE-acetaminophen (PERCOCET) 5-325 MG per tablet    pregabalin (LYRICA) 150 MG capsule      Other Visit Diagnoses     Encounter for medication monitoring  (Chronic)       Relevant Medications    oxyCODONE-acetaminophen (PERCOCET) 5-325 MG per tablet             Treatment Plan:  Patient relates current medications are helping the pain. Patient reports taking pain medications as prescribed, denies obtaining medications from different sources and denies use of illegal drugs. Patient denies side effects from medications like nausea, vomiting, constipation or drowsiness. Patient reports current activities of daily living are possible due to medications and would like to continue them. As always, we encourage daily stretching and strengthening exercises, and recommend minimizing use of pain medications unless patient cannot get through daily activities due to pain. Contract requirements met. Continue opioid therapy. Script written for percocet  Discussed reducing dose of pain medication and patient states his dose was reduced already in the past and he does not think he will be able to function on a lower dose.    Follow up appointment made for 4 weeks

## 2022-02-07 ENCOUNTER — HOSPITAL ENCOUNTER (OUTPATIENT)
Dept: INFUSION THERAPY | Age: 72
Discharge: HOME | End: 2022-02-07
Payer: MEDICARE

## 2022-02-07 VITALS — DIASTOLIC BLOOD PRESSURE: 61 MMHG | RESPIRATION RATE: 16 BRPM | SYSTOLIC BLOOD PRESSURE: 102 MMHG | HEART RATE: 53 BPM

## 2022-02-07 DIAGNOSIS — D50.0 IRON DEFICIENCY ANEMIA DUE TO CHRONIC BLOOD LOSS: Primary | ICD-10-CM

## 2022-02-07 PROCEDURE — 2580000003 HC RX 258: Performed by: INTERNAL MEDICINE

## 2022-02-07 PROCEDURE — 6360000002 HC RX W HCPCS: Performed by: INTERNAL MEDICINE

## 2022-02-07 PROCEDURE — 96365 THER/PROPH/DIAG IV INF INIT: CPT

## 2022-02-07 RX ORDER — SODIUM CHLORIDE 9 MG/ML
25 INJECTION, SOLUTION INTRAVENOUS PRN
Status: CANCELLED | OUTPATIENT
Start: 2022-02-07

## 2022-02-07 RX ORDER — SODIUM CHLORIDE 9 MG/ML
INJECTION, SOLUTION INTRAVENOUS CONTINUOUS
Status: CANCELLED | OUTPATIENT
Start: 2022-02-07

## 2022-02-07 RX ORDER — EPINEPHRINE 1 MG/ML
0.3 INJECTION, SOLUTION, CONCENTRATE INTRAVENOUS PRN
Status: CANCELLED | OUTPATIENT
Start: 2022-02-07

## 2022-02-07 RX ORDER — SODIUM CHLORIDE 0.9 % (FLUSH) 0.9 %
5-40 SYRINGE (ML) INJECTION PRN
Status: CANCELLED | OUTPATIENT
Start: 2022-02-07

## 2022-02-07 RX ORDER — SODIUM CHLORIDE, SODIUM LACTATE, POTASSIUM CHLORIDE, CALCIUM CHLORIDE 600; 310; 30; 20 MG/100ML; MG/100ML; MG/100ML; MG/100ML
1000 INJECTION, SOLUTION INTRAVENOUS CONTINUOUS
Status: CANCELLED | OUTPATIENT
Start: 2022-02-07

## 2022-02-07 RX ORDER — DIPHENHYDRAMINE HYDROCHLORIDE 50 MG/ML
50 INJECTION INTRAMUSCULAR; INTRAVENOUS
Status: CANCELLED | OUTPATIENT
Start: 2022-02-07

## 2022-02-07 RX ORDER — ACETAMINOPHEN 325 MG/1
650 TABLET ORAL
Status: CANCELLED | OUTPATIENT
Start: 2022-02-07

## 2022-02-07 RX ORDER — HEPARIN SODIUM (PORCINE) LOCK FLUSH IV SOLN 100 UNIT/ML 100 UNIT/ML
500 SOLUTION INTRAVENOUS PRN
Status: CANCELLED | OUTPATIENT
Start: 2022-02-07

## 2022-02-07 RX ORDER — ALBUTEROL SULFATE 90 UG/1
4 AEROSOL, METERED RESPIRATORY (INHALATION) PRN
Status: CANCELLED | OUTPATIENT
Start: 2022-02-07

## 2022-02-07 RX ORDER — SODIUM CHLORIDE 9 MG/ML
INJECTION, SOLUTION INTRAVENOUS CONTINUOUS
Status: DISCONTINUED | OUTPATIENT
Start: 2022-02-07 | End: 2022-02-08 | Stop reason: HOSPADM

## 2022-02-07 RX ORDER — ONDANSETRON 2 MG/ML
8 INJECTION INTRAMUSCULAR; INTRAVENOUS
Status: CANCELLED | OUTPATIENT
Start: 2022-02-07

## 2022-02-07 RX ADMIN — FERRIC CARBOXYMALTOSE INJECTION 750 MG: 50 INJECTION, SOLUTION INTRAVENOUS at 13:18

## 2022-02-07 RX ADMIN — SODIUM CHLORIDE: 9 INJECTION, SOLUTION INTRAVENOUS at 13:18

## 2022-02-07 NOTE — PROGRESS NOTES
Pt here for 2 of 2 Injectafer infusions. Infusion complete without incident. Pt d/c'd in stable condition. Returns on 3-16-22 for MD f/u.

## 2022-02-09 ENCOUNTER — HOSPITAL ENCOUNTER (OUTPATIENT)
Dept: PREADMISSION TESTING | Age: 72
Discharge: HOME | End: 2022-02-13
Payer: MEDICARE

## 2022-02-09 VITALS
BODY MASS INDEX: 31.84 KG/M2 | TEMPERATURE: 98.2 F | WEIGHT: 215 LBS | HEIGHT: 69 IN | OXYGEN SATURATION: 97 % | DIASTOLIC BLOOD PRESSURE: 61 MMHG | RESPIRATION RATE: 18 BRPM | HEART RATE: 70 BPM | SYSTOLIC BLOOD PRESSURE: 120 MMHG

## 2022-02-09 LAB
ANION GAP SERPL CALCULATED.3IONS-SCNC: 12 MMOL/L (ref 9–17)
BUN BLDV-MCNC: 15 MG/DL (ref 8–23)
CHLORIDE BLD-SCNC: 103 MMOL/L (ref 98–107)
CO2: 26 MMOL/L (ref 20–31)
CREAT SERPL-MCNC: 0.8 MG/DL (ref 0.7–1.2)
GFR AFRICAN AMERICAN: >60 ML/MIN
GFR NON-AFRICAN AMERICAN: >60 ML/MIN
GFR SERPL CREATININE-BSD FRML MDRD: NORMAL ML/MIN/{1.73_M2}
GFR SERPL CREATININE-BSD FRML MDRD: NORMAL ML/MIN/{1.73_M2}
GLUCOSE BLD-MCNC: 94 MG/DL (ref 70–99)
HCT VFR BLD CALC: 36.1 % (ref 40.7–50.3)
HEMOGLOBIN: 10.1 G/DL (ref 13–17)
POTASSIUM SERPL-SCNC: 4.6 MMOL/L (ref 3.7–5.3)
SODIUM BLD-SCNC: 141 MMOL/L (ref 135–144)

## 2022-02-09 PROCEDURE — 84520 ASSAY OF UREA NITROGEN: CPT

## 2022-02-09 PROCEDURE — 85018 HEMOGLOBIN: CPT

## 2022-02-09 PROCEDURE — 36415 COLL VENOUS BLD VENIPUNCTURE: CPT

## 2022-02-09 PROCEDURE — 87086 URINE CULTURE/COLONY COUNT: CPT

## 2022-02-09 PROCEDURE — 82947 ASSAY GLUCOSE BLOOD QUANT: CPT

## 2022-02-09 PROCEDURE — 85014 HEMATOCRIT: CPT

## 2022-02-09 PROCEDURE — 80051 ELECTROLYTE PANEL: CPT

## 2022-02-09 PROCEDURE — 82565 ASSAY OF CREATININE: CPT

## 2022-02-09 NOTE — H&P
History and Physical    Pt Name: Alan Sloan  MRN: [id number]  YOB: 1950  Date of evaluation: 2/9/2022  Primary Care Physician: Eugene Al MD    SUBJECTIVE:   History of Chief Complaint:    Alan Sloan is a 73 y.o. male who presents for PAT appointment. Patient reports history of hematuria but this has resolved. He states this will be the third procedure in the last year regarding his bladder tumor. He has history of previous bladder tumor excision. Patient denies any dysuria, flank pain, frequency. He does admit to occasional abdominal pain, unassociated with urination. Patient has been scheduled for CYSTOSCOPY BLADDER BIOPSY, FULGURATION  Allergies  is allergic to asa [aspirin], iron, and nsaids. Medications  Prior to Admission medications    Medication Sig Start Date End Date Taking? Authorizing Provider   morphine (MS CONTIN) 60 MG extended release tablet Take 1 tablet by mouth 2 times daily for 30 days. 2/1/22 3/3/22 Yes VEE James CNP   oxyCODONE-acetaminophen (PERCOCET) 5-325 MG per tablet Take 1 tablet by mouth every 8 hours for 30 days. 2/1/22 3/3/22 Yes VEE James CNP   pregabalin (LYRICA) 150 MG capsule Take 1 capsule by mouth 3 times daily for 90 days.  2/1/22 5/2/22 Yes VEE James CNP   baclofen (LIORESAL) 10 MG tablet Take 1 tablet by mouth 3 times daily as needed (back pain) 12/8/21  Yes Eugene Al MD   vitamin D (ERGOCALCIFEROL) 1.25 MG (70990 UT) CAPS capsule TAKE ONE CAPSULE BY MOUTH ONCE WEEKLY 11/11/21  Yes Eugene Al MD   lisinopril-hydroCHLOROthiazide (PRINZIDE;ZESTORETIC) 10-12.5 MG per tablet TAKE ONE TABLET BY MOUTH DAILY 11/8/21  Yes Eugene Al MD   pantoprazole (PROTONIX) 40 MG tablet Take 1 tablet by mouth daily 9/24/21  Yes Eugene Al MD   metFORMIN (GLUCOPHAGE) 1000 MG tablet TAKE 1 TABLET BY MOUTH TWICE DAILY WITH MEALS 9/24/21  Yes Eugene Al MD cyanocobalamin 1000 MCG/ML injection Inject 1 mL into the muscle every 30 days Call for next refill which will be monthly for life 6/9/21  Yes Ariel Velasquez MD   pravastatin (PRAVACHOL) 40 MG tablet Take 1 tablet by mouth every evening Stop Gemfibrozil 5/11/21  Yes Ariel Velasquez MD   metoprolol tartrate (LOPRESSOR) 25 MG tablet Take 1 tablet by mouth 2 times daily 5/11/21  Yes Ariel Velasquez MD   glipiZIDE (GLUCOTROL) 5 MG tablet Take 1 tablet by mouth every morning Keep fasting morning blood glucose . If blood glucose below 80, you need to stop glipizide 4/16/21  Yes Ariel Velasquez MD   loperamide (RA ANTI-DIARRHEAL) 2 MG capsule Take 1 capsule by mouth 4 times daily as needed for Diarrhea 1/5/21  Yes Mayela Stubbs MD   Probiotic Product (PROBIOTIC-10 PO) Take by mouth daily    Yes Historical Provider, MD   glucose monitoring (FREESTYLE FREEDOM) kit Please supply Patient with a glucose monitoring kit that insurance will cover. 1/10/22   Ariel Velasquez MD   blood glucose monitor strips Testing once a day. Please dispense according to patients insurance. 1/10/22   Ariel Velasquez MD   Lancets 30G MISC Testing once a day. Please dispense according to patients insurance.  1/10/22   Ariel Velasquez MD   fluticasone (FLONASE) 50 MCG/ACT nasal spray 2 sprays by Nasal route daily 1/5/22   Mara Anne MD   TRUEplus Lancets 30G MISC Test blood glucose twice a day 5/4/21   Ariel Velasquez MD   Alcohol Swabs (B-D SINGLE USE SWABS REGULAR) PADS USE TO CHECK BLOOD SUGAR TWICE A DAY 5/4/21   Ariel Velasquez MD   Syringe/Needle, Disp, (SYRINGE 3CC/25GX1\") 25G X 1\" 3 ML MISC To be used with B12 injections 1/8/21   Ariel Velasquez MD   Blood Glucose Monitoring Suppl (TRUE METRIX METER) w/Device KIT USE AS DIRECTED TO TEST BLOOD SUGAR 4/8/20   Historical Provider, MD     Past Medical History    has a past medical history of Anemia, Arthritis, [facility] Bladder tumor, Chronic back pain, Chronic neck pain, Colon polyps, COVID, Diabetic neuropathy ([facility] [address] Diarrhea, Encounter for chronic pain management, Essential hypertension, Heartburn, Hematuria, Hepatitis B core antibody positive, History of bleeding peptic ulcer, History of blood transfusion, History of hepatitis C, History of migraine headaches, History of stress test, Hyperlipidemia, Iron (Fe) deficiency anemia, Poor historian, Positive FIT (fecal immunochemical test), Type 2 diabetes mellitus with microalbuminuria, without long-term current use of insulin ([facility] [address] Under care of team, Under care of team, Under care of team, Wears dentures, and Wears glasses. Past Surgical History   has a past surgical history that includes Cervical spine surgery (); shoulder surgery (Right); Dental surgery (10/2015); Colonoscopy (2015); Upper gastrointestinal endoscopy (2015); Cholecystectomy (2019); Umbilical hernia repair (3173-53); Upper gastrointestinal endoscopy (N/A, 10/08/2019); Colonoscopy (N/A, 10/08/2019); hernia repair (N/A, 2020); bladder tumor excision (2021); Cystoscopy (Right, 2021); Cystoscopy (2021); and Cystoscopy (N/A, 2021). Social History   reports that he quit smoking about 6 years ago. His smoking use included cigarettes. He has a 22.50 pack-year smoking history. He has never used smokeless tobacco.    reports no history of alcohol use. reports no history of drug use. Marital Status   Children 3  Occupation retired  Family History  Family Status   Relation Name Status    Mother     Flo Lacey Father       family history includes Diabetes in his mother; Heart Disease in his father.     Review of Systems:  CONSTITUTIONAL:   negative for fevers, chills, fatigue and malaise    EYES:   negative for double vision, blurred vision and photophobia    HEENT:   negative for tinnitus, epistaxis and sore throat     RESPIRATORY:   negative for cough, shortness of breath, wheezing     CARDIOVASCULAR:   negative for chest pain, palpitations, syncope, edema     GASTROINTESTINAL:   negative for nausea, vomiting     GENITOURINARY:   negative for incontinence history of hematuria, resolved; abdominal pain   MUSCULOSKELETAL:   negative for neck or back pain     NEUROLOGICAL:   Negative for weakness and tingling  negative for headaches and dizziness     PSYCHIATRIC:   negative for anxiety       OBJECTIVE:   VITALS:  height is 5' 9\" (1.753 m) and weight is 215 lb (97.5 kg). His temporal temperature is 98.2 °F (36.8 °C). His blood pressure is 120/61 and his pulse is 70. His respiration is 18 and oxygen saturation is 97%. CONSTITUTIONAL:alert & oriented x 3, no acute distress. Calm and pleasant. SKIN:  Warm and dry, no rashes to exposed areas of skin. HEAD:  Normocephalic, atraumatic. EYES: PERRL. EOMs intact. Wearing glasses. EARS:  Intact and equal bilaterally. No edema or thickening, without lumps, lesions, or discharge. Hearing grossly WNL. NOSE:  Nares patent. No rhinorrhea   MOUTH/THROAT:  Mucous membranes pink and moist, uvula midline, edentulous; wears full upper and lower dentures. NECK:supple, no lymphadenopathy  LUNGS: Respirations even and non-labored. Clear to auscultation bilaterally, no wheezes, rales, or rhonchi. CARDIOVASCULAR: Regular rate and rhythm, no murmurs/rubs/gallops   ABDOMEN: soft, non-tender, non-distended, bowel sounds active x 4   EXTREMITIES: No edema to bilateral lower extremities. No varicosities to bilateral lower extremities. NEUROLOGIC: CN II-XII are grossly intact. Gait is smooth, rhythmic and effortless. Testing:   EKG: on file from 1/2022  Labs pending: drawn 2/9/2022   IMPRESSIONS:   Bladder cancer.   PLANS:   CYSTOSCOPY BLADDER BIOPSY, FULGURATION    VEE Tran CNP  Electronically signed 2/9/2022 at 11:19 AM

## 2022-02-09 NOTE — PROGRESS NOTES
Anesthesia Focused Assessment    Hx of anesthesia complications:  no  Family hx of anesthesia complications:  no      Prior + Covid-19 test? yes  Date: 1/6/2022  Symptoms: none, tested due to exposure  Complications: none  Hospitalization required? no      STOP-BANG Sleep Apnea Questionnaire    SNORE loudly (heard through closed doors)? No  TIRED, fatigued, sleepy during daytime? No  OBSERVED stopping breathing during sleep? No  High blood PRESSURE or being treated? Yes    BMI over 35? No  AGE over 48? Yes  NECK circumference over 16\"? No  GENDER (male)? Yes             Total 3  High risk 5-8  Intermediate risk 3-4  Low risk 0-2    ----------------------------------------------------------------------------------------------------------------------  JONATHAN                              No  If yes, machine? DM1                                            No  DM2                   Yes    Coronary Artery Disease      No  HTN         Yes  Defib/AICD/Pacemaker               No             Renal Failure                   No  If yes, on dialysis           Active smoker? No  Drinks alcohol? No  Illicit drugs?         No  Dentition?        edentulous      Past Medical History:   Diagnosis Date    Anemia     Arthritis     osteoarthritis    Bladder cancer ([facility] 03/2021    bladder    Bladder tumor     Chronic back pain     Chronic neck pain     Colon polyps 10/08/2019    tubular adenoma x2    COVID 01/06/2022    self reported-home test-no symptoms    Diabetic neuropathy ([facility]     Diarrhea     En[facility] for chronic [facility]     [facility] Dr. Sandoval CERVANTES visit 01/03/2022    Essential hypertension 05/12/2020    managed by Dr. Maria G Jones PCP    Heartburn     Hematuria     Hepatitis B core antibody positive     History of bleeding peptic ulcer     History of blood transfusion     no reactions    History of hepatitis C     History of migraine headaches     History of stress test 07/2020    \"low risk\"    Hyperlipidemia     Iron (Fe) deficiency anemia     Poor historian     states his wife takes care of all medical information    Positive FIT (fecal immunochemical test)     Type 2 diabetes mellitus with microalbuminuria, without long-term current use of insulin ([facility] [address] 07/13/2020    managed by Dr. Bret Dubose last e-visit 11/2021    Under care of team 02/09/2022    pcp-Dr Leann Rosenberg virtual visit 11/2021    Under care of team 02/09/2022    hematology-Dr Caprice Saavedra 32 virtual visit 11/2021    Under care of team 02/09/2022    urology-Dr fairchild-last visit nov 2021    Wears dentures     Wears glasses          Patient was evaluated in PAT & anesthesia guidelines were applied. NPO guidelines, medication instructions and scheduled arrival time were reviewed with patient. Patient was sent for post PAT anesthesia interview for covid + history and evaluated by [facility]. Okay to proceed with surgery.                                                                                                                       Medical or cardiac clearance ordered: VEE Cerda - CNP   2/9/22  11:20 AM

## 2022-02-10 LAB
CULTURE: NO GROWTH
Lab: NORMAL
SPECIMEN DESCRIPTION: NORMAL

## 2022-02-15 NOTE — H&P
Mirian Jaimes, Ankit Car, Patel Leavitt, Daniel, & Blake   Urology H&P      Patient:  Magy Patton  MRN: [id number]  YOB: 1950    CHIEF COMPLAINT: Bladder tumor    HISTORY OF PRESENT ILLNESS:   The patient is a 73 y.o. male who presents with bladder tumor. 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 Recent office cystoscopy demonstrated multifocal tumors. Patient's old records, notes and chart reviewed and summarized above.     Past Medical History:    Past Medical History:   Diagnosis Date    Anemia     Arthritis     osteoarthritis    Bladder cancer ([address] 03/2021    bladder    Bladder tumor     Chronic back pain     Chronic neck pain     Colon polyps 10/08/2019    tubular adenoma x2    COVID 01/06/2022    self reported-home test-no symptoms    Diabetic neuropathy ([facility]     Diarrhea     Encounter for chronic pain management     [facility] Ady Espinoza E visit 01/03/2022    Essential hypertension 05/12/2020    managed by Dr. Yordan Narayaann PCP    Heartburn     Hematuria     Hepatitis B core antibody positive     History of bleeding peptic ulcer     History of blood transfusion     no reactions    History of hepatitis C     History of migraine headaches     History of stress test 07/2020    \"low risk\"    Hyperlipidemia     Iron (Fe) deficiency anemia     Poor historian     states his wife takes care of all medical information    Positive FIT (fecal immunochemical test)     Type 2 diabetes mellitus with microalbuminuria, without long-term current use of insulin ([facility] 07/13/2020    managed by Dr. Aziza Shelley last e-visit 11/2021    Under care of team 02/09/2022    pcp-Dr Art Patel virtual visit 11/2021    Under care of team 02/09/2022    hematology-Dr Caprice Saavedra 32 virtual visit 11/2021    Under care of team 02/09/2022    urology-Dr fairchild-last visit nov 2021    Franc dentures     Wears glasses        Past Surgical History:    Past Surgical History:   Procedure Laterality Date    BLADDER TUMOR EXCISION  04/29/2021    CYSTOSCOPY TUR BLADDER TUMOR, GYRUS, RIGHT STENT PLACEMENT AND RIGHT STENT REMOVAL    CERVICAL SPINE SURGERY  1977    cervical spine three times, has plate    CHOLECYSTECTOMY  03/22/2019    COLONOSCOPY  01/25/2015    10 yr recall, hemorrhoids    COLONOSCOPY N/A 10/08/2019    tubular adenoma x2    CYSTOSCOPY Right 4/29/2021    CYSTOSCOPY TUR BLADDER TUMOR, GYRUS, RIGHT STENT PLACEMENT AND RIGHT STENT REMOVAL performed by Allison Diehl MD at [address] [facility] [address]  09/09/2021    CYSTOSCOPY, TRANSURETHRAL RESECTION BLADDER TUMOR    CYSTOSCOPY N/A 9/9/2021    CYSTOSCOPY, TRANSURETHRAL RESECTION BLADDER TUMOR performed by Allison Diehl MD at [address] [facility] [address]  10/2015    all teeth extracted    HERNIA REPAIR N/A 08/07/2020    HERNIA INCISIONAL REPAIR LAPAROSCOPIC ROBOTIC WITH MESH performed by Bam Zelaya DO at [facility] Right    [address] [address]    UPPER GASTROINTESTINAL ENDOSCOPY  01/25/2015    UPPER GASTROINTESTINAL ENDOSCOPY N/A 10/08/2019    EGD BIOPSY performed by Rubén Montana MD at [facility]       Medications:    No current facility-administered medications for this encounter. Current Outpatient Medications:     morphine (MS CONTIN) 60 MG extended release tablet, Take 1 tablet by mouth 2 times daily for 30 days. , Disp: 60 tablet, Rfl: 0    oxyCODONE-acetaminophen (PERCOCET) 5-325 MG per tablet, Take 1 tablet by mouth every 8 hours for 30 days. , Disp: 90 tablet, Rfl: 0    pregabalin (LYRICA) 150 MG capsule, Take 1 capsule by mouth 3 times daily for 90 days. , Disp: 90 capsule, Rfl: 2    glucose monitoring (FREESTYLE FREEDOM) kit, Please supply Patient with a glucose monitoring kit that insurance will cover. , Disp: 1 kit, Rfl: 0    blood glucose monitor strips, Testing once a day. Please dispense according to patients insurance., Disp: 100 strip, Rfl: 3    Lancets 30G MISC, Testing once a day. Please dispense according to patients insurance., Disp: 100 each, Rfl: 3    fluticasone (FLONASE) 50 MCG/ACT nasal spray, 2 sprays by Nasal route daily, Disp: 16 g, Rfl: 0    baclofen (LIORESAL) 10 MG tablet, Take 1 tablet by mouth 3 times daily as needed (back pain), Disp: 270 tablet, Rfl: 1    vitamin D (ERGOCALCIFEROL) 1.25 MG (91556 UT) CAPS capsule, TAKE ONE CAPSULE BY MOUTH ONCE WEEKLY, Disp: 4 capsule, Rfl: 2    lisinopril-hydroCHLOROthiazide (PRINZIDE;ZESTORETIC) 10-12.5 MG per tablet, TAKE ONE TABLET BY MOUTH DAILY, Disp: 90 tablet, Rfl: 3    pantoprazole (PROTONIX) 40 MG tablet, Take 1 tablet by mouth daily, Disp: 90 tablet, Rfl: 1    metFORMIN (GLUCOPHAGE) 1000 MG tablet, TAKE 1 TABLET BY MOUTH TWICE DAILY WITH MEALS, Disp: 180 tablet, Rfl: 1    cyanocobalamin 1000 MCG/ML injection, Inject 1 mL into the muscle every 30 days Call for next refill which will be monthly for life, Disp: 3 mL, Rfl: 3    pravastatin (PRAVACHOL) 40 MG tablet, Take 1 tablet by mouth every evening Stop Gemfibrozil, Disp: 90 tablet, Rfl: 3    metoprolol tartrate (LOPRESSOR) 25 MG tablet, Take 1 tablet by mouth 2 times daily, Disp: 180 tablet, Rfl: 3    TRUEplus Lancets 30G MISC, Test blood glucose twice a day, Disp: 200 each, Rfl: 3    Alcohol Swabs (B-D SINGLE USE SWABS REGULAR) PADS, USE TO CHECK BLOOD SUGAR TWICE A DAY, Disp: 200 each, Rfl: 3    glipiZIDE (GLUCOTROL) 5 MG tablet, Take 1 tablet by mouth every morning Keep fasting morning blood glucose .   If blood glucose below 80, you need to stop glipizide, Disp: 90 tablet, Rfl: 3    Syringe/Needle, Disp, (SYRINGE 3CC/25GX1\") 25G X 1\" 3 ML MISC, To be used with B12 injections, Disp: 50 each, Rfl: 2    loperamide (RA ANTI-DIARRHEAL) 2 MG capsule, Take 1 capsule by mouth 4 times daily as needed for Diarrhea, Disp: 112 capsule, Rfl: 2   Probiotic Product (PROBIOTIC-10 PO), Take by mouth daily , Disp: , Rfl:     Blood Glucose Monitoring Suppl (TRUE METRIX METER) w/Device KIT, USE AS DIRECTED TO TEST BLOOD SUGAR, Disp: , Rfl:     Allergies: Allergies   Allergen Reactions    Asa [Aspirin]       iron deficiency anemia , requiring iron infusions and transfusions    Iron      Pill- constipation, abdominal pain    Nsaids       iron deficiency anemia, history of bleeding ulcers        Social History:   Social History     Socioeconomic History    Marital status:      Spouse name: Not on file    Number of children: Not on file    Years of education: Not on file    Highest education level: Not on file   Occupational History    Occupation: disabled   Tobacco Use    Smoking status: Former Smoker     Packs/day: 0.50     Years: 45.00     Pack years: 22.50     Types: Cigarettes     Quit date: 2015     Years since quittin.1    Smokeless tobacco: Never Used    Tobacco comment: on chantix 11-6-15   12-7-15   Vaping Use    Vaping Use: Never used   Substance and Sexual Activity    Alcohol use: No    Drug use: No    Sexual activity: Yes     Partners: Female   Other Topics Concern    Not on file   Social History Narrative    Not on file     Social Determinants of Health     Financial Resource Strain: High Risk    Difficulty of Paying Living Expenses: Very hard   Food Insecurity: Food Insecurity Present    Worried About Running Out of Food in the Last Year: Often true    Jennifer of Food in the Last Year: Often true   Transportation Needs:     Lack of Transportation (Medical): Not on file    Lack of Transportation (Non-Medical):  Not on file   Physical Activity:     Days of Exercise per Week: Not on file    Minutes of Exercise per Session: Not on file   Stress:     Feeling of Stress : Not on file   Social Connections:     Frequency of Communication with Friends and Family: Not on file    Frequency of Social Gatherings with no palpable lymphadenopathy  Pelvic exam: deferred. Rectal exam not indicated. Labs:  No results for input(s): WBC, HGB, HCT, MCV, PLT in the last 72 hours. No results for input(s): NA, K, CL, CO2, PHOS, BUN, CREATININE, CA in the last 72 hours. No results for input(s): COLORU, PHUR, LABCAST, WBCUA, RBCUA, MUCUS, TRICHOMONAS, YEAST, BACTERIA, CLARITYU, SPECGRAV, LEUKOCYTESUR, UROBILINOGEN, Wallene Keel in the last 72 hours. Invalid input(s): NITRATE, GLUCOSEUKETONESUAMORPHOUS        -----------------------------------------------------------------  Imaging Results:  No results found.     Assessment and Plan   Impression:   problem list:  History of bladder tumor      Plan:  OR for cystoscopy, bladder fulguration, biopsy        David Knutson MD  2:33 PM 2/15/2022

## 2022-02-16 ENCOUNTER — ANESTHESIA (OUTPATIENT)
Dept: OPERATING ROOM | Age: 72
End: 2022-02-16
Payer: MEDICARE

## 2022-02-16 ENCOUNTER — HOSPITAL ENCOUNTER (OUTPATIENT)
Age: 72
Setting detail: OUTPATIENT SURGERY
Discharge: HOME | End: 2022-02-16
Attending: UROLOGY | Admitting: UROLOGY
Payer: MEDICARE

## 2022-02-16 ENCOUNTER — ANESTHESIA EVENT (OUTPATIENT)
Dept: OPERATING ROOM | Age: 72
End: 2022-02-16
Payer: MEDICARE

## 2022-02-16 VITALS
BODY MASS INDEX: 31.84 KG/M2 | HEART RATE: 82 BPM | WEIGHT: 215 LBS | SYSTOLIC BLOOD PRESSURE: 98 MMHG | HEIGHT: 69 IN | DIASTOLIC BLOOD PRESSURE: 63 MMHG | RESPIRATION RATE: 15 BRPM | OXYGEN SATURATION: 93 % | TEMPERATURE: 98.2 F

## 2022-02-16 VITALS — OXYGEN SATURATION: 94 % | TEMPERATURE: 97.9 F | DIASTOLIC BLOOD PRESSURE: 85 MMHG | SYSTOLIC BLOOD PRESSURE: 99 MMHG

## 2022-02-16 LAB
GLUCOSE BLD-MCNC: 122 MG/DL (ref 75–110)
GLUCOSE BLD-MCNC: 148 MG/DL (ref 74–100)
POC POTASSIUM: 4.7 MMOL/L (ref 3.5–4.5)

## 2022-02-16 PROCEDURE — 82947 ASSAY GLUCOSE BLOOD QUANT: CPT

## 2022-02-16 PROCEDURE — 7100000000 HC PACU RECOVERY - FIRST 15 MIN: Performed by: UROLOGY

## 2022-02-16 PROCEDURE — 3600000014 HC SURGERY LEVEL 4 ADDTL 15MIN: Performed by: UROLOGY

## 2022-02-16 PROCEDURE — 2500000003 HC RX 250 WO HCPCS: Performed by: NURSE ANESTHETIST, CERTIFIED REGISTERED

## 2022-02-16 PROCEDURE — 7100000010 HC PHASE II RECOVERY - FIRST 15 MIN: Performed by: UROLOGY

## 2022-02-16 PROCEDURE — 6360000002 HC RX W HCPCS: Performed by: NURSE ANESTHETIST, CERTIFIED REGISTERED

## 2022-02-16 PROCEDURE — 7100000001 HC PACU RECOVERY - ADDTL 15 MIN: Performed by: UROLOGY

## 2022-02-16 PROCEDURE — 3700000000 HC ANESTHESIA ATTENDED CARE: Performed by: UROLOGY

## 2022-02-16 PROCEDURE — 6360000002 HC RX W HCPCS: Performed by: STUDENT IN AN ORGANIZED HEALTH CARE EDUCATION/TRAINING PROGRAM

## 2022-02-16 PROCEDURE — A4217 STERILE WATER/SALINE, 500 ML: HCPCS | Performed by: UROLOGY

## 2022-02-16 PROCEDURE — 3700000001 HC ADD 15 MINUTES (ANESTHESIA): Performed by: UROLOGY

## 2022-02-16 PROCEDURE — 2709999900 HC NON-CHARGEABLE SUPPLY: Performed by: UROLOGY

## 2022-02-16 PROCEDURE — 88305 TISSUE EXAM BY PATHOLOGIST: CPT

## 2022-02-16 PROCEDURE — 3600000004 HC SURGERY LEVEL 4 BASE: Performed by: UROLOGY

## 2022-02-16 PROCEDURE — 2580000003 HC RX 258: Performed by: ANESTHESIOLOGY

## 2022-02-16 PROCEDURE — 2580000003 HC RX 258: Performed by: UROLOGY

## 2022-02-16 PROCEDURE — 84132 ASSAY OF SERUM POTASSIUM: CPT

## 2022-02-16 RX ORDER — FENTANYL CITRATE 50 UG/ML
50 INJECTION, SOLUTION INTRAMUSCULAR; INTRAVENOUS EVERY 5 MIN PRN
Status: DISCONTINUED | OUTPATIENT
Start: 2022-02-16 | End: 2022-02-16 | Stop reason: HOSPADM

## 2022-02-16 RX ORDER — MAGNESIUM HYDROXIDE 1200 MG/15ML
LIQUID ORAL PRN
Status: DISCONTINUED | OUTPATIENT
Start: 2022-02-16 | End: 2022-02-16 | Stop reason: ALTCHOICE

## 2022-02-16 RX ORDER — FENTANYL CITRATE 50 UG/ML
INJECTION, SOLUTION INTRAMUSCULAR; INTRAVENOUS PRN
Status: DISCONTINUED | OUTPATIENT
Start: 2022-02-16 | End: 2022-02-16 | Stop reason: SDUPTHER

## 2022-02-16 RX ORDER — CEPHALEXIN 500 MG/1
500 CAPSULE ORAL 3 TIMES DAILY
Qty: 9 CAPSULE | Refills: 0 | Status: SHIPPED | OUTPATIENT
Start: 2022-02-16 | End: 2022-02-19

## 2022-02-16 RX ORDER — LIDOCAINE HYDROCHLORIDE 10 MG/ML
INJECTION, SOLUTION EPIDURAL; INFILTRATION; INTRACAUDAL; PERINEURAL PRN
Status: DISCONTINUED | OUTPATIENT
Start: 2022-02-16 | End: 2022-02-16 | Stop reason: SDUPTHER

## 2022-02-16 RX ORDER — EPHEDRINE SULFATE/0.9% NACL/PF 50 MG/5 ML
SYRINGE (ML) INTRAVENOUS PRN
Status: DISCONTINUED | OUTPATIENT
Start: 2022-02-16 | End: 2022-02-16 | Stop reason: SDUPTHER

## 2022-02-16 RX ORDER — PROPOFOL 10 MG/ML
INJECTION, EMULSION INTRAVENOUS PRN
Status: DISCONTINUED | OUTPATIENT
Start: 2022-02-16 | End: 2022-02-16 | Stop reason: SDUPTHER

## 2022-02-16 RX ORDER — MAGNESIUM HYDROXIDE 1200 MG/15ML
LIQUID ORAL CONTINUOUS PRN
Status: COMPLETED | OUTPATIENT
Start: 2022-02-16 | End: 2022-02-16

## 2022-02-16 RX ORDER — ROCURONIUM BROMIDE 10 MG/ML
INJECTION, SOLUTION INTRAVENOUS PRN
Status: DISCONTINUED | OUTPATIENT
Start: 2022-02-16 | End: 2022-02-16 | Stop reason: SDUPTHER

## 2022-02-16 RX ORDER — DEXAMETHASONE SODIUM PHOSPHATE 10 MG/ML
INJECTION INTRAMUSCULAR; INTRAVENOUS PRN
Status: DISCONTINUED | OUTPATIENT
Start: 2022-02-16 | End: 2022-02-16 | Stop reason: SDUPTHER

## 2022-02-16 RX ORDER — PHENYLEPHRINE HCL IN 0.9% NACL 0.5 MG/5ML
SYRINGE (ML) INTRAVENOUS PRN
Status: DISCONTINUED | OUTPATIENT
Start: 2022-02-16 | End: 2022-02-16 | Stop reason: SDUPTHER

## 2022-02-16 RX ORDER — FENTANYL CITRATE 50 UG/ML
25 INJECTION, SOLUTION INTRAMUSCULAR; INTRAVENOUS EVERY 5 MIN PRN
Status: DISCONTINUED | OUTPATIENT
Start: 2022-02-16 | End: 2022-02-16 | Stop reason: HOSPADM

## 2022-02-16 RX ORDER — SODIUM CHLORIDE, SODIUM LACTATE, POTASSIUM CHLORIDE, CALCIUM CHLORIDE 600; 310; 30; 20 MG/100ML; MG/100ML; MG/100ML; MG/100ML
1000 INJECTION, SOLUTION INTRAVENOUS CONTINUOUS
Status: DISCONTINUED | OUTPATIENT
Start: 2022-02-16 | End: 2022-02-16 | Stop reason: HOSPADM

## 2022-02-16 RX ORDER — MIDAZOLAM HYDROCHLORIDE 1 MG/ML
INJECTION INTRAMUSCULAR; INTRAVENOUS PRN
Status: DISCONTINUED | OUTPATIENT
Start: 2022-02-16 | End: 2022-02-16 | Stop reason: SDUPTHER

## 2022-02-16 RX ORDER — ONDANSETRON 2 MG/ML
INJECTION INTRAMUSCULAR; INTRAVENOUS PRN
Status: DISCONTINUED | OUTPATIENT
Start: 2022-02-16 | End: 2022-02-16 | Stop reason: SDUPTHER

## 2022-02-16 RX ADMIN — FENTANYL CITRATE 100 MCG: 50 INJECTION, SOLUTION INTRAMUSCULAR; INTRAVENOUS at 07:58

## 2022-02-16 RX ADMIN — Medication 100 MCG: at 08:03

## 2022-02-16 RX ADMIN — Medication 100 MCG: at 08:20

## 2022-02-16 RX ADMIN — Medication 10 MG: at 08:20

## 2022-02-16 RX ADMIN — ONDANSETRON 4 MG: 2 INJECTION INTRAMUSCULAR; INTRAVENOUS at 08:08

## 2022-02-16 RX ADMIN — ROCURONIUM BROMIDE 30 MG: 10 INJECTION INTRAVENOUS at 07:58

## 2022-02-16 RX ADMIN — CEFAZOLIN 2000 MG: 10 INJECTION, POWDER, FOR SOLUTION INTRAVENOUS at 08:06

## 2022-02-16 RX ADMIN — PROPOFOL 200 MG: 10 INJECTION, EMULSION INTRAVENOUS at 07:58

## 2022-02-16 RX ADMIN — SODIUM CHLORIDE, POTASSIUM CHLORIDE, SODIUM LACTATE AND CALCIUM CHLORIDE 1000 ML: 600; 310; 30; 20 INJECTION, SOLUTION INTRAVENOUS at 06:38

## 2022-02-16 RX ADMIN — Medication 100 MCG: at 08:14

## 2022-02-16 RX ADMIN — SODIUM CHLORIDE, POTASSIUM CHLORIDE, SODIUM LACTATE AND CALCIUM CHLORIDE: 600; 310; 30; 20 INJECTION, SOLUTION INTRAVENOUS at 08:27

## 2022-02-16 RX ADMIN — Medication 100 MCG: at 08:06

## 2022-02-16 RX ADMIN — DEXAMETHASONE SODIUM PHOSPHATE 5 MG: 10 INJECTION INTRAMUSCULAR; INTRAVENOUS at 08:08

## 2022-02-16 RX ADMIN — MIDAZOLAM HYDROCHLORIDE 2 MG: 1 INJECTION, SOLUTION INTRAMUSCULAR; INTRAVENOUS at 07:56

## 2022-02-16 RX ADMIN — ROCURONIUM BROMIDE 10 MG: 10 INJECTION INTRAVENOUS at 08:22

## 2022-02-16 RX ADMIN — LIDOCAINE HYDROCHLORIDE 50 MG: 10 INJECTION, SOLUTION EPIDURAL; INFILTRATION; INTRACAUDAL at 07:58

## 2022-02-16 RX ADMIN — Medication 10 MG: at 08:14

## 2022-02-16 RX ADMIN — Medication 100 MCG: at 08:08

## 2022-02-16 ASSESSMENT — PULMONARY FUNCTION TESTS
PIF_VALUE: 22
PIF_VALUE: 27
PIF_VALUE: 22
PIF_VALUE: 0
PIF_VALUE: 20
PIF_VALUE: 4
PIF_VALUE: 22
PIF_VALUE: 0
PIF_VALUE: 22
PIF_VALUE: 23
PIF_VALUE: 2
PIF_VALUE: 24
PIF_VALUE: 22
PIF_VALUE: 1
PIF_VALUE: 17
PIF_VALUE: 22
PIF_VALUE: 21
PIF_VALUE: 1
PIF_VALUE: 22
PIF_VALUE: 3
PIF_VALUE: 1
PIF_VALUE: 4
PIF_VALUE: 20
PIF_VALUE: 1
PIF_VALUE: 22
PIF_VALUE: 23
PIF_VALUE: 23
PIF_VALUE: 18
PIF_VALUE: 21
PIF_VALUE: 21
PIF_VALUE: 22
PIF_VALUE: 34
PIF_VALUE: 4
PIF_VALUE: 0
PIF_VALUE: 23
PIF_VALUE: 22
PIF_VALUE: 22
PIF_VALUE: 15
PIF_VALUE: 21

## 2022-02-16 ASSESSMENT — PAIN SCALES - GENERAL
PAINLEVEL_OUTOF10: 0

## 2022-02-16 ASSESSMENT — PAIN - FUNCTIONAL ASSESSMENT: PAIN_FUNCTIONAL_ASSESSMENT: 0-10

## 2022-02-16 NOTE — ANESTHESIA PRE PROCEDURE
Department of Anesthesiology  Preprocedure Note       Name:  Magy Patton   Age:  73 y.o.  :  1950                                          MRN:  [id number]         Date:  2022      Surgeon: Maria Luz Carrero):  Wanda Mac MD    Procedure: Procedure(s):  CYSTOSCOPY BLADDER BIOPSY, FULGURATION    Medications prior to admission:   Prior to Admission medications    Medication Sig Start Date End Date Taking? Authorizing Provider   morphine (MS CONTIN) 60 MG extended release tablet Take 1 tablet by mouth 2 times daily for 30 days. 2/1/22 3/3/22  VEE Shirley CNP   oxyCODONE-acetaminophen (PERCOCET) 5-325 MG per tablet Take 1 tablet by mouth every 8 hours for 30 days. 2/1/22 3/3/22  VEE Shirley CNP   pregabalin (LYRICA) 150 MG capsule Take 1 capsule by mouth 3 times daily for 90 days. 22  VEE Shirley CNP   glucose monitoring (FREESTYLE FREEDOM) kit Please supply Patient with a glucose monitoring kit that insurance will cover. 1/10/22   Amber Borden MD   blood glucose monitor strips Testing once a day. Please dispense according to patients insurance. 1/10/22   Amber Borden MD   Lancets 30G MISC Testing once a day. Please dispense according to patients insurance.  1/10/22   Amber Borden MD   fluticasone (FLONASE) 50 MCG/ACT nasal spray 2 sprays by Nasal route daily 22   Geoff Lyn MD   baclofen (LIORESAL) 10 MG tablet Take 1 tablet by mouth 3 times daily as needed (back pain) 21   Amber Borden MD   vitamin D (ERGOCALCIFEROL) 1.25 MG (24771 UT) CAPS capsule TAKE ONE CAPSULE BY MOUTH ONCE WEEKLY 21   Amber Borden MD   lisinopril-hydroCHLOROthiazide (PRINZIDE;ZESTORETIC) 10-12.5 MG per tablet TAKE ONE TABLET BY MOUTH DAILY 21   Amber Borden MD   pantoprazole (PROTONIX) 40 MG tablet Take 1 tablet by mouth daily 21   Amber Borden MD   metFORMIN (GLUCOPHAGE) 1000 MG tablet TAKE 1 TABLET BY MOUTH TWICE DAILY WITH MEALS 9/24/21   Patricia Marrero MD   cyanocobalamin 1000 MCG/ML injection Inject 1 mL into the muscle every 30 days Call for next refill which will be monthly for life 6/9/21   Patricia Marrero MD   pravastatin (PRAVACHOL) 40 MG tablet Take 1 tablet by mouth every evening Stop Gemfibrozil 5/11/21   Patricia Marrero MD   metoprolol tartrate (LOPRESSOR) 25 MG tablet Take 1 tablet by mouth 2 times daily 5/11/21   Patricia Marrero MD   TRUEplus Lancets 30G MISC Test blood glucose twice a day 5/4/21   Patricia Marrero MD   Alcohol Swabs (B-D SINGLE USE SWABS REGULAR) PADS USE TO CHECK BLOOD SUGAR TWICE A DAY 5/4/21   Patricia Marrero MD   glipiZIDE (GLUCOTROL) 5 MG tablet Take 1 tablet by mouth every morning Keep fasting morning blood glucose . If blood glucose below 80, you need to stop glipizide 4/16/21   Patricia Marrero MD   Syringe/Needle, Disp, (SYRINGE 3CC/25GX1\") 25G X 1\" 3 ML MISC To be used with B12 injections 1/8/21   Patricia Marrero MD   loperamide (RA ANTI-DIARRHEAL) 2 MG capsule Take 1 capsule by mouth 4 times daily as needed for Diarrhea 1/5/21   Matilde Hickman MD   Probiotic Product (PROBIOTIC-10 PO) Take by mouth daily     Historical Provider, MD   Blood Glucose Monitoring Suppl (TRUE METRIX METER) w/Device KIT USE AS DIRECTED TO TEST BLOOD SUGAR 4/8/20   Historical Provider, MD       Current medications:    No current facility-administered medications for this visit. No current outpatient medications on file. Facility-Administered Medications Ordered in Other Visits   Medication Dose Route Frequency Provider Last Rate Last Admin    ceFAZolin (ANCEF) 2000 mg in dextrose 5 % 50 mL IVPB  2,000 mg IntraVENous Once Leonia Goodpasture, MD        lactated ringers infusion 1,000 mL  1,000 mL IntraVENous Continuous Mercedes Borden MD 50 mL/hr at 02/16/22 0638 1,000 mL at 02/16/22 0457       Allergies:     Allergies   Allergen Reactions    Asa [Aspirin]       iron deficiency anemia , requiring iron infusions and transfusions    Iron      Pill- constipation, abdominal pain    Nsaids       iron deficiency anemia, history of bleeding ulcers        Problem List:    Patient Active Problem List   Diagnosis Code    Degenerative disc disease, lumbar M51.36    Lumbar spondylosis M47.816    Lumbar radiculopathy M54.16    Lumbar spinal stenosis M48.061    Encounter for medication monitoring Z51.81    Cervical spondylitis ([address] M46.92    S/P cervical spinal fusion Z98.1    Anemia D64.9    GERD (gastroesophageal reflux disease) K21.9    Osteoarthritis of spine with radiculopathy, lumbar region M47.26    Osteoarthritis of lumbar spine M47.816    Iron deficiency anemia D50.9    Colon polyps K63.5    Hepatitis B core antibody positive R76.8    Peripheral polyneuropathy G62.9    Essential hypertension I10    Type 2 diabetes mellitus with hyperglycemia, without long-term current use of insulin (HCC) E11.65    Hyperlipidemia with target LDL less than 100 E78.5    Vitamin D deficiency E55.9    Vitamin B 12 deficiency E53.8    Abnormal EKG R94.31    Type 2 diabetes mellitus with diabetic polyneuropathy, without long-term current use of insulin (HCC) E11.42    Abdominal discomfort R10.9    Irritable bowel syndrome with diarrhea K58.0    Chronic hepatitis C without hepatic coma (HCC) B18.2    Diarrhea R19.7    Status post hernia repair Z98.890, Z87.19    Incisional hernia without obstruction or gangrene K43.2    Worsening headaches R51.9    Positive FIT (fecal immunochemical test) R19.5    History of hepatitis C Z86.19    Absolute anemia D64.9    Malignant neoplasm of lateral wall of urinary bladder (HCC) C67.2    Need for prophylactic vaccination against diphtheria-tetanus-pertussis (DTP) Z23    COVID-19 virus infection U07.1       Past Medical History:        Diagnosis Date    Anemia     Arthritis     osteoarthritis    Bladder cancer ([facility] 03/2021    bladder    Bladder tumor     Chronic back pain     Chronic neck pain     Colon polyps 10/08/2019    tubular adenoma x2    COVID 01/06/2022    self reported-home test-no symptoms    Diabetic neuropathy ([facility]     Diarrhea     Encounter for chronic pain management     [facility] Dr. Any Murillo E visit 01/03/2022    Essential hypertension 05/12/2020    managed by Dr. Sampson Sanchez PCP    Heartburn     Hematuria     Hepatitis B core antibody positive     History of bleeding peptic ulcer     History of blood transfusion     no reactions    History of hepatitis C     History of migraine headaches     History of stress test 07/2020    \"low risk\"    Hyperlipidemia     Iron (Fe) deficiency anemia     Poor historian     states his wife takes care of all medical information    Positive FIT (fecal immunochemical test)     Type 2 diabetes mellitus with microalbuminuria, without long-term current use of insulin [facility] 07/13/2020    managed by Dr. Darin Borges last e-visit 11/2021    Under care of team 02/09/2022    pcp-Dr Judy Trinh virtual visit 11/2021    Under care of team 02/09/2022    hematology-Dr Caprice Saavedra 32 virtual visit 11/2021    Under care of team 02/09/2022    urology-Dr fairchild-last visit nov 2021    Wears dentures     Wears glasses        Past Surgical History:        Procedure Laterality Date    BLADDER TUMOR EXCISION  04/29/2021    CYSTOSCOPY TUR BLADDER TUMOR, GYRUS, RIGHT STENT PLACEMENT AND RIGHT STENT REMOVAL    CERVICAL SPINE SURGERY  1977    cervical spine three times, has plate    CHOLECYSTECTOMY  03/22/2019    COLONOSCOPY  01/25/2015    10 yr recall, hemorrhoids    COLONOSCOPY N/A 10/08/2019    tubular adenoma x2    CYSTOSCOPY Right 4/29/2021    CYSTOSCOPY TUR BLADDER TUMOR, GYRUS, RIGHT STENT PLACEMENT AND RIGHT STENT REMOVAL performed by Phyllis Chahal MD at [address] [place] [address]  09/09/2021 CYSTOSCOPY, TRANSURETHRAL RESECTION BLADDER TUMOR    CYSTOSCOPY N/A 2021    CYSTOSCOPY, TRANSURETHRAL RESECTION BLADDER TUMOR performed by Claire Longoria MD at [address] [place] [address]  10/2015    all teeth extracted    HERNIA REPAIR N/A 2020    HERNIA INCISIONAL REPAIR LAPAROSCOPIC ROBOTIC WITH MESH performed by Deon Ac DO at [facility]     UMBILICAL HERNIA REPAIR  3022-19    UPPER GASTROINTESTINAL ENDOSCOPY  2015    UPPER GASTROINTESTINAL ENDOSCOPY N/A 10/08/2019    EGD BIOPSY performed by Geni Fernandez MD at [address] [place] [address]       Social History:    Social History     Tobacco Use    Smoking status: Former Smoker     Packs/day: 0.50     Years: 45.00     Pack years: 22.50     Types: Cigarettes     Quit date: 2015     Years since quittin.2    Smokeless tobacco: Never Used    Tobacco comment: on chantix 11-6-15   12-7-15   Substance Use Topics    Alcohol use: No                                Counseling given: Not Answered  Comment: on chantix 11-6-15   12-7-15      Vital Signs (Current): There were no vitals filed for this visit.                                            BP Readings from Last 3 Encounters:   22 123/75   22 120/61   22 102/61       NPO Status:                                                                                 BMI:   Wt Readings from Last 3 Encounters:   22 215 lb (97.5 kg)   22 215 lb (97.5 kg)   22 212 lb (96.2 kg)     There is no height or weight on file to calculate BMI.    CBC:   Lab Results   Component Value Date    WBC 4.1 2022    RBC 3.44 2022    HGB 10.1 2022    HCT 36.1 2022    MCV 81.1 2022    RDW 19.4 2022     2022       CMP:   Lab Results   Component Value Date     2022    K 4.6 2022     2022    CO2 26 2022    BUN 15 2022    CREATININE 0.80 2022    GFRAA >60 2022 LABGLOM >60 02/09/2022    GLUCOSE 94 02/09/2022    PROT 6.7 10/23/2021    CALCIUM 8.6 10/23/2021    BILITOT 0.18 10/23/2021    ALKPHOS 83 10/23/2021    AST 17 10/23/2021    ALT 20 10/23/2021       POC Tests: No results for input(s): POCGLU, POCNA, POCK, POCCL, POCBUN, POCHEMO, POCHCT in the last 72 hours. Coags:   Lab Results   Component Value Date    PROTIME 12.3 02/28/2021    INR 0.9 02/28/2021    APTT 24.5 02/28/2021       HCG (If Applicable): No results found for: PREGTESTUR, PREGSERUM, HCG, HCGQUANT     ABGs: No results found for: PHART, PO2ART, TQZ8QNB, KPN5SXN, BEART, R5TIOEIQ     Type & Screen (If Applicable):  No results found for: LABABO, LABRH    Drug/Infectious Status (If Applicable):  Lab Results   Component Value Date    HEPCAB REACTIVE 07/13/2020       COVID-19 Screening (If Applicable):   Lab Results   Component Value Date    COVID19 Not Detected 12/30/2021    COVID19 Not Detected 08/03/2020           Anesthesia Evaluation  Patient summary reviewed and Nursing notes reviewed no history of anesthetic complications:   Airway: Mallampati: I  TM distance: >3 FB   Neck ROM: full  Mouth opening: > = 3 FB Dental:    (+) edentulous      Pulmonary:normal exam    (+) COPD:                            ROS comment: +Covid 12/21   Cardiovascular:    (+) hypertension:,         Rhythm: regular  Rate: normal                    Neuro/Psych:   (+) neuromuscular disease (peripheral neuropathy):, headaches:,             GI/Hepatic/Renal:   (+) GERD:, hepatitis: C, liver disease:, renal disease:,           Endo/Other:    (+) DiabetesType II DM, , : arthritis:., malignancy/cancer (bladder cancer). Abdominal:   (+) obese,           Vascular: Other Findings:               Anesthesia Plan      general     ASA 3       Induction: intravenous. MIPS: Postoperative opioids intended and Prophylactic antiemetics administered. Anesthetic plan and risks discussed with patient.       Plan discussed with CRNA.                   Salomon Farmer MD   2/16/2022

## 2022-02-16 NOTE — OP NOTE
Operative Note      Patient: Guerda Mojica  YOB: 1950  MRN: [id number]    Date of Procedure: 2/16/2022    Pre-Op Diagnosis: BLADDER CANCER    Post-Op Diagnosis: Same       Procedure:  Cystoscopy  Bladder biopsy and fulguration tumor size less than 2 cm    Surgeon(s):  Augustine Bacon MD    Assistant:  Malvin Hemphill MD- PGY3    Anesthesia: General    Estimated Blood Loss (mL): Minimal    Complications: None    Specimens:   ID Type Source Tests Collected by Time Destination   A : bladder biopsy Tissue Bladder SURGICAL PATHOLOGY Augustine Bacon MD 2/16/2022 1573        Implants:  * No implants in log *      Drains:   [REMOVED] Urethral Catheter Non-latex 16 fr (Removed)       [REMOVED] Urethral Catheter Triple-lumen 22 fr (Removed)       Findings:   Areas on the posterior bladder wall adjacent to the dome concerning for malignancy    Indication for procedure:  Patient is a 82-year-old male with history of low-grade noninvasive urothelial cell carcinoma of the bladder who presents today for cystoscopy, bladder biopsy and fulguration. H&P was reviewed, informed consent was obtained, patient understood risk, benefits, alternatives of the procedure and wished to proceed. Operative detail:  Patient was brought to the cystoscopy suite and placed on continuous pulse oximetry and cardiac monitor anesthesia. IV antibiotics including 2 g of Ancef were administered. Patient was placed in the dorsolithotomy position and prepped and draped in normal sterile fashion. Timeout was performed confirming patient, procedure, side, all the room agreed. A well-lubricated 25 Thai cystoscopic sheath with a 30 degree lens was inserted into the urethral meatus and advanced into the bladder. Upon entering the bladder we did a pan cystoscopy that revealed small papillary tumors on the floor of the bladder and posterior bladder wall adjacent to the left dome.   We then obtained a cold cup biopsy and biopsy the larger sample near the dome. Sufficient sample was obtained for pathology. We then fulgurated any bleeding and spotting edges. We fulgurated the other smaller tumors. We repeated the process, and there was no further tumor. Patient bladder was then emptied. Patient was then awakened anesthesia transferred to PACU stable condition. Please note that Dr. Jose Borden was scrubbed and present for the entire duration of procedure.     Plan:  Patient will follow up in the office review pathology      Electronically signed by lJ Mcdonough MD on 2/16/2022 at 8:29 AM

## 2022-02-16 NOTE — ANESTHESIA POSTPROCEDURE EVALUATION
Department of Anesthesiology  Postprocedure Note    Patient: Myriam Ellis  MRN: [id number]  YOB: 1950  Date of evaluation: 2/16/2022  Time:  12:50 PM     Procedure Summary     Date: 02/16/22 Room / Location: [address]    Anesthesia Start: 9473 Anesthesia Stop: 1558    Procedure: CYSTOSCOPY BLADDER BIOPSY, FULGURATION (N/A ) Diagnosis: (BLADDER CANCER)    Surgeons: Kamryn Abreu MD Responsible Provider: Salomon Farmer MD    Anesthesia Type: general ASA Status: 3          Anesthesia Type: general    Be Phase I: Be Score: 10    Be Phase II: Be Score: 10    Last vitals: Reviewed and per EMR flowsheets.        Anesthesia Post Evaluation    Patient location during evaluation: PACU  Patient participation: complete - patient participated  Level of consciousness: awake and alert  Pain score: 1  Airway patency: patent  Nausea & Vomiting: no nausea and no vomiting  Complications: no  Cardiovascular status: hemodynamically stable  Respiratory status: acceptable  Hydration status: euvolemic

## 2022-02-16 NOTE — PROGRESS NOTES
Discharge instructions and prescription (Cephalexin) reviewed with patient and wife. Both acknowledged understanding. Prescription was E-scripted to [facility] on [address] per physician.

## 2022-02-17 LAB — SURGICAL PATHOLOGY REPORT: NORMAL

## 2022-02-25 ENCOUNTER — TELEMEDICINE (OUTPATIENT)
Dept: FAMILY MEDICINE CLINIC | Age: 72
End: 2022-02-25
Payer: MEDICARE

## 2022-02-25 DIAGNOSIS — E11.42 TYPE 2 DIABETES MELLITUS WITH DIABETIC POLYNEUROPATHY, WITHOUT LONG-TERM CURRENT USE OF INSULIN (HCC): Primary | ICD-10-CM

## 2022-02-25 DIAGNOSIS — I71.40 ABDOMINAL AORTIC ANEURYSM (AAA) WITHOUT RUPTURE: ICD-10-CM

## 2022-02-25 DIAGNOSIS — E55.9 VITAMIN D DEFICIENCY: ICD-10-CM

## 2022-02-25 DIAGNOSIS — E78.5 HYPERLIPIDEMIA WITH TARGET LDL LESS THAN 100: ICD-10-CM

## 2022-02-25 DIAGNOSIS — C67.2 MALIGNANT NEOPLASM OF LATERAL WALL OF URINARY BLADDER (HCC): ICD-10-CM

## 2022-02-25 DIAGNOSIS — I10 ESSENTIAL HYPERTENSION: ICD-10-CM

## 2022-02-25 DIAGNOSIS — B18.2 HEP C W/O COMA, CHRONIC (HCC): ICD-10-CM

## 2022-02-25 DIAGNOSIS — D64.9 ANEMIA, UNSPECIFIED TYPE: ICD-10-CM

## 2022-02-25 PROBLEM — R51.9 WORSENING HEADACHES: Status: RESOLVED | Noted: 2020-12-29 | Resolved: 2022-02-25

## 2022-02-25 PROBLEM — U07.1 COVID-19 VIRUS INFECTION: Status: RESOLVED | Noted: 2022-01-10 | Resolved: 2022-02-25

## 2022-02-25 PROBLEM — R19.7 DIARRHEA: Status: RESOLVED | Noted: 2020-07-28 | Resolved: 2022-02-25

## 2022-02-25 PROBLEM — R10.9 ABDOMINAL DISCOMFORT: Status: RESOLVED | Noted: 2020-07-28 | Resolved: 2022-02-25

## 2022-02-25 PROBLEM — Z23 NEED FOR PROPHYLACTIC VACCINATION AGAINST DIPHTHERIA-TETANUS-PERTUSSIS (DTP): Status: RESOLVED | Noted: 2021-09-24 | Resolved: 2022-02-25

## 2022-02-25 PROBLEM — R19.5 POSITIVE FIT (FECAL IMMUNOCHEMICAL TEST): Status: RESOLVED | Noted: 2020-12-29 | Resolved: 2022-02-25

## 2022-02-25 PROCEDURE — 99214 OFFICE O/P EST MOD 30 MIN: CPT | Performed by: FAMILY MEDICINE

## 2022-02-25 RX ORDER — METFORMIN HYDROCHLORIDE 500 MG/1
500 TABLET, EXTENDED RELEASE ORAL
Qty: 90 TABLET | Refills: 3 | Status: SHIPPED
Start: 2022-02-25 | End: 2022-08-22 | Stop reason: SINTOL

## 2022-02-25 RX ORDER — ERGOCALCIFEROL 1.25 MG/1
CAPSULE ORAL
Qty: 4 CAPSULE | Refills: 2 | Status: SHIPPED
Start: 2022-02-25 | End: 2022-03-14 | Stop reason: DRUGHIGH

## 2022-02-25 RX ORDER — LISINOPRIL 10 MG/1
10 TABLET ORAL DAILY
Qty: 90 TABLET | Refills: 3 | Status: SHIPPED | OUTPATIENT
Start: 2022-02-25

## 2022-02-25 ASSESSMENT — ENCOUNTER SYMPTOMS
ABDOMINAL DISTENTION: 0
NAUSEA: 1
WHEEZING: 0
DIARRHEA: 1
COUGH: 0
SHORTNESS OF BREATH: 0
CHEST TIGHTNESS: 0
CONSTIPATION: 0
VOMITING: 1
BACK PAIN: 1
ABDOMINAL PAIN: 1

## 2022-02-25 ASSESSMENT — PATIENT HEALTH QUESTIONNAIRE - PHQ9
SUM OF ALL RESPONSES TO PHQ QUESTIONS 1-9: 0
SUM OF ALL RESPONSES TO PHQ QUESTIONS 1-9: 0
SUM OF ALL RESPONSES TO PHQ9 QUESTIONS 1 & 2: 0
1. LITTLE INTEREST OR PLEASURE IN DOING THINGS: 0
2. FEELING DOWN, DEPRESSED OR HOPELESS: 0
SUM OF ALL RESPONSES TO PHQ QUESTIONS 1-9: 0
SUM OF ALL RESPONSES TO PHQ QUESTIONS 1-9: 0

## 2022-02-25 NOTE — PROGRESS NOTES
Visit Information    Have you changed or started any medications since your last visit including any over-the-counter medicines, vitamins, or herbal medicines? no   Are you having any side effects from any of your medications? -  no  Have you stopped taking any of your medications? Is so, why? -  no    Have you seen any other physician or provider since your last visit? Yes - Records Obtained  Have you had any other diagnostic tests since your last visit? Yes - Records Obtained  Have you been seen in the emergency room and/or had an admission to a hospital since we last saw you? Yes - Records Obtained  Have you had your routine dental cleaning in the past 6 months? no    Have you activated your JO[facility] account? If not, what are your barriers?  Yes     Patient Care Team:  Yeimi Porter MD as PCP - General (Family Medicine)  Yeimi Porter MD as PCP - [facility]  Amber Kahn MD as Consulting Physician (Gastroenterology)  Alexa Quinn MD as Consulting Physician (Pain Management)  Julio C Garcia MD as Consulting Physician (Hematology and Oncology)  Arthur Guadalupe DO as Consulting Physician (Bariatric Surgery)  Jennifer Snell DO as Consulting Physician (Cardiology)  Britney Jacobs MD as Consulting Physician (Urology)    Medical History Review  Past Medical, Family, and Social History reviewed and does contribute to the patient presenting condition    Health Maintenance   Topic Date Due    DTaP/Tdap/Td vaccine (1 - Tdap) Never done    Low dose CT lung screening  Never done    Shingles Vaccine (2 of 3) 12/27/2015    Diabetic retinal exam  05/13/2021    Diabetic microalbuminuria test  07/13/2021    A1C test (Diabetic or Prediabetic)  12/24/2021    Depression Screen  09/17/2022    Diabetic foot exam  09/24/2022    Annual Wellness Visit (AWV)  09/25/2022    Lipid screen  10/23/2022    Creatinine monitoring  02/09/2023    Potassium monitoring  02/16/2023    Colorectal Cancer Screen  10/08/2024    Hepatitis A vaccine  Completed    Flu vaccine  Completed    Pneumococcal 65+ years Vaccine  Completed    COVID-19 Vaccine  Completed    AAA screen  Completed    Hib vaccine  Aged Out    Meningococcal (ACWY) vaccine  Aged Out

## 2022-02-25 NOTE — PROGRESS NOTES
2022    TELEHEALTH EVALUATION -- Audio/Visual (During [facility] public health emergency)      Vera Quintero (:  1950) is a 73 y.o. male,Established patient, here for evaluation of the following chief complaint(s): Hypertension, Diabetes, and Health Maintenance        ASSESSMENT/PLAN:    1. Type 2 diabetes mellitus with diabetic polyneuropathy, without long-term current use of insulin (HCC)   Improved   Lab Results   Component Value Date    LABA1C 5.0 2021    LABA1C 5.9 2021    LABA1C 6.2 (H) 2020     - Dose decreased 2022 :  metFORMIN (GLUCOPHAGE XR) 500 MG extended release tablet; Take 1 tablet by mouth daily (with breakfast) ** stop Metformin 1000 mg BID**, Disp-90 tablet, R-3Normal  -We will stop glipizide completely  -     [facility], Podiatry, [place]  -     CBC; Future  -     Comprehensive Metabolic Panel; Future  -     Magnesium; Future  -     TSH; Future  -     Vitamin B12 & Folate; Future  -     Hemoglobin A1C; Future  -     Microalbumin / Creatinine Urine Ratio; Future    -advised home blood glucose testing  daily  -daily feet exam, Foot care: advised to wash feet daily, pat dry and apply lotion at night, avoiding between toes. Need to look at feet daily and report to a physician any signs of inflammation or skin damage  -annual dilated eye exam  -Low carb, low fat diet, increase fruits and vegetables, and exercise 4-5 times a day 30-40 minutes a day discussed  -continue Lisinopril ACEI and statin Pravachol 40 mg      2. Essential hypertension  Blood pressure is towards the lower side  Will recheck labs. Discussed low salt diet and BP and pulse monitoring. We will decrease blood pressure medications  -start     lisinopril (PRINIVIL;ZESTRIL) 10 MG tablet; Take 1 tablet by mouth daily ** stop Lisinopril HCTZ**, Disp-90 tablet, R-3Normal  Continue metoprolol 25 mg twice a day  -     CBC; Future  -     Comprehensive Metabolic Panel;  Future  - Magnesium; Future  -     TSH; Future  -     Urinalysis with Reflex to Culture; Future    3. Anemia, unspecified type  Improving, but still moderate in severity  Recheck CBC, recently had cystoscopy for bladder cancer  Follow-up with GI for possible colonoscopy  Follow-up with hematologist oncologist  -     CBC; Future  4. Hep C w/o coma, chronic (HCC)  Asymptomatic  Recheck labs  -     CBC; Future  -     Comprehensive Metabolic Panel; Future  -     Hepatitis C RNA, quantitative, PCR; Future  Last hepatitis C RNA was done 7/25/2020 which was not detected  5. Hyperlipidemia with target LDL less than 100  Well-controlled  Pravastatin 40 mg daily, recheck lipid profile  Lab Results   Component Value Date    LDLCHOLESTEROL      10/23/2021    LDLDIRECT 41 10/23/2021       -     Lipid Panel; Future  6. Malignant neoplasm of lateral wall of urinary bladder (HCC)  Stable  Following with urology having serial cystoscopies, fulguration of the bladder tumors, by  Dr. Jose Borden, last done 2/16/2022, last pathology report showed benign urothelial mucosa  7. Abdominal aortic aneurysm (AAA) without rupture (HCC)  Small, likely stable, as he has quit smoking  -     VL ABDOMINAL AORTA LIMITED; Future  8. Vitamin D deficiency  Unsure if improving or not. Will recheck level  Continue supplementation.  -     Vitamin D 25 Hydroxy; Future      To stop glipizide, stop hydrochlorothiazide, decrease dosage of Metformin    Daksha English received counseling on the following healthy behaviors: nutrition, exercise, medication adherence and weight loss. Reviewed prior labs and health maintenance  Discussed use, benefit, and side effects of prescribed medications. Barriers to medication compliance addressed. Patient given educational materials - see patient instructions  All patient questions answered. Patient voiced understanding.   The patient's past medical,surgical, social, and family history as well as his current medications and allergies were reviewed as documented in today's encounter. Medications, labs, diagnostic studies, consultations and follow-up as documented in this encounter. Return in about 4 months (around 2022) for ALWAYS NEEDS 30 MIN. APPT, DM2, HTN, HLP, LABS F/U anemia. Data Unavailable    Future Appointments   Date Time Provider Pippa Cazares   2022  9:00 AM Nirmal Domingo Juanjose 23   2022 11:40 AM Flaco Valentino MD [facility]   3/16/2022 11:45 AM Eris Vásquez MD [address] [place] [address] 22   3/31/2022  3:30 PM Mariza Hartman MD [facility]   2022  1:30 PM [facility]         SUBJECTIVE/OBJECTIVE:  Jeanette Welsh (:  1950) has requested an audio/video evaluation for the following concern(s):Hypertension, Diabetes, and Health Maintenance    Irina Ruiz, his wife is present during the entire visit    Has known diabetes mellitus type 2 and polyneuropathy. Feels like walking on the rocks, on Lyrica, but not always helping. Home Blood Glucose 138. On Metformin 1000 mg BID. Glipizine prn, but hasn't been taking it. A1c improved  Lab Results   Component Value Date    LABA1C 5.0 2021    LABA1C 5.9 2021    LABA1C 6.2 (H) 2020       Hypertension:    he  is not exercising and is adherent to low salt diet. Blood pressure is well controlled at home. Cardiac symptoms fatigue. Patient denies chest pain, chest pressure/discomfort, claudication, dyspnea, exertional chest pressure/discomfort, irregular heart beat, lower extremity edema, near-syncope, orthopnea, palpitations, paroxysmal nocturnal dyspnea, syncope and tachypnea. Cardiovascular risk factors: advanced age (older than 54 for men, 72 for women), diabetes mellitus, dyslipidemia, hypertension, male gender, obesity (BMI >= 30 kg/m2) and smoking/ tobacco exposure.  Use of agents associated with hypertension: noneHistory of target organ damage: CAD,coronary artery disease. EK2020 showed possible anterior infarct. Was referred to cardiology. Blood pressure has been towards the lower side lately 118/68    BP Readings from Last 3 Encounters:   22 98/63   22 99/85   22 120/61        Pulse is Normal.    Pulse Readings from Last 3 Encounters:   22 82   22 70   22 53     Had cystoscopy for bladder cancer. Denies seeing blood in the urine. Had recent cystoscopy. Hyperlipidemia:  No new myalgias or GI upset on pravastatin (Pravachol). Medication compliance: compliant all of the time. Patient is  following a low fat, low cholesterol diet. LDL is normal, high triglycerides . Lab Results   Component Value Date    LDLCHOLESTEROL      10/23/2021    LDLDIRECT 41 10/23/2021     Lab Results   Component Value Date    TRIG 279 (H) 2020    TRIG 148 2019    TRIG 160 (H) 05/10/2017       Reports episodes of nausea and vomiting, abdominal pain and diarrhea lasting for 24 hrs  Has been having Chronic diarrhea for many years. He says diarrhea has stopped now, had diarrhea on Monday. Patient currently denies any abdominal pain. Has known Hep C  Received treatment before  Last hepatitis C RNA was done 2020 which was not detected. Patient has chronic anemia and he sees hematologist oncologist and GI, told they don't know where is he bleeding. His last POC stool was positive for blood and his last colonoscopy was in 2019. Advised to ask for a colonoscopy at his appointment with GI.   Has moderate anemia received iron infusion by hematologist oncologist  His wife says they have made appointment with the GI specialist already    CT 2021-showed just fatty liver and AAA 3 cm stable  Due to size, recommended ever 3 yr follow up     No acute intra-abdominal or intrapelvic abnormalities are noted.       Diffuse fatty infiltration of the liver.       Urinary bladder is decompressed with mildly diffuse wall thickening.       Stable 3 cm abdominal aortic aneurysm.  Recommend follow-up every 3 years         He does have chronic back pain, and lumbar radiculopathy, spinal stenosis, prior history of neck surgery follows with pain management. Lilian Franco has Vitamin D deficiency. Lilian Franco  is  taking Vitamin D supplementation   he feels tired. Lab Results   Component Value Date    VITD25 13.6 (L) 10/23/2021       Negative depression screening. PHQ Scores 2/25/2022 9/17/2021 8/31/2021 9/23/2020 5/12/2020   PHQ2 Score 0 0 0 0 0   PHQ9 Score 0 0 0 0 0         Review of Systems   Constitutional: Positive for diaphoresis, fatigue and unexpected weight change. Negative for activity change, appetite change, chills and fever. Eyes: Positive for visual disturbance (stable, chronic). Respiratory: Negative for cough, chest tightness, shortness of breath and wheezing. Cardiovascular: Negative for chest pain, palpitations and leg swelling. Gastrointestinal: Positive for abdominal pain, diarrhea, nausea and vomiting. Negative for abdominal distention and constipation. Endocrine: Negative for cold intolerance, heat intolerance, polydipsia, polyphagia and polyuria. Genitourinary: Positive for frequency and urgency. Negative for dysuria. Musculoskeletal: Positive for back pain. Neurological: Positive for numbness (feet, on Lyrica). Negative for dizziness and weakness. Hematological: Does not bruise/bleed easily. Psychiatric/Behavioral: Negative for dysphoric mood and sleep disturbance. The patient is not nervous/anxious. Prior to Visit Medications    Medication Sig Taking? Authorizing Provider   morphine (MS CONTIN) 60 MG extended release tablet Take 1 tablet by mouth 2 times daily for 30 days. Yes VEE Melgar CNP   oxyCODONE-acetaminophen (PERCOCET) 5-325 MG per tablet Take 1 tablet by mouth every 8 hours for 30 days.  Yes VEE Melgar CNP   pregabalin (LYRICA) 150 MG capsule Take 1 capsule by mouth 3 times daily for 90 days. Yes VEE Bhat - YUKO   glucose monitoring (FREESTYLE FREEDOM) kit Please supply Patient with a glucose monitoring kit that insurance will cover. Yes Kat Dobson MD   blood glucose monitor strips Testing once a day. Please dispense according to patients insurance. Yes Kat Dobson MD   fluticasone (FLONASE) 50 MCG/ACT nasal spray 2 sprays by Nasal route daily Yes Joey Guerin MD   baclofen (LIORESAL) 10 MG tablet Take 1 tablet by mouth 3 times daily as needed (back pain) Yes Kat Dobson MD   vitamin D (ERGOCALCIFEROL) 1.25 MG (30654 UT) CAPS capsule TAKE ONE CAPSULE BY MOUTH ONCE WEEKLY Yes Kat Dobson MD   lisinopril-hydroCHLOROthiazide (PRINZIDE;ZESTORETIC) 10-12.5 MG per tablet TAKE ONE TABLET BY MOUTH DAILY Yes Kat Dobson MD   pantoprazole (PROTONIX) 40 MG tablet Take 1 tablet by mouth daily Yes Kat Dobson MD   metFORMIN (GLUCOPHAGE) 1000 MG tablet TAKE 1 TABLET BY MOUTH TWICE DAILY WITH MEALS Yes Kat Dobson MD   cyanocobalamin 1000 MCG/ML injection Inject 1 mL into the muscle every 30 days Call for next refill which will be monthly for life Yes Kat Dobson MD   pravastatin (PRAVACHOL) 40 MG tablet Take 1 tablet by mouth every evening Stop Gemfibrozil Yes Kat Dobson MD   metoprolol tartrate (LOPRESSOR) 25 MG tablet Take 1 tablet by mouth 2 times daily Yes Kat Dobson MD   TRUEplus Lancets 30G MISC Test blood glucose twice a day Yes Kat Dobson MD   Alcohol Swabs (B-D SINGLE USE SWABS REGULAR) PADS USE TO CHECK BLOOD SUGAR TWICE A DAY Yes Kat Dobson MD   glipiZIDE (GLUCOTROL) 5 MG tablet Take 1 tablet by mouth every morning Keep fasting morning blood glucose .   If blood glucose below 80, you need to stop glipizide Yes Kat Dobson MD   Syringe/Needle, Disp, (SYRINGE 3CC/25GX1\") 25G X 1\" 3 ML MISC To be used with B12 injections Yes Kaz Tavares MD   loperamide (RA ANTI-DIARRHEAL) 2 MG capsule Take 1 capsule by mouth 4 times daily as needed for Diarrhea Yes Brittany Cabrales MD   Probiotic Product (PROBIOTIC-10 PO) Take by mouth daily  Yes Historical Provider, MD   Blood Glucose Monitoring Suppl (TRUE METRIX METER) w/Device KIT USE AS DIRECTED TO TEST BLOOD SUGAR Yes Historical Provider, MD       Social History     Tobacco Use    Smoking status: Former Smoker     Packs/day: 2.00     Years: 45.00     Pack years: 90.00     Types: Cigarettes     Start date: 1968     Quit date: 2015     Years since quittin.2    Smokeless tobacco: Never Used    Tobacco comment: on chantix 11-6-15   12-7-15   Vaping Use    Vaping Use: Never used   Substance Use Topics    Alcohol use: No    Drug use: No          PHYSICAL EXAMINATION:    Vital Signs: (As obtained by patient/caregiver or practitioner observation)  -vital signs stable and within normal limits except Obesity per BMI. Patient-Reported Vitals 2022   Patient-Reported Weight 210 lbs   Patient-Reported Height 5'9   Patient-Reported Systolic 714   Patient-Reported Diastolic 68   Patient-Reported Pulse -   Patient-Reported Temperature -           Constitutional: [x] Appears well-developed and well-nourished [x] No apparent distress      [x] Abnormal-obese      Mental status  [x] Alert and awake  [x] Oriented to person/place/time [x]Able to follow commands      Eyes:  EOM    [x]  Normal  [] Abnormal-  Sclera  [x]  Normal  [] Abnormal -         Discharge [x]  None visible  [] Abnormal -    HENT:   [x] Normocephalic, atraumatic.   [] Abnormal   [x] Mouth/Throat: Mucous membranes are moist.     External Ears [x] Normal  [] Abnormal-     Neck: [x] No visualized mass     Pulmonary/Chest: [x] Respiratory effort normal.  [x] No visualized signs of difficulty breathing or respiratory distress        [] Abnormal        Musculoskeletal:   [x] Normal gait with no signs of ataxia [x] Normal range of motion of neck        [] Abnormal-       Neurological:        [x] No Facial Asymmetry (Cranial nerve 7 motor function) (limited exam to video visit)          [x] No gaze palsy        [] Abnormal-            Skin:        [x] No significant exanthematous lesions or discoloration noted on facial skin         [] Abnormal-            Psychiatric:      [x] No Hallucinations       [x]Mood is normal      [x]Behavior is normal      [x]Judgment is normal      [x]Thought content is normal       [] Abnormal    Other pertinent observable physical exam findings-no abdominal pain during the encounter with direct pressure over the abdomen    Due to this being a TeleHealth encounter, evaluation of the following organ systems is limited: Vitals/Constitutional/EENT/Resp/CV/GI//MS/Neuro/Skin/Heme-Lymph-Imm. I personally reviewed most recent labs reviewed with the patient and all questions fully answered.    Anemia, moderate, slightly improved after receiving iron infusion by patient  hypertriglyceridemia  Vitamin D deficiency  Otherwise labs within normal limits        Lab Results   Component Value Date    WBC 4.1 01/11/2022    HGB 10.1 (L) 02/09/2022    HCT 36.1 (L) 02/09/2022    MCV 81.1 01/11/2022     01/11/2022       Lab Results   Component Value Date     02/09/2022    K 4.6 02/09/2022     02/09/2022    CO2 26 02/09/2022    BUN 15 02/09/2022    CREATININE 0.80 02/09/2022    GLUCOSE 94 02/09/2022    CALCIUM 8.6 10/23/2021        Lab Results   Component Value Date    ALT 20 10/23/2021    AST 17 10/23/2021    ALKPHOS 83 10/23/2021    BILITOT 0.18 (L) 10/23/2021       Lab Results   Component Value Date    TSH 1.68 10/23/2021       Lab Results   Component Value Date    CHOL 92 07/13/2020    CHOL 112 04/24/2019    CHOL 102 05/10/2017     Lab Results   Component Value Date    TRIG 279 (H) 07/13/2020    TRIG 148 04/24/2019    TRIG 160 (H) 05/10/2017     Lab Results   Component Value Date HDL 22 (L) 10/23/2021    HDL 23 (L) 07/13/2020    HDL 23 (L) 04/24/2019     Lab Results   Component Value Date    LDLCHOLESTEROL      10/23/2021    LDLCHOLESTEROL 13 07/13/2020    LDLCHOLESTEROL 59 04/24/2019       Lab Results   Component Value Date    CHOLHDLRATIO 5.2 (H) 10/23/2021    CHOLHDLRATIO 4.0 07/13/2020    CHOLHDLRATIO 4.9 04/24/2019       Lab Results   Component Value Date    LABA1C 5.0 09/24/2021    LABA1C 5.9 01/11/2021    LABA1C 6.2 (H) 07/13/2020     Lab Results   Component Value Date    IRON 26 (L) 01/11/2022    TIBC 346 01/11/2022    FERRITIN 39 01/11/2022         Lab Results   Component Value Date    ZZRBICNQ36 1690 (H) 01/11/2022       Lab Results   Component Value Date    VITD25 13.6 (L) 10/23/2021       Orders Placed This Encounter   Medications    metFORMIN (GLUCOPHAGE XR) 500 MG extended release tablet     Sig: Take 1 tablet by mouth daily (with breakfast) ** stop Metformin 1000 mg BID**     Dispense:  90 tablet     Refill:  3    lisinopril (PRINIVIL;ZESTRIL) 10 MG tablet     Sig: Take 1 tablet by mouth daily ** stop Lisinopril HCTZ**     Dispense:  90 tablet     Refill:  3       Orders Placed This Encounter   Procedures    VL ABDOMINAL AORTA LIMITED     Standing Status:   Future     Standing Expiration Date:   3/11/2022    CBC     Standing Status:   Future     Standing Expiration Date:   3/11/2022    Comprehensive Metabolic Panel     Standing Status:   Future     Standing Expiration Date:   3/11/2022    Lipid Panel     Standing Status:   Future     Standing Expiration Date:   3/11/2022     Order Specific Question:   Is Patient Fasting?/# of Hours     Answer:   8-10 Hours, water ok to drink    Magnesium     Standing Status:   Future     Standing Expiration Date:   3/11/2022    TSH     Standing Status:   Future     Standing Expiration Date:   3/11/2022    Vitamin B12 & Folate     Standing Status:   Future     Standing Expiration Date:   3/11/2022    Vitamin D 25 Hydroxy state where the provider was licensed to provide care. On this date 2/25/2022 I have spent 35 minutes reviewing previous notes, test results and face to face with the patient discussing the diagnosis and importance of compliance with the treatment plan as well as documenting on the day of the visit. --Jarred Coon MD on 2/27/2022 at 1:51 PM    An electronic signature was used to authenticate this note.

## 2022-02-28 ENCOUNTER — HOSPITAL ENCOUNTER (OUTPATIENT)
Dept: PAIN MANAGEMENT | Age: 72
Discharge: HOME | End: 2022-02-28
Payer: MEDICARE

## 2022-02-28 ENCOUNTER — OFFICE VISIT (OUTPATIENT)
Dept: UROLOGY | Age: 72
End: 2022-02-28
Payer: MEDICARE

## 2022-02-28 VITALS
SYSTOLIC BLOOD PRESSURE: 131 MMHG | HEART RATE: 85 BPM | BODY MASS INDEX: 31.84 KG/M2 | DIASTOLIC BLOOD PRESSURE: 84 MMHG | TEMPERATURE: 97.7 F | RESPIRATION RATE: 16 BRPM | WEIGHT: 215 LBS | OXYGEN SATURATION: 96 % | HEIGHT: 69 IN

## 2022-02-28 VITALS
DIASTOLIC BLOOD PRESSURE: 60 MMHG | BODY MASS INDEX: 31.84 KG/M2 | WEIGHT: 215 LBS | SYSTOLIC BLOOD PRESSURE: 104 MMHG | TEMPERATURE: 96.5 F | OXYGEN SATURATION: 95 % | HEIGHT: 69 IN | HEART RATE: 82 BPM

## 2022-02-28 DIAGNOSIS — Z98.1 S/P CERVICAL SPINAL FUSION: Chronic | ICD-10-CM

## 2022-02-28 DIAGNOSIS — M47.26 OSTEOARTHRITIS OF SPINE WITH RADICULOPATHY, LUMBAR REGION: ICD-10-CM

## 2022-02-28 DIAGNOSIS — M48.061 SPINAL STENOSIS OF LUMBAR REGION WITHOUT NEUROGENIC CLAUDICATION: Chronic | ICD-10-CM

## 2022-02-28 DIAGNOSIS — Z51.81 ENCOUNTER FOR MEDICATION MONITORING: Chronic | ICD-10-CM

## 2022-02-28 DIAGNOSIS — M46.92 CERVICAL SPONDYLITIS (HCC): Chronic | ICD-10-CM

## 2022-02-28 DIAGNOSIS — G89.29 ENCOUNTER FOR CHRONIC PAIN MANAGEMENT: ICD-10-CM

## 2022-02-28 DIAGNOSIS — M54.16 LUMBAR RADICULOPATHY: Chronic | ICD-10-CM

## 2022-02-28 DIAGNOSIS — M51.36 DEGENERATIVE DISC DISEASE, LUMBAR: Chronic | ICD-10-CM

## 2022-02-28 DIAGNOSIS — M47.816 OSTEOARTHRITIS OF LUMBAR SPINE, UNSPECIFIED SPINAL OSTEOARTHRITIS COMPLICATION STATUS: ICD-10-CM

## 2022-02-28 DIAGNOSIS — M47.816 LUMBAR SPONDYLOSIS: Chronic | ICD-10-CM

## 2022-02-28 DIAGNOSIS — C67.2 MALIGNANT NEOPLASM OF LATERAL WALL OF URINARY BLADDER (HCC): Primary | ICD-10-CM

## 2022-02-28 DIAGNOSIS — R31.0 GROSS HEMATURIA: ICD-10-CM

## 2022-02-28 PROCEDURE — 99213 OFFICE O/P EST LOW 20 MIN: CPT

## 2022-02-28 PROCEDURE — 99213 OFFICE O/P EST LOW 20 MIN: CPT | Performed by: UROLOGY

## 2022-02-28 PROCEDURE — 99213 OFFICE O/P EST LOW 20 MIN: CPT | Performed by: NURSE PRACTITIONER

## 2022-02-28 RX ORDER — MORPHINE SULFATE 60 MG/1
60 TABLET, FILM COATED, EXTENDED RELEASE ORAL 2 TIMES DAILY
Qty: 60 TABLET | Refills: 0 | Status: SHIPPED | OUTPATIENT
Start: 2022-03-03 | End: 2022-03-29 | Stop reason: SDUPTHER

## 2022-02-28 RX ORDER — OXYCODONE HYDROCHLORIDE AND ACETAMINOPHEN 5; 325 MG/1; MG/1
1 TABLET ORAL EVERY 8 HOURS
Qty: 90 TABLET | Refills: 0 | Status: SHIPPED | OUTPATIENT
Start: 2022-03-03 | End: 2022-03-29 | Stop reason: SDUPTHER

## 2022-02-28 ASSESSMENT — ENCOUNTER SYMPTOMS
SHORTNESS OF BREATH: 0
COUGH: 0
DIARRHEA: 0
ABDOMINAL PAIN: 0
EYE REDNESS: 0
SHORTNESS OF BREATH: 0
CONSTIPATION: 0
CONSTIPATION: 0
BACK PAIN: 1
EYE PAIN: 0
WHEEZING: 0
BACK PAIN: 0
COUGH: 0
NAUSEA: 0
VOMITING: 0

## 2022-02-28 NOTE — PROGRESS NOTES
Chief Complaint: back pain    PMH   Pt c/o low back pain for many years. MRI with multifocal degenerative disc disease throughout the lumbar spine. There is no known injury or surgery to the area. He does have a history of scoliosis. His last PT was over 4 years ago with no benefit and he is not interested in repeating. He does do home stretches every morning. He also had a LESI in 2013 that did not help. He has non-invasive bladder cancer and had resection of a tumor in April. He continues to follow with oncology and hematology - he continues Injectafer infusions for anemia. He will see podiatry next month for foot pain. Did discuss considering taper of opioid dose and he states he was on a higher dose when he started with pain management and has tapered over time. Back Pain  This is a chronic problem. The current episode started more than 1 year ago. The problem occurs constantly. The problem is unchanged. The pain is present in the lumbar spine. The quality of the pain is described as aching. Radiates to: down both legs to the feet. The pain is at a severity of 4/10. The pain is moderate. The symptoms are aggravated by position and standing (walking). Associated symptoms include numbness and tingling. Pertinent negatives include no chest pain, fever or paresthesias. He has tried analgesics and bed rest for the symptoms. The treatment provided mild relief. Patient denies any new neurological symptoms. No bowel or bladder incontinence, no weakness, and no falling. Pill count: appropriate due 3/3    Morphine equivalent: 142.5    Periodic Controlled Substance Monitoring: Possible medication side effects, risk of tolerance/dependence & alternative treatments discussed. ,No signs of potential drug abuse or diversion identified. ,Obtaining appropriate analgesic effect of treatment. Karmen Tobias, APRN - CNP)  Chronic Pain > 80 MEDD: Co-prescribed Naloxone. ,Obtained or confirmed \"Medication Contract\" on file.  VEE Mejia - CNP)      Past Medical History:   Diagnosis Date    Anemia     Arthritis     osteoarthritis    Bladder cancer ([facility] 03/2021    bladder    Bladder tumor     Chronic back pain     Chronic neck pain     Colon polyps 10/08/2019    tubular adenoma x2    COVID 01/06/2022    self reported-home test-no symptoms    COVID-19 virus infection 1/10/2022    Diabetic neuropathy ([facility]     Diarrhea     Encounter for chronic pain management     [facility] Mag[facility] E visit 01/03/2022    Essential hypertension 05/12/2020    managed by Dr. Sandra Puente PCP    GERD (gastroesophageal reflux disease)     Heartburn     Hematuria     Hepatitis B core antibody positive     History of bleeding peptic ulcer     History of blood transfusion     no reactions    History of hepatitis C     History of migraine headaches     History of stress test 07/2020    \"low risk\"    Hyperlipidemia     Iron (Fe) deficiency anemia     Neuropathy     Poor historian     states his wife takes care of all medical information    Positive FIT (fecal immunochemical test)     Type 2 diabetes mellitus with microalbuminuria, without long-term current use of insulin ([facility] 07/13/2020    managed by Dr. Brandie Mcgrath last e-visit 11/2021    Under care of team 02/09/2022    pcp-Dr Harris Gaona virtual visit 11/2021    Under care of team 02/09/2022    hematology-Dr Caprice Saavedra 32 virtual visit 11/2021    Under care of team 02/09/2022    urology-Dr fairchild-last visit nov 2021    Wears dentures     Wears glasses     Worsening headaches 12/29/2020       Past Surgical History:   Procedure Laterality Date    CERVICAL SPINE SURGERY  1977    cervical spine three times, has plate    CHOLECYSTECTOMY  03/22/2019    COLONOSCOPY  01/25/2015    10 yr recall, hemorrhoids    COLONOSCOPY N/A 10/08/2019    tubular adenoma x2    CYSTOSCOPY Right 04/29/2021    CYSTOSCOPY TUR BLADDER TUMOR, GYRUS, RIGHT STENT PLACEMENT AND RIGHT STENT REMOVAL performed by Kamryn Abreu MD at [address] [facility] 09/09/2021    CYSTOSCOPY, TRANSURETHRAL RESECTION BLADDER TUMOR performed by Kamryn Abreu MD at [address]  02/16/2022    Bladder biopsy, Fulguration     CYSTOSCOPY N/A 2/16/2022    CYSTOSCOPY BLADDER BIOPSY, FULGURATION performed by Kamryn Abreu MD at [address] [facility]  10/2015    all teeth extracted    HERNIA REPAIR N/A 08/07/2020    HERNIA INCISIONAL REPAIR LAPAROSCOPIC ROBOTIC WITH MESH performed by Filomena Garrett DO at [facility] Right     UMBILICAL HERNIA REPAIR  3022-19    UPPER GASTROINTESTINAL ENDOSCOPY  01/25/2015    UPPER GASTROINTESTINAL ENDOSCOPY N/A 10/08/2019    EGD BIOPSY performed by Jonathon Molina MD at [facility]       Allergies   Allergen Reactions   [place] [Aspirin]       iron deficiency anemia , requiring iron infusions and transfusions    Iron      Pill- constipation, abdominal pain    Nsaids       iron deficiency anemia, history of bleeding ulcers          Current Outpatient Medications:     metFORMIN (GLUCOPHAGE XR) 500 MG extended release tablet, Take 1 tablet by mouth daily (with breakfast) ** stop Metformin 1000 mg BID**, Disp: 90 tablet, Rfl: 3    lisinopril (PRINIVIL;ZESTRIL) 10 MG tablet, Take 1 tablet by mouth daily ** stop Lisinopril HCTZ**, Disp: 90 tablet, Rfl: 3    vitamin D (ERGOCALCIFEROL) 1.25 MG (28855 UT) CAPS capsule, TAKE ONE CAPSULE BY MOUTH ONCE WEEKLY, Disp: 4 capsule, Rfl: 2    morphine (MS CONTIN) 60 MG extended release tablet, Take 1 tablet by mouth 2 times daily for 30 days. , Disp: 60 tablet, Rfl: 0    oxyCODONE-acetaminophen (PERCOCET) 5-325 MG per tablet, Take 1 tablet by mouth every 8 hours for 30 days. , Disp: 90 tablet, Rfl: 0    pregabalin (LYRICA) 150 MG capsule, Take 1 capsule by mouth 3 times daily for 90 days. , Disp: 90 capsule, Rfl: 2    glucose monitoring (FREESTYLE FREEDOM) kit, Please supply Patient with a glucose monitoring kit that insurance will cover. , Disp: 1 kit, Rfl: 0    blood glucose monitor strips, Testing once a day.   Please dispense according to patients insurance., Disp: 100 strip, Rfl: 3    fluticasone (FLONASE) 50 MCG/ACT nasal spray, 2 sprays by Nasal route daily, Disp: 16 g, Rfl: 0    baclofen (LIORESAL) 10 MG tablet, Take 1 tablet by mouth 3 times daily as needed (back pain), Disp: 270 tablet, Rfl: 1    pantoprazole (PROTONIX) 40 MG tablet, Take 1 tablet by mouth daily, Disp: 90 tablet, Rfl: 1    cyanocobalamin 1000 MCG/ML injection, Inject 1 mL into the muscle every 30 days Call for next refill which will be monthly for life, Disp: 3 mL, Rfl: 3    pravastatin (PRAVACHOL) 40 MG tablet, Take 1 tablet by mouth every evening Stop Gemfibrozil, Disp: 90 tablet, Rfl: 3    metoprolol tartrate (LOPRESSOR) 25 MG tablet, Take 1 tablet by mouth 2 times daily, Disp: 180 tablet, Rfl: 3    TRUEplus Lancets 30G MISC, Test blood glucose twice a day, Disp: 200 each, Rfl: 3    Alcohol Swabs (B-D SINGLE USE SWABS REGULAR) PADS, USE TO CHECK BLOOD SUGAR TWICE A DAY, Disp: 200 each, Rfl: 3    Syringe/Needle, Disp, (SYRINGE 3CC/25GX1\") 25G X 1\" 3 ML MISC, To be used with B12 injections, Disp: 50 each, Rfl: 2    loperamide (RA ANTI-DIARRHEAL) 2 MG capsule, Take 1 capsule by mouth 4 times daily as needed for Diarrhea, Disp: 112 capsule, Rfl: 2    Probiotic Product (PROBIOTIC-10 PO), Take by mouth daily , Disp: , Rfl:     Blood Glucose Monitoring Suppl (TRUE METRIX METER) w/Device KIT, USE AS DIRECTED TO TEST BLOOD SUGAR, Disp: , Rfl:     Family History   Problem Relation Age of Onset    Diabetes Mother     Heart Disease Father     High Blood Pressure Father        Social History     Socioeconomic History    Marital status:      Spouse name: Not on file    Number of children: Not on file    Years of education: Not on file   Marshall Mancilla education level: Not on file   Occupational History    Occupation: disabled   Tobacco Use    Smoking status: Former Smoker     Packs/day: 2.00     Years: 45.00     Pack years: 90.00     Types: Cigarettes     Start date: 1968     Quit date: 2015     Years since quittin.2    Smokeless tobacco: Never Used    Tobacco comment: on chantix 11-6-15   12-7-15   Vaping Use    Vaping Use: Never used   Substance and Sexual Activity    Alcohol use: No    Drug use: No    Sexual activity: Yes     Partners: Female   Other Topics Concern    Not on file   Social History Narrative    Not on file     Social Determinants of Health     Financial Resource Strain: High Risk    Difficulty of Paying Living Expenses: Very hard   Food Insecurity: Food Insecurity Present    Worried About Running Out of Food in the Last Year: Often true    Jennifer of Food in the Last Year: Often true   Transportation Needs:     Lack of Transportation (Medical): Not on file    Lack of Transportation (Non-Medical):  Not on file   Physical Activity:     Days of Exercise per Week: Not on file    Minutes of Exercise per Session: Not on file   Stress:     Feeling of Stress : Not on file   Social Connections:     Frequency of Communication with Friends and Family: Not on file    Frequency of Social Gatherings with Friends and Family: Not on file    Attends Mosque Services: Not on file    Active Member of [address] [place] [address] or Organizations: Not on file    Attends Club or Organization Meetings: Not on file    Marital Status: Not on file   Intimate Partner Violence:     Fear of Current or Ex-Partner: Not on file    Emotionally Abused: Not on file    Physically Abused: Not on file    Sexually Abused: Not on file   Housing Stability:     Unable to Pay for Housing in the Last Year: Not on file    Number of [place] in the Last Year: Not on file    Unstable Housing in the Last Year: Not on file       Review of Systems:  Review of Systems Constitutional: Negative for chills and fever. Cardiovascular: Negative for chest pain and palpitations. Respiratory: Negative for cough and shortness of breath. Musculoskeletal: Positive for back pain and joint pain. Gastrointestinal: Negative for constipation. Neurological: Positive for numbness and tingling. Negative for disturbances in coordination, loss of balance and paresthesias. Physical Exam:  /84   Pulse 85   Temp 97.7 °F (36.5 °C) (Infrared)   Resp 16   Ht 5' 9\" (1.753 m)   Wt 215 lb (97.5 kg)   SpO2 96%   BMI 31.75 kg/m²     Physical Exam  Constitutional:        HENT:      Head: Normocephalic. Pulmonary:      Effort: Pulmonary effort is normal.   Musculoskeletal:         General: Normal range of motion. Cervical back: Normal range of motion. Lumbar back: Tenderness present. Skin:     General: Skin is warm and dry. Neurological:      Mental Status: He is alert and oriented to person, place, and time.          Record/Diagnostics Review:    Last zoe 4/2021 and was appropriate     Assessment:  Problem List Items Addressed This Visit     Degenerative disc disease, lumbar (Chronic)    Relevant Medications    oxyCODONE-acetaminophen (PERCOCET) 5-325 MG per tablet (Start on 3/3/2022)    morphine (MS CONTIN) 60 MG extended release tablet (Start on 3/3/2022)    Lumbar spondylosis (Chronic)    Relevant Medications    oxyCODONE-acetaminophen (PERCOCET) 5-325 MG per tablet (Start on 3/3/2022)    morphine (MS CONTIN) 60 MG extended release tablet (Start on 3/3/2022)    Lumbar radiculopathy (Chronic)    Relevant Medications    oxyCODONE-acetaminophen (PERCOCET) 5-325 MG per tablet (Start on 3/3/2022)    morphine (MS CONTIN) 60 MG extended release tablet (Start on 3/3/2022)    Lumbar spinal stenosis (Chronic)    Relevant Medications    oxyCODONE-acetaminophen (PERCOCET) 5-325 MG per tablet (Start on 3/3/2022)    Cervical spondylitis (HCC) (Chronic)    Relevant Medications oxyCODONE-acetaminophen (PERCOCET) 5-325 MG per tablet (Start on 3/3/2022)    S/P cervical spinal fusion (Chronic)    Relevant Medications    oxyCODONE-acetaminophen (PERCOCET) 5-325 MG per tablet (Start on 3/3/2022)    Osteoarthritis of spine with radiculopathy, lumbar region    Relevant Medications    oxyCODONE-acetaminophen (PERCOCET) 5-325 MG per tablet (Start on 3/3/2022)    morphine (MS CONTIN) 60 MG extended release tablet (Start on 3/3/2022)    Osteoarthritis of lumbar spine    Relevant Medications    oxyCODONE-acetaminophen (PERCOCET) 5-325 MG per tablet (Start on 3/3/2022)    morphine (MS CONTIN) 60 MG extended release tablet (Start on 3/3/2022)      Other Visit Diagnoses     Encounter for medication monitoring  (Chronic)       Relevant Medications    oxyCODONE-acetaminophen (PERCOCET) 5-325 MG per tablet (Start on 3/3/2022)    Encounter for chronic pain management        Relevant Medications    oxyCODONE-acetaminophen (PERCOCET) 5-325 MG per tablet (Start on 3/3/2022)             Treatment Plan:  Patient relates current medications are helping the pain. Patient reports taking pain medications as prescribed, denies obtaining medications from different sources and denies use of illegal drugs. Patient denies side effects from medications like nausea, vomiting, constipation or drowsiness. Patient reports current activities of daily living are possible due to medications and would like to continue them. As always, we encourage daily stretching and strengthening exercises, and recommend minimizing use of pain medications unless patient cannot get through daily activities due to pain. Contract requirements met. Continue opioid therapy.  Script written for percocet  Continues to follow with oncology for bladder cancer  Follow up appointment made for 4 weeks

## 2022-02-28 NOTE — PROGRESS NOTES
[address] [facility] [address]  Dept: [address] [facility] Urology Office Note - Established    Patient:  Viktoriya Castillo  YOB: 1950  Date: 2/28/2022    The patient is a 73 y.o. male who presents todayfor evaluation of the following problems:   Chief Complaint   Patient presents with   Crisp Daniel     S/P bladder bx        HPI  Is a very pleasant 65-year-old gentleman with a history of bladder cancer. He had a recent bladder biopsy which demonstrated inflammation but no malignancy. His last positive biopsy was last year. He did have some gross hematuria for a few days but this is resolved. He has no bothersome urinary symptoms presently. Summary of old records: N/A    Additional History: N/A    Procedures Today: N/A    Urinalysis today:  No results found for this visit on 02/28/22. Last several PSA's:  Lab Results   Component Value Date    PSA 0.62 03/17/2021     Last total testosterone:  No results found for: TESTOSTERONE    AUA Symptom Score (2/28/2022):   INCOMPLETE EMPTYING: How often have you had the sensation of not emptying your bladder?: Not at all  FREQUENCY: How often do you have to urinate less than every two hours?: Less than 1 to 5 times  INTERMITTENCY: How often have you found you stopped and started again several times when you urinated?: Not at all  URGENCY: How often have you found it difficult to postpone urination?: Not at all  WEAK STREAM: How often have you had a weak urinary stream?: Not at all  STRAINING: How often have you had to strain to start  urination?: Not at all  NOCTURIA: How many times did you typically get up at night to uriniate?: 2 Times  TOTAL I-PSS SCORE[de-identified] 3  How would you feel if you were to spend the rest of your life with your urinary condition?: Pleased    Last BUN and creatinine:  Lab Results   Component Value Date    BUN 15 02/09/2022     Lab Results   Component Value Date    CREATININE 0.80 02/09/2022       Additional Lab/Culture results: none    Imaging Reviewed during this Office Visit: none  (results were independently reviewed by physician and radiology report verified)    PAST MEDICAL, FAMILY AND SOCIAL HISTORY UPDATE:  Past Medical History:   Diagnosis Date    Anemia     Arthritis     osteoarthritis    Bladder cancer ([facility] 03/2021    bladder    Bladder tumor     Chronic back pain     Chronic neck pain     Colon polyps 10/08/2019    tubular adenoma x2    COVID 01/06/2022    self reported-home test-no symptoms    COVID-19 virus infection 1/10/2022    Diabetic neuropathy ([facility]     Diarrhea     Encounter for chronic pain management     [facility] Dr. Meka Garcia E visit 01/03/2022    Essential hypertension 05/12/2020    managed by Dr. Lara Colorado PCP    GERD (gastroesophageal reflux disease)     Heartburn     Hematuria     Hepatitis B core antibody positive     History of bleeding peptic ulcer     History of blood transfusion     no reactions    History of hepatitis C     History of migraine headaches     History of stress test 07/2020    \"low risk\"    Hyperlipidemia     Iron (Fe) deficiency anemia     Neuropathy     Poor historian     states his wife takes care of all medical information    Positive FIT (fecal immunochemical test)     Type 2 diabetes mellitus with microalbuminuria, without long-term current use of insulin ([facility] 07/13/2020    managed by Dr. Lucía Mcginnis last e-visit 11/2021    Under care of team 02/09/2022    pcp-Dr Alena Ochoa virtual visit 11/2021    Under care of team 02/09/2022    hematology-Dr Caprice Saavedra 32 virtual visit 11/2021    Under care of team 02/09/2022    urology-Dr fairchild-last visit nov 2021    Wears dentures     Wears glasses     Worsening headaches 12/29/2020     Past Surgical History:   Procedure Laterality Date    CERVICAL SPINE SURGERY  1977    cervical spine three times, has plate    CHOLECYSTECTOMY  03/22/2019    COLONOSCOPY  01/25/2015    10 yr recall, hemorrhoids    COLONOSCOPY N/A 10/08/2019    tubular adenoma x2    CYSTOSCOPY Right 04/29/2021    CYSTOSCOPY TUR BLADDER TUMOR, GYRUS, RIGHT STENT PLACEMENT AND RIGHT STENT REMOVAL performed by Kia Priest MD at [address] 09/09/2021    CYSTOSCOPY, TRANSURETHRAL RESECTION BLADDER TUMOR performed by Kia Priest MD at [address]  02/16/2022    Bladder biopsy, Fulguration     CYSTOSCOPY N/A 2/16/2022    CYSTOSCOPY BLADDER BIOPSY, FULGURATION performed by Kia Priest MD at [facility]  10/2015    all teeth extracted    HERNIA REPAIR N/A 08/07/2020    HERNIA INCISIONAL REPAIR LAPAROSCOPIC ROBOTIC WITH MESH performed by Kristine Plasencia DO at [facility] Right     UMBILICAL HERNIA REPAIR  3022-19    UPPER GASTROINTESTINAL ENDOSCOPY  01/25/2015    UPPER GASTROINTESTINAL ENDOSCOPY N/A 10/08/2019    EGD BIOPSY performed by [facility] Dodd MD at [facility]     Family History   Problem Relation Age of Onset    Diabetes Mother     Heart Disease Father     High Blood Pressure Father      Outpatient Medications Marked as Taking for the 2/28/22 encounter (Office Visit) with Kia Priest MD   Medication Sig Dispense Refill    [START ON 3/3/2022] oxyCODONE-acetaminophen (PERCOCET) 5-325 MG per tablet Take 1 tablet by mouth every 8 hours for 30 days. 90 tablet 0    [START ON 3/3/2022] morphine (MS CONTIN) 60 MG extended release tablet Take 1 tablet by mouth 2 times daily for 30 days.  60 tablet 0    metFORMIN (GLUCOPHAGE XR) 500 MG extended release tablet Take 1 tablet by mouth daily (with breakfast) ** stop Metformin 1000 mg BID** 90 tablet 3    lisinopril (PRINIVIL;ZESTRIL) 10 MG tablet Take 1 tablet by mouth daily ** stop Lisinopril HCTZ** 90 tablet 3    vitamin D (ERGOCALCIFEROL) 1.25 MG (69275 UT) CAPS capsule TAKE ONE CAPSULE BY MOUTH ONCE WEEKLY 4 capsule 2    pregabalin (LYRICA) 150 MG capsule Take 1 capsule by mouth 3 times daily for 90 days. 90 capsule 2    glucose monitoring (FREESTYLE FREEDOM) kit Please supply Patient with a glucose monitoring kit that insurance will cover. 1 kit 0    blood glucose monitor strips Testing once a day. Please dispense according to patients insurance.  100 strip 3    fluticasone (FLONASE) 50 MCG/ACT nasal spray 2 sprays by Nasal route daily 16 g 0    baclofen (LIORESAL) 10 MG tablet Take 1 tablet by mouth 3 times daily as needed (back pain) 270 tablet 1    pantoprazole (PROTONIX) 40 MG tablet Take 1 tablet by mouth daily 90 tablet 1    cyanocobalamin 1000 MCG/ML injection Inject 1 mL into the muscle every 30 days Call for next refill which will be monthly for life 3 mL 3    pravastatin (PRAVACHOL) 40 MG tablet Take 1 tablet by mouth every evening Stop Gemfibrozil 90 tablet 3    metoprolol tartrate (LOPRESSOR) 25 MG tablet Take 1 tablet by mouth 2 times daily 180 tablet 3    TRUEplus Lancets 30G MISC Test blood glucose twice a day 200 each 3    Alcohol Swabs (B-D SINGLE USE SWABS REGULAR) PADS USE TO CHECK BLOOD SUGAR TWICE A  each 3    Syringe/Needle, Disp, (SYRINGE 3CC/25GX1\") 25G X 1\" 3 ML MISC To be used with B12 injections 50 each 2    loperamide (RA ANTI-DIARRHEAL) 2 MG capsule Take 1 capsule by mouth 4 times daily as needed for Diarrhea 112 capsule 2    Probiotic Product (PROBIOTIC-10 PO) Take by mouth daily       Blood Glucose Monitoring Suppl (TRUE METRIX METER) w/Device KIT USE AS DIRECTED TO TEST BLOOD SUGAR         Asa [aspirin], Iron, and Nsaids  Social History     Tobacco Use   Smoking Status Former Smoker    Packs/day: 2.00    Years: 45.00    Pack years: 90.00    Types: Cigarettes    Start date: 1968   Merchant Quit date: 2015    Years since quittin.2   Smokeless Tobacco Never Used   Tobacco Comment    on chantix 11-6-15 12-7-15     (Ifpatient a smoker, smoking cessation counseling offered)    Social History     Substance and Sexual Activity   Alcohol Use No       REVIEW OF SYSTEMS:  Review of Systems    Physical Exam:      Vitals:    02/28/22 1124   BP: 104/60   Pulse: 82   Temp: 96.5 °F (35.8 °C)   SpO2: 95%     Body mass index is 31.75 kg/m². Patient is a 73 y.o. male in no acute distress and alert and oriented to person, place and time. Physical Exam  Constitutional: Patient in no acute distress. Neuro: Alert and oriented to person, place and time. Psych: Mood normal, affect normal      Assessment and Plan      1. Malignant neoplasm of lateral wall of urinary bladder (HCC)    2. Gross hematuria           Plan:   F/u 3 mo cysto for continued surveillance of bladder ca      Return in about 3 months (around 5/28/2022) for Cystoscopy. Prescriptions Ordered:  No orders of the defined types were placed in this encounter. Orders Placed:  No orders of the defined types were placed in this encounter. Phyllis Chahal MD    Agree with the ROS entered by the MA.

## 2022-03-03 ENCOUNTER — TELEPHONE (OUTPATIENT)
Dept: PAIN MANAGEMENT | Age: 72
End: 2022-03-03

## 2022-03-03 NOTE — TELEPHONE ENCOUNTER
[address] [address]  RX 6270  GROUP: [facility]  [phone]    Patient calls today asking for a PA on all his medications he had prescribed to him by Deonna Riojas. I was able to get the information above from the insurance. Did call [facility] and left a message asking them to start the process for this patient's PA through Cover My Meds. Will Route to Maxx Briceño CNP who saw patient at his last appointment.

## 2022-03-06 ENCOUNTER — PATIENT MESSAGE (OUTPATIENT)
Dept: FAMILY MEDICINE CLINIC | Age: 72
End: 2022-03-06

## 2022-03-06 DIAGNOSIS — M46.92 CERVICAL SPONDYLITIS (HCC): ICD-10-CM

## 2022-03-06 DIAGNOSIS — M47.816 LUMBAR SPONDYLOSIS: ICD-10-CM

## 2022-03-06 DIAGNOSIS — Z98.1 S/P CERVICAL SPINAL FUSION: ICD-10-CM

## 2022-03-06 DIAGNOSIS — M51.36 DEGENERATIVE DISC DISEASE, LUMBAR: ICD-10-CM

## 2022-03-06 DIAGNOSIS — M54.16 LUMBAR RADICULOPATHY: ICD-10-CM

## 2022-03-07 RX ORDER — BACLOFEN 10 MG/1
10 TABLET ORAL 3 TIMES DAILY PRN
Qty: 270 TABLET | Refills: 1 | Status: SHIPPED | OUTPATIENT
Start: 2022-03-07 | End: 2022-05-31 | Stop reason: SDUPTHER

## 2022-03-07 NOTE — TELEPHONE ENCOUNTER
From: Jessica Wei  To: Dr. Lucie Chan: 3/6/2022 8:55 PM EST  Subject: RX BACLOFEN 10mg #270 1 TID    Hello Dr Willa Reeder,   Can you please refill Viral Elmore listed in Subject line? [employer] [phone].   Thank you,   Kate~spouse  [phone]

## 2022-03-07 NOTE — TELEPHONE ENCOUNTER
Please Approve or Refuse.   Send to Pharmacy per Pt's Request: daniel     Next Visit Date:  6/29/2022   Last Visit Date: 2/25/2022    Hemoglobin A1C (%)   Date Value   09/24/2021 5.0   01/11/2021 5.9   07/13/2020 6.2 (H)             ( goal A1C is < 7)   BP Readings from Last 3 Encounters:   02/28/22 131/84   02/28/22 104/60   02/16/22 98/63          (goal 120/80)  BUN   Date Value Ref Range Status   02/09/2022 15 8 - 23 mg/dL Final     CREATININE   Date Value Ref Range Status   02/09/2022 0.80 0.70 - 1.20 mg/dL Final     Potassium   Date Value Ref Range Status   02/09/2022 4.6 3.7 - 5.3 mmol/L Final

## 2022-03-12 ENCOUNTER — HOSPITAL ENCOUNTER (OUTPATIENT)
Age: 72
Discharge: HOME | End: 2022-03-12
Payer: MEDICARE

## 2022-03-12 LAB
ALBUMIN SERPL-MCNC: 4.6 G/DL (ref 3.5–5.2)
ALP BLD-CCNC: 79 U/L (ref 40–129)
ALT SERPL-CCNC: 16 U/L (ref 5–41)
ANION GAP SERPL CALCULATED.3IONS-SCNC: 11 MMOL/L (ref 9–17)
AST SERPL-CCNC: 15 U/L
BACTERIA: NORMAL
BILIRUB SERPL-MCNC: 0.38 MG/DL (ref 0.3–1.2)
BILIRUBIN URINE: NEGATIVE
BUN BLDV-MCNC: 15 MG/DL (ref 8–23)
CALCIUM SERPL-MCNC: 9.3 MG/DL (ref 8.6–10.4)
CHLORIDE BLD-SCNC: 105 MMOL/L (ref 98–107)
CHOLESTEROL/HDL RATIO: 4.2
CHOLESTEROL: 100 MG/DL
CO2: 27 MMOL/L (ref 20–31)
COLOR: ABNORMAL
CREAT SERPL-MCNC: 0.73 MG/DL (ref 0.7–1.2)
EPITHELIAL CELLS UA: NORMAL /HPF
FOLATE: 8.4 NG/ML
GFR AFRICAN AMERICAN: >60 ML/MIN
GFR NON-AFRICAN AMERICAN: >60 ML/MIN
GFR SERPL CREATININE-BSD FRML MDRD: ABNORMAL ML/MIN/{1.73_M2}
GLUCOSE BLD-MCNC: 142 MG/DL (ref 70–99)
GLUCOSE URINE: NEGATIVE
HCT VFR BLD CALC: 34.8 % (ref 41–53)
HDLC SERPL-MCNC: 24 MG/DL
HEMOGLOBIN: 11.6 G/DL (ref 13.5–17.5)
KETONES, URINE: ABNORMAL
LDL CHOLESTEROL: 19 MG/DL (ref 0–130)
LEUKOCYTE ESTERASE, URINE: NEGATIVE
MCH RBC QN AUTO: 27.2 PG (ref 26–34)
MCHC RBC AUTO-ENTMCNC: 33.2 G/DL (ref 31–37)
MCV RBC AUTO: 82.1 FL (ref 80–100)
NITRITE, URINE: NEGATIVE
PDW BLD-RTO: 23.1 % (ref 11.5–14.9)
PH UA: 5 (ref 5–8)
PLATELET # BLD: 280 K/UL (ref 150–450)
PMV BLD AUTO: 7.5 FL (ref 6–12)
POTASSIUM SERPL-SCNC: 4.9 MMOL/L (ref 3.7–5.3)
PROTEIN UA: ABNORMAL
RBC # BLD: 4.25 M/UL (ref 4.5–5.9)
RBC UA: NORMAL /HPF
SODIUM BLD-SCNC: 143 MMOL/L (ref 135–144)
SPECIFIC GRAVITY UA: 1.03 (ref 1–1.03)
TOTAL PROTEIN: 6.6 G/DL (ref 6.4–8.3)
TRIGL SERPL-MCNC: 285 MG/DL
TSH SERPL DL<=0.05 MIU/L-ACNC: 1.21 MIU/L (ref 0.3–5)
TURBIDITY: CLEAR
URINE HGB: NEGATIVE
UROBILINOGEN, URINE: NORMAL
VITAMIN B-12: 564 PG/ML (ref 232–1245)
VITAMIN D 25-HYDROXY: 28.2 NG/ML
WBC # BLD: 7.8 K/UL (ref 3.5–11)
WBC UA: NORMAL /HPF

## 2022-03-12 PROCEDURE — 85027 COMPLETE CBC AUTOMATED: CPT

## 2022-03-12 PROCEDURE — 84443 ASSAY THYROID STIM HORMONE: CPT

## 2022-03-12 PROCEDURE — 83036 HEMOGLOBIN GLYCOSYLATED A1C: CPT

## 2022-03-12 PROCEDURE — 87522 HEPATITIS C REVRS TRNSCRPJ: CPT

## 2022-03-12 PROCEDURE — 82746 ASSAY OF FOLIC ACID SERUM: CPT

## 2022-03-12 PROCEDURE — 80053 COMPREHEN METABOLIC PANEL: CPT

## 2022-03-12 PROCEDURE — 80061 LIPID PANEL: CPT

## 2022-03-12 PROCEDURE — 82306 VITAMIN D 25 HYDROXY: CPT

## 2022-03-12 PROCEDURE — 36415 COLL VENOUS BLD VENIPUNCTURE: CPT

## 2022-03-12 PROCEDURE — 82607 VITAMIN B-12: CPT

## 2022-03-12 PROCEDURE — 81015 MICROSCOPIC EXAM OF URINE: CPT

## 2022-03-13 LAB
ESTIMATED AVERAGE GLUCOSE: 103 MG/DL
HBA1C MFR BLD: 5.2 % (ref 4–6)

## 2022-03-14 DIAGNOSIS — E55.9 VITAMIN D DEFICIENCY: Primary | ICD-10-CM

## 2022-03-14 RX ORDER — CHOLECALCIFEROL (VITAMIN D3) 50 MCG
2000 TABLET ORAL DAILY
Qty: 30 TABLET | Refills: 3 | Status: SHIPPED | OUTPATIENT
Start: 2022-03-14 | End: 2022-07-14

## 2022-03-14 NOTE — RESULT ENCOUNTER NOTE
Please notify patient: Hemoglobin A1c 5.2 very well controlled diabetes. Blood glucose 142Vitamin D is improving, he could continue with vitamin D 2000 units daily from over-the-counter, take with food to get observed. High triglycerides, continue to cut down on sweets.   Anemia is improving  Otherwise labs within normal limits  continue current treatment    Future Appointments  3/16/2022  11:45 AM   Matthew Shay MD          [address]  3/29/2022  9:40 AM    Alessio Cadena AP* [id number] [facility]  3/31/2022  3:30 PM    Ady Guerrero MD          [facility]        [facility]  5/23/2022  11:00 AM   Lennart Kanner, MD         [facility]           [facility]  6/29/2022  1:30 PM    Alon Mejia MD     [facility]               [address] [facility]

## 2022-03-15 LAB
HEPATITIS C RNA PCR QUANT: NOT DETECTED
SOURCE: NORMAL

## 2022-03-15 NOTE — RESULT ENCOUNTER NOTE
Hepatitis C undetectable, normal  Please advise the patient, there was blood in the stool on 9/16/2020, his colonoscopy is due this year, I would like him to discuss this with Dr. Sven Tan on 3/31/2022 and to get his colonoscopy done    Future Appointments  3/16/2022  11:45 AM   Howard Hobson MD          [address]  3/29/2022  9:40 AM    Jorge Perez, AP* [id number]  3/31/2022  3:30 PM    Anthony Matthews MD          [facility]        [facility]  5/23/2022  11:00 AM   Neptali Moore MD         [facility]           [facility]  6/29/2022  1:30 PM    [facility]  9/27/2022  10:00 AM   Oni Lara MD     [facility]

## 2022-03-16 ENCOUNTER — TELEPHONE (OUTPATIENT)
Dept: ONCOLOGY | Age: 72
End: 2022-03-16

## 2022-03-16 ENCOUNTER — OFFICE VISIT (OUTPATIENT)
Dept: ONCOLOGY | Age: 72
End: 2022-03-16
Payer: MEDICARE

## 2022-03-16 ENCOUNTER — HOSPITAL ENCOUNTER (OUTPATIENT)
Age: 72
Discharge: HOME | End: 2022-03-16
Payer: MEDICARE

## 2022-03-16 VITALS
HEART RATE: 82 BPM | DIASTOLIC BLOOD PRESSURE: 68 MMHG | SYSTOLIC BLOOD PRESSURE: 130 MMHG | BODY MASS INDEX: 32.59 KG/M2 | WEIGHT: 220.7 LBS | TEMPERATURE: 97.1 F

## 2022-03-16 DIAGNOSIS — C67.2 MALIGNANT NEOPLASM OF LATERAL WALL OF URINARY BLADDER (HCC): ICD-10-CM

## 2022-03-16 DIAGNOSIS — D50.0 IRON DEFICIENCY ANEMIA DUE TO CHRONIC BLOOD LOSS: ICD-10-CM

## 2022-03-16 DIAGNOSIS — K92.2 GASTROINTESTINAL HEMORRHAGE, UNSPECIFIED GASTROINTESTINAL HEMORRHAGE TYPE: ICD-10-CM

## 2022-03-16 DIAGNOSIS — D64.9 ANEMIA, UNSPECIFIED TYPE: ICD-10-CM

## 2022-03-16 DIAGNOSIS — D50.8 IRON DEFICIENCY ANEMIA SECONDARY TO INADEQUATE DIETARY IRON INTAKE: Primary | ICD-10-CM

## 2022-03-16 DIAGNOSIS — E53.8 B12 DEFICIENCY: ICD-10-CM

## 2022-03-16 LAB
ABSOLUTE EOS #: 0.06 K/UL (ref 0–0.4)
ABSOLUTE LYMPH #: 1.68 K/UL (ref 1–4.8)
ABSOLUTE MONO #: 0.46 K/UL (ref 0.1–1.2)
BASOPHILS # BLD: 1 % (ref 0–2)
BASOPHILS ABSOLUTE: 0.06 K/UL (ref 0–0.2)
EOSINOPHILS RELATIVE PERCENT: 1 % (ref 1–4)
FERRITIN: 17 UG/L (ref 30–400)
HCT VFR BLD CALC: 32.9 % (ref 41–53)
HEMOGLOBIN: 10.8 G/DL (ref 13.5–17.5)
IRON SATURATION: 6 % (ref 20–55)
IRON: 21 UG/DL (ref 59–158)
LYMPHOCYTES # BLD: 29 % (ref 24–44)
MCH RBC QN AUTO: 26.7 PG (ref 26–34)
MCHC RBC AUTO-ENTMCNC: 32.7 G/DL (ref 31–37)
MCV RBC AUTO: 81.6 FL (ref 80–100)
MONOCYTES # BLD: 8 % (ref 2–11)
MORPHOLOGY: ABNORMAL
PDW BLD-RTO: 22.1 % (ref 12.5–15.4)
PLATELET # BLD: 321 K/UL (ref 140–450)
PMV BLD AUTO: 7.1 FL (ref 6–12)
RBC # BLD: 4.03 M/UL (ref 4.5–5.9)
SEG NEUTROPHILS: 61 % (ref 36–66)
SEGMENTED NEUTROPHILS ABSOLUTE COUNT: 3.54 K/UL (ref 1.8–7.7)
TOTAL IRON BINDING CAPACITY: 361 UG/DL (ref 250–450)
UNSATURATED IRON BINDING CAPACITY: 340 UG/DL (ref 112–347)
WBC # BLD: 5.8 K/UL (ref 3.5–11)

## 2022-03-16 PROCEDURE — 1123F ACP DISCUSS/DSCN MKR DOCD: CPT | Performed by: INTERNAL MEDICINE

## 2022-03-16 PROCEDURE — 83550 IRON BINDING TEST: CPT

## 2022-03-16 PROCEDURE — G8427 DOCREV CUR MEDS BY ELIG CLIN: HCPCS | Performed by: INTERNAL MEDICINE

## 2022-03-16 PROCEDURE — G8417 CALC BMI ABV UP PARAM F/U: HCPCS | Performed by: INTERNAL MEDICINE

## 2022-03-16 PROCEDURE — 82728 ASSAY OF FERRITIN: CPT

## 2022-03-16 PROCEDURE — 36415 COLL VENOUS BLD VENIPUNCTURE: CPT

## 2022-03-16 PROCEDURE — 99214 OFFICE O/P EST MOD 30 MIN: CPT | Performed by: INTERNAL MEDICINE

## 2022-03-16 PROCEDURE — 83540 ASSAY OF IRON: CPT

## 2022-03-16 PROCEDURE — 85025 COMPLETE CBC W/AUTO DIFF WBC: CPT

## 2022-03-16 PROCEDURE — 1036F TOBACCO NON-USER: CPT | Performed by: INTERNAL MEDICINE

## 2022-03-16 PROCEDURE — 3017F COLORECTAL CA SCREEN DOC REV: CPT | Performed by: INTERNAL MEDICINE

## 2022-03-16 PROCEDURE — 4040F PNEUMOC VAC/ADMIN/RCVD: CPT | Performed by: INTERNAL MEDICINE

## 2022-03-16 PROCEDURE — G8484 FLU IMMUNIZE NO ADMIN: HCPCS | Performed by: INTERNAL MEDICINE

## 2022-03-16 NOTE — TELEPHONE ENCOUNTER
Labs now tibc and ferritin today  rv in 2 months with labs prior Cbc tibc and ferritin    Pt to have labs drawn after md visit    RV scheduled 5/18/22 @ 11:30am    PT will have labs drawn one week prior to return visit    PT was given lab orders, AVS and an appt schedule    Electronically signed by Tashi Aguiar on 3/16/2022 at 12:22 PM

## 2022-03-16 NOTE — PROGRESS NOTES
Subjective:   PCP: Sadiq Moreno MD       Chief Complaint   Patient presents with    Follow-up     review status of disease    Discuss Labs     results     Reviewed results of lab work-up. INTERIM HISTORY: Patient presents to the clinic for hematology follow up and discuss lab work. He is feeling well and his last iron infusion was 01/2022. He has GI follow up scheduled in 2 weeks, he will discuss updated colonoscopy. He denies any hematuria and is following with urology, last cystoscopy was negative. He is taking vitamin D with recent adjust to daily dosing, he continues with B12. During this visit patient's allergy, social, medical, surgical history and medications were reviewed and updated. REVIEW OF SYSTEMS:   General: No fever or night sweats. Weight is stable. +fatigue   ENT: No double or blurred vision, no hearing problem, no dysphagia or sore throat   Respiratory: No chest pain, no shortness of breath, no cough or hemoptysis. Cardiovascular: Denies chest pain, PND or orthopnea. No L E swelling or palpitations. Gastrointestinal: No nausea or vomiting, abdominal pain, or constipation, diarrhea  Genitourinary: Denies dysuria, frequency, urgency or incontinence. +hematuria - resolved  Neurological: Denies headaches, decreased LOC, no sensory or motor focal deficits. Musculoskeletal: No arthralgia no back pain or joint swelling. Skin: There are no rashes or bleeding. Psych: Denies hallucinations or intentions to harm self      PHYSICAL EXAM:   Vitals: /68   Pulse 82   Temp 97.1 °F (36.2 °C) (Temporal)   Wt 220 lb 11.2 oz (100.1 kg)   BMI 32.59 kg/m²   General appearance: alert and cooperative with exam  HEENT: Head: Normocephalic, no lesions, without obvious abnormality.   Neck: no adenopathy  Lungs: clear to auscultation bilaterally  Heart: regular rate and rhythm, S1, S2 normal, no murmur, click, rub or gallop  Abdomen: soft, non-tender; bowel sounds normal; no masses,  no organomegaly  Extremities: extremities normal, atraumatic, no cyanosis or edema  Neurologic: Mental status: Alert, oriented, thought content appropriate      REVIEW OF LABORATORY DATA:   Lab Results   Component Value Date    WBC 7.8 03/12/2022    HGB 11.6 (L) 03/12/2022    HCT 34.8 (L) 03/12/2022    MCV 82.1 03/12/2022     03/12/2022       Chemistry        Component Value Date/Time     03/12/2022 0930    K 4.9 03/12/2022 0930     03/12/2022 0930    CO2 27 03/12/2022 0930    BUN 15 03/12/2022 0930    CREATININE 0.73 03/12/2022 0930        Component Value Date/Time    CALCIUM 9.3 03/12/2022 0930    ALKPHOS 79 03/12/2022 0930    AST 15 03/12/2022 0930    ALT 16 03/12/2022 0930    BILITOT 0.38 03/12/2022 0930        Lab Results   Component Value Date    GJJTDIAT02 159 03/12/2022         Lab Results   Component Value Date    IRON 26 (L) 01/11/2022    TIBC 346 01/11/2022    FERRITIN 39 01/11/2022         IMPRESSION:   81year old male with history of bleeding ulcer back in 2015 and iron def, now with iron def anemia, and stool occult stools    -s/p IV iron with improvement in iron stores and hemoglobin  -EGD and colonoscopy 10/2019 did not show active bleeding, still no active source of iron def  -s/p GI in 12/3/19, concern for still blood loss, iron def, no plasns for repeat capsule video study, he has completed treatment for hepatitis C with [employer].  -repeat iron studies are consistent with iron deficiency  -transfuse if Hbg is less than 7.0    -B12 deficiency, patient started on B12 shots 7/2020  -Non-invasive urothelial carcinoma, low grade, TURBT, 04/2021    PLAN:   We reviewed his lab work, his vitamin D remains low but has had recent adjustment, HGB has improved with IV iron, counts and electrolytes in range otherwise, B12 is in range. I am ordering iron studies to be checked today and we will continue iron infusion as needed. Clinically he is doing well.   Continue B12 supplementation  Continue surveillance for bladder cancer  We will follow up on results of colonoscopy  Return in 2 months with repeat lab work. Addendum:    Patient iron studies show Iron deficiency with iron saturation of only 6% and ferritin of 17. Scribe Attestation   This note was created by Carmen Rodriges acting as scribe for the physician signing this note  Electronically Signed  Carmen Rodriges, 3/16/2022  Scribe, [employer]. Attending Attestation   Note was reviewed and edited. I am in agreement with the note as entered      This note is created with the assistance of a speech recognition program.  While intending to generate a document that actually reflects the content of the visit, the document can still have some errors including those of syntax and sound a like substitutions which may escape proof reading. It such instances, actual meaning can be extrapolated by contextual diversion.

## 2022-03-17 RX ORDER — EPINEPHRINE 1 MG/ML
0.3 INJECTION, SOLUTION, CONCENTRATE INTRAVENOUS PRN
Status: CANCELLED | OUTPATIENT
Start: 2022-03-17

## 2022-03-17 RX ORDER — ONDANSETRON 2 MG/ML
8 INJECTION INTRAMUSCULAR; INTRAVENOUS
Status: CANCELLED | OUTPATIENT
Start: 2022-03-17

## 2022-03-17 RX ORDER — SODIUM CHLORIDE 0.9 % (FLUSH) 0.9 %
5-40 SYRINGE (ML) INJECTION PRN
Status: CANCELLED | OUTPATIENT
Start: 2022-03-17

## 2022-03-17 RX ORDER — ALBUTEROL SULFATE 90 UG/1
4 AEROSOL, METERED RESPIRATORY (INHALATION) PRN
Status: CANCELLED | OUTPATIENT
Start: 2022-03-17

## 2022-03-17 RX ORDER — SODIUM CHLORIDE 9 MG/ML
INJECTION, SOLUTION INTRAVENOUS CONTINUOUS
Status: CANCELLED | OUTPATIENT
Start: 2022-03-17

## 2022-03-17 RX ORDER — SODIUM CHLORIDE 9 MG/ML
25 INJECTION, SOLUTION INTRAVENOUS PRN
Status: CANCELLED | OUTPATIENT
Start: 2022-03-17

## 2022-03-17 RX ORDER — DIPHENHYDRAMINE HYDROCHLORIDE 50 MG/ML
50 INJECTION INTRAMUSCULAR; INTRAVENOUS
Status: CANCELLED | OUTPATIENT
Start: 2022-03-17

## 2022-03-17 RX ORDER — ACETAMINOPHEN 325 MG/1
650 TABLET ORAL
Status: CANCELLED | OUTPATIENT
Start: 2022-03-17

## 2022-03-17 RX ORDER — HEPARIN SODIUM (PORCINE) LOCK FLUSH IV SOLN 100 UNIT/ML 100 UNIT/ML
500 SOLUTION INTRAVENOUS PRN
Status: CANCELLED | OUTPATIENT
Start: 2022-03-17

## 2022-03-23 ENCOUNTER — HOSPITAL ENCOUNTER (OUTPATIENT)
Dept: INFUSION THERAPY | Age: 72
Discharge: HOME | End: 2022-03-23
Payer: MEDICARE

## 2022-03-23 VITALS
DIASTOLIC BLOOD PRESSURE: 66 MMHG | RESPIRATION RATE: 18 BRPM | TEMPERATURE: 98.2 F | HEART RATE: 82 BPM | SYSTOLIC BLOOD PRESSURE: 107 MMHG

## 2022-03-23 DIAGNOSIS — D50.8 IRON DEFICIENCY ANEMIA SECONDARY TO INADEQUATE DIETARY IRON INTAKE: Primary | ICD-10-CM

## 2022-03-23 PROCEDURE — 96365 THER/PROPH/DIAG IV INF INIT: CPT

## 2022-03-23 PROCEDURE — 2580000003 HC RX 258: Performed by: INTERNAL MEDICINE

## 2022-03-23 PROCEDURE — 6360000002 HC RX W HCPCS: Performed by: INTERNAL MEDICINE

## 2022-03-23 RX ORDER — SODIUM CHLORIDE 9 MG/ML
25 INJECTION, SOLUTION INTRAVENOUS PRN
Status: CANCELLED | OUTPATIENT
Start: 2022-03-30

## 2022-03-23 RX ORDER — ALBUTEROL SULFATE 90 UG/1
4 AEROSOL, METERED RESPIRATORY (INHALATION) PRN
Status: CANCELLED | OUTPATIENT
Start: 2022-03-30

## 2022-03-23 RX ORDER — SODIUM CHLORIDE 9 MG/ML
INJECTION, SOLUTION INTRAVENOUS CONTINUOUS
Status: ACTIVE | OUTPATIENT
Start: 2022-03-23 | End: 2022-03-23

## 2022-03-23 RX ORDER — ONDANSETRON 2 MG/ML
8 INJECTION INTRAMUSCULAR; INTRAVENOUS
Status: CANCELLED | OUTPATIENT
Start: 2022-03-30

## 2022-03-23 RX ORDER — ACETAMINOPHEN 325 MG/1
650 TABLET ORAL
Status: CANCELLED | OUTPATIENT
Start: 2022-03-30

## 2022-03-23 RX ORDER — HEPARIN SODIUM (PORCINE) LOCK FLUSH IV SOLN 100 UNIT/ML 100 UNIT/ML
500 SOLUTION INTRAVENOUS PRN
Status: CANCELLED | OUTPATIENT
Start: 2022-03-30

## 2022-03-23 RX ORDER — EPINEPHRINE 1 MG/ML
0.3 INJECTION, SOLUTION, CONCENTRATE INTRAVENOUS PRN
Status: CANCELLED | OUTPATIENT
Start: 2022-03-30

## 2022-03-23 RX ORDER — SODIUM CHLORIDE 9 MG/ML
INJECTION, SOLUTION INTRAVENOUS CONTINUOUS
Status: CANCELLED | OUTPATIENT
Start: 2022-03-30

## 2022-03-23 RX ORDER — SODIUM CHLORIDE 0.9 % (FLUSH) 0.9 %
5-40 SYRINGE (ML) INJECTION PRN
Status: CANCELLED | OUTPATIENT
Start: 2022-03-30

## 2022-03-23 RX ORDER — DIPHENHYDRAMINE HYDROCHLORIDE 50 MG/ML
50 INJECTION INTRAMUSCULAR; INTRAVENOUS
Status: CANCELLED | OUTPATIENT
Start: 2022-03-30

## 2022-03-23 RX ADMIN — SODIUM CHLORIDE: 9 INJECTION, SOLUTION INTRAVENOUS at 09:10

## 2022-03-23 RX ADMIN — FERRIC CARBOXYMALTOSE INJECTION 750 MG: 50 INJECTION, SOLUTION INTRAVENOUS at 09:12

## 2022-03-23 NOTE — PROGRESS NOTES
Patient arrived for injectafer 1 of 2   Tolerated w/o incident   Return 3/30 injectafer 2 of 2   Randi Rojas RN

## 2022-03-28 ENCOUNTER — TELEPHONE (OUTPATIENT)
Dept: FAMILY MEDICINE CLINIC | Age: 72
End: 2022-03-28

## 2022-03-28 DIAGNOSIS — E11.42 TYPE 2 DIABETES MELLITUS WITH DIABETIC POLYNEUROPATHY, WITHOUT LONG-TERM CURRENT USE OF INSULIN (HCC): Primary | ICD-10-CM

## 2022-03-28 PROCEDURE — 3044F HG A1C LEVEL LT 7.0%: CPT | Performed by: FAMILY MEDICINE

## 2022-03-28 RX ORDER — BLOOD-GLUCOSE METER
KIT MISCELLANEOUS
Qty: 1 KIT | Refills: 0 | Status: SHIPPED | OUTPATIENT
Start: 2022-03-28

## 2022-03-28 RX ORDER — GLUCOSAMINE HCL/CHONDROITIN SU 500-400 MG
CAPSULE ORAL
Qty: 100 STRIP | Refills: 3 | Status: SHIPPED | OUTPATIENT
Start: 2022-03-28

## 2022-03-29 ENCOUNTER — HOSPITAL ENCOUNTER (OUTPATIENT)
Dept: PAIN MANAGEMENT | Age: 72
Discharge: HOME | End: 2022-03-29
Payer: MEDICARE

## 2022-03-29 VITALS
HEART RATE: 103 BPM | OXYGEN SATURATION: 95 % | DIASTOLIC BLOOD PRESSURE: 88 MMHG | SYSTOLIC BLOOD PRESSURE: 118 MMHG | TEMPERATURE: 97.5 F

## 2022-03-29 DIAGNOSIS — M47.816 LUMBAR SPONDYLOSIS: Chronic | ICD-10-CM

## 2022-03-29 DIAGNOSIS — M48.061 SPINAL STENOSIS OF LUMBAR REGION WITHOUT NEUROGENIC CLAUDICATION: Chronic | ICD-10-CM

## 2022-03-29 DIAGNOSIS — M48.061 SPINAL STENOSIS OF LUMBAR REGION, UNSPECIFIED WHETHER NEUROGENIC CLAUDICATION PRESENT: Chronic | ICD-10-CM

## 2022-03-29 DIAGNOSIS — G89.29 ENCOUNTER FOR CHRONIC PAIN MANAGEMENT: ICD-10-CM

## 2022-03-29 DIAGNOSIS — Z51.81 ENCOUNTER FOR MEDICATION MONITORING: Primary | ICD-10-CM

## 2022-03-29 DIAGNOSIS — M47.26 OSTEOARTHRITIS OF SPINE WITH RADICULOPATHY, LUMBAR REGION: ICD-10-CM

## 2022-03-29 DIAGNOSIS — M46.92 CERVICAL SPONDYLITIS (HCC): Chronic | ICD-10-CM

## 2022-03-29 DIAGNOSIS — M47.816 OSTEOARTHRITIS OF LUMBAR SPINE, UNSPECIFIED SPINAL OSTEOARTHRITIS COMPLICATION STATUS: ICD-10-CM

## 2022-03-29 DIAGNOSIS — M54.16 LUMBAR RADICULOPATHY: Chronic | ICD-10-CM

## 2022-03-29 DIAGNOSIS — Z98.1 S/P CERVICAL SPINAL FUSION: Chronic | ICD-10-CM

## 2022-03-29 DIAGNOSIS — M51.36 DEGENERATIVE DISC DISEASE, LUMBAR: Chronic | ICD-10-CM

## 2022-03-29 PROCEDURE — 80307 DRUG TEST PRSMV CHEM ANLYZR: CPT

## 2022-03-29 PROCEDURE — 99214 OFFICE O/P EST MOD 30 MIN: CPT | Performed by: NURSE PRACTITIONER

## 2022-03-29 PROCEDURE — 99213 OFFICE O/P EST LOW 20 MIN: CPT

## 2022-03-29 RX ORDER — OXYCODONE HYDROCHLORIDE AND ACETAMINOPHEN 5; 325 MG/1; MG/1
1 TABLET ORAL EVERY 8 HOURS PRN
Qty: 90 TABLET | Refills: 0 | Status: SHIPPED | OUTPATIENT
Start: 2022-04-02 | End: 2022-05-02 | Stop reason: SDUPTHER

## 2022-03-29 RX ORDER — MORPHINE SULFATE 60 MG/1
60 TABLET, FILM COATED, EXTENDED RELEASE ORAL DAILY
Qty: 30 TABLET | Refills: 0 | Status: SHIPPED | OUTPATIENT
Start: 2022-04-02 | End: 2022-05-02 | Stop reason: SDUPTHER

## 2022-03-29 RX ORDER — MORPHINE SULFATE 30 MG/1
30 TABLET, FILM COATED, EXTENDED RELEASE ORAL NIGHTLY
Qty: 30 TABLET | Refills: 0 | Status: SHIPPED | OUTPATIENT
Start: 2022-03-29 | End: 2022-05-02 | Stop reason: SDUPTHER

## 2022-03-29 ASSESSMENT — ENCOUNTER SYMPTOMS: BACK PAIN: 1

## 2022-03-29 NOTE — PROGRESS NOTES
Chief Complaint: back pain    PMH    Pt c/o low back pain for many years. MRI with multifocal degenerative disc disease throughout the lumbar spine. There is no known injury or surgery to the area. He does have a history of scoliosis. His last PT was over 4 years ago with no benefit and he is not interested in repeating. He does do home stretches every morning. He also had a LESI in 2013 that did not help. He has non-invasive bladder cancer and had resection of a tumor in April. He continues to follow with oncology and hematology - he continues Injectafer infusions for anemia. He follows with podiatry for foot pain. Did discuss MED of 142 and the risks related to high doses of opiates. Discussed taper of opioid dose and he states he was on a higher dose when he started with pain management and has tapered over time. He is agreeable to trying to lower his dose today. Back Pain  This is a chronic problem. The current episode started more than 1 year ago. The problem occurs constantly. The problem is unchanged. The pain is present in the lumbar spine. The quality of the pain is described as burning and aching. Radiates to: down left leg to the foot. The pain is at a severity of 4/10. The pain is mild. The symptoms are aggravated by position and standing (walking). Associated symptoms include numbness. He has tried analgesics and bed rest for the symptoms. The treatment provided mild relief. Patient denies any new neurological symptoms. No bowel or bladder incontinence, no weakness, and no falling. Pill count: appropriate due 4/2    Morphine equivalent: 142.5    Periodic Controlled Substance Monitoring: Possible medication side effects, risk of tolerance/dependence & alternative treatments discussed. ,No signs of potential drug abuse or diversion identified. ,Obtaining appropriate analgesic effect of treatment.  Rox Wahl, VEE - CNP)      Past Medical History:   Diagnosis Date    Anemia  Arthritis     osteoarthritis    Bladder cancer ([facility] [address] 03/2021    bladder    Bladder tumor     Chronic back pain     Chronic neck pain     Colon polyps 10/08/2019    tubular adenoma x2    COVID 01/06/2022    self reported-home test-no symptoms    COVID-19 virus infection 1/10/2022    Diabetic neuropathy ([facility] [address]     Diarrhea     Encounter for chronic pain management     [facility] Dr. Edel Hoover E visit 01/03/2022    Essential hypertension 05/12/2020    managed by Dr. Mack July PCP    GERD (gastroesophageal reflux disease)     Heartburn     Hematuria     Hepatitis B core antibody positive     History of bleeding peptic ulcer     History of blood transfusion     no reactions    History of hepatitis C     History of migraine headaches     History of stress test 07/2020    \"low risk\"    Hyperlipidemia     Iron (Fe) deficiency anemia     Neuropathy     Poor historian     states his wife takes care of all medical information    Positive FIT (fecal immunochemical test)     Type 2 diabetes mellitus with microalbuminuria, without long-term current use of insulin ([facility] 07/13/2020    managed by Dr. Mark Corado last e-visit 11/2021    Under care of team 02/09/2022    pcp-Dr Nyasia Ramires virtual visit 11/2021    Under care of team 02/09/2022    hematology-Dr Caprice [address] virtual visit 11/2021    Under care of team 02/09/2022    urology-Dr fairchild-last visit nov 2021    Wears dentures     Wears glasses     Worsening headaches 12/29/2020       Past Surgical History:   Procedure Laterality Date    CERVICAL SPINE SURGERY  1977    cervical spine three times, has plate    CHOLECYSTECTOMY  03/22/2019    COLONOSCOPY  01/25/2015    10 yr recall, hemorrhoids    COLONOSCOPY N/A 10/08/2019    tubular adenoma x2    CYSTOSCOPY Right 04/29/2021    CYSTOSCOPY TUR BLADDER TUMOR, GYRUS, RIGHT STENT PLACEMENT AND RIGHT STENT REMOVAL performed by Stew Arias MD at [facility] OR    CYSTOSCOPY N/A 09/09/2021    CYSTOSCOPY, TRANSURETHRAL RESECTION BLADDER TUMOR performed by Ivanna Ulrich MD at [address] [place]  02/16/2022    Bladder biopsy, Fulguration     CYSTOSCOPY N/A 2/16/2022    CYSTOSCOPY BLADDER BIOPSY, FULGURATION performed by Ivanna Ulrich MD at [address] [facility]   10/2015    all teeth extracted    HERNIA REPAIR N/A 08/07/2020    HERNIA INCISIONAL REPAIR LAPAROSCOPIC ROBOTIC WITH MESH performed by Taryn Solorzano DO at [facility] Right     UMBILICAL HERNIA REPAIR  3022-19    UPPER GASTROINTESTINAL ENDOSCOPY  01/25/2015    UPPER GASTROINTESTINAL ENDOSCOPY N/A 10/08/2019    EGD BIOPSY performed by Arlet Lomax MD at [address] [Aspirin]       iron deficiency anemia , requiring iron infusions and transfusions    Iron      Pill- constipation, abdominal pain    Nsaids       iron deficiency anemia, history of bleeding ulcers          Current Outpatient Medications:     glucose monitoring (FREESTYLE FREEDOM) kit, Please supply Patient with a glucose monitoring kit that insurance will cover. , Disp: 1 kit, Rfl: 0    blood glucose monitor strips, Testing once a day. Please dispense according to patients insurance., Disp: 100 strip, Rfl: 3    Lancets 30G MISC, Testing once a day. Please dispense according to patients insurance., Disp: 100 each, Rfl: 3    vitamin D (CHOLECALCIFEROL) 50 MCG (2000 UT) TABS tablet, Take 1 tablet by mouth daily, Disp: 30 tablet, Rfl: 3    baclofen (LIORESAL) 10 MG tablet, Take 1 tablet by mouth 3 times daily as needed (back pain), Disp: 270 tablet, Rfl: 1    oxyCODONE-acetaminophen (PERCOCET) 5-325 MG per tablet, Take 1 tablet by mouth every 8 hours for 30 days. , Disp: 90 tablet, Rfl: 0    morphine (MS CONTIN) 60 MG extended release tablet, Take 1 tablet by mouth 2 times daily for 30 days. , Disp: 60 tablet, Rfl: 0    metFORMIN (GLUCOPHAGE XR) 500 MG extended release tablet, Take 1 tablet by mouth daily (with breakfast) ** stop Metformin 1000 mg BID**, Disp: 90 tablet, Rfl: 3    lisinopril (PRINIVIL;ZESTRIL) 10 MG tablet, Take 1 tablet by mouth daily ** stop Lisinopril HCTZ**, Disp: 90 tablet, Rfl: 3    pregabalin (LYRICA) 150 MG capsule, Take 1 capsule by mouth 3 times daily for 90 days. , Disp: 90 capsule, Rfl: 2    fluticasone (FLONASE) 50 MCG/ACT nasal spray, 2 sprays by Nasal route daily, Disp: 16 g, Rfl: 0    pantoprazole (PROTONIX) 40 MG tablet, Take 1 tablet by mouth daily, Disp: 90 tablet, Rfl: 1    cyanocobalamin 1000 MCG/ML injection, Inject 1 mL into the muscle every 30 days Call for next refill which will be monthly for life, Disp: 3 mL, Rfl: 3    pravastatin (PRAVACHOL) 40 MG tablet, Take 1 tablet by mouth every evening Stop Gemfibrozil, Disp: 90 tablet, Rfl: 3    metoprolol tartrate (LOPRESSOR) 25 MG tablet, Take 1 tablet by mouth 2 times daily, Disp: 180 tablet, Rfl: 3    TRUEplus Lancets 30G MISC, Test blood glucose twice a day, Disp: 200 each, Rfl: 3    Alcohol Swabs (B-D SINGLE USE SWABS REGULAR) PADS, USE TO CHECK BLOOD SUGAR TWICE A DAY, Disp: 200 each, Rfl: 3    Syringe/Needle, Disp, (SYRINGE 3CC/25GX1\") 25G X 1\" 3 ML MISC, To be used with B12 injections, Disp: 50 each, Rfl: 2    loperamide (RA ANTI-DIARRHEAL) 2 MG capsule, Take 1 capsule by mouth 4 times daily as needed for Diarrhea, Disp: 112 capsule, Rfl: 2    Probiotic Product (PROBIOTIC-10 PO), Take by mouth daily , Disp: , Rfl:     Blood Glucose Monitoring Suppl (TRUE METRIX METER) w/Device KIT, USE AS DIRECTED TO TEST BLOOD SUGAR, Disp: , Rfl:     Family History   Problem Relation Age of Onset    Diabetes Mother     Heart Disease Father     High Blood Pressure Father        Social History     Socioeconomic History    Marital status:      Spouse name: Not on file    Number of children: Not on file    Years of education: Not on file    Highest education level: Not on file   Occupational History    Occupation: disabled   Tobacco Use    Smoking status: Former Smoker     Packs/day: 2.00     Years: 45.00     Pack years: 90.00     Types: Cigarettes     Start date: 1968     Quit date: 2015     Years since quittin.3    Smokeless tobacco: Never Used    Tobacco comment: on chantix 11-6-15   12-7-15   Vaping Use    Vaping Use: Never used   Substance and Sexual Activity    Alcohol use: No    Drug use: No    Sexual activity: Yes     Partners: Female   Other Topics Concern    Not on file   Social History Narrative    Not on file     Social Determinants of Health     Financial Resource Strain: High Risk    Difficulty of Paying Living Expenses: Very hard   Food Insecurity: Food Insecurity Present    Worried About Running Out of Food in the Last Year: Often true    Jennifer of Food in the Last Year: Often true   Transportation Needs:     Lack of Transportation (Medical): Not on file    Lack of Transportation (Non-Medical):  Not on file   Physical Activity:     Days of Exercise per Week: Not on file    Minutes of Exercise per Session: Not on file   Stress:     Feeling of Stress : Not on file   Social Connections:     Frequency of Communication with Friends and Family: Not on file    Frequency of Social Gatherings with Friends and Family: Not on file    Attends Yarsani Services: Not on file    Active Member of [address] [place] [address] or Organizations: Not on file    Attends Club or Organization Meetings: Not on file    Marital Status: Not on file   Intimate Partner Violence:     Fear of Current or Ex-Partner: Not on file    Emotionally Abused: Not on file    Physically Abused: Not on file    Sexually Abused: Not on file   Housing Stability:     Unable to Pay for Housing in the Last Year: Not on file    Number of [place] in the Last Year: Not on file    Unstable Housing in the Last Year: Not on file       Review of Systems:  Review of Systems Musculoskeletal: Positive for back pain. Neurological: Positive for numbness.        Physical Exam:  /88   Pulse 103   Temp 97.5 °F (36.4 °C)   SpO2 95%     Physical Exam    Record/Diagnostics Review:    Last zoe 4/2021 and was appropriate     Assessment:  Problem List Items Addressed This Visit     Degenerative disc disease, lumbar (Chronic)    Relevant Medications    oxyCODONE-acetaminophen (PERCOCET) 5-325 MG per tablet (Start on 4/2/2022)    morphine (MS CONTIN) 60 MG extended release tablet (Start on 4/2/2022)    morphine (MS CONTIN) 30 MG extended release tablet    Lumbar spondylosis (Chronic)    Relevant Medications    oxyCODONE-acetaminophen (PERCOCET) 5-325 MG per tablet (Start on 4/2/2022)    morphine (MS CONTIN) 60 MG extended release tablet (Start on 4/2/2022)    morphine (MS CONTIN) 30 MG extended release tablet    Lumbar radiculopathy (Chronic)    Relevant Medications    oxyCODONE-acetaminophen (PERCOCET) 5-325 MG per tablet (Start on 4/2/2022)    morphine (MS CONTIN) 60 MG extended release tablet (Start on 4/2/2022)    morphine (MS CONTIN) 30 MG extended release tablet    Lumbar spinal stenosis (Chronic)    Relevant Medications    oxyCODONE-acetaminophen (PERCOCET) 5-325 MG per tablet (Start on 4/2/2022)    morphine (MS CONTIN) 30 MG extended release tablet    Other Relevant Orders    DRUG SCREEN, PAIN    MRI LUMBAR SPINE WO CONTRAST    Cervical spondylitis (HCC) (Chronic)    Relevant Medications    oxyCODONE-acetaminophen (PERCOCET) 5-325 MG per tablet (Start on 4/2/2022)    S/P cervical spinal fusion (Chronic)    Relevant Medications    oxyCODONE-acetaminophen (PERCOCET) 5-325 MG per tablet (Start on 4/2/2022)    Encounter for medication monitoring - Primary    Relevant Medications    oxyCODONE-acetaminophen (PERCOCET) 5-325 MG per tablet (Start on 4/2/2022)    morphine (MS CONTIN) 30 MG extended release tablet    Other Relevant Orders    DRUG SCREEN, PAIN    Osteoarthritis of spine with radiculopathy, lumbar region    Relevant Medications    oxyCODONE-acetaminophen (PERCOCET) 5-325 MG per tablet (Start on 4/2/2022)    morphine (MS CONTIN) 60 MG extended release tablet (Start on 4/2/2022)    morphine (MS CONTIN) 30 MG extended release tablet    Osteoarthritis of lumbar spine    Relevant Medications    oxyCODONE-acetaminophen (PERCOCET) 5-325 MG per tablet (Start on 4/2/2022)    morphine (MS CONTIN) 60 MG extended release tablet (Start on 4/2/2022)    morphine (MS CONTIN) 30 MG extended release tablet      Other Visit Diagnoses     Encounter for chronic pain management        Relevant Medications    oxyCODONE-acetaminophen (PERCOCET) 5-325 MG per tablet (Start on 4/2/2022)             Treatment Plan:  Patient relates current medications are helping the pain. Patient reports taking pain medications as prescribed, denies obtaining medications from different sources and denies use of illegal drugs. Patient denies side effects from medications like nausea, vomiting, constipation or drowsiness. Patient reports current activities of daily living are possible due to medications and would like to continue them. As always, we encourage daily stretching and strengthening exercises, and recommend minimizing use of pain medications unless patient cannot get through daily activities due to pain. Contract requirements met. Continue opioid therapy.  Script written for percocet and MSER  Reduce dose of MSER to 60 mg am and 30 mg pm  Continue percocet 5-325 TID  He has tried physical therapy but states this worsens his pain  Will get updated imaging of the low back to guide treatment plan  UDS for standard monitoring purposes  Follow up appointment made for 4 weeks

## 2022-03-30 ENCOUNTER — HOSPITAL ENCOUNTER (OUTPATIENT)
Dept: INFUSION THERAPY | Age: 72
Discharge: HOME | End: 2022-03-30
Payer: MEDICARE

## 2022-03-30 VITALS
SYSTOLIC BLOOD PRESSURE: 104 MMHG | DIASTOLIC BLOOD PRESSURE: 67 MMHG | RESPIRATION RATE: 16 BRPM | HEART RATE: 93 BPM | TEMPERATURE: 98 F

## 2022-03-30 DIAGNOSIS — D50.8 IRON DEFICIENCY ANEMIA SECONDARY TO INADEQUATE DIETARY IRON INTAKE: Primary | ICD-10-CM

## 2022-03-30 PROCEDURE — 6360000002 HC RX W HCPCS: Performed by: INTERNAL MEDICINE

## 2022-03-30 PROCEDURE — 96365 THER/PROPH/DIAG IV INF INIT: CPT

## 2022-03-30 PROCEDURE — 2580000003 HC RX 258: Performed by: INTERNAL MEDICINE

## 2022-03-30 RX ORDER — ALBUTEROL SULFATE 90 UG/1
4 AEROSOL, METERED RESPIRATORY (INHALATION) PRN
Status: CANCELLED | OUTPATIENT
Start: 2022-03-30

## 2022-03-30 RX ORDER — EPINEPHRINE 1 MG/ML
0.3 INJECTION, SOLUTION, CONCENTRATE INTRAVENOUS PRN
Status: CANCELLED | OUTPATIENT
Start: 2022-03-30

## 2022-03-30 RX ORDER — SODIUM CHLORIDE 0.9 % (FLUSH) 0.9 %
5-40 SYRINGE (ML) INJECTION PRN
Status: CANCELLED | OUTPATIENT
Start: 2022-03-30

## 2022-03-30 RX ORDER — HEPARIN SODIUM (PORCINE) LOCK FLUSH IV SOLN 100 UNIT/ML 100 UNIT/ML
500 SOLUTION INTRAVENOUS PRN
Status: CANCELLED | OUTPATIENT
Start: 2022-03-30

## 2022-03-30 RX ORDER — SODIUM CHLORIDE 9 MG/ML
INJECTION, SOLUTION INTRAVENOUS CONTINUOUS
Status: ACTIVE | OUTPATIENT
Start: 2022-03-30 | End: 2022-03-30

## 2022-03-30 RX ORDER — ONDANSETRON 2 MG/ML
8 INJECTION INTRAMUSCULAR; INTRAVENOUS
Status: CANCELLED | OUTPATIENT
Start: 2022-03-30

## 2022-03-30 RX ORDER — ACETAMINOPHEN 325 MG/1
650 TABLET ORAL
Status: CANCELLED | OUTPATIENT
Start: 2022-03-30

## 2022-03-30 RX ORDER — SODIUM CHLORIDE 9 MG/ML
INJECTION, SOLUTION INTRAVENOUS CONTINUOUS
Status: CANCELLED | OUTPATIENT
Start: 2022-03-30

## 2022-03-30 RX ORDER — DIPHENHYDRAMINE HYDROCHLORIDE 50 MG/ML
50 INJECTION INTRAMUSCULAR; INTRAVENOUS
Status: CANCELLED | OUTPATIENT
Start: 2022-03-30

## 2022-03-30 RX ORDER — SODIUM CHLORIDE 9 MG/ML
25 INJECTION, SOLUTION INTRAVENOUS PRN
Status: CANCELLED | OUTPATIENT
Start: 2022-03-30

## 2022-03-30 RX ADMIN — SODIUM CHLORIDE: 9 INJECTION, SOLUTION INTRAVENOUS at 09:15

## 2022-03-30 RX ADMIN — FERRIC CARBOXYMALTOSE INJECTION 750 MG: 50 INJECTION, SOLUTION INTRAVENOUS at 09:16

## 2022-03-30 NOTE — PROGRESS NOTES
Patient arrive for 2 of 2 injectafer. Tolerated w/o incident and left in stable condition. Returns 5/18 for  visit.

## 2022-03-31 ENCOUNTER — OFFICE VISIT (OUTPATIENT)
Dept: GASTROENTEROLOGY | Age: 72
End: 2022-03-31
Payer: MEDICARE

## 2022-03-31 VITALS
BODY MASS INDEX: 32.64 KG/M2 | TEMPERATURE: 96.8 F | SYSTOLIC BLOOD PRESSURE: 165 MMHG | WEIGHT: 221 LBS | DIASTOLIC BLOOD PRESSURE: 87 MMHG | HEART RATE: 100 BPM

## 2022-03-31 DIAGNOSIS — D50.0 IRON DEFICIENCY ANEMIA DUE TO CHRONIC BLOOD LOSS: ICD-10-CM

## 2022-03-31 DIAGNOSIS — K21.9 GASTROESOPHAGEAL REFLUX DISEASE, UNSPECIFIED WHETHER ESOPHAGITIS PRESENT: ICD-10-CM

## 2022-03-31 DIAGNOSIS — D36.9 ADENOMATOUS POLYP: ICD-10-CM

## 2022-03-31 DIAGNOSIS — D50.8 OTHER IRON DEFICIENCY ANEMIA: Primary | ICD-10-CM

## 2022-03-31 PROCEDURE — 1123F ACP DISCUSS/DSCN MKR DOCD: CPT | Performed by: INTERNAL MEDICINE

## 2022-03-31 PROCEDURE — 1036F TOBACCO NON-USER: CPT | Performed by: INTERNAL MEDICINE

## 2022-03-31 PROCEDURE — 4040F PNEUMOC VAC/ADMIN/RCVD: CPT | Performed by: INTERNAL MEDICINE

## 2022-03-31 PROCEDURE — G8417 CALC BMI ABV UP PARAM F/U: HCPCS | Performed by: INTERNAL MEDICINE

## 2022-03-31 PROCEDURE — G8427 DOCREV CUR MEDS BY ELIG CLIN: HCPCS | Performed by: INTERNAL MEDICINE

## 2022-03-31 PROCEDURE — G8484 FLU IMMUNIZE NO ADMIN: HCPCS | Performed by: INTERNAL MEDICINE

## 2022-03-31 PROCEDURE — 99214 OFFICE O/P EST MOD 30 MIN: CPT | Performed by: INTERNAL MEDICINE

## 2022-03-31 PROCEDURE — 3017F COLORECTAL CA SCREEN DOC REV: CPT | Performed by: INTERNAL MEDICINE

## 2022-03-31 RX ORDER — POLYETHYLENE GLYCOL 3350, SODIUM SULFATE ANHYDROUS, SODIUM BICARBONATE, SODIUM CHLORIDE, POTASSIUM CHLORIDE 236; 22.74; 6.74; 5.86; 2.97 G/4L; G/4L; G/4L; G/4L; G/4L
4 POWDER, FOR SOLUTION ORAL ONCE
Qty: 4000 ML | Refills: 0 | Status: SHIPPED | OUTPATIENT
Start: 2022-03-31 | End: 2022-03-31

## 2022-03-31 ASSESSMENT — ENCOUNTER SYMPTOMS
BLOOD IN STOOL: 0
CONSTIPATION: 0
VOMITING: 0
DIARRHEA: 1
SHORTNESS OF BREATH: 0
NAUSEA: 0
ABDOMINAL DISTENTION: 1
COUGH: 0
CHOKING: 0
RECTAL PAIN: 0
WHEEZING: 0
ANAL BLEEDING: 0
SORE THROAT: 0
TROUBLE SWALLOWING: 0
COLOR CHANGE: 0
ABDOMINAL PAIN: 1

## 2022-03-31 NOTE — PROGRESS NOTES
GI CLINIC FOLLOW UP    NTERVAL HISTORY:   No referring provider defined for this encounter. Chief Complaint   Patient presents with    Follow-up     Pt is here today for f/u on anemia, pt also due to screen for colonoscopy. 1. Other iron deficiency anemia    2. Iron deficiency anemia due to chronic blood loss    3. Adenomatous polyp    4. Gastroesophageal reflux disease, unspecified whether esophagitis present      This patient is evaluated in my office after last year  He has history significant for chronic anemia  Has also been seen and followed by hematology oncology  Had GI work-up done in the past in 2019 he was found to have some redundancy of the colon with some retained stools small polyps removed biopsy revealed them to be adenomatous polyps also had an EGD done short segment Klein's was noted  Patient also had capsule endoscopy first time the battery  second time no obvious pathology was noted but his prep again in the small bowel was not very good especially in the distal small bowel areas nonspecific distal ileal rounded ulceration nonbleeding was noted? He was recommended to have a colonoscopy in 2 years which is due for now  It seems like patient has been receiving IV iron  His hemoglobin last year was 13 but it is down to 11 range  He denies any overt bleeding    Also history for bladder cancer and has been seen and followed by Dr. Navya Kang in urology    Some irritable bowel syndrome-like symptom with abdominal bloating gas  Has history for acid reflux  Has been on PPI  Denies any nausea vomiting hematemesis  Previous records reviewed with him and his accompanying wife      HISTORY OF PRESENT ILLNESS: Edson Quintanilla is a 73 y.o. male with a past history remarkable for , referred for evaluation of   Chief Complaint   Patient presents with    Follow-up     Pt is here today for f/u on anemia, pt also due to screen for colonoscopy.    .    Past Medical,Family, and Social History reviewed and does contribute to the patient presenting condition. Patient's PMH/PSH,SH,PSYCH Hx, MEDs, ALLERGIES, and ROS were all reviewed and updated in the appropriate sections.     PAST MEDICAL HISTORY:  Past Medical History:   Diagnosis Date    Anemia     Arthritis     osteoarthritis    Bladder cancer ([facility] 03/2021    bladder    Bladder tumor     Chronic back pain     Chronic neck pain     Colon polyps 10/08/2019    tubular adenoma x2    COVID 01/06/2022    self reported-home test-no symptoms    COVID-19 virus infection 1/10/2022    Diabetic neuropathy ([facility]     Diarrhea     Encounter for chronic pain management     [facility] pain management [address] [place] [address] Dr. Dave Diaz E visit 01/03/2022    Essential hypertension 05/12/2020    managed by Dr. Jefferson Roman PCP    GERD (gastroesophageal reflux disease)     Heartburn     Hematuria     Hepatitis B core antibody positive     History of bleeding peptic ulcer     History of blood transfusion     no reactions    History of hepatitis C     History of migraine headaches     History of stress test 07/2020    \"low risk\"    Hyperlipidemia     Iron (Fe) deficiency anemia     Neuropathy     Poor historian     states his wife takes care of all medical information    Positive FIT (fecal immunochemical test)     Type 2 diabetes mellitus with microalbuminuria, without long-term current use of insulin [facility] 07/13/2020    managed by Dr. Sallie Bedoya last e-visit 11/2021    Under care of team 02/09/2022    pcp-Dr Flores Cea virtual visit 11/2021    Under care of team 02/09/2022    hematology-Dr Caprice Saavedra [address] virtual visit 11/2021    Under care of team 02/09/2022    urology-Dr fairchild-last visit nov 2021    Wears dentures     Wears glasses     Worsening headaches 12/29/2020       Past Surgical History:   Procedure Laterality Date    CERVICAL SPINE SURGERY  1977    cervical spine three times, has plate    CHOLECYSTECTOMY 03/22/2019    COLONOSCOPY  01/25/2015    10 yr recall, hemorrhoids    COLONOSCOPY N/A 10/08/2019    tubular adenoma x2    CYSTOSCOPY Right 04/29/2021    CYSTOSCOPY TUR BLADDER TUMOR, GYRUS, RIGHT STENT PLACEMENT AND RIGHT STENT REMOVAL [facility] 09/09/2021    CYSTOSCOPY, TRANSURETHRAL RESECTION BLADDER TUMOR performed by Claire Longoria MD at [facility]  02/16/2022    Bladder biopsy, Fulguration     CYSTOSCOPY N/A 2/16/2022    CYSTOSCOPY BLADDER BIOPSY, FULGURATION performed by Claire Longoria MD at [facility]  10/2015    all teeth extracted   [address] N/A 08/07/2020    HERNIA INCISIONAL REPAIR LAPAROSCOPIC ROBOTIC WITH MESH performed by Deon Ac DO at [facility] Right    [address]  [address] [place] [address]    UPPER GASTROINTESTINAL ENDOSCOPY  01/25/2015    UPPER GASTROINTESTINAL ENDOSCOPY N/A 10/08/2019    EGD BIOPSY performed by Geni Fernandez MD at [address]:    Current Outpatient Medications:     [START ON 4/2/2022] oxyCODONE-acetaminophen (PERCOCET) 5-325 MG per tablet, Take 1 tablet by mouth every 8 hours as needed for Pain for up to 30 days. , Disp: 90 tablet, Rfl: 0    [START ON 4/2/2022] morphine (MS CONTIN) 60 MG extended release tablet, Take 1 tablet by mouth daily for 30 days. , Disp: 30 tablet, Rfl: 0    morphine (MS CONTIN) 30 MG extended release tablet, Take 1 tablet by mouth nightly for 30 days. Take at bedtime, Disp: 30 tablet, Rfl: 0    glucose monitoring (FREESTYLE FREEDOM) kit, Please supply Patient with a glucose monitoring kit that insurance will cover. , Disp: 1 kit, Rfl: 0    blood glucose monitor strips, Testing once a day. Please dispense according to patients insurance., Disp: 100 strip, Rfl: 3    Lancets 30G MISC, Testing once a day.   Please dispense according to patients insurance., Disp: 100 each, Rfl: 3    vitamin D (CHOLECALCIFEROL) 50 MCG (2000 UT) TABS tablet, Take 1 tablet by mouth daily, Disp: 30 tablet, Rfl: 3    baclofen (LIORESAL) 10 MG tablet, Take 1 tablet by mouth 3 times daily as needed (back pain), Disp: 270 tablet, Rfl: 1    metFORMIN (GLUCOPHAGE XR) 500 MG extended release tablet, Take 1 tablet by mouth daily (with breakfast) ** stop Metformin 1000 mg BID**, Disp: 90 tablet, Rfl: 3    lisinopril (PRINIVIL;ZESTRIL) 10 MG tablet, Take 1 tablet by mouth daily ** stop Lisinopril HCTZ**, Disp: 90 tablet, Rfl: 3    pregabalin (LYRICA) 150 MG capsule, Take 1 capsule by mouth 3 times daily for 90 days. , Disp: 90 capsule, Rfl: 2    fluticasone (FLONASE) 50 MCG/ACT nasal spray, 2 sprays by Nasal route daily, Disp: 16 g, Rfl: 0    pantoprazole (PROTONIX) 40 MG tablet, Take 1 tablet by mouth daily, Disp: 90 tablet, Rfl: 1    cyanocobalamin 1000 MCG/ML injection, Inject 1 mL into the muscle every 30 days Call for next refill which will be monthly for life, Disp: 3 mL, Rfl: 3    pravastatin (PRAVACHOL) 40 MG tablet, Take 1 tablet by mouth every evening Stop Gemfibrozil, Disp: 90 tablet, Rfl: 3    metoprolol tartrate (LOPRESSOR) 25 MG tablet, Take 1 tablet by mouth 2 times daily, Disp: 180 tablet, Rfl: 3    TRUEplus Lancets 30G MISC, Test blood glucose twice a day, Disp: 200 each, Rfl: 3    Alcohol Swabs (B-D SINGLE USE SWABS REGULAR) PADS, USE TO CHECK BLOOD SUGAR TWICE A DAY, Disp: 200 each, Rfl: 3    Syringe/Needle, Disp, (SYRINGE 3CC/25GX1\") 25G X 1\" 3 ML MISC, To be used with B12 injections, Disp: 50 each, Rfl: 2    loperamide (RA ANTI-DIARRHEAL) 2 MG capsule, Take 1 capsule by mouth 4 times daily as needed for Diarrhea, Disp: 112 capsule, Rfl: 2    Probiotic Product (PROBIOTIC-10 PO), Take by mouth daily , Disp: , Rfl:     Blood Glucose Monitoring Suppl (TRUE METRIX METER) w/Device KIT, USE AS DIRECTED TO TEST BLOOD SUGAR, Disp: , Rfl:     polyethylene glycol (GOLYTELY) 236 g solution, Take 4,000 mLs by mouth once for 1 dose, Disp: 4000 mL, Rfl: 0    ALLERGIES:   Allergies   Allergen Reactions    Asa [Aspirin]       iron deficiency anemia , requiring iron infusions and transfusions    Iron      Pill- constipation, abdominal pain    Nsaids       iron deficiency anemia, history of bleeding ulcers        FAMILY HISTORY:       Problem Relation Age of Onset    Diabetes Mother     Heart Disease Father     High Blood Pressure Father          SOCIAL HISTORY:   Social History     Socioeconomic History    Marital status:      Spouse name: Not on file    Number of children: Not on file    Years of education: Not on file    Highest education level: Not on file   Occupational History    Occupation: disabled   Tobacco Use    Smoking status: Former Smoker     Packs/day: 2.00     Years: 45.00     Pack years: 90.00     Types: Cigarettes     Start date: 1968     Quit date: 2015     Years since quittin.3    Smokeless tobacco: Never Used    Tobacco comment: on chantix 11-6-15   12-7-15   Vaping Use    Vaping Use: Never used   Substance and Sexual Activity    Alcohol use: No    Drug use: No    Sexual activity: Yes     Partners: Female   Other Topics Concern    Not on file   Social History Narrative    Not on file     Social Determinants of Health     Financial Resource Strain: High Risk    Difficulty of Paying Living Expenses: Very hard   Food Insecurity: Food Insecurity Present    Worried About Running Out of Food in the Last Year: Often true    Jennifer of Food in the Last Year: Often true   Transportation Needs:     Lack of Transportation (Medical): Not on file    Lack of Transportation (Non-Medical):  Not on file   Physical Activity:     Days of Exercise per Week: Not on file    Minutes of Exercise per Session: Not on file   Stress:     Feeling of Stress : Not on file   Social Connections:     Frequency of Communication with Friends and Family: Not on file    Frequency of Social Gatherings with Friends and Family: Not on file    Attends [employer]ism Services: Not on file    Active Member of Clubs or Organizations: Not on file    Attends Club or Organization Meetings: Not on file    Marital Status: Not on file   Intimate Partner Violence:     Fear of Current or Ex-Partner: Not on file    Emotionally Abused: Not on file    Physically Abused: Not on file    Sexually Abused: Not on file   Housing Stability:     Unable to Pay for Housing in the Last Year: Not on file    Number of [place] in the Last Year: Not on file    Unstable Housing in the Last Year: Not on file         REVIEW OF SYSTEMS:         Review of Systems   Constitutional: Positive for unexpected weight change. Negative for appetite change and fatigue. HENT: Negative for sore throat and trouble swallowing. Eyes: Negative for visual disturbance. Respiratory: Negative for cough, choking, shortness of breath and wheezing. Cardiovascular: Negative for chest pain, palpitations and leg swelling. Gastrointestinal: Positive for abdominal distention, abdominal pain and diarrhea (Constant). Negative for anal bleeding, blood in stool, constipation, nausea, rectal pain and vomiting. Skin: Negative for color change. Allergic/Immunologic: Negative for environmental allergies and food allergies. Neurological: Positive for dizziness (Occasional) and headaches (Occasional). Negative for weakness, light-headedness and numbness. Hematological: Does not bruise/bleed easily. Psychiatric/Behavioral: Negative for confusion and sleep disturbance. The patient is not nervous/anxious. PHYSICAL EXAMINATION: Vital signs reviewed per the nursing documentation. BP (!) 165/87   Pulse 100   Temp 96.8 °F (36 °C)   Wt 221 lb (100.2 kg)   BMI 32.64 kg/m²   Body mass index is 32.64 kg/m². Physical Exam  Nursing note reviewed. Constitutional:       Appearance: He is well-developed.       Comments: Anxious    HENT: Head: Normocephalic and atraumatic. Eyes:      Conjunctiva/sclera: Conjunctivae normal.      Pupils: Pupils are equal, round, and reactive to light. Cardiovascular:      Rate and Rhythm: Normal rate and regular rhythm. Pulmonary:      Effort: Pulmonary effort is normal.      Breath sounds: Normal breath sounds. Abdominal:      General: Bowel sounds are normal.      Palpations: Abdomen is soft. Comments: NON TENDER, NON DISTENTED  LIVER SPLEEN AND HERNIAS ARE NOT  PALPABLE  BOWEL SOUNDS ARE POSITIVE      Genitourinary:     Rectum: Normal.   Musculoskeletal:         General: Normal range of motion. Cervical back: Normal range of motion and neck supple. Skin:     General: Skin is warm. Neurological:      Mental Status: He is alert and oriented to person, place, and time. Deep Tendon Reflexes: Reflexes are normal and symmetric. LABORATORY DATA: Reviewed  Lab Results   Component Value Date    WBC 5.8 03/16/2022    HGB 10.8 (L) 03/16/2022    HCT 32.9 (L) 03/16/2022    MCV 81.6 03/16/2022     03/16/2022     03/12/2022    K 4.9 03/12/2022     03/12/2022    CO2 27 03/12/2022    BUN 15 03/12/2022    CREATININE 0.73 03/12/2022    LABALBU 4.6 03/12/2022    BILITOT 0.38 03/12/2022    ALKPHOS 79 03/12/2022    AST 15 03/12/2022    ALT 16 03/12/2022    INR 0.9 02/28/2021         Lab Results   Component Value Date    RBC 4.03 (L) 03/16/2022    HGB 10.8 (L) 03/16/2022    MCV 81.6 03/16/2022    MCH 26.7 03/16/2022    MCHC 32.7 03/16/2022    RDW 22.1 (H) 03/16/2022    MPV 7.1 03/16/2022    BASOPCT 1 03/16/2022    LYMPHSABS 1.68 03/16/2022    MONOSABS 0.46 03/16/2022    NEUTROABS 3.54 03/16/2022    EOSABS 0.06 03/16/2022    BASOSABS 0.06 03/16/2022         DIAGNOSTIC TESTING:     No results found. Assessment  1. Other iron deficiency anemia    2. Iron deficiency anemia due to chronic blood loss    3. Adenomatous polyp    4.  Gastroesophageal reflux disease, unspecified whether esophagitis present        Plan    Plan EGD and Colonoscopy     The Endoscopic procedure was explained to the patient in detail  The prep and NPO were explained  All the Risks, Benefits, and Alternatives were explained  Risk of Bleeding, Perforation and Cardio Respiratory risks were explained  his questions were answered  The procedure has been scheduled with the  in the office  Patient was asked to give us a call for any questions  The patient has verbalized understanding and agreement to this plan. Cont Iron     F/u HGB    Pt was advised in detail about some life style and dietary modifications. He was advised about avoidance of caffeine, nicotine and chocolate. Pt was also told to stay away from any kind of fast foods, soda pops. He was also advised to avoid lots of spices, grease and fried food etc.     Instructions were also given about trying to arrange the timing, quality and quantity of food. Instructions were given about using ample amount of fiber including dietary and supplemental fiber either metamucil, bennafiber or citrucell etc.  Pt was advised about drinking ample amount of water without any colors or chemicals. Stress was given about regular exercise. Pt has verbalized understanding and agreement to these modifications. He seems to have some stool incontinence some pelvic floor exercises were explained to him    Bulking agents were discussed with him    Patient and wife had multiple questions were answered to their satisfaction    More than half of patient's clinic visit time was spent in counseling about lifestyle and dietary modifications  Patient's  questions were answered in this regard as well  The patient has verbalized understanding and agreement       Thank you for allowing me to participate in the care of Mr. Mili Whitney. For any further questions please do not hesitate to contact me. I have reviewed and agree with the ROS entered by the MA/Nurse.          Airam Torres Michelle Starr MD, [facility]  Board Certified in Gastroenterology and [facility]  Office #: [phone]

## 2022-04-02 LAB
6-ACETYLMORPHINE, UR: NOT DETECTED
7-AMINOCLONAZEPAM, URINE: NOT DETECTED
ALPHA-OH-ALPRAZ, URINE: NOT DETECTED
ALPHA-OH-MIDAZOLAM, URINE: NOT DETECTED
ALPRAZOLAM, URINE: NOT DETECTED
AMPHETAMINES, URINE: NOT DETECTED
BARBITURATES, URINE: PRESENT
BENZOYLECGONINE, UR: NOT DETECTED
BUPRENORPHINE URINE: NOT DETECTED
CARISOPRODOL, UR: NOT DETECTED
CLONAZEPAM, URINE: NOT DETECTED
CODEINE, URINE: NOT DETECTED
CREATININE URINE: 202.5 MG/DL (ref 20–400)
DIAZEPAM, URINE: NOT DETECTED
DRUGS EXPECTED, UR: NORMAL
EER HI RES INTERP UR: NORMAL
ETHYL GLUCURONIDE UR: NOT DETECTED
FENTANYL URINE: NOT DETECTED
GABAPENTIN: NOT DETECTED
HYDROCODONE, URINE: NOT DETECTED
HYDROMORPHONE, URINE: PRESENT
LORAZEPAM, URINE: NOT DETECTED
MARIJUANA METAB, UR: NOT DETECTED
MDA, UR: NOT DETECTED
MDEA, EVE, UR: NOT DETECTED
MDMA URINE: NOT DETECTED
MEPERIDINE METAB, UR: NOT DETECTED
METHADONE, URINE: NOT DETECTED
METHAMPHETAMINE, URINE: NOT DETECTED
METHYLPHENIDATE: NOT DETECTED
MIDAZOLAM, URINE: NOT DETECTED
MORPHINE URINE: PRESENT
NALOXONE URINE: NOT DETECTED
NORBUPRENORPHINE, URINE: NOT DETECTED
NORDIAZEPAM, URINE: NOT DETECTED
NORFENTANYL, URINE: NOT DETECTED
NORHYDROCODONE, URINE: NOT DETECTED
NOROXYCODONE, URINE: PRESENT
NOROXYMORPHONE, URINE: PRESENT
OXAZEPAM, URINE: NOT DETECTED
OXYCODONE URINE: PRESENT
OXYMORPHONE, URINE: PRESENT
PAIN MANAGEMENT DRUG PANEL INTERP, URINE: NORMAL
PAIN MGT DRUG PANEL, HI RES, UR: NORMAL
PCP,URINE: NOT DETECTED
PHENTERMINE, UR: NOT DETECTED
PREGABALIN: PRESENT
TAPENTADOL, URINE: NOT DETECTED
TAPENTADOL-O-SULFATE, URINE: NOT DETECTED
TEMAZEPAM, URINE: NOT DETECTED
TRAMADOL, URINE: NOT DETECTED
ZOLPIDEM METABOLITE (ZCA), URINE: NOT DETECTED
ZOLPIDEM, URINE: NOT DETECTED

## 2022-04-12 DIAGNOSIS — K21.9 GASTROESOPHAGEAL REFLUX DISEASE WITHOUT ESOPHAGITIS: ICD-10-CM

## 2022-04-13 RX ORDER — PANTOPRAZOLE SODIUM 40 MG/1
TABLET, DELAYED RELEASE ORAL
Qty: 90 TABLET | Refills: 1 | Status: SHIPPED | OUTPATIENT
Start: 2022-04-13 | End: 2022-10-12

## 2022-04-13 NOTE — TELEPHONE ENCOUNTER
Please Approve or Refuse.   Send to Pharmacy per Pt's Request:      Next Visit Date:  6/29/2022   Last Visit Date: 2/25/2022    Hemoglobin A1C (%)   Date Value   03/12/2022 5.2   09/24/2021 5.0   01/11/2021 5.9             ( goal A1C is < 7)   BP Readings from Last 3 Encounters:   03/31/22 (!) 165/87   03/30/22 104/67   03/29/22 118/88          (goal 120/80)  BUN   Date Value Ref Range Status   03/12/2022 15 8 - 23 mg/dL Final     CREATININE   Date Value Ref Range Status   03/12/2022 0.73 0.70 - 1.20 mg/dL Final     Potassium   Date Value Ref Range Status   03/12/2022 4.9 3.7 - 5.3 mmol/L Final

## 2022-05-02 ENCOUNTER — HOSPITAL ENCOUNTER (OUTPATIENT)
Dept: PAIN MANAGEMENT | Age: 72
Discharge: HOME | End: 2022-05-02
Payer: MEDICARE

## 2022-05-02 VITALS
DIASTOLIC BLOOD PRESSURE: 62 MMHG | OXYGEN SATURATION: 95 % | SYSTOLIC BLOOD PRESSURE: 108 MMHG | HEART RATE: 86 BPM | BODY MASS INDEX: 30.07 KG/M2 | WEIGHT: 203 LBS | HEIGHT: 69 IN | TEMPERATURE: 97.7 F

## 2022-05-02 DIAGNOSIS — M54.16 LUMBAR RADICULOPATHY: Chronic | ICD-10-CM

## 2022-05-02 DIAGNOSIS — Z98.1 S/P CERVICAL SPINAL FUSION: Chronic | ICD-10-CM

## 2022-05-02 DIAGNOSIS — M46.92 CERVICAL SPONDYLITIS (HCC): Chronic | ICD-10-CM

## 2022-05-02 DIAGNOSIS — Z51.81 ENCOUNTER FOR MEDICATION MONITORING: ICD-10-CM

## 2022-05-02 DIAGNOSIS — M48.061 SPINAL STENOSIS OF LUMBAR REGION WITHOUT NEUROGENIC CLAUDICATION: Chronic | ICD-10-CM

## 2022-05-02 DIAGNOSIS — M51.36 DEGENERATIVE DISC DISEASE, LUMBAR: Chronic | ICD-10-CM

## 2022-05-02 DIAGNOSIS — M47.816 OSTEOARTHRITIS OF LUMBAR SPINE, UNSPECIFIED SPINAL OSTEOARTHRITIS COMPLICATION STATUS: ICD-10-CM

## 2022-05-02 DIAGNOSIS — M47.26 OSTEOARTHRITIS OF SPINE WITH RADICULOPATHY, LUMBAR REGION: ICD-10-CM

## 2022-05-02 DIAGNOSIS — M48.061 SPINAL STENOSIS OF LUMBAR REGION, UNSPECIFIED WHETHER NEUROGENIC CLAUDICATION PRESENT: Chronic | ICD-10-CM

## 2022-05-02 DIAGNOSIS — G89.29 ENCOUNTER FOR CHRONIC PAIN MANAGEMENT: ICD-10-CM

## 2022-05-02 DIAGNOSIS — M47.816 LUMBAR SPONDYLOSIS: Chronic | ICD-10-CM

## 2022-05-02 PROCEDURE — 99213 OFFICE O/P EST LOW 20 MIN: CPT

## 2022-05-02 PROCEDURE — 99213 OFFICE O/P EST LOW 20 MIN: CPT | Performed by: NURSE PRACTITIONER

## 2022-05-02 RX ORDER — MORPHINE SULFATE 30 MG/1
30 TABLET, FILM COATED, EXTENDED RELEASE ORAL NIGHTLY
Qty: 30 TABLET | Refills: 0 | Status: SHIPPED | OUTPATIENT
Start: 2022-05-02 | End: 2022-05-31 | Stop reason: SDUPTHER

## 2022-05-02 RX ORDER — MORPHINE SULFATE 60 MG/1
60 TABLET, FILM COATED, EXTENDED RELEASE ORAL DAILY
Qty: 30 TABLET | Refills: 0 | Status: SHIPPED | OUTPATIENT
Start: 2022-05-02 | End: 2022-05-31 | Stop reason: SDUPTHER

## 2022-05-02 RX ORDER — OXYCODONE HYDROCHLORIDE AND ACETAMINOPHEN 5; 325 MG/1; MG/1
1 TABLET ORAL 2 TIMES DAILY PRN
Qty: 60 TABLET | Refills: 0 | Status: SHIPPED | OUTPATIENT
Start: 2022-05-02 | End: 2022-05-31 | Stop reason: SDUPTHER

## 2022-05-02 ASSESSMENT — PAIN DESCRIPTION - DIRECTION: RADIATING_TOWARDS: DOES NOT RADIATE

## 2022-05-02 ASSESSMENT — ENCOUNTER SYMPTOMS
BACK PAIN: 1
BOWEL INCONTINENCE: 0
ABDOMINAL PAIN: 0

## 2022-05-02 ASSESSMENT — PAIN DESCRIPTION - ORIENTATION: ORIENTATION: LOWER

## 2022-05-02 ASSESSMENT — PAIN SCALES - GENERAL: PAINLEVEL_OUTOF10: 4

## 2022-05-02 ASSESSMENT — PAIN DESCRIPTION - DESCRIPTORS: DESCRIPTORS: ACHING;CRAMPING

## 2022-05-02 ASSESSMENT — PAIN DESCRIPTION - PAIN TYPE: TYPE: CHRONIC PAIN

## 2022-05-02 ASSESSMENT — PAIN DESCRIPTION - PROGRESSION: CLINICAL_PROGRESSION: NOT CHANGED

## 2022-05-02 ASSESSMENT — PAIN DESCRIPTION - LOCATION: LOCATION: BACK

## 2022-05-02 ASSESSMENT — PAIN DESCRIPTION - FREQUENCY: FREQUENCY: CONTINUOUS

## 2022-05-02 NOTE — PROGRESS NOTES
Chief Complaint   Patient presents with    Back Pain    Medication Refill     Lyrica         [facility]  Pt c/o low back pain for many years. MRI with multifocal degenerative disc disease throughout the lumbar spine. There is no known injury or surgery to the area. He does have a history of scoliosis. His last PT was over 4 years ago with no benefit and he is not interested in repeating. He does do home stretches every morning. He also had a LESI in 2013 that did not help. He has non-invasive bladder cancer and had resection of a tumor in April. He continues to follow with oncology and hematology - he continues Injectafer infusions for anemia. He follows with podiatry for foot pain. MED was 142 and the risks related to high doses of opiates were discussed at previous visits. MSER dose was reduced last visit. Will continue to taper by 10% each month. Patient verbalizes understanding. Back Pain  This is a chronic problem. The current episode started more than 1 year ago. The problem occurs constantly. The problem is unchanged. The pain is present in the lumbar spine. The quality of the pain is described as aching and cramping. The pain does not radiate. The pain is at a severity of 4/10. The pain is moderate. The pain is the same all the time. The symptoms are aggravated by position, twisting, sitting, standing and coughing. Stiffness is present in the morning. Associated symptoms include leg pain, tingling, weakness and weight loss. Pertinent negatives include no abdominal pain, bladder incontinence, bowel incontinence, chest pain, fever, headaches, numbness or pelvic pain. Risk factors include history of cancer. He has tried heat, ice, muscle relaxant, walking, home exercises, bed rest and NSAIDs for the symptoms. The treatment provided no relief. Patient denies any new neurological symptoms. No bowel or bladder incontinence, no weakness, and no falling.     Pill count: appropriate due today    Morphine equivalent: 112.5    Controlled Substance Monitoring:    Acute and Chronic Pain Monitoring:   RX Monitoring 5/2/2022   Attestation -   Acute Pain Prescriptions -   Periodic Controlled Substance Monitoring Possible medication side effects, risk of tolerance/dependence & alternative treatments discussed. ;No signs of potential drug abuse or diversion identified.;Obtaining appropriate analgesic effect of treatment. Chronic Pain > 50 MEDD Re-evaluated the status of the patient's underlying condition causing pain. Chronic Pain > 80 MEDD Co-prescribed Naloxone.    Chronic Pain > 120 MEDD -         Past Medical History:   Diagnosis Date    Anemia     Arthritis     osteoarthritis    Bladder cancer ([facility] 03/2021    bladder    Bladder tumor     Chronic back pain     Chronic neck pain     Colon polyps 10/08/2019    tubular adenoma x2    COVID 01/06/2022    self reported-home test-no symptoms    COVID-19 virus infection 1/10/2022    Diabetic neuropathy ([facility]     Diarrhea     Encounter for chronic pain management     [facility] pain management [facility] Dr. Precious Garrido E visit 01/03/2022    Essential hypertension 05/12/2020    managed by Dr. Efrem Esposito PCP    GERD (gastroesophageal reflux disease)     Heartburn     Hematuria     Hepatitis B core antibody positive     History of bleeding peptic ulcer     History of blood transfusion     no reactions    History of hepatitis C     History of migraine headaches     History of stress test 07/2020    \"low risk\"    Hyperlipidemia     Iron (Fe) deficiency anemia     Neuropathy     Poor historian     states his wife takes care of all medical information    Positive FIT (fecal immunochemical test)     Type 2 diabetes mellitus with microalbuminuria, without long-term current use of insulin ([facility] 07/13/2020    managed by Dr. Chalo Bansal last e-visit 11/2021    Under care of team 02/09/2022    pcp-Dr Magalis Miranda virtual visit 11/2021    Under care of team 02/09/2022    hematology-Dr Caprice Saavedra [address] virtual visit 11/2021    Under care of team 02/09/2022    urology-Dr fairchild-last visit nov 2021    Wears dentures     Wears glasses     Worsening headaches 12/29/2020       Past Surgical History:   Procedure Laterality Date    CERVICAL SPINE SURGERY  1977    cervical spine three times, has plate    CHOLECYSTECTOMY  03/22/2019    COLONOSCOPY  01/25/2015    10 yr recall, hemorrhoids    COLONOSCOPY N/A 10/08/2019    tubular adenoma x2    CYSTOSCOPY Right 04/29/2021    CYSTOSCOPY TUR BLADDER TUMOR, GYRUS, RIGHT STENT PLACEMENT AND RIGHT STENT REMOVAL performed by Roxie Ramos MD at [facility] 09/09/2021    CYSTOSCOPY, TRANSURETHRAL RESECTION BLADDER TUMOR performed by Roxie Ramos MD at [facility]  02/16/2022    Bladder biopsy, Fulguration     CYSTOSCOPY N/A 2/16/2022    CYSTOSCOPY BLADDER BIOPSY, FULGURATION performed by Roxie Ramos MD at [facility]  10/2015    all teeth extracted   [address] [facility] N/A 08/07/2020    HERNIA INCISIONAL REPAIR LAPAROSCOPIC ROBOTIC WITH MESH performed by Irasema Delarosa DO at [facility] Right    [address]  [address] [place] [address]    UPPER GASTROINTESTINAL ENDOSCOPY  01/25/2015    UPPER GASTROINTESTINAL ENDOSCOPY N/A 10/08/2019    EGD BIOPSY performed by Steven Delgado MD at [facility] ENDO       Allergies   Allergen Reactions   Brittany Caprice [Aspirin]       iron deficiency anemia , requiring iron infusions and transfusions    Iron      Pill- constipation, abdominal pain    Nsaids       iron deficiency anemia, history of bleeding ulcers          Current Outpatient Medications:     pantoprazole (PROTONIX) 40 MG tablet, TAKE ONE TABLET BY MOUTH DAILY, Disp: 90 tablet, Rfl: 1    oxyCODONE-acetaminophen (PERCOCET) 5-325 MG per tablet, Take 1 tablet by mouth every 8 hours as needed for Pain for up to 30 days. , Disp: 90 tablet, Rfl: 0    morphine (MS CONTIN) 60 MG extended release tablet, Take 1 tablet by mouth daily for 30 days. , Disp: 30 tablet, Rfl: 0    glucose monitoring (FREESTYLE FREEDOM) kit, Please supply Patient with a glucose monitoring kit that insurance will cover. , Disp: 1 kit, Rfl: 0    blood glucose monitor strips, Testing once a day. Please dispense according to patients insurance., Disp: 100 strip, Rfl: 3    Lancets 30G MISC, Testing once a day. Please dispense according to patients insurance., Disp: 100 each, Rfl: 3    vitamin D (CHOLECALCIFEROL) 50 MCG (2000 UT) TABS tablet, Take 1 tablet by mouth daily, Disp: 30 tablet, Rfl: 3    baclofen (LIORESAL) 10 MG tablet, Take 1 tablet by mouth 3 times daily as needed (back pain), Disp: 270 tablet, Rfl: 1    metFORMIN (GLUCOPHAGE XR) 500 MG extended release tablet, Take 1 tablet by mouth daily (with breakfast) ** stop Metformin 1000 mg BID**, Disp: 90 tablet, Rfl: 3    lisinopril (PRINIVIL;ZESTRIL) 10 MG tablet, Take 1 tablet by mouth daily ** stop Lisinopril HCTZ**, Disp: 90 tablet, Rfl: 3    pregabalin (LYRICA) 150 MG capsule, Take 1 capsule by mouth 3 times daily for 90 days. , Disp: 90 capsule, Rfl: 2    fluticasone (FLONASE) 50 MCG/ACT nasal spray, 2 sprays by Nasal route daily, Disp: 16 g, Rfl: 0    cyanocobalamin 1000 MCG/ML injection, Inject 1 mL into the muscle every 30 days Call for next refill which will be monthly for life, Disp: 3 mL, Rfl: 3    pravastatin (PRAVACHOL) 40 MG tablet, Take 1 tablet by mouth every evening Stop Gemfibrozil, Disp: 90 tablet, Rfl: 3    metoprolol tartrate (LOPRESSOR) 25 MG tablet, Take 1 tablet by mouth 2 times daily, Disp: 180 tablet, Rfl: 3    TRUEplus Lancets 30G MISC, Test blood glucose twice a day, Disp: 200 each, Rfl: 3    Alcohol Swabs (B-D SINGLE USE SWABS REGULAR) PADS, USE TO CHECK BLOOD SUGAR TWICE A DAY, Disp: 200 each, Rfl: 3    Syringe/Needle, Disp, (SYRINGE 3CC/25GX1\") 25G X 1\" 3 ML MISC, To be used with B12 injections, Disp: 50 each, Rfl: 2    loperamide (RA ANTI-DIARRHEAL) 2 MG capsule, Take 1 capsule by mouth 4 times daily as needed for Diarrhea, Disp: 112 capsule, Rfl: 2    Probiotic Product (PROBIOTIC-10 PO), Take by mouth daily , Disp: , Rfl:     Blood Glucose Monitoring Suppl (TRUE METRIX METER) w/Device KIT, USE AS DIRECTED TO TEST BLOOD SUGAR, Disp: , Rfl:     Family History   Problem Relation Age of Onset    Diabetes Mother     Heart Disease Father     High Blood Pressure Father        Social History     Socioeconomic History    Marital status:      Spouse name: Not on file    Number of children: Not on file    Years of education: Not on file    Highest education level: Not on file   Occupational History    Occupation: disabled   Tobacco Use    Smoking status: Former Smoker     Packs/day: 2.00     Years: 45.00     Pack years: 90.00     Types: Cigarettes     Start date: 1968     Quit date: 2015     Years since quittin.4    Smokeless tobacco: Never Used    Tobacco comment: on chantix 11-6-15   12-7-15   Vaping Use    Vaping Use: Never used   Substance and Sexual Activity    Alcohol use: No    Drug use: No    Sexual activity: Yes     Partners: Female   Other Topics Concern    Not on file   Social History Narrative    Not on file     Social Determinants of Health     Financial Resource Strain: High Risk    Difficulty of Paying Living Expenses: Very hard   Food Insecurity: Food Insecurity Present    Worried About Running Out of Food in the Last Year: Often true    Jennifer of Food in the Last Year: Often true   Transportation Needs:     Lack of Transportation (Medical): Not on file    Lack of Transportation (Non-Medical):  Not on file   Physical Activity:     Days of Exercise per Week: Not on file    Minutes of Exercise per Session: Not on file   Stress:     Feeling of Stress : Not on file   Social Connections:     Frequency of Communication with Friends and Family: Not on file    Frequency of Social Gatherings with Friends and Family: Not on file    Attends Protestant Services: Not on file    Active Member of Clubs or Organizations: Not on file    Attends Club or Organization Meetings: Not on file    Marital Status: Not on file   Intimate Partner Violence:     Fear of Current or Ex-Partner: Not on file    Emotionally Abused: Not on file    Physically Abused: Not on file    Sexually Abused: Not on file   Housing Stability:     Unable to Pay for Housing in the Last Year: Not on file    Number of [place] in the Last Year: Not on file    Unstable Housing in the Last Year: Not on file       Review of Systems:  Review of Systems   Constitutional: Positive for weight loss. Negative for fever. Cardiovascular: Negative for chest pain and palpitations. Musculoskeletal: Positive for back pain. Gastrointestinal: Negative for abdominal pain and bowel incontinence. Genitourinary: Negative for bladder incontinence and pelvic pain. Neurological: Positive for tingling and weakness. Negative for disturbances in coordination, headaches, loss of balance and numbness. Physical Exam:  /62   Pulse 86   Temp 97.7 °F (36.5 °C) (Oral)   Ht 5' 9\" (1.753 m)   Wt 203 lb (92.1 kg)   SpO2 95%   BMI 29.98 kg/m²     Physical Exam  HENT:      Head: Normocephalic. Pulmonary:      Effort: Pulmonary effort is normal.   Musculoskeletal:         General: Normal range of motion. Cervical back: Normal range of motion. Lumbar back: Tenderness present. Skin:     General: Skin is warm and dry. Neurological:      Mental Status: He is alert and oriented to person, place, and time. Record/Diagnostics Review:    Last zoe 3/2022 and was not appropriate +barbiturates not prescribed - he states he may have taken one of his wife's headache pills. Pt is warned that this is a violation of his medication contract. Will repeat UDS in near future.      Assessment:  Problem List Items Addressed This Visit     Degenerative disc disease, lumbar (Chronic)    Relevant Medications    oxyCODONE-acetaminophen (PERCOCET) 5-325 MG per tablet    morphine (MS CONTIN) 60 MG extended release tablet    morphine (MS CONTIN) 30 MG extended release tablet    Lumbar spondylosis (Chronic)    Relevant Medications    oxyCODONE-acetaminophen (PERCOCET) 5-325 MG per tablet    morphine (MS CONTIN) 60 MG extended release tablet    morphine (MS CONTIN) 30 MG extended release tablet    Lumbar radiculopathy (Chronic)    Relevant Medications    oxyCODONE-acetaminophen (PERCOCET) 5-325 MG per tablet    morphine (MS CONTIN) 60 MG extended release tablet    morphine (MS CONTIN) 30 MG extended release tablet    Lumbar spinal stenosis (Chronic)    Relevant Medications    oxyCODONE-acetaminophen (PERCOCET) 5-325 MG per tablet    morphine (MS CONTIN) 30 MG extended release tablet    Cervical spondylitis (HCC) (Chronic)    Relevant Medications    oxyCODONE-acetaminophen (PERCOCET) 5-325 MG per tablet    S/P cervical spinal fusion (Chronic)    Relevant Medications    oxyCODONE-acetaminophen (PERCOCET) 5-325 MG per tablet    Encounter for chronic pain management    Relevant Medications    oxyCODONE-acetaminophen (PERCOCET) 5-325 MG per tablet    morphine (MS CONTIN) 30 MG extended release tablet    Osteoarthritis of spine with radiculopathy, lumbar region    Relevant Medications    oxyCODONE-acetaminophen (PERCOCET) 5-325 MG per tablet    morphine (MS CONTIN) 60 MG extended release tablet    morphine (MS CONTIN) 30 MG extended release tablet    Osteoarthritis of lumbar spine    Relevant Medications    oxyCODONE-acetaminophen (PERCOCET) 5-325 MG per tablet    morphine (MS CONTIN) 60 MG extended release tablet    morphine (MS CONTIN) 30 MG extended release tablet      Other Visit Diagnoses     Encounter for medication monitoring        Relevant Medications    oxyCODONE-acetaminophen (PERCOCET) 5-325 MG per tablet    morphine (MS CONTIN) 30 MG extended release tablet             Treatment Plan:  Patient relates current medications are helping the pain. Patient reports taking pain medications as prescribed, denies obtaining medications from different sources and denies use of illegal drugs. Patient denies side effects from medications like nausea, vomiting, constipation or drowsiness. Patient reports current activities of daily living are possible due to medications and would like to continue them. As always, we encourage daily stretching and strengthening exercises, and recommend minimizing use of pain medications unless patient cannot get through daily activities due to pain. Contract requirements met. Continue opioid therapy. Script written for percocet and MSER. Will reduce percocet dose to BID PRN  Continue tapering MED by 10% at each monthly visit  MRI lumbar spine ordered lst month but not completed. He states he did not get a call to schedule this. Phone number given to pt to schedule this. Follow up appointment made for 4 weeks    I have reviewed the chief complaint and history of present illness (including ROS and PFSH) and vital documentation by my staff and I agree with their documentation and have added where applicable.

## 2022-05-03 DIAGNOSIS — I10 ESSENTIAL HYPERTENSION: ICD-10-CM

## 2022-05-10 ENCOUNTER — TELEPHONE (OUTPATIENT)
Dept: ONCOLOGY | Age: 72
End: 2022-05-10

## 2022-05-15 DIAGNOSIS — M47.26 OSTEOARTHRITIS OF SPINE WITH RADICULOPATHY, LUMBAR REGION: ICD-10-CM

## 2022-05-15 DIAGNOSIS — M54.16 LUMBAR RADICULOPATHY: Chronic | ICD-10-CM

## 2022-05-15 DIAGNOSIS — M51.36 DEGENERATIVE DISC DISEASE, LUMBAR: Chronic | ICD-10-CM

## 2022-05-15 DIAGNOSIS — M48.061 SPINAL STENOSIS OF LUMBAR REGION WITHOUT NEUROGENIC CLAUDICATION: Chronic | ICD-10-CM

## 2022-05-15 DIAGNOSIS — M47.816 OSTEOARTHRITIS OF LUMBAR SPINE, UNSPECIFIED SPINAL OSTEOARTHRITIS COMPLICATION STATUS: ICD-10-CM

## 2022-05-15 DIAGNOSIS — G89.29 ENCOUNTER FOR CHRONIC PAIN MANAGEMENT: ICD-10-CM

## 2022-05-16 DIAGNOSIS — E53.8 VITAMIN B 12 DEFICIENCY: ICD-10-CM

## 2022-05-16 RX ORDER — CYANOCOBALAMIN 1000 UG/ML
1000 INJECTION INTRAMUSCULAR; SUBCUTANEOUS
Qty: 3 ML | Refills: 3 | Status: SHIPPED | OUTPATIENT
Start: 2022-05-16

## 2022-05-17 ENCOUNTER — HOSPITAL ENCOUNTER (OUTPATIENT)
Dept: CT IMAGING | Age: 72
Discharge: HOME | End: 2022-05-19
Payer: MEDICARE

## 2022-05-17 ENCOUNTER — HOSPITAL ENCOUNTER (OUTPATIENT)
Dept: MRI IMAGING | Age: 72
Discharge: HOME | End: 2022-05-19
Payer: MEDICARE

## 2022-05-17 ENCOUNTER — HOSPITAL ENCOUNTER (OUTPATIENT)
Age: 72
Discharge: HOME | End: 2022-05-17
Payer: MEDICARE

## 2022-05-17 ENCOUNTER — TELEPHONE (OUTPATIENT)
Dept: PAIN MANAGEMENT | Age: 72
End: 2022-05-17

## 2022-05-17 DIAGNOSIS — M48.061 SPINAL STENOSIS OF LUMBAR REGION, UNSPECIFIED WHETHER NEUROGENIC CLAUDICATION PRESENT: Chronic | ICD-10-CM

## 2022-05-17 DIAGNOSIS — Z87.891 PERSONAL HISTORY OF TOBACCO USE: ICD-10-CM

## 2022-05-17 LAB
FERRITIN: 12 NG/ML (ref 30–400)
IRON SATURATION: 8 % (ref 20–55)
IRON: 30 UG/DL (ref 59–158)
TOTAL IRON BINDING CAPACITY: 389 UG/DL (ref 250–450)
UNSATURATED IRON BINDING CAPACITY: 359 UG/DL (ref 112–347)

## 2022-05-17 PROCEDURE — 83550 IRON BINDING TEST: CPT

## 2022-05-17 PROCEDURE — 71271 CT THORAX LUNG CANCER SCR C-: CPT

## 2022-05-17 PROCEDURE — 83540 ASSAY OF IRON: CPT

## 2022-05-17 PROCEDURE — 72148 MRI LUMBAR SPINE W/O DYE: CPT

## 2022-05-17 PROCEDURE — 82728 ASSAY OF FERRITIN: CPT

## 2022-05-17 PROCEDURE — 36415 COLL VENOUS BLD VENIPUNCTURE: CPT

## 2022-05-18 ENCOUNTER — TELEMEDICINE (OUTPATIENT)
Dept: ONCOLOGY | Age: 72
End: 2022-05-18
Payer: MEDICARE

## 2022-05-18 ENCOUNTER — HOSPITAL ENCOUNTER (OUTPATIENT)
Age: 72
Discharge: HOME | End: 2022-05-18

## 2022-05-18 DIAGNOSIS — K92.2 GASTROINTESTINAL HEMORRHAGE, UNSPECIFIED GASTROINTESTINAL HEMORRHAGE TYPE: ICD-10-CM

## 2022-05-18 DIAGNOSIS — D50.8 OTHER IRON DEFICIENCY ANEMIA: Primary | ICD-10-CM

## 2022-05-18 DIAGNOSIS — G89.29 ENCOUNTER FOR CHRONIC PAIN MANAGEMENT: ICD-10-CM

## 2022-05-18 DIAGNOSIS — C67.2 MALIGNANT NEOPLASM OF LATERAL WALL OF URINARY BLADDER (HCC): ICD-10-CM

## 2022-05-18 DIAGNOSIS — M47.26 OSTEOARTHRITIS OF SPINE WITH RADICULOPATHY, LUMBAR REGION: ICD-10-CM

## 2022-05-18 DIAGNOSIS — D64.9 ANEMIA, UNSPECIFIED TYPE: ICD-10-CM

## 2022-05-18 DIAGNOSIS — M54.16 LUMBAR RADICULOPATHY: Chronic | ICD-10-CM

## 2022-05-18 DIAGNOSIS — M47.816 OSTEOARTHRITIS OF LUMBAR SPINE, UNSPECIFIED SPINAL OSTEOARTHRITIS COMPLICATION STATUS: ICD-10-CM

## 2022-05-18 DIAGNOSIS — M48.061 SPINAL STENOSIS OF LUMBAR REGION WITHOUT NEUROGENIC CLAUDICATION: Chronic | ICD-10-CM

## 2022-05-18 DIAGNOSIS — M51.36 DEGENERATIVE DISC DISEASE, LUMBAR: Chronic | ICD-10-CM

## 2022-05-18 DIAGNOSIS — E53.8 B12 DEFICIENCY: ICD-10-CM

## 2022-05-18 PROCEDURE — 3017F COLORECTAL CA SCREEN DOC REV: CPT | Performed by: INTERNAL MEDICINE

## 2022-05-18 PROCEDURE — 1123F ACP DISCUSS/DSCN MKR DOCD: CPT | Performed by: INTERNAL MEDICINE

## 2022-05-18 PROCEDURE — G8427 DOCREV CUR MEDS BY ELIG CLIN: HCPCS | Performed by: INTERNAL MEDICINE

## 2022-05-18 PROCEDURE — 99214 OFFICE O/P EST MOD 30 MIN: CPT | Performed by: INTERNAL MEDICINE

## 2022-05-18 PROCEDURE — 4040F PNEUMOC VAC/ADMIN/RCVD: CPT | Performed by: INTERNAL MEDICINE

## 2022-05-18 RX ORDER — FAMOTIDINE 10 MG/ML
20 INJECTION, SOLUTION INTRAVENOUS
Status: CANCELLED | OUTPATIENT
Start: 2022-05-18

## 2022-05-18 RX ORDER — PREGABALIN 150 MG/1
CAPSULE ORAL
Qty: 90 CAPSULE | OUTPATIENT
Start: 2022-05-18

## 2022-05-18 RX ORDER — HEPARIN SODIUM (PORCINE) LOCK FLUSH IV SOLN 100 UNIT/ML 100 UNIT/ML
500 SOLUTION INTRAVENOUS PRN
Status: CANCELLED | OUTPATIENT
Start: 2022-05-18

## 2022-05-18 RX ORDER — ALBUTEROL SULFATE 90 UG/1
4 AEROSOL, METERED RESPIRATORY (INHALATION) PRN
Status: CANCELLED | OUTPATIENT
Start: 2022-05-18

## 2022-05-18 RX ORDER — SODIUM CHLORIDE 0.9 % (FLUSH) 0.9 %
5-40 SYRINGE (ML) INJECTION PRN
Status: CANCELLED | OUTPATIENT
Start: 2022-05-18

## 2022-05-18 RX ORDER — FOLIC ACID 1 MG/1
1 TABLET ORAL DAILY
Qty: 90 TABLET | Refills: 1 | Status: SHIPPED | OUTPATIENT
Start: 2022-05-18

## 2022-05-18 RX ORDER — DIPHENHYDRAMINE HYDROCHLORIDE 50 MG/ML
50 INJECTION INTRAMUSCULAR; INTRAVENOUS
Status: CANCELLED | OUTPATIENT
Start: 2022-05-18

## 2022-05-18 RX ORDER — SODIUM CHLORIDE 9 MG/ML
5-250 INJECTION, SOLUTION INTRAVENOUS PRN
Status: CANCELLED | OUTPATIENT
Start: 2022-05-18

## 2022-05-18 RX ORDER — SODIUM CHLORIDE 9 MG/ML
INJECTION, SOLUTION INTRAVENOUS CONTINUOUS
Status: CANCELLED | OUTPATIENT
Start: 2022-05-18

## 2022-05-18 RX ORDER — PREGABALIN 150 MG/1
150 CAPSULE ORAL 3 TIMES DAILY
Qty: 90 CAPSULE | Refills: 2 | Status: SHIPPED | OUTPATIENT
Start: 2022-05-18 | End: 2022-08-15 | Stop reason: SDUPTHER

## 2022-05-18 RX ORDER — EPINEPHRINE 1 MG/ML
0.3 INJECTION, SOLUTION, CONCENTRATE INTRAVENOUS PRN
Status: CANCELLED | OUTPATIENT
Start: 2022-05-18

## 2022-05-18 RX ORDER — ONDANSETRON 2 MG/ML
8 INJECTION INTRAMUSCULAR; INTRAVENOUS
Status: CANCELLED | OUTPATIENT
Start: 2022-05-18

## 2022-05-18 RX ORDER — ACETAMINOPHEN 325 MG/1
650 TABLET ORAL
Status: CANCELLED | OUTPATIENT
Start: 2022-05-18

## 2022-05-18 NOTE — PROGRESS NOTES
Subjective:   PCP: Benita Saini MD       Chief Complaint   Patient presents with    Follow-up     review status of disease    Discuss Labs    Other     Having scopes done beginnig of june     Discussed results of lab work-up. Patient seen in clinic via virtual visit due to ongoing coronavirus pandemic    INTERIM HISTORY:     Patient seen in clinic via virtual visit due to ongoing coronavirus pandemic. Patient received Injectafer 2 infusions second infusion was on 3/30. Continues to be iron deficient. We do not have a hemoglobin from the blood draw yesterday. Patient does complain of being tired. He is scheduled for EGD and colonoscopy per GI. Denies fever or chills. During this visit patient's allergy, social, medical, surgical history and medications were reviewed and updated. REVIEW OF SYSTEMS:   General: No fever or night sweats. Weight is stable. +fatigue   ENT: No double or blurred vision, no hearing problem, no dysphagia or sore throat   Respiratory: No chest pain, no shortness of breath, no cough or hemoptysis. Cardiovascular: Denies chest pain, PND or orthopnea. No L E swelling or palpitations. Gastrointestinal: No nausea or vomiting, abdominal pain, or constipation, diarrhea  Genitourinary: Denies dysuria, frequency, urgency or incontinence. +hematuria - resolved  Neurological: Denies headaches, decreased LOC, no sensory or motor focal deficits. Musculoskeletal: No arthralgia no back pain or joint swelling. Skin: There are no rashes or bleeding.   Psych: Denies hallucinations or intentions to harm self          PHYSICAL EXAMINATION:    Vital Signs: (As obtained by patient/caregiver or practitioner observation)    Blood pressure-  Heart rate-    Respiratory rate-    Temperature-  Pulse oximetry-     Constitutional: [x] Appears well-developed and well-nourished [x] No apparent distress      [] Abnormal-   Mental status  [x] Alert and awake  [x] Oriented to person/place/time []Able to follow commands      Eyes:  EOM    [x]  Normal  [] Abnormal-  Sclera  [x]  Normal  [] Abnormal -         Discharge [x]  None visible  [] Abnormal -    HENT:   [x] Normocephalic, atraumatic. [] Abnormal   [x] Mouth/Throat: Mucous membranes are moist.     External Ears [x] Normal  [] Abnormal-     Neck: [x] No visualized mass     Pulmonary/Chest: [x] Respiratory effort normal.  [x] No visualized signs of difficulty breathing or respiratory distress        [] Abnormal-      Musculoskeletal:   [x] Normal gait with no signs of ataxia         [x] Normal range of motion of neck        [] Abnormal-       Neurological:        [x] No Facial Asymmetry (Cranial nerve 7 motor function) (limited exam to video visit)          [x] No gaze palsy        [] Abnormal-         Skin:        [x] No significant exanthematous lesions or discoloration noted on facial skin         [] Abnormal-            Psychiatric:       [x] Normal Affect [x] No Hallucinations        [] Abnormal-     Other pertinent observable physical exam findings-     Due to this being a TeleHealth encounter, evaluation of the following organ systems is limited: Vitals/Constitutional/EENT/Resp/CV/GI//MS/Neuro/Skin/Heme-Lymph-Imm.           REVIEW OF LABORATORY DATA:   Lab Results   Component Value Date    WBC 5.8 03/16/2022    HGB 10.8 (L) 03/16/2022    HCT 32.9 (L) 03/16/2022    MCV 81.6 03/16/2022     03/16/2022       Chemistry        Component Value Date/Time     03/12/2022 0930    K 4.9 03/12/2022 0930     03/12/2022 0930    CO2 27 03/12/2022 0930    BUN 15 03/12/2022 0930    CREATININE 0.73 03/12/2022 0930        Component Value Date/Time    CALCIUM 9.3 03/12/2022 0930    ALKPHOS 79 03/12/2022 0930    AST 15 03/12/2022 0930    ALT 16 03/12/2022 0930    BILITOT 0.38 03/12/2022 0930        Lab Results   Component Value Date    [id number] 564 03/12/2022         Lab Results   Component Value Date    IRON 30 (L) 05/17/2022 TIBC 389 05/17/2022    FERRITIN 12 (L) 05/17/2022         IMPRESSION:   81year old male with history of bleeding ulcer back in 2015 and iron def, now with iron def anemia, and stool occult stools    -s/p IV iron with improvement in iron stores and hemoglobin  -EGD and colonoscopy 10/2019 did not show active bleeding, still no active source of iron def  -s/p GI in 12/3/19, concern for still blood loss, iron def, no plasns for repeat capsule video study, he has completed treatment for hepatitis C with Remy Lopezist.  -repeat iron studies are consistent with iron deficiency  -transfuse if Hbg is less than 7.0    -B12 deficiency, patient started on B12 shots 7/2020  -Non-invasive urothelial carcinoma, low grade, TURBT, 04/2021    PLAN:   Personally reviewed results of lab work-up and other relevant clinical data  We will check with lab if he can add H&H to the blood draw from yesterday. Despite of getting IV iron patient continues to be iron deficient with ferritin of 12 only. I will reorder Injectafer. Recheck iron stores in 6 weeks. I will also call in folic acid for the patient. Follow-up on EGD colonoscopy report which is scheduled for later this month  Continue surveillance for bladder cancer  Patient and his wife had multiple questions which answered the best my ability. MD Monika Jason, was evaluated through a synchronous (real-time) audio-video encounter. The patient (or guardian if applicable) is aware that this is a billable service, which includes applicable co-pays. This Virtual Visit was conducted with patient's (and/or legal guardian's) consent. The visit was conducted pursuant to the emergency declaration under the [facility], [address] [place] [address] authority and the [employer] General Act. Patient identification was verified, and a caregiver was present when appropriate.  The patient was located at home in a state where the provider was licensed to provide care. Total time spent for this encounter: Not billed by time    --Jose Roberto Davila MD on 5/18/2022 at 12:11 PM    An electronic signature was used to authenticate this note.

## 2022-05-19 ENCOUNTER — OFFICE VISIT (OUTPATIENT)
Dept: PODIATRY | Age: 72
End: 2022-05-19
Payer: MEDICARE

## 2022-05-19 VITALS — WEIGHT: 203 LBS | BODY MASS INDEX: 30.07 KG/M2 | HEIGHT: 69 IN

## 2022-05-19 DIAGNOSIS — M79.671 PAIN IN RIGHT FOOT: ICD-10-CM

## 2022-05-19 DIAGNOSIS — E11.42 DIABETIC POLYNEUROPATHY ASSOCIATED WITH TYPE 2 DIABETES MELLITUS (HCC): Primary | ICD-10-CM

## 2022-05-19 DIAGNOSIS — M79.672 PAIN IN LEFT FOOT: ICD-10-CM

## 2022-05-19 DIAGNOSIS — M54.16 LUMBAR RADICULOPATHY: ICD-10-CM

## 2022-05-19 PROCEDURE — 1036F TOBACCO NON-USER: CPT | Performed by: PODIATRIST

## 2022-05-19 PROCEDURE — 99203 OFFICE O/P NEW LOW 30 MIN: CPT | Performed by: PODIATRIST

## 2022-05-19 PROCEDURE — 4040F PNEUMOC VAC/ADMIN/RCVD: CPT | Performed by: PODIATRIST

## 2022-05-19 PROCEDURE — 3044F HG A1C LEVEL LT 7.0%: CPT | Performed by: PODIATRIST

## 2022-05-19 PROCEDURE — 3017F COLORECTAL CA SCREEN DOC REV: CPT | Performed by: PODIATRIST

## 2022-05-19 PROCEDURE — 2022F DILAT RTA XM EVC RTNOPTHY: CPT | Performed by: PODIATRIST

## 2022-05-19 PROCEDURE — G8427 DOCREV CUR MEDS BY ELIG CLIN: HCPCS | Performed by: PODIATRIST

## 2022-05-19 PROCEDURE — G8417 CALC BMI ABV UP PARAM F/U: HCPCS | Performed by: PODIATRIST

## 2022-05-19 PROCEDURE — 1123F ACP DISCUSS/DSCN MKR DOCD: CPT | Performed by: PODIATRIST

## 2022-05-19 ASSESSMENT — ENCOUNTER SYMPTOMS
NAUSEA: 0
BACK PAIN: 1
DIARRHEA: 0
COLOR CHANGE: 0
SHORTNESS OF BREATH: 0

## 2022-05-19 NOTE — PROGRESS NOTES
Juliocesar Patricia is a 73 y.o. male who presents to the office today with chief complaint of request for a diabetic foot exam.  Chief Complaint   Patient presents with    Foot Pain     b/l foot pain, feels like walking on rocks    Numbness     b/l numbness and tingling    Diabetes        Symptoms began about 2 year(s) ago. Patient denies injury to the feet. Patient states that he experiences numbness and tingling to the feet as well as heat and feeling like he is walking on rocks. Pain is rated 8 out of 10 at it's worst and is described as intermittent. Treatments prior to today's visit include: None.       Allergies   Allergen Reactions    Asa [Aspirin]       iron deficiency anemia , requiring iron infusions and transfusions    Iron      Pill- constipation, abdominal pain    Nsaids       iron deficiency anemia, history of bleeding ulcers        Past Medical History:   Diagnosis Date    Anemia     Arthritis     osteoarthritis    Bladder cancer (HCC) 03/2021    bladder    Bladder tumor     Chronic back pain     Chronic neck pain     Colon polyps 10/08/2019    tubular adenoma x2    COVID 01/06/2022    self reported-home test-no symptoms    COVID-19 virus infection 1/10/2022    Diabetic neuropathy ([address]     Diarrhea     Encounter for chronic pain management     [facility] pain management [address] Catheryn Leventhal E visit 01/03/2022    Essential hypertension 05/12/2020    managed by Dr. King Terry PCP    GERD (gastroesophageal reflux disease)     Heartburn     Hematuria     Hepatitis B core antibody positive     History of bleeding peptic ulcer     History of blood transfusion     no reactions    History of hepatitis C     History of migraine headaches     History of stress test 07/2020    \"low risk\"    Hyperlipidemia     Iron (Fe) deficiency anemia     Neuropathy     Poor historian     states his wife takes care of all medical information    Positive FIT (fecal immunochemical test)     Type 2 diabetes mellitus with microalbuminuria, without long-term current use of insulin ([facility] [address] 07/13/2020    managed by Dr. Ebenezer Jenkins last e-visit 11/2021    Under care of team 02/09/2022    pcp-Dr Sindi Luther virtual visit 11/2021    Under care of team 02/09/2022    hematology-Dr Caprice Savaedra [address] virtual visit 11/2021    Under care of team 02/09/2022    urology-Dr fairchild-last visit nov 2021    Wears dentures     Wears glasses     Worsening headaches 12/29/2020       Prior to Admission medications    Medication Sig Start Date End Date Taking? Authorizing Provider   pregabalin (LYRICA) 150 MG capsule Take 1 capsule by mouth 3 times daily for 90 days. 5/18/22 8/16/22 Yes VEE James CNP   folic acid (FOLVITE) 1 MG tablet Take 1 tablet by mouth daily 5/18/22  Yes Inna Zambrano MD   cyanocobalamin 1000 MCG/ML injection INJECT 1 ML INTO THE MUSCLE EVERY 30 DAYS. CALL FOR NEXT REFILL WHICH WILL BE MONTHLY FOR LIFE 5/16/22  Yes Benita Saini MD   metoprolol tartrate (LOPRESSOR) 25 MG tablet TAKE ONE TABLET BY MOUTH TWICE A DAY 5/4/22  Yes Benita Saini MD   oxyCODONE-acetaminophen (PERCOCET) 5-325 MG per tablet Take 1 tablet by mouth 2 times daily as needed for Pain for up to 30 days. 5/2/22 6/1/22 Yes VEE James CNP   morphine (MS CONTIN) 60 MG extended release tablet Take 1 tablet by mouth daily for 30 days. 5/2/22 6/1/22 Yes VEE James CNP   morphine (MS CONTIN) 30 MG extended release tablet Take 1 tablet by mouth nightly for 30 days. Take at bedtime 5/2/22 6/1/22 Yes VEE James CNP   pantoprazole (PROTONIX) 40 MG tablet TAKE ONE TABLET BY MOUTH DAILY 4/13/22  Yes Benita Saini MD   glucose monitoring (FREESTYLE [employer]) kit Please supply Patient with a glucose monitoring kit that insurance will cover. 3/28/22  Yes Benita Saini MD   blood glucose monitor strips Testing once a day.   Please dispense according to patients insurance. 3/28/22  Yes Jacy Mcrae MD   Lancets 30G MISC Testing once a day. Please dispense according to patients insurance.  3/28/22  Yes Jacy Mcrae MD   vitamin D (CHOLECALCIFEROL) 50 MCG (2000 UT) TABS tablet Take 1 tablet by mouth daily 3/14/22  Yes Jacy Mcrae MD   baclofen (LIORESAL) 10 MG tablet Take 1 tablet by mouth 3 times daily as needed (back pain) 3/7/22  Yes Jacy Mcrae MD   metFORMIN (GLUCOPHAGE XR) 500 MG extended release tablet Take 1 tablet by mouth daily (with breakfast) ** stop Metformin 1000 mg BID** 2/25/22  Yes Jacy Mcrae MD   lisinopril (PRINIVIL;ZESTRIL) 10 MG tablet Take 1 tablet by mouth daily ** stop Lisinopril HCTZ** 2/25/22  Yes Jacy Mcrae MD   fluticasone (FLONASE) 50 MCG/ACT nasal spray 2 sprays by Nasal route daily 1/5/22  Yes Melvin Cortez MD   pravastatin (PRAVACHOL) 40 MG tablet Take 1 tablet by mouth every evening Stop Gemfibrozil 5/11/21  Yes Jacy Mcrae MD   Alcohol Swabs (B-D SINGLE USE SWABS REGULAR) PADS USE TO CHECK BLOOD SUGAR TWICE A DAY 5/4/21  Yes Jacy Mcrae MD   Syringe/Needle, Disp, (SYRINGE 3CC/25GX1\") 25G X 1\" 3 ML MISC To be used with B12 injections 1/8/21  Yes Jacy Mcrae MD   loperamide (RA ANTI-DIARRHEAL) 2 MG capsule Take 1 capsule by mouth 4 times daily as needed for Diarrhea 1/5/21  Yes Ozzy Nieves MD   Probiotic Product (PROBIOTIC-10 PO) Take by mouth daily    Yes Historical Provider, MD       Past Surgical History:   Procedure Laterality Date   [address]    cervical spine three times, has plate    CHOLECYSTECTOMY  03/22/2019    COLONOSCOPY  01/25/2015    10 yr recall, hemorrhoids    COLONOSCOPY N/A 10/08/2019    tubular adenoma x2    CYSTOSCOPY Right 04/29/2021    CYSTOSCOPY TUR BLADDER TUMOR, GYRUS, RIGHT STENT PLACEMENT AND RIGHT STENT REMOVAL performed by Aime Lucio MD at [address] [place] [address] N/A 09/09/2021 CYSTOSCOPY, TRANSURETHRAL RESECTION BLADDER TUMOR performed by Lauro Ward MD at [address]  2022    Bladder biopsy, Fulguration     CYSTOSCOPY N/A 2022    CYSTOSCOPY BLADDER BIOPSY, FULGURATION performed by Lauro Ward MD at [address] [facility]  10/2015    all teeth extracted    HERNIA REPAIR N/A 2020    HERNIA INCISIONAL REPAIR LAPAROSCOPIC ROBOTIC WITH MESH performed by Jennifer Nix DO at [facility] Right     UMBILICAL HERNIA REPAIR  3022-19    UPPER GASTROINTESTINAL ENDOSCOPY  2015    UPPER GASTROINTESTINAL ENDOSCOPY N/A 10/08/2019    EGD BIOPSY performed by Brittney Cabral MD at [address] [place] [address]       Family History   Problem Relation Age of Onset    Diabetes Mother     Heart Disease Father     High Blood Pressure Father        Social History     Tobacco Use    Smoking status: Former Smoker     Packs/day: 2.00     Years: 45.00     Pack years: 90.00     Types: Cigarettes     Start date: 1968     Quit date: 2015     Years since quittin.4    Smokeless tobacco: Never Used    Tobacco comment: on chantix 11-6-15   12-7-15   Substance Use Topics    Alcohol use: No       Review of Systems   Constitutional: Negative for activity change, appetite change, chills, diaphoresis, fatigue and fever. Respiratory: Negative for shortness of breath. Cardiovascular: Negative for leg swelling. Gastrointestinal: Negative for diarrhea and nausea. Endocrine: Negative for cold intolerance, heat intolerance and polyuria. Musculoskeletal: Positive for arthralgias and back pain. Negative for gait problem, joint swelling and myalgias. Skin: Negative for color change, pallor, rash and wound. Allergic/Immunologic: Negative for environmental allergies and food allergies. Neurological: Positive for numbness. Negative for dizziness, weakness and light-headedness. Hematological: Does not bruise/bleed easily. Dow-Adam monofilament. Musculoskeletal:  Muscle strength is +5/5 to all four muscle groups of the right lower extremity and +5/5 to all four muscle groups of the left lower extremity. There are no areas of subluxation, dislocation, or laxity noted to either lower extremity. Range of motion to the right ankle is  free of pain or grinding. Range of motion to the left ankle is  free of pain or grinding. Range of motion to the right subtalar joint is  free of pain or grinding. Range of motion to the left subtalar joint is  free of pain or grinding. No abnormalities, asymmetries, or misalignments are seen between the extremities. Weightbearing evaluation does reveal rearfoot eversion, medial prominence of the talar head, loss of the medial longitudinal arch height, and too many toes sign bilaterally. The lesser digits of the right foot are contracted. The lesser digits of the left foot are contracted. There is no prominence noted to the first metatarsal head without abduction of the hallux of the right foot. There is no prominence noted to the first metatarsal head without abduction of the hallux of the left foot. There is mild pain with palpation to the right midfoot plantarly. There is no pain to the left midfoot plantarly, nor to the heel or ball of either foot. Shoe examination was performed. Biomechanical Exam: normal bilaterally. Asessment: Patient is a 73 y.o. male with:    Diagnosis Orders   1. Diabetic polyneuropathy associated with type 2 diabetes mellitus [facility])  [address] [facility], [facility], [facility]   2. Lumbar radiculopathy  [address] [facility], [facility], [facility]   3. Pain in left foot  [address] [place], [facility], , [facility]   4. Pain in right foot  [address], , [facility]       Plan:  1. Clinical evaluation of the patient.  2. Patient informed that while he has diabetic neuropathy, I believe his symptoms are due to a greater part to his lower back issues. Patient is referred to a neurosurgeon for evaluation. 3. Contact office with any questions/problems/concerns. Return if symptoms worsen or fail to improve.    5/19/2022      Jannet Hagan DPM

## 2022-05-23 ENCOUNTER — PROCEDURE VISIT (OUTPATIENT)
Dept: UROLOGY | Age: 72
End: 2022-05-23
Payer: MEDICARE

## 2022-05-23 VITALS
TEMPERATURE: 98.3 F | HEART RATE: 88 BPM | SYSTOLIC BLOOD PRESSURE: 108 MMHG | DIASTOLIC BLOOD PRESSURE: 65 MMHG | HEIGHT: 69 IN | WEIGHT: 203 LBS | BODY MASS INDEX: 30.07 KG/M2

## 2022-05-23 DIAGNOSIS — C67.2 MALIGNANT NEOPLASM OF LATERAL WALL OF URINARY BLADDER (HCC): Primary | ICD-10-CM

## 2022-05-23 PROCEDURE — 52000 CYSTOURETHROSCOPY: CPT | Performed by: UROLOGY

## 2022-05-23 NOTE — PROGRESS NOTES
Cystoscopy Operative Note (5/23/22)  Surgeon: Nelsy Lozano MD   Anesthesia: Urethral 2% Xylocaine   Indications: Bladder Cancer   Position: Dorsal Lithotomy    Findings:   Risks and Benefits discussed with patient prior to procedure. The patient was prepped and draped in the usual sterile fashion. The flexible cystoscope was advanced through the urethra and into the bladder. The bladder was thoroughly inspected and the following was noted:    Residual Urine: none  Urethra: normal appearing urethra with no masses, tenderness or lesions  Prostate: partially obstructing lateral lobes of prostate; median lobe present? no.   Bladder: Small tumor noted anteriorly just proximal to the bladder neck. No bladder diverticulum. There was mild trabeculation noted. Ureters: Clear efflux from both ureters. Orifices with normal configuration and location. The cystoscope was removed. The patient tolerated the procedure well.      Agree with the ROS entered by the MA.    TURBT

## 2022-05-24 ENCOUNTER — HOSPITAL ENCOUNTER (OUTPATIENT)
Dept: PREADMISSION TESTING | Age: 72
Discharge: HOME | End: 2022-05-28

## 2022-05-24 VITALS — HEIGHT: 69 IN | WEIGHT: 219 LBS | BODY MASS INDEX: 32.44 KG/M2

## 2022-05-24 NOTE — PROGRESS NOTES
Pre-op Instructions For Out-Patient Surgery    Medication Instructions:  · Please stop herbs and any supplements now (includes vitamins and minerals). · Please contact your surgeon and prescribing physician for pre-op instructions for any blood thinners. · If you have inhalers/aerosol treatments at home, please use them the morning of your surgery and bring the inhalers with you to the hospital.    · Please take the following medications the morning of your surgery with a sip of water:    Lisinopril  &  Metoprolol    Surgery Instructions:  1. After midnight before surgery:  Do not eat or drink anything, including water, mints, gum, and hard candy. You may brush your teeth without swallowing. No smoking, chewing tobacco, or street drugs. 2. Please shower or bathe before surgery. 3. Please do not wear any cologne, lotion, powder, deodorant, jewelry, piercings, perfume, makeup, nail polish, hair accessories, or hair spray on the day of surgery. Wear loose comfortable clothing. 4. Leave your valuables at home. Bring a storage case for any glasses/contacts. 5. An adult who is responsible for you MUST drive you home and should be with you for the first 24 hours after surgery. 6. If having out-patient knee and foot surgeries, please arrange for planned crutches, walker, or wheelchair before arriving to the hospital.    The Day of Surgery:  · Arrive at [address] [place] [address] Surgery Entrance at the time directed by your surgeon and check in at the desk. · If you have a living will or healthcare power of , please bring a copy. · You will be taken to the pre-op holding area where you will be prepared for surgery. A physical assessment will be performed by a nurse practitioner or house officer.   Your IV will be started and you will meet your anesthesiologist.    · When you go to surgery, your family will be directed to the surgical waiting room, where the doctor should speak with them after your surgery. · After surgery, you will be taken to the recovery room then when you are awake and stable you will go to the short stay unit for preparation to be discharged. · If you use a Bi-PAP or C-PAP machine, please bring it with you and leave it in the car in case it is needed in recovery room. Instructions read to Dain Martel and understanding verbalized.      6/7/22 EGD and Colonoscopy

## 2022-05-25 ENCOUNTER — HOSPITAL ENCOUNTER (OUTPATIENT)
Dept: INFUSION THERAPY | Age: 72
Discharge: HOME | End: 2022-05-25
Payer: MEDICARE

## 2022-05-25 VITALS
HEART RATE: 87 BPM | SYSTOLIC BLOOD PRESSURE: 102 MMHG | DIASTOLIC BLOOD PRESSURE: 63 MMHG | TEMPERATURE: 98.1 F | RESPIRATION RATE: 18 BRPM

## 2022-05-25 DIAGNOSIS — D50.8 OTHER IRON DEFICIENCY ANEMIA: Primary | ICD-10-CM

## 2022-05-25 PROCEDURE — 6360000002 HC RX W HCPCS: Performed by: INTERNAL MEDICINE

## 2022-05-25 PROCEDURE — 2580000003 HC RX 258: Performed by: INTERNAL MEDICINE

## 2022-05-25 PROCEDURE — 96365 THER/PROPH/DIAG IV INF INIT: CPT

## 2022-05-25 RX ORDER — EPINEPHRINE 1 MG/ML
0.3 INJECTION, SOLUTION, CONCENTRATE INTRAVENOUS PRN
Status: CANCELLED | OUTPATIENT
Start: 2022-06-01

## 2022-05-25 RX ORDER — SODIUM CHLORIDE 9 MG/ML
INJECTION, SOLUTION INTRAVENOUS CONTINUOUS
Status: DISCONTINUED | OUTPATIENT
Start: 2022-05-25 | End: 2022-05-26 | Stop reason: HOSPADM

## 2022-05-25 RX ORDER — SODIUM CHLORIDE 0.9 % (FLUSH) 0.9 %
5-40 SYRINGE (ML) INJECTION PRN
Status: CANCELLED | OUTPATIENT
Start: 2022-06-01

## 2022-05-25 RX ORDER — HEPARIN SODIUM (PORCINE) LOCK FLUSH IV SOLN 100 UNIT/ML 100 UNIT/ML
500 SOLUTION INTRAVENOUS PRN
Status: CANCELLED | OUTPATIENT
Start: 2022-06-01

## 2022-05-25 RX ORDER — DIPHENHYDRAMINE HYDROCHLORIDE 50 MG/ML
50 INJECTION INTRAMUSCULAR; INTRAVENOUS
Status: CANCELLED | OUTPATIENT
Start: 2022-06-01

## 2022-05-25 RX ORDER — SODIUM CHLORIDE 9 MG/ML
INJECTION, SOLUTION INTRAVENOUS CONTINUOUS
Status: CANCELLED | OUTPATIENT
Start: 2022-06-01

## 2022-05-25 RX ORDER — ACETAMINOPHEN 325 MG/1
650 TABLET ORAL
Status: CANCELLED | OUTPATIENT
Start: 2022-06-01

## 2022-05-25 RX ORDER — ALBUTEROL SULFATE 90 UG/1
4 AEROSOL, METERED RESPIRATORY (INHALATION) PRN
Status: CANCELLED | OUTPATIENT
Start: 2022-06-01

## 2022-05-25 RX ORDER — ONDANSETRON 2 MG/ML
8 INJECTION INTRAMUSCULAR; INTRAVENOUS
Status: CANCELLED | OUTPATIENT
Start: 2022-06-01

## 2022-05-25 RX ORDER — FAMOTIDINE 10 MG/ML
20 INJECTION, SOLUTION INTRAVENOUS
Status: CANCELLED | OUTPATIENT
Start: 2022-06-01

## 2022-05-25 RX ORDER — SODIUM CHLORIDE 9 MG/ML
5-250 INJECTION, SOLUTION INTRAVENOUS PRN
Status: CANCELLED | OUTPATIENT
Start: 2022-06-01

## 2022-05-25 RX ADMIN — FERRIC CARBOXYMALTOSE INJECTION 750 MG: 50 INJECTION, SOLUTION INTRAVENOUS at 08:18

## 2022-05-25 RX ADMIN — SODIUM CHLORIDE: 9 INJECTION, SOLUTION INTRAVENOUS at 08:17

## 2022-05-25 NOTE — PROGRESS NOTES
Patient arrived for 1 of 2 injectafer. Tolerated w/o incident and left in stable condition. Returns 6/1.

## 2022-05-31 ENCOUNTER — HOSPITAL ENCOUNTER (OUTPATIENT)
Dept: PAIN MANAGEMENT | Age: 72
Discharge: HOME | End: 2022-05-31
Payer: MEDICARE

## 2022-05-31 ENCOUNTER — TELEPHONE (OUTPATIENT)
Dept: UROLOGY | Age: 72
End: 2022-05-31

## 2022-05-31 VITALS
OXYGEN SATURATION: 95 % | DIASTOLIC BLOOD PRESSURE: 78 MMHG | SYSTOLIC BLOOD PRESSURE: 146 MMHG | TEMPERATURE: 97.3 F | WEIGHT: 203 LBS | BODY MASS INDEX: 30.07 KG/M2 | RESPIRATION RATE: 16 BRPM | HEART RATE: 82 BPM | HEIGHT: 69 IN

## 2022-05-31 DIAGNOSIS — M47.26 OSTEOARTHRITIS OF SPINE WITH RADICULOPATHY, LUMBAR REGION: ICD-10-CM

## 2022-05-31 DIAGNOSIS — M51.36 DEGENERATIVE DISC DISEASE, LUMBAR: ICD-10-CM

## 2022-05-31 DIAGNOSIS — Z98.1 S/P CERVICAL SPINAL FUSION: ICD-10-CM

## 2022-05-31 DIAGNOSIS — M47.816 LUMBAR SPONDYLOSIS: Chronic | ICD-10-CM

## 2022-05-31 DIAGNOSIS — Z98.1 S/P CERVICAL SPINAL FUSION: Chronic | ICD-10-CM

## 2022-05-31 DIAGNOSIS — M47.816 OSTEOARTHRITIS OF LUMBAR SPINE, UNSPECIFIED SPINAL OSTEOARTHRITIS COMPLICATION STATUS: ICD-10-CM

## 2022-05-31 DIAGNOSIS — M46.92 CERVICAL SPONDYLITIS (HCC): Chronic | ICD-10-CM

## 2022-05-31 DIAGNOSIS — M54.16 LUMBAR RADICULOPATHY: Chronic | ICD-10-CM

## 2022-05-31 DIAGNOSIS — M48.061 SPINAL STENOSIS OF LUMBAR REGION, UNSPECIFIED WHETHER NEUROGENIC CLAUDICATION PRESENT: Chronic | ICD-10-CM

## 2022-05-31 DIAGNOSIS — M47.816 LUMBAR SPONDYLOSIS: ICD-10-CM

## 2022-05-31 DIAGNOSIS — Z79.891 CHRONIC USE OF OPIATE DRUG FOR THERAPEUTIC PURPOSE: ICD-10-CM

## 2022-05-31 DIAGNOSIS — M48.061 SPINAL STENOSIS OF LUMBAR REGION WITHOUT NEUROGENIC CLAUDICATION: Chronic | ICD-10-CM

## 2022-05-31 DIAGNOSIS — M54.16 LUMBAR RADICULOPATHY: ICD-10-CM

## 2022-05-31 DIAGNOSIS — Z51.81 ENCOUNTER FOR MEDICATION MONITORING: ICD-10-CM

## 2022-05-31 DIAGNOSIS — G89.29 ENCOUNTER FOR CHRONIC PAIN MANAGEMENT: ICD-10-CM

## 2022-05-31 DIAGNOSIS — M51.36 DEGENERATIVE DISC DISEASE, LUMBAR: Primary | Chronic | ICD-10-CM

## 2022-05-31 DIAGNOSIS — M46.92 CERVICAL SPONDYLITIS (HCC): ICD-10-CM

## 2022-05-31 PROCEDURE — 99213 OFFICE O/P EST LOW 20 MIN: CPT

## 2022-05-31 PROCEDURE — 99213 OFFICE O/P EST LOW 20 MIN: CPT | Performed by: NURSE PRACTITIONER

## 2022-05-31 RX ORDER — BACLOFEN 10 MG/1
10 TABLET ORAL 3 TIMES DAILY PRN
Qty: 270 TABLET | Refills: 1 | Status: SHIPPED | OUTPATIENT
Start: 2022-05-31

## 2022-05-31 RX ORDER — OXYCODONE HYDROCHLORIDE AND ACETAMINOPHEN 5; 325 MG/1; MG/1
1 TABLET ORAL 2 TIMES DAILY PRN
Qty: 60 TABLET | Refills: 0 | Status: SHIPPED | OUTPATIENT
Start: 2022-06-01 | End: 2022-06-30 | Stop reason: SDUPTHER

## 2022-05-31 RX ORDER — MORPHINE SULFATE 30 MG/1
30 TABLET, FILM COATED, EXTENDED RELEASE ORAL 2 TIMES DAILY
Qty: 60 TABLET | Refills: 0 | Status: SHIPPED | OUTPATIENT
Start: 2022-06-01 | End: 2022-06-30 | Stop reason: SDUPTHER

## 2022-05-31 RX ORDER — MORPHINE SULFATE 15 MG/1
15 TABLET, FILM COATED, EXTENDED RELEASE ORAL DAILY
Qty: 30 TABLET | Refills: 0 | Status: SHIPPED | OUTPATIENT
Start: 2022-06-01 | End: 2022-07-01

## 2022-05-31 ASSESSMENT — PAIN SCALES - GENERAL: PAINLEVEL_OUTOF10: 5

## 2022-05-31 ASSESSMENT — ENCOUNTER SYMPTOMS
CONSTIPATION: 0
SHORTNESS OF BREATH: 0
BACK PAIN: 1
BOWEL INCONTINENCE: 0
COUGH: 0

## 2022-05-31 NOTE — TELEPHONE ENCOUNTER
PATIENT WIFE CALLED STATING THEY RECEIVED CT LUNG SCAN RESULTS WHICH STATED TO HAVE MONITORED YEARLY FROM DR BRITO BUT PATIENT STATES THEY READ THE REPORT IN [facility] AND THE REPORT STATES EVERY 6 MONTH. CAN YOU PLEASE RECHECK THE RESULTS REGARDING THIS      PLEase Approve or Refuse.   Send to Pharmacy per Pt's Request:      Next Visit Date:  6/29/2022   Last Visit Date: 2/25/2022    Hemoglobin A1C (%)   Date Value   03/12/2022 5.2   09/24/2021 5.0   01/11/2021 5.9             ( goal A1C is < 7)   BP Readings from Last 3 Encounters:   05/31/22 (!) 146/78   05/25/22 102/63   05/23/22 108/65          (goal 120/80)  BUN   Date Value Ref Range Status   03/12/2022 15 8 - 23 mg/dL Final     CREATININE   Date Value Ref Range Status   03/12/2022 0.73 0.70 - 1.20 mg/dL Final     Potassium   Date Value Ref Range Status   03/12/2022 4.9 3.7 - 5.3 mmol/L Final

## 2022-05-31 NOTE — TELEPHONE ENCOUNTER
GnamGnamt message sent to patient    1. 6 mm lingular pulmonary nodule. Multiple additional 3-4 mm pulmonary   nodules in the lingula. Mild dependent atelectasis and respiratory motion. Mild emphysema. 2. Coronary artery disease. Cardiomegaly. Atherosclerotic calcification of   the aorta and branch vasculature. 3. Fatty liver. LUNG RADS:   Per ACR Lung-RADS Version 1.1       Category 3, Probably benign (Probably benign finding(s) - short term follow   up suggested; includes nodules with a low likelihood of becoming a clinically   active cancer). Management: 6 Month LDCT.

## 2022-05-31 NOTE — PROGRESS NOTES
Chief Complaint   Patient presents with    Back Pain    Medication Refill         [facility]     Pt c/o low back pain for many years. There is no known injury or surgery to the area. He does have a history of scoliosis. His last PT was over 4 years ago with no benefit and he is not interested in repeating. He does do home stretches every morning. He also had a LESI in 2013 that did not help. MRI done 5/2022 shows multilevel degenerative changes facet hypertrophy L3/4 L4/5 L5/S1. No canal stenosis  He has non-invasive bladder cancer and had resection of a tumor in April 2021. He continues to follow with oncology and hematology - he continues Injectafer infusions for anemia. Pt has spot in bladder that will be biopsied this month,Pt has spot in bladder that will be biopsied this month   He follows with podiatry for foot pain and referred to NS to l[facility]zy for lumbar radiculopathy vs peripheral neuropathy. Has not made appt yet d/t other dr visits. (colon/egd/bladder bx)     Despite of significant amount of opioid use patient continues to complain severe chronic pain issues. I have explained MME to the patient and that the CDC recommends avoiding MME over 90 with goal to be less than 50. Explained to patient that there is a higher risk for hyperalgesia, respiratory depression and accidental overdose with chronic use of high dose opioids. Assured patient we will work with them to slowly titrate to a lower dose while at the same time offering non opioid options and interventions when applicable    MED was 804 and the risks related to high doses of opiates were discussed at previous visits. MSER dose was reduced last visit. Will continue to taper by 10% each month. Patient verbalizes understanding. HPI:     Back Pain  This is a chronic problem. The current episode started more than 1 year ago. The problem occurs constantly. The problem has been gradually worsening since onset.  The pain is present in the lumbar spine. The quality of the pain is described as burning and aching. The pain radiates to the left foot, left thigh, left knee, right foot, right knee and right thigh. The pain is at a severity of 5/10. The pain is the same all the time. The symptoms are aggravated by bending, sitting and standing. Associated symptoms include leg pain and numbness. Pertinent negatives include no bladder incontinence, bowel incontinence, chest pain, fever, headaches, tingling or weakness. He has tried heat and ice for the symptoms. Patient denies any new neurological symptoms. No bowel or bladder incontinence, no weakness, and no falling. Pill count: appropriate 6/1  Morphine ER 60 MG - 1  Morphine ER 30 Mg - 2  Oxycodone - 2    Morphine equivalent: 142.5--> 110.5-->105-->90    Controlled Substance Monitoring:    Acute and Chronic Pain Monitoring:   RX Monitoring 5/31/2022   Attestation -   Acute Pain Prescriptions -   Periodic Controlled Substance Monitoring Possible medication side effects, risk of tolerance/dependence & alternative treatments discussed. ;No signs of potential drug abuse or diversion identified. ;Assessed functional status. ;Obtaining appropriate analgesic effect of treatment. Chronic Pain > 50 MEDD -   Chronic Pain > 80 MEDD Consulted with a specialist.;Obtained or confirmed \"Medication Contract\" on file. ;Co-prescribed Naloxone. Chronic Pain > 120 MEDD -         Periodic Controlled Substance Monitoring: Possible medication side effects, risk of tolerance/dependence & alternative treatments discussed. ,No signs of potential drug abuse or diversion identified. ,Assessed functional status. ,Obtaining appropriate analgesic effect of treatment. VEE Norwood CNP)  Chronic Pain > 80 MEDD: Consulted with a specialist.,Obtained or confirmed \"Medication Contract\" on file.,Co-prescribed Naloxone.  VEE Norwood CNP)      Past Medical History:   Diagnosis Date    Anemia     Arthritis     osteoarthritis  Bladder cancer ([facility] 03/2021    bladder    Bladder tumor     Chronic back pain     Chronic neck pain     Colon polyps 10/08/2019    tubular adenoma x2    COVID 01/06/2022    self reported-home test-no symptoms    COVID-19 virus infection 1/10/2022    Diabetic neuropathy ([facility]     Diarrhea     Encounter for chronic pain management     [facility] Dr. Junior Holden E visit 01/03/2022    Essential hypertension 05/12/2020    managed by Dr. Lawson Umana PCP    GERD (gastroesophageal reflux disease)     Heartburn     Hematuria     Hepatitis B core antibody positive     History of bleeding peptic ulcer     History of blood transfusion     no reactions    History of hepatitis C     History of migraine headaches     History of stress test 07/2020    \"low risk\"    Hyperlipidemia     Iron (Fe) deficiency anemia     Neuropathy     Poor historian     states his wife takes care of all medical information    Positive FIT (fecal immunochemical test)     Type 2 diabetes mellitus with microalbuminuria, without long-term current use of insulin ([facility] 07/13/2020    managed by Dr. Sridevi Torres last e-visit 11/2021    Under care of team 02/09/2022    pcp-Dr Alyson Palafox virtual visit 11/2021    Under care of team 02/09/2022    hematology-Dr Caprice Saavedra 32 virtual visit 11/2021    Under care of team 02/09/2022    urology-Dr fairchild-last visit nov 2021    Wears dentures     Wears glasses     Worsening headaches 12/29/2020       Past Surgical History:   Procedure Laterality Date    CERVICAL SPINE SURGERY  1977    cervical spine three times, has plate    CHOLECYSTECTOMY  03/22/2019    COLONOSCOPY  01/25/2015    10 yr recall, hemorrhoids    COLONOSCOPY N/A 10/08/2019    tubular adenoma x2    CYSTOSCOPY Right 04/29/2021    CYSTOSCOPY TUR BLADDER TUMOR, GYRUS, RIGHT STENT PLACEMENT AND RIGHT STENT REMOVAL performed by Christiana Vera MD at [place] [address] N/A 09/09/2021 CYSTOSCOPY, TRANSURETHRAL RESECTION BLADDER TUMOR performed by Latonia Mari MD at [facility] 9  02/16/2022    Bladder biopsy, Fulguration     CYSTOSCOPY N/A 2/16/2022    CYSTOSCOPY BLADDER BIOPSY, FULGURATION performed by Latonia Mari MD at [address]  10/2015    all teeth extracted    HERNIA REPAIR N/A 08/07/2020    HERNIA INCISIONAL REPAIR LAPAROSCOPIC ROBOTIC WITH MESH performed by Gonzales Oviedo DO at [facility] Right     UMBILICAL HERNIA REPAIR  3022-19    UPPER GASTROINTESTINAL ENDOSCOPY  01/25/2015    UPPER GASTROINTESTINAL ENDOSCOPY N/A 10/08/2019    EGD BIOPSY performed by Russ Rick MD at [facility]       Allergies   Allergen Reactions   Rudolm Rain [Aspirin]       iron deficiency anemia , requiring iron infusions and transfusions    Iron      Pill- constipation, abdominal pain    Nsaids       iron deficiency anemia, history of bleeding ulcers          Current Outpatient Medications:     pregabalin (LYRICA) 150 MG capsule, Take 1 capsule by mouth 3 times daily for 90 days. , Disp: 90 capsule, Rfl: 2    folic acid (FOLVITE) 1 MG tablet, Take 1 tablet by mouth daily, Disp: 90 tablet, Rfl: 1    cyanocobalamin 1000 MCG/ML injection, INJECT 1 ML INTO THE MUSCLE EVERY 30 DAYS. CALL FOR NEXT REFILL WHICH WILL BE MONTHLY FOR LIFE, Disp: 3 mL, Rfl: 3    metoprolol tartrate (LOPRESSOR) 25 MG tablet, TAKE ONE TABLET BY MOUTH TWICE A DAY, Disp: 180 tablet, Rfl: 3    oxyCODONE-acetaminophen (PERCOCET) 5-325 MG per tablet, Take 1 tablet by mouth 2 times daily as needed for Pain for up to 30 days. , Disp: 60 tablet, Rfl: 0    morphine (MS CONTIN) 60 MG extended release tablet, Take 1 tablet by mouth daily for 30 days. , Disp: 30 tablet, Rfl: 0    morphine (MS CONTIN) 30 MG extended release tablet, Take 1 tablet by mouth nightly for 30 days.  Take at bedtime, Disp: 30 tablet, Rfl: 0    pantoprazole (PROTONIX) 40 MG tablet, TAKE ONE TABLET BY MOUTH DAILY, Disp: 90 tablet, Rfl: 1    glucose monitoring (FREESTYLE FREEDOM) kit, Please supply Patient with a glucose monitoring kit that insurance will cover. , Disp: 1 kit, Rfl: 0    blood glucose monitor strips, Testing once a day. Please dispense according to patients insurance., Disp: 100 strip, Rfl: 3    Lancets 30G MISC, Testing once a day.   Please dispense according to patients insurance., Disp: 100 each, Rfl: 3    vitamin D (CHOLECALCIFEROL) 50 MCG (2000 UT) TABS tablet, Take 1 tablet by mouth daily, Disp: 30 tablet, Rfl: 3    baclofen (LIORESAL) 10 MG tablet, Take 1 tablet by mouth 3 times daily as needed (back pain), Disp: 270 tablet, Rfl: 1    metFORMIN (GLUCOPHAGE XR) 500 MG extended release tablet, Take 1 tablet by mouth daily (with breakfast) ** stop Metformin 1000 mg BID**, Disp: 90 tablet, Rfl: 3    lisinopril (PRINIVIL;ZESTRIL) 10 MG tablet, Take 1 tablet by mouth daily ** stop Lisinopril HCTZ**, Disp: 90 tablet, Rfl: 3    fluticasone (FLONASE) 50 MCG/ACT nasal spray, 2 sprays by Nasal route daily, Disp: 16 g, Rfl: 0    pravastatin (PRAVACHOL) 40 MG tablet, Take 1 tablet by mouth every evening Stop Gemfibrozil, Disp: 90 tablet, Rfl: 3    Alcohol Swabs (B-D SINGLE USE SWABS REGULAR) PADS, USE TO CHECK BLOOD SUGAR TWICE A DAY, Disp: 200 each, Rfl: 3    Syringe/Needle, Disp, (SYRINGE 3CC/25GX1\") 25G X 1\" 3 ML MISC, To be used with B12 injections, Disp: 50 each, Rfl: 2    loperamide (RA ANTI-DIARRHEAL) 2 MG capsule, Take 1 capsule by mouth 4 times daily as needed for Diarrhea, Disp: 112 capsule, Rfl: 2    Probiotic Product (PROBIOTIC-10 PO), Take by mouth daily , Disp: , Rfl:     Family History   Problem Relation Age of Onset    Diabetes Mother     Heart Disease Father     High Blood Pressure Father        Social History     Socioeconomic History    Marital status:      Spouse name: Not on file    Number of children: Not on file    Years of education: Not on file   [employer] Highest education level: Not on file   Occupational History    Occupation: disabled   Tobacco Use    Smoking status: Former Smoker     Packs/day: 2.00     Years: 45.00     Pack years: 90.00     Types: Cigarettes     Start date: 1968     Quit date: 2015     Years since quittin.4    Smokeless tobacco: Never Used    Tobacco comment: on chantix 11-6-15   12-7-15   Vaping Use    Vaping Use: Never used   Substance and Sexual Activity    Alcohol use: No    Drug use: No    Sexual activity: Yes     Partners: Female   Other Topics Concern    Not on file   Social History Narrative    Not on file     Social Determinants of Health     Financial Resource Strain: High Risk    Difficulty of Paying Living Expenses: Very hard   Food Insecurity: Food Insecurity Present    Worried About Running Out of Food in the Last Year: Often true    Jennifer of Food in the Last Year: Often true   Transportation Needs:     Lack of Transportation (Medical): Not on file    Lack of Transportation (Non-Medical):  Not on file   Physical Activity:     Days of Exercise per Week: Not on file    Minutes of Exercise per Session: Not on file   Stress:     Feeling of Stress : Not on file   Social Connections:     Frequency of Communication with Friends and Family: Not on file    Frequency of Social Gatherings with Friends and Family: Not on file    Attends Latter-day Services: Not on file    Active Member of [address] [place] [address] or Organizations: Not on file    Attends Club or Organization Meetings: Not on file    Marital Status: Not on file   Intimate Partner Violence:     Fear of Current or Ex-Partner: Not on file    Emotionally Abused: Not on file    Physically Abused: Not on file    Sexually Abused: Not on file   Housing Stability:     Unable to Pay for Housing in the Last Year: Not on file    Number of [place] in the Last Year: Not on file    Unstable Housing in the Last Year: Not on file       Review of Systems:  Review of Systems   Constitutional: Negative for chills and fever. Cardiovascular: Negative for chest pain. Respiratory: Negative for cough and shortness of breath. Musculoskeletal: Positive for back pain. Gastrointestinal: Negative for bowel incontinence and constipation. Genitourinary: Negative for bladder incontinence. Neurological: Positive for numbness. Negative for headaches, tingling and weakness. Physical Exam:  BP (!) 146/78   Pulse 82   Temp 97.3 °F (36.3 °C)   Resp 16   Ht 5' 9\" (1.753 m)   Wt 203 lb (92.1 kg)   SpO2 95%   BMI 29.98 kg/m²     Physical Exam  Cardiovascular:      Rate and Rhythm: Normal rate. Pulmonary:      Effort: Pulmonary effort is normal.   Musculoskeletal:         General: Normal range of motion. Skin:     General: Skin is warm and dry. Neurological:      Mental Status: He is alert and oriented to person, place, and time. Record/Diagnostics Review:    Last zoe 3/22  and was appropriate     Assessment:  Problem List Items Addressed This Visit     Degenerative disc disease, lumbar - Primary (Chronic)    Lumbar radiculopathy (Chronic)    Lumbar spinal stenosis (Chronic)    Chronic use of opiate drug for therapeutic purpose    Osteoarthritis of spine with radiculopathy, lumbar region             Treatment Plan:  Patient relates current medications are helping the pain. Patient reports taking pain medications as prescribed, denies obtaining medications from different sources and denies use of illegal drugs. Patient denies side effects from medications like nausea, vomiting, constipation or drowsiness. Patient reports current activities of daily living are possible due to medications and would like to continue them. As always, we encourage daily stretching and strengthening exercises, and recommend minimizing use of pain medications unless patient cannot get through daily activities due to pain. Contract requirements met. Continue opioid therapy. Script written for MS ER 30/45mg and continue percocet BID  Follow up appointment made for 4 weeks    I have reviewed the chief complaint and history of present illness (including ROS and [address]) and vital documentation by my staff and I agree with their documentation and have added where applicable.

## 2022-05-31 NOTE — TELEPHONE ENCOUNTER
Per Dr. Frank Records - pt to have Cysto, TURBT     ---------------------    LM for patient to call back to schedule

## 2022-05-31 NOTE — TELEPHONE ENCOUNTER
Patient sent a message through [employer] stating [facility] Feliberto Montgomery,   While looking through [address], I seen where you stated you LM for patient to call back to schedule at 1:20pm on 5/31. I want to confirm with you our phone number because I never received a VM or missed call from you.   Im thankful I was in [employer] confirming appts for him, thats when I seen your msg.\"

## 2022-06-01 ENCOUNTER — TELEPHONE (OUTPATIENT)
Dept: UROLOGY | Age: 72
End: 2022-06-01

## 2022-06-01 ENCOUNTER — HOSPITAL ENCOUNTER (OUTPATIENT)
Dept: INFUSION THERAPY | Age: 72
Discharge: HOME | End: 2022-06-01
Payer: MEDICARE

## 2022-06-01 VITALS
DIASTOLIC BLOOD PRESSURE: 63 MMHG | SYSTOLIC BLOOD PRESSURE: 106 MMHG | HEART RATE: 81 BPM | RESPIRATION RATE: 16 BRPM | TEMPERATURE: 97.9 F

## 2022-06-01 DIAGNOSIS — D50.8 OTHER IRON DEFICIENCY ANEMIA: Primary | ICD-10-CM

## 2022-06-01 PROCEDURE — 2580000003 HC RX 258: Performed by: INTERNAL MEDICINE

## 2022-06-01 PROCEDURE — 96365 THER/PROPH/DIAG IV INF INIT: CPT

## 2022-06-01 PROCEDURE — 6360000002 HC RX W HCPCS: Performed by: INTERNAL MEDICINE

## 2022-06-01 RX ORDER — EPINEPHRINE 1 MG/ML
0.3 INJECTION, SOLUTION, CONCENTRATE INTRAVENOUS PRN
Status: CANCELLED | OUTPATIENT
Start: 2022-06-01

## 2022-06-01 RX ORDER — FAMOTIDINE 10 MG/ML
20 INJECTION, SOLUTION INTRAVENOUS
Status: CANCELLED | OUTPATIENT
Start: 2022-06-01

## 2022-06-01 RX ORDER — SODIUM CHLORIDE 9 MG/ML
5-250 INJECTION, SOLUTION INTRAVENOUS PRN
Status: CANCELLED | OUTPATIENT
Start: 2022-06-01

## 2022-06-01 RX ORDER — SODIUM CHLORIDE 9 MG/ML
INJECTION, SOLUTION INTRAVENOUS CONTINUOUS
Status: CANCELLED | OUTPATIENT
Start: 2022-06-01

## 2022-06-01 RX ORDER — ALBUTEROL SULFATE 90 UG/1
4 AEROSOL, METERED RESPIRATORY (INHALATION) PRN
Status: CANCELLED | OUTPATIENT
Start: 2022-06-01

## 2022-06-01 RX ORDER — DIPHENHYDRAMINE HYDROCHLORIDE 50 MG/ML
50 INJECTION INTRAMUSCULAR; INTRAVENOUS
Status: CANCELLED | OUTPATIENT
Start: 2022-06-01

## 2022-06-01 RX ORDER — SODIUM CHLORIDE 0.9 % (FLUSH) 0.9 %
5-40 SYRINGE (ML) INJECTION PRN
Status: CANCELLED | OUTPATIENT
Start: 2022-06-01

## 2022-06-01 RX ORDER — ACETAMINOPHEN 325 MG/1
650 TABLET ORAL
Status: CANCELLED | OUTPATIENT
Start: 2022-06-01

## 2022-06-01 RX ORDER — HEPARIN SODIUM (PORCINE) LOCK FLUSH IV SOLN 100 UNIT/ML 100 UNIT/ML
500 SOLUTION INTRAVENOUS PRN
Status: CANCELLED | OUTPATIENT
Start: 2022-06-01

## 2022-06-01 RX ORDER — ONDANSETRON 2 MG/ML
8 INJECTION INTRAMUSCULAR; INTRAVENOUS
Status: CANCELLED | OUTPATIENT
Start: 2022-06-01

## 2022-06-01 RX ORDER — SODIUM CHLORIDE 9 MG/ML
5-250 INJECTION, SOLUTION INTRAVENOUS PRN
Status: DISCONTINUED | OUTPATIENT
Start: 2022-06-01 | End: 2022-06-02 | Stop reason: HOSPADM

## 2022-06-01 RX ADMIN — FERRIC CARBOXYMALTOSE INJECTION 750 MG: 50 INJECTION, SOLUTION INTRAVENOUS at 15:32

## 2022-06-01 RX ADMIN — SODIUM CHLORIDE 100 ML/HR: 9 INJECTION, SOLUTION INTRAVENOUS at 15:29

## 2022-06-01 NOTE — ONCOLOGY
Patient arrived for injectafer 2/2. Infusion completed without issue. Pt stable at discharge. Scheduled to return 7/6/22 for  MD visit.

## 2022-06-01 NOTE — TELEPHONE ENCOUNTER
COLE Hansen @ [facility] 6/17/22 9:30am **STOP BLOOD THINNERS 6/10/22**   PAT @ [facility] 6/6/22 10:30am           Spoke with wife Aleyda Liu, procedure info emailed .

## 2022-06-02 ENCOUNTER — TELEPHONE (OUTPATIENT)
Dept: FAMILY MEDICINE CLINIC | Age: 72
End: 2022-06-02

## 2022-06-02 NOTE — TELEPHONE ENCOUNTER
Incoming fax came from  urology office, for medical clearance. For procedure scheduled on 06/17/22. See attachment below.

## 2022-06-02 NOTE — TELEPHONE ENCOUNTER
Called pt and spoke with pt and wife, they both verbalized understanding, will contact office once pre-testing is all completed

## 2022-06-02 NOTE — TELEPHONE ENCOUNTER
Await pretesting on 6/6/2022, please advise patient to call us on 6/7/2022    BP Readings from Last 3 Encounters:   06/01/22 106/63   05/31/22 (!) 146/78   05/25/22 102/63         Future Appointments   Date Time Provider Pippa Floydi   6/8/2022  9:30 AM [facility] PAT RM 2 [facility]   6/27/2022 11:40 AM Malcom Pacheco MD [facility]   6/29/2022  1:30 PM Radha Vincent MD [facility]   6/30/2022  1:00 PM Rhina Henderson MD  [facility]   7/6/2022  4:00 PM Zaira Jordan MD [address] [place] [address]   8/11/2022  2:30 PM Carolyne Ibanez MD [facility]   9/27/2022 10:00 AM [facility]

## 2022-06-04 DIAGNOSIS — E78.5 HYPERLIPIDEMIA WITH TARGET LDL LESS THAN 100: ICD-10-CM

## 2022-06-06 ENCOUNTER — ANESTHESIA EVENT (OUTPATIENT)
Dept: ENDOSCOPY | Age: 72
End: 2022-06-06
Payer: MEDICARE

## 2022-06-06 ENCOUNTER — HOSPITAL ENCOUNTER (OUTPATIENT)
Dept: PREADMISSION TESTING | Age: 72
Discharge: HOME | End: 2022-06-06

## 2022-06-06 RX ORDER — PRAVASTATIN SODIUM 40 MG
TABLET ORAL
Qty: 90 TABLET | Refills: 3 | Status: SHIPPED | OUTPATIENT
Start: 2022-06-06

## 2022-06-06 RX ORDER — SODIUM CHLORIDE, SODIUM LACTATE, POTASSIUM CHLORIDE, CALCIUM CHLORIDE 600; 310; 30; 20 MG/100ML; MG/100ML; MG/100ML; MG/100ML
1000 INJECTION, SOLUTION INTRAVENOUS CONTINUOUS
Status: CANCELLED | OUTPATIENT
Start: 2022-06-06

## 2022-06-07 ENCOUNTER — ANESTHESIA (OUTPATIENT)
Dept: ENDOSCOPY | Age: 72
End: 2022-06-07
Payer: MEDICARE

## 2022-06-07 ENCOUNTER — HOSPITAL ENCOUNTER (OUTPATIENT)
Age: 72
Setting detail: OUTPATIENT SURGERY
Discharge: HOME | End: 2022-06-07
Attending: INTERNAL MEDICINE | Admitting: INTERNAL MEDICINE
Payer: MEDICARE

## 2022-06-07 VITALS
TEMPERATURE: 97.4 F | DIASTOLIC BLOOD PRESSURE: 82 MMHG | OXYGEN SATURATION: 97 % | BODY MASS INDEX: 32.44 KG/M2 | SYSTOLIC BLOOD PRESSURE: 138 MMHG | WEIGHT: 219 LBS | HEIGHT: 69 IN | HEART RATE: 79 BPM | RESPIRATION RATE: 19 BRPM

## 2022-06-07 DIAGNOSIS — K63.5 POLYP OF COLON, UNSPECIFIED PART OF COLON, UNSPECIFIED TYPE: ICD-10-CM

## 2022-06-07 LAB — GLUCOSE BLD-MCNC: 149 MG/DL (ref 75–110)

## 2022-06-07 PROCEDURE — 2709999900 HC NON-CHARGEABLE SUPPLY: Performed by: INTERNAL MEDICINE

## 2022-06-07 PROCEDURE — 3700000000 HC ANESTHESIA ATTENDED CARE: Performed by: INTERNAL MEDICINE

## 2022-06-07 PROCEDURE — 3609027000 HC COLONOSCOPY: Performed by: INTERNAL MEDICINE

## 2022-06-07 PROCEDURE — 2580000003 HC RX 258: Performed by: ANESTHESIOLOGY

## 2022-06-07 PROCEDURE — 3609012400 HC EGD TRANSORAL BIOPSY SINGLE/MULTIPLE: Performed by: INTERNAL MEDICINE

## 2022-06-07 PROCEDURE — 2500000003 HC RX 250 WO HCPCS: Performed by: NURSE ANESTHETIST, CERTIFIED REGISTERED

## 2022-06-07 PROCEDURE — 6360000002 HC RX W HCPCS: Performed by: NURSE ANESTHETIST, CERTIFIED REGISTERED

## 2022-06-07 PROCEDURE — 7100000010 HC PHASE II RECOVERY - FIRST 15 MIN: Performed by: INTERNAL MEDICINE

## 2022-06-07 PROCEDURE — 82947 ASSAY GLUCOSE BLOOD QUANT: CPT

## 2022-06-07 PROCEDURE — 3700000001 HC ADD 15 MINUTES (ANESTHESIA): Performed by: INTERNAL MEDICINE

## 2022-06-07 PROCEDURE — 45378 DIAGNOSTIC COLONOSCOPY: CPT | Performed by: INTERNAL MEDICINE

## 2022-06-07 PROCEDURE — 43239 EGD BIOPSY SINGLE/MULTIPLE: CPT | Performed by: INTERNAL MEDICINE

## 2022-06-07 PROCEDURE — 7100000011 HC PHASE II RECOVERY - ADDTL 15 MIN: Performed by: INTERNAL MEDICINE

## 2022-06-07 PROCEDURE — 88305 TISSUE EXAM BY PATHOLOGIST: CPT

## 2022-06-07 RX ORDER — SODIUM CHLORIDE 0.9 % (FLUSH) 0.9 %
5-40 SYRINGE (ML) INJECTION EVERY 12 HOURS SCHEDULED
Status: CANCELLED | OUTPATIENT
Start: 2022-06-07

## 2022-06-07 RX ORDER — DIPHENHYDRAMINE HYDROCHLORIDE 50 MG/ML
12.5 INJECTION INTRAMUSCULAR; INTRAVENOUS
Status: CANCELLED | OUTPATIENT
Start: 2022-06-07 | End: 2022-06-07

## 2022-06-07 RX ORDER — SODIUM CHLORIDE 9 MG/ML
INJECTION, SOLUTION INTRAVENOUS CONTINUOUS
Status: DISCONTINUED | OUTPATIENT
Start: 2022-06-07 | End: 2022-06-07 | Stop reason: HOSPADM

## 2022-06-07 RX ORDER — LABETALOL HYDROCHLORIDE 5 MG/ML
10 INJECTION, SOLUTION INTRAVENOUS
Status: CANCELLED | OUTPATIENT
Start: 2022-06-07

## 2022-06-07 RX ORDER — FENTANYL CITRATE 50 UG/ML
25 INJECTION, SOLUTION INTRAMUSCULAR; INTRAVENOUS EVERY 5 MIN PRN
Status: CANCELLED | OUTPATIENT
Start: 2022-06-07

## 2022-06-07 RX ORDER — SIMETHICONE 20 MG/.3ML
EMULSION ORAL PRN
Status: DISCONTINUED | OUTPATIENT
Start: 2022-06-07 | End: 2022-06-07 | Stop reason: ALTCHOICE

## 2022-06-07 RX ORDER — MIDAZOLAM HYDROCHLORIDE 1 MG/ML
INJECTION INTRAMUSCULAR; INTRAVENOUS PRN
Status: DISCONTINUED | OUTPATIENT
Start: 2022-06-07 | End: 2022-06-07 | Stop reason: SDUPTHER

## 2022-06-07 RX ORDER — SODIUM CHLORIDE 9 MG/ML
INJECTION, SOLUTION INTRAVENOUS PRN
Status: CANCELLED | OUTPATIENT
Start: 2022-06-07

## 2022-06-07 RX ORDER — PROPOFOL 10 MG/ML
INJECTION, EMULSION INTRAVENOUS PRN
Status: DISCONTINUED | OUTPATIENT
Start: 2022-06-07 | End: 2022-06-07 | Stop reason: SDUPTHER

## 2022-06-07 RX ORDER — HYDRALAZINE HYDROCHLORIDE 20 MG/ML
10 INJECTION INTRAMUSCULAR; INTRAVENOUS
Status: CANCELLED | OUTPATIENT
Start: 2022-06-07

## 2022-06-07 RX ORDER — METOCLOPRAMIDE HYDROCHLORIDE 5 MG/ML
10 INJECTION INTRAMUSCULAR; INTRAVENOUS
Status: CANCELLED | OUTPATIENT
Start: 2022-06-07 | End: 2022-06-07

## 2022-06-07 RX ORDER — KETAMINE HYDROCHLORIDE 10 MG/ML
INJECTION, SOLUTION INTRAMUSCULAR; INTRAVENOUS PRN
Status: DISCONTINUED | OUTPATIENT
Start: 2022-06-07 | End: 2022-06-07 | Stop reason: SDUPTHER

## 2022-06-07 RX ORDER — LIDOCAINE HYDROCHLORIDE 10 MG/ML
1 INJECTION, SOLUTION EPIDURAL; INFILTRATION; INTRACAUDAL; PERINEURAL
Status: DISCONTINUED | OUTPATIENT
Start: 2022-06-07 | End: 2022-06-07 | Stop reason: HOSPADM

## 2022-06-07 RX ORDER — SODIUM CHLORIDE 0.9 % (FLUSH) 0.9 %
5-40 SYRINGE (ML) INJECTION PRN
Status: DISCONTINUED | OUTPATIENT
Start: 2022-06-07 | End: 2022-06-07 | Stop reason: HOSPADM

## 2022-06-07 RX ORDER — SODIUM CHLORIDE 9 MG/ML
INJECTION, SOLUTION INTRAVENOUS PRN
Status: DISCONTINUED | OUTPATIENT
Start: 2022-06-07 | End: 2022-06-07 | Stop reason: HOSPADM

## 2022-06-07 RX ORDER — SODIUM CHLORIDE 0.9 % (FLUSH) 0.9 %
5-40 SYRINGE (ML) INJECTION EVERY 12 HOURS SCHEDULED
Status: DISCONTINUED | OUTPATIENT
Start: 2022-06-07 | End: 2022-06-07 | Stop reason: HOSPADM

## 2022-06-07 RX ORDER — MEPERIDINE HYDROCHLORIDE 25 MG/ML
12.5 INJECTION INTRAMUSCULAR; INTRAVENOUS; SUBCUTANEOUS EVERY 5 MIN PRN
Status: CANCELLED | OUTPATIENT
Start: 2022-06-07

## 2022-06-07 RX ORDER — ONDANSETRON 2 MG/ML
4 INJECTION INTRAMUSCULAR; INTRAVENOUS
Status: CANCELLED | OUTPATIENT
Start: 2022-06-07 | End: 2022-06-07

## 2022-06-07 RX ORDER — SODIUM CHLORIDE 0.9 % (FLUSH) 0.9 %
5-40 SYRINGE (ML) INJECTION PRN
Status: CANCELLED | OUTPATIENT
Start: 2022-06-07

## 2022-06-07 RX ADMIN — SODIUM CHLORIDE: 9 INJECTION, SOLUTION INTRAVENOUS at 10:24

## 2022-06-07 RX ADMIN — MIDAZOLAM 2 MG: 1 INJECTION INTRAMUSCULAR; INTRAVENOUS at 11:10

## 2022-06-07 RX ADMIN — PROPOFOL 50 MG: 10 INJECTION, EMULSION INTRAVENOUS at 11:22

## 2022-06-07 RX ADMIN — KETAMINE HYDROCHLORIDE 20 MG: 10 INJECTION INTRAMUSCULAR; INTRAVENOUS at 11:19

## 2022-06-07 RX ADMIN — PROPOFOL 50 MG: 10 INJECTION, EMULSION INTRAVENOUS at 11:26

## 2022-06-07 RX ADMIN — PROPOFOL 50 MG: 10 INJECTION, EMULSION INTRAVENOUS at 11:12

## 2022-06-07 RX ADMIN — PROPOFOL 50 MG: 10 INJECTION, EMULSION INTRAVENOUS at 11:10

## 2022-06-07 RX ADMIN — PROPOFOL 50 MG: 10 INJECTION, EMULSION INTRAVENOUS at 11:31

## 2022-06-07 RX ADMIN — PROPOFOL 50 MG: 10 INJECTION, EMULSION INTRAVENOUS at 11:17

## 2022-06-07 RX ADMIN — KETAMINE HYDROCHLORIDE 30 MG: 10 INJECTION INTRAMUSCULAR; INTRAVENOUS at 11:10

## 2022-06-07 ASSESSMENT — PAIN - FUNCTIONAL ASSESSMENT: PAIN_FUNCTIONAL_ASSESSMENT: 0-10

## 2022-06-07 ASSESSMENT — ENCOUNTER SYMPTOMS
BACK PAIN: 1
STRIDOR: 0
EYES NEGATIVE: 1
RESPIRATORY NEGATIVE: 1
SHORTNESS OF BREATH: 0
DIARRHEA: 1
ABDOMINAL PAIN: 1

## 2022-06-07 ASSESSMENT — LIFESTYLE VARIABLES: SMOKING_STATUS: 0

## 2022-06-07 NOTE — ANESTHESIA PRE PROCEDURE
Department of Anesthesiology  Preprocedure Note       Name:  Jesus Alberto Jolly   Age:  73 y.o.  :  1950                                          MRN:  [id number]         Date:  2022      Surgeon: Esme Emerson):  New Griffiths MD    Procedure: Procedure(s):  EGD ESOPHAGOGASTRODUODENOSCOPY  COLONOSCOPY DIAGNOSTIC    Medications prior to admission:   Prior to Admission medications    Medication Sig Start Date End Date Taking? Authorizing Provider   pravastatin (PRAVACHOL) 40 MG tablet TAKE ONE TABLET BY MOUTH EVERY EVENING. STOP GEMFIBROZIL 22   Raina Ryan MD   baclofen (LIORESAL) 10 MG tablet Take 1 tablet by mouth 3 times daily as needed (back pain) 22   Raina Ryan MD   morphine (MS CONTIN) 30 MG extended release tablet Take 1 tablet by mouth 2 times daily for 30 days. 30mg in am and 45mg at night 22  VEE Oliva CNP   morphine (MS CONTIN) 15 MG extended release tablet Take 1 tablet by mouth daily for 30 days. Take with 30mg at night to equal 45 22  VEE Oliva CNP   oxyCODONE-acetaminophen (PERCOCET) 5-325 MG per tablet Take 1 tablet by mouth 2 times daily as needed for Pain for up to 30 days. 22  VEE Oliva CNP   pregabalin (LYRICA) 150 MG capsule Take 1 capsule by mouth 3 times daily for 90 days. 22  VEE Mcduffie CNP   folic acid (FOLVITE) 1 MG tablet Take 1 tablet by mouth daily 22   [facility], MD   cyanocobalamin 1000 MCG/ML injection INJECT 1 ML INTO THE MUSCLE EVERY 30 DAYS.  CALL FOR NEXT REFILL WHICH WILL BE MONTHLY FOR LIFE 22   Raina Ryan MD   metoprolol tartrate (LOPRESSOR) 25 MG tablet TAKE ONE TABLET BY MOUTH TWICE A DAY 22   Raina Ryan MD   pantoprazole (PROTONIX) 40 MG tablet TAKE ONE TABLET BY MOUTH DAILY 22   Raina Ryan MD   glucose monitoring (FREESTYLE FREEDOM) kit Please supply Patient with a glucose monitoring kit that insurance will cover. 3/28/22   Lennox Oregon, MD   blood glucose monitor strips Testing once a day. Please dispense according to patients insurance. 3/28/22   Lennox Oregon, MD   Lancets 30G MISC Testing once a day. Please dispense according to patients insurance.  3/28/22   Lennox Oregon, MD   vitamin D (CHOLECALCIFEROL) 50 MCG (2000 UT) TABS tablet Take 1 tablet by mouth daily 3/14/22   Lennox Oregon, MD   metFORMIN (GLUCOPHAGE XR) 500 MG extended release tablet Take 1 tablet by mouth daily (with breakfast) ** stop Metformin 1000 mg BID** 2/25/22   Lennox Oregon, MD   lisinopril (PRINIVIL;ZESTRIL) 10 MG tablet Take 1 tablet by mouth daily ** stop Lisinopril HCTZ** 2/25/22   Lennox Oregon, MD   fluticasone (FLONASE) 50 MCG/ACT nasal spray 2 sprays by Nasal route daily 1/5/22   Tremaine Harrell MD   Alcohol Swabs (B-D SINGLE USE SWABS REGULAR) PADS USE TO CHECK BLOOD SUGAR TWICE A DAY 5/4/21   Lennox Oregon, MD   Syringe/Needle, Disp, (SYRINGE 3CC/25GX1\") 25G X 1\" 3 ML MISC To be used with B12 injections 1/8/21   Lennox Oregon, MD   loperamide (RA ANTI-DIARRHEAL) 2 MG capsule Take 1 capsule by mouth 4 times daily as needed for Diarrhea 1/5/21   Kitty Lacey MD   Probiotic Product (PROBIOTIC-10 PO) Take by mouth daily     Historical Provider, MD       Current medications:    Current Facility-Administered Medications   Medication Dose Route Frequency Provider Last Rate Last Admin    lidocaine PF 1 % injection 1 mL  1 mL IntraDERmal Once PRN Bosswaldemar Lee MD        sodium chloride flush 0.9 % injection 5-40 mL  5-40 mL IntraVENous 2 times per day Stanley Garcia MD        sodium chloride flush 0.9 % injection 5-40 mL  5-40 mL IntraVENous PRN Gia Lee MD        0.9 % sodium chloride infusion   IntraVENous PRN Bosswaldemar Lee MD        0.9 % sodium chloride infusion   IntraVENous Continuous Stanley Garcia  mL/hr at 06/07/22 1024 New Bag at 06/07/22 1024 Allergies:     Allergies   Allergen Reactions    Asa [Aspirin]       iron deficiency anemia , requiring iron infusions and transfusions    Iron      Pill- constipation, abdominal pain    Nsaids       iron deficiency anemia, history of bleeding ulcers        Problem List:    Patient Active Problem List   Diagnosis Code    Degenerative disc disease, lumbar M51.36    Lumbar spondylosis M47.816    Lumbar radiculopathy M54.16    Lumbar spinal stenosis M48.061    Encounter for medication monitoring Z51.81    Cervical spondylitis ([facility] [address] M46.92    S/P cervical spinal fusion Z98.1    GERD (gastroesophageal reflux disease) K21.9    Osteoarthritis of spine with radiculopathy, lumbar region M47.26    Iron deficiency anemia D50.9    Hep C w/o coma, chronic (HCC) B18.2    Colon polyps K63.5    Hepatitis B core antibody positive R76.8    Peripheral polyneuropathy G62.9    Essential hypertension I10    Type 2 diabetes mellitus with hyperglycemia, without long-term current use of insulin (HCC) E11.65    Hyperlipidemia with target LDL less than 100 E78.5    Vitamin D deficiency E55.9    Vitamin B 12 deficiency E53.8    Abnormal EKG R94.31    Type 2 diabetes mellitus with diabetic polyneuropathy, without long-term current use of insulin (HCC) E11.42    Irritable bowel syndrome with diarrhea K58.0    Chronic hepatitis C without hepatic coma (HCC) B18.2    Status post hernia repair Z98.890, Z87.19    Incisional hernia without obstruction or gangrene K43.2    Malignant neoplasm of lateral wall of urinary bladder (HCC) C67.2    Abdominal aortic aneurysm (AAA) without rupture (HCC) I71.4    Gastroesophageal reflux disease K21.9    Adenomatous polyp D36.9    Iron deficiency anemia due to chronic blood loss D50.0    Chronic use of opiate drug for therapeutic purpose Z79.891       Past Medical History:        Diagnosis Date    Anemia     Arthritis     osteoarthritis    Bladder cancer ([facility] [address] 03/2021 bladder    Bladder tumor     Chronic back pain     Chronic neck pain     Colon polyps 10/08/2019    tubular adenoma x2    COVID 01/06/2022    self reported-home test-no symptoms    COVID-19 virus infection 1/10/2022    Diabetic neuropathy ([facility] [address]     Diarrhea     Encounter for chronic pain management     [facility]. Kelly Mckinney E visit 01/03/2022    Essential hypertension 05/12/2020    managed by Dr. Bradford Harding PCP    GERD (gastroesophageal reflux disease)     Heartburn     Hematuria     Hepatitis B core antibody positive     History of bleeding peptic ulcer     History of blood transfusion     no reactions    History of hepatitis C     History of migraine headaches     History of stress test 07/2020    \"low risk\"    Hyperlipidemia     Iron (Fe) deficiency anemia     Neuropathy     Poor historian     states his wife takes care of all medical information    Positive FIT (fecal immunochemical test)     Type 2 diabetes mellitus with microalbuminuria, without long-term current use of insulin ([facility] [address] 07/13/2020    managed by Dr. Anirudh Mccann last e-visit 11/2021    Under care of team 02/09/2022    pcp-Dr Jaron Meza virtual visit 11/2021    Under care of team 02/09/2022    hematology-Dr Caprice Saavedra [address] virtual visit 11/2021    Under care of team 02/09/2022    urology-Dr fairchild-last visit nov 2021    Wears dentures     Wears glasses     Worsening headaches 12/29/2020       Past Surgical History:        Procedure Laterality Date    CERVICAL SPINE SURGERY  1977    cervical spine three times, has plate    CHOLECYSTECTOMY  03/22/2019    COLONOSCOPY  01/25/2015    10 yr recall, hemorrhoids    COLONOSCOPY N/A 10/08/2019    tubular adenoma x2    CYSTOSCOPY Right 04/29/2021    CYSTOSCOPY TUR BLADDER TUMOR, GYRUS, RIGHT STENT PLACEMENT AND RIGHT STENT REMOVAL performed by Israel Conley MD at [facility] 09/09/2021    CYSTOSCOPY, TRANSURETHRAL RESECTION BLADDER TUMOR performed by Renetta Aguirre MD at [address]  2022    Bladder biopsy, Fulguration     CYSTOSCOPY N/A 2022    CYSTOSCOPY BLADDER BIOPSY, FULGURATION performed by Renetta Aguirre MD at [address] [facility] [address]  10/2015    all teeth extracted    HERNIA REPAIR N/A 2020    HERNIA INCISIONAL REPAIR LAPAROSCOPIC ROBOTIC WITH MESH performed by Per Galeano DO at [facility] Right     UMBILICAL HERNIA REPAIR  3022-19    UPPER GASTROINTESTINAL ENDOSCOPY  2015    UPPER GASTROINTESTINAL ENDOSCOPY N/A 10/08/2019    EGD BIOPSY performed by Lilibteh Oates MD at [facility]       Social History:    Social History     Tobacco Use    Smoking status: Former Smoker     Packs/day: 2.00     Years: 45.00     Pack years: 90.00     Types: Cigarettes     Start date: 1968     Quit date: 2015     Years since quittin.5    Smokeless tobacco: Never Used    Tobacco comment: on chantix 11-6-15   12-7-15   Substance Use Topics    Alcohol use: No                                Counseling given: Not Answered  Comment: on chantix 11-6-15   12-7-15      Vital Signs (Current):   Vitals:    22 0955   BP: (!) 148/83   Pulse: 80   Resp: 16   Temp: 98.6 °F (37 °C)   TempSrc: Infrared   SpO2: 96%   Weight: 219 lb (99.3 kg)   Height: 5' 9\" (1.753 m)                                              BP Readings from Last 3 Encounters:   22 (!) 148/83   22 106/63   22 (!) 146/78       NPO Status: Time of last liquid consumption:                         Time of last solid consumption:                         Date of last liquid consumption: 22                        Date of last solid food consumption: 22    BMI:   Wt Readings from Last 3 Encounters:   22 219 lb (99.3 kg)   22 203 lb (92.1 kg)   22 219 lb (99.3 kg)     Body mass index is 32.34 kg/m².     CBC:   Lab Results   Component Value Date    WBC 5.8 03/16/2022    RBC 4.03 03/16/2022    HGB 10.8 03/16/2022    HCT 32.9 03/16/2022    MCV 81.6 03/16/2022    RDW 22.1 03/16/2022     03/16/2022     HB 10.8    CMP:   Lab Results   Component Value Date     03/12/2022    K 4.9 03/12/2022     03/12/2022    CO2 27 03/12/2022    BUN 15 03/12/2022    CREATININE 0.73 03/12/2022    GFRAA >60 03/12/2022    LABGLOM >60 03/12/2022    GLUCOSE 142 03/12/2022    PROT 6.6 03/12/2022    CALCIUM 9.3 03/12/2022    BILITOT 0.38 03/12/2022    ALKPHOS 79 03/12/2022    AST 15 03/12/2022    ALT 16 03/12/2022       POC Tests:   Recent Labs     06/07/22  1018   POCGLU 149*       Coags:   Lab Results   Component Value Date    PROTIME 12.3 02/28/2021    INR 0.9 02/28/2021    APTT 24.5 02/28/2021       HCG (If Applicable): No results found for: PREGTESTUR, PREGSERUM, HCG, HCGQUANT     ABGs: No results found for: PHART, PO2ART, XNR9HHW, AZC4EGZ, BEART, Z2HHBQVP     Type & Screen (If Applicable):  No results found for: LABABO, LABRH    Drug/Infectious Status (If Applicable):  Lab Results   Component Value Date    HEPCAB REACTIVE 07/13/2020       COVID-19 Screening (If Applicable):   Lab Results   Component Value Date    COVID19 Not Detected 12/30/2021    COVID19 Not Detected 08/03/2020           Anesthesia Evaluation  Patient summary reviewed and Nursing notes reviewed no history of anesthetic complications:   Airway: Mallampati: II  TM distance: >3 FB   Neck ROM: full  Mouth opening: > = 3 FB   Dental:    (+) edentulous      Pulmonary:Negative Pulmonary ROS and normal exam  breath sounds clear to auscultation      (-) pneumonia, COPD, asthma, shortness of breath, recent URI, sleep apnea, rhonchi, wheezes, rales, stridor, not a current smoker and no decreased breath sounds          Patient did not smoke on day of surgery.                  Cardiovascular:  Exercise tolerance: good (>4 METS),   (+) hypertension: no interval change,     (-) valvular problems/murmurs, past MI, CAD, CABG/stent, dysrhythmias,  angina,  CHF, orthopnea, PND,  FARRIS, murmur, weak pulses,  friction rub, systolic click, carotid bruit,  JVD, peripheral edema, no pulmonary hypertension and no hyperlipidemia    ECG reviewed  Rhythm: regular  Rate: normal           Beta Blocker:  Dose within 24 Hrs         Neuro/Psych:   (+) neuromuscular disease:, headaches: migraine headaches,    (-) seizures, TIA, CVA, psychiatric history and depression/anxiety            GI/Hepatic/Renal:   (+) GERD: no interval change, hepatitis:, liver disease:,      (-) no renal disease, bowel prep and no morbid obesity       Endo/Other:    (+) DiabetesType II DM, , : arthritis: OA., no malignancy/cancer. (-) hypothyroidism, hyperthyroidism, blood dyscrasia, no electrolyte abnormalities, no malignancy/cancer               Abdominal:             Vascular: negative vascular ROS. - PVD, DVT and PE. Other Findings:           Anesthesia Plan      general     ASA 3       Induction: intravenous. MIPS: Postoperative opioids intended and Prophylactic antiemetics administered. Anesthetic plan and risks discussed with patient. Plan discussed with CRNA.                     Kamini Roberts MD   6/7/2022

## 2022-06-07 NOTE — H&P
HISTORY and Joana Murphy [id number]       NAME:  Lady Dowd  MRN: [id number]   YOB: 1950   Date: 6/7/2022   Age: 73 y.o. Gender: male       COMPLAINT AND PRESENT HISTORY:   Lady Dowd is 73 y.o.,  male, will be having a Colonoscopy and EGD. Prior Colonoscopy   Done 2019 with polyp removed. Prior EGD was done 2019     Pre diagnosis:ANEMIA ,HISTORY OF COLON POLYPS ,GERD   HPI:  Patient has hx of Colon Polyps but no  hx of Diverticulosis. Patient denies any  FH of Colon or esophogeal  Cancer. Patient reports no changes in bowel habits. Diarrhea  Started two year ago , pt has 3-4 BM per day and he is on probiotic product and Loperamide. Pt has  GI /Rectal bleeding for small amount, and he has positive fecal occult blood test last year. No experiencing red/ black/  Stools. .  Patient complain of intermittent cramping pain in the lower abdomen after food. Pain relief after he has BM. No nausea or vomiting, no abdominal bloating, no weight loss. Patient denies any Dysphagia. Pt has hx of GERD, he  is on a PPI./ Antacid, that is helping to control symptoms. Pt denies fever/chills, chest pain or SOB. Review of additional significant medical hx:  Anemia   Currently medication r/t condition :  Iron infusion every 4 months depending on the Hgh level.      DMII  Currently medication r/t condition : metformin   Lab Results   Component Value Date    LABA1C 5.2 03/12/2022     Lab Results   Component Value Date     03/12/2022       GERD  Currently medication r/t condition : Protonix     HTN, HLD,   Currently medication r/t condition : lopressor, lisinopril , pravastatin    BP Readings from Last 3 Encounters:   06/01/22 106/63   05/31/22 (!) 146/78   05/25/22 102/63       ECG 2021  Narrative & Impression  Normal sinus rhythm  Normal ECG  When compared with ECG of 29-APR-2021 06:54,  No significant change was found      NPO status: PT NPO since the past midnight  Medications taken TODAY (with sip of water): pt took lisinopril with sip of water   Anticoagulation status: none  Prep fully completed: YES. Pt reports his BM is clear liquid   Denies personal hx of blood clots. Denies personal hx of MRSA infection. Denies any personal or family hx of previous complications w/anesthesia.     PAST MEDICAL HISTORY     Past Medical History:   Diagnosis Date    Anemia     Arthritis     osteoarthritis    Bladder cancer ([facility] 03/2021    bladder    Bladder tumor     Chronic back pain     Chronic neck pain     Colon polyps 10/08/2019    tubular adenoma x2    COVID 01/06/2022    self reported-home test-no symptoms    COVID-19 virus infection 1/10/2022    Diabetic neuropathy ([facility]     Diarrhea     [facility] for chronic [facility] management     [facility] Cipriano [facility] visit 01/03/2022    Essential hypertension 05/12/2020    managed by Dr. Tremayne Davalos PCP    GERD (gastroesophageal reflux disease)     Heartburn     Hematuria     Hepatitis B core antibody positive     History of bleeding peptic ulcer     History of blood transfusion     no reactions    History of hepatitis C     History of migraine headaches     History of stress test 07/2020    \"low risk\"    Hyperlipidemia     Iron (Fe) deficiency anemia     Neuropathy     Poor historian     states his wife takes care of all medical information    Positive FIT (fecal immunochemical test)     Type 2 diabetes mellitus with microalbuminuria, without long-term current use of insulin ([facility] 07/13/2020    managed by Dr. Parveen Rojas last e-visit 11/2021    Under care of team 02/09/2022    pcp-Dr Slim Morrison virtual visit 11/2021    Under care of team 02/09/2022    hematology-Dr Caprice Saavedra [address] virtual visit 11/2021    Under care of team 02/09/2022    urology-Dr fairchild-last visit nov 2021    Wears dentures     Wears glasses     Worsening headaches 12/29/2020       SURGICAL HISTORY       Past Surgical History:   Procedure Laterality Date    CERVICAL SPINE SURGERY  1977    cervical spine three times, has plate    CHOLECYSTECTOMY  2019    COLONOSCOPY  2015    10 yr recall, hemorrhoids    COLONOSCOPY N/A 10/08/2019    tubular adenoma x2    CYSTOSCOPY Right 2021    CYSTOSCOPY TUR BLADDER TUMOR, GYRUS, RIGHT STENT PLACEMENT AND RIGHT STENT REMOVAL performed by Samara Argueta MD at [address] [facility] 2021    CYSTOSCOPY, TRANSURETHRAL RESECTION BLADDER TUMOR performed by Samara Argueta MD at [address] [facility].  2022    Bladder biopsy, Fulguration     CYSTOSCOPY N/A 2022    CYSTOSCOPY BLADDER BIOPSY, FULGURATION performed by Samara Argueta MD at [address] [facility] [address]  10/2015    all teeth extracted    HERNIA REPAIR N/A 2020    HERNIA INCISIONAL REPAIR LAPAROSCOPIC ROBOTIC WITH MESH performed by Avinash Goodman DO at [facility] Right     UMBILICAL [address]  [id number]    UPPER GASTROINTESTINAL ENDOSCOPY  2015    UPPER GASTROINTESTINAL ENDOSCOPY N/A 10/08/2019    EGD BIOPSY performed by Ludwin Padgett MD at [address]       Family History   Problem Relation Age of Onset    Diabetes Mother     Heart Disease Father     High Blood Pressure Father        SOCIAL HISTORY       Social History     Socioeconomic History    Marital status:      Spouse name: Not on file    Number of children: Not on file    Years of education: Not on file    Highest education level: Not on file   Occupational History    Occupation: disabled   Tobacco Use    Smoking status: Former Smoker     Packs/day: 2.00     Years: 45.00     Pack years: 90.00     Types: Cigarettes     Start date: 1968     Quit date: 2015     Years since quittin.5    Smokeless tobacco: Never Used    Tobacco comment: on chantix -15   12-7-15   Vaping Use    Vaping Use: Never used   Substance and Sexual Activity    Alcohol use: No    Drug use: No    Sexual activity: Yes     Partners: Female   Other Topics Concern    Not on file   Social History Narrative    Not on file     Social Determinants of Health     Financial Resource Strain: High Risk    Difficulty of Paying Living Expenses: Very hard   Food Insecurity: Food Insecurity Present    Worried About Running Out of Food in the Last Year: Often true    Jennifer of Food in the Last Year: Often true   Transportation Needs:     Lack of Transportation (Medical): Not on file    Lack of Transportation (Non-Medical): Not on file   Physical Activity:     Days of Exercise per Week: Not on file    Minutes of Exercise per Session: Not on file   Stress:     Feeling of Stress : Not on file   Social Connections:     Frequency of Communication with Friends and Family: Not on file    Frequency of Social Gatherings with Friends and Family: Not on file    Attends Congregational Services: Not on file    Active Member of [address] [place] [address] or Organizations: Not on file    Attends Club or Organization Meetings: Not on file    Marital Status: Not on file   Intimate Partner Violence:     Fear of Current or Ex-Partner: Not on file    Emotionally Abused: Not on file    Physically Abused: Not on file    Sexually Abused: Not on file   Housing Stability:     Unable to Pay for Housing in the Last Year: Not on file    Number of [place] in the Last Year: Not on file    Unstable Housing in the Last Year: Not on file           REVIEW OF SYSTEMS      Allergies   Allergen Reactions    Asa [Aspirin]       iron deficiency anemia , requiring iron infusions and transfusions    Iron      Pill- constipation, abdominal pain    Nsaids       iron deficiency anemia, history of bleeding ulcers        No current facility-administered medications on file prior to encounter.      Current Outpatient Medications on File Prior to Encounter   Medication Sig Dispense Refill    glucose monitoring (FREESTYLE FREEDOM) kit Please supply Patient with a glucose monitoring kit that insurance will cover. 1 kit 0    blood glucose monitor strips Testing once a day. Please dispense according to patients insurance. 100 strip 3    Lancets 30G MISC Testing once a day. Please dispense according to patients insurance. 100 each 3    vitamin D (CHOLECALCIFEROL) 50 MCG (2000 UT) TABS tablet Take 1 tablet by mouth daily 30 tablet 3    metFORMIN (GLUCOPHAGE XR) 500 MG extended release tablet Take 1 tablet by mouth daily (with breakfast) ** stop Metformin 1000 mg BID** 90 tablet 3    lisinopril (PRINIVIL;ZESTRIL) 10 MG tablet Take 1 tablet by mouth daily ** stop Lisinopril HCTZ** 90 tablet 3    fluticasone (FLONASE) 50 MCG/ACT nasal spray 2 sprays by Nasal route daily 16 g 0    Alcohol Swabs (B-D SINGLE USE SWABS REGULAR) PADS USE TO CHECK BLOOD SUGAR TWICE A  each 3    Syringe/Needle, Disp, (SYRINGE 3CC/25GX1\") 25G X 1\" 3 ML MISC To be used with B12 injections 50 each 2    loperamide (RA ANTI-DIARRHEAL) 2 MG capsule Take 1 capsule by mouth 4 times daily as needed for Diarrhea 112 capsule 2    Probiotic Product (PROBIOTIC-10 PO) Take by mouth daily          Review of Systems   Constitutional: Negative. HENT:        No teeth    Eyes: Negative. Eye glasses    Respiratory: Negative. Cardiovascular: Negative. Gastrointestinal: Positive for abdominal pain and diarrhea. Genitourinary: Negative. Musculoskeletal: Positive for back pain. Skin: Negative. Neurological: Negative. Hematological: Negative. Psychiatric/Behavioral: Negative. GENERAL PHYSICAL EXAM     Vitals: see nursing flow sheet for vital signs     GENERAL APPEARANCE:   Paddy Kam is 73 y.o.,  male, not obese, nourished, conscious, alert. Does not appear to be distress or pain at this time. Physical Exam  Constitutional:       Appearance: Normal appearance.  He is normal weight. HENT:      Head: Normocephalic. Right Ear: External ear normal.      Left Ear: External ear normal.      Nose: Nose normal.      Mouth/Throat:      Mouth: Mucous membranes are moist.      Comments: Pt has upper denture   Eyes:      General:         Right eye: No discharge. Left eye: No discharge. Cardiovascular:      Rate and Rhythm: Normal rate and regular rhythm. Pulses: Normal pulses. Radial pulses are 2+ on the right side and 2+ on the left side. Dorsalis pedis pulses are 2+ on the right side and 2+ on the left side. Posterior tibial pulses are 2+ on the right side and 2+ on the left side. Heart sounds: Normal heart sounds. No murmur heard. No gallop. Pulmonary:      Effort: Pulmonary effort is normal.      Breath sounds: Normal breath sounds. No wheezing or rhonchi. Abdominal:      General: Bowel sounds are normal. There is no distension. Palpations: Abdomen is soft. There is no mass. Tenderness: There is no abdominal tenderness. Musculoskeletal:         General: No deformity. Normal range of motion. Right lower leg: No edema. Left lower leg: No edema. Skin:     General: Skin is warm and dry. Findings: No bruising or erythema. Neurological:      General: No focal deficit present. Mental Status: He is alert and oriented to person, place, and time. Motor: No weakness.       Gait: Gait normal.   Psychiatric:         Mood and Affect: Mood normal.         Behavior: Behavior normal.                   PROVISIONAL DIAGNOSES / SURGERY:      ANEMIA HISTORY OF COLON POLYPS GERD   COLONOSCOPY DIAGNOSTIC  EGD ESOPHAGOGASTRODUODENOSCOPY  Patient Active Problem List    Diagnosis Date Noted    Chronic use of opiate drug for therapeutic purpose 05/31/2022    Gastroesophageal reflux disease 03/31/2022    Adenomatous polyp 03/31/2022    Iron deficiency anemia due to chronic blood loss 03/31/2022    Abdominal aortic aneurysm (AAA) without rupture ([facility] 02/25/2022    Malignant neoplasm of lateral wall of urinary bladder ([address] 09/01/2021    Incisional hernia without obstruction or gangrene     Status post hernia repair 08/07/2020    Irritable bowel syndrome with diarrhea 07/28/2020    Chronic hepatitis C without hepatic coma ([address] 07/28/2020    Type 2 diabetes mellitus with diabetic polyneuropathy, without long-term current use of insulin ([address] 07/13/2020    Abnormal EKG 05/13/2020    Peripheral polyneuropathy 05/12/2020    Essential hypertension 05/12/2020    Type 2 diabetes mellitus with hyperglycemia, without long-term current use of insulin ([address] 05/12/2020    Hyperlipidemia with target LDL less than 100 05/12/2020    Vitamin D deficiency 05/12/2020    Vitamin B 12 deficiency 05/12/2020    Hepatitis B core antibody positive     Colon polyps 10/08/2019    Hep C w/o coma, chronic (HCC)     Iron deficiency anemia 04/10/2018    Osteoarthritis of spine with radiculopathy, lumbar region     GERD (gastroesophageal reflux disease) 08/31/2015    Cervical spondylitis ([address] 08/15/2014    S/P cervical spinal fusion 08/15/2014    Degenerative disc disease, lumbar 05/17/2014    Lumbar spondylosis 05/17/2014    Lumbar radiculopathy 05/17/2014    Lumbar spinal stenosis 05/17/2014    Encounter for medication monitoring 05/17/2014           VEE Ovalle - CNP on 6/7/2022 at 9:21 AM

## 2022-06-07 NOTE — ANESTHESIA POSTPROCEDURE EVALUATION
POST- ANESTHESIA EVALUATION       Pt Name: Agata Dave  MRN: [id number]  YOB: 1950  Date of evaluation: 6/7/2022  Time:  12:43 PM      /82   Pulse 79   Temp 97.4 °F (36.3 °C) (Infrared)   Resp 19   Ht 5' 9\" (1.753 m)   Wt 219 lb (99.3 kg)   SpO2 97%   BMI 32.34 kg/m²      Consciousness Level  Awake  Cardiopulmonary Status  Stable  Pain Adequately Treated YES  Nausea / Vomiting  NO  Adequate Hydration  YES  Anesthesia Related Complications NONE      Electronically signed by Avery Clarke MD on 6/7/2022 at 12:43 PM       Department of Anesthesiology  Postprocedure Note    Patient: Agata Dave  MRN: [id number]  YOB: 1950  Date of evaluation: 6/7/2022  Time:  12:43 PM     Procedure Summary     Date: 06/07/22 Room / Location: [place] [address] / [facility]    Anesthesia Start: 1108 Anesthesia Stop: 5453    Procedures:       EGD BIOPSY OF DUODENUM, GE JUNCTION AND GASTRIC ANTRIUM (N/A Esophagus)      COLONOSCOPY DIAGNOSTIC (N/A ) Diagnosis:       Polyp of colon, unspecified part of colon, unspecified type      (ANEMIA HISTORY OF COLON POLYPS  GERD // PATIENT VACCINATED)    Surgeons: Abdulkadir Blunt MD Responsible Provider: Avery Clarke MD    Anesthesia Type: general ASA Status: 3          Anesthesia Type: general    Be Phase I:      Be Phase II: Be Score: 10    Last vitals: Reviewed and per EMR flowsheets.        Anesthesia Post Evaluation

## 2022-06-07 NOTE — OP NOTE
PROCEDURE NOTE    DATE OF PROCEDURE: 6/7/2022    SURGEON: Ozzy Nieves MD    ASSISTANT: None    PREOPERATIVE DIAGNOSIS: SCREENING  HX OF COLON POLYPS  ANEMIA    POSTOPERATIVE DIAGNOSIS: as described below    OPERATION: Total colonoscopy     ANESTHESIA: MAC PER ANESTHESIA     ESTIMATED BLOOD LOSS: less than 50     COMPLICATIONS: None. SPECIMENS:  Was Not Obtained    HISTORY: The patient is a 73y.o. year old male with history of above preop diagnosis. I recommended colonoscopy with possible biopsy or polypectomy and I explained the risk, benefits, expected outcome, and alternatives to the procedure. Risks included but are not limited to bleeding, infection, respiratory distress, hypotension, and perforation of the colon and possibility of missing a lesion. The patient understands and is in agreement. PROCEDURE: The patient was given IV conscious sedation. The patient's SPO2 remained above 90% throughout the procedure. The colonoscope was inserted per rectum and advanced under direct vision to the cecum without difficulty. The prep was good. Findings:  Terminal ileum: normal    Cecum/Ascending colon: normal    Transverse colon: normal    Descending/Sigmoid colon: abnormal: MILD TO MOD DIVERTICULOSIS    Rectum/Anus: examined in normal and retroflexed positions and was abnormal: MOD INT HEMORRHOIDS    Withdrawal Time was (minutes): 14    The colon was decompressed and the scope was removed. The patient tolerated the procedure well. Recommendations/Plan:   1. Lifestyle and dietary modifications as discussed  2. F/U In Office in 3-4 weeks  3. Discussed with the family  4. Colonoscopy Recall :6 year  5. POST SEDATION PATIENT WAS STABLE WITH STABLE VITAL SIGNS AND OXYGEN SATURATIONS AND WAS DISCHARGED HOME WITH RIDE IN A STABLE CONDITION.     Electronically signed by Ozzy Nieves MD  on 6/7/2022 at 11:37 AM

## 2022-06-07 NOTE — OP NOTE
PROCEDURE NOTE    DATE OF PROCEDURE: 6/7/2022     SURGEON: Hal Romberg, MD    ASSISTANT: None    PREOPERATIVE DIAGNOSIS: ANEMIA  GERD    POSTOPERATIVE DIAGNOSIS: As described below    OPERATION: Upper GI endoscopy with Biopsy    ANESTHESIA: MAC PER ANESTHESIA     ESTIMATED BLOOD LOSS: Less than 50 ml    COMPLICATIONS: None. SPECIMENS:  Was Obtained:     HISTORY: The patient is a 73y.o. year old male with history of above preop diagnosis. I recommended esophagogastroduodenoscopy with possible biopsy and I explained the risk, benefits, expected outcome, and alternatives to the procedure. Risks included but are not limited to bleeding, infection, respiratory distress, hypotension, and perforation of the esophagus, stomach, or duodenum. Patient understands and is in agreement. PROCEDURE: The patient was given IV conscious sedation. The patient's SPO2 remained above 90% throughout the procedure. The gastroscope was inserted orally and advanced under direct vision through the esophagus, through the stomach, through the pylorus, and into the descending duodenum. Findings:    Retropharyngeal area was grossly normal appearing    Esophagus: abnormal: MILD IRREGULARITY OF SCJ WAS BIOPSIED FOR LEYVA'S AND PICTURES WERE TAKEN    Stomach:    Fundus: normal    Body: abnormal: MILD TO MOD DIFFUSE GASTRITIS    Antrum: abnormal: AS ABOVE WAS BIOPSIED    Duodenum:     Descending: normal. RANDOM BIOPSIES WERE TAKEN     Bulb: normal    The scope was removed and the patient tolerated the procedure well. Recommendations/Plan:   1. F/U Biopsies  2. F/U In Office in 3-4 weeks  3. Discussed with the family  4.  Post sedation patient was stable with stable vital signs and stable O2 saturations    Electronically signed by Hal Romberg, MD  on 6/7/2022 at 11:17 AM

## 2022-06-08 ENCOUNTER — TELEPHONE (OUTPATIENT)
Dept: FAMILY MEDICINE CLINIC | Age: 72
End: 2022-06-08

## 2022-06-08 ENCOUNTER — HOSPITAL ENCOUNTER (OUTPATIENT)
Dept: PREADMISSION TESTING | Age: 72
Discharge: HOME | End: 2022-06-12
Payer: MEDICARE

## 2022-06-08 VITALS
OXYGEN SATURATION: 97 % | RESPIRATION RATE: 18 BRPM | WEIGHT: 217 LBS | HEART RATE: 87 BPM | BODY MASS INDEX: 32.14 KG/M2 | DIASTOLIC BLOOD PRESSURE: 80 MMHG | SYSTOLIC BLOOD PRESSURE: 154 MMHG | HEIGHT: 69 IN | TEMPERATURE: 97.9 F

## 2022-06-08 LAB
ANION GAP SERPL CALCULATED.3IONS-SCNC: 12 MMOL/L (ref 9–17)
BUN BLDV-MCNC: 10 MG/DL (ref 8–23)
CHLORIDE BLD-SCNC: 109 MMOL/L (ref 98–107)
CO2: 21 MMOL/L (ref 20–31)
CREAT SERPL-MCNC: 0.63 MG/DL (ref 0.7–1.2)
GFR AFRICAN AMERICAN: >60 ML/MIN
GFR NON-AFRICAN AMERICAN: >60 ML/MIN
GFR SERPL CREATININE-BSD FRML MDRD: ABNORMAL ML/MIN/{1.73_M2}
GLUCOSE BLD-MCNC: 178 MG/DL (ref 70–99)
HCT VFR BLD CALC: 36.8 % (ref 40.7–50.3)
HEMOGLOBIN: 11.7 G/DL (ref 13–17)
MCH RBC QN AUTO: 27.1 PG (ref 25.2–33.5)
MCHC RBC AUTO-ENTMCNC: 31.8 G/DL (ref 28.4–34.8)
MCV RBC AUTO: 85.4 FL (ref 82.6–102.9)
NRBC AUTOMATED: 0 PER 100 WBC
PDW BLD-RTO: 23.1 % (ref 11.8–14.4)
PLATELET # BLD: 222 K/UL (ref 138–453)
PMV BLD AUTO: 9.7 FL (ref 8.1–13.5)
POTASSIUM SERPL-SCNC: 4 MMOL/L (ref 3.7–5.3)
RBC # BLD: 4.31 M/UL (ref 4.21–5.77)
SODIUM BLD-SCNC: 142 MMOL/L (ref 135–144)
SURGICAL PATHOLOGY REPORT: NORMAL
WBC # BLD: 4.9 K/UL (ref 3.5–11.3)

## 2022-06-08 PROCEDURE — 82947 ASSAY GLUCOSE BLOOD QUANT: CPT

## 2022-06-08 PROCEDURE — 80051 ELECTROLYTE PANEL: CPT

## 2022-06-08 PROCEDURE — 87086 URINE CULTURE/COLONY COUNT: CPT

## 2022-06-08 PROCEDURE — 84520 ASSAY OF UREA NITROGEN: CPT

## 2022-06-08 PROCEDURE — 82565 ASSAY OF CREATININE: CPT

## 2022-06-08 PROCEDURE — 36415 COLL VENOUS BLD VENIPUNCTURE: CPT

## 2022-06-08 PROCEDURE — 85027 COMPLETE CBC AUTOMATED: CPT

## 2022-06-08 ASSESSMENT — PAIN SCALES - GENERAL: PAINLEVEL_OUTOF10: 3

## 2022-06-08 ASSESSMENT — PAIN DESCRIPTION - LOCATION: LOCATION: BACK

## 2022-06-08 ASSESSMENT — PAIN DESCRIPTION - PAIN TYPE: TYPE: CHRONIC PAIN

## 2022-06-08 ASSESSMENT — PAIN DESCRIPTION - DESCRIPTORS: DESCRIPTORS: ACHING

## 2022-06-08 ASSESSMENT — PAIN DESCRIPTION - FREQUENCY: FREQUENCY: CONTINUOUS

## 2022-06-08 NOTE — TELEPHONE ENCOUNTER
Clearance request from urology received  Needs a nurse visit for blood pressure recheck this Friday, then will clear him for surgery  Urine culture is pending    EKG on 1/3/2022 was normal    Blood pressure a bit high this morning  BP Readings from Last 3 Encounters:   06/08/22 (!) 154/80   06/07/22 138/82   06/01/22 106/63     CBC mild anemia  Blood glucose mildly high, likely not fasting, recent A1c normal  Otherwise labs within normal limits  Lab Results   Component Value Date    LABA1C 5.2 03/12/2022    LABA1C 5.0 09/24/2021    LABA1C 5.9 01/11/2021

## 2022-06-08 NOTE — H&P
History and Physical    Pt Name: Jeniffer Suarez  MRN: [id number]  YOB: 1950  Date of evaluation: 6/8/2022    SUBJECTIVE:   History of Chief Complaint:    Patient presents for PAT appointment. He has a history of hematuria, which prompted his visit with urology. He has been diagnosed with bladder tumor, s/p procedure x 3 for bladder tumor excision. Patient says that his three month follow up cystoscopy in the office showed abnormality, so he has been scheduled for cystoscopy, TURBT. Past Medical History    has a past medical history of AAA (abdominal aortic aneurysm) ([address] Anemia, Arthritis, Bladder cancer ([address] Chronic back pain, Chronic neck pain, Colon polyps, COVID, COVID-19 virus infection, Diabetic neuropathy ([address] Diarrhea, Encounter for chronic pain management, Essential hypertension, GERD (gastroesophageal reflux disease), Health care maintenance, Heartburn, Hematuria, Hepatitis B core antibody positive, History of bleeding peptic ulcer, History of blood transfusion, History of hepatitis C, History of migraine headaches, History of stress test, Hyperlipidemia, Iron (Fe) deficiency anemia, Neuropathy, Poor historian, Positive FIT (fecal immunochemical test), Type 2 diabetes mellitus with microalbuminuria, without long-term current use of insulin ([address] Under care of team, Under care of team, Under care of team, Wears dentures, Wears glasses, and Worsening headaches. Past Surgical History   has a past surgical history that includes Cervical spine surgery (1977); shoulder surgery (Right); Dental surgery (10/2015); Colonoscopy (01/25/2015); Upper gastrointestinal endoscopy (01/25/2015); Cholecystectomy (03/22/2019); Umbilical hernia repair (1975-68); Upper gastrointestinal endoscopy (N/A, 10/08/2019); Colonoscopy (N/A, 10/08/2019); hernia repair (N/A, 08/07/2020); Cystoscopy (Right, 04/29/2021); Cystoscopy (N/A, 09/09/2021); Cystoscopy (N/A, 02/16/2022);  Upper gastrointestinal endoscopy (N/A, 06/07/2022); Colonoscopy (N/A, 06/07/2022); and Endoscopy, colon, diagnostic. Medications  Prior to Admission medications    Medication Sig Start Date End Date Taking? Authorizing Provider   pravastatin (PRAVACHOL) 40 MG tablet TAKE ONE TABLET BY MOUTH EVERY EVENING. STOP GEMFIBROZIL 6/6/22   Lennox Oregon, MD   baclofen (LIORESAL) 10 MG tablet Take 1 tablet by mouth 3 times daily as needed (back pain) 5/31/22   Lennox Oregon, MD   morphine (MS CONTIN) 30 MG extended release tablet Take 1 tablet by mouth 2 times daily for 30 days. 30mg in am and 45mg at night 6/1/22 7/1/22  VEE Cuevas CNP   morphine (MS CONTIN) 15 MG extended release tablet Take 1 tablet by mouth daily for 30 days. Take with 30mg at night to equal 45 6/1/22 7/1/22  VEE Cuevas CNP   oxyCODONE-acetaminophen (PERCOCET) 5-325 MG per tablet Take 1 tablet by mouth 2 times daily as needed for Pain for up to 30 days. 6/1/22 7/1/22  VEE Cuevas CNP   pregabalin (LYRICA) 150 MG capsule Take 1 capsule by mouth 3 times daily for 90 days. 5/18/22 8/16/22  VEE Angel CNP   folic acid (FOLVITE) 1 MG tablet Take 1 tablet by mouth daily 5/18/22   [facility], MD   cyanocobalamin 1000 MCG/ML injection INJECT 1 ML INTO THE MUSCLE EVERY 30 DAYS. CALL FOR NEXT REFILL WHICH WILL BE MONTHLY FOR LIFE 5/16/22   Lennox Oregon, MD   metoprolol tartrate (LOPRESSOR) 25 MG tablet TAKE ONE TABLET BY MOUTH TWICE A DAY 5/4/22   Lennox Oregon, MD   pantoprazole (PROTONIX) 40 MG tablet TAKE ONE TABLET BY MOUTH DAILY 4/13/22   Lennox Oregon, MD   glucose monitoring (FREESTYLE FREEDOM) kit Please supply Patient with a glucose monitoring kit that insurance will cover. 3/28/22   Lennox Oregon, MD   blood glucose monitor strips Testing once a day. Please dispense according to patients insurance. 3/28/22   Lennox Oregon, MD   Lancets 30G MISC Testing once a day.   Please dispense according to patients for nausea, vomiting     GENITOURINARY:   See HPI   MUSCULOSKELETAL:   negative for neck or back pain     NEUROLOGICAL:   Negative for weakness and tingling  negative for headaches and dizziness     PSYCHIATRIC:   negative for anxiety         OBJECTIVE:   VITALS:  height is 5' 9\" (1.753 m) and weight is 217 lb (98.4 kg). His temporal temperature is 97.9 °F (36.6 °C). His blood pressure is 154/80 (abnormal) and his pulse is 87. His respiration is 18 and oxygen saturation is 97%. CONSTITUTIONAL:alert & cooperative, no acute distress. Very pleasant and talkative. SKIN:  Warm and dry, no rashes on exposed areas of skin. HEAD:  Normocephalic, atraumatic   EYES: EOMs intact. EARS:  Hearing grossly WNL. NOSE:  Nares patent. No rhinorrhea. MOUTH/THROAT:  benign  NECK:good ROM   LUNGS: Clear to auscultation bilaterally, no wheezes. CARDIOVASCULAR: Heart sounds are normal.  Regular rate and rhythm without murmur. ABDOMEN: soft, non tender, non distended. EXTREMITIES: no edema bilateral lower extremities.     Testing:   EK22  Labs pending: drawn 2022     IMPRESSIONS:   1. Bladder tumor  2.  has a past medical history of AAA (abdominal aortic aneurysm) ([facility] Anemia, Arthritis, Bladder cancer ([facility] (2021), Chronic back pain, Chronic neck pain, Colon polyps (10/08/2019), COVID (2022), COVID-19 virus infection (01/10/2022), Diabetic neuropathy ([facility] Diarrhea, Encounter for chronic pain management, Essential hypertension (2020), GERD (gastroesophageal reflux disease), Health care maintenance, Heartburn, Hematuria, Hepatitis B core antibody positive, History of bleeding peptic ulcer, History of blood transfusion, History of hepatitis C, History of migraine headaches, History of stress test (2020), Hyperlipidemia, Iron (Fe) deficiency anemia, Neuropathy, Poor historian, Positive FIT (fecal immunochemical test), Type 2 diabetes mellitus with microalbuminuria, without long-term current use of insulin ([facility] (07/13/2020), Under care of team (02/09/2022), Under care of team (02/09/2022), Under care of team (02/09/2022), Wears dentures, Wears glasses, and Worsening headaches (12/29/2020). PLANS:   1.  Cystoscopy, TURBT    MIGNON Lazaro PA-C  Electronically signed 6/8/2022 at 10:13 AM

## 2022-06-08 NOTE — PROGRESS NOTES
Anesthesia Focused Assessment    Has patient ever tested positive for COVID? Yes, January 2022. STOP-BANG Sleep Apnea Questionnaire    SNORE loudly (heard through closed doors)? No  TIRED, fatigued, sleepy during daytime? No  OBSERVED stopping breathing during sleep? No  High blood PRESSURE being treated? Yes    BMI over 35? No  AGE over 48? Yes  NECK circumference over 16\"? No  GENDER (male)? Yes             Total 3  High risk 5-8  Intermediate risk 3-4  Low risk 0-2    Obstructive Sleep Apnea: no  If YES, machine used: no     Type 1 DM:   no  T2DM:  yes    Coronary Artery Disease:  no  Hypertension:  yes    Active smoker:  no  Drinks Alcohol:  no    Dentition: full dentures    Defib / AICD / Pacemaker: no      Renal Failure/dialysis:  no    Patient was evaluated in PAT & anesthesia guidelines were applied. NPO guidelines, medication instructions and scheduled arrival time were reviewed with patient. Hx of anesthesia complications:  no  Family hx of anesthesia complications:  no                                                                                                                     Anesthesia contacted:   no  Medical or cardiac clearance ordered: no.  Patient is without cardiac or pulmonary complaints. He had surgery several months ago without complication.     Luis Barahona PA-C  6/8/22  9:46 AM

## 2022-06-08 NOTE — TELEPHONE ENCOUNTER
Keep appointment with Dr. Brennan Felty, then no nurse visit is needed please let him know    Future Appointments   Date Time Provider Pippa Cazares   6/13/2022  9:45 AM [facility]   6/27/2022 11:40 AM Emily Franco MD .  [facility]   6/30/2022  1:00 PM Alaina Bryson MD [address] [place] [address]   7/6/2022  4:00 PM Alaina Perea MD [address] [place] [address]   7/28/2022  2:30 PM [facility]   8/11/2022  2:30 PM [facility]   9/27/2022 10:00 AM [facility]

## 2022-06-09 LAB
CULTURE: NORMAL
SPECIMEN DESCRIPTION: NORMAL

## 2022-06-13 ENCOUNTER — OFFICE VISIT (OUTPATIENT)
Dept: FAMILY MEDICINE CLINIC | Age: 72
End: 2022-06-13
Payer: MEDICARE

## 2022-06-13 VITALS
WEIGHT: 222 LBS | SYSTOLIC BLOOD PRESSURE: 146 MMHG | HEART RATE: 85 BPM | OXYGEN SATURATION: 96 % | DIASTOLIC BLOOD PRESSURE: 82 MMHG | BODY MASS INDEX: 32.88 KG/M2 | TEMPERATURE: 97.9 F | HEIGHT: 69 IN

## 2022-06-13 DIAGNOSIS — E11.65 TYPE 2 DIABETES MELLITUS WITH HYPERGLYCEMIA, WITHOUT LONG-TERM CURRENT USE OF INSULIN (HCC): ICD-10-CM

## 2022-06-13 DIAGNOSIS — I10 ESSENTIAL HYPERTENSION: ICD-10-CM

## 2022-06-13 DIAGNOSIS — Z12.5 PROSTATE CANCER SCREENING: ICD-10-CM

## 2022-06-13 DIAGNOSIS — Z01.818 PRE-OPERATIVE CLEARANCE: Primary | ICD-10-CM

## 2022-06-13 DIAGNOSIS — C67.2 MALIGNANT NEOPLASM OF LATERAL WALL OF URINARY BLADDER (HCC): ICD-10-CM

## 2022-06-13 PROCEDURE — G8417 CALC BMI ABV UP PARAM F/U: HCPCS | Performed by: FAMILY MEDICINE

## 2022-06-13 PROCEDURE — 99214 OFFICE O/P EST MOD 30 MIN: CPT | Performed by: FAMILY MEDICINE

## 2022-06-13 PROCEDURE — G8427 DOCREV CUR MEDS BY ELIG CLIN: HCPCS | Performed by: FAMILY MEDICINE

## 2022-06-13 PROCEDURE — 3017F COLORECTAL CA SCREEN DOC REV: CPT | Performed by: FAMILY MEDICINE

## 2022-06-13 PROCEDURE — 1123F ACP DISCUSS/DSCN MKR DOCD: CPT | Performed by: FAMILY MEDICINE

## 2022-06-13 PROCEDURE — 2022F DILAT RTA XM EVC RTNOPTHY: CPT | Performed by: FAMILY MEDICINE

## 2022-06-13 PROCEDURE — 3044F HG A1C LEVEL LT 7.0%: CPT | Performed by: FAMILY MEDICINE

## 2022-06-13 PROCEDURE — 1036F TOBACCO NON-USER: CPT | Performed by: FAMILY MEDICINE

## 2022-06-13 ASSESSMENT — ENCOUNTER SYMPTOMS
COUGH: 0
SORE THROAT: 0
TROUBLE SWALLOWING: 0
BLOOD IN STOOL: 0
SHORTNESS OF BREATH: 0
RHINORRHEA: 0
NAUSEA: 0
CHEST TIGHTNESS: 0
SINUS PRESSURE: 0
EYE REDNESS: 0
ABDOMINAL DISTENTION: 0
DIARRHEA: 0
ABDOMINAL PAIN: 0
STRIDOR: 0
CONSTIPATION: 0
COLOR CHANGE: 0
BACK PAIN: 1

## 2022-06-13 NOTE — PROGRESS NOTES
Chief Complaint   Patient presents with    Pre-op Exam     BLADDER CANCER         Caio Saez  here today for follow up on chronic medical problems, go over labs and/or diagnostic studies, and medication refills. Pre-op Exam (BLADDER CANCER)      HPI: Patient is scheduled for preop clearance. Patient is scheduled for surgery for bladder malignancy has recurrence. Patient has appointment with cardiologist today. Patient does not have any history of coronary artery disease. Patient is up-to-date on blood work. EKG was done in January which is normal results are below. Narrative & Impression    Normal sinus rhythm  Normal ECG  When compared with ECG of 29-APR-2021 06:54,  No significant change was found       Hypertension fairly stable reports he relates normal at home. Blood pressure is borderline high. He will record his blood pressure at home and call back. He denies any chest pain dyspnea on exertion. Diabetes under control on current treatment. Denies any hyperglycemia or hypoglycemia. BP (!) 146/82 (Site: Right Upper Arm, Position: Sitting)   Pulse 85   Temp 97.9 °F (36.6 °C) (Temporal)   Ht 5' 9\" (1.753 m)   Wt 222 lb (100.7 kg)   SpO2 96%   BMI 32.78 kg/m²    Body mass index is 32.78 kg/m². Wt Readings from Last 3 Encounters:   06/13/22 222 lb (100.7 kg)   06/08/22 217 lb (98.4 kg)   06/07/22 219 lb (99.3 kg)        []Negative depression screening. PHQ Scores 2/25/2022 9/17/2021 8/31/2021 9/23/2020 5/12/2020   PHQ2 Score 0 0 0 0 0   PHQ9 Score 0 0 0 0 0      []1-4 = Minimal depression   []5-9 = Milddepression   []10-14 = Moderate depression   []15-19 = Moderately severe depression   []20-27 = Severe depression    Discussed testing with the patient and all questions fully answered.     Hospital Outpatient Visit on 06/08/2022   Component Date Value Ref Range Status    BUN 06/08/2022 10  8 - 23 mg/dL Final    CREATININE 06/08/2022 0.63* 0.70 - 1.20 mg/dL Final    GFR Non-African American 06/08/2022 >60  >60 mL/min Final    GFR  06/08/2022 >60  >60 mL/min Final    GFR Comment 06/08/2022        Final    Comment: Average GFR for 61-76 years old:   80 mL/min/1.73sq m  Chronic Kidney Disease:   <60 mL/min/1.73sq m  Kidney failure:   <15 mL/min/1.73sq m              eGFR calculated using average adult body mass. Additional eGFR calculator available at:        [email or web address]            Glucose 06/08/2022 178* 70 - 99 mg/dL Final    Sodium 06/08/2022 142  135 - 144 mmol/L Final    Potassium 06/08/2022 4.0  3.7 - 5.3 mmol/L Final    Chloride 06/08/2022 109* 98 - 107 mmol/L Final    CO2 06/08/2022 21  20 - 31 mmol/L Final    Anion Gap 06/08/2022 12  9 - 17 mmol/L Final    WBC 06/08/2022 4.9  3.5 - 11.3 k/uL Final    RBC 06/08/2022 4.31  4.21 - 5.77 m/uL Final    Hemoglobin 06/08/2022 11.7* 13.0 - 17.0 g/dL Final    Hematocrit 06/08/2022 36.8* 40.7 - 50.3 % Final    MCV 06/08/2022 85.4  82.6 - 102.9 fL Final    MCH 06/08/2022 27.1  25.2 - 33.5 pg Final    MCHC 06/08/2022 31.8  28.4 - 34.8 g/dL Final    RDW 06/08/2022 23.1* 11.8 - 14.4 % Final    Platelets 43/16/6797 222  138 - 453 k/uL Final    MPV 06/08/2022 9.7  8.1 - 13.5 fL Final    NRBC Automated 06/08/2022 0.0  0.0 per 100 WBC Final    Specimen Description 06/08/2022 . Random Urine   Final    Culture 06/08/2022 NO SIGNIFICANT GROWTH   Final         Most recent labs reviewed:     Lab Results   Component Value Date    WBC 4.9 06/08/2022    HGB 11.7 (L) 06/08/2022    HCT 36.8 (L) 06/08/2022    MCV 85.4 06/08/2022     06/08/2022       @BRIEFLAB(NA,K,CL,CO2,BUN,CREATININE,GLUCOSE,CALCIUM)@     Lab Results   Component Value Date    ALT 16 03/12/2022    AST 15 03/12/2022    ALKPHOS 79 03/12/2022    BILITOT 0.38 03/12/2022       Lab Results   Component Value Date    TSH 1.21 03/12/2022       Lab Results   Component Value Date    CHOL 100 03/12/2022    CHOL 92 07/13/2020    CHOL 112 04/24/2019     Lab Results   Component Value Date    TRIG 285 (H) 03/12/2022    TRIG 279 (H) 07/13/2020    TRIG 148 04/24/2019     Lab Results   Component Value Date    HDL 24 (L) 03/12/2022    HDL 22 (L) 10/23/2021    HDL 23 (L) 07/13/2020     Lab Results   Component Value Date    LDLCHOLESTEROL 19 03/12/2022    LDLCHOLESTEROL      10/23/2021    LDLCHOLESTEROL 13 07/13/2020     Lab Results   Component Value Date    VLDL NOT REPORTED 10/23/2021    VLDL NOT REPORTED (H) 07/13/2020    VLDL NOT REPORTED 04/24/2019     Lab Results   Component Value Date    CHOLHDLRATIO 4.2 03/12/2022    CHOLHDLRATIO 5.2 (H) 10/23/2021    CHOLHDLRATIO 4.0 07/13/2020       Lab Results   Component Value Date    LABA1C 5.2 03/12/2022       Lab Results   Component Value Date    EGGVSZGS80 564 03/12/2022       Lab Results   Component Value Date    FOLATE 8.4 03/12/2022       Lab Results   Component Value Date    IRON 30 (L) 05/17/2022    TIBC 389 05/17/2022    FERRITIN 12 (L) 05/17/2022       Lab Results   Component Value Date    VITD25 28.2 (L) 03/12/2022             Current Outpatient Medications   Medication Sig Dispense Refill    pravastatin (PRAVACHOL) 40 MG tablet TAKE ONE TABLET BY MOUTH EVERY EVENING. STOP GEMFIBROZIL 90 tablet 3    baclofen (LIORESAL) 10 MG tablet Take 1 tablet by mouth 3 times daily as needed (back pain) 270 tablet 1    morphine (MS CONTIN) 30 MG extended release tablet Take 1 tablet by mouth 2 times daily for 30 days. 30mg in am and 45mg at night 60 tablet 0    morphine (MS CONTIN) 15 MG extended release tablet Take 1 tablet by mouth daily for 30 days. Take with 30mg at night to equal 45 30 tablet 0    oxyCODONE-acetaminophen (PERCOCET) 5-325 MG per tablet Take 1 tablet by mouth 2 times daily as needed for Pain for up to 30 days. 60 tablet 0    pregabalin (LYRICA) 150 MG capsule Take 1 capsule by mouth 3 times daily for 90 days.  90 capsule 2    folic acid (FOLVITE) 1 MG tablet Take 1 tablet by mouth daily 90 tablet 1    cyanocobalamin 1000 MCG/ML injection INJECT 1 ML INTO THE MUSCLE EVERY 30 DAYS. CALL FOR NEXT REFILL WHICH WILL BE MONTHLY FOR LIFE 3 mL 3    metoprolol tartrate (LOPRESSOR) 25 MG tablet TAKE ONE TABLET BY MOUTH TWICE A  tablet 3    pantoprazole (PROTONIX) 40 MG tablet TAKE ONE TABLET BY MOUTH DAILY 90 tablet 1    glucose monitoring (FREESTYLE FREEDOM) kit Please supply Patient with a glucose monitoring kit that insurance will cover. 1 kit 0    blood glucose monitor strips Testing once a day. Please dispense according to patients insurance. 100 strip 3    Lancets 30G MISC Testing once a day. Please dispense according to patients insurance. 100 each 3    vitamin D (CHOLECALCIFEROL) 50 MCG (2000 UT) TABS tablet Take 1 tablet by mouth daily 30 tablet 3    metFORMIN (GLUCOPHAGE XR) 500 MG extended release tablet Take 1 tablet by mouth daily (with breakfast) ** stop Metformin 1000 mg BID** 90 tablet 3    lisinopril (PRINIVIL;ZESTRIL) 10 MG tablet Take 1 tablet by mouth daily ** stop Lisinopril HCTZ** 90 tablet 3    fluticasone (FLONASE) 50 MCG/ACT nasal spray 2 sprays by Nasal route daily 16 g 0    Alcohol Swabs (B-D SINGLE USE SWABS REGULAR) PADS USE TO CHECK BLOOD SUGAR TWICE A  each 3    Syringe/Needle, Disp, (SYRINGE 3CC/25GX1\") 25G X 1\" 3 ML MISC To be used with B12 injections 50 each 2    loperamide (RA ANTI-DIARRHEAL) 2 MG capsule Take 1 capsule by mouth 4 times daily as needed for Diarrhea 112 capsule 2    Probiotic Product (PROBIOTIC-10 PO) Take by mouth daily        No current facility-administered medications for this visit.              Social History     Socioeconomic History    Marital status:      Spouse name: Not on file    Number of children: Not on file    Years of education: Not on file    Highest education level: Not on file   Occupational History    Occupation: disabled   Tobacco Use    Smoking status: Former Smoker     Packs/day: 2.00 Years: 45.00     Pack years: 90.00     Types: Cigarettes     Start date: 1968     Quit date: 2015     Years since quittin.5    Smokeless tobacco: Never Used    Tobacco comment: on chantix 11-6-15   12-7-15   Vaping Use    Vaping Use: Never used   Substance and Sexual Activity    Alcohol use: No    Drug use: No    Sexual activity: Yes     Partners: Female   Other Topics Concern    Not on file   Social History Narrative    Not on file     Social Determinants of Health     Financial Resource Strain: High Risk    Difficulty of Paying Living Expenses: Very hard   Food Insecurity: Food Insecurity Present    Worried About Running Out of Food in the Last Year: Often true    Jennifer of Food in the Last Year: Often true   Transportation Needs:     Lack of Transportation (Medical): Not on file    Lack of Transportation (Non-Medical):  Not on file   Physical Activity:     Days of Exercise per Week: Not on file    Minutes of Exercise per Session: Not on file   Stress:     Feeling of Stress : Not on file   Social Connections:     Frequency of Communication with Friends and Family: Not on file    Frequency of Social Gatherings with Friends and Family: Not on file    Attends Yazidism Services: Not on file    Active Member of [address] [place] [address] [employer] or Organizations: Not on file    Attends Club or Organization Meetings: Not on file    Marital Status: Not on file   Intimate Partner Violence:     Fear of Current or Ex-Partner: Not on file    Emotionally Abused: Not on file    Physically Abused: Not on file    Sexually Abused: Not on file   Housing Stability:     Unable to Pay for Housing in the Last Year: Not on file    Number of [place] in the Last Year: Not on file    Unstable Housing in the Last Year: Not on file     Counseling given: Yes  Comment: on chantix 11-6-15   12-7-15        Family History   Problem Relation Age of Onset    Diabetes Mother     Heart Disease Father     High Blood Pressure Father              -rest of complaints with corresponding details per ROS    The patient's past medical, surgical, social, and family history as well as his current medications and allergies were reviewed as documented intoday's encounter. Review of Systems   Constitutional: Positive for activity change. Negative for appetite change, fatigue, fever and unexpected weight change. HENT: Negative for congestion, ear pain, postnasal drip, rhinorrhea, sinus pressure, sore throat and trouble swallowing. Eyes: Negative for redness and visual disturbance. Respiratory: Negative for cough, chest tightness, shortness of breath and stridor. Cardiovascular: Negative for chest pain, palpitations and leg swelling. Gastrointestinal: Negative for abdominal distention, abdominal pain, blood in stool, constipation, diarrhea and nausea. Endocrine: Negative for polydipsia and polyphagia. Genitourinary: Positive for difficulty urinating. Negative for flank pain, frequency and urgency. Musculoskeletal: Positive for arthralgias, back pain, gait problem and myalgias. Negative for neck pain. Skin: Negative for color change, rash and wound. Allergic/Immunologic: Negative for food allergies and immunocompromised state. Neurological: Negative for dizziness, speech difficulty, weakness, light-headedness, numbness and headaches. Psychiatric/Behavioral: Negative for agitation, behavioral problems, decreased concentration, dysphoric mood, hallucinations, sleep disturbance and suicidal ideas. The patient is nervous/anxious. Physical Exam  Vitals and nursing note reviewed. Constitutional:       General: He is not in acute distress. Appearance: Normal appearance. He is well-developed. He is obese. He is not diaphoretic. HENT:      Head: Normocephalic and atraumatic. Nose: Nose normal.   Eyes:      General:         Right eye: No discharge. Left eye: No discharge.       Extraocular Movements: Extraocular movements intact. Conjunctiva/sclera: Conjunctivae normal.      Pupils: Pupils are equal, round, and reactive to light. Neck:      Thyroid: No thyromegaly. Cardiovascular:      Rate and Rhythm: Normal rate and regular rhythm. Heart sounds: Normal heart sounds. No murmur heard. Pulmonary:      Effort: Pulmonary effort is normal. No respiratory distress. Breath sounds: Normal breath sounds. No wheezing or rhonchi. Abdominal:      General: Bowel sounds are normal. There is no distension. Palpations: Abdomen is soft. There is no mass. Tenderness: There is abdominal tenderness in the suprapubic area. There is no guarding or rebound. Musculoskeletal:         General: No tenderness. Cervical back: Normal range of motion and neck supple. Spasms present. No rigidity. Thoracic back: Spasms present. Normal range of motion. Lumbar back: Spasms present. Decreased range of motion. Lymphadenopathy:      Cervical: No cervical adenopathy. Skin:     Coloration: Skin is not jaundiced or pale. Findings: No bruising, erythema or rash. Neurological:      General: No focal deficit present. Mental Status: He is alert and oriented to person, place, and time. Cranial Nerves: No cranial nerve deficit. Sensory: No sensory deficit. Motor: No weakness or tremor. Coordination: Coordination normal.      Gait: Gait and tandem walk normal.      Deep Tendon Reflexes: Reflexes are normal and symmetric. Psychiatric:         Attention and Perception: Attention and perception normal. He is attentive. Mood and Affect: Mood is anxious. Mood is not depressed. Affect is not tearful. Speech: He is communicative. Speech is not rapid and pressured, delayed or slurred. Behavior: Behavior normal. Behavior is not agitated or slowed. Thought Content:  Thought content normal.         Judgment: Judgment normal. ASSESSMENT AND PLAN      1. Pre-operative clearance  Preop clearance is pending, will get clearance from cardiology and also blood pressure readings from home. If both are normal can clear. 2. Essential hypertension  Fairly controlled monitor blood pressure at home call back with blood pressure readings if still high increase lisinopril to 20 mg.    3. Malignant neoplasm of lateral wall of urinary bladder (HCC)  Recurrent, planning for surgery    4. Type 2 diabetes mellitus with hyperglycemia, without long-term current use of insulin ([address]  Well-controlled continue current treatment continue to monitor blood sugars    5. Prostate cancer screening    - PSA Screening; Future      Orders Placed This Encounter   Procedures    PSA Screening     Standing Status:   Future     Standing Expiration Date:   6/13/2023         There are no discontinued medications. Desire Quijano received counseling on the following healthy behaviors: nutrition, exercise and medication adherence  Reviewed prior labs and health maintenance  Continue current medications, diet and exercise. Discussed use, benefit, and side effects of prescribed medications. Barriers to medication compliance addressed. Patient given educational materials - see patient instructions  Was a self-tracking handout given in paper form or via [employer]? Yes    Requested Prescriptions      No prescriptions requested or ordered in this encounter       All patient questions answered. Patient voiced understanding. Quality Measures    Body mass index is 32.78 kg/m². Elevated. Weight control planned discussed Healthy diet and regular exercise. BP: (!) 146/82. Blood pressure is high. Treatment plan consists of Weight Reduction, DASH Eating Plan, Dietary Sodium Restriction, Patient In-home Blood Pressure Monitoring and No treatment change needed. Fall Risk 9/17/2021 9/23/2020 5/12/2020   2 or more falls in past year? no no no   Fall with injury in past year? no no no     The patient does not have a history of falls. I did , complete a risk assessment for falls. A plan of care for falls in-office gait and balance testing performed using The Timed Up and Go Test was negative for increased falls risk- no further intervention is currently indicated, home safety tips provided, No Treatment plan indicated    Lab Results   Component Value Date    LDLCHOLESTEROL 19 03/12/2022    LDLDIRECT 41 10/23/2021    (goal LDL reduction with dx if diabetes is 50% LDL reduction)    PHQ Scores 2/25/2022 9/17/2021 8/31/2021 9/23/2020 5/12/2020   PHQ2 Score 0 0 0 0 0   PHQ9 Score 0 0 0 0 0     Interpretation of Total Score Depression Severity: 1-4 = Minimal depression, 5-9 = Mild depression, 10-14 = Moderate depression, 15-19 = Moderately severe depression, 20-27 = Severe depression      The patient'spast medical, surgical, social, and family history as well as his   current medications and allergies were reviewed as documented in today's encounter. Medications, labs, diagnostic studies, consultations andfollow-up as documented in this encounter. No follow-ups on file. Patient wasseen with total face to face time of 30 minutes. More than 50% of this visit was counseling and education. Future Appointments   Date Time Provider Pippa Floydi   6/27/2022 11:40 AM Parish Driscoll MD [address] [place] [address]   6/30/2022  1:00 PM Keira Booth MD [address]   7/6/2022  4:00 PM Edwardo Suarez MD [address] [place] [address]   7/28/2022  2:30 PM Adriana Welsh MD [facility]   8/11/2022  2:30 PM Pia Sebastian MD [address]   9/27/2022 10:00 AM Adriana Welsh MD [address]     This note was completed by using the assistance of a speech-recognition program. However, inadvertent computerized transcription errors may be present.  Althoughevery effort was made to ensure accuracy, no guarantees can be provided that every mistake has been identified and corrected by editing.   Electronically signed by Rosnia Mcghee MD on 6/13/2022  11:33 AM

## 2022-06-13 NOTE — PATIENT INSTRUCTIONS
New Updates for [facility] MyChart/ [email or web address] ([facility]) YOLANDA    Thank you for choosing US to give you the best care! [facility] ([facility]) is always trying to think of new ways to help their patients. We are asking all patients to try out the new digital registration that is now available through your [facility] account or the new YOLANDA, [email or web address] ([facility]). Via the yolanda you're now able to update your personal and registration information prior to your upcoming appointment. This will save you time once you arrive at the office to check-in, not to mention your information remains safe!! Many other perks come from signing up for an account, such as:   Requesting refills   Scheduling an appointment   Completing an [address] Sending a message to the office/provider   Having access to your medication list   Paying your bill/copay prior to your appointment   Scheduling your yearly mammogram   Review your test results    If you are not familiar with [facility] or the [email or web address] ([facility]) YOLANDA, please ask one of us and we will be happy to answer any questions or help you set-up your account.       Your [facility] office,  Karie

## 2022-06-13 NOTE — PROGRESS NOTES
Visit Information    Have you changed or started any medications since your last visit including any over-the-counter medicines, vitamins, or herbal medicines? no   Are you having any side effects from any of your medications? -  no  Have you stopped taking any of your medications? Is so, why? -  no    Have you seen any other physician or provider since your last visit? Yes - Records Obtained  Have you had any other diagnostic tests since your last visit? Yes - Records Obtained  Have you been seen in the emergency room and/or had an admission to a hospital since we last saw you? No  Have you had your routine dental cleaning in the past 6 months? no    Have you activated your [employer] account? If not, what are your barriers?  Yes     Patient Care Team:  Marina Delgado MD as PCP - General (Family Medicine)  Marina Delgado MD as PCP - [facility] Provider  Adela Cunha MD as Consulting Physician (Gastroenterology)  Micky Gaona MD as Consulting Physician (Pain Management)  Gerson Dowd MD as Consulting Physician (Hematology and Oncology)  Arun Duval DO as Consulting Physician (Bariatric Surgery)  Celsa Denson DO as Consulting Physician (Cardiology)  Iman Schofield MD as Consulting Physician (Urology)    Medical History Review  Past Medical, Family, and Social History reviewed and does contribute to the patient presenting condition    Health Maintenance   Topic Date Due    DTaP/Tdap/Td vaccine (1 - Tdap) Never done    Shingles vaccine (2 of 3) 12/27/2015    Diabetic microalbuminuria test  07/13/2021    Prostate Specific Antigen (PSA) Screening or Monitoring  03/17/2022    A1C test (Diabetic or Prediabetic)  06/12/2022    Diabetic foot exam  09/24/2022    Annual Wellness Visit (AWV)  09/25/2022    Low dose CT lung screening  11/17/2022    Depression Screen  02/25/2023    Lipids  03/12/2023    Diabetic retinal exam  05/31/2023    Colorectal Cancer Screen  06/07/2025    Hepatitis A vaccine  Completed    Flu vaccine  Completed    Pneumococcal 65+ years Vaccine  Completed    COVID-19 Vaccine  Completed    Hib vaccine  Aged Out    Meningococcal (ACWY) vaccine  Aged Out

## 2022-06-15 NOTE — TELEPHONE ENCOUNTER
Needs nurse visit appointment BP check for clearance      BP Readings from Last 3 Encounters:   06/13/22 (!) 146/82   06/08/22 (!) 154/80   06/07/22 138/82

## 2022-06-15 NOTE — TELEPHONE ENCOUNTER
PRE OP AT [facility] CALLED IN REGARD TO PATIENT UPCOMING SURGERY THAT IS SCHEDULED COMING UP. THEY WILL LIKE TO KNOW WHAT IS THE SATUS OF MEDICAL CLEARANCE, AS PATIENT HAS BEEN CLEARED BY CARDIOLOGIST ALSO HAS FINISHED ALL PRE-TESTING. PLEASE ADVISE. PHILLIP [phone] CALLED.

## 2022-06-15 NOTE — TELEPHONE ENCOUNTER
Called pt to Schedule nv for bp check. Was going to pass phone to pt, and the person who answered hung up.

## 2022-06-16 ENCOUNTER — TELEPHONE (OUTPATIENT)
Dept: UROLOGY | Age: 72
End: 2022-06-16

## 2022-06-16 NOTE — H&P
Pre-op History and Physical  Tatianna Mahan PA-C    Patient:  Mercedes Burgess  MRN: [id number]  YOB: 1950    HISTORY OF PRESENT ILLNESS:     The patient is a 73 y.o. male who presents with history of bladder cancer and TURBTs in the past. Last Cystoscopy in May was positive for bladder tumor. Here for TURBT today. Patient's old records, notes and chart reviewed and summarized above. Tatianna Mahan PA-C independently reviewed the images and verified the radiology reports from:    No results found.       Past Medical History:    Past Medical History:   Diagnosis Date    AAA (abdominal aortic aneurysm) ([facility]     \"stable\" 3 cm on CT 2021    Anemia     Arthritis     osteoarthritis    Bladder cancer ([facility] 03/2021    bladder    Chronic back pain     Chronic neck pain     Colon polyps 10/08/2019    tubular adenoma x2    COVID 01/06/2022    self reported-home test-no symptoms    COVID-19 virus infection 01/10/2022    Diabetic neuropathy ([facility]     Diarrhea     Encounter for chronic pain management     [facility] pain management [address] [place] [address] Dr. Kristofer Clayton E visit 01/03/2022    Essential hypertension 05/12/2020    managed by Dr. Aristeo Stokes PCP    GERD (gastroesophageal reflux disease)     Health care maintenance     PCP- Dr Karo Doran    Heartburn     Hematuria     Hepatitis B core antibody positive     History of bleeding peptic ulcer     History of blood transfusion     no reactions    History of hepatitis C     History of migraine headaches     History of stress test 07/2020    \"low risk\"    Hyperlipidemia     Iron (Fe) deficiency anemia     Neuropathy     Poor historian     states his wife takes care of all medical information    Positive FIT (fecal immunochemical test)     Type 2 diabetes mellitus with microalbuminuria, without long-term current use of insulin ([facility] 07/13/2020    managed by Dr. Miguel Alejandro last e-visit 11/2021    Under care of team 02/09/2022 pcp-Dr Yamila fuentes-last virtual visit 11/2021    Under care of team 02/09/2022    hematology-Dr Caprice Saavedra [address] virtual visit 11/2021    Under care of team 02/09/2022    urology-Dr fairchild-last visit nov 2021    Wears dentures     Wears glasses     Worsening headaches 12/29/2020       Past Surgical History:    Past Surgical History:   Procedure Laterality Date    CERVICAL SPINE SURGERY  1977    cervical spine three times, has plate    CHOLECYSTECTOMY  03/22/2019    COLONOSCOPY  01/25/2015    10 yr recall, hemorrhoids    COLONOSCOPY N/A 10/08/2019    tubular adenoma x2    COLONOSCOPY N/A 06/07/2022    COLONOSCOPY DIAGNOSTIC performed by Ashvin Barone MD at [place] [address] Right 04/29/2021    CYSTOSCOPY TUR BLADDER TUMOR, GYRUS, RIGHT STENT PLACEMENT AND RIGHT STENT REMOVAL performed by Arash Britt MD at [facility] 09/09/2021    CYSTOSCOPY, TRANSURETHRAL RESECTION BLADDER TUMOR performed by Arash Britt MD at [facility]. N/A 02/16/2022    CYSTOSCOPY BLADDER BIOPSY, FULGURATION performed by Arash Britt MD at [facility]  10/2015    all teeth extracted    ENDOSCOPY, COLON, DIAGNOSTIC      HERNIA REPAIR N/A 08/07/2020    HERNIA INCISIONAL REPAIR LAPAROSCOPIC ROBOTIC WITH MESH performed by Mendoza Zhang DO at [facility] Right    [address]  [address] [place] [address]    UPPER GASTROINTESTINAL ENDOSCOPY  01/25/2015    UPPER GASTROINTESTINAL ENDOSCOPY N/A 10/08/2019    EGD BIOPSY performed by Ashvin Barone MD at [place] [address] N/A 06/07/2022    EGD BIOPSY OF DUODENUM, GE JUNCTION AND GASTRIC ANTRUM performed by Ashvin Barone MD at [facility]       Medications Prior to Admission:    Prior to Admission medications    Medication Sig Start Date End Date Taking?  Authorizing Provider   pravastatin (PRAVACHOL) 40 MG tablet TAKE ONE TABLET BY MOUTH EVERY EVENING. STOP GEMFIBROZIL 6/6/22   David Cheek MD   baclofen (LIORESAL) 10 MG tablet Take 1 tablet by mouth 3 times daily as needed (back pain) 5/31/22   David Cheek MD   morphine (MS CONTIN) 30 MG extended release tablet Take 1 tablet by mouth 2 times daily for 30 days. 30mg in am and 45mg at night 6/1/22 7/1/22  Merryl Baumgarten, APRN - CNP   morphine (MS CONTIN) 15 MG extended release tablet Take 1 tablet by mouth daily for 30 days. Take with 30mg at night to equal 45 6/1/22 7/1/22  Merhaol Baumgarten, APRN - CNP   oxyCODONE-acetaminophen (PERCOCET) 5-325 MG per tablet Take 1 tablet by mouth 2 times daily as needed for Pain for up to 30 days. 6/1/22 7/1/22  Merryl Baumgarten, APRN - CNP   pregabalin (LYRICA) 150 MG capsule Take 1 capsule by mouth 3 times daily for 90 days. 5/18/22 8/16/22  VEE Mayorga - CNP   folic acid (FOLVITE) 1 MG tablet Take 1 tablet by mouth daily 5/18/22   [facility], MD   cyanocobalamin 1000 MCG/ML injection INJECT 1 ML INTO THE MUSCLE EVERY 30 DAYS. CALL FOR NEXT REFILL WHICH WILL BE MONTHLY FOR LIFE 5/16/22   David Cheek MD   metoprolol tartrate (LOPRESSOR) 25 MG tablet TAKE ONE TABLET BY MOUTH TWICE A DAY 5/4/22   David Cheek MD   pantoprazole (PROTONIX) 40 MG tablet TAKE ONE TABLET BY MOUTH DAILY 4/13/22   David Cheek MD   glucose monitoring (FREESTYLE FREEDOM) kit Please supply Patient with a glucose monitoring kit that insurance will cover. 3/28/22   David Cheek MD   blood glucose monitor strips Testing once a day. Please dispense according to patients insurance. 3/28/22   David Cheek MD   Lancets 30G MISC Testing once a day. Please dispense according to patients insurance.  3/28/22   David Cheek MD   vitamin D (CHOLECALCIFEROL) 50 MCG (2000 UT) TABS tablet Take 1 tablet by mouth daily 3/14/22   David Cheek MD   metFORMIN (GLUCOPHAGE XR) 500 MG extended release tablet Take 1 tablet by mouth daily (with breakfast) ** stop Metformin 1000 mg BID** 22   Benny Braun MD   lisinopril (PRINIVIL;ZESTRIL) 10 MG tablet Take 1 tablet by mouth daily ** stop Lisinopril HCTZ** 22   Benny Braun MD   fluticasone (FLONASE) 50 MCG/ACT nasal spray 2 sprays by Nasal route daily 22   Lb Anderson MD   Alcohol Swabs (B-D SINGLE USE SWABS REGULAR) PADS USE TO CHECK BLOOD SUGAR TWICE A DAY 21   Benny Braun MD   Syringe/Needle, Disp, (SYRINGE 3CC/25GX1\") 25G X 1\" 3 ML MISC To be used with B12 injections 21   Benny Braun MD   loperamide (RA ANTI-DIARRHEAL) 2 MG capsule Take 1 capsule by mouth 4 times daily as needed for Diarrhea 21   Fatmata Maldonado MD   Probiotic Product (PROBIOTIC-10 PO) Take by mouth daily     Historical Provider, MD       Allergies:  Asa [aspirin], Iron, and Nsaids    Social History:    Social History     Socioeconomic History    Marital status:      Spouse name: Not on file    Number of children: Not on file    Years of education: Not on file    Highest education level: Not on file   Occupational History    Occupation: disabled   Tobacco Use    Smoking status: Former Smoker     Packs/day: 2.00     Years: 45.00     Pack years: 90.00     Types: Cigarettes     Start date: 1968     Quit date: 2015     Years since quittin.5    Smokeless tobacco: Never Used    Tobacco comment: on chantix 11-6-15   12-7-15   Vaping Use    Vaping Use: Never used   Substance and Sexual Activity    Alcohol use: No    Drug use: No    Sexual activity: Yes     Partners: Female   Other Topics Concern    Not on file   Social History Narrative    Not on file     Social Determinants of Health     Financial Resource Strain: High Risk    Difficulty of Paying Living Expenses: Very hard   Food Insecurity: Food Insecurity Present    Worried About Running Out of Food in the Last Year: Often true    Jennifer of Food in the Last Year: Often true   Transportation Needs:     Lack of Transportation (Medical): Not on file    Lack of Transportation (Non-Medical): Not on file   Physical Activity:     Days of Exercise per Week: Not on file    Minutes of Exercise per Session: Not on file   Stress:     Feeling of Stress : Not on file   Social Connections:     Frequency of Communication with Friends and Family: Not on file    Frequency of Social Gatherings with Friends and Family: Not on file    Attends [employer] Services: Not on file    Active Member of [address] [place] [address] [employer] or Organizations: Not on file    Attends Club or Organization Meetings: Not on file    Marital Status: Not on file   Intimate Partner Violence:     Fear of Current or Ex-Partner: Not on file    Emotionally Abused: Not on file    Physically Abused: Not on file    Sexually Abused: Not on file   Housing Stability:     Unable to Pay for Housing in the Last Year: Not on file    Number of [place] in the Last Year: Not on file    Unstable Housing in the Last Year: Not on file       Family History:    Family History   Problem Relation Age of Onset    Diabetes Mother     Heart Disease Father     High Blood Pressure Father        REVIEW OF SYSTEMS:  Constitutional: negative  Eyes: negative  Respiratory: negative  Cardiovascular: negative  Gastrointestinal: negative  Genitourinary: no acute issues  Musculoskeletal: negative  Skin: negative   Neurological: negative  Hematological/Lymphatic: negative  Psychological: negative    PHYSICAL EXAM:    No data found. Constitutional: Patient in NAD  Neuro: Alert and oriented to person, place, and time  Psych: Mood and affect normal  Skin: Clean, dry, intact   Lungs: Respiratory effort normal, CTA  Cardiovascular:  Normal peripheral pulses; no murmur.  Normal rhythm  Abdomen: Soft, non-tender, non-distended, no hepatosplenomegaly or hernia, CVA tenderness none  Bladder: Non-tender and non-disdended   : Non-tender, skin intact, no lesions       LABS:   No results for input(s): WBC, HGB, HCT, MCV, PLT in the last 72 hours. No results for input(s): NA, K, CL, CO2, PHOS, BUN, CREATININE, CA in the last 72 hours. Lab Results   Component Value Date    PSA 0.62 03/17/2021         Urinalysis: No results for input(s): COLORU, PHUR, LABCAST, WBCUA, RBCUA, MUCUS, TRICHOMONAS, YEAST, BACTERIA, CLARITYU, SPECGRAV, LEUKOCYTESUR, UROBILINOGEN, [place] in the last 72 hours. Invalid input(s): NITRATE, GLUCOSEUKETONESUAMORPHOUS     -----------------------------------------------------------------    ASSESSMENT AND PLAN:    Impression:    Bladder Cancer  Patient Active Problem List   Diagnosis    Degenerative disc disease, lumbar    Lumbar spondylosis    Lumbar radiculopathy    Lumbar spinal stenosis    Encounter for medication monitoring    [facility]    S/P cervical spinal fusion    GERD (gastroesophageal reflux disease)    Osteoarthritis of spine with radiculopathy, lumbar region    Iron deficiency anemia    Hep C w/o coma, chronic (HCC)    Colon polyps    Hepatitis B core antibody positive    Peripheral polyneuropathy    Essential hypertension    Type 2 diabetes mellitus with hyperglycemia, without long-term current use of insulin (HCC)    Hyperlipidemia with target LDL less than 100    Vitamin D deficiency    Vitamin B 12 deficiency    Abnormal EKG    Type 2 diabetes mellitus with diabetic polyneuropathy, without long-term current use of insulin (HCC)    Irritable bowel syndrome with diarrhea    Chronic hepatitis C without hepatic coma (HCC)    Status post hernia repair    Incisional hernia without obstruction or gangrene    Malignant neoplasm of lateral wall of urinary bladder (HCC)    Abdominal aortic aneurysm (AAA) without rupture (HCC)    Gastroesophageal reflux disease    Adenomatous polyp    Iron deficiency anemia due to chronic blood loss    Chronic use of opiate drug for therapeutic purpose       Plan: Cystoscopy, TURBT in OR today.     Consent obtained    The patient was counseled at length about the risks of terence Covid-19 during their perioperative period and any recovery window from their procedure. The patient was made aware that terence Covid-19  may worsen their prognosis for recovering from their procedure  and lend to a higher morbidity and/or mortality risk. All material risks, benefits, and reasonable alternatives including postponing the procedure were discussed. The patient does wish to proceed with the procedure at this time.         Miranda Mars PA-C  3:56 PM 6/16/2022

## 2022-06-16 NOTE — TELEPHONE ENCOUNTER
Surgery time for procedure on 6/17/2022 at [facility] was changed to 8:30am (6:30am arrival). .. NPO midnight. .. pt notified and verbalized an understanding.

## 2022-06-17 ENCOUNTER — ANESTHESIA EVENT (OUTPATIENT)
Dept: OPERATING ROOM | Age: 72
End: 2022-06-17
Payer: MEDICARE

## 2022-06-17 ENCOUNTER — HOSPITAL ENCOUNTER (OUTPATIENT)
Age: 72
Setting detail: OUTPATIENT SURGERY
Discharge: HOME | End: 2022-06-17
Attending: UROLOGY | Admitting: UROLOGY
Payer: MEDICARE

## 2022-06-17 ENCOUNTER — ANESTHESIA (OUTPATIENT)
Dept: OPERATING ROOM | Age: 72
End: 2022-06-17
Payer: MEDICARE

## 2022-06-17 VITALS
DIASTOLIC BLOOD PRESSURE: 68 MMHG | HEART RATE: 92 BPM | TEMPERATURE: 96.8 F | SYSTOLIC BLOOD PRESSURE: 134 MMHG | RESPIRATION RATE: 17 BRPM | OXYGEN SATURATION: 94 %

## 2022-06-17 DIAGNOSIS — G89.18 POST-OP PAIN: Primary | ICD-10-CM

## 2022-06-17 DIAGNOSIS — C67.2 MALIGNANT NEOPLASM OF LATERAL WALL OF URINARY BLADDER (HCC): ICD-10-CM

## 2022-06-17 LAB
GLUCOSE BLD-MCNC: 154 MG/DL (ref 75–110)
GLUCOSE BLD-MCNC: 158 MG/DL (ref 75–110)

## 2022-06-17 PROCEDURE — 2500000003 HC RX 250 WO HCPCS

## 2022-06-17 PROCEDURE — 2580000003 HC RX 258: Performed by: UROLOGY

## 2022-06-17 PROCEDURE — 7100000001 HC PACU RECOVERY - ADDTL 15 MIN: Performed by: UROLOGY

## 2022-06-17 PROCEDURE — 2580000003 HC RX 258: Performed by: ANESTHESIOLOGY

## 2022-06-17 PROCEDURE — 6360000002 HC RX W HCPCS

## 2022-06-17 PROCEDURE — 3700000000 HC ANESTHESIA ATTENDED CARE: Performed by: UROLOGY

## 2022-06-17 PROCEDURE — 88307 TISSUE EXAM BY PATHOLOGIST: CPT

## 2022-06-17 PROCEDURE — 3600000014 HC SURGERY LEVEL 4 ADDTL 15MIN: Performed by: UROLOGY

## 2022-06-17 PROCEDURE — 3600000004 HC SURGERY LEVEL 4 BASE: Performed by: UROLOGY

## 2022-06-17 PROCEDURE — 7100000000 HC PACU RECOVERY - FIRST 15 MIN: Performed by: UROLOGY

## 2022-06-17 PROCEDURE — 7100000010 HC PHASE II RECOVERY - FIRST 15 MIN: Performed by: UROLOGY

## 2022-06-17 PROCEDURE — 7100000011 HC PHASE II RECOVERY - ADDTL 15 MIN: Performed by: UROLOGY

## 2022-06-17 PROCEDURE — 82947 ASSAY GLUCOSE BLOOD QUANT: CPT

## 2022-06-17 PROCEDURE — 2709999900 HC NON-CHARGEABLE SUPPLY: Performed by: UROLOGY

## 2022-06-17 PROCEDURE — 3700000001 HC ADD 15 MINUTES (ANESTHESIA): Performed by: UROLOGY

## 2022-06-17 PROCEDURE — 2720000010 HC SURG SUPPLY STERILE: Performed by: UROLOGY

## 2022-06-17 RX ORDER — CEFAZOLIN SODIUM 1 G/3ML
INJECTION, POWDER, FOR SOLUTION INTRAMUSCULAR; INTRAVENOUS PRN
Status: DISCONTINUED | OUTPATIENT
Start: 2022-06-17 | End: 2022-06-17 | Stop reason: SDUPTHER

## 2022-06-17 RX ORDER — SODIUM CHLORIDE 0.9 % (FLUSH) 0.9 %
5-40 SYRINGE (ML) INJECTION EVERY 12 HOURS SCHEDULED
Status: DISCONTINUED | OUTPATIENT
Start: 2022-06-17 | End: 2022-06-17 | Stop reason: HOSPADM

## 2022-06-17 RX ORDER — LIDOCAINE HYDROCHLORIDE 10 MG/ML
INJECTION, SOLUTION EPIDURAL; INFILTRATION; INTRACAUDAL; PERINEURAL PRN
Status: DISCONTINUED | OUTPATIENT
Start: 2022-06-17 | End: 2022-06-17 | Stop reason: SDUPTHER

## 2022-06-17 RX ORDER — HYDRALAZINE HYDROCHLORIDE 20 MG/ML
10 INJECTION INTRAMUSCULAR; INTRAVENOUS
Status: DISCONTINUED | OUTPATIENT
Start: 2022-06-17 | End: 2022-06-17 | Stop reason: HOSPADM

## 2022-06-17 RX ORDER — ROCURONIUM BROMIDE 10 MG/ML
INJECTION, SOLUTION INTRAVENOUS PRN
Status: DISCONTINUED | OUTPATIENT
Start: 2022-06-17 | End: 2022-06-17 | Stop reason: SDUPTHER

## 2022-06-17 RX ORDER — ONDANSETRON 2 MG/ML
INJECTION INTRAMUSCULAR; INTRAVENOUS PRN
Status: DISCONTINUED | OUTPATIENT
Start: 2022-06-17 | End: 2022-06-17 | Stop reason: SDUPTHER

## 2022-06-17 RX ORDER — PROPOFOL 10 MG/ML
INJECTION, EMULSION INTRAVENOUS PRN
Status: DISCONTINUED | OUTPATIENT
Start: 2022-06-17 | End: 2022-06-17 | Stop reason: SDUPTHER

## 2022-06-17 RX ORDER — CEFADROXIL 500 MG/1
500 CAPSULE ORAL 2 TIMES DAILY
Qty: 6 CAPSULE | Refills: 0 | Status: SHIPPED | OUTPATIENT
Start: 2022-06-17 | End: 2022-06-20

## 2022-06-17 RX ORDER — SODIUM CHLORIDE 0.9 % (FLUSH) 0.9 %
5-40 SYRINGE (ML) INJECTION PRN
Status: DISCONTINUED | OUTPATIENT
Start: 2022-06-17 | End: 2022-06-17 | Stop reason: HOSPADM

## 2022-06-17 RX ORDER — MAGNESIUM HYDROXIDE 1200 MG/15ML
LIQUID ORAL CONTINUOUS PRN
Status: DISCONTINUED | OUTPATIENT
Start: 2022-06-17 | End: 2022-06-17 | Stop reason: HOSPADM

## 2022-06-17 RX ORDER — METOCLOPRAMIDE HYDROCHLORIDE 5 MG/ML
10 INJECTION INTRAMUSCULAR; INTRAVENOUS
Status: DISCONTINUED | OUTPATIENT
Start: 2022-06-17 | End: 2022-06-17 | Stop reason: HOSPADM

## 2022-06-17 RX ORDER — FENTANYL CITRATE 50 UG/ML
INJECTION, SOLUTION INTRAMUSCULAR; INTRAVENOUS PRN
Status: DISCONTINUED | OUTPATIENT
Start: 2022-06-17 | End: 2022-06-17 | Stop reason: SDUPTHER

## 2022-06-17 RX ORDER — PHENYLEPHRINE HYDROCHLORIDE 10 MG/ML
INJECTION INTRAVENOUS PRN
Status: DISCONTINUED | OUTPATIENT
Start: 2022-06-17 | End: 2022-06-17 | Stop reason: SDUPTHER

## 2022-06-17 RX ORDER — DROPERIDOL 2.5 MG/ML
0.62 INJECTION, SOLUTION INTRAMUSCULAR; INTRAVENOUS
Status: DISCONTINUED | OUTPATIENT
Start: 2022-06-17 | End: 2022-06-17 | Stop reason: HOSPADM

## 2022-06-17 RX ORDER — MAGNESIUM HYDROXIDE 1200 MG/15ML
LIQUID ORAL PRN
Status: DISCONTINUED | OUTPATIENT
Start: 2022-06-17 | End: 2022-06-17 | Stop reason: HOSPADM

## 2022-06-17 RX ORDER — DOCUSATE SODIUM 100 MG/1
100 CAPSULE, LIQUID FILLED ORAL 2 TIMES DAILY
Qty: 60 CAPSULE | Refills: 0 | Status: SHIPPED | OUTPATIENT
Start: 2022-06-17 | End: 2022-07-06 | Stop reason: ALTCHOICE

## 2022-06-17 RX ORDER — DEXAMETHASONE SODIUM PHOSPHATE 10 MG/ML
INJECTION INTRAMUSCULAR; INTRAVENOUS PRN
Status: DISCONTINUED | OUTPATIENT
Start: 2022-06-17 | End: 2022-06-17 | Stop reason: SDUPTHER

## 2022-06-17 RX ORDER — DIPHENHYDRAMINE HYDROCHLORIDE 50 MG/ML
12.5 INJECTION INTRAMUSCULAR; INTRAVENOUS
Status: DISCONTINUED | OUTPATIENT
Start: 2022-06-17 | End: 2022-06-17 | Stop reason: HOSPADM

## 2022-06-17 RX ORDER — SODIUM CHLORIDE, SODIUM LACTATE, POTASSIUM CHLORIDE, CALCIUM CHLORIDE 600; 310; 30; 20 MG/100ML; MG/100ML; MG/100ML; MG/100ML
1000 INJECTION, SOLUTION INTRAVENOUS CONTINUOUS
Status: DISCONTINUED | OUTPATIENT
Start: 2022-06-17 | End: 2022-06-17 | Stop reason: HOSPADM

## 2022-06-17 RX ORDER — MEPERIDINE HYDROCHLORIDE 50 MG/ML
12.5 INJECTION INTRAMUSCULAR; INTRAVENOUS; SUBCUTANEOUS EVERY 5 MIN PRN
Status: DISCONTINUED | OUTPATIENT
Start: 2022-06-17 | End: 2022-06-17 | Stop reason: HOSPADM

## 2022-06-17 RX ORDER — SODIUM CHLORIDE 9 MG/ML
25 INJECTION, SOLUTION INTRAVENOUS PRN
Status: DISCONTINUED | OUTPATIENT
Start: 2022-06-17 | End: 2022-06-17 | Stop reason: HOSPADM

## 2022-06-17 RX ADMIN — LIDOCAINE HYDROCHLORIDE 50 MG: 10 INJECTION, SOLUTION EPIDURAL; INFILTRATION; INTRACAUDAL; PERINEURAL at 08:05

## 2022-06-17 RX ADMIN — PROPOFOL 150 MG: 10 INJECTION, EMULSION INTRAVENOUS at 08:05

## 2022-06-17 RX ADMIN — SODIUM CHLORIDE, POTASSIUM CHLORIDE, SODIUM LACTATE AND CALCIUM CHLORIDE 1000 ML: 600; 310; 30; 20 INJECTION, SOLUTION INTRAVENOUS at 07:40

## 2022-06-17 RX ADMIN — FENTANYL CITRATE 50 MCG: 50 INJECTION, SOLUTION INTRAMUSCULAR; INTRAVENOUS at 08:33

## 2022-06-17 RX ADMIN — SUGAMMADEX 100 MG: 100 INJECTION, SOLUTION INTRAVENOUS at 08:29

## 2022-06-17 RX ADMIN — PHENYLEPHRINE HYDROCHLORIDE 100 MCG: 10 INJECTION INTRAVENOUS at 08:23

## 2022-06-17 RX ADMIN — CEFAZOLIN 2000 MG: 330 INJECTION, POWDER, FOR SOLUTION INTRAMUSCULAR; INTRAVENOUS at 08:17

## 2022-06-17 RX ADMIN — ROCURONIUM BROMIDE 50 MG: 10 INJECTION INTRAVENOUS at 08:05

## 2022-06-17 RX ADMIN — ONDANSETRON 4 MG: 2 INJECTION INTRAMUSCULAR; INTRAVENOUS at 08:29

## 2022-06-17 RX ADMIN — SUGAMMADEX 100 MG: 100 INJECTION, SOLUTION INTRAVENOUS at 08:30

## 2022-06-17 RX ADMIN — PHENYLEPHRINE HYDROCHLORIDE 100 MCG: 10 INJECTION INTRAVENOUS at 08:19

## 2022-06-17 RX ADMIN — FENTANYL CITRATE 50 MCG: 50 INJECTION, SOLUTION INTRAMUSCULAR; INTRAVENOUS at 08:05

## 2022-06-17 RX ADMIN — DEXAMETHASONE SODIUM PHOSPHATE 10 MG: 10 INJECTION INTRAMUSCULAR; INTRAVENOUS at 08:17

## 2022-06-17 ASSESSMENT — PAIN - FUNCTIONAL ASSESSMENT
PAIN_FUNCTIONAL_ASSESSMENT: 0-10
PAIN_FUNCTIONAL_ASSESSMENT: PREVENTS OR INTERFERES SOME ACTIVE ACTIVITIES AND ADLS

## 2022-06-17 ASSESSMENT — PAIN SCALES - GENERAL
PAINLEVEL_OUTOF10: 0

## 2022-06-17 NOTE — RESULT ENCOUNTER NOTE
Noted    Lab Results       Component                Value               Date                       LABA1C                   5.2                 03/12/2022                 LABA1C                   5.0                 09/24/2021                 LABA1C                   5.9                 01/11/2021                Future Appointments  6/27/2022  11:40 AM   Renetta Aguirre MD         [facility]           [facility]  6/30/2022  1:00 PM    Sage Al MD          [address] [employer]  7/6/2022   4:00 PM    Loki Phillips MD          [address]  7/28/2022  2:30 PM    Desiree Ramon MD     [facility]  8/11/2022  2:30 PM    Lilib[facility] Oates [facility]          [facility]  9/27/2022  10:00 AM   Desiree Ramon MD     [facility]

## 2022-06-17 NOTE — ANESTHESIA POSTPROCEDURE EVALUATION
POST- ANESTHESIA EVALUATION       Pt Name: Monalisa Aragon  MRN: [id number]  YOB: 1950  Date of evaluation: 6/17/2022  Time:  10:08 AM      /68   Pulse 92   Temp 96.8 °F (36 °C)   Resp 17   SpO2 94%      Consciousness Level  Awake  Cardiopulmonary Status  Stable  Pain Adequately Treated YES  Nausea / Vomiting  NO  Adequate Hydration  YES  Anesthesia Related Complications NONE      Electronically signed by Mariano Dias MD on 6/17/2022 at 10:08 AM       Department of Anesthesiology  Postprocedure Note    Patient: Monalisa Aragon  MRN: [id number]  YOB: 1950  Date of evaluation: 6/17/2022  Time:  10:08 AM     Procedure Summary     Date: 06/17/22 Room / [facility]    Anesthesia Start: 0802 Anesthesia Stop: 5513    Procedure: CYSTOSCOPY, TUR BLADDER TUMOR, GYRUS (N/A ) Diagnosis:       Malignant neoplasm of lateral wall of urinary bladder ([address]      (BLADDER CANCER)    Surgeons: Parish Driscoll MD Responsible Provider: Mariano Dias MD    Anesthesia Type: general ASA Status: 3          Anesthesia Type: No value filed. Be Phase I: Be Score: 10    Be Phase II: Be Score: 10    Last vitals: Reviewed and per EMR flowsheets.        Anesthesia Post Evaluation

## 2022-06-17 NOTE — PROGRESS NOTES
Wife to bedside  Updated  Reviewed DC instructions with wife  All questions answered   verbalized understanding

## 2022-06-17 NOTE — PROGRESS NOTES
Dr Ochoa [facility] updated on pt sinus rhythm with PAVs every other beat no CP no SOB  VSS   No intervention says Dr Dawkins Perches to go when meets phase 1 criteria  C and DB enc to help o2 sat go from 92 -97%     Pre op was 93%      Drinking water

## 2022-06-17 NOTE — PROGRESS NOTES
Dr Aye Matos updated on urinate about 20cc and says ok to go  Dr Donaldo Ibarra says ok to go with 93% SAT which is same as preop.

## 2022-06-17 NOTE — ANESTHESIA PRE PROCEDURE
Department of Anesthesiology  Preprocedure Note       Name:  Rand Fabry   Age:  73 y.o.  :  1950                                          MRN:  [id number]         Date:  2022      Surgeon: Gail Del Cid):  Ted Rodriguez MD    Procedure: Procedure(s):  CYSTOSCOPY, TUR BLADDER TUMOR, GYRUS    Medications prior to admission:   Prior to Admission medications    Medication Sig Start Date End Date Taking? Authorizing Provider   pravastatin (PRAVACHOL) 40 MG tablet TAKE ONE TABLET BY MOUTH EVERY EVENING. STOP GEMFIBROZIL 22   Colleen Joe MD   baclofen (LIORESAL) 10 MG tablet Take 1 tablet by mouth 3 times daily as needed (back pain) 22   Colleen Joe MD   morphine (MS CONTIN) 30 MG extended release tablet Take 1 tablet by mouth 2 times daily for 30 days. 30mg in am and 45mg at night 22  VEE Noguera CNP   morphine (MS CONTIN) 15 MG extended release tablet Take 1 tablet by mouth daily for 30 days. Take with 30mg at night to equal 45 22  VEE Noguera CNP   oxyCODONE-acetaminophen (PERCOCET) 5-325 MG per tablet Take 1 tablet by mouth 2 times daily as needed for Pain for up to 30 days. 22  VEE Noguera CNP   pregabalin (LYRICA) 150 MG capsule Take 1 capsule by mouth 3 times daily for 90 days. 22  VEE Cagle CNP   folic acid (FOLVITE) 1 MG tablet Take 1 tablet by mouth daily 22   [facility], MD   cyanocobalamin 1000 MCG/ML injection INJECT 1 ML INTO THE MUSCLE EVERY 30 DAYS. CALL FOR NEXT REFILL WHICH WILL BE MONTHLY FOR LIFE 22   Colleen Joe MD   metoprolol tartrate (LOPRESSOR) 25 MG tablet TAKE ONE TABLET BY MOUTH TWICE A DAY 22   Colleen Joe MD   pantoprazole (PROTONIX) 40 MG tablet TAKE ONE TABLET BY MOUTH DAILY 22   Colleen Joe MD   glucose monitoring (FREESTYLE FREEDOM) kit Please supply Patient with a glucose monitoring kit that insurance will cover. 3/28/22   Dea Quintero MD   blood glucose monitor strips Testing once a day. Please dispense according to patients insurance. 3/28/22   Dea Quintero MD   Lancets 30G MISC Testing once a day. Please dispense according to patients insurance. 3/28/22   Dea Quintero MD   vitamin D (CHOLECALCIFEROL) 50 MCG (2000 UT) TABS tablet Take 1 tablet by mouth daily 3/14/22   Dea Quintero MD   metFORMIN (GLUCOPHAGE XR) 500 MG extended release tablet Take 1 tablet by mouth daily (with breakfast) ** stop Metformin 1000 mg BID** 2/25/22   Dea Quintero MD   lisinopril (PRINIVIL;ZESTRIL) 10 MG tablet Take 1 tablet by mouth daily ** stop Lisinopril HCTZ** 2/25/22   Dea Quintero MD   fluticasone (FLONASE) 50 MCG/ACT nasal spray 2 sprays by Nasal route daily 1/5/22   Norene Schirmer, MD   Alcohol Swabs (B-D SINGLE USE SWABS REGULAR) PADS USE TO CHECK BLOOD SUGAR TWICE A DAY 5/4/21   Dea Quintero MD   Syringe/Needle, Disp, (SYRINGE 3CC/25GX1\") 25G X 1\" 3 ML MISC To be used with B12 injections 1/8/21   Dea Quintero MD   loperamide (RA ANTI-DIARRHEAL) 2 MG capsule Take 1 capsule by mouth 4 times daily as needed for Diarrhea 1/5/21   Aubree Gonzalez MD   Probiotic Product (PROBIOTIC-10 PO) Take by mouth daily     Historical Provider, MD       Current medications:    Current Facility-Administered Medications   Medication Dose Route Frequency Provider Last Rate Last Admin    ceFAZolin (ANCEF) 2000 mg in sterile water 20 mL IV syringe  2,000 mg IntraVENous On Call to Vesna Soria MD        ceFAZolin (ANCEF) 2000 mg in sterile water 20 mL IV syringe  2,000 mg IntraVENous On Call to [address] [facility],[address], PA-C        lactated ringers infusion 1,000 mL  1,000 mL IntraVENous Continuous Nora Middleton MD           Allergies:     Allergies   Allergen Reactions    Asa [Aspirin]       iron deficiency anemia , requiring iron infusions and transfusions    Iron      Pill- constipation, abdominal pain    Nsaids       iron deficiency anemia, history of bleeding ulcers        Problem List:    Patient Active Problem List   Diagnosis Code    Degenerative disc disease, lumbar M51.36    Lumbar spondylosis M47.816    Lumbar radiculopathy M54.16    Lumbar spinal stenosis M48.061    Encounter for medication monitoring Z51.81    Cervical spondylitis (HCC) M46.92    S/P cervical spinal fusion Z98.1    GERD (gastroesophageal reflux disease) K21.9    Osteoarthritis of spine with radiculopathy, lumbar region M47.26    Iron deficiency anemia D50.9    Hep C w/o coma, chronic (HCC) B18.2    Colon polyps K63.5    Hepatitis B core antibody positive R76.8    Peripheral polyneuropathy G62.9    Essential hypertension I10    Type 2 diabetes mellitus with hyperglycemia, without long-term current use of insulin (HCC) E11.65    Hyperlipidemia with target LDL less than 100 E78.5    Vitamin D deficiency E55.9    Vitamin B 12 deficiency E53.8    Abnormal EKG R94.31    Type 2 diabetes mellitus with diabetic polyneuropathy, without long-term current use of insulin (HCC) E11.42    Irritable bowel syndrome with diarrhea K58.0    Chronic hepatitis C without hepatic coma (HCC) B18.2    Status post hernia repair Z98.890, Z87.19    Incisional hernia without obstruction or gangrene K43.2    Malignant neoplasm of lateral wall of urinary bladder (HCC) C67.2    Abdominal aortic aneurysm (AAA) without rupture (HCC) I71.4    Gastroesophageal reflux disease K21.9    Adenomatous polyp D36.9    Iron deficiency anemia due to chronic blood loss D50.0    Chronic use of opiate drug for therapeutic purpose Z79.891       Past Medical History:        Diagnosis Date    AAA (abdominal aortic aneurysm) (HCC)     \"stable\" 3 cm on CT 2021    Anemia     Arthritis     osteoarthritis    Bladder cancer ([facility] [address] 03/2021    bladder    Chronic back pain     Chronic neck pain     Colon polyps 10/08/2019    tubular adenoma x2  COVID 01/06/2022    self reported-home test-no symptoms    COVID-19 virus infection 01/10/2022    Diabetic neuropathy ([facility] [address] [address]     Diarrhea     En[facility] for chronic pain management     [facility] C[facility] 01/03/2022    Essential hypertension 05/12/2020    managed by Dr. Andrea Celaya PCP    GERD (gastroesophageal reflux disease)     Health care maintenance     PCP- Dr Maxine Herrera Heartburn     Hematuria     Hepatitis B core antibody positive     History of bleeding peptic ulcer     History of blood transfusion     no reactions    History of hepatitis C     History of migraine headaches     History of stress test 07/2020    \"low risk\"    Hyperlipidemia     Iron (Fe) deficiency anemia     Neuropathy     Poor historian     states his wife takes care of all medical information    Positive FIT (fecal immunochemical test)     Type 2 diabetes mellitus with microalbuminuria, without long-term current use of insulin ([facility] [address] 07/13/2020    managed by Dr. Kyler Fung last e-visit 11/2021    Under care of team 02/09/2022    pcp-Dr Marlo Flower virtual visit 11/2021    Under care of team 02/09/2022    hematology-Dr Caprice Saavedra  virtual visit 11/2021    Under care of team 02/09/2022    urology-Dr fairchild-last visit nov 2021    Wears dentures     Wears glasses     Worsening headaches 12/29/2020       Past Surgical History:        Procedure Laterality Date    CERVICAL SPINE SURGERY  1977    cervical spine three times, has plate    CHOLECYSTECTOMY  03/22/2019    COLONOSCOPY  01/25/2015    10 yr recall, hemorrhoids    COLONOSCOPY N/A 10/08/2019    tubular adenoma x2    COLONOSCOPY N/A 06/07/2022    COLONOSCOPY DIAGNOSTIC performed by Alexandr Graf MD at [facility] Right 04/29/2021    CYSTOSCOPY TUR BLADDER TUMOR, GYRUS, RIGHT STENT PLACEMENT AND RIGHT STENT REMOVAL performed by Kameron Kline MD at [facility] 2021    CYSTOSCOPY, TRANSURETHRAL RESECTION BLADDER TUMOR [facility] [address] [facility] 2022    CYSTOSCOPY BLADDER BIOPSY, FULGURATION performed by Kerline Uriarte MD at [address] [address]  10/2015    all teeth extracted    ENDOSCOPY, COLON, DIAGNOSTIC      HERNIA REPAIR N/A 2020    HERNIA INCISIONAL REPAIR LAPAROSCOPIC ROBOTIC WITH MESH performed by Eli Martinez DO at [facility] Right     UMBILICAL HERNIA REPAIR  3022-19    UPPER GASTROINTESTINAL ENDOSCOPY  2015    UPPER GASTROINTESTINAL ENDOSCOPY N/A 10/08/2019    EGD BIOPSY performed by Ester Sanchez MD at [facility] N/A 2022    EGD BIOPSY OF DUODENUM, GE JUNCTION AND GASTRIC ANTRUM performed by Ester Sanchez MD at [facility]       Social History:    Social History     Tobacco Use    Smoking status: Former Smoker     Packs/day: 2.00     Years: 45.00     Pack years: 90.00     Types: Cigarettes     Start date: 1968     Quit date: 2015     Years since quittin.5    Smokeless tobacco: Never Used    Tobacco comment: on chantix 11-6-15   12-7-15   Substance Use Topics    Alcohol use:  No                                Counseling given: Not Answered  Comment: on chantix 11-6-15   12-7-15      Vital Signs (Current):   Vitals:    22 0713   BP: (!) 142/84   Pulse: 82   Resp: 20   Temp: 96.8 °F (36 °C)   TempSrc: Temporal   SpO2: 95%                                              BP Readings from Last 3 Encounters:   22 (!) 142/84   22 (!) 146/82   22 (!) 154/80       NPO Status:                                                                                 BMI:   Wt Readings from Last 3 Encounters:   22 222 lb (100.7 kg)   22 217 lb (98.4 kg)   22 219 lb (99.3 kg)     There is no height or weight on file to calculate BMI.    CBC:   Lab Results   Component Value Date    WBC 4.9 06/08/2022    RBC 4.31 06/08/2022    HGB 11.7 06/08/2022    HCT 36.8 06/08/2022    MCV 85.4 06/08/2022    RDW 23.1 06/08/2022     06/08/2022       CMP:   Lab Results   Component Value Date     06/08/2022    K 4.0 06/08/2022     06/08/2022    CO2 21 06/08/2022    BUN 10 06/08/2022    CREATININE 0.63 06/08/2022    GFRAA >60 06/08/2022    LABGLOM >60 06/08/2022    GLUCOSE 178 06/08/2022    PROT 6.6 03/12/2022    CALCIUM 9.3 03/12/2022    BILITOT 0.38 03/12/2022    ALKPHOS 79 03/12/2022    AST 15 03/12/2022    ALT 16 03/12/2022       POC Tests: No results for input(s): POCGLU, POCNA, POCK, POCCL, POCBUN, POCHEMO, POCHCT in the last 72 hours. Coags:   Lab Results   Component Value Date    PROTIME 12.3 02/28/2021    INR 0.9 02/28/2021    APTT 24.5 02/28/2021       HCG (If Applicable): No results found for: PREGTESTUR, PREGSERUM, HCG, HCGQUANT     ABGs: No results found for: PHART, PO2ART, UQY5MCS, RUQ4ISP, BEART, E3SVJBAB     Type & Screen (If Applicable):  No results found for: LABABO, LABRH    Drug/Infectious Status (If Applicable):  Lab Results   Component Value Date    HEPCAB REACTIVE 07/13/2020       COVID-19 Screening (If Applicable):   Lab Results   Component Value Date    COVID19 Not Detected 12/30/2021    COVID19 Not Detected 08/03/2020           Anesthesia Evaluation  Patient summary reviewed and Nursing notes reviewed no history of anesthetic complications:   Airway: Mallampati: I  TM distance: >3 FB   Neck ROM: limited  Mouth opening: > = 3 FB   Dental:    (+) upper dentures and lower dentures      Pulmonary:normal exam                              ROS comment: 90 pack year smoker quit 2015   Cardiovascular:    (+) hypertension:, hyperlipidemia                  Neuro/Psych:   (+) neuromuscular disease:, headaches: migraine headaches,              ROS comment: Peripheral neuropathy GI/Hepatic/Renal:   (+) GERD:,          ROS comment: Bladder cancer.    Endo/Other:    (+) DiabetesType II DM, , blood dyscrasia: anemia, arthritis:., .                 Abdominal:             Vascular: Other Findings:           Anesthesia Plan      general     ASA 3       Induction: intravenous. MIPS: Postoperative opioids intended and Prophylactic antiemetics administered. Anesthetic plan and risks discussed with patient. Plan discussed with CRNA.                     Mariano Dias MD   6/17/2022

## 2022-06-17 NOTE — OP NOTE
Dr. Lauro Ward MD      [address] [facility]    Patient: Ivan Melendez   :   MRN: [id number]     DATE:  2022     SURGEON:   Lauro Ward MD    ASSISTANT:  Yony Esteban MD.      PREOPERATIVE DIAGNOSIS:  BLADDER CANCER    POSTOPERATIVE DIAGNOSIS:  same    PROCEDURE PERFORMED:  CYSTOSCOPY, TUR BLADDER TUMOR, GYRUS  Small <2 cm    ANESTHESIA:  General    COMPLICATIONS:  None. ESTIMATED BLOOD LOSS:  5 cc    SPECIMENS:  ID Type Source Tests Collected by Time Destination   A : BLADDER TUMOR  Tissue Bladder SURGICAL PATHOLOGY Lauro Ward MD 2022 [id number]           DRAINS:  none     INDICATIONS FOR PROCEDURE:  The patient is a 73 y.o. male who presents with a bladder tumor. He has a history hematuria was diagnosed with bladder cancer and had previous TURBT's. His resection in 2021 showed low-grade noninvasive urothelial carcinoma. Reresection was negative. Local cystoscopy showed recurrence. He is here today for resection of bladder tumor. The risks and benefits of the procedure as well as possible alternatives and complications were discussed and he consented    DETAILS OF THE PROCEDURE:  The patient was correctly identified in the preoperative holding area. he was brought back to the operating room and placed in the dorsal lithotomy position. EPC cuffs were on, in place, and fully functional. General endotracheal anesthesia was administered. he was given Ancef 2gm IV  for antibiotic prophylaxis. The patient was then prepped and draped in the usual sterile fashion. After appropriate time-out was performed with all parties agreeing, the visual obturator was inserted into the bladder. A thorough and complete cystoscopy was then performed which showed a tumor near the bladder neck at the 12 o'clock position and another tumor in the posterior wall. In total of these were less than 2 cm.   There was a erythematous area on the posterior left wall which was cauterized. .  The largest tumor was 1cm in size. The ureteral orifices were patent in the orthotopic location. The resectoscope was then inserted and used to resect the tumors. The resection did appear to obtain muscle. There did not appear to be residual tumor. All bleeding areas were fulgurated and hemostasis was visualized. The bladder was then drained and the cystoscope was removed. A Fr patel catheter  catheter was not inserted. Dr Rosendo Gutiérrez MD was scrubbed and present for the procedure. The patient tolerated the procedure well and was sent to PACU for postoperative monitoring. DISPOSITION:  The patient will be transferred to PACU and discharged home if he is doing well in recovery.

## 2022-06-20 LAB — SURGICAL PATHOLOGY REPORT: NORMAL

## 2022-06-21 NOTE — RESULT ENCOUNTER NOTE
Noted  Future Appointments  6/27/2022  11:40 AM   Alma Engel MD         [facility]           [facility]  6/30/2022  1:00 PM    Jose Daniel Root MD          [address] [facility] [place] [facility]  7/6/2022   4:00 PM    Robert Manley MD          [id number]  7/28/2022  2:30 PM    T[facility] Agrawal [facility]     [facility]  8/11/2022  2:30 PM    Norma Kulkarni MD          [facility]        [facility]  9/27/2022  10:00 AM   Toby Agrawal MD     [facility]

## 2022-06-27 ENCOUNTER — OFFICE VISIT (OUTPATIENT)
Dept: UROLOGY | Age: 72
End: 2022-06-27
Payer: MEDICARE

## 2022-06-27 VITALS
SYSTOLIC BLOOD PRESSURE: 124 MMHG | HEART RATE: 61 BPM | OXYGEN SATURATION: 95 % | TEMPERATURE: 97.5 F | BODY MASS INDEX: 31.55 KG/M2 | DIASTOLIC BLOOD PRESSURE: 78 MMHG | WEIGHT: 213 LBS | HEIGHT: 69 IN

## 2022-06-27 DIAGNOSIS — C67.2 MALIGNANT NEOPLASM OF LATERAL WALL OF URINARY BLADDER (HCC): Primary | ICD-10-CM

## 2022-06-27 DIAGNOSIS — R31.0 GROSS HEMATURIA: ICD-10-CM

## 2022-06-27 DIAGNOSIS — Z12.5 PROSTATE CANCER SCREENING: ICD-10-CM

## 2022-06-27 PROCEDURE — G8427 DOCREV CUR MEDS BY ELIG CLIN: HCPCS | Performed by: UROLOGY

## 2022-06-27 PROCEDURE — 1123F ACP DISCUSS/DSCN MKR DOCD: CPT | Performed by: UROLOGY

## 2022-06-27 PROCEDURE — G8417 CALC BMI ABV UP PARAM F/U: HCPCS | Performed by: UROLOGY

## 2022-06-27 PROCEDURE — 1036F TOBACCO NON-USER: CPT | Performed by: UROLOGY

## 2022-06-27 PROCEDURE — 99214 OFFICE O/P EST MOD 30 MIN: CPT | Performed by: UROLOGY

## 2022-06-27 PROCEDURE — 3017F COLORECTAL CA SCREEN DOC REV: CPT | Performed by: UROLOGY

## 2022-06-27 ASSESSMENT — ENCOUNTER SYMPTOMS
SHORTNESS OF BREATH: 0
SORE THROAT: 1
SINUS PAIN: 0
NAUSEA: 0
VOMITING: 0
DIARRHEA: 1
WHEEZING: 0
COUGH: 0

## 2022-06-27 NOTE — PROGRESS NOTES
Review of Systems   Constitutional: Negative for chills, fatigue and fever. HENT: Positive for sore throat. Negative for congestion and sinus pain. Respiratory: Negative for cough, shortness of breath and wheezing. Cardiovascular: Negative for chest pain and palpitations. Gastrointestinal: Positive for diarrhea. Negative for nausea and vomiting. Genitourinary: Negative for difficulty urinating, dysuria, frequency, hematuria and urgency.

## 2022-06-27 NOTE — PROGRESS NOTES
[address] [address]  Dept: [address] [facility] Office Note - Established    Patient:  Juliocesar Patricia  YOB: 1950  Date: 6/27/2022    The patient is a 73 y.o. male who presents todayfor evaluation of the following problems:   Chief Complaint   Patient presents with    Follow-up     TURBT follow up        HPI  This is a very pleasant 82-year-old gentleman with a history of bladder cancer. He has had a recent TURBT which shows low-grade noninvasive urothelial carcinoma of the prostate. His last PSA was about 18 months ago. He has some frequency but no bothersome symptoms. Summary of old records: N/A    Additional History: N/A    Procedures Today: N/A    Urinalysis today:  No results found for this visit on 06/27/22. Last several PSA's:  Lab Results   Component Value Date    PSA 0.62 03/17/2021     Last total testosterone:  No results found for: TESTOSTERONE    AUA Symptom Score (6/27/2022):                                Last BUN and creatinine:  Lab Results   Component Value Date    BUN 10 06/08/2022     Lab Results   Component Value Date    CREATININE 0.63 (L) 06/08/2022       Additional Lab/Culture results: none    Imaging Reviewed during this Office Visit: none  (results were independently reviewed by physician and radiology report verified)    PAST MEDICAL, FAMILY AND SOCIAL HISTORY UPDATE:  Past Medical History:   Diagnosis Date    AAA (abdominal aortic aneurysm) ([address]     \"stable\" 3 cm on CT 2021    Anemia     Arthritis     osteoarthritis    Bladder cancer ([address] 03/2021    bladder    Chronic back pain     Chronic neck pain     Colon polyps 10/08/2019    tubular adenoma x2    COVID 01/06/2022    self reported-home test-no symptoms    COVID-19 virus infection 01/10/2022    Diabetic neuropathy ([address]     Diarrhea     Encounter for chronic pain management Lakeshia Mckinney E visit 01/03/2022    Essential hypertension 05/12/2020    managed by Dr. Bradford Harding PCP    GERD (gastroesophageal reflux disease)     Health care maintenance     PCP- Dr Pete Johnson Heartburn     Hematuria     Hepatitis B core antibody positive     History of bleeding peptic ulcer     History of blood transfusion     no reactions    History of hepatitis C     History of migraine headaches     History of stress test 07/2020    \"low risk\"    Hyperlipidemia     Iron (Fe) deficiency anemia     Neuropathy     Poor historian     states his wife takes care of all medical information    Positive FIT (fecal immunochemical test)     Type 2 diabetes mellitus with microalbuminuria, without long-term current use of insulin ([facility] 07/13/2020    managed by Dr. Anirudh Mccann last e-visit 11/2021    Under care of team 02/09/2022    pcp-Dr Jaron Meza virtual visit 11/2021    Under care of team 02/09/2022    hematology-Dr Caprice Davidson virtual visit 11/2021    Under care of team 02/09/2022    urology-Dr fairchild-last visit nov 2021    Wears dentures     Wears glasses     Worsening headaches 12/29/2020     Past Surgical History:   Procedure Laterality Date    CERVICAL SPINE SURGERY  1977    cervical spine three times, has plate    CHOLECYSTECTOMY  03/22/2019    COLONOSCOPY  01/25/2015    10 yr recall, hemorrhoids    COLONOSCOPY N/A 10/08/2019    tubular adenoma x2    COLONOSCOPY N/A 06/07/2022    COLONOSCOPY DIAGNOSTIC performed by Kamlesh Orona MD at [facility] 8 Right 04/29/2021    CYSTOSCOPY TUR BLADDER TUMOR, GYRUS, RIGHT STENT PLACEMENT AND RIGHT STENT REMOVAL performed by Israel Conley MD at [facility] 09/09/2021    CYSTOSCOPY, TRANSURETHRAL RESECTION BLADDER TUMOR performed by Israel Conley MD at [facility] 02/16/2022    CYSTOSCOPY BLADDER BIOPSY, FULGURATION performed by Vick Carrizales Krish De Paz MD at [address] [place]  06/17/2022    TUR-BT    CYSTOSCOPY N/A 6/17/2022    CYSTOSCOPY, TUR BLADDER TUMOR, GYRUS performed by Nan Sofia MD at [facility]  10/2015    all teeth extracted    ENDOSCOPY, COLON, DIAGNOSTIC      HERNIA REPAIR N/A 08/07/2020    HERNIA INCISIONAL REPAIR LAPAROSCOPIC ROBOTIC WITH MESH performed by Myriam Wagoner DO at [facility] Right     UMBILICAL HERNIA REPAIR  3022-19    UPPER GASTROINTESTINAL ENDOSCOPY  01/25/2015    UPPER GASTROINTESTINAL ENDOSCOPY N/A 10/08/2019    EGD BIOPSY performed by Abdulkadir Blunt MD at [place] [address] N/A 06/07/2022    EGD BIOPSY OF DUODENUM, GE JUNCTION AND GASTRIC ANTRUM performed by Abdulkadir Blunt MD at [address] [place] [address]     Family History   Problem Relation Age of Onset    Diabetes Mother     Heart Disease Father     High Blood Pressure Father      Outpatient Medications Marked as Taking for the 6/27/22 encounter (Office Visit) with Nan Sofia MD   Medication Sig Dispense Refill    pravastatin (PRAVACHOL) 40 MG tablet TAKE ONE TABLET BY MOUTH EVERY EVENING. STOP GEMFIBROZIL 90 tablet 3    baclofen (LIORESAL) 10 MG tablet Take 1 tablet by mouth 3 times daily as needed (back pain) 270 tablet 1    morphine (MS CONTIN) 30 MG extended release tablet Take 1 tablet by mouth 2 times daily for 30 days. 30mg in am and 45mg at night 60 tablet 0    morphine (MS CONTIN) 15 MG extended release tablet Take 1 tablet by mouth daily for 30 days. Take with 30mg at night to equal 45 30 tablet 0    oxyCODONE-acetaminophen (PERCOCET) 5-325 MG per tablet Take 1 tablet by mouth 2 times daily as needed for Pain for up to 30 days. 60 tablet 0    pregabalin (LYRICA) 150 MG capsule Take 1 capsule by mouth 3 times daily for 90 days.  90 capsule 2    folic acid (FOLVITE) 1 MG tablet Take 1 tablet by mouth daily 90 tablet 1    cyanocobalamin 1000 MCG/ML injection INJECT 1 ML INTO THE MUSCLE EVERY 30 DAYS. CALL FOR NEXT REFILL WHICH WILL BE MONTHLY FOR LIFE 3 mL 3    metoprolol tartrate (LOPRESSOR) 25 MG tablet TAKE ONE TABLET BY MOUTH TWICE A  tablet 3    pantoprazole (PROTONIX) 40 MG tablet TAKE ONE TABLET BY MOUTH DAILY 90 tablet 1    glucose monitoring (FREESTYLE FREEDOM) kit Please supply Patient with a glucose monitoring kit that insurance will cover. 1 kit 0    blood glucose monitor strips Testing once a day. Please dispense according to patients insurance. 100 strip 3    Lancets 30G MISC Testing once a day. Please dispense according to patients insurance.  100 each 3    vitamin D (CHOLECALCIFEROL) 50 MCG ( UT) TABS tablet Take 1 tablet by mouth daily 30 tablet 3    metFORMIN (GLUCOPHAGE XR) 500 MG extended release tablet Take 1 tablet by mouth daily (with breakfast) ** stop Metformin 1000 mg BID** 90 tablet 3    lisinopril (PRINIVIL;ZESTRIL) 10 MG tablet Take 1 tablet by mouth daily ** stop Lisinopril HCTZ** 90 tablet 3    fluticasone (FLONASE) 50 MCG/ACT nasal spray 2 sprays by Nasal route daily 16 g 0    Alcohol Swabs (B-D SINGLE USE SWABS REGULAR) PADS USE TO CHECK BLOOD SUGAR TWICE A  each 3    Syringe/Needle, Disp, (SYRINGE 3CC/25GX1\") 25G X 1\" 3 ML MISC To be used with B12 injections 50 each 2    loperamide (RA ANTI-DIARRHEAL) 2 MG capsule Take 1 capsule by mouth 4 times daily as needed for Diarrhea 112 capsule 2    Probiotic Product (PROBIOTIC-10 PO) Take by mouth daily          Asa [aspirin], Iron, and Nsaids  Social History     Tobacco Use   Smoking Status Former Smoker    Packs/day: 2.00    Years: 45.00    Pack years: 90.00    Types: Cigarettes    Start date: 1968   Jackson Patricia Quit date: 2015    Years since quittin.5   Smokeless Tobacco Never Used   Tobacco Comment    on chantix 11-6-15   12-7-15     (Ifpatient a smoker, smoking cessation counseling offered)    Social History     Substance and Sexual Activity   Alcohol Use No REVIEW OF SYSTEMS:  Review of Systems    Physical Exam:      Vitals:    06/27/22 1157   BP: 124/78   Pulse: 61   Temp: 97.5 °F (36.4 °C)   SpO2: 95%     Body mass index is 31.45 kg/m². Patient is a 73 y.o. male in no acute distress and alert and oriented to person, place and time. Physical Exam  Constitutional: Patient in no acute distress. Neuro: Alert and oriented to person, place and time. Psych: Mood normal, affect normal  Skin: No rash noted      Assessment and Plan      1. Malignant neoplasm of lateral wall of urinary bladder (HCC)    2. Gross hematuria    3. Prostate cancer screening           Plan:   F/u 3 mo cysto  psa prior      Return in about 3 months (around 9/27/2022) for Cystoscopy and psa. Prescriptions Ordered:  No orders of the defined types were placed in this encounter. Orders Placed:  Orders Placed This Encounter   Procedures    PSA, Diagnostic     Standing Status:   Future     Standing Expiration Date:   6/22/2023           Aida Modi MD    Agree with the ROS entered by the MA.

## 2022-06-30 ENCOUNTER — HOSPITAL ENCOUNTER (OUTPATIENT)
Age: 72
Discharge: HOME | End: 2022-06-30
Payer: MEDICARE

## 2022-06-30 ENCOUNTER — HOSPITAL ENCOUNTER (OUTPATIENT)
Dept: PAIN MANAGEMENT | Age: 72
Discharge: HOME | End: 2022-06-30
Payer: MEDICARE

## 2022-06-30 VITALS
HEART RATE: 85 BPM | RESPIRATION RATE: 16 BRPM | HEIGHT: 69 IN | OXYGEN SATURATION: 97 % | TEMPERATURE: 97.3 F | DIASTOLIC BLOOD PRESSURE: 71 MMHG | BODY MASS INDEX: 30.07 KG/M2 | WEIGHT: 203 LBS | SYSTOLIC BLOOD PRESSURE: 132 MMHG

## 2022-06-30 DIAGNOSIS — D50.8 OTHER IRON DEFICIENCY ANEMIA: ICD-10-CM

## 2022-06-30 DIAGNOSIS — M47.26 OSTEOARTHRITIS OF SPINE WITH RADICULOPATHY, LUMBAR REGION: ICD-10-CM

## 2022-06-30 DIAGNOSIS — M48.061 SPINAL STENOSIS OF LUMBAR REGION, UNSPECIFIED WHETHER NEUROGENIC CLAUDICATION PRESENT: Chronic | ICD-10-CM

## 2022-06-30 DIAGNOSIS — G89.29 ENCOUNTER FOR CHRONIC PAIN MANAGEMENT: ICD-10-CM

## 2022-06-30 DIAGNOSIS — Z98.1 S/P CERVICAL SPINAL FUSION: Chronic | ICD-10-CM

## 2022-06-30 DIAGNOSIS — M48.061 SPINAL STENOSIS OF LUMBAR REGION WITHOUT NEUROGENIC CLAUDICATION: Chronic | ICD-10-CM

## 2022-06-30 DIAGNOSIS — M54.16 LUMBAR RADICULOPATHY: Chronic | ICD-10-CM

## 2022-06-30 DIAGNOSIS — M51.36 DEGENERATIVE DISC DISEASE, LUMBAR: Chronic | ICD-10-CM

## 2022-06-30 DIAGNOSIS — M46.92 CERVICAL SPONDYLITIS (HCC): Chronic | ICD-10-CM

## 2022-06-30 DIAGNOSIS — M47.816 LUMBAR SPONDYLOSIS: Chronic | ICD-10-CM

## 2022-06-30 DIAGNOSIS — M47.816 OSTEOARTHRITIS OF LUMBAR SPINE, UNSPECIFIED SPINAL OSTEOARTHRITIS COMPLICATION STATUS: ICD-10-CM

## 2022-06-30 DIAGNOSIS — Z51.81 ENCOUNTER FOR MEDICATION MONITORING: ICD-10-CM

## 2022-06-30 LAB
ABSOLUTE EOS #: 0.07 K/UL (ref 0–0.4)
ABSOLUTE LYMPH #: 1.56 K/UL (ref 1–4.8)
ABSOLUTE MONO #: 0.39 K/UL (ref 0.1–1.3)
ABSOLUTE RETIC #: 0.13 M/UL (ref 0.02–0.1)
BASOPHILS # BLD: 1 % (ref 0–2)
BASOPHILS ABSOLUTE: 0.07 K/UL (ref 0–0.2)
EOSINOPHILS RELATIVE PERCENT: 1 % (ref 0–4)
FERRITIN: 69 NG/ML (ref 30–400)
FOLATE: >20 NG/ML
HCT VFR BLD CALC: 42 % (ref 41–53)
HEMOGLOBIN: 13.8 G/DL (ref 13.5–17.5)
IRON SATURATION: 20 % (ref 20–55)
IRON: 61 UG/DL (ref 59–158)
LYMPHOCYTES # BLD: 24 % (ref 24–44)
MCH RBC QN AUTO: 27.6 PG (ref 26–34)
MCHC RBC AUTO-ENTMCNC: 32.8 G/DL (ref 31–37)
MCV RBC AUTO: 84.2 FL (ref 80–100)
MONOCYTES # BLD: 6 % (ref 1–7)
MORPHOLOGY: ABNORMAL
PDW BLD-RTO: 23.3 % (ref 11.5–14.9)
PLATELET # BLD: 240 K/UL (ref 150–450)
PMV BLD AUTO: 7.4 FL (ref 6–12)
RBC # BLD: 4.99 M/UL (ref 4.5–5.9)
RETIC %: 2.6 % (ref 0.5–2)
SEG NEUTROPHILS: 68 % (ref 36–66)
SEGMENTED NEUTROPHILS ABSOLUTE COUNT: 4.41 K/UL (ref 1.3–9.1)
TOTAL IRON BINDING CAPACITY: 307 UG/DL (ref 250–450)
UNSATURATED IRON BINDING CAPACITY: 246 UG/DL (ref 112–347)
VITAMIN B-12: 417 PG/ML (ref 232–1245)
WBC # BLD: 6.5 K/UL (ref 3.5–11)

## 2022-06-30 PROCEDURE — 85025 COMPLETE CBC W/AUTO DIFF WBC: CPT

## 2022-06-30 PROCEDURE — 82607 VITAMIN B-12: CPT

## 2022-06-30 PROCEDURE — 36415 COLL VENOUS BLD VENIPUNCTURE: CPT

## 2022-06-30 PROCEDURE — 85045 AUTOMATED RETICULOCYTE COUNT: CPT

## 2022-06-30 PROCEDURE — 82728 ASSAY OF FERRITIN: CPT

## 2022-06-30 PROCEDURE — 99214 OFFICE O/P EST MOD 30 MIN: CPT | Performed by: ANESTHESIOLOGY

## 2022-06-30 PROCEDURE — 83550 IRON BINDING TEST: CPT

## 2022-06-30 PROCEDURE — 99213 OFFICE O/P EST LOW 20 MIN: CPT

## 2022-06-30 PROCEDURE — 82746 ASSAY OF FOLIC ACID SERUM: CPT

## 2022-06-30 PROCEDURE — 83540 ASSAY OF IRON: CPT

## 2022-06-30 RX ORDER — MORPHINE SULFATE 30 MG/1
30 TABLET, FILM COATED, EXTENDED RELEASE ORAL 2 TIMES DAILY
Qty: 60 TABLET | Refills: 0 | Status: SHIPPED | OUTPATIENT
Start: 2022-06-30 | End: 2022-07-28 | Stop reason: SDUPTHER

## 2022-06-30 RX ORDER — OXYCODONE HYDROCHLORIDE AND ACETAMINOPHEN 5; 325 MG/1; MG/1
1 TABLET ORAL 2 TIMES DAILY PRN
Qty: 60 TABLET | Refills: 0 | Status: SHIPPED | OUTPATIENT
Start: 2022-06-30 | End: 2022-07-28 | Stop reason: SDUPTHER

## 2022-06-30 ASSESSMENT — ENCOUNTER SYMPTOMS
BACK PAIN: 1
EYES NEGATIVE: 1
RESPIRATORY NEGATIVE: 1

## 2022-06-30 ASSESSMENT — PAIN SCALES - GENERAL: PAINLEVEL_OUTOF10: 6

## 2022-06-30 NOTE — PROGRESS NOTES
The patient is a 73 y. o. Non- / non  male. Chief Complaint   Patient presents with    Back Pain    Medication Refill        HPI    Medication Refill:   Morphine ER 15 MG  Morphine ER 30 MG  Oxycodone    Pain score Today:  4  Adverse effects (Constipation / Nausea / Sedation / sexual Dysfunction / others) :  none  Mood: good  Sleep pattern and quality: fair  Activity level: fair    Pill count Today:  Morphine ER 15 MG - 1  Morphine ER 30 MG - 3  Oxycodone - 1    Last dose taken 06/30/2022 morning  OARRS report reviewed today: yes  ER/Hospitalizations/PCP visit related to pain since last visit:no   Any legal problems e.g. DUI etc.:No  Satisfied with current management: Yes    Opioid Contract: 04/16/2019  Last Urine Dug screen dated: 03/29/2022    Lab Results   Component Value Date    LABA1C 5.2 03/12/2022     Lab Results   Component Value Date     03/12/2022       Past Medical History, Past Surgical History, Social History, Allergies and Medications reviewed and updated in EPIC as indicated    Family History reviewed and is noncontributory.         Past Medical History:   Diagnosis Date    AAA (abdominal aortic aneurysm) (HCC)     \"stable\" 3 cm on CT 2021    Anemia     Arthritis     osteoarthritis    Bladder cancer ([address] 03/2021    bladder    Chronic back pain     Chronic neck pain     Colon polyps 10/08/2019    tubular adenoma x2    COVID 01/06/2022    self reported-home test-no symptoms    COVID-19 virus infection 01/10/2022    Diabetic neuropathy [facility]     Diarrhea     Encounter for chronic pain management     [facility] pain management [facility] Dr. Toni Marcano E visit 01/03/2022    Essential hypertension 05/12/2020    managed by Dr. Mona George PCP    GERD (gastroesophageal reflux disease)     Health care maintenance     PCP- Dr Jovita Templeton    Heartburn     Hematuria     Hepatitis B core antibody positive     History of bleeding peptic ulcer     History of blood transfusion     no reactions    History of hepatitis C     History of migraine headaches     History of stress test 07/2020    \"low risk\"    Hyperlipidemia     Iron (Fe) deficiency anemia     Neuropathy     Poor historian     states his wife takes care of all medical information    Positive FIT (fecal immunochemical test)     Type 2 diabetes mellitus with microalbuminuria, without long-term current use of insulin [facility] 07/13/2020    managed by Dr. Anahi Bennett last e-visit 11/2021    Under care of team 02/09/2022    pcp-Dr Vinicio Pizano virtual visit 11/2021    Under care of team 02/09/2022    hematology-Dr Caprice Davidson virtual visit 11/2021    Under care of team 02/09/2022    urology-Dr fairchild-last visit nov 2021    Wears dentures     Wears glasses     Worsening headaches 12/29/2020        Past Surgical History:   Procedure Laterality Date    CERVICAL SPINE SURGERY  1977    cervical spine three times, has plate    CHOLECYSTECTOMY  03/22/2019    COLONOSCOPY  01/25/2015    10 yr recall, hemorrhoids    COLONOSCOPY N/A 10/08/2019    tubular adenoma x2    COLONOSCOPY N/A 06/07/2022    COLONOSCOPY DIAGNOSTIC performed by Arlen Aggarwal MD at [facility] 8 Right 04/29/2021    CYSTOSCOPY TUR BLADDER TUMOR, GYRUS, RIGHT STENT PLACEMENT AND RIGHT STENT REMOVAL [facility] 09/09/2021    CYSTOSCOPY, TRANSURETHRAL RESECTION BLADDER TUMOR performed by Holly Hogan MD at [place] [address]. N/A 02/16/2022    CYSTOSCOPY BLADDER BIOPSY, FULGURATION performed by Holly Hogan MD at [facility]  06/17/2022    TUR-BT    CYSTOSCOPY N/A 6/17/2022    CYSTOSCOPY, TUR BLADDER TUMOR, GYRUS performed by Holly Hogan MD at [facility]  10/2015    all teeth extracted    ENDOSCOPY, COLON, DIAGNOSTIC      HERNIA REPAIR N/A 08/07/2020    HERNIA INCISIONAL REPAIR LAPAROSCOPIC ROBOTIC WITH MESH performed by Jack Berrios DO at [facility] Right     UMBILICAL HERNIA REPAIR  8284-44    UPPER GASTROINTESTINAL ENDOSCOPY  2015    UPPER GASTROINTESTINAL ENDOSCOPY N/A 10/08/2019    EGD BIOPSY performed by Carley Novoa MD at [place] [address] N/A 2022    EGD BIOPSY OF DUODENUM, GE JUNCTION AND GASTRIC ANTRUM performed by Carley Novoa MD at [address] [place] [address] History     Socioeconomic History    Marital status:      Spouse name: None    Number of children: None    Years of education: None    Highest education level: None   Occupational History    Occupation: disabled   Tobacco Use    Smoking status: Former Smoker     Packs/day: 2.00     Years: 45.00     Pack years: 90.00     Types: Cigarettes     Start date: 1968     Quit date: 2015     Years since quittin.5    Smokeless tobacco: Never Used    Tobacco comment: on chantix 11-6-15   12-7-15   Vaping Use    Vaping Use: Never used   Substance and Sexual Activity    Alcohol use: No    Drug use: No    Sexual activity: Yes     Partners: Female   Other Topics Concern    None   Social History Narrative    None     Social Determinants of Health     Financial Resource Strain: High Risk    Difficulty of Paying Living Expenses: Very hard   Food Insecurity: Food Insecurity Present    Worried About Running Out of Food in the Last Year: Often true    Jennifer of Food in the Last Year: Often true   Transportation Needs:     Lack of Transportation (Medical): Not on file    Lack of Transportation (Non-Medical):  Not on file   Physical Activity:     Days of Exercise per Week: Not on file    Minutes of Exercise per Session: Not on file   Stress:     Feeling of Stress : Not on file   Social Connections:     Frequency of Communication with Friends and Family: Not on file    Frequency of Social Gatherings with Friends and Family: Not on file    Attends Advent Services: Not on file    Active Member of Clubs or Organizations: Not on file    Attends Club or Organization Meetings: Not on file    Marital Status: Not on file   Intimate Partner Violence:     Fear of Current or Ex-Partner: Not on file    Emotionally Abused: Not on file    Physically Abused: Not on file    Sexually Abused: Not on file   Housing Stability:     Unable to Pay for Housing in the Last Year: Not on file    Number of [place] in the Last Year: Not on file    Unstable Housing in the Last Year: Not on file       Family History   Problem Relation Age of Onset    Diabetes Mother     Heart Disease Father     High Blood Pressure Father        Allergies   Allergen Reactions    Asa [Aspirin]       iron deficiency anemia , requiring iron infusions and transfusions    Iron      Pill- constipation, abdominal pain    Nsaids       iron deficiency anemia, history of bleeding ulcers        There were no vitals filed for this visit. Current Outpatient Medications   Medication Sig Dispense Refill    docusate sodium (COLACE) 100 MG capsule Take 1 capsule by mouth 2 times daily 60 capsule 0    pravastatin (PRAVACHOL) 40 MG tablet TAKE ONE TABLET BY MOUTH EVERY EVENING. STOP GEMFIBROZIL 90 tablet 3    baclofen (LIORESAL) 10 MG tablet Take 1 tablet by mouth 3 times daily as needed (back pain) 270 tablet 1    morphine (MS CONTIN) 30 MG extended release tablet Take 1 tablet by mouth 2 times daily for 30 days. 30mg in am and 45mg at night 60 tablet 0    morphine (MS CONTIN) 15 MG extended release tablet Take 1 tablet by mouth daily for 30 days. Take with 30mg at night to equal 45 30 tablet 0    oxyCODONE-acetaminophen (PERCOCET) 5-325 MG per tablet Take 1 tablet by mouth 2 times daily as needed for Pain for up to 30 days. 60 tablet 0    pregabalin (LYRICA) 150 MG capsule Take 1 capsule by mouth 3 times daily for 90 days.  90 capsule 2    folic acid (FOLVITE) 1 MG tablet Take 1 tablet by mouth daily 90 tablet 1    cyanocobalamin 1000 MCG/ML injection INJECT 1 ML INTO THE MUSCLE EVERY 30 DAYS. CALL FOR NEXT REFILL WHICH WILL BE MONTHLY FOR LIFE 3 mL 3    metoprolol tartrate (LOPRESSOR) 25 MG tablet TAKE ONE TABLET BY MOUTH TWICE A  tablet 3    pantoprazole (PROTONIX) 40 MG tablet TAKE ONE TABLET BY MOUTH DAILY 90 tablet 1    glucose monitoring (FREESTYLE FREEDOM) kit Please supply Patient with a glucose monitoring kit that insurance will cover. 1 kit 0    blood glucose monitor strips Testing once a day. Please dispense according to patients insurance. 100 strip 3    Lancets 30G MISC Testing once a day. Please dispense according to patients insurance. 100 each 3    vitamin D (CHOLECALCIFEROL) 50 MCG (2000 UT) TABS tablet Take 1 tablet by mouth daily 30 tablet 3    metFORMIN (GLUCOPHAGE XR) 500 MG extended release tablet Take 1 tablet by mouth daily (with breakfast) ** stop Metformin 1000 mg BID** 90 tablet 3    lisinopril (PRINIVIL;ZESTRIL) 10 MG tablet Take 1 tablet by mouth daily ** stop Lisinopril HCTZ** 90 tablet 3    fluticasone (FLONASE) 50 MCG/ACT nasal spray 2 sprays by Nasal route daily 16 g 0    Alcohol Swabs (B-D SINGLE USE SWABS REGULAR) PADS USE TO CHECK BLOOD SUGAR TWICE A  each 3    Syringe/Needle, Disp, (SYRINGE 3CC/25GX1\") 25G X 1\" 3 ML MISC To be used with B12 injections 50 each 2    loperamide (RA ANTI-DIARRHEAL) 2 MG capsule Take 1 capsule by mouth 4 times daily as needed for Diarrhea 112 capsule 2    Probiotic Product (PROBIOTIC-10 PO) Take by mouth daily        No current facility-administered medications for this encounter. Review of Systems   Constitutional: Negative. HENT: Negative. Eyes: Negative. Respiratory: Negative. Cardiovascular: Negative. Musculoskeletal: Positive for back pain. Objective  Assessment & Plan  No diagnosis found. No orders of the defined types were placed in this encounter.      No orders of the defined types were placed in this encounter.            Electronically signed by Felix Alvarez MA on 6/30/2022 at 12:45 PM

## 2022-06-30 NOTE — PROGRESS NOTES
The patient is a 73 y. o. Non- / non  male. Chief Complaint   Patient presents with    Back Pain    Medication Refill        Back Pain  Pertinent negatives include no fever.      53-year-old patient with history of chronic neck and low back pain  Cervical spine fusion surgery x3  No previous back surgery    Main issue is low back pain located in the lower lumbar area midline and right of the midline  Reports intermittent radiation down right leg  Had recent MRI lumbar spine  Report intermittent numbness in the leg  No changes in bladder or bowel control  No therapy for 10 years or longer  No spine injection for almost 10 years  On chronic opioid therapy  We have been gradually reducing opioid dose  To the been reduced from about 140 mg morphine a day now to 90 Franci equivalent of morphine a day    Reports no side effects  Symptoms are stable despite of gradual medication regiment reduction    Automated prescription report reviewed  Safe use of medication discussed  Refill ordered  Will continue MS Contin 30 mg twice a day  Will continue oxycodone 5 mg twice a day  Will discontinue MS IR  Will reduce the total daily morphine dose to 75 mg a day    Will recommend to consider changing to MS Contin 15 mg twice a day on next visit and increase oxycodone to bring the MME down to 65 or less  Continue working up with 5 to 10% monthly dose reduction to bring it less than 50 to reduce opioid related complication risk    MRI lumbar spine was reviewed  Images reviewed independently  No significant surgical pathology  Recommended epidural injection  Patient declined  Recommended physical therapy  Patient declined    Medication Refill:   Morphine ER 15 MG  Morphine ER 30 MG  Oxycodone    Pain score Today:  4  Adverse effects (Constipation / Nausea / Sedation / sexual Dysfunction / others) :  none  Mood: good  Sleep pattern and quality: fair  Activity level: fair    Pill count Today:  Morphine ER 15 MG - 1  Morphine ER 30 MG - 3  Oxycodone - 1    Last dose taken 06/30/2022 morning  OARRS report reviewed today: yes  ER/Hospitalizations/PCP visit related to pain since last visit:no   Any legal problems e.g. DUI etc.:No  Satisfied with current management: Yes    Opioid Contract: 04/16/2019  Last Urine Dug screen dated: 03/29/2022    Lab Results   Component Value Date    LABA1C 5.2 03/12/2022     Lab Results   Component Value Date     03/12/2022       Past Medical History, Past Surgical History, Social History, Allergies and Medications reviewed and updated in EPIC as indicated    Family History reviewed and is noncontributory.         Past Medical History:   Diagnosis Date    AAA (abdominal aortic aneurysm) (HCC)     \"stable\" 3 cm on CT 2021    Anemia     Arthritis     osteoarthritis    Bladder cancer ([facility] 03/2021    bladder    Chronic back pain     Chronic neck pain     Colon polyps 10/08/2019    tubular adenoma x2    COVID 01/06/2022    self reported-home test-no symptoms    COVID-19 virus infection 01/10/2022    Diabetic neuropathy ([facility]     Diarrhea     Encounter for chronic pain management     [facility] Dr. Maxwell CERVANTES visit 01/03/2022    Essential hypertension 05/12/2020    managed by Dr. Buffy Allen PCP    GERD (gastroesophageal reflux disease)     Health care maintenance     PCP- Dr Precious Prather    Heartburn     Hematuria     Hepatitis B core antibody positive     History of bleeding peptic ulcer     History of blood transfusion     no reactions    History of hepatitis C     History of migraine headaches     History of stress test 07/2020    \"low risk\"    Hyperlipidemia     Iron (Fe) deficiency anemia     Neuropathy     Poor historian     states his wife takes care of all medical information    Positive FIT (fecal immunochemical test)     Type 2 diabetes mellitus with microalbuminuria, without long-term current use of insulin ([facility] 07/13/2020 managed by Dr. Sherri Quevedo last e-visit 11/2021    Under care of team 02/09/2022    pcp-Dr Bao Alexis virtual visit 11/2021    Under care of team 02/09/2022    hematology-Dr Caprice [address] virtual visit 11/2021    Under care of team 02/09/2022    urology-Dr riggins-shannen-last visit nov 2021    Wears dentures     Wears glasses     Worsening headaches 12/29/2020        Past Surgical History:   Procedure Laterality Date   [address]    cervical spine three times, has plate    CHOLECYSTECTOMY  03/22/2019    COLONOSCOPY  01/25/2015    10 yr recall, hemorrhoids    COLONOSCOPY N/A 10/08/2019    tubular adenoma x2    COLONOSCOPY N/A 06/07/2022    COLONOSCOPY DIAGNOSTIC performed by Shane Morrissey MD at [address] [place] [address] 04/29/2021    CYSTOSCOPY TUR BLADDER TUMOR, GYRUS, RIGHT STENT PLACEMENT AND RIGHT STENT REMOVAL performed by Marilou Tran MD at [place] [address] 09/09/2021    CYSTOSCOPY, TRANSURETHRAL RESECTION BLADDER TUMOR performed by Marilou Tran MD at [address] N/A 02/16/2022    CYSTOSCOPY BLADDER BIOPSY, FULGURATION performed by Marilou Tran MD at [address]  06/17/2022    TUR-BT    CYSTOSCOPY N/A 6/17/2022    CYSTOSCOPY, TUR BLADDER TUMOR, GYRUS performed by Marilou Tran MD at [address] [place] [address]  10/2015    all teeth extracted    ENDOSCOPY, COLON, DIAGNOSTIC      HERNIA REPAIR N/A 08/07/2020    HERNIA INCISIONAL REPAIR LAPAROSCOPIC ROBOTIC WITH MESH performed by Marie Adam DO at [place] [address]    UPPER GASTROINTESTINAL ENDOSCOPY  01/25/2015    UPPER GASTROINTESTINAL ENDOSCOPY N/A 10/08/2019    EGD BIOPSY performed by Shane Morrissey MD at [place] [address] N/A 06/07/2022    EGD BIOPSY OF DUODENUM, GE JUNCTION AND GASTRIC ANTRUM performed by Shane Morrissey MD at [address] [place] [address] Socioeconomic History    Marital status:      Spouse name: None    Number of children: None    Years of education: None    Highest education level: None   Occupational History    Occupation: disabled   Tobacco Use    Smoking status: Former Smoker     Packs/day: 2.00     Years: 45.00     Pack years: 90.00     Types: Cigarettes     Start date: 1968     Quit date: 2015     Years since quittin.5    Smokeless tobacco: Never Used    Tobacco comment: on chantix 11-6-15   12-7-15   Vaping Use    Vaping Use: Never used   Substance and Sexual Activity    Alcohol use: No    Drug use: No    Sexual activity: Yes     Partners: Female   Other Topics Concern    None   Social History Narrative    None     Social Determinants of Health     Financial Resource Strain: High Risk    Difficulty of Paying Living Expenses: Very hard   Food Insecurity: Food Insecurity Present    Worried About Running Out of Food in the Last Year: Often true    Jennifer of Food in the Last Year: Often true   Transportation Needs:     Lack of Transportation (Medical): Not on file    Lack of Transportation (Non-Medical):  Not on file   Physical Activity:     Days of Exercise per Week: Not on file    Minutes of Exercise per Session: Not on file   Stress:     Feeling of Stress : Not on file   Social Connections:     Frequency of Communication with Friends and Family: Not on file    Frequency of Social Gatherings with Friends and Family: Not on file    Attends Shinto Services: Not on file    Active Member of Clubs or Organizations: Not on file    Attends Club or Organization Meetings: Not on file    Marital Status: Not on file   Intimate Partner Violence:     Fear of Current or Ex-Partner: Not on file    Emotionally Abused: Not on file    Physically Abused: Not on file    Sexually Abused: Not on file   Housing Stability:     Unable to Pay for Housing in the Last Year: Not on file    Number of [facility] in the Last Year: Not on file    Unstable Housing in the Last Year: Not on file       Family History   Problem Relation Age of Onset    Diabetes Mother     Heart Disease Father     High Blood Pressure Father        Allergies   Allergen Reactions    Asa [Aspirin]       iron deficiency anemia , requiring iron infusions and transfusions    Iron      Pill- constipation, abdominal pain    Nsaids       iron deficiency anemia, history of bleeding ulcers        Vitals:    06/30/22 1252   BP: 132/71   Pulse: 85   Resp: 16   Temp: 97.3 °F (36.3 °C)   SpO2: 97%       Current Outpatient Medications   Medication Sig Dispense Refill    morphine (MS CONTIN) 30 MG extended release tablet Take 1 tablet by mouth 2 times daily for 30 days. 30mg in am and 45mg at night 60 tablet 0    oxyCODONE-acetaminophen (PERCOCET) 5-325 MG per tablet Take 1 tablet by mouth 2 times daily as needed for Pain for up to 30 days. 60 tablet 0    docusate sodium (COLACE) 100 MG capsule Take 1 capsule by mouth 2 times daily 60 capsule 0    pravastatin (PRAVACHOL) 40 MG tablet TAKE ONE TABLET BY MOUTH EVERY EVENING. STOP GEMFIBROZIL 90 tablet 3    baclofen (LIORESAL) 10 MG tablet Take 1 tablet by mouth 3 times daily as needed (back pain) 270 tablet 1    morphine (MS CONTIN) 15 MG extended release tablet Take 1 tablet by mouth daily for 30 days. Take with 30mg at night to equal 45 30 tablet 0    pregabalin (LYRICA) 150 MG capsule Take 1 capsule by mouth 3 times daily for 90 days. 90 capsule 2    folic acid (FOLVITE) 1 MG tablet Take 1 tablet by mouth daily 90 tablet 1    cyanocobalamin 1000 MCG/ML injection INJECT 1 ML INTO THE MUSCLE EVERY 30 DAYS.  CALL FOR NEXT REFILL WHICH WILL BE MONTHLY FOR LIFE 3 mL 3    metoprolol tartrate (LOPRESSOR) 25 MG tablet TAKE ONE TABLET BY MOUTH TWICE A  tablet 3    pantoprazole (PROTONIX) 40 MG tablet TAKE ONE TABLET BY MOUTH DAILY 90 tablet 1    glucose monitoring (FREESTYLE FREEDOM) kit Please supply Patient with a glucose monitoring kit that insurance will cover. 1 kit 0    blood glucose monitor strips Testing once a day. Please dispense according to patients insurance. 100 strip 3    Lancets 30G MISC Testing once a day. Please dispense according to patients insurance. 100 each 3    vitamin D (CHOLECALCIFEROL) 50 MCG (2000 UT) TABS tablet Take 1 tablet by mouth daily 30 tablet 3    metFORMIN (GLUCOPHAGE XR) 500 MG extended release tablet Take 1 tablet by mouth daily (with breakfast) ** stop Metformin 1000 mg BID** 90 tablet 3    lisinopril (PRINIVIL;ZESTRIL) 10 MG tablet Take 1 tablet by mouth daily ** stop Lisinopril HCTZ** 90 tablet 3    fluticasone (FLONASE) 50 MCG/ACT nasal spray 2 sprays by Nasal route daily 16 g 0    Alcohol Swabs (B-D SINGLE USE SWABS REGULAR) PADS USE TO CHECK BLOOD SUGAR TWICE A  each 3    Syringe/Needle, Disp, (SYRINGE 3CC/25GX1\") 25G X 1\" 3 ML MISC To be used with B12 injections 50 each 2    loperamide (RA ANTI-DIARRHEAL) 2 MG capsule Take 1 capsule by mouth 4 times daily as needed for Diarrhea 112 capsule 2    Probiotic Product (PROBIOTIC-10 PO) Take by mouth daily        No current facility-administered medications for this encounter. Review of Systems   Constitutional: Negative. Negative for fever. HENT: Negative. Eyes: Negative. Respiratory: Negative. Cardiovascular: Negative. Musculoskeletal: Positive for back pain. Objective:  General Appearance: In no acute distress and well-appearing. Vital signs: (most recent): Blood pressure 132/71, pulse 85, temperature 97.3 °F (36.3 °C), resp. rate 16, height 5' 9\" (1.753 m), weight 203 lb (92.1 kg), SpO2 97 %. Vital signs are normal.  No fever. Output: Producing urine and producing stool. Lungs:  Normal effort. He is not in respiratory distress. Heart: Normal rate. Neurological: Patient is alert and oriented to person, place and time.       Assessment & Plan 79-year-old patient with history of chronic neck and low back pain  Cervical spine fusion surgery x3  No previous back surgery    Main issue is low back pain located in the lower lumbar area midline and right of the midline  Reports intermittent radiation down right leg  Report intermittent numbness in the leg  No changes in bladder or bowel control  Had recent MRI lumbar spine  No therapy for 10 years or longer  No spine injection for almost 10 years  On chronic opioid therapy  We have been gradually reducing opioid dose    Reports no side effects of medications  Denies any illicit substance use  Symptoms are stable despite of gradual medication regiment reduction    Automated prescription report reviewed  Safe use of medication discussed  Refill ordered  Will continue MS Contin 30 mg twice a day  Will continue oxycodone 5 mg twice a day  Will discontinue MS IR  Will reduce the total daily morphine dose to 75 mg a day    Will recommend to consider changing to MS Contin 15 mg twice a day on next visit and increase oxycodone to bring the MME down to 65 or less  Continue working up with 5 to 10% monthly dose reduction to bring it less than 50 to reduce opioid related complication risk    MRI lumbar spine was reviewed  Images reviewed independently  No significant surgical pathology  Recommended epidural injection  Patient declined  Recommended physical therapy  Patient declined      1. Lumbar radiculopathy    2. Encounter for medication monitoring    3. Spinal stenosis of lumbar region, unspecified whether neurogenic claudication present    4. Degenerative disc disease, lumbar    5. Spinal stenosis of lumbar region without neurogenic claudication    6. Osteoarthritis of spine with radiculopathy, lumbar region    7. Lumbar spondylosis    8. Cervical spondylitis ([address]    9. S/P cervical spinal fusion    10. Encounter for chronic pain management    11.  Osteoarthritis of lumbar spine, unspecified spinal osteoarthritis

## 2022-07-06 ENCOUNTER — TELEMEDICINE (OUTPATIENT)
Dept: ONCOLOGY | Age: 72
End: 2022-07-06
Payer: MEDICARE

## 2022-07-06 DIAGNOSIS — D50.8 OTHER IRON DEFICIENCY ANEMIA: Primary | ICD-10-CM

## 2022-07-06 DIAGNOSIS — D64.9 ANEMIA, UNSPECIFIED TYPE: ICD-10-CM

## 2022-07-06 DIAGNOSIS — C67.2 MALIGNANT NEOPLASM OF LATERAL WALL OF URINARY BLADDER (HCC): ICD-10-CM

## 2022-07-06 PROCEDURE — 1123F ACP DISCUSS/DSCN MKR DOCD: CPT | Performed by: INTERNAL MEDICINE

## 2022-07-06 PROCEDURE — 99214 OFFICE O/P EST MOD 30 MIN: CPT | Performed by: INTERNAL MEDICINE

## 2022-07-06 PROCEDURE — G8427 DOCREV CUR MEDS BY ELIG CLIN: HCPCS | Performed by: INTERNAL MEDICINE

## 2022-07-06 PROCEDURE — 3017F COLORECTAL CA SCREEN DOC REV: CPT | Performed by: INTERNAL MEDICINE

## 2022-07-14 DIAGNOSIS — E55.9 VITAMIN D DEFICIENCY: ICD-10-CM

## 2022-07-14 RX ORDER — CHOLECALCIFEROL (VITAMIN D3) 50 MCG
TABLET ORAL
Qty: 90 TABLET | Refills: 3 | Status: SHIPPED
Start: 2022-07-14 | End: 2022-09-30 | Stop reason: ALTCHOICE

## 2022-07-27 ENCOUNTER — TELEPHONE (OUTPATIENT)
Dept: PAIN MANAGEMENT | Age: 72
End: 2022-07-27

## 2022-07-27 NOTE — PROGRESS NOTES
Visit Information    Have you changed or started any medications since your last visit including any over-the-counter medicines, vitamins, or herbal medicines? {YES/NO DEFAULT:21114}   Have you stopped taking any of your medications? Is so, why? -  {YES/NO DEFAULT:21114}  Are you having any side effects from any of your medications? - {YES/NO DEFAULT:21114}    Have you seen any other physician or provider since your last visit? {YES/NO DEFAULT:21114}   Have you had any other diagnostic tests since your last visit? {YES/NO DEFAULT:21114}   Have you been seen in the emergency room and/or had an admission in a hospital since we last saw you? {YES/NO DEFAULT:21114}   Have you had your routine dental cleaning in the past 6 months? {YES/NO DEFAULT:21114}     Do you have an active MyChart account? If no, what is the barrier?   {yes [id number]}    Patient Care Team:  Amberly Roth MD as PCP - General (Family Medicine)  Amberly Roth MD as PCP - [facility]  Ying Sanz MD as Consulting Physician (Gastroenterology)  Shagufta Arnold MD as Consulting Physician (Pain Management)  Carlos Lee MD as Consulting Physician (Hematology and Oncology)  Silvina Henderson DO as Consulting Physician (Bariatric Surgery)  Abner Villaseñor DO as Consulting Physician (Cardiology)  Haley Arriaga MD as Consulting Physician (Urology)    Medical History Review  Past Medical, Family, and Social History reviewed and {DOES/NOT:19883} contribute to the patient presenting condition    Health Maintenance   Topic Date Due    DTaP/Tdap/Td vaccine (1 - Tdap) Never done    Shingles vaccine (2 of 3) 12/27/2015    Diabetic microalbuminuria test  07/13/2021    Prostate Specific Antigen (PSA) Screening or Monitoring  03/17/2022    A1C test (Diabetic or Prediabetic)  06/12/2022    Flu vaccine (1) 09/01/2022    Diabetic foot exam  09/24/2022    Annual Wellness Visit (AWV)  09/25/2022    Low dose CT lung screening  11/17/2022 Depression Screen  02/25/2023    Lipids  03/12/2023    Diabetic retinal exam  05/31/2023    Colorectal Cancer Screen  06/07/2025    Hepatitis A vaccine  Completed    Pneumococcal 65+ years Vaccine  Completed    COVID-19 Vaccine  Completed    Hib vaccine  Aged Out    Meningococcal (ACWY) vaccine  Aged Out

## 2022-07-28 ENCOUNTER — TELEMEDICINE (OUTPATIENT)
Dept: FAMILY MEDICINE CLINIC | Age: 72
End: 2022-07-28
Payer: MEDICARE

## 2022-07-28 DIAGNOSIS — E11.42 TYPE 2 DIABETES MELLITUS WITH DIABETIC POLYNEUROPATHY, WITHOUT LONG-TERM CURRENT USE OF INSULIN (HCC): Primary | ICD-10-CM

## 2022-07-28 DIAGNOSIS — M47.26 OSTEOARTHRITIS OF SPINE WITH RADICULOPATHY, LUMBAR REGION: ICD-10-CM

## 2022-07-28 DIAGNOSIS — M51.36 DEGENERATIVE DISC DISEASE, LUMBAR: Chronic | ICD-10-CM

## 2022-07-28 DIAGNOSIS — Z23 ENCOUNTER FOR IMMUNIZATION: ICD-10-CM

## 2022-07-28 DIAGNOSIS — Z51.81 ENCOUNTER FOR MEDICATION MONITORING: ICD-10-CM

## 2022-07-28 DIAGNOSIS — E78.5 HYPERLIPIDEMIA WITH TARGET LDL LESS THAN 100: ICD-10-CM

## 2022-07-28 DIAGNOSIS — I10 ESSENTIAL HYPERTENSION: ICD-10-CM

## 2022-07-28 DIAGNOSIS — M46.92 CERVICAL SPONDYLITIS (HCC): Chronic | ICD-10-CM

## 2022-07-28 DIAGNOSIS — M48.061 SPINAL STENOSIS OF LUMBAR REGION WITHOUT NEUROGENIC CLAUDICATION: Chronic | ICD-10-CM

## 2022-07-28 DIAGNOSIS — M48.061 SPINAL STENOSIS OF LUMBAR REGION, UNSPECIFIED WHETHER NEUROGENIC CLAUDICATION PRESENT: Chronic | ICD-10-CM

## 2022-07-28 DIAGNOSIS — M54.16 LUMBAR RADICULOPATHY: Chronic | ICD-10-CM

## 2022-07-28 DIAGNOSIS — B18.2 HEP C W/O COMA, CHRONIC (HCC): ICD-10-CM

## 2022-07-28 DIAGNOSIS — Z98.1 S/P CERVICAL SPINAL FUSION: Chronic | ICD-10-CM

## 2022-07-28 DIAGNOSIS — G89.29 ENCOUNTER FOR CHRONIC PAIN MANAGEMENT: ICD-10-CM

## 2022-07-28 DIAGNOSIS — M47.816 LUMBAR SPONDYLOSIS: Chronic | ICD-10-CM

## 2022-07-28 DIAGNOSIS — Z12.5 PROSTATE CANCER SCREENING: ICD-10-CM

## 2022-07-28 DIAGNOSIS — M47.816 OSTEOARTHRITIS OF LUMBAR SPINE, UNSPECIFIED SPINAL OSTEOARTHRITIS COMPLICATION STATUS: ICD-10-CM

## 2022-07-28 PROCEDURE — 99214 OFFICE O/P EST MOD 30 MIN: CPT | Performed by: FAMILY MEDICINE

## 2022-07-28 PROCEDURE — 3017F COLORECTAL CA SCREEN DOC REV: CPT | Performed by: FAMILY MEDICINE

## 2022-07-28 PROCEDURE — 3044F HG A1C LEVEL LT 7.0%: CPT | Performed by: FAMILY MEDICINE

## 2022-07-28 PROCEDURE — 1123F ACP DISCUSS/DSCN MKR DOCD: CPT | Performed by: FAMILY MEDICINE

## 2022-07-28 PROCEDURE — 2022F DILAT RTA XM EVC RTNOPTHY: CPT | Performed by: FAMILY MEDICINE

## 2022-07-28 PROCEDURE — G8427 DOCREV CUR MEDS BY ELIG CLIN: HCPCS | Performed by: FAMILY MEDICINE

## 2022-07-28 RX ORDER — OXYCODONE HYDROCHLORIDE AND ACETAMINOPHEN 5; 325 MG/1; MG/1
1 TABLET ORAL 2 TIMES DAILY PRN
Qty: 60 TABLET | Refills: 0 | Status: SHIPPED | OUTPATIENT
Start: 2022-07-30 | End: 2022-08-24 | Stop reason: SDUPTHER

## 2022-07-28 RX ORDER — MORPHINE SULFATE 30 MG/1
30 TABLET, FILM COATED, EXTENDED RELEASE ORAL 2 TIMES DAILY
Qty: 60 TABLET | Refills: 0 | Status: SHIPPED
Start: 2022-07-30 | End: 2022-08-24 | Stop reason: DRUGHIGH

## 2022-07-28 ASSESSMENT — ENCOUNTER SYMPTOMS
CHEST TIGHTNESS: 0
ABDOMINAL PAIN: 0
WHEEZING: 0
DIARRHEA: 0
COUGH: 0
VOMITING: 0
SHORTNESS OF BREATH: 0
NAUSEA: 0
ABDOMINAL DISTENTION: 0
CONSTIPATION: 0

## 2022-07-28 NOTE — PATIENT INSTRUCTIONS
Patient Education        Learning About Low-Carbohydrate Diets  What is a low-carbohydrate diet? A low-carbohydrate (or \"low-carb\") diet limits foods and drinks that have carbohydrates. This includes grains, fruits, milk and yogurt, and starchy vegetables like potatoes, beans, and corn. It also avoids foods and drinks that have added sugar. Instead, low-carb diets include foods that are high inprotein and fat. Why might you follow a low-carb diet? Low-carb diets may be used for a variety of reasons, such as for weight loss. People who have diabetes may use a low-carb diet to help manage their bloodsugar levels. What should you do before you start the diet? Talk to your doctor before you try any diet. This is even more important if you have health problems like kidney disease, heart disease, or diabetes. Your doctor may suggest that you meet with a registered dietitian. A dietitian canhelp you make an eating plan that works for you. What foods do you eat on a low-carb diet? On a low-carb diet, you choose foods that are high in protein and fat. Examplesof these are:  Meat, poultry, and fish. Eggs. Nuts, such as walnuts, pecans, almonds, and peanuts. Peanut butter and other nut butters. Tofu. Avocado. Rosezena Hoyles. Non-starchy vegetables like broccoli, cauliflower, green beans, mushrooms, peppers, lettuce, and spinach. Unsweetened non-dairy milks like almond milk and coconut milk. Cheese, cottage cheese, and cream cheese. Where can you learn more? Go to [email or web address] [email or web address] and sign in to your [employer] account. Enter C335 in the [employer] box to learn more about \"Learning About Low-Carbohydrate Diets. \"     If you do not have an account, please click on the \"Sign Up Now\" link. Current as of: September 8, 2021               Content Version: 13.3  © 1649-3404 [employer]. Care instructions adapted under license by [facility] ([facility]).  If you have questions about a medical condition or this instruction, always ask your healthcare professional. [place] [address] any warranty or liability for your use of this information.

## 2022-07-28 NOTE — PROGRESS NOTES
2022    TELEHEALTH EVALUATION -- Audio/Visual (During [id number] public health emergency)      Nick Moreno (:  1950) is a 73 y.o. male,Established patient, here for evaluation of the following chief complaint(s): Diabetes, Hyperlipidemia, and Hypertension        ASSESSMENT/PLAN:    1. Type 2 diabetes mellitus with diabetic polyneuropathy, without long-term current use of insulin (HCC)  Well controlled, stable. Continue current treatment. Metformin  mg  Will recheck labs. If A1c still well controlled, we could stop metformin    Lab Results   Component Value Date    LABA1C 5.2 2022    LABA1C 5.0 2021    LABA1C 5.9 2021       -     Hemoglobin A1C; Future  -     Uric Acid; Future  -     Comprehensive Metabolic Panel; Future  -     Microalbumin / Creatinine Urine Ratio; Future  -advised home blood glucose testing  daily  -daily feet exam, Foot care: advised to wash feet daily, pat dry and apply lotion at night, avoiding between toes. Need to look at feet daily and report to a physician any signs of inflammation or skin damage  -annual dilated eye exam  -Low carb, low fat diet, increase fruits and vegetables, and exercise 4-5 times a day 30-40 minutes a day discussed  -continue current treatment  -continue lisinopril ACEI and statin pravastatin  Continue Lyrica, folic acid and vitamin B12 injections monthly for polyneuropathy  2. Essential hypertension  Well controlled. Continue current treatment. Metoprolol 25 Mg twice a day, lisinopril 10 Mg daily  Will recheck labs. Discussed low salt diet and BP and pulse monitoring.    -     Uric Acid; Future  -     Comprehensive Metabolic Panel; Future  -     Urinalysis with Reflex to Culture; Future  3. Hyperlipidemia with target LDL less than 100  Well-controlled   continue pravastatin 40 Mg daily    Lab Results   Component Value Date    LDLCHOLESTEROL 19 2022    LDLDIRECT 41 10/23/2021       4.  Hep C w/o coma, chronic [facility])  Improved  In remission  S/p antiviral treatment by Dr. Yuni Garcai  Last hep C RNA undetectable on 3/12/2022  -     Comprehensive Metabolic Panel; Future  5. Prostate cancer screening  -     PSA Screening; Future    The natural history of prostate cancer and ongoing controversy regarding screening and potential treatment outcomes of prostate cancer has been discussed with the patient. The meaning of a false positive PSA and a false negative PSA has been discussed. He indicates understanding of the limitations of this screening test and wishes to proceed with screening PSA testing. 6. Encounter for immunization  -     Tdap (ADACEL) 5-2-15.5 LF-MCG/0.5 injection; Inject 0.5 mLs into the muscle once for 1 dose, Disp-0.5 mL, R-0Normal  -     zoster recombinant adjuvanted vaccine [facility]) 50 MCG/0.5ML SUSR injection; 50 MCG IM then repeat 2-6 months., Disp-0.5 mL, R-1Normal        Teo Adolfos received counseling on the following healthy behaviors: nutrition, exercise, and medication adherence  Reviewed prior labs and health maintenance  Discussed use, benefit, and side effects of prescribed medications. Barriers to medication compliance addressed. Patient given educational materials - see patient instructions  All patient questions answered. Patient voiced understanding. The patient's past medical,surgical, social, and family history as well as his current medications and allergies were reviewed as documented in today's encounter. Medications, labs, diagnostic studies, consultations and follow-up as documented in this encounter. Return for KEEP APPT.     Data Unavailable    Future Appointments   Date Time Provider Pippa Cazares   [facility]11[facility]2022  2:30 PM [facility]   8/24/2022  9:20 AM VEE Mendez - CNP [address]   8/31/2022 12:45 PM Gennaro Crowley MD [address] [place] [address]   9/26/2022 10:00 AM Petr Crouch MD [facility]   9/27/2022 10:00 AM Luis Nolasco MD [facility]         SUBJECTIVE/OBJECTIVE:  Philippe Khan (:  1950) has requested an audio/video evaluation for the following concern(s):Diabetes, Hyperlipidemia, and Hypertension      Patient is accompanied by  his wife, Dali Uribe, during this video visit    Patient has known type 2 diabetes mellitus with polyneuropathy in his feet  Home Blood Glucose 156, 141  Has been checking almost every day, fasting. Taking Metformin, tolerating well, denies side effects  He is up-to-date with eye exam.  A1c stable, well controlled. Lab Results   Component Value Date    LABA1C 5.2 2022    LABA1C 5.0 2021    LABA1C 5.9 2021     For polyneuropathy, he is on Lyrica given by pain management but also folic acid and N57 injections every month  He says they have been helping. Hypertension:    he  is not exercising, but trying to stay active, and is adherent to low salt diet. Blood pressure is well controlled at home. Cardiac symptoms fatigue. Patient denies chest pain, chest pressure/discomfort, claudication, dyspnea, exertional chest pressure/discomfort, irregular heart beat, lower extremity edema, near-syncope, orthopnea, palpitations, paroxysmal nocturnal dyspnea, syncope, and tachypnea. Cardiovascular risk factors: advanced age (older than 54 for men, 72 for women), diabetes mellitus, dyslipidemia, hypertension, male gender, and obesity (BMI >= 30 kg/m2). Use of agents associated with hypertension: none. History of target organ damage:  Coronary artery disease . Patient was referred to cardiologist for stress test and clearance for surgery, he was cleared as intermediate risk.     blood pressure is Normal.  132/70 mmHg    BP Readings from Last 3 Encounters:   22 132/71   22 124/78   22 134/68        Pulse is Normal.    Pulse Readings from Last 3 Encounters:   22 85   22 61   22 92         Hyperlipidemia:  No new myalgias or GI upset on pravastatin (Pravachol). Medication compliance: compliant all of the time. Patient is  following a low fat, low cholesterol diet. LDL is normal , with high triglycerides  Lab Results   Component Value Date    LDLCHOLESTEROL 19 03/12/2022    LDLDIRECT 41 10/23/2021     Lab Results   Component Value Date    TRIG 285 (H) 03/12/2022    TRIG 279 (H) 07/13/2020    TRIG 148 04/24/2019       Vitamin B12 deficiency   Dain Yuen  is  taking Vitamin B12 supplementation. Monthly injections    Dain Yuen reports  numbness and tingling and fatigue. B12 is improving  Vitamin B12 was as low as 214 on 7/7/2020  Lab Results   Component Value Date    TCNGMMQV31 480 06/30/2022       Swetting a lot lately per his wife. Denies fever or chills. Has been eating okay. They do have air-conditioning. He does have history of hepatitis C, post antiviral treatment. Denies nausea, vomiting, abdominal pain, diarrhea constipation        Review of Systems   Constitutional:  Positive for diaphoresis and fatigue. Negative for activity change, appetite change, chills, fever and unexpected weight change. Eyes:  Positive for visual disturbance (stable, chronic). Respiratory:  Negative for cough, chest tightness, shortness of breath and wheezing. Cardiovascular:  Negative for chest pain, palpitations and leg swelling. Gastrointestinal:  Negative for abdominal distention, abdominal pain, constipation, diarrhea, nausea and vomiting. Endocrine: Negative for cold intolerance, heat intolerance, polydipsia, polyphagia and polyuria. Musculoskeletal:  Positive for back pain (chronic, going to pain management). Neurological:  Positive for numbness (feet, numb and burning). Hematological:  Does not bruise/bleed easily. Prior to Visit Medications    Medication Sig Taking? Authorizing Provider   morphine (MS CONTIN) 30 MG extended release tablet Take 1 tablet by mouth in the morning and 1 tablet before bedtime. Do all this for 30 days.  30mg in am and 45mg at night. Yes VEE Andrade CNP   oxyCODONE-acetaminophen (PERCOCET) 5-325 MG per tablet Take 1 tablet by mouth 2 times daily as needed for Pain for up to 30 days. Yes VEE Andrade CNP   vitamin D (CHOLECALCIFEROL) 50 MCG (2000 UT) TABS tablet TAKE ONE TABLET BY MOUTH DAILY Yes Fiona Ro MD   pravastatin (PRAVACHOL) 40 MG tablet TAKE ONE TABLET BY MOUTH EVERY EVENING. STOP GEMFIBROZIL Yes Fiona Ro MD   baclofen (LIORESAL) 10 MG tablet Take 1 tablet by mouth 3 times daily as needed (back pain) Yes Fiona Ro MD   pregabalin (LYRICA) 150 MG capsule Take 1 capsule by mouth 3 times daily for 90 days. Yes VEE Luna CNP   folic acid (FOLVITE) 1 MG tablet Take 1 tablet by mouth daily Yes Loretta Cornelius MD   cyanocobalamin 1000 MCG/ML injection INJECT 1 ML INTO THE MUSCLE EVERY 30 DAYS. CALL FOR NEXT REFILL WHICH WILL BE MONTHLY FOR LIFE Yes Fiona Ro MD   metoprolol tartrate (LOPRESSOR) 25 MG tablet TAKE ONE TABLET BY MOUTH TWICE A DAY Yes Fiona Ro MD   pantoprazole (PROTONIX) 40 MG tablet TAKE ONE TABLET BY MOUTH DAILY Yes Fiona Ro MD   glucose monitoring (FREESTYLE FREEDOM) kit Please supply Patient with a glucose monitoring kit that insurance will cover. Yes Fiona Ro MD   blood glucose monitor strips Testing once a day. Please dispense according to patients insurance. Yes Fiona Ro MD   Lancets 30G MISC Testing once a day. Please dispense according to patients insurance.  Yes Fiona Ro MD   metFORMIN (GLUCOPHAGE XR) 500 MG extended release tablet Take 1 tablet by mouth daily (with breakfast) ** stop Metformin 1000 mg BID** Yes Fiona Ro MD   lisinopril (PRINIVIL;ZESTRIL) 10 MG tablet Take 1 tablet by mouth daily ** stop Lisinopril HCTZ** Yes Fiona Ro MD   fluticasone (FLONASE) 50 MCG/ACT nasal spray 2 sprays by Nasal route daily Yes Rosalia Shore MD   Alcohol Swabs (B-D SINGLE USE SWABS REGULAR) PADS USE TO CHECK BLOOD SUGAR TWICE A DAY Yes Jeanna Marcus MD   Syringe/Needle, Disp, (SYRINGE 3CC/25GX1\") 25G X 1\" 3 ML MISC To be used with B12 injections Yes Jeanna Marcus MD   loperamide (RA ANTI-DIARRHEAL) 2 MG capsule Take 1 capsule by mouth 4 times daily as needed for Diarrhea Yes Yue Tolbert MD   Probiotic Product (PROBIOTIC-10 PO) Take by mouth daily  Yes Historical Provider, MD       Social History     Tobacco Use    Smoking status: Former     Packs/day: 2.00     Years: 45.00     Pack years: 90.00     Types: Cigarettes     Start date: 1968     Quit date: 2015     Years since quittin.6    Smokeless tobacco: Never    Tobacco comments:     on chantix 11-6-15   12-7-15   Vaping Use    Vaping Use: Never used   Substance Use Topics    Alcohol use: No    Drug use: No          PHYSICAL EXAMINATION:    Vital Signs: (As obtained by patient/caregiver or practitioner observation)  -vital signs stable and within normal limits except overweight per BMI, BMI 29.92 kg/M 2  Patient-Reported Vitals 2022   Patient-Reported Weight 203 lbs   Patient-Reported Height 5 ft 9 in   Patient-Reported Systolic 571   Patient-Reported Diastolic 70   Patient-Reported Pulse -   Patient-Reported Temperature -           Intensity of pain is:3-4/10       Constitutional: [x] Appears well-developed and well-nourished [x] No apparent distress      [] Abnormal-       Mental status  [x] Alert and awake  [x] Oriented to person/place/time [x]Able to follow commands      Eyes:  EOM    [x]  Normal  [] Abnormal-  Sclera  [x]  Normal  [] Abnormal -         Discharge [x]  None visible  [] Abnormal -    HENT:   [x] Normocephalic, atraumatic.   [] Abnormal   [x] Mouth/Throat: Mucous membranes are moist.     External Ears [x] Normal  [] Abnormal-     Neck: [x] No visualized mass     Pulmonary/Chest: [x] Respiratory effort normal.  [x] No visualized signs of difficulty breathing or respiratory distress        [] Abnormal        Musculoskeletal:   [x] Normal gait with no signs of ataxia         [x] Normal range of motion of neck        [] Abnormal-       Neurological:        [x] No Facial Asymmetry (Cranial nerve 7 motor function) (limited exam to video visit)          [x] No gaze palsy        [] Abnormal-            Skin:        [x] No significant exanthematous lesions or discoloration noted on facial skin         [] Abnormal-            Psychiatric:      [x] No Hallucinations       [x]Mood is normal      [x]Behavior is normal      [x]Judgment is normal      [x]Thought content is normal       [] Abnormal-     Other pertinent observable physical exam findings-none    Due to this being a TeleHealth encounter, evaluation of the following organ systems is limited: Vitals/Constitutional/EENT/Resp/CV/GI//MS/Neuro/Skin/Heme-Lymph-Imm. I personally reviewed most recent labs reviewed with the patient and all questions fully answered.    Hyperglycemia  hypertriglyceridemia  Vitamin D deficiency, taking vitamin D supplements  Vitamin B12 improved, taking vitamin B12 injections monthly  Otherwise labs within normal limits        Lab Results   Component Value Date    WBC 6.5 06/30/2022    HGB 13.8 06/30/2022    HCT 42.0 06/30/2022    MCV 84.2 06/30/2022     06/30/2022       Lab Results   Component Value Date/Time     06/08/2022 10:16 AM    K 4.0 06/08/2022 10:16 AM     06/08/2022 10:16 AM    CO2 21 06/08/2022 10:16 AM    BUN 10 06/08/2022 10:16 AM    CREATININE 0.63 06/08/2022 10:16 AM    GLUCOSE 178 06/08/2022 10:16 AM    CALCIUM 9.3 03/12/2022 09:30 AM        Lab Results   Component Value Date    ALT 16 03/12/2022    AST 15 03/12/2022    ALKPHOS 79 03/12/2022    BILITOT 0.38 03/12/2022       Lab Results   Component Value Date    TSH 1.21 03/12/2022       Lab Results   Component Value Date    CHOL 100 03/12/2022    CHOL 92 07/13/2020    CHOL 112 04/24/2019     Lab Results (real-time) audio-video encounter. The patient (or guardian if applicable) is aware that this is a billable service, which includes applicable co-pays. The virtual visit was conducted with patient's (and/or legal guardian consent). Verbal consent to proceed has been obtained within the past 12 months. The visit was conducted pursuant to the emergency declaration under the [address] [place] [address] and the [employer] General Act. Patient identification was verified    Patient was alone   Provider was located at primary practice location. The patient was located at home, in a state where the provider was licensed to provide care. On this date 7/28/2022 I have spent 35 minutes reviewing previous notes, test results and face to face with the patient discussing the diagnosis and importance of compliance with the treatment plan as well as documenting on the day of the visit. --Nicholas Pedersen MD on 7/31/2022 at 12:14 PM    An electronic signature was used to authenticate this note.

## 2022-07-31 ASSESSMENT — ENCOUNTER SYMPTOMS: BACK PAIN: 1

## 2022-08-11 ENCOUNTER — OFFICE VISIT (OUTPATIENT)
Dept: GASTROENTEROLOGY | Age: 72
End: 2022-08-11
Payer: MEDICARE

## 2022-08-11 VITALS
TEMPERATURE: 97.6 F | WEIGHT: 217 LBS | BODY MASS INDEX: 32.05 KG/M2 | DIASTOLIC BLOOD PRESSURE: 87 MMHG | SYSTOLIC BLOOD PRESSURE: 162 MMHG

## 2022-08-11 DIAGNOSIS — K58.0 IRRITABLE BOWEL SYNDROME WITH DIARRHEA: ICD-10-CM

## 2022-08-11 DIAGNOSIS — K21.9 GASTROESOPHAGEAL REFLUX DISEASE, UNSPECIFIED WHETHER ESOPHAGITIS PRESENT: ICD-10-CM

## 2022-08-11 DIAGNOSIS — R19.7 DIARRHEA, UNSPECIFIED TYPE: Primary | ICD-10-CM

## 2022-08-11 DIAGNOSIS — D50.0 IRON DEFICIENCY ANEMIA DUE TO CHRONIC BLOOD LOSS: ICD-10-CM

## 2022-08-11 DIAGNOSIS — Z86.19 HX OF HEPATITIS C: ICD-10-CM

## 2022-08-11 DIAGNOSIS — D36.9 ADENOMATOUS POLYP: ICD-10-CM

## 2022-08-11 PROCEDURE — G8427 DOCREV CUR MEDS BY ELIG CLIN: HCPCS | Performed by: INTERNAL MEDICINE

## 2022-08-11 PROCEDURE — G8417 CALC BMI ABV UP PARAM F/U: HCPCS | Performed by: INTERNAL MEDICINE

## 2022-08-11 PROCEDURE — 1036F TOBACCO NON-USER: CPT | Performed by: INTERNAL MEDICINE

## 2022-08-11 PROCEDURE — 3017F COLORECTAL CA SCREEN DOC REV: CPT | Performed by: INTERNAL MEDICINE

## 2022-08-11 PROCEDURE — 1123F ACP DISCUSS/DSCN MKR DOCD: CPT | Performed by: INTERNAL MEDICINE

## 2022-08-11 PROCEDURE — 99214 OFFICE O/P EST MOD 30 MIN: CPT | Performed by: INTERNAL MEDICINE

## 2022-08-11 ASSESSMENT — ENCOUNTER SYMPTOMS
WHEEZING: 0
ABDOMINAL DISTENTION: 0
DIARRHEA: 1
COUGH: 0
ABDOMINAL PAIN: 1
RECTAL PAIN: 0
NAUSEA: 0
SORE THROAT: 0
SHORTNESS OF BREATH: 0
CONSTIPATION: 0
ANAL BLEEDING: 0
VOMITING: 0
TROUBLE SWALLOWING: 0
CHOKING: 0
COLOR CHANGE: 0
BLOOD IN STOOL: 0

## 2022-08-11 NOTE — PROGRESS NOTES
GI CLINIC FOLLOW UP    NTERVAL HISTORY:   No referring provider defined for this encounter. Chief Complaint   Patient presents with    Procedure     Pt is here today for a f/u from colonoscopy/EGD proc done on 06/07/22. 1. Diarrhea, unspecified type    2. Iron deficiency anemia due to chronic blood loss    3. Adenomatous polyp    4. Gastroesophageal reflux disease, unspecified whether esophagitis present    5. Irritable bowel syndrome with diarrhea    6. Hx of hepatitis C       The patient is here as a follow up of his recent GI procedure. The results have been sent to you separately   The findings were explained to the patient in detail and biopsies were also discussed   with him    This patient history significant for chronic anemia    Had a recent upper endoscopy and colonoscopy by myself    Mild irregularity of this, columnar junction was noted biopsy has not revealed any intestinal medical  Did some moderate diffuse gastritis was not    Colonoscopy was relatively normal besides hemorrhoids and some diverticulosis      Patient overall clinically feeling okay except that he is complaining of significant diarrhea and not able to stop it or control it he has been on metformin recently started/restarted    Denies any given diarrhea nausea vomiting rectal bleeding melanotic    Previous work-up was reviewed with him  HISTORY OF PRESENT ILLNESS: Beryle Stade D Napolean Shad is a 73 y.o. male with a past history remarkable for , referred for evaluation of   Chief Complaint   Patient presents with    Procedure     Pt is here today for a f/u from colonoscopy/EGD proc done on 06/07/22. Corrie Felix Past Medical,Family, and Social History reviewed and does contribute to the patient presenting condition. Patient's PMH/PSH,SH,PSYCH Hx, MEDs, ALLERGIES, and ROS were all reviewed and updated in the appropriate sections.     PAST MEDICAL HISTORY:  Past Medical History:   Diagnosis Date    AAA (abdominal aortic aneurysm) ([facility] [address]     \"stable\" 3 cm on CT 2021    Anemia     Arthritis     osteoarthritis    Bladder cancer ([facility] [address] 03/2021    bladder    Chronic back pain     Chronic neck pain     Colon polyps 10/08/2019    tubular adenoma x2    COVID 01/06/2022    self reported-home test-no symptoms    COVID-19 virus infection 01/10/2022    Diabetic neuropathy ([facility] [address]     Diarrhea     Encounter for chronic pain management     [facility] Dr. Maxwell Sanches E visit 01/03/2022    Essential hypertension 05/12/2020    managed by Dr. Buffy Allen PCP    GERD (gastroesophageal reflux disease)     Health care maintenance     PCP- Dr Precious Prather    Heartburn     Hematuria     Hepatitis B core antibody positive     History of bleeding peptic ulcer     History of blood transfusion     no reactions    History of hepatitis C     History of migraine headaches     History of stress test 07/2020    \"low risk\"    Hyperlipidemia     Iron (Fe) deficiency anemia     Neuropathy     Poor historian     states his wife takes care of all medical information    Positive FIT (fecal immunochemical test)     Type 2 diabetes mellitus with microalbuminuria, without long-term current use of insulin ([facility] [address] 07/13/2020    managed by Dr. Niraj Tracy last e-visit 11/2021    Under care of team 02/09/2022    pcp-Dr Cadence Mars virtual visit 11/2021    Under care of team 02/09/2022    hematology-Dr Caprice Saavedra 34 virtual visit 11/2021    Under care of team 02/09/2022    urology-Dr fairchild-last visit nov 2021    Wears dentures     Wears glasses     Worsening headaches 12/29/2020       Past Surgical History:   Procedure Laterality Date    CERVICAL SPINE SURGERY  1977    cervical spine three times, has plate    CHOLECYSTECTOMY  03/22/2019    COLONOSCOPY  01/25/2015    10 yr recall, hemorrhoids    COLONOSCOPY N/A 10/08/2019    tubular adenoma x2    COLONOSCOPY N/A 06/07/2022    COLONOSCOPY DIAGNOSTIC performed by Justice Olmos MD at [facility] 3    baclofen (LIORESAL) 10 MG tablet, Take 1 tablet by mouth 3 times daily as needed (back pain), Disp: 270 tablet, Rfl: 1    pregabalin (LYRICA) 150 MG capsule, Take 1 capsule by mouth 3 times daily for 90 days. , Disp: 90 capsule, Rfl: 2    folic acid (FOLVITE) 1 MG tablet, Take 1 tablet by mouth daily, Disp: 90 tablet, Rfl: 1    cyanocobalamin 1000 MCG/ML injection, INJECT 1 ML INTO THE MUSCLE EVERY 30 DAYS. CALL FOR NEXT REFILL WHICH WILL BE MONTHLY FOR LIFE, Disp: 3 mL, Rfl: 3    metoprolol tartrate (LOPRESSOR) 25 MG tablet, TAKE ONE TABLET BY MOUTH TWICE A DAY, Disp: 180 tablet, Rfl: 3    pantoprazole (PROTONIX) 40 MG tablet, TAKE ONE TABLET BY MOUTH DAILY, Disp: 90 tablet, Rfl: 1    glucose monitoring (FREESTYLE FREEDOM) kit, Please supply Patient with a glucose monitoring kit that insurance will cover. , Disp: 1 kit, Rfl: 0    blood glucose monitor strips, Testing once a day. Please dispense according to patients insurance., Disp: 100 strip, Rfl: 3    Lancets 30G MISC, Testing once a day.   Please dispense according to patients insurance., Disp: 100 each, Rfl: 3    metFORMIN (GLUCOPHAGE XR) 500 MG extended release tablet, Take 1 tablet by mouth daily (with breakfast) ** stop Metformin 1000 mg BID**, Disp: 90 tablet, Rfl: 3    lisinopril (PRINIVIL;ZESTRIL) 10 MG tablet, Take 1 tablet by mouth daily ** stop Lisinopril HCTZ**, Disp: 90 tablet, Rfl: 3    fluticasone (FLONASE) 50 MCG/ACT nasal spray, 2 sprays by Nasal route daily, Disp: 16 g, Rfl: 0    Alcohol Swabs (B-D SINGLE USE SWABS REGULAR) PADS, USE TO CHECK BLOOD SUGAR TWICE A DAY, Disp: 200 each, Rfl: 3    Syringe/Needle, Disp, (SYRINGE 3CC/25GX1\") 25G X 1\" 3 ML MISC, To be used with B12 injections, Disp: 50 each, Rfl: 2    loperamide (RA ANTI-DIARRHEAL) 2 MG capsule, Take 1 capsule by mouth 4 times daily as needed for Diarrhea, Disp: 112 capsule, Rfl: 2    Probiotic Product (PROBIOTIC-10 PO), Take by mouth daily , Disp: , Rfl:     ALLERGIES:   Allergies Allergen Reactions    Asa [Aspirin]       iron deficiency anemia , requiring iron infusions and transfusions    Iron      Pill- constipation, abdominal pain    Nsaids       iron deficiency anemia, history of bleeding ulcers        FAMILY HISTORY:       Problem Relation Age of Onset    Diabetes Mother     Heart Disease Father     High Blood Pressure Father          SOCIAL HISTORY:   Social History     Socioeconomic History    Marital status:      Spouse name: Not on file    Number of children: Not on file    Years of education: Not on file    Highest education level: Not on file   Occupational History    Occupation: disabled   Tobacco Use    Smoking status: Former     Packs/day: 2.00     Years: 45.00     Pack years: 90.00     Types: Cigarettes     Start date: 1968     Quit date: 2015     Years since quittin.6    Smokeless tobacco: Never    Tobacco comments:     on chantix 11-6-15   12-7-15   Vaping Use    Vaping Use: Never used   Substance and Sexual Activity    Alcohol use: No    Drug use: No    Sexual activity: Yes     Partners: Female   Other Topics Concern    Not on file   Social History Narrative    Not on file     Social Determinants of Health     Financial Resource Strain: High Risk    Difficulty of Paying Living Expenses: Very hard   Food Insecurity: Food Insecurity Present    Worried About Running Out of Food in the Last Year: Often true    Ran Out of Food in the Last Year: Often true   Transportation Needs: Not on file   Physical Activity: Not on file   Stress: Not on file   Social Connections: Not on file   Intimate Partner Violence: Not on file   Housing Stability: Not on file         REVIEW OF SYSTEMS:         Review of Systems   Constitutional:  Negative for appetite change and fatigue. HENT:  Negative for sore throat and trouble swallowing. Eyes:  Negative for visual disturbance. Respiratory:  Negative for cough, choking, shortness of breath and wheezing.     Cardiovascular: Negative for chest pain, palpitations and leg swelling. Gastrointestinal:  Positive for abdominal pain and diarrhea. Negative for abdominal distention, anal bleeding, blood in stool, constipation, nausea, rectal pain and vomiting. Skin:  Negative for color change. Allergic/Immunologic: Negative for environmental allergies and food allergies. Neurological:  Negative for dizziness, weakness, light-headedness, numbness and headaches. Hematological:  Does not bruise/bleed easily. Psychiatric/Behavioral:  Negative for confusion and sleep disturbance. The patient is not nervous/anxious. PHYSICAL EXAMINATION: Vital signs reviewed per the nursing documentation. BP (!) 162/87   Temp 97.6 °F (36.4 °C)   Wt 217 lb (98.4 kg)   BMI 32.05 kg/m²   Body mass index is 32.05 kg/m². Physical Exam  Nursing note reviewed. Constitutional:       Appearance: He is well-developed. Comments: Anxious     HENT:      Head: Normocephalic and atraumatic. Eyes:      Conjunctiva/sclera: Conjunctivae normal.      Pupils: Pupils are equal, round, and reactive to light. Cardiovascular:      Rate and Rhythm: Normal rate and regular rhythm. Pulmonary:      Effort: Pulmonary effort is normal.      Breath sounds: Normal breath sounds. Abdominal:      General: Bowel sounds are normal.      Palpations: Abdomen is soft. Comments: NON TENDER, NON DISTENTED  LIVER SPLEEN AND HERNIAS ARE NOT  PALPABLE  BOWEL SOUNDS ARE POSITIVE        Genitourinary:     Rectum: Normal.   Musculoskeletal:         General: Normal range of motion. Cervical back: Normal range of motion and neck supple. Skin:     General: Skin is warm. Neurological:      Mental Status: He is alert and oriented to person, place, and time. Deep Tendon Reflexes: Reflexes are normal and symmetric.          LABORATORY DATA: Reviewed  Lab Results   Component Value Date    WBC 6.5 06/30/2022    HGB 13.8 06/30/2022    HCT 42.0 06/30/2022    MCV 84.2 06/30/2022     06/30/2022     06/08/2022    K 4.0 06/08/2022     (H) 06/08/2022    CO2 21 06/08/2022    BUN 10 06/08/2022    CREATININE 0.63 (L) 06/08/2022    LABALBU 4.6 03/12/2022    BILITOT 0.38 03/12/2022    ALKPHOS 79 03/12/2022    AST 15 03/12/2022    ALT 16 03/12/2022    INR 0.9 02/28/2021         Lab Results   Component Value Date    RBC 4.99 06/30/2022    HGB 13.8 06/30/2022    MCV 84.2 06/30/2022    MCH 27.6 06/30/2022    MCHC 32.8 06/30/2022    RDW 23.3 (H) 06/30/2022    MPV 7.4 06/30/2022    BASOPCT 1 06/30/2022    LYMPHSABS 1.56 06/30/2022    MONOSABS 0.39 06/30/2022    NEUTROABS 4.41 06/30/2022    EOSABS 0.07 06/30/2022    BASOSABS 0.07 06/30/2022         DIAGNOSTIC TESTING:     No results found. Assessment  1. Diarrhea, unspecified type    2. Iron deficiency anemia due to chronic blood loss    3. Adenomatous polyp    4. Gastroesophageal reflux disease, unspecified whether esophagitis present    5. Irritable bowel syndrome with diarrhea    6. Hx of hepatitis C        Plan    Switch Metformin to some other anti diabetic medicine because of he is having sig diarrhea    The patient was instructed to start taking some OTC Probiotics products   These are available over the counter at the Pharmacy stores and Grocery stores  He was explained about the beneficial effects they have in the GI track  They will help to establish the good bacterial kibmerli and will help with the digestion and bowel movements  The patient has verbalized understanding and agreement to this plan    Pt was advised in detail about some life style and dietary modifications. He was advised about avoidance of caffeine, nicotine and chocolate. Pt was also told to stay away from any kind of fast foods, soda pops. He was also advised to avoid lots of spices, grease and fried food etc.     Instructions were also given about trying to arrange the timing, quality and quantity of food.     Instructions were given about using ample amount of fiber including dietary and supplemental fiber either metamucil, bennafiber or citrucell etc.  Pt was advised about drinking ample amount of water without any colors or chemicals. Stress was given about regular exercise. Pt has verbalized understanding and agreement to these modifications. Cont Iron     F/ U HGB    Cont to f/u with hematology      Thank you for allowing me to participate in the care of Mr. Jonathan Robles. For any further questions please do not hesitate to contact me. I have reviewed and agree with the ROS entered by the MA/Nurse.          Celestina Hammonds MD, [facility]  Board Certified in Gastroenterology and [address] [place] [address] Gastroenterology  Office #: [phone]

## 2022-08-14 DIAGNOSIS — M51.36 DEGENERATIVE DISC DISEASE, LUMBAR: Chronic | ICD-10-CM

## 2022-08-14 DIAGNOSIS — G89.29 ENCOUNTER FOR CHRONIC PAIN MANAGEMENT: ICD-10-CM

## 2022-08-14 DIAGNOSIS — M54.16 LUMBAR RADICULOPATHY: Chronic | ICD-10-CM

## 2022-08-14 DIAGNOSIS — M48.061 SPINAL STENOSIS OF LUMBAR REGION WITHOUT NEUROGENIC CLAUDICATION: Chronic | ICD-10-CM

## 2022-08-14 DIAGNOSIS — M47.816 OSTEOARTHRITIS OF LUMBAR SPINE, UNSPECIFIED SPINAL OSTEOARTHRITIS COMPLICATION STATUS: ICD-10-CM

## 2022-08-14 DIAGNOSIS — M47.26 OSTEOARTHRITIS OF SPINE WITH RADICULOPATHY, LUMBAR REGION: ICD-10-CM

## 2022-08-15 ENCOUNTER — TELEPHONE (OUTPATIENT)
Dept: PAIN MANAGEMENT | Age: 72
End: 2022-08-15

## 2022-08-15 DIAGNOSIS — M54.16 LUMBAR RADICULOPATHY: Chronic | ICD-10-CM

## 2022-08-15 DIAGNOSIS — M47.26 OSTEOARTHRITIS OF SPINE WITH RADICULOPATHY, LUMBAR REGION: ICD-10-CM

## 2022-08-15 DIAGNOSIS — M51.36 DEGENERATIVE DISC DISEASE, LUMBAR: Chronic | ICD-10-CM

## 2022-08-15 DIAGNOSIS — G89.29 ENCOUNTER FOR CHRONIC PAIN MANAGEMENT: ICD-10-CM

## 2022-08-15 DIAGNOSIS — M47.816 OSTEOARTHRITIS OF LUMBAR SPINE, UNSPECIFIED SPINAL OSTEOARTHRITIS COMPLICATION STATUS: ICD-10-CM

## 2022-08-15 DIAGNOSIS — M48.061 SPINAL STENOSIS OF LUMBAR REGION WITHOUT NEUROGENIC CLAUDICATION: Chronic | ICD-10-CM

## 2022-08-15 RX ORDER — PREGABALIN 150 MG/1
150 CAPSULE ORAL 3 TIMES DAILY
Qty: 90 CAPSULE | Refills: 2 | Status: SHIPPED | OUTPATIENT
Start: 2022-08-15 | End: 2022-11-13

## 2022-08-15 RX ORDER — PREGABALIN 150 MG/1
CAPSULE ORAL
Qty: 90 CAPSULE | OUTPATIENT
Start: 2022-08-15

## 2022-08-19 ENCOUNTER — HOSPITAL ENCOUNTER (OUTPATIENT)
Age: 72
Discharge: HOME | End: 2022-08-19
Payer: MEDICARE

## 2022-08-19 DIAGNOSIS — I10 ESSENTIAL HYPERTENSION: ICD-10-CM

## 2022-08-19 DIAGNOSIS — E11.42 TYPE 2 DIABETES MELLITUS WITH DIABETIC POLYNEUROPATHY, WITHOUT LONG-TERM CURRENT USE OF INSULIN (HCC): ICD-10-CM

## 2022-08-19 DIAGNOSIS — C67.2 MALIGNANT NEOPLASM OF LATERAL WALL OF URINARY BLADDER (HCC): ICD-10-CM

## 2022-08-19 DIAGNOSIS — Z12.5 PROSTATE CANCER SCREENING: ICD-10-CM

## 2022-08-19 DIAGNOSIS — B18.2 HEP C W/O COMA, CHRONIC (HCC): ICD-10-CM

## 2022-08-19 DIAGNOSIS — D50.8 OTHER IRON DEFICIENCY ANEMIA: ICD-10-CM

## 2022-08-19 DIAGNOSIS — D64.9 ANEMIA, UNSPECIFIED TYPE: ICD-10-CM

## 2022-08-19 LAB
ABSOLUTE EOS #: 0.1 K/UL (ref 0–0.4)
ABSOLUTE LYMPH #: 1.1 K/UL (ref 1–4.8)
ABSOLUTE MONO #: 0.4 K/UL (ref 0.1–1.3)
ALBUMIN SERPL-MCNC: 4.1 G/DL (ref 3.5–5.2)
ALP BLD-CCNC: 99 U/L (ref 40–129)
ALT SERPL-CCNC: 19 U/L (ref 5–41)
ANION GAP SERPL CALCULATED.3IONS-SCNC: 10 MMOL/L (ref 9–17)
ANION GAP SERPL CALCULATED.3IONS-SCNC: 13 MMOL/L (ref 9–17)
AST SERPL-CCNC: 17 U/L
BACTERIA: NORMAL
BASOPHILS # BLD: 1 % (ref 0–2)
BASOPHILS ABSOLUTE: 0 K/UL (ref 0–0.2)
BILIRUB SERPL-MCNC: 0.37 MG/DL (ref 0.3–1.2)
BILIRUBIN URINE: NEGATIVE
BUN BLDV-MCNC: 11 MG/DL (ref 8–23)
BUN BLDV-MCNC: 11 MG/DL (ref 8–23)
CALCIUM SERPL-MCNC: 9.1 MG/DL (ref 8.6–10.4)
CALCIUM SERPL-MCNC: 9.2 MG/DL (ref 8.6–10.4)
CASTS UA: NORMAL /LPF
CHLORIDE BLD-SCNC: 103 MMOL/L (ref 98–107)
CHLORIDE BLD-SCNC: 103 MMOL/L (ref 98–107)
CO2: 24 MMOL/L (ref 20–31)
CO2: 24 MMOL/L (ref 20–31)
COLOR: YELLOW
CREAT SERPL-MCNC: 0.64 MG/DL (ref 0.7–1.2)
CREAT SERPL-MCNC: 0.68 MG/DL (ref 0.7–1.2)
CREATININE URINE: 215.5 MG/DL (ref 39–259)
EOSINOPHILS RELATIVE PERCENT: 1 % (ref 0–4)
EPITHELIAL CELLS UA: NORMAL /HPF
ESTIMATED AVERAGE GLUCOSE: 143 MG/DL
FERRITIN: 12 NG/ML (ref 30–400)
FOLATE: >20 NG/ML
GFR AFRICAN AMERICAN: >60 ML/MIN
GFR AFRICAN AMERICAN: >60 ML/MIN
GFR NON-AFRICAN AMERICAN: >60 ML/MIN
GFR NON-AFRICAN AMERICAN: >60 ML/MIN
GFR SERPL CREATININE-BSD FRML MDRD: ABNORMAL ML/MIN/{1.73_M2}
GFR SERPL CREATININE-BSD FRML MDRD: ABNORMAL ML/MIN/{1.73_M2}
GLUCOSE BLD-MCNC: 243 MG/DL (ref 70–99)
GLUCOSE BLD-MCNC: 248 MG/DL (ref 70–99)
GLUCOSE URINE: ABNORMAL
HBA1C MFR BLD: 6.6 % (ref 4–6)
HCT VFR BLD CALC: 38.6 % (ref 41–53)
HEMOGLOBIN: 12.5 G/DL (ref 13.5–17.5)
IRON SATURATION: 10 % (ref 20–55)
IRON: 39 UG/DL (ref 59–158)
KETONES, URINE: ABNORMAL
LEUKOCYTE ESTERASE, URINE: NEGATIVE
LYMPHOCYTES # BLD: 17 % (ref 24–44)
MCH RBC QN AUTO: 26.8 PG (ref 26–34)
MCHC RBC AUTO-ENTMCNC: 32.3 G/DL (ref 31–37)
MCV RBC AUTO: 83 FL (ref 80–100)
MICROALBUMIN/CREAT 24H UR: 96 MG/L
MICROALBUMIN/CREAT UR-RTO: 45 MCG/MG CREAT
MONOCYTES # BLD: 7 % (ref 1–7)
NITRITE, URINE: NEGATIVE
PDW BLD-RTO: 15.6 % (ref 11.5–14.9)
PH UA: 5 (ref 5–8)
PLATELET # BLD: 284 K/UL (ref 150–450)
PMV BLD AUTO: 7.1 FL (ref 6–12)
POTASSIUM SERPL-SCNC: 4.4 MMOL/L (ref 3.7–5.3)
POTASSIUM SERPL-SCNC: 4.4 MMOL/L (ref 3.7–5.3)
PROSTATE SPECIFIC ANTIGEN: 0.65 NG/ML
PROTEIN UA: ABNORMAL
RBC # BLD: 4.65 M/UL (ref 4.5–5.9)
RBC UA: NORMAL /HPF
SEG NEUTROPHILS: 74 % (ref 36–66)
SEGMENTED NEUTROPHILS ABSOLUTE COUNT: 5 K/UL (ref 1.3–9.1)
SODIUM BLD-SCNC: 137 MMOL/L (ref 135–144)
SODIUM BLD-SCNC: 140 MMOL/L (ref 135–144)
SPECIFIC GRAVITY UA: 1.02 (ref 1–1.03)
TOTAL IRON BINDING CAPACITY: 381 UG/DL (ref 250–450)
TOTAL PROTEIN: 6.9 G/DL (ref 6.4–8.3)
TURBIDITY: CLEAR
UNSATURATED IRON BINDING CAPACITY: 342 UG/DL (ref 112–347)
URIC ACID: 6.2 MG/DL (ref 3.4–7)
URINE HGB: NEGATIVE
UROBILINOGEN, URINE: NORMAL
VITAMIN B-12: 428 PG/ML (ref 232–1245)
WBC # BLD: 6.7 K/UL (ref 3.5–11)
WBC UA: NORMAL /HPF

## 2022-08-19 PROCEDURE — 36415 COLL VENOUS BLD VENIPUNCTURE: CPT

## 2022-08-19 PROCEDURE — 82607 VITAMIN B-12: CPT

## 2022-08-19 PROCEDURE — 82728 ASSAY OF FERRITIN: CPT

## 2022-08-19 PROCEDURE — 82746 ASSAY OF FOLIC ACID SERUM: CPT

## 2022-08-19 PROCEDURE — 80053 COMPREHEN METABOLIC PANEL: CPT

## 2022-08-19 PROCEDURE — 81001 URINALYSIS AUTO W/SCOPE: CPT

## 2022-08-19 PROCEDURE — 80048 BASIC METABOLIC PNL TOTAL CA: CPT

## 2022-08-19 PROCEDURE — 82570 ASSAY OF URINE CREATININE: CPT

## 2022-08-19 PROCEDURE — 82043 UR ALBUMIN QUANTITATIVE: CPT

## 2022-08-19 PROCEDURE — 85025 COMPLETE CBC W/AUTO DIFF WBC: CPT

## 2022-08-19 PROCEDURE — 83540 ASSAY OF IRON: CPT

## 2022-08-19 PROCEDURE — 83036 HEMOGLOBIN GLYCOSYLATED A1C: CPT

## 2022-08-19 PROCEDURE — 83550 IRON BINDING TEST: CPT

## 2022-08-19 PROCEDURE — G0103 PSA SCREENING: HCPCS

## 2022-08-19 PROCEDURE — 84550 ASSAY OF BLOOD/URIC ACID: CPT

## 2022-08-19 NOTE — RESULT ENCOUNTER NOTE
Please notify patient: Small amount of proteins in the urine, related to diabetes and high blood pressure, improved diabetes, lisinopril 10 Mg. Hemoglobin A1c 6.6, worsening diabetes but still well controlled, low-carb diet advised. Blood glucose 248 very high.         Otherwise labs within normal limits  continue current treatment    Future Appointments  8/24/2022  9:20 AM    ELANA Moore* [id number] [employer]  8/31/2022  12:45 PM   Azeb Gomez MD          [address]  9/26/2022  10:00 AM   Marilou Tran MD         [facility]           [facility]  9/27/2022  10:00 AM   Governor Shelby MD      sc               [facility]  2/16/2023  2:00 PM    Shane Morrissey MD          [facility]

## 2022-08-22 ENCOUNTER — TELEPHONE (OUTPATIENT)
Dept: FAMILY MEDICINE CLINIC | Age: 72
End: 2022-08-22

## 2022-08-22 NOTE — TELEPHONE ENCOUNTER
Patient has been having issues with diarrhea recently and his metformin is making it worse. Pt saw GI on 8/11/2022 and in his note he says       \" Plan     Switch Metformin to some other anti diabetic medicine because of he is having sig diarrhea. \"     Patients wife is requesting a different medication that will not have this affect on him. Please advise. Thank you.

## 2022-08-22 NOTE — TELEPHONE ENCOUNTER
Please advise to stop metformin altogether, lets watch the blood glucose without any diabetic medication his A1c is very well controlled    Lets see if diarrhea resolves  Thank you!       Lab Results   Component Value Date    LABA1C 6.6 (H) 08/19/2022    LABA1C 5.2 03/12/2022    LABA1C 5.0 09/24/2021         Future Appointments   Date Time Provider Department Center   8/24/2022  9:20 AM Hildale Los, APRN - CNP [facility] [address]   8/31/2022 12:45 PM Carlos Lee MD [address] [place] [address]   9/26/2022 10:00 AM Haley Arriaga MD .  [facility]   9/27/2022 10:00 AM [facility]   2/16/2023  2:00 PM Ying Sanz MD [facility]

## 2022-08-24 ENCOUNTER — HOSPITAL ENCOUNTER (OUTPATIENT)
Dept: PAIN MANAGEMENT | Age: 72
Discharge: HOME | End: 2022-08-24
Payer: MEDICARE

## 2022-08-24 VITALS
TEMPERATURE: 97.3 F | SYSTOLIC BLOOD PRESSURE: 156 MMHG | RESPIRATION RATE: 16 BRPM | OXYGEN SATURATION: 95 % | BODY MASS INDEX: 30.07 KG/M2 | WEIGHT: 203 LBS | DIASTOLIC BLOOD PRESSURE: 81 MMHG | HEART RATE: 92 BPM | HEIGHT: 69 IN

## 2022-08-24 DIAGNOSIS — Z51.81 ENCOUNTER FOR MEDICATION MONITORING: ICD-10-CM

## 2022-08-24 DIAGNOSIS — Z98.1 S/P CERVICAL SPINAL FUSION: Chronic | ICD-10-CM

## 2022-08-24 DIAGNOSIS — M47.816 LUMBAR SPONDYLOSIS: Chronic | ICD-10-CM

## 2022-08-24 DIAGNOSIS — M51.36 DEGENERATIVE DISC DISEASE, LUMBAR: Chronic | ICD-10-CM

## 2022-08-24 DIAGNOSIS — M47.26 OSTEOARTHRITIS OF SPINE WITH RADICULOPATHY, LUMBAR REGION: Primary | ICD-10-CM

## 2022-08-24 DIAGNOSIS — M48.061 SPINAL STENOSIS OF LUMBAR REGION WITHOUT NEUROGENIC CLAUDICATION: Chronic | ICD-10-CM

## 2022-08-24 DIAGNOSIS — G89.29 ENCOUNTER FOR CHRONIC PAIN MANAGEMENT: ICD-10-CM

## 2022-08-24 DIAGNOSIS — M46.92 CERVICAL SPONDYLITIS (HCC): Chronic | ICD-10-CM

## 2022-08-24 DIAGNOSIS — M47.816 OSTEOARTHRITIS OF LUMBAR SPINE, UNSPECIFIED SPINAL OSTEOARTHRITIS COMPLICATION STATUS: ICD-10-CM

## 2022-08-24 DIAGNOSIS — M54.16 LUMBAR RADICULOPATHY: Chronic | ICD-10-CM

## 2022-08-24 DIAGNOSIS — M48.061 SPINAL STENOSIS OF LUMBAR REGION, UNSPECIFIED WHETHER NEUROGENIC CLAUDICATION PRESENT: Chronic | ICD-10-CM

## 2022-08-24 PROCEDURE — 99213 OFFICE O/P EST LOW 20 MIN: CPT | Performed by: NURSE PRACTITIONER

## 2022-08-24 PROCEDURE — 99213 OFFICE O/P EST LOW 20 MIN: CPT

## 2022-08-24 RX ORDER — MORPHINE SULFATE 15 MG/1
15 TABLET, FILM COATED, EXTENDED RELEASE ORAL 2 TIMES DAILY
Qty: 60 TABLET | Refills: 0 | Status: SHIPPED | OUTPATIENT
Start: 2022-08-29 | End: 2022-09-26 | Stop reason: SDUPTHER

## 2022-08-24 RX ORDER — OXYCODONE HYDROCHLORIDE AND ACETAMINOPHEN 5; 325 MG/1; MG/1
1 TABLET ORAL EVERY 6 HOURS PRN
Qty: 120 TABLET | Refills: 0 | Status: SHIPPED | OUTPATIENT
Start: 2022-08-29 | End: 2022-09-26 | Stop reason: SDUPTHER

## 2022-08-24 ASSESSMENT — PAIN SCALES - GENERAL: PAINLEVEL_OUTOF10: 4

## 2022-08-24 ASSESSMENT — ENCOUNTER SYMPTOMS
COUGH: 0
BACK PAIN: 1
BOWEL INCONTINENCE: 0
SHORTNESS OF BREATH: 0
CONSTIPATION: 0

## 2022-08-24 NOTE — PROGRESS NOTES
Chief Complaint   Patient presents with    Back Pain    Medication Refill         [facility]  Pt c/o low back pain for many years. There is no known injury or surgery to the area. He does have a history of scoliosis. His last PT was over 4 years ago with no benefit and he is not interested in repeating. He does do home stretches every morning. He also had a LESI in 2013 that did not help. MRI done 5/2022 shows multilevel degenerative changes facet hypertrophy L3/4 L4/5 L5/S1. No canal stenosis  He has non-invasive bladder cancer and had resection of a tumor in April 2021. He continues to follow with oncology and hematology - he continues Injectafer infusions for anemia. Pt has spot in bladder that will be biopsied this month,Pt has spot in bladder that will be biopsied this month   He follows with podiatry for foot pain and referred to NS to lizzy for lumbar radiculopathy vs peripheral neuropathy. Has not made appt yet d/t other dr visits. (colon/egd/bladder bx)      Despite of significant amount of opioid use patient continues to complain severe chronic pain issues. I have explained MME to the patient and that the CDC recommends avoiding MME over 90 with goal to be less than 50. Explained to patient that there is a higher risk for hyperalgesia, respiratory depression and accidental overdose with chronic use of high dose opioids. Assured patient we will work with them to slowly titrate to a lower dose while at the same time offering non opioid options and interventions when applicable     MED was 710 and the risks related to high doses of opiates were discussed at previous visits. MSER dose was reduced last visit. Will continue to taper by 10% each month. Patient verbalizes understanding. POC is to reduce MS Contin to 15 mg BID and increase percocet to QID    Back Pain  This is a chronic problem. The current episode started more than 1 year ago. The problem occurs constantly.  The problem has been gradually worsening since onset. The pain is present in the lumbar spine. The quality of the pain is described as aching. The pain radiates to the left foot, left thigh, left knee, right foot, right knee and right thigh. The pain is at a severity of 4/10. The symptoms are aggravated by bending, sitting and standing. Associated symptoms include numbness, tingling and weakness. Pertinent negatives include no bladder incontinence, bowel incontinence, chest pain, fever or headaches. He has tried ice and heat for the symptoms. Patient denies any new neurological symptoms. No bowel or bladder incontinence, no weakness, and no falling. Pill count: appropriate due 8/29  Oxycodone - 1  Morphine ER 30 MG - 11    Morphine equivalent: 75    Controlled Substance Monitoring:    Acute and Chronic Pain Monitoring:   RX Monitoring 8/24/2022   Attestation -   Acute Pain Prescriptions -   Periodic Controlled Substance Monitoring Possible medication side effects, risk of tolerance/dependence & alternative treatments discussed. ;No signs of potential drug abuse or diversion identified.;Obtaining appropriate analgesic effect of treatment. Chronic Pain > 50 MEDD Re-evaluated the status of the patient's underlying condition causing pain.    Chronic Pain > 80 MEDD -   Chronic Pain > 120 MEDD -            Past Medical History:   Diagnosis Date    AAA (abdominal aortic aneurysm) (HCC)     \"stable\" 3 cm on CT 2021    Anemia     Arthritis     osteoarthritis    Bladder cancer ([address] 03/2021    bladder    Chronic back pain     Chronic neck pain     Colon polyps 10/08/2019    tubular adenoma x2    COVID 01/06/2022    self reported-home test-no symptoms    COVID-19 virus infection 01/10/2022    Diabetic neuropathy (HCC)     Diarrhea     Encounter for chronic pain management     [facility] Dr. Jorge CERVANTES visit 01/03/2022    Essential hypertension 05/12/2020    managed by Dr. Ruth Heller PCP    GERD (gastroesophageal reflux disease) Health care maintenance     PCP- Dr Amita Chapa    Heartburn     Hematuria     Hepatitis B core antibody positive     History of bleeding peptic ulcer     History of blood transfusion     no reactions    History of hepatitis C     History of migraine headaches     History of stress test 07/2020    \"low risk\"    Hyperlipidemia     Iron (Fe) deficiency anemia     Neuropathy     Poor historian     states his wife takes care of all medical information    Positive FIT (fecal immunochemical test)     Type 2 diabetes mellitus with microalbuminuria, without long-term current use of insulin ([facility] [address] 07/13/2020    managed by Dr. Sara Springer last e-visit 11/2021    Under care of team 02/09/2022    pcp-Dr Xavi Hodge virtual visit 11/2021    Under care of team 02/09/2022    hematology-Dr Caprice Saavedra  virtual visit 11/2021    Under care of team 02/09/2022    urology-Dr fairchild-last visit nov 2021    Wears dentures     Wears glasses     Worsening headaches 12/29/2020       Past Surgical History:   Procedure Laterality Date    CERVICAL SPINE SURGERY  1977    cervical spine three times, has plate    CHOLECYSTECTOMY  03/22/2019    COLONOSCOPY  01/25/2015    10 yr recall, hemorrhoids    COLONOSCOPY N/A 10/08/2019    tubular adenoma x2    COLONOSCOPY N/A 06/07/2022    COLONOSCOPY DIAGNOSTIC performed by Dilip Waldron MD at [address] [place] [address] Right 04/29/2021    CYSTOSCOPY TUR BLADDER TUMOR, GYRUS, RIGHT STENT PLACEMENT AND RIGHT STENT REMOVAL performed by Ej Cedeño MD at [address] [place] [address] N/A 09/09/2021    CYSTOSCOPY, TRANSURETHRAL RESECTION BLADDER TUMOR performed by Ej Cedeño MD at [address] [place] [address] N/A 02/16/2022    CYSTOSCOPY BLADDER BIOPSY, FULGURATION performed by Ej Cedeño MD at [address] [place] [address]  06/17/2022    TUR-BT    CYSTOSCOPY N/A 6/17/2022    CYSTOSCOPY, TUR BLADDER TUMOR, GYRUS performed by Ej Cedeño MD at [address] [place] [address]  10/2015    all teeth extracted    ENDOSCOPY, COLON, DIAGNOSTIC      HERNIA REPAIR N/A 08/07/2020    HERNIA INCISIONAL REPAIR LAPAROSCOPIC ROBOTIC WITH MESH performed by Nicol Anderson DO at [facility] Right     UMBILICAL HERNIA REPAIR  3022-19    UPPER GASTROINTESTINAL ENDOSCOPY  01/25/2015    UPPER GASTROINTESTINAL ENDOSCOPY N/A 10/08/2019    EGD BIOPSY performed by Priscilla Roche MD at [address] N/A 06/07/2022    EGD BIOPSY OF DUODENUM, GE JUNCTION AND GASTRIC ANTRUM performed by Priscilla Roche MD at [address] [place] [address]  [Aspirin]       iron deficiency anemia , requiring iron infusions and transfusions    Iron      Pill- constipation, abdominal pain    Nsaids       iron deficiency anemia, history of bleeding ulcers          Current Outpatient Medications:     pregabalin (LYRICA) 150 MG capsule, Take 1 capsule by mouth in the morning and 1 capsule at noon and 1 capsule before bedtime. Do all this for 90 days. , Disp: 90 capsule, Rfl: 2    zoster recombinant adjuvanted vaccine (SHINGRIX) 50 MCG/0.5ML SUSR injection, 50 MCG IM then repeat 2-6 months., Disp: 0.5 mL, Rfl: 1    morphine (MS CONTIN) 30 MG extended release tablet, Take 1 tablet by mouth in the morning and 1 tablet before bedtime. Do all this for 30 days. 30mg in am and 45mg at night., Disp: 60 tablet, Rfl: 0    oxyCODONE-acetaminophen (PERCOCET) 5-325 MG per tablet, Take 1 tablet by mouth 2 times daily as needed for Pain for up to 30 days. , Disp: 60 tablet, Rfl: 0    vitamin D (CHOLECALCIFEROL) 50 MCG (2000 UT) TABS tablet, TAKE ONE TABLET BY MOUTH DAILY, Disp: 90 tablet, Rfl: 3    pravastatin (PRAVACHOL) 40 MG tablet, TAKE ONE TABLET BY MOUTH EVERY EVENING. STOP GEMFIBROZIL, Disp: 90 tablet, Rfl: 3    baclofen (LIORESAL) 10 MG tablet, Take 1 tablet by mouth 3 times daily as needed (back pain), Disp: 270 tablet, Rfl: 1    folic acid (FOLVITE) 1 MG tablet, Take 1 tablet 90. 00     Types: Cigarettes     Start date: 1968     Quit date: 2015     Years since quittin.7    Smokeless tobacco: Never    Tobacco comments:     on chantix 11-6-15   12-7-15   Vaping Use    Vaping Use: Never used   Substance and Sexual Activity    Alcohol use: No    Drug use: No    Sexual activity: Yes     Partners: Female   Other Topics Concern    Not on file   Social History Narrative    Not on file     Social Determinants of Health     Financial Resource Strain: High Risk    Difficulty of Paying Living Expenses: Very hard   Food Insecurity: Food Insecurity Present    Worried About Running Out of Food in the Last Year: Often true    Ran Out of Food in the Last Year: Often true   Transportation Needs: Not on file   Physical Activity: Not on file   Stress: Not on file   Social Connections: Not on file   Intimate Partner Violence: Not on file   Housing Stability: Not on file       Review of Systems:  Review of Systems   Constitutional: Negative for chills and fever. Cardiovascular:  Negative for chest pain and palpitations. Respiratory:  Negative for cough and shortness of breath. Musculoskeletal:  Positive for back pain. Gastrointestinal:  Negative for bowel incontinence and constipation. Genitourinary:  Negative for bladder incontinence. Neurological:  Positive for numbness, tingling and weakness. Negative for disturbances in coordination, headaches and loss of balance. Physical Exam:  BP (!) 156/81   Pulse 92   Temp 97.3 °F (36.3 °C)   Resp 16   Ht 5' 9\" (1.753 m)   Wt 203 lb (92.1 kg)   SpO2 95%   BMI 29.98 kg/m²     Physical Exam  HENT:      Head: Normocephalic. Pulmonary:      Effort: Pulmonary effort is normal.   Musculoskeletal:         General: Normal range of motion. Cervical back: Normal range of motion. Lumbar back: Tenderness present. Skin:     General: Skin is warm and dry.    Neurological:      Mental Status: He is alert and oriented to person, place, and time.        Record/Diagnostics Review:    Last zoe 3/2022 and was appropriate     Assessment:  Problem List Items Addressed This Visit       Degenerative disc disease, lumbar (Chronic)    Relevant Medications    morphine (MS CONTIN) 15 MG extended release tablet (Start on 8/29/2022)    oxyCODONE-acetaminophen (PERCOCET) 5-325 MG per tablet (Start on 8/29/2022)    Lumbar spondylosis (Chronic)    Relevant Medications    morphine (MS CONTIN) 15 MG extended release tablet (Start on 8/29/2022)    oxyCODONE-acetaminophen (PERCOCET) 5-325 MG per tablet (Start on 8/29/2022)    Lumbar radiculopathy (Chronic)    Relevant Medications    morphine (MS CONTIN) 15 MG extended release tablet (Start on 8/29/2022)    oxyCODONE-acetaminophen (PERCOCET) 5-325 MG per tablet (Start on 8/29/2022)    Lumbar spinal stenosis (Chronic)    Relevant Medications    morphine (MS CONTIN) 15 MG extended release tablet (Start on 8/29/2022)    oxyCODONE-acetaminophen (PERCOCET) 5-325 MG per tablet (Start on 8/29/2022)    Cervical spondylitis (HCC) (Chronic)    Relevant Medications    oxyCODONE-acetaminophen (PERCOCET) 5-325 MG per tablet (Start on 8/29/2022)    S/P cervical spinal fusion (Chronic)    Relevant Medications    oxyCODONE-acetaminophen (PERCOCET) 5-325 MG per tablet (Start on 8/29/2022)    Encounter for medication monitoring    Relevant Medications    oxyCODONE-acetaminophen (PERCOCET) 5-325 MG per tablet (Start on 8/29/2022)    Osteoarthritis of spine with radiculopathy, lumbar region - Primary    Relevant Medications    morphine (MS CONTIN) 15 MG extended release tablet (Start on 8/29/2022)    oxyCODONE-acetaminophen (PERCOCET) 5-325 MG per tablet (Start on 8/29/2022)     Other Visit Diagnoses       Encounter for chronic pain management        Relevant Medications    oxyCODONE-acetaminophen (PERCOCET) 5-325 MG per tablet (Start on 8/29/2022)    Osteoarthritis of lumbar spine, unspecified spinal osteoarthritis complication status        Relevant Medications    morphine (MS CONTIN) 15 MG extended release tablet (Start on 8/29/2022)    oxyCODONE-acetaminophen (PERCOCET) 5-325 MG per tablet (Start on 8/29/2022)               Treatment Plan:  Patient relates current medications are helping the pain. Patient reports taking pain medications as prescribed, denies obtaining medications from different sources and denies use of illegal drugs. Patient denies side effects from medications like nausea, vomiting, constipation or drowsiness. Patient reports current activities of daily living are possible due to medications and would like to continue them. As always, we encourage daily stretching and strengthening exercises, and recommend minimizing use of pain medications unless patient cannot get through daily activities due to pain. Contract requirements met. Continue opioid therapy. Script written for MSER reduced to 15 mg BID and percocet increased to QID - will continue to taper next month  Discussed physical therapy and he declines  Discussed injections and he declines  Follow up appointment made for 4 weeks    I have reviewed the chief complaint and history of present illness (including ROS and PFSH) and vital documentation by my staff and I agree with their documentation and have added where applicable.

## 2022-08-29 RX ORDER — POLYETHYLENE GLYCOL-3350 AND ELECTROLYTES 236; 6.74; 5.86; 2.97; 22.74 G/274.31G; G/274.31G; G/274.31G; G/274.31G; G/274.31G
POWDER, FOR SOLUTION ORAL
Refills: 0 | OUTPATIENT
Start: 2022-08-29

## 2022-09-19 ENCOUNTER — HOSPITAL ENCOUNTER (OUTPATIENT)
Age: 72
Discharge: HOME | End: 2022-09-19
Payer: MEDICARE

## 2022-09-19 DIAGNOSIS — Z12.5 PROSTATE CANCER SCREENING: ICD-10-CM

## 2022-09-19 LAB
ANION GAP SERPL CALCULATED.3IONS-SCNC: 11 MMOL/L (ref 9–17)
BUN BLDV-MCNC: 16 MG/DL (ref 8–23)
CALCIUM SERPL-MCNC: 9.4 MG/DL (ref 8.6–10.4)
CHLORIDE BLD-SCNC: 102 MMOL/L (ref 98–107)
CO2: 26 MMOL/L (ref 20–31)
CREAT SERPL-MCNC: 0.69 MG/DL (ref 0.7–1.2)
CREATININE URINE: 107.1 MG/DL (ref 39–259)
FERRITIN: 9 NG/ML (ref 30–400)
GFR AFRICAN AMERICAN: >60 ML/MIN
GFR NON-AFRICAN AMERICAN: >60 ML/MIN
GFR SERPL CREATININE-BSD FRML MDRD: ABNORMAL ML/MIN/{1.73_M2}
GLUCOSE BLD-MCNC: 242 MG/DL (ref 70–99)
IRON SATURATION: 5 % (ref 20–55)
IRON: 19 UG/DL (ref 59–158)
MICROALBUMIN/CREAT 24H UR: 118 MG/L
MICROALBUMIN/CREAT UR-RTO: 110 MCG/MG CREAT
POTASSIUM SERPL-SCNC: 4.6 MMOL/L (ref 3.7–5.3)
PROSTATE SPECIFIC ANTIGEN: 1.01 NG/ML
SODIUM BLD-SCNC: 139 MMOL/L (ref 135–144)
TOTAL IRON BINDING CAPACITY: 389 UG/DL (ref 250–450)
UNSATURATED IRON BINDING CAPACITY: 370 UG/DL (ref 112–347)

## 2022-09-19 PROCEDURE — 82043 UR ALBUMIN QUANTITATIVE: CPT

## 2022-09-19 PROCEDURE — 36415 COLL VENOUS BLD VENIPUNCTURE: CPT

## 2022-09-19 PROCEDURE — 82570 ASSAY OF URINE CREATININE: CPT

## 2022-09-19 PROCEDURE — 80048 BASIC METABOLIC PNL TOTAL CA: CPT

## 2022-09-19 PROCEDURE — 83550 IRON BINDING TEST: CPT

## 2022-09-19 PROCEDURE — 82728 ASSAY OF FERRITIN: CPT

## 2022-09-19 PROCEDURE — 84153 ASSAY OF PSA TOTAL: CPT

## 2022-09-19 PROCEDURE — 83540 ASSAY OF IRON: CPT

## 2022-09-20 ENCOUNTER — TELEPHONE (OUTPATIENT)
Dept: FAMILY MEDICINE CLINIC | Age: 72
End: 2022-09-20

## 2022-09-20 NOTE — TELEPHONE ENCOUNTER
Pts wife called in and wants to know why anything was mentioned about his lisinopril when they never asked about it before with all the other medications he is on why was that one singled out. Writer offered to send a message back to provider to inquire and caller declined states they will discuss at the next appt.

## 2022-09-26 ENCOUNTER — HOSPITAL ENCOUNTER (OUTPATIENT)
Dept: PAIN MANAGEMENT | Age: 72
Discharge: HOME | End: 2022-09-26
Payer: MEDICARE

## 2022-09-26 ENCOUNTER — PROCEDURE VISIT (OUTPATIENT)
Dept: UROLOGY | Age: 72
End: 2022-09-26
Payer: MEDICARE

## 2022-09-26 VITALS — HEIGHT: 69 IN | TEMPERATURE: 98 F | BODY MASS INDEX: 30.07 KG/M2 | WEIGHT: 203 LBS

## 2022-09-26 VITALS — RESPIRATION RATE: 16 BRPM | HEART RATE: 57 BPM | OXYGEN SATURATION: 96 %

## 2022-09-26 DIAGNOSIS — M48.061 SPINAL STENOSIS OF LUMBAR REGION, UNSPECIFIED WHETHER NEUROGENIC CLAUDICATION PRESENT: Chronic | ICD-10-CM

## 2022-09-26 DIAGNOSIS — M51.36 DEGENERATIVE DISC DISEASE, LUMBAR: Chronic | ICD-10-CM

## 2022-09-26 DIAGNOSIS — G89.29 ENCOUNTER FOR CHRONIC PAIN MANAGEMENT: ICD-10-CM

## 2022-09-26 DIAGNOSIS — Z51.81 ENCOUNTER FOR MEDICATION MONITORING: ICD-10-CM

## 2022-09-26 DIAGNOSIS — M47.816 OSTEOARTHRITIS OF LUMBAR SPINE, UNSPECIFIED SPINAL OSTEOARTHRITIS COMPLICATION STATUS: ICD-10-CM

## 2022-09-26 DIAGNOSIS — M46.92 CERVICAL SPONDYLITIS (HCC): Chronic | ICD-10-CM

## 2022-09-26 DIAGNOSIS — M47.816 LUMBAR SPONDYLOSIS: Chronic | ICD-10-CM

## 2022-09-26 DIAGNOSIS — Z98.1 S/P CERVICAL SPINAL FUSION: Chronic | ICD-10-CM

## 2022-09-26 DIAGNOSIS — C67.2 MALIGNANT NEOPLASM OF LATERAL WALL OF URINARY BLADDER (HCC): Primary | ICD-10-CM

## 2022-09-26 DIAGNOSIS — M47.26 OSTEOARTHRITIS OF SPINE WITH RADICULOPATHY, LUMBAR REGION: ICD-10-CM

## 2022-09-26 DIAGNOSIS — M54.16 LUMBAR RADICULOPATHY: Chronic | ICD-10-CM

## 2022-09-26 DIAGNOSIS — M48.061 SPINAL STENOSIS OF LUMBAR REGION WITHOUT NEUROGENIC CLAUDICATION: Chronic | ICD-10-CM

## 2022-09-26 PROCEDURE — 99213 OFFICE O/P EST LOW 20 MIN: CPT | Performed by: NURSE PRACTITIONER

## 2022-09-26 PROCEDURE — 52000 CYSTOURETHROSCOPY: CPT | Performed by: UROLOGY

## 2022-09-26 PROCEDURE — 99213 OFFICE O/P EST LOW 20 MIN: CPT

## 2022-09-26 RX ORDER — OXYCODONE HYDROCHLORIDE AND ACETAMINOPHEN 5; 325 MG/1; MG/1
1 TABLET ORAL EVERY 8 HOURS PRN
Qty: 90 TABLET | Refills: 0 | Status: SHIPPED | OUTPATIENT
Start: 2022-09-28 | End: 2022-10-27

## 2022-09-26 RX ORDER — MORPHINE SULFATE 15 MG/1
15 TABLET, FILM COATED, EXTENDED RELEASE ORAL 2 TIMES DAILY
Qty: 60 TABLET | Refills: 0 | Status: SHIPPED | OUTPATIENT
Start: 2022-09-28 | End: 2022-10-26 | Stop reason: SDUPTHER

## 2022-09-26 SDOH — HEALTH STABILITY: PHYSICAL HEALTH: ON AVERAGE, HOW MANY MINUTES DO YOU ENGAGE IN EXERCISE AT THIS LEVEL?: 40 MIN

## 2022-09-26 SDOH — HEALTH STABILITY: PHYSICAL HEALTH: ON AVERAGE, HOW MANY DAYS PER WEEK DO YOU ENGAGE IN MODERATE TO STRENUOUS EXERCISE (LIKE A BRISK WALK)?: 2 DAYS

## 2022-09-26 ASSESSMENT — ENCOUNTER SYMPTOMS
SHORTNESS OF BREATH: 0
COUGH: 0
BACK PAIN: 1
CONSTIPATION: 0

## 2022-09-26 ASSESSMENT — PATIENT HEALTH QUESTIONNAIRE - PHQ9
2. FEELING DOWN, DEPRESSED OR HOPELESS: 0
SUM OF ALL RESPONSES TO PHQ QUESTIONS 1-9: 0
SUM OF ALL RESPONSES TO PHQ9 QUESTIONS 1 & 2: 0
SUM OF ALL RESPONSES TO PHQ QUESTIONS 1-9: 0
SUM OF ALL RESPONSES TO PHQ QUESTIONS 1-9: 0
1. LITTLE INTEREST OR PLEASURE IN DOING THINGS: 0
SUM OF ALL RESPONSES TO PHQ QUESTIONS 1-9: 0

## 2022-09-26 ASSESSMENT — LIFESTYLE VARIABLES
HOW OFTEN DO YOU HAVE SIX OR MORE DRINKS ON ONE OCCASION: 1
HOW MANY STANDARD DRINKS CONTAINING ALCOHOL DO YOU HAVE ON A TYPICAL DAY: 0
HOW OFTEN DO YOU HAVE A DRINK CONTAINING ALCOHOL: 1
HOW OFTEN DO YOU HAVE A DRINK CONTAINING ALCOHOL: NEVER
HOW MANY STANDARD DRINKS CONTAINING ALCOHOL DO YOU HAVE ON A TYPICAL DAY: PATIENT DOES NOT DRINK

## 2022-09-26 NOTE — PROGRESS NOTES
Cystoscopy Operative Note (9/26/22)  Surgeon: Frannie Pedersen MD  Anesthesia: Urethral 2% Xylocaine   Indications: Bladder Cancer  Position: Dorsal Lithotomy    Findings:   Risks and Benefits discussed with patient prior to procedure. The patient was prepped and draped in the usual sterile fashion. The flexible cystoscope was advanced through the urethra and into the bladder. The bladder was thoroughly inspected and the following was noted:    Residual Urine: none  Urethra: normal appearing urethra with no masses, tenderness or lesions  Prostate: partially obstructing lateral lobes of prostate; median lobe present? no.   Bladder: multifocal small bladder tumors. No bladder diverticulum. There was none trabeculation noted. Ureters: Clear efflux from both ureters. Orifices with normal configuration and location. The cystoscope was removed. The patient tolerated the procedure well. Agree with the ROS entered by the MA.     Cysto bladder biopsy and fulguration mac

## 2022-09-26 NOTE — PROGRESS NOTES
Chief Complaint: back pain    PMH  Pt c/o low back pain for many years. There is no known injury or surgery to the area. He does have a history of scoliosis. His last PT was over 4 years ago with no benefit and he is not interested in repeating. He does do home stretches every morning. He also had a LESI in 2013 that did not help. MRI done 5/2022 shows multilevel degenerative changes facet hypertrophy L3/4 L4/5 L5/S1. No canal stenosis  He has non-invasive bladder cancer and had resection of a tumor in April 2021. He continues to follow with oncology and hematology - he continues Injectafer infusions for anemia. Pt has spot in bladder that will be biopsied this month,Pt has spot in bladder that will be biopsied this month   He follows with podiatry for foot pain and referred to NS to [facility]zy for lumbar radiculopathy vs peripheral neuropathy. Has not made appt yet d/t other dr visits. (colon/egd/bladder bx)      Despite of significant amount of opioid use patient continues to complain severe chronic pain issues. I have explained MME to the patient and that the CDC recommends avoiding MME over 90 with goal to be less than 50. Explained to patient that there is a higher risk for hyperalgesia, respiratory depression and accidental overdose with chronic use of high dose opioids. Assured patient we will work with them to slowly titrate to a lower dose while at the same time offering non opioid options and interventions when applicable     MED was 109 and the risks related to high doses of opiates were discussed at previous visits. MSER dose was reduced last visit. Will continue to taper by 10% each month. Patient verbalizes understanding. Current MED is 60         Back Pain  This is a chronic problem. The current episode started more than 1 year ago. The problem is unchanged. The pain is present in the lumbar spine. The quality of the pain is described as aching. Radiates to: into buttocks.  The pain is at a severity of 3/10. The pain is mild. The symptoms are aggravated by position, sitting and lying down. Pertinent negatives include no chest pain or fever. He has tried analgesics and bed rest for the symptoms. The treatment provided mild relief. Patient denies any new neurological symptoms. No bowel or bladder incontinence, no weakness, and no falling. Pill count: appropriate due 9/28    Morphine equivalent: 60    Periodic Controlled Substance Monitoring: Possible medication side effects, risk of tolerance/dependence & alternative treatments discussed., No signs of potential drug abuse or diversion identified., Obtaining appropriate analgesic effect of treatment.  Mortimer Carney, APRN - CNP)      Past Medical History:   Diagnosis Date    AAA (abdominal aortic aneurysm) ([facility] [address]     \"stable\" 3 cm on CT 2021    Anemia     Arthritis     osteoarthritis    Bladder cancer ([facility] [address] 03/2021    bladder    Chronic back pain     Chronic neck pain     Colon polyps 10/08/2019    tubular adenoma x2    COVID 01/06/2022    self reported-home test-no symptoms    COVID-19 virus infection 01/10/2022    Diabetic neuropathy (HCC)     Diarrhea     Encounter for chronic pain management     [facility] pain management [facility] Dr. Jefferson Baugh E visit 01/03/2022    Essential hypertension 05/12/2020    managed by Dr. Markell Bennett PCP    GERD (gastroesophageal reflux disease)     Health care maintenance     PCP- Dr Ernestina Guillaume    Heartburn     Hematuria     Hepatitis B core antibody positive     History of bleeding peptic ulcer     History of blood transfusion     no reactions    History of hepatitis C     History of migraine headaches     History of stress test 07/2020    \"low risk\"    Hyperlipidemia     Iron (Fe) deficiency anemia     Neuropathy     Poor historian     states his wife takes care of all medical information    Positive FIT (fecal immunochemical test)     Type 2 diabetes mellitus with microalbuminuria, without long-term current use of insulin ([facility] [address] 07/13/2020    managed by Dr. Sara Springer last e-visit 11/2021    Under care of team 02/09/2022    pcp-Dr Xavi Hodge virtual visit 11/2021    Under care of team 02/09/2022    hematology-Dr Caprice Saavedra 34 virtual visit 11/2021    Under care of team 02/09/2022    urology-Dr fairchild-last visit nov 2021    Wears dentures     Wears glasses     Worsening headaches 12/29/2020       Past Surgical History:   Procedure Laterality Date    CERVICAL SPINE SURGERY  1977    cervical spine three times, has plate    CHOLECYSTECTOMY  03/22/2019    COLONOSCOPY  01/25/2015    10 yr recall, hemorrhoids    COLONOSCOPY N/A 10/08/2019    tubular adenoma x2    COLONOSCOPY N/A 06/07/2022    COLONOSCOPY DIAGNOSTIC performed by Dilip Waldron MD at [facility] 8 Right 04/29/2021    CYSTOSCOPY TUR BLADDER TUMOR, GYRUS, RIGHT STENT PLACEMENT AND RIGHT STENT REMOVAL performed by Ej Cedeño MD at [address] [place] [address] N/A 09/09/2021    CYSTOSCOPY, TRANSURETHRAL RESECTION BLADDER TUMOR performed by Ej Cedeño MD at [address] [place] [address] N/A 02/16/2022    CYSTOSCOPY BLADDER BIOPSY, FULGURATION performed by Ej Cedeño MD at [address] [place] [address]  06/17/2022    TUR-BT    CYSTOSCOPY N/A 6/17/2022    CYSTOSCOPY, TUR BLADDER TUMOR, GYRUS performed by Ej Cedeño MD at [facility] [address]  10/2015    all teeth extracted    ENDOSCOPY, COLON, DIAGNOSTIC      HERNIA REPAIR N/A 08/07/2020    HERNIA INCISIONAL REPAIR LAPAROSCOPIC ROBOTIC WITH MESH performed by Christen Osman DO at [address] [facility]  [address]22-    UPPER GASTROINTESTINAL ENDOSCOPY  01/25/2015    UPPER GASTROINTESTINAL ENDOSCOPY N/A 10/08/2019    EGD BIOPSY performed by Dilip Waldron MD at [address] [place] [address] N/A 06/07/2022    EGD BIOPSY OF DUODENUM, GE JUNCTION AND GASTRIC ANTRUM performed by Dilip Waldron MD at [place] [phone] insurance., Disp: 100 strip, Rfl: 3    Lancets 30G MISC, Testing once a day.   Please dispense according to patients insurance., Disp: 100 each, Rfl: 3    lisinopril (PRINIVIL;ZESTRIL) 10 MG tablet, Take 1 tablet by mouth daily ** stop Lisinopril HCTZ**, Disp: 90 tablet, Rfl: 3    fluticasone (FLONASE) 50 MCG/ACT nasal spray, 2 sprays by Nasal route daily, Disp: 16 g, Rfl: 0    Alcohol Swabs (B-D SINGLE USE SWABS REGULAR) PADS, USE TO CHECK BLOOD SUGAR TWICE A DAY, Disp: 200 each, Rfl: 3    Syringe/Needle, Disp, (SYRINGE 3CC/25GX1\") 25G X 1\" 3 ML MISC, To be used with B12 injections, Disp: 50 each, Rfl: 2    loperamide (RA ANTI-DIARRHEAL) 2 MG capsule, Take 1 capsule by mouth 4 times daily as needed for Diarrhea, Disp: 112 capsule, Rfl: 2    Probiotic Product (PROBIOTIC-10 PO), Take by mouth daily , Disp: , Rfl:     Family History   Problem Relation Age of Onset    Diabetes Mother     Heart Disease Father     High Blood Pressure Father        Social History     Socioeconomic History    Marital status:      Spouse name: Not on file    Number of children: Not on file    Years of education: Not on file    Highest education level: Not on file   Occupational History    Occupation: disabled   Tobacco Use    Smoking status: Former     Packs/day: 2.00     Years: 45.00     Pack years: 90.00     Types: Cigarettes     Start date: 1968     Quit date: 2015     Years since quittin.8    Smokeless tobacco: Never    Tobacco comments:     on chantix 11-6-15   12-7-15   Vaping Use    Vaping Use: Never used   Substance and Sexual Activity    Alcohol use: No    Drug use: No    Sexual activity: Yes     Partners: Female   Other Topics Concern    Not on file   Social History Narrative    Not on file     Social Determinants of Health     Financial Resource Strain: Not on file   Food Insecurity: Not on file   Transportation Needs: Not on file   Physical Activity: Not on file   Stress: Not on file   Social Connections: Not on file   Intimate Partner Violence: Not on file   Housing Stability: Not on file       Review of Systems:  Review of Systems   Constitutional: Negative for chills and fever. Cardiovascular:  Negative for chest pain and palpitations. Respiratory:  Negative for cough and shortness of breath. Musculoskeletal:  Positive for back pain. Gastrointestinal:  Negative for constipation. Neurological:  Negative for disturbances in coordination and loss of balance. Physical Exam:  Pulse 57   Resp 16   SpO2 96%     Physical Exam  HENT:      Head: Normocephalic. Pulmonary:      Effort: Pulmonary effort is normal.   Musculoskeletal:         General: Normal range of motion. Cervical back: Normal range of motion. Lumbar back: Tenderness present. Skin:     General: Skin is warm and dry. Neurological:      Mental Status: He is alert and oriented to person, place, and time.        Record/Diagnostics Review:    Last zoe 3/2022 and was appropriate     Assessment:  Problem List Items Addressed This Visit       Degenerative disc disease, lumbar (Chronic)    Relevant Medications    morphine (MS CONTIN) 15 MG extended release tablet (Start on 9/28/2022)    oxyCODONE-acetaminophen (PERCOCET) 5-325 MG per tablet (Start on 9/28/2022)    Lumbar spondylosis (Chronic)    Relevant Medications    morphine (MS CONTIN) 15 MG extended release tablet (Start on 9/28/2022)    oxyCODONE-acetaminophen (PERCOCET) 5-325 MG per tablet (Start on 9/28/2022)    Lumbar radiculopathy (Chronic)    Relevant Medications    morphine (MS CONTIN) 15 MG extended release tablet (Start on 9/28/2022)    oxyCODONE-acetaminophen (PERCOCET) 5-325 MG per tablet (Start on 9/28/2022)    Lumbar spinal stenosis (Chronic)    Relevant Medications    morphine (MS CONTIN) 15 MG extended release tablet (Start on 9/28/2022)    oxyCODONE-acetaminophen (PERCOCET) 5-325 MG per tablet (Start on 9/28/2022)    Cervical spondylitis (HCC) (Chronic)    Relevant Medications    oxyCODONE-acetaminophen (PERCOCET) 5-325 MG per tablet (Start on 9/28/2022)    S/P cervical spinal fusion (Chronic)    Relevant Medications    oxyCODONE-acetaminophen (PERCOCET) 5-325 MG per tablet (Start on 9/28/2022)    Encounter for medication monitoring    Relevant Medications    oxyCODONE-acetaminophen (PERCOCET) 5-325 MG per tablet (Start on 9/28/2022)    Osteoarthritis of spine with radiculopathy, lumbar region    Relevant Medications    morphine (MS CONTIN) 15 MG extended release tablet (Start on 9/28/2022)    oxyCODONE-acetaminophen (PERCOCET) 5-325 MG per tablet (Start on 9/28/2022)     Other Visit Diagnoses       Encounter for chronic pain management        Relevant Medications    oxyCODONE-acetaminophen (PERCOCET) 5-325 MG per tablet (Start on 9/28/2022)    Osteoarthritis of lumbar spine, unspecified spinal osteoarthritis complication status        Relevant Medications    morphine (MS CONTIN) 15 MG extended release tablet (Start on 9/28/2022)    oxyCODONE-acetaminophen (PERCOCET) 5-325 MG per tablet (Start on 9/28/2022)               Treatment Plan:  Patient relates current medications are helping the pain. Patient reports taking pain medications as prescribed, denies obtaining medications from different sources and denies use of illegal drugs. Patient denies side effects from medications like nausea, vomiting, constipation or drowsiness. Patient reports current activities of daily living are possible due to medications and would like to continue them. As always, we encourage daily stretching and strengthening exercises, and recommend minimizing use of pain medications unless patient cannot get through daily activities due to pain. Contract requirements met. Continue opioid therapy.  Script written for MSER and percocet - will reduce dose of percocet to TID this month  Follow up appointment made for 4 weeks    I have reviewed the chief complaint and history of present illness (including ROS and PFSH) and vital documentation by my staff and I agree with their documentation and have added where applicable.

## 2022-09-27 ENCOUNTER — HOSPITAL ENCOUNTER (OUTPATIENT)
Age: 72
Discharge: HOME | End: 2022-09-27
Payer: MEDICARE

## 2022-09-27 ENCOUNTER — OFFICE VISIT (OUTPATIENT)
Dept: FAMILY MEDICINE CLINIC | Age: 72
End: 2022-09-27
Payer: MEDICARE

## 2022-09-27 VITALS
HEART RATE: 60 BPM | HEIGHT: 69 IN | SYSTOLIC BLOOD PRESSURE: 138 MMHG | WEIGHT: 218 LBS | TEMPERATURE: 97.3 F | OXYGEN SATURATION: 98 % | BODY MASS INDEX: 32.29 KG/M2 | DIASTOLIC BLOOD PRESSURE: 80 MMHG

## 2022-09-27 DIAGNOSIS — I49.49 EXTRASYSTOLES: ICD-10-CM

## 2022-09-27 DIAGNOSIS — E11.65 TYPE 2 DIABETES MELLITUS WITH HYPERGLYCEMIA, WITHOUT LONG-TERM CURRENT USE OF INSULIN (HCC): ICD-10-CM

## 2022-09-27 DIAGNOSIS — Z00.00 MEDICARE ANNUAL WELLNESS VISIT, SUBSEQUENT: Primary | ICD-10-CM

## 2022-09-27 DIAGNOSIS — Z23 ENCOUNTER FOR IMMUNIZATION: ICD-10-CM

## 2022-09-27 DIAGNOSIS — D50.8 OTHER IRON DEFICIENCY ANEMIA: ICD-10-CM

## 2022-09-27 DIAGNOSIS — E55.9 VITAMIN D DEFICIENCY: ICD-10-CM

## 2022-09-27 DIAGNOSIS — G62.9 PERIPHERAL POLYNEUROPATHY: ICD-10-CM

## 2022-09-27 LAB
ABSOLUTE EOS #: 0.08 K/UL (ref 0–0.4)
ABSOLUTE LYMPH #: 1.78 K/UL (ref 1–4.8)
ABSOLUTE MONO #: 0.41 K/UL (ref 0.1–1.3)
ALBUMIN SERPL-MCNC: 4.2 G/DL (ref 3.5–5.2)
ALP BLD-CCNC: 95 U/L (ref 40–129)
ALT SERPL-CCNC: 18 U/L (ref 5–41)
ANION GAP SERPL CALCULATED.3IONS-SCNC: 14 MMOL/L (ref 9–17)
AST SERPL-CCNC: 16 U/L
BASOPHILS # BLD: 1 % (ref 0–2)
BASOPHILS ABSOLUTE: 0.08 K/UL (ref 0–0.2)
BILIRUB SERPL-MCNC: 0.5 MG/DL (ref 0.3–1.2)
BUN BLDV-MCNC: 17 MG/DL (ref 8–23)
CALCIUM SERPL-MCNC: 9.4 MG/DL (ref 8.6–10.4)
CHLORIDE BLD-SCNC: 100 MMOL/L (ref 98–107)
CO2: 22 MMOL/L (ref 20–31)
CREAT SERPL-MCNC: 0.69 MG/DL (ref 0.7–1.2)
EOSINOPHILS RELATIVE PERCENT: 1 % (ref 0–4)
GFR AFRICAN AMERICAN: >60 ML/MIN
GFR NON-AFRICAN AMERICAN: >60 ML/MIN
GFR SERPL CREATININE-BSD FRML MDRD: ABNORMAL ML/MIN/{1.73_M2}
GLUCOSE BLD-MCNC: 235 MG/DL (ref 70–99)
HCT VFR BLD CALC: 36.9 % (ref 41–53)
HEMOGLOBIN: 11.8 G/DL (ref 13.5–17.5)
IRON SATURATION: 10 % (ref 20–55)
IRON: 42 UG/DL (ref 59–158)
LYMPHOCYTES # BLD: 22 % (ref 24–44)
MCH RBC QN AUTO: 24.4 PG (ref 26–34)
MCHC RBC AUTO-ENTMCNC: 31.9 G/DL (ref 31–37)
MCV RBC AUTO: 76.4 FL (ref 80–100)
MONOCYTES # BLD: 5 % (ref 1–7)
MORPHOLOGY: ABNORMAL
PDW BLD-RTO: 15.5 % (ref 11.5–14.9)
PLATELET # BLD: 251 K/UL (ref 150–450)
PMV BLD AUTO: 7.6 FL (ref 6–12)
POTASSIUM SERPL-SCNC: 4.5 MMOL/L (ref 3.7–5.3)
RBC # BLD: 4.83 M/UL (ref 4.5–5.9)
SEG NEUTROPHILS: 71 % (ref 36–66)
SEGMENTED NEUTROPHILS ABSOLUTE COUNT: 5.75 K/UL (ref 1.3–9.1)
SODIUM BLD-SCNC: 136 MMOL/L (ref 135–144)
TOTAL IRON BINDING CAPACITY: 414 UG/DL (ref 250–450)
TOTAL PROTEIN: 7 G/DL (ref 6.4–8.3)
TSH SERPL DL<=0.05 MIU/L-ACNC: 1.1 UIU/ML (ref 0.3–5)
UNSATURATED IRON BINDING CAPACITY: 372 UG/DL (ref 112–347)
VITAMIN D 25-HYDROXY: 18.1 NG/ML
WBC # BLD: 8.1 K/UL (ref 3.5–11)

## 2022-09-27 PROCEDURE — 1036F TOBACCO NON-USER: CPT | Performed by: FAMILY MEDICINE

## 2022-09-27 PROCEDURE — 3017F COLORECTAL CA SCREEN DOC REV: CPT | Performed by: FAMILY MEDICINE

## 2022-09-27 PROCEDURE — 84156 ASSAY OF PROTEIN URINE: CPT

## 2022-09-27 PROCEDURE — 83550 IRON BINDING TEST: CPT

## 2022-09-27 PROCEDURE — 84443 ASSAY THYROID STIM HORMONE: CPT

## 2022-09-27 PROCEDURE — 82306 VITAMIN D 25 HYDROXY: CPT

## 2022-09-27 PROCEDURE — G8427 DOCREV CUR MEDS BY ELIG CLIN: HCPCS | Performed by: FAMILY MEDICINE

## 2022-09-27 PROCEDURE — 36415 COLL VENOUS BLD VENIPUNCTURE: CPT

## 2022-09-27 PROCEDURE — G8417 CALC BMI ABV UP PARAM F/U: HCPCS | Performed by: FAMILY MEDICINE

## 2022-09-27 PROCEDURE — 3044F HG A1C LEVEL LT 7.0%: CPT | Performed by: FAMILY MEDICINE

## 2022-09-27 PROCEDURE — 85025 COMPLETE CBC W/AUTO DIFF WBC: CPT

## 2022-09-27 PROCEDURE — 84166 PROTEIN E-PHORESIS/URINE/CSF: CPT

## 2022-09-27 PROCEDURE — 83540 ASSAY OF IRON: CPT

## 2022-09-27 PROCEDURE — 84165 PROTEIN E-PHORESIS SERUM: CPT

## 2022-09-27 PROCEDURE — 90694 VACC AIIV4 NO PRSRV 0.5ML IM: CPT | Performed by: FAMILY MEDICINE

## 2022-09-27 PROCEDURE — 2022F DILAT RTA XM EVC RTNOPTHY: CPT | Performed by: FAMILY MEDICINE

## 2022-09-27 PROCEDURE — G0008 ADMIN INFLUENZA VIRUS VAC: HCPCS | Performed by: FAMILY MEDICINE

## 2022-09-27 PROCEDURE — 84155 ASSAY OF PROTEIN SERUM: CPT

## 2022-09-27 PROCEDURE — G0439 PPPS, SUBSEQ VISIT: HCPCS | Performed by: FAMILY MEDICINE

## 2022-09-27 PROCEDURE — 80053 COMPREHEN METABOLIC PANEL: CPT

## 2022-09-27 PROCEDURE — 1123F ACP DISCUSS/DSCN MKR DOCD: CPT | Performed by: FAMILY MEDICINE

## 2022-09-27 PROCEDURE — 99214 OFFICE O/P EST MOD 30 MIN: CPT | Performed by: FAMILY MEDICINE

## 2022-09-27 RX ORDER — NORTRIPTYLINE HYDROCHLORIDE 10 MG/1
10 CAPSULE ORAL NIGHTLY
Qty: 30 CAPSULE | Refills: 3 | Status: SHIPPED | OUTPATIENT
Start: 2022-09-27

## 2022-09-27 SDOH — ECONOMIC STABILITY: INCOME INSECURITY: IN THE LAST 12 MONTHS, WAS THERE A TIME WHEN YOU WERE NOT ABLE TO PAY THE MORTGAGE OR RENT ON TIME?: NO

## 2022-09-27 SDOH — ECONOMIC STABILITY: TRANSPORTATION INSECURITY
IN THE PAST 12 MONTHS, HAS LACK OF TRANSPORTATION KEPT YOU FROM MEETINGS, WORK, OR FROM GETTING THINGS NEEDED FOR DAILY LIVING?: NO

## 2022-09-27 SDOH — ECONOMIC STABILITY: FOOD INSECURITY: WITHIN THE PAST 12 MONTHS, YOU WORRIED THAT YOUR FOOD WOULD RUN OUT BEFORE YOU GOT MONEY TO BUY MORE.: NEVER TRUE

## 2022-09-27 SDOH — ECONOMIC STABILITY: HOUSING INSECURITY
IN THE LAST 12 MONTHS, WAS THERE A TIME WHEN YOU DID NOT HAVE A STEADY PLACE TO SLEEP OR SLEPT IN A SHELTER (INCLUDING NOW)?: NO

## 2022-09-27 SDOH — ECONOMIC STABILITY: TRANSPORTATION INSECURITY
IN THE PAST 12 MONTHS, HAS THE LACK OF TRANSPORTATION KEPT YOU FROM MEDICAL APPOINTMENTS OR FROM GETTING MEDICATIONS?: NO

## 2022-09-27 SDOH — ECONOMIC STABILITY: FOOD INSECURITY: WITHIN THE PAST 12 MONTHS, THE FOOD YOU BOUGHT JUST DIDN'T LAST AND YOU DIDN'T HAVE MONEY TO GET MORE.: NEVER TRUE

## 2022-09-27 ASSESSMENT — ENCOUNTER SYMPTOMS
WHEEZING: 0
VOMITING: 0
BACK PAIN: 1
COUGH: 0
ABDOMINAL PAIN: 0
CHEST TIGHTNESS: 0
CONSTIPATION: 0
ABDOMINAL DISTENTION: 0
SHORTNESS OF BREATH: 0
NAUSEA: 0

## 2022-09-27 ASSESSMENT — VISUAL ACUITY
OD_CC: 20/50
OS_CC: 20/25

## 2022-09-27 ASSESSMENT — SOCIAL DETERMINANTS OF HEALTH (SDOH): HOW HARD IS IT FOR YOU TO PAY FOR THE VERY BASICS LIKE FOOD, HOUSING, MEDICAL CARE, AND HEATING?: NOT HARD AT ALL

## 2022-09-27 NOTE — PATIENT INSTRUCTIONS
Personalized Preventive Plan for Irma Huynh - 9/27/2022  Medicare offers a range of preventive health benefits. Some of the tests and screenings are paid in full while other may be subject to a deductible, co-insurance, and/or copay. Some of these benefits include a comprehensive review of your medical history including lifestyle, illnesses that may run in your family, and various assessments and screenings as appropriate. After reviewing your medical record and screening and assessments performed today your provider may have ordered immunizations, labs, imaging, and/or referrals for you. A list of these orders (if applicable) as well as your Preventive Care list are included within your After Visit Summary for your review. Other Preventive Recommendations:    A preventive eye exam performed by an eye specialist is recommended every 1-2 years to screen for glaucoma; cataracts, macular degeneration, and other eye disorders. A preventive dental visit is recommended every 6 months. Try to get at least 150 minutes of exercise per week or 10,000 steps per day on a pedometer . Order or download the FREE \"Exercise & Physical Activity: Your Everyday Guide\" from [employer]. Call 3-784.255.5223 or search [employer] online. You need 7992-4834 mg of calcium and 2251-9705 IU of vitamin D per day. It is possible to meet your calcium requirement with diet alone, but a vitamin D supplement is usually necessary to meet this goal.  When exposed to the sun, use a sunscreen that protects against both UVA and UVB radiation with an SPF of 30 or greater. Reapply every 2 to 3 hours or after sweating, drying off with a towel, or swimming. Always wear a seat belt when traveling in a car. Always wear a helmet when riding a bicycle or motorcycle. Heart-Healthy Diet   Sodium, Fat, and Cholesterol Controlled Diet       What Is a Heart Healthy Diet?    A heart-healthy diet is one that limits sodium , certain types of fat , and cholesterol . This type of diet is recommended for:   People with any form of cardiovascular disease (eg, coronary heart disease , peripheral vascular disease , previous heart attack , previous stroke )   People with risk factors for cardiovascular disease, such as high blood pressure , high cholesterol , or diabetes   Anyone who wants to lower their risk of developing cardiovascular disease   Sodium    Sodium is a mineral found in many foods. In general, most people consume much more sodium than they need. Diets high in sodium can increase blood pressure and lead to edema (water retention). On a heart-healthy diet, you should consume no more than 2,300 mg (milligrams) of sodium per dayabout the amount in one teaspoon of table salt. The foods highest in sodium include table salt (about 50% sodium), processed foods, convenience foods, and preserved foods. Cholesterol    Cholesterol is a fat-like, waxy substance in your blood. Our bodies make some cholesterol. It is also found in animal products, with the highest amounts in fatty meat, egg yolks, whole milk, cheese, shellfish, and organ meats. On a heart-healthy diet, you should limit your cholesterol intake to less than 200 mg per day. It is normal and important to have some cholesterol in your bloodstream. But too much cholesterol can cause plaque to build up within your arteries, which can eventually lead to a heart attack or stroke. The two types of cholesterol that are most commonly referred to are:   Low-density lipoprotein (LDL) cholesterol  Also known as bad cholesterol, this is the cholesterol that tends to build up along your arteries. Bad cholesterol levels are increased by eating fats that are saturated or hydrogenated. Optimal level of this cholesterol is less than 100. Over 130 starts to get risky for heart disease.    High-density lipoprotein (HDL) cholesterol  Also known as good cholesterol, this type of cholesterol actually carries cholesterol away from your arteries and may, therefore, help lower your risk of having a heart attack. You want this level to be high (ideally greater than 60). It is a risk to have a level less than 40. You can raise this good cholesterol by eating olive oil, canola oil, avocados, or nuts. Exercise raises this level, too. Fat    Fat is calorie dense and packs a lot of calories into a small amount of food. Even though fats should be limited due to their high calorie content, not all fats are bad. In fact, some fats are quite healthful. Fat can be broken down into four main types. The good-for-you fats are:   Monounsaturated fat  found in oils such as olive and canola, avocados, and nuts and natural nut butters; can decrease cholesterol levels, while keeping levels of HDL cholesterol high   Polyunsaturated fat  found in oils such as safflower, sunflower, soybean, corn, and sesame; can decrease total cholesterol and LDL cholesterol   Omega-3 fatty acids  particularly those found in fatty fish (such as salmon, trout, tuna, mackerel, herring, and sardines); can decrease risk of arrhythmias, decrease triglyceride levels, and slightly lower blood pressure   The fats that you want to limit are:   Saturated fat  found in animal products, many fast foods, and a few vegetables; increases total blood cholesterol, including LDL levels   Animal fats that are saturated include: butter, lard, whole-milk dairy products, meat fat, and poultry skin   Vegetable fats that are saturated include: hydrogenated shortening, palm oil, coconut oil, cocoa butter   Hydrogenated or trans fat  found in margarine and vegetable shortening, most shelf stable snack foods, and fried foods; increases LDL and decreases HDL     It is generally recommended that you limit your total fat for the day to less than 30% of your total calories.  If you follow an 1800-calorie heart healthy diet, for example, this would mean 60 grams of fat or less per day. Saturated fat and trans fat in your diet raises your blood cholesterol the most, much more than dietary cholesterol does. For this reason, on a heart-healthy diet, less than 7% of your calories should come from saturated fat and ideally 0% from trans fat. On an 1800-calorie diet, this translates into less than 14 grams of saturated fat per day, leaving 46 grams of fat to come from mono- and polyunsaturated fats.    Food Choices on a Heart Healthy Diet   Food Category   Foods Recommended   Foods to Avoid   Grains   Breads and rolls without salted tops Most dry and cooked cereals Unsalted crackers and breadsticks Low-sodium or homemade breadcrumbs or stuffing All rice and pastas   Breads, rolls, and crackers with salted tops High-fat baked goods (eg, muffins, donuts, pastries) Quick breads, self-rising flour, and biscuit mixes Regular bread crumbs Instant hot cereals Commercially prepared rice, pasta, or stuffing mixes   Vegetables   Most fresh, frozen, and low-sodium canned vegetables Low-sodium and salt-free vegetable juices Canned vegetables if unsalted or rinsed   Regular canned vegetables and juices, including sauerkraut and pickled vegetables Frozen vegetables with sauces Commercially prepared potato and vegetable mixes   Fruits   Most fresh, frozen, and canned fruits All fruit juices   Fruits processed with salt or sodium   Milk   Nonfat or low-fat (1%) milk Nonfat or low-fat yogurt Cottage cheese, low-fat ricotta, cheeses labeled as low-fat and low-sodium   Whole milk Reduced-fat (2%) milk Malted and chocolate milk Full fat yogurt Most cheeses (unless low-fat and low salt) Buttermilk (no more than 1 cup per week)   Meats and Beans   Lean cuts of fresh or frozen beef, veal, lamb, or pork (look for the word loin) Fresh or frozen poultry without the skin Fresh or frozen fish and some shellfish Egg whites and egg substitutes (Limit whole eggs to three per week) Tofu Nuts or seeds (unsalted, dry-roasted), low-sodium peanut butter Dried peas, beans, and lentils   Any smoked, cured, salted, or canned meat, fish, or poultry (including slater, chipped beef, cold cuts, hot dogs, sausages, sardines, and anchovies) Poultry skins Breaded and/or fried fish or meats Canned peas, beans, and lentils Salted nuts   Fats and Oils   Olive oil and canola oil Low-sodium, low-fat salad dressings and mayonnaise   Butter, margarine, coconut and palm oils, slater fat   Snacks, Sweets, and Condiments   Low-sodium or unsalted versions of broths, soups, soy sauce, and condiments Pepper, herbs, and spices; vinegar, lemon, or lime juice Low-fat frozen desserts (yogurt, sherbet, fruit bars) Sugar, cocoa powder, honey, syrup, jam, and preserves Low-fat, trans-fat free cookies, cakes, and pies Raul and animal crackers, fig bars, danae snaps   High-fat desserts Broth, soups, gravies, and sauces, made from instant mixes or other high-sodium ingredients Salted snack foods Canned olives Meat tenderizers, seasoning salt, and most flavored vinegars   Beverages   Low-sodium carbonated beverages Tea and coffee in moderation Soy milk   Commercially softened water   Suggestions   Make whole grains, fruits, and vegetables the base of your diet. Choose heart-healthy fats such as canola, olive, and flaxseed oil, and foods high in heart-healthy fats, such as nuts, seeds, soybeans, tofu, and fish. Eat fish at least twice per week; the fish highest in omega-3 fatty acids and lowest in mercury include salmon, herring, mackerel, sardines, and canned chunk light tuna. If you eat fish less than twice per week or have high triglycerides, talk to your doctor about taking fish oil supplements. Read food labels. For products low in fat and cholesterol, look for fat free, low-fat, cholesterol free, saturated fat free, and trans fat freeAlso scan the Nutrition Facts Label, which lists saturated fat, trans fat, and cholesterol amounts. For products low in sodium, look for sodium free, very low sodium, low sodium, no added salt, and unsalted   Skip the salt when cooking or at the table; if food needs more flavor, get creative and try out different herbs and spices. Garlic and onion also add substantial flavor to foods. Trim any visible fat off meat and poultry before cooking, and drain the fat off after rodriguez. Use cooking methods that require little or no added fat, such as grilling, boiling, baking, poaching, broiling, roasting, steaming, stir-frying, and sauting. Avoid fast food and convenience food. They tend to be high in saturated and trans fat and have a lot of added salt. Talk to a registered dietitian for individualized diet advice. Last Reviewed: March 2011 Kwesi Mathur MS, MPH, RD   Updated: 3/29/2011       Safer Sex: After Your Visit  Your Care Instructions  Safer sex is a way to reduce your risk of getting an infection spread through sex. It can also help prevent pregnancy. Most infections that are spread through sex, also called sexually transmitted infections or STIs, can be cured. But some can decrease your chances of getting pregnant if they are not treated early. Others, such as herpes, have no cure. And some, such as HIV, can be deadly. Several products can help you practice safer sex and reduce your chance of STIs. One of the best is a condom. There are condoms for men and for women. The female condom is a tube of soft plastic with a closed end that is placed deep into the vagina. You can use a special rubber sheet (dental dam) for protection during oral sex. Latex gloves can keep your hands from touching blood, semen, or other body fluids that can carry infections. Remember that birth control methods such as diaphragms, IUDs, foams, and birth control pills do not stop you from getting STIs. Follow-up care is a key part of your treatment and safety.  Be sure to make and go to all appointments, and call your doctor if you are having problems. Its also a good idea to know your test results and keep a list of the medicines you take. How can you care for yourself at home? Think about getting shots to prevent hepatitis A and hepatitis B. These two diseases can be spread through sex. You also can get hepatitis A if you eat infected food. Use condoms or female condoms each time and every time you have sex. Learn the right way to use a male condom:  Condoms come in several sizes. Make sure you use the right size. A condom that is too small can break easily. A condom that is too big can slip off during sex. Use a new condom each time you have sex. Be careful not to poke a hole in the condom when you open the wrapper. Squeeze the tip of the condom to keep out air. Pull down the loose skin (foreskin) from the head of an uncircumcised penis. While squeezing the tip of the condom, unroll it all the way down to the base of the firm penis. Never use petroleum jelly (such as Vaseline), grease, hand lotion, baby oil, or anything with oil in it. These products can make holes in the condom. After sex, hold the condom on your penis as you remove your penis from your partner. This will keep semen from spilling out of the condom. Learn to use a female condom:  You can put in a female condom up to 8 hours before sex. Squeeze the smaller ring at the closed end and insert it deep into the vagina. The larger ring at the open end should stay outside the vagina. During sex, make sure the penis goes into the condom. After the penis is removed, close the open end of the condom by twisting it. Remove the condom. Do not use a female condom and male condom at the same time. Do not have sex with anyone who has symptoms of an STI, such as sores on the genitals or mouth. The herpes virus that causes cold sores can spread to and from the penis and vagina. Do not drink a lot of alcohol or use drugs before sex.  This can cause you to let down your guard and not practice safer sex. Having one sex partner (who does not have STIs and does not have sex with anyone else) is a sure way to avoid STIs. Talk to your partner before you have sex. Find out if he or she has or is at risk for any STI. Keep in mind that a person may be able to spread an STI even if he or she does not have symptoms. You and your partner may want to get an HIV test. You should get tested again 6 months later. © 8623-7606 [employer]. Care instructions adapted under license by [facility]. This care instruction is for use with your licensed healthcare professional. If you have questions about a medical condition or this instruction, always ask your healthcare professional. [address] any warranty or liability for your use of this information. Content Version: 9.4.47036; Last Revised: January 19, 2012                Keep Your Memory Jetgiselle Ales       Many factors can affect your ability to remembera hectic lifestyle, aging, stress, chronic disease, and certain medicines. But, there are steps you can take to sharpen your mind and help preserve your memory. Challenge Your Brain   Regularly challenging your mind may help keeps it in top shape. Good mental exercises include:   Crossword puzzlesUse a dictionary if you need it; you will learn more that way. Brainteasers Try some! Crafts, such as wood working and sewing   Hobbies, such as gardening and building model airplanes   SocializingVisit old friends or join groups to meet new ones.    Reading   Learning a new language   Taking a class, whether it be art history or farzana chi   TravelingExperience the food, history, and culture of your destination   Learning to use a computer   Going to museums, the theater, or thought-provoking movies   Changing things in your daily life, such as reversing your pattern in the grocery store or brushing your teeth using your nondominant hand   Use Memory Aids   There is no need to remember every detail on your own. These memory aids can help:   Calendars and day planners   Electronic organizers to store all sorts of helpful informationThese devices can \"beep\" to remind you of appointments. A book of days to record birthdays, anniversaries, and other occasions that occur on the same date every year   Detailed \"to-do\" lists and strategically placed sticky notes   Quick \"study\" sessionsBefore a gathering, review who will be there so their names will be fresh in your mind. Establish routinesFor example, keep your keys, wallet, and umbrella in the same place all the time or take medicine with your 8:00 AM glass of juice   Live a Healthy Life   Many actions that will keep your body strong will do the same for your mind. For example:   Talk to Your Doctor About Herbs and Supplements    Malnutrition and vitamin deficiencies can impair your mental function. For example, vitamin B12 deficiency can cause a range of symptoms, including confusion. But, what if your nutritional needs are being met? Can herbs and supplements still offer a benefit? Researchers have investigated a range of natural remedies, such as ginkgo , ginseng , and the supplement phosphatidylserine (PS). So far, though, the evidence is inconsistent as to whether these products can improve memory or thinking. If you are interested in taking herbs and supplements, talk to your doctor first because they may interact with other medicines that you are taking. Exercise Regularly    Among the many benefits of regular exercise are increased blood flow to the brain and decreased risk of certain diseases that can interfere with memory function. One study found that even moderate exercise has a beneficial effect.  Examples of \"moderate\" exercise include:   Playing 18 holes of golf once a week, without a cart   Playing tennis twice a week   Walking one mile per day   Manage Stress    It can be tough to remember what is important when your mind is cluttered. Make time for relaxation. Choose activities that calm you down, and make it routine. Manage Chronic Conditions    Side effects of high blood pressure , diabetes, and heart disease can interfere with mental function. Many of the lifestyle steps discussed here can help manage these conditions. Strive to eat a healthy diet, exercise regularly, get stress under control, and follow your doctor's advice for your condition. Minimize Medications    Talk to your doctor about the medicines that you take. Some may be unnecessary. Also, healthy lifestyle habits may lower the need for certain drugs. Last Reviewed: April 2010 Syed Puentes MD   Updated: 4/13/2010     Keeping Home a [address] [facility]       As we get older, changes in balance, gait, strength, vision, hearing, and cognition make even the most youthful senior more prone to accidents. Falls are one of the leading health risks for older people. This increased risk of falling is related to:   Aging process (eg, decreased muscle strength, slowed reflexes)   Higher incidence of chronic health problems (eg, arthritis, diabetes) that may limit mobility, agility or sensory awareness   Side effects of medicine (eg, dizziness, blurred vision)especially medicines like prescription pain medicines and drugs used to treat mental health conditions   Depending on the brittleness of your bones, the consequences of a fall can be serious and long lasting. Home Life   Research by the Association of Aging [facility]) shows that some home accidents among older adults can be prevented by making simple lifestyle changes and basic modifications and repairs to the home environment. Here are some lifestyle changes that experts recommend:   Have your hearing and vision checked regularly. Be sure to wear prescription glasses that are right for you. Speak to your doctor or pharmacist about the possible side effects of your medicines.  A number of medicines can cause dizziness. If you have problems with sleep, talk to your doctor. Limit your intake of alcohol. If necessary, use a cane or walker to help maintain your balance. Wear supportive, rubber-soled shoes, even at home. If you live in a region that gets wintry weather, you may want to put special cleats on your shoes to prevent you from slipping on the snow and ice. Exercise regularly to help maintain muscle tone, agility, and balance. Always hold the banister when going up or down stairs. Also, use  bars when getting in or out of the bath or shower, or using the toilet. To avoid dizziness, get up slowly from a lying down position. Sit up first, dangling your legs for a minute or two before rising to a standing position. Overall Home Safety Check   According to the Consumer [employer] Safety Comm[employer]'s \"Older Consumer Home Safety Checklist,\" it is important to check for potential hazards in each room. And remember, proper lighting is an essential factor in home safety. If you cannot see clearly, you are more likely to fall. Important questions to ask yourself include:   Are lamp, electric, extension, and telephone cords placed out of the flow of traffic and maintained in good condition? Have frayed cords been replaced? Are all small rugs and runners slip resistant? If not, you can secure them to the floor with a special double-sided carpet tape. Are smoke detectors properly locatedone on every floor of your home and one outside of every sleeping area? Are they in good working order? Are batteries replaced at least once a year? Do you have a well-maintained carbon monoxide detector outside every sleeping are in your home? Does your furniture layout leave plenty of space to maneuver between and around chairs, tables, beds, and sofas? Are hallways, stairs and passages between rooms well lit? Can you reach a lamp without getting out of bed?    Are floor surfaces well maintained? Shag rugs, high-pile carpeting, tile floors, and polished wood floors can be particularly slippery. Stairs should always have handrails and be carpeted or fitted with a non-skid tread. Is your telephone easily reachable. Is the cord safely tucked away? Room by Room   According to the Association of Aging, bathrooms and ivonne are the two most potentially hazardous rooms in your home. In the Kitchen    Be sure your stove is in proper working order and always make sure burners and the oven are off before you go out or go to sleep. Keep pots on the back burners, turn handles away from the front of the stove, and keep stove clean and free of grease build-up. Kitchen ventilation systems and range exhausts should be working properly. Keep flammable objects such as towels and pot holders away from the cooking area except when in use. Make sure kitchen curtains are tied back. Move cords and appliances away from the sink and hot surfaces. If extension cords are needed, install wiring guides so they do not hang over the sink, range, or working areas. Look for coffee pots, kettles and toaster ovens with automatic shut-offs. Keep a mop handy in the kitchen so you can wipe up spills instantly. You should also have a small fire extinguisher. Arrange your kitchen with frequently used items on lower shelves to avoid the need to stand on a stepstool to reach them. Make sure countertops are well-lit to avoid injuries while cutting and preparing food. In the Bathroom    Use a non-slip mat or decals in the tub and shower, since wet, soapy tile or porcelain surfaces are extremely slippery. Make sure bathroom rugs are non-skid or tape them firmly to the floor. Bathtubs should have at least one, preferably two, grab bars, firmly attached to structural supports in the wall.  (Do not use built-in soap holders or glass shower doors as grab bars.)    Tub seats fitted with non-slip material on the legs allow you to wash sitting down. For people with limited mobility, bathtub transfer benches allow you to slide safely into the tub. Raised toilet seats and toilet safety rails are helpful for those with knee or hip problems. In the [facility]    Make sure you use a nightlight and that the area around your bed is clear of potential obstacles. Be careful with electric blankets and never go to sleep with a heating pad, which can cause serious burns even if on a low setting. Use fire-resistant mattress covers and pillows, and NEVER smoke in bed. Keep a phone next to the bed that is programmed to dial 911 at the push of a button. If you have a chronic condition, you may want to sign on with an automatic call-in service. Typically the system includes a small pendant that connects directly to an emergency medical voice-response system. You should also make arrangements to stay in contact with someonefriend, neighbor, family memberon a regular schedule. Fire Prevention   According to the [employer] (Smoke Alarms for Every) [address] [place] [address], senior citizens are one of the two highest risk groups for death and serious injuries due to residential fires. When cooking, wear short-sleeved items, never a bulky long-sleeved robe. The [facility]'s Safety Checklist for Older Consumers emphasizes the importance of checking basements, garages, workshops and storage areas for fire hazards, such as volatile liquids, piles of old rags or clothing and overloaded circuits. Never smoke in bed or when lying down on a couch or recliner chair. Small portable electric or kerosene heaters are responsible for many home fires and should be used cautiously if at all. If you do use one, be sure to keep them away from flammable materials. In case of fire, make sure you have a pre-established emergency exit plan. Have a professional check your fireplace and other fuel-burning appliances yearly.     Helping Hands   Baby boomers entering the herzog years will continue to see the development of new products to help older adults live safely and independently in spite of age-related changes. Making Life More Livable  , by Aubrey Chavarria, lists over 1,000 products for \"living well in the mature years,\" such as bathing and mobility aids, household security devices, ergonomically designed knives and peelers, and faucet valves and knobs for temperature control. Medical supply stores and organizations are good sources of information about products that improve your quality of life and insure your safety.      Last Reviewed: November 2009 Royer Tucker MD   Updated: 3/7/2011

## 2022-09-27 NOTE — PROGRESS NOTES
Visit Information    Have you changed or started any medications since your last visit including any over-the-counter medicines, vitamins, or herbal medicines? no   Are you having any side effects from any of your medications? -  no  Have you stopped taking any of your medications? Is so, why? -  no    Have you seen any other physician or provider since your last visit? No  Have you had any other diagnostic tests since your last visit? Yes - Records Obtained  Have you been seen in the emergency room and/or had an admission to a hospital since we last saw you? No  Have you had your routine dental cleaning in the past 6 months? no    Have you activated your [employer] account? If not, what are your barriers?  Yes     Patient Care Team:  Merlin Goldman MD as PCP - General (Family Medicine)  Merlin Goldman MD as PCP - [facility]  Libertad Wadsworth MD as Consulting Physician (Gastroenterology)  Yael Rhoades MD as Consulting Physician (Pain Management)  Mirza Fortune MD as Consulting Physician (Hematology and Oncology)  Ricky Carl DO as Consulting Physician (Bariatric Surgery)  Britney Bautista DO as Consulting Physician (Cardiology)  Greg Muhammad MD as Consulting Physician (Urology)    Medical History Review  Past Medical, Family, and Social History reviewed and does contribute to the patient presenting condition    Health Maintenance   Topic Date Due    DTaP/Tdap/Td vaccine (1 - Tdap) Never done    Shingles vaccine (2 of 3) 12/27/2015    COVID-19 Vaccine (3 - Booster for Lisa series) 05/21/2022    Flu vaccine (1) 08/01/2022    Diabetic foot exam  09/24/2022    Annual Wellness Visit (AWV)  09/25/2022    Low dose CT lung screening  11/17/2022    A1C test (Diabetic or Prediabetic)  11/19/2022    Lipids  03/12/2023    Diabetic retinal exam  05/31/2023    Diabetic microalbuminuria test  09/19/2023    Depression Screen  09/26/2023    Colorectal Cancer Screen  06/07/2025    Hepatitis A vaccine  Completed    Pneumococcal 65+ years Vaccine  Completed    Hib vaccine  Aged Out    Meningococcal (ACWY) vaccine  Aged Out

## 2022-09-27 NOTE — PROGRESS NOTES
Medicare Annual Wellness Visit    Sade Hancock is here for Medicare AWV, Diabetes, and Peripheral Neuropathy    Assessment & Plan   Medicare annual wellness visit, subsequent  Immunizations updated  Advance care directives addressed  Will address his diabetes and polyneuropathy separately  Home safety discussed  Low-carb diet, increase activity level discussed  To get COVID-19 booster vaccine at the pharmacy in 4 weeks  Encounter for immunization  -     Influenza, FLUAD, (age 72 y+), IM, PF, 0.5 mL  Type 2 diabetes mellitus with hyperglycemia, without long-term current use of insulin (HCC)  -     SITagliptin (JANUVIA) 50 MG tablet; Take 1 tablet by mouth daily, Disp-30 tablet, R-5Normal  -     CBC with Auto Differential; Future  -     Comprehensive Metabolic Panel; Future  -     TSH; Future  -     Electrophoresis Protein, Serum; Future  -      DIABETES FOOT EXAM  -     [facility] OFFICE OUTPATIENT VISIT 25 MINUTES [[id number]]  Peripheral polyneuropathy  -     nortriptyline (PAMELOR) 10 MG capsule; Take 1 capsule by mouth nightly For Polyneuropathy, diarrhea and insomnia, Disp-30 capsule, R-3Normal  -     CBC with Auto Differential; Future  -     Iron and TIBC; Future  -     Protein Electrophoresis, Urine; Future  -     [facility] OFFICE OUTPATIENT VISIT 25 MINUTES [[id number]]  Other iron deficiency anemia  -     CBC with Auto Differential; Future  -     Comprehensive Metabolic Panel; Future  -     Iron and TIBC; Future  -     Protein Electrophoresis, Urine; Future  -     Electrophoresis Protein, Serum; Future  -     Rena Liu MD, Hematology/Oncology, [place]  -     [facility] OFFICE OUTPATIENT VISIT 25 MINUTES [[id number]]  Extrasystoles  -     EKG 12 Lead; Future  -     Tanja Mauricio MD, Cardiology, [facility] OFFICE OUTPATIENT VISIT 25 MINUTES [[id number]]  Vitamin D deficiency  -     Vitamin D 25 Hydroxy;  Future  -     [facility] OFFICE OUTPATIENT VISIT 25 MINUTES [[id number]]    Recommendations for Preventive Services Due: see orders and patient instructions/AVS.  Recommended screening schedule for the next 5-10 years is provided to the patient in written form: see Patient Instructions/AVS.     Return in 1 year (on 9/28/2023) for Medicare Annual Wellness Visit in 1 year, [facility], [address] [place] [address] [facility] Subjective     Patient's complete Health Risk Assessment and screening values have been reviewed and are found in Flowsheets. The following problems were reviewed today and where indicated follow up appointments were made and/or referrals ordered. Positive Risk Factor Screenings with Interventions:             Opioid Risk: (Low risk score <55) Opioid risk score: 25    Patient is low risk for opioid use disorder or overdose. Last PDMP Josh baig Reviewed:  Review User Review Instant Review Result   Juma Royster 9/27/2022 10:24 AM Reviewed PDMP [1]     Last Controlled Substance Monitoring Documentation      [address] Office Visit from 9/27/2022 in [facility]   Periodic Controlled Substance Monitoring Possible medication side effects, risk of tolerance/dependence & alternative treatments discussed., No signs of potential drug abuse or diversion identified. , Assessed functional status.  filed at 09/27/2022 1024             General Health and ACP:  General  In general, how would you say your health is?: Good  In the past 7 days, have you experienced any of the following: New or Increased Pain, New or Increased Fatigue, Loneliness, Social Isolation, Stress or Anger?: (!) Yes  Select all that apply: (!) New or Increased Pain  Do you get the social and emotional support that you need?: Yes  Do you have a Living Will?: (!) No    Advance Directives       Power of  Living Will ACP-Advance Directive ACP-Power of     Not on File Not on File Not on File Not on File          General Health Risk Interventions:  Pain issues: home exercises provided, back pain 3/10, feet 4/10, worse at nighttime, saw neurology, podiatry and seeing pain management, told it is from the back, on Lyrica ,we will do polyneuropathy work-up    No Living Will: Advance Care Planning addressed with patient today, packet given, he will complete and bring back when done    Health Habits/Nutrition:  Physical Activity: Insufficiently Active    Days of Exercise per Week: 2 days    Minutes of Exercise per Session: 40 min     Have you lost any weight without trying in the past 3 months?: No  Body mass index: (!) 32.19  Have you seen the dentist within the past year?: N/A - wear dentures  Health Habits/Nutrition Interventions:  Inadequate physical activity:  patient agrees to increase physical activity as follows: Increase daily walking to 3 times a day  Body mass index is 32.19 kg/m².   Obesity per BMI, low-carb diet advised, increase activity level      Hearing/Vision:  Do you or your family notice any trouble with your hearing that hasn't been managed with hearing aids?: No  Do you have difficulty driving, watching TV, or doing any of your daily activities because of your eyesight?: No  Have you had an eye exam within the past year?: Yes  Vision Screening    Right eye Left eye Both eyes   Without correction      With correction 20/50 20/25 20/25     Hearing/Vision Interventions:  Vision concerns:  patient encouraged to make appointment with his/her eye specialist, has appointment today, will get new glasses      Safety:  Do you have working smoke detectors?: Yes  Do you have any tripping hazards - loose or unsecured carpets or rugs?: No  Do you have any tripping hazards - clutter in doorways, halls, or stairs?: No  Do you have either shower bars, grab bars, non-slip mats or non-slip surfaces in your shower or bathtub?: (!) No  Do all of your stairways have a railing or banister?: Not Applicable  Do you always fasten your seatbelt when you are in a car?: Yes  Safety Interventions:  Home safety tips provided           Objective   Vitals: 09/27/22 0950   BP: 138/80   Pulse: 60   Temp: 97.3 °F (36.3 °C)   SpO2: 98%   Weight: 218 lb (98.9 kg)   Height: 5' 9\" (1.753 m)      Body mass index is 32.19 kg/m². Vital signs within normal limits except obesity per BMI      Allergies   Allergen Reactions    Asa [Aspirin]       iron deficiency anemia , requiring iron infusions and transfusions    Iron      Pill- constipation, abdominal pain    Nsaids       iron deficiency anemia, history of bleeding ulcers      Prior to Visit Medications    Medication Sig Taking? Authorizing Provider   morphine (MS CONTIN) 15 MG extended release tablet Take 1 tablet by mouth 2 times daily for 30 days. Yes VEE Tlyer CNP   oxyCODONE-acetaminophen (PERCOCET) 5-325 MG per tablet Take 1 tablet by mouth every 8 hours as needed for Pain for up to 30 days. Yes VEE Tyler CNP   pregabalin (LYRICA) 150 MG capsule Take 1 capsule by mouth in the morning and 1 capsule at noon and 1 capsule before bedtime. Do all this for 90 days. Yes VEE Tyler CNP   vitamin D (CHOLECALCIFEROL) 50 MCG (2000 UT) TABS tablet TAKE ONE TABLET BY MOUTH DAILY Yes Hua Howard MD   pravastatin (PRAVACHOL) 40 MG tablet TAKE ONE TABLET BY MOUTH EVERY EVENING. STOP GEMFIBROZIL Yes Hua Howard MD   baclofen (LIORESAL) 10 MG tablet Take 1 tablet by mouth 3 times daily as needed (back pain) Yes Hua Howard MD   folic acid (FOLVITE) 1 MG tablet Take 1 tablet by mouth daily Yes Mathew Ridley MD   cyanocobalamin 1000 MCG/ML injection INJECT 1 ML INTO THE MUSCLE EVERY 30 DAYS.  CALL FOR NEXT REFILL WHICH WILL BE MONTHLY FOR LIFE Yes Hua Howard MD   metoprolol tartrate (LOPRESSOR) 25 MG tablet TAKE ONE TABLET BY MOUTH TWICE A DAY Yes Hua Howard MD   pantoprazole (PROTONIX) 40 MG tablet TAKE ONE TABLET BY MOUTH DAILY Yes Hua Howard MD   glucose monitoring (FREESTYLE FREEDOM) kit Please supply Patient with a glucose monitoring kit that insurance will cover. Yes Chantel Boss MD   blood glucose monitor strips Testing once a day. Please dispense according to patients insurance. Yes Chantel Boss MD   Lancets 30G MISC Testing once a day. Please dispense according to patients insurance. Yes Chantel Boss MD   lisinopril (PRINIVIL;ZESTRIL) 10 MG tablet Take 1 tablet by mouth daily ** stop Lisinopril HCTZ** Yes Chantel Boss MD   fluticasone (FLONASE) 50 MCG/ACT nasal spray 2 sprays by Nasal route daily Yes Khalida See MD   Alcohol Swabs (B-D SINGLE USE SWABS REGULAR) PADS USE TO CHECK BLOOD SUGAR TWICE A DAY Yes Chantel Boss MD   Syringe/Needle, Disp, (SYRINGE 3CC/25GX1\") 25G X 1\" 3 ML MISC To be used with B12 injections Yes Chantel Boss MD   loperamide (RA ANTI-DIARRHEAL) 2 MG capsule Take 1 capsule by mouth 4 times daily as needed for Diarrhea Yes Luz Marina Hdz MD   Probiotic Product (PROBIOTIC-10 PO) Take by mouth daily  Yes Historical Provider, MD   zoster recombinant adjuvanted vaccine (SHINGRIX) 50 MCG/0.5ML SUSR injection 50 MCG IM then repeat 2-6 months.   Patient not taking: Reported on 2022  Chantel Boss MD       [facility] (Including outside providers/suppliers regularly involved in providing care):   Patient Care Team:  Chantel Boss MD as PCP - General (Family Medicine)  Chantel Boss MD as PCP - [facility]  Luz Marina Hdz MD as Consulting Physician (Gastroenterology)  Chandrika Alejandre MD as Consulting Physician (Pain Management)  Lashell Paredes MD as Consulting Physician (Hematology and Oncology)  Petr Alejandro DO as Consulting Physician (Bariatric Surgery)  Bijal Giraldo DO as Consulting Physician (Cardiology)  Veronika Altman MD as Consulting Physician (Urology)     Reviewed and updated this visit:  Tobacco  Allergies  Meds  Problems  Med Hx  Surg Hx  Soc Hx  Fam Hx                  Benji Miranda (:  1950) is a 81 y.o. male,Established patient, here for evaluation of the following chief complaint(s): Medicare AWV, Diabetes, and Peripheral Neuropathy      ASSESSMENT/PLAN:    1. Medicare annual wellness visit, subsequent  2. Encounter for immunization  -     Influenza, FLUAD, (age 72 y+), IM, PF, 0.5 mL  3. Type 2 diabetes mellitus with hyperglycemia, without long-term current use of insulin (HCC)  -     SITagliptin (JANUVIA) 50 MG tablet; Take 1 tablet by mouth daily, Disp-30 tablet, R-5Normal  -     CBC with Auto Differential; Future  -     Comprehensive Metabolic Panel; Future  -     TSH; Future  -     Electrophoresis Protein, Serum; Future  -      DIABETES FOOT EXAM  -     ME OFFICE OUTPATIENT VISIT 25 MINUTES [[id number]]  4. Peripheral polyneuropathy  -     nortriptyline (PAMELOR) 10 MG capsule; Take 1 capsule by mouth nightly For Polyneuropathy, diarrhea and insomnia, Disp-30 capsule, R-3Normal  -     CBC with Auto Differential; Future  -     Iron and TIBC; Future  -     Protein Electrophoresis, Urine; Future  -     ME OFFICE OUTPATIENT VISIT 25 MINUTES [[id number]]  5. Other iron deficiency anemia  -     CBC with Auto Differential; Future  -     Comprehensive Metabolic Panel; Future  -     Iron and TIBC; Future  -     Protein Electrophoresis, Urine; Future  -     Electrophoresis Protein, Serum; Future  -     Carolin Doyle MD, Hematology/Oncology, [place]  -     ME OFFICE OUTPATIENT VISIT 25 MINUTES [[id number]]  6. Extrasystoles  -     EKG 12 Lead; Future  -     [facility] - Jared Shelton MD, Cardiology, [address] [place] [address] OFFICE OUTPATIENT VISIT 25 MINUTES [[id number]]  7. Vitamin D deficiency  -     Vitamin D 25 Hydroxy; Future  -     ME OFFICE OUTPATIENT VISIT 25 MINUTES [[id number]]      Ej Mohini received counseling on the following healthy behaviors: nutrition, exercise, medication adherence, and weight loss  Reviewed prior labs and health maintenance  Discussed use, benefit, and side effects of prescribed medications.    Barriers to medication compliance addressed. Patient given educational materials - see patient instructions  All patient questions answered. Patient voiced understanding. The patient's past medical,surgical, social, and family history as well as his current medications and allergies were reviewed as documented in today's encounter. Medications, labs, diagnostic studies, consultations and follow-up as documented in this encounter. Return in 1 year (on 9/28/2023) for Medicare Annual Wellness Visit in 1 year, [facility], [address] [place] [address] [employer]. Another appt in 4 months for diabetes    Future Appointments   Date Time Provider Pippa Cazares   10/26/2022  9:30 AM DO Maurice Reyes   1/27[facility]2023 11:00 [facility]   2/16/2023  2:00 PM [facility]   9/27/2023 11:00 AM [facility]       SUBJECTIVE/OBJECTIVE:    Vick Danielle complains of high Blood Glucose readings    Has known diabetes mellitus type 2  Stopped Metformin about 1.5 months ago due to worsening diarrhea. Blood Glucose 200's in am   Before it was 140's when taking Metformin   Still having diarrhea 4-5 bowel movements a day, first 2 BMS are formed then watery  Had both colonoscopy and EGD 6/7/2022, benign pathology, no polyps  Had cholecystectomy in 2019  He does admit to onset of diarrhea since after the cholecystectomy   He says diarrhea is \"Getting better, declines meds\"    Vick Danielle complains of Neuropathy in his feet, severe, cannot go to sleep due to it, burning, aching, toes and buttom of his feet. Lyrica helped initially, but not anymore  back pain 3/10, feet 4/10, worse at nighttime, saw neurology, podiatry and seeing pain management, told the neuropathy in his feet is from the back  He says he did see neurology, told it is from the back  Had EMG as well.   Hemoglobin A1c worsening but well controlled  Lab Results   Component Value Date    LABA1C 6.6 (H) 08/19/2022    LABA1C 5.2 03/12/2022    LABA1C 5.0 09/24/2021     He does have iron deficiency anemia  Took iron infusions because cannot take the pill. Taking folic acid and B15 injections as well. Was recently found to have bladder cancer about 1 year ago. Colonoscopy in 2019 showed tubular adenomas x2, he had EGD and colonoscopy 6/7/2022    EKG  normal 1/3/2022  Was seen by Dr. Arcelia Dow in 2020 and was told everything was fine. During the exam he was found to have extra beats. He denies chest pain, shortness of breath. He does feel tired all the time. Prior vitamin D deficiency, feeling tired all the time, he does take vitamin D supplementation        Review of Systems   Constitutional:  Positive for fatigue. Negative for activity change, appetite change, chills, diaphoresis, fever and unexpected weight change. Eyes:  Positive for visual disturbance. Respiratory:  Negative for cough, chest tightness, shortness of breath and wheezing. Cardiovascular:  Negative for chest pain, palpitations and leg swelling. Gastrointestinal:  Positive for diarrhea (chronic). Negative for abdominal distention, abdominal pain, constipation, nausea and vomiting. Endocrine: Negative for cold intolerance, heat intolerance, polydipsia, polyphagia and polyuria. Musculoskeletal:  Positive for back pain (chronic, going to pain management). Neurological:  Positive for numbness (feet). Negative for weakness. Hematological:  Does not bruise/bleed easily. Psychiatric/Behavioral:  Positive for sleep disturbance.        -vital signs stable and within normal limits except obesity per BMI    /80   Pulse 60   Temp 97.3 °F (36.3 °C)   Ht 5' 9\" (1.753 m)   Wt 218 lb (98.9 kg)   SpO2 98%   BMI 32.19 kg/m²        Physical Exam  Vitals and nursing note reviewed. Constitutional:       General: He is not in acute distress. Appearance: Normal appearance. He is well-developed. He is obese. He is not diaphoretic. HENT:      Head: Normocephalic and atraumatic. Right Ear: External ear normal.      Left Ear: External ear normal.      Mouth/Throat:      Comments: I did not examine the mouth due to coronavirus pandemic and wearing masks. Eyes:      General: Lids are normal. No scleral icterus. Right eye: No discharge. Left eye: No discharge. Extraocular Movements: Extraocular movements intact. Conjunctiva/sclera: Conjunctivae normal.   Neck:      Thyroid: No thyromegaly. Cardiovascular:      Rate and Rhythm: Normal rate and regular rhythm. FrequentExtrasystoles are present. Pulses:           Dorsalis pedis pulses are 2+ on the right side and 2+ on the left side. Posterior tibial pulses are 2+ on the right side and 2+ on the left side. Heart sounds: Normal heart sounds. No murmur heard. Comments: I told him to get the EKG and stop caffeine-based beverages   Pulmonary:      Effort: Pulmonary effort is normal. No respiratory distress. Breath sounds: Normal breath sounds. No wheezing or rales. Chest:      Chest wall: No tenderness. Abdominal:      General: Bowel sounds are normal. There is no distension. Palpations: Abdomen is soft. There is no hepatomegaly or splenomegaly. Tenderness: There is no abdominal tenderness. Comments: Obese abdomen. Musculoskeletal:      Cervical back: Normal range of motion and neck supple. Lumbar back: Spasms, tenderness and bony tenderness present. Decreased range of motion. Right lower leg: No edema. Left lower leg: No edema. Right foot: Normal range of motion. No deformity, bunion, Charcot foot, foot drop or prominent metatarsal heads. Left foot: Normal range of motion. No deformity, bunion, Charcot foot, foot drop or prominent metatarsal heads. Feet:      Right foot:      Protective Sensation: 5 sites tested. 1 site sensed.      Skin integrity: Skin integrity normal.      Toenail Condition: Right toenails are normal.      Left foot:      Protective Sensation: 5 sites tested. 1 site sensed. Skin integrity: Skin integrity normal.      Toenail Condition: Left toenails are normal.      Comments: Barely feeling in the feet and lower ankles  Lymphadenopathy:      Cervical: No cervical adenopathy. Skin:     General: Skin is warm and dry. Capillary Refill: Capillary refill takes less than 2 seconds. Findings: No rash. Neurological:      Mental Status: He is alert and oriented to person, place, and time. Sensory: Sensory deficit present. Gait: Gait abnormal.      Deep Tendon Reflexes: Reflexes are normal and symmetric. Psychiatric:         Attention and Perception: Attention normal.         Mood and Affect: Mood is anxious. Speech: Speech is rapid and pressured. Behavior: Behavior normal.         Thought Content: Thought content normal.         Cognition and Memory: Cognition normal.         Judgment: Judgment normal.           I personally reviewed testing with patient.    taking B12 IM monthly  Iron infusions  Anemia , cannot take iron po  Hyperglycemia  HIGH triglycerides   Vitamin D deficiency    Otherwise labs within normal limits    Lab Results   Component Value Date    WBC 6.7 08/19/2022    HGB 12.5 (L) 08/19/2022    HCT 38.6 (L) 08/19/2022    MCV 83.0 08/19/2022     08/19/2022       Lab Results   Component Value Date/Time     09/19/2022 10:02 AM    K 4.6 09/19/2022 10:02 AM     09/19/2022 10:02 AM    CO2 26 09/19/2022 10:02 AM    BUN 16 09/19/2022 10:02 AM    CREATININE 0.69 09/19/2022 10:02 AM    GLUCOSE 242 09/19/2022 10:02 AM    CALCIUM 9.4 09/19/2022 10:02 AM        Lab Results   Component Value Date    ALT 19 08/19/2022    AST 17 08/19/2022    ALKPHOS 99 08/19/2022    BILITOT 0.37 08/19/2022       Lab Results   Component Value Date    TSH 1.21 03/12/2022       Lab Results   Component Value Date    CHOL 100 03/12/2022    CHOL 92 07/13/2020    CHOL 112 04/24/2019     Lab Results   Component Value Date    TRIG 285 (H) 03/12/2022    TRIG 279 (H) 07/13/2020    TRIG 148 04/24/2019     Lab Results   Component Value Date    HDL 24 (L) 03/12/2022    HDL 22 (L) 10/23/2021    HDL 23 (L) 07/13/2020     Lab Results   Component Value Date    LDLCHOLESTEROL 19 03/12/2022    LDLCHOLESTEROL      10/23/2021    LDLCHOLESTEROL 13 07/13/2020     Lab Results   Component Value Date    CHOLHDLRATIO 4.2 03/12/2022    CHOLHDLRATIO 5.2 (H) 10/23/2021    CHOLHDLRATIO 4.0 07/13/2020       Lab Results   Component Value Date    LABA1C 6.6 (H) 08/19/2022       Lab Results   Component Value Date    YXHGXYDK07 428 08/19/2022       Lab Results   Component Value Date    FOLATE >20.0 08/19/2022       Lab Results   Component Value Date    VITD25 28.2 (L) 03/12/2022         Orders Placed This Encounter   Procedures    Influenza, FLUAD, (age 72 y+), IM, PF, 0.5 mL    CBC with Auto Differential     Standing Status:   Future     Standing Expiration Date:   9/27/2023    Comprehensive Metabolic Panel     Standing Status:   Future     Standing Expiration Date:   11/24/2022    Iron and TIBC     Standing Status:   Future     Standing Expiration Date:   11/24/2022     Order Specific Question:   Is Patient Fasting? Answer:   yes     Order Specific Question:   No of Hours?      Answer:   8    Vitamin D 25 Hydroxy     Standing Status:   Future     Standing Expiration Date:   9/27/2023    TSH     Standing Status:   Future     Standing Expiration Date:   9/27/2023    Protein Electrophoresis, Urine     Standing Status:   Future     Standing Expiration Date:   9/27/2023    Electrophoresis Protein, Serum     Standing Status:   Future     Standing Expiration Date:   9/27/2023    Kacy Tang MD, Hematology/Oncology, [place]     Referral Priority:   Routine     Referral Type:   Eval and Treat     Referral Reason:   Specialty Services Required     Referred to Provider:   Kilo Coreas Mariah Tierney MD     Requested Specialty:   Hematology and Oncology     Number of Visits Requested:   1    WHITLEY Buckner MD, Cardiology, [facility]     Referral Priority:   Routine     Referral Type:   Eval and Treat     Referral Reason:   Specialty Services Required     Referred to Provider:   Torito Dominguez MD     Requested Specialty:   Cardiology     Number of Visits Requested:   1    EKG 12 Lead     Standing Status:   Future     Standing Expiration Date:   9/27/2023     Order Specific Question:   Reason for Exam?     Answer:   Irregular heart rate    HM DIABETES FOOT EXAM    [place] OFFICE OUTPATIENT VISIT 25 MINUTES [[id number]]       Orders Placed This Encounter   Medications    SITagliptin (JANUVIA) 50 MG tablet     Sig: Take 1 tablet by mouth daily     Dispense:  30 tablet     Refill:  5    nortriptyline (PAMELOR) 10 MG capsule     Sig: Take 1 capsule by mouth nightly For Polyneuropathy, diarrhea and insomnia     Dispense:  30 capsule     Refill:  3       Medications Discontinued During This Encounter   Medication Reason    zoster recombinant adjuvanted vaccine [facility]) 50 MCG/0.5ML SUSR injection Therapy completed           On this date 9/27/2022 I have spent 35 minutes reviewing previous notes, test results and face to face with the patient discussing the diagnosis and importance of compliance with the treatment plan as well as documenting on the day of the visit. This note was completed by using the assistance of a speech-recognition program. However, inadvertent computerized transcription errors may be present. Although every effort was made to ensure accuracy, no guarantees can be provided that every mistake has been identified and corrected by editing. An electronic signature was used to authenticate this note.   Electronically signed by Mickie Cummings MD on 10/4/2022 at 11:18 PM

## 2022-09-30 DIAGNOSIS — E55.9 VITAMIN D DEFICIENCY: Primary | ICD-10-CM

## 2022-09-30 LAB
ALBUMIN (CALCULATED): 4.1 G/DL (ref 3.2–5.2)
ALBUMIN PERCENT: 56 % (ref 45–65)
ALPHA 1 PERCENT: 3 % (ref 3–6)
ALPHA 2 PERCENT: 13 % (ref 6–13)
ALPHA-1-GLOBULIN: 0.2 G/DL (ref 0.1–0.4)
ALPHA-2-GLOBULIN: 0.9 G/DL (ref 0.5–0.9)
BETA GLOBULIN: 1 G/DL (ref 0.5–1.1)
BETA PERCENT: 14 % (ref 11–19)
GAMMA GLOBULIN %: 14 % (ref 9–20)
GAMMA GLOBULIN: 1 G/DL (ref 0.5–1.5)
PATHOLOGIST: NORMAL
PROTEIN ELECTROPHORESIS, SERUM: NORMAL
TOTAL PROT. SUM,%: 100 % (ref 98–102)
TOTAL PROT. SUM: 7.2 G/DL (ref 6.3–8.2)
TOTAL PROTEIN: 7.2 G/DL (ref 6.4–8.3)

## 2022-09-30 RX ORDER — ERGOCALCIFEROL 1.25 MG/1
50000 CAPSULE ORAL WEEKLY
Qty: 12 CAPSULE | Refills: 0 | Status: SHIPPED | OUTPATIENT
Start: 2022-09-30

## 2022-09-30 NOTE — RESULT ENCOUNTER NOTE
Please notify patient: Low iron, with mildly worsening anemia, I would like him to start iron oral or can discuss with his hematologist oncologist on 10/7/2022    Vitamin D very low worsening--stop daily vitamin D and will start high doses vitamin D, to take with food  Blood glucose very high at 235--did he receive Januvia which we recently started for him?   Cut down the sweets, he needs to check home blood glucose daily  Otherwise labs within normal limits  continue current treatment    Future Appointments  10/7/2022  10:00 AM   Pinky Norman MD          [address]  10/26/2022 9:30 AM    Jeremy Chapman DO         [address] [facility]  1/27/2023  11:00 AM   Julien Curry MD     [facility]  2/16/2023  2:00 PM    Yvonne Molina MD          [facility]  9/27/2023  11:00 AM   Julien Curry MD     [facility]

## 2022-10-03 LAB
P E INTERPRETATION, U: NORMAL
PATHOLOGIST: NORMAL
SPECIMEN TYPE: NORMAL
URINE TOTAL PROTEIN: 46 MG/DL

## 2022-10-04 PROBLEM — I49.49 EXTRASYSTOLES: Status: ACTIVE | Noted: 2022-10-04

## 2022-10-04 ASSESSMENT — ENCOUNTER SYMPTOMS: DIARRHEA: 1

## 2022-10-05 NOTE — RESULT ENCOUNTER NOTE
Noted  Future Appointments  10/7/2022  10:00 AM   Sofi Grimaldo MD          [address]  10/26/2022 9:30 AM    Nael Echevarria DO         [address] [facility]  1/27/2023  11:00 AM   Gordo Garner MD     [facility]  2/16/2023  2:00 PM    Latanya Nolasco MD          [place] [id number] [facility]  9/27/2023  11:00 AM   Gordo Garner MD     [facility]

## 2022-10-07 ENCOUNTER — TELEPHONE (OUTPATIENT)
Dept: UROLOGY | Age: 72
End: 2022-10-07

## 2022-10-07 ENCOUNTER — OFFICE VISIT (OUTPATIENT)
Dept: ONCOLOGY | Age: 72
End: 2022-10-07
Payer: MEDICARE

## 2022-10-07 ENCOUNTER — TELEPHONE (OUTPATIENT)
Dept: ONCOLOGY | Age: 72
End: 2022-10-07

## 2022-10-07 VITALS
BODY MASS INDEX: 32.96 KG/M2 | TEMPERATURE: 98.2 F | WEIGHT: 223.2 LBS | OXYGEN SATURATION: 96 % | HEART RATE: 94 BPM | DIASTOLIC BLOOD PRESSURE: 78 MMHG | SYSTOLIC BLOOD PRESSURE: 123 MMHG

## 2022-10-07 DIAGNOSIS — D50.8 OTHER IRON DEFICIENCY ANEMIA: Primary | ICD-10-CM

## 2022-10-07 DIAGNOSIS — C67.2 MALIGNANT NEOPLASM OF LATERAL WALL OF URINARY BLADDER (HCC): ICD-10-CM

## 2022-10-07 PROCEDURE — G8427 DOCREV CUR MEDS BY ELIG CLIN: HCPCS | Performed by: INTERNAL MEDICINE

## 2022-10-07 PROCEDURE — 99214 OFFICE O/P EST MOD 30 MIN: CPT | Performed by: INTERNAL MEDICINE

## 2022-10-07 PROCEDURE — 1123F ACP DISCUSS/DSCN MKR DOCD: CPT | Performed by: INTERNAL MEDICINE

## 2022-10-07 PROCEDURE — 99211 OFF/OP EST MAY X REQ PHY/QHP: CPT | Performed by: INTERNAL MEDICINE

## 2022-10-07 PROCEDURE — 1036F TOBACCO NON-USER: CPT | Performed by: INTERNAL MEDICINE

## 2022-10-07 PROCEDURE — G8484 FLU IMMUNIZE NO ADMIN: HCPCS | Performed by: INTERNAL MEDICINE

## 2022-10-07 PROCEDURE — 3017F COLORECTAL CA SCREEN DOC REV: CPT | Performed by: INTERNAL MEDICINE

## 2022-10-07 PROCEDURE — G8417 CALC BMI ABV UP PARAM F/U: HCPCS | Performed by: INTERNAL MEDICINE

## 2022-10-07 RX ORDER — SODIUM CHLORIDE 9 MG/ML
5-250 INJECTION, SOLUTION INTRAVENOUS PRN
Status: CANCELLED | OUTPATIENT
Start: 2022-10-07

## 2022-10-07 RX ORDER — SODIUM CHLORIDE 9 MG/ML
INJECTION, SOLUTION INTRAVENOUS CONTINUOUS
Status: CANCELLED | OUTPATIENT
Start: 2022-10-07

## 2022-10-07 RX ORDER — ACETAMINOPHEN 325 MG/1
650 TABLET ORAL
Status: CANCELLED | OUTPATIENT
Start: 2022-10-07

## 2022-10-07 RX ORDER — ALBUTEROL SULFATE 90 UG/1
4 AEROSOL, METERED RESPIRATORY (INHALATION) PRN
Status: CANCELLED | OUTPATIENT
Start: 2022-10-07

## 2022-10-07 RX ORDER — SODIUM CHLORIDE 0.9 % (FLUSH) 0.9 %
5-40 SYRINGE (ML) INJECTION PRN
Status: CANCELLED | OUTPATIENT
Start: 2022-10-07

## 2022-10-07 RX ORDER — HEPARIN SODIUM (PORCINE) LOCK FLUSH IV SOLN 100 UNIT/ML 100 UNIT/ML
500 SOLUTION INTRAVENOUS PRN
Status: CANCELLED | OUTPATIENT
Start: 2022-10-07

## 2022-10-07 RX ORDER — ONDANSETRON 2 MG/ML
8 INJECTION INTRAMUSCULAR; INTRAVENOUS
Status: CANCELLED | OUTPATIENT
Start: 2022-10-07

## 2022-10-07 RX ORDER — EPINEPHRINE 1 MG/ML
0.3 INJECTION, SOLUTION, CONCENTRATE INTRAVENOUS PRN
Status: CANCELLED | OUTPATIENT
Start: 2022-10-07

## 2022-10-07 RX ORDER — FAMOTIDINE 10 MG/ML
20 INJECTION, SOLUTION INTRAVENOUS
Status: CANCELLED | OUTPATIENT
Start: 2022-10-07

## 2022-10-07 RX ORDER — DIPHENHYDRAMINE HYDROCHLORIDE 50 MG/ML
50 INJECTION INTRAMUSCULAR; INTRAVENOUS
Status: CANCELLED | OUTPATIENT
Start: 2022-10-07

## 2022-10-07 NOTE — TELEPHONE ENCOUNTER
AVS from 10/7/22      injectafer as soon approved  Rv in 2 months withlabs couple of day sprior      AVS given to  to follow precert     Pt was given AVS and appointment schedule    Electronically signed by Donal Watters on 10/7/2022 at 10:48 AM

## 2022-10-07 NOTE — PROGRESS NOTES
Subjective:   PCP: Keyon Simeon MD       Chief Complaint   Patient presents with    Follow-up     Review status of disease    Discuss Labs     Discussed results of lab work-up. INTERIM HISTORY:     Patient presents to the clinic for a follow-up visit accompanied by his wife. Patient lab work-up shows iron deficiency. Patient is scheduled for a cystoscopy with TURBT. Denies fever or chills. During this visit patient's allergy, social, medical, surgical history and medications were reviewed and updated. REVIEW OF SYSTEMS:   General: No fever or night sweats. Weight is stable. +fatigue   ENT: No double or blurred vision, no hearing problem, no dysphagia or sore throat   Respiratory: No chest pain, no shortness of breath, no cough or hemoptysis. Cardiovascular: Denies chest pain, PND or orthopnea. No L E swelling or palpitations. Gastrointestinal: No nausea or vomiting, abdominal pain, or constipation, diarrhea  Genitourinary: Denies dysuria, frequency, urgency or incontinence. +hematuria - resolved  Neurological: Denies headaches, decreased LOC, no sensory or motor focal deficits. Musculoskeletal: No arthralgia no back pain or joint swelling. Skin: There are no rashes or bleeding. Psych: Denies hallucinations or intentions to harm self      PHYSICAL EXAM:   Vitals: /78   Pulse 94   Temp 98.2 °F (36.8 °C) (Temporal)   Wt 223 lb 3.2 oz (101.2 kg)   SpO2 96%   BMI 32.96 kg/m²   General appearance: alert and cooperative with exam  HEENT: Head: Normocephalic, no lesions, without obvious abnormality.   Neck: no adenopathy  Lungs: clear to auscultation bilaterally  Heart: regular rate and rhythm, S1, S2 normal, no murmur, click, rub or gallop  Abdomen: soft, non-tender; bowel sounds normal; no masses,  no organomegaly  Extremities: extremities normal, atraumatic, no cyanosis or edema  Neurologic: Mental status: Alert, oriented, thought content appropriate      REVIEW OF LABORATORY DATA:   Lab Results   Component Value Date    WBC 8.1 09/27/2022    HGB 11.8 (L) 09/27/2022    HCT 36.9 (L) 09/27/2022    MCV 76.4 (L) 09/27/2022     09/27/2022       Chemistry        Component Value Date/Time     09/27/2022 1107    K 4.5 09/27/2022 1107     09/27/2022 1107    CO2 22 09/27/2022 1107    BUN 17 09/27/2022 1107    CREATININE 0.69 (L) 09/27/2022 1107        Component Value Date/Time    CALCIUM 9.4 09/27/2022 1107    ALKPHOS 95 09/27/2022 1107    AST 16 09/27/2022 1107    ALT 18 09/27/2022 1107    BILITOT 0.5 09/27/2022 1107        Lab Results   Component Value Date    QORDPSJW17 883 08/19/2022         Lab Results   Component Value Date    IRON 42 (L) 09/27/2022    TIBC 414 09/27/2022    FERRITIN 9 (L) 09/19/2022         IMPRESSION:   81year old male with history of bleeding ulcer back in 2015 and iron def, now with iron def anemia, and stool occult stools    -s/p IV iron with improvement in iron stores and hemoglobin  -EGD and colonoscopy 10/2019 did not show active bleeding, still no active source of iron def  -s/p GI in 12/3/19, concern for still blood loss, iron def, no plasns for repeat capsule video study, he has completed treatment for hepatitis C with Rebbecca Tate.  -repeat iron studies are consistent with iron deficiency  -transfuse if Hbg is less than 7.0    -B12 deficiency, patient started on B12 shots 7/2020  -Non-invasive urothelial carcinoma, low grade, TURBT, 04/2021    PLAN:   Personally reviewed results of lab work-up and other relevant clinical data  Reviewed results of iron studies. Patient developed iron deficiency and is also becoming anemic. Patient last iron infusion was back in June. I will place orders for Injectafer. Planning to check iron stores every 2 months. Patient is intolerant to oral iron  Fall on results of TURBT. Patient and his wife had multiple questions which answered the best my ability.       Yesenia Rae MD    This note is created with the assistance of a speech recognition program.  While intending to generate a document that actually reflects the content of the visit, the document can still have some errors including those of syntax and sound a like substitutions which may escape proof reading. It such instances, actual meaning can be extrapolated by contextual diversion.

## 2022-10-07 NOTE — TELEPHONE ENCOUNTER
Per Dr. Mina Ch- cysto bladder bx & fulguration 30min MAC      Attempted to contact patient for procedure scheduling. Left voicemail for patient to call back for procedure scheduling.

## 2022-10-11 ENCOUNTER — TELEPHONE (OUTPATIENT)
Dept: UROLOGY | Age: 72
End: 2022-10-11

## 2022-10-11 DIAGNOSIS — K21.9 GASTROESOPHAGEAL REFLUX DISEASE WITHOUT ESOPHAGITIS: ICD-10-CM

## 2022-10-11 DIAGNOSIS — R31.9 HEMATURIA, UNSPECIFIED TYPE: ICD-10-CM

## 2022-10-11 DIAGNOSIS — Z01.818 PRE-OP TESTING: ICD-10-CM

## 2022-10-11 DIAGNOSIS — C67.2 MALIGNANT NEOPLASM OF LATERAL WALL OF URINARY BLADDER (HCC): Primary | ICD-10-CM

## 2022-10-11 PROCEDURE — 93000 ELECTROCARDIOGRAM COMPLETE: CPT | Performed by: UROLOGY

## 2022-10-11 NOTE — TELEPHONE ENCOUNTER
Cysto, bladder bx and fulguration @ [facility] 10/28/22 12:30pm **STOP BLOOD THINNERS 10/21/22**   PAT sent to patient to be done right away           Spoke with Toro Ny, procedure info emailed.

## 2022-10-12 ENCOUNTER — HOSPITAL ENCOUNTER (OUTPATIENT)
Age: 72
Discharge: HOME | End: 2022-10-12
Payer: MEDICARE

## 2022-10-12 ENCOUNTER — TELEPHONE (OUTPATIENT)
Dept: FAMILY MEDICINE CLINIC | Age: 72
End: 2022-10-12

## 2022-10-12 ENCOUNTER — HOSPITAL ENCOUNTER (OUTPATIENT)
Age: 72
Discharge: HOME | End: 2022-10-14
Payer: MEDICARE

## 2022-10-12 DIAGNOSIS — Z01.818 PRE-OP TESTING: ICD-10-CM

## 2022-10-12 DIAGNOSIS — R31.9 HEMATURIA, UNSPECIFIED TYPE: ICD-10-CM

## 2022-10-12 DIAGNOSIS — C67.2 MALIGNANT NEOPLASM OF LATERAL WALL OF URINARY BLADDER (HCC): ICD-10-CM

## 2022-10-12 DIAGNOSIS — D50.8 OTHER IRON DEFICIENCY ANEMIA: ICD-10-CM

## 2022-10-12 DIAGNOSIS — I49.49 EXTRASYSTOLES: ICD-10-CM

## 2022-10-12 LAB
ABSOLUTE EOS #: 0.06 K/UL (ref 0–0.4)
ABSOLUTE LYMPH #: 1.32 K/UL (ref 1–4.8)
ABSOLUTE MONO #: 0.39 K/UL (ref 0.1–1.3)
BASOPHILS # BLD: 1 % (ref 0–2)
BASOPHILS ABSOLUTE: 0.06 K/UL (ref 0–0.2)
EOSINOPHILS RELATIVE PERCENT: 1 % (ref 0–4)
FERRITIN: 8 NG/ML (ref 30–400)
FOLATE: >20 NG/ML
HCT VFR BLD CALC: 37 % (ref 41–53)
HEMOGLOBIN: 11.7 G/DL (ref 13.5–17.5)
IRON SATURATION: 5 % (ref 20–55)
IRON: 20 UG/DL (ref 59–158)
LYMPHOCYTES # BLD: 24 % (ref 24–44)
MCH RBC QN AUTO: 23.8 PG (ref 26–34)
MCHC RBC AUTO-ENTMCNC: 31.8 G/DL (ref 31–37)
MCV RBC AUTO: 74.8 FL (ref 80–100)
MONOCYTES # BLD: 7 % (ref 1–7)
MORPHOLOGY: ABNORMAL
MORPHOLOGY: ABNORMAL
PDW BLD-RTO: 16.2 % (ref 11.5–14.9)
PLATELET # BLD: 304 K/UL (ref 150–450)
PMV BLD AUTO: 7 FL (ref 6–12)
RBC # BLD: 4.94 M/UL (ref 4.5–5.9)
SEG NEUTROPHILS: 67 % (ref 36–66)
SEGMENTED NEUTROPHILS ABSOLUTE COUNT: 3.67 K/UL (ref 1.3–9.1)
TOTAL IRON BINDING CAPACITY: 398 UG/DL (ref 250–450)
UNSATURATED IRON BINDING CAPACITY: 378 UG/DL (ref 112–347)
VITAMIN B-12: 647 PG/ML (ref 232–1245)
WBC # BLD: 5.5 K/UL (ref 3.5–11)

## 2022-10-12 PROCEDURE — 83540 ASSAY OF IRON: CPT

## 2022-10-12 PROCEDURE — 87186 SC STD MICRODIL/AGAR DIL: CPT

## 2022-10-12 PROCEDURE — 87077 CULTURE AEROBIC IDENTIFY: CPT

## 2022-10-12 PROCEDURE — 93005 ELECTROCARDIOGRAM TRACING: CPT

## 2022-10-12 PROCEDURE — 87086 URINE CULTURE/COLONY COUNT: CPT

## 2022-10-12 PROCEDURE — 83550 IRON BINDING TEST: CPT

## 2022-10-12 PROCEDURE — 82607 VITAMIN B-12: CPT

## 2022-10-12 PROCEDURE — 82746 ASSAY OF FOLIC ACID SERUM: CPT

## 2022-10-12 PROCEDURE — 82728 ASSAY OF FERRITIN: CPT

## 2022-10-12 PROCEDURE — 36415 COLL VENOUS BLD VENIPUNCTURE: CPT

## 2022-10-12 PROCEDURE — 85025 COMPLETE CBC W/AUTO DIFF WBC: CPT

## 2022-10-12 RX ORDER — PANTOPRAZOLE SODIUM 40 MG/1
TABLET, DELAYED RELEASE ORAL
Qty: 90 TABLET | Refills: 1 | Status: SHIPPED | OUTPATIENT
Start: 2022-10-12

## 2022-10-12 NOTE — TELEPHONE ENCOUNTER
Please Approve or Refuse.   Send to Pharmacy per Pt's Request:      Next Visit Date:  10/12/2022   Last Visit Date: 9/27/2022    Hemoglobin A1C (%)   Date Value   08/19/2022 6.6 (H)   03/12/2022 5.2   09/24/2021 5.0             ( goal A1C is < 7)   BP Readings from Last 3 Encounters:   10/07/22 123/78   09/27/22 138/80   08/24/22 (!) 156/81          (goal 120/80)  BUN   Date Value Ref Range Status   09/27/2022 17 8 - 23 mg/dL Final     Creatinine   Date Value Ref Range Status   09/27/2022 0.69 (L) 0.70 - 1.20 mg/dL Final     Potassium   Date Value Ref Range Status   09/27/2022 4.5 3.7 - 5.3 mmol/L Final

## 2022-10-12 NOTE — TELEPHONE ENCOUNTER
Cleared for surgery, I will put the form back in the box. Please let the patient know I clear him if he takes any baby aspirin, to stop it 7 days prior to surgery      ===========  ADOLFOI  Patient needs clearance for cystoscopy, with anesthesia MAC, surgery date 10/28/2022  Request received to hold off blood thinner but he is not on any blood thinner    Blood pressure controlled  BP Readings from Last 3 Encounters:   10/07/22 123/78   09/27/22 138/80   08/24/22 (!) 156/81     EKG completed 1/3/2022 normal sinus rhythm, normal EKG  Lab Results   Component Value Date    WBC 8.1 09/27/2022    HGB 11.8 (L) 09/27/2022    HCT 36.9 (L) 09/27/2022    MCV 76.4 (L) 09/27/2022     09/27/2022       Lab Results   Component Value Date    LABA1C 6.6 (H) 08/19/2022    LABA1C 5.2 03/12/2022    LABA1C 5.0 09/24/2021       Current Outpatient Medications on File Prior to Visit   Medication Sig Dispense Refill    vitamin D (ERGOCALCIFEROL) 1.25 MG (73292 UT) CAPS capsule Take 1 capsule by mouth once a week **Stop daily vitamin D** 12 capsule 0    SITagliptin (JANUVIA) 50 MG tablet Take 1 tablet by mouth daily 30 tablet 5    nortriptyline (PAMELOR) 10 MG capsule Take 1 capsule by mouth nightly For Polyneuropathy, diarrhea and insomnia 30 capsule 3    morphine (MS CONTIN) 15 MG extended release tablet Take 1 tablet by mouth 2 times daily for 30 days. 60 tablet 0    oxyCODONE-acetaminophen (PERCOCET) 5-325 MG per tablet Take 1 tablet by mouth every 8 hours as needed for Pain for up to 30 days. 90 tablet 0    pregabalin (LYRICA) 150 MG capsule Take 1 capsule by mouth in the morning and 1 capsule at noon and 1 capsule before bedtime. Do all this for 90 days.  90 capsule 2    pravastatin (PRAVACHOL) 40 MG tablet TAKE ONE TABLET BY MOUTH EVERY EVENING. STOP GEMFIBROZIL 90 tablet 3    baclofen (LIORESAL) 10 MG tablet Take 1 tablet by mouth 3 times daily as needed (back pain) 896 tablet 1    folic acid (FOLVITE) 1 MG tablet Take 1 tablet by mouth daily 90 tablet 1    cyanocobalamin 1000 MCG/ML injection INJECT 1 ML INTO THE MUSCLE EVERY 30 DAYS. CALL FOR NEXT REFILL WHICH WILL BE MONTHLY FOR LIFE 3 mL 3    metoprolol tartrate (LOPRESSOR) 25 MG tablet TAKE ONE TABLET BY MOUTH TWICE A  tablet 3    pantoprazole (PROTONIX) 40 MG tablet TAKE ONE TABLET BY MOUTH DAILY 90 tablet 1    glucose monitoring (FREESTYLE FREEDOM) kit Please supply Patient with a glucose monitoring kit that insurance will cover. 1 kit 0    blood glucose monitor strips Testing once a day. Please dispense according to patients insurance. 100 strip 3    Lancets 30G MISC Testing once a day. Please dispense according to patients insurance. 100 each 3    lisinopril (PRINIVIL;ZESTRIL) 10 MG tablet Take 1 tablet by mouth daily ** stop Lisinopril HCTZ** 90 tablet 3    fluticasone (FLONASE) 50 MCG/ACT nasal spray 2 sprays by Nasal route daily 16 g 0    Alcohol Swabs (B-D SINGLE USE SWABS REGULAR) PADS USE TO CHECK BLOOD SUGAR TWICE A  each 3    Syringe/Needle, Disp, (SYRINGE 3CC/25GX1\") 25G X 1\" 3 ML MISC To be used with B12 injections 50 each 2    loperamide (RA ANTI-DIARRHEAL) 2 MG capsule Take 1 capsule by mouth 4 times daily as needed for Diarrhea 112 capsule 2    Probiotic Product (PROBIOTIC-10 PO) Take by mouth daily        No current facility-administered medications on file prior to visit.            Future Appointments   Date Time Provider Pippa Cazares   10/14/2022 11:00 AM [facility] PB MED ONC CHAIR 10 [facility]   10/25/2022  3:00 PM [facility] PB MED ONC CHAIR 16 [facility]   10/26/2022  9:30 AM [facility]   1/27/2023 11:00 AM MD anup Roach [facility]   2/16/2023  2:00 PM [facility]   9/27/2023 11:00 AM Ronald Moya MD [facility] Yoan Murillo

## 2022-10-13 DIAGNOSIS — D50.8 IRON DEFICIENCY ANEMIA SECONDARY TO INADEQUATE DIETARY IRON INTAKE: Primary | ICD-10-CM

## 2022-10-13 LAB
CULTURE: ABNORMAL
EKG ATRIAL RATE: 100 BPM
EKG P AXIS: 48 DEGREES
EKG P-R INTERVAL: 146 MS
EKG Q-T INTERVAL: 344 MS
EKG QRS DURATION: 80 MS
EKG QTC CALCULATION (BAZETT): 443 MS
EKG R AXIS: 77 DEGREES
EKG T AXIS: 44 DEGREES
EKG VENTRICULAR RATE: 100 BPM
SPECIMEN DESCRIPTION: ABNORMAL

## 2022-10-13 PROCEDURE — 93010 ELECTROCARDIOGRAM REPORT: CPT | Performed by: INTERNAL MEDICINE

## 2022-10-13 NOTE — RESULT ENCOUNTER NOTE
Please notify patient normal EKG     Message sent to the wife was not read on 9/27/2022 patient message please give them the following message as an answer to her questions    Me  to [facility]    1:06 PM  Olegario Barrera     Regarding diarrhea we figure it out it could be related to having the gallbladder out as he said the symptoms started after 2019, and she also gets formed stools  He declined any new medication for diarrhea at this time        I did start him on Januvia 50 mg for diabetes  I did start him on nortriptyline 10 mg at nighttime     I do not think I mention lisinopril unless I reviewed the medications     I ordered blood work which he is already doing     I suggested for him to get COVID-19 vaccine booster at the pharmacy      Future Appointments  10/14/2022 11:00 AM   [facility] PB MED ONC CHAIR 10    [facility] 12  10/25/2022 3:00 PM    [facility]  10/26/2022 9:30 AM    DO PORFIRIO Marmolejo PAINMGT        [facility]  1/27/2023  11:00 AM   [facility]  2/16/2023  2:00 PM    [facility]el [facility]          [facility]  9/27/2023  11:00 AM   Yulissa Snider MD     [facility]

## 2022-10-14 ENCOUNTER — HOSPITAL ENCOUNTER (OUTPATIENT)
Dept: INFUSION THERAPY | Age: 72
Discharge: HOME | End: 2022-10-14
Payer: MEDICARE

## 2022-10-14 VITALS
HEART RATE: 93 BPM | DIASTOLIC BLOOD PRESSURE: 77 MMHG | TEMPERATURE: 97.5 F | SYSTOLIC BLOOD PRESSURE: 127 MMHG | RESPIRATION RATE: 16 BRPM

## 2022-10-14 DIAGNOSIS — N30.01 ACUTE CYSTITIS WITH HEMATURIA: Primary | ICD-10-CM

## 2022-10-14 DIAGNOSIS — D50.8 OTHER IRON DEFICIENCY ANEMIA: Primary | ICD-10-CM

## 2022-10-14 PROCEDURE — 2580000003 HC RX 258: Performed by: INTERNAL MEDICINE

## 2022-10-14 PROCEDURE — 96365 THER/PROPH/DIAG IV INF INIT: CPT

## 2022-10-14 PROCEDURE — 6360000002 HC RX W HCPCS: Performed by: INTERNAL MEDICINE

## 2022-10-14 RX ORDER — ALBUTEROL SULFATE 90 UG/1
4 AEROSOL, METERED RESPIRATORY (INHALATION) PRN
Status: CANCELLED | OUTPATIENT
Start: 2022-10-21

## 2022-10-14 RX ORDER — ACETAMINOPHEN 325 MG/1
650 TABLET ORAL
Status: CANCELLED | OUTPATIENT
Start: 2022-10-21

## 2022-10-14 RX ORDER — SODIUM CHLORIDE 9 MG/ML
INJECTION, SOLUTION INTRAVENOUS CONTINUOUS
Status: CANCELLED | OUTPATIENT
Start: 2022-10-21

## 2022-10-14 RX ORDER — ONDANSETRON 2 MG/ML
8 INJECTION INTRAMUSCULAR; INTRAVENOUS
Status: CANCELLED | OUTPATIENT
Start: 2022-10-21

## 2022-10-14 RX ORDER — DIPHENHYDRAMINE HYDROCHLORIDE 50 MG/ML
50 INJECTION INTRAMUSCULAR; INTRAVENOUS
Status: CANCELLED | OUTPATIENT
Start: 2022-10-21

## 2022-10-14 RX ORDER — SODIUM CHLORIDE 9 MG/ML
5-250 INJECTION, SOLUTION INTRAVENOUS PRN
Status: CANCELLED | OUTPATIENT
Start: 2022-10-21

## 2022-10-14 RX ORDER — SODIUM CHLORIDE 0.9 % (FLUSH) 0.9 %
5-40 SYRINGE (ML) INJECTION PRN
Status: CANCELLED | OUTPATIENT
Start: 2022-10-21

## 2022-10-14 RX ORDER — FAMOTIDINE 10 MG/ML
20 INJECTION, SOLUTION INTRAVENOUS
Status: CANCELLED | OUTPATIENT
Start: 2022-10-21

## 2022-10-14 RX ORDER — EPINEPHRINE 1 MG/ML
0.3 INJECTION, SOLUTION, CONCENTRATE INTRAVENOUS PRN
Status: CANCELLED | OUTPATIENT
Start: 2022-10-21

## 2022-10-14 RX ORDER — HEPARIN SODIUM (PORCINE) LOCK FLUSH IV SOLN 100 UNIT/ML 100 UNIT/ML
500 SOLUTION INTRAVENOUS PRN
Status: CANCELLED | OUTPATIENT
Start: 2022-10-21

## 2022-10-14 RX ORDER — SULFAMETHOXAZOLE AND TRIMETHOPRIM 800; 160 MG/1; MG/1
1 TABLET ORAL 2 TIMES DAILY
Qty: 14 TABLET | Refills: 0 | Status: SHIPPED | OUTPATIENT
Start: 2022-10-14 | End: 2022-10-21

## 2022-10-14 RX ORDER — SODIUM CHLORIDE 9 MG/ML
5-250 INJECTION, SOLUTION INTRAVENOUS PRN
Status: DISCONTINUED | OUTPATIENT
Start: 2022-10-14 | End: 2022-10-15 | Stop reason: HOSPADM

## 2022-10-14 RX ADMIN — FERRIC CARBOXYMALTOSE INJECTION 750 MG: 50 INJECTION, SOLUTION INTRAVENOUS at 11:00

## 2022-10-14 RX ADMIN — SODIUM CHLORIDE 150 ML/HR: 9 INJECTION, SOLUTION INTRAVENOUS at 10:58

## 2022-10-14 NOTE — TELEPHONE ENCOUNTER
Noted  Future Appointments   Date Time Provider Pippa Floydi   10/25/2022  3:00 PM [facility] PB MED ONC CHAIR 16 [facility]   10/26/2022  9:30 AM DO PORFIRIO Perez PAINMGT [facility]   12/9/2022 11:45 AM Shannan Gill MD [address] [place] [address]   1/27/2023 11:00 AM Vita Morley MD [facility]   2/16/2023  2:00 PM [facility]   9/27/2023 11:00 AM Vita Morley MD [facility]

## 2022-10-14 NOTE — PROGRESS NOTES
Contains abnormal data Culture, Urine  Order: [id number]  Status: Final result    Visible to patient: Yes (not seen)    Next appt: Today at 11:00 AM in Infusion Therapy ([facility]  MED ONC CHAIR 10)    Dx: Pre-op testing; Hematuria, unspecifie. .. Specimen Information: Urine, clean catch   0 Result Notes  Component 10/12/22 1204    Specimen Description . CLEAN CATCH URINE    Culture PROTEUS MIRABILIS 10 to 50,000 CFU/ML Abnormal     Resulting Agency [facility]        Susceptibility    Proteus mirabilis (1)    Antibiotic Interpretation Microscan Method Status    ampicillin Sensitive <=2 BACTERIAL SUSCEPTIBILITY PANEL KIYA Final    aztreonam Sensitive <=1 BACTERIAL SUSCEPTIBILITY PANEL KIYA Final    ceFAZolin Sensitive 8 BACTERIAL SUSCEPTIBILITY PANEL KIYA Final     Cefazolin sensitivity results can be used to predict the effectiveness of oral   cephalosporins (eg.  Cephalexin) in uncomplicated Urinary Tract Infections due to E. coli, K.    pneumoniae, and P. mirabilis        cefTRIAXone Sensitive <=1 BACTERIAL SUSCEPTIBILITY PANEL KIYA Final    ciprofloxacin Sensitive <=0.25 BACTERIAL SUSCEPTIBILITY PANEL KIYA Final    gentamicin Sensitive <=1 BACTERIAL SUSCEPTIBILITY PANEL KIYA Final    nitrofurantoin Resistant 128 BACTERIAL SUSCEPTIBILITY PANEL KIYA Final    tobramycin Sensitive <=1 BACTERIAL SUSCEPTIBILITY PANEL KIYA Final    trimethoprim-sulfamethoxazole Sensitive <=20 BACTERIAL SUSCEPTIBILITY PANEL KIYA Final    piperacillin-tazobactam Sensitive <=4 BACTERIAL SUSCEPTIBILITY PANEL KIYA Final          Specimen Collected: 10/12/22 12:04 EDT Last Resulted: 10/13/22 22:46 EDT                Cr: 0.69  Surgery 10/28/22

## 2022-10-14 NOTE — PROGRESS NOTES
Patient here for injectafer day 1 of 2. Vitals stable. Denies any issues at this time. He tolerated treatment well and stayed 1/2 hour post infusion. He was discharged home in stable condition and is due to return 10/25 for next treatment and 12/9 for MD visit.

## 2022-10-18 ENCOUNTER — TELEPHONE (OUTPATIENT)
Dept: ONCOLOGY | Age: 72
End: 2022-10-18

## 2022-10-18 DIAGNOSIS — R91.8 ABNORMAL CT LUNG SCREENING: Primary | ICD-10-CM

## 2022-10-18 NOTE — TELEPHONE ENCOUNTER
Our records indicate that your patient is coming due for their recommended 6 month follow up testing from  lung cancer screening. For your convenience, we have pended the order for the scan for you. If you do not agree with the need for the test, please cancel the order and let us know.      Sincerely,    [address] [facility]

## 2022-10-25 ENCOUNTER — HOSPITAL ENCOUNTER (OUTPATIENT)
Dept: INFUSION THERAPY | Age: 72
Discharge: HOME | End: 2022-10-25
Payer: MEDICARE

## 2022-10-25 VITALS
HEART RATE: 111 BPM | DIASTOLIC BLOOD PRESSURE: 77 MMHG | TEMPERATURE: 97.9 F | SYSTOLIC BLOOD PRESSURE: 129 MMHG | RESPIRATION RATE: 16 BRPM

## 2022-10-25 DIAGNOSIS — D50.8 OTHER IRON DEFICIENCY ANEMIA: Primary | ICD-10-CM

## 2022-10-25 PROCEDURE — 2580000003 HC RX 258: Performed by: INTERNAL MEDICINE

## 2022-10-25 PROCEDURE — 96365 THER/PROPH/DIAG IV INF INIT: CPT

## 2022-10-25 PROCEDURE — 6360000002 HC RX W HCPCS: Performed by: INTERNAL MEDICINE

## 2022-10-25 RX ORDER — SODIUM CHLORIDE 9 MG/ML
5-250 INJECTION, SOLUTION INTRAVENOUS PRN
Status: DISCONTINUED | OUTPATIENT
Start: 2022-10-25 | End: 2022-10-26 | Stop reason: HOSPADM

## 2022-10-25 RX ORDER — FAMOTIDINE 10 MG/ML
20 INJECTION, SOLUTION INTRAVENOUS
OUTPATIENT
Start: 2022-10-28

## 2022-10-25 RX ORDER — ACETAMINOPHEN 325 MG/1
650 TABLET ORAL
OUTPATIENT
Start: 2022-10-28

## 2022-10-25 RX ORDER — HEPARIN SODIUM (PORCINE) LOCK FLUSH IV SOLN 100 UNIT/ML 100 UNIT/ML
500 SOLUTION INTRAVENOUS PRN
OUTPATIENT
Start: 2022-10-28

## 2022-10-25 RX ORDER — DIPHENHYDRAMINE HYDROCHLORIDE 50 MG/ML
50 INJECTION INTRAMUSCULAR; INTRAVENOUS
OUTPATIENT
Start: 2022-10-28

## 2022-10-25 RX ORDER — EPINEPHRINE 1 MG/ML
0.3 INJECTION, SOLUTION, CONCENTRATE INTRAVENOUS PRN
OUTPATIENT
Start: 2022-10-28

## 2022-10-25 RX ORDER — SODIUM CHLORIDE 9 MG/ML
INJECTION, SOLUTION INTRAVENOUS CONTINUOUS
OUTPATIENT
Start: 2022-10-28

## 2022-10-25 RX ORDER — SODIUM CHLORIDE 9 MG/ML
5-250 INJECTION, SOLUTION INTRAVENOUS PRN
Status: CANCELLED | OUTPATIENT
Start: 2022-10-28

## 2022-10-25 RX ORDER — ONDANSETRON 2 MG/ML
8 INJECTION INTRAMUSCULAR; INTRAVENOUS
OUTPATIENT
Start: 2022-10-28

## 2022-10-25 RX ORDER — ALBUTEROL SULFATE 90 UG/1
4 AEROSOL, METERED RESPIRATORY (INHALATION) PRN
OUTPATIENT
Start: 2022-10-28

## 2022-10-25 RX ORDER — SODIUM CHLORIDE 0.9 % (FLUSH) 0.9 %
5-40 SYRINGE (ML) INJECTION PRN
OUTPATIENT
Start: 2022-10-28

## 2022-10-25 RX ORDER — SODIUM CHLORIDE 9 MG/ML
5-250 INJECTION, SOLUTION INTRAVENOUS PRN
OUTPATIENT
Start: 2022-10-28

## 2022-10-25 RX ADMIN — SODIUM CHLORIDE 100 ML/HR: 9 INJECTION, SOLUTION INTRAVENOUS at 14:58

## 2022-10-25 RX ADMIN — FERRIC CARBOXYMALTOSE INJECTION 750 MG: 50 INJECTION, SOLUTION INTRAVENOUS at 15:02

## 2022-10-25 NOTE — PROGRESS NOTES
Patient arrived for 2 of 2 injectafer. Tolerated infusion w/o incident and left in stable condition. Returns 12/9 for Dr. Seema Talamantes.

## 2022-10-26 ENCOUNTER — HOSPITAL ENCOUNTER (OUTPATIENT)
Dept: PAIN MANAGEMENT | Age: 72
Discharge: HOME | End: 2022-10-26
Payer: MEDICARE

## 2022-10-26 ENCOUNTER — HOSPITAL ENCOUNTER (OUTPATIENT)
Dept: CT IMAGING | Age: 72
Discharge: HOME | End: 2022-10-28
Payer: MEDICARE

## 2022-10-26 VITALS
TEMPERATURE: 97.3 F | SYSTOLIC BLOOD PRESSURE: 144 MMHG | OXYGEN SATURATION: 93 % | WEIGHT: 223 LBS | BODY MASS INDEX: 32.93 KG/M2 | HEART RATE: 100 BPM | DIASTOLIC BLOOD PRESSURE: 87 MMHG

## 2022-10-26 DIAGNOSIS — M47.816 LUMBAR SPONDYLOSIS: Primary | Chronic | ICD-10-CM

## 2022-10-26 DIAGNOSIS — R91.8 ABNORMAL CT LUNG SCREENING: ICD-10-CM

## 2022-10-26 DIAGNOSIS — M48.061 SPINAL STENOSIS OF LUMBAR REGION, UNSPECIFIED WHETHER NEUROGENIC CLAUDICATION PRESENT: Chronic | ICD-10-CM

## 2022-10-26 DIAGNOSIS — M54.16 LUMBAR RADICULOPATHY: Chronic | ICD-10-CM

## 2022-10-26 DIAGNOSIS — Z79.891 CHRONIC USE OF OPIATE DRUG FOR THERAPEUTIC PURPOSE: ICD-10-CM

## 2022-10-26 PROCEDURE — 99214 OFFICE O/P EST MOD 30 MIN: CPT | Performed by: STUDENT IN AN ORGANIZED HEALTH CARE EDUCATION/TRAINING PROGRAM

## 2022-10-26 PROCEDURE — 71250 CT THORAX DX C-: CPT

## 2022-10-26 PROCEDURE — 99213 OFFICE O/P EST LOW 20 MIN: CPT

## 2022-10-26 RX ORDER — OXYCODONE HYDROCHLORIDE AND ACETAMINOPHEN 5; 325 MG/1; MG/1
1 TABLET ORAL EVERY 6 HOURS PRN
Qty: 60 TABLET | Refills: 0 | OUTPATIENT
Start: 2022-10-28 | End: 2022-10-26 | Stop reason: SDUPTHER

## 2022-10-26 RX ORDER — MORPHINE SULFATE 15 MG/1
15 TABLET, FILM COATED, EXTENDED RELEASE ORAL 2 TIMES DAILY
Qty: 60 TABLET | Refills: 0 | Status: SHIPPED | OUTPATIENT
Start: 2022-10-28 | End: 2022-11-22 | Stop reason: SDUPTHER

## 2022-10-26 RX ORDER — OXYCODONE HYDROCHLORIDE AND ACETAMINOPHEN 5; 325 MG/1; MG/1
1 TABLET ORAL 2 TIMES DAILY PRN
Qty: 60 TABLET | Refills: 0 | Status: SHIPPED | OUTPATIENT
Start: 2022-10-28 | End: 2022-11-22 | Stop reason: SDUPTHER

## 2022-10-26 NOTE — PROGRESS NOTES
History of migraine headaches     History of stress test 07/2020    \"low risk\"    Hyperlipidemia     Iron (Fe) deficiency anemia     Neuropathy     Poor historian     states his wife takes care of all medical information    Positive FIT (fecal immunochemical test)     Type 2 diabetes mellitus with microalbuminuria, without long-term current use of insulin ([facility] [address] 07/13/2020    managed by Dr. Mattie Smiley last e-visit 11/2021    Under care of team 02/09/2022    pcp-Dr Lyndon Crowder virtual visit 11/2021    Under care of team 02/09/2022    hematology-Dr Caprice Saavedra 34 virtual visit 11/2021    Under care of team 02/09/2022    urology-Dr fairchild-last visit nov 2021    Wears dentures     Wears glasses     Worsening headaches 12/29/2020       Past Surgical History:   Procedure Laterality Date    CERVICAL SPINE SURGERY  1977    cervical spine three times, has plate    CHOLECYSTECTOMY  03/22/2019    COLONOSCOPY  01/25/2015    10 yr recall, hemorrhoids    COLONOSCOPY N/A 10/08/2019    tubular adenoma x2    COLONOSCOPY N/A 06/07/2022    COLONOSCOPY DIAGNOSTIC performed by Sierra Farooq MD at [address] [place] [address] Right 04/29/2021    CYSTOSCOPY TUR BLADDER TUMOR, GYRUS, RIGHT STENT PLACEMENT AND RIGHT STENT REMOVAL [facility] N/A 09/09/2021    CYSTOSCOPY, TRANSURETHRAL RESECTION BLADDER TUMOR performed by Hilton Soliman MD at [address] [place] [address] N/A 02/16/2022    CYSTOSCOPY BLADDER BIOPSY, FULGURATION performed by Hilton Soliman MD at [address] [place] [address]  06/17/2022    TUR-BT    CYSTOSCOPY N/A 6/17/2022    CYSTOSCOPY, TUR BLADDER TUMOR, GYRUS performed by Hilton Soliman MD at [facility] [address]  10/2015    all teeth extracted    ENDOSCOPY, COLON, DIAGNOSTIC      HERNIA REPAIR N/A 08/07/2020    HERNIA INCISIONAL REPAIR LAPAROSCOPIC ROBOTIC WITH MESH performed by Katerine Love DO at [facility] Right     UMBILICAL HERNIA REPAIR  3022-19    UPPER GASTROINTESTINAL ENDOSCOPY  2015    UPPER GASTROINTESTINAL ENDOSCOPY N/A 10/08/2019    EGD BIOPSY performed by Inderjit Zendejas MD at [address] [place] [address] N/A 2022    EGD BIOPSY OF DUODENUM, GE JUNCTION AND GASTRIC ANTRUM performed by Inderjit Zendejas MD at [facility]       Family History   Problem Relation Age of Onset    Diabetes Mother     Heart Disease Father     High Blood Pressure Father        Social History     Socioeconomic History    Marital status:      Spouse name: Not on file    Number of children: Not on file    Years of education: Not on file    Highest education level: Not on file   Occupational History    Occupation: disabled   Tobacco Use    Smoking status: Former     Packs/day: 2.00     Years: 45.00     Pack years: 90.00     Types: Cigarettes     Start date: 1968     Quit date: 2015     Years since quittin.8    Smokeless tobacco: Never    Tobacco comments:     on chantix 11-6-15   12-7-15   Vaping Use    Vaping Use: Never used   Substance and Sexual Activity    Alcohol use: No    Drug use: No    Sexual activity: Yes     Partners: Female   Other Topics Concern    Not on file   Social History Narrative    Not on file     Social Determinants of Health     Financial Resource Strain: Low Risk     Difficulty of Paying Living Expenses: Not hard at all   Food Insecurity: No Food Insecurity    Worried About [address] in the Last Year: Never true    [facility] in the Last Year: Never true   Transportation Needs: No Transportation Needs    Lack of Transportation (Medical): No    Lack of Transportation (Non-Medical):  No   Physical Activity: Insufficiently Active    Days of Exercise per Week: 2 days    Minutes of Exercise per Session: 40 min   Stress: Not on file   Social Connections: Not on file   Intimate Partner Violence: Not on file   Housing Stability: Unknown    Unable to Pay for Housing in the Last Year: No    Number of Places Lived in the Last Year: Not on file    Unstable Housing in the Last Year: No       Medications & Allergies:   Current Outpatient Medications   Medication Instructions    Alcohol Swabs (B-D SINGLE USE SWABS REGULAR) PADS USE TO CHECK BLOOD SUGAR TWICE A DAY    baclofen (LIORESAL) 10 mg, Oral, 3 TIMES DAILY PRN    blood glucose monitor strips Testing once a day. Please dispense according to patients insurance. cyanocobalamin 1,000 mcg, IntraMUSCular, EVERY 30 DAYS, Call for next refill which will be monthly for life    fluticasone (FLONASE) 50 MCG/ACT nasal spray 2 sprays, Nasal, DAILY    folic acid (FOLVITE) 1 mg, Oral, DAILY    glucose monitoring (FREESTYLE FREEDOM) kit Please supply Patient with a glucose monitoring kit that insurance will cover. Lancets 30G MISC Testing once a day. Please dispense according to patients insurance.     lisinopril (PRINIVIL;ZESTRIL) 10 mg, Oral, DAILY, ** stop Lisinopril HCTZ**    loperamide (RA ANTI-DIARRHEAL) 2 mg, Oral, 4 TIMES DAILY PRN    metoprolol tartrate (LOPRESSOR) 25 MG tablet TAKE ONE TABLET BY MOUTH TWICE A DAY    [START ON 10/28/2022] morphine (MS CONTIN) 15 mg, Oral, 2 TIMES DAILY    nortriptyline (PAMELOR) 10 mg, Oral, NIGHTLY, For Polyneuropathy, diarrhea and insomnia    oxyCODONE-acetaminophen (PERCOCET) 5-325 MG per tablet 1 tablet, Oral, EVERY 8 HOURS PRN    [START ON 10/28/2022] oxyCODONE-acetaminophen (PERCOCET) 5-325 MG per tablet 1 tablet, Oral, 2 TIMES DAILY PRN    pantoprazole (PROTONIX) 40 MG tablet TAKE ONE TABLET BY MOUTH DAILY    pravastatin (PRAVACHOL) 40 MG tablet TAKE ONE TABLET BY MOUTH EVERY EVENING. STOP GEMFIBROZIL    pregabalin (LYRICA) 150 mg, Oral, 3 TIMES DAILY    Probiotic Product (PROBIOTIC-10 PO) Oral, DAILY    SITagliptin (JANUVIA) 50 mg, Oral, DAILY    Syringe/Needle, Disp, (SYRINGE 3CC/25GX1\") 25G X 1\" 3 ML MISC To be used with B12 injections    vitamin D (ERGOCALCIFEROL) 50,000 Units, Oral, WEEKLY, **Stop daily vitamin D**       Allergies   Allergen Reactions    Asa [Aspirin]       iron deficiency anemia , requiring iron infusions and transfusions    Iron      Pill- constipation, abdominal pain    Nsaids       iron deficiency anemia, history of bleeding ulcers        Review of Systems:   Constitutional: negative for weight changes or fevers  Cardiovascular: negative for chest pain, palpitations, irregular heart beat  Respiratory: negative for dyspnea, cough, wheezing  Gastrointestinal: negative for constipation, diarrhea, nausea  Genitourinary: negative for bowel/bladder incontinence   Musculoskeletal: positive for low back pain  Neurological: Positive for radicular leg pain, leg weakness or numbness/tingling  Behavioral/Psych: negative for anxiety/depression   Hematological: negative for abnormal bleeding, anticoagulation use or antiplatelet use  All other systems reviewed and are negative    OBJECTIVE:    Vitals:    10/26/22 0915   BP: (!) 144/87   Pulse: 100   Temp: 97.3 °F (36.3 °C)   SpO2: 93%       PHYSICAL EXAM    GENERAL: No acute distress, pleasant, well-appearing  HEENT: Normocephalic, atraumatic, Pupils equal and round  CARDIOVASCULAR: Well perfused, No peripheral cyanosis  PULMONARY: Good chest wall excursion, breathing unlabored  PSYCH: Appropriate affect and insight, non-pressured speech  SKIN: No rashes or lesions  MUSCULOSKELETAL:  Inspection: The back and extremities are symmetric and aligned. Muscle bulk is normal in appearance.   Palpation: There is tenderness to palpation along the lumbar paraspinal musculature bilaterally  Lumbar range of motion is full  NEUROMUSCULAR:  Patient ambulates unassisted  Gait is antalgic  Sensation to light touch is intact throughout lower extremities  Strength is full in lower extremities  No ankle clonus    DIAGNOSIS:    ICD-10-CM    1. Lumbar spondylosis  M47.816 morphine (MS CONTIN) 15 MG extended release tablet     oxyCODONE-acetaminophen (PERCOCET) 5-325 MG per tablet     DISCONTINUED: oxyCODONE-acetaminophen (PERCOCET) 5-325 MG per tablet      2. Lumbar radiculopathy  M54.16 morphine (MS CONTIN) 15 MG extended release tablet      3. Spinal stenosis of lumbar region, unspecified whether neurogenic claudication present  M48.061 morphine (MS CONTIN) 15 MG extended release tablet      4. Chronic use of opiate drug for therapeutic purpose  Z79.891            ASSESSMENT:    Garlin Cranker is a 81 y.o.male who presents with chronic low back pain and leg pain as well as opioid management visit. To review, patient has had low back pain for greater than 5 years. He denies any previous low back surgeries. The patient's history and physical examination are consistent with lumbar spondylosis and lumbar radiculopathy. Neurologically, it appears the patient has full strength and normal sensation. There is no evidence of myelopathy on examination. There are no red flags in the patient's history. The patient continues to take opioid medications to improve pain, function and quality of life. The patient denies any side effects from the medications including constipation or respiratory depression. The patient reports adequate analgesia with the medication. There is no evidence of aberrant behavior. At today's visit, I will decrease his Percocet from 5/325 mg 3 times daily as needed to twice daily as needed. Patient agrees with the treatment plan. PLAN:  Medications:     For nonopioid therapy, the following medications were prescribed:    -Continue medication prescribed by primary care physician    Opioid therapy:  -Continue morphine extended release 15 mg twice daily  -Decrease Percocet from 5/325 mg 3 times daily as needed to twice daily as needed  -Pain Treatment agreement: Up to date  -Urine Drug Screen: 3/29/2022, reviewed and appropriate  -OARRS reviewed and appropriate    Interventions:  -Offered interventional spine procedure, patient deferred    Imaging:  -Reviewed lumbar MRI with patient in room    Behavioral Therapies:  -Continue daily stretching and home exercise program    Referrals:  -None    Follow-up Plan:  -1 month with NP    Patient was offered intervention where appropriate. Multi-modal Pain Therapy: The patient was explicitly considered for multimodal and interdisciplinary therapy. Non-opioid and non-pharmacological opportunities to enhance analgesia and quality of life have been and will continue to be pursued. Opioid Therapy: Education provided to patient regarding short term and long term implications of opioid medication use. Repeat opioid risk stratification today, discussion regarding functional achievements, safe storage, and optimization of non-opioid interventional, behavioral, and pharmaceutical modalities. Will continue attempt to wean off medication as appropriate. Nat Adkins, DO  Interventional Pain Management/PM&R   [facility]    Orders Placed This Encounter    morphine (MS CONTIN) 15 MG extended release tablet     Sig: Take 1 tablet by mouth 2 times daily for 30 days. Dispense:  60 tablet     Refill:  0     Reduce doses taken as pain becomes manageable    DISCONTD: oxyCODONE-acetaminophen (PERCOCET) 5-325 MG per tablet     Sig: Take 1 tablet by mouth every 6 hours as needed for Pain for up to 30 days. Dispense:  60 tablet     Refill:  0     Reduce doses taken as pain becomes manageable    oxyCODONE-acetaminophen (PERCOCET) 5-325 MG per tablet     Sig: Take 1 tablet by mouth 2 times daily as needed for Pain for up to 30 days.      Dispense:  60 tablet     Refill:  0     Reduce doses taken as pain becomes manageable

## 2022-10-28 ENCOUNTER — ANESTHESIA (OUTPATIENT)
Dept: OPERATING ROOM | Age: 72
End: 2022-10-28
Payer: MEDICARE

## 2022-10-28 ENCOUNTER — ANESTHESIA EVENT (OUTPATIENT)
Dept: OPERATING ROOM | Age: 72
End: 2022-10-28
Payer: MEDICARE

## 2022-10-28 ENCOUNTER — HOSPITAL ENCOUNTER (OUTPATIENT)
Age: 72
Setting detail: OUTPATIENT SURGERY
Discharge: HOME | End: 2022-10-28
Attending: UROLOGY | Admitting: UROLOGY
Payer: MEDICARE

## 2022-10-28 VITALS
HEIGHT: 69 IN | WEIGHT: 220 LBS | RESPIRATION RATE: 16 BRPM | DIASTOLIC BLOOD PRESSURE: 79 MMHG | SYSTOLIC BLOOD PRESSURE: 114 MMHG | HEART RATE: 81 BPM | TEMPERATURE: 97.9 F | OXYGEN SATURATION: 95 % | BODY MASS INDEX: 32.58 KG/M2

## 2022-10-28 DIAGNOSIS — C67.9 MALIGNANT NEOPLASM OF URINARY BLADDER, UNSPECIFIED SITE (HCC): ICD-10-CM

## 2022-10-28 LAB — GLUCOSE BLD-MCNC: 162 MG/DL (ref 75–110)

## 2022-10-28 PROCEDURE — 7100000041 HC SPAR PHASE II RECOVERY - ADDTL 15 MIN: Performed by: UROLOGY

## 2022-10-28 PROCEDURE — 3700000001 HC ADD 15 MINUTES (ANESTHESIA): Performed by: UROLOGY

## 2022-10-28 PROCEDURE — 3600000004 HC SURGERY LEVEL 4 BASE: Performed by: UROLOGY

## 2022-10-28 PROCEDURE — 3700000000 HC ANESTHESIA ATTENDED CARE: Performed by: UROLOGY

## 2022-10-28 PROCEDURE — 82947 ASSAY GLUCOSE BLOOD QUANT: CPT

## 2022-10-28 PROCEDURE — 7100000040 HC SPAR PHASE II RECOVERY - FIRST 15 MIN: Performed by: UROLOGY

## 2022-10-28 PROCEDURE — 2580000003 HC RX 258: Performed by: ANESTHESIOLOGY

## 2022-10-28 PROCEDURE — 2580000003 HC RX 258: Performed by: UROLOGY

## 2022-10-28 PROCEDURE — 2709999900 HC NON-CHARGEABLE SUPPLY: Performed by: UROLOGY

## 2022-10-28 PROCEDURE — 6360000002 HC RX W HCPCS

## 2022-10-28 PROCEDURE — 2500000003 HC RX 250 WO HCPCS

## 2022-10-28 PROCEDURE — 3600000014 HC SURGERY LEVEL 4 ADDTL 15MIN: Performed by: UROLOGY

## 2022-10-28 PROCEDURE — 88305 TISSUE EXAM BY PATHOLOGIST: CPT

## 2022-10-28 RX ORDER — LIDOCAINE HYDROCHLORIDE 10 MG/ML
INJECTION, SOLUTION EPIDURAL; INFILTRATION; INTRACAUDAL; PERINEURAL PRN
Status: DISCONTINUED | OUTPATIENT
Start: 2022-10-28 | End: 2022-10-28 | Stop reason: SDUPTHER

## 2022-10-28 RX ORDER — FENTANYL CITRATE 50 UG/ML
50 INJECTION, SOLUTION INTRAMUSCULAR; INTRAVENOUS EVERY 5 MIN PRN
Status: CANCELLED | OUTPATIENT
Start: 2022-10-28

## 2022-10-28 RX ORDER — PHENAZOPYRIDINE HYDROCHLORIDE 100 MG/1
100 TABLET, FILM COATED ORAL 3 TIMES DAILY PRN
Qty: 9 TABLET | Refills: 0 | Status: SHIPPED | OUTPATIENT
Start: 2022-10-28 | End: 2022-10-31

## 2022-10-28 RX ORDER — SODIUM CHLORIDE 0.9 % (FLUSH) 0.9 %
5-40 SYRINGE (ML) INJECTION PRN
Status: CANCELLED | OUTPATIENT
Start: 2022-10-28

## 2022-10-28 RX ORDER — SODIUM CHLORIDE 0.9 % (FLUSH) 0.9 %
5-40 SYRINGE (ML) INJECTION EVERY 12 HOURS SCHEDULED
Status: CANCELLED | OUTPATIENT
Start: 2022-10-28

## 2022-10-28 RX ORDER — PROPOFOL 10 MG/ML
INJECTION, EMULSION INTRAVENOUS CONTINUOUS PRN
Status: DISCONTINUED | OUTPATIENT
Start: 2022-10-28 | End: 2022-10-28 | Stop reason: SDUPTHER

## 2022-10-28 RX ORDER — FENTANYL CITRATE 50 UG/ML
25 INJECTION, SOLUTION INTRAMUSCULAR; INTRAVENOUS EVERY 5 MIN PRN
Status: CANCELLED | OUTPATIENT
Start: 2022-10-28

## 2022-10-28 RX ORDER — FENTANYL CITRATE 50 UG/ML
INJECTION, SOLUTION INTRAMUSCULAR; INTRAVENOUS PRN
Status: DISCONTINUED | OUTPATIENT
Start: 2022-10-28 | End: 2022-10-28 | Stop reason: SDUPTHER

## 2022-10-28 RX ORDER — PROPOFOL 10 MG/ML
INJECTION, EMULSION INTRAVENOUS PRN
Status: DISCONTINUED | OUTPATIENT
Start: 2022-10-28 | End: 2022-10-28 | Stop reason: SDUPTHER

## 2022-10-28 RX ORDER — SODIUM CHLORIDE 9 MG/ML
INJECTION, SOLUTION INTRAVENOUS PRN
Status: CANCELLED | OUTPATIENT
Start: 2022-10-28

## 2022-10-28 RX ORDER — SODIUM CHLORIDE, SODIUM LACTATE, POTASSIUM CHLORIDE, CALCIUM CHLORIDE 600; 310; 30; 20 MG/100ML; MG/100ML; MG/100ML; MG/100ML
INJECTION, SOLUTION INTRAVENOUS CONTINUOUS
Status: DISCONTINUED | OUTPATIENT
Start: 2022-10-28 | End: 2022-10-28 | Stop reason: HOSPADM

## 2022-10-28 RX ORDER — MAGNESIUM HYDROXIDE 1200 MG/15ML
LIQUID ORAL PRN
Status: DISCONTINUED | OUTPATIENT
Start: 2022-10-28 | End: 2022-10-28 | Stop reason: ALTCHOICE

## 2022-10-28 RX ORDER — MIDAZOLAM HYDROCHLORIDE 1 MG/ML
INJECTION INTRAMUSCULAR; INTRAVENOUS PRN
Status: DISCONTINUED | OUTPATIENT
Start: 2022-10-28 | End: 2022-10-28 | Stop reason: SDUPTHER

## 2022-10-28 RX ORDER — ONDANSETRON 2 MG/ML
4 INJECTION INTRAMUSCULAR; INTRAVENOUS
Status: CANCELLED | OUTPATIENT
Start: 2022-10-28 | End: 2022-10-29

## 2022-10-28 RX ADMIN — SODIUM CHLORIDE, POTASSIUM CHLORIDE, SODIUM LACTATE AND CALCIUM CHLORIDE: 600; 310; 30; 20 INJECTION, SOLUTION INTRAVENOUS at 11:49

## 2022-10-28 RX ADMIN — FENTANYL CITRATE 50 MCG: 50 INJECTION, SOLUTION INTRAMUSCULAR; INTRAVENOUS at 12:23

## 2022-10-28 RX ADMIN — LIDOCAINE HYDROCHLORIDE 50 MG: 10 INJECTION, SOLUTION EPIDURAL; INFILTRATION; INTRACAUDAL; PERINEURAL at 12:23

## 2022-10-28 RX ADMIN — MIDAZOLAM 2 MG: 1 INJECTION INTRAMUSCULAR; INTRAVENOUS at 12:23

## 2022-10-28 RX ADMIN — PROPOFOL 100 MCG/KG/MIN: 10 INJECTION, EMULSION INTRAVENOUS at 12:23

## 2022-10-28 RX ADMIN — PROPOFOL 30 MG: 10 INJECTION, EMULSION INTRAVENOUS at 12:23

## 2022-10-28 RX ADMIN — Medication 2000 MG: at 12:30

## 2022-10-28 RX ADMIN — FENTANYL CITRATE 50 MCG: 50 INJECTION, SOLUTION INTRAMUSCULAR; INTRAVENOUS at 12:35

## 2022-10-28 ASSESSMENT — PAIN SCALES - GENERAL
PAINLEVEL_OUTOF10: 0
PAINLEVEL_OUTOF10: 0

## 2022-10-28 ASSESSMENT — PAIN - FUNCTIONAL ASSESSMENT: PAIN_FUNCTIONAL_ASSESSMENT: 0-10

## 2022-10-28 NOTE — OP NOTE
FACILITY:  [address] [place] [address]   Scotty Ordonez  1950  [id number]    DATE: 10/28/22  SURGEON:  Dr. Trey Larson MD  ASSISTANT: José Ozuna MD  PREOPERATIVE DIAGNOSIS:  Multifocal bladder tumor  History of low grade bladder cancer (total size 2 cm)  POSTOPERATIVE DIAGNOSIS:  Multifocal bladder tumor  History of low grade bladder cancer (total size 2 cm)  PROCEDURES PERFORMED:  1. Cystourethroscopy  2. Bladder biopsy with fulguration  ANESTHESIA:  MAC  COMPLICATIONS:  None  DRAINS:  None  SPECIMEN:  ID Type Source Tests Collected by Time Destination   A : BLADDER BIOPSY  Tissue Bladder SURGICAL PATHOLOGY Tomas Molina MD 10/28/2022 1233    ESTIMATED BLOOD LOSS:  Less than 5 mL      INDICATIONS FOR THE PROCEDURE:  Scotty Ordonez is a 81 y.o. male presents with a history of low grade bladder cancer. A cystoscopy was performed which showed multifocal papillary bladder tumor. The patient follows up today to obtain tissue diagnosis. The risks and benefits of the procedure, as well as possible alternatives and complications were discussed and he consented. DETAILS OF THE PROCEDURE:  The patient was correctly identified in the preoperative holding area. He was brought back to the operating room and placed in the dorsal lithotomy position. Anesthesia was administered; antibiotics administered by Anesthesia. EPC cuffs were on and functional. Patient was then prepped and draped in the usual sterile fashion. Once an appropriate time out had been performed, with all parties  consenting, a 25 Somali cystoscope with a 30-degree lens was placed through the urethra into the bladder. A thorough and complete cystoscopy was performed. Abnormalities include: Papillary tumors at posterior bladder wall and dome of bladder. The posterior papillary tumor was biopsied with a cold cup biopsy forcep, and then the area was fulgurated with a bugbee electrode. The other tumors were fulgurated.   Hemostasis was achieved. The specimen was sent to pathology. The bladder was drained and the cystoscope was removed. The patient tolerated the procedure well and was sent to the PACU for postoperative monitoring. Dr. Hayden Puente was present and scrubbed for the entirety of the procedure. DISPOSITION:  The patient was discharged home in stable condition with instructions to follow up in clinic for pathology results.

## 2022-10-28 NOTE — DISCHARGE INSTRUCTIONS
Cystoscopy bladder biopsy with fulguration. You may see blood in the urine after the procedure. This should resolve over the next couple days. Please stay hydrated. You may see intermittent blood in the urine while the stent is in place. This is expected. Pt ok to discharge home in good condition  No heavy lifting, >10 lbs for today  Pt should avoid strenuous activity for today  Pt should walk moderately at home  Pt ok to shower   Pt may resume diet as tolerated  Please call attending physician or hospital  with questions  Call or Present to ED if fever (> 101F), intractable nausea vomiting or pain. Pt should follow up with Dr. Saulo Garrett, in 2 weeks for pathology results, call to confirm appointment     No alcoholic beverages, no driving or operating machinery, no making important decisions for 24 hours. Children should maintain quiet play ( games, movies, books ) for 24 hours. You may have a normal diet but should eat lightly day of surgery. Drink plenty of fluids.   Urinate within 8 hours after surgery, if unable to urinate call your doctor

## 2022-10-28 NOTE — ANESTHESIA PRE PROCEDURE
Department of Anesthesiology  Preprocedure Note       Name:  Patrice Anderson   Age:  81 y.o.  :  1950                                          MRN:  [id number]         Date:  10/28/2022      Surgeon: Guadalupe Simms):  Cornelius Marie MD    Procedure: Procedure(s):  CYSTOSCOPY BLADDER BIOPSY WITH FULGARATION    Medications prior to admission:   Prior to Admission medications    Medication Sig Start Date End Date Taking? Authorizing Provider   morphine (MS CONTIN) 15 MG extended release tablet Take 1 tablet by mouth 2 times daily for 30 days. 10/28/22 11/27/22  Khurram Baker DO   oxyCODONE-acetaminophen (PERCOCET) 5-325 MG per tablet Take 1 tablet by mouth 2 times daily as needed for Pain for up to 30 days. 10/28/22 11/27/22  Khurram Baker DO   pantoprazole (PROTONIX) 40 MG tablet TAKE ONE TABLET BY MOUTH DAILY 10/12/22   Anabela Pattreson MD   vitamin D (ERGOCALCIFEROL) 1.25 MG (27754 UT) CAPS capsule Take 1 capsule by mouth once a week **Stop daily vitamin D** 22   Anabela Patterson MD   SITagliptin (JANUVIA) 50 MG tablet Take 1 tablet by mouth daily 22   Anabela Patterson MD   nortriptyline (PAMELOR) 10 MG capsule Take 1 capsule by mouth nightly For Polyneuropathy, diarrhea and insomnia 22   Anabela Patterson MD   pregabalin (LYRICA) 150 MG capsule Take 1 capsule by mouth in the morning and 1 capsule at noon and 1 capsule before bedtime. Do all this for 90 days. 8/15/22 11/13/22  Lenora Tobias, APRN - CNP   pravastatin (PRAVACHOL) 40 MG tablet TAKE ONE TABLET BY MOUTH EVERY EVENING. STOP GEMFIBROZIL 22   Anabela Patterson MD   baclofen (LIORESAL) 10 MG tablet Take 1 tablet by mouth 3 times daily as needed (back pain) 22   Anabela Patterson MD   folic acid (FOLVITE) 1 MG tablet Take 1 tablet by mouth daily 22   Ezio Claros MD   cyanocobalamin 1000 MCG/ML injection INJECT 1 ML INTO THE MUSCLE EVERY 30 DAYS.  CALL FOR NEXT REFILL WHICH WILL BE MONTHLY FOR LIFE 5/16/22   Marla Collins MD   metoprolol tartrate (LOPRESSOR) 25 MG tablet TAKE ONE TABLET BY MOUTH TWICE A DAY 5/4/22   Marla Collins MD   glucose monitoring (FREESTYLE FREEDOM) kit Please supply Patient with a glucose monitoring kit that insurance will cover. 3/28/22   Marla Collins MD   blood glucose monitor strips Testing once a day. Please dispense according to patients insurance. 3/28/22   Marla Collins MD   Lancets 30G MISC Testing once a day. Please dispense according to patients insurance. 3/28/22   Marla Collins MD   lisinopril (PRINIVIL;ZESTRIL) 10 MG tablet Take 1 tablet by mouth daily ** stop Lisinopril HCTZ** 2/25/22   Marla Collins MD   fluticasone (FLONASE) 50 MCG/ACT nasal spray 2 sprays by Nasal route daily  Patient taking differently: 2 sprays by Nasal route daily as needed 1/5/22   Mela Howell MD   Alcohol Swabs (B-D SINGLE USE SWABS REGULAR) PADS USE TO CHECK BLOOD SUGAR TWICE A DAY 5/4/21   Marla Collins MD   Syringe/Needle, Disp, (SYRINGE 3CC/25GX1\") 25G X 1\" 3 ML MISC To be used with B12 injections 1/8/21   Marla Collins MD   loperamide (RA ANTI-DIARRHEAL) 2 MG capsule Take 1 capsule by mouth 4 times daily as needed for Diarrhea 1/5/21   Edmond Ramirez MD   Probiotic Product (PROBIOTIC-10 PO) Take by mouth daily     Historical Provider, MD       Current medications:    Current Facility-Administered Medications   Medication Dose Route Frequency Provider Last Rate Last Admin    ceFAZolin (ANCEF) 2000 mg in sterile water 20 mL IV syringe  2,000 mg IntraVENous Once Steph Solitario MD        lactated ringers infusion   IntraVENous Continuous Dinah Lesch,  mL/hr at 10/28/22 1149 New Bag at 10/28/22 1149       Allergies:     Allergies   Allergen Reactions    Asa [Aspirin]       iron deficiency anemia , requiring iron infusions and transfusions    Iron      Pill- constipation, abdominal pain    Nsaids       iron deficiency anemia, history of bleeding ulcers        Problem List:    Patient Active Problem List   Diagnosis Code    Degenerative disc disease, lumbar M51.36    Lumbar spondylosis M47.816    Lumbar radiculopathy M54.16    Lumbar spinal stenosis M48.061    Encounter for medication monitoring Z51.81    Cervical spondylitis (HCC) M46.92    S/P cervical spinal fusion Z98.1    GERD (gastroesophageal reflux disease) K21.9    Osteoarthritis of spine with radiculopathy, lumbar region M47.26    Iron deficiency anemia D50.9    Hep C w/o coma, chronic (HCC) B18.2    Colon polyps K63.5    Hepatitis B core antibody positive R76.8    Peripheral polyneuropathy G62.9    Essential hypertension I10    Type 2 diabetes mellitus with hyperglycemia, without long-term current use of insulin (HCC) E11.65    Hyperlipidemia with target LDL less than 100 E78.5    Vitamin D deficiency E55.9    Vitamin B 12 deficiency E53.8    Abnormal EKG R94.31    Type 2 diabetes mellitus with diabetic polyneuropathy, without long-term current use of insulin (HCC) E11.42    Irritable bowel syndrome with diarrhea K58.0    Chronic hepatitis C without hepatic coma (HCC) B18.2    Status post hernia repair Z98.890, Z87.19    Incisional hernia without obstruction or gangrene K43.2    Malignant neoplasm of lateral wall of urinary bladder (HCC) C67.2    Abdominal aortic aneurysm (AAA) without rupture I71.40    Gastroesophageal reflux disease K21.9    Adenomatous polyp D36.9    Iron deficiency anemia due to chronic blood loss D50.0    Chronic use of opiate drug for therapeutic purpose Z79.891    Hx of hepatitis C Z86.19    Diarrhea R19.7    Extrasystoles I49.49       Past Medical History:        Diagnosis Date    AAA (abdominal aortic aneurysm)     \"stable\" 3 cm on CT 2021    Arthritis     osteoarthritis    Bladder cancer ([facility] [address] 03/2021    bladder    Chronic neck pain     Colon polyps 10/08/2019    tubular adenoma x2    COVID-19 virus infection 01/10/2022 Slight cough.  Diabetic neuropathy ([facility] [address]     Diarrhea     Essential hypertension 05/12/2020    managed by Dr. Ruthy Talamantes PCP    GERD (gastroesophageal reflux disease)     Hematuria     Hepatitis B core antibody positive     History of bleeding peptic ulcer     History of blood transfusion     no reactions    History of hepatitis C     History of migraine headaches     History of stress test 07/2020    \"low risk\"    Hyperlipidemia     Iron (Fe) deficiency anemia     Poor historian     states his wife takes care of all medical information    Positive FIT (fecal immunochemical test)     Type 2 diabetes mellitus with microalbuminuria, without long-term current use of insulin ([facility] [address] 07/13/2020    managed by Dr. Emi Soliman Under care of team 02/09/2022    pcp-Dr Camille Cartagena virtual visit 9/2022    Under care of team 02/09/2022    hematology-Dr Caprice Saavedra [address] virtual visit 10/2022    Under care of team 02/09/2022    urology-Dr fairchild-last visit 9/2022    Under care of team 10/27/2022    GI - DR. BRITO - last vist - 8/2022    Under care of team 10/27/2022    PAIN MANAGEMENT - HERMELINDA Mayes - last visit - 10/2022    Under care of team 10/27/2022    PODIATRY - DR. YE - last visit - 5/2022    Wears dentures     Wears glasses     Worsening headaches 12/29/2020       Past Surgical History:        Procedure Laterality Date    CERVICAL SPINE SURGERY  1977    cervical spine three times, has plate    CHOLECYSTECTOMY  03/22/2019    COLONOSCOPY  01/25/2015    10 yr recall, hemorrhoids    COLONOSCOPY N/A 10/08/2019    tubular adenoma x2    COLONOSCOPY N/A 06/07/2022    COLONOSCOPY DIAGNOSTIC performed by Beni Dorado MD at [facility] 8 Right 04/29/2021    CYSTOSCOPY TUR BLADDER TUMOR, GYRUS, RIGHT STENT PLACEMENT AND RIGHT STENT REMOVAL performed by Jonny Manuel MD at [facility] 09/09/2021    CYSTOSCOPY, TRANSURETHRAL RESECTION BLADDER TUMOR performed by Luis Payton MD at [address] N/A 2022    CYSTOSCOPY BLADDER BIOPSY, FULGURATION performed by Luis Payton MD at [address] N/A 2022    CYSTOSCOPY, TUR BLADDER TUMOR, GYRUS performed by Luis Payton MD at [address] [facility] [address]  10/2015    all teeth extracted    ENDOSCOPY, COLON, DIAGNOSTIC      HERNIA REPAIR N/A 2020    HERNIA INCISIONAL REPAIR LAPAROSCOPIC ROBOTIC WITH MESH performed by Betsey Godfrey DO at [facility] [address]  3022-19    UPPER GASTROINTESTINAL ENDOSCOPY  2015    UPPER GASTROINTESTINAL ENDOSCOPY N/A 10/08/2019    EGD BIOPSY performed by Zena Reyes MD at [facility] N/A 2022    EGD BIOPSY OF DUODENUM, GE JUNCTION AND GASTRIC ANTRUM performed by Zena Reyes MD at [facility]       Social History:    Social History     Tobacco Use    Smoking status: Former     Packs/day: 2.00     Years: 45.00     Pack years: 90.00     Types: Cigarettes     Start date: 1968     Quit date: 2015     Years since quittin.8    Smokeless tobacco: Never    Tobacco comments:     on chantix 11-6-15   12-7-15   Substance Use Topics    Alcohol use:  No                                Counseling given: Not Answered  Tobacco comments: on chantix 11-6-15   12-7-15      Vital Signs (Current):   Vitals:    10/28/22 1136   BP: (!) 144/67   Pulse: 83   Resp: 16   Temp: 96.8 °F (36 °C)   SpO2: 98%   Weight: 220 lb (99.8 kg)   Height: 5' 9\" (1.753 m)                                              BP Readings from Last 3 Encounters:   10/28/22 (!) 144/67   10/26/22 (!) 144/87   10/25/22 129/77       NPO Status: Time of last liquid consumption: 2300                        Time of last solid consumption:                         Date of last liquid consumption: 10/27/22                        Date of last solid food consumption: 10/27/22    BMI:   Wt Readings from Last 3 Encounters:   10/28/22 220 lb (99.8 kg)   10/26/22 223 lb (101.2 kg)   10/07/22 223 lb 3.2 oz (101.2 kg)     Body mass index is 32.49 kg/m². CBC:   Lab Results   Component Value Date/Time    WBC 5.5 10/12/2022 12:07 PM    RBC 4.94 10/12/2022 12:07 PM    HGB 11.7 10/12/2022 12:07 PM    HCT 37.0 10/12/2022 12:07 PM    MCV 74.8 10/12/2022 12:07 PM    RDW 16.2 10/12/2022 12:07 PM     10/12/2022 12:07 PM       CMP:   Lab Results   Component Value Date/Time     09/27/2022 11:07 AM    K 4.5 09/27/2022 11:07 AM     09/27/2022 11:07 AM    CO2 22 09/27/2022 11:07 AM    BUN 17 09/27/2022 11:07 AM    CREATININE 0.69 09/27/2022 11:07 AM    GFRAA >60 09/27/2022 11:07 AM    LABGLOM >60 09/27/2022 11:07 AM    GLUCOSE 235 09/27/2022 11:07 AM    PROT 7.0 09/27/2022 11:07 AM    PROT 7.2 09/27/2022 11:07 AM    CALCIUM 9.4 09/27/2022 11:07 AM    BILITOT 0.5 09/27/2022 11:07 AM    ALKPHOS 95 09/27/2022 11:07 AM    AST 16 09/27/2022 11:07 AM    ALT 18 09/27/2022 11:07 AM       POC Tests: No results for input(s): POCGLU, POCNA, POCK, POCCL, POCBUN, POCHEMO, POCHCT in the last 72 hours.     Coags:   Lab Results   Component Value Date/Time    PROTIME 12.3 02/28/2021 03:27 PM    INR 0.9 02/28/2021 03:27 PM    APTT 24.5 02/28/2021 03:27 PM       HCG (If Applicable): No results found for: PREGTESTUR, PREGSERUM, HCG, HCGQUANT     ABGs: No results found for: PHART, PO2ART, SRE3VFD, HWF3QLG, BEART, O4EBWYVN     Type & Screen (If Applicable):  No results found for: LABABO, LABRH    Drug/Infectious Status (If Applicable):  Lab Results   Component Value Date/Time    HEPCAB REACTIVE 07/13/2020 09:04 AM       COVID-19 Screening (If Applicable):   Lab Results   Component Value Date/Time    COVID19 Not Detected 12/30/2021 02:53 PM    COVID19 Not Detected 08/03/2020 08:54 PM           Anesthesia Evaluation  Patient summary reviewed and Nursing notes reviewed no history of anesthetic complications:   Airway: Mallampati: II  TM distance: >3 FB   Neck ROM: limited  Mouth opening: > = 3 FB   Dental:    (+) upper dentures, lower dentures and edentulous      Pulmonary:normal exam                              ROS comment: 90 pack year smoker quit 2015   Cardiovascular:    (+) hypertension:, hyperlipidemia                  Neuro/Psych:   (+) neuromuscular disease:, headaches: migraine headaches,              ROS comment: Peripheral neuropathy GI/Hepatic/Renal:   (+) GERD:, hepatitis: C, liver disease:,          ROS comment: Bladder cancer. Endo/Other:    (+) DiabetesType II DM, , blood dyscrasia: anemia, arthritis:., .                 Abdominal:   (+) obese,           Vascular: negative vascular ROS. Other Findings:             Anesthesia Plan      MAC     ASA 3       Induction: intravenous. MIPS: Postoperative opioids intended and Prophylactic antiemetics administered. Anesthetic plan and risks discussed with patient and spouse. Plan discussed with CRNA.                     Angel Ovalles MD   10/28/2022

## 2022-10-28 NOTE — ANESTHESIA POSTPROCEDURE EVALUATION
Department of Anesthesiology  Postprocedure Note    Patient: Juan Carlos Jacob  MRN: [id number]  YOB: 1950  Date of evaluation: 10/28/2022      Procedure Summary     Date: 10/28/22 Room / Location: [address]    Anesthesia Start: 1219 Anesthesia Stop: 2758    Procedure: CYSTOSCOPY BLADDER BIOPSY WITH FULGARATION Diagnosis:       Malignant neoplasm of urinary bladder, unspecified site ([address]      (BLADDER CANCER)    Surgeons: Alejandro Crockett MD Responsible Provider: Radha Graf MD    Anesthesia Type: MAC ASA Status: 3          Anesthesia Type: No value filed.     Be Phase I:      Be Phase II: Be Score: 10    POST-OP ANESTHESIA NOTE       /79   Pulse 81   Temp 97.9 °F (36.6 °C) (Temporal)   Resp 16   Ht 5' 9\" (1.753 m)   Wt 220 lb (99.8 kg)   SpO2 95%   BMI 32.49 kg/m²    Pain Assessment: 0-10  Pain Level: 0       Anesthesia Post Evaluation    Patient location during evaluation: PACU  Patient participation: complete - patient participated  Level of consciousness: awake  Pain score: 0  Airway patency: patent  Nausea & Vomiting: no vomiting and no nausea  Complications: no  Cardiovascular status: hemodynamically stable  Respiratory status: acceptable  Hydration status: stable

## 2022-10-28 NOTE — H&P
Mirian Marsh, Jazmyn Everett, Bhavesh Duong, Luis Teran, & Blake   Urology History & Physical      Patient:  Seema Muniz  MRN: [id number]  YOB: 1950    HISTORY OF PRESENT ILLNESS:   The patient is a 81 y.o. male who presents with bladder tumor. He has history of low-grade bladder cancer. Cystoscopy on 9/26/22 showed multifocal small bladder tumors. Patient's old records, notes and chart reviewed and summarized above. Past Medical History:    Past Medical History:   Diagnosis Date    AAA (abdominal aortic aneurysm)     \"stable\" 3 cm on CT 2021    Arthritis     osteoarthritis    Bladder cancer ([facility] 03/2021    bladder    Chronic neck pain     Colon polyps 10/08/2019    tubular adenoma x2    COVID-19 virus infection 01/10/2022    Slight cough. Diabetic neuropathy ([facility]     Diarrhea     Essential hypertension 05/12/2020    managed by Dr. David Gill PCP    GERD (gastroesophageal reflux disease)     Hematuria     Hepatitis B core antibody positive     History of bleeding peptic ulcer     History of blood transfusion     no reactions    History of hepatitis C     History of migraine headaches     History of stress test 07/2020    \"low risk\"    Hyperlipidemia     Iron (Fe) deficiency anemia     Poor historian     states his wife takes care of all medical information    Positive FIT (fecal immunochemical test)     Type 2 diabetes mellitus with microalbuminuria, without long-term current use of insulin ([facility] 07/13/2020    managed by Dr. Josiane Carrion    Under care of team 02/09/2022    pcp-Dr Boris Steiner virtual visit 9/2022    Under care of team 02/09/2022    hematology-Dr Caprice Saavedra  virtual visit 10/2022    Under care of team 02/09/2022    urology-Dr fairchild-last visit 9/2022    Under care of team 10/27/2022    GI - DR. BRITO - last vist - 8/2022    Under care of team 10/27/2022    PAIN MANAGEMENT - HERMELINDA Galloway - last visit - 10/2022    Under care of team 10/27/2022 PODIATRY - DR. YE - last visit - 5/2022    Wears dentures     Wears glasses     Worsening headaches 12/29/2020       Past Surgical History:    Past Surgical History:   Procedure Laterality Date    CERVICAL SPINE SURGERY  1977    cervical spine three times, has plate    CHOLECYSTECTOMY  03/22/2019    COLONOSCOPY  01/25/2015    10 yr recall, hemorrhoids    COLONOSCOPY N/A 10/08/2019    tubular adenoma x2    COLONOSCOPY N/A 06/07/2022    COLONOSCOPY DIAGNOSTIC performed by Tania Gotti MD at [facility] Right 04/29/2021    CYSTOSCOPY TUR BLADDER TUMOR, GYRUS, RIGHT STENT PLACEMENT AND RIGHT STENT REMOVAL performed by Dana Ochoa MD at [place] [address] N/A 09/09/2021    CYSTOSCOPY, TRANSURETHRAL RESECTION BLADDER TUMOR performed by Dana Ochoa MD at [place] [address] N/A 02/16/2022    CYSTOSCOPY BLADDER BIOPSY, FULGURATION performed by Dana Ochoa MD at [place] [address] N/A 06/17/2022    CYSTOSCOPY, TUR BLADDER TUMOR, GYRUS performed by Dana Ochoa MD at [facility] [address]  10/2015    all teeth extracted    ENDOSCOPY, COLON, DIAGNOSTIC      HERNIA REPAIR N/A 08/07/2020    HERNIA INCISIONAL REPAIR LAPAROSCOPIC ROBOTIC WITH MESH performed by Samra Anderson DO at [address] [place] [address]    UPPER GASTROINTESTINAL ENDOSCOPY  01/25/2015    UPPER GASTROINTESTINAL ENDOSCOPY N/A 10/08/2019    EGD BIOPSY performed by Tania Gotti MD at [address] [place] [address] N/A 06/07/2022    EGD BIOPSY OF DUODENUM, GE JUNCTION AND GASTRIC ANTRUM performed by Tania Gotti MD at [facility]       Medications:    No current facility-administered medications for this encounter. Current Outpatient Medications:     morphine (MS CONTIN) 15 MG extended release tablet, Take 1 tablet by mouth 2 times daily for 30 days. , Disp: 60 tablet, Rfl: 0    oxyCODONE-acetaminophen (PERCOCET) 5-325 MG per tablet, Take 1 tablet by mouth 2 times daily as needed for Pain for up to 30 days. , Disp: 60 tablet, Rfl: 0    pantoprazole (PROTONIX) 40 MG tablet, TAKE ONE TABLET BY MOUTH DAILY, Disp: 90 tablet, Rfl: 1    vitamin D (ERGOCALCIFEROL) 1.25 MG (33261 UT) CAPS capsule, Take 1 capsule by mouth once a week **Stop daily vitamin D**, Disp: 12 capsule, Rfl: 0    SITagliptin (JANUVIA) 50 MG tablet, Take 1 tablet by mouth daily, Disp: 30 tablet, Rfl: 5    nortriptyline (PAMELOR) 10 MG capsule, Take 1 capsule by mouth nightly For Polyneuropathy, diarrhea and insomnia, Disp: 30 capsule, Rfl: 3    pregabalin (LYRICA) 150 MG capsule, Take 1 capsule by mouth in the morning and 1 capsule at noon and 1 capsule before bedtime. Do all this for 90 days. , Disp: 90 capsule, Rfl: 2    pravastatin (PRAVACHOL) 40 MG tablet, TAKE ONE TABLET BY MOUTH EVERY EVENING. STOP GEMFIBROZIL, Disp: 90 tablet, Rfl: 3    baclofen (LIORESAL) 10 MG tablet, Take 1 tablet by mouth 3 times daily as needed (back pain), Disp: 270 tablet, Rfl: 1    folic acid (FOLVITE) 1 MG tablet, Take 1 tablet by mouth daily, Disp: 90 tablet, Rfl: 1    cyanocobalamin 1000 MCG/ML injection, INJECT 1 ML INTO THE MUSCLE EVERY 30 DAYS. CALL FOR NEXT REFILL WHICH WILL BE MONTHLY FOR LIFE, Disp: 3 mL, Rfl: 3    metoprolol tartrate (LOPRESSOR) 25 MG tablet, TAKE ONE TABLET BY MOUTH TWICE A DAY, Disp: 180 tablet, Rfl: 3    glucose monitoring (FREESTYLE FREEDOM) kit, Please supply Patient with a glucose monitoring kit that insurance will cover. , Disp: 1 kit, Rfl: 0    blood glucose monitor strips, Testing once a day. Please dispense according to patients insurance., Disp: 100 strip, Rfl: 3    Lancets 30G MISC, Testing once a day.   Please dispense according to patients insurance., Disp: 100 each, Rfl: 3    lisinopril (PRINIVIL;ZESTRIL) 10 MG tablet, Take 1 tablet by mouth daily ** stop Lisinopril HCTZ**, Disp: 90 tablet, Rfl: 3    fluticasone (FLONASE) 50 MCG/ACT nasal spray, 2 sprays by Nasal route daily (Patient taking differently: 2 sprays by Nasal route daily as needed), Disp: 16 g, Rfl: 0    Alcohol Swabs (B-D SINGLE USE SWABS REGULAR) PADS, USE TO CHECK BLOOD SUGAR TWICE A DAY, Disp: 200 each, Rfl: 3    Syringe/Needle, Disp, (SYRINGE 3CC/25GX1\") 25G X 1\" 3 ML MISC, To be used with B12 injections, Disp: 50 each, Rfl: 2    loperamide (RA ANTI-DIARRHEAL) 2 MG capsule, Take 1 capsule by mouth 4 times daily as needed for Diarrhea, Disp: 112 capsule, Rfl: 2    Probiotic Product (PROBIOTIC-10 PO), Take by mouth daily , Disp: , Rfl:     Allergies:     Allergies   Allergen Reactions    Asa [Aspirin]       iron deficiency anemia , requiring iron infusions and transfusions    Iron      Pill- constipation, abdominal pain    Nsaids       iron deficiency anemia, history of bleeding ulcers        Social History:   Social History     Socioeconomic History    Marital status:      Spouse name: Not on file    Number of children: Not on file    Years of education: Not on file    Highest education level: Not on file   Occupational History    Occupation: disabled   Tobacco Use    Smoking status: Former     Packs/day: 2.00     Years: 45.00     Pack years: 90.00     Types: Cigarettes     Start date: 1968     Quit date: 2015     Years since quittin.8    Smokeless tobacco: Never    Tobacco comments:     on chantix 11-6-15   12-7-15   Vaping Use    Vaping Use: Never used   Substance and Sexual Activity    Alcohol use: No    Drug use: No    Sexual activity: Yes     Partners: Female   Other Topics Concern    Not on file   Social History Narrative    Not on file     Social Determinants of Health     Financial Resource Strain: Low Risk     Difficulty of Paying Living Expenses: Not hard at all   Food Insecurity: No Food Insecurity    Worried About Running Out of Food in the Last Year: Never true    Ran Out of Food in the Last Year: Never true   Transportation Needs: No Transportation Needs    Lack of Transportation (Medical): No    Lack of Transportation (Non-Medical): No   Physical Activity: Insufficiently Active    Days of Exercise per Week: 2 days    Minutes of Exercise per Session: 40 min   Stress: Not on file   Social Connections: Not on file   Intimate Partner Violence: Not on file   Housing Stability: Unknown    Unable to Pay for Housing in the Last Year: No    Number of Places Lived in the Last Year: Not on file    Unstable Housing in the Last Year: No       Family History:    Family History   Problem Relation Age of Onset    Diabetes Mother     Heart Disease Father     High Blood Pressure Father        REVIEW OF SYSTEMS:  A comprehensive 14 point review of systems was obtained. Constitutional: No fatigue  Eyes: No blurry vision  Ears, nose, mouth, throat, face: No ringing in the ears; no facial droop. Respiratory: No cough or cold. Cardiovascular: No palpitations  Gastrointestinal: No diarrhea or constipation. Genitourinary: No burning with urination  Integument/Skin: No rashes  Hematologic/Lymphatic: No easy bruising  Musculoskeletal: No muscle pains  Neurologic: No weakness in the extremities. Psychiatric: No depression or suicidal thoughts. Endocrine: No heat or cold intolerances. Allergic/Immunologic: No current seasonal allergies; no skin hives. Physical Exam:      Constitutional: Patient in no acute distress. Neuro: alert and oriented to person place and time. Head: Atraumatic and normocephalic. Neck: Trachea midline. Ext: 2+ radial pulses bilaterally. Psych: Mood and affect normal.  Skin: No rashes or bruising present. Lungs: Respiratory effort normal.  Cardiovascular:  Regular rhythm. Abdomen: Soft, non-tender, non-distended. Bladder non-tender and not distended. Lymphatics: no palpable lymphadenopathy    Labs:  No results for input(s): WBC, HGB, HCT, MCV, PLT in the last 72 hours.   No results for input(s): NA, K, CL, CO2, PHOS, BUN, CREATININE, CA in the last 72 hours. No results for input(s): COLORU, PHUR, LABCAST, WBCUA, RBCUA, MUCUS, TRICHOMONAS, YEAST, BACTERIA, CLARITYU, SPECGRAV, LEUKOCYTESUR, UROBILINOGEN, Gwynneth Jimbo in the last 72 hours. Invalid input(s): NITRATE, GLUCOSEUKETONESUAMORPHOUS        -----------------------------------------------------------------  Imaging Results:  Low Dose Chest CT -Abnormal Lung Screen Follow up    Result Date: 10/26/2022  EXAMINATION: LOW DOSE OF THE CT  CHEST WITHOUT CONTRAST 10/26/2022 11:04 am TECHNIQUE: Low Dose of the CT Chest without the administration of intravenous contrast (MA=40). Multiplanar reformatted images are provided for review. Automated exposure control, iterative reconstruction, and/or weight based adjustment of the mA/kV was utilized to reduce the radiation dose to as low as reasonably achievable. COMPARISON: 2022 HISTORY: ORDERING SYSTEM PROVIDED HISTORY: Abnormal CT lung screening TECHNOLOGIST PROVIDED HISTORY: Age: 81 y.o. Smoking History: Social History Tobacco Use Smoking status: Former Packs/day: 2.00 Years: 45.00 Pack years: 80 Types: Cigarettes Start date: 1968 Quit date: 2015 Years since quittin.8 Smokeless tobacco: Never Tobacco comments: on chantix 11-6-15   12-7-15 Vaping Use Vaping Use: Never used Alcohol use: No Drug use: No Pack years: 90 Last CT lung screen: 2022 Reason for exam:->6 month follow up Reason for Exam: pt states previous smoker of cigs Additional signs and symptoms: tickle in throat dry cough Relevant Medical/Surgical History: pt confirms 2 packs per day, smoked x 45 years FINDINGS: Mediastinum: The cardiac size is normal. Moderate to severe coronary artery calcifications. .  There is no significant mediastinal, hilar or axillary lymphadenopathy. The thyroid gland shows no significant abnormalities. The esophagus shows no significant abnormalities.  Lungs/pleura: Previously reported 6 mm lingular nodule and additional 3-4 mm nodules have resolved supporting inflammatory etiology. A 5 mm subpleural nodule in the medial aspect superior segment left lower lobe is unchanged. Not well depicted on prior due to slice selection and volume averaging. No significant new lung nodule or mass. No focal consolidation. No pleural effusion or pneumothorax. Upper Abdomen: No acute process. No suspicious mass. Soft Tissues/Bones: No acute abnormality of the bones. The superficial soft tissues show no significant abnormalities. 1. Interval resolution of previously noted nodules in the inferior lingula. 2. Stable 5 mm subpleural nodule superior segment left lower lobe. 3. No new nodules. RECOMMENDATIONS: Resume annual lung screening.        Assessment and Plan   Impression:    Patient Active Problem List   Diagnosis    Degenerative disc disease, lumbar    Lumbar spondylosis    Lumbar radiculopathy    Lumbar spinal stenosis    Encounter for medication monitoring    Cervical spondylitis (HCC)    S/P cervical spinal fusion    GERD (gastroesophageal reflux disease)    Osteoarthritis of spine with radiculopathy, lumbar region    Iron deficiency anemia    Hep C w/o coma, chronic (HCC)    Colon polyps    Hepatitis B core antibody positive    Peripheral polyneuropathy    Essential hypertension    Type 2 diabetes mellitus with hyperglycemia, without long-term current use of insulin (HCC)    Hyperlipidemia with target LDL less than 100    Vitamin D deficiency    Vitamin B 12 deficiency    Abnormal EKG    Type 2 diabetes mellitus with diabetic polyneuropathy, without long-term current use of insulin (HCC)    Irritable bowel syndrome with diarrhea    Chronic hepatitis C without hepatic coma ([facility]    Status post hernia repair    Incisional hernia without obstruction or gangrene    Malignant neoplasm of lateral wall of urinary bladder (HCC)    Abdominal aortic aneurysm (AAA) without rupture    Gastroesophageal reflux disease    Adenomatous polyp    Iron deficiency anemia due to chronic blood loss    Chronic use of opiate drug for therapeutic purpose    Hx of hepatitis C    Diarrhea    Extrasystoles     Bladder tumor  History of low-grade bladder cancer      Plan:   Cystoscopy, bladder biopsy, fulguration    Brady Durbin MD

## 2022-10-31 LAB — SURGICAL PATHOLOGY REPORT: NORMAL

## 2022-11-02 ENCOUNTER — PATIENT MESSAGE (OUTPATIENT)
Dept: FAMILY MEDICINE CLINIC | Age: 72
End: 2022-11-02

## 2022-11-02 DIAGNOSIS — R11.0 NAUSEA: Primary | ICD-10-CM

## 2022-11-02 RX ORDER — PROMETHAZINE HYDROCHLORIDE 25 MG/1
25 TABLET ORAL 3 TIMES DAILY PRN
Qty: 21 TABLET | Refills: 0 | Status: SHIPPED | OUTPATIENT
Start: 2022-11-02 | End: 2022-11-16

## 2022-11-02 NOTE — TELEPHONE ENCOUNTER
From: Uziel Vanegas  To: Dr. Valencia Dopp: 2022 12:32 AM EDT  Subject: See attached [address] Dr,  Will you please call in RX for nausea.  It helps me a lot when I feel nauseated after I eat sometimes  Thank you,  Serena Germain for Uziel Vanegas   [id number]  [address] [facility]~[place] [phone]

## 2022-11-02 NOTE — RESULT ENCOUNTER NOTE
Management per urology, has upcoming appointment with urology, patient has known bladder cancer followed with serial cystoscopies  Biopsy noninvasive low-grade papillary urothelial carcinoma    Future Appointments  11/7/2022  11:40 AM   Stella Purcell MD         [facility] U[facility]           [facility]  11/22/2022 9:30 AM    Nat Adkins DO         [address] [employer]  12/9/2022  11:45 AM   Precious Verdin MD          [address]  1/27/2023  11:00 AM   Ronald Moya MD     [facility]  2/16/2023  2:00 PM    Renee Chou MD          [facility]  9/27/2023  11:00 AM   Ronald Moya MD     [facility]

## 2022-11-07 ENCOUNTER — OFFICE VISIT (OUTPATIENT)
Dept: UROLOGY | Age: 72
End: 2022-11-07
Payer: MEDICARE

## 2022-11-07 VITALS
SYSTOLIC BLOOD PRESSURE: 140 MMHG | WEIGHT: 223 LBS | BODY MASS INDEX: 33.03 KG/M2 | DIASTOLIC BLOOD PRESSURE: 78 MMHG | OXYGEN SATURATION: 95 % | HEART RATE: 81 BPM | HEIGHT: 69 IN

## 2022-11-07 DIAGNOSIS — C67.2 MALIGNANT NEOPLASM OF LATERAL WALL OF URINARY BLADDER (HCC): Primary | ICD-10-CM

## 2022-11-07 PROCEDURE — G8417 CALC BMI ABV UP PARAM F/U: HCPCS | Performed by: UROLOGY

## 2022-11-07 PROCEDURE — 3078F DIAST BP <80 MM HG: CPT | Performed by: UROLOGY

## 2022-11-07 PROCEDURE — 3074F SYST BP LT 130 MM HG: CPT | Performed by: UROLOGY

## 2022-11-07 PROCEDURE — G8484 FLU IMMUNIZE NO ADMIN: HCPCS | Performed by: UROLOGY

## 2022-11-07 PROCEDURE — 99213 OFFICE O/P EST LOW 20 MIN: CPT | Performed by: UROLOGY

## 2022-11-07 PROCEDURE — G8427 DOCREV CUR MEDS BY ELIG CLIN: HCPCS | Performed by: UROLOGY

## 2022-11-07 PROCEDURE — 1036F TOBACCO NON-USER: CPT | Performed by: UROLOGY

## 2022-11-07 PROCEDURE — 1123F ACP DISCUSS/DSCN MKR DOCD: CPT | Performed by: UROLOGY

## 2022-11-07 PROCEDURE — 3017F COLORECTAL CA SCREEN DOC REV: CPT | Performed by: UROLOGY

## 2022-11-07 ASSESSMENT — ENCOUNTER SYMPTOMS
SHORTNESS OF BREATH: 0
COUGH: 0
WHEEZING: 0

## 2022-11-07 NOTE — PROGRESS NOTES
[address] [address]  Dept: [address] [facility] Urology Office Note - Established    Patient:  Irma Huynh  YOB: 1950  Date: 11/7/2022    The patient is a 81 y.o. male who presents todayfor evaluation of the following problems:   Chief Complaint   Patient presents with    Post-Op Check       HPI  This is a very pleasant 82-year-old gentleman with a history of bladder cancer. He did have a recent bladder biopsy which shows low-grade noninvasive urothelial carcinoma. He is doing well after the biopsy. He has no bothersome urinary symptoms presently. Summary of old records: N/A    Additional History: N/A    Procedures Today: N/A    Urinalysis today:  No results found for this visit on 11/07/22. Last several PSA's:  Lab Results   Component Value Date    PSA 1.01 09/19/2022    PSA 0.65 08/19/2022    PSA 0.62 03/17/2021     Last total testosterone:  No results found for: TESTOSTERONE    AUA Symptom Score (11/7/2022): Last BUN and creatinine:  Lab Results   Component Value Date    BUN 17 09/27/2022     Lab Results   Component Value Date    CREATININE 0.69 (L) 09/27/2022       Additional Lab/Culture results: none    Imaging Reviewed during this Office Visit: none  (results were independently reviewed by physician and radiology report verified)    PAST MEDICAL, FAMILY AND SOCIAL HISTORY UPDATE:  Past Medical History:   Diagnosis Date    AAA (abdominal aortic aneurysm)     \"stable\" 3 cm on CT 2021    Arthritis     osteoarthritis    Bladder cancer ([facility] 03/2021    bladder    Chronic neck pain     Colon polyps 10/08/2019    tubular adenoma x2    COVID-19 virus infection 01/10/2022    Slight cough.     Diabetic neuropathy ([address]     Diarrhea     Essential hypertension 05/12/2020    managed by Dr. Darling Nicholas PCP    GERD (gastroesophageal reflux disease) Hematuria     Hepatitis B core antibody positive     History of bleeding peptic ulcer     History of blood transfusion     no reactions    History of hepatitis C     History of migraine headaches     History of stress test 07/2020    \"low risk\"    Hyperlipidemia     Iron (Fe) deficiency anemia     Poor historian     states his wife takes care of all medical information    Positive FIT (fecal immunochemical test)     Type 2 diabetes mellitus with microalbuminuria, without long-term current use of insulin ([facility] 07/13/2020    managed by Dr. Mattie Smiley    Under care of team 02/09/2022    pcp-Dr Lyndon Crowder virtual visit 9/2022    Under care of team 02/09/2022    hematology-Dr Caprice Saavedra 32 virtual visit 10/2022    Under care of team 02/09/2022    urology-Dr fairchild-last visit 9/2022    Under care of team 10/27/2022    GI - DR. BRITO - last vist - 8/2022    Under care of team 10/27/2022    PAIN MANAGEMENT - HERMELINDA Arroyo - last visit - 10/2022    Under care of team 10/27/2022    PODIATRY - DR. YE - last visit - 5/2022    Wears dentures     Wears glasses     Worsening headaches 12/29/2020     Past Surgical History:   Procedure Laterality Date    CERVICAL SPINE SURGERY  1977    cervical spine three times, has plate    CHOLECYSTECTOMY  03/22/2019    COLONOSCOPY  01/25/2015    10 yr recall, hemorrhoids    COLONOSCOPY N/A 10/08/2019    tubular adenoma x2    COLONOSCOPY N/A 06/07/2022    COLONOSCOPY DIAGNOSTIC performed by Sierra Farooq MD at [address] [place] [address] Right 04/29/2021    CYSTOSCOPY TUR BLADDER TUMOR, GYRUS, RIGHT STENT PLACEMENT AND RIGHT STENT REMOVAL performed by Hilton Soliman MD at [address] N/A 09/09/2021    CYSTOSCOPY, TRANSURETHRAL RESECTION BLADDER TUMOR performed by Hilton Soliman MD at [address] N/A 02/16/2022    CYSTOSCOPY BLADDER BIOPSY, FULGURATION performed by Hilton Soliman MD at [address] N/A 06/17/2022    CYSTOSCOPY, TUR BLADDER TUMOR, GYRUS performed by Veronika Altman MD at [address] N/A 10/28/2022    [address] performed by Veronika Altman MD at [facility] [address]  10/2015    all teeth extracted    ENDOSCOPY, COLON, DIAGNOSTIC      HERNIA REPAIR N/A 08/07/2020    HERNIA INCISIONAL REPAIR LAPAROSCOPIC ROBOTIC WITH MESH performed by Petr Alejandro DO at [facility] Right     UMBILICAL HERNIA REPAIR  3022-19    UPPER GASTROINTESTINAL ENDOSCOPY  01/25/2015    UPPER GASTROINTESTINAL ENDOSCOPY N/A 10/08/2019    EGD BIOPSY performed by Luz Marina Hdz MD at [facility] N/A 06/07/2022    EGD BIOPSY OF DUODENUM, GE JUNCTION AND GASTRIC ANTRUM performed by Luz Marina Hdz MD at [facility]     Family History   Problem Relation Age of Onset    Diabetes Mother     Heart Disease Father     High Blood Pressure Father      Outpatient Medications Marked as Taking for the 11/7/22 encounter (Office Visit) with Veronika Altman MD   Medication Sig Dispense Refill    promethazine (PHENERGAN) 25 MG tablet Take 1 tablet by mouth 3 times daily as needed for Nausea 21 tablet 0    morphine (MS CONTIN) 15 MG extended release tablet Take 1 tablet by mouth 2 times daily for 30 days. 60 tablet 0    oxyCODONE-acetaminophen (PERCOCET) 5-325 MG per tablet Take 1 tablet by mouth 2 times daily as needed for Pain for up to 30 days.  60 tablet 0    pantoprazole (PROTONIX) 40 MG tablet TAKE ONE TABLET BY MOUTH DAILY 90 tablet 1    vitamin D (ERGOCALCIFEROL) 1.25 MG (79352 UT) CAPS capsule Take 1 capsule by mouth once a week **Stop daily vitamin D** 12 capsule 0    SITagliptin (JANUVIA) 50 MG tablet Take 1 tablet by mouth daily 30 tablet 5    nortriptyline (PAMELOR) 10 MG capsule Take 1 capsule by mouth nightly For Polyneuropathy, diarrhea and insomnia 30 capsule 3    pregabalin (LYRICA) 150 MG capsule Take 1 capsule by mouth in the morning and 1 capsule at noon and 1 capsule before bedtime. Do all this for 90 days. 90 capsule 2    pravastatin (PRAVACHOL) 40 MG tablet TAKE ONE TABLET BY MOUTH EVERY EVENING. STOP GEMFIBROZIL 90 tablet 3    baclofen (LIORESAL) 10 MG tablet Take 1 tablet by mouth 3 times daily as needed (back pain) 986 tablet 1    folic acid (FOLVITE) 1 MG tablet Take 1 tablet by mouth daily 90 tablet 1    cyanocobalamin 1000 MCG/ML injection INJECT 1 ML INTO THE MUSCLE EVERY 30 DAYS. CALL FOR NEXT REFILL WHICH WILL BE MONTHLY FOR LIFE 3 mL 3    metoprolol tartrate (LOPRESSOR) 25 MG tablet TAKE ONE TABLET BY MOUTH TWICE A  tablet 3    glucose monitoring (FREESTYLE FREEDOM) kit Please supply Patient with a glucose monitoring kit that insurance will cover. 1 kit 0    blood glucose monitor strips Testing once a day. Please dispense according to patients insurance. 100 strip 3    Lancets 30G MISC Testing once a day. Please dispense according to patients insurance.  100 each 3    lisinopril (PRINIVIL;ZESTRIL) 10 MG tablet Take 1 tablet by mouth daily ** stop Lisinopril HCTZ** 90 tablet 3    fluticasone (FLONASE) 50 MCG/ACT nasal spray 2 sprays by Nasal route daily (Patient taking differently: 2 sprays by Nasal route daily as needed) 16 g 0    Alcohol Swabs (B-D SINGLE USE SWABS REGULAR) PADS USE TO CHECK BLOOD SUGAR TWICE A  each 3    Syringe/Needle, Disp, (SYRINGE 3CC/25GX1\") 25G X 1\" 3 ML MISC To be used with B12 injections 50 each 2    loperamide (RA ANTI-DIARRHEAL) 2 MG capsule Take 1 capsule by mouth 4 times daily as needed for Diarrhea 112 capsule 2    Probiotic Product (PROBIOTIC-10 PO) Take by mouth daily          Asa [aspirin], Iron, and Nsaids  Social History     Tobacco Use   Smoking Status Former    Packs/day: 2.00    Years: 45.00    Pack years: 90.00    Types: Cigarettes    Start date: 1968    Quit date: 2015    Years since quittin.9   Smokeless Tobacco Never   Tobacco Comments    on chantix 11-6-15   12-7-15 (Ifpatient a smoker, smoking cessation counseling offered)    Social History     Substance and Sexual Activity   Alcohol Use No       REVIEW OF SYSTEMS:  Review of Systems    Physical Exam:      Vitals:    11/07/22 1150   BP: (!) 140/78   Pulse: 81   SpO2: 95%     Body mass index is 32.93 kg/m². Patient is a 81 y.o. male in no acute distress and alert and oriented to person, place and time. Physical Exam  Constitutional: Patient in no acute distress. Neuro: Alert and oriented to person, place and time. Psych: Mood normal, affect normal  Skin: No rash noted  HEENT: Head: Normocephalic andatraumatic      Assessment and Plan      1. Malignant neoplasm of lateral wall of urinary bladder (HCC)           Plan:   F/u 3 mo cystoscopy      Return in about 3 months (around 2/7/2023) for Cystoscopy. Prescriptions Ordered:  No orders of the defined types were placed in this encounter. Orders Placed:  No orders of the defined types were placed in this encounter. Hilton Soliman MD    Agree with the ROS entered by the MA.

## 2022-11-10 DIAGNOSIS — E53.8 VITAMIN B 12 DEFICIENCY: ICD-10-CM

## 2022-11-10 RX ORDER — CYANOCOBALAMIN 1000 UG/ML
1000 INJECTION, SOLUTION INTRAMUSCULAR; SUBCUTANEOUS
Qty: 3 ML | Refills: 3 | Status: SHIPPED | OUTPATIENT
Start: 2022-11-10

## 2022-11-10 RX ORDER — FOLIC ACID 1 MG/1
1 TABLET ORAL DAILY
Qty: 90 TABLET | Refills: 1 | Status: SHIPPED | OUTPATIENT
Start: 2022-11-10

## 2022-11-10 RX ORDER — FOLIC ACID 1 MG/1
TABLET ORAL
Qty: 90 TABLET | Refills: 1 | OUTPATIENT
Start: 2022-11-10

## 2022-11-10 NOTE — TELEPHONE ENCOUNTER
Noted. Thank you!     Future Appointments   Date Time Provider Pippa Cazares   11/22/2022  9:30 AM DO Maurice Killian   12/9/2022 11:45 AM Hannah Lockett MD [address] [place] [address]   1/27/2023 11:00 AM [facility]   2/6/2023 10:30 AM Michaela Simon MD [address] [place] [address]   2/16/2023  2:00 PM [facility]   9/27/2023 11:00 AM Yolande Gutierrez MD [facility]

## 2022-11-10 NOTE — TELEPHONE ENCOUNTER
Please Approve or Refuse.   Send to Pharmacy per Pt's Request: daniel      Next Visit Date:  1/27/2023   Last Visit Date: 9/27/2022    Hemoglobin A1C (%)   Date Value   08/19/2022 6.6 (H)   03/12/2022 5.2   09/24/2021 5.0             ( goal A1C is < 7)   BP Readings from Last 3 Encounters:   11/07/22 (!) 140/78   10/28/22 114/79   10/26/22 (!) 144/87          (goal 120/80)  BUN   Date Value Ref Range Status   09/27/2022 17 8 - 23 mg/dL Final     Creatinine   Date Value Ref Range Status   09/27/2022 0.69 (L) 0.70 - 1.20 mg/dL Final     Potassium   Date Value Ref Range Status   09/27/2022 4.5 3.7 - 5.3 mmol/L Final

## 2022-11-16 ENCOUNTER — TELEPHONE (OUTPATIENT)
Dept: PAIN MANAGEMENT | Age: 72
End: 2022-11-16

## 2022-11-16 DIAGNOSIS — M47.816 OSTEOARTHRITIS OF LUMBAR SPINE, UNSPECIFIED SPINAL OSTEOARTHRITIS COMPLICATION STATUS: ICD-10-CM

## 2022-11-16 DIAGNOSIS — R11.0 NAUSEA: ICD-10-CM

## 2022-11-16 DIAGNOSIS — M47.26 OSTEOARTHRITIS OF SPINE WITH RADICULOPATHY, LUMBAR REGION: ICD-10-CM

## 2022-11-16 DIAGNOSIS — E55.9 VITAMIN D DEFICIENCY: ICD-10-CM

## 2022-11-16 DIAGNOSIS — G89.29 ENCOUNTER FOR CHRONIC PAIN MANAGEMENT: ICD-10-CM

## 2022-11-16 DIAGNOSIS — M51.36 DEGENERATIVE DISC DISEASE, LUMBAR: Chronic | ICD-10-CM

## 2022-11-16 DIAGNOSIS — M48.061 SPINAL STENOSIS OF LUMBAR REGION WITHOUT NEUROGENIC CLAUDICATION: Chronic | ICD-10-CM

## 2022-11-16 DIAGNOSIS — M54.16 LUMBAR RADICULOPATHY: Chronic | ICD-10-CM

## 2022-11-16 RX ORDER — PREGABALIN 150 MG/1
CAPSULE ORAL
Qty: 90 CAPSULE | OUTPATIENT
Start: 2022-11-16

## 2022-11-16 RX ORDER — PROMETHAZINE HYDROCHLORIDE 25 MG/1
TABLET ORAL
Qty: 21 TABLET | Refills: 0 | Status: SHIPPED | OUTPATIENT
Start: 2022-11-16

## 2022-11-16 RX ORDER — ERGOCALCIFEROL 1.25 MG/1
CAPSULE ORAL
Qty: 12 CAPSULE | Refills: 0 | Status: SHIPPED | OUTPATIENT
Start: 2022-11-16

## 2022-11-16 RX ORDER — PREGABALIN 150 MG/1
150 CAPSULE ORAL 3 TIMES DAILY
Qty: 90 CAPSULE | Refills: 2 | Status: SHIPPED | OUTPATIENT
Start: 2022-11-16 | End: 2023-02-14

## 2022-11-16 NOTE — TELEPHONE ENCOUNTER
Please Approve or Refuse.   Send to Pharmacy per Pt's Request:      Next Visit Date:  1/27/2023   Last Visit Date: 9/27/2022    Hemoglobin A1C (%)   Date Value   08/19/2022 6.6 (H)   03/12/2022 5.2   09/24/2021 5.0             ( goal A1C is < 7)   BP Readings from Last 3 Encounters:   11/07/22 (!) 140/78   10/28/22 114/79   10/26/22 (!) 144/87          (goal 120/80)  BUN   Date Value Ref Range Status   09/27/2022 17 8 - 23 mg/dL Final     Creatinine   Date Value Ref Range Status   09/27/2022 0.69 (L) 0.70 - 1.20 mg/dL Final     Potassium   Date Value Ref Range Status   09/27/2022 4.5 3.7 - 5.3 mmol/L Final

## 2022-11-18 ENCOUNTER — E-VISIT (OUTPATIENT)
Dept: FAMILY MEDICINE CLINIC | Age: 72
End: 2022-11-18
Payer: MEDICARE

## 2022-11-18 DIAGNOSIS — J30.89 NON-SEASONAL ALLERGIC RHINITIS DUE TO OTHER ALLERGIC TRIGGER: Primary | ICD-10-CM

## 2022-11-18 DIAGNOSIS — B96.89 ACUTE BACTERIAL SINUSITIS: ICD-10-CM

## 2022-11-18 DIAGNOSIS — J01.90 ACUTE BACTERIAL SINUSITIS: ICD-10-CM

## 2022-11-18 DIAGNOSIS — Z87.891 HISTORY OF SMOKING: ICD-10-CM

## 2022-11-18 PROCEDURE — 99422 OL DIG E/M SVC 11-20 MIN: CPT | Performed by: FAMILY MEDICINE

## 2022-11-18 RX ORDER — CETIRIZINE HYDROCHLORIDE 10 MG/1
10 TABLET ORAL DAILY
Qty: 30 TABLET | Refills: 0 | Status: SHIPPED | OUTPATIENT
Start: 2022-11-18 | End: 2022-12-18

## 2022-11-18 RX ORDER — AMOXICILLIN AND CLAVULANATE POTASSIUM 875; 125 MG/1; MG/1
1 TABLET, FILM COATED ORAL 2 TIMES DAILY
Qty: 14 TABLET | Refills: 0 | Status: SHIPPED | OUTPATIENT
Start: 2022-11-18 | End: 2022-11-25

## 2022-11-18 RX ORDER — METHYLPREDNISOLONE 4 MG/1
TABLET ORAL
Qty: 1 KIT | Refills: 0 | Status: SHIPPED | OUTPATIENT
Start: 2022-11-18

## 2022-11-18 RX ORDER — FLUTICASONE PROPIONATE 50 MCG
1 SPRAY, SUSPENSION (ML) NASAL DAILY
Qty: 16 G | Refills: 1 | Status: SHIPPED | OUTPATIENT
Start: 2022-11-18 | End: 2022-12-18

## 2022-11-18 ASSESSMENT — LIFESTYLE VARIABLES
SMOKING_YEARS: 30
PACKS_PER_DAY: 2
SMOKING_STATUS: NO, I'M A FORMER SMOKER

## 2022-11-18 NOTE — PROGRESS NOTES
[address] [place] [address]  Dept: [phone]      E-VISIT PROGRESS NOTE    HPI  Nelida Barksdale is a 81 y.o. male patient  has requested an audio/video evaluation for the following concern(s): See below    PATIENT MESSAGE  Patient Questionnaire Submission  --------------------------------     Questionnaire:  EVISIT SINUS/COUGH/COLD QUESTIONNAIRE 1     Question: Have you been experiencing nasal congestion? Answer:   Yes     Question: When you blow your nose, what color is the mucus? Answer:   Mostly clear     Question: Have you had pain or pressure around your nose and face or do your upper teeth hurt? Answer:   No     Question: Has your throat been sore? Answer:   Yes     Question: Have you noticed any swollen glands in your neck? Answer:   No     Question: Have you been sneezing? Answer:   Yes     Question: Have you been coughing? Answer:   Yes     Questionnaire:  EVISIT SINUS/COUGH/COLD QUESTIONNAIRE 2     Question: How often are you coughing? Answer:   Mostly early in the morning            Mostly at night     Question: Have you been coughing up any mucus? Answer:   Yes, I am coughing up a little bit of mucus     Question: What is the appearance of the mucus? Answer: The mucus is clear or white     Questionnaire:  EVISIT SINUS/COUGH/COLD QUESTIONNAIRE 3     Question: Have you been hoarse or lost your voice? Answer:   No     Question: Have your ears been bothering you? Answer:   No     Question: If yes, please describe. Answer:        Question: Have your eyes been bothering you? Answer:   Yes     Question: If yes, please describe. Answer:   Runny watery     Question: Have you been experiencing significant body aches? Answer:   No     Question: Have you had severe headaches of stiff neck? Answer:   No     Question: Have you been experiencing consistent nausea, vomitting, or dizziness? Answer:    No Question: Have you been experiencing swelling of your mouth or tongue, or difficulty swallowing? Answer:   No     Question: Have you been experiencing chest pain or shortness of breath? Answer:   No     Question: Have you developed a new rash? Answer:   No     Question: How long have you been having these symptoms? Answer:   Started sneezing/coughing late Wednesday 11/16     Question: Have you been having fevers? Answer:   No, I have not had any fevers     Questionnaire:  EVISIT SINUS/COUGH/COLD QUESTIONNAIRE 5     Question: Do you currently smoke? Answer:   No, I'm a former smoker     Questionnaire:  EVISIT SINUS/COUGH/COLD QUESTIONNAIRE 7     Question: On average, how many pack per day did you smoke in the past?   Answer:   2     Question: For how many years did you smoke? Answer:   30     Questionnaire:  EVISIT SINUS/COUGH/COLD QUESTIONNAIRE 8     Question: Do you have any chronic illnesses, such as diabetes, heart disease, asthma, emphysema, HIV, cancer, or kidney failure, or have you recently taken any medications that can weaken your ability to fight infection, such as prednisone  Answer:   Yes     Question: Please enter the details of your other illness(es) or medications that can weaken your ability to fight infection  Answer:   Diabetes     Question: Have you been recently hospitalized? Answer:   No     Question: Have you been exposed to people with similar symptoms? Answer:   I am not sure     Question: Have you traveled within the past month? Answer:   No     Question: If yes, when and where? Answer:        Question: Are your symptoms worse when you are exposed to pollen, dust, mold, pets, or other things in your environment? Answer:   No     Question: Are your symptoms better, worse, or staying the same?   Answer:   My symptoms are gradually worsening     Question: Have you experienced similar symptoms in the past?  Answer:   Yes     Questionnaire:  EVISIT SINUS/COUGH/COLD QUESTIONNAIRE 9     Question: What treatments have worked in the past?  Answer:   Antibiotics, cough syrup & nasal spray     Question: What treatments have not worked? Answer:   Loratidine     Question: Are you currently using any medications or other treatments for these symptoms? Answer:   No     Questionnaire: MH EVISIT SINUS/COUGH/COLD QUESTIONNAIRE 11     Question: Do you have anything else to add? Answer:   Pressure at forehead  Review of Systems   Past Medical History:   Diagnosis Date    AAA (abdominal aortic aneurysm)     \"stable\" 3 cm on CT 2021    Arthritis     osteoarthritis    Bladder cancer ([facility] 03/2021    bladder    Chronic neck pain     Colon polyps 10/08/2019    tubular adenoma x2    COVID-19 virus infection 01/10/2022    Slight cough. Diabetic neuropathy ([facility]     Diarrhea     Essential hypertension 05/12/2020    managed by Dr. Darling Nicholas PCP    GERD (gastroesophageal reflux disease)     Hematuria     Hepatitis B core antibody positive     History of bleeding peptic ulcer     History of blood transfusion     no reactions    History of hepatitis C     History of migraine headaches     History of stress test 07/2020    \"low risk\"    Hyperlipidemia     Iron (Fe) deficiency anemia     Poor historian     states his wife takes care of all medical information    Positive FIT (fecal immunochemical test)     Type 2 diabetes mellitus with microalbuminuria, without long-term current use of insulin ([facility] 07/13/2020    managed by Dr. Melanie Fields    Under care of team 02/09/2022    pcp-Dr Carlos Summers virtual visit 9/2022    Under care of team 02/09/2022    hematology-Dr Caprice Saavedra 34 virtual visit 10/2022    Under care of team 02/09/2022    urology-Dr fairchild-last visit 9/2022    Under care of team 10/27/2022    GI - DR. BRITO - last vist - 8/2022    Under care of team 10/27/2022    PAIN MANAGEMENT - HERMELINDA Shipman - last visit - 10/2022    Under care of team 10/27/2022    PODIATRY - DR. Hans Davis - last visit - 5/2022    Wears dentures     Wears glasses     Worsening headaches 12/29/2020      Allergies   Allergen Reactions    Asa [Aspirin]       iron deficiency anemia , requiring iron infusions and transfusions    Iron      Pill- constipation, abdominal pain    Nsaids       iron deficiency anemia, history of bleeding ulcers          Medication List            Accurate as of November 18, 2022 12:23 PM. If you have any questions, ask your nurse or doctor. START taking these medications      amoxicillin-clavulanate 875-125 MG per tablet  Commonly known as: AUGMENTIN  Take 1 tablet by mouth 2 times daily for 7 days  Started by: VEE Philip CNP     cetirizine 10 MG tablet  Commonly known as: ZYRTEC  Take 1 tablet by mouth daily  Started by: VEE Marie CNP     methylPREDNISolone 4 MG tablet  Commonly known as: MEDROL DOSEPACK  Take by mouth. Started by: VEE Marie CNP            CHANGE how you take these medications      fluticasone 50 MCG/ACT nasal spray  Commonly known as: FLONASE  1 spray by Each Nostril route daily  What changed:   how much to take  how to take this  Changed by: VEE Philip CNP            CONTINUE taking these medications      B-D SINGLE USE SWABS REGULAR Pads  USE TO CHECK BLOOD SUGAR TWICE A DAY     baclofen 10 MG tablet  Commonly known as: LIORESAL  Take 1 tablet by mouth 3 times daily as needed (back pain)     blood glucose test strips  Testing once a day. Please dispense according to patients insurance. cyanocobalamin 1000 MCG/ML injection  Inject 1 mL into the muscle every 30 days Call for next refill which will be monthly for life     folic acid 1 MG tablet  Commonly known as: FOLVITE  Take 1 tablet by mouth daily     glucose monitoring kit  Please supply Patient with a glucose monitoring kit that insurance will cover. Lancets 30G Misc  Testing once a day.   Please dispense according to patients insurance. lisinopril 10 MG tablet  Commonly known as: PRINIVIL;ZESTRIL  Take 1 tablet by mouth daily ** stop Lisinopril HCTZ**     loperamide 2 MG capsule  Commonly known as: RA Anti-Diarrheal  Take 1 capsule by mouth 4 times daily as needed for Diarrhea     metoprolol tartrate 25 MG tablet  Commonly known as: LOPRESSOR  TAKE ONE TABLET BY MOUTH TWICE A DAY     morphine 15 MG extended release tablet  Commonly known as: MS Contin  Take 1 tablet by mouth 2 times daily for 30 days. nortriptyline 10 MG capsule  Commonly known as: Pamelor  Take 1 capsule by mouth nightly For Polyneuropathy, diarrhea and insomnia     oxyCODONE-acetaminophen 5-325 MG per tablet  Commonly known as: Percocet  Take 1 tablet by mouth 2 times daily as needed for Pain for up to 30 days. pantoprazole 40 MG tablet  Commonly known as: PROTONIX  TAKE ONE TABLET BY MOUTH DAILY     pravastatin 40 MG tablet  Commonly known as: PRAVACHOL  TAKE ONE TABLET BY MOUTH EVERY EVENING. STOP GEMFIBROZIL     pregabalin 150 MG capsule  Commonly known as: Lyrica  Take 1 capsule by mouth 3 times daily for 90 days. PROBIOTIC-10 PO     promethazine 25 MG tablet  Commonly known as: PHENERGAN  TAKE ONE TABLET BY MOUTH THREE TIMES A DAY AS NEEDED FOR NAUSEA     SITagliptin 50 MG tablet  Commonly known as: Januvia  Take 1 tablet by mouth daily     SYRINGE 3CC/25GX1\" 25G X 1\" 3 ML Misc  To be used with B12 injections     vitamin D 1.25 MG (66576 UT) Caps capsule  Commonly known as: ERGOCALCIFEROL  TAKE ONE CAPSULE BY MOUTH ONCE WEEKLY. STOP VITAMIN DAILY TO BE TAKEN DAILY.                Where to Get Your Medications        These medications were sent to [facility] [id number] [address]  [address] [place] [address]      Phone: [phone]   amoxicillin-clavulanate 875-125 MG per tablet  cetirizine 10 MG tablet  fluticasone 50 MCG/ACT nasal spray  methylPREDNISolone 4 MG tablet Based on the symptoms reported by the patient, it seems that patient has   CURRENT MEDS W/ ASSOC DIAG           Start Date End Date     Alcohol Swabs (B-D SINGLE USE SWABS REGULAR) PADS  05/04/21  --     USE TO CHECK BLOOD SUGAR TWICE A DAY     Associated Diagnoses:  Type 2 diabetes mellitus with hyperglycemia, without long-term current use of insulin (HCC)     baclofen (LIORESAL) 10 MG tablet  05/31/22  --     Take 1 tablet by mouth 3 times daily as needed (back pain)     Associated Diagnoses:  Degenerative disc disease, lumbar, Lumbar spondylosis, Lumbar radiculopathy, Cervical spondylitis ([facility] S/P cervical spinal fusion     blood glucose monitor strips  03/28/22  --     Testing once a day. Please dispense according to patients insurance. Associated Diagnoses:  Type 2 diabetes mellitus with diabetic polyneuropathy, without long-term current use of insulin ([facility])     cyanocobalamin 1000 MCG/ML injection  11/10/22  --     Inject 1 mL into the muscle every 30 days Call for next refill which will be monthly for life     Associated Diagnoses:  Vitamin B 12 deficiency     fluticasone (FLONASE) 50 MCG/ACT nasal spray  01/05/22  --     2 sprays by Nasal route daily     Patient taking differently: 2 sprays by Nasal route daily as needed     Associated Diagnoses:  Subacute pansinusitis, Acute viral pharyngitis     folic acid (FOLVITE) 1 MG tablet  11/10/22  --     Take 1 tablet by mouth daily     Associated Diagnoses:  --     glucose monitoring (FREESTYLE FREEDOM) kit  03/28/22  --     Please supply Patient with a glucose monitoring kit that insurance will cover. Associated Diagnoses:  Type 2 diabetes mellitus with diabetic polyneuropathy, without long-term current use of insulin ([facility])     Lancets 30G MISC  03/28/22  --     Testing once a day. Please dispense according to patients insurance.      Associated Diagnoses:  Type 2 diabetes mellitus with diabetic polyneuropathy, without long-term current use of insulin (HCC)     lisinopril (PRINIVIL;ZESTRIL) 10 MG tablet  02/25/22  --     Take 1 tablet by mouth daily ** stop Lisinopril HCTZ**     Associated Diagnoses:  Essential hypertension     loperamide (RA ANTI-DIARRHEAL) 2 MG capsule  01/05/21  --     Take 1 capsule by mouth 4 times daily as needed for Diarrhea     Associated Diagnoses:  Diarrhea, unspecified type     metoprolol tartrate (LOPRESSOR) 25 MG tablet  05/04/22  --     TAKE ONE TABLET BY MOUTH TWICE A DAY     Associated Diagnoses:  Essential hypertension     morphine (MS CONTIN) 15 MG extended release tablet  10/28/22  11/27/22     Take 1 tablet by mouth 2 times daily for 30 days. Associated Diagnoses:  Lumbar spondylosis, Lumbar radiculopathy, Spinal stenosis of lumbar region, unspecified whether neurogenic claudication present     nortriptyline (PAMELOR) 10 MG capsule  09/27/22  --     Take 1 capsule by mouth nightly For Polyneuropathy, diarrhea and insomnia     Associated Diagnoses:  Peripheral polyneuropathy     oxyCODONE-acetaminophen (PERCOCET) 5-325 MG per tablet  10/28/22  11/27/22     Take 1 tablet by mouth 2 times daily as needed for Pain for up to 30 days. Associated Diagnoses:  Lumbar spondylosis     pantoprazole (PROTONIX) 40 MG tablet  10/12/22  --     TAKE ONE TABLET BY MOUTH DAILY     Associated Diagnoses:  Gastroesophageal reflux disease without esophagitis     pravastatin (PRAVACHOL) 40 MG tablet  06/06/22  --     TAKE ONE TABLET BY MOUTH EVERY EVENING. STOP GEMFIBROZIL     Associated Diagnoses:  Hyperlipidemia with target LDL less than 100     pregabalin (LYRICA) 150 MG capsule  11/16/22 02/14/23     Take 1 capsule by mouth 3 times daily for 90 days.      Associated Diagnoses:  Lumbar radiculopathy, Degenerative disc disease, lumbar, Spinal stenosis of lumbar region without neurogenic claudication, Osteoarthritis of spine with radiculopathy, lumbar region, Encounter for chronic pain management, Osteoarthritis of lumbar spine, unspecified spinal osteoarthritis complication status     Probiotic Product (PROBIOTIC-10 PO)  --  --     Associated Diagnoses:  --     promethazine (PHENERGAN) 25 MG tablet  22  --     TAKE ONE TABLET BY MOUTH THREE TIMES A DAY AS NEEDED FOR NAUSEA     Associated Diagnoses:  Nausea     SITagliptin (JANUVIA) 50 MG tablet  22  --     Take 1 tablet by mouth daily     Associated Diagnoses:  Type 2 diabetes mellitus with hyperglycemia, without long-term current use of insulin ([facility])     Syringe/Needle, Disp, (SYRINGE 3CC/25GX1\") 25G X 1\" 3 ML MISC  21  --     To be used with B12 injections     Associated Diagnoses:  Vitamin B 12 deficiency     vitamin D (ERGOCALCIFEROL) 1.25 MG (02670 UT) CAPS capsule  22  --     TAKE ONE CAPSULE BY MOUTH ONCE WEEKLY. STOP VITAMIN DAILY TO BE TAKEN DAILY. Associated Diagnoses:  Vitamin D deficiency           Orders Placed This Encounter   Medications    amoxicillin-clavulanate (AUGMENTIN) 875-125 MG per tablet     Sig: Take 1 tablet by mouth 2 times daily for 7 days     Dispense:  14 tablet     Refill:  0    methylPREDNISolone (MEDROL DOSEPACK) 4 MG tablet     Sig: Take by mouth. Dispense:  1 kit     Refill:  0    fluticasone (FLONASE) 50 MCG/ACT nasal spray     Si spray by Each Nostril route daily     Dispense:  16 g     Refill:  1    cetirizine (ZYRTEC) 10 MG tablet     Sig: Take 1 tablet by mouth daily     Dispense:  30 tablet     Refill:  0     No orders of the defined types were placed in this encounter. Diagnosis Orders   1. Non-seasonal allergic rhinitis due to other allergic trigger  amoxicillin-clavulanate (AUGMENTIN) 875-125 MG per tablet    methylPREDNISolone (MEDROL DOSEPACK) 4 MG tablet      2. History of smoking  fluticasone (FLONASE) 50 MCG/ACT nasal spray      3.  Acute bacterial sinusitis  fluticasone (FLONASE) 50 MCG/ACT nasal spray    cetirizine (ZYRTEC) 10 MG tablet            Patient was advised to contact me if symptoms not improving or getting worse, or to call us for an appointment. On this date 11/18/2022 I have spent 15 minutes reviewing previous notes, test results and face to face with the patient discussing the diagnosis and importance of compliance with the treatment plan as well as documenting on the day of the visit. This note was completed by using the assistance of a speech-recognition program. However, inadvertent computerized transcription errors may be present.  Although every effort was made to ensure accuracy, no guarantees can be provided that every mistake has been identified and corrected by editing   Electronically signed by VEE Singh CNP on 11/18/22 at 12:19 PM EST

## 2022-11-22 ENCOUNTER — HOSPITAL ENCOUNTER (OUTPATIENT)
Dept: PAIN MANAGEMENT | Age: 72
Discharge: HOME | End: 2022-11-22
Payer: MEDICARE

## 2022-11-22 VITALS
TEMPERATURE: 97.3 F | DIASTOLIC BLOOD PRESSURE: 78 MMHG | SYSTOLIC BLOOD PRESSURE: 135 MMHG | OXYGEN SATURATION: 98 % | HEART RATE: 100 BPM | RESPIRATION RATE: 20 BRPM

## 2022-11-22 DIAGNOSIS — Z79.891 CHRONIC USE OF OPIATE DRUG FOR THERAPEUTIC PURPOSE: Primary | ICD-10-CM

## 2022-11-22 DIAGNOSIS — M54.16 LUMBAR RADICULOPATHY: Chronic | ICD-10-CM

## 2022-11-22 DIAGNOSIS — M48.061 SPINAL STENOSIS OF LUMBAR REGION WITHOUT NEUROGENIC CLAUDICATION: ICD-10-CM

## 2022-11-22 DIAGNOSIS — M51.36 DEGENERATIVE DISC DISEASE, LUMBAR: ICD-10-CM

## 2022-11-22 DIAGNOSIS — M48.061 SPINAL STENOSIS OF LUMBAR REGION, UNSPECIFIED WHETHER NEUROGENIC CLAUDICATION PRESENT: Chronic | ICD-10-CM

## 2022-11-22 DIAGNOSIS — M47.816 LUMBAR SPONDYLOSIS: Chronic | ICD-10-CM

## 2022-11-22 PROCEDURE — 80307 DRUG TEST PRSMV CHEM ANLYZR: CPT

## 2022-11-22 PROCEDURE — 99213 OFFICE O/P EST LOW 20 MIN: CPT

## 2022-11-22 PROCEDURE — G0481 DRUG TEST DEF 8-14 CLASSES: HCPCS

## 2022-11-22 PROCEDURE — 99214 OFFICE O/P EST MOD 30 MIN: CPT | Performed by: STUDENT IN AN ORGANIZED HEALTH CARE EDUCATION/TRAINING PROGRAM

## 2022-11-22 RX ORDER — MORPHINE SULFATE 15 MG/1
15 TABLET, FILM COATED, EXTENDED RELEASE ORAL 2 TIMES DAILY
Qty: 60 TABLET | Refills: 0 | Status: SHIPPED | OUTPATIENT
Start: 2022-11-27 | End: 2022-12-27

## 2022-11-22 RX ORDER — OXYCODONE HYDROCHLORIDE AND ACETAMINOPHEN 5; 325 MG/1; MG/1
1 TABLET ORAL 2 TIMES DAILY PRN
Qty: 60 TABLET | Refills: 0 | Status: SHIPPED | OUTPATIENT
Start: 2022-11-27 | End: 2022-12-27

## 2022-11-22 ASSESSMENT — PAIN SCALES - GENERAL: PAINLEVEL_OUTOF10: 4

## 2022-11-22 NOTE — PROGRESS NOTES
Chronic Pain Clinic Note     Encounter Date: 11/22/2022     SUBJECTIVE:  Chief complaint: Low back pain    History of Present Illness:   Galina Chappell is a 81 y.o. male who presents with back pain    Medication Refill:   Oxycodone - 10  Morphine ER 15 MG - 10    Current Complaints of Pain:   Location: back   Radiation: both legs  Severity: mod  Pain Numerical Score -    Average: 3/4     Highest: 6  Lowest: 3  Character/Quality: Complains of pain that is burning  Timing: constant  Associated symptoms: none  Numbness: yes  Weakness: yes legs and feet  Exacerbating factors: twisting moving the wrong way   Alleviating factors:  laying down medication   Length of time pain has been present: Started about 5 years ago  Inciting event/injury:  driving truck  Bowel/Bladder incontinence: no  Falls: no  Physical Therapy:  yes, has tried    History of Interventions:   Surgery: No   Injections: None    Imaging:    Lumbar MRI 5/17/2022    Impression   Multilevel degenerative disc disease with associated multilevel degenerative   facet hypertrophy with bilateral foraminal narrowing as described above. Past Medical History:   Diagnosis Date    AAA (abdominal aortic aneurysm)     \"stable\" 3 cm on CT 2021    Arthritis     osteoarthritis    Bladder cancer ([address] 03/2021    bladder    Chronic neck pain     Colon polyps 10/08/2019    tubular adenoma x2    COVID-19 virus infection 01/10/2022    Slight cough.     Diabetic neuropathy ([address]     Diarrhea     Essential hypertension 05/12/2020    managed by Dr. Clarke Luke PCP    GERD (gastroesophageal reflux disease)     Hematuria     Hepatitis B core antibody positive     History of bleeding peptic ulcer     History of blood transfusion     no reactions    History of hepatitis C     History of migraine headaches     History of stress test 07/2020    \"low risk\"    Hyperlipidemia     Iron (Fe) deficiency anemia     Poor historian     states his wife takes care of all medical information Positive FIT (fecal immunochemical test)     Type 2 diabetes mellitus with microalbuminuria, without long-term current use of insulin ([facility] 07/13/2020    managed by Dr. Apoorva Agosto    Under care of team 02/09/2022    pcp-Dr Zonia Koch virtual visit 9/2022    Under care of team 02/09/2022    hematology-Dr Caprice Saavedra [address] virtual visit 10/2022    Under care of team 02/09/2022    urology-Dr fairchild-last visit 9/2022    Under care of team 10/27/2022    GI - DR. BRITO - last vist - 8/2022    Under care of team 10/27/2022    PAIN MANAGEMENT - HERMELINDA Mcmahon - last visit - 10/2022    Under care of team 10/27/2022    PODIATRY - DR. YE - last visit - 5/2022    Wears dentures     Wears glasses     Worsening headaches 12/29/2020       Past Surgical History:   Procedure Laterality Date    CERVICAL SPINE SURGERY  1977    cervical spine three times, has plate    CHOLECYSTECTOMY  03/22/2019    COLONOSCOPY  01/25/2015    10 yr recall, hemorrhoids    COLONOSCOPY N/A 10/08/2019    tubular adenoma x2    COLONOSCOPY N/A 06/07/2022    COLONOSCOPY DIAGNOSTIC performed by Anastasia Santoro MD at [address] [place] [address] 04/29/2021    CYSTOSCOPY TUR BLADDER TUMOR, GYRUS, RIGHT STENT PLACEMENT AND RIGHT STENT REMOVAL performed by Sol Graff MD at [place] [address] N/A 09/09/2021    CYSTOSCOPY, TRANSURETHRAL RESECTION BLADDER TUMOR performed by Sol Graff MD at [place] [address] N/A 02/16/2022    CYSTOSCOPY BLADDER BIOPSY, FULGURATION performed by Sol Graff MD at [place] [address] N/A 06/17/2022    CYSTOSCOPY, TUR BLADDER TUMOR, GYRUS performed by Sol Graff MD at [place] [address] N/A 10/28/2022    [address] [facility] [address] [facility] [address] performed by Sol Graff MD at [facility]  10/2015    all teeth extracted    ENDOSCOPY, COLON, DIAGNOSTIC      HERNIA REPAIR N/A 08/07/2020    HERNIA INCISIONAL REPAIR LAPAROSCOPIC ROBOTIC WITH MESH performed by Lawanda Rivero DO at [facility] Right     UMBILICAL HERNIA REPAIR  8802-83    UPPER GASTROINTESTINAL ENDOSCOPY  2015    UPPER GASTROINTESTINAL ENDOSCOPY N/A 10/08/2019    EGD BIOPSY performed by Rylan Jones MD at [address] N/A 2022    EGD BIOPSY OF DUODENUM, GE JUNCTION AND GASTRIC ANTRUM performed by Rylan Jones MD at [facility]       Family History   Problem Relation Age of Onset    Diabetes Mother     Heart Disease Father     High Blood Pressure Father        Social History     Socioeconomic History    Marital status:      Spouse name: Not on file    Number of children: Not on file    Years of education: Not on file    Highest education level: Not on file   Occupational History    Occupation: disabled   Tobacco Use    Smoking status: Former     Packs/day: 2.00     Years: 45.00     Pack years: 90.00     Types: Cigarettes     Start date: 1968     Quit date: 2015     Years since quittin.9    Smokeless tobacco: Never    Tobacco comments:     on chantix 11-6-15   12-7-15   Vaping Use    Vaping Use: Never used   Substance and Sexual Activity    Alcohol use: No    Drug use: No    Sexual activity: Yes     Partners: Female   Other Topics Concern    Not on file   Social History Narrative    Not on file     Social Determinants of Health     Financial Resource Strain: Low Risk     Difficulty of Paying Living Expenses: Not hard at all   Food Insecurity: No Food Insecurity    Worried About [facility] in the Last Year: Never true    [facility] in the Last Year: Never true   Transportation Needs: No Transportation Needs    Lack of Transportation (Medical): No    Lack of Transportation (Non-Medical):  No   Physical Activity: Insufficiently Active    Days of Exercise per Week: 2 days    Minutes of Exercise per Session: 40 min   Stress: Not on file   Social Connections: Not on file   Intimate Partner Violence: Not on file   Housing Stability: Unknown    Unable to Pay for Housing in the Last Year: No    Number of Places Lived in the Last Year: Not on file    Unstable Housing in the Last Year: No       Medications & Allergies:   Current Outpatient Medications   Medication Instructions    Alcohol Swabs (B-D SINGLE USE SWABS REGULAR) PADS USE TO CHECK BLOOD SUGAR TWICE A DAY    amoxicillin-clavulanate (AUGMENTIN) 875-125 MG per tablet 1 tablet, Oral, 2 TIMES DAILY    baclofen (LIORESAL) 10 mg, Oral, 3 TIMES DAILY PRN    blood glucose monitor strips Testing once a day. Please dispense according to patients insurance. cetirizine (ZYRTEC) 10 mg, Oral, DAILY    cyanocobalamin 1,000 mcg, IntraMUSCular, EVERY 30 DAYS, Call for next refill which will be monthly for life    fluticasone (FLONASE) 50 MCG/ACT nasal spray 1 spray, Each Nostril, DAILY    folic acid (FOLVITE) 1 mg, Oral, DAILY    glucose monitoring (FREESTYLE FREEDOM) kit Please supply Patient with a glucose monitoring kit that insurance will cover. Lancets 30G MISC Testing once a day. Please dispense according to patients insurance.     lisinopril (PRINIVIL;ZESTRIL) 10 mg, Oral, DAILY, ** stop Lisinopril HCTZ**    loperamide (RA ANTI-DIARRHEAL) 2 mg, Oral, 4 TIMES DAILY PRN    methylPREDNISolone (MEDROL DOSEPACK) 4 MG tablet Take by mouth.    metoprolol tartrate (LOPRESSOR) 25 MG tablet TAKE ONE TABLET BY MOUTH TWICE A DAY    [START ON 11/27/2022] morphine (MS CONTIN) 15 mg, Oral, 2 TIMES DAILY    nortriptyline (PAMELOR) 10 mg, Oral, NIGHTLY, For Polyneuropathy, diarrhea and insomnia    [START ON 11/27/2022] oxyCODONE-acetaminophen (PERCOCET) 5-325 MG per tablet 1 tablet, Oral, 2 TIMES DAILY PRN    pantoprazole (PROTONIX) 40 MG tablet TAKE ONE TABLET BY MOUTH DAILY    pravastatin (PRAVACHOL) 40 MG tablet TAKE ONE TABLET BY MOUTH EVERY EVENING. STOP GEMFIBROZIL    pregabalin (LYRICA) 150 mg, Oral, 3 TIMES DAILY    Probiotic Product (PROBIOTIC-10 PO) Oral, DAILY promethazine (PHENERGAN) 25 MG tablet TAKE ONE TABLET BY MOUTH THREE TIMES A DAY AS NEEDED FOR NAUSEA    SITagliptin (JANUVIA) 50 mg, Oral, DAILY    Syringe/Needle, Disp, (SYRINGE 3CC/25GX1\") 25G X 1\" 3 ML MISC To be used with B12 injections    vitamin D (ERGOCALCIFEROL) 1.25 MG (60894 UT) CAPS capsule TAKE ONE CAPSULE BY MOUTH ONCE WEEKLY. STOP VITAMIN DAILY TO BE TAKEN DAILY. Allergies   Allergen Reactions    Asa [Aspirin]       iron deficiency anemia , requiring iron infusions and transfusions    Iron      Pill- constipation, abdominal pain    Nsaids       iron deficiency anemia, history of bleeding ulcers        Review of Systems:   Constitutional: negative for weight changes or fevers  Cardiovascular: negative for chest pain, palpitations, irregular heart beat  Respiratory: negative for dyspnea, cough, wheezing  Gastrointestinal: negative for constipation, diarrhea, nausea  Genitourinary: negative for bowel/bladder incontinence   Musculoskeletal: positive for low back pain  Neurological: Positive for radicular leg pain, leg weakness or numbness/tingling  Behavioral/Psych: negative for anxiety/depression   Hematological: negative for abnormal bleeding, anticoagulation use or antiplatelet use  All other systems reviewed and are negative    OBJECTIVE:    Vitals:    11/22/22 0929   BP: 135/78   Pulse: 100   Resp: 20   Temp: 97.3 °F (36.3 °C)   SpO2: 98%         PHYSICAL EXAM    GENERAL: No acute distress, pleasant, well-appearing  HEENT: Normocephalic, atraumatic, Pupils equal and round  CARDIOVASCULAR: Well perfused, No peripheral cyanosis  PULMONARY: Good chest wall excursion, breathing unlabored  PSYCH: Appropriate affect and insight, non-pressured speech  SKIN: No rashes or lesions  MUSCULOSKELETAL:  Inspection: The back and extremities are symmetric and aligned. Muscle bulk is normal in appearance.   Palpation: There is tenderness to palpation along the lumbar paraspinal musculature bilaterally  Lumbar range of motion is full  NEUROMUSCULAR:  Patient ambulates unassisted  Gait is antalgic  Sensation to light touch is intact throughout lower extremities  Strength is full in lower extremities  No ankle clonus    DIAGNOSIS:    ICD-10-CM    1. Chronic use of opiate drug for therapeutic purpose  Z79.891       2. Lumbar radiculopathy  M54.16 morphine (MS CONTIN) 15 MG extended release tablet      3. Degenerative disc disease, lumbar  M51.36       4. Spinal stenosis of lumbar region without neurogenic claudication  M48.061       5. Lumbar spondylosis  M47.816 oxyCODONE-acetaminophen (PERCOCET) 5-325 MG per tablet     morphine (MS CONTIN) 15 MG extended release tablet      6. Spinal stenosis of lumbar region, unspecified whether neurogenic claudication present  M48.061 morphine (MS CONTIN) 15 MG extended release tablet           ASSESSMENT:    Kelsey Chavez is a 81 y.o.male who presents with chronic low back pain and leg pain as well as opioid management visit. To review, patient has had low back pain for greater than 5 years. He denies any previous low back surgeries. The patient's history and physical examination are consistent with lumbar spondylosis and lumbar radiculopathy. Neurologically, it appears the patient has full strength and normal sensation. There is no evidence of myelopathy on examination. There are no red flags in the patient's history. The patient continues to take opioid medications to improve pain, function and quality of life. The patient denies any side effects from the medications including constipation or respiratory depression. The patient reports adequate analgesia with the medication. There is no evidence of aberrant behavior. The patient is due for a yearly urine drug screen which was ordered at today's visit to ensure the patient is taking the medication as prescribed. The patient reports taking Percocet yesterday and morphine this morning. PLAN:  Medications:     For nonopioid therapy, the following medications were prescribed:    -Continue medication prescribed by primary care physician    Opioid therapy:  -Continue morphine extended release 15 mg twice daily, refilled  -Continue Percocet twice daily as needed, refilled  -Pain Treatment agreement: Updated 11/22/2022  -Urine Drug Screen: Ordered today 11/22/2022  -OARRS reviewed and appropriate    Interventions:  -Offered interventional spine procedure, patient deferred    Imaging:  -No new imaging    Behavioral Therapies:  -Continue daily stretching and home exercise program    Referrals:  -None    Follow-up Plan:  -1 month    Patient was offered intervention where appropriate. Multi-modal Pain Therapy: The patient was explicitly considered for multimodal and interdisciplinary therapy. Non-opioid and non-pharmacological opportunities to enhance analgesia and quality of life have been and will continue to be pursued. Opioid Therapy: Education provided to patient regarding short term and long term implications of opioid medication use. Repeat opioid risk stratification today, discussion regarding functional achievements, safe storage, and optimization of non-opioid interventional, behavioral, and pharmaceutical modalities. Will continue attempt to wean off medication as appropriate. Virginie Remy, DO  Interventional Pain Management/PM&R   [address]    Orders Placed This Encounter    DRUG SCREEN, PAIN     Morphine this morning  Percocet yesterday     Standing Status:   Standing     Number of Occurrences:   1    oxyCODONE-acetaminophen (PERCOCET) 5-325 MG per tablet     Sig: Take 1 tablet by mouth 2 times daily as needed for Pain for up to 30 days. Dispense:  60 tablet     Refill:  0     Reduce doses taken as pain becomes manageable    morphine (MS CONTIN) 15 MG extended release tablet     Sig: Take 1 tablet by mouth 2 times daily for 30 days.      Dispense:  60 tablet     Refill:  0     Reduce doses taken as pain becomes manageable

## 2022-11-27 LAB
6-ACETYLMORPHINE, UR: NOT DETECTED
7-AMINOCLONAZEPAM, URINE: NOT DETECTED
ALPHA-OH-ALPRAZ, URINE: NOT DETECTED
ALPHA-OH-MIDAZOLAM, URINE: NOT DETECTED
ALPRAZOLAM, URINE: NOT DETECTED
AMPHETAMINES, URINE: NOT DETECTED
BARBITURATES, URINE: NOT DETECTED
BENZOYLECGONINE, UR: NOT DETECTED
BUPRENORPHINE URINE: NOT DETECTED
CARISOPRODOL, UR: NOT DETECTED
CLONAZEPAM, URINE: NOT DETECTED
CODEINE, URINE: NOT DETECTED
CREATININE URINE: 187.3 MG/DL (ref 20–400)
DIAZEPAM, URINE: NOT DETECTED
DRUGS EXPECTED, UR: NORMAL
EER HI RES INTERP UR: NORMAL
ETHYL GLUCURONIDE UR: NOT DETECTED
FENTANYL URINE: NOT DETECTED
GABAPENTIN: NOT DETECTED
HYDROCODONE, URINE: NOT DETECTED
HYDROMORPHONE, URINE: PRESENT
LORAZEPAM, URINE: NOT DETECTED
MARIJUANA METAB, UR: NOT DETECTED
MDA, UR: NOT DETECTED
MDEA, EVE, UR: NOT DETECTED
MDMA URINE: NOT DETECTED
MEPERIDINE METAB, UR: NOT DETECTED
METHADONE, URINE: NOT DETECTED
METHAMPHETAMINE, URINE: NOT DETECTED
METHYLPHENIDATE: NOT DETECTED
MIDAZOLAM, URINE: NOT DETECTED
MORPHINE URINE: PRESENT
NALOXONE URINE: NOT DETECTED
NORBUPRENORPHINE, URINE: NOT DETECTED
NORDIAZEPAM, URINE: NOT DETECTED
NORFENTANYL, URINE: NOT DETECTED
NORHYDROCODONE, URINE: NOT DETECTED
NOROXYCODONE, URINE: NOT DETECTED
NOROXYMORPHONE, URINE: NOT DETECTED
OXAZEPAM, URINE: PRESENT
OXYCODONE URINE: NOT DETECTED
OXYMORPHONE, URINE: NOT DETECTED
PAIN MANAGEMENT DRUG PANEL INTERP, URINE: NORMAL
PAIN MGT DRUG PANEL, HI RES, UR: NORMAL
PCP,URINE: NOT DETECTED
PHENTERMINE, UR: NOT DETECTED
PREGABALIN: PRESENT
TAPENTADOL, URINE: NOT DETECTED
TAPENTADOL-O-SULFATE, URINE: NOT DETECTED
TEMAZEPAM, URINE: NOT DETECTED
TRAMADOL, URINE: NOT DETECTED
ZOLPIDEM METABOLITE (ZCA), URINE: NOT DETECTED
ZOLPIDEM, URINE: NOT DETECTED

## 2022-11-28 ENCOUNTER — PATIENT MESSAGE (OUTPATIENT)
Dept: FAMILY MEDICINE CLINIC | Age: 72
End: 2022-11-28

## 2022-11-28 DIAGNOSIS — M47.816 LUMBAR SPONDYLOSIS: ICD-10-CM

## 2022-11-28 DIAGNOSIS — M51.36 DEGENERATIVE DISC DISEASE, LUMBAR: ICD-10-CM

## 2022-11-28 DIAGNOSIS — M46.92 CERVICAL SPONDYLITIS (HCC): ICD-10-CM

## 2022-11-28 DIAGNOSIS — E53.8 VITAMIN B 12 DEFICIENCY: Primary | ICD-10-CM

## 2022-11-28 DIAGNOSIS — M54.16 LUMBAR RADICULOPATHY: ICD-10-CM

## 2022-11-28 DIAGNOSIS — Z98.1 S/P CERVICAL SPINAL FUSION: ICD-10-CM

## 2022-11-29 RX ORDER — BACLOFEN 10 MG/1
10 TABLET ORAL 3 TIMES DAILY PRN
Qty: 270 TABLET | Refills: 1 | Status: SHIPPED | OUTPATIENT
Start: 2022-11-29

## 2022-11-29 RX ORDER — NEEDLES, DISPOSABLE 25GX5/8"
NEEDLE, DISPOSABLE MISCELLANEOUS
Qty: 50 EACH | Refills: 3 | Status: SHIPPED | OUTPATIENT
Start: 2022-11-29

## 2022-11-29 NOTE — TELEPHONE ENCOUNTER
Last Visit:  9/27/2022     Next Visit Date:  Future Appointments   Date Time Provider Pippa Cazares   12/9/2022 11:45 AM Jim Petersen  [facility]   12/22/2022  9:40 AM VEE Baez - CNP [address]   1/27/2023 11:00 AM Jayme Coronado MD [facility]   2/6/2023 10:30 AM Vincenzo Saravia MD [facility]   [facility]16[facility]2023  2:00 [facility]   9/27/2023 11:00 AM Jayme Coronado MD fp sc [address] Maintenance   Topic Date Due    Hepatitis B vaccine (1 of 3 - Risk 3-dose series) Never done    DTaP/Tdap/Td vaccine (1 - Tdap) Never done    Shingles vaccine (2 of 3) 12/27/2015    COVID-19 Vaccine (3 - Booster for Lisa series) 03/18/2022    Low dose CT lung screening  11/17/2022    A1C test (Diabetic or Prediabetic)  11/19/2022    Lipids  03/12/2023    Diabetic retinal exam  05/31/2023    Diabetic microalbuminuria test  09/19/2023    Depression Screen  09/26/2023    Diabetic foot exam  09/27/2023    Annual Wellness Visit (AWV)  09/28/2023    Colorectal Cancer Screen  06/07/2025    Hepatitis A vaccine  Completed    Flu vaccine  Completed    Pneumococcal 65+ years Vaccine  Completed    Hib vaccine  Aged Out    Meningococcal (ACWY) vaccine  Aged Out       Hemoglobin A1C (%)   Date Value   08/19/2022 6.6 (H)   03/12/2022 5.2   09/24/2021 5.0             ( goal A1C is < 7)   Microalb/Crt.  Ratio (mcg/mg creat)   Date Value   09/19/2022 110 (H)     LDL Cholesterol (mg/dL)   Date Value   03/12/2022 19   10/23/2021            (goal LDL is <100)   AST (U/L)   Date Value   09/27/2022 16     ALT (U/L)   Date Value   09/27/2022 18     BUN (mg/dL)   Date Value   09/27/2022 17     BP Readings from Last 3 Encounters:   11/22/22 135/78   11/07/22 (!) 140/78   10/28/22 114/79          (goal 120/80)    All Future Testing planned in [facility]  Lab Frequency Next Occurrence   COVID-19 Once 12/27/2021   Microalbumin / Creatinine Urine Ratio Once 02/25/2023   DRUG SCREEN, PAIN Once 03/29/2022   EGD Once 06/30/2022   COLONOSCOPY W/ OR W/O BIOPSY Once 07/01/2022   PSA Screening Once 06/13/2022   Culture, Urine Once 10/23/2022   CBC Auto Differential     CBC with Auto Differential     CBC with Auto Differential     CBC with Auto Differential     CBC with Auto Differential     Ferritin     Iron and TIBC     Vitamin B12 & Folate                 Patient Active Problem List:     Degenerative disc disease, lumbar     Lumbar spondylosis     Lumbar radiculopathy     Lumbar spinal stenosis     Encounter for medication monitoring     Cervical spondylitis (HCC)     S/P cervical spinal fusion     GERD (gastroesophageal reflux disease)     Osteoarthritis of spine with radiculopathy, lumbar region     Iron deficiency anemia     Hep C w/o coma, chronic (HCC)     Colon polyps     Hepatitis B core antibody positive     Peripheral polyneuropathy     Essential hypertension     Type 2 diabetes mellitus with hyperglycemia, without long-term current use of insulin (HCC)     Hyperlipidemia with target LDL less than 100     Vitamin D deficiency     Vitamin B 12 deficiency     Abnormal EKG     Type 2 diabetes mellitus with diabetic polyneuropathy, without long-term current use of insulin (HCC)     Irritable bowel syndrome with diarrhea     Chronic hepatitis C without hepatic coma (HCC)     Status post hernia repair     Incisional hernia without obstruction or gangrene     Malignant neoplasm of lateral wall of urinary bladder (HCC)     Abdominal aortic aneurysm (AAA) without rupture     Gastroesophageal reflux disease     Adenomatous polyp     Iron deficiency anemia due to chronic blood loss     Chronic use of opiate drug for therapeutic purpose     Hx of hepatitis C     Diarrhea     Extrasystoles

## 2022-11-29 NOTE — TELEPHONE ENCOUNTER
From: Lebron Ariza  To: Dr. Thompson Divers: 11/28/2022 11:54 PM EST  Subject: BD 25 x 1 needles needed    Hi Dr Antonieta Stewart,   Can you please call in needles to [facility] for   3- BD 25x1 needles with refills to coincide with Kobys injectable iron? These are the needles we use. He has RX but no needles.  Thank you so much,   Roula Walker for   Fabiola Wisdom  [phone]

## 2022-12-05 ENCOUNTER — TELEPHONE (OUTPATIENT)
Dept: INFUSION THERAPY | Age: 72
End: 2022-12-05

## 2022-12-05 ENCOUNTER — HOSPITAL ENCOUNTER (OUTPATIENT)
Age: 72
Setting detail: SPECIMEN
Discharge: HOME | End: 2022-12-05

## 2022-12-05 NOTE — TELEPHONE ENCOUNTER
Pts wife requesting labs needed for MD visit this Friday be faxed to [facility] lab. Pt's wife notified lab orders faxed with confirmation.

## 2022-12-07 ENCOUNTER — HOSPITAL ENCOUNTER (OUTPATIENT)
Age: 72
Discharge: HOME | End: 2022-12-07
Payer: MEDICARE

## 2022-12-07 DIAGNOSIS — D50.8 IRON DEFICIENCY ANEMIA SECONDARY TO INADEQUATE DIETARY IRON INTAKE: ICD-10-CM

## 2022-12-07 LAB
FERRITIN: 40 NG/ML (ref 30–400)
FOLATE: >20 NG/ML
IRON SATURATION: 16 % (ref 20–55)
IRON: 52 UG/DL (ref 59–158)
TOTAL IRON BINDING CAPACITY: 333 UG/DL (ref 250–450)
UNSATURATED IRON BINDING CAPACITY: 281 UG/DL (ref 112–347)
VITAMIN B-12: 393 PG/ML (ref 232–1245)

## 2022-12-07 PROCEDURE — 83540 ASSAY OF IRON: CPT

## 2022-12-07 PROCEDURE — 82607 VITAMIN B-12: CPT

## 2022-12-07 PROCEDURE — 83550 IRON BINDING TEST: CPT

## 2022-12-07 PROCEDURE — 82728 ASSAY OF FERRITIN: CPT

## 2022-12-07 PROCEDURE — 36415 COLL VENOUS BLD VENIPUNCTURE: CPT

## 2022-12-07 PROCEDURE — 82746 ASSAY OF FOLIC ACID SERUM: CPT

## 2022-12-09 ENCOUNTER — OFFICE VISIT (OUTPATIENT)
Dept: ONCOLOGY | Age: 72
End: 2022-12-09
Payer: MEDICARE

## 2022-12-09 ENCOUNTER — HOSPITAL ENCOUNTER (OUTPATIENT)
Age: 72
Discharge: HOME | End: 2022-12-09
Payer: MEDICARE

## 2022-12-09 VITALS
DIASTOLIC BLOOD PRESSURE: 85 MMHG | BODY MASS INDEX: 32.43 KG/M2 | HEART RATE: 95 BPM | WEIGHT: 219.6 LBS | TEMPERATURE: 97 F | SYSTOLIC BLOOD PRESSURE: 139 MMHG

## 2022-12-09 DIAGNOSIS — D50.8 OTHER IRON DEFICIENCY ANEMIA: ICD-10-CM

## 2022-12-09 DIAGNOSIS — E53.8 B12 DEFICIENCY: ICD-10-CM

## 2022-12-09 DIAGNOSIS — C67.2 MALIGNANT NEOPLASM OF LATERAL WALL OF URINARY BLADDER (HCC): Primary | ICD-10-CM

## 2022-12-09 DIAGNOSIS — C67.2 MALIGNANT NEOPLASM OF LATERAL WALL OF URINARY BLADDER (HCC): ICD-10-CM

## 2022-12-09 LAB
ABSOLUTE EOS #: 0 K/UL (ref 0–0.4)
ABSOLUTE LYMPH #: 1.97 K/UL (ref 1–4.8)
ABSOLUTE MONO #: 0.51 K/UL (ref 0.1–0.8)
BASOPHILS # BLD: 1 % (ref 0–2)
BASOPHILS ABSOLUTE: 0.07 K/UL (ref 0–0.2)
EOSINOPHILS RELATIVE PERCENT: 0 % (ref 1–4)
HCT VFR BLD CALC: 43.3 % (ref 41–53)
HEMOGLOBIN: 14.2 G/DL (ref 13.5–17.5)
LYMPHOCYTES # BLD: 27 % (ref 24–44)
MCH RBC QN AUTO: 27.7 PG (ref 26–34)
MCHC RBC AUTO-ENTMCNC: 32.9 G/DL (ref 31–37)
MCV RBC AUTO: 84.3 FL (ref 80–100)
MONOCYTES # BLD: 7 % (ref 1–7)
MORPHOLOGY: ABNORMAL
PDW BLD-RTO: 22.3 % (ref 12.5–15.4)
PLATELET # BLD: 233 K/UL (ref 140–450)
PMV BLD AUTO: 7.1 FL (ref 6–12)
RBC # BLD: 5.13 M/UL (ref 4.5–5.9)
SEG NEUTROPHILS: 65 % (ref 36–66)
SEGMENTED NEUTROPHILS ABSOLUTE COUNT: 4.75 K/UL (ref 1.8–7.7)
WBC # BLD: 7.3 K/UL (ref 3.5–11)

## 2022-12-09 PROCEDURE — 1123F ACP DISCUSS/DSCN MKR DOCD: CPT | Performed by: INTERNAL MEDICINE

## 2022-12-09 PROCEDURE — G8417 CALC BMI ABV UP PARAM F/U: HCPCS | Performed by: INTERNAL MEDICINE

## 2022-12-09 PROCEDURE — G8427 DOCREV CUR MEDS BY ELIG CLIN: HCPCS | Performed by: INTERNAL MEDICINE

## 2022-12-09 PROCEDURE — 85025 COMPLETE CBC W/AUTO DIFF WBC: CPT

## 2022-12-09 PROCEDURE — G8484 FLU IMMUNIZE NO ADMIN: HCPCS | Performed by: INTERNAL MEDICINE

## 2022-12-09 PROCEDURE — 36415 COLL VENOUS BLD VENIPUNCTURE: CPT

## 2022-12-09 PROCEDURE — 1036F TOBACCO NON-USER: CPT | Performed by: INTERNAL MEDICINE

## 2022-12-09 PROCEDURE — 3078F DIAST BP <80 MM HG: CPT | Performed by: INTERNAL MEDICINE

## 2022-12-09 PROCEDURE — 99214 OFFICE O/P EST MOD 30 MIN: CPT | Performed by: INTERNAL MEDICINE

## 2022-12-09 PROCEDURE — 3017F COLORECTAL CA SCREEN DOC REV: CPT | Performed by: INTERNAL MEDICINE

## 2022-12-09 PROCEDURE — 3074F SYST BP LT 130 MM HG: CPT | Performed by: INTERNAL MEDICINE

## 2022-12-09 NOTE — PROGRESS NOTES
Subjective:   PCP: Rozina Piedra MD       Chief Complaint   Patient presents with    Follow-up     Review status of disease    Discuss Labs   Discussed results of lab work-up. INTERIM HISTORY:     Patient presents to the clinic for a follow-up visit and to discuss results of his lab work-up. Complains of being tired. Continues to be on B12. Patient not tolerate any oral iron    During this visit patient's allergy, social, medical, surgical history and medications were reviewed and updated. REVIEW OF SYSTEMS:   General: No fever or night sweats. Weight is stable. +fatigue   ENT: No double or blurred vision, no hearing problem, no dysphagia or sore throat   Respiratory: No chest pain, no shortness of breath, no cough or hemoptysis. Cardiovascular: Denies chest pain, PND or orthopnea. No L E swelling or palpitations. Gastrointestinal: No nausea or vomiting, abdominal pain, or constipation, diarrhea  Genitourinary: Denies dysuria, frequency, urgency or incontinence. +hematuria - resolved  Neurological: Denies headaches, decreased LOC, no sensory or motor focal deficits. Musculoskeletal: No arthralgia no back pain or joint swelling. Skin: There are no rashes or bleeding. Psych: Denies hallucinations or intentions to harm self      PHYSICAL EXAM:   Vitals: /85   Pulse 95   Temp 97 °F (36.1 °C) (Temporal)   Wt 219 lb 9.6 oz (99.6 kg)   BMI 32.43 kg/m²   General appearance: alert and cooperative with exam  HEENT: Head: Normocephalic, no lesions, without obvious abnormality.   Neck: no adenopathy  Lungs: clear to auscultation bilaterally  Heart: regular rate and rhythm, S1, S2 normal, no murmur, click, rub or gallop  Abdomen: soft, non-tender; bowel sounds normal; no masses,  no organomegaly  Extremities: extremities normal, atraumatic, no cyanosis or edema  Neurologic: Mental status: Alert, oriented, thought content appropriate      REVIEW OF LABORATORY DATA:   Lab Results Component Value Date    WBC 7.3 12/09/2022    HGB 14.2 12/09/2022    HCT 43.3 12/09/2022    MCV 84.3 12/09/2022     12/09/2022       Chemistry        Component Value Date/Time     09/27/2022 1107    K 4.5 09/27/2022 1107     09/27/2022 1107    CO2 22 09/27/2022 1107    BUN 17 09/27/2022 1107    CREATININE 0.69 (L) 09/27/2022 1107        Component Value Date/Time    CALCIUM 9.4 09/27/2022 1107    ALKPHOS 95 09/27/2022 1107    AST 16 09/27/2022 1107    ALT 18 09/27/2022 1107    BILITOT 0.5 09/27/2022 1107        Lab Results   Component Value Date    PKYDOTCN11 393 12/07/2022         Lab Results   Component Value Date    IRON 52 (L) 12/07/2022    TIBC 333 12/07/2022    FERRITIN 40 12/07/2022         IMPRESSION:   81year old male with history of bleeding ulcer back in 2015 and iron def, now with iron def anemia, and stool occult stools    -s/p IV iron with improvement in iron stores and hemoglobin  -EGD and colonoscopy 10/2019 did not show active bleeding, still no active source of iron def  -s/p GI in 12/3/19, concern for still blood loss, iron def, no plasns for repeat capsule video study, he has completed treatment for hepatitis C with Anish Alaniz.  -repeat iron studies are consistent with iron deficiency  -transfuse if Hbg is less than 7.0    -B12 deficiency, patient started on B12 shots 7/2020  -Non-invasive urothelial carcinoma, low grade, TURBT, 04/2021    PLAN:   Personally reviewed results of lab work-up and other relevant clinical data. Patient hemoglobin is 14.2. Iron studies show ferritin of 40 and iron saturation of 16. Patient not able to tolerate any oral iron. Continue to closely monitor iron stores and give parenteral iron as needed  Continue B12 supplementation  Patient will continue to follow with GI  Surveillance cystoscopy per urology  Patient and his wife had multiple questions which answered the best my ability.       Casi Bella MD    This note is created with the assistance of a speech recognition program.  While intending to generate a document that actually reflects the content of the visit, the document can still have some errors including those of syntax and sound a like substitutions which may escape proof reading. It such instances, actual meaning can be extrapolated by contextual diversion.

## 2022-12-12 ENCOUNTER — TELEPHONE (OUTPATIENT)
Dept: ONCOLOGY | Age: 72
End: 2022-12-12

## 2022-12-12 NOTE — TELEPHONE ENCOUNTER
AVS from 12/9/22      Rv in 2 months with labs prior     Rv scheduled for 2/17 @ 9:45 am     Pt will have labs drawn one week prior to RV    Pt was given AVS and appointment schedule    Electronically signed by Diandra Chaahl on 12/12/2022 at 8:14 AM

## 2022-12-22 ENCOUNTER — HOSPITAL ENCOUNTER (OUTPATIENT)
Dept: PAIN MANAGEMENT | Age: 72
Discharge: HOME | End: 2022-12-22
Payer: MEDICARE

## 2022-12-22 VITALS — HEART RATE: 102 BPM | OXYGEN SATURATION: 96 % | DIASTOLIC BLOOD PRESSURE: 89 MMHG | SYSTOLIC BLOOD PRESSURE: 150 MMHG

## 2022-12-22 DIAGNOSIS — M54.16 LUMBAR RADICULOPATHY: Chronic | ICD-10-CM

## 2022-12-22 DIAGNOSIS — M47.816 LUMBAR SPONDYLOSIS: Primary | Chronic | ICD-10-CM

## 2022-12-22 DIAGNOSIS — Z79.891 CHRONIC USE OF OPIATE DRUG FOR THERAPEUTIC PURPOSE: ICD-10-CM

## 2022-12-22 DIAGNOSIS — M46.92 CERVICAL SPONDYLITIS (HCC): Chronic | ICD-10-CM

## 2022-12-22 DIAGNOSIS — M48.061 SPINAL STENOSIS OF LUMBAR REGION, UNSPECIFIED WHETHER NEUROGENIC CLAUDICATION PRESENT: Chronic | ICD-10-CM

## 2022-12-22 PROCEDURE — 99213 OFFICE O/P EST LOW 20 MIN: CPT

## 2022-12-22 RX ORDER — MORPHINE SULFATE 15 MG/1
15 TABLET, FILM COATED, EXTENDED RELEASE ORAL 2 TIMES DAILY
Qty: 60 TABLET | Refills: 0 | Status: SHIPPED | OUTPATIENT
Start: 2022-12-27 | End: 2023-01-26

## 2022-12-22 RX ORDER — OXYCODONE HYDROCHLORIDE AND ACETAMINOPHEN 5; 325 MG/1; MG/1
1 TABLET ORAL 2 TIMES DAILY PRN
Qty: 60 TABLET | Refills: 0 | Status: SHIPPED | OUTPATIENT
Start: 2022-12-27 | End: 2023-01-26

## 2022-12-22 ASSESSMENT — ENCOUNTER SYMPTOMS
COUGH: 0
BACK PAIN: 1
CONSTIPATION: 0
BOWEL INCONTINENCE: 0
SHORTNESS OF BREATH: 0

## 2022-12-22 ASSESSMENT — PAIN DESCRIPTION - FREQUENCY: FREQUENCY: CONTINUOUS

## 2022-12-22 ASSESSMENT — PAIN SCALES - GENERAL: PAINLEVEL_OUTOF10: 4

## 2022-12-22 ASSESSMENT — PAIN DESCRIPTION - PAIN TYPE: TYPE: CHRONIC PAIN

## 2022-12-22 ASSESSMENT — PAIN DESCRIPTION - DESCRIPTORS: DESCRIPTORS: SHARP

## 2022-12-22 ASSESSMENT — PAIN DESCRIPTION - LOCATION: LOCATION: BACK

## 2022-12-22 NOTE — PROGRESS NOTES
Chief Complaint   Patient presents with    Back Pain     Med refill         PMH   Pt c/o low back pain for many years. There is no known injury or surgery to the area. He does have a history of scoliosis. His last PT was over 4 years ago with no benefit and he is not interested in repeating. He does do home stretches every morning. He also had a LESI in 2013 that did not help. MRI done 5/2022 shows multilevel degenerative changes facet hypertrophy L3/4 L4/5 L5/S1. No canal stenosis  He has non-invasive bladder cancer and had resection of a tumor in April 2021. He continues to follow with oncology and hematology - he continues Injectafer infusions for anemia. Pt has spot in bladder that will be biopsied this month,Pt has spot in bladder that will be biopsied this month   He follows with podiatry for foot pain and referred to NS to [facility] for lumbar radiculopathy vs peripheral neuropathy. Has not made appt yet d/t other dr visits. (colon/egd/bladder bx)     MED was 142 and the risks related to high doses of opiates were discussed at previous visits. Have been tapering dose by 10% each month. Patient verbalizes understanding. Current MED is 45. Back Pain  This is a chronic problem. The current episode started more than 1 year ago. The problem occurs constantly. The problem is unchanged. The pain is present in the lumbar spine. The quality of the pain is described as stabbing. The pain radiates to the left knee and right knee. The pain is at a severity of 4/10. The pain is moderate. The symptoms are aggravated by bending, sitting and standing. Associated symptoms include numbness. Pertinent negatives include no bladder incontinence, bowel incontinence, chest pain, fever, tingling or weakness. Risk factors include history of cancer. He has tried ice, heat, bed rest and walking for the symptoms. The treatment provided mild relief. Patient denies any new neurological symptoms.  No bowel or bladder incontinence, no weakness, and no falling. Pill count: appropriate due 12/27  Oxycodone - 9  Morphine ER 15 mg - 10    Morphine equivalent: 45    Controlled Substance Monitoring:    Acute and Chronic Pain Monitoring:   RX Monitoring 12/22/2022   Attestation -   Acute Pain Prescriptions -   Periodic Controlled Substance Monitoring Possible medication side effects, risk of tolerance/dependence & alternative treatments discussed. ;No signs of potential drug abuse or diversion identified.;Obtaining appropriate analgesic effect of treatment. Chronic Pain > 50 MEDD -   Chronic Pain > 80 MEDD -   Chronic Pain > 120 MEDD -            Past Medical History:   Diagnosis Date    AAA (abdominal aortic aneurysm)     \"stable\" 3 cm on CT 2021    Arthritis     osteoarthritis    Bladder cancer ([facility] [address] 03/2021    bladder    Chronic neck pain     Colon polyps 10/08/2019    tubular adenoma x2    COVID-19 virus infection 01/10/2022    Slight cough.     Diabetic neuropathy ([facility] [address]     Diarrhea     Essential hypertension 05/12/2020    managed by Dr. Meera Diaz PCP    GERD (gastroesophageal reflux disease)     Hematuria     Hepatitis B core antibody positive     History of bleeding peptic ulcer     History of blood transfusion     no reactions    History of hepatitis C     History of migraine headaches     History of stress test 07/2020    \"low risk\"    Hyperlipidemia     Iron (Fe) deficiency anemia     Poor historian     states his wife takes care of all medical information    Positive FIT (fecal immunochemical test)     Type 2 diabetes mellitus with microalbuminuria, without long-term current use of insulin ([facility] [address] 07/13/2020    managed by Dr. Robinson Hogan    Under care of team 02/09/2022    pcp-Dr Cristian Butler virtual visit 9/2022    Under care of team 02/09/2022    hematology-Dr Caprice Saavedra  virtual visit 10/2022    Under care of team 02/09/2022    urology-Dr fairchild-last visit 9/2022    Under care of team 10/27/2022    GI - DR. BRITO - last vist - 8/2022    Under care of team 10/27/2022    PAIN MANAGEMENT - LOTUSYOSVANY Lawsvolodymyr Mcmahon - last visit - 10/2022    Under care of team 10/27/2022    PODIATRY - DR. YE - last visit - 5/2022    Wears dentures     Wears glasses     Worsening headaches 12/29/2020       Past Surgical History:   Procedure Laterality Date    CERVICAL SPINE SURGERY  1977    cervical spine three times, has plate    CHOLECYSTECTOMY  03/22/2019    COLONOSCOPY  01/25/2015    10 yr recall, hemorrhoids    COLONOSCOPY N/A 10/08/2019    tubular adenoma x2    COLONOSCOPY N/A 06/07/2022    COLONOSCOPY DIAGNOSTIC performed by Anastasia Santoro MD at [address] [facility] 04/29/2021    CYSTOSCOPY TUR BLADDER TUMOR, GYRUS, RIGHT STENT PLACEMENT AND RIGHT STENT REMOVAL performed by Sol Graff MD at [place] [address] N/A 09/09/2021    CYSTOSCOPY, TRANSURETHRAL RESECTION BLADDER TUMOR performed by Sol Graff MD at [place] [address] N/A 02/16/2022    CYSTOSCOPY BLADDER BIOPSY, FULGURATION performed by Sol Graff MD at [place] [address] N/A 06/17/2022    CYSTOSCOPY, TUR BLADDER TUMOR, GYRUS performed by Sol Graff MD at [place] [address] N/A 10/28/2022    [address] performed by Sol Graff MD at [address] [facility]  10/2015    all teeth extracted    ENDOSCOPY, COLON, DIAGNOSTIC      HERNIA REPAIR N/A 08/07/2020    HERNIA INCISIONAL REPAIR LAPAROSCOPIC ROBOTIC WITH MESH performed by Claudine Amezcua DO at [facility]     [facility]  [address]22-19    UPPER GASTROINTESTINAL ENDOSCOPY  01/25/2015    UPPER GASTROINTESTINAL ENDOSCOPY N/A 10/08/2019    EGD BIOPSY performed by Anastasia Santoro MD at [address] [facility] N/A 06/07/2022    EGD BIOPSY OF DUODENUM, GE JUNCTION AND GASTRIC ANTRUM performed by Anastasia Santoro MD at [address] [facility] iron deficiency anemia , requiring iron infusions and transfusions    Iron      Pill- constipation, abdominal pain    Nsaids       iron deficiency anemia, history of bleeding ulcers          Current Outpatient Medications:     baclofen (LIORESAL) 10 MG tablet, Take 1 tablet by mouth 3 times daily as needed (back pain), Disp: 270 tablet, Rfl: 1    NEEDLE, DISP, 25 G (B-D DISP NEEDLE 25GX1\") 25G X 1\" MISC, To use with syringe, Disp: 50 each, Rfl: 3    oxyCODONE-acetaminophen (PERCOCET) 5-325 MG per tablet, Take 1 tablet by mouth 2 times daily as needed for Pain for up to 30 days. , Disp: 60 tablet, Rfl: 0    morphine (MS CONTIN) 15 MG extended release tablet, Take 1 tablet by mouth 2 times daily for 30 days. , Disp: 60 tablet, Rfl: 0    methylPREDNISolone (MEDROL DOSEPACK) 4 MG tablet, Take by mouth., Disp: 1 kit, Rfl: 0    fluticasone (FLONASE) 50 MCG/ACT nasal spray, 1 spray by Each Nostril route daily, Disp: 16 g, Rfl: 1    vitamin D (ERGOCALCIFEROL) 1.25 MG (40030 UT) CAPS capsule, TAKE ONE CAPSULE BY MOUTH ONCE WEEKLY. STOP VITAMIN DAILY TO BE TAKEN DAILY. , Disp: 12 capsule, Rfl: 0    promethazine (PHENERGAN) 25 MG tablet, TAKE ONE TABLET BY MOUTH THREE TIMES A DAY AS NEEDED FOR NAUSEA, Disp: 21 tablet, Rfl: 0    pregabalin (LYRICA) 150 MG capsule, Take 1 capsule by mouth 3 times daily for 90 days. , Disp: 90 capsule, Rfl: 2    folic acid (FOLVITE) 1 MG tablet, Take 1 tablet by mouth daily, Disp: 90 tablet, Rfl: 1    cyanocobalamin 1000 MCG/ML injection, Inject 1 mL into the muscle every 30 days Call for next refill which will be monthly for life, Disp: 3 mL, Rfl: 3    pantoprazole (PROTONIX) 40 MG tablet, TAKE ONE TABLET BY MOUTH DAILY, Disp: 90 tablet, Rfl: 1    SITagliptin (JANUVIA) 50 MG tablet, Take 1 tablet by mouth daily, Disp: 30 tablet, Rfl: 5    nortriptyline (PAMELOR) 10 MG capsule, Take 1 capsule by mouth nightly For Polyneuropathy, diarrhea and insomnia, Disp: 30 capsule, Rfl: 3    pravastatin (PRAVACHOL) 40 MG tablet, TAKE ONE TABLET BY MOUTH EVERY EVENING. STOP GEMFIBROZIL, Disp: 90 tablet, Rfl: 3    metoprolol tartrate (LOPRESSOR) 25 MG tablet, TAKE ONE TABLET BY MOUTH TWICE A DAY, Disp: 180 tablet, Rfl: 3    glucose monitoring (FREESTYLE FREEDOM) kit, Please supply Patient with a glucose monitoring kit that insurance will cover. , Disp: 1 kit, Rfl: 0    blood glucose monitor strips, Testing once a day. Please dispense according to patients insurance., Disp: 100 strip, Rfl: 3    Lancets 30G MISC, Testing once a day. Please dispense according to patients insurance., Disp: 100 each, Rfl: 3    lisinopril (PRINIVIL;ZESTRIL) 10 MG tablet, Take 1 tablet by mouth daily ** stop Lisinopril HCTZ**, Disp: 90 tablet, Rfl: 3    Alcohol Swabs (B-D SINGLE USE SWABS REGULAR) PADS, USE TO CHECK BLOOD SUGAR TWICE A DAY, Disp: 200 each, Rfl: 3    Syringe/Needle, Disp, (SYRINGE 3CC/25GX1\") 25G X 1\" 3 ML MISC, To be used with B12 injections, Disp: 50 each, Rfl: 2    loperamide (RA ANTI-DIARRHEAL) 2 MG capsule, Take 1 capsule by mouth 4 times daily as needed for Diarrhea, Disp: 112 capsule, Rfl: 2    Probiotic Product (PROBIOTIC-10 PO), Take by mouth daily , Disp: , Rfl:     Family History   Problem Relation Age of Onset    Diabetes Mother     Heart Disease Father     High Blood Pressure Father        Social History     Socioeconomic History    Marital status:      Spouse name: Not on file    Number of children: Not on file    Years of education: Not on file    Highest education level: Not on file   Occupational History    Occupation: disabled   Tobacco Use    Smoking status: Former     Packs/day: 2.00     Years: 45.00     Pack years: 90.00     Types: Cigarettes     Start date: 1968     Quit date: 2015     Years since quittin.0    Smokeless tobacco: Never    Tobacco comments:     on chantix 11-6-15   12-7-15   Vaping Use    Vaping Use: Never used   Substance and Sexual Activity    Alcohol use:  No Drug use: No    Sexual activity: Yes     Partners: Female   Other Topics Concern    Not on file   Social History Narrative    Not on file     Social Determinants of Health     Financial Resource Strain: Low Risk     Difficulty of Paying Living Expenses: Not hard at all   Food Insecurity: No Food Insecurity    Worried About [address] in the Last Year: Never true    [address] in the Last Year: Never true   Transportation Needs: No Transportation Needs    Lack of Transportation (Medical): No    Lack of Transportation (Non-Medical): No   Physical Activity: Insufficiently Active    Days of Exercise per Week: 2 days    Minutes of Exercise per Session: 40 min   Stress: Not on file   Social Connections: Not on file   Intimate Partner Violence: Not on file   Housing Stability: Unknown    Unable to Pay for Housing in the Last Year: No    Number of Places Lived in the Last Year: Not on file    Unstable Housing in the Last Year: No       Review of Systems:  Review of Systems   Constitutional: Negative for chills and fever. Cardiovascular:  Negative for chest pain and palpitations. Respiratory:  Negative for cough and shortness of breath. Musculoskeletal:  Positive for back pain. Gastrointestinal:  Negative for bowel incontinence and constipation. Genitourinary:  Negative for bladder incontinence. Neurological:  Positive for numbness. Negative for disturbances in coordination, loss of balance, tingling and weakness. Physical Exam:  BP (!) 150/89   Pulse (!) 102   SpO2 96%     Physical Exam  HENT:      Head: Normocephalic. Pulmonary:      Effort: Pulmonary effort is normal.   Musculoskeletal:         General: Normal range of motion. Cervical back: Normal range of motion. Lumbar back: Tenderness present. Skin:     General: Skin is warm and dry. Neurological:      Mental Status: He is alert and oriented to person, place, and time.        Record/Diagnostics Review:    Last zoe 11/2022 and was appropriate     Assessment:  Problem List Items Addressed This Visit       Chronic use of opiate drug for therapeutic purpose    Lumbar spondylosis - Primary (Chronic)    Relevant Medications    oxyCODONE-acetaminophen (PERCOCET) 5-325 MG per tablet (Start on 12/27/2022)    morphine (MS CONTIN) 15 MG extended release tablet (Start on 12/27/2022)    Lumbar radiculopathy (Chronic)    Relevant Medications    morphine (MS CONTIN) 15 MG extended release tablet (Start on 12/27/2022)    Lumbar spinal stenosis (Chronic)    Relevant Medications    morphine (MS CONTIN) 15 MG extended release tablet (Start on 12/27/2022)    Cervical spondylitis (HCC) (Chronic)          Treatment Plan:  Patient relates current medications are helping the pain. Patient reports taking pain medications as prescribed, denies obtaining medications from different sources and denies use of illegal drugs. Medication risk and benefits have been discussed. Patient denies side effects from medications like nausea, vomiting, constipation or drowsiness. Patient reports current activities of daily living are possible due to medications and would like to continue them. As always, we encourage daily stretching and strengthening exercises, and recommend minimizing use of pain medications unless patient cannot get through daily activities due to pain. Due to the high risk nature of this patient's pain medication close monitoring is required. Continue current medication management, pt has been stable and compliant. Script written for percocet and MSER  Follow up appointment made for 4 weeks    I have reviewed the chief complaint and history of present illness (including ROS and PFSH) and vital documentation by my staff and I agree with their documentation and have added where applicable.

## 2023-01-23 ENCOUNTER — HOSPITAL ENCOUNTER (OUTPATIENT)
Dept: PAIN MANAGEMENT | Age: 73
Discharge: HOME | End: 2023-01-23
Payer: MEDICARE

## 2023-01-23 VITALS
HEIGHT: 69 IN | OXYGEN SATURATION: 95 % | HEART RATE: 67 BPM | WEIGHT: 219 LBS | DIASTOLIC BLOOD PRESSURE: 70 MMHG | SYSTOLIC BLOOD PRESSURE: 135 MMHG | BODY MASS INDEX: 32.44 KG/M2

## 2023-01-23 DIAGNOSIS — M48.061 SPINAL STENOSIS OF LUMBAR REGION, UNSPECIFIED WHETHER NEUROGENIC CLAUDICATION PRESENT: Chronic | ICD-10-CM

## 2023-01-23 DIAGNOSIS — M47.816 LUMBAR SPONDYLOSIS: Chronic | ICD-10-CM

## 2023-01-23 DIAGNOSIS — M54.16 LUMBAR RADICULOPATHY: Chronic | ICD-10-CM

## 2023-01-23 DIAGNOSIS — Z79.891 CHRONIC USE OF OPIATE DRUG FOR THERAPEUTIC PURPOSE: Primary | ICD-10-CM

## 2023-01-23 PROCEDURE — G0481 DRUG TEST DEF 8-14 CLASSES: HCPCS

## 2023-01-23 PROCEDURE — 80307 DRUG TEST PRSMV CHEM ANLYZR: CPT

## 2023-01-23 PROCEDURE — 99213 OFFICE O/P EST LOW 20 MIN: CPT | Performed by: NURSE PRACTITIONER

## 2023-01-23 PROCEDURE — 99213 OFFICE O/P EST LOW 20 MIN: CPT

## 2023-01-23 RX ORDER — OXYCODONE HYDROCHLORIDE AND ACETAMINOPHEN 5; 325 MG/1; MG/1
1 TABLET ORAL 2 TIMES DAILY PRN
Qty: 60 TABLET | Refills: 0 | Status: SHIPPED | OUTPATIENT
Start: 2023-01-26 | End: 2023-02-25

## 2023-01-23 RX ORDER — MORPHINE SULFATE 15 MG/1
15 TABLET, FILM COATED, EXTENDED RELEASE ORAL 2 TIMES DAILY
Qty: 60 TABLET | Refills: 0 | Status: SHIPPED | OUTPATIENT
Start: 2023-01-26 | End: 2023-02-25

## 2023-01-23 ASSESSMENT — ENCOUNTER SYMPTOMS
CONSTIPATION: 0
BACK PAIN: 1
COUGH: 0
SHORTNESS OF BREATH: 0

## 2023-01-23 NOTE — PROGRESS NOTES
Chief Complaint   Patient presents with    Back Pain     To discuss next steps          PMH  Pt c/o low back pain for many years. There is no known injury or surgery to the area. He does have a history of scoliosis. His last PT was over 4 years ago with no benefit and he is not interested in repeating. He does do home stretches every morning. He also had a LESI in 2013 that did not help. MRI done 5/2022 shows multilevel degenerative changes facet hypertrophy L3/4 L4/5 L5/S1. No canal stenosis  He has non-invasive bladder cancer and had resection of a tumor in April 2021. He continues to follow with oncology and hematology - he continues Injectafer infusions for anemia. Pt has spot in bladder that will be biopsied this month,Pt has spot in bladder that will be biopsied this month   He follows with podiatry for foot pain and referred to NS to [facility] for lumbar radiculopathy vs peripheral neuropathy. Has not made appt yet d/t other dr visits. (colon/egd/bladder bx)     MED was 142 and the risks related to high doses of opiates were discussed at previous visits. Have been tapering dose by 10% each month. Patient verbalizes understanding. Current MED is 45. Back Pain  This is a chronic problem. The current episode started more than 1 year ago. The problem occurs constantly. The problem has been gradually worsening since onset. The pain is present in the lumbar spine. The quality of the pain is described as burning and stabbing. The pain radiates to the right thigh and left thigh. The pain is at a severity of 4/10. The pain is mild. The pain is Worse during the night. The symptoms are aggravated by sitting, stress, twisting, standing and position. Stiffness is present In the morning. Associated symptoms include numbness and tingling. Pertinent negatives include no chest pain or fever. Risk factors include history of cancer. He has tried muscle relaxant, walking and home exercises for the symptoms.  The treatment provided moderate relief.        Medication Refill:     Pain score Today:  4  Adverse effects (Constipation / Nausea / Sedation / sexual Dysfunction / others) :  no  Mood: good  Sleep pattern and quality: fair  Activity level: fair    Pill count Today:Morphine # 6Percocet # 6  Last dose taken  01/23/2023  OARRS report reviewed today: yes  ER/Hospitalizations/PCP visit related to pain since last visit:no   Any legal problems e.g. DUI etc.:No  Satisfied with current management: Yes    Opioid Contract: 11/22/2022  Last Urine Dug screen dated: 11/22/2022    Hemoglobin A1C   Date Value Ref Range Status   08/19/2022 6.6 (H) 4.0 - 6.0 % Final       Past Medical History, Past Surgical History, Social History, Allergies and Medications reviewed and updated in EPIC as indicated    Family History reviewed and is noncontributory.    Pill count: due 1/26    MED: 45    Controlled Substance Monitoring:    Acute and Chronic Pain Monitoring:   RX Monitoring 1/23/2023   Attestation -   Acute Pain Prescriptions -   Periodic Controlled Substance Monitoring Possible medication side effects, risk of tolerance/dependence & alternative treatments discussed.;No signs of potential drug abuse or diversion identified.;Obtaining appropriate analgesic effect of treatment.   Chronic Pain > 50 MEDD -   Chronic Pain > 80 MEDD -   Chronic Pain > 120 MEDD -              Past Medical History:   Diagnosis Date    AAA (abdominal aortic aneurysm)     \"stable\" 3 cm on CT 2021    Arthritis     osteoarthritis    Bladder cancer (HCC) 03/2021    bladder    Chronic neck pain     Colon polyps 10/08/2019    tubular adenoma x2    COVID-19 virus infection 01/10/2022    Slight cough.    Diabetic neuropathy (HCC)     Diarrhea     Essential hypertension 05/12/2020    managed by Dr. Bentley PCP    GERD (gastroesophageal reflux disease)     Hematuria     Hepatitis B core antibody positive     History of bleeding peptic ulcer     History of blood transfusion   no reactions    History of hepatitis C     History of migraine headaches     History of stress test 07/2020    \"low risk\"    Hyperlipidemia     Iron (Fe) deficiency anemia     Poor historian     states his wife takes care of all medical information    Positive FIT (fecal immunochemical test)     Type 2 diabetes mellitus with microalbuminuria, without long-term current use of insulin ([facility] 07/13/2020    managed by Dr. Bang Palmer    Under care of team 02/09/2022    pcp-Dr Betsy Borden virtual visit 9/2022    Under care of team 02/09/2022    hematology-Dr Caprice Saavedra 34 virtual visit 10/2022    Under care of team 02/09/2022    urology-Dr fairchild-last visit 9/2022    Under care of team 10/27/2022    GI - DR. BRITO - last vist - 8/2022    Under care of team 10/27/2022    PAIN MANAGEMENT - HERMELINDA Bryson - last visit - 10/2022    Under care of team 10/27/2022    PODIATRY - DR. YE - last visit - 5/2022    Wears dentures     Wears glasses     Worsening headaches 12/29/2020       Past Surgical History:   Procedure Laterality Date    CERVICAL SPINE SURGERY  1977    cervical spine three times, has plate    CHOLECYSTECTOMY  03/22/2019    COLONOSCOPY  01/25/2015    10 yr recall, hemorrhoids    COLONOSCOPY N/A 10/08/2019    tubular adenoma x2    COLONOSCOPY N/A 06/07/2022    COLONOSCOPY DIAGNOSTIC performed by Michael Corona MD at [address] [place] [address] 04/29/2021    CYSTOSCOPY TUR BLADDER TUMOR, GYRUS, RIGHT STENT PLACEMENT AND RIGHT STENT REMOVAL performed by Chris Cheney MD at [facility] N/A 09/09/2021    CYSTOSCOPY, TRANSURETHRAL RESECTION BLADDER TUMOR performed by Chris Cheney MD at [facility] N/A 02/16/2022    CYSTOSCOPY BLADDER BIOPSY, FULGURATION performed by Chris Cheney MD at [facility] N/A 06/17/2022    CYSTOSCOPY, TUR BLADDER TUMOR, GYRUS performed by Chris Cheney MD at [place] [address] 10/28/2022    CYSTOSCOPY BLADDER BIOPSY WITH FULGARATION performed by Rhona Snellen, MD at [facility] [address]  10/2015    all teeth extracted    ENDOSCOPY, COLON, DIAGNOSTIC      HERNIA REPAIR N/A 08/07/2020    HERNIA INCISIONAL REPAIR LAPAROSCOPIC ROBOTIC WITH MESH performed by Ty Pearce DO at [facility] Right     UMBILICAL HERNIA REPAIR  3022-19    UPPER GASTROINTESTINAL ENDOSCOPY  01/25/2015    UPPER GASTROINTESTINAL ENDOSCOPY N/A 10/08/2019    EGD BIOPSY performed by Janes Junior MD at [address] [facility] N/A 06/07/2022    EGD BIOPSY OF DUODENUM, GE JUNCTION AND GASTRIC ANTRUM performed by Janes Junior MD at [address] [place] [address]  [Aspirin]       iron deficiency anemia , requiring iron infusions and transfusions    Iron      Pill- constipation, abdominal pain    Nsaids       iron deficiency anemia, history of bleeding ulcers          Current Outpatient Medications:     oxyCODONE-acetaminophen (PERCOCET) 5-325 MG per tablet, Take 1 tablet by mouth 2 times daily as needed for Pain for up to 30 days. , Disp: 60 tablet, Rfl: 0    morphine (MS CONTIN) 15 MG extended release tablet, Take 1 tablet by mouth 2 times daily for 30 days. , Disp: 60 tablet, Rfl: 0    baclofen (LIORESAL) 10 MG tablet, Take 1 tablet by mouth 3 times daily as needed (back pain), Disp: 270 tablet, Rfl: 1    NEEDLE, DISP, 25 G (B-D DISP NEEDLE 25GX1\") 25G X 1\" MISC, To use with syringe, Disp: 50 each, Rfl: 3    methylPREDNISolone (MEDROL DOSEPACK) 4 MG tablet, Take by mouth., Disp: 1 kit, Rfl: 0    fluticasone (FLONASE) 50 MCG/ACT nasal spray, 1 spray by Each Nostril route daily, Disp: 16 g, Rfl: 1    vitamin D (ERGOCALCIFEROL) 1.25 MG (08871 UT) CAPS capsule, TAKE ONE CAPSULE BY MOUTH ONCE WEEKLY. STOP VITAMIN DAILY TO BE TAKEN DAILY. , Disp: 12 capsule, Rfl: 0    promethazine (PHENERGAN) 25 MG tablet, TAKE ONE TABLET BY MOUTH THREE TIMES A DAY AS NEEDED FOR NAUSEA, Disp: 21 tablet, Rfl: 0    pregabalin (LYRICA) 150 MG capsule, Take 1 capsule by mouth 3 times daily for 90 days. , Disp: 90 capsule, Rfl: 2    folic acid (FOLVITE) 1 MG tablet, Take 1 tablet by mouth daily, Disp: 90 tablet, Rfl: 1    cyanocobalamin 1000 MCG/ML injection, Inject 1 mL into the muscle every 30 days Call for next refill which will be monthly for life, Disp: 3 mL, Rfl: 3    pantoprazole (PROTONIX) 40 MG tablet, TAKE ONE TABLET BY MOUTH DAILY, Disp: 90 tablet, Rfl: 1    SITagliptin (JANUVIA) 50 MG tablet, Take 1 tablet by mouth daily, Disp: 30 tablet, Rfl: 5    nortriptyline (PAMELOR) 10 MG capsule, Take 1 capsule by mouth nightly For Polyneuropathy, diarrhea and insomnia, Disp: 30 capsule, Rfl: 3    pravastatin (PRAVACHOL) 40 MG tablet, TAKE ONE TABLET BY MOUTH EVERY EVENING. STOP GEMFIBROZIL, Disp: 90 tablet, Rfl: 3    metoprolol tartrate (LOPRESSOR) 25 MG tablet, TAKE ONE TABLET BY MOUTH TWICE A DAY, Disp: 180 tablet, Rfl: 3    glucose monitoring (FREESTYLE FREEDOM) kit, Please supply Patient with a glucose monitoring kit that insurance will cover. , Disp: 1 kit, Rfl: 0    blood glucose monitor strips, Testing once a day. Please dispense according to patients insurance., Disp: 100 strip, Rfl: 3    Lancets 30G MISC, Testing once a day.   Please dispense according to patients insurance., Disp: 100 each, Rfl: 3    lisinopril (PRINIVIL;ZESTRIL) 10 MG tablet, Take 1 tablet by mouth daily ** stop Lisinopril HCTZ**, Disp: 90 tablet, Rfl: 3    Alcohol Swabs (B-D SINGLE USE SWABS REGULAR) PADS, USE TO CHECK BLOOD SUGAR TWICE A DAY, Disp: 200 each, Rfl: 3    Syringe/Needle, Disp, (SYRINGE 3CC/25GX1\") 25G X 1\" 3 ML MISC, To be used with B12 injections, Disp: 50 each, Rfl: 2    loperamide (RA ANTI-DIARRHEAL) 2 MG capsule, Take 1 capsule by mouth 4 times daily as needed for Diarrhea, Disp: 112 capsule, Rfl: 2    Probiotic Product (PROBIOTIC-10 PO), Take by mouth daily , Disp: , Rfl:     Family History   Problem Relation Age of Onset    Diabetes Mother     Heart Disease Father     High Blood Pressure Father        Social History     Socioeconomic History    Marital status:      Spouse name: Not on file    Number of children: Not on file    Years of education: Not on file    Highest education level: Not on file   Occupational History    Occupation: disabled   Tobacco Use    Smoking status: Former     Packs/day: 2.00     Years: 45.00     Pack years: 90.00     Types: Cigarettes     Start date: 1968     Quit date: 2015     Years since quittin.1    Smokeless tobacco: Never    Tobacco comments:     on chantix 11-6-15   12-7-15   Vaping Use    Vaping Use: Never used   Substance and Sexual Activity    Alcohol use: No    Drug use: No    Sexual activity: Yes     Partners: Female   Other Topics Concern    Not on file   Social History Narrative    Not on file     Social Determinants of Health     Financial Resource Strain: Low Risk     Difficulty of Paying Living Expenses: Not hard at all   Food Insecurity: No Food Insecurity    Worried About [address] in the Last Year: Never true    [address] [place] in the Last Year: Never true   Transportation Needs: No Transportation Needs    Lack of Transportation (Medical): No    Lack of Transportation (Non-Medical): No   Physical Activity: Insufficiently Active    Days of Exercise per Week: 2 days    Minutes of Exercise per Session: 40 min   Stress: Not on file   Social Connections: Not on file   Intimate Partner Violence: Not on file   Housing Stability: Unknown    Unable to Pay for Housing in the Last Year: No    Number of Places Lived in the Last Year: Not on file    Unstable Housing in the Last Year: No       Review of Systems:  Review of Systems   Constitutional: Negative for chills and fever. Cardiovascular:  Negative for chest pain and palpitations. Respiratory:  Negative for cough and shortness of breath. Musculoskeletal:  Positive for back pain.    Gastrointestinal: Negative for constipation. Neurological:  Positive for numbness and tingling. Negative for disturbances in coordination and loss of balance. Physical Exam:  /70   Pulse 67   Ht 5' 9\" (1.753 m)   Wt 219 lb (99.3 kg)   SpO2 95%   BMI 32.34 kg/m²     Physical Exam  HENT:      Head: Normocephalic. Pulmonary:      Effort: Pulmonary effort is normal.   Musculoskeletal:         General: Normal range of motion. Cervical back: Normal range of motion. Lumbar back: Tenderness present. Skin:     General: Skin is warm and dry. Neurological:      Mental Status: He is alert and oriented to person, place, and time. Record/Diagnostics Review:    Last zoe 11/2022 and was not appropriate - +oxazepam    Assessment:  Problem List Items Addressed This Visit       Chronic use of opiate drug for therapeutic purpose - Primary    Relevant Orders    DRUG SCREEN, PAIN    Lumbar spondylosis (Chronic)    Relevant Medications    oxyCODONE-acetaminophen (PERCOCET) 5-325 MG per tablet (Start on 1/26/2023)    morphine (MS CONTIN) 15 MG extended release tablet (Start on 1/26/2023)    Lumbar radiculopathy (Chronic)    Relevant Medications    morphine (MS CONTIN) 15 MG extended release tablet (Start on 1/26/2023)    Lumbar spinal stenosis (Chronic)    Relevant Medications    morphine (MS CONTIN) 15 MG extended release tablet (Start on 1/26/2023)          Treatment Plan:  Patient relates current medications are helping the pain. Patient reports taking pain medications as prescribed, denies obtaining medications from different sources and denies use of illegal drugs. Medication risk and benefits have been discussed. Patient denies side effects from medications like nausea, vomiting, constipation or drowsiness. Patient reports current activities of daily living are possible due to medications and would like to continue them.      As always, we encourage daily stretching and strengthening exercises, and recommend minimizing use of pain medications unless patient cannot get through daily activities due to pain. Due to the high risk nature of this patient's pain medication close monitoring is required. Continue current medication management, pt has been stable and compliant. Script written for MSER and percocet  He is not interested in injections  UDS for standard monitoring purposes  Follow up appointment made for 4 weeks    I have reviewed the chief complaint and history of present illness (including ROS and PFSH) and vital documentation by my staff and I agree with their documentation and have added where applicable.

## 2023-01-26 DIAGNOSIS — Z98.1 S/P CERVICAL SPINAL FUSION: ICD-10-CM

## 2023-01-26 DIAGNOSIS — E11.42 TYPE 2 DIABETES MELLITUS WITH DIABETIC POLYNEUROPATHY, WITHOUT LONG-TERM CURRENT USE OF INSULIN (HCC): ICD-10-CM

## 2023-01-26 DIAGNOSIS — M48.061 SPINAL STENOSIS OF LUMBAR REGION WITHOUT NEUROGENIC CLAUDICATION: ICD-10-CM

## 2023-01-26 DIAGNOSIS — M46.92 CERVICAL SPONDYLITIS (HCC): Primary | ICD-10-CM

## 2023-01-26 DIAGNOSIS — M47.816 LUMBAR SPONDYLOSIS: ICD-10-CM

## 2023-01-26 DIAGNOSIS — M54.16 LUMBAR RADICULOPATHY: ICD-10-CM

## 2023-01-26 NOTE — PROGRESS NOTES
Diagnosis Orders   1. Cervical spondylitis (HCC)  Handicap Placard MISC      2. S/P cervical spinal fusion  Handicap Placard MISC      3. Lumbar radiculopathy  Handicap Placard MISC      4. Lumbar spondylosis  Handicap Placard MISC      5. Spinal stenosis of lumbar region without neurogenic claudication  Handicap Placard MISC      6.  Type 2 diabetes mellitus with diabetic polyneuropathy, without long-term current use of insulin ([address]  Handicap Placard MISC           Future Appointments   Date Time Provider Pippa Cazares   1/27/2023 11:00 AM Nicholas Pedersen MD [facility]   2/6/2023 10:30 AM Real Izaguirre MD [facility]   2/22/2023  2:45 PM Sandy South MD [address] [place] [address]   2/23/2023  9:40 AM Morena Vila APRN - CNP [address]   3/2/2023  2:15 PM Adalberto Jennings MD [facility]   9/27/2023 11:00 AM Nicholas Pedersen MD [facility]

## 2023-01-27 ENCOUNTER — TELEMEDICINE (OUTPATIENT)
Dept: FAMILY MEDICINE CLINIC | Age: 73
End: 2023-01-27

## 2023-01-27 DIAGNOSIS — C67.2 MALIGNANT NEOPLASM OF LATERAL WALL OF URINARY BLADDER (HCC): ICD-10-CM

## 2023-01-27 DIAGNOSIS — E78.5 HYPERLIPIDEMIA WITH TARGET LDL LESS THAN 100: ICD-10-CM

## 2023-01-27 DIAGNOSIS — M46.92 CERVICAL SPONDYLITIS (HCC): ICD-10-CM

## 2023-01-27 DIAGNOSIS — E55.9 VITAMIN D DEFICIENCY: ICD-10-CM

## 2023-01-27 DIAGNOSIS — B18.2 HEP C W/O COMA, CHRONIC (HCC): ICD-10-CM

## 2023-01-27 DIAGNOSIS — I10 ESSENTIAL HYPERTENSION: ICD-10-CM

## 2023-01-27 DIAGNOSIS — E11.65 TYPE 2 DIABETES MELLITUS WITH HYPERGLYCEMIA, WITHOUT LONG-TERM CURRENT USE OF INSULIN (HCC): Primary | ICD-10-CM

## 2023-01-27 DIAGNOSIS — I71.43 INFRARENAL ABDOMINAL AORTIC ANEURYSM (AAA) WITHOUT RUPTURE: ICD-10-CM

## 2023-01-27 DIAGNOSIS — I73.9 CLAUDICATION OF BOTH LOWER EXTREMITIES (HCC): ICD-10-CM

## 2023-01-27 RX ORDER — FLUTICASONE PROPIONATE 50 MCG
2 SPRAY, SUSPENSION (ML) NASAL DAILY
Qty: 11.1 G | Refills: 3 | Status: SHIPPED | OUTPATIENT
Start: 2023-01-27 | End: 2023-02-26

## 2023-01-27 RX ORDER — METFORMIN HYDROCHLORIDE 500 MG/1
500 TABLET, EXTENDED RELEASE ORAL 2 TIMES DAILY
Qty: 180 TABLET | Refills: 1 | Status: SHIPPED | OUTPATIENT
Start: 2023-01-27

## 2023-01-27 ASSESSMENT — ENCOUNTER SYMPTOMS
ABDOMINAL DISTENTION: 0
SHORTNESS OF BREATH: 0
CHEST TIGHTNESS: 0
DIARRHEA: 1
VOMITING: 0
ABDOMINAL PAIN: 0
CONSTIPATION: 0
COUGH: 0
WHEEZING: 0
NAUSEA: 0

## 2023-01-27 ASSESSMENT — PATIENT HEALTH QUESTIONNAIRE - PHQ9
SUM OF ALL RESPONSES TO PHQ9 QUESTIONS 1 & 2: 1
SUM OF ALL RESPONSES TO PHQ QUESTIONS 1-9: 1
1. LITTLE INTEREST OR PLEASURE IN DOING THINGS: 1
SUM OF ALL RESPONSES TO PHQ QUESTIONS 1-9: 1
2. FEELING DOWN, DEPRESSED OR HOPELESS: 0
SUM OF ALL RESPONSES TO PHQ QUESTIONS 1-9: 1
SUM OF ALL RESPONSES TO PHQ QUESTIONS 1-9: 1

## 2023-01-27 NOTE — PROGRESS NOTES
2023    TELEHEALTH EVALUATION -- Audio/Visual (During YTDUT-60 public health emergency)      Sridevi Francis (:  1950) is a 81 y.o. male,Established patient, here for evaluation of the following chief complaint(s): Diabetes (CONCERNS WITH HIGH BLOOD SUGAR TODAY ), Weight Management (WANTS TO LOOSE WEIGHT), Discuss Medications (METFORMIN), Hypertension, and Neck Pain        ASSESSMENT/PLAN:    1. Type 2 diabetes mellitus with hyperglycemia, without long-term current use of insulin (HCC)  worsening    Lab Results   Component Value Date    LABA1C 6.6 (H) 2022    LABA1C 5.2 2022    LABA1C 5.0 2021       -     Hemoglobin A1c; Future  -   starts  dapagliflozin (FARXIGA) 10 MG tablet; Take 1 tablet by mouth every morning For diabetes, Disp-90 tablet, R-3Normal  -    start  metFORMIN (GLUCOPHAGE-XR) 500 MG extended release tablet; Take 1 tablet by mouth in the morning and at bedtime, Disp-180 tablet, R-1Normal  -Continue Januvia 50 mg  -     CBC with Auto Differential; Future  -     Comprehensive Metabolic Panel; Future  -     Magnesium; Future  -     TSH; Future  -     Vitamin B12 & Folate; Future  -     Uric Acid; Future    -advised home blood glucose testing  daily  -daily feet exam, Foot care: advised to wash feet daily, pat dry and apply lotion at night, avoiding between toes. Need to look at feet daily and report to a physician any signs of inflammation or skin damage  -annual dilated eye exam  -Low carb, low fat diet, increase fruits and vegetables, and exercise 4-5 times a day 30-40 minutes a day discussed  -continue Lisinopril  ACEI and statin Pravachol    2. Essential hypertension  Well controlled. Continue current treatment. Lisinopril 10 mg, metoprolol 25 mg BID  Will recheck labs. Discussed low salt diet and BP and pulse monitoring.    -     CBC with Auto Differential; Future  -     Comprehensive Metabolic Panel; Future  -     Magnesium; Future  -     TSH;  Future  - Uric Acid; Future  -     Urinalysis with Reflex to Culture; Future  3. Hyperlipidemia with target LDL less than 100  Well controlled. Continue current treatment. Pravachol 40 mg  Will recheck labs. Lab Results   Component Value Date    LDLCHOLESTEROL 19 03/12/2022    LDLDIRECT 41 10/23/2021       -     Lipid Panel; Future  4. Cervical spondylitis (HCC)  Stable  stretching, repositioning   Restart lidocaine patches  On Lyrica, Baclofen prn, Morphine MS prn, Percocet prn  Bengay prn    5. Malignant neoplasm of lateral wall of urinary bladder (HCC)  Stable  Recurrent  Will have another cystoscopy    -     Urinalysis with Reflex to Culture; Future  6. Hep C w/o coma, chronic (HCC)  Stable  Recheck labs and US liver  Check labs    -     Hepatitis C RNA, quantitative, PCR; Future  -     US LIVER; Future  7. Claudication of both lower extremities (HCC)  Stable  Continue Pravachol 40 mg  Recheck AAA, rule out  PVD  -     VL ABDOMINAL AORTA DUPLEX SCAN; Future  -     VL ARTERIAL PVR LOWER WO EXERCISE; Future  8. Vitamin D deficiency  Unsure if improving or not. Will recheck level    -     Vitamin D 25 Hydroxy; Future  9. Infrarenal abdominal aortic aneurysm (AAA) without rupture  Stable  Recheck imaging  -     VL ABDOMINAL AORTA DUPLEX SCAN; Future  -     VL ARTERIAL PVR LOWER WO EXERCISE; Future      Musa Jean received counseling on the following healthy behaviors: nutrition, exercise, and medication adherence and weight loss. Reviewed prior labs and health maintenance  Discussed use, benefit, and side effects of prescribed medications. Barriers to medication compliance addressed. Patient given educational materials - see patient instructions  All patient questions answered. Patient voiced understanding. The patient's past medical,surgical, social, and family history as well as his current medications and allergies were reviewed as documented in today's encounter.     Medications, labs, diagnostic studies, consultations and follow-up as documented in this encounter. Return in about 4 months (around 2023) for **POC A1C, Visit type diabetes. Data Unavailable    Future Appointments   Date Time Provider Pippa Cazares   2023 10:30 AM [facility]   2023  2:45 PM Pinky Norman  [facility] 22   2023  9:40 AM Mariano Ek, APRN - CNP [facility] [address]   3/2/2023  2:15 PM Yvonne Molina MD [facility] [address] [facility]   2023 11:00 AM Julien Curry MD [facility]         SUBJECTIVE/OBJECTIVE:  Jeff Dominguez (:  1950) has requested an audio/video evaluation for the following concern(s):Diabetes (CONCERNS WITH HIGH BLOOD SUGAR TODAY ), Weight Management (WANTS TO LOOSE WEIGHT), Discuss Medications (METFORMIN), Hypertension, and Neck Pain      Comes with his wife, Андрей Talbert. Has known diabetes mellitus type 2  Blood Glucose over 200's, was 241 this morning, 220- 248   Has been off Metformin due to diarrhea  Diarrhea never changed , wants to restart metformin. Only taking Januvia 50 mg  Drinking only water  Not exercising much  He says he will start exercising     A1c worsening    Lab Results   Component Value Date    LABA1C 6.6 (H) 2022    LABA1C 5.2 2022    LABA1C 5.0 2021     Increased microalbumin  Lab Results   Component Value Date    LABMICR 110 (H) 2022       Hypertension:    he  is not exercising and is not adherent to low salt diet. Blood pressure is well controlled at home. Cardiac symptoms fatigue and lower extremity edema    \"My feet are huge\"     Patient denies chest pain, chest pressure/discomfort, dyspnea, exertional chest pressure/discomfort, irregular heart beat, near-syncope, orthopnea, palpitations, paroxysmal nocturnal dyspnea, syncope, and tachypnea. Cardiovascular risk factors: Advanced age, male gender diabetes, hypertension, sedentary lifestyle.  Use of agents associated with hypertension: none. History of target organ damage:  AAA . Low risk on stress test on 7/9/2020  Saw Dr. Hayde Benitez  about 6 months ago, had clearance for surgeries    blood pressure is Normal. 110/70    BP Readings from Last 3 Encounters:   01/23/23 135/70   12/22/22 (!) 150/89   12/09/22 139/85        Pulse is Normal.    Pulse Readings from Last 3 Encounters:   01/23/23 67   12/22/22 (!) 102   12/09/22 95       Hyperlipidemia:  No new myalgias or GI upset on pravastatin (Pravachol). Medication compliance: compliant all of the time. Patient is  following a low fat, low cholesterol diet. LDL is normal  and high triglycerides    Lab Results   Component Value Date    LDLCHOLESTEROL 19 03/12/2022    LDLDIRECT 41 10/23/2021     Lab Results   Component Value Date    TRIG 285 (H) 03/12/2022    TRIG 279 (H) 07/13/2020    TRIG 148 04/24/2019         Cervical spondylitis  Reports neck stiff  He goes to Pain management, taking muscle relaxant and opiates  Denies side effects   Advised bengay and lidocaine, stretching, repositioning. Has lidocaine patched but not using , advised to start taking them  Had 3 surgeries on the neck   Has Limited ROM on the left  Intensity of pain is 2-3/10    Has known bladder cancer    Will have cystoscopy on 2/6/2023  Had cystoscopy 10/28/2022 and had 2 spots in clusters  Had 6 removals so far. \"-- Diagnosis --   Urinary bladder, cystoscopic biopsy:   Noninvasive low-grade papillary urothelial carcinoma. \"    Denies frequency, urgency or dysuria. Has a bit of pressure suprapubic, but no pain      Has history of Hep C  Received antiviral treatment 2-3 years ago. Denies nausea, vomiting, constipation. Denies abdominal pain. Lab Results   Component Value Date    HAV NONREACTIVE 09/10/2019    HEPBCAB REACTIVE (A) 09/10/2019    HEPCAB REACTIVE (A) 07/13/2020     Component Ref Range & Units 3/12/22 0930 12/30/21 1453 4/25/21 1356   Source  . PLASMA [1]  . NASOPHARYNGEAL SWAB  . NASOPHARYNGEAL SWAB    HCV RNA PCR, Quant Not Detected Not Detected [2]      Comment: HCV RNA NOT DETECTED       US liver 9/10/2019--showed fatty liver    Mildly enlarged liver with evidence hepatic steatosis. No focal abnormality   or biliary obstruction. Hepatopetal flow in the portal vein. AAA on CT 2/28/2021 3 cm infrarenal AAA  Gets Cramps when walking, rest helps. Also gets cramps at nighttime      Stable 3 cm abdominal   aortic aneurysm. CTA done 10/10/2019 showed 3 cm infrarenal abdominal aortic aneurysm  CTA ABDOMEN:       Mild calcific plaque of the abdominal aorta without evidence of significant   stenosis. Focal fusiform aneurysm of the infrarenal abdominal aorta   measuring up to 3 cm AP terminating above the iliac bifurcation. The SMA is   patent. Mild narrowing of the celiac trunk at its origin. The ALY is   filled. Extensive calcific plaque of the tortuous splenic artery. Single   renal arteries to each kidney show no significant stenosis. CTA PELVIS:       Calcific plaque of the somewhat tortuous/ectatic iliac and visualized femoral   arteries without significant stenosis. Common iliac arteries measure   approximately 13 mm on the right and 12 mm on the left. Mild stenosis at the   origin of the left internal iliac artery. Precious Dominguez has Vitamin D deficiency. Precious Dominguez  is not taking Vitamin D supplementation   he feels tired. Lab Results   Component Value Date    VITD25 18.1 (L) 09/27/2022         PHQ-2 Over the past 2 weeks, how often have you been bothered by any of the following problems? Little interest or pleasure in doing things: Several days  Feeling down, depressed, or hopeless: Not at all  PHQ-2 Score: 1  PHQ-9 Over the past 2 weeks, how often have you been bothered by any of the following problems?   PHQ-9 Total Score: 1  PHQ-9 Total Score: 1    PHQ Scores 1/27/2023 1/26/2023 9/26/2022 2/25/2022 9/17/2021 8/31/2021 9/23/2020   PHQ2 Score 1 2 0 0 0 0 0   PHQ9 Score 1 2 0 0 0 0 0       Review of Systems   Constitutional:  Positive for fatigue. Negative for activity change, appetite change, chills, diaphoresis, fever and unexpected weight change. Eyes:  Positive for visual disturbance. Respiratory:  Negative for cough, chest tightness, shortness of breath and wheezing. Cardiovascular:  Positive for leg swelling. Negative for chest pain and palpitations. Gastrointestinal:  Positive for diarrhea. Negative for abdominal distention, abdominal pain, constipation, nausea and vomiting. Endocrine: Negative for cold intolerance, heat intolerance, polydipsia, polyphagia and polyuria. Genitourinary:  Negative for difficulty urinating, dysuria, frequency and hematuria. Musculoskeletal:  Positive for arthralgias, myalgias and neck pain. Allergic/Immunologic: Positive for immunocompromised state (due to  bladder cancer). Neurological:  Positive for numbness (feet). Psychiatric/Behavioral:  Positive for dysphoric mood. Negative for sleep disturbance. The patient is nervous/anxious. Prior to Visit Medications    Medication Sig Taking? Authorizing Provider   oxyCODONE-acetaminophen (PERCOCET) 5-325 MG per tablet Take 1 tablet by mouth 2 times daily as needed for Pain for up to 30 days. Yes VEE Castro CNP   morphine (MS CONTIN) 15 MG extended release tablet Take 1 tablet by mouth 2 times daily for 30 days. Yes VEE Castro CNP   baclofen (LIORESAL) 10 MG tablet Take 1 tablet by mouth 3 times daily as needed (back pain) Yes Jaciel Rodriguez MD   NEEDLE, DISP, 25 G (B-D DISP NEEDLE 25GX1\") 25G X 1\" MISC To use with syringe Yes Jaciel Rodriguez MD   fluticasone (FLONASE) 50 MCG/ACT nasal spray 1 spray by Each Nostril route daily Yes VEE Philip CNP   vitamin D (ERGOCALCIFEROL) 1.25 MG (66105 UT) CAPS capsule TAKE ONE CAPSULE BY MOUTH ONCE WEEKLY. STOP VITAMIN DAILY TO BE TAKEN DAILY.  Yes Steffen Summers MD   promethazine (PHENERGAN) 25 MG tablet TAKE ONE TABLET BY MOUTH THREE TIMES A DAY AS NEEDED FOR NAUSEA Yes Viktor Santacruz MD   pregabalin (LYRICA) 150 MG capsule Take 1 capsule by mouth 3 times daily for 90 days. Yes Viktor Santacruz MD   folic acid (FOLVITE) 1 MG tablet Take 1 tablet by mouth daily Yes Frank Sanchez MD   cyanocobalamin 1000 MCG/ML injection Inject 1 mL into the muscle every 30 days Call for next refill which will be monthly for life Yes Ernestina Guillaume MD   pantoprazole (PROTONIX) 40 MG tablet TAKE ONE TABLET BY MOUTH DAILY Yes Ernestina Guillaume MD   SITagliptin (JANUVIA) 50 MG tablet Take 1 tablet by mouth daily Yes Ernestina Guillaume MD   nortriptyline (PAMELOR) 10 MG capsule Take 1 capsule by mouth nightly For Polyneuropathy, diarrhea and insomnia Yes Ernestina Guillaume MD   pravastatin (PRAVACHOL) 40 MG tablet TAKE ONE TABLET BY MOUTH EVERY EVENING. STOP GEMFIBROZIL Yes Ernestina Guillaume MD   metoprolol tartrate (LOPRESSOR) 25 MG tablet TAKE ONE TABLET BY MOUTH TWICE A DAY Yes Ernestina Guillaume MD   glucose monitoring (FREESTYLE FREEDOM) kit Please supply Patient with a glucose monitoring kit that insurance will cover. Yes Ernestina Guillaume MD   blood glucose monitor strips Testing once a day. Please dispense according to patients insurance. Yes Ernestina Guillaume MD   Lancets 30G MISC Testing once a day. Please dispense according to patients insurance.  Yes Ernestina Guillaume MD   lisinopril (PRINIVIL;ZESTRIL) 10 MG tablet Take 1 tablet by mouth daily ** stop Lisinopril HCTZ** Yes Ernestina Guillaume MD   Alcohol Swabs (B-D SINGLE USE SWABS REGULAR) PADS USE TO CHECK BLOOD SUGAR TWICE A DAY Yes Ernestina Guillaume MD   Syringe/Needle, Disp, (SYRINGE 3CC/25GX1\") 25G X 1\" 3 ML MISC To be used with B12 injections Yes Ernestina Guillaume MD   Probiotic Product (PROBIOTIC-10 PO) Take by mouth daily  Yes Historical Provider, MD   Handicap Placard MISC by Does not apply route Can't walk greater than 200 feet. Expires in 5 years. Patient not taking: Reported on 2023  Harjeet Bennett MD   methylPREDNISolone (MEDROL DOSEPACK) 4 MG tablet Take by mouth. Patient not taking: Reported on 2023  VEE Philip - CNP   loperamide (RA ANTI-DIARRHEAL) 2 MG capsule Take 1 capsule by mouth 4 times daily as needed for Diarrhea  Patient not taking: Reported on 2023  Karleen Rubinstein, MD       Social History     Tobacco Use    Smoking status: Former     Packs/day: 2.00     Years: 45.00     Pack years: 90.00     Types: Cigarettes     Start date: 1968     Quit date: 2015     Years since quittin.1    Smokeless tobacco: Never    Tobacco comments:     on chantix 11-6-15   12-7-15   Vaping Use    Vaping Use: Never used   Substance Use Topics    Alcohol use: No    Drug use: No          PHYSICAL EXAMINATION:    Vital Signs: (As obtained by patient/caregiver or practitioner observation)  -vital signs stable and within normal limits except obesity , BMI 32.34 kg/m2  Patient-Reported Vitals 2023   Patient-Reported Weight 222 lbs   Patient-Reported Height 5'9\"   Patient-Reported Systolic 520   Patient-Reported Diastolic 70   Patient-Reported Pulse -   Patient-Reported Temperature -           Intensity of pain is:2-3/10       Constitutional: [x] Appears well-developed and well-nourished [x] No apparent distress      [x] Abnormal- obese      Mental status  [x] Alert and awake  [x] Oriented to person/place/time [x]Able to follow commands      Eyes:  EOM    [x]  Normal  [] Abnormal-  Sclera  [x]  Normal  [] Abnormal -         Discharge [x]  None visible  [] Abnormal -    HENT:   [x] Normocephalic, atraumatic.   [] Abnormal   [x] Mouth/Throat: Mucous membranes are moist.     External Ears [x] Normal  [] Abnormal-     Neck: [x] No visualized mass     Pulmonary/Chest: [x] Respiratory effort normal.  [x] No visualized signs of difficulty breathing or respiratory distress        [] Abnormal Musculoskeletal:   [x] Normal gait with no signs of ataxia         [] Normal range of motion of neck        [x] Abnormal- decreased range of motion in the neck      Neurological:        [x] No Facial Asymmetry (Cranial nerve 7 motor function) (limited exam to video visit)          [x] No gaze palsy        [] Abnormal-            Skin:        [x] No significant exanthematous lesions or discoloration noted on facial skin         [] Abnormal-            Psychiatric:      [x] No Hallucinations       [x]Mood is normal      [x]Behavior is normal      [x]Judgment is normal      [x]Thought content is normal       [] Abnormal-     Other pertinent observable physical exam findings- none    Due to this being a TeleHealth encounter, evaluation of the following organ systems is limited: Vitals/Constitutional/EENT/Resp/CV/GI//MS/Neuro/Skin/Heme-Lymph-Imm. I personally reviewed most recent labs reviewed with the patient and all questions fully answered.    Hyperglycemia, not controlled  Vitamin D deficiency  hearing loss  hypertriglyceridemia  Otherwise labs within normal limits        Lab Results   Component Value Date    WBC 7.3 12/09/2022    HGB 14.2 12/09/2022    HCT 43.3 12/09/2022    MCV 84.3 12/09/2022     12/09/2022       Lab Results   Component Value Date/Time     09/27/2022 11:07 AM    K 4.5 09/27/2022 11:07 AM     09/27/2022 11:07 AM    CO2 22 09/27/2022 11:07 AM    BUN 17 09/27/2022 11:07 AM    CREATININE 0.69 09/27/2022 11:07 AM    GLUCOSE 235 09/27/2022 11:07 AM    CALCIUM 9.4 09/27/2022 11:07 AM        Lab Results   Component Value Date    ALT 18 09/27/2022    AST 16 09/27/2022    ALKPHOS 95 09/27/2022    BILITOT 0.5 09/27/2022       Lab Results   Component Value Date    TSH 1.10 09/27/2022       Lab Results   Component Value Date    CHOL 100 03/12/2022    CHOL 92 07/13/2020    CHOL 112 04/24/2019     Lab Results   Component Value Date    TRIG 285 (H) 03/12/2022    TRIG 279 (H) 07/13/2020 TRIG 148 2019     Lab Results   Component Value Date    HDL 24 (L) 2022    HDL 22 (L) 10/23/2021    HDL 23 (L) 2020     Lab Results   Component Value Date    LDLCHOLESTEROL 19 2022    LDLCHOLESTEROL      10/23/2021    LDLCHOLESTEROL 13 2020       Lab Results   Component Value Date    CHOLHDLRATIO 4.2 2022    CHOLHDLRATIO 5.2 (H) 10/23/2021    CHOLHDLRATIO 4.0 2020       Lab Results   Component Value Date    LABA1C 6.6 (H) 2022    LABA1C 5.2 2022    LABA1C 5.0 2021     Lab Results   Component Value Date    LABMICR 110 (H) 2022         Lab Results   Component Value Date    OKVLQMAM32 393 2022       Lab Results   Component Value Date    VITD25 18.1 (L) 2022       Orders Placed This Encounter   Medications    fluticasone (FLONASE) 50 MCG/ACT nasal spray     Si sprays by Each Nostril route daily     Dispense:  11.1 g     Refill:  3    dapagliflozin (FARXIGA) 10 MG tablet     Sig: Take 1 tablet by mouth every morning For diabetes     Dispense:  90 tablet     Refill:  3    metFORMIN (GLUCOPHAGE-XR) 500 MG extended release tablet     Sig: Take 1 tablet by mouth in the morning and at bedtime     Dispense:  180 tablet     Refill:  1         Orders Placed This Encounter   Procedures    US LIVER     This procedure can be scheduled via [employer]. Access your [employer] account by visiting [email or web address].      Standing Status:   Future     Standing Expiration Date:   2024     Order Specific Question:   Reason for exam:     Answer:   elevated LFTs    VL ABDOMINAL AORTA DUPLEX SCAN     Standing Status:   Future     Standing Expiration Date:   2024    VL ARTERIAL PVR LOWER WO EXERCISE     Standing Status:   Future     Standing Expiration Date:   2024    Hemoglobin A1c     Standing Status:   Future     Standing Expiration Date:   2024    Hepatitis C RNA, quantitative, PCR     Standing Status:   Future     Standing Expiration Date: 1/27/2024    CBC with Auto Differential     Standing Status:   Future     Standing Expiration Date:   1/27/2024    Comprehensive Metabolic Panel     Standing Status:   Future     Standing Expiration Date:   1/27/2024    Magnesium     Standing Status:   Future     Standing Expiration Date:   1/27/2024    TSH     Standing Status:   Future     Standing Expiration Date:   1/27/2024    Vitamin B12 & Folate     Standing Status:   Future     Standing Expiration Date:   1/27/2024    Vitamin D 25 Hydroxy     Standing Status:   Future     Standing Expiration Date:   1/27/2024    Lipid Panel     Standing Status:   Future     Standing Expiration Date:   1/27/2024     Order Specific Question:   Is Patient Fasting?/# of Hours     Answer:   8-10 Hours, water ok to drink    Uric Acid     Standing Status:   Future     Standing Expiration Date:   1/27/2024    Urinalysis with Reflex to Culture     Standing Status:   Future     Standing Expiration Date:   1/27/2024     Order Specific Question:   SPECIFY(EX-CATH,MIDSTREAM,CYSTO,ETC)? Answer:   MIDSTREAM         Medications Discontinued During This Encounter   Medication Reason    Handicap Placard MISC Therapy completed    methylPREDNISolone (MEDROL DOSEPACK) 4 MG tablet Therapy completed    loperamide (RA ANTI-DIARRHEAL) 2 MG capsule Therapy completed    fluticasone (FLONASE) 50 MCG/ACT nasal spray REORDER              Nadia Fernandez, was evaluated through a synchronous (real-time) audio-video encounter. The patient (or guardian if applicable) is aware that this is a billable service, which includes applicable co-pays. The virtual visit was conducted with patient's (and/or legal guardian consent). Verbal consent to proceed has been obtained within the past 12 months.  The visit was conducted pursuant to the emergency declaration under the [facility], [address] [place] [address] authority and the [employer] and [facility] [facility] Act.  Patient identification was verified    Patient was accompanied by his wife, Eliceo Neal. Provider was located at primary practice location. The patient was located at home, in a state where the provider was licensed to provide care. On this date 1/27/2023 I have spent 35 minutes reviewing previous notes, test results and face to face with the patient discussing the diagnosis and importance of compliance with the treatment plan as well as documenting on the day of the visit. --Cheikh Driscoll MD on 1/28/2023 at 7:56 PM    An electronic signature was used to authenticate this note.

## 2023-01-28 LAB
6-ACETYLMORPHINE, UR: NOT DETECTED
7-AMINOCLONAZEPAM, URINE: NOT DETECTED
ALPHA-OH-ALPRAZ, URINE: NOT DETECTED
ALPHA-OH-MIDAZOLAM, URINE: NOT DETECTED
ALPRAZOLAM, URINE: NOT DETECTED
AMPHETAMINES, URINE: NOT DETECTED
BARBITURATES, URINE: NOT DETECTED
BENZOYLECGONINE, UR: NOT DETECTED
BUPRENORPHINE URINE: NOT DETECTED
CARISOPRODOL, UR: NOT DETECTED
CLONAZEPAM, URINE: NOT DETECTED
CODEINE, URINE: NOT DETECTED
CREATININE URINE: 69.3 MG/DL (ref 20–400)
DIAZEPAM, URINE: NOT DETECTED
DRUGS EXPECTED, UR: NORMAL
EER HI RES INTERP UR: NORMAL
ETHYL GLUCURONIDE UR: NOT DETECTED
FENTANYL URINE: NOT DETECTED
GABAPENTIN: NOT DETECTED
HYDROCODONE, URINE: NOT DETECTED
HYDROMORPHONE, URINE: PRESENT
LORAZEPAM, URINE: NOT DETECTED
MARIJUANA METAB, UR: NOT DETECTED
MDA, UR: NOT DETECTED
MDEA, EVE, UR: NOT DETECTED
MDMA URINE: NOT DETECTED
MEPERIDINE METAB, UR: NOT DETECTED
METHADONE, URINE: NOT DETECTED
METHAMPHETAMINE, URINE: NOT DETECTED
METHYLPHENIDATE: NOT DETECTED
MIDAZOLAM, URINE: NOT DETECTED
MORPHINE URINE: PRESENT
NALOXONE URINE: NOT DETECTED
NORBUPRENORPHINE, URINE: NOT DETECTED
NORDIAZEPAM, URINE: NOT DETECTED
NORFENTANYL, URINE: NOT DETECTED
NORHYDROCODONE, URINE: NOT DETECTED
NOROXYCODONE, URINE: NOT DETECTED
NOROXYMORPHONE, URINE: NOT DETECTED
OXAZEPAM, URINE: NOT DETECTED
OXYCODONE URINE: NOT DETECTED
OXYMORPHONE, URINE: NOT DETECTED
PAIN MANAGEMENT DRUG PANEL INTERP, URINE: NORMAL
PAIN MGT DRUG PANEL, HI RES, UR: NORMAL
PCP,URINE: NOT DETECTED
PHENTERMINE, UR: NOT DETECTED
PREGABALIN: PRESENT
TAPENTADOL, URINE: NOT DETECTED
TAPENTADOL-O-SULFATE, URINE: NOT DETECTED
TEMAZEPAM, URINE: NOT DETECTED
TRAMADOL, URINE: NOT DETECTED
ZOLPIDEM METABOLITE (ZCA), URINE: NOT DETECTED
ZOLPIDEM, URINE: NOT DETECTED

## 2023-02-06 ENCOUNTER — PROCEDURE VISIT (OUTPATIENT)
Dept: UROLOGY | Age: 73
End: 2023-02-06
Payer: MEDICARE

## 2023-02-06 VITALS — WEIGHT: 219 LBS | BODY MASS INDEX: 32.44 KG/M2 | HEIGHT: 69 IN

## 2023-02-06 DIAGNOSIS — C67.2 MALIGNANT NEOPLASM OF LATERAL WALL OF URINARY BLADDER (HCC): Primary | ICD-10-CM

## 2023-02-06 PROCEDURE — 52000 CYSTOURETHROSCOPY: CPT | Performed by: UROLOGY

## 2023-02-06 NOTE — PROGRESS NOTES
Cystoscopy Operative Note (2/6/23)  Surgeon: Levy Preciado MD  Anesthesia: Urethral 2% Xylocaine   Indications: Bladder Cancer  Position: Dorsal Lithotomy    Findings:   Risks and Benefits discussed with patient prior to procedure. The patient was prepped and draped in the usual sterile fashion. The flexible cystoscope was advanced through the urethra and into the bladder. The bladder was thoroughly inspected and the following was noted:    Residual Urine: none  Urethra: normal appearing urethra with no masses, tenderness or lesions  Prostate: partially obstructing lateral lobes of prostate; median lobe present? no.   Bladder: Multiple small bladder tumors noted throughout the bladder. No bladder diverticulum. There was none trabeculation noted. Ureters: Clear efflux from both ureters. Orifices with normal configuration and location. The cystoscope was removed. The patient tolerated the procedure well. Agree with the ROS entered by the MA. Plan: Cystoscopy with bladder biopsy and fulguration with possible TURBT.

## 2023-02-12 DIAGNOSIS — M47.26 OSTEOARTHRITIS OF SPINE WITH RADICULOPATHY, LUMBAR REGION: ICD-10-CM

## 2023-02-12 DIAGNOSIS — G89.29 ENCOUNTER FOR CHRONIC PAIN MANAGEMENT: ICD-10-CM

## 2023-02-12 DIAGNOSIS — M54.16 LUMBAR RADICULOPATHY: Chronic | ICD-10-CM

## 2023-02-12 DIAGNOSIS — M51.36 DEGENERATIVE DISC DISEASE, LUMBAR: Chronic | ICD-10-CM

## 2023-02-12 DIAGNOSIS — M47.816 OSTEOARTHRITIS OF LUMBAR SPINE, UNSPECIFIED SPINAL OSTEOARTHRITIS COMPLICATION STATUS: ICD-10-CM

## 2023-02-12 DIAGNOSIS — M48.061 SPINAL STENOSIS OF LUMBAR REGION WITHOUT NEUROGENIC CLAUDICATION: Chronic | ICD-10-CM

## 2023-02-13 RX ORDER — PREGABALIN 150 MG/1
150 CAPSULE ORAL 3 TIMES DAILY
Qty: 90 CAPSULE | Refills: 0 | Status: SHIPPED | OUTPATIENT
Start: 2023-02-13 | End: 2023-03-15

## 2023-02-15 ENCOUNTER — HOSPITAL ENCOUNTER (OUTPATIENT)
Age: 73
Discharge: HOME | End: 2023-02-15
Payer: MEDICARE

## 2023-02-15 ENCOUNTER — APPOINTMENT (OUTPATIENT)
Dept: VASCULAR LAB | Age: 73
End: 2023-02-15
Payer: MEDICARE

## 2023-02-15 ENCOUNTER — HOSPITAL ENCOUNTER (OUTPATIENT)
Dept: ULTRASOUND IMAGING | Age: 73
Discharge: HOME | End: 2023-02-17
Payer: MEDICARE

## 2023-02-15 DIAGNOSIS — E11.65 TYPE 2 DIABETES MELLITUS WITH HYPERGLYCEMIA, WITHOUT LONG-TERM CURRENT USE OF INSULIN (HCC): ICD-10-CM

## 2023-02-15 DIAGNOSIS — E53.8 B12 DEFICIENCY: ICD-10-CM

## 2023-02-15 DIAGNOSIS — I10 ESSENTIAL HYPERTENSION: ICD-10-CM

## 2023-02-15 DIAGNOSIS — C67.2 MALIGNANT NEOPLASM OF LATERAL WALL OF URINARY BLADDER (HCC): ICD-10-CM

## 2023-02-15 DIAGNOSIS — E55.9 VITAMIN D DEFICIENCY: ICD-10-CM

## 2023-02-15 DIAGNOSIS — B18.2 HEP C W/O COMA, CHRONIC (HCC): ICD-10-CM

## 2023-02-15 DIAGNOSIS — E78.5 HYPERLIPIDEMIA WITH TARGET LDL LESS THAN 100: ICD-10-CM

## 2023-02-15 DIAGNOSIS — D50.8 OTHER IRON DEFICIENCY ANEMIA: ICD-10-CM

## 2023-02-15 PROBLEM — K76.0 FATTY LIVER: Status: ACTIVE | Noted: 2023-02-15

## 2023-02-15 LAB
25(OH)D3 SERPL-MCNC: 23.8 NG/ML
ABSOLUTE EOS #: 0 K/UL (ref 0–0.4)
ABSOLUTE EOS #: 0 K/UL (ref 0–0.4)
ABSOLUTE LYMPH #: 1.39 K/UL (ref 1–4.8)
ABSOLUTE LYMPH #: 1.39 K/UL (ref 1–4.8)
ABSOLUTE MONO #: 0.59 K/UL (ref 0.1–1.3)
ABSOLUTE MONO #: 0.59 K/UL (ref 0.1–1.3)
ALBUMIN SERPL-MCNC: 4.1 G/DL (ref 3.5–5.2)
ALP SERPL-CCNC: 76 U/L (ref 40–129)
ALT SERPL-CCNC: 21 U/L (ref 5–41)
ANION GAP SERPL CALCULATED.3IONS-SCNC: 13 MMOL/L (ref 9–17)
AST SERPL-CCNC: 19 U/L
BACTERIA: NORMAL
BASOPHILS # BLD: 1 % (ref 0–2)
BASOPHILS # BLD: 1 % (ref 0–2)
BASOPHILS ABSOLUTE: 0.1 K/UL (ref 0–0.2)
BASOPHILS ABSOLUTE: 0.1 K/UL (ref 0–0.2)
BILIRUB SERPL-MCNC: 0.4 MG/DL (ref 0.3–1.2)
BILIRUBIN URINE: NEGATIVE
BUN SERPL-MCNC: 15 MG/DL (ref 8–23)
CALCIUM SERPL-MCNC: 9.1 MG/DL (ref 8.6–10.4)
CASTS UA: NORMAL /LPF
CHLORIDE SERPL-SCNC: 101 MMOL/L (ref 98–107)
CHOLEST SERPL-MCNC: 117 MG/DL
CHOLESTEROL/HDL RATIO: 4.7
CO2 SERPL-SCNC: 26 MMOL/L (ref 20–31)
COLOR: YELLOW
CREAT SERPL-MCNC: 0.7 MG/DL (ref 0.7–1.2)
EOSINOPHILS RELATIVE PERCENT: 0 % (ref 0–4)
EOSINOPHILS RELATIVE PERCENT: 0 % (ref 0–4)
EPITHELIAL CELLS UA: NORMAL /HPF
EST. AVERAGE GLUCOSE BLD GHB EST-MCNC: 197 MG/DL
FERRITIN SERPL-MCNC: 9 NG/ML (ref 30–400)
FOLATE SERPL-MCNC: >20 NG/ML
GFR SERPL CREATININE-BSD FRML MDRD: >60 ML/MIN/1.73M2
GLUCOSE SERPL-MCNC: 185 MG/DL (ref 70–99)
GLUCOSE UR STRIP.AUTO-MCNC: ABNORMAL MG/DL
HBA1C MFR BLD: 8.5 % (ref 4–6)
HCT VFR BLD AUTO: 39.6 % (ref 41–53)
HCT VFR BLD AUTO: 39.6 % (ref 41–53)
HDLC SERPL-MCNC: 25 MG/DL
HGB BLD-MCNC: 12.6 G/DL (ref 13.5–17.5)
HGB BLD-MCNC: 12.6 G/DL (ref 13.5–17.5)
IRON SATURATION: 10 % (ref 20–55)
IRON SERPL-MCNC: 37 UG/DL (ref 59–158)
KETONES UR STRIP.AUTO-MCNC: NEGATIVE MG/DL
LDLC SERPL CALC-MCNC: ABNORMAL MG/DL (ref 0–130)
LDLC SERPL DIRECT ASSAY-MCNC: 43 MG/DL
LEUKOCYTE ESTERASE UR QL STRIP.AUTO: NEGATIVE
LYMPHOCYTES # BLD: 14 % (ref 24–44)
LYMPHOCYTES # BLD: 14 % (ref 24–44)
MAGNESIUM SERPL-MCNC: 2 MG/DL (ref 1.6–2.6)
MCH RBC QN AUTO: 25.2 PG (ref 26–34)
MCH RBC QN AUTO: 25.2 PG (ref 26–34)
MCHC RBC AUTO-ENTMCNC: 31.9 G/DL (ref 31–37)
MCHC RBC AUTO-ENTMCNC: 31.9 G/DL (ref 31–37)
MCV RBC AUTO: 79 FL (ref 80–100)
MCV RBC AUTO: 79 FL (ref 80–100)
MONOCYTES # BLD: 6 % (ref 1–7)
MONOCYTES # BLD: 6 % (ref 1–7)
MORPHOLOGY: ABNORMAL
NITRITE UR QL STRIP.AUTO: NEGATIVE
PDW BLD-RTO: 15.8 % (ref 11.5–14.9)
PDW BLD-RTO: 15.8 % (ref 11.5–14.9)
PLATELET # BLD AUTO: 255 K/UL (ref 150–450)
PLATELET # BLD AUTO: 255 K/UL (ref 150–450)
PMV BLD AUTO: 7.4 FL (ref 6–12)
PMV BLD AUTO: 7.4 FL (ref 6–12)
POTASSIUM SERPL-SCNC: 3.9 MMOL/L (ref 3.7–5.3)
PROT SERPL-MCNC: 7.1 G/DL (ref 6.4–8.3)
PROT UR STRIP.AUTO-MCNC: 5 MG/DL (ref 5–8)
PROT UR STRIP.AUTO-MCNC: ABNORMAL MG/DL
RBC # BLD: 5.02 M/UL (ref 4.5–5.9)
RBC # BLD: 5.02 M/UL (ref 4.5–5.9)
RBC CLUMPS #/AREA URNS AUTO: NORMAL /HPF
SEG NEUTROPHILS: 79 % (ref 36–66)
SEG NEUTROPHILS: 79 % (ref 36–66)
SEGMENTED NEUTROPHILS ABSOLUTE COUNT: 7.82 K/UL (ref 1.3–9.1)
SEGMENTED NEUTROPHILS ABSOLUTE COUNT: 7.82 K/UL (ref 1.3–9.1)
SODIUM SERPL-SCNC: 140 MMOL/L (ref 135–144)
SPECIFIC GRAVITY UA: 1.04 (ref 1–1.03)
TIBC SERPL-MCNC: 381 UG/DL (ref 250–450)
TRIGL SERPL-MCNC: 467 MG/DL
TSH SERPL-ACNC: 1.01 UIU/ML (ref 0.3–5)
TURBIDITY: CLEAR
UNSATURATED IRON BINDING CAPACITY: 344 UG/DL (ref 112–347)
URATE SERPL-MCNC: 5.1 MG/DL (ref 3.4–7)
URINE HGB: NEGATIVE
UROBILINOGEN, URINE: NORMAL
VIT B12 SERPL-MCNC: 520 PG/ML (ref 232–1245)
WBC # BLD AUTO: 9.9 K/UL (ref 3.5–11)
WBC # BLD AUTO: 9.9 K/UL (ref 3.5–11)
WBC UA: NORMAL /HPF

## 2023-02-15 PROCEDURE — 80053 COMPREHEN METABOLIC PANEL: CPT

## 2023-02-15 PROCEDURE — 83550 IRON BINDING TEST: CPT

## 2023-02-15 PROCEDURE — 83735 ASSAY OF MAGNESIUM: CPT

## 2023-02-15 PROCEDURE — 80061 LIPID PANEL: CPT

## 2023-02-15 PROCEDURE — 36415 COLL VENOUS BLD VENIPUNCTURE: CPT

## 2023-02-15 PROCEDURE — 76705 ECHO EXAM OF ABDOMEN: CPT

## 2023-02-15 PROCEDURE — 87522 HEPATITIS C REVRS TRNSCRPJ: CPT

## 2023-02-15 PROCEDURE — 82746 ASSAY OF FOLIC ACID SERUM: CPT

## 2023-02-15 PROCEDURE — 82607 VITAMIN B-12: CPT

## 2023-02-15 PROCEDURE — 81001 URINALYSIS AUTO W/SCOPE: CPT

## 2023-02-15 PROCEDURE — 83036 HEMOGLOBIN GLYCOSYLATED A1C: CPT

## 2023-02-15 PROCEDURE — 84443 ASSAY THYROID STIM HORMONE: CPT

## 2023-02-15 PROCEDURE — 83540 ASSAY OF IRON: CPT

## 2023-02-15 PROCEDURE — 84550 ASSAY OF BLOOD/URIC ACID: CPT

## 2023-02-15 PROCEDURE — 82728 ASSAY OF FERRITIN: CPT

## 2023-02-15 PROCEDURE — 83721 ASSAY OF BLOOD LIPOPROTEIN: CPT

## 2023-02-15 PROCEDURE — 85025 COMPLETE CBC W/AUTO DIFF WBC: CPT

## 2023-02-15 PROCEDURE — 82306 VITAMIN D 25 HYDROXY: CPT

## 2023-02-16 LAB
HEPATITIS C RNA PCR QUANT: NOT DETECTED
SOURCE: NORMAL

## 2023-02-16 RX ORDER — LISINOPRIL 10 MG/1
TABLET ORAL
Qty: 90 TABLET | Refills: 3 | Status: SHIPPED | OUTPATIENT
Start: 2023-02-16

## 2023-02-16 NOTE — RESULT ENCOUNTER NOTE
Please notify patient: Fatty liver abdominal ultrasound  Improved diabetes control    Low carb, low fat diet, increase fruits and vegetables, and exercise 4-5 times a week 30-40 minutes a day, or walk 1-2 hours per day, or wear a pedometer and get at least 10,000 steps per day.     Future Appointments  2/22/2023  10:00 AM   [facility] VASCULAR RM [address]  2/22/2023  2:45 PM    Jerone Leyden, MD          [address]  2/23/2023  9:40 AM    ELANA Bean* [facility]        [place]  3/2/2023   2:15 PM    Ekaterina Le MD          [facility]        [facility]  9/27/2023  11:00 AM   Madison Hatchet, MD      [address]

## 2023-02-16 NOTE — RESULT ENCOUNTER NOTE
Please notify patient: Improving vitamin D  Hemoglobin A1c worsening 8.5, he is eating too much sugar  Or drinking pop  High triglycerides  Blood glucose 185 high  Is he taking dapagliflozin 10 mg daily, metformin 500 Mg twice a day, Januvia 50 Mg daily? Every day?   Is she drinking pop  Mild anemia  Otherwise labs within normal limits  continue current treatment  Just hepatitis C RNA pending  Future Appointments  2/22/2023  10:00 AM   [facility] VASCULAR RM [address]  2/22/2023  2:45 PM    Carolyn Puentes MD          [address]  2/23/2023  9:40 AM    ELANA Becerra* STCZ PAINT        [place]  3/2/2023   2:15 PM    Karleen Rubinstein, MD          [facility]        [facility]  9/27/2023  11:00 AM   Harjeet Bennett MD     [facility]               [address]

## 2023-02-16 NOTE — RESULT ENCOUNTER NOTE
Please notify patient: Improving vitamin D  Hemoglobin A1c worsening 8.5, he is eating too much sugar  Or drinking pop  High triglycerides  Blood glucose 185 high  Is he taking dapagliflozin 10 mg daily, metformin 500 Mg twice a day, Januvia 50 Mg daily?  Every day? Is she drinking pop  Mild anemia  Otherwise labs within normal limits  continue current treatment  hepatitis C RNA--- undetectable, Great!   Future Appointments  2/22/2023  10:00 AM   [facility] VASCULAR RM [address]  2/22/2023  2:45 PM    Jerone Leyden, MD          [address]  2/23/2023  9:40 AM    ELANA Meneses* STCZ PAINMGT        [place]  3/2/2023   2:15 PM    Ekaterina Le MD          [facility]  3/24/2023  10:15 AM   Ekaterina Le MD          DANIEL [facility]ORD[facility]  9/27/2023  11:00 AM   Madison Hatchet, MD     [facility]               [address] [facility]

## 2023-02-20 DIAGNOSIS — E11.42 TYPE 2 DIABETES MELLITUS WITH DIABETIC POLYNEUROPATHY, WITHOUT LONG-TERM CURRENT USE OF INSULIN (HCC): Primary | ICD-10-CM

## 2023-02-20 NOTE — PROGRESS NOTES
Diagnosis Orders   1.  Type 2 diabetes mellitus with diabetic polyneuropathy, without long-term current use of insulin ([facility] [address]  [address] [facility] Appointments   Date Time Provider Pippa Cazares   2/22/2023 10:00 AM [facility] VASCULAR RM [address]   2/22/2023  2:45 PM Kat Myers  [place] [address]   2/23/2023  9:40 AM VEE Valdez - CNP [address]   3/2/2023  2:15 PM Brisa Gonzáles MD [facility]   3/24/2023 10:15 AM Brisa Gonzáles MD DANIEL CORDON [facility]   9/27/2023 11:00 AM Keyon Simeon MD [facility] Jass Bach

## 2023-02-21 ENCOUNTER — TELEPHONE (OUTPATIENT)
Dept: UROLOGY | Age: 73
End: 2023-02-21

## 2023-02-21 ENCOUNTER — TELEPHONE (OUTPATIENT)
Dept: FAMILY MEDICINE CLINIC | Age: 73
End: 2023-02-21

## 2023-02-21 NOTE — TELEPHONE ENCOUNTER
Incoming fax came from DR. SNYDER , in regard to upcoming procedure on 03/10/2023 . Will like a medical clearance from provider. Prior to scheduled surgery/Procedure. Future Appointments   Date Time Provider Pippa Debora   2/22/2023 10:00 AM [facility] VASCULAR RM [address]   2/22/2023  2:45 PM Rubi Carpio  [place] [address]   2/23/2023  9:40 AM VEE Quach - CNP [address]   2/24/2023  2:00 PM STVZ PAT RM 2 [facility]Z PAT [facility]   3/2/2023  2:15 PM Dana Rodas [facility]   3/24/2023 10:15 AM Dana Rodas MD [facility]   3/27/2023 11:40 AM Rowena To MD [facility]   9/27/2023 11:00 AM Cl[facility] Mcardle, [facility]        Please see attachment below. Please advise. Thank you!

## 2023-02-21 NOTE — TELEPHONE ENCOUNTER
Cysto bladder bx and fulguration @ ST 3/10/23 10:30am **STOP BLOOD THINNERS 3/3/23**   PAT @ ST 2/24/23 2:00pm           Spoke with patient, procedure info emailed.

## 2023-02-22 ENCOUNTER — HOSPITAL ENCOUNTER (OUTPATIENT)
Dept: VASCULAR LAB | Age: 73
Discharge: HOME | End: 2023-02-22
Payer: MEDICARE

## 2023-02-22 DIAGNOSIS — I71.43 INFRARENAL ABDOMINAL AORTIC ANEURYSM (AAA) WITHOUT RUPTURE: ICD-10-CM

## 2023-02-22 DIAGNOSIS — I73.9 CLAUDICATION OF BOTH LOWER EXTREMITIES (HCC): ICD-10-CM

## 2023-02-22 PROCEDURE — 93923 UPR/LXTR ART STDY 3+ LVLS: CPT

## 2023-02-22 NOTE — TELEPHONE ENCOUNTER
Cleared for surgery, intermediate risk, not on anticoagulation  I will place the form in the box    ============  FYI    BP Readings from Last 3 Encounters:   01/23/23 135/70   12/22/22 (!) 150/89   12/09/22 139/85     Lab Results   Component Value Date    LABA1C 8.5 (H) 02/15/2023    LABA1C 6.6 (H) 08/19/2022    LABA1C 5.2 03/12/2022     Patient had clearance from cardiologist already done on October 13, 2022, in media, is intermediate risk    Prior  prior EKG 10/12/2022 normal  Patient is not on any blood thinners  We will clear him for surgery with general anesthesia, needs cystoscopy bladder biopsy and fulguration    Lab Results   Component Value Date/Time     02/15/2023 09:20 AM    K 3.9 02/15/2023 09:20 AM     02/15/2023 09:20 AM    CO2 26 02/15/2023 09:20 AM    BUN 15 02/15/2023 09:20 AM    CREATININE 0.70 02/15/2023 09:20 AM    GLUCOSE 185 02/15/2023 09:20 AM    CALCIUM 9.1 02/15/2023 09:20 AM          Future Appointments   Date Time Provider Pippa Cazares   2/22/2023 10:00 AM [facility] VASCULAR RM [address]   2/23/2023  9:40 AM Dulce Jenkins APRN - CNP [address]   2/24/2023  9:00 AM Jim Petersen MD [address] [place] [address]   2/24/2023  2:00 PM STVZ PAT RM 2 [facility]Z PAT [facility]   3/2/2023  2:15 PM Ria Adam MD [facility]   3/24/2023 10:15 AM Ria Adam MD DANIEL [facility]   3/27/2023 11:40 AM Vincenzo Saravia MD [facility]   9/27/2023 11:00 AM Jayme Coronado MD  [facility]

## 2023-02-23 ENCOUNTER — HOSPITAL ENCOUNTER (OUTPATIENT)
Dept: PAIN MANAGEMENT | Age: 73
Discharge: HOME | End: 2023-02-23
Payer: MEDICARE

## 2023-02-23 VITALS
SYSTOLIC BLOOD PRESSURE: 125 MMHG | WEIGHT: 212 LBS | TEMPERATURE: 97.3 F | HEIGHT: 69 IN | HEART RATE: 90 BPM | OXYGEN SATURATION: 95 % | DIASTOLIC BLOOD PRESSURE: 75 MMHG | BODY MASS INDEX: 31.4 KG/M2

## 2023-02-23 DIAGNOSIS — M54.16 LUMBAR RADICULOPATHY: Chronic | ICD-10-CM

## 2023-02-23 DIAGNOSIS — M47.816 LUMBAR SPONDYLOSIS: Primary | Chronic | ICD-10-CM

## 2023-02-23 DIAGNOSIS — G89.29 ENCOUNTER FOR CHRONIC PAIN MANAGEMENT: ICD-10-CM

## 2023-02-23 DIAGNOSIS — M47.26 OSTEOARTHRITIS OF SPINE WITH RADICULOPATHY, LUMBAR REGION: ICD-10-CM

## 2023-02-23 DIAGNOSIS — M51.36 DEGENERATIVE DISC DISEASE, LUMBAR: Chronic | ICD-10-CM

## 2023-02-23 DIAGNOSIS — Z51.81 ENCOUNTER FOR MEDICATION MONITORING: ICD-10-CM

## 2023-02-23 DIAGNOSIS — M46.92 CERVICAL SPONDYLITIS (HCC): Chronic | ICD-10-CM

## 2023-02-23 DIAGNOSIS — M47.816 OSTEOARTHRITIS OF LUMBAR SPINE, UNSPECIFIED SPINAL OSTEOARTHRITIS COMPLICATION STATUS: ICD-10-CM

## 2023-02-23 DIAGNOSIS — M48.061 SPINAL STENOSIS OF LUMBAR REGION WITHOUT NEUROGENIC CLAUDICATION: Chronic | ICD-10-CM

## 2023-02-23 DIAGNOSIS — M48.061 SPINAL STENOSIS OF LUMBAR REGION, UNSPECIFIED WHETHER NEUROGENIC CLAUDICATION PRESENT: Chronic | ICD-10-CM

## 2023-02-23 PROCEDURE — G0481 DRUG TEST DEF 8-14 CLASSES: HCPCS

## 2023-02-23 PROCEDURE — 99213 OFFICE O/P EST LOW 20 MIN: CPT

## 2023-02-23 PROCEDURE — 80307 DRUG TEST PRSMV CHEM ANLYZR: CPT

## 2023-02-23 PROCEDURE — 99213 OFFICE O/P EST LOW 20 MIN: CPT | Performed by: NURSE PRACTITIONER

## 2023-02-23 RX ORDER — OXYCODONE HYDROCHLORIDE AND ACETAMINOPHEN 5; 325 MG/1; MG/1
1 TABLET ORAL 2 TIMES DAILY PRN
Qty: 60 TABLET | Refills: 0 | Status: SHIPPED | OUTPATIENT
Start: 2023-02-25 | End: 2023-03-27

## 2023-02-23 RX ORDER — MORPHINE SULFATE 15 MG/1
15 TABLET, FILM COATED, EXTENDED RELEASE ORAL 2 TIMES DAILY
Qty: 60 TABLET | Refills: 0 | Status: SHIPPED | OUTPATIENT
Start: 2023-02-25 | End: 2023-03-27

## 2023-02-23 RX ORDER — SODIUM CHLORIDE, SODIUM LACTATE, POTASSIUM CHLORIDE, CALCIUM CHLORIDE 600; 310; 30; 20 MG/100ML; MG/100ML; MG/100ML; MG/100ML
1000 INJECTION, SOLUTION INTRAVENOUS CONTINUOUS
OUTPATIENT
Start: 2023-02-23

## 2023-02-23 ASSESSMENT — PAIN SCALES - GENERAL: PAINLEVEL_OUTOF10: 3

## 2023-02-23 ASSESSMENT — ENCOUNTER SYMPTOMS
BOWEL INCONTINENCE: 0
BACK PAIN: 1

## 2023-02-23 NOTE — H&P
History and Physical    Pt Name: Agnes Olivares  MRN: [id number]  YOB: 1950  Date of evaluation: 2/24/2023  Primary Care Physician: Hua Howard MD    SUBJECTIVE:   History of Chief Complaint:    Agnes Olivares is a 81 y.o. male who presents for PAT appointment. Patient complains of bladder cancer, bladder tumor. He reports he follows closely with urology and reports he had a recent cystoscopy in the office. He has had multiple cystoscopies, reports this will be his \"7th or 8th time\". He denies any urological complaints, states \"I feel great\". Patient has been scheduled for CYSTOSCOPY BLADDER BIOPSY, FULGURATION, POSSIBLE CYSTO TUR BLADDER TUMOR   Allergies  is allergic to asa [aspirin], iron, and nsaids. Medications  Prior to Admission medications    Medication Sig Start Date End Date Taking? Authorizing Provider   oxyCODONE-acetaminophen (PERCOCET) 5-325 MG per tablet Take 1 tablet by mouth 2 times daily as needed for Pain for up to 30 days. 2/25/23 3/27/23  Maggie Night VEE Tobias CNP   morphine (MS CONTIN) 15 MG extended release tablet Take 1 tablet by mouth 2 times daily for 30 days. 2/25/23 3/27/23  Maggie Night VEE Tobias CNP   lisinopril (PRINIVIL;ZESTRIL) 10 MG tablet TAKE ONE TABLET BY MOUTH DAILY, STOP LISINOPRIL-HYDROCHLOROTHIAZIDE 2/16/23   Hua Howard MD   pregabalin (LYRICA) 150 MG capsule Take 1 capsule by mouth in the morning, at noon, and at bedtime for 30 days.  Max Daily Amount: 450 mg 2/13/23 3/15/23  Hua Howard MD   fluticasone (FLONASE) 50 MCG/ACT nasal spray 2 sprays by Each Nostril route daily 1/27/23 2/26/23  Hua Howard MD   dapagliflozin (FARXIGA) 10 MG tablet Take 1 tablet by mouth every morning For diabetes 1/27/23   Hua Howard MD   metFORMIN (GLUCOPHAGE-XR) 500 MG extended release tablet Take 1 tablet by mouth in the morning and at bedtime 1/27/23   Hua Howard MD   baclofen (LIORESAL) 10 MG tablet Take 1 tablet by mouth 3 times daily as needed (back pain) 11/29/22   Silas Pepe MD   NEEDLE, DISP, 25 G (B-D DISP NEEDLE 25GX1\") 25G X 1\" MISC To use with syringe 11/29/22   Silas Pepe MD   vitamin D (ERGOCALCIFEROL) 1.25 MG (20729 UT) CAPS capsule TAKE ONE CAPSULE BY MOUTH ONCE WEEKLY. STOP VITAMIN DAILY TO BE TAKEN DAILY. 11/16/22   Daphne Archibald MD   promethazine (PHENERGAN) 25 MG tablet TAKE ONE TABLET BY MOUTH THREE TIMES A DAY AS NEEDED FOR NAUSEA  Patient not taking: Reported on 2/24/2023 11/16/22   Daphne Archibald MD   folic acid (FOLVITE) 1 MG tablet Take 1 tablet by mouth daily 11/10/22   [facility], MD   cyanocobalamin 1000 MCG/ML injection Inject 1 mL into the muscle every 30 days Call for next refill which will be monthly for life 11/10/22   Silas Pepe MD   pantoprazole (PROTONIX) 40 MG tablet TAKE ONE TABLET BY MOUTH DAILY 10/12/22   Silas Pepe MD   SITagliptin (JANUVIA) 50 MG tablet Take 1 tablet by mouth daily 9/27/22   Silas Pepe MD   nortriptyline (PAMELOR) 10 MG capsule Take 1 capsule by mouth nightly For Polyneuropathy, diarrhea and insomnia 9/27/22   Silas Pepe MD   pravastatin (PRAVACHOL) 40 MG tablet TAKE ONE TABLET BY MOUTH EVERY EVENING. STOP GEMFIBROZIL 6/6/22   Silas Pepe MD   metoprolol tartrate (LOPRESSOR) 25 MG tablet TAKE ONE TABLET BY MOUTH TWICE A DAY 5/4/22   Silas Pepe MD   glucose monitoring (FREESTYLE FREEDOM) kit Please supply Patient with a glucose monitoring kit that insurance will cover. 3/28/22   Silas Pepe MD   blood glucose monitor strips Testing once a day. Please dispense according to patients insurance. 3/28/22   Silas Pepe MD   Lancets 30G MISC Testing once a day. Please dispense according to patients insurance.  3/28/22   Silas Pepe MD   Alcohol Swabs (B-D SINGLE USE SWABS REGULAR) PADS USE TO CHECK BLOOD SUGAR TWICE A DAY 5/4/21   Silas Pepe MD   Syringe/Needle, Disp, (SYRINGE 3CC/25GX1\") 25G X 1\" 3 ML MISC To be used with B12 injections 1/8/21   Chantel Boss MD   Probiotic Product (PROBIOTIC-10 PO) Take by mouth daily     Historical Provider, MD     Past Medical History    has a past medical history of AAA (abdominal aortic aneurysm), Arthritis, Bladder cancer ([facility] [address] Chronic neck pain, Colon polyps, COVID-19 vaccine administered, COVID-19 virus infection, Diabetic neuropathy ([facility] [address] Diarrhea, Essential hypertension, Fatty liver, GERD (gastroesophageal reflux disease), Hematuria, Hepatitis B core antibody positive, History of bleeding peptic ulcer, History of blood transfusion, History of hepatitis C, History of migraine headaches, History of stress test, Hyperlipidemia, Iron (Fe) deficiency anemia, Lung nodule, Neuropathy, Poor historian, Positive FIT (fecal immunochemical test), Snores, Type 2 diabetes mellitus with microalbuminuria, without long-term current use of insulin ([facility] Under care of service provider, Under care of service provider, Under care of service provider, Under care of service provider, Under care of service provider, Under care of service provider, Under care of team, Wears dentures, Wears glasses, and Worsening headaches. Past Surgical History   has a past surgical history that includes Cervical spine surgery (1977); shoulder surgery (Right); Dental surgery (10/2015); Colonoscopy (01/25/2015); Upper gastrointestinal endoscopy (01/25/2015); Cholecystectomy (03/22/2019); Umbilical hernia repair (0643-77); Upper gastrointestinal endoscopy (N/A, 10/08/2019); Colonoscopy (N/A, 10/08/2019); hernia repair (N/A, 08/07/2020); Cystoscopy (Right, 04/29/2021); Cystoscopy (N/A, 09/09/2021); Cystoscopy (N/A, 02/16/2022); Upper gastrointestinal endoscopy (N/A, 06/07/2022); Colonoscopy (N/A, 06/07/2022); Endoscopy, colon, diagnostic; Cystoscopy (N/A, 06/17/2022); and Cystoscopy (N/A, 10/28/2022). Social History   reports that he quit smoking about 7 years ago.  His smoking use included cigarettes. He started smoking about 54 years ago. He has a 90.00 pack-year smoking history. He has never used smokeless tobacco.    reports no history of alcohol use. reports no history of drug use. Marital Status   Children 2 step, 3 biological  Occupation retired,   Family History  Family Status   Relation Name Status    Mother Mother     Father Bruno Arreola      family history includes Diabetes in his mother; Heart Disease in his father; High Blood Pressure in his father. Review of Systems:  CONSTITUTIONAL:   negative for fevers, chills, fatigue and malaise    EYES:   negative for double vision, blurred vision and photophobia +glasses   HEENT:   negative for tinnitus, epistaxis and sore throat     RESPIRATORY:   negative for cough, shortness of breath, wheezing     CARDIOVASCULAR:   negative for chest pain, palpitations, syncope, edema     GASTROINTESTINAL:   negative for nausea, vomiting     GENITOURINARY:   negative for incontinence  +see HPI   MUSCULOSKELETAL:   negative for neck or back pain     NEUROLOGICAL:   Negative for weakness and tingling  negative for headaches and dizziness     PSYCHIATRIC:   negative for anxiety       OBJECTIVE:   VITALS:  height is 5' 9\" (1.753 m) and weight is 214 lb (97.1 kg). His temporal temperature is 97.7 °F (36.5 °C). His blood pressure is 138/76 and his pulse is 95. His respiration is 18 and oxygen saturation is 97%. CONSTITUTIONAL:alert & oriented x 3, no acute distress. Friendly. Very pleasant. Limited historian. SKIN:  Warm and dry, no rashes on exposed areas of skin   HEAD:  Normocephalic, atraumatic   EYES: EOMs intact. PERRL. Wearing glasses. EARS:  Equal bilaterally, no edema or thickening, skin is intact without lumps or lesions. No discharge. Hearing grossly WNL. NOSE:  Nares patent.   No rhinorrhea   MOUTH/THROAT:  Mucous membranes moist, tongue is pink, uvula midline, upper and lower dentures in use  NECK:full ROM  LUNGS: Respirations even and non-labored. Clear to auscultation bilaterally, no wheezes, rales, or rhonchi. CARDIOVASCULAR: Regular rate and rhythm, no murmurs/rubs/gallops   ABDOMEN: soft, non-tender, non-distended, bowel sounds active x 4  EXTREMITIES: No edema bilateral lower extremities. No varicosities bilateral lower extremities. NEUROLOGIC: CN II-XII are grossly intact. Gait is smooth, rhythmic and effortless   Testing:   EKG: on file from 10/2022  Labs pending: drawn 2/15/23 per PCP, resulted in epic and pt has been granted medical clearance.   IMPRESSIONS:   Bladder cancer, tumor  PLANS:   CYSTOSCOPY BLADDER BIOPSY, FULGURATION, POSSIBLE CYSTO TUR BLADDER TUMOR   VEE BOWEN CNP  Electronically signed 2/24/2023 at 2:37 PM

## 2023-02-23 NOTE — PROGRESS NOTES
Anesthesia Focused Assessment    Hx of anesthesia complications:  no  Family hx of anesthesia complications:  no    METS functional capacity:   Moderate/Excellent: >4 climbing >/= 1 flight of stairs without stopping or walking up hill  >1-2 blocks    Prior + Covid-19 test? 1/2022  Covid vaccinated?: yes      STOP-BANG Sleep Apnea Questionnaire    SNORE loudly (heard through closed doors)? Yes  TIRED, fatigued, sleepy during daytime? No  OBSERVED stopping breathing during sleep? No  High blood PRESSURE or being treated? Yes    BMI over 35? No  AGE over 48? Yes  NECK circumference over 16\"? No  GENDER (male)? Yes             Total 4  High risk 5-8  Intermediate risk 3-4  Low risk 0-2    ----------------------------------------------------------------------------------------------------------------------  JONATHAN:                                             no  If yes, machine?:                              Type 1 DM:                                   no  T2DM:                                           yes, had recent labs (in epic from 2/15/23) addressed by PCP, has been granted medical clearance, in media    Coronary Artery Disease:             no  Hypertension:                               yes  Defib / AICD / Pacemaker:           no  BP today 138/76, no cardiopulmonary complaints. Recent PVR negative, resulted in epic. Renal Failure:                               no  If yes, on dialysis? :                           Active smoker:                              no  Drinks Alcohol:                              no  Illicit drugs:                                    no    Dentition:                                      upper and lower dentures    Past Medical History:   Diagnosis Date    AAA (abdominal aortic aneurysm)     \"stable\" 3 cm on CT 2021, \"recommend f/u every 3 years\"    Arthritis     osteoarthritis    Bladder cancer ([facility] [address] 03/2021    bladder    Chronic neck pain     Colon polyps 10/08/2019    tubular adenoma x2    COVID-19 vaccine administered     COVID-19 virus infection 01/10/2022    Slight cough. Diabetic neuropathy ([address]     Diarrhea     Essential hypertension 05/12/2020    managed by Dr. Jose Roberto Mathews PCP    Fatty liver 02/15/2023    GERD (gastroesophageal reflux disease)     Hematuria     Hepatitis B core antibody positive     History of bleeding peptic ulcer     History of blood transfusion     no reactions    History of hepatitis C     History of migraine headaches     History of stress test 07/2020    \"low risk\"    Hyperlipidemia     Iron (Fe) deficiency anemia     Lung nodule     stable per CT 10/2022    Neuropathy     Poor historian     states his wife takes care of all medical information    Positive FIT (fecal immunochemical test)     Snores     Type 2 diabetes mellitus with microalbuminuria, without long-term current use of insulin ([facility] [address] 07/13/2020    managed by Dr. Savannah Donato    Under care of service provider 02/24/2023    pcp-Dr Aravind [facility] [place] virtual visit jan 2023    Under care of service provider 02/24/2023    hematology-Dr Caprice Saavedra  virtual visit feb 2023    Under care of service provider 02/24/2023    urology-Dr fairchild-last visit feb 2023    Under care of service provider 02/24/2023    GI - DR. BRITO - last vist - oct 2022    Under care of service provider 02/24/2023    PAIN MANAGEMENT - HERMELINDA Hodge - last visit - feb 2023    Under care of service provider 02/24/2023    PODIATRY - DR. YE - last visit - 5/2022    Under care of team     TCC- last visit 6/2022, office notes in physical pre-op folder    Wears dentures     Wears glasses     Worsening headaches 12/29/2020    as of 2/24/2023 less often and less severe       Patient was evaluated in PAT & anesthesia guidelines were applied. NPO guidelines, medication instructions and scheduled arrival time were reviewed with patient.      I advised patient/patient family to please contact surgeons office, ahead of time if possible, if any new signs or symptoms of illness, infection, rash, etc. Patient/ patient family verbalize understanding. Anesthesia contacted:   no  Medical clearance granted- copy in media. Cardiac clearance granted for most recent cysto in October 2022, will request updated cardiac clearance. I obtained last TCC notes from 6/2022, place in physical pre-op folder.      VEE Goldberg CNP  Electronically signed 2/24/2023 at 2:48 PM

## 2023-02-23 NOTE — DISCHARGE INSTRUCTIONS
Pre-operative Instructions           NOTHING to eat or drink after midnight the night prior to surgery   (This includes gum, candy, mints, chewing tobacco, etc). Please arrive at the surgery center (Entrance B) by 8:30 AM on 3/10/2023 (or as directed by your surgeon's office). See Directons to Surgery Center below. Please take only the following medication(s) the day of surgery with a small sip of water: Metoprolol (Lopressor), Lisinopril (Prinivil), Pantoprazole (Protonix), Pregabalin (Lyrica), MS Contin    Please stop any blood thinning medications  AS DIRECTED BY PRESCRIBING PROVIDER! :    Failure to stop these medications as instructed (too soon or too late) may result in injury to you, or your surgery may need to be rescheduled. Below is a list of some examples for your reference. Antiplatelets : (stop blood cells (called platelets) from sticking together and forming a blood clot):   Aspirin (Bufferin, Ecotrin), Clopidogrel (Plavix), Ticagrelor (Brilinta), Prasugrel (Effient), Dipyridamole/aspirin (Aggrenox), Ticlopidine (Ticlid), Eptifibatide (Integrilin)    Anticoagulants: (slow down your body's process of making clots): Warfarin (Coumadin), Rivaroxaban (Xarelto), Dabigatran (Pradaxa), Apixaban (Eliquis), Edoxaban (Savaysa), Heparin/Enoxaparin (Lovenox), Fondaparinux (Arixtra)    NSAIDS: Aspirin (Bufferin, Ecotrin), Ibuprofen (Motrin, Nuprin,Advil), Naproxen (Aleve),Meloxicam (Mobic), Celecoxib (Celebrex), Diclofenac (Voltaren), Etodolac (Lodine), Indomethacin, Ketorolac, Nabumetone, Oxaprozin (Daypro), Piroxicam (Feldene), Excedrin (has aspirin in it)    Herbal supplements: Bromelain, Cinnamon, [employer], [place] (female ginseng), Fish oil, Garlic, Alia,Ginkgo biloba, Grape seed extract, Turmeric, Vitamin E, etc....)    You may continue the rest of your medications through the night before surgery unless instructed otherwise.     If applicable:   Do not take diabetic medications on the day of surgery. Please use/bring daily inhalers with you     PLEASE NOTE:  THE ABOVE (IF ANY) DISCONTINUED MEDS MAY ONLY BE FROM   \"CLEANING UP\" THE MED LIST AND WERE NOT ACTUALLY CANCELLED; SEE CHART FOR DETAILS AND ALWAYS CHECK WITH PRESCRIBING PROVIDER BEFORE DISCONTINUING ANY MEDICATIONS    2/24/23  1:58 PM      ___________________  _______________________  Signature (Provider)              Signature (Patient)         Day of Surgery/Procedure    As a patient at [facility] you can expect quality medical and nursing care that is centered on your individual needs. Our goal is to make your surgical experience as comfortable as possible    Directions to the [address] [place] [address] at [address]. Larry is located in the Emergency Room parking lot on [facility] or there is additional parking across the street. The address is [address] [place] [address]. Please check in at the [facility] upon arrival.     Patient Instructions  In case of any illness please contact your surgeons office for instructions prior to coming to the hospital.    Due to current restrictions you are only allowed 2 adults to be with you. Masks are to be worn and screening will take place on arrival.     Bring your current list of medications, vitamins, herbals and anything you might take on an  \"as needed \" basis. It is important we have a correct list with dosages and frequencies. Please verify your list with your medications at home or bring all of your bottles with you. If you have been given a blood band be sure to bring it with you on the day of surgery. Do not put it on or close the clasp. Use and bring any inhalers if you are currently using one. It is ok to brush your teeth but do not swallow any water. You may be required to provide a urine sample upon your arrival to the pre-op area, so please take this into consideration prior to using the restroom.     No jewelry or piercing's to be worn into surgery because you might be injured because of them. No contact lenses to be worn. It is ok to wear your glasses in pre op but they will be removed prior to going into the operating room. Dentures/partials will likely need to be removed in pre op depending on your type of anesthesia, please do not use adhesives on the day of surgery. Bathe as instructed with the special soap given to you. No lotions, powders or creams after bathing. Wear loose comfortable clothing / shoes that are easy to get on and off over wounds or casts. If you are going to be admitted after surgery please bring your Cpap or BiPap if you have it at home. Keep patient belongings to a minimum and leave valuables at home. If you are staying overnight with us, please bring a SMALL bag of personal items. We cannot accommodate large items, like suitcases. If you are going home after anesthesia or sedation then you must have a responsible adult with you to take you home and to be with you for the first 24 hours after surgery. If you do not have someone to stay with you your surgery might get cancelled. Please contact your surgeon's office to see if other arrangements can be made if you can not find someone to stay with you. If you have any other questions on the day of surgery please contact [phone] or [phone]    If you have any other questions regarding your procedure/surgery please call  your surgeon's office.

## 2023-02-23 NOTE — PROGRESS NOTES
Chief Complaint   Patient presents with    Back Pain     Med refill         PMH Pt c/o low back pain for many years. There is no known injury or surgery to the area. He does have a history of scoliosis. His last PT was over 4 years ago with no benefit and he is not interested in repeating. He does do home stretches every morning. He also had a LESI in 2013 that did not help. MRI done 5/2022 shows multilevel degenerative changes facet hypertrophy L3/4 L4/5 L5/S1. No canal stenosis  He has non-invasive bladder cancer and had resection of a tumor in April 2021. He continues to follow with oncology and hematology - he continues Injectafer infusions for anemia. Pt has spot in bladder that will be biopsied this month,Pt has spot in bladder that will be biopsied this month   He follows with podiatry for foot pain and referred to NS to l[facility]zy for lumbar radiculopathy vs peripheral neuropathy. Has not made appt yet d/t other dr visits. (colon/egd/bladder bx)     MED was 142 and the risks related to high doses of opiates were discussed at previous visits. Have been tapering dose by 10% each month. Patient verbalizes understanding. Current MED is 45. Back Pain  This is a chronic problem. The current episode started more than 1 year ago. The problem occurs constantly. The problem is unchanged. The pain is present in the lumbar spine. The quality of the pain is described as cramping. The pain radiates to the left foot, left thigh, left knee, right foot, right knee and right thigh. The pain is at a severity of 3/10. The pain is The same all the time. The symptoms are aggravated by bending, sitting and standing. Associated symptoms include numbness, tingling and weakness. Pertinent negatives include no bladder incontinence, bowel incontinence, chest pain, fever or headaches. He has tried ice and heat for the symptoms. Patient denies any new neurological symptoms.  No bowel or bladder incontinence, no weakness, and no falling. Pill count: appropriate  Oxycodone - 3 - due 2/25  Morphine - 5 - due 2/23 - will fill 2/25    Morphine equivalent: 45    Controlled Substance Monitoring:    Acute and Chronic Pain Monitoring:   RX Monitoring 2/23/2023   Attestation -   Acute Pain Prescriptions -   Periodic Controlled Substance Monitoring Possible medication side effects, risk of tolerance/dependence & alternative treatments discussed. ;No signs of potential drug abuse or diversion identified.;Obtaining appropriate analgesic effect of treatment. Chronic Pain > 50 MEDD -   Chronic Pain > 80 MEDD -   Chronic Pain > 120 MEDD -            Past Medical History:   Diagnosis Date    AAA (abdominal aortic aneurysm)     \"stable\" 3 cm on CT 2021    Arthritis     osteoarthritis    Bladder cancer ([facility] 03/2021    bladder    Chronic neck pain     Colon polyps 10/08/2019    tubular adenoma x2    COVID-19 virus infection 01/10/2022    Slight cough.     Diabetic neuropathy ([facility]     Diarrhea     Essential hypertension 05/12/2020    managed by Dr. Ruthy Talamantes PCP    Fatty liver 2/15/2023    GERD (gastroesophageal reflux disease)     Hematuria     Hepatitis B core antibody positive     History of bleeding peptic ulcer     History of blood transfusion     no reactions    History of hepatitis C     History of migraine headaches     History of stress test 07/2020    \"low risk\"    Hyperlipidemia     Iron (Fe) deficiency anemia     Poor historian     states his wife takes care of all medical information    Positive FIT (fecal immunochemical test)     Type 2 diabetes mellitus with microalbuminuria, without long-term current use of insulin ([facility] 07/13/2020    managed by Dr. Liana Reeder    Under care of team 02/09/2022    pcp-Dr Camille Cartagena virtual visit 9/2022    Under care of team 02/09/2022    hematology-Dr Caprice Saavedar [address] virtual visit 10/2022    Under care of team 02/09/2022    urology-Dr fairchild-last visit 9/2022    Under care of team 10/27/2022    GI - DR. BRITO - last vist - 8/2022    Under care of team 10/27/2022    PAIN MANAGEMENT - HERMELINDA Johnanlamonte Mayes - last visit - 10/2022    Under care of team 10/27/2022    PODIATRY - DR. YE - last visit - 5/2022    Wears dentures     Wears glasses     Worsening headaches 12/29/2020       Past Surgical History:   Procedure Laterality Date    CERVICAL SPINE SURGERY  1977    cervical spine three times, has plate    CHOLECYSTECTOMY  03/22/2019    COLONOSCOPY  01/25/2015    10 yr recall, hemorrhoids    COLONOSCOPY N/A 10/08/2019    tubular adenoma x2    COLONOSCOPY N/A 06/07/2022    COLONOSCOPY DIAGNOSTIC performed by Beni Dorado MD at [address] [place] [address] Right 04/29/2021    CYSTOSCOPY TUR BLADDER TUMOR, GYRUS, RIGHT STENT PLACEMENT AND RIGHT STENT REMOVAL performed by Jonny Manuel MD at [place] [id number] N/A 09/09/2021    CYSTOSCOPY, TRANSURETHRAL RESECTION BLADDER TUMOR performed by Jonny Manuel MD at [place] [address] N/A 02/16/2022    CYSTOSCOPY BLADDER BIOPSY, FULGURATION performed by Jonny Manuel MD at [place] [address] N/A 06/17/2022    CYSTOSCOPY, TUR BLADDER TUMOR, GYRUS performed by Jonny Manuel MD at [place] [address] N/A 10/28/2022    [address] performed by Jonny Manuel MD at [facility] [address]  10/2015    all teeth extracted    ENDOSCOPY, COLON, DIAGNOSTIC      HERNIA REPAIR N/A 08/07/2020    HERNIA INCISIONAL REPAIR LAPAROSCOPIC ROBOTIC WITH MESH performed by Shilpi Pérez DO at [facility] Right     [address]  [address]19    UPPER GASTROINTESTINAL ENDOSCOPY  01/25/2015    UPPER GASTROINTESTINAL ENDOSCOPY N/A 10/08/2019    EGD BIOPSY performed by Beni Dorado MD at [place] [address] N/A 06/07/2022    EGD BIOPSY OF DUODENUM, GE JUNCTION AND GASTRIC ANTRUM performed by Beni Dorado MD at [address] [place] [address]  [Aspirin]       iron deficiency anemia , requiring iron infusions and transfusions    Iron      Pill- constipation, abdominal pain    Nsaids       iron deficiency anemia, history of bleeding ulcers          Current Outpatient Medications:     lisinopril (PRINIVIL;ZESTRIL) 10 MG tablet, TAKE ONE TABLET BY MOUTH DAILY, STOP LISINOPRIL-HYDROCHLOROTHIAZIDE, Disp: 90 tablet, Rfl: 3    pregabalin (LYRICA) 150 MG capsule, Take 1 capsule by mouth in the morning, at noon, and at bedtime for 30 days. Max Daily Amount: 450 mg, Disp: 90 capsule, Rfl: 0    fluticasone (FLONASE) 50 MCG/ACT nasal spray, 2 sprays by Each Nostril route daily, Disp: 11.1 g, Rfl: 3    dapagliflozin (FARXIGA) 10 MG tablet, Take 1 tablet by mouth every morning For diabetes, Disp: 90 tablet, Rfl: 3    metFORMIN (GLUCOPHAGE-XR) 500 MG extended release tablet, Take 1 tablet by mouth in the morning and at bedtime, Disp: 180 tablet, Rfl: 1    oxyCODONE-acetaminophen (PERCOCET) 5-325 MG per tablet, Take 1 tablet by mouth 2 times daily as needed for Pain for up to 30 days. , Disp: 60 tablet, Rfl: 0    morphine (MS CONTIN) 15 MG extended release tablet, Take 1 tablet by mouth 2 times daily for 30 days. , Disp: 60 tablet, Rfl: 0    baclofen (LIORESAL) 10 MG tablet, Take 1 tablet by mouth 3 times daily as needed (back pain), Disp: 270 tablet, Rfl: 1    NEEDLE, DISP, 25 G (B-D DISP NEEDLE 25GX1\") 25G X 1\" MISC, To use with syringe, Disp: 50 each, Rfl: 3    vitamin D (ERGOCALCIFEROL) 1.25 MG (99505 UT) CAPS capsule, TAKE ONE CAPSULE BY MOUTH ONCE WEEKLY. STOP VITAMIN DAILY TO BE TAKEN DAILY. , Disp: 12 capsule, Rfl: 0    promethazine (PHENERGAN) 25 MG tablet, TAKE ONE TABLET BY MOUTH THREE TIMES A DAY AS NEEDED FOR NAUSEA, Disp: 21 tablet, Rfl: 0    folic acid (FOLVITE) 1 MG tablet, Take 1 tablet by mouth daily, Disp: 90 tablet, Rfl: 1    cyanocobalamin 1000 MCG/ML injection, Inject 1 mL into the muscle every 30 days Call for next refill which will be monthly for life, Disp: 3 mL, Rfl: 3    pantoprazole (PROTONIX) 40 MG tablet, TAKE ONE TABLET BY MOUTH DAILY, Disp: 90 tablet, Rfl: 1    SITagliptin (JANUVIA) 50 MG tablet, Take 1 tablet by mouth daily, Disp: 30 tablet, Rfl: 5    nortriptyline (PAMELOR) 10 MG capsule, Take 1 capsule by mouth nightly For Polyneuropathy, diarrhea and insomnia, Disp: 30 capsule, Rfl: 3    pravastatin (PRAVACHOL) 40 MG tablet, TAKE ONE TABLET BY MOUTH EVERY EVENING. STOP GEMFIBROZIL, Disp: 90 tablet, Rfl: 3    metoprolol tartrate (LOPRESSOR) 25 MG tablet, TAKE ONE TABLET BY MOUTH TWICE A DAY, Disp: 180 tablet, Rfl: 3    glucose monitoring (FREESTYLE FREEDOM) kit, Please supply Patient with a glucose monitoring kit that insurance will cover. , Disp: 1 kit, Rfl: 0    blood glucose monitor strips, Testing once a day. Please dispense according to patients insurance., Disp: 100 strip, Rfl: 3    Lancets 30G MISC, Testing once a day.   Please dispense according to patients insurance., Disp: 100 each, Rfl: 3    Alcohol Swabs (B-D SINGLE USE SWABS REGULAR) PADS, USE TO CHECK BLOOD SUGAR TWICE A DAY, Disp: 200 each, Rfl: 3    Syringe/Needle, Disp, (SYRINGE 3CC/25GX1\") 25G X 1\" 3 ML MISC, To be used with B12 injections, Disp: 50 each, Rfl: 2    Probiotic Product (PROBIOTIC-10 PO), Take by mouth daily , Disp: , Rfl:     Family History   Problem Relation Age of Onset    Diabetes Mother     Heart Disease Father     High Blood Pressure Father        Social History     Socioeconomic History    Marital status:      Spouse name: Not on file    Number of children: Not on file    Years of education: Not on file    Highest education level: Not on file   Occupational History    Occupation: disabled   Tobacco Use    Smoking status: Former     Packs/day: 2.00     Years: 45.00     Pack years: 90.00     Types: Cigarettes     Start date: 1968     Quit date: 2015     Years since quittin.2    Smokeless tobacco: Never    Tobacco comments:     on chantix 11-6-15   12-7-15   Vaping Use    Vaping Use: Never used   Substance and Sexual Activity    Alcohol use: No    Drug use: No    Sexual activity: Yes     Partners: Female   Other Topics Concern    Not on file   Social History Narrative    Not on file     Social Determinants of Health     Financial Resource Strain: Low Risk     Difficulty of Paying Living Expenses: Not hard at all   Food Insecurity: No Food Insecurity    Worried About Running Out of Food in the Last Year: Never true    Ran Out of Food in the Last Year: Never true   Transportation Needs: No Transportation Needs    Lack of Transportation (Medical): No    Lack of Transportation (Non-Medical): No   Physical Activity: Insufficiently Active    Days of Exercise per Week: 2 days    Minutes of Exercise per Session: 40 min   Stress: Not on file   Social Connections: Not on file   Intimate Partner Violence: Not on file   Housing Stability: Unknown    Unable to Pay for Housing in the Last Year: No    Number of Places Lived in the Last Year: Not on file    Unstable Housing in the Last Year: No       Review of Systems:  Review of Systems   Constitutional: Negative for fever.   Cardiovascular:  Negative for chest pain.   Musculoskeletal:  Positive for back pain.   Gastrointestinal:  Negative for bowel incontinence.   Genitourinary:  Negative for bladder incontinence.   Neurological:  Positive for numbness, tingling and weakness. Negative for headaches.     Physical Exam:  /75   Pulse 90   Temp 97.3 °F (36.3 °C)   Ht 5' 9\" (1.753 m)   Wt 212 lb (96.2 kg)   SpO2 95%   BMI 31.31 kg/m²     Physical Exam  HENT:      Head: Normocephalic.   Pulmonary:      Effort: Pulmonary effort is normal.   Musculoskeletal:         General: Normal range of motion.      Cervical back: Normal range of motion.      Lumbar back: Tenderness present.   Skin:     General: Skin is warm and dry.   Neurological:      Mental Status: He is alert and oriented to person, place, and time.  Record/Diagnostics Review:    Last zoe 1/2023 and was NOT appropriate - absence of percocet    Assessment:  Problem List Items Addressed This Visit       Degenerative disc disease, lumbar (Chronic)    Relevant Medications    oxyCODONE-acetaminophen (PERCOCET) 5-325 MG per tablet (Start on 2/25/2023)    morphine (MS CONTIN) 15 MG extended release tablet (Start on 2/25/2023)    Lumbar spondylosis - Primary (Chronic)    Relevant Medications    oxyCODONE-acetaminophen (PERCOCET) 5-325 MG per tablet (Start on 2/25/2023)    morphine (MS CONTIN) 15 MG extended release tablet (Start on 2/25/2023)    Lumbar radiculopathy (Chronic)    Relevant Medications    morphine (MS CONTIN) 15 MG extended release tablet (Start on 2/25/2023)    Lumbar spinal stenosis (Chronic)    Relevant Medications    morphine (MS CONTIN) 15 MG extended release tablet (Start on 2/25/2023)    Cervical spondylitis (HCC) (Chronic)    Encounter for medication monitoring    Relevant Orders    DRUG SCREEN, PAIN    Osteoarthritis of spine with radiculopathy, lumbar region    Relevant Medications    oxyCODONE-acetaminophen (PERCOCET) 5-325 MG per tablet (Start on 2/25/2023)    morphine (MS CONTIN) 15 MG extended release tablet (Start on 2/25/2023)     Other Visit Diagnoses       Encounter for chronic pain management        Osteoarthritis of lumbar spine, unspecified spinal osteoarthritis complication status        Relevant Medications    oxyCODONE-acetaminophen (PERCOCET) 5-325 MG per tablet (Start on 2/25/2023)    morphine (MS CONTIN) 15 MG extended release tablet (Start on 2/25/2023)               Treatment Plan:  Patient relates current medications are helping the pain. Patient reports taking pain medications as prescribed, denies obtaining medications from different sources and denies use of illegal drugs. Medication risk and benefits have been discussed. Patient denies side effects from medications like nausea, vomiting, constipation or drowsiness. Patient reports current activities of daily living are possible due to medications and would like to continue them. As always, we encourage daily stretching and strengthening exercises, and recommend minimizing use of pain medications unless patient cannot get through daily activities due to pain. Due to the high risk nature of this patient's pain medication close monitoring is required. Continue current medication management, pt has been stable and compliant. Script written for MSER and oxycodone  UDS for standard monitoring purposes  Follow up appointment made for 4 weeks    I have reviewed the chief complaint and history of present illness (including ROS and PFSH) and vital documentation by my staff and I agree with their documentation and have added where applicable.

## 2023-02-23 NOTE — RESULT ENCOUNTER NOTE
Please notify patient:   Normal scan of the legs, no blockages        Future Appointments  2/23/2023  9:40 AM    [place] [id number] [address]  2/24/2023  9:00 AM    Mirza Fortune MD          [facility]  2/24/2023  2:00 PM    [facility] PAT RM 2              [facility] PAT            [facility]  3/2/2023   2:15 PM    Libertad Wadsworth MD          [facility]  3/24/2023  10:15 AM   Libertad Wadsworth MD          [facility]  3/27/2023  11:40 AM   Greg Muhammad MD         [facility]           [facility]  9/27/2023  11:00 AM   Merlin Goldman MD     [facility]               Yogi Model

## 2023-02-24 ENCOUNTER — OFFICE VISIT (OUTPATIENT)
Dept: ONCOLOGY | Age: 73
End: 2023-02-24
Payer: MEDICARE

## 2023-02-24 ENCOUNTER — TELEPHONE (OUTPATIENT)
Dept: ONCOLOGY | Age: 73
End: 2023-02-24

## 2023-02-24 ENCOUNTER — HOSPITAL ENCOUNTER (OUTPATIENT)
Dept: PREADMISSION TESTING | Age: 73
End: 2023-02-24
Payer: MEDICARE

## 2023-02-24 VITALS
BODY MASS INDEX: 31.59 KG/M2 | HEART RATE: 94 BPM | WEIGHT: 213.9 LBS | DIASTOLIC BLOOD PRESSURE: 75 MMHG | TEMPERATURE: 97.8 F | SYSTOLIC BLOOD PRESSURE: 122 MMHG

## 2023-02-24 VITALS
RESPIRATION RATE: 18 BRPM | TEMPERATURE: 97.7 F | SYSTOLIC BLOOD PRESSURE: 138 MMHG | HEART RATE: 95 BPM | WEIGHT: 214 LBS | HEIGHT: 69 IN | DIASTOLIC BLOOD PRESSURE: 76 MMHG | BODY MASS INDEX: 31.7 KG/M2 | OXYGEN SATURATION: 97 %

## 2023-02-24 DIAGNOSIS — D50.8 OTHER IRON DEFICIENCY ANEMIA: ICD-10-CM

## 2023-02-24 DIAGNOSIS — D50.0 IRON DEFICIENCY ANEMIA DUE TO CHRONIC BLOOD LOSS: Primary | ICD-10-CM

## 2023-02-24 PROCEDURE — 87088 URINE BACTERIA CULTURE: CPT

## 2023-02-24 PROCEDURE — 99211 OFF/OP EST MAY X REQ PHY/QHP: CPT | Performed by: INTERNAL MEDICINE

## 2023-02-24 PROCEDURE — 87186 SC STD MICRODIL/AGAR DIL: CPT

## 2023-02-24 PROCEDURE — 87086 URINE CULTURE/COLONY COUNT: CPT

## 2023-02-24 RX ORDER — FAMOTIDINE 10 MG/ML
20 INJECTION, SOLUTION INTRAVENOUS
OUTPATIENT
Start: 2023-02-24

## 2023-02-24 RX ORDER — ALBUTEROL SULFATE 90 UG/1
4 AEROSOL, METERED RESPIRATORY (INHALATION) PRN
OUTPATIENT
Start: 2023-02-24

## 2023-02-24 RX ORDER — EPINEPHRINE 1 MG/ML
0.3 INJECTION, SOLUTION, CONCENTRATE INTRAVENOUS PRN
OUTPATIENT
Start: 2023-02-24

## 2023-02-24 RX ORDER — SODIUM CHLORIDE 9 MG/ML
INJECTION, SOLUTION INTRAVENOUS CONTINUOUS
OUTPATIENT
Start: 2023-02-24

## 2023-02-24 RX ORDER — ACETAMINOPHEN 325 MG/1
650 TABLET ORAL
OUTPATIENT
Start: 2023-02-24

## 2023-02-24 RX ORDER — SODIUM CHLORIDE 0.9 % (FLUSH) 0.9 %
5-40 SYRINGE (ML) INJECTION PRN
OUTPATIENT
Start: 2023-02-24

## 2023-02-24 RX ORDER — DIPHENHYDRAMINE HYDROCHLORIDE 50 MG/ML
50 INJECTION INTRAMUSCULAR; INTRAVENOUS
OUTPATIENT
Start: 2023-02-24

## 2023-02-24 RX ORDER — SODIUM CHLORIDE 9 MG/ML
5-250 INJECTION, SOLUTION INTRAVENOUS PRN
OUTPATIENT
Start: 2023-02-24

## 2023-02-24 RX ORDER — HEPARIN SODIUM (PORCINE) LOCK FLUSH IV SOLN 100 UNIT/ML 100 UNIT/ML
500 SOLUTION INTRAVENOUS PRN
OUTPATIENT
Start: 2023-02-24

## 2023-02-24 RX ORDER — ONDANSETRON 2 MG/ML
8 INJECTION INTRAMUSCULAR; INTRAVENOUS
OUTPATIENT
Start: 2023-02-24

## 2023-02-24 ASSESSMENT — PAIN DESCRIPTION - ONSET: ONSET: ON-GOING

## 2023-02-24 ASSESSMENT — PAIN SCALES - GENERAL: PAINLEVEL_OUTOF10: 3

## 2023-02-24 ASSESSMENT — PAIN DESCRIPTION - PAIN TYPE: TYPE: CHRONIC PAIN

## 2023-02-24 ASSESSMENT — PAIN DESCRIPTION - FREQUENCY: FREQUENCY: CONTINUOUS

## 2023-02-24 ASSESSMENT — PAIN DESCRIPTION - LOCATION: LOCATION: BACK

## 2023-02-24 ASSESSMENT — PAIN DESCRIPTION - DESCRIPTORS: DESCRIPTORS: ACHING

## 2023-02-24 NOTE — TELEPHONE ENCOUNTER
AVS from 2/24/23    Iv injectafer as soon approved  Rv in 2 months with labs couple of days prior       AVS given to  to follow precert      Rv scheduled for 4/26 @ 9:45 am    Pt was given AVS and appointment schedule    Electronically signed by Linda Hickman on 2/24/2023 at 1:24 PM

## 2023-02-24 NOTE — PROGRESS NOTES
Subjective:   PCP: Zeus Dowd MD       Chief Complaint   Patient presents with    Follow-up     Review status of disease    Discuss Labs   Discussed results of lab work-up. INTERIM HISTORY:     Patient presents to the clinic for a follow-up visit and to discuss results of his lab work-up. Clinically patient states that he feels good however patient lab work-up showed declining hemoglobin. Also shows declining iron stores. MCV has worsened. Patient denies noticing any obvious source of bleeding. He is scheduled for a cystoscope next month. During this visit patient's allergy, social, medical, surgical history and medications were reviewed and updated. REVIEW OF SYSTEMS:   General: No fever or night sweats. Weight is stable. +fatigue   ENT: No double or blurred vision, no hearing problem, no dysphagia or sore throat   Respiratory: No chest pain, no shortness of breath, no cough or hemoptysis. Cardiovascular: Denies chest pain, PND or orthopnea. No L E swelling or palpitations. Gastrointestinal: No nausea or vomiting, abdominal pain, or constipation, diarrhea  Genitourinary: Denies dysuria, frequency, urgency or incontinence. +hematuria - resolved  Neurological: Denies headaches, decreased LOC, no sensory or motor focal deficits. Musculoskeletal: No arthralgia no back pain or joint swelling. Skin: There are no rashes or bleeding. Psych: Denies hallucinations or intentions to harm self      PHYSICAL EXAM:   Vitals: /75   Pulse 94   Temp 97.8 °F (36.6 °C) (Temporal)   Wt 213 lb 14.4 oz (97 kg)   BMI 31.59 kg/m²   General appearance: alert and cooperative with exam  HEENT: Head: Normocephalic, no lesions, without obvious abnormality.   Neck: no adenopathy  Lungs: clear to auscultation bilaterally  Heart: regular rate and rhythm, S1, S2 normal, no murmur, click, rub or gallop  Abdomen: soft, non-tender; bowel sounds normal; no masses,  no organomegaly  Extremities: extremities normal, atraumatic, no cyanosis or edema  Neurologic: Mental status: Alert, oriented, thought content appropriate      REVIEW OF LABORATORY DATA:   Lab Results   Component Value Date    WBC 9.9 02/15/2023    HGB 12.6 (L) 02/15/2023    HCT 39.6 (L) 02/15/2023    MCV 79.0 (L) 02/15/2023     02/15/2023       Chemistry        Component Value Date/Time     02/15/2023 0920    K 3.9 02/15/2023 0920     02/15/2023 0920    CO2 26 02/15/2023 0920    BUN 15 02/15/2023 0920    CREATININE 0.70 02/15/2023 0920        Component Value Date/Time    CALCIUM 9.1 02/15/2023 0920    ALKPHOS 76 02/15/2023 0920    AST 19 02/15/2023 0920    ALT 21 02/15/2023 0920    BILITOT 0.4 02/15/2023 0920        Lab Results   Component Value Date    SRKZSSHU16 520 02/15/2023         Lab Results   Component Value Date    IRON 37 (L) 02/15/2023    TIBC 381 02/15/2023    FERRITIN 9 (L) 02/15/2023         IMPRESSION:   81year old male with history of bleeding ulcer back in 2015 and iron def, now with iron def anemia, and stool occult stools    -s/p IV iron with improvement in iron stores and hemoglobin  -EGD and colonoscopy 10/2019 did not show active bleeding, still no active source of iron def  -s/p GI in 12/3/19, concern for still blood loss, iron def, no plasns for repeat capsule video study, he has completed treatment for hepatitis C with Daibill Connell.  -repeat iron studies are consistent with iron deficiency  -transfuse if Hbg is less than 7.0    -B12 deficiency, patient started on B12 shots 7/2020  -Non-invasive urothelial carcinoma, low grade, TURBT, 04/2021      PLAN:   Personally reviewed results of lab work-up recurrent clinical data. Patient hemoglobin is declining MCV has worsened. Patient iron stores on now consistent with iron deficiency. Patient has been requiring IV iron infusions frequently.   Given patient is intolerant to oral iron and is now iron deficient we will proceed with IV iron using Injectafer. Patient I believe has occult GI bleed he has had extensive GI work-up done in the past.  Continue B12 supplementation  Continue surveillance for nonmuscle invasive bladder cancer by urology. He is scheduled for a cystoscopy soonPatient and his wife had multiple questions which answered the best my ability. Roberto Chavez MD    This note is created with the assistance of a speech recognition program.  While intending to generate a document that actually reflects the content of the visit, the document can still have some errors including those of syntax and sound a like substitutions which may escape proof reading. It such instances, actual meaning can be extrapolated by contextual diversion.

## 2023-02-25 LAB
MICROORGANISM SPEC CULT: ABNORMAL
SPECIMEN DESCRIPTION: ABNORMAL

## 2023-02-27 ENCOUNTER — TELEPHONE (OUTPATIENT)
Dept: PAIN MANAGEMENT | Age: 73
End: 2023-02-27

## 2023-02-27 DIAGNOSIS — N30.00 ACUTE CYSTITIS WITHOUT HEMATURIA: Primary | ICD-10-CM

## 2023-02-27 DIAGNOSIS — M47.816 LUMBAR SPONDYLOSIS: Chronic | ICD-10-CM

## 2023-02-27 RX ORDER — OXYCODONE HYDROCHLORIDE AND ACETAMINOPHEN 5; 325 MG/1; MG/1
1 TABLET ORAL 2 TIMES DAILY PRN
Qty: 60 TABLET | Refills: 0 | Status: SHIPPED | OUTPATIENT
Start: 2023-02-27 | End: 2023-03-29

## 2023-02-27 RX ORDER — CEPHALEXIN 500 MG/1
500 CAPSULE ORAL 3 TIMES DAILY
Qty: 30 CAPSULE | Refills: 0 | Status: SHIPPED | OUTPATIENT
Start: 2023-02-27 | End: 2023-03-09

## 2023-02-27 NOTE — PROGRESS NOTES
Urine culture is positive. Surgery scheduled 3/10/23 with Dr. Roxana Viveros (cysto bladder bx)  No atb allergies.   Cr 0.70  Will send keflex x10 days

## 2023-03-03 ENCOUNTER — TELEPHONE (OUTPATIENT)
Dept: ONCOLOGY | Age: 73
End: 2023-03-03

## 2023-03-03 NOTE — TELEPHONE ENCOUNTER
Spoke with patient spouse will call back to sched iron infusion injectafer 2 hr infusion 7 days apart P/c exp 2/24/24

## 2023-03-16 ENCOUNTER — APPOINTMENT (OUTPATIENT)
Dept: CT IMAGING | Age: 73
End: 2023-03-16
Payer: MEDICARE

## 2023-03-16 ENCOUNTER — HOSPITAL ENCOUNTER (EMERGENCY)
Age: 73
Discharge: HOME | End: 2023-03-16
Attending: EMERGENCY MEDICINE
Payer: MEDICARE

## 2023-03-16 VITALS
DIASTOLIC BLOOD PRESSURE: 62 MMHG | HEART RATE: 98 BPM | OXYGEN SATURATION: 96 % | BODY MASS INDEX: 31.31 KG/M2 | RESPIRATION RATE: 18 BRPM | WEIGHT: 212 LBS | SYSTOLIC BLOOD PRESSURE: 123 MMHG | TEMPERATURE: 97.2 F

## 2023-03-16 DIAGNOSIS — K52.9 ENTERITIS: Primary | ICD-10-CM

## 2023-03-16 LAB
ABSOLUTE BANDS #: 2.55 K/UL (ref 0–1)
ABSOLUTE EOS #: 0 K/UL (ref 0–0.4)
ABSOLUTE LYMPH #: 0.45 K/UL (ref 1–4.8)
ABSOLUTE MONO #: 0.3 K/UL (ref 0.1–1.3)
ALBUMIN SERPL-MCNC: 4.6 G/DL (ref 3.5–5.2)
ALP SERPL-CCNC: 106 U/L (ref 40–129)
ALT SERPL-CCNC: 18 U/L (ref 5–41)
ANION GAP SERPL CALCULATED.3IONS-SCNC: 17 MMOL/L (ref 9–17)
AST SERPL-CCNC: 17 U/L
BANDS: 17 % (ref 0–10)
BASOPHILS # BLD: 0 % (ref 0–2)
BASOPHILS ABSOLUTE: 0 K/UL (ref 0–0.2)
BILIRUB SERPL-MCNC: 0.6 MG/DL (ref 0.3–1.2)
BUN SERPL-MCNC: 16 MG/DL (ref 8–23)
CALCIUM SERPL-MCNC: 10.2 MG/DL (ref 8.6–10.4)
CHLORIDE SERPL-SCNC: 100 MMOL/L (ref 98–107)
CO2 SERPL-SCNC: 20 MMOL/L (ref 20–31)
CREAT SERPL-MCNC: 0.71 MG/DL (ref 0.7–1.2)
EOSINOPHILS RELATIVE PERCENT: 0 % (ref 0–4)
FLUAV RNA RESP QL NAA+PROBE: NOT DETECTED
FLUBV RNA RESP QL NAA+PROBE: NOT DETECTED
GFR SERPL CREATININE-BSD FRML MDRD: >60 ML/MIN/1.73M2
GLUCOSE SERPL-MCNC: 288 MG/DL (ref 70–99)
HCT VFR BLD AUTO: 41.6 % (ref 41–53)
HGB BLD-MCNC: 13 G/DL (ref 13.5–17.5)
LACTATE PLASV-SCNC: 2.2 MMOL/L (ref 0.5–2.2)
LIPASE SERPL-CCNC: 46 U/L (ref 13–60)
LYMPHOCYTES # BLD: 3 % (ref 24–44)
MCH RBC QN AUTO: 22.5 PG (ref 26–34)
MCHC RBC AUTO-ENTMCNC: 31.3 G/DL (ref 31–37)
MCV RBC AUTO: 71.9 FL (ref 80–100)
MONOCYTES # BLD: 2 % (ref 1–7)
MORPHOLOGY: ABNORMAL
PDW BLD-RTO: 17.2 % (ref 11.5–14.9)
PLATELET # BLD AUTO: 388 K/UL (ref 150–450)
PMV BLD AUTO: 7.6 FL (ref 6–12)
POTASSIUM SERPL-SCNC: 4 MMOL/L (ref 3.7–5.3)
PROT SERPL-MCNC: 8.7 G/DL (ref 6.4–8.3)
RBC # BLD: 5.78 M/UL (ref 4.5–5.9)
SARS-COV-2 RNA RESP QL NAA+PROBE: DETECTED
SEG NEUTROPHILS: 78 % (ref 36–66)
SEGMENTED NEUTROPHILS ABSOLUTE COUNT: 11.7 K/UL (ref 1.3–9.1)
SODIUM SERPL-SCNC: 137 MMOL/L (ref 135–144)
SOURCE: ABNORMAL
SPECIMEN DESCRIPTION: ABNORMAL
T4 FREE SERPL-MCNC: 1.18 NG/DL (ref 0.93–1.7)
TSH SERPL-ACNC: 1.31 UIU/ML (ref 0.3–5)
WBC # BLD AUTO: 15 K/UL (ref 3.5–11)

## 2023-03-16 PROCEDURE — 6360000002 HC RX W HCPCS: Performed by: EMERGENCY MEDICINE

## 2023-03-16 PROCEDURE — 84439 ASSAY OF FREE THYROXINE: CPT

## 2023-03-16 PROCEDURE — 6360000004 HC RX CONTRAST MEDICATION: Performed by: EMERGENCY MEDICINE

## 2023-03-16 PROCEDURE — 87636 SARSCOV2 & INF A&B AMP PRB: CPT

## 2023-03-16 PROCEDURE — 80053 COMPREHEN METABOLIC PANEL: CPT

## 2023-03-16 PROCEDURE — 83605 ASSAY OF LACTIC ACID: CPT

## 2023-03-16 PROCEDURE — 74177 CT ABD & PELVIS W/CONTRAST: CPT

## 2023-03-16 PROCEDURE — 99285 EMERGENCY DEPT VISIT HI MDM: CPT

## 2023-03-16 PROCEDURE — 36415 COLL VENOUS BLD VENIPUNCTURE: CPT

## 2023-03-16 PROCEDURE — 96374 THER/PROPH/DIAG INJ IV PUSH: CPT

## 2023-03-16 PROCEDURE — 96372 THER/PROPH/DIAG INJ SC/IM: CPT

## 2023-03-16 PROCEDURE — 83690 ASSAY OF LIPASE: CPT

## 2023-03-16 PROCEDURE — 85025 COMPLETE CBC W/AUTO DIFF WBC: CPT

## 2023-03-16 PROCEDURE — 2580000003 HC RX 258: Performed by: EMERGENCY MEDICINE

## 2023-03-16 PROCEDURE — 84443 ASSAY THYROID STIM HORMONE: CPT

## 2023-03-16 RX ORDER — 0.9 % SODIUM CHLORIDE 0.9 %
100 INTRAVENOUS SOLUTION INTRAVENOUS ONCE
Status: COMPLETED | OUTPATIENT
Start: 2023-03-16 | End: 2023-03-16

## 2023-03-16 RX ORDER — DICYCLOMINE HYDROCHLORIDE 10 MG/ML
20 INJECTION INTRAMUSCULAR ONCE
Status: COMPLETED | OUTPATIENT
Start: 2023-03-16 | End: 2023-03-16

## 2023-03-16 RX ORDER — ONDANSETRON 4 MG/1
4 TABLET, ORALLY DISINTEGRATING ORAL 3 TIMES DAILY PRN
Qty: 21 TABLET | Refills: 0 | Status: SHIPPED | OUTPATIENT
Start: 2023-03-16

## 2023-03-16 RX ORDER — DICYCLOMINE HCL 20 MG
20 TABLET ORAL EVERY 6 HOURS PRN
Qty: 30 TABLET | Refills: 0 | Status: SHIPPED | OUTPATIENT
Start: 2023-03-16

## 2023-03-16 RX ORDER — ONDANSETRON 2 MG/ML
4 INJECTION INTRAMUSCULAR; INTRAVENOUS ONCE
Status: COMPLETED | OUTPATIENT
Start: 2023-03-16 | End: 2023-03-16

## 2023-03-16 RX ORDER — SODIUM CHLORIDE 0.9 % (FLUSH) 0.9 %
10 SYRINGE (ML) INJECTION PRN
Status: DISCONTINUED | OUTPATIENT
Start: 2023-03-16 | End: 2023-03-16 | Stop reason: HOSPADM

## 2023-03-16 RX ORDER — 0.9 % SODIUM CHLORIDE 0.9 %
1000 INTRAVENOUS SOLUTION INTRAVENOUS ONCE
Status: COMPLETED | OUTPATIENT
Start: 2023-03-16 | End: 2023-03-16

## 2023-03-16 RX ADMIN — IOPAMIDOL 75 ML: 755 INJECTION, SOLUTION INTRAVENOUS at 13:38

## 2023-03-16 RX ADMIN — ONDANSETRON 4 MG: 2 INJECTION INTRAMUSCULAR; INTRAVENOUS at 11:43

## 2023-03-16 RX ADMIN — SODIUM CHLORIDE 100 ML: 9 INJECTION, SOLUTION INTRAVENOUS at 13:39

## 2023-03-16 RX ADMIN — SODIUM CHLORIDE 1000 ML: 9 INJECTION, SOLUTION INTRAVENOUS at 11:49

## 2023-03-16 RX ADMIN — SODIUM CHLORIDE, PRESERVATIVE FREE 10 ML: 5 INJECTION INTRAVENOUS at 13:38

## 2023-03-16 RX ADMIN — DICYCLOMINE HYDROCHLORIDE 20 MG: 20 INJECTION, SOLUTION INTRAMUSCULAR at 11:50

## 2023-03-16 ASSESSMENT — ENCOUNTER SYMPTOMS
EYE REDNESS: 0
NAUSEA: 1
BLOOD IN STOOL: 0
SORE THROAT: 0
EYE PAIN: 0
SHORTNESS OF BREATH: 0
RHINORRHEA: 0
COUGH: 1
BACK PAIN: 0
WHEEZING: 0
SINUS PRESSURE: 0
FACIAL SWELLING: 0
CONSTIPATION: 0
VOMITING: 1
TROUBLE SWALLOWING: 0
ABDOMINAL PAIN: 1
DIARRHEA: 0
COLOR CHANGE: 0
CHEST TIGHTNESS: 0
EYE DISCHARGE: 0

## 2023-03-16 NOTE — ED PROVIDER NOTES
[facility] ED  eMERGENCY dEPARTMENT eNCOUnter      Pt Name: Koby Otero  MRN: [id number]  Birthdate 1950  Date of evaluation: 3/16/23      CHIEF COMPLAINT       Chief Complaint   Patient presents with    Abdominal Pain    Emesis    Nausea         HISTORY OF PRESENT ILLNESS    Koby Otero is a 72 y.o. male who presents complaining of abdominal pain.  Patient states that yesterday he started having some diffuse abdominal pain worse in the epigastrium.  Patient states this morning he woke up and has been nauseous and vomiting ever since.  Patient states that he feels extremely warm but has not taken his temperature.  Patient denies any diarrhea or blood in the vomit.  Patient is having no urinary symptoms.  Patient has had a cough productive of some sputum.  Patient and family all had COVID about 2 weeks ago.  Patient had mostly gotten over it until this started yesterday.      REVIEW OF SYSTEMS       Review of Systems   Constitutional:  Positive for fatigue and fever. Negative for activity change, appetite change, chills and diaphoresis.   HENT:  Negative for congestion, ear pain, facial swelling, nosebleeds, rhinorrhea, sinus pressure, sore throat and trouble swallowing.    Eyes:  Negative for pain, discharge and redness.   Respiratory:  Positive for cough. Negative for chest tightness, shortness of breath and wheezing.    Cardiovascular:  Negative for chest pain, palpitations and leg swelling.   Gastrointestinal:  Positive for abdominal pain, nausea and vomiting. Negative for blood in stool, constipation and diarrhea.   Genitourinary:  Negative for difficulty urinating, dysuria, flank pain, frequency, genital sores and hematuria.   Musculoskeletal:  Negative for arthralgias, back pain, gait problem, joint swelling, myalgias and neck pain.   Skin:  Negative for color change, pallor, rash and wound.   Neurological:  Negative for dizziness, tremors, seizures, syncope, speech difficulty, weakness,  numbness and headaches. Psychiatric/Behavioral:  Negative for confusion, decreased concentration, hallucinations, self-injury, sleep disturbance and suicidal ideas. PAST MEDICAL HISTORY     Past Medical History:   Diagnosis Date    AAA (abdominal aortic aneurysm)     \"stable\" 3 cm on CT 2021, \"recommend f/u every 3 years\"    Arthritis     osteoarthritis    Bladder cancer ([facility] [address] 03/2021    bladder    Chronic neck pain     Colon polyps 10/08/2019    tubular adenoma x2    COVID-19 vaccine administered     COVID-19 virus infection 01/10/2022    Slight cough. Diabetic neuropathy ([facility] [address]     Diarrhea     Essential hypertension 05/12/2020    managed by Dr. Paxton Stringer PCP    Fatty liver 02/15/2023    GERD (gastroesophageal reflux disease)     Hematuria     Hepatitis B core antibody positive     History of bleeding peptic ulcer     History of blood transfusion     no reactions    History of hepatitis C     History of migraine headaches     History of stress test 07/2020    \"low risk\"    Hyperlipidemia     Iron (Fe) deficiency anemia     Lung nodule     stable per CT 10/2022    Neuropathy     Poor historian     states his wife takes care of all medical information    Positive FIT (fecal immunochemical test)     Snores     Type 2 diabetes mellitus with microalbuminuria, without long-term current use of insulin ([facility] [address] 07/13/2020    managed by Dr. Frankie Bynum    Under care of service provider 02/24/2023    pcp-Dr Aravind Perez [facility] virtual visit jan 2023    Under care of service provider 02/24/2023    hematology-Dr Caprice [address] virtual visit feb 2023    Under care of service provider 02/24/2023    urology-Dr fairchild-last visit feb 2023    Under care of service provider 02/24/2023    GI - DR. BRITO - last vist - oct 2022    Under care of service provider 02/24/2023    PAIN MANAGEMENT - HERMELINDA Lee - last visit - feb 2023    Under care of service provider 02/24/2023    PODIATRY - DR. YE - last visit - 5/2022    Under care of team     TCC- last visit 6/2022, office notes in physical pre-op folder    Wears dentures     Wears glasses     Worsening headaches 12/29/2020    as of 2/24/2023 less often and less severe       SURGICAL HISTORY       Past Surgical History:   Procedure Laterality Date    CERVICAL SPINE SURGERY  1977    cervical spine three times, has plate    CHOLECYSTECTOMY  03/22/2019    COLONOSCOPY  01/25/2015    10 yr recall, hemorrhoids    COLONOSCOPY N/A 10/08/2019    tubular adenoma x2    COLONOSCOPY N/A 06/07/2022    COLONOSCOPY DIAGNOSTIC performed by Anastasia Santoro MD at [address] [place] [address] Right 04/29/2021    CYSTOSCOPY TUR BLADDER TUMOR, GYRUS, RIGHT STENT PLACEMENT AND RIGHT STENT REMOVAL performed by Sol Graff MD at [place] [address] N/A 09/09/2021    CYSTOSCOPY, TRANSURETHRAL RESECTION BLADDER TUMOR performed by Sol Graff MD at [place] [address] N/A 02/16/2022    CYSTOSCOPY BLADDER BIOPSY, FULGURATION performed by Sol Graff MD at [place] [address] N/A 06/17/2022    CYSTOSCOPY, TUR BLADDER TUMOR, GYRUS performed by Sol Graff MD at [place] [address] N/A 10/28/2022    [address] performed by Sol Graff MD at [facility] [address]  10/2015    all teeth extracted    ENDOSCOPY, COLON, DIAGNOSTIC      HERNIA REPAIR N/A 08/07/2020    HERNIA INCISIONAL REPAIR LAPAROSCOPIC ROBOTIC WITH MESH performed by Claudine Amezcua DO at [facility] Right     [address]  [address]19    UPPER GASTROINTESTINAL ENDOSCOPY  01/25/2015    UPPER GASTROINTESTINAL ENDOSCOPY N/A 10/08/2019    EGD BIOPSY performed by Anastasia Santoro MD at [address] [place] [address] N/A 06/07/2022    EGD BIOPSY OF DUODENUM, GE JUNCTION AND GASTRIC ANTRUM performed by Anastasia Santoro MD at [address] [place] [address]       Previous Medications    ALCOHOL SWABS (B-D SINGLE USE SWABS REGULAR) PADS USE TO CHECK BLOOD SUGAR TWICE A DAY    BACLOFEN (LIORESAL) 10 MG TABLET    Take 1 tablet by mouth 3 times daily as needed (back pain)    BLOOD GLUCOSE MONITOR STRIPS    Testing once a day. Please dispense according to patients insurance. CYANOCOBALAMIN 1000 MCG/ML INJECTION    Inject 1 mL into the muscle every 30 days Call for next refill which will be monthly for life    DAPAGLIFLOZIN (FARXIGA) 10 MG TABLET    Take 1 tablet by mouth every morning For diabetes    FLUTICASONE (FLONASE) 50 MCG/ACT NASAL SPRAY    2 sprays by Each Nostril route daily    FOLIC ACID (FOLVITE) 1 MG TABLET    Take 1 tablet by mouth daily    GLUCOSE MONITORING (FREESTYLE FREEDOM) KIT    Please supply Patient with a glucose monitoring kit that insurance will cover. LANCETS 30G MISC    Testing once a day. Please dispense according to patients insurance. LISINOPRIL (PRINIVIL;ZESTRIL) 10 MG TABLET    TAKE ONE TABLET BY MOUTH DAILY, STOP LISINOPRIL-HYDROCHLOROTHIAZIDE    METFORMIN (GLUCOPHAGE-XR) 500 MG EXTENDED RELEASE TABLET    Take 1 tablet by mouth in the morning and at bedtime    METOPROLOL TARTRATE (LOPRESSOR) 25 MG TABLET    TAKE ONE TABLET BY MOUTH TWICE A DAY    MORPHINE (MS CONTIN) 15 MG EXTENDED RELEASE TABLET    Take 1 tablet by mouth 2 times daily for 30 days. NEEDLE, DISP, 25 G (B-D DISP NEEDLE 25GX1\") 25G X 1\" MISC    To use with syringe    NORTRIPTYLINE (PAMELOR) 10 MG CAPSULE    Take 1 capsule by mouth nightly For Polyneuropathy, diarrhea and insomnia    OXYCODONE-ACETAMINOPHEN (PERCOCET) 5-325 MG PER TABLET    Take 1 tablet by mouth 2 times daily as needed for Pain for up to 30 days. PANTOPRAZOLE (PROTONIX) 40 MG TABLET    TAKE ONE TABLET BY MOUTH DAILY    PRAVASTATIN (PRAVACHOL) 40 MG TABLET    TAKE ONE TABLET BY MOUTH EVERY EVENING. STOP GEMFIBROZIL    PREGABALIN (LYRICA) 150 MG CAPSULE    Take 1 capsule by mouth in the morning, at noon, and at bedtime for 30 days.  Max Daily Amount: 450 mg    PROBIOTIC PRODUCT (PROBIOTIC-10 PO)    Take by mouth daily     PROMETHAZINE (PHENERGAN) 25 MG TABLET    TAKE ONE TABLET BY MOUTH THREE TIMES A DAY AS NEEDED FOR NAUSEA    SITAGLIPTIN (JANUVIA) 50 MG TABLET    Take 1 tablet by mouth daily    SYRINGE/NEEDLE, DISP, (SYRINGE 3CC/25GX1\") 25G X 1\" 3 ML MISC    To be used with B12 injections    VITAMIN D (ERGOCALCIFEROL) 1.25 MG (98290 UT) CAPS CAPSULE    TAKE ONE CAPSULE BY MOUTH ONCE WEEKLY. STOP VITAMIN DAILY TO BE TAKEN DAILY. ALLERGIES     is allergic to asa [aspirin], iron, and nsaids. SOCIAL HISTORY      reports that he quit smoking about 7 years ago. His smoking use included cigarettes. He started smoking about 54 years ago. He has a 90.00 pack-year smoking history. He has never used smokeless tobacco. He reports that he does not drink alcohol and does not use drugs. PHYSICAL EXAM     INITIAL VITALS: /62   Pulse 98   Temp 97.2 °F (36.2 °C) (Oral)   Resp 18   Wt 212 lb (96.2 kg)   SpO2 96%   BMI 31.31 kg/m²      Physical Exam  Vitals and nursing note reviewed. Constitutional:       General: He is not in acute distress. Appearance: He is well-developed. He is not diaphoretic. HENT:      Head: Normocephalic and atraumatic. Eyes:      General: No scleral icterus. Right eye: No discharge. Left eye: No discharge. Conjunctiva/sclera: Conjunctivae normal.      Pupils: Pupils are equal, round, and reactive to light. Cardiovascular:      Rate and Rhythm: Normal rate and regular rhythm. Heart sounds: Normal heart sounds. No murmur heard. No friction rub. No gallop. Pulmonary:      Effort: Pulmonary effort is normal. No respiratory distress. Breath sounds: Normal breath sounds. No wheezing or rales. Chest:      Chest wall: No tenderness. Abdominal:      General: Bowel sounds are normal. There is no distension. Palpations: Abdomen is soft. There is no mass. Tenderness:  There is generalized abdominal tenderness. There is no guarding or rebound. Musculoskeletal:         General: No tenderness. Normal range of motion. Skin:     General: Skin is warm and dry. Coloration: Skin is not pale. Findings: No erythema or rash. Neurological:      Mental Status: He is alert and oriented to person, place, and time. Cranial Nerves: No cranial nerve deficit. Sensory: No sensory deficit. Motor: No abnormal muscle tone. Coordination: Coordination normal.      Deep Tendon Reflexes: Reflexes normal.   Psychiatric:         Behavior: Behavior normal.         Thought Content: Thought content normal.         Judgment: Judgment normal.         MEDICAL DECISION MAKING:     Summary of Patient Presentation:        1)  Number and Complexity of Problems  Problem List This Visit:  Abdominal pain, vomiting, cough    Differential Diagnosis:  Viral illness, flu, pneumonia, sepsis    Diagnoses Considered but Do Not Suspect:  AAA rupture    Pertinent Comorbid Conditions:  AAA, diabetes, hepatitis    2)  Data Reviewed  Decision Rules/Scores/MIPS utilized:  Is this patient to be included in the SEP-1 core measure due to severe sepsis or septic shock? No Exclusion criteria - the patient is NOT to be included for SEP-1 Core Measure due to: 2+ SIRS criteria are not met      External Documents Reviewed:  None    Imaging that is independently reviewed and interpreted by me as:  None    See more data below for the lab and radiology tests and orders. 3)  Treatment and Disposition      \"ED Course\" Notes From Epic Narrator:  ED Course as of 03/16/23 1425   Thu Mar 16, 2023   1240 Patient was reevaluated updated on what labs I had back and states he is feeling much better after the medications. We will wait CT scan and the rest the lab work come back. [JL]   0 Went in to reidentify with the patient his CT scan was negative other than for probable viral enteritis.   Patient is having diarrhea not overly concerned about the ileus that they are concerned about. At this point time patient is feeling much better we discussed hospitalization versus home and the patient would prefer to go home with medications. [JL]      ED Course User Index  [JL] Jarek Napier MD         PROCEDURES:  None      DATA FOR LAB AND RADIOLOGY TESTS ORDERED BELOW ARE REVIEWED BY THE ED CLINICIAN:    RADIOLOGY: All x-rays, CT, MRI, and formal ultrasound images (except ED bedside ultrasound) are read by the radiologist, see reports below, unless otherwise noted in MDM or here. Reports below are reviewed by myself. CT ABDOMEN PELVIS W IV CONTRAST Additional Contrast? None   Final Result   Fluid/liquid stool throughout large and small bowel loops with some distal   small bowel dilatation and mural prominence/mild thickening. Findings more   suggestive of enteritis/diarrhea with generalized ileus; continued clinical   follow-up for worsening ileitis or early colitis recommended. No definite   bowel obstruction. Tiny amount of pathologic free fluid, best seen   perihepatic distribution. Chronic pancreatitis. Probable hepatic steatosis, enlarged left lobe liver   and borderline splenomegaly. LABS: Lab orders shown below, the results are reviewed by myself, and all abnormals are listed below.   Labs Reviewed   COVID-19 & INFLUENZA COMBO - Abnormal; Notable for the following components:       Result Value    SARS-CoV-2 RNA, RT PCR DETECTED (*)     All other components within normal limits   CBC WITH AUTO DIFFERENTIAL - Abnormal; Notable for the following components:    WBC 15.0 (*)     Hemoglobin 13.0 (*)     MCV 71.9 (*)     MCH 22.5 (*)     RDW 17.2 (*)     Seg Neutrophils 78 (*)     Lymphocytes 3 (*)     Bands 17 (*)     Segs Absolute 11.70 (*)     Absolute Lymph # 0.45 (*)     Absolute Bands # 2.55 (*)     All other components within normal limits   COMPREHENSIVE METABOLIC PANEL - Abnormal; Notable for the following components:    Glucose 288 (*)     Total Protein 8.7 (*)     All other components within normal limits   LIPASE   TSH   T4, FREE   LACTIC ACID   URINALYSIS WITH REFLEX TO CULTURE       Vitals Reviewed:    Vitals:    03/16/23 1120 03/16/23 1245   BP: (!) 172/90 123/62   Pulse: 94 98   Resp: 18 18   Temp: 97.2 °F (36.2 °C)    TempSrc: Oral    SpO2: 90% 96%   Weight: 212 lb (96.2 kg)      MEDICATIONS GIVEN TO PATIENT THIS ENCOUNTER:  Orders Placed This Encounter   Medications    0.9 % sodium chloride bolus    ondansetron (ZOFRAN) injection 4 mg    dicyclomine (BENTYL) injection 20 mg    iopamidol (ISOVUE-370) 76 % injection 75 mL    0.9 % sodium chloride bolus    sodium chloride flush 0.9 % injection 10 mL    dicyclomine (BENTYL) 20 MG tablet     Sig: Take 1 tablet by mouth every 6 hours as needed (Abdominal pain)     Dispense:  30 tablet     Refill:  0    ondansetron (ZOFRAN-ODT) 4 MG disintegrating tablet     Sig: Take 1 tablet by mouth 3 times daily as needed for Nausea or Vomiting     Dispense:  21 tablet     Refill:  0     DISCHARGE PRESCRIPTIONS:  New Prescriptions    DICYCLOMINE (BENTYL) 20 MG TABLET    Take 1 tablet by mouth every 6 hours as needed (Abdominal pain)    ONDANSETRON (ZOFRAN-ODT) 4 MG DISINTEGRATING TABLET    Take 1 tablet by mouth 3 times daily as needed for Nausea or Vomiting     PHYSICIAN CONSULTS ORDERED THIS ENCOUNTER:  None    FINAL IMPRESSION      1.  Enteritis          DISPOSITION/PLAN   DISPOSITION Decision To Discharge 03/16/2023 02:23:42 PM      PATIENT REFERREDTO:  Frank Christensen MD  [address] [place] [address].  [address] [place] [address]  [phone]    In 1 week      [facility] ED  [place] [address]  [phone]    If symptoms worsen    DISCHARGEMEDICATIONS:  New Prescriptions    DICYCLOMINE (BENTYL) 20 MG TABLET    Take 1 tablet by mouth every 6 hours as needed (Abdominal pain)    ONDANSETRON (ZOFRAN-ODT) 4 MG DISINTEGRATING TABLET    Take 1 tablet by mouth 3 times daily as needed for Nausea or Vomiting       (Please note that portions of this note were completed with a voice recognition program.  Efforts were made to edit thedictations but occasionally words are mis-transcribed.)    Pierre Montaño MD  Attending Emergency Physician                        Pierre Montaño MD  03/16/23 1650

## 2023-03-16 NOTE — ED NOTES
Mode of arrival (squad #, walk in, police, etc) : Walk in         Chief complaint(s): abd pain, nausea, emesis         Arrival Note (brief scenario, treatment PTA, etc). : Pt states abd pain since yesterday. Nausea and emesis today. Pt is diaphoretic. Pt denies any chest pain or sob. Pt 89% on room air patient placed in 2L during triage. Pt had covid 2+ weeks ago. C= \"Have you ever felt that you should Cut down on your drinking? \"  No  A= \"Have people Annoyed you by criticizing your drinking? \"  No  G= \"Have you ever felt bad or Guilty about your drinking? \"  No  E= \"Have you ever had a drink as an Eye-opener first thing in the morning to steady your nerves or to help a hangover? \"  No      Deferred []      Reason for deferring: N/A    *If yes to two or more: probable alcohol abuse. Asia Casiano  03/16/23 1120

## 2023-03-16 NOTE — ED NOTES
2 vials of  Dicyclomine pulled due to first  Vial spilling on floor      Deedee Corea  03/16/23 9274

## 2023-03-17 ENCOUNTER — TELEPHONE (OUTPATIENT)
Dept: PAIN MANAGEMENT | Age: 73
End: 2023-03-17

## 2023-03-17 DIAGNOSIS — M47.26 OSTEOARTHRITIS OF SPINE WITH RADICULOPATHY, LUMBAR REGION: ICD-10-CM

## 2023-03-17 DIAGNOSIS — M48.061 SPINAL STENOSIS OF LUMBAR REGION WITHOUT NEUROGENIC CLAUDICATION: Chronic | ICD-10-CM

## 2023-03-17 DIAGNOSIS — M54.16 LUMBAR RADICULOPATHY: Chronic | ICD-10-CM

## 2023-03-17 DIAGNOSIS — M51.36 DEGENERATIVE DISC DISEASE, LUMBAR: Chronic | ICD-10-CM

## 2023-03-17 DIAGNOSIS — M47.816 OSTEOARTHRITIS OF LUMBAR SPINE, UNSPECIFIED SPINAL OSTEOARTHRITIS COMPLICATION STATUS: ICD-10-CM

## 2023-03-17 DIAGNOSIS — G89.29 ENCOUNTER FOR CHRONIC PAIN MANAGEMENT: ICD-10-CM

## 2023-03-17 RX ORDER — PREGABALIN 150 MG/1
150 CAPSULE ORAL 3 TIMES DAILY
Qty: 90 CAPSULE | Refills: 0 | Status: SHIPPED | OUTPATIENT
Start: 2023-03-17 | End: 2023-04-16

## 2023-03-23 ENCOUNTER — HOSPITAL ENCOUNTER (OUTPATIENT)
Dept: PAIN MANAGEMENT | Age: 73
Discharge: HOME | End: 2023-03-23
Payer: MEDICARE

## 2023-03-23 VITALS
OXYGEN SATURATION: 95 % | DIASTOLIC BLOOD PRESSURE: 73 MMHG | HEART RATE: 86 BPM | BODY MASS INDEX: 31.4 KG/M2 | HEIGHT: 69 IN | SYSTOLIC BLOOD PRESSURE: 151 MMHG | TEMPERATURE: 97.3 F | RESPIRATION RATE: 20 BRPM | WEIGHT: 212 LBS

## 2023-03-23 DIAGNOSIS — M48.061 SPINAL STENOSIS OF LUMBAR REGION, UNSPECIFIED WHETHER NEUROGENIC CLAUDICATION PRESENT: Chronic | ICD-10-CM

## 2023-03-23 DIAGNOSIS — Z79.891 CHRONIC USE OF OPIATE DRUG FOR THERAPEUTIC PURPOSE: ICD-10-CM

## 2023-03-23 DIAGNOSIS — Z51.81 ENCOUNTER FOR MEDICATION MONITORING: ICD-10-CM

## 2023-03-23 DIAGNOSIS — M54.16 LUMBAR RADICULOPATHY: Chronic | ICD-10-CM

## 2023-03-23 DIAGNOSIS — M47.816 LUMBAR SPONDYLOSIS: Primary | Chronic | ICD-10-CM

## 2023-03-23 DIAGNOSIS — E11.65 TYPE 2 DIABETES MELLITUS WITH HYPERGLYCEMIA, WITHOUT LONG-TERM CURRENT USE OF INSULIN (HCC): ICD-10-CM

## 2023-03-23 PROCEDURE — 99213 OFFICE O/P EST LOW 20 MIN: CPT

## 2023-03-23 PROCEDURE — 99213 OFFICE O/P EST LOW 20 MIN: CPT | Performed by: NURSE PRACTITIONER

## 2023-03-23 RX ORDER — SITAGLIPTIN 50 MG/1
TABLET, FILM COATED ORAL
Qty: 90 TABLET | Refills: 3 | Status: SHIPPED | OUTPATIENT
Start: 2023-03-23

## 2023-03-23 RX ORDER — MORPHINE SULFATE 15 MG/1
15 TABLET, FILM COATED, EXTENDED RELEASE ORAL 2 TIMES DAILY
Qty: 60 TABLET | Refills: 0 | Status: SHIPPED | OUTPATIENT
Start: 2023-03-27 | End: 2023-04-26

## 2023-03-23 RX ORDER — OXYCODONE HYDROCHLORIDE AND ACETAMINOPHEN 5; 325 MG/1; MG/1
1 TABLET ORAL 2 TIMES DAILY PRN
Qty: 60 TABLET | Refills: 0 | Status: SHIPPED | OUTPATIENT
Start: 2023-03-29 | End: 2023-04-28

## 2023-03-23 ASSESSMENT — ENCOUNTER SYMPTOMS
COUGH: 0
BOWEL INCONTINENCE: 0
SHORTNESS OF BREATH: 0
CONSTIPATION: 0
BACK PAIN: 1

## 2023-03-23 NOTE — TELEPHONE ENCOUNTER
Please Approve or Refuse.   Send to Pharmacy per Pt's Request:      Next Visit Date:  Visit date not found   Last Visit Date: 1/27/2023    Hemoglobin A1C (%)   Date Value   02/15/2023 8.5 (H)   08/19/2022 6.6 (H)   03/12/2022 5.2             ( goal A1C is < 7)   BP Readings from Last 3 Encounters:   03/16/23 123/62   02/24/23 138/76   02/24/23 122/75          (goal 120/80)  BUN   Date Value Ref Range Status   03/16/2023 16 8 - 23 mg/dL Final     Creatinine   Date Value Ref Range Status   03/16/2023 0.71 0.70 - 1.20 mg/dL Final     Potassium   Date Value Ref Range Status   03/16/2023 4.0 3.7 - 5.3 mmol/L Final

## 2023-03-23 NOTE — PROGRESS NOTES
SURGERY Right     UMBILICAL HERNIA REPAIR  3022-19    UPPER GASTROINTESTINAL ENDOSCOPY  01/25/2015    UPPER GASTROINTESTINAL ENDOSCOPY N/A 10/08/2019    EGD BIOPSY performed by Beto Overton MD at [address] [place] [address] N/A 06/07/2022    EGD BIOPSY OF DUODENUM, GE JUNCTION AND GASTRIC ANTRUM performed by Beto Overton MD at [address] [place] [address]  [Aspirin]       iron deficiency anemia , requiring iron infusions and transfusions    Iron      Pill- constipation, abdominal pain    Nsaids       iron deficiency anemia, history of bleeding ulcers          Current Outpatient Medications:     pregabalin (LYRICA) 150 MG capsule, Take 1 capsule by mouth in the morning, at noon, and at bedtime for 30 days. Max Daily Amount: 450 mg, Disp: 90 capsule, Rfl: 0    dicyclomine (BENTYL) 20 MG tablet, Take 1 tablet by mouth every 6 hours as needed (Abdominal pain), Disp: 30 tablet, Rfl: 0    ondansetron (ZOFRAN-ODT) 4 MG disintegrating tablet, Take 1 tablet by mouth 3 times daily as needed for Nausea or Vomiting, Disp: 21 tablet, Rfl: 0    oxyCODONE-acetaminophen (PERCOCET) 5-325 MG per tablet, Take 1 tablet by mouth 2 times daily as needed for Pain for up to 30 days. , Disp: 60 tablet, Rfl: 0    morphine (MS CONTIN) 15 MG extended release tablet, Take 1 tablet by mouth 2 times daily for 30 days. , Disp: 60 tablet, Rfl: 0    lisinopril (PRINIVIL;ZESTRIL) 10 MG tablet, TAKE ONE TABLET BY MOUTH DAILY, STOP LISINOPRIL-HYDROCHLOROTHIAZIDE, Disp: 90 tablet, Rfl: 3    fluticasone (FLONASE) 50 MCG/ACT nasal spray, 2 sprays by Each Nostril route daily, Disp: 11.1 g, Rfl: 3    dapagliflozin (FARXIGA) 10 MG tablet, Take 1 tablet by mouth every morning For diabetes, Disp: 90 tablet, Rfl: 3    metFORMIN (GLUCOPHAGE-XR) 500 MG extended release tablet, Take 1 tablet by mouth in the morning and at bedtime, Disp: 180 tablet, Rfl: 1    baclofen (LIORESAL) 10 MG tablet, Take 1 tablet by mouth 3

## 2023-03-24 ENCOUNTER — OFFICE VISIT (OUTPATIENT)
Dept: GASTROENTEROLOGY | Age: 73
End: 2023-03-24
Payer: MEDICARE

## 2023-03-24 ENCOUNTER — HOSPITAL ENCOUNTER (OUTPATIENT)
Dept: INFUSION THERAPY | Age: 73
Discharge: HOME | End: 2023-03-24
Payer: MEDICARE

## 2023-03-24 VITALS
RESPIRATION RATE: 18 BRPM | SYSTOLIC BLOOD PRESSURE: 134 MMHG | HEART RATE: 79 BPM | DIASTOLIC BLOOD PRESSURE: 74 MMHG | TEMPERATURE: 98.1 F

## 2023-03-24 VITALS
BODY MASS INDEX: 31.31 KG/M2 | WEIGHT: 212 LBS | TEMPERATURE: 97.9 F | SYSTOLIC BLOOD PRESSURE: 149 MMHG | DIASTOLIC BLOOD PRESSURE: 82 MMHG

## 2023-03-24 DIAGNOSIS — D36.9 ADENOMATOUS POLYP: Primary | ICD-10-CM

## 2023-03-24 DIAGNOSIS — R93.3 ABNORMAL FINDING ON GI TRACT IMAGING: ICD-10-CM

## 2023-03-24 DIAGNOSIS — K21.9 GASTROESOPHAGEAL REFLUX DISEASE, UNSPECIFIED WHETHER ESOPHAGITIS PRESENT: ICD-10-CM

## 2023-03-24 DIAGNOSIS — D50.8 OTHER IRON DEFICIENCY ANEMIA: Primary | ICD-10-CM

## 2023-03-24 PROCEDURE — G8427 DOCREV CUR MEDS BY ELIG CLIN: HCPCS | Performed by: INTERNAL MEDICINE

## 2023-03-24 PROCEDURE — 6360000002 HC RX W HCPCS: Performed by: INTERNAL MEDICINE

## 2023-03-24 PROCEDURE — 3075F SYST BP GE 130 - 139MM HG: CPT | Performed by: INTERNAL MEDICINE

## 2023-03-24 PROCEDURE — 96365 THER/PROPH/DIAG IV INF INIT: CPT

## 2023-03-24 PROCEDURE — 3078F DIAST BP <80 MM HG: CPT | Performed by: INTERNAL MEDICINE

## 2023-03-24 PROCEDURE — G8484 FLU IMMUNIZE NO ADMIN: HCPCS | Performed by: INTERNAL MEDICINE

## 2023-03-24 PROCEDURE — 2580000003 HC RX 258: Performed by: INTERNAL MEDICINE

## 2023-03-24 PROCEDURE — 3017F COLORECTAL CA SCREEN DOC REV: CPT | Performed by: INTERNAL MEDICINE

## 2023-03-24 PROCEDURE — 99214 OFFICE O/P EST MOD 30 MIN: CPT | Performed by: INTERNAL MEDICINE

## 2023-03-24 PROCEDURE — 1036F TOBACCO NON-USER: CPT | Performed by: INTERNAL MEDICINE

## 2023-03-24 PROCEDURE — 1123F ACP DISCUSS/DSCN MKR DOCD: CPT | Performed by: INTERNAL MEDICINE

## 2023-03-24 PROCEDURE — G8417 CALC BMI ABV UP PARAM F/U: HCPCS | Performed by: INTERNAL MEDICINE

## 2023-03-24 RX ORDER — SODIUM CHLORIDE 9 MG/ML
5-250 INJECTION, SOLUTION INTRAVENOUS PRN
Status: DISCONTINUED | OUTPATIENT
Start: 2023-03-24 | End: 2023-03-25 | Stop reason: HOSPADM

## 2023-03-24 RX ORDER — SODIUM CHLORIDE 9 MG/ML
5-250 INJECTION, SOLUTION INTRAVENOUS PRN
Status: CANCELLED | OUTPATIENT
Start: 2023-03-31

## 2023-03-24 RX ORDER — SODIUM CHLORIDE 9 MG/ML
INJECTION, SOLUTION INTRAVENOUS CONTINUOUS
Status: CANCELLED | OUTPATIENT
Start: 2023-03-31

## 2023-03-24 RX ORDER — EPINEPHRINE 1 MG/ML
0.3 INJECTION, SOLUTION, CONCENTRATE INTRAVENOUS PRN
Status: CANCELLED | OUTPATIENT
Start: 2023-03-31

## 2023-03-24 RX ORDER — ALBUTEROL SULFATE 90 UG/1
4 AEROSOL, METERED RESPIRATORY (INHALATION) PRN
Status: CANCELLED | OUTPATIENT
Start: 2023-03-31

## 2023-03-24 RX ORDER — DIPHENHYDRAMINE HYDROCHLORIDE 50 MG/ML
50 INJECTION INTRAMUSCULAR; INTRAVENOUS
Status: CANCELLED | OUTPATIENT
Start: 2023-03-31

## 2023-03-24 RX ORDER — ONDANSETRON 2 MG/ML
8 INJECTION INTRAMUSCULAR; INTRAVENOUS
Status: CANCELLED | OUTPATIENT
Start: 2023-03-31

## 2023-03-24 RX ORDER — SODIUM CHLORIDE 0.9 % (FLUSH) 0.9 %
5-40 SYRINGE (ML) INJECTION PRN
Status: CANCELLED | OUTPATIENT
Start: 2023-03-31

## 2023-03-24 RX ORDER — HEPARIN SODIUM (PORCINE) LOCK FLUSH IV SOLN 100 UNIT/ML 100 UNIT/ML
500 SOLUTION INTRAVENOUS PRN
Status: CANCELLED | OUTPATIENT
Start: 2023-03-31

## 2023-03-24 RX ORDER — ACETAMINOPHEN 325 MG/1
650 TABLET ORAL
Status: CANCELLED | OUTPATIENT
Start: 2023-03-31

## 2023-03-24 RX ORDER — FAMOTIDINE 10 MG/ML
20 INJECTION, SOLUTION INTRAVENOUS
Status: CANCELLED | OUTPATIENT
Start: 2023-03-31

## 2023-03-24 RX ADMIN — SODIUM CHLORIDE 150 ML/HR: 9 INJECTION, SOLUTION INTRAVENOUS at 08:11

## 2023-03-24 RX ADMIN — FERRIC CARBOXYMALTOSE INJECTION 750 MG: 50 INJECTION, SOLUTION INTRAVENOUS at 08:15

## 2023-03-24 ASSESSMENT — ENCOUNTER SYMPTOMS
TROUBLE SWALLOWING: 0
SHORTNESS OF BREATH: 0
ABDOMINAL PAIN: 0
CONSTIPATION: 0
SORE THROAT: 0
WHEEZING: 0
VOMITING: 0
ABDOMINAL DISTENTION: 0
CHOKING: 0
ANAL BLEEDING: 0
COUGH: 0
BLOOD IN STOOL: 0
COLOR CHANGE: 0
RECTAL PAIN: 0
DIARRHEA: 1
NAUSEA: 0

## 2023-03-24 NOTE — PROGRESS NOTES
about regular exercise. Pt has verbalized understanding and agreement to these modifications. Pt was given advices and instructions about weight loss. He was advised about the significance of exercise at least three times a week . Dietary advices were also given about the avoidance of fast food, fried food and lots of spices and grease. nstructions were given about using ample amount of fiber including dietary and supplemental fiber either metamucil, bennafiber or citrucell etc.  Pt was advised about drinking ample amount of water without any colors or chemicals. Stress was given about regular exercise. Pt was told to stay way from soda pops. Pt has verbalized understanding and agrrement    The patient was instructed to start taking some OTC Probiotics products   These are available over the counter at the Pharmacy stores and Grocery stores  He was explained about the beneficial effects they have in the GI track  They will help to establish the good bacterial kimberli and will help with the digestion and bowel movements  The patient has verbalized understanding and agreement to this plan  More than half of patient's clinic visit time was spent in counseling about lifestyle and dietary modifications  Patient's  questions were answered in this regard as well  The patient has verbalized understanding and agreement       Thank you for allowing me to participate in the care of Mr. Verma Sandhoff. For any further questions please do not hesitate to contact me. I have reviewed and agree with the ROS entered by the MA/Nurse. This note is created with the assistance of the speech recognition program.  While intending to generate a document that actually reflects the content of the visit, document can still have some errors including those of syntax and sound like substitutions which may escape proof reading. Actual meaning can be extrapolated by contextual diversion.      Katie Croft MD, [facility]  Board Certified

## 2023-03-29 ENCOUNTER — PATIENT MESSAGE (OUTPATIENT)
Dept: GASTROENTEROLOGY | Age: 73
End: 2023-03-29

## 2023-03-29 DIAGNOSIS — K21.9 GASTROESOPHAGEAL REFLUX DISEASE WITHOUT ESOPHAGITIS: ICD-10-CM

## 2023-03-29 RX ORDER — PANTOPRAZOLE SODIUM 40 MG/1
TABLET, DELAYED RELEASE ORAL
Qty: 90 TABLET | Refills: 1 | Status: SHIPPED | OUTPATIENT
Start: 2023-03-29

## 2023-03-30 RX ORDER — PANTOPRAZOLE SODIUM 40 MG/1
40 TABLET, DELAYED RELEASE ORAL DAILY
Qty: 90 TABLET | Refills: 1 | OUTPATIENT
Start: 2023-03-30

## 2023-03-31 ENCOUNTER — HOSPITAL ENCOUNTER (OUTPATIENT)
Dept: INFUSION THERAPY | Age: 73
Discharge: HOME | End: 2023-03-31
Payer: MEDICARE

## 2023-03-31 VITALS
SYSTOLIC BLOOD PRESSURE: 104 MMHG | RESPIRATION RATE: 16 BRPM | HEART RATE: 80 BPM | TEMPERATURE: 97.9 F | DIASTOLIC BLOOD PRESSURE: 67 MMHG

## 2023-03-31 DIAGNOSIS — D50.8 OTHER IRON DEFICIENCY ANEMIA: Primary | ICD-10-CM

## 2023-03-31 PROCEDURE — 6360000002 HC RX W HCPCS: Performed by: INTERNAL MEDICINE

## 2023-03-31 PROCEDURE — 2580000003 HC RX 258: Performed by: INTERNAL MEDICINE

## 2023-03-31 PROCEDURE — 96365 THER/PROPH/DIAG IV INF INIT: CPT

## 2023-03-31 RX ORDER — SODIUM CHLORIDE 0.9 % (FLUSH) 0.9 %
5-40 SYRINGE (ML) INJECTION PRN
OUTPATIENT
Start: 2023-03-31

## 2023-03-31 RX ORDER — SODIUM CHLORIDE 9 MG/ML
INJECTION, SOLUTION INTRAVENOUS CONTINUOUS
OUTPATIENT
Start: 2023-03-31

## 2023-03-31 RX ORDER — ONDANSETRON 2 MG/ML
8 INJECTION INTRAMUSCULAR; INTRAVENOUS
OUTPATIENT
Start: 2023-03-31

## 2023-03-31 RX ORDER — EPINEPHRINE 1 MG/ML
0.3 INJECTION, SOLUTION, CONCENTRATE INTRAVENOUS PRN
OUTPATIENT
Start: 2023-03-31

## 2023-03-31 RX ORDER — SODIUM CHLORIDE 0.9 % (FLUSH) 0.9 %
5-40 SYRINGE (ML) INJECTION PRN
Status: DISCONTINUED | OUTPATIENT
Start: 2023-03-31 | End: 2023-04-01 | Stop reason: HOSPADM

## 2023-03-31 RX ORDER — DIPHENHYDRAMINE HYDROCHLORIDE 50 MG/ML
50 INJECTION INTRAMUSCULAR; INTRAVENOUS
OUTPATIENT
Start: 2023-03-31

## 2023-03-31 RX ORDER — SODIUM CHLORIDE 9 MG/ML
5-250 INJECTION, SOLUTION INTRAVENOUS PRN
Status: DISCONTINUED | OUTPATIENT
Start: 2023-03-31 | End: 2023-04-01 | Stop reason: HOSPADM

## 2023-03-31 RX ORDER — SODIUM CHLORIDE 9 MG/ML
5-250 INJECTION, SOLUTION INTRAVENOUS PRN
OUTPATIENT
Start: 2023-03-31

## 2023-03-31 RX ORDER — ACETAMINOPHEN 325 MG/1
650 TABLET ORAL
OUTPATIENT
Start: 2023-03-31

## 2023-03-31 RX ORDER — ALBUTEROL SULFATE 90 UG/1
4 AEROSOL, METERED RESPIRATORY (INHALATION) PRN
OUTPATIENT
Start: 2023-03-31

## 2023-03-31 RX ORDER — FAMOTIDINE 10 MG/ML
20 INJECTION, SOLUTION INTRAVENOUS
OUTPATIENT
Start: 2023-03-31

## 2023-03-31 RX ORDER — HEPARIN SODIUM (PORCINE) LOCK FLUSH IV SOLN 100 UNIT/ML 100 UNIT/ML
500 SOLUTION INTRAVENOUS PRN
OUTPATIENT
Start: 2023-03-31

## 2023-03-31 RX ORDER — DICYCLOMINE HCL 20 MG
20 TABLET ORAL EVERY 6 HOURS PRN
Qty: 30 TABLET | Refills: 0 | Status: CANCELLED | OUTPATIENT
Start: 2023-03-31

## 2023-03-31 RX ORDER — SODIUM CHLORIDE 9 MG/ML
5-250 INJECTION, SOLUTION INTRAVENOUS PRN
Status: CANCELLED | OUTPATIENT
Start: 2023-03-31

## 2023-03-31 RX ADMIN — SODIUM CHLORIDE, PRESERVATIVE FREE 10 ML: 5 INJECTION INTRAVENOUS at 09:47

## 2023-03-31 RX ADMIN — SODIUM CHLORIDE 100 ML/HR: 9 INJECTION, SOLUTION INTRAVENOUS at 09:47

## 2023-03-31 RX ADMIN — FERRIC CARBOXYMALTOSE INJECTION 750 MG: 50 INJECTION, SOLUTION INTRAVENOUS at 10:08

## 2023-03-31 NOTE — PROGRESS NOTES
Patient arrived for 2 of 2 injectafer. Tolerated w/o incident and left in stable condition.  Returns 4/26 for

## 2023-03-31 NOTE — TELEPHONE ENCOUNTER
Writer called medication into [address] [place] [address] with no refills. He will need a follow up appointment in the office.

## 2023-04-04 ENCOUNTER — TELEPHONE (OUTPATIENT)
Dept: UROLOGY | Age: 73
End: 2023-04-04

## 2023-04-04 NOTE — TELEPHONE ENCOUNTER
Procedure time change for 4/14/23   The procedure time has been moved to 9:45am, 7:45am arrival.   Left voicemail for patient with need procedure time and arrival.

## 2023-04-14 ENCOUNTER — HOSPITAL ENCOUNTER (OUTPATIENT)
Age: 73
Setting detail: OUTPATIENT SURGERY
Discharge: HOME | End: 2023-04-14
Attending: UROLOGY | Admitting: UROLOGY
Payer: MEDICARE

## 2023-04-14 VITALS
TEMPERATURE: 97.5 F | RESPIRATION RATE: 15 BRPM | SYSTOLIC BLOOD PRESSURE: 113 MMHG | OXYGEN SATURATION: 92 % | HEART RATE: 74 BPM | DIASTOLIC BLOOD PRESSURE: 64 MMHG

## 2023-04-14 DIAGNOSIS — G89.18 POST-OP PAIN: Primary | ICD-10-CM

## 2023-04-14 DIAGNOSIS — C67.9 MALIGNANT NEOPLASM OF URINARY BLADDER, UNSPECIFIED SITE (HCC): ICD-10-CM

## 2023-04-14 LAB — GLUCOSE BLD-MCNC: 146 MG/DL (ref 75–110)

## 2023-04-14 PROCEDURE — 2580000003 HC RX 258: Performed by: UROLOGY

## 2023-04-14 PROCEDURE — 3700000001 HC ADD 15 MINUTES (ANESTHESIA): Performed by: UROLOGY

## 2023-04-14 PROCEDURE — 2720000010 HC SURG SUPPLY STERILE: Performed by: UROLOGY

## 2023-04-14 PROCEDURE — 3600000004 HC SURGERY LEVEL 4 BASE: Performed by: UROLOGY

## 2023-04-14 PROCEDURE — 7100000001 HC PACU RECOVERY - ADDTL 15 MIN: Performed by: UROLOGY

## 2023-04-14 PROCEDURE — 3700000000 HC ANESTHESIA ATTENDED CARE: Performed by: UROLOGY

## 2023-04-14 PROCEDURE — 7100000011 HC PHASE II RECOVERY - ADDTL 15 MIN: Performed by: UROLOGY

## 2023-04-14 PROCEDURE — 7100000010 HC PHASE II RECOVERY - FIRST 15 MIN: Performed by: UROLOGY

## 2023-04-14 PROCEDURE — 82947 ASSAY GLUCOSE BLOOD QUANT: CPT

## 2023-04-14 PROCEDURE — 3600000014 HC SURGERY LEVEL 4 ADDTL 15MIN: Performed by: UROLOGY

## 2023-04-14 PROCEDURE — 7100000000 HC PACU RECOVERY - FIRST 15 MIN: Performed by: UROLOGY

## 2023-04-14 PROCEDURE — 2709999900 HC NON-CHARGEABLE SUPPLY: Performed by: UROLOGY

## 2023-04-14 PROCEDURE — 88305 TISSUE EXAM BY PATHOLOGIST: CPT

## 2023-04-14 RX ORDER — SODIUM CHLORIDE 0.9 % (FLUSH) 0.9 %
5-40 SYRINGE (ML) INJECTION PRN
Status: DISCONTINUED | OUTPATIENT
Start: 2023-04-14 | End: 2023-04-14 | Stop reason: HOSPADM

## 2023-04-14 RX ORDER — SODIUM CHLORIDE, SODIUM LACTATE, POTASSIUM CHLORIDE, CALCIUM CHLORIDE 600; 310; 30; 20 MG/100ML; MG/100ML; MG/100ML; MG/100ML
1000 INJECTION, SOLUTION INTRAVENOUS CONTINUOUS
Status: DISCONTINUED | OUTPATIENT
Start: 2023-04-14 | End: 2023-04-14 | Stop reason: HOSPADM

## 2023-04-14 RX ORDER — MEPERIDINE HYDROCHLORIDE 50 MG/ML
12.5 INJECTION INTRAMUSCULAR; INTRAVENOUS; SUBCUTANEOUS EVERY 5 MIN PRN
Status: DISCONTINUED | OUTPATIENT
Start: 2023-04-14 | End: 2023-04-14 | Stop reason: HOSPADM

## 2023-04-14 RX ORDER — HYDRALAZINE HYDROCHLORIDE 20 MG/ML
10 INJECTION INTRAMUSCULAR; INTRAVENOUS
Status: DISCONTINUED | OUTPATIENT
Start: 2023-04-14 | End: 2023-04-14 | Stop reason: HOSPADM

## 2023-04-14 RX ORDER — DROPERIDOL 2.5 MG/ML
0.62 INJECTION, SOLUTION INTRAMUSCULAR; INTRAVENOUS
Status: DISCONTINUED | OUTPATIENT
Start: 2023-04-14 | End: 2023-04-14 | Stop reason: HOSPADM

## 2023-04-14 RX ORDER — SODIUM CHLORIDE 0.9 % (FLUSH) 0.9 %
5-40 SYRINGE (ML) INJECTION EVERY 12 HOURS SCHEDULED
Status: DISCONTINUED | OUTPATIENT
Start: 2023-04-14 | End: 2023-04-14 | Stop reason: HOSPADM

## 2023-04-14 RX ORDER — SODIUM CHLORIDE 9 MG/ML
25 INJECTION, SOLUTION INTRAVENOUS PRN
Status: DISCONTINUED | OUTPATIENT
Start: 2023-04-14 | End: 2023-04-14 | Stop reason: HOSPADM

## 2023-04-14 RX ORDER — METOCLOPRAMIDE HYDROCHLORIDE 5 MG/ML
10 INJECTION INTRAMUSCULAR; INTRAVENOUS
Status: DISCONTINUED | OUTPATIENT
Start: 2023-04-14 | End: 2023-04-14 | Stop reason: HOSPADM

## 2023-04-14 RX ORDER — DIPHENHYDRAMINE HYDROCHLORIDE 50 MG/ML
12.5 INJECTION INTRAMUSCULAR; INTRAVENOUS
Status: DISCONTINUED | OUTPATIENT
Start: 2023-04-14 | End: 2023-04-14 | Stop reason: HOSPADM

## 2023-04-14 RX ORDER — SULFAMETHOXAZOLE AND TRIMETHOPRIM 800; 160 MG/1; MG/1
1 TABLET ORAL 2 TIMES DAILY
Qty: 6 TABLET | Refills: 0 | Status: SHIPPED | OUTPATIENT
Start: 2023-04-14 | End: 2023-04-17

## 2023-04-14 RX ORDER — MAGNESIUM HYDROXIDE 1200 MG/15ML
LIQUID ORAL PRN
Status: DISCONTINUED | OUTPATIENT
Start: 2023-04-14 | End: 2023-04-14 | Stop reason: HOSPADM

## 2023-04-14 ASSESSMENT — PAIN - FUNCTIONAL ASSESSMENT: PAIN_FUNCTIONAL_ASSESSMENT: 0-10

## 2023-04-14 NOTE — OP NOTE
Operative Note      Patient: Gwen Gambino  YOB: 1950  MRN: [id number]    Date of Procedure: 4/14/2023    Pre-Op Diagnosis Codes:     * Malignant neoplasm of urinary bladder, unspecified site ([facility] [C67.9]    Post-Op Diagnosis: Same       Procedure:  Cystoscopy, bladder biopsy with fulguration    Surgeon(s):  Kourtney Kline MD    Assistant:   Resident: Mathieu Castellanos MD    Anesthesia: General    Estimated Blood Loss (mL): Minimal    Complications: None    Specimens:   ID Type Source Tests Collected by Time Destination   A : BLADDER BIOPSY Tissue Bladder SURGICAL PATHOLOGY Kourtney Kline MD 4/14/2023 4518        Implants:  * No implants in log *      Drains: * No LDAs found *    Findings:   Small frondular papillary lesions at posterior bladder wall sampled with cold cup biopsy with hemostasis      Indication for procedure:  Patient is a 62-year-old male with history of 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 He presents today for the aforementioned procedure. H&P was reviewed, informed consent was obtained, patient understood risk, benefits, alternatives of the procedure and wished to proceed. Operative detail:  Patient was brought to the operative suite and placed on continuous pulse symmetry and cardiac monitor by anesthesia. IV antibiotics including 2 g of Ancef were administered. He was then placed in the dorsolithotomy position and prepped and draped in normal sterile fashion. Timeout was performed confirming patient, procedure, side, all in the room agreed. A well-lubricated 25 [place] cystoscopic sheath with a 30 degree lens was inserted into urethral meatus and advanced into the bladder. Upon entering the bladder we did a pan cystoscopy that revealed frondular papillary lesions at the posterior bladder wall.   We then obtained a cold cup biopsy and sample of these lesions making sure to get a large enough sample for pathologic

## 2023-04-14 NOTE — DISCHARGE INSTRUCTIONS
TURP/TURBT Discharge Instructions:  1. No heavy lifting greater than 15 pounds, straining or strenuous activity for the next 2 weeks. 2. OK to resume blood thinners (Aspirin/Plavix) on 1 WEEK. Do not resume before this time and do not resume if urine very bloody/passing clots. 3. OK to have regular diet. Drink plenty of fluids. 4. Take prescriptions as instructed including antibiotics. 5. Call your doctor or present to ER for the following:   Chills   Temperature greater than 101   Pain that is not tolerable despite taking pain medicine as ordered   There is increased swelling, redness or warmth at surgical site   There is increased drainage or bleeding from surgical site   No urine draining from patel or unable to urinate for 6-8 hours. 6. Follow-up with *** in the office in 1-2 weeks if you leave with a catheter and 3-4 weeks if no catheter. Call office to confirm appointment.

## 2023-04-14 NOTE — H&P
History and Physical    Patient:  Gwen Gambino  MRN: [id number]    CHIEF COMPLAINT:  bladder cancer    History Obtained From:  patient  PCP: Estelita Brown MD    HISTORY OF PRESENT ILLNESS:   The patient is a 81 y.o. male who presents with above    Past Medical History:        Diagnosis Date    AAA (abdominal aortic aneurysm) ([facility]     \"stable\" 3 cm on CT 2021, \"recommend f/u every 3 years\"    Arthritis     osteoarthritis    Bladder cancer ([facility] 03/2021    bladder    Bleeding ulcer 2015    Chronic neck pain     Colon polyps 10/08/2019    tubular adenoma x2    COVID-19 03/06/2023    ill x 1 day per wife    COVID-19 virus infection 01/10/2022    Slight cough.     Diabetic neuropathy (HCC)     Diarrhea     Enteritis 03/16/2023    pain x 1 day, resolved after meds in ER and started on bentyl    Essential hypertension 05/12/2020    managed by Dr. Khurram Titus PCP    Fatty liver 02/15/2023    GERD (gastroesophageal reflux disease)     Hepatitis B core antibody positive     History of bleeding peptic ulcer     History of blood transfusion     no reactions    History of hepatitis C     completed treatment for this    History of migraine headaches     History of stress test 07/2020    \"low risk\"    Hyperlipidemia     IBS (irritable bowel syndrome)     diarrhea    Iron (Fe) deficiency anemia     getting iron infusions    Lung nodule     stable per CT 10/2022    Neuropathy     Poor historian     states his wife takes care of all medical information    Positive FIT (fecal immunochemical test)     Snores     Type 2 diabetes mellitus with microalbuminuria, without long-term current use of insulin ([facility] 07/13/2020    managed by Dr. Madisyn Benavides    Under care of service provider     pcp-Dr Jarred Kirkpatrick virtual visit jan 2023    Under care of service provider     hematology-Dr Caprice Saavedra 34 seen 2/24/2023    Under care of service provider     urology-Dr fairchild-last visit feb 2023    Under care of service provider

## 2023-04-17 DIAGNOSIS — M47.816 OSTEOARTHRITIS OF LUMBAR SPINE, UNSPECIFIED SPINAL OSTEOARTHRITIS COMPLICATION STATUS: ICD-10-CM

## 2023-04-17 DIAGNOSIS — M54.16 LUMBAR RADICULOPATHY: Chronic | ICD-10-CM

## 2023-04-17 DIAGNOSIS — M47.26 OSTEOARTHRITIS OF SPINE WITH RADICULOPATHY, LUMBAR REGION: ICD-10-CM

## 2023-04-17 DIAGNOSIS — M51.36 DEGENERATIVE DISC DISEASE, LUMBAR: Chronic | ICD-10-CM

## 2023-04-17 DIAGNOSIS — G89.29 ENCOUNTER FOR CHRONIC PAIN MANAGEMENT: ICD-10-CM

## 2023-04-17 DIAGNOSIS — M48.061 SPINAL STENOSIS OF LUMBAR REGION WITHOUT NEUROGENIC CLAUDICATION: Chronic | ICD-10-CM

## 2023-04-17 LAB — SURGICAL PATHOLOGY REPORT: NORMAL

## 2023-04-17 RX ORDER — PREGABALIN 150 MG/1
150 CAPSULE ORAL 3 TIMES DAILY
Qty: 90 CAPSULE | Refills: 0 | Status: SHIPPED | OUTPATIENT
Start: 2023-04-17 | End: 2023-05-17

## 2023-04-17 NOTE — RESULT ENCOUNTER NOTE
Management per urology, has known bladder cancer, doing surveillance cystoscopies,  has appointment with urology    -NONINVASIVE HIGH-GRADE PAPILLARY UROTHELIAL CARCINOMA. -MUSCULARIS PROPRIA IS NOT PRESENT FOR EVALUATION.      Future Appointments  4/22/2023  8:45 AM    [facility] MRI              [facility] MRI            [facility] Radiolog  4/24/2023  10:00 AM   Khurram Au,          ST[facility] PAINMGT        [facility]  4/24/2023  11:50 AM   Sandrine Morgan MD         [facility] URO           [facility]  4/26/2023  9:45 AM    Sanam Suarez MD          [address]  7/6/2023   1:00 PM    Stefan Whitney MD          [facility] GI        TOLPP  9/27/2023  11:00 AM   Bijan Ramirez MD     [facility]

## 2023-04-22 ENCOUNTER — HOSPITAL ENCOUNTER (OUTPATIENT)
Dept: MRI IMAGING | Age: 73
End: 2023-04-22
Payer: MEDICARE

## 2023-04-22 VITALS — WEIGHT: 212 LBS | BODY MASS INDEX: 31.31 KG/M2

## 2023-04-22 DIAGNOSIS — K21.9 GASTROESOPHAGEAL REFLUX DISEASE, UNSPECIFIED WHETHER ESOPHAGITIS PRESENT: ICD-10-CM

## 2023-04-22 DIAGNOSIS — D36.9 ADENOMATOUS POLYP: ICD-10-CM

## 2023-04-22 PROCEDURE — 76377 3D RENDER W/INTRP POSTPROCES: CPT

## 2023-04-22 PROCEDURE — A9579 GAD-BASE MR CONTRAST NOS,1ML: HCPCS | Performed by: INTERNAL MEDICINE

## 2023-04-22 PROCEDURE — 6360000004 HC RX CONTRAST MEDICATION: Performed by: INTERNAL MEDICINE

## 2023-04-22 RX ADMIN — GADOTERIDOL 9.62 MMOL: 279.3 INJECTION, SOLUTION INTRAVENOUS at 09:08

## 2023-04-24 ENCOUNTER — OFFICE VISIT (OUTPATIENT)
Dept: UROLOGY | Age: 73
End: 2023-04-24
Payer: MEDICARE

## 2023-04-24 ENCOUNTER — HOSPITAL ENCOUNTER (OUTPATIENT)
Dept: PAIN MANAGEMENT | Age: 73
Discharge: HOME | End: 2023-04-24
Payer: MEDICARE

## 2023-04-24 VITALS
BODY MASS INDEX: 32.13 KG/M2 | TEMPERATURE: 97.5 F | HEIGHT: 68 IN | DIASTOLIC BLOOD PRESSURE: 71 MMHG | WEIGHT: 212 LBS | SYSTOLIC BLOOD PRESSURE: 119 MMHG | HEART RATE: 78 BPM

## 2023-04-24 VITALS — BODY MASS INDEX: 31.52 KG/M2 | WEIGHT: 208 LBS | TEMPERATURE: 97.3 F | HEIGHT: 68 IN

## 2023-04-24 DIAGNOSIS — E55.9 VITAMIN D DEFICIENCY: ICD-10-CM

## 2023-04-24 DIAGNOSIS — Z79.891 CHRONIC USE OF OPIATE FOR THERAPEUTIC PURPOSE: Primary | ICD-10-CM

## 2023-04-24 DIAGNOSIS — M54.16 LUMBAR RADICULOPATHY: Chronic | ICD-10-CM

## 2023-04-24 DIAGNOSIS — M47.816 LUMBAR SPONDYLOSIS: Chronic | ICD-10-CM

## 2023-04-24 DIAGNOSIS — M48.061 SPINAL STENOSIS OF LUMBAR REGION, UNSPECIFIED WHETHER NEUROGENIC CLAUDICATION PRESENT: Chronic | ICD-10-CM

## 2023-04-24 DIAGNOSIS — C67.2 MALIGNANT NEOPLASM OF LATERAL WALL OF URINARY BLADDER (HCC): Primary | ICD-10-CM

## 2023-04-24 DIAGNOSIS — M51.36 DEGENERATIVE DISC DISEASE, LUMBAR: Chronic | ICD-10-CM

## 2023-04-24 PROCEDURE — G8417 CALC BMI ABV UP PARAM F/U: HCPCS | Performed by: UROLOGY

## 2023-04-24 PROCEDURE — 3074F SYST BP LT 130 MM HG: CPT | Performed by: UROLOGY

## 2023-04-24 PROCEDURE — G8427 DOCREV CUR MEDS BY ELIG CLIN: HCPCS | Performed by: UROLOGY

## 2023-04-24 PROCEDURE — 1036F TOBACCO NON-USER: CPT | Performed by: UROLOGY

## 2023-04-24 PROCEDURE — 99214 OFFICE O/P EST MOD 30 MIN: CPT | Performed by: UROLOGY

## 2023-04-24 PROCEDURE — 99213 OFFICE O/P EST LOW 20 MIN: CPT

## 2023-04-24 PROCEDURE — 3017F COLORECTAL CA SCREEN DOC REV: CPT | Performed by: UROLOGY

## 2023-04-24 PROCEDURE — 1123F ACP DISCUSS/DSCN MKR DOCD: CPT | Performed by: UROLOGY

## 2023-04-24 PROCEDURE — 99214 OFFICE O/P EST MOD 30 MIN: CPT | Performed by: STUDENT IN AN ORGANIZED HEALTH CARE EDUCATION/TRAINING PROGRAM

## 2023-04-24 PROCEDURE — 3078F DIAST BP <80 MM HG: CPT | Performed by: UROLOGY

## 2023-04-24 RX ORDER — OXYCODONE HYDROCHLORIDE AND ACETAMINOPHEN 5; 325 MG/1; MG/1
1 TABLET ORAL 2 TIMES DAILY PRN
Qty: 60 TABLET | Refills: 0 | Status: SHIPPED | OUTPATIENT
Start: 2023-04-29 | End: 2023-05-29

## 2023-04-24 RX ORDER — PREGABALIN 150 MG/1
150 CAPSULE ORAL 3 TIMES DAILY
Qty: 90 CAPSULE | Refills: 2 | Status: SHIPPED | OUTPATIENT
Start: 2023-04-24 | End: 2023-07-23

## 2023-04-24 RX ORDER — ERGOCALCIFEROL 1.25 MG/1
CAPSULE ORAL
Qty: 12 CAPSULE | Refills: 0 | Status: SHIPPED | OUTPATIENT
Start: 2023-04-24

## 2023-04-24 RX ORDER — MORPHINE SULFATE 15 MG/1
15 TABLET, FILM COATED, EXTENDED RELEASE ORAL 2 TIMES DAILY
Qty: 60 TABLET | Refills: 0 | Status: SHIPPED | OUTPATIENT
Start: 2023-04-26 | End: 2023-05-26

## 2023-04-24 ASSESSMENT — ENCOUNTER SYMPTOMS
ABDOMINAL PAIN: 0
BACK PAIN: 0
EYE REDNESS: 0
SHORTNESS OF BREATH: 0
CONSTIPATION: 0
COUGH: 0
VOMITING: 0
NAUSEA: 0
WHEEZING: 0
DIARRHEA: 0
EYE PAIN: 0

## 2023-04-24 ASSESSMENT — PAIN SCALES - GENERAL: PAINLEVEL_OUTOF10: 4

## 2023-04-24 NOTE — PROGRESS NOTES
Review of Systems   Constitutional:  Negative for chills, fatigue and fever. Eyes:  Negative for pain, redness and visual disturbance. Respiratory:  Negative for cough, shortness of breath and wheezing. Cardiovascular:  Negative for chest pain and leg swelling. Gastrointestinal:  Negative for abdominal pain, constipation, diarrhea, nausea and vomiting. Genitourinary:  Negative for difficulty urinating, dysuria, flank pain, frequency, hematuria, scrotal swelling, testicular pain and urgency. Musculoskeletal:  Negative for back pain, joint swelling and myalgias. Skin:  Negative for rash and wound. Neurological:  Negative for dizziness, tremors, weakness and numbness. Hematological:  Does not bruise/bleed easily.
resolved after meds in ER and started on bentyl    Essential hypertension 05/12/2020    managed by Dr. Hernandez Never PCP    Fatty liver 02/15/2023    GERD (gastroesophageal reflux disease)     Hepatitis B core antibody positive     History of bleeding peptic ulcer     History of blood transfusion     no reactions    History of hepatitis C     completed treatment for this    History of migraine headaches     History of stress test 07/2020    \"low risk\"    Hyperlipidemia     IBS (irritable bowel syndrome)     diarrhea    Iron (Fe) deficiency anemia     getting iron infusions    Lung nodule     stable per CT 10/2022    Neuropathy     Poor historian     states his wife takes care of all medical information    Positive FIT (fecal immunochemical test)     Snores     Type 2 diabetes mellitus with microalbuminuria, without long-term current use of insulin ([facility] 07/13/2020    managed by Dr. Josiane Carrion    Under care of service provider     pcp-Dr Boris Steiner virtual visit jan 2023    Under care of service provider     hematology-Dr Caprice Saavedra 32 seen 2/24/2023    Under care of service provider     urology-Dr fairchild-last visit feb 2023    Under care of service provider     GI - DR. BRITO - last seen 3/24/2023    Under care of service provider     PAIN MANAGEMENT - [facility] - last seen 3/23/2023    Under care of service provider     PODIATRY - DR. YE - last visit - 5/2022    Under care of team     Cardiology, [facility]- last visit 6/2022, office notes in physical pre-op folder    Wears dentures     Wears glasses      Past Surgical History:   Procedure Laterality Date    CERVICAL SPINE SURGERY  1977    cervical spine three times, has plate    CHOLECYSTECTOMY  03/22/2019    COLONOSCOPY  01/25/2015    10 yr recall, hemorrhoids    COLONOSCOPY N/A 10/08/2019    tubular adenoma x2    COLONOSCOPY N/A 06/07/2022    COLONOSCOPY DIAGNOSTIC performed by Negrito Oh MD at [address]

## 2023-04-24 NOTE — PROGRESS NOTES
capsule by mouth in the morning, at noon, and at bedtime for 90 days. Max Daily Amount: 450 mg     Dispense:  90 capsule     Refill:  2    oxyCODONE-acetaminophen (PERCOCET) 5-325 MG per tablet     Sig: Take 1 tablet by mouth 2 times daily as needed for Pain for up to 30 days. Max Daily Amount: 2 tablets     Dispense:  60 tablet     Refill:  0     Reduce doses taken as pain becomes manageable    morphine (MS CONTIN) 15 MG extended release tablet     Sig: Take 1 tablet by mouth 2 times daily for 30 days.      Dispense:  60 tablet     Refill:  0     Reduce doses taken as pain becomes manageable

## 2023-04-25 DIAGNOSIS — E11.42 TYPE 2 DIABETES MELLITUS WITH DIABETIC POLYNEUROPATHY, WITHOUT LONG-TERM CURRENT USE OF INSULIN (HCC): ICD-10-CM

## 2023-04-25 RX ORDER — BLOOD SUGAR DIAGNOSTIC
STRIP MISCELLANEOUS
Qty: 75 STRIP | Refills: 5 | Status: SHIPPED | OUTPATIENT
Start: 2023-04-25 | End: 2023-04-26

## 2023-04-25 NOTE — TELEPHONE ENCOUNTER
Please Approve or Refuse.   Send to Pharmacy per Pt's Request: daniel      Next Visit Date:  9/27/2023   Last Visit Date: 1/27/2023    Hemoglobin A1C (%)   Date Value   02/15/2023 8.5 (H)   08/19/2022 6.6 (H)   03/12/2022 5.2             ( goal A1C is < 7)   BP Readings from Last 3 Encounters:   04/24/23 119/71   04/14/23 113/64   03/31/23 104/67          (goal 120/80)  BUN   Date Value Ref Range Status   03/16/2023 16 8 - 23 mg/dL Final     Creatinine   Date Value Ref Range Status   03/16/2023 0.71 0.70 - 1.20 mg/dL Final     Potassium   Date Value Ref Range Status   03/16/2023 4.0 3.7 - 5.3 mmol/L Final

## 2023-04-26 ENCOUNTER — HOSPITAL ENCOUNTER (OUTPATIENT)
Age: 73
Discharge: HOME | End: 2023-04-26
Payer: MEDICARE

## 2023-04-26 ENCOUNTER — OFFICE VISIT (OUTPATIENT)
Dept: ONCOLOGY | Age: 73
End: 2023-04-26
Payer: MEDICARE

## 2023-04-26 ENCOUNTER — TELEPHONE (OUTPATIENT)
Dept: ONCOLOGY | Age: 73
End: 2023-04-26

## 2023-04-26 VITALS
BODY MASS INDEX: 31.81 KG/M2 | HEART RATE: 86 BPM | RESPIRATION RATE: 18 BRPM | SYSTOLIC BLOOD PRESSURE: 121 MMHG | WEIGHT: 209.19 LBS | TEMPERATURE: 97.8 F | DIASTOLIC BLOOD PRESSURE: 75 MMHG | OXYGEN SATURATION: 95 %

## 2023-04-26 DIAGNOSIS — E11.42 TYPE 2 DIABETES MELLITUS WITH DIABETIC POLYNEUROPATHY, WITHOUT LONG-TERM CURRENT USE OF INSULIN (HCC): ICD-10-CM

## 2023-04-26 DIAGNOSIS — D50.8 OTHER IRON DEFICIENCY ANEMIA: Primary | ICD-10-CM

## 2023-04-26 DIAGNOSIS — D50.0 IRON DEFICIENCY ANEMIA DUE TO CHRONIC BLOOD LOSS: ICD-10-CM

## 2023-04-26 DIAGNOSIS — D64.9 ANEMIA, UNSPECIFIED TYPE: ICD-10-CM

## 2023-04-26 DIAGNOSIS — D50.8 OTHER IRON DEFICIENCY ANEMIA: ICD-10-CM

## 2023-04-26 DIAGNOSIS — I10 ESSENTIAL HYPERTENSION: ICD-10-CM

## 2023-04-26 LAB
ABSOLUTE EOS #: 0 K/UL (ref 0–0.4)
ABSOLUTE LYMPH #: 0.92 K/UL (ref 1–4.8)
ABSOLUTE MONO #: 0.28 K/UL (ref 0.1–0.8)
BASOPHILS # BLD: 0 % (ref 0–2)
BASOPHILS ABSOLUTE: 0 K/UL (ref 0–0.2)
EOSINOPHILS RELATIVE PERCENT: 0 % (ref 1–4)
FERRITIN SERPL-MCNC: 39 NG/ML (ref 30–400)
FOLATE SERPL-MCNC: >20 NG/ML
HCT VFR BLD AUTO: 40.1 % (ref 41–53)
HGB BLD-MCNC: 12.8 G/DL (ref 13.5–17.5)
IRON SATURATION: 9 % (ref 20–55)
IRON SERPL-MCNC: 29 UG/DL (ref 59–158)
LYMPHOCYTES # BLD: 13 % (ref 24–44)
MCH RBC QN AUTO: 26.3 PG (ref 26–34)
MCHC RBC AUTO-ENTMCNC: 32.1 G/DL (ref 31–37)
MCV RBC AUTO: 81.9 FL (ref 80–100)
MONOCYTES # BLD: 4 % (ref 1–7)
MORPHOLOGY: ABNORMAL
MORPHOLOGY: ABNORMAL
PDW BLD-RTO: 27.7 % (ref 12.5–15.4)
PLATELET # BLD AUTO: 186 K/UL (ref 140–450)
PMV BLD AUTO: 7.4 FL (ref 6–12)
RBC # BLD: 4.89 M/UL (ref 4.5–5.9)
SEG NEUTROPHILS: 83 % (ref 36–66)
SEGMENTED NEUTROPHILS ABSOLUTE COUNT: 5.9 K/UL (ref 1.8–7.7)
TIBC SERPL-MCNC: 313 UG/DL (ref 250–450)
UNSATURATED IRON BINDING CAPACITY: 284 UG/DL (ref 112–347)
VIT B12 SERPL-MCNC: 477 PG/ML (ref 232–1245)
WBC # BLD AUTO: 7.1 K/UL (ref 3.5–11)

## 2023-04-26 PROCEDURE — 3078F DIAST BP <80 MM HG: CPT | Performed by: INTERNAL MEDICINE

## 2023-04-26 PROCEDURE — 83550 IRON BINDING TEST: CPT

## 2023-04-26 PROCEDURE — 82728 ASSAY OF FERRITIN: CPT

## 2023-04-26 PROCEDURE — 36415 COLL VENOUS BLD VENIPUNCTURE: CPT

## 2023-04-26 PROCEDURE — 1036F TOBACCO NON-USER: CPT | Performed by: INTERNAL MEDICINE

## 2023-04-26 PROCEDURE — 82746 ASSAY OF FOLIC ACID SERUM: CPT

## 2023-04-26 PROCEDURE — G8417 CALC BMI ABV UP PARAM F/U: HCPCS | Performed by: INTERNAL MEDICINE

## 2023-04-26 PROCEDURE — 3017F COLORECTAL CA SCREEN DOC REV: CPT | Performed by: INTERNAL MEDICINE

## 2023-04-26 PROCEDURE — 3074F SYST BP LT 130 MM HG: CPT | Performed by: INTERNAL MEDICINE

## 2023-04-26 PROCEDURE — 99214 OFFICE O/P EST MOD 30 MIN: CPT | Performed by: INTERNAL MEDICINE

## 2023-04-26 PROCEDURE — 82607 VITAMIN B-12: CPT

## 2023-04-26 PROCEDURE — 99211 OFF/OP EST MAY X REQ PHY/QHP: CPT | Performed by: INTERNAL MEDICINE

## 2023-04-26 PROCEDURE — 85025 COMPLETE CBC W/AUTO DIFF WBC: CPT

## 2023-04-26 PROCEDURE — G8427 DOCREV CUR MEDS BY ELIG CLIN: HCPCS | Performed by: INTERNAL MEDICINE

## 2023-04-26 PROCEDURE — 83540 ASSAY OF IRON: CPT

## 2023-04-26 PROCEDURE — 1123F ACP DISCUSS/DSCN MKR DOCD: CPT | Performed by: INTERNAL MEDICINE

## 2023-04-26 RX ORDER — CALCIUM CITRATE/VITAMIN D3 200MG-6.25
TABLET ORAL
Qty: 100 STRIP | Refills: 3 | Status: SHIPPED | OUTPATIENT
Start: 2023-04-26

## 2023-04-26 RX ORDER — BLOOD SUGAR DIAGNOSTIC
STRIP MISCELLANEOUS
Qty: 75 STRIP | Refills: 5 | OUTPATIENT
Start: 2023-04-26

## 2023-04-26 NOTE — TELEPHONE ENCOUNTER
AVS from 4/26/23      Rv in 2 months with labs 1 week prior ( print orders for pt )      Rv scheduled for 6/21 @ 9:30 am    Pt will have labs drawn one week prior to RV    Pt was given AVS and appointment schedule    Electronically signed by Kourtney Fritz on 4/26/2023 at 10:52 AM

## 2023-04-26 NOTE — TELEPHONE ENCOUNTER
Please Approve or Refuse.   Send to Pharmacy per Pt's Request: daniel      Next Visit Date:  9/27/2023   Last Visit Date: 1/27/2023    Hemoglobin A1C (%)   Date Value   02/15/2023 8.5 (H)   08/19/2022 6.6 (H)   03/12/2022 5.2             ( goal A1C is < 7)   BP Readings from Last 3 Encounters:   04/26/23 121/75   04/24/23 119/71   04/14/23 113/64          (goal 120/80)  BUN   Date Value Ref Range Status   03/16/2023 16 8 - 23 mg/dL Final     Creatinine   Date Value Ref Range Status   03/16/2023 0.71 0.70 - 1.20 mg/dL Final     Potassium   Date Value Ref Range Status   03/16/2023 4.0 3.7 - 5.3 mmol/L Final

## 2023-04-27 RX ORDER — 0.9 % SODIUM CHLORIDE 0.9 %
30 INTRAVENOUS SOLUTION INTRAVENOUS ONCE
Status: ACTIVE | OUTPATIENT
Start: 2023-04-27

## 2023-04-27 RX ORDER — SODIUM CHLORIDE 0.9 % (FLUSH) 0.9 %
10 SYRINGE (ML) INJECTION PRN
Status: ACTIVE | OUTPATIENT
Start: 2023-04-27

## 2023-05-07 PROBLEM — D64.9 ANEMIA: Status: ACTIVE | Noted: 2023-05-07

## 2023-05-07 RX ORDER — ACETAMINOPHEN 325 MG/1
650 TABLET ORAL
OUTPATIENT
Start: 2023-05-07

## 2023-05-07 RX ORDER — ALBUTEROL SULFATE 90 UG/1
4 AEROSOL, METERED RESPIRATORY (INHALATION) PRN
OUTPATIENT
Start: 2023-05-07

## 2023-05-07 RX ORDER — SODIUM CHLORIDE 9 MG/ML
5-250 INJECTION, SOLUTION INTRAVENOUS PRN
OUTPATIENT
Start: 2023-05-07

## 2023-05-07 RX ORDER — SODIUM CHLORIDE 0.9 % (FLUSH) 0.9 %
5-40 SYRINGE (ML) INJECTION PRN
OUTPATIENT
Start: 2023-05-07

## 2023-05-07 RX ORDER — ONDANSETRON 2 MG/ML
8 INJECTION INTRAMUSCULAR; INTRAVENOUS
OUTPATIENT
Start: 2023-05-07

## 2023-05-07 RX ORDER — HEPARIN SODIUM (PORCINE) LOCK FLUSH IV SOLN 100 UNIT/ML 100 UNIT/ML
500 SOLUTION INTRAVENOUS PRN
OUTPATIENT
Start: 2023-05-07

## 2023-05-07 RX ORDER — DIPHENHYDRAMINE HYDROCHLORIDE 50 MG/ML
50 INJECTION INTRAMUSCULAR; INTRAVENOUS
OUTPATIENT
Start: 2023-05-07

## 2023-05-07 RX ORDER — SODIUM CHLORIDE 9 MG/ML
INJECTION, SOLUTION INTRAVENOUS CONTINUOUS
OUTPATIENT
Start: 2023-05-07

## 2023-05-07 RX ORDER — EPINEPHRINE 1 MG/ML
0.3 INJECTION, SOLUTION, CONCENTRATE INTRAVENOUS PRN
OUTPATIENT
Start: 2023-05-07

## 2023-05-07 RX ORDER — FAMOTIDINE 10 MG/ML
20 INJECTION, SOLUTION INTRAVENOUS
OUTPATIENT
Start: 2023-05-07

## 2023-05-08 RX ORDER — FOLIC ACID 1 MG/1
TABLET ORAL
Qty: 90 TABLET | Refills: 1 | Status: SHIPPED | OUTPATIENT
Start: 2023-05-08

## 2023-05-11 ENCOUNTER — HOSPITAL ENCOUNTER (OUTPATIENT)
Dept: INFUSION THERAPY | Age: 73
Discharge: HOME | End: 2023-05-11
Payer: MEDICARE

## 2023-05-11 VITALS
SYSTOLIC BLOOD PRESSURE: 103 MMHG | HEART RATE: 82 BPM | TEMPERATURE: 97.6 F | RESPIRATION RATE: 18 BRPM | DIASTOLIC BLOOD PRESSURE: 63 MMHG

## 2023-05-11 DIAGNOSIS — D50.8 OTHER IRON DEFICIENCY ANEMIA: ICD-10-CM

## 2023-05-11 DIAGNOSIS — D64.9 ANEMIA, UNSPECIFIED TYPE: Primary | ICD-10-CM

## 2023-05-11 PROCEDURE — 2580000003 HC RX 258: Performed by: INTERNAL MEDICINE

## 2023-05-11 PROCEDURE — 96365 THER/PROPH/DIAG IV INF INIT: CPT

## 2023-05-11 PROCEDURE — 6360000002 HC RX W HCPCS: Performed by: INTERNAL MEDICINE

## 2023-05-11 RX ORDER — ONDANSETRON 2 MG/ML
8 INJECTION INTRAMUSCULAR; INTRAVENOUS
OUTPATIENT
Start: 2023-05-18

## 2023-05-11 RX ORDER — DIPHENHYDRAMINE HYDROCHLORIDE 50 MG/ML
50 INJECTION INTRAMUSCULAR; INTRAVENOUS
OUTPATIENT
Start: 2023-05-18

## 2023-05-11 RX ORDER — ALBUTEROL SULFATE 90 UG/1
4 AEROSOL, METERED RESPIRATORY (INHALATION) PRN
OUTPATIENT
Start: 2023-05-18

## 2023-05-11 RX ORDER — FAMOTIDINE 10 MG/ML
20 INJECTION, SOLUTION INTRAVENOUS
OUTPATIENT
Start: 2023-05-18

## 2023-05-11 RX ORDER — SODIUM CHLORIDE 9 MG/ML
5-250 INJECTION, SOLUTION INTRAVENOUS PRN
OUTPATIENT
Start: 2023-05-18

## 2023-05-11 RX ORDER — ACETAMINOPHEN 325 MG/1
650 TABLET ORAL
OUTPATIENT
Start: 2023-05-18

## 2023-05-11 RX ORDER — SODIUM CHLORIDE 0.9 % (FLUSH) 0.9 %
5-40 SYRINGE (ML) INJECTION PRN
OUTPATIENT
Start: 2023-05-18

## 2023-05-11 RX ORDER — SODIUM CHLORIDE 9 MG/ML
5-250 INJECTION, SOLUTION INTRAVENOUS PRN
Status: DISCONTINUED | OUTPATIENT
Start: 2023-05-11 | End: 2023-05-12 | Stop reason: HOSPADM

## 2023-05-11 RX ORDER — EPINEPHRINE 1 MG/ML
0.3 INJECTION, SOLUTION, CONCENTRATE INTRAVENOUS PRN
OUTPATIENT
Start: 2023-05-18

## 2023-05-11 RX ORDER — HEPARIN SODIUM (PORCINE) LOCK FLUSH IV SOLN 100 UNIT/ML 100 UNIT/ML
500 SOLUTION INTRAVENOUS PRN
OUTPATIENT
Start: 2023-05-18

## 2023-05-11 RX ORDER — SODIUM CHLORIDE 9 MG/ML
INJECTION, SOLUTION INTRAVENOUS CONTINUOUS
OUTPATIENT
Start: 2023-05-18

## 2023-05-11 RX ADMIN — FERRIC CARBOXYMALTOSE INJECTION 750 MG: 50 INJECTION, SOLUTION INTRAVENOUS at 13:24

## 2023-05-11 RX ADMIN — SODIUM CHLORIDE 100 ML/HR: 9 INJECTION, SOLUTION INTRAVENOUS at 13:20

## 2023-05-11 NOTE — PROGRESS NOTES
Patient arrived for 1 of 1 injectafer. Patient tolerated w/o incident and left in stable condition.  Returns 6/21 for

## 2023-05-25 ENCOUNTER — HOSPITAL ENCOUNTER (OUTPATIENT)
Dept: PAIN MANAGEMENT | Age: 73
Discharge: HOME | End: 2023-05-25
Payer: MEDICARE

## 2023-05-25 VITALS
WEIGHT: 209.19 LBS | DIASTOLIC BLOOD PRESSURE: 82 MMHG | OXYGEN SATURATION: 96 % | SYSTOLIC BLOOD PRESSURE: 157 MMHG | HEART RATE: 77 BPM | BODY MASS INDEX: 31.71 KG/M2 | HEIGHT: 68 IN

## 2023-05-25 DIAGNOSIS — M48.061 SPINAL STENOSIS OF LUMBAR REGION, UNSPECIFIED WHETHER NEUROGENIC CLAUDICATION PRESENT: Chronic | ICD-10-CM

## 2023-05-25 DIAGNOSIS — M47.816 LUMBAR SPONDYLOSIS: Chronic | ICD-10-CM

## 2023-05-25 DIAGNOSIS — M54.16 LUMBAR RADICULOPATHY: Chronic | ICD-10-CM

## 2023-05-25 DIAGNOSIS — Z79.891 CHRONIC USE OF OPIATE FOR THERAPEUTIC PURPOSE: ICD-10-CM

## 2023-05-25 DIAGNOSIS — M51.36 DEGENERATIVE DISC DISEASE, LUMBAR: Primary | Chronic | ICD-10-CM

## 2023-05-25 PROCEDURE — 99213 OFFICE O/P EST LOW 20 MIN: CPT | Performed by: NURSE PRACTITIONER

## 2023-05-25 PROCEDURE — 99213 OFFICE O/P EST LOW 20 MIN: CPT

## 2023-05-25 RX ORDER — MORPHINE SULFATE 15 MG/1
15 TABLET, FILM COATED, EXTENDED RELEASE ORAL 2 TIMES DAILY
Qty: 60 TABLET | Refills: 0 | Status: SHIPPED | OUTPATIENT
Start: 2023-05-26 | End: 2023-06-25

## 2023-05-25 RX ORDER — OXYCODONE HYDROCHLORIDE AND ACETAMINOPHEN 5; 325 MG/1; MG/1
1 TABLET ORAL 2 TIMES DAILY PRN
Qty: 54 TABLET | Refills: 0 | Status: SHIPPED | OUTPATIENT
Start: 2023-05-26 | End: 2023-06-22

## 2023-05-25 ASSESSMENT — PAIN DESCRIPTION - LOCATION: LOCATION: BACK

## 2023-05-25 ASSESSMENT — PAIN DESCRIPTION - PAIN TYPE: TYPE: CHRONIC PAIN

## 2023-05-25 ASSESSMENT — PAIN DESCRIPTION - DESCRIPTORS: DESCRIPTORS: STABBING

## 2023-05-25 ASSESSMENT — ENCOUNTER SYMPTOMS
BACK PAIN: 1
BOWEL INCONTINENCE: 0

## 2023-05-25 ASSESSMENT — PAIN DESCRIPTION - FREQUENCY: FREQUENCY: CONTINUOUS

## 2023-05-25 ASSESSMENT — PAIN SCALES - GENERAL: PAINLEVEL_OUTOF10: 3

## 2023-05-25 NOTE — PROGRESS NOTES
Plan:  Patient relates current medications are helping the pain. Patient reports taking pain medications as prescribed, denies obtaining medications from different sources and denies use of illegal drugs. Medication risk and benefits have been discussed. Patient denies side effects from medications like nausea, vomiting, constipation or drowsiness. Patient reports current activities of daily living are possible due to medications and would like to continue them. As always, we encourage daily stretching and strengthening exercises, and recommend minimizing use of pain medications unless patient cannot get through daily activities due to pain. Due to the high risk nature of this patient's pain medication close monitoring is required. Continue current medication management, pt has been stable and compliant. Script written for MS ER and percocet  Follow up appointment made for 4 weeks    I have reviewed the chief complaint and history of present illness (including ROS and PFSH) and vital documentation by my staff and I agree with their documentation and have added where applicable.

## 2023-05-26 DIAGNOSIS — M46.92 CERVICAL SPONDYLITIS (HCC): ICD-10-CM

## 2023-05-26 DIAGNOSIS — M47.816 LUMBAR SPONDYLOSIS: ICD-10-CM

## 2023-05-26 DIAGNOSIS — M54.16 LUMBAR RADICULOPATHY: ICD-10-CM

## 2023-05-26 DIAGNOSIS — Z98.1 S/P CERVICAL SPINAL FUSION: ICD-10-CM

## 2023-05-26 DIAGNOSIS — M51.36 DEGENERATIVE DISC DISEASE, LUMBAR: ICD-10-CM

## 2023-05-30 RX ORDER — BACLOFEN 10 MG/1
10 TABLET ORAL 3 TIMES DAILY PRN
Qty: 270 TABLET | Refills: 1 | Status: SHIPPED | OUTPATIENT
Start: 2023-05-30

## 2023-05-30 NOTE — TELEPHONE ENCOUNTER
" Specialty Pharmacy Patient Management Program  Call Center Refill Outreach      Nilay \"CW\" is a 59 y.o. male contacted today regarding refills of his medication(s).    Specialty medication(s) and dose(s) confirmed: sofosbuvir-velpatasvir 400-100mg  Other medications being refilled: none    Refill Questions    Flowsheet Row Most Recent Value   Changes to allergies? No   Changes to medications? No   New conditions since last clinic visit No   Unplanned office visit, urgent care, ED, or hospital admission in the last 4 weeks  No   How does patient/caregiver feel medication is working? Excellent   Financial problems or insurance changes  No   Since the previous refill, were any specialty medication doses or scheduled injections missed or delayed?  No   Does this patient require a clinical escalation to a pharmacist? No                     Follow-up: 3 weeks     Jamal Enriquez RPH  Specialty Pharmacist  5/30/2023  09:10 EDT    " Patient's wife leaves message stating Regi Mcclure needs a refil on Lyrica. Will route this to [facility].

## 2023-05-30 NOTE — TELEPHONE ENCOUNTER
Please Approve or Refuse.   Send to Pharmacy per Pt's Request:      Next Visit Date:  9/27/2023   Last Visit Date: 1/27/2023    Hemoglobin A1C (%)   Date Value   02/15/2023 8.5 (H)   08/19/2022 6.6 (H)   03/12/2022 5.2             ( goal A1C is < 7)   BP Readings from Last 3 Encounters:   05/25/23 (!) 157/82   05/11/23 103/63   04/26/23 121/75          (goal 120/80)  BUN   Date Value Ref Range Status   03/16/2023 16 8 - 23 mg/dL Final     Creatinine   Date Value Ref Range Status   03/16/2023 0.71 0.70 - 1.20 mg/dL Final     Potassium   Date Value Ref Range Status   03/16/2023 4.0 3.7 - 5.3 mmol/L Final

## 2023-06-14 DIAGNOSIS — E78.5 HYPERLIPIDEMIA WITH TARGET LDL LESS THAN 100: ICD-10-CM

## 2023-06-15 RX ORDER — PRAVASTATIN SODIUM 40 MG
40 TABLET ORAL NIGHTLY
Qty: 90 TABLET | Refills: 3 | Status: SHIPPED | OUTPATIENT
Start: 2023-06-15

## 2023-06-15 NOTE — TELEPHONE ENCOUNTER
6/15/2023     Called patient to schedule routine follow up appointment for same day BP CHHECK . Patient did not answer so left a detail message. For patient to call office back to schedule follow up appointment as requested. Also sent a [employer] message.     Future Appointments   Date Time Provider [address]   6/19/2023  9:00 AM Rossi Bell  [facility]   6/21/2023  9:30 AM Jia Richmond MD [address] [place] [address]   7/6/2023  1:00 PM Climmie Gottron, MD [facility] GI TOLP   7/31/2023 11:00 AM Rena Mckinnon MD . C URO TOLPP   9/27/2023 11:00 AM Cheryl Gallagher MD [facility]

## 2023-06-19 ENCOUNTER — HOSPITAL ENCOUNTER (OUTPATIENT)
Dept: PAIN MANAGEMENT | Age: 73
Discharge: HOME | End: 2023-06-19
Payer: MEDICARE

## 2023-06-19 ENCOUNTER — HOSPITAL ENCOUNTER (OUTPATIENT)
Age: 73
Discharge: HOME | End: 2023-06-19
Payer: MEDICARE

## 2023-06-19 VITALS — BODY MASS INDEX: 30.96 KG/M2 | HEIGHT: 69 IN | TEMPERATURE: 97.3 F | WEIGHT: 209 LBS

## 2023-06-19 DIAGNOSIS — M47.816 LUMBAR SPONDYLOSIS: Chronic | ICD-10-CM

## 2023-06-19 DIAGNOSIS — M48.061 SPINAL STENOSIS OF LUMBAR REGION, UNSPECIFIED WHETHER NEUROGENIC CLAUDICATION PRESENT: Chronic | ICD-10-CM

## 2023-06-19 DIAGNOSIS — Z79.891 CHRONIC USE OF OPIATE FOR THERAPEUTIC PURPOSE: Primary | ICD-10-CM

## 2023-06-19 DIAGNOSIS — M51.36 DEGENERATIVE DISC DISEASE, LUMBAR: ICD-10-CM

## 2023-06-19 DIAGNOSIS — D50.0 IRON DEFICIENCY ANEMIA DUE TO CHRONIC BLOOD LOSS: ICD-10-CM

## 2023-06-19 DIAGNOSIS — M54.16 LUMBAR RADICULOPATHY: Chronic | ICD-10-CM

## 2023-06-19 DIAGNOSIS — D64.9 ANEMIA, UNSPECIFIED TYPE: ICD-10-CM

## 2023-06-19 LAB
BASOPHILS # BLD: 0 K/UL (ref 0–0.2)
BASOPHILS NFR BLD: 0 % (ref 0–2)
EOSINOPHIL # BLD: 0.07 K/UL (ref 0–0.4)
EOSINOPHILS RELATIVE PERCENT: 1 % (ref 0–4)
ERYTHROCYTE [DISTWIDTH] IN BLOOD BY AUTOMATED COUNT: 19.7 % (ref 11.5–14.9)
FERRITIN SERPL-MCNC: 36 NG/ML (ref 30–400)
HCT VFR BLD AUTO: 45.2 % (ref 41–53)
HGB BLD-MCNC: 15.1 G/DL (ref 13.5–17.5)
IRON SATN MFR SERPL: 16 % (ref 20–55)
IRON SERPL-MCNC: 56 UG/DL (ref 59–158)
LYMPHOCYTES # BLD: 18 % (ref 24–44)
LYMPHOCYTES NFR BLD: 1.26 K/UL (ref 1–4.8)
MCH RBC QN AUTO: 27.7 PG (ref 26–34)
MCHC RBC AUTO-ENTMCNC: 33.3 G/DL (ref 31–37)
MCV RBC AUTO: 83.2 FL (ref 80–100)
MONOCYTES NFR BLD: 0.49 K/UL (ref 0.1–1.3)
MONOCYTES NFR BLD: 7 % (ref 1–7)
MORPHOLOGY: ABNORMAL
MORPHOLOGY: ABNORMAL
NEUTROPHILS NFR BLD: 74 % (ref 36–66)
NEUTS SEG NFR BLD: 5.18 K/UL (ref 1.3–9.1)
PLATELET # BLD AUTO: 218 K/UL (ref 150–450)
PMV BLD AUTO: 7.6 FL (ref 6–12)
RBC # BLD AUTO: 5.44 M/UL (ref 4.5–5.9)
TIBC SERPL-MCNC: 346 UG/DL (ref 250–450)
UNSATURATED IRON BINDING CAPACITY: 290 UG/DL (ref 112–347)
WBC OTHER # BLD: 7 K/UL (ref 3.5–11)

## 2023-06-19 PROCEDURE — 99214 OFFICE O/P EST MOD 30 MIN: CPT | Performed by: STUDENT IN AN ORGANIZED HEALTH CARE EDUCATION/TRAINING PROGRAM

## 2023-06-19 PROCEDURE — 82728 ASSAY OF FERRITIN: CPT

## 2023-06-19 PROCEDURE — 36415 COLL VENOUS BLD VENIPUNCTURE: CPT

## 2023-06-19 PROCEDURE — 83550 IRON BINDING TEST: CPT

## 2023-06-19 PROCEDURE — 85027 COMPLETE CBC AUTOMATED: CPT

## 2023-06-19 PROCEDURE — 83540 ASSAY OF IRON: CPT

## 2023-06-19 PROCEDURE — 99213 OFFICE O/P EST LOW 20 MIN: CPT

## 2023-06-19 RX ORDER — OXYCODONE HYDROCHLORIDE AND ACETAMINOPHEN 5; 325 MG/1; MG/1
1 TABLET ORAL 2 TIMES DAILY PRN
Qty: 60 TABLET | Refills: 0 | Status: SHIPPED | OUTPATIENT
Start: 2023-06-25 | End: 2023-07-25

## 2023-06-19 RX ORDER — MORPHINE SULFATE 15 MG/1
15 TABLET, FILM COATED, EXTENDED RELEASE ORAL 2 TIMES DAILY
Qty: 60 TABLET | Refills: 0 | Status: SHIPPED | OUTPATIENT
Start: 2023-06-25 | End: 2023-07-25

## 2023-06-19 ASSESSMENT — PAIN SCALES - GENERAL: PAINLEVEL_OUTOF10: 3

## 2023-06-19 NOTE — PROGRESS NOTES
Refill:  0     Filling 3 days early to match MS refill    morphine (MS CONTIN) 15 MG extended release tablet     Sig: Take 1 tablet by mouth 2 times daily for 30 days.      Dispense:  60 tablet     Refill:  0     Reduce doses taken as pain becomes manageable

## 2023-06-21 ENCOUNTER — TELEPHONE (OUTPATIENT)
Dept: ONCOLOGY | Age: 73
End: 2023-06-21

## 2023-06-21 ENCOUNTER — OFFICE VISIT (OUTPATIENT)
Dept: ONCOLOGY | Age: 73
End: 2023-06-21
Payer: MEDICARE

## 2023-06-21 VITALS
TEMPERATURE: 97.7 F | SYSTOLIC BLOOD PRESSURE: 149 MMHG | BODY MASS INDEX: 30.69 KG/M2 | WEIGHT: 207.8 LBS | HEART RATE: 83 BPM | DIASTOLIC BLOOD PRESSURE: 76 MMHG

## 2023-06-21 DIAGNOSIS — D50.8 IRON DEFICIENCY ANEMIA SECONDARY TO INADEQUATE DIETARY IRON INTAKE: Primary | ICD-10-CM

## 2023-06-21 DIAGNOSIS — C68.9 UROTHELIAL CANCER (HCC): ICD-10-CM

## 2023-06-21 PROCEDURE — 3017F COLORECTAL CA SCREEN DOC REV: CPT | Performed by: INTERNAL MEDICINE

## 2023-06-21 PROCEDURE — 1123F ACP DISCUSS/DSCN MKR DOCD: CPT | Performed by: INTERNAL MEDICINE

## 2023-06-21 PROCEDURE — 1036F TOBACCO NON-USER: CPT | Performed by: INTERNAL MEDICINE

## 2023-06-21 PROCEDURE — G8427 DOCREV CUR MEDS BY ELIG CLIN: HCPCS | Performed by: INTERNAL MEDICINE

## 2023-06-21 PROCEDURE — 99211 OFF/OP EST MAY X REQ PHY/QHP: CPT | Performed by: INTERNAL MEDICINE

## 2023-06-21 PROCEDURE — G8417 CALC BMI ABV UP PARAM F/U: HCPCS | Performed by: INTERNAL MEDICINE

## 2023-06-21 PROCEDURE — 3077F SYST BP >= 140 MM HG: CPT | Performed by: INTERNAL MEDICINE

## 2023-06-21 PROCEDURE — 3078F DIAST BP <80 MM HG: CPT | Performed by: INTERNAL MEDICINE

## 2023-06-21 PROCEDURE — 99214 OFFICE O/P EST MOD 30 MIN: CPT | Performed by: INTERNAL MEDICINE

## 2023-06-21 NOTE — PROGRESS NOTES
Subjective:   PCP: Ney Sauer MD       Chief Complaint   Patient presents with    Follow-up     Review status of disease    Discuss Labs   Discussed results of lab work-up    INTERIM HISTORY:     Patient presents to the clinic for a follow-up visit and to discuss results of his lab work-up. Overall patient feeling better. Received 1 infusion of Injectafer last month. Hemoglobin has improved to 15. Iron saturation still on the lower side. Patient also underwent cystoscopy which showed a high-grade papillary cancer. He is going to be started on BCG intravesical therapy. During this visit patient's allergy, social, medical, surgical history and medications were reviewed and updated. REVIEW OF SYSTEMS:   General: No fever or night sweats. Weight is stable. +fatigue   ENT: No double or blurred vision, no hearing problem, no dysphagia or sore throat   Respiratory: No chest pain, no shortness of breath, no cough or hemoptysis. Cardiovascular: Denies chest pain, PND or orthopnea. No L E swelling or palpitations. Gastrointestinal: No nausea or vomiting, abdominal pain, or constipation, diarrhea  Genitourinary: Denies dysuria, frequency, urgency or incontinence. +hematuria - resolved  Neurological: Denies headaches, decreased LOC, no sensory or motor focal deficits. Musculoskeletal: No arthralgia no back pain or joint swelling. Skin: There are no rashes or bleeding. Psych: Denies hallucinations or intentions to harm self      PHYSICAL EXAM:   Vitals: BP (!) 149/76   Pulse 83   Temp 97.7 °F (36.5 °C) (Temporal)   Wt 207 lb 12.8 oz (94.3 kg)   BMI 30.69 kg/m²   General appearance: alert and cooperative with exam  HEENT: Head: Normocephalic, no lesions, without obvious abnormality.   Neck: no adenopathy  Lungs: clear to auscultation bilaterally  Heart: regular rate and rhythm, S1, S2 normal, no murmur, click, rub or gallop  Abdomen: soft, non-tender; bowel sounds normal; no masses,  no

## 2023-06-21 NOTE — TELEPHONE ENCOUNTER
AVS from 6/21/23    Rv in 2 months with labs 1 week prior     *rv is sched for Arsenio@[employer] w/labs 1 week prior tibc iron,cbc,ferritin    PT was given AVS, lab orders  and appt schedule
EOAE (evoked otoacoustic emission)

## 2023-07-03 RX ORDER — FLUTICASONE PROPIONATE 50 MCG
SPRAY, SUSPENSION (ML) NASAL
Qty: 1 EACH | Refills: 1 | Status: SHIPPED | OUTPATIENT
Start: 2023-07-03

## 2023-07-03 NOTE — TELEPHONE ENCOUNTER
Please Approve or Refuse.   Send to Pharmacy per Pt's Request:      Next Visit Date:  9/27/2023   Last Visit Date: 1/27/2023    Hemoglobin A1C (%)   Date Value   02/15/2023 8.5 (H)   08/19/2022 6.6 (H)   03/12/2022 5.2             ( goal A1C is < 7)   BP Readings from Last 3 Encounters:   06/21/23 (!) 149/76   05/25/23 (!) 157/82   05/11/23 103/63          (goal 120/80)  BUN   Date Value Ref Range Status   03/16/2023 16 8 - 23 mg/dL Final     Creatinine   Date Value Ref Range Status   03/16/2023 0.71 0.70 - 1.20 mg/dL Final     Potassium   Date Value Ref Range Status   03/16/2023 4.0 3.7 - 5.3 mmol/L Final

## 2023-07-06 ENCOUNTER — OFFICE VISIT (OUTPATIENT)
Dept: GASTROENTEROLOGY | Age: 73
End: 2023-07-06
Payer: MEDICARE

## 2023-07-06 VITALS
DIASTOLIC BLOOD PRESSURE: 70 MMHG | BODY MASS INDEX: 30.27 KG/M2 | SYSTOLIC BLOOD PRESSURE: 123 MMHG | TEMPERATURE: 97.5 F | WEIGHT: 205 LBS

## 2023-07-06 DIAGNOSIS — Z98.890 STATUS POST HERNIA REPAIR: ICD-10-CM

## 2023-07-06 DIAGNOSIS — Z86.19 HX OF HEPATITIS C: ICD-10-CM

## 2023-07-06 DIAGNOSIS — Z87.19 STATUS POST HERNIA REPAIR: ICD-10-CM

## 2023-07-06 DIAGNOSIS — D50.0 IRON DEFICIENCY ANEMIA DUE TO CHRONIC BLOOD LOSS: Primary | ICD-10-CM

## 2023-07-06 DIAGNOSIS — K21.9 GASTROESOPHAGEAL REFLUX DISEASE, UNSPECIFIED WHETHER ESOPHAGITIS PRESENT: ICD-10-CM

## 2023-07-06 DIAGNOSIS — D36.9 ADENOMATOUS POLYP: ICD-10-CM

## 2023-07-06 DIAGNOSIS — K58.0 IRRITABLE BOWEL SYNDROME WITH DIARRHEA: ICD-10-CM

## 2023-07-06 PROCEDURE — 1123F ACP DISCUSS/DSCN MKR DOCD: CPT | Performed by: INTERNAL MEDICINE

## 2023-07-06 PROCEDURE — G8417 CALC BMI ABV UP PARAM F/U: HCPCS | Performed by: INTERNAL MEDICINE

## 2023-07-06 PROCEDURE — 3074F SYST BP LT 130 MM HG: CPT | Performed by: INTERNAL MEDICINE

## 2023-07-06 PROCEDURE — 3078F DIAST BP <80 MM HG: CPT | Performed by: INTERNAL MEDICINE

## 2023-07-06 PROCEDURE — 99214 OFFICE O/P EST MOD 30 MIN: CPT | Performed by: INTERNAL MEDICINE

## 2023-07-06 PROCEDURE — 3017F COLORECTAL CA SCREEN DOC REV: CPT | Performed by: INTERNAL MEDICINE

## 2023-07-06 PROCEDURE — 1036F TOBACCO NON-USER: CPT | Performed by: INTERNAL MEDICINE

## 2023-07-06 PROCEDURE — G8427 DOCREV CUR MEDS BY ELIG CLIN: HCPCS | Performed by: INTERNAL MEDICINE

## 2023-07-06 ASSESSMENT — ENCOUNTER SYMPTOMS
SHORTNESS OF BREATH: 0
COLOR CHANGE: 0
DIARRHEA: 1
CHOKING: 0
ABDOMINAL DISTENTION: 0
ABDOMINAL PAIN: 1
COUGH: 0
SORE THROAT: 0
RECTAL PAIN: 0
CONSTIPATION: 0
TROUBLE SWALLOWING: 0
BLOOD IN STOOL: 0
VOMITING: 0
ANAL BLEEDING: 0
NAUSEA: 0
WHEEZING: 0

## 2023-07-06 NOTE — PROGRESS NOTES
Pt was asked to avoid spices grease and fried food. Advices were also given about avoidance of any kind of fast foods, soda pops and high energy drinks. Pt was advised to place two small block under the head end of the bed which may help with night time reflux. Was advised not to eat any thin at least 2-3 hrs before going to bed and walk especially after dinner    Pt has verbalized understanding and agreement to this plan. Thank you for allowing me to participate in the care of Mr. Ana Langston. For any further questions please do not hesitate to contact me. I have reviewed and agree with the ROS entered by the MA/Nurse. This note is created with the assistance of the speech recognition program.  While intending to generate a document that actually reflects the content of the visit, document can still have some errors including those of syntax and sound like substitutions which may escape proof reading. Actual meaning can be extrapolated by contextual diversion.      Davin Sequeira MD, [facility]  Board Certified in Gastroenterology and [facility]  Office #: [phone]

## 2023-07-15 DIAGNOSIS — E55.9 VITAMIN D DEFICIENCY: ICD-10-CM

## 2023-07-17 RX ORDER — ERGOCALCIFEROL 1.25 MG/1
CAPSULE ORAL
Qty: 12 CAPSULE | Refills: 0 | Status: SHIPPED | OUTPATIENT
Start: 2023-07-17

## 2023-07-20 ENCOUNTER — HOSPITAL ENCOUNTER (OUTPATIENT)
Dept: PAIN MANAGEMENT | Age: 73
Discharge: HOME | End: 2023-07-20
Payer: MEDICARE

## 2023-07-20 VITALS
WEIGHT: 208 LBS | TEMPERATURE: 97.5 F | DIASTOLIC BLOOD PRESSURE: 70 MMHG | HEIGHT: 69 IN | OXYGEN SATURATION: 98 % | BODY MASS INDEX: 30.81 KG/M2 | SYSTOLIC BLOOD PRESSURE: 123 MMHG

## 2023-07-20 DIAGNOSIS — M51.36 DEGENERATIVE DISC DISEASE, LUMBAR: Chronic | ICD-10-CM

## 2023-07-20 DIAGNOSIS — M48.061 SPINAL STENOSIS OF LUMBAR REGION, UNSPECIFIED WHETHER NEUROGENIC CLAUDICATION PRESENT: Chronic | ICD-10-CM

## 2023-07-20 DIAGNOSIS — M54.16 LUMBAR RADICULOPATHY: Chronic | ICD-10-CM

## 2023-07-20 DIAGNOSIS — M47.816 LUMBAR SPONDYLOSIS: Chronic | ICD-10-CM

## 2023-07-20 DIAGNOSIS — Z79.891 CHRONIC USE OF OPIATE FOR THERAPEUTIC PURPOSE: Primary | ICD-10-CM

## 2023-07-20 PROCEDURE — 99213 OFFICE O/P EST LOW 20 MIN: CPT

## 2023-07-20 PROCEDURE — G0481 DRUG TEST DEF 8-14 CLASSES: HCPCS

## 2023-07-20 PROCEDURE — 99213 OFFICE O/P EST LOW 20 MIN: CPT | Performed by: NURSE PRACTITIONER

## 2023-07-20 PROCEDURE — 80307 DRUG TEST PRSMV CHEM ANLYZR: CPT

## 2023-07-20 RX ORDER — MORPHINE SULFATE 15 MG/1
15 TABLET, FILM COATED, EXTENDED RELEASE ORAL 2 TIMES DAILY
Qty: 60 TABLET | Refills: 0 | Status: SHIPPED | OUTPATIENT
Start: 2023-07-25 | End: 2023-08-24

## 2023-07-20 RX ORDER — PREGABALIN 150 MG/1
150 CAPSULE ORAL 3 TIMES DAILY
Qty: 90 CAPSULE | Refills: 2 | Status: SHIPPED | OUTPATIENT
Start: 2023-07-20 | End: 2023-10-18

## 2023-07-20 RX ORDER — OXYCODONE HYDROCHLORIDE AND ACETAMINOPHEN 5; 325 MG/1; MG/1
1 TABLET ORAL 2 TIMES DAILY PRN
Qty: 60 TABLET | Refills: 0 | Status: SHIPPED | OUTPATIENT
Start: 2023-07-25 | End: 2023-08-24

## 2023-07-20 ASSESSMENT — ENCOUNTER SYMPTOMS
BACK PAIN: 1
CONSTIPATION: 0
BOWEL INCONTINENCE: 0
SHORTNESS OF BREATH: 0
COUGH: 0

## 2023-07-20 ASSESSMENT — PAIN DESCRIPTION - LOCATION: LOCATION: BACK

## 2023-07-23 LAB
6-ACETYLMORPHINE, UR: NOT DETECTED
7-AMINOCLONAZEPAM, URINE: NOT DETECTED
ALPHA-OH-ALPRAZ, URINE: NOT DETECTED
ALPHA-OH-MIDAZOLAM, URINE: NOT DETECTED
ALPRAZOLAM, URINE: NOT DETECTED
AMPHETAMINES, URINE: NOT DETECTED
BARBITURATES, URINE: NOT DETECTED
BENZOYLECGONINE, UR: NOT DETECTED
BUPRENORPHINE URINE: NOT DETECTED
CARISOPRODOL, UR: NOT DETECTED
CLONAZEPAM, URINE: NOT DETECTED
CODEINE, URINE: NOT DETECTED
CREATININE URINE: 63.4 MG/DL (ref 20–400)
DIAZEPAM, URINE: NOT DETECTED
DRUGS EXPECTED, UR: NORMAL
EER HI RES INTERP UR: NORMAL
ETHYL GLUCURONIDE UR: NOT DETECTED
FENTANYL URINE: NOT DETECTED
GABAPENTIN: NOT DETECTED
HYDROCODONE, OPI6M: NOT DETECTED
HYDROMORPHONE, OPI3M: PRESENT
LORAZEPAM, URINE: NOT DETECTED
MARIJUANA METAB, UR: NOT DETECTED
MDA, UR: NOT DETECTED
MDEA, EVE, UR: NOT DETECTED
MDMA URINE: NOT DETECTED
MEPERIDINE METAB, UR: NOT DETECTED
METHADONE, URINE: NOT DETECTED
METHAMPHETAMINE, URINE: NOT DETECTED
METHYLPHENIDATE: NOT DETECTED
MIDAZOLAM, URINE: NOT DETECTED
MORPHINE, OPI1M: PRESENT
NALOXONE URINE: NOT DETECTED
NORBUPRENORPHINE, URINE: NOT DETECTED
NORDIAZEPAM, URINE: NOT DETECTED
NORFENTANYL, URINE: NOT DETECTED
NORHYDROCODONE, URINE: NOT DETECTED
NOROXYCODONE, URINE: NOT DETECTED
NOROXYMORPHONE, URINE: NOT DETECTED
OXAZEPAM, URINE: NOT DETECTED
OXYCODONE URINE: NOT DETECTED
OXYMORPHONE, URINE: NOT DETECTED
PAIN MANAGEMENT DRUG PANEL INTERP, URINE: NORMAL
PAIN MGT DRUG PANEL, HI RES, UR: NORMAL
PCP,URINE: NOT DETECTED
PHENTERMINE, UR: NOT DETECTED
PREGABALIN: PRESENT
TAPENTADOL, URINE: NOT DETECTED
TAPENTADOL-O-SULFATE, URINE: NOT DETECTED
TEMAZEPAM, URINE: NOT DETECTED
TRAMADOL, URINE: NOT DETECTED
ZOLPIDEM METABOLITE (ZCA), URINE: NOT DETECTED
ZOLPIDEM, URINE: NOT DETECTED

## 2023-07-24 DIAGNOSIS — E11.65 TYPE 2 DIABETES MELLITUS WITH HYPERGLYCEMIA, WITHOUT LONG-TERM CURRENT USE OF INSULIN (HCC): ICD-10-CM

## 2023-07-24 RX ORDER — METFORMIN HYDROCHLORIDE 500 MG/1
TABLET, EXTENDED RELEASE ORAL
Qty: 180 TABLET | Refills: 1 | Status: SHIPPED | OUTPATIENT
Start: 2023-07-24

## 2023-07-31 ENCOUNTER — PROCEDURE VISIT (OUTPATIENT)
Dept: UROLOGY | Age: 73
End: 2023-07-31
Payer: MEDICARE

## 2023-07-31 ENCOUNTER — HOSPITAL ENCOUNTER (OUTPATIENT)
Age: 73
Setting detail: SPECIMEN
Discharge: HOME | End: 2023-07-31

## 2023-07-31 VITALS
WEIGHT: 207 LBS | HEIGHT: 68 IN | DIASTOLIC BLOOD PRESSURE: 76 MMHG | BODY MASS INDEX: 31.37 KG/M2 | TEMPERATURE: 97.9 F | HEART RATE: 81 BPM | SYSTOLIC BLOOD PRESSURE: 116 MMHG

## 2023-07-31 DIAGNOSIS — C67.2 MALIGNANT NEOPLASM OF LATERAL WALL OF URINARY BLADDER (HCC): ICD-10-CM

## 2023-07-31 DIAGNOSIS — C67.2 MALIGNANT NEOPLASM OF LATERAL WALL OF URINARY BLADDER (HCC): Primary | ICD-10-CM

## 2023-07-31 PROCEDURE — 52000 CYSTOURETHROSCOPY: CPT | Performed by: UROLOGY

## 2023-07-31 PROCEDURE — 99999 PR OFFICE/OUTPT VISIT,PROCEDURE ONLY: CPT | Performed by: UROLOGY

## 2023-07-31 NOTE — PROGRESS NOTES
Cystoscopy Operative Note (7/31/23)  Surgeon: Wes Rowley MD  Anesthesia: Urethral 2% Xylocaine   Indications: Bladder cancer  Position: Dorsal Lithotomy    Findings:   The patient was prepped and draped in the usual sterile fashion. The flexible cystoscope was advanced through the urethra and into the bladder. The bladder was thoroughly inspected and the following was noted:    Residual Urine: none  Urethra: normal appearing urethra with no masses, tenderness or lesions  Prostate: partially obstructing lateral lobes of prostate; median lobe present? no.    Bladder: Multiple bladder tumors throughout the bladder including 3 prominent tumors anteriorly and multiple small subcentimeter tumors throughout the bladder. No bladder diverticulum. There was none trabeculation noted. Ureters: Clear efflux from both ureters. Orifices with normal configuration and location. The cystoscope was removed. The patient tolerated the procedure well.        Plan: TURBT

## 2023-08-01 LAB
MICROORGANISM SPEC CULT: NO GROWTH
SPECIMEN DESCRIPTION: NORMAL

## 2023-08-09 ENCOUNTER — TELEPHONE (OUTPATIENT)
Dept: FAMILY MEDICINE CLINIC | Age: 73
End: 2023-08-09

## 2023-08-09 ENCOUNTER — TELEPHONE (OUTPATIENT)
Dept: UROLOGY | Age: 73
End: 2023-08-09

## 2023-08-09 NOTE — TELEPHONE ENCOUNTER
Incoming fax came from [employer] office Dr. Lon Cortes ,    [employer] office will like a medical clearance from Provider. Upcoming surgery/Procedure: 9/1/2023     Scheduled on 8/8/2023 . Will need medical clearance prior to scheduled surgery/Procedure . Please see attachment below. Please advise. Thank you!     Future Appointments   Date Time Provider [address]  [facility]   8/18/2023 10:00 AM ST[facility] PAT RM 1 [facility] PAT [facility]   8/22/2023  9:00 AM Sandra Phillips APRN - CNP [facility] [address] [facility]   8/23/2023 10:00 AM Michael Delcid MD [address] [place] [address]   9/11/2023  1:10 PM Cheikh Can MD [facility]   9/27/2023 11:00 AM Tomasa Eisenmenger, MD fp sc [facility]OLPP   1/9/2024  1:00 PM Denisse Wilson MD [facility]

## 2023-08-09 NOTE — TELEPHONE ENCOUNTER
Patient has appointment for clearance with us on 8/16/2023, he is scheduled for general anesthesia  I do not see aspirin on the list  Pretesting is scheduled 8/18/2023  Patient has seen Dr. Belen Moon before  Stress test low risk 7/9/2020  Last EKG 10/12/2022 normal  Await pretesting  Future Appointments   Date Time Provider [address]   8/16/2023  9:30 AM [facility]   8/18/2023 10:00 AM ST[facility] PAT RM 1 [facility]Z PAT [facility]   8/22/2023  9:00 AM Meghan Mclaughlin APRN - CNP STCZ [address] [facility]   8/23/2023 10:00 AM Joe Gr MD [address] [place] [address]   9/11/2023  1:10 PM Nyla Raphael MD [facility]   9/27/2023 11:00 [facility]   1/9/2024  1:00 PM Henrry Petty MD [facility]

## 2023-08-09 NOTE — TELEPHONE ENCOUNTER
COLE Hansen @ [facility] 9/1/23 7:30am **STOP BLOOD THINNERS 8/25/23**   PAT @ [facility] 8/18/23 10:00am       Spoke with patient, procedure info emailed.

## 2023-08-13 DIAGNOSIS — M54.16 LUMBAR RADICULOPATHY: Chronic | ICD-10-CM

## 2023-08-13 DIAGNOSIS — M51.36 DEGENERATIVE DISC DISEASE, LUMBAR: Chronic | ICD-10-CM

## 2023-08-14 RX ORDER — PREGABALIN 150 MG/1
CAPSULE ORAL
Qty: 90 CAPSULE | OUTPATIENT
Start: 2023-08-14

## 2023-08-16 ENCOUNTER — OFFICE VISIT (OUTPATIENT)
Dept: FAMILY MEDICINE CLINIC | Age: 73
End: 2023-08-16
Payer: MEDICARE

## 2023-08-16 VITALS
WEIGHT: 203.2 LBS | TEMPERATURE: 98.4 F | HEIGHT: 69 IN | SYSTOLIC BLOOD PRESSURE: 136 MMHG | OXYGEN SATURATION: 97 % | DIASTOLIC BLOOD PRESSURE: 84 MMHG | HEART RATE: 84 BPM | BODY MASS INDEX: 30.1 KG/M2

## 2023-08-16 DIAGNOSIS — E11.42 TYPE 2 DIABETES MELLITUS WITH DIABETIC POLYNEUROPATHY, WITHOUT LONG-TERM CURRENT USE OF INSULIN (HCC): Primary | ICD-10-CM

## 2023-08-16 DIAGNOSIS — N32.89 OTHER SPECIFIED DISORDERS OF BLADDER: ICD-10-CM

## 2023-08-16 DIAGNOSIS — Z87.891 PERSONAL HISTORY OF TOBACCO USE: ICD-10-CM

## 2023-08-16 DIAGNOSIS — Z87.891 SMOKING HISTORY: ICD-10-CM

## 2023-08-16 DIAGNOSIS — Z01.818 PRE-OP TESTING: Primary | ICD-10-CM

## 2023-08-16 DIAGNOSIS — I71.43 INFRARENAL ABDOMINAL AORTIC ANEURYSM (AAA) WITHOUT RUPTURE (HCC): ICD-10-CM

## 2023-08-16 PROCEDURE — 1036F TOBACCO NON-USER: CPT | Performed by: NURSE PRACTITIONER

## 2023-08-16 PROCEDURE — 1123F ACP DISCUSS/DSCN MKR DOCD: CPT | Performed by: NURSE PRACTITIONER

## 2023-08-16 PROCEDURE — 3079F DIAST BP 80-89 MM HG: CPT | Performed by: NURSE PRACTITIONER

## 2023-08-16 PROCEDURE — 99214 OFFICE O/P EST MOD 30 MIN: CPT | Performed by: NURSE PRACTITIONER

## 2023-08-16 PROCEDURE — 3017F COLORECTAL CA SCREEN DOC REV: CPT | Performed by: NURSE PRACTITIONER

## 2023-08-16 PROCEDURE — 3075F SYST BP GE 130 - 139MM HG: CPT | Performed by: NURSE PRACTITIONER

## 2023-08-16 PROCEDURE — G0296 VISIT TO DETERM LDCT ELIG: HCPCS | Performed by: NURSE PRACTITIONER

## 2023-08-16 PROCEDURE — G8417 CALC BMI ABV UP PARAM F/U: HCPCS | Performed by: NURSE PRACTITIONER

## 2023-08-16 PROCEDURE — G8427 DOCREV CUR MEDS BY ELIG CLIN: HCPCS | Performed by: NURSE PRACTITIONER

## 2023-08-16 RX ORDER — HYDROXYZINE PAMOATE 25 MG/1
CAPSULE ORAL
COMMUNITY

## 2023-08-16 RX ORDER — ZOSTER VACCINE RECOMBINANT, ADJUVANTED 50 MCG/0.5
0.5 KIT INTRAMUSCULAR SEE ADMIN INSTRUCTIONS
Qty: 0.5 ML | Refills: 0 | Status: SHIPPED | OUTPATIENT
Start: 2023-08-16 | End: 2023-08-17

## 2023-08-16 RX ORDER — SODIUM CHLORIDE, SODIUM LACTATE, POTASSIUM CHLORIDE, CALCIUM CHLORIDE 600; 310; 30; 20 MG/100ML; MG/100ML; MG/100ML; MG/100ML
INJECTION, SOLUTION INTRAVENOUS CONTINUOUS
OUTPATIENT
Start: 2023-08-16

## 2023-08-16 SDOH — ECONOMIC STABILITY: INCOME INSECURITY: HOW HARD IS IT FOR YOU TO PAY FOR THE VERY BASICS LIKE FOOD, HOUSING, MEDICAL CARE, AND HEATING?: SOMEWHAT HARD

## 2023-08-16 SDOH — ECONOMIC STABILITY: FOOD INSECURITY: WITHIN THE PAST 12 MONTHS, THE FOOD YOU BOUGHT JUST DIDN'T LAST AND YOU DIDN'T HAVE MONEY TO GET MORE.: SOMETIMES TRUE

## 2023-08-16 SDOH — ECONOMIC STABILITY: FOOD INSECURITY: WITHIN THE PAST 12 MONTHS, YOU WORRIED THAT YOUR FOOD WOULD RUN OUT BEFORE YOU GOT MONEY TO BUY MORE.: SOMETIMES TRUE

## 2023-08-16 ASSESSMENT — PATIENT HEALTH QUESTIONNAIRE - PHQ9
1. LITTLE INTEREST OR PLEASURE IN DOING THINGS: 0
SUM OF ALL RESPONSES TO PHQ QUESTIONS 1-9: 0
SUM OF ALL RESPONSES TO PHQ QUESTIONS 1-9: 0
2. FEELING DOWN, DEPRESSED OR HOPELESS: 0
SUM OF ALL RESPONSES TO PHQ QUESTIONS 1-9: 0
SUM OF ALL RESPONSES TO PHQ9 QUESTIONS 1 & 2: 0
SUM OF ALL RESPONSES TO PHQ QUESTIONS 1-9: 0

## 2023-08-16 NOTE — DISCHARGE INSTRUCTIONS
Preoperative Instructions:    Stop eating solid foods at midnight the night prior to surgery. Stop drinking clear liquids at midnight the night prior to surgery. (Follow bowel prep instructions if instructed by your surgeon.)    Phil Ambrizing at the surgery center (Entrance B) by 6:00 on 9/1/2023  (or as directed by your surgeon's office). Please stop any blood thinning medications as directed by your surgeon or prescribing physician. Failure to stop certain medications may interfere with your scheduled surgery. These may include:  Aspirin, Warfarin (Coumadin), Clopidogrel (Plavix), Ibuprofen (Motrin, Advil), Naproxen (Aleve), Meloxicam (Mobic), Celecoxib (Celebrex), Eliquis, Pradaxa, Xarelto, Effient, Fish Oil, Herbal supplements. You may continue the rest of your medications through the night before surgery unless instructed otherwise. Please take only the following medication(s) the day of surgery with a small sip of water:  Lopressor/metoprolol, lisinopril, lyrica, protonix, percocet if needed, MS Contin    Please use and bring inhalers the day of surgery. Please bring CPAP the day of surgery. PLEASE NOTE:  THE ABOVE (IF ANY) DISCONTINUED MEDS MAY ONLY BE FROM   \"CLEANING UP\" THE MED LIST AND WERE NOT ACTUALLY CANCELLED; SEE CHART FOR DETAILS AND ALWAYS CHECK WITH PRESCRIBING PROVIDER BEFORE DISCONTINUING ANY MEDICATIONS          ____________________________  ____________________________  Signature (Patient)           Signature/date(Provider)      REMINDERS:  ** If you are going home the day of your procedure, you will need a friend or family member to drive you home after your procedure. Your  must be 25years of age or older and able to sign off on your discharge instructions. Taxi cabs or any form of public transportation is not acceptable. ** It is preferable that the friend or family member stay at the hospital throughout your procedure.   ** If you are going home the same day as your

## 2023-08-16 NOTE — PROGRESS NOTES
I will add it to his lab work.
03/16/2024    Colorectal Cancer Screen  06/07/2025    Hepatitis A vaccine  Completed    Pneumococcal 65+ years Vaccine  Completed    Hib vaccine  Aged Out    Meningococcal (ACWY) vaccine  Aged Out    Hepatitis B vaccine  Discontinued    AAA screen  Discontinued    Prostate Specific Antigen (PSA) Screening or Monitoring  Discontinued
Cardiographics  ECG:  ECG ordered - results -   Echocardiogram:  Not ordered at this time, awaiting ECG results    -has cardiology appointment on 8/18/2023    Imaging  Chest X-Ray:  Ordered - awaiting results      Lab Review   Ordered coagulation studies, CBC, CMP, UA  - awaiting results -      Lab Results   Component Value Date    LABA1C 8.5 (H) 02/15/2023    LABA1C 6.6 (H) 08/19/2022    LABA1C 5.2 03/12/2022     Recently had A1c checked with insurance company, will obtain records    Recently had eye exam - will obtain records      Assessment:      72 y.o. male with planned surgery as above. Known risk factors for perioperative complications: Anemia  Diabetes mellitus AAA HTN    Cardiac Risk Estimation: per the Revised Cardiac Risk Index (Circ. 100:1043, 1999), the patient's risk factors for cardiac complications include N/A, putting him in: RCI RISK CLASS I (0 risk factors, risk of major cardiac compl. appr. 0.5%)    Current medications which may produce withdrawal symptoms if withheld perioperatively: Percocet, MS Contin        Plan:      1. Preoperative workup as follows chest x-ray, ECG, hemoglobin, hematocrit, electrolytes, creatinine, glucose, liver function studies, coagulation studies, urinalysis (urinary tract instrumentation planned)  2. Change in medication regimen before surgery: discontinue ASA 14d before surgery, discontinue NSAIDs 14d before surgery, discontinue Metformin 24h before surgery  3. Prophylaxis for cardiac events with perioperative beta-blockers: should be considered, specific regimen per anesthesia  4. Invasive hemodynamic monitoring perioperatively: at the discretion of anesthesiologist  5. Deep vein thrombosis prophylaxis postoperatively:regimen to be chosen by surgical team  6.  Surveillance for postoperative MI with ECG immediately postoperatively and on postoperative days 1 and 2 AND troponin levels 24 hours postoperatively and on day 4 or hospital discharge (whichever comes

## 2023-08-18 ENCOUNTER — HOSPITAL ENCOUNTER (OUTPATIENT)
Dept: GENERAL RADIOLOGY | Age: 73
End: 2023-08-18
Payer: MEDICARE

## 2023-08-18 ENCOUNTER — HOSPITAL ENCOUNTER (OUTPATIENT)
Age: 73
Discharge: HOME | End: 2023-08-18
Payer: MEDICARE

## 2023-08-18 ENCOUNTER — HOSPITAL ENCOUNTER (OUTPATIENT)
Dept: PREADMISSION TESTING | Age: 73
End: 2023-08-18
Payer: MEDICARE

## 2023-08-18 VITALS
BODY MASS INDEX: 29.77 KG/M2 | DIASTOLIC BLOOD PRESSURE: 82 MMHG | RESPIRATION RATE: 18 BRPM | OXYGEN SATURATION: 96 % | HEIGHT: 69 IN | HEART RATE: 80 BPM | SYSTOLIC BLOOD PRESSURE: 144 MMHG | WEIGHT: 201 LBS | TEMPERATURE: 97.1 F

## 2023-08-18 DIAGNOSIS — C68.9 UROTHELIAL CANCER (HCC): ICD-10-CM

## 2023-08-18 DIAGNOSIS — Z01.818 PRE-OP TESTING: ICD-10-CM

## 2023-08-18 DIAGNOSIS — D50.8 IRON DEFICIENCY ANEMIA SECONDARY TO INADEQUATE DIETARY IRON INTAKE: ICD-10-CM

## 2023-08-18 DIAGNOSIS — E11.42 TYPE 2 DIABETES MELLITUS WITH DIABETIC POLYNEUROPATHY, WITHOUT LONG-TERM CURRENT USE OF INSULIN (HCC): ICD-10-CM

## 2023-08-18 DIAGNOSIS — N32.89 OTHER SPECIFIED DISORDERS OF BLADDER: ICD-10-CM

## 2023-08-18 DIAGNOSIS — I71.43 INFRARENAL ABDOMINAL AORTIC ANEURYSM (AAA) WITHOUT RUPTURE (HCC): ICD-10-CM

## 2023-08-18 LAB
ALBUMIN SERPL-MCNC: 4.3 G/DL (ref 3.5–5.2)
ALBUMIN/GLOB SERPL: 1.5 {RATIO} (ref 1–2.5)
ALP SERPL-CCNC: 84 U/L (ref 40–129)
ALT SERPL-CCNC: 18 U/L (ref 5–41)
ANION GAP SERPL CALCULATED.3IONS-SCNC: 13 MMOL/L (ref 9–17)
AST SERPL-CCNC: 16 U/L
BASOPHILS # BLD: 0.07 K/UL (ref 0–0.2)
BASOPHILS # BLD: 0.07 K/UL (ref 0–0.2)
BASOPHILS NFR BLD: 1 % (ref 0–2)
BASOPHILS NFR BLD: 1 % (ref 0–2)
BILIRUB SERPL-MCNC: 0.4 MG/DL (ref 0.3–1.2)
BILIRUB UR QL STRIP: NEGATIVE
BUN SERPL-MCNC: 14 MG/DL (ref 8–23)
CALCIUM SERPL-MCNC: 9.2 MG/DL (ref 8.6–10.4)
CHLORIDE SERPL-SCNC: 103 MMOL/L (ref 98–107)
CLARITY UR: CLEAR
CO2 SERPL-SCNC: 23 MMOL/L (ref 20–31)
COLOR UR: YELLOW
CREAT SERPL-MCNC: 0.8 MG/DL (ref 0.7–1.2)
EKG ATRIAL RATE: 73 BPM
EKG P AXIS: 39 DEGREES
EKG P-R INTERVAL: 146 MS
EKG Q-T INTERVAL: 382 MS
EKG QRS DURATION: 84 MS
EKG QTC CALCULATION (BAZETT): 420 MS
EKG R AXIS: 24 DEGREES
EKG T AXIS: 46 DEGREES
EKG VENTRICULAR RATE: 73 BPM
EOSINOPHIL # BLD: 0.05 K/UL (ref 0–0.44)
EOSINOPHIL # BLD: 0.05 K/UL (ref 0–0.44)
EOSINOPHILS RELATIVE PERCENT: 1 % (ref 1–4)
EOSINOPHILS RELATIVE PERCENT: 1 % (ref 1–4)
EPI CELLS #/AREA URNS HPF: NORMAL /HPF (ref 0–5)
ERYTHROCYTE [DISTWIDTH] IN BLOOD BY AUTOMATED COUNT: 14.4 % (ref 11.8–14.4)
ERYTHROCYTE [DISTWIDTH] IN BLOOD BY AUTOMATED COUNT: 14.4 % (ref 11.8–14.4)
EST. AVERAGE GLUCOSE BLD GHB EST-MCNC: 123 MG/DL
FERRITIN SERPL-MCNC: 12 NG/ML (ref 30–400)
GFR SERPL CREATININE-BSD FRML MDRD: >60 ML/MIN/1.73M2
GLUCOSE SERPL-MCNC: 111 MG/DL (ref 70–99)
GLUCOSE UR STRIP-MCNC: ABNORMAL MG/DL
HBA1C MFR BLD: 5.9 % (ref 4–6)
HCT VFR BLD AUTO: 46 % (ref 40.7–50.3)
HCT VFR BLD AUTO: 48 % (ref 40.7–50.3)
HGB BLD-MCNC: 14.6 G/DL (ref 13–17)
HGB BLD-MCNC: 14.7 G/DL (ref 13–17)
HGB UR QL STRIP.AUTO: ABNORMAL
IMM GRANULOCYTES # BLD AUTO: 0.05 K/UL (ref 0–0.3)
IMM GRANULOCYTES # BLD AUTO: 0.05 K/UL (ref 0–0.3)
IMM GRANULOCYTES NFR BLD: 1 %
IMM GRANULOCYTES NFR BLD: 1 %
INR PPP: 1
IRON SATN MFR SERPL: 11 % (ref 20–55)
IRON SERPL-MCNC: 40 UG/DL (ref 59–158)
KETONES UR STRIP-MCNC: NEGATIVE MG/DL
LEUKOCYTE ESTERASE UR QL STRIP: NEGATIVE
LYMPHOCYTES NFR BLD: 1.62 K/UL (ref 1.1–3.7)
LYMPHOCYTES NFR BLD: 1.62 K/UL (ref 1.1–3.7)
LYMPHOCYTES RELATIVE PERCENT: 25 % (ref 24–43)
LYMPHOCYTES RELATIVE PERCENT: 25 % (ref 24–43)
MCH RBC QN AUTO: 26.4 PG (ref 25.2–33.5)
MCH RBC QN AUTO: 26.9 PG (ref 25.2–33.5)
MCHC RBC AUTO-ENTMCNC: 30.6 G/DL (ref 28.4–34.8)
MCHC RBC AUTO-ENTMCNC: 31.7 G/DL (ref 28.4–34.8)
MCV RBC AUTO: 84.7 FL (ref 82.6–102.9)
MCV RBC AUTO: 86.2 FL (ref 82.6–102.9)
MONOCYTES NFR BLD: 0.56 K/UL (ref 0.1–1.2)
MONOCYTES NFR BLD: 0.56 K/UL (ref 0.1–1.2)
MONOCYTES NFR BLD: 9 % (ref 3–12)
MONOCYTES NFR BLD: 9 % (ref 3–12)
NEUTROPHILS NFR BLD: 63 % (ref 36–65)
NEUTROPHILS NFR BLD: 63 % (ref 36–65)
NEUTS SEG NFR BLD: 4.09 K/UL (ref 1.5–8.1)
NEUTS SEG NFR BLD: 4.09 K/UL (ref 1.5–8.1)
NITRITE UR QL STRIP: NEGATIVE
NRBC BLD-RTO: 0 PER 100 WBC
NRBC BLD-RTO: 0 PER 100 WBC
PARTIAL THROMBOPLASTIN TIME: 24.9 SEC (ref 23–36.5)
PH UR STRIP: 5 [PH] (ref 5–8)
PLATELET # BLD AUTO: 197 K/UL (ref 138–453)
PLATELET # BLD AUTO: 217 K/UL (ref 138–453)
PMV BLD AUTO: 10.3 FL (ref 8.1–13.5)
PMV BLD AUTO: 9.4 FL (ref 8.1–13.5)
POTASSIUM SERPL-SCNC: 4.2 MMOL/L (ref 3.7–5.3)
PROT SERPL-MCNC: 7.1 G/DL (ref 6.4–8.3)
PROT UR STRIP-MCNC: NEGATIVE MG/DL
PROTHROMBIN TIME: 12.5 SEC (ref 11.7–14.9)
RBC # BLD AUTO: 5.43 M/UL (ref 4.21–5.77)
RBC # BLD AUTO: 5.57 M/UL (ref 4.21–5.77)
RBC #/AREA URNS HPF: NORMAL /HPF (ref 0–4)
SODIUM SERPL-SCNC: 139 MMOL/L (ref 135–144)
SP GR UR STRIP: 1.03 (ref 1–1.03)
TIBC SERPL-MCNC: 368 UG/DL (ref 250–450)
UNSATURATED IRON BINDING CAPACITY: 328 UG/DL (ref 112–347)
UROBILINOGEN UR STRIP-ACNC: NORMAL EU/DL (ref 0–1)
WBC #/AREA URNS HPF: NORMAL /HPF (ref 0–5)
WBC OTHER # BLD: 6.1 K/UL (ref 3.5–11.3)
WBC OTHER # BLD: 6.4 K/UL (ref 3.5–11.3)

## 2023-08-18 PROCEDURE — 81001 URINALYSIS AUTO W/SCOPE: CPT

## 2023-08-18 PROCEDURE — 36415 COLL VENOUS BLD VENIPUNCTURE: CPT

## 2023-08-18 PROCEDURE — 71046 X-RAY EXAM CHEST 2 VIEWS: CPT

## 2023-08-18 PROCEDURE — 83036 HEMOGLOBIN GLYCOSYLATED A1C: CPT

## 2023-08-18 PROCEDURE — 82728 ASSAY OF FERRITIN: CPT

## 2023-08-18 PROCEDURE — 93005 ELECTROCARDIOGRAM TRACING: CPT

## 2023-08-18 PROCEDURE — 80053 COMPREHEN METABOLIC PANEL: CPT

## 2023-08-18 PROCEDURE — 87086 URINE CULTURE/COLONY COUNT: CPT

## 2023-08-18 PROCEDURE — 83550 IRON BINDING TEST: CPT

## 2023-08-18 PROCEDURE — 85025 COMPLETE CBC W/AUTO DIFF WBC: CPT

## 2023-08-18 PROCEDURE — 85730 THROMBOPLASTIN TIME PARTIAL: CPT

## 2023-08-18 PROCEDURE — 85610 PROTHROMBIN TIME: CPT

## 2023-08-18 PROCEDURE — 83540 ASSAY OF IRON: CPT

## 2023-08-18 ASSESSMENT — PAIN DESCRIPTION - PAIN TYPE: TYPE: CHRONIC PAIN

## 2023-08-18 ASSESSMENT — PAIN DESCRIPTION - DESCRIPTORS: DESCRIPTORS: ACHING

## 2023-08-18 ASSESSMENT — PAIN DESCRIPTION - LOCATION: LOCATION: BACK

## 2023-08-18 ASSESSMENT — PAIN SCALES - GENERAL: PAINLEVEL_OUTOF10: 3

## 2023-08-18 NOTE — H&P
History and Physical    Pt Name: El Salgado  MRN: [id number]  YOB: 1950  Date of evaluation: 8/18/2023    SUBJECTIVE:   History of Chief Complaint:    Patient presents for PAT appointment. He has been scheduled for cystoscopy, TURBT. Patient says that he initially had hematuria at the onset of this, presented for evaluation and was found to have bladder cancer. Patient follows with urology, has had multiple cystoscopies in the past.  He says that on his three month follow up cystoscopy he was noted to have abnormality and so has now been scheduled for this procedure. Past Medical History    has a past medical history of AAA (abdominal aortic aneurysm) ([address]), Abnormal electrocardiogram (ECG) (EKG), Arthritis, Bladder cancer ([address]), Bleeding ulcer, Chronic neck pain, Colon polyps, COVID-19, COVID-19 virus infection, Diabetic neuropathy ([address]), Diarrhea, Enteritis, Essential hypertension, Fatty liver, GERD (gastroesophageal reflux disease), Hepatitis B core antibody positive, History of bleeding peptic ulcer, History of blood transfusion, History of hepatitis C, History of migraine headaches, History of stress test, Hyperlipidemia, IBS (irritable bowel syndrome), Iron (Fe) deficiency anemia, Lung nodule, Neuropathy, Poor historian, Positive FIT (fecal immunochemical test), Snores, Type 2 diabetes mellitus with microalbuminuria, without long-term current use of insulin ([address]), Under care of service provider, Under care of service provider, Under care of service provider, Under care of service provider, Under care of service provider, Under care of service provider, Under care of team, Wears dentures, and Wears glasses. Past Surgical History   has a past surgical history that includes Cervical spine surgery (1977); shoulder surgery (Right); Dental surgery (10/2015); Colonoscopy (01/25/2015); Upper gastrointestinal endoscopy (01/25/2015); Cholecystectomy (03/22/2019); Umbilical hernia repair;  Upper

## 2023-08-18 NOTE — PROGRESS NOTES
Anesthesia Focused Assessment    STOP-BANG Sleep Apnea Questionnaire    SNORE loudly (heard through closed doors)? Yes  TIRED, fatigued, sleepy during daytime? No  OBSERVED stopping breathing during sleep? No  High blood PRESSURE being treated? Yes    BMI over 35? No  AGE over 48? Yes  NECK circumference over 16\"? No  GENDER (male)? Yes             Total 4  High risk 5-8  Intermediate risk 3-4  Low risk 0-2    Obstructive Sleep Apnea: denies  If YES, machine used: no     Type 1 DM:   no  T2DM:  yes    Coronary Artery Disease:  follows with [facility], due for appointment per their note. Hypertension:  yes    Active smoker:  up to 2 PPD for 45 years, quit 2015  Drinks Alcohol:  no    Dentition: upper and lower dentures    Defib / AICD / Pacemaker: no      Renal Failure/dialysis:  no    Patient was evaluated in PAT & anesthesia guidelines were applied. NPO guidelines, medication instructions and scheduled arrival time were reviewed with patient. I advised patient to please contact the surgeon's office, ahead of time if possible, if any new signs or symptoms of illness, infection, rash, etc    Hx of anesthesia complications:  no  Family hx of anesthesia complications:  no                                                                                                                     PCP clearance/notes are on file, patient was seen for clearance two days ago. Cardiac clearance copy to be requested, patient due for appointment for this clearance according to cardiology notes. He follows with [facility], last visit 2022.     Maria A Lindquist PA-C  8/18/23  10:11 AM

## 2023-08-19 LAB
MICROORGANISM SPEC CULT: NO GROWTH
SPECIMEN DESCRIPTION: NORMAL

## 2023-08-21 LAB
EKG ATRIAL RATE: 73 BPM
EKG P AXIS: 39 DEGREES
EKG P-R INTERVAL: 146 MS
EKG Q-T INTERVAL: 382 MS
EKG QRS DURATION: 84 MS
EKG QTC CALCULATION (BAZETT): 420 MS
EKG R AXIS: 24 DEGREES
EKG T AXIS: 46 DEGREES
EKG VENTRICULAR RATE: 73 BPM

## 2023-08-22 ENCOUNTER — HOSPITAL ENCOUNTER (OUTPATIENT)
Dept: PAIN MANAGEMENT | Age: 73
Discharge: HOME | End: 2023-08-22
Payer: MEDICARE

## 2023-08-22 VITALS — BODY MASS INDEX: 29.92 KG/M2 | HEIGHT: 69 IN | TEMPERATURE: 97.3 F | RESPIRATION RATE: 20 BRPM | WEIGHT: 202 LBS

## 2023-08-22 DIAGNOSIS — M47.816 LUMBAR SPONDYLOSIS: Chronic | ICD-10-CM

## 2023-08-22 DIAGNOSIS — M51.36 DEGENERATIVE DISC DISEASE, LUMBAR: Primary | Chronic | ICD-10-CM

## 2023-08-22 DIAGNOSIS — Z79.891 CHRONIC USE OF OPIATE FOR THERAPEUTIC PURPOSE: ICD-10-CM

## 2023-08-22 DIAGNOSIS — M48.061 SPINAL STENOSIS OF LUMBAR REGION, UNSPECIFIED WHETHER NEUROGENIC CLAUDICATION PRESENT: Chronic | ICD-10-CM

## 2023-08-22 DIAGNOSIS — M54.16 LUMBAR RADICULOPATHY: Chronic | ICD-10-CM

## 2023-08-22 PROCEDURE — 99213 OFFICE O/P EST LOW 20 MIN: CPT

## 2023-08-22 RX ORDER — MORPHINE SULFATE 15 MG/1
15 TABLET, FILM COATED, EXTENDED RELEASE ORAL 2 TIMES DAILY
Qty: 60 TABLET | Refills: 0 | Status: SHIPPED | OUTPATIENT
Start: 2023-08-24 | End: 2023-08-22 | Stop reason: ALTCHOICE

## 2023-08-22 RX ORDER — OXYCODONE HYDROCHLORIDE AND ACETAMINOPHEN 5; 325 MG/1; MG/1
1 TABLET ORAL 2 TIMES DAILY PRN
Qty: 60 TABLET | Refills: 0 | Status: SHIPPED | OUTPATIENT
Start: 2023-08-24 | End: 2023-08-22 | Stop reason: ALTCHOICE

## 2023-08-22 ASSESSMENT — ENCOUNTER SYMPTOMS
BACK PAIN: 1
CONSTIPATION: 0
BOWEL INCONTINENCE: 0
SHORTNESS OF BREATH: 0
COUGH: 0

## 2023-08-22 NOTE — PROGRESS NOTES
Relevant Medications    morphine (MS CONTIN) 15 MG extended release tablet (Start on 8/24/2023)    Lumbar spinal stenosis (Chronic)    Relevant Medications    morphine (MS CONTIN) 15 MG extended release tablet (Start on 8/24/2023)          Treatment Plan:      POC is to discontinue medication management d/t failed UDS  Pt would like appt with MD to discuss  Script discontinued and pt encouraged pt to f/u with PCP for another [facility]"" referral   UDS results reviewed with pt - admits to taking 3 times a day at times d/t increased pain in his feet  Msg to MD re  POC and appt offered and medication discontinued   Message left on pharmacy line with POC update    I have reviewed the chief complaint and history of present illness (including ROS and PFSH) and vital documentation by my staff and I agree with their documentation and have added where applicable.

## 2023-08-23 ENCOUNTER — HOSPITAL ENCOUNTER (OUTPATIENT)
Dept: PAIN MANAGEMENT | Age: 73
Discharge: HOME | End: 2023-08-23
Payer: MEDICARE

## 2023-08-23 ENCOUNTER — TELEPHONE (OUTPATIENT)
Dept: ONCOLOGY | Age: 73
End: 2023-08-23

## 2023-08-23 ENCOUNTER — OFFICE VISIT (OUTPATIENT)
Dept: ONCOLOGY | Age: 73
End: 2023-08-23
Payer: MEDICARE

## 2023-08-23 VITALS — TEMPERATURE: 97.3 F | RESPIRATION RATE: 20 BRPM | HEIGHT: 69 IN | WEIGHT: 204 LBS | BODY MASS INDEX: 30.21 KG/M2

## 2023-08-23 VITALS
WEIGHT: 204.8 LBS | TEMPERATURE: 97.9 F | SYSTOLIC BLOOD PRESSURE: 132 MMHG | DIASTOLIC BLOOD PRESSURE: 74 MMHG | BODY MASS INDEX: 30.24 KG/M2 | HEART RATE: 76 BPM

## 2023-08-23 DIAGNOSIS — M48.061 SPINAL STENOSIS OF LUMBAR REGION, UNSPECIFIED WHETHER NEUROGENIC CLAUDICATION PRESENT: ICD-10-CM

## 2023-08-23 DIAGNOSIS — C68.9 UROTHELIAL CANCER (HCC): ICD-10-CM

## 2023-08-23 DIAGNOSIS — D50.8 IRON DEFICIENCY ANEMIA SECONDARY TO INADEQUATE DIETARY IRON INTAKE: ICD-10-CM

## 2023-08-23 DIAGNOSIS — M51.36 DEGENERATIVE DISC DISEASE, LUMBAR: ICD-10-CM

## 2023-08-23 DIAGNOSIS — Z79.891 CHRONIC USE OF OPIATE FOR THERAPEUTIC PURPOSE: ICD-10-CM

## 2023-08-23 DIAGNOSIS — D50.0 IRON DEFICIENCY ANEMIA DUE TO CHRONIC BLOOD LOSS: Primary | ICD-10-CM

## 2023-08-23 DIAGNOSIS — M54.16 LUMBAR RADICULOPATHY: Primary | ICD-10-CM

## 2023-08-23 DIAGNOSIS — M47.816 LUMBAR SPONDYLOSIS: ICD-10-CM

## 2023-08-23 PROCEDURE — 99214 OFFICE O/P EST MOD 30 MIN: CPT | Performed by: STUDENT IN AN ORGANIZED HEALTH CARE EDUCATION/TRAINING PROGRAM

## 2023-08-23 PROCEDURE — 99211 OFF/OP EST MAY X REQ PHY/QHP: CPT | Performed by: INTERNAL MEDICINE

## 2023-08-23 PROCEDURE — 3075F SYST BP GE 130 - 139MM HG: CPT | Performed by: INTERNAL MEDICINE

## 2023-08-23 PROCEDURE — 99214 OFFICE O/P EST MOD 30 MIN: CPT | Performed by: INTERNAL MEDICINE

## 2023-08-23 PROCEDURE — 3078F DIAST BP <80 MM HG: CPT | Performed by: INTERNAL MEDICINE

## 2023-08-23 PROCEDURE — 1123F ACP DISCUSS/DSCN MKR DOCD: CPT | Performed by: INTERNAL MEDICINE

## 2023-08-23 PROCEDURE — G8417 CALC BMI ABV UP PARAM F/U: HCPCS | Performed by: INTERNAL MEDICINE

## 2023-08-23 PROCEDURE — 1036F TOBACCO NON-USER: CPT | Performed by: INTERNAL MEDICINE

## 2023-08-23 PROCEDURE — G8427 DOCREV CUR MEDS BY ELIG CLIN: HCPCS | Performed by: INTERNAL MEDICINE

## 2023-08-23 PROCEDURE — 3017F COLORECTAL CA SCREEN DOC REV: CPT | Performed by: INTERNAL MEDICINE

## 2023-08-23 PROCEDURE — 99213 OFFICE O/P EST LOW 20 MIN: CPT

## 2023-08-23 RX ORDER — SODIUM CHLORIDE 9 MG/ML
INJECTION, SOLUTION INTRAVENOUS CONTINUOUS
OUTPATIENT
Start: 2023-08-23

## 2023-08-23 RX ORDER — ONDANSETRON 2 MG/ML
8 INJECTION INTRAMUSCULAR; INTRAVENOUS
OUTPATIENT
Start: 2023-08-23

## 2023-08-23 RX ORDER — HEPARIN SODIUM (PORCINE) LOCK FLUSH IV SOLN 100 UNIT/ML 100 UNIT/ML
500 SOLUTION INTRAVENOUS PRN
OUTPATIENT
Start: 2023-08-23

## 2023-08-23 RX ORDER — SODIUM CHLORIDE 9 MG/ML
5-250 INJECTION, SOLUTION INTRAVENOUS PRN
OUTPATIENT
Start: 2023-08-23

## 2023-08-23 RX ORDER — SODIUM CHLORIDE 0.9 % (FLUSH) 0.9 %
5-40 SYRINGE (ML) INJECTION PRN
OUTPATIENT
Start: 2023-08-23

## 2023-08-23 RX ORDER — EPINEPHRINE 1 MG/ML
0.3 INJECTION, SOLUTION, CONCENTRATE INTRAVENOUS PRN
OUTPATIENT
Start: 2023-08-23

## 2023-08-23 RX ORDER — ACETAMINOPHEN 325 MG/1
650 TABLET ORAL
OUTPATIENT
Start: 2023-08-23

## 2023-08-23 RX ORDER — FAMOTIDINE 10 MG/ML
20 INJECTION, SOLUTION INTRAVENOUS
OUTPATIENT
Start: 2023-08-23

## 2023-08-23 RX ORDER — ALBUTEROL SULFATE 90 UG/1
4 AEROSOL, METERED RESPIRATORY (INHALATION) PRN
OUTPATIENT
Start: 2023-08-23

## 2023-08-23 RX ORDER — DIPHENHYDRAMINE HYDROCHLORIDE 50 MG/ML
50 INJECTION INTRAMUSCULAR; INTRAVENOUS
OUTPATIENT
Start: 2023-08-23

## 2023-08-23 RX ORDER — CLONIDINE HYDROCHLORIDE 0.1 MG/1
0.1 TABLET ORAL 2 TIMES DAILY PRN
Qty: 14 TABLET | Refills: 0 | Status: SHIPPED | OUTPATIENT
Start: 2023-08-23 | End: 2023-08-30

## 2023-08-23 NOTE — PROGRESS NOTES
distress, pleasant, well-appearing  HEENT: Normocephalic, atraumatic, Pupils equal and round  CARDIOVASCULAR: Well perfused, No peripheral cyanosis  PULMONARY: Good chest wall excursion, breathing unlabored  PSYCH: Appropriate affect and insight, non-pressured speech  SKIN: No rashes or lesions  MUSCULOSKELETAL:  Inspection: The back and extremities are symmetric and aligned. Muscle bulk is normal in appearance. Palpation: There is tenderness to palpation along the lumbar paraspinal musculature bilaterally  Lumbar range of motion is full  NEUROMUSCULAR:  Patient ambulates unassisted  Gait is antalgic  Sensation to light touch is intact throughout lower extremities  Strength is full in lower extremities  No ankle clonus    DIAGNOSIS:    ICD-10-CM    1. Lumbar radiculopathy  M54.16       2. Lumbar spondylosis  M47.816       3. Degenerative disc disease, lumbar  M51.36       4. Chronic use of opiate for therapeutic purpose  Z79.891       5. Spinal stenosis of lumbar region, unspecified whether neurogenic claudication present  M48.061         ASSESSMENT:    Jayjay Pena is a 72 y.o.male who presents with chronic low back pain and leg pain as well as opioid management visit. To review, patient has had low back pain for greater than 5 years. He denies any previous low back surgeries. The patient's history and physical examination are consistent with lumbar spondylosis and lumbar radiculopathy. Neurologically, it appears the patient has full strength and normal sensation. There is no evidence of myelopathy on examination. There are no red flags in the patient's history. The patient continues to take opioid medications to improve pain, function and quality of life. The patient denies any side effects from the medications including constipation or respiratory depression. The patient reports adequate analgesia with the medication.       At today's visit, I had a long discussion about the medication

## 2023-08-23 NOTE — TELEPHONE ENCOUNTER
AVS from 8/23/23    Injectafer as soon approved  Rv in 2 months with labs priro     AVS given to  to follow precert    RV scheduled for 10/25/23 @10:30am    Pt will have labs drawn one week prior to RV    PT was given AVS and appt schedule    Electronically signed by Patricia Eden on 8/23/2023 at 10:51 AM  P.S.

## 2023-08-23 NOTE — PROGRESS NOTES
retained stone in the extrahepatic common duct. Pancreatic Duct: No pancreatic ductal dilatation is seen. No peripancreatic fluid. A few scattered T2 hyperintense foci are seen within the pancreas, measuring 7 mm in size less, likely side-branch IPMNs. Pancreas calcification seen on recent CT are not clearly visible by MRI. No significant inflammatory stranding surround the pancreas. Other:  Adrenal glands are unremarkable. No hydronephrosis on the right. No hydronephrosis on the left. Subcentimeter T2 hyperintense foci seen in the right and left kidney, likely cyst. No significant small or large bowel distention noted. Scattered colonic diverticula are seen. There is borderline splenomegaly. No perisplenic fluid No abnormally enhancing mass on postcontrast images Spurring is seen in the spine     No acute intra-abdominal abnormality. Surgically absent gallbladder with biliary ductal dilatation. No retained common duct stone. Mild signal loss seen in liver on out of phase images, compatible with fatty infiltration. No inflammatory stranding surrounding pancreas. A few tiny T2 hyperintense foci seen within the pancreas likely side-branch IPMN RECOMMENDATIONS: ACR management recommendations for incidental cystic pancreatic masses in asymptomatic* patients on CT, MR, or US < 2 cm:  Single follow-up in 1 yr (MR preferred) *Stable:  Benign, no further follow-up *Growth: As below based upon size ** None of the following:  Hyperamylasemia, recent onset diabetes, severe epigastric pain, weight loss, steatorrhea, or jaundice. Shilpa WYLIE, et al.  Managing incidental findings on abdominal CT: white paper of the ACR Incidental Findings Committee. J Am Silvia Radiol 2010; 7:892-424.           IMPRESSION:   81year old male with history of bleeding ulcer back in 2015 and iron def, now with iron def anemia, and stool occult stools    -s/p IV iron with improvement in iron stores and hemoglobin  -EGD and colonoscopy

## 2023-08-28 RX ORDER — CLONIDINE HYDROCHLORIDE 0.1 MG/1
TABLET ORAL
Qty: 14 TABLET | Refills: 0 | OUTPATIENT
Start: 2023-08-28

## 2023-08-29 ENCOUNTER — TELEMEDICINE (OUTPATIENT)
Dept: FAMILY MEDICINE CLINIC | Age: 73
End: 2023-08-29
Payer: MEDICARE

## 2023-08-29 DIAGNOSIS — F11.93 OPIOID WITHDRAWAL (HCC): Primary | ICD-10-CM

## 2023-08-29 DIAGNOSIS — C67.2 MALIGNANT NEOPLASM OF LATERAL WALL OF URINARY BLADDER (HCC): ICD-10-CM

## 2023-08-29 DIAGNOSIS — I10 ESSENTIAL HYPERTENSION: ICD-10-CM

## 2023-08-29 DIAGNOSIS — M48.062 SPINAL STENOSIS OF LUMBAR REGION WITH NEUROGENIC CLAUDICATION: ICD-10-CM

## 2023-08-29 DIAGNOSIS — E11.42 TYPE 2 DIABETES MELLITUS WITH DIABETIC POLYNEUROPATHY, WITHOUT LONG-TERM CURRENT USE OF INSULIN (HCC): ICD-10-CM

## 2023-08-29 DIAGNOSIS — F41.9 ANXIETY: ICD-10-CM

## 2023-08-29 DIAGNOSIS — M54.41 CHRONIC BILATERAL LOW BACK PAIN WITH BILATERAL SCIATICA: ICD-10-CM

## 2023-08-29 DIAGNOSIS — M54.42 CHRONIC BILATERAL LOW BACK PAIN WITH BILATERAL SCIATICA: ICD-10-CM

## 2023-08-29 DIAGNOSIS — G89.29 CHRONIC BILATERAL LOW BACK PAIN WITH BILATERAL SCIATICA: ICD-10-CM

## 2023-08-29 DIAGNOSIS — E53.8 VITAMIN B 12 DEFICIENCY: ICD-10-CM

## 2023-08-29 DIAGNOSIS — M51.36 DEGENERATIVE DISC DISEASE, LUMBAR: ICD-10-CM

## 2023-08-29 PROCEDURE — G8427 DOCREV CUR MEDS BY ELIG CLIN: HCPCS | Performed by: FAMILY MEDICINE

## 2023-08-29 PROCEDURE — 3044F HG A1C LEVEL LT 7.0%: CPT | Performed by: FAMILY MEDICINE

## 2023-08-29 PROCEDURE — 2022F DILAT RTA XM EVC RTNOPTHY: CPT | Performed by: FAMILY MEDICINE

## 2023-08-29 PROCEDURE — 99214 OFFICE O/P EST MOD 30 MIN: CPT | Performed by: FAMILY MEDICINE

## 2023-08-29 PROCEDURE — 1123F ACP DISCUSS/DSCN MKR DOCD: CPT | Performed by: FAMILY MEDICINE

## 2023-08-29 PROCEDURE — 3017F COLORECTAL CA SCREEN DOC REV: CPT | Performed by: FAMILY MEDICINE

## 2023-08-29 RX ORDER — CLONIDINE HYDROCHLORIDE 0.2 MG/1
0.2 TABLET ORAL 2 TIMES DAILY
Qty: 14 TABLET | Refills: 0 | Status: SHIPPED | OUTPATIENT
Start: 2023-08-29 | End: 2023-09-05

## 2023-08-29 RX ORDER — METHYLPREDNISOLONE 4 MG/1
TABLET ORAL
Qty: 1 KIT | Refills: 0 | Status: SHIPPED | OUTPATIENT
Start: 2023-08-29

## 2023-08-29 RX ORDER — CYANOCOBALAMIN 1000 UG/ML
1000 INJECTION, SOLUTION INTRAMUSCULAR; SUBCUTANEOUS
Qty: 3 ML | Refills: 3 | Status: SHIPPED | OUTPATIENT
Start: 2023-08-29

## 2023-08-29 RX ORDER — BUSPIRONE HYDROCHLORIDE 10 MG/1
10 TABLET ORAL 3 TIMES DAILY PRN
Qty: 90 TABLET | Refills: 0 | Status: SHIPPED | OUTPATIENT
Start: 2023-08-29 | End: 2023-09-28

## 2023-08-29 RX ORDER — FLUOXETINE 10 MG/1
10 CAPSULE ORAL DAILY
Qty: 30 CAPSULE | Refills: 3 | Status: SHIPPED | OUTPATIENT
Start: 2023-08-29

## 2023-08-29 RX ORDER — LOPERAMIDE HYDROCHLORIDE 2 MG/1
2 CAPSULE ORAL 4 TIMES DAILY PRN
Qty: 40 CAPSULE | Refills: 0 | Status: SHIPPED | OUTPATIENT
Start: 2023-08-29 | End: 2023-09-08

## 2023-08-29 ASSESSMENT — ENCOUNTER SYMPTOMS
WHEEZING: 0
DIARRHEA: 1
BACK PAIN: 1
SHORTNESS OF BREATH: 0
ABDOMINAL PAIN: 1
VOMITING: 1
CONSTIPATION: 0
NAUSEA: 1
COUGH: 0
ABDOMINAL DISTENTION: 0
CHEST TIGHTNESS: 0

## 2023-08-29 NOTE — PROGRESS NOTES
SUBJECTIVE/OBJECTIVE:  Shira Bal (:  1950) has requested an audio/video evaluation for the following concern(s):Diabetes, Hypertension, and Back Pain    Comes with his wife Maryuri Shrestha? yes     He says he was dismissed from pain management and wants me to take over Lyrica  His wife says pain management not giving him pain meds , recently seen by Dr Pamela Moon and previously seen by Dr. Stuart Lam  Per records, he has seen pain management since   Patient says \"I don't want morphine and percocet\"    His wife says he has been having a lot of anxiety since the withdrawal.  He is not taking nortriptyline which I started for him last year for chronic pain and neuropathy. Patient's wife says he came negative on the drug test, because \"He would take 3 times a day sometimes, and would be short another time\"    Drug test inconsistent 2023, apparently he failed another drug test before as well. The wife says during the encounters see Dr. Stuart Lam he also failed a drug test once, but they understood that he self adjust his the medications. Patient's wife was very upset saying Mathew Mir has been with them for many years\", and did not understand why he was dismissed for an abnormal drug test, he and his wife do not understand why I cannot take over controlled medications either, I did explain them in detail, I did explain that there are other options to control the pain, they are willing to try.     Drug test 2023    \"DRUGS EXPECTED:   PERCOCET (OXYCODONE) [TODAY]   MORPHINE [TODAY]   LYRICA (PREGABALIN) [TODAY]   ________________________________________________________________   CONSISTENT with medications provided:   MORPHINE : based on morphine, hydromorphone   LYRICA (PREGABALIN) : based on pregabalin   ________________________________________________________________   INCONSISTENT with medications provided:   PERCOCET (OXYCODONE) : based on the absence of oxycodone and   metabolites

## 2023-08-31 ENCOUNTER — ANESTHESIA EVENT (OUTPATIENT)
Dept: OPERATING ROOM | Age: 73
End: 2023-08-31
Payer: MEDICARE

## 2023-08-31 NOTE — H&P
Pre-op History and Physical      Patient:  Laurence Rivera  MRN: [id number]  YOB: 1950    HISTORY OF PRESENT ILLNESS:     The patient is a 72 y.o. male who presents with history of bladder cancer. Recent cysto with multiple tumors throughout bladder including 3 prominent tumors anteriorly. Here for Cystoscopy, TURBT. Patient's old records, notes and chart reviewed and summarized above. Past Medical History:    Past Medical History:   Diagnosis Date    AAA (abdominal aortic aneurysm) ([address])     \"stable\" 3 cm on CT 2021 & 2023. Abnormal electrocardiogram (ECG) (EKG)     Arthritis     osteoarthritis    Bladder cancer ([address]) 03/2021    bladder    Bleeding ulcer 2015    Chronic neck pain     Colon polyps 10/08/2019    tubular adenoma x2    COVID-19 03/06/2023    ill x 1 day per wife    COVID-19 virus infection 01/10/2022    Slight cough.     Diabetic neuropathy (HCC)     Diarrhea     Enteritis 03/16/2023    pain x 1 day, resolved after meds in ER and started on bentyl    Essential hypertension 05/12/2020    managed by Dr. Jose Wright PCP    Fatty liver 02/15/2023    GERD (gastroesophageal reflux disease)     Hepatitis B core antibody positive     History of bleeding peptic ulcer     History of blood transfusion     no reactions    History of hepatitis C     completed treatment for this    History of migraine headaches     History of stress test 07/2020    \"low risk\"    Hyperlipidemia     IBS (irritable bowel syndrome)     diarrhea    Iron (Fe) deficiency anemia     getting iron infusions    Lung nodule     stable per CT 10/2022    Neuropathy     Poor historian     states his wife takes care of all medical information    Positive FIT (fecal immunochemical test)     Snores     Type 2 diabetes mellitus with microalbuminuria, without long-term current use of insulin ([address]) 07/13/2020    managed by Dr. Marietta Edmond    Under care of service provider     pcp-Dr Pattie Dave virtual visit [place] Number of Places Lived in the Last Year: Not on file    Unstable Housing in the Last Year: No       Family History:    Family History   Problem Relation Age of Onset    Diabetes Mother     Heart Disease Father     High Blood Pressure Father        REVIEW OF SYSTEMS:  Constitutional: negative  Eyes: negative  Respiratory: negative  Cardiovascular: negative  Gastrointestinal: negative  Genitourinary: no acute issues  Musculoskeletal: negative  Skin: negative   Neurological: negative  Hematological/Lymphatic: negative  Psychological: negative    PHYSICAL EXAM:    No data found. Constitutional: Patient in NAD  Neuro: Alert and oriented to person, place, and time  Psych: Mood and affect normal  Skin: Clean, dry, intact   Lungs: Respiratory effort normal, CTA  Cardiovascular:  Normal peripheral pulses; no murmur. Normal rhythm  Abdomen: Soft, non-tender, non-distended, no hepatosplenomegaly or hernia, CVA tenderness none  Bladder: Non-tender and non-disdended   : Non-tender, skin intact, no lesions       LABS:   No results for input(s): WBC, HGB, HCT, MCV, PLT in the last 72 hours. No results for input(s): NA, K, CL, CO2, PHOS, BUN, CREATININE, CA in the last 72 hours. Lab Results   Component Value Date    PSA 1.01 09/19/2022    PSA 0.65 08/19/2022    PSA 0.62 03/17/2021         Urinalysis: No results for input(s): COLORU, PHUR, LABCAST, WBCUA, RBCUA, MUCUS, TRICHOMONAS, YEAST, BACTERIA, CLARITYU, SPECGRAV, LEUKOCYTESUR, UROBILINOGEN, BILIRUBINUR, BLOODU in the last 72 hours.     Invalid input(s): NITRATE, GLUCOSEUKETONESUAMORPHOUS     -----------------------------------------------------------------    ASSESSMENT AND PLAN:    Impression:    Bladder cancer    Patient Active Problem List   Diagnosis    Degenerative disc disease, lumbar    Lumbar spondylosis    Lumbar radiculopathy    Lumbar spinal stenosis    Cervical spondylitis (HCC)    S/P cervical spinal fusion    GERD (gastroesophageal reflux disease)

## 2023-09-01 ENCOUNTER — TELEPHONE (OUTPATIENT)
Dept: UROLOGY | Age: 73
End: 2023-09-01

## 2023-09-01 ENCOUNTER — ANESTHESIA (OUTPATIENT)
Dept: OPERATING ROOM | Age: 73
End: 2023-09-01
Payer: MEDICARE

## 2023-09-01 ENCOUNTER — HOSPITAL ENCOUNTER (OUTPATIENT)
Age: 73
Setting detail: OUTPATIENT SURGERY
Discharge: HOME | End: 2023-09-01
Attending: UROLOGY | Admitting: UROLOGY
Payer: MEDICARE

## 2023-09-01 VITALS
TEMPERATURE: 97.7 F | RESPIRATION RATE: 21 BRPM | OXYGEN SATURATION: 95 % | BODY MASS INDEX: 29.92 KG/M2 | HEART RATE: 66 BPM | HEIGHT: 69 IN | DIASTOLIC BLOOD PRESSURE: 84 MMHG | WEIGHT: 202 LBS | SYSTOLIC BLOOD PRESSURE: 160 MMHG

## 2023-09-01 DIAGNOSIS — C67.9 MALIGNANT NEOPLASM OF URINARY BLADDER, UNSPECIFIED SITE (HCC): ICD-10-CM

## 2023-09-01 LAB
GLUCOSE BLD-MCNC: 160 MG/DL (ref 75–110)
GLUCOSE BLD-MCNC: 189 MG/DL (ref 75–110)

## 2023-09-01 PROCEDURE — 3600000004 HC SURGERY LEVEL 4 BASE: Performed by: UROLOGY

## 2023-09-01 PROCEDURE — 2720000010 HC SURG SUPPLY STERILE: Performed by: UROLOGY

## 2023-09-01 PROCEDURE — 3700000001 HC ADD 15 MINUTES (ANESTHESIA): Performed by: UROLOGY

## 2023-09-01 PROCEDURE — C9399 UNCLASSIFIED DRUGS OR BIOLOG: HCPCS | Performed by: NURSE ANESTHETIST, CERTIFIED REGISTERED

## 2023-09-01 PROCEDURE — 3700000000 HC ANESTHESIA ATTENDED CARE: Performed by: UROLOGY

## 2023-09-01 PROCEDURE — 7100000010 HC PHASE II RECOVERY - FIRST 15 MIN: Performed by: UROLOGY

## 2023-09-01 PROCEDURE — 82947 ASSAY GLUCOSE BLOOD QUANT: CPT

## 2023-09-01 PROCEDURE — 6360000002 HC RX W HCPCS: Performed by: NURSE ANESTHETIST, CERTIFIED REGISTERED

## 2023-09-01 PROCEDURE — 7100000000 HC PACU RECOVERY - FIRST 15 MIN: Performed by: UROLOGY

## 2023-09-01 PROCEDURE — 2580000003 HC RX 258: Performed by: ANESTHESIOLOGY

## 2023-09-01 PROCEDURE — 7100000001 HC PACU RECOVERY - ADDTL 15 MIN: Performed by: UROLOGY

## 2023-09-01 PROCEDURE — 3600000014 HC SURGERY LEVEL 4 ADDTL 15MIN: Performed by: UROLOGY

## 2023-09-01 PROCEDURE — 88307 TISSUE EXAM BY PATHOLOGIST: CPT

## 2023-09-01 PROCEDURE — 6370000000 HC RX 637 (ALT 250 FOR IP): Performed by: ANESTHESIOLOGY

## 2023-09-01 PROCEDURE — 6360000002 HC RX W HCPCS: Performed by: PHYSICIAN ASSISTANT

## 2023-09-01 PROCEDURE — 2500000003 HC RX 250 WO HCPCS: Performed by: NURSE ANESTHETIST, CERTIFIED REGISTERED

## 2023-09-01 PROCEDURE — 2580000003 HC RX 258: Performed by: UROLOGY

## 2023-09-01 PROCEDURE — 7100000011 HC PHASE II RECOVERY - ADDTL 15 MIN: Performed by: UROLOGY

## 2023-09-01 PROCEDURE — 2709999900 HC NON-CHARGEABLE SUPPLY: Performed by: UROLOGY

## 2023-09-01 RX ORDER — MIDAZOLAM HYDROCHLORIDE 2 MG/2ML
1 INJECTION, SOLUTION INTRAMUSCULAR; INTRAVENOUS EVERY 10 MIN PRN
Status: DISCONTINUED | OUTPATIENT
Start: 2023-09-01 | End: 2023-09-01 | Stop reason: HOSPADM

## 2023-09-01 RX ORDER — PHENAZOPYRIDINE HYDROCHLORIDE 200 MG/1
200 TABLET, FILM COATED ORAL 3 TIMES DAILY PRN
Qty: 15 TABLET | Refills: 0 | Status: SHIPPED | OUTPATIENT
Start: 2023-09-01 | End: 2023-09-06

## 2023-09-01 RX ORDER — PROPOFOL 10 MG/ML
INJECTION, EMULSION INTRAVENOUS PRN
Status: DISCONTINUED | OUTPATIENT
Start: 2023-09-01 | End: 2023-09-01 | Stop reason: SDUPTHER

## 2023-09-01 RX ORDER — MIDAZOLAM HYDROCHLORIDE 1 MG/ML
INJECTION INTRAMUSCULAR; INTRAVENOUS PRN
Status: DISCONTINUED | OUTPATIENT
Start: 2023-09-01 | End: 2023-09-01 | Stop reason: SDUPTHER

## 2023-09-01 RX ORDER — ONDANSETRON 2 MG/ML
4 INJECTION INTRAMUSCULAR; INTRAVENOUS
Status: DISCONTINUED | OUTPATIENT
Start: 2023-09-01 | End: 2023-09-01 | Stop reason: HOSPADM

## 2023-09-01 RX ORDER — PHENAZOPYRIDINE HYDROCHLORIDE 100 MG/1
200 TABLET, FILM COATED ORAL
Status: COMPLETED | OUTPATIENT
Start: 2023-09-01 | End: 2023-09-01

## 2023-09-01 RX ORDER — SODIUM CHLORIDE 0.9 % (FLUSH) 0.9 %
5-40 SYRINGE (ML) INJECTION PRN
Status: DISCONTINUED | OUTPATIENT
Start: 2023-09-01 | End: 2023-09-01 | Stop reason: HOSPADM

## 2023-09-01 RX ORDER — LIDOCAINE HYDROCHLORIDE 10 MG/ML
INJECTION, SOLUTION EPIDURAL; INFILTRATION; INTRACAUDAL; PERINEURAL PRN
Status: DISCONTINUED | OUTPATIENT
Start: 2023-09-01 | End: 2023-09-01 | Stop reason: SDUPTHER

## 2023-09-01 RX ORDER — HYDROCODONE BITARTRATE AND ACETAMINOPHEN 5; 325 MG/1; MG/1
1 TABLET ORAL EVERY 6 HOURS PRN
Qty: 12 TABLET | Refills: 0 | Status: SHIPPED | OUTPATIENT
Start: 2023-09-01 | End: 2023-09-04

## 2023-09-01 RX ORDER — FENTANYL CITRATE 50 UG/ML
25 INJECTION, SOLUTION INTRAMUSCULAR; INTRAVENOUS EVERY 5 MIN PRN
Status: DISCONTINUED | OUTPATIENT
Start: 2023-09-01 | End: 2023-09-01 | Stop reason: HOSPADM

## 2023-09-01 RX ORDER — SODIUM CHLORIDE 0.9 % (FLUSH) 0.9 %
5-40 SYRINGE (ML) INJECTION EVERY 12 HOURS SCHEDULED
Status: DISCONTINUED | OUTPATIENT
Start: 2023-09-01 | End: 2023-09-01 | Stop reason: HOSPADM

## 2023-09-01 RX ORDER — SODIUM CHLORIDE 9 MG/ML
INJECTION, SOLUTION INTRAVENOUS PRN
Status: DISCONTINUED | OUTPATIENT
Start: 2023-09-01 | End: 2023-09-01 | Stop reason: HOSPADM

## 2023-09-01 RX ORDER — MEPERIDINE HYDROCHLORIDE 50 MG/ML
12.5 INJECTION INTRAMUSCULAR; INTRAVENOUS; SUBCUTANEOUS EVERY 5 MIN PRN
Status: DISCONTINUED | OUTPATIENT
Start: 2023-09-01 | End: 2023-09-01 | Stop reason: HOSPADM

## 2023-09-01 RX ORDER — ONDANSETRON 2 MG/ML
INJECTION INTRAMUSCULAR; INTRAVENOUS PRN
Status: DISCONTINUED | OUTPATIENT
Start: 2023-09-01 | End: 2023-09-01 | Stop reason: SDUPTHER

## 2023-09-01 RX ORDER — MAGNESIUM HYDROXIDE 1200 MG/15ML
LIQUID ORAL CONTINUOUS PRN
Status: DISCONTINUED | OUTPATIENT
Start: 2023-09-01 | End: 2023-09-01 | Stop reason: HOSPADM

## 2023-09-01 RX ORDER — FENTANYL CITRATE 50 UG/ML
INJECTION, SOLUTION INTRAMUSCULAR; INTRAVENOUS PRN
Status: DISCONTINUED | OUTPATIENT
Start: 2023-09-01 | End: 2023-09-01 | Stop reason: SDUPTHER

## 2023-09-01 RX ORDER — SODIUM CHLORIDE, SODIUM LACTATE, POTASSIUM CHLORIDE, CALCIUM CHLORIDE 600; 310; 30; 20 MG/100ML; MG/100ML; MG/100ML; MG/100ML
INJECTION, SOLUTION INTRAVENOUS CONTINUOUS
Status: DISCONTINUED | OUTPATIENT
Start: 2023-09-01 | End: 2023-09-01 | Stop reason: HOSPADM

## 2023-09-01 RX ORDER — CEFADROXIL 500 MG/1
500 CAPSULE ORAL 2 TIMES DAILY
Qty: 6 CAPSULE | Refills: 0 | Status: SHIPPED | OUTPATIENT
Start: 2023-09-01 | End: 2023-09-04

## 2023-09-01 RX ORDER — ROCURONIUM BROMIDE 10 MG/ML
INJECTION, SOLUTION INTRAVENOUS PRN
Status: DISCONTINUED | OUTPATIENT
Start: 2023-09-01 | End: 2023-09-01 | Stop reason: SDUPTHER

## 2023-09-01 RX ORDER — LIDOCAINE HYDROCHLORIDE 10 MG/ML
1 INJECTION, SOLUTION INFILTRATION; PERINEURAL
Status: DISCONTINUED | OUTPATIENT
Start: 2023-09-01 | End: 2023-09-01 | Stop reason: HOSPADM

## 2023-09-01 RX ORDER — FENTANYL CITRATE 50 UG/ML
50 INJECTION, SOLUTION INTRAMUSCULAR; INTRAVENOUS EVERY 5 MIN PRN
Status: DISCONTINUED | OUTPATIENT
Start: 2023-09-01 | End: 2023-09-01 | Stop reason: HOSPADM

## 2023-09-01 RX ADMIN — PHENAZOPYRIDINE HYDROCHLORIDE 200 MG: 100 TABLET ORAL at 09:17

## 2023-09-01 RX ADMIN — ONDANSETRON 4 MG: 2 INJECTION INTRAMUSCULAR; INTRAVENOUS at 08:07

## 2023-09-01 RX ADMIN — ROCURONIUM BROMIDE 40 MG: 10 INJECTION, SOLUTION INTRAVENOUS at 07:30

## 2023-09-01 RX ADMIN — Medication 2000 MG: at 07:42

## 2023-09-01 RX ADMIN — SUGAMMADEX 200 MG: 100 INJECTION, SOLUTION INTRAVENOUS at 08:07

## 2023-09-01 RX ADMIN — FENTANYL CITRATE 100 MCG: 50 INJECTION, SOLUTION INTRAMUSCULAR; INTRAVENOUS at 07:30

## 2023-09-01 RX ADMIN — SODIUM CHLORIDE, POTASSIUM CHLORIDE, SODIUM LACTATE AND CALCIUM CHLORIDE: 600; 310; 30; 20 INJECTION, SOLUTION INTRAVENOUS at 08:15

## 2023-09-01 RX ADMIN — MIDAZOLAM 2 MG: 1 INJECTION INTRAMUSCULAR; INTRAVENOUS at 07:29

## 2023-09-01 RX ADMIN — PROPOFOL 150 MG: 10 INJECTION, EMULSION INTRAVENOUS at 07:30

## 2023-09-01 RX ADMIN — SODIUM CHLORIDE, POTASSIUM CHLORIDE, SODIUM LACTATE AND CALCIUM CHLORIDE: 600; 310; 30; 20 INJECTION, SOLUTION INTRAVENOUS at 06:57

## 2023-09-01 RX ADMIN — LIDOCAINE HYDROCHLORIDE 50 MG: 10 INJECTION, SOLUTION EPIDURAL; INFILTRATION; INTRACAUDAL; PERINEURAL at 07:30

## 2023-09-01 ASSESSMENT — PAIN SCALES - GENERAL: PAINLEVEL_OUTOF10: 6

## 2023-09-01 ASSESSMENT — PAIN - FUNCTIONAL ASSESSMENT
PAIN_FUNCTIONAL_ASSESSMENT: ACTIVITIES ARE NOT PREVENTED
PAIN_FUNCTIONAL_ASSESSMENT: 0-10

## 2023-09-01 ASSESSMENT — PAIN DESCRIPTION - DESCRIPTORS
DESCRIPTORS: PRESSURE
DESCRIPTORS: DISCOMFORT;ACHING

## 2023-09-01 NOTE — ANESTHESIA PRE PROCEDURE
08/18/2023 10:50 AM    ALT 18 08/18/2023 10:50 AM       POC Tests: No results for input(s): POCGLU, POCNA, POCK, POCCL, POCBUN, POCHEMO, POCHCT in the last 72 hours. Coags:   Lab Results   Component Value Date/Time    PROTIME 12.5 08/18/2023 10:50 AM    INR 1.0 08/18/2023 10:50 AM    APTT 24.9 08/18/2023 10:50 AM       HCG (If Applicable): No results found for: PREGTESTUR, PREGSERUM, HCG, HCGQUANT     ABGs: No results found for: PHART, PO2ART, NLW2BXC, KXT6XIB, BEART, H6NCNFNV     Type & Screen (If Applicable):  No results found for: LABABO, LABRH    Drug/Infectious Status (If Applicable):  Lab Results   Component Value Date/Time    HEPCAB REACTIVE 07/13/2020 09:04 AM       COVID-19 Screening (If Applicable):   Lab Results   Component Value Date/Time    COVID19 DETECTED 03/16/2023 11:58 AM    COVID19 Not Detected 08/03/2020 08:54 PM           Anesthesia Evaluation  Patient summary reviewed no history of anesthetic complications:   Airway: Mallampati: II  TM distance: >3 FB   Neck ROM: full  Mouth opening: > = 3 FB   Dental:          Pulmonary:Negative Pulmonary ROS and normal exam                               Cardiovascular:    (+) hypertension: no interval change,       ECG reviewed  Rhythm: regular  Rate: normal                    Neuro/Psych:   (+) neuromuscular disease:,             GI/Hepatic/Renal:   (+) GERD: no interval change, hepatitis: B, liver disease:,           Endo/Other:    (+) DiabetesType II DM, no interval change, , : arthritis: OA., .                 Abdominal:   (+) obese,           Vascular: negative vascular ROS. Other Findings:           Anesthesia Plan      general     ASA 2       Induction: intravenous. Anesthetic plan and risks discussed with patient. Use of blood products discussed with patient whom consented to blood products. Plan discussed with CRNA.                     Carmenza Olson MD   9/1/2023

## 2023-09-01 NOTE — OP NOTE
Operative Note      Patient: Chuy Coffey  YOB: 1950  MRN: [id number]    Date of Procedure: 9/1/2023    Pre-Op diagnosis: Multiple bladder tumors (total aggregate diameter approximately 7 cm)    Post-Op Diagnosis: Same       Procedure: Transurethral resection of large bladder tumor (7 cm total diameter)    Surgeon(s):  Elmer Coates MD    Assistant:   * No surgical staff found *    Anesthesia: General    Estimated Blood Loss (mL): Minimal    Complications: None    Specimens:   ID Type Source Tests Collected by Time Destination   A : BLADDER TUMOR Tissue Bladder SURGICAL PATHOLOGY Elmer Coates MD 9/1/2023 0804        Implants:  * No implants in log *      Drains:   Urinary Catheter 09/01/23 3 Way (Active)       Findings: Multifocal bladder tumors successfully resected and fulgurated        Detailed Description of Procedure: The patient was brought to the operating room. He was properly identified. He was administered a general anesthetic and placed in modified dorsolithotomy position. He was prepped and draped in sterile fashion. A rigid cystoscope was introduced into the bladder and the visual obturator was exchanged for the resectoscope and the bladder was surveyed in its entirety there were 3 sizable tumors each approximately 2 cm in diameter anteriorly and then about 6 smaller approximately 1 cm tumors scattered throughout the bladder posteriorly and near the trigone. I did use a resectoscope and resected all of the tumors with out leaving any gross visible tumor. I then fulgurated the beds of the tumors and fulgurated a few superficial papillary lesions. The bladder became fairly thin anteriorly and I elected to place a 22 [place] three-way Tate catheter. All of the tumor specimens were sent to pathology for analysis and the procedure was terminated. The patient was taken to the PACU in stable condition.   He will follow-up in 5 days for catheter removal.  He has refused BCG in the past and I will encourage him to consider this moving forward.     Electronically signed by Bryan Lopez MD on 9/1/2023 at 8:22 AM

## 2023-09-01 NOTE — DISCHARGE INSTRUCTIONS
Pt ok to discharge home in good condition  Pt should avoid strenuous activity   Pt should walk moderately at home  Pt ok to shower  Pt may continue regular diet   Please call attending physician or hospital  with questions  Call or Present to ED if fever (> 101F), intractable nausea vomiting or pain. Rx in chart     Pt should follow up with Dr. Jacqlyn Gilford in office, call to confirm appointment    You may notice blood in the urine, burning with urination, urgency, and frequency of urination after this procedure-this is expected. This should resolve over time. If you were sent home with antibiotics please take these to completion without missing any doses. Follow up Wednesday September 6, 2023 for patel catheter removal. Call Dr Marie Chaparro office for appt. No alcoholic beverages, no driving or operating machinery, no making important decisions for 24 hours. You may have a normal diet but should eat lightly day of surgery. Drink plenty of fluids.   Urinate within 8 hours after surgery, if unable to urinate call your doctor     Call your doctor for the following:   Chills   Temperature greater than 101   Pain that is not tolerable despite taking pain medicine as ordered   There is increased swelling, redness or warmth at surgical site   There is increased drainage or bleeding from surgical site   Do not remove surgical dressing unless instructed to do so by your surgeon

## 2023-09-01 NOTE — ANESTHESIA POSTPROCEDURE EVALUATION
Department of Anesthesiology  Postprocedure Note    Patient: Kylah Bee  MRN: [id number]  YOB: 1950  Date of evaluation: 9/1/2023      Procedure Summary     Date: 09/01/23 Room / Location: [address]    Anesthesia Start: 0839 Anesthesia Stop: 3655    Procedure: CYSTOSCOPY TUR BLADDER TUMOR (Bladder) Diagnosis:       Malignant neoplasm of urinary bladder, unspecified site ([address])      (Malignant neoplasm of urinary bladder, unspecified site ([address]) [C67.9])    Surgeons: Marilin Johnson MD Responsible Provider: Zofia Hussein MD    Anesthesia Type: general ASA Status: 2          Anesthesia Type: No value filed.     Be Phase I: Be Score: 9    Be Phase II:        Anesthesia Post Evaluation    Patient location during evaluation: PACU  Patient participation: complete - patient participated  Level of consciousness: awake and alert  Pain score: 2  Airway patency: patent  Nausea & Vomiting: no nausea and no vomiting  Complications: no  Cardiovascular status: hemodynamically stable  Respiratory status: acceptable  Hydration status: stable  Pain management: adequate

## 2023-09-01 NOTE — TELEPHONE ENCOUNTER
Writer spoke with Gabriel Hale ok to phone in tramadol 50mg dispense 10 tablets patient is to take every 6 hrs as needed for pain. Also phoned in duricef bid x 3d. Patient was updated.

## 2023-09-01 NOTE — PROGRESS NOTES
Discharge instructions reviewed with patient and , leg bag supplies given and instructions for use reviewed, all questions answered.

## 2023-09-01 NOTE — TELEPHONE ENCOUNTER
Patient wife called in-stated that Mahmood Delarosa does not have the medications that were ordered, and needs it to be sent to [place] on [facility].

## 2023-09-02 DIAGNOSIS — F11.93 OPIOID WITHDRAWAL (HCC): ICD-10-CM

## 2023-09-05 RX ORDER — CLONIDINE HYDROCHLORIDE 0.2 MG/1
0.2 TABLET ORAL 2 TIMES DAILY
Qty: 14 TABLET | Refills: 0 | Status: SHIPPED | OUTPATIENT
Start: 2023-09-05

## 2023-09-05 NOTE — TELEPHONE ENCOUNTER
Please Approve or Refuse.   Send to Pharmacy per Pt's Request:      Next Visit Date:  9/27/2023   Last Visit Date: 8/29/2023    Hemoglobin A1C (%)   Date Value   08/18/2023 5.9   02/15/2023 8.5 (H)   08/19/2022 6.6 (H)             ( goal A1C is < 7)   BP Readings from Last 3 Encounters:   09/01/23 (!) 160/84   08/23/23 132/74   08/18/23 (!) 144/82          (goal 120/80)  BUN   Date Value Ref Range Status   08/18/2023 14 8 - 23 mg/dL Final     Creatinine   Date Value Ref Range Status   08/18/2023 0.8 0.7 - 1.2 mg/dL Final     Potassium   Date Value Ref Range Status   08/18/2023 4.2 3.7 - 5.3 mmol/L Final

## 2023-09-06 ENCOUNTER — OFFICE VISIT (OUTPATIENT)
Dept: UROLOGY | Age: 73
End: 2023-09-06

## 2023-09-06 VITALS
HEIGHT: 69 IN | HEART RATE: 68 BPM | TEMPERATURE: 97 F | SYSTOLIC BLOOD PRESSURE: 132 MMHG | DIASTOLIC BLOOD PRESSURE: 83 MMHG | WEIGHT: 202 LBS | BODY MASS INDEX: 29.92 KG/M2

## 2023-09-06 DIAGNOSIS — C67.2 MALIGNANT NEOPLASM OF LATERAL WALL OF URINARY BLADDER (HCC): Primary | ICD-10-CM

## 2023-09-06 LAB — SURGICAL PATHOLOGY REPORT: NORMAL

## 2023-09-06 PROCEDURE — NBSRV NON-BILLABLE SERVICE: Performed by: NURSE PRACTITIONER

## 2023-09-06 NOTE — RESULT ENCOUNTER NOTE
Management per urology, known bladder cancer, noninvasive low-grade papillary urothelial carcinoma  He does have appointment with urology to discuss    Future Appointments  9/11/2023  10:00 AM   ST PB MED ONC CHAIR 08    [facility]  9/11/2023  1:10 PM    Ros Ascencio MD         .  URO           [facility]  9/18/2023  10:00 AM   ST PB MED ONC CHAIR 09    [facility]  9/27/2023  11:00 AM   Ro Oliveira MD     [facility]  10/25/2023 10:30 AM   Erica Fernandez MD          [address]  1/9/2024   1:00 PM    Santos Palacios MD          [address] [facility]

## 2023-09-06 NOTE — PROGRESS NOTES
Patient presents to office for 20F catheter removal. Balloon deflated and catheter was removed with no complications. Patient tolerated procedure well. Patient will keep follow up appointment with provider and will call the office with any questions or concerns.

## 2023-09-11 ENCOUNTER — OFFICE VISIT (OUTPATIENT)
Dept: UROLOGY | Age: 73
End: 2023-09-11
Payer: MEDICARE

## 2023-09-11 ENCOUNTER — HOSPITAL ENCOUNTER (OUTPATIENT)
Dept: INFUSION THERAPY | Age: 73
Discharge: HOME | End: 2023-09-11
Payer: MEDICARE

## 2023-09-11 VITALS
HEART RATE: 82 BPM | DIASTOLIC BLOOD PRESSURE: 72 MMHG | SYSTOLIC BLOOD PRESSURE: 133 MMHG | RESPIRATION RATE: 18 BRPM | TEMPERATURE: 98.6 F

## 2023-09-11 VITALS
BODY MASS INDEX: 30.21 KG/M2 | OXYGEN SATURATION: 100 % | HEART RATE: 96 BPM | HEIGHT: 69 IN | SYSTOLIC BLOOD PRESSURE: 145 MMHG | WEIGHT: 204 LBS | TEMPERATURE: 98.1 F | DIASTOLIC BLOOD PRESSURE: 4 MMHG

## 2023-09-11 DIAGNOSIS — Z85.51 HISTORY OF BLADDER CANCER: Primary | ICD-10-CM

## 2023-09-11 DIAGNOSIS — R31.0 GROSS HEMATURIA: ICD-10-CM

## 2023-09-11 DIAGNOSIS — D50.0 IRON DEFICIENCY ANEMIA DUE TO CHRONIC BLOOD LOSS: Primary | ICD-10-CM

## 2023-09-11 DIAGNOSIS — C67.2 MALIGNANT NEOPLASM OF LATERAL WALL OF URINARY BLADDER (HCC): Primary | ICD-10-CM

## 2023-09-11 PROCEDURE — 99214 OFFICE O/P EST MOD 30 MIN: CPT | Performed by: UROLOGY

## 2023-09-11 PROCEDURE — 2580000003 HC RX 258: Performed by: INTERNAL MEDICINE

## 2023-09-11 PROCEDURE — G8427 DOCREV CUR MEDS BY ELIG CLIN: HCPCS | Performed by: UROLOGY

## 2023-09-11 PROCEDURE — 3017F COLORECTAL CA SCREEN DOC REV: CPT | Performed by: UROLOGY

## 2023-09-11 PROCEDURE — 3078F DIAST BP <80 MM HG: CPT | Performed by: UROLOGY

## 2023-09-11 PROCEDURE — 6360000002 HC RX W HCPCS: Performed by: INTERNAL MEDICINE

## 2023-09-11 PROCEDURE — 3074F SYST BP LT 130 MM HG: CPT | Performed by: UROLOGY

## 2023-09-11 PROCEDURE — G8417 CALC BMI ABV UP PARAM F/U: HCPCS | Performed by: UROLOGY

## 2023-09-11 PROCEDURE — 1036F TOBACCO NON-USER: CPT | Performed by: UROLOGY

## 2023-09-11 PROCEDURE — 96365 THER/PROPH/DIAG IV INF INIT: CPT

## 2023-09-11 PROCEDURE — 1123F ACP DISCUSS/DSCN MKR DOCD: CPT | Performed by: UROLOGY

## 2023-09-11 RX ORDER — HEPARIN 100 UNIT/ML
500 SYRINGE INTRAVENOUS PRN
OUTPATIENT
Start: 2023-09-18

## 2023-09-11 RX ORDER — SODIUM CHLORIDE 9 MG/ML
5-250 INJECTION, SOLUTION INTRAVENOUS PRN
Status: DISCONTINUED | OUTPATIENT
Start: 2023-09-11 | End: 2023-09-12 | Stop reason: HOSPADM

## 2023-09-11 RX ORDER — FAMOTIDINE 10 MG/ML
20 INJECTION, SOLUTION INTRAVENOUS
OUTPATIENT
Start: 2023-09-18

## 2023-09-11 RX ORDER — SODIUM CHLORIDE 9 MG/ML
INJECTION, SOLUTION INTRAVENOUS CONTINUOUS
OUTPATIENT
Start: 2023-09-18

## 2023-09-11 RX ORDER — ACETAMINOPHEN 325 MG/1
650 TABLET ORAL
OUTPATIENT
Start: 2023-09-18

## 2023-09-11 RX ORDER — ALBUTEROL SULFATE 90 UG/1
4 AEROSOL, METERED RESPIRATORY (INHALATION) PRN
OUTPATIENT
Start: 2023-09-18

## 2023-09-11 RX ORDER — EPINEPHRINE 1 MG/ML
0.3 INJECTION, SOLUTION, CONCENTRATE INTRAVENOUS PRN
OUTPATIENT
Start: 2023-09-18

## 2023-09-11 RX ORDER — ONDANSETRON 2 MG/ML
8 INJECTION INTRAMUSCULAR; INTRAVENOUS
OUTPATIENT
Start: 2023-09-18

## 2023-09-11 RX ORDER — SODIUM CHLORIDE 9 MG/ML
5-250 INJECTION, SOLUTION INTRAVENOUS PRN
OUTPATIENT
Start: 2023-09-18

## 2023-09-11 RX ORDER — SODIUM CHLORIDE 0.9 % (FLUSH) 0.9 %
5-40 SYRINGE (ML) INJECTION PRN
OUTPATIENT
Start: 2023-09-18

## 2023-09-11 RX ORDER — DIPHENHYDRAMINE HYDROCHLORIDE 50 MG/ML
50 INJECTION INTRAMUSCULAR; INTRAVENOUS
OUTPATIENT
Start: 2023-09-18

## 2023-09-11 RX ADMIN — SODIUM CHLORIDE 150 ML/HR: 9 INJECTION, SOLUTION INTRAVENOUS at 10:04

## 2023-09-11 RX ADMIN — FERRIC CARBOXYMALTOSE INJECTION 750 MG: 50 INJECTION, SOLUTION INTRAVENOUS at 10:06

## 2023-09-11 ASSESSMENT — ENCOUNTER SYMPTOMS
NAUSEA: 0
ABDOMINAL PAIN: 0
CONSTIPATION: 0
BACK PAIN: 0
VOMITING: 0
EYE PAIN: 0
COUGH: 0
EYE REDNESS: 0
SHORTNESS OF BREATH: 0
DIARRHEA: 0
WHEEZING: 0

## 2023-09-11 NOTE — PROGRESS NOTES
Patient arrived for injectafer 1 of 2. Arrives ambulatory. Denies any complaints. Treatment complete without incident. Patient declined 30 minute post observation. Discharged in stable condition. Returns 9/18/2023 for injectafer 2 of 2.

## 2023-09-11 NOTE — PROGRESS NOTES
[address]  [address] [facility] [address]  Dept: [address] [place] [address] Urology Office Note - Established    Patient:  Shira Bal  YOB: 1950  Date: 9/11/2023    The patient is a 72 y.o. male who presents todayfor evaluation of the following problems:   Chief Complaint   Patient presents with    Results       HPI  This is a 79-year-old gentleman with a history of bladder cancer. He has had multiple recurrences. He recently had a TURBT. He had a large tumor burden resected. Thankfully, the pathology report shows low-grade disease. He has previously been resistant to doing BCG. Summary of old records: N/A    Additional History: N/A    Procedures Today: N/A    Urinalysis today:  No results found for this visit on 09/11/23. Last several PSA's:  Lab Results   Component Value Date    PSA 1.01 09/19/2022    PSA 0.65 08/19/2022    PSA 0.62 03/17/2021     Last total testosterone:  No results found for: \"TESTOSTERONE\"    AUA Symptom Score (9/11/2023): Last BUN and creatinine:  Lab Results   Component Value Date    BUN 14 08/18/2023     Lab Results   Component Value Date    CREATININE 0.8 08/18/2023       Additional Lab/Culture results: none    Imaging Reviewed during this Office Visit: none  (results were independently reviewed by physician and radiology report verified)    PAST MEDICAL, FAMILY AND SOCIAL HISTORY UPDATE:  Past Medical History:   Diagnosis Date    AAA (abdominal aortic aneurysm) ([address])     \"stable\" 3 cm on CT 2021 & 2023. Abnormal electrocardiogram (ECG) (EKG)     Arthritis     osteoarthritis    Bladder cancer ([address]) 03/2021    bladder    Bleeding ulcer 2015    Chronic neck pain     Colon polyps 10/08/2019    tubular adenoma x2    COVID-19 03/06/2023    ill x 1 day per wife    COVID-19 virus infection 01/10/2022    Slight cough.     Diabetic neuropathy ([address])

## 2023-09-18 ENCOUNTER — HOSPITAL ENCOUNTER (OUTPATIENT)
Dept: INFUSION THERAPY | Age: 73
Discharge: HOME | End: 2023-09-18
Payer: MEDICARE

## 2023-09-18 VITALS
TEMPERATURE: 97.9 F | DIASTOLIC BLOOD PRESSURE: 67 MMHG | SYSTOLIC BLOOD PRESSURE: 124 MMHG | HEART RATE: 92 BPM | RESPIRATION RATE: 16 BRPM

## 2023-09-18 DIAGNOSIS — D50.0 IRON DEFICIENCY ANEMIA DUE TO CHRONIC BLOOD LOSS: ICD-10-CM

## 2023-09-18 DIAGNOSIS — D50.8 OTHER IRON DEFICIENCY ANEMIA: ICD-10-CM

## 2023-09-18 DIAGNOSIS — D64.9 ANEMIA, UNSPECIFIED TYPE: Primary | ICD-10-CM

## 2023-09-18 PROCEDURE — 2580000003 HC RX 258: Performed by: INTERNAL MEDICINE

## 2023-09-18 PROCEDURE — 96365 THER/PROPH/DIAG IV INF INIT: CPT

## 2023-09-18 PROCEDURE — 6360000002 HC RX W HCPCS: Performed by: INTERNAL MEDICINE

## 2023-09-18 RX ORDER — SODIUM CHLORIDE 9 MG/ML
INJECTION, SOLUTION INTRAVENOUS CONTINUOUS
OUTPATIENT
Start: 2023-09-25

## 2023-09-18 RX ORDER — DIPHENHYDRAMINE HYDROCHLORIDE 50 MG/ML
50 INJECTION INTRAMUSCULAR; INTRAVENOUS
OUTPATIENT
Start: 2023-09-21

## 2023-09-18 RX ORDER — SODIUM CHLORIDE 9 MG/ML
5-250 INJECTION, SOLUTION INTRAVENOUS PRN
Status: DISCONTINUED | OUTPATIENT
Start: 2023-09-18 | End: 2023-09-19 | Stop reason: HOSPADM

## 2023-09-18 RX ORDER — ACETAMINOPHEN 325 MG/1
650 TABLET ORAL
OUTPATIENT
Start: 2023-09-25

## 2023-09-18 RX ORDER — FAMOTIDINE 10 MG/ML
20 INJECTION, SOLUTION INTRAVENOUS
OUTPATIENT
Start: 2023-09-25

## 2023-09-18 RX ORDER — DIPHENHYDRAMINE HYDROCHLORIDE 50 MG/ML
50 INJECTION INTRAMUSCULAR; INTRAVENOUS
OUTPATIENT
Start: 2023-09-25

## 2023-09-18 RX ORDER — SODIUM CHLORIDE 9 MG/ML
5-250 INJECTION, SOLUTION INTRAVENOUS PRN
OUTPATIENT
Start: 2023-09-21

## 2023-09-18 RX ORDER — EPINEPHRINE 1 MG/ML
0.3 INJECTION, SOLUTION, CONCENTRATE INTRAVENOUS PRN
OUTPATIENT
Start: 2023-09-21

## 2023-09-18 RX ORDER — SODIUM CHLORIDE 0.9 % (FLUSH) 0.9 %
5-40 SYRINGE (ML) INJECTION PRN
OUTPATIENT
Start: 2023-09-25

## 2023-09-18 RX ORDER — ALBUTEROL SULFATE 90 UG/1
4 AEROSOL, METERED RESPIRATORY (INHALATION) PRN
OUTPATIENT
Start: 2023-09-25

## 2023-09-18 RX ORDER — SODIUM CHLORIDE 0.9 % (FLUSH) 0.9 %
5-40 SYRINGE (ML) INJECTION PRN
OUTPATIENT
Start: 2023-09-21

## 2023-09-18 RX ORDER — HEPARIN 100 UNIT/ML
500 SYRINGE INTRAVENOUS PRN
OUTPATIENT
Start: 2023-09-21

## 2023-09-18 RX ORDER — ONDANSETRON 2 MG/ML
8 INJECTION INTRAMUSCULAR; INTRAVENOUS
OUTPATIENT
Start: 2023-09-25

## 2023-09-18 RX ORDER — ACETAMINOPHEN 325 MG/1
650 TABLET ORAL
OUTPATIENT
Start: 2023-09-21

## 2023-09-18 RX ORDER — HEPARIN 100 UNIT/ML
500 SYRINGE INTRAVENOUS PRN
Status: CANCELLED | OUTPATIENT
Start: 2023-09-21

## 2023-09-18 RX ORDER — ALBUTEROL SULFATE 90 UG/1
4 AEROSOL, METERED RESPIRATORY (INHALATION) PRN
OUTPATIENT
Start: 2023-09-21

## 2023-09-18 RX ORDER — SODIUM CHLORIDE 9 MG/ML
5-250 INJECTION, SOLUTION INTRAVENOUS PRN
OUTPATIENT
Start: 2023-09-25

## 2023-09-18 RX ORDER — SODIUM CHLORIDE 9 MG/ML
5-250 INJECTION, SOLUTION INTRAVENOUS PRN
Status: CANCELLED | OUTPATIENT
Start: 2023-09-21

## 2023-09-18 RX ORDER — SODIUM CHLORIDE 9 MG/ML
INJECTION, SOLUTION INTRAVENOUS CONTINUOUS
OUTPATIENT
Start: 2023-09-21

## 2023-09-18 RX ORDER — HEPARIN 100 UNIT/ML
500 SYRINGE INTRAVENOUS PRN
OUTPATIENT
Start: 2023-09-25

## 2023-09-18 RX ORDER — ONDANSETRON 2 MG/ML
8 INJECTION INTRAMUSCULAR; INTRAVENOUS
OUTPATIENT
Start: 2023-09-21

## 2023-09-18 RX ORDER — EPINEPHRINE 1 MG/ML
0.3 INJECTION, SOLUTION, CONCENTRATE INTRAVENOUS PRN
OUTPATIENT
Start: 2023-09-25

## 2023-09-18 RX ORDER — FAMOTIDINE 10 MG/ML
20 INJECTION, SOLUTION INTRAVENOUS
OUTPATIENT
Start: 2023-09-21

## 2023-09-18 RX ADMIN — SODIUM CHLORIDE 200 ML/HR: 9 INJECTION, SOLUTION INTRAVENOUS at 13:58

## 2023-09-18 RX ADMIN — FERRIC CARBOXYMALTOSE INJECTION 750 MG: 50 INJECTION, SOLUTION INTRAVENOUS at 13:59

## 2023-09-18 NOTE — PROGRESS NOTES
Patient arrived for 2 of 2 injectafer. Patient tolerated w/o incident. Declined 30 minute post observation and left in stable condition.  Returns  10/25 for

## 2023-09-25 DIAGNOSIS — F41.9 ANXIETY: ICD-10-CM

## 2023-09-25 DIAGNOSIS — F11.93 OPIOID WITHDRAWAL (HCC): ICD-10-CM

## 2023-09-25 DIAGNOSIS — K21.9 GASTROESOPHAGEAL REFLUX DISEASE WITHOUT ESOPHAGITIS: ICD-10-CM

## 2023-09-25 RX ORDER — BUSPIRONE HYDROCHLORIDE 10 MG/1
TABLET ORAL
Qty: 90 TABLET | Refills: 0 | Status: SHIPPED | OUTPATIENT
Start: 2023-09-25

## 2023-09-25 RX ORDER — PANTOPRAZOLE SODIUM 40 MG/1
TABLET, DELAYED RELEASE ORAL
Qty: 90 TABLET | Refills: 1 | Status: SHIPPED | OUTPATIENT
Start: 2023-09-25

## 2023-09-25 NOTE — TELEPHONE ENCOUNTER
Please Approve or Refuse.   Send to Pharmacy per Pt's Request:      Next Visit Date:  9/27/2023   Last Visit Date: 8/29/2023    Hemoglobin A1C (%)   Date Value   08/18/2023 5.9   02/15/2023 8.5 (H)   08/19/2022 6.6 (H)             ( goal A1C is < 7)   BP Readings from Last 3 Encounters:   09/18/23 124/67   09/11/23 133/72   09/11/23 (!) 145/4          (goal 120/80)  BUN   Date Value Ref Range Status   08/18/2023 14 8 - 23 mg/dL Final     Creatinine   Date Value Ref Range Status   08/18/2023 0.8 0.7 - 1.2 mg/dL Final     Potassium   Date Value Ref Range Status   08/18/2023 4.2 3.7 - 5.3 mmol/L Final

## 2023-09-28 DIAGNOSIS — C67.2 MALIGNANT NEOPLASM OF LATERAL WALL OF URINARY BLADDER (HCC): Primary | ICD-10-CM

## 2023-09-28 RX ORDER — ONDANSETRON 2 MG/ML
8 INJECTION INTRAMUSCULAR; INTRAVENOUS
Status: CANCELLED | OUTPATIENT
Start: 2023-10-18

## 2023-09-28 RX ORDER — MEPERIDINE HYDROCHLORIDE 50 MG/ML
12.5 INJECTION INTRAMUSCULAR; INTRAVENOUS; SUBCUTANEOUS PRN
Status: CANCELLED | OUTPATIENT
Start: 2023-11-01

## 2023-09-28 RX ORDER — EPINEPHRINE 1 MG/ML
0.3 INJECTION, SOLUTION, CONCENTRATE INTRAVENOUS PRN
Status: CANCELLED | OUTPATIENT
Start: 2023-11-15

## 2023-09-28 RX ORDER — MEPERIDINE HYDROCHLORIDE 50 MG/ML
12.5 INJECTION INTRAMUSCULAR; INTRAVENOUS; SUBCUTANEOUS PRN
Status: CANCELLED | OUTPATIENT
Start: 2023-09-28

## 2023-09-28 RX ORDER — SODIUM CHLORIDE 9 MG/ML
INJECTION, SOLUTION INTRAVENOUS CONTINUOUS
Status: CANCELLED | OUTPATIENT
Start: 2023-10-18

## 2023-09-28 RX ORDER — ALBUTEROL SULFATE 90 UG/1
4 AEROSOL, METERED RESPIRATORY (INHALATION) PRN
Status: CANCELLED | OUTPATIENT
Start: 2023-11-15

## 2023-09-28 RX ORDER — DIPHENHYDRAMINE HYDROCHLORIDE 50 MG/ML
50 INJECTION INTRAMUSCULAR; INTRAVENOUS
Status: CANCELLED | OUTPATIENT
Start: 2023-11-15

## 2023-09-28 RX ORDER — ACETAMINOPHEN 325 MG/1
650 TABLET ORAL
Status: CANCELLED | OUTPATIENT
Start: 2023-11-01

## 2023-09-28 RX ORDER — ONDANSETRON 2 MG/ML
8 INJECTION INTRAMUSCULAR; INTRAVENOUS
Status: CANCELLED | OUTPATIENT
Start: 2023-09-28

## 2023-09-28 RX ORDER — MEPERIDINE HYDROCHLORIDE 50 MG/ML
12.5 INJECTION INTRAMUSCULAR; INTRAVENOUS; SUBCUTANEOUS PRN
Status: CANCELLED | OUTPATIENT
Start: 2023-11-15

## 2023-09-28 RX ORDER — SODIUM CHLORIDE 9 MG/ML
INJECTION, SOLUTION INTRAVENOUS CONTINUOUS
Status: CANCELLED | OUTPATIENT
Start: 2023-11-15

## 2023-09-28 RX ORDER — EPINEPHRINE 1 MG/ML
0.3 INJECTION, SOLUTION, CONCENTRATE INTRAVENOUS PRN
Status: CANCELLED | OUTPATIENT
Start: 2023-11-01

## 2023-09-28 RX ORDER — FAMOTIDINE 10 MG/ML
20 INJECTION, SOLUTION INTRAVENOUS
Status: CANCELLED | OUTPATIENT
Start: 2023-09-28

## 2023-09-28 RX ORDER — ALBUTEROL SULFATE 90 UG/1
4 AEROSOL, METERED RESPIRATORY (INHALATION) PRN
Status: CANCELLED | OUTPATIENT
Start: 2023-09-28

## 2023-09-28 RX ORDER — MEPERIDINE HYDROCHLORIDE 50 MG/ML
12.5 INJECTION INTRAMUSCULAR; INTRAVENOUS; SUBCUTANEOUS PRN
Status: CANCELLED | OUTPATIENT
Start: 2023-10-18

## 2023-09-28 RX ORDER — FAMOTIDINE 10 MG/ML
20 INJECTION, SOLUTION INTRAVENOUS
Status: CANCELLED | OUTPATIENT
Start: 2023-10-18

## 2023-09-28 RX ORDER — ACETAMINOPHEN 325 MG/1
650 TABLET ORAL
Status: CANCELLED | OUTPATIENT
Start: 2023-11-08

## 2023-09-28 RX ORDER — SODIUM CHLORIDE 9 MG/ML
INJECTION, SOLUTION INTRAVENOUS CONTINUOUS
Status: CANCELLED | OUTPATIENT
Start: 2023-10-25

## 2023-09-28 RX ORDER — ONDANSETRON 2 MG/ML
8 INJECTION INTRAMUSCULAR; INTRAVENOUS
Status: CANCELLED | OUTPATIENT
Start: 2023-11-01

## 2023-09-28 RX ORDER — ONDANSETRON 2 MG/ML
8 INJECTION INTRAMUSCULAR; INTRAVENOUS
Status: CANCELLED | OUTPATIENT
Start: 2023-11-08

## 2023-09-28 RX ORDER — ALBUTEROL SULFATE 90 UG/1
4 AEROSOL, METERED RESPIRATORY (INHALATION) PRN
Status: CANCELLED | OUTPATIENT
Start: 2023-10-18

## 2023-09-28 RX ORDER — ALBUTEROL SULFATE 90 UG/1
4 AEROSOL, METERED RESPIRATORY (INHALATION) PRN
Status: CANCELLED | OUTPATIENT
Start: 2023-11-08

## 2023-09-28 RX ORDER — SODIUM CHLORIDE 9 MG/ML
INJECTION, SOLUTION INTRAVENOUS CONTINUOUS
Status: CANCELLED | OUTPATIENT
Start: 2023-09-28

## 2023-09-28 RX ORDER — ACETAMINOPHEN 325 MG/1
650 TABLET ORAL
Status: CANCELLED | OUTPATIENT
Start: 2023-11-15

## 2023-09-28 RX ORDER — EPINEPHRINE 1 MG/ML
0.3 INJECTION, SOLUTION, CONCENTRATE INTRAVENOUS PRN
Status: CANCELLED | OUTPATIENT
Start: 2023-09-28

## 2023-09-28 RX ORDER — DIPHENHYDRAMINE HYDROCHLORIDE 50 MG/ML
50 INJECTION INTRAMUSCULAR; INTRAVENOUS
Status: CANCELLED | OUTPATIENT
Start: 2023-09-28

## 2023-09-28 RX ORDER — DIPHENHYDRAMINE HYDROCHLORIDE 50 MG/ML
50 INJECTION INTRAMUSCULAR; INTRAVENOUS
Status: CANCELLED | OUTPATIENT
Start: 2023-10-25

## 2023-09-28 RX ORDER — FAMOTIDINE 10 MG/ML
20 INJECTION, SOLUTION INTRAVENOUS
Status: CANCELLED | OUTPATIENT
Start: 2023-11-08

## 2023-09-28 RX ORDER — LIDOCAINE HYDROCHLORIDE 20 MG/ML
JELLY TOPICAL ONCE
Status: CANCELLED | OUTPATIENT
Start: 2023-11-15 | End: 2023-11-02

## 2023-09-28 RX ORDER — MEPERIDINE HYDROCHLORIDE 50 MG/ML
12.5 INJECTION INTRAMUSCULAR; INTRAVENOUS; SUBCUTANEOUS PRN
Status: CANCELLED | OUTPATIENT
Start: 2023-10-25

## 2023-09-28 RX ORDER — MEPERIDINE HYDROCHLORIDE 50 MG/ML
12.5 INJECTION INTRAMUSCULAR; INTRAVENOUS; SUBCUTANEOUS PRN
Status: CANCELLED | OUTPATIENT
Start: 2023-11-08

## 2023-09-28 RX ORDER — ACETAMINOPHEN 325 MG/1
650 TABLET ORAL
Status: CANCELLED | OUTPATIENT
Start: 2023-10-18

## 2023-09-28 RX ORDER — FAMOTIDINE 10 MG/ML
20 INJECTION, SOLUTION INTRAVENOUS
Status: CANCELLED | OUTPATIENT
Start: 2023-11-15

## 2023-09-28 RX ORDER — ALBUTEROL SULFATE 90 UG/1
4 AEROSOL, METERED RESPIRATORY (INHALATION) PRN
Status: CANCELLED | OUTPATIENT
Start: 2023-11-01

## 2023-09-28 RX ORDER — EPINEPHRINE 1 MG/ML
0.3 INJECTION, SOLUTION, CONCENTRATE INTRAVENOUS PRN
Status: CANCELLED | OUTPATIENT
Start: 2023-10-25

## 2023-09-28 RX ORDER — EPINEPHRINE 1 MG/ML
0.3 INJECTION, SOLUTION, CONCENTRATE INTRAVENOUS PRN
Status: CANCELLED | OUTPATIENT
Start: 2023-11-08

## 2023-09-28 RX ORDER — ALBUTEROL SULFATE 90 UG/1
4 AEROSOL, METERED RESPIRATORY (INHALATION) PRN
Status: CANCELLED | OUTPATIENT
Start: 2023-10-25

## 2023-09-28 RX ORDER — SODIUM CHLORIDE 9 MG/ML
INJECTION, SOLUTION INTRAVENOUS CONTINUOUS
Status: CANCELLED | OUTPATIENT
Start: 2023-11-01

## 2023-09-28 RX ORDER — LIDOCAINE HYDROCHLORIDE 20 MG/ML
JELLY TOPICAL ONCE
Status: CANCELLED | OUTPATIENT
Start: 2023-10-25 | End: 2023-10-12

## 2023-09-28 RX ORDER — ACETAMINOPHEN 325 MG/1
650 TABLET ORAL
Status: CANCELLED | OUTPATIENT
Start: 2023-09-28

## 2023-09-28 RX ORDER — SODIUM CHLORIDE 9 MG/ML
INJECTION, SOLUTION INTRAVENOUS CONTINUOUS
Status: CANCELLED | OUTPATIENT
Start: 2023-11-08

## 2023-09-28 RX ORDER — LIDOCAINE HYDROCHLORIDE 20 MG/ML
JELLY TOPICAL ONCE
Status: CANCELLED | OUTPATIENT
Start: 2023-10-18 | End: 2023-10-05

## 2023-09-28 RX ORDER — LIDOCAINE HYDROCHLORIDE 20 MG/ML
JELLY TOPICAL ONCE
Status: CANCELLED | OUTPATIENT
Start: 2023-09-28 | End: 2023-09-28

## 2023-09-28 RX ORDER — DIPHENHYDRAMINE HYDROCHLORIDE 50 MG/ML
50 INJECTION INTRAMUSCULAR; INTRAVENOUS
Status: CANCELLED | OUTPATIENT
Start: 2023-11-01

## 2023-09-28 RX ORDER — ONDANSETRON 2 MG/ML
8 INJECTION INTRAMUSCULAR; INTRAVENOUS
Status: CANCELLED | OUTPATIENT
Start: 2023-11-15

## 2023-09-28 RX ORDER — LIDOCAINE HYDROCHLORIDE 20 MG/ML
JELLY TOPICAL ONCE
Status: CANCELLED | OUTPATIENT
Start: 2023-11-08 | End: 2023-10-26

## 2023-09-28 RX ORDER — DIPHENHYDRAMINE HYDROCHLORIDE 50 MG/ML
50 INJECTION INTRAMUSCULAR; INTRAVENOUS
Status: CANCELLED | OUTPATIENT
Start: 2023-10-18

## 2023-09-28 RX ORDER — LIDOCAINE HYDROCHLORIDE 20 MG/ML
JELLY TOPICAL ONCE
Status: CANCELLED | OUTPATIENT
Start: 2023-11-01 | End: 2023-10-19

## 2023-09-28 RX ORDER — ONDANSETRON 2 MG/ML
8 INJECTION INTRAMUSCULAR; INTRAVENOUS
Status: CANCELLED | OUTPATIENT
Start: 2023-10-25

## 2023-09-28 RX ORDER — FAMOTIDINE 10 MG/ML
20 INJECTION, SOLUTION INTRAVENOUS
Status: CANCELLED | OUTPATIENT
Start: 2023-10-25

## 2023-09-28 RX ORDER — DIPHENHYDRAMINE HYDROCHLORIDE 50 MG/ML
50 INJECTION INTRAMUSCULAR; INTRAVENOUS
Status: CANCELLED | OUTPATIENT
Start: 2023-11-08

## 2023-09-28 RX ORDER — FAMOTIDINE 10 MG/ML
20 INJECTION, SOLUTION INTRAVENOUS
Status: CANCELLED | OUTPATIENT
Start: 2023-11-01

## 2023-09-28 RX ORDER — ACETAMINOPHEN 325 MG/1
650 TABLET ORAL
Status: CANCELLED | OUTPATIENT
Start: 2023-10-25

## 2023-09-28 RX ORDER — EPINEPHRINE 1 MG/ML
0.3 INJECTION, SOLUTION, CONCENTRATE INTRAVENOUS PRN
Status: CANCELLED | OUTPATIENT
Start: 2023-10-18

## 2023-10-06 ENCOUNTER — HOSPITAL ENCOUNTER (OUTPATIENT)
Age: 73
Discharge: HOME | End: 2023-10-06
Payer: MEDICARE

## 2023-10-06 DIAGNOSIS — D50.8 IRON DEFICIENCY ANEMIA SECONDARY TO INADEQUATE DIETARY IRON INTAKE: ICD-10-CM

## 2023-10-06 DIAGNOSIS — E55.9 VITAMIN D DEFICIENCY: ICD-10-CM

## 2023-10-06 DIAGNOSIS — C67.2 MALIGNANT NEOPLASM OF LATERAL WALL OF URINARY BLADDER (HCC): ICD-10-CM

## 2023-10-06 DIAGNOSIS — D50.0 IRON DEFICIENCY ANEMIA DUE TO CHRONIC BLOOD LOSS: ICD-10-CM

## 2023-10-06 LAB
BACTERIA URNS QL MICRO: ABNORMAL
BASOPHILS # BLD: 0.06 K/UL (ref 0–0.2)
BASOPHILS NFR BLD: 1 % (ref 0–2)
BILIRUB UR QL STRIP: NEGATIVE
CASTS #/AREA URNS LPF: ABNORMAL /LPF
CLARITY UR: CLEAR
COLOR UR: YELLOW
EOSINOPHIL # BLD: 0.06 K/UL (ref 0–0.4)
EOSINOPHILS RELATIVE PERCENT: 1 % (ref 0–4)
EPI CELLS #/AREA URNS HPF: ABNORMAL /HPF
ERYTHROCYTE [DISTWIDTH] IN BLOOD BY AUTOMATED COUNT: 23.1 % (ref 11.5–14.9)
FERRITIN SERPL-MCNC: 158 NG/ML (ref 30–400)
GLUCOSE UR STRIP-MCNC: ABNORMAL MG/DL
HCT VFR BLD AUTO: 49.4 % (ref 41–53)
HGB BLD-MCNC: 15.7 G/DL (ref 13.5–17.5)
HGB UR QL STRIP.AUTO: ABNORMAL
IRON SATN MFR SERPL: 27 % (ref 20–55)
IRON SERPL-MCNC: 75 UG/DL (ref 59–158)
KETONES UR STRIP-MCNC: ABNORMAL MG/DL
LEUKOCYTE ESTERASE UR QL STRIP: NEGATIVE
LYMPHOCYTES NFR BLD: 1.41 K/UL (ref 1–4.8)
LYMPHOCYTES RELATIVE PERCENT: 22 % (ref 24–44)
MCH RBC QN AUTO: 27.8 PG (ref 26–34)
MCHC RBC AUTO-ENTMCNC: 31.9 G/DL (ref 31–37)
MCV RBC AUTO: 87.3 FL (ref 80–100)
MONOCYTES NFR BLD: 0.45 K/UL (ref 0.1–1.3)
MONOCYTES NFR BLD: 7 % (ref 1–7)
MORPHOLOGY: ABNORMAL
NEUTROPHILS NFR BLD: 69 % (ref 36–66)
NEUTS SEG NFR BLD: 4.42 K/UL (ref 1.3–9.1)
NITRITE UR QL STRIP: NEGATIVE
PH UR STRIP: 5.5 [PH] (ref 5–8)
PLATELET # BLD AUTO: 228 K/UL (ref 150–450)
PMV BLD AUTO: 8 FL (ref 6–12)
PROT UR STRIP-MCNC: ABNORMAL MG/DL
RBC # BLD AUTO: 5.66 M/UL (ref 4.5–5.9)
RBC #/AREA URNS HPF: ABNORMAL /HPF
SP GR UR STRIP: 1.04 (ref 1–1.03)
TIBC SERPL-MCNC: 282 UG/DL (ref 250–450)
UNSATURATED IRON BINDING CAPACITY: 207 UG/DL (ref 112–347)
UROBILINOGEN UR STRIP-ACNC: NORMAL EU/DL (ref 0–1)
VIT B12 SERPL-MCNC: 405 PG/ML (ref 232–1245)
WBC #/AREA URNS HPF: ABNORMAL /HPF
WBC OTHER # BLD: 6.4 K/UL (ref 3.5–11)

## 2023-10-06 PROCEDURE — 81001 URINALYSIS AUTO W/SCOPE: CPT

## 2023-10-06 PROCEDURE — 82728 ASSAY OF FERRITIN: CPT

## 2023-10-06 PROCEDURE — 85025 COMPLETE CBC W/AUTO DIFF WBC: CPT

## 2023-10-06 PROCEDURE — 82607 VITAMIN B-12: CPT

## 2023-10-06 PROCEDURE — 83540 ASSAY OF IRON: CPT

## 2023-10-06 PROCEDURE — 83550 IRON BINDING TEST: CPT

## 2023-10-06 PROCEDURE — 36415 COLL VENOUS BLD VENIPUNCTURE: CPT

## 2023-10-06 RX ORDER — ERGOCALCIFEROL 1.25 MG/1
CAPSULE ORAL
Qty: 12 CAPSULE | Refills: 0 | Status: SHIPPED | OUTPATIENT
Start: 2023-10-06

## 2023-10-06 NOTE — TELEPHONE ENCOUNTER
Please Approve or Refuse.   Send to Pharmacy per Pt's Request:      Next Visit Date:  10/12/2023   Last Visit Date: 8/29/2023    Hemoglobin A1C (%)   Date Value   08/18/2023 5.9   02/15/2023 8.5 (H)   08/19/2022 6.6 (H)             ( goal A1C is < 7)   BP Readings from Last 3 Encounters:   09/18/23 124/67   09/11/23 133/72   09/11/23 (!) 145/4          (goal 120/80)  BUN   Date Value Ref Range Status   08/18/2023 14 8 - 23 mg/dL Final     Creatinine   Date Value Ref Range Status   08/18/2023 0.8 0.7 - 1.2 mg/dL Final     Potassium   Date Value Ref Range Status   08/18/2023 4.2 3.7 - 5.3 mmol/L Final

## 2023-10-09 ENCOUNTER — TELEPHONE (OUTPATIENT)
Dept: INFUSION THERAPY | Age: 73
End: 2023-10-09

## 2023-10-09 ENCOUNTER — TELEPHONE (OUTPATIENT)
Dept: ONCOLOGY | Age: 73
End: 2023-10-09

## 2023-10-09 NOTE — TELEPHONE ENCOUNTER
Chemotherapy orders received:    Chemotherapy doses verified:   BCG flat dose 50 mg   Previous tx with urology   Joshua Cockayne, RN

## 2023-10-11 ENCOUNTER — TELEPHONE (OUTPATIENT)
Dept: ONCOLOGY | Age: 73
End: 2023-10-11

## 2023-10-11 ENCOUNTER — OFFICE VISIT (OUTPATIENT)
Dept: ONCOLOGY | Age: 73
End: 2023-10-11
Payer: MEDICARE

## 2023-10-11 ENCOUNTER — HOSPITAL ENCOUNTER (OUTPATIENT)
Dept: INFUSION THERAPY | Age: 73
Discharge: HOME | End: 2023-10-11
Payer: MEDICARE

## 2023-10-11 VITALS
DIASTOLIC BLOOD PRESSURE: 68 MMHG | SYSTOLIC BLOOD PRESSURE: 119 MMHG | BODY MASS INDEX: 29.53 KG/M2 | HEART RATE: 80 BPM | WEIGHT: 200 LBS | TEMPERATURE: 97.8 F

## 2023-10-11 DIAGNOSIS — C67.2 MALIGNANT NEOPLASM OF LATERAL WALL OF URINARY BLADDER (HCC): Primary | ICD-10-CM

## 2023-10-11 DIAGNOSIS — D50.8 IRON DEFICIENCY ANEMIA SECONDARY TO INADEQUATE DIETARY IRON INTAKE: ICD-10-CM

## 2023-10-11 DIAGNOSIS — E53.8 B12 DEFICIENCY: ICD-10-CM

## 2023-10-11 DIAGNOSIS — C67.2 MALIGNANT NEOPLASM OF LATERAL WALL OF URINARY BLADDER (HCC): ICD-10-CM

## 2023-10-11 LAB
BACTERIA URNS QL MICRO: ABNORMAL
BILIRUB UR QL STRIP: NEGATIVE
CHARACTER UR: ABNORMAL
CLARITY UR: CLEAR
COLOR UR: YELLOW
EPI CELLS #/AREA URNS HPF: ABNORMAL /HPF (ref 0–5)
GLUCOSE UR STRIP-MCNC: ABNORMAL MG/DL
HGB UR QL STRIP.AUTO: ABNORMAL
KETONES UR STRIP-MCNC: NEGATIVE MG/DL
LEUKOCYTE ESTERASE UR QL STRIP: NEGATIVE
NITRITE UR QL STRIP: NEGATIVE
PH UR STRIP: 5.5 [PH] (ref 5–8)
PROT UR STRIP-MCNC: ABNORMAL MG/DL
RBC #/AREA URNS HPF: ABNORMAL /HPF (ref 0–2)
SP GR UR STRIP: 1.02 (ref 1–1.03)
UROBILINOGEN UR STRIP-ACNC: NORMAL EU/DL (ref 0–1)
WBC #/AREA URNS HPF: ABNORMAL /HPF (ref 0–5)

## 2023-10-11 PROCEDURE — 81001 URINALYSIS AUTO W/SCOPE: CPT

## 2023-10-11 PROCEDURE — 1123F ACP DISCUSS/DSCN MKR DOCD: CPT | Performed by: INTERNAL MEDICINE

## 2023-10-11 PROCEDURE — 2580000003 HC RX 258: Performed by: INTERNAL MEDICINE

## 2023-10-11 PROCEDURE — 6360000002 HC RX W HCPCS: Performed by: INTERNAL MEDICINE

## 2023-10-11 PROCEDURE — 3078F DIAST BP <80 MM HG: CPT | Performed by: INTERNAL MEDICINE

## 2023-10-11 PROCEDURE — 99211 OFF/OP EST MAY X REQ PHY/QHP: CPT | Performed by: INTERNAL MEDICINE

## 2023-10-11 PROCEDURE — 51720 TREATMENT OF BLADDER LESION: CPT

## 2023-10-11 PROCEDURE — 99214 OFFICE O/P EST MOD 30 MIN: CPT | Performed by: INTERNAL MEDICINE

## 2023-10-11 PROCEDURE — G8484 FLU IMMUNIZE NO ADMIN: HCPCS | Performed by: INTERNAL MEDICINE

## 2023-10-11 PROCEDURE — 1036F TOBACCO NON-USER: CPT | Performed by: INTERNAL MEDICINE

## 2023-10-11 PROCEDURE — 6370000000 HC RX 637 (ALT 250 FOR IP): Performed by: INTERNAL MEDICINE

## 2023-10-11 PROCEDURE — 3017F COLORECTAL CA SCREEN DOC REV: CPT | Performed by: INTERNAL MEDICINE

## 2023-10-11 PROCEDURE — G8417 CALC BMI ABV UP PARAM F/U: HCPCS | Performed by: INTERNAL MEDICINE

## 2023-10-11 PROCEDURE — G8427 DOCREV CUR MEDS BY ELIG CLIN: HCPCS | Performed by: INTERNAL MEDICINE

## 2023-10-11 PROCEDURE — 3074F SYST BP LT 130 MM HG: CPT | Performed by: INTERNAL MEDICINE

## 2023-10-11 RX ORDER — ALBUTEROL SULFATE 90 UG/1
4 AEROSOL, METERED RESPIRATORY (INHALATION) PRN
OUTPATIENT
Start: 2023-10-11

## 2023-10-11 RX ORDER — DIPHENHYDRAMINE HYDROCHLORIDE 50 MG/ML
50 INJECTION INTRAMUSCULAR; INTRAVENOUS
OUTPATIENT
Start: 2023-10-11

## 2023-10-11 RX ORDER — MEPERIDINE HYDROCHLORIDE 50 MG/ML
12.5 INJECTION INTRAMUSCULAR; INTRAVENOUS; SUBCUTANEOUS PRN
OUTPATIENT
Start: 2023-10-11

## 2023-10-11 RX ORDER — EPINEPHRINE 1 MG/ML
0.3 INJECTION, SOLUTION, CONCENTRATE INTRAVENOUS PRN
OUTPATIENT
Start: 2023-10-11

## 2023-10-11 RX ORDER — LIDOCAINE HYDROCHLORIDE 20 MG/ML
JELLY TOPICAL ONCE
Status: COMPLETED | OUTPATIENT
Start: 2023-10-11 | End: 2023-10-11

## 2023-10-11 RX ORDER — LIDOCAINE HYDROCHLORIDE 20 MG/ML
JELLY TOPICAL ONCE
Status: CANCELLED | OUTPATIENT
Start: 2023-10-11 | End: 2023-10-11

## 2023-10-11 RX ORDER — ACETAMINOPHEN 325 MG/1
650 TABLET ORAL
OUTPATIENT
Start: 2023-10-11

## 2023-10-11 RX ORDER — SODIUM CHLORIDE 9 MG/ML
INJECTION, SOLUTION INTRAVENOUS CONTINUOUS
OUTPATIENT
Start: 2023-10-11

## 2023-10-11 RX ORDER — FAMOTIDINE 10 MG/ML
20 INJECTION, SOLUTION INTRAVENOUS
OUTPATIENT
Start: 2023-10-11

## 2023-10-11 RX ORDER — ONDANSETRON 2 MG/ML
8 INJECTION INTRAMUSCULAR; INTRAVENOUS
OUTPATIENT
Start: 2023-10-11

## 2023-10-11 RX ADMIN — SODIUM CHLORIDE 50 MG: 9 INJECTION, SOLUTION INTRAVENOUS at 10:33

## 2023-10-11 RX ADMIN — LIDOCAINE HYDROCHLORIDE: 20 JELLY TOPICAL at 09:59

## 2023-10-11 SDOH — HEALTH STABILITY: PHYSICAL HEALTH: ON AVERAGE, HOW MANY MINUTES DO YOU ENGAGE IN EXERCISE AT THIS LEVEL?: 10 MIN

## 2023-10-11 SDOH — HEALTH STABILITY: PHYSICAL HEALTH: ON AVERAGE, HOW MANY DAYS PER WEEK DO YOU ENGAGE IN MODERATE TO STRENUOUS EXERCISE (LIKE A BRISK WALK)?: 3 DAYS

## 2023-10-11 ASSESSMENT — PATIENT HEALTH QUESTIONNAIRE - PHQ9
SUM OF ALL RESPONSES TO PHQ QUESTIONS 1-9: 0
2. FEELING DOWN, DEPRESSED OR HOPELESS: 0
SUM OF ALL RESPONSES TO PHQ QUESTIONS 1-9: 0
SUM OF ALL RESPONSES TO PHQ9 QUESTIONS 1 & 2: 0
SUM OF ALL RESPONSES TO PHQ QUESTIONS 1-9: 0
1. LITTLE INTEREST OR PLEASURE IN DOING THINGS: 0
SUM OF ALL RESPONSES TO PHQ QUESTIONS 1-9: 0

## 2023-10-11 ASSESSMENT — LIFESTYLE VARIABLES
HOW MANY STANDARD DRINKS CONTAINING ALCOHOL DO YOU HAVE ON A TYPICAL DAY: PATIENT DOES NOT DRINK
HOW OFTEN DO YOU HAVE A DRINK CONTAINING ALCOHOL: 1
HOW OFTEN DO YOU HAVE SIX OR MORE DRINKS ON ONE OCCASION: 1
HOW MANY STANDARD DRINKS CONTAINING ALCOHOL DO YOU HAVE ON A TYPICAL DAY: 0
HOW OFTEN DO YOU HAVE A DRINK CONTAINING ALCOHOL: NEVER

## 2023-10-11 NOTE — PROGRESS NOTES
Pt here for BCG C1D1. .  Arrives ambulatory. Denies any new complaints. Urine sample obtained, results reviewed  Pt was seen by Dr. Anoop Zamora, discussed UA results with Dr. Anoop Zamora - order rec'd to proceed with tx. Tx complete without incident. Pt d/c'd in stable condition. Returns 10/18/23 for BCG C1D8.

## 2023-10-11 NOTE — TELEPHONE ENCOUNTER
AVS from 10/11/23    Bcg today as planned     Rv in 2 months with labs priro       Tx today as scheduled    Rv sched for 12/13 @ 8:00 am     Pt elected to access avs & schedule on [employer]    Electronically signed by Meghna Jauregui on 10/11/2023 at 8:49 AM

## 2023-10-12 ENCOUNTER — OFFICE VISIT (OUTPATIENT)
Dept: FAMILY MEDICINE CLINIC | Age: 73
End: 2023-10-12
Payer: MEDICARE

## 2023-10-12 VITALS
DIASTOLIC BLOOD PRESSURE: 74 MMHG | OXYGEN SATURATION: 94 % | SYSTOLIC BLOOD PRESSURE: 130 MMHG | BODY MASS INDEX: 29.65 KG/M2 | WEIGHT: 200.2 LBS | HEIGHT: 69 IN | HEART RATE: 90 BPM | TEMPERATURE: 97.7 F

## 2023-10-12 DIAGNOSIS — J30.89 NON-SEASONAL ALLERGIC RHINITIS DUE TO OTHER ALLERGIC TRIGGER: ICD-10-CM

## 2023-10-12 DIAGNOSIS — E11.65 TYPE 2 DIABETES MELLITUS WITH HYPERGLYCEMIA, WITHOUT LONG-TERM CURRENT USE OF INSULIN (HCC): ICD-10-CM

## 2023-10-12 DIAGNOSIS — E55.9 VITAMIN D DEFICIENCY: ICD-10-CM

## 2023-10-12 DIAGNOSIS — Z23 ENCOUNTER FOR IMMUNIZATION: ICD-10-CM

## 2023-10-12 DIAGNOSIS — E78.5 HYPERLIPIDEMIA WITH TARGET LDL LESS THAN 100: ICD-10-CM

## 2023-10-12 DIAGNOSIS — Z00.00 MEDICARE ANNUAL WELLNESS VISIT, SUBSEQUENT: Primary | ICD-10-CM

## 2023-10-12 PROBLEM — J30.9 ALLERGIC RHINITIS DUE TO ALLERGEN: Status: ACTIVE | Noted: 2023-10-12

## 2023-10-12 PROCEDURE — 2022F DILAT RTA XM EVC RTNOPTHY: CPT | Performed by: FAMILY MEDICINE

## 2023-10-12 PROCEDURE — G8484 FLU IMMUNIZE NO ADMIN: HCPCS | Performed by: FAMILY MEDICINE

## 2023-10-12 PROCEDURE — 1036F TOBACCO NON-USER: CPT | Performed by: FAMILY MEDICINE

## 2023-10-12 PROCEDURE — G8427 DOCREV CUR MEDS BY ELIG CLIN: HCPCS | Performed by: FAMILY MEDICINE

## 2023-10-12 PROCEDURE — 1123F ACP DISCUSS/DSCN MKR DOCD: CPT | Performed by: FAMILY MEDICINE

## 2023-10-12 PROCEDURE — G8417 CALC BMI ABV UP PARAM F/U: HCPCS | Performed by: FAMILY MEDICINE

## 2023-10-12 PROCEDURE — 90694 VACC AIIV4 NO PRSRV 0.5ML IM: CPT | Performed by: FAMILY MEDICINE

## 2023-10-12 PROCEDURE — 3078F DIAST BP <80 MM HG: CPT | Performed by: FAMILY MEDICINE

## 2023-10-12 PROCEDURE — G0439 PPPS, SUBSEQ VISIT: HCPCS | Performed by: FAMILY MEDICINE

## 2023-10-12 PROCEDURE — 99214 OFFICE O/P EST MOD 30 MIN: CPT | Performed by: FAMILY MEDICINE

## 2023-10-12 PROCEDURE — G0008 ADMIN INFLUENZA VIRUS VAC: HCPCS | Performed by: FAMILY MEDICINE

## 2023-10-12 PROCEDURE — 3017F COLORECTAL CA SCREEN DOC REV: CPT | Performed by: FAMILY MEDICINE

## 2023-10-12 PROCEDURE — 3044F HG A1C LEVEL LT 7.0%: CPT | Performed by: FAMILY MEDICINE

## 2023-10-12 PROCEDURE — 3075F SYST BP GE 130 - 139MM HG: CPT | Performed by: FAMILY MEDICINE

## 2023-10-12 RX ORDER — BNT162B2 0.23 MG/2.25ML
0.3 INJECTION, SUSPENSION INTRAMUSCULAR ONCE
Qty: 0.3 ML | Refills: 0 | Status: SHIPPED | OUTPATIENT
Start: 2023-10-12 | End: 2023-10-12

## 2023-10-12 RX ORDER — LEVOCETIRIZINE DIHYDROCHLORIDE 5 MG/1
5 TABLET, FILM COATED ORAL NIGHTLY
Qty: 90 TABLET | Refills: 0 | Status: SHIPPED | OUTPATIENT
Start: 2023-10-12

## 2023-10-12 RX ORDER — FLUTICASONE PROPIONATE 50 MCG
2 SPRAY, SUSPENSION (ML) NASAL DAILY
Qty: 16 G | Refills: 0 | Status: SHIPPED | OUTPATIENT
Start: 2023-10-12

## 2023-10-12 RX ORDER — LIDOCAINE 40 MG/G
CREAM TOPICAL
Qty: 120 G | Refills: 3 | Status: SHIPPED | OUTPATIENT
Start: 2023-10-12

## 2023-10-12 RX ORDER — POLYETHYLENE GLYCOL 400 AND PROPYLENE GLYCOL 4; 3 MG/ML; MG/ML
1 SOLUTION/ DROPS OPHTHALMIC
Qty: 30 ML | Refills: 0 | Status: SHIPPED | OUTPATIENT
Start: 2023-10-12

## 2023-10-12 SDOH — ECONOMIC STABILITY: FOOD INSECURITY: WITHIN THE PAST 12 MONTHS, YOU WORRIED THAT YOUR FOOD WOULD RUN OUT BEFORE YOU GOT MONEY TO BUY MORE.: NEVER TRUE

## 2023-10-12 SDOH — ECONOMIC STABILITY: FOOD INSECURITY: WITHIN THE PAST 12 MONTHS, THE FOOD YOU BOUGHT JUST DIDN'T LAST AND YOU DIDN'T HAVE MONEY TO GET MORE.: NEVER TRUE

## 2023-10-12 SDOH — ECONOMIC STABILITY: INCOME INSECURITY: HOW HARD IS IT FOR YOU TO PAY FOR THE VERY BASICS LIKE FOOD, HOUSING, MEDICAL CARE, AND HEATING?: NOT HARD AT ALL

## 2023-10-12 ASSESSMENT — ENCOUNTER SYMPTOMS
RHINORRHEA: 1
BACK PAIN: 1
ABDOMINAL PAIN: 0
SHORTNESS OF BREATH: 0
EYE ITCHING: 1
DIARRHEA: 0
VOMITING: 0
COUGH: 0
CHEST TIGHTNESS: 0
SORE THROAT: 0
EYE DISCHARGE: 1
SINUS PAIN: 0
SINUS PRESSURE: 0
NAUSEA: 0
ABDOMINAL DISTENTION: 0
WHEEZING: 0
CONSTIPATION: 0

## 2023-10-12 ASSESSMENT — VISUAL ACUITY
OD_CC: 20/50
OS_CC: 20/25

## 2023-10-12 NOTE — PATIENT INSTRUCTIONS
Advance Directives: Care Instructions  Overview  An advance directive is a legal way to state your wishes at the end of your life. It tells your family and your doctor what to do if you can't say what you want. There are two main types of advance directives. You can change them any time your wishes change. Living will. This form tells your family and your doctor your wishes about life support and other treatment. The form is also called a declaration. Medical power of . This form lets you name a person to make treatment decisions for you when you can't speak for yourself. This person is called a health care agent (health care proxy, health care surrogate). The form is also called a durable power of  for health care. If you do not have an advance directive, decisions about your medical care may be made by a family member, or by a doctor or a  who doesn't know you. It may help to think of an advance directive as a gift to the people who care for you. If you have one, they won't have to make tough decisions by themselves. For more information, including forms for your state, see the [address] [place] [address] website (www.caringinfo.org/planning/advance-directives/). Follow-up care is a key part of your treatment and safety. Be sure to make and go to all appointments, and call your doctor if you are having problems. It's also a good idea to know your test results and keep a list of the medicines you take. What should you include in an advance directive? Many states have a unique advance directive form. (It may ask you to address specific issues.) Or you might use a universal form that's approved by many states. If your form doesn't tell you what to address, it may be hard to know what to include in your advance directive. Use the questions below to help you get started. Who do you want to make decisions about your medical care if you are not able to?   What life-support measures do you want if you

## 2023-10-12 NOTE — PROGRESS NOTES
Visit Information    Have you changed or started any medications since your last visit including any over-the-counter medicines, vitamins, or herbal medicines? no   Have you stopped taking any of your medications? Is so, why? -  no  Are you having any side effects from any of your medications? - no    Have you seen any other physician or provider since your last visit? yes -    Have you had any other diagnostic tests since your last visit? yes -    Have you been seen in the emergency room and/or had an admission in a hospital since we last saw you?  no  Have you had your routine dental cleaning in the past 6 months?  yes -      Do you have an active MyChart account? If no, what is the barrier?   Yes    Patient Care Team:  Tarsha Michelle MD as PCP - General (Family Medicine)  Tarsha Michelle MD as PCP - Empaneled Provider  Bolivar Nunez MD as Consulting Physician (Gastroenterology)  Hannah Malagon MD as Consulting Physician (Hematology and Oncology)  Barbara Resendiz DO as Consulting Physician (Bariatric Surgery)  Verner Jester, DO as Consulting Physician (Cardiology)  Kacy Larkin MD as Consulting Physician (Urology)  Trinity Hand DO as Consulting Physician (Physical Medicine and Rehab)    Medical History Review  Past Medical, Family, and Social History reviewed and does contribute to the patient presenting condition    Health Maintenance   Topic Date Due    DTaP/Tdap/Td vaccine (1 - Tdap) Never done    Shingles vaccine (2 of 3) 12/27/2015    COVID-19 Vaccine (3 - Booster for Lisa series) 03/18/2022    Low dose CT lung screening &/or counseling  04/26/2023    Flu vaccine (1) 08/01/2023    Diabetic Alb to Cr ratio (uACR) test  09/19/2023    Diabetic retinal exam  09/22/2023    Diabetic foot exam  09/27/2023    Annual Wellness Visit (AWV)  09/28/2023    A1C test (Diabetic or Prediabetic)  11/18/2023    Lipids  02/15/2024    GFR test (Diabetes, CKD 3-4, OR last GFR 15-59)  08/18/2024
Results   Component Value Date    HDL 25 (L) 02/15/2023    HDL 24 (L) 03/12/2022    HDL 22 (L) 10/23/2021     Lab Results   Component Value Date    LDLCHOLESTEROL      02/15/2023    LDLCHOLESTEROL 19 03/12/2022    LDLCHOLESTEROL      10/23/2021     Lab Results   Component Value Date    CHOLHDLRATIO 4.7 02/15/2023    CHOLHDLRATIO 4.2 03/12/2022    CHOLHDLRATIO 5.2 (H) 10/23/2021       Lab Results   Component Value Date    LABA1C 5.9 08/18/2023       Lab Results   Component Value Date    JDLACOYI88 405 10/06/2023       Lab Results   Component Value Date    FOLATE >20.0 04/26/2023       Lab Results   Component Value Date    VITD25 23.8 (L) 02/15/2023         Orders Placed This Encounter   Procedures    Influenza, FLUAD, (age 72 y+), IM, Preservative Free, 0.5 mL    Microalbumin / Creatinine Urine Ratio     Standing Status:   Future     Standing Expiration Date:   10/12/2024    Comprehensive Metabolic Panel     Standing Status:   Future     Standing Expiration Date:   10/12/2024    TSH     Standing Status:   Future     Standing Expiration Date:   10/12/2024    Uric Acid     Standing Status:   Future     Standing Expiration Date:   10/12/2024    Vitamin D 25 Hydroxy     Standing Status:   Future     Standing Expiration Date:   10/12/2024    Lipid Panel     Standing Status:   Future     Standing Expiration Date:   10/12/2024     Order Specific Question:   Is Patient Fasting?/# of Hours     Answer:   8-10 Hours, water ok to drink    MA OFFICE OUTPATIENT VISIT 25 MINUTES [01756]       Orders Placed This Encounter   Medications    levocetirizine (XYZAL) 5 MG tablet     Sig: Take 1 tablet by mouth nightly     Dispense:  90 tablet     Refill:  0    pneumococcal 20-valent conjugat (PREVNAR) 0.5 ML ANABELLA inj     Sig: Inject 0.5 mLs into the muscle once for 1 dose     Dispense:  0.5 mL     Refill:  0    COVID-19 mRNA Vac-Joselito,Pfizer, (PFIZER-BIONT COVID-19 VAC-JOSELITO) 30 MCG/0.3ML SUSP injection     Sig: Inject 0.3 mLs into the

## 2023-10-18 ENCOUNTER — HOSPITAL ENCOUNTER (OUTPATIENT)
Dept: INFUSION THERAPY | Age: 73
Discharge: HOME | End: 2023-10-18
Payer: MEDICARE

## 2023-10-18 VITALS
DIASTOLIC BLOOD PRESSURE: 95 MMHG | RESPIRATION RATE: 16 BRPM | HEART RATE: 76 BPM | SYSTOLIC BLOOD PRESSURE: 150 MMHG | TEMPERATURE: 99 F

## 2023-10-18 DIAGNOSIS — C67.2 MALIGNANT NEOPLASM OF LATERAL WALL OF URINARY BLADDER (HCC): Primary | ICD-10-CM

## 2023-10-18 PROCEDURE — 6370000000 HC RX 637 (ALT 250 FOR IP): Performed by: INTERNAL MEDICINE

## 2023-10-18 PROCEDURE — 6360000002 HC RX W HCPCS: Performed by: INTERNAL MEDICINE

## 2023-10-18 PROCEDURE — 51720 TREATMENT OF BLADDER LESION: CPT

## 2023-10-18 PROCEDURE — 2580000003 HC RX 258: Performed by: INTERNAL MEDICINE

## 2023-10-18 RX ORDER — LIDOCAINE HYDROCHLORIDE 20 MG/ML
JELLY TOPICAL ONCE
Status: COMPLETED | OUTPATIENT
Start: 2023-10-18 | End: 2023-10-18

## 2023-10-18 RX ADMIN — LIDOCAINE HYDROCHLORIDE: 20 JELLY TOPICAL at 08:20

## 2023-10-18 RX ADMIN — SODIUM CHLORIDE 50 MG: 9 INJECTION, SOLUTION INTRAVENOUS at 08:35

## 2023-10-22 DIAGNOSIS — F11.93 OPIOID WITHDRAWAL (HCC): ICD-10-CM

## 2023-10-22 DIAGNOSIS — F41.9 ANXIETY: ICD-10-CM

## 2023-10-23 RX ORDER — BUSPIRONE HYDROCHLORIDE 10 MG/1
10 TABLET ORAL 3 TIMES DAILY PRN
Qty: 90 TABLET | Refills: 5 | Status: SHIPPED | OUTPATIENT
Start: 2023-10-23

## 2023-10-23 NOTE — TELEPHONE ENCOUNTER
Please Approve or Refuse.   Send to Pharmacy per Pt's Request:      Next Visit Date:  2/16/2024   Last Visit Date: 10/12/2023    Hemoglobin A1C (%)   Date Value   08/18/2023 5.9   02/15/2023 8.5 (H)   08/19/2022 6.6 (H)             ( goal A1C is < 7)   BP Readings from Last 3 Encounters:   10/18/23 (!) 150/95   10/12/23 130/74   10/11/23 119/68          (goal 120/80)  BUN   Date Value Ref Range Status   08/18/2023 14 8 - 23 mg/dL Final     Creatinine   Date Value Ref Range Status   08/18/2023 0.8 0.7 - 1.2 mg/dL Final     Potassium   Date Value Ref Range Status   08/18/2023 4.2 3.7 - 5.3 mmol/L Final

## 2023-10-25 ENCOUNTER — HOSPITAL ENCOUNTER (OUTPATIENT)
Dept: INFUSION THERAPY | Age: 73
Discharge: HOME | End: 2023-10-25
Payer: MEDICARE

## 2023-10-25 VITALS
DIASTOLIC BLOOD PRESSURE: 86 MMHG | RESPIRATION RATE: 16 BRPM | OXYGEN SATURATION: 95 % | TEMPERATURE: 98.1 F | SYSTOLIC BLOOD PRESSURE: 133 MMHG

## 2023-10-25 DIAGNOSIS — C67.2 MALIGNANT NEOPLASM OF LATERAL WALL OF URINARY BLADDER (HCC): Primary | ICD-10-CM

## 2023-10-25 PROCEDURE — 51720 TREATMENT OF BLADDER LESION: CPT

## 2023-10-25 PROCEDURE — 6370000000 HC RX 637 (ALT 250 FOR IP): Performed by: INTERNAL MEDICINE

## 2023-10-25 PROCEDURE — 2580000003 HC RX 258: Performed by: INTERNAL MEDICINE

## 2023-10-25 PROCEDURE — 6360000002 HC RX W HCPCS: Performed by: INTERNAL MEDICINE

## 2023-10-25 RX ORDER — LIDOCAINE HYDROCHLORIDE 20 MG/ML
JELLY TOPICAL ONCE
Status: COMPLETED | OUTPATIENT
Start: 2023-10-25 | End: 2023-10-25

## 2023-10-25 RX ADMIN — SODIUM CHLORIDE 50 MG: 9 INJECTION, SOLUTION INTRAVENOUS at 08:35

## 2023-10-25 RX ADMIN — LIDOCAINE HYDROCHLORIDE: 20 JELLY TOPICAL at 08:07

## 2023-10-25 NOTE — PROGRESS NOTES
Pt here for BCG C1D15. Arrives ambulatory. Denies any new complaints. Order rec'd to proceed with tx. 16Fr Tate catheter placed. 'Tx complete without incident. Tate catheter removed. Pt d/c'd in stable condition. Returns 11/1/23 for C1D22.

## 2023-10-25 NOTE — PLAN OF CARE
Problem: Chronic Conditions and Co-morbidities  Goal: Patient's chronic conditions and co-morbidity symptoms are monitored and maintained or improved  Outcome: Completed  Flowsheets (Taken 10/25/2023 0801)  Care Plan - Patient's Chronic Conditions and Co-Morbidity Symptoms are Monitored and Maintained or Improved: Monitor and assess patient's chronic conditions and comorbid symptoms for stability, deterioration, or improvement

## 2023-10-27 ENCOUNTER — HOSPITAL ENCOUNTER (OUTPATIENT)
Age: 73
Discharge: HOME | End: 2023-10-27
Payer: MEDICARE

## 2023-10-27 ENCOUNTER — HOSPITAL ENCOUNTER (OUTPATIENT)
Dept: CT IMAGING | Age: 73
End: 2023-10-27
Payer: MEDICARE

## 2023-10-27 DIAGNOSIS — E78.5 HYPERLIPIDEMIA WITH TARGET LDL LESS THAN 100: ICD-10-CM

## 2023-10-27 DIAGNOSIS — Z87.891 PERSONAL HISTORY OF TOBACCO USE: ICD-10-CM

## 2023-10-27 DIAGNOSIS — E11.65 TYPE 2 DIABETES MELLITUS WITH HYPERGLYCEMIA, WITHOUT LONG-TERM CURRENT USE OF INSULIN (HCC): ICD-10-CM

## 2023-10-27 DIAGNOSIS — E55.9 VITAMIN D DEFICIENCY: ICD-10-CM

## 2023-10-27 LAB
25(OH)D3 SERPL-MCNC: 39.2 NG/ML
ALBUMIN SERPL-MCNC: 4.2 G/DL (ref 3.5–5.2)
ALP SERPL-CCNC: 81 U/L (ref 40–129)
ALT SERPL-CCNC: 19 U/L (ref 5–41)
ANION GAP SERPL CALCULATED.3IONS-SCNC: 12 MMOL/L (ref 9–17)
AST SERPL-CCNC: 19 U/L
BILIRUB SERPL-MCNC: 0.6 MG/DL (ref 0.3–1.2)
BUN SERPL-MCNC: 11 MG/DL (ref 8–23)
CALCIUM SERPL-MCNC: 9.5 MG/DL (ref 8.6–10.4)
CHLORIDE SERPL-SCNC: 104 MMOL/L (ref 98–107)
CHOLEST SERPL-MCNC: 126 MG/DL
CHOLESTEROL/HDL RATIO: 4.2
CO2 SERPL-SCNC: 26 MMOL/L (ref 20–31)
CREAT SERPL-MCNC: 0.7 MG/DL (ref 0.7–1.2)
CREAT UR-MCNC: 63.8 MG/DL (ref 39–259)
GFR SERPL CREATININE-BSD FRML MDRD: >60 ML/MIN/1.73M2
GLUCOSE SERPL-MCNC: 140 MG/DL (ref 70–99)
HDLC SERPL-MCNC: 30 MG/DL
LDLC SERPL CALC-MCNC: 36 MG/DL (ref 0–130)
MICROALBUMIN UR-MCNC: 119 MG/L
MICROALBUMIN/CREAT UR-RTO: 187 MCG/MG CREAT
POTASSIUM SERPL-SCNC: 4.9 MMOL/L (ref 3.7–5.3)
PROT SERPL-MCNC: 7 G/DL (ref 6.4–8.3)
SODIUM SERPL-SCNC: 142 MMOL/L (ref 135–144)
TRIGL SERPL-MCNC: 302 MG/DL
TSH SERPL DL<=0.05 MIU/L-ACNC: 1.23 UIU/ML (ref 0.3–5)
URATE SERPL-MCNC: 5.8 MG/DL (ref 3.4–7)

## 2023-10-27 PROCEDURE — 82043 UR ALBUMIN QUANTITATIVE: CPT

## 2023-10-27 PROCEDURE — 36415 COLL VENOUS BLD VENIPUNCTURE: CPT

## 2023-10-27 PROCEDURE — 80061 LIPID PANEL: CPT

## 2023-10-27 PROCEDURE — 82306 VITAMIN D 25 HYDROXY: CPT

## 2023-10-27 PROCEDURE — 84550 ASSAY OF BLOOD/URIC ACID: CPT

## 2023-10-27 PROCEDURE — 80053 COMPREHEN METABOLIC PANEL: CPT

## 2023-10-27 PROCEDURE — 82570 ASSAY OF URINE CREATININE: CPT

## 2023-10-27 PROCEDURE — 71271 CT THORAX LUNG CANCER SCR C-: CPT

## 2023-10-27 PROCEDURE — 84443 ASSAY THYROID STIM HORMONE: CPT

## 2023-10-27 NOTE — RESULT ENCOUNTER NOTE
Please notify patient: Proteins in the urine, slightly worse than before-  Continue good control of blood pressure, diabetes and lisinopril    Glucose 140 relatively well-controlled diabetes  Triglycerides high he is still eating too many sweets or drinking pop?   Vitamin D improving, to make sure he takes vitamin D, I did give him a refill vitamin D on 10/6/2023, of high dosage weekly with food for 3 months  Otherwise labs within normal limits  continue current treatment    Future Appointments  11/1/2023  9:00 AM    ST PB MED ONC CHAIR 16    [facility]  11/8/2023  9:00 AM    ST PB MED ONC CHAIR 12    [facility]  11/15/2023 9:00 AM    [facility] PB MED ONC CHAIR 15    [facility]  12/11/2023 10:30 AM   Bryan Lopez MD        . C URO           [facility]  12/13/2023 8:00 AM    Vince Saavedra MD         [address] [place] [address]  1/9/2024   1:00 PM    Gabbi Molina MD         [address]  2/16/2024  8:30 AM    Yahir Chaudhary MD    [facility]  10/15/2024 9:00 AM    Yahir Chaudhary MD    [address]

## 2023-10-30 ENCOUNTER — TELEPHONE (OUTPATIENT)
Dept: CASE MANAGEMENT | Age: 73
End: 2023-10-30

## 2023-10-30 DIAGNOSIS — I51.7 CARDIOMEGALY: Primary | ICD-10-CM

## 2023-10-30 NOTE — RESULT ENCOUNTER NOTE
Please notify patient that the CT of his chest is showing an enlarging pulmonary nodule up to 9 mm, previously 5 mm a year ago. Recommended further follow-up with PET scan. I am going to route this result to his oncologist as well for further recommendations. Upcoming appointment with Dr Radha Nascimento in December. Currently undergoing treatment for bladder cancer. Cardiomegaly noted, coronary artery disease noted. Patient is to follow-up with cardiology, continue pravastatin. I will order an Echo. Please make appointment with Dr Belen Moon with cardiology.

## 2023-11-01 ENCOUNTER — TELEPHONE (OUTPATIENT)
Dept: INFUSION THERAPY | Age: 73
End: 2023-11-01

## 2023-11-01 ENCOUNTER — HOSPITAL ENCOUNTER (OUTPATIENT)
Dept: INFUSION THERAPY | Age: 73
Discharge: HOME | End: 2023-11-01
Payer: MEDICARE

## 2023-11-01 VITALS
RESPIRATION RATE: 16 BRPM | TEMPERATURE: 98.1 F | HEART RATE: 88 BPM | SYSTOLIC BLOOD PRESSURE: 131 MMHG | DIASTOLIC BLOOD PRESSURE: 77 MMHG

## 2023-11-01 DIAGNOSIS — C67.2 MALIGNANT NEOPLASM OF LATERAL WALL OF URINARY BLADDER (HCC): Primary | ICD-10-CM

## 2023-11-01 PROCEDURE — 6360000002 HC RX W HCPCS: Performed by: INTERNAL MEDICINE

## 2023-11-01 PROCEDURE — 51720 TREATMENT OF BLADDER LESION: CPT

## 2023-11-01 PROCEDURE — 6370000000 HC RX 637 (ALT 250 FOR IP): Performed by: INTERNAL MEDICINE

## 2023-11-01 PROCEDURE — 2580000003 HC RX 258: Performed by: INTERNAL MEDICINE

## 2023-11-01 RX ORDER — LIDOCAINE HYDROCHLORIDE 20 MG/ML
JELLY TOPICAL ONCE
Status: COMPLETED | OUTPATIENT
Start: 2023-11-01 | End: 2023-11-01

## 2023-11-01 RX ADMIN — SODIUM CHLORIDE 50 MG: 9 INJECTION, SOLUTION INTRAVENOUS at 09:37

## 2023-11-01 RX ADMIN — LIDOCAINE HYDROCHLORIDE: 20 JELLY TOPICAL at 09:09

## 2023-11-01 NOTE — TELEPHONE ENCOUNTER
Pt's spouse, Natalya Villeda left  in triage stating pt is having blood in his urine. Attempted to call her back to further assess, but had to leave .

## 2023-11-01 NOTE — PROGRESS NOTES
Pt here for BCG C1D22. Arrives ambulatory. Denies any new complaints. Order rec'd to proceed with tx.  16 Fr Tate catheter placed.'  Tx complete without incident. Tate catheter removed. Pt d/c'd in stable condition. Returns 11/8/23 for C1D29.

## 2023-11-06 RX ORDER — FOLIC ACID 1 MG/1
1000 TABLET ORAL DAILY
Qty: 90 TABLET | Refills: 1 | Status: SHIPPED | OUTPATIENT
Start: 2023-11-06

## 2023-11-08 ENCOUNTER — HOSPITAL ENCOUNTER (OUTPATIENT)
Dept: INFUSION THERAPY | Age: 73
Discharge: HOME | End: 2023-11-08
Payer: MEDICARE

## 2023-11-08 VITALS
HEART RATE: 90 BPM | SYSTOLIC BLOOD PRESSURE: 139 MMHG | TEMPERATURE: 98.5 F | DIASTOLIC BLOOD PRESSURE: 91 MMHG | RESPIRATION RATE: 16 BRPM

## 2023-11-08 DIAGNOSIS — C67.2 MALIGNANT NEOPLASM OF LATERAL WALL OF URINARY BLADDER (HCC): Primary | ICD-10-CM

## 2023-11-08 PROCEDURE — 6370000000 HC RX 637 (ALT 250 FOR IP)

## 2023-11-08 PROCEDURE — 2580000003 HC RX 258: Performed by: INTERNAL MEDICINE

## 2023-11-08 PROCEDURE — 51720 TREATMENT OF BLADDER LESION: CPT

## 2023-11-08 PROCEDURE — 6360000002 HC RX W HCPCS: Performed by: INTERNAL MEDICINE

## 2023-11-08 RX ORDER — LIDOCAINE HYDROCHLORIDE 20 MG/ML
JELLY TOPICAL ONCE
Status: COMPLETED | OUTPATIENT
Start: 2023-11-08 | End: 2023-11-08

## 2023-11-08 RX ORDER — LIDOCAINE HYDROCHLORIDE 20 MG/ML
JELLY TOPICAL
Status: COMPLETED
Start: 2023-11-08 | End: 2023-11-08

## 2023-11-08 RX ADMIN — LIDOCAINE HYDROCHLORIDE: 20 JELLY TOPICAL at 09:00

## 2023-11-08 RX ADMIN — SODIUM CHLORIDE 50 MG: 9 INJECTION, SOLUTION INTRAVENOUS at 09:49

## 2023-11-08 NOTE — PROGRESS NOTES
Pt here for BCG bladder instillation. #16fr patel catheter inserted per protocol. 300ml clear yellow urine drained. BCG instilled and patel d/c'd. Procedure tolerated well. Pt instructed to turn every 15 minutes from back to side-stomach-side then back again. Encouraged to increase oral fluids for the next 24-48 hours. Pt encouraged not to urinate for 2 hours after bladder instillation. Pt verbalized understanding. Pt was treated without incident and d/c'd in stable condition. Pt returns on  for next BCG.

## 2023-11-08 NOTE — OP NOTE
ADULT ANNUAL [facility]    NAME: Mike Jackson  AGE: 41 y.o. SEX: male  : 1984     DATE: 2023     Assessment and Plan:     Problem List Items Addressed This Visit          Nervous and Auditory    Cerebellar ataxia, ambulatory dysfunction, dysarthria, cervical dystonia  Patient follows up with neurology. Other    Annual physical exam - Primary    Relevant Orders    Comprehensive metabolic panel    CBC and differential    TSH, 3rd generation with Free T4 reflex    Lipid Panel with Direct LDL reflex    Patient instructed to eat a healthy well balanced diet. Tremor    Relevant Orders    Comprehensive metabolic panel    CBC and differential    TSH, 3rd generation with Free T4 reflex    Patient follows up with neurology. Encounter for immunization    Relevant Orders    influenza vaccine, quadrivalent, 0.5 mL, preservative-free, for adult and pediatric patients 6 mos+ (AFLURIA, FLUARIX, FLULAVAL, FLUZONE) (Completed)      Screening for diabetes mellitus    Relevant Orders    Comprehensive metabolic panel    Screening for cardiovascular condition    Relevant Orders    Lipid Panel with Direct LDL reflex    Lab work ordered. Patient instructed to check with insurance for coverage. Will follow-up results with the patient. Patient instructed to follow-up in 1 year for a physical exam or sooner prn. Immunizations and preventive care screenings were discussed with patient today. Appropriate education was printed on patient's after visit summary. Counseling:  Dental Health: discussed importance of regular tooth brushing, flossing, and dental visits. Injury prevention: discussed safety/seat belts, safety helmets, smoke detectors, carbon monoxide detectors,     BMI Counseling: BMI was not able to be calculated due to patient refusing height and/or weight.      Depression Screening and Follow-up Plan: Patient was     8/7/2020      Preoperative Diagnosis:  Incisional hernia      Postoperative Diagnosis:  Incarcerated incisional hernia      Procedure:  Laparoscopic Incarcerated incisional hernia repair with mesh, Robotic Assisted     Surgeon:  Blanche Dubose DO      Ast:  Betsy Lanier MD      Complications:  None      Specimen:  None      Anesthesia:  General      EBL:  17 cc      Operative Indications:  81 year old male with an incisional hernia.  The risks, benefits and options were explained to the patient.  Option of no surgery given.  Risks of bleeding, infection, mesh infection, need for further surgery, damage to surrounding structures were explained.  The patient consented to the procedure.      Medications: Ancef, SCD's, Heparin SQ      Operative Narrative: The patient was taken to the operative suite where he was placed in supine position and administered general anesthesia.  He was then prepared and draped in normal sterile fashion.  SCD's placed.  Time out called and procedure and patient confirmed.  A 12 mm port with an optical trocar was then placed in the left upper quadrant at Rudolph's point.  Pneumoperitoneum established without complications. 12 mm port placed.  The underlying organs without bleeding or visible succus.  There was significant midline adhesions.  Then three 8 mm robotic ports then placed under direct visualization in the left abdomen laterally as possible, each about 4 finger breadths apart.  The robot docked.  .  We took down the adhesions at midline.  Hernia sites noted.  No viscera in the hernia. Carmene o was a midline defect at site of prior incision from cholecystectomy approximately 4 cm.       With the preperitoneal fat withdrawn down from the hernia site we turned to closure and mesh placement.  The hernia site was then closed with an 0-V lock suture in running fashion at the hernia site.  Proper fascial re-approximation noted.  Hemostasis noted throughout the pre-peritoneal screened for depression during today's encounter. They screened negative with a PHQ-2 score of 0. Return in 1 year (on 11/8/2024) for Annual physical.     Chief Complaint:     Chief Complaint   Patient presents with    Annual Exam      History of Present Illness:     Adult Annual Physical   Patient here for a comprehensive physical exam.     Patient follows up with tremor, cerebellar ataxia, and Has. Diet and Physical Activity  Diet/Nutrition: well balanced diet. Exercise:  Patient does strength exercises every day at home . Depression Screening  PHQ-2/9 Depression Screening    Little interest or pleasure in doing things: 0 - not at all  Feeling down, depressed, or hopeless: 0 - not at all  PHQ-2 Score: 0  PHQ-2 Interpretation: Negative depression screen       General Health  Sleep:  6-10 hours at sleep at night . Hearing: normal - bilateral.  Vision: goes for regular eye exams and wears glasses. Dental: regular dental visits and brushes teeth twice daily.  Health  History of STDs?: no.    Advanced Care Planning  Do you have an advanced directive? no  Do you have a durable medical power of ? no     Review of Systems:     Review of Systems   Constitutional:  Negative for appetite change, chills and fever. HENT:  Negative for congestion, ear pain, sinus pressure, sore throat and trouble swallowing. Eyes:  Negative for pain, discharge and redness. Respiratory:  Negative for cough, chest tightness, shortness of breath and wheezing. Cardiovascular:  Negative for chest pain, palpitations and leg swelling. Gastrointestinal:  Negative for abdominal pain, blood in stool, diarrhea, nausea and vomiting. Genitourinary:  Negative for dysuria, frequency, hematuria, penile pain and testicular pain. Musculoskeletal:  Negative for arthralgias and myalgias. Skin:  Negative for rash. Neurological:         As noted in HPI. Psychiatric/Behavioral:  Negative for suicidal ideas. space.      Gloves then changed.  A 6 inch Bard protective layer mesh was then opened and passed down into the abdomen. The mesh rolled and passed down through the 12 mm port site.  The mesh was placed into the peritoneal space and unrolled and fitted against the abdominal wall.  A solitary O-vicryl suture used to pull the mesh up at midline.  The mesh fit well within our dissected space with symmetric minimal 5 cm coverage of the repair site on each side.  The mesh was secured to the abdominal wall with three 2-0 V lock sutures.  Hemostasis noted.  The robot undocked.  The skin overlying the hernia site inspected and intact.  The port sites closed with multiple 0 vicryl sutures with a suture passer under visualization.      The ports removed under direct visualization. Each site hemostatic.  Pneumoperitoneum released.  Counts reported to me as correct. The incisions closed with 4-0 monocryl sutures in deep dermal fashion.      Skin glue applied to each incision.  The patient tolerated the procedure well.        The patient and his family updated postoperatively. Denies any depression. Past Medical History:     Past Medical History:   Diagnosis Date    Neurologic abnormality       Past Surgical History:     History reviewed. No pertinent surgical history. Social History:     Social History     Socioeconomic History    Marital status: Single     Spouse name: None    Number of children: None    Years of education: None    Highest education level: None   Occupational History    None   Tobacco Use    Smoking status: Never    Smokeless tobacco: Never   Vaping Use    Vaping Use: Never used   Substance and Sexual Activity    Alcohol use: Never    Drug use: Never    Sexual activity: Not Currently   Other Topics Concern    None   Social History Narrative    None     Social Determinants of Health     Financial Resource Strain: Not on file   Food Insecurity: Not on file   Transportation Needs: Not on file   Physical Activity: Not on file   Stress: Not on file   Social Connections: Not on file   Intimate Partner Violence: Not on file   Housing Stability: Not on file      Family History:     Family History   Problem Relation Age of Onset    Basal cell carcinoma Mother     Breast cancer Mother     No Known Problems Father     Irritable bowel syndrome Sister     Liver disease Paternal Uncle     Colon cancer Maternal Grandmother       Current Medications:     Current Outpatient Medications   Medication Sig Dispense Refill    ciclopirox (LOPROX) 0.77 % cream Apply topically 2 (two) times a day To affected areas of face 15 g 1    clonazePAM (KlonoPIN) 0.5 mg tablet Take 2 tablets (1 mg total) by mouth daily at bedtime 60 tablet 0    Multiple Vitamin (multivitamin) capsule Take 1 capsule by mouth daily       No current facility-administered medications for this visit. Allergies:     No Known Allergies   Physical Exam:     /72   Pulse 74   Resp 18   SpO2 99%     Physical Exam  Vitals reviewed. Constitutional:       General: He is not in acute distress. Appearance: He is not ill-appearing or diaphoretic. HENT:      Right Ear: Tympanic membrane, ear canal and external ear normal.      Left Ear: Tympanic membrane, ear canal and external ear normal.      Nose: Nose normal.      Mouth/Throat:      Mouth: Mucous membranes are moist.      Pharynx: Oropharynx is clear. No oropharyngeal exudate or posterior oropharyngeal erythema. Eyes:      Conjunctiva/sclera: Conjunctivae normal.      Pupils: Pupils are equal, round, and reactive to light. Cardiovascular:      Rate and Rhythm: Normal rate and regular rhythm. Pulses: Normal pulses. Heart sounds: Normal heart sounds. Comments: No edema noted. Pulmonary:      Effort: Pulmonary effort is normal. No respiratory distress. Breath sounds: Normal breath sounds. No wheezing. Abdominal:      General: There is no distension. Palpations: Abdomen is soft. There is no mass. Tenderness: There is no abdominal tenderness. Genitourinary:     Comments: Patient deferred exam.   Musculoskeletal:      Comments: Patient is in a wheelchair. Lymphadenopathy:      Cervical: No cervical adenopathy. Skin:     Findings: No rash. Neurological:      Mental Status: He is alert and oriented to person, place, and time.       Comments: Head tremor noted on exam.      Psychiatric:         Mood and Affect: Mood normal.          Liana Caba, [address] [facility]

## 2023-11-11 DIAGNOSIS — M51.36 DEGENERATIVE DISC DISEASE, LUMBAR: Chronic | ICD-10-CM

## 2023-11-11 DIAGNOSIS — M54.16 LUMBAR RADICULOPATHY: Chronic | ICD-10-CM

## 2023-11-13 ENCOUNTER — PATIENT MESSAGE (OUTPATIENT)
Dept: FAMILY MEDICINE CLINIC | Age: 73
End: 2023-11-13

## 2023-11-13 DIAGNOSIS — R91.1 LUNG NODULE SEEN ON IMAGING STUDY: Primary | ICD-10-CM

## 2023-11-13 RX ORDER — PREGABALIN 150 MG/1
CAPSULE ORAL
Qty: 90 CAPSULE | OUTPATIENT
Start: 2023-11-13

## 2023-11-13 NOTE — TELEPHONE ENCOUNTER
10/12/2023  07/20/2023   1  Pregabalin 150 Mg Capsule 90.00  30  Ki Vri  [id number]   [facility] ([id number])  2  3.01 LME  Medicare [place]    09/14/2023 07/20/2023   1  Pregabalin 150 Mg Capsule 90.00  30  Ki Vri  [id number]   [facility] ([id number])  1  3.01 LME  Medicare [place]    09/01/2023 09/01/2023   1  Tramadol Hcl 50 Mg Tablet 10.00  2  Sa Zaf  [id number]   Wal ([id number])  0  50.00 MME  Medicare  [place]    08/15/2023  07/20/2023   1  Pregabalin 150 Mg Capsule 90.00  30  Ki Vri  [id number]   [facility] (6223)  0  3.01 LME  Medicare [place]    07/25/2023 07/20/2023   1  Oxycodone-Acetaminophen 5-325 60.00  30  Ki Vri  [id number]   [facility] (6223)  0  15.00 MME  Medicare [place]    07/25/2023 07/20/2023   1  Morphine Sulf Er 15 Mg Tablet 60.00  30  Ki Vri              Diabetic polyneuropathy, severe, bladder cancer  Will need medication contract  New pain management referral placed but they he not establish at

## 2023-11-13 NOTE — TELEPHONE ENCOUNTER
Diagnosis Orders   1.  Lung nodule seen on imaging study  PET [address], [address] Mar Mckeon MD, Pulmonology, [place]           Future Appointments   Date Time Provider [address]   11/15/2023  9:00 AM ST[facility] PB MED ONC CHAIR [facility]   12/11/2023 10:30 AM Darius Menezes MD [facility]   12/13/2023  8:00 AM Gianna Ibrahim  [facility]   1/9/2024  1:00 PM Anthony Garcia MD [facility] [address] [place] [address]   2/16/2024  8:30 AM Puneet Patel MD [facility]   10/15/2024  9:00 AM Puneet Patel MD [address]

## 2023-11-13 NOTE — TELEPHONE ENCOUNTER
From: Zhao Strauss  To: Dr. Tarsha Arce: 11/13/2023 12:40 PM EST  Subject: Generic Lyrica    [facility] on [address] states they sent you a msg for Kate Nunes to get his Generic Lyrica filled. Can you please send back, Kate Nunes has one and a half days of meds  Left.  Thank you, Charlette Chester for Gil Arce

## 2023-11-15 ENCOUNTER — HOSPITAL ENCOUNTER (OUTPATIENT)
Dept: INFUSION THERAPY | Age: 73
Discharge: HOME | End: 2023-11-15
Payer: MEDICARE

## 2023-11-15 VITALS
TEMPERATURE: 98.5 F | HEART RATE: 86 BPM | DIASTOLIC BLOOD PRESSURE: 81 MMHG | SYSTOLIC BLOOD PRESSURE: 148 MMHG | RESPIRATION RATE: 18 BRPM

## 2023-11-15 DIAGNOSIS — C67.2 MALIGNANT NEOPLASM OF LATERAL WALL OF URINARY BLADDER (HCC): Primary | ICD-10-CM

## 2023-11-15 LAB
BACTERIA URNS QL MICRO: NORMAL
BILIRUB UR QL STRIP: NEGATIVE
CLARITY UR: CLEAR
COLOR UR: YELLOW
EPI CELLS #/AREA URNS HPF: NORMAL /HPF (ref 0–5)
GLUCOSE UR STRIP-MCNC: ABNORMAL MG/DL
HGB UR QL STRIP.AUTO: ABNORMAL
KETONES UR STRIP-MCNC: NEGATIVE MG/DL
LEUKOCYTE ESTERASE UR QL STRIP: NEGATIVE
NITRITE UR QL STRIP: NEGATIVE
PH UR STRIP: 5.5 [PH] (ref 5–8)
PROT UR STRIP-MCNC: NEGATIVE MG/DL
RBC #/AREA URNS HPF: NORMAL /HPF (ref 0–2)
SP GR UR STRIP: 1.01 (ref 1–1.03)
UROBILINOGEN UR STRIP-ACNC: NORMAL EU/DL (ref 0–1)
WBC #/AREA URNS HPF: NORMAL /HPF (ref 0–5)

## 2023-11-15 PROCEDURE — 2580000003 HC RX 258: Performed by: INTERNAL MEDICINE

## 2023-11-15 PROCEDURE — 51720 TREATMENT OF BLADDER LESION: CPT

## 2023-11-15 PROCEDURE — 81001 URINALYSIS AUTO W/SCOPE: CPT

## 2023-11-15 PROCEDURE — 6370000000 HC RX 637 (ALT 250 FOR IP): Performed by: INTERNAL MEDICINE

## 2023-11-15 PROCEDURE — 6360000002 HC RX W HCPCS: Performed by: INTERNAL MEDICINE

## 2023-11-15 RX ORDER — LIDOCAINE HYDROCHLORIDE 20 MG/ML
JELLY TOPICAL ONCE
Status: COMPLETED | OUTPATIENT
Start: 2023-11-15 | End: 2023-11-15

## 2023-11-15 RX ADMIN — SODIUM CHLORIDE 50 MG: 9 INJECTION, SOLUTION INTRAVENOUS at 09:51

## 2023-11-15 RX ADMIN — LIDOCAINE HYDROCHLORIDE: 20 JELLY TOPICAL at 09:38

## 2023-11-15 NOTE — PROGRESS NOTES
Patient arrived for C1D36 BCG. 16fr patel inserted. 200 ml Clear yellow urine drained. BCG instilled and patel d/c'd. Patient tolerated w/o incident and left instable condition.  Returns 12/13 for

## 2023-11-21 ENCOUNTER — OFFICE VISIT (OUTPATIENT)
Dept: PULMONOLOGY | Age: 73
End: 2023-11-21
Payer: MEDICARE

## 2023-11-21 VITALS
BODY MASS INDEX: 29.77 KG/M2 | RESPIRATION RATE: 13 BRPM | SYSTOLIC BLOOD PRESSURE: 120 MMHG | HEART RATE: 92 BPM | HEIGHT: 69 IN | OXYGEN SATURATION: 94 % | WEIGHT: 201 LBS | DIASTOLIC BLOOD PRESSURE: 69 MMHG | TEMPERATURE: 98 F

## 2023-11-21 DIAGNOSIS — R91.1 NODULE OF LOWER LOBE OF LEFT LUNG: Primary | ICD-10-CM

## 2023-11-21 DIAGNOSIS — J43.2 CENTRILOBULAR EMPHYSEMA (HCC): ICD-10-CM

## 2023-11-21 DIAGNOSIS — C67.2 MALIGNANT NEOPLASM OF LATERAL WALL OF URINARY BLADDER (HCC): ICD-10-CM

## 2023-11-21 PROCEDURE — G8417 CALC BMI ABV UP PARAM F/U: HCPCS | Performed by: INTERNAL MEDICINE

## 2023-11-21 PROCEDURE — 1036F TOBACCO NON-USER: CPT | Performed by: INTERNAL MEDICINE

## 2023-11-21 PROCEDURE — 3017F COLORECTAL CA SCREEN DOC REV: CPT | Performed by: INTERNAL MEDICINE

## 2023-11-21 PROCEDURE — G8427 DOCREV CUR MEDS BY ELIG CLIN: HCPCS | Performed by: INTERNAL MEDICINE

## 2023-11-21 PROCEDURE — G8484 FLU IMMUNIZE NO ADMIN: HCPCS | Performed by: INTERNAL MEDICINE

## 2023-11-21 PROCEDURE — 99204 OFFICE O/P NEW MOD 45 MIN: CPT | Performed by: INTERNAL MEDICINE

## 2023-11-21 PROCEDURE — 3074F SYST BP LT 130 MM HG: CPT | Performed by: INTERNAL MEDICINE

## 2023-11-21 PROCEDURE — 1123F ACP DISCUSS/DSCN MKR DOCD: CPT | Performed by: INTERNAL MEDICINE

## 2023-11-21 PROCEDURE — 3023F SPIROM DOC REV: CPT | Performed by: INTERNAL MEDICINE

## 2023-11-21 PROCEDURE — 3078F DIAST BP <80 MM HG: CPT | Performed by: INTERNAL MEDICINE

## 2023-11-21 NOTE — PROGRESS NOTES
Barriers to medication compliance addressed. All patient questions answered. Pt voiced understanding. I hope this updates you on my evaluation and clinical thinking. Thank you for allowing me to participate in his care. Sincerely,      Electronically signed by Vani Hermosillo MD on   11/21/23 at 10:42 AM EST       Please note that this chart was generated using voice recognition Dragon dictation software. Although every effort was made to ensure the accuracy of this automated transcription, some errors in transcription may have occurred.

## 2023-11-24 DIAGNOSIS — M46.92 CERVICAL SPONDYLITIS (HCC): ICD-10-CM

## 2023-11-24 DIAGNOSIS — M54.16 LUMBAR RADICULOPATHY: ICD-10-CM

## 2023-11-24 DIAGNOSIS — Z98.1 S/P CERVICAL SPINAL FUSION: ICD-10-CM

## 2023-11-24 DIAGNOSIS — M51.36 DEGENERATIVE DISC DISEASE, LUMBAR: ICD-10-CM

## 2023-11-24 DIAGNOSIS — M47.816 LUMBAR SPONDYLOSIS: ICD-10-CM

## 2023-11-27 RX ORDER — BACLOFEN 10 MG/1
10 TABLET ORAL 3 TIMES DAILY PRN
Qty: 270 TABLET | Refills: 1 | Status: SHIPPED | OUTPATIENT
Start: 2023-11-27

## 2023-11-27 NOTE — TELEPHONE ENCOUNTER
Please Approve or Refuse.   Send to Pharmacy per Pt's Request:      Next Visit Date:  2/16/2024   Last Visit Date: 10/12/2023    Hemoglobin A1C (%)   Date Value   08/18/2023 5.9   02/15/2023 8.5 (H)   08/19/2022 6.6 (H)             ( goal A1C is < 7)   BP Readings from Last 3 Encounters:   11/21/23 120/69   11/15/23 (!) 148/81   11/08/23 (!) 139/91          (goal 120/80)  BUN   Date Value Ref Range Status   10/27/2023 11 8 - 23 mg/dL Final     Creatinine   Date Value Ref Range Status   10/27/2023 0.7 0.7 - 1.2 mg/dL Final     Potassium   Date Value Ref Range Status   10/27/2023 4.9 3.7 - 5.3 mmol/L Final

## 2023-11-28 ENCOUNTER — HOSPITAL ENCOUNTER (OUTPATIENT)
Dept: PULMONOLOGY | Age: 73
Discharge: HOME | End: 2023-11-28
Payer: MEDICARE

## 2023-11-28 DIAGNOSIS — R91.1 NODULE OF LOWER LOBE OF LEFT LUNG: ICD-10-CM

## 2023-11-28 DIAGNOSIS — J43.2 CENTRILOBULAR EMPHYSEMA (HCC): ICD-10-CM

## 2023-11-28 LAB
DLCO %PRED: NORMAL
DLCO PRED: NORMAL
DLCO/VA %PRED: NORMAL
DLCO/VA PRED: NORMAL
DLCO/VA: NORMAL
DLCO: NORMAL
EXPIRATORY TIME: NORMAL
FEF 25-75% %PRED-PRE: NORMAL
FEF 25-75% PRED: NORMAL
FEF 25-75%-PRE: NORMAL
FEV1 %PRED-PRE: NORMAL
FEV1 PRED: NORMAL
FEV1/FVC %PRED-PRE: NORMAL
FEV1/FVC PRED: NORMAL
FEV1/FVC: NORMAL
FEV1: NORMAL
FVC %PRED-PRE: NORMAL
FVC PRED: NORMAL
FVC: NORMAL
GAW %PRED: NORMAL
GAW PRED: NORMAL
GAW: NORMAL
IC %PRED: NORMAL
IC PRED: NORMAL
IC: NORMAL
MVV %PRED-PRE: NORMAL
MVV PRED: NORMAL
MVV-PRE: NORMAL
PEF %PRED-PRE: NORMAL
PEF PRED: NORMAL
PEF-PRE: NORMAL
RAW %PRED: NORMAL
RAW PRED: NORMAL
RAW: NORMAL
RV %PRED: NORMAL
RV PRED: NORMAL
RV: NORMAL
SVC %PRED: NORMAL
SVC PRED: NORMAL
SVC: NORMAL
TLC %PRED: NORMAL
TLC PRED: NORMAL
TLC: NORMAL
VA %PRED: NORMAL
VA PRED: NORMAL
VA: NORMAL
VTG %PRED: NORMAL
VTG PRED: NORMAL
VTG: NORMAL

## 2023-11-28 PROCEDURE — 6370000000 HC RX 637 (ALT 250 FOR IP): Performed by: INTERNAL MEDICINE

## 2023-11-28 PROCEDURE — 94729 DIFFUSING CAPACITY: CPT

## 2023-11-28 PROCEDURE — 94060 EVALUATION OF WHEEZING: CPT

## 2023-11-28 PROCEDURE — 94726 PLETHYSMOGRAPHY LUNG VOLUMES: CPT

## 2023-11-28 PROCEDURE — 94664 DEMO&/EVAL PT USE INHALER: CPT

## 2023-11-28 RX ORDER — ALBUTEROL SULFATE 90 UG/1
2 AEROSOL, METERED RESPIRATORY (INHALATION) ONCE
Status: COMPLETED | OUTPATIENT
Start: 2023-11-28 | End: 2023-11-28

## 2023-11-28 RX ADMIN — ALBUTEROL SULFATE 2 PUFF: 90 AEROSOL, METERED RESPIRATORY (INHALATION) at 11:34

## 2023-11-28 NOTE — PROCEDURES
PFT Interpretation:    NORMAL  Lung Mechanics, Volumes,and Diffusion Capacity is noted. Evidence of air trapping Is noted - possibly a variation of normal.  NO Significant improvement is noted lung mechanics after bronchodilators.       Sophia Jeronimo MD

## 2023-12-05 ENCOUNTER — HOSPITAL ENCOUNTER (OUTPATIENT)
Dept: VASCULAR LAB | Age: 73
Discharge: HOME | End: 2023-12-07
Attending: FAMILY MEDICINE
Payer: MEDICARE

## 2023-12-05 ENCOUNTER — HOSPITAL ENCOUNTER (OUTPATIENT)
Age: 73
Discharge: HOME | End: 2023-12-07
Attending: FAMILY MEDICINE
Payer: MEDICARE

## 2023-12-05 VITALS
WEIGHT: 201 LBS | HEIGHT: 69 IN | DIASTOLIC BLOOD PRESSURE: 69 MMHG | BODY MASS INDEX: 29.77 KG/M2 | HEART RATE: 92 BPM | SYSTOLIC BLOOD PRESSURE: 120 MMHG

## 2023-12-05 DIAGNOSIS — I73.9 CLAUDICATION OF BOTH LOWER EXTREMITIES (HCC): ICD-10-CM

## 2023-12-05 DIAGNOSIS — I51.7 CARDIOMEGALY: ICD-10-CM

## 2023-12-05 DIAGNOSIS — I71.43 INFRARENAL ABDOMINAL AORTIC ANEURYSM (AAA) WITHOUT RUPTURE (HCC): ICD-10-CM

## 2023-12-05 LAB
ECHO AO ROOT DIAM: 4 CM
ECHO AO ROOT INDEX: 1.93 CM/M2
ECHO AV AREA PEAK VELOCITY: 4 CM2
ECHO AV AREA VTI: 3.6 CM2
ECHO AV AREA/BSA PEAK VELOCITY: 1.9 CM2/M2
ECHO AV AREA/BSA VTI: 1.7 CM2/M2
ECHO AV MEAN GRADIENT: 3 MMHG
ECHO AV MEAN VELOCITY: 0.8 M/S
ECHO AV PEAK GRADIENT: 5 MMHG
ECHO AV PEAK VELOCITY: 1.1 M/S
ECHO AV VELOCITY RATIO: 0.91
ECHO AV VTI: 24.8 CM
ECHO BSA: 2.11 M2
ECHO LA AREA 2C: 12.7 CM2
ECHO LA AREA 4C: 13.8 CM2
ECHO LA DIAMETER INDEX: 2.27 CM/M2
ECHO LA DIAMETER: 4.7 CM
ECHO LA MAJOR AXIS: 5.1 CM
ECHO LA MINOR AXIS: 5.2 CM
ECHO LA TO AORTIC ROOT RATIO: 1.18
ECHO LA VOL BP: 28 ML (ref 18–58)
ECHO LA VOL MOD A2C: 26 ML (ref 18–58)
ECHO LA VOL MOD A4C: 30 ML (ref 18–58)
ECHO LA VOL/BSA BIPLANE: 14 ML/M2 (ref 16–34)
ECHO LA VOLUME INDEX MOD A2C: 13 ML/M2 (ref 16–34)
ECHO LA VOLUME INDEX MOD A4C: 14 ML/M2 (ref 16–34)
ECHO LV E' LATERAL VELOCITY: 8 CM/S
ECHO LV E' SEPTAL VELOCITY: 5 CM/S
ECHO LV FRACTIONAL SHORTENING: 24 % (ref 28–44)
ECHO LV INTERNAL DIMENSION DIASTOLE INDEX: 2.22 CM/M2
ECHO LV INTERNAL DIMENSION DIASTOLIC: 4.6 CM (ref 4.2–5.9)
ECHO LV INTERNAL DIMENSION SYSTOLIC INDEX: 1.69 CM/M2
ECHO LV INTERNAL DIMENSION SYSTOLIC: 3.5 CM
ECHO LV IVSD: 1.5 CM (ref 0.6–1)
ECHO LV MASS 2D: 284.8 G (ref 88–224)
ECHO LV MASS INDEX 2D: 137.6 G/M2 (ref 49–115)
ECHO LV POSTERIOR WALL DIASTOLIC: 1.5 CM (ref 0.6–1)
ECHO LV RELATIVE WALL THICKNESS RATIO: 0.65
ECHO LVOT AREA: 4.5 CM2
ECHO LVOT AV VTI INDEX: 0.79
ECHO LVOT DIAM: 2.4 CM
ECHO LVOT MEAN GRADIENT: 2 MMHG
ECHO LVOT PEAK GRADIENT: 4 MMHG
ECHO LVOT PEAK VELOCITY: 1 M/S
ECHO LVOT STROKE VOLUME INDEX: 43 ML/M2
ECHO LVOT SV: 89.1 ML
ECHO LVOT VTI: 19.7 CM
ECHO MV A VELOCITY: 1 M/S
ECHO MV AREA VTI: 3 CM2
ECHO MV E DECELERATION TIME (DT): 225 MS
ECHO MV E VELOCITY: 0.65 M/S
ECHO MV E/A RATIO: 0.65
ECHO MV E/E' LATERAL: 8.13
ECHO MV E/E' RATIO (AVERAGED): 10.56
ECHO MV LVOT VTI INDEX: 1.52
ECHO MV MAX VELOCITY: 1 M/S
ECHO MV MEAN GRADIENT: 2 MMHG
ECHO MV MEAN VELOCITY: 0.6 M/S
ECHO MV PEAK GRADIENT: 4 MMHG
ECHO MV VTI: 30 CM
ECHO RA AREA 4C: 13.3 CM2
ECHO RA END SYSTOLIC VOLUME APICAL 4 CHAMBER INDEX BSA: 14 ML/M2
ECHO RA VOLUME: 30 ML
ECHO RV TAPSE: 1.9 CM (ref 1.7–?)
VAS AAA AP: 3 CM
VAS AAA TRANS: 2.7 CM
VAS AORTA DIST AP: 3 CM
VAS AORTA DIST PSV: 115 CM/S
VAS AORTA DIST TR: 2.7 CM
VAS AORTA MID AP: 1.53 CM
VAS AORTA MID PSV: 92.9 CM/S
VAS AORTA MID TRANS: 1.43 CM
VAS AORTA PROX AP: 2.24 CM
VAS AORTA PROX PSV: 60.4 CM/S
VAS AORTA PROX TR: 1.9 CM
VAS LEFT COM ILIAC AP: 1.14 CM
VAS LEFT COM ILIAC PROX PSV: 116 CM/S
VAS LEFT COM ILIAC TRANS: 1.15 CM
VAS RIGHT COM ILIAC AP: 1.25 CM
VAS RIGHT COM ILIAC PROX PSV: 158 CM/S
VAS RIGHT COM ILIAC TRANS: 1.23 CM

## 2023-12-05 PROCEDURE — 93976 VASCULAR STUDY: CPT | Performed by: SURGERY

## 2023-12-05 PROCEDURE — 93306 TTE W/DOPPLER COMPLETE: CPT

## 2023-12-05 PROCEDURE — 93976 VASCULAR STUDY: CPT

## 2023-12-05 PROCEDURE — 93306 TTE W/DOPPLER COMPLETE: CPT | Performed by: INTERNAL MEDICINE

## 2023-12-05 NOTE — RESULT ENCOUNTER NOTE
Please notify patient: Stable AAA  Infrarenal abdominal aortic aneurysm (AAA) was visualized with dimensions 3.00 cm AP x 2.70 cm TR. There has been no significant change in size of the aneurysm compared to the previous study. Absolutely continue to abstain from smoking!   Continue pravastatin and good blood pressure control    Future Appointments  12/6/2023  8:30 AM    [place] INJECTION R* [facility] PB NM          [facility]  12/6/2023  9:30 AM    [place] PET/CT ROOM     [facility] PB NM          [facility]  12/11/2023 10:30 AM   Stephanie Russell MD        [facility]           [facility]  12/12/2023 11:00 AM   Mele Alfonso MD         Resp [place]        [facility]  12/13/2023 8:00 AM    Pratik Ariza MD         [facility] CANCER        [facility]  1/9/2024   1:00 PM    Tootie Rosario MD         [address]  2/16/2024  8:30 AM    Nelia West MD    [facility]  10/15/2024 9:00 AM    Nelia West MD    [address]

## 2023-12-05 NOTE — TELEPHONE ENCOUNTER
Called pt and made aware no further questions or concerns. Also state that his BS have been running normal range.
Incoming fax came from pharmacy in regards to sending in a new script for a a glucometer , along with supplies. Due to ins changes. Please advise. See pharmacy attached at bottom.
Lab Results   Component Value Date    VITD25 28.2 (L) 03/12/2022     Lab Results   Component Value Date    LABA1C 5.2 03/12/2022    LABA1C 5.0 09/24/2021    LABA1C 5.9 01/11/2021     Please let the patient know to  prescription from the pharmacy. If worsening symptoms in few days or not getting better as expected needs to let us know and make appointment. Orders Placed This Encounter   Medications    glucose monitoring (FREESTYLE FREEDOM) kit     Sig: Please supply Patient with a glucose monitoring kit that insurance will cover. Dispense:  1 kit     Refill:  0    blood glucose monitor strips     Sig: Testing once a day. Please dispense according to patients insurance. Dispense:  100 strip     Refill:  3    Lancets 30G MISC     Sig: Testing once a day. Please dispense according to patients insurance. Dispense:  100 each     Refill:  [address] [place] [address]  Phone: [phone] Fax: [phone]      No orders of the defined types were placed in this encounter. Future Appointments   Date Time Provider Pippa Cazares   3/29/2022  9:40 AM VEE Dyer - CNP [address]   3/30/2022  9:00 AM [facility]   3/31/2022  3:30 PM Dwayne Hu MD [facility] GI [facility]   5/18/2022 11:30 AM Mathieu Kahn MD [address] [place] [address]   5/23/2022 11:00 AM Milly Cohn MD [facility]   6/29/2022  1:30 PM Homero Gaming MD fp sc [facility]   9/27/2022 10:00 AM Homero Gaming MD fp sc [facility]       Thank you!
No

## 2023-12-06 ENCOUNTER — HOSPITAL ENCOUNTER (OUTPATIENT)
Dept: NUCLEAR MEDICINE | Age: 73
Discharge: HOME | End: 2023-12-08
Attending: INTERNAL MEDICINE
Payer: MEDICARE

## 2023-12-06 DIAGNOSIS — C67.2 MALIGNANT NEOPLASM OF LATERAL WALL OF URINARY BLADDER (HCC): ICD-10-CM

## 2023-12-06 DIAGNOSIS — R91.1 NODULE OF LOWER LOBE OF LEFT LUNG: ICD-10-CM

## 2023-12-06 LAB — GLUCOSE BLD-MCNC: 137 MG/DL (ref 75–110)

## 2023-12-06 PROCEDURE — 3430000000 HC RX DIAGNOSTIC RADIOPHARMACEUTICAL: Performed by: INTERNAL MEDICINE

## 2023-12-06 PROCEDURE — 2580000003 HC RX 258: Performed by: INTERNAL MEDICINE

## 2023-12-06 PROCEDURE — A9552 F18 FDG: HCPCS | Performed by: INTERNAL MEDICINE

## 2023-12-06 PROCEDURE — 82947 ASSAY GLUCOSE BLOOD QUANT: CPT

## 2023-12-06 PROCEDURE — 78815 PET IMAGE W/CT SKULL-THIGH: CPT

## 2023-12-06 RX ORDER — SODIUM CHLORIDE 0.9 % (FLUSH) 0.9 %
10 SYRINGE (ML) INJECTION
Status: COMPLETED | OUTPATIENT
Start: 2023-12-06 | End: 2023-12-06

## 2023-12-06 RX ORDER — FLUDEOXYGLUCOSE F 18 200 MCI/ML
10 INJECTION, SOLUTION INTRAVENOUS
Status: COMPLETED | OUTPATIENT
Start: 2023-12-06 | End: 2023-12-06

## 2023-12-06 RX ADMIN — FLUDEOXYGLUCOSE F 18 11.39 MILLICURIE: 200 INJECTION, SOLUTION INTRAVENOUS at 08:15

## 2023-12-06 RX ADMIN — SODIUM CHLORIDE, PRESERVATIVE FREE 10 ML: 5 INJECTION INTRAVENOUS at 08:15

## 2023-12-06 NOTE — RESULT ENCOUNTER NOTE
Please notify patient that his echo is showing normal left ventricular function with a EF of 55%. Normal wall motion. Mildly dilated aortic root. Once again, recommend he follow-up with cardiology.

## 2023-12-07 DIAGNOSIS — I71.40 ABDOMINAL AORTIC ANEURYSM (AAA) WITHOUT RUPTURE, UNSPECIFIED PART (HCC): Primary | ICD-10-CM

## 2023-12-08 NOTE — RESULT ENCOUNTER NOTE
Noted, recheck vascular testing in 1 year, vascular referral also placed      Future Appointments  12/11/2023 10:30 AM   Stephanie Russell MD        [address] [place]  12/12/2023 11:00 AM   Mele Alfonso MD         [facility]        [facility]  12/13/2023 8:00 AM    Pratik Ariza MD         [address]  1/9/2024   1:00 PM    Tootie Rosario MD         [address]  2/16/2024  8:30 AM    Nelia West MD    [facility]  10/15/2024 9:00 AM    Nelia West MD    [address]

## 2023-12-09 ENCOUNTER — HOSPITAL ENCOUNTER (OUTPATIENT)
Age: 73
Setting detail: SPECIMEN
Discharge: HOME | End: 2023-12-09
Payer: MEDICARE

## 2023-12-09 DIAGNOSIS — C67.2 MALIGNANT NEOPLASM OF LATERAL WALL OF URINARY BLADDER (HCC): ICD-10-CM

## 2023-12-09 LAB
BASOPHILS # BLD: 0.1 K/UL (ref 0–0.2)
BASOPHILS NFR BLD: 1 % (ref 0–2)
EOSINOPHIL # BLD: 0.1 K/UL (ref 0–0.4)
EOSINOPHILS RELATIVE PERCENT: 1 % (ref 0–4)
ERYTHROCYTE [DISTWIDTH] IN BLOOD BY AUTOMATED COUNT: 15.3 % (ref 11.5–14.9)
HCT VFR BLD AUTO: 48.1 % (ref 41–53)
HGB BLD-MCNC: 15.8 G/DL (ref 13.5–17.5)
LYMPHOCYTES NFR BLD: 1.5 K/UL (ref 1–4.8)
LYMPHOCYTES RELATIVE PERCENT: 19 % (ref 24–44)
MCH RBC QN AUTO: 29 PG (ref 26–34)
MCHC RBC AUTO-ENTMCNC: 32.8 G/DL (ref 31–37)
MCV RBC AUTO: 88.4 FL (ref 80–100)
MONOCYTES NFR BLD: 0.4 K/UL (ref 0.1–1.3)
MONOCYTES NFR BLD: 5 % (ref 1–7)
NEUTROPHILS NFR BLD: 74 % (ref 36–66)
NEUTS SEG NFR BLD: 5.9 K/UL (ref 1.3–9.1)
PLATELET # BLD AUTO: 272 K/UL (ref 150–450)
PMV BLD AUTO: 7.5 FL (ref 6–12)
RBC # BLD AUTO: 5.44 M/UL (ref 4.5–5.9)
WBC OTHER # BLD: 7.9 K/UL (ref 3.5–11)

## 2023-12-09 PROCEDURE — 82728 ASSAY OF FERRITIN: CPT

## 2023-12-09 PROCEDURE — 85025 COMPLETE CBC W/AUTO DIFF WBC: CPT

## 2023-12-09 PROCEDURE — 83550 IRON BINDING TEST: CPT

## 2023-12-09 PROCEDURE — 83540 ASSAY OF IRON: CPT

## 2023-12-09 PROCEDURE — 36415 COLL VENOUS BLD VENIPUNCTURE: CPT

## 2023-12-11 ENCOUNTER — PROCEDURE VISIT (OUTPATIENT)
Dept: UROLOGY | Age: 73
End: 2023-12-11
Payer: MEDICARE

## 2023-12-11 DIAGNOSIS — C67.2 MALIGNANT NEOPLASM OF LATERAL WALL OF URINARY BLADDER (HCC): Primary | ICD-10-CM

## 2023-12-11 PROCEDURE — 52000 CYSTOURETHROSCOPY: CPT | Performed by: UROLOGY

## 2023-12-11 NOTE — PROGRESS NOTES
Cystoscopy Operative Note (12/11/23)  Surgeon: Leroy Salmeron MD   Anesthesia: Urethral 2% Xylocaine   Indications: Bladder Cancer  Position: Dorsal Lithotomy    Findings:   Risks and Benefits discussed with patient prior to procedure. The patient was prepped and draped in the usual sterile fashion. The flexible cystoscope was advanced through the urethra and into the bladder. The bladder was thoroughly inspected and the following was noted:    Residual Urine: none  Urethra: normal appearing urethra with no masses, tenderness or lesions  Prostate: partially obstructing lateral lobes of prostate; median lobe present? no.   Bladder: No tumors or CIS noted. No bladder diverticulum. There was none trabeculation noted. Ureters: Clear efflux from both ureters. Orifices with normal configuration and location. The cystoscope was removed. The patient tolerated the procedure well. Agree with the ROS entered by the MA.     Plan: cysto 3 mo

## 2023-12-12 ENCOUNTER — OFFICE VISIT (OUTPATIENT)
Dept: PULMONOLOGY | Age: 73
End: 2023-12-12
Payer: MEDICARE

## 2023-12-12 VITALS
OXYGEN SATURATION: 94 % | SYSTOLIC BLOOD PRESSURE: 139 MMHG | TEMPERATURE: 98.6 F | HEART RATE: 80 BPM | DIASTOLIC BLOOD PRESSURE: 79 MMHG | RESPIRATION RATE: 16 BRPM | BODY MASS INDEX: 30.96 KG/M2 | WEIGHT: 209 LBS | HEIGHT: 69 IN

## 2023-12-12 DIAGNOSIS — J43.2 CENTRILOBULAR EMPHYSEMA (HCC): ICD-10-CM

## 2023-12-12 DIAGNOSIS — R91.1 NODULE OF LOWER LOBE OF LEFT LUNG: Primary | ICD-10-CM

## 2023-12-12 PROCEDURE — 3017F COLORECTAL CA SCREEN DOC REV: CPT | Performed by: INTERNAL MEDICINE

## 2023-12-12 PROCEDURE — G8427 DOCREV CUR MEDS BY ELIG CLIN: HCPCS | Performed by: INTERNAL MEDICINE

## 2023-12-12 PROCEDURE — 1123F ACP DISCUSS/DSCN MKR DOCD: CPT | Performed by: INTERNAL MEDICINE

## 2023-12-12 PROCEDURE — G8417 CALC BMI ABV UP PARAM F/U: HCPCS | Performed by: INTERNAL MEDICINE

## 2023-12-12 PROCEDURE — G8484 FLU IMMUNIZE NO ADMIN: HCPCS | Performed by: INTERNAL MEDICINE

## 2023-12-12 PROCEDURE — 3075F SYST BP GE 130 - 139MM HG: CPT | Performed by: INTERNAL MEDICINE

## 2023-12-12 PROCEDURE — 99214 OFFICE O/P EST MOD 30 MIN: CPT | Performed by: INTERNAL MEDICINE

## 2023-12-12 PROCEDURE — 3078F DIAST BP <80 MM HG: CPT | Performed by: INTERNAL MEDICINE

## 2023-12-12 PROCEDURE — 1036F TOBACCO NON-USER: CPT | Performed by: INTERNAL MEDICINE

## 2023-12-12 PROCEDURE — 3023F SPIROM DOC REV: CPT | Performed by: INTERNAL MEDICINE

## 2023-12-12 NOTE — PATIENT INSTRUCTIONS
Mailed AVS to patient   [facility]    12/12/23mm: VM left on wife's phone and [facility] message sent with CT and f/u appt dates, times, and locations.

## 2023-12-13 ENCOUNTER — TELEPHONE (OUTPATIENT)
Dept: ONCOLOGY | Age: 73
End: 2023-12-13

## 2023-12-13 ENCOUNTER — OFFICE VISIT (OUTPATIENT)
Dept: ONCOLOGY | Age: 73
End: 2023-12-13
Payer: MEDICARE

## 2023-12-13 VITALS
OXYGEN SATURATION: 94 % | RESPIRATION RATE: 16 BRPM | SYSTOLIC BLOOD PRESSURE: 132 MMHG | WEIGHT: 206 LBS | BODY MASS INDEX: 30.42 KG/M2 | HEART RATE: 83 BPM | DIASTOLIC BLOOD PRESSURE: 72 MMHG | TEMPERATURE: 98 F

## 2023-12-13 DIAGNOSIS — D50.8 IRON DEFICIENCY ANEMIA SECONDARY TO INADEQUATE DIETARY IRON INTAKE: ICD-10-CM

## 2023-12-13 DIAGNOSIS — E53.8 B12 DEFICIENCY: ICD-10-CM

## 2023-12-13 DIAGNOSIS — C67.2 MALIGNANT NEOPLASM OF LATERAL WALL OF URINARY BLADDER (HCC): Primary | ICD-10-CM

## 2023-12-13 LAB
FERRITIN SERPL-MCNC: 17 NG/ML (ref 30–400)
IRON SATN MFR SERPL: 17 % (ref 20–55)
IRON SERPL-MCNC: 65 UG/DL (ref 59–158)
TIBC SERPL-MCNC: 391 UG/DL (ref 250–450)
UNSATURATED IRON BINDING CAPACITY: 326 UG/DL (ref 112–347)

## 2023-12-13 PROCEDURE — G8484 FLU IMMUNIZE NO ADMIN: HCPCS | Performed by: INTERNAL MEDICINE

## 2023-12-13 PROCEDURE — 1036F TOBACCO NON-USER: CPT | Performed by: INTERNAL MEDICINE

## 2023-12-13 PROCEDURE — 1123F ACP DISCUSS/DSCN MKR DOCD: CPT | Performed by: INTERNAL MEDICINE

## 2023-12-13 PROCEDURE — G8417 CALC BMI ABV UP PARAM F/U: HCPCS | Performed by: INTERNAL MEDICINE

## 2023-12-13 PROCEDURE — 99211 OFF/OP EST MAY X REQ PHY/QHP: CPT | Performed by: INTERNAL MEDICINE

## 2023-12-13 PROCEDURE — 3074F SYST BP LT 130 MM HG: CPT | Performed by: INTERNAL MEDICINE

## 2023-12-13 PROCEDURE — 99214 OFFICE O/P EST MOD 30 MIN: CPT | Performed by: INTERNAL MEDICINE

## 2023-12-13 PROCEDURE — G8427 DOCREV CUR MEDS BY ELIG CLIN: HCPCS | Performed by: INTERNAL MEDICINE

## 2023-12-13 PROCEDURE — 3017F COLORECTAL CA SCREEN DOC REV: CPT | Performed by: INTERNAL MEDICINE

## 2023-12-13 PROCEDURE — 3078F DIAST BP <80 MM HG: CPT | Performed by: INTERNAL MEDICINE

## 2023-12-13 NOTE — TELEPHONE ENCOUNTER
Rv in 2 months with labs prior         Patient was scheduled on 2/14/2024 with labs 1 week before appointment.       Patient given AVS, schedule, and orders

## 2023-12-14 RX ORDER — SODIUM CHLORIDE 9 MG/ML
5-250 INJECTION, SOLUTION INTRAVENOUS PRN
OUTPATIENT
Start: 2023-12-14

## 2023-12-14 RX ORDER — SODIUM CHLORIDE 9 MG/ML
INJECTION, SOLUTION INTRAVENOUS CONTINUOUS
OUTPATIENT
Start: 2023-12-14

## 2023-12-14 RX ORDER — DIPHENHYDRAMINE HYDROCHLORIDE 50 MG/ML
50 INJECTION INTRAMUSCULAR; INTRAVENOUS
OUTPATIENT
Start: 2023-12-14

## 2023-12-14 RX ORDER — ACETAMINOPHEN 325 MG/1
650 TABLET ORAL
OUTPATIENT
Start: 2023-12-14

## 2023-12-14 RX ORDER — ONDANSETRON 2 MG/ML
8 INJECTION INTRAMUSCULAR; INTRAVENOUS
OUTPATIENT
Start: 2023-12-14

## 2023-12-14 RX ORDER — ALBUTEROL SULFATE 90 UG/1
4 AEROSOL, METERED RESPIRATORY (INHALATION) PRN
OUTPATIENT
Start: 2023-12-14

## 2023-12-14 RX ORDER — HEPARIN SODIUM (PORCINE) LOCK FLUSH IV SOLN 100 UNIT/ML 100 UNIT/ML
500 SOLUTION INTRAVENOUS PRN
OUTPATIENT
Start: 2023-12-14

## 2023-12-14 RX ORDER — EPINEPHRINE 1 MG/ML
0.3 INJECTION, SOLUTION, CONCENTRATE INTRAVENOUS PRN
OUTPATIENT
Start: 2023-12-14

## 2023-12-14 RX ORDER — SODIUM CHLORIDE 0.9 % (FLUSH) 0.9 %
5-40 SYRINGE (ML) INJECTION PRN
OUTPATIENT
Start: 2023-12-14

## 2023-12-14 RX ORDER — FAMOTIDINE 10 MG/ML
20 INJECTION, SOLUTION INTRAVENOUS
OUTPATIENT
Start: 2023-12-14

## 2023-12-16 NOTE — PROGRESS NOTES
REASON FOR THE CONSULTATION:  Chief Complaint   Patient presents with    Pulmonary Nodule     Lung nodule- f/u imaging. Tickle in throat, head congestion x2 days. HISTORY OF PRESENT ILLNESS:    Toshia Valero is a 72y.o. year old male   No cough , no fever , no hemoptysis , no sputum . No chest pain , no orthopnea , no PND   No nausea or abdominal pain . Immunization   Immunization History   Administered Date(s) Administered    COVID-19, J&J, (age 18y+), IM, 0.5 mL 04/09/2021, 01/21/2022    COVID-19, MODERNA, (2023-24 formula), (age 12y+), IM, 50mcg/0.5mL 10/15/2023    Hep A, HAVRIX, VAQTA, (age 19y+), IM, 1mL 09/24/2019, 04/30/2020    Influenza Virus Vaccine 10/01/2015, 11/01/2016    Influenza, FLUAD, (age 72 y+), Adjuvanted, 0.5mL 09/28/2020, 09/24/2021, 09/27/2022, 10/12/2023    Pneumococcal, PCV-13, PREVNAR 15, (age 6w+), IM, 0.5mL 02/02/2016    Pneumococcal, PPSV23, PNEUMOVAX 23, (age 2y+), SC/IM, 0.5mL 09/28/2020    Zoster Live (Zostavax) 11/01/2015          PAST MEDICAL HISTORY:       Diagnosis Date    AAA (abdominal aortic aneurysm) ([address])     \"stable\" 3 cm on CT 2021 & 2023. Abnormal electrocardiogram (ECG) (EKG)     Arthritis     osteoarthritis    Bladder cancer ([address]) 03/2021    bladder    Bleeding ulcer 2015    Chronic neck pain     Colon polyps 10/08/2019    tubular adenoma x2    COVID-19 03/06/2023    ill x 1 day per wife    COVID-19 virus infection 01/10/2022    Slight cough.     Diabetic neuropathy ([address])     Diarrhea     Enteritis 03/16/2023    pain x 1 day, resolved after meds in ER and started on bentyl    Essential hypertension 05/12/2020    managed by Dr. Jorge Larkin PCP    Fatty liver 02/15/2023    GERD (gastroesophageal reflux disease)     Hepatitis B core antibody positive     History of bleeding peptic ulcer     History of blood transfusion     no reactions    History of hepatitis C     completed treatment for this    History of migraine headaches     History of stress test

## 2024-01-01 NOTE — CARE COORDINATION
Case Management Initial Discharge Plan  [place],             Met with:patient to discuss discharge plans. Information verified: address, contacts, phone number, , insurance Yes    Emergency Contact/Next of Kin name & number: Cyndi Magallon spouse - [phone]    PCP: Marek Longo MD  Date of last visit: past month    Insurance Provider: Medicare A/B - has HELP forms for assistance    Discharge Planning    Living Arrangements:  Spouse/Significant Other   Support Systems:  Spouse/Significant Other    Home has 1 stories  2 stairs to climb to get into front door, stairs to climb to reach second floor  Location of bedroom/bathroom in home 1    Patient able to perform ADL's:Independent    Current Services (outpatient & in home) none  DME equipment:   DME provider:     Receiving oral anticoagulation therapy? No    If indicated:   Physician managing anticoagulation treatment:   Where does patient obtain lab work for ATC treatment? Potential Assistance Needed:       Patient agreeable to home care: No  [place] of choice provided:  no    Prior SNF/Rehab Placement and Facility: no  Agreeable to SNF/Rehab: No  [place] of choice provided: no     Evaluation: no    Expected Discharge date:       Patient expects to be discharged to:  Home  Follow Up Appointment: Best Day/ Time:      Transportation provider: self  Transportation arrangements needed for discharge: No    Readmission Risk              Risk of Unplanned Readmission:        11             Does patient have a readmission risk score greater than 14?: No  If yes, follow-up appointment must be made within 7 days of discharge.      Goals of Care: monitor for post-op needs      Discharge Plan: home with spouse, independent          Electronically signed by Michelle Daniels RN on 20 at 3:36 PM EDT Female

## 2024-01-02 RX ORDER — SODIUM CHLORIDE 9 MG/ML
5-250 INJECTION, SOLUTION INTRAVENOUS PRN
Status: CANCELLED | OUTPATIENT
Start: 2024-01-08

## 2024-01-02 RX ORDER — ALBUTEROL SULFATE 90 UG/1
4 AEROSOL, METERED RESPIRATORY (INHALATION) PRN
Status: CANCELLED | OUTPATIENT
Start: 2024-01-08

## 2024-01-02 RX ORDER — SODIUM CHLORIDE 0.9 % (FLUSH) 0.9 %
5-40 SYRINGE (ML) INJECTION PRN
Status: CANCELLED | OUTPATIENT
Start: 2024-01-08

## 2024-01-02 RX ORDER — SODIUM CHLORIDE 9 MG/ML
INJECTION, SOLUTION INTRAVENOUS CONTINUOUS
Status: CANCELLED | OUTPATIENT
Start: 2024-01-08

## 2024-01-02 RX ORDER — ONDANSETRON 2 MG/ML
8 INJECTION INTRAMUSCULAR; INTRAVENOUS
Status: CANCELLED | OUTPATIENT
Start: 2024-01-08

## 2024-01-02 RX ORDER — DIPHENHYDRAMINE HYDROCHLORIDE 50 MG/ML
50 INJECTION INTRAMUSCULAR; INTRAVENOUS
Status: CANCELLED | OUTPATIENT
Start: 2024-01-08

## 2024-01-02 RX ORDER — EPINEPHRINE 1 MG/ML
0.3 INJECTION, SOLUTION, CONCENTRATE INTRAVENOUS PRN
Status: CANCELLED | OUTPATIENT
Start: 2024-01-08

## 2024-01-02 RX ORDER — ACETAMINOPHEN 325 MG/1
650 TABLET ORAL
Status: CANCELLED | OUTPATIENT
Start: 2024-01-08

## 2024-01-02 RX ORDER — HEPARIN 100 UNIT/ML
500 SYRINGE INTRAVENOUS PRN
Status: CANCELLED | OUTPATIENT
Start: 2024-01-08

## 2024-01-05 DIAGNOSIS — J30.89 NON-SEASONAL ALLERGIC RHINITIS DUE TO OTHER ALLERGIC TRIGGER: ICD-10-CM

## 2024-01-08 RX ORDER — LEVOCETIRIZINE DIHYDROCHLORIDE 5 MG/1
5 TABLET, FILM COATED ORAL NIGHTLY
Qty: 90 TABLET | Refills: 0 | Status: SHIPPED | OUTPATIENT
Start: 2024-01-08

## 2024-01-08 NOTE — TELEPHONE ENCOUNTER
Please Approve or Refuse.  Send to Pharmacy per Pt's Request: daniel     Next Visit Date:  2/16/2024   Last Visit Date: 10/12/2023    Hemoglobin A1C (%)   Date Value   08/18/2023 5.9   02/15/2023 8.5 (H)   08/19/2022 6.6 (H)             ( goal A1C is < 7)   BP Readings from Last 3 Encounters:   12/13/23 132/72   12/12/23 139/79   12/05/23 120/69          (goal 120/80)  BUN   Date Value Ref Range Status   10/27/2023 11 8 - 23 mg/dL Final     Creatinine   Date Value Ref Range Status   10/27/2023 0.7 0.7 - 1.2 mg/dL Final     Potassium   Date Value Ref Range Status   10/27/2023 4.9 3.7 - 5.3 mmol/L Final

## 2024-01-09 ENCOUNTER — OFFICE VISIT (OUTPATIENT)
Dept: GASTROENTEROLOGY | Age: 74
End: 2024-01-09
Payer: MEDICARE

## 2024-01-09 VITALS
TEMPERATURE: 98.3 F | WEIGHT: 198 LBS | DIASTOLIC BLOOD PRESSURE: 65 MMHG | SYSTOLIC BLOOD PRESSURE: 112 MMHG | BODY MASS INDEX: 29.24 KG/M2

## 2024-01-09 DIAGNOSIS — R63.4 WEIGHT LOSS: ICD-10-CM

## 2024-01-09 DIAGNOSIS — K43.2 INCISIONAL HERNIA WITHOUT OBSTRUCTION OR GANGRENE: ICD-10-CM

## 2024-01-09 DIAGNOSIS — R11.2 NAUSEA AND VOMITING, UNSPECIFIED VOMITING TYPE: ICD-10-CM

## 2024-01-09 DIAGNOSIS — Z86.19 HX OF HEPATITIS C: ICD-10-CM

## 2024-01-09 DIAGNOSIS — D50.8 OTHER IRON DEFICIENCY ANEMIA: Primary | ICD-10-CM

## 2024-01-09 DIAGNOSIS — K21.9 GASTROESOPHAGEAL REFLUX DISEASE, UNSPECIFIED WHETHER ESOPHAGITIS PRESENT: ICD-10-CM

## 2024-01-09 DIAGNOSIS — C67.2 MALIGNANT NEOPLASM OF LATERAL WALL OF URINARY BLADDER (HCC): ICD-10-CM

## 2024-01-09 PROCEDURE — 3074F SYST BP LT 130 MM HG: CPT | Performed by: INTERNAL MEDICINE

## 2024-01-09 PROCEDURE — 3017F COLORECTAL CA SCREEN DOC REV: CPT | Performed by: INTERNAL MEDICINE

## 2024-01-09 PROCEDURE — G8417 CALC BMI ABV UP PARAM F/U: HCPCS | Performed by: INTERNAL MEDICINE

## 2024-01-09 PROCEDURE — 1036F TOBACCO NON-USER: CPT | Performed by: INTERNAL MEDICINE

## 2024-01-09 PROCEDURE — 99214 OFFICE O/P EST MOD 30 MIN: CPT | Performed by: INTERNAL MEDICINE

## 2024-01-09 PROCEDURE — G8484 FLU IMMUNIZE NO ADMIN: HCPCS | Performed by: INTERNAL MEDICINE

## 2024-01-09 PROCEDURE — 3078F DIAST BP <80 MM HG: CPT | Performed by: INTERNAL MEDICINE

## 2024-01-09 PROCEDURE — 1123F ACP DISCUSS/DSCN MKR DOCD: CPT | Performed by: INTERNAL MEDICINE

## 2024-01-09 PROCEDURE — G8427 DOCREV CUR MEDS BY ELIG CLIN: HCPCS | Performed by: INTERNAL MEDICINE

## 2024-01-09 RX ORDER — ONDANSETRON 4 MG/1
4 TABLET, FILM COATED ORAL DAILY PRN
Qty: 30 TABLET | Refills: 0 | Status: SHIPPED | OUTPATIENT
Start: 2024-01-09

## 2024-01-09 ASSESSMENT — ENCOUNTER SYMPTOMS
SORE THROAT: 0
CONSTIPATION: 0
SHORTNESS OF BREATH: 0
NAUSEA: 1
VOMITING: 1
RECTAL PAIN: 0
ABDOMINAL PAIN: 1
CHOKING: 0
ANAL BLEEDING: 0
COUGH: 0
TROUBLE SWALLOWING: 0
DIARRHEA: 1
COLOR CHANGE: 0
BLOOD IN STOOL: 0
ABDOMINAL DISTENTION: 0
WHEEZING: 0

## 2024-01-09 NOTE — PROGRESS NOTES
bedtime, Disp: 90 tablet, Rfl: 3    metoprolol tartrate (LOPRESSOR) 25 MG tablet, TAKE ONE TABLET BY MOUTH TWICE A DAY, Disp: 180 tablet, Rfl: 3    blood glucose test strips (TRUE METRIX BLOOD GLUCOSE TEST) strip, TEST DAILY, Disp: 100 strip, Rfl: 3    JANUVIA 50 MG tablet, TAKE ONE TABLET BY MOUTH DAILY, Disp: 90 tablet, Rfl: 3    dicyclomine (BENTYL) 20 MG tablet, Take 1 tablet by mouth every 6 hours as needed (Abdominal pain), Disp: 30 tablet, Rfl: 0    ondansetron (ZOFRAN-ODT) 4 MG disintegrating tablet, Take 1 tablet by mouth 3 times daily as needed for Nausea or Vomiting, Disp: 21 tablet, Rfl: 0    lisinopril (PRINIVIL;ZESTRIL) 10 MG tablet, TAKE ONE TABLET BY MOUTH DAILY, STOP LISINOPRIL-HYDROCHLOROTHIAZIDE (Patient taking differently: Take 1 tablet by mouth daily), Disp: 90 tablet, Rfl: 3    dapagliflozin (FARXIGA) 10 MG tablet, Take 1 tablet by mouth every morning For diabetes, Disp: 90 tablet, Rfl: 3    NEEDLE, DISP, 25 G (B-D DISP NEEDLE 25GX1\") 25G X 1\" MISC, To use with syringe, Disp: 50 each, Rfl: 3    promethazine (PHENERGAN) 25 MG tablet, TAKE ONE TABLET BY MOUTH THREE TIMES A DAY AS NEEDED FOR NAUSEA, Disp: 21 tablet, Rfl: 0    glucose monitoring (FREESTYLE FREEDOM) kit, Please supply Patient with a glucose monitoring kit that insurance will cover., Disp: 1 kit, Rfl: 0    Lancets 30G MISC, Testing once a day.  Please dispense according to patients insurance., Disp: 100 each, Rfl: 3    Alcohol Swabs (B-D SINGLE USE SWABS REGULAR) PADS, USE TO CHECK BLOOD SUGAR TWICE A DAY, Disp: 200 each, Rfl: 3    Syringe/Needle, Disp, (SYRINGE 3CC/25GX1\") 25G X 1\" 3 ML MISC, To be used with B12 injections, Disp: 50 each, Rfl: 2    Probiotic Product (PROBIOTIC-10 PO), Take 1 capsule by mouth daily, Disp: , Rfl:     busPIRone (BUSPAR) 10 MG tablet, TAKE ONE TABLET BY MOUTH THREE TIMES A DAY AS NEEDED FOR ANXIETY, Disp: 90 tablet, Rfl: 5    cloNIDine (CATAPRES) 0.2 MG tablet, Take 1 tablet by mouth 2 times daily Goal

## 2024-01-11 RX ORDER — SODIUM CHLORIDE 0.9 % (FLUSH) 0.9 %
5-40 SYRINGE (ML) INJECTION PRN
Status: CANCELLED | OUTPATIENT
Start: 2024-01-12

## 2024-01-11 RX ORDER — SODIUM CHLORIDE 9 MG/ML
5-250 INJECTION, SOLUTION INTRAVENOUS PRN
Status: CANCELLED | OUTPATIENT
Start: 2024-01-12

## 2024-01-11 RX ORDER — DIPHENHYDRAMINE HYDROCHLORIDE 50 MG/ML
50 INJECTION INTRAMUSCULAR; INTRAVENOUS
Status: CANCELLED | OUTPATIENT
Start: 2024-01-12

## 2024-01-11 RX ORDER — ALBUTEROL SULFATE 90 UG/1
4 AEROSOL, METERED RESPIRATORY (INHALATION) PRN
Status: CANCELLED | OUTPATIENT
Start: 2024-01-12

## 2024-01-11 RX ORDER — FAMOTIDINE 10 MG/ML
20 INJECTION, SOLUTION INTRAVENOUS
Status: CANCELLED | OUTPATIENT
Start: 2024-01-12

## 2024-01-11 RX ORDER — SODIUM CHLORIDE 9 MG/ML
INJECTION, SOLUTION INTRAVENOUS CONTINUOUS
Status: CANCELLED | OUTPATIENT
Start: 2024-01-12

## 2024-01-11 RX ORDER — HEPARIN 100 UNIT/ML
500 SYRINGE INTRAVENOUS PRN
Status: CANCELLED | OUTPATIENT
Start: 2024-01-12

## 2024-01-11 RX ORDER — EPINEPHRINE 1 MG/ML
0.3 INJECTION, SOLUTION, CONCENTRATE INTRAVENOUS PRN
Status: CANCELLED | OUTPATIENT
Start: 2024-01-12

## 2024-01-11 RX ORDER — ACETAMINOPHEN 325 MG/1
650 TABLET ORAL
Status: CANCELLED | OUTPATIENT
Start: 2024-01-12

## 2024-01-11 RX ORDER — ONDANSETRON 2 MG/ML
8 INJECTION INTRAMUSCULAR; INTRAVENOUS
Status: CANCELLED | OUTPATIENT
Start: 2024-01-12

## 2024-01-12 ENCOUNTER — HOSPITAL ENCOUNTER (OUTPATIENT)
Dept: INFUSION THERAPY | Age: 74
Discharge: HOME | End: 2024-01-12
Payer: MEDICARE

## 2024-01-12 VITALS
RESPIRATION RATE: 16 BRPM | HEART RATE: 81 BPM | TEMPERATURE: 98.1 F | DIASTOLIC BLOOD PRESSURE: 67 MMHG | SYSTOLIC BLOOD PRESSURE: 117 MMHG

## 2024-01-12 DIAGNOSIS — D64.9 ANEMIA, UNSPECIFIED TYPE: Primary | ICD-10-CM

## 2024-01-12 DIAGNOSIS — D50.8 OTHER IRON DEFICIENCY ANEMIA: ICD-10-CM

## 2024-01-12 PROCEDURE — 6360000002 HC RX W HCPCS: Performed by: INTERNAL MEDICINE

## 2024-01-12 PROCEDURE — 2580000003 HC RX 258: Performed by: INTERNAL MEDICINE

## 2024-01-12 PROCEDURE — 96365 THER/PROPH/DIAG IV INF INIT: CPT

## 2024-01-12 RX ORDER — SODIUM CHLORIDE 0.9 % (FLUSH) 0.9 %
5-40 SYRINGE (ML) INJECTION PRN
Status: CANCELLED | OUTPATIENT
Start: 2024-01-19

## 2024-01-12 RX ORDER — DIPHENHYDRAMINE HYDROCHLORIDE 50 MG/ML
50 INJECTION INTRAMUSCULAR; INTRAVENOUS
Status: CANCELLED | OUTPATIENT
Start: 2024-01-19

## 2024-01-12 RX ORDER — SODIUM CHLORIDE 9 MG/ML
5-250 INJECTION, SOLUTION INTRAVENOUS PRN
Status: CANCELLED | OUTPATIENT
Start: 2024-01-19

## 2024-01-12 RX ORDER — ACETAMINOPHEN 325 MG/1
650 TABLET ORAL
Status: CANCELLED | OUTPATIENT
Start: 2024-01-19

## 2024-01-12 RX ORDER — SODIUM CHLORIDE 9 MG/ML
5-250 INJECTION, SOLUTION INTRAVENOUS PRN
Status: DISCONTINUED | OUTPATIENT
Start: 2024-01-12 | End: 2024-01-13 | Stop reason: HOSPADM

## 2024-01-12 RX ORDER — ALBUTEROL SULFATE 90 UG/1
4 AEROSOL, METERED RESPIRATORY (INHALATION) PRN
Status: CANCELLED | OUTPATIENT
Start: 2024-01-19

## 2024-01-12 RX ORDER — EPINEPHRINE 1 MG/ML
0.3 INJECTION, SOLUTION, CONCENTRATE INTRAVENOUS PRN
Status: CANCELLED | OUTPATIENT
Start: 2024-01-19

## 2024-01-12 RX ORDER — FAMOTIDINE 10 MG/ML
20 INJECTION, SOLUTION INTRAVENOUS
Status: CANCELLED | OUTPATIENT
Start: 2024-01-19

## 2024-01-12 RX ORDER — ONDANSETRON 2 MG/ML
8 INJECTION INTRAMUSCULAR; INTRAVENOUS
Status: CANCELLED | OUTPATIENT
Start: 2024-01-19

## 2024-01-12 RX ORDER — SODIUM CHLORIDE 9 MG/ML
INJECTION, SOLUTION INTRAVENOUS CONTINUOUS
Status: CANCELLED | OUTPATIENT
Start: 2024-01-19

## 2024-01-12 RX ORDER — HEPARIN 100 UNIT/ML
500 SYRINGE INTRAVENOUS PRN
Status: CANCELLED | OUTPATIENT
Start: 2024-01-19

## 2024-01-12 RX ADMIN — SODIUM CHLORIDE 50 ML/HR: 9 INJECTION, SOLUTION INTRAVENOUS at 09:13

## 2024-01-12 RX ADMIN — FERRIC CARBOXYMALTOSE INJECTION 750 MG: 50 INJECTION, SOLUTION INTRAVENOUS at 09:17

## 2024-01-12 NOTE — PROGRESS NOTES
Pt here for 1 of 2 injectafer infusions   Arrives ambulatory   Denies complaint/concern   Tx complete without incident   Pt discharged in stable condition   Returns 1/19 for 2 of 2 injectafer infusions

## 2024-01-15 ENCOUNTER — HOSPITAL ENCOUNTER (OUTPATIENT)
Dept: NUCLEAR MEDICINE | Age: 74
Discharge: HOME | End: 2024-01-17
Payer: MEDICARE

## 2024-01-15 DIAGNOSIS — K43.2 INCISIONAL HERNIA WITHOUT OBSTRUCTION OR GANGRENE: ICD-10-CM

## 2024-01-15 DIAGNOSIS — D50.8 OTHER IRON DEFICIENCY ANEMIA: ICD-10-CM

## 2024-01-15 DIAGNOSIS — R11.2 NAUSEA AND VOMITING, UNSPECIFIED VOMITING TYPE: ICD-10-CM

## 2024-01-15 DIAGNOSIS — Z86.19 HX OF HEPATITIS C: ICD-10-CM

## 2024-01-15 DIAGNOSIS — C67.2 MALIGNANT NEOPLASM OF LATERAL WALL OF URINARY BLADDER (HCC): ICD-10-CM

## 2024-01-15 DIAGNOSIS — R63.4 WEIGHT LOSS: ICD-10-CM

## 2024-01-15 DIAGNOSIS — K21.9 GASTROESOPHAGEAL REFLUX DISEASE, UNSPECIFIED WHETHER ESOPHAGITIS PRESENT: ICD-10-CM

## 2024-01-15 PROCEDURE — A9541 TC99M SULFUR COLLOID: HCPCS | Performed by: INTERNAL MEDICINE

## 2024-01-15 PROCEDURE — 78264 GASTRIC EMPTYING IMG STUDY: CPT

## 2024-01-15 PROCEDURE — 3430000000 HC RX DIAGNOSTIC RADIOPHARMACEUTICAL: Performed by: INTERNAL MEDICINE

## 2024-01-15 RX ORDER — TECHNETIUM TC 99M SULFUR COLLOID 2 MG
1 KIT MISCELLANEOUS ONCE
Status: COMPLETED | OUTPATIENT
Start: 2024-01-15 | End: 2024-01-15

## 2024-01-15 RX ADMIN — TECHNETIUM TC 99M SULFUR COLLOID 1.1 MILLICURIE: KIT at 08:12

## 2024-01-16 ENCOUNTER — TELEPHONE (OUTPATIENT)
Dept: GASTROENTEROLOGY | Age: 74
End: 2024-01-16

## 2024-01-16 ENCOUNTER — TELEPHONE (OUTPATIENT)
Dept: PULMONOLOGY | Age: 74
End: 2024-01-16

## 2024-01-16 NOTE — TELEPHONE ENCOUNTER
Called pulmonology office and spoke to Ana.  Stated that the provider is out of the office.  However, she did send a message to see if the doctor would stop in to sign the clearance and or document in the chart.    LVM for the cardiology office advising that we were trying to obtain the clearance so that we can add him for procedure on 1/17/24.  To fax back and or call the office.

## 2024-01-16 NOTE — TELEPHONE ENCOUNTER
Writer rec'd call from pt wife stating pt is losing more weight and wants to know if EGD can be moved up any sooner, writer asked [facility] surgery scheduler and she states we are waiting on pulmonary and cardio clearances to come back, if we can obtain those clearances today we may be able to put pt on the schedule for EGD tomorrow. Writer advised pt wife to call pulmonary/cardio and let them know the severity of pt symptoms and the amount of weight he has lost and see if they can send clearances in today, pt wife voices understanding, writer did advise wife if we get the clearances back we will call them back today and schedule EGD for tomorrow, pt wife was advised to keep her phone on her in case we obtain both clearances and have a time for procedure, wife voices understanding.

## 2024-01-16 NOTE — TELEPHONE ENCOUNTER
Received cardiac clearance and the patient is at low cardiac risk, but acceptable, for the planned surgical procedure.  This patient may proceed accordingly.    Received pulmonary clearance and cleared with the following restrictions.  Low risk.    Called the patient and wife Kate answered.  On HIPAA.  Informed that we received the clearances.  We r/s for 1/17/24 STA @ 11:30 a.m. and arriving by 10 a.m.  Writer thanked for the help and call ended.

## 2024-01-16 NOTE — TELEPHONE ENCOUNTER
Wife called, states SIM wants to move his EGD up to tomorrow due to his extreme weight loss recently.     Clearance request is on your desk, originally stated the EGD was being done in April.     Are you able to stop the office to complete that?

## 2024-01-17 ENCOUNTER — HOSPITAL ENCOUNTER (OUTPATIENT)
Age: 74
Setting detail: OUTPATIENT SURGERY
Discharge: HOME | End: 2024-01-17
Attending: INTERNAL MEDICINE | Admitting: INTERNAL MEDICINE
Payer: MEDICARE

## 2024-01-17 ENCOUNTER — ANESTHESIA (OUTPATIENT)
Dept: OPERATING ROOM | Age: 74
End: 2024-01-17
Payer: MEDICARE

## 2024-01-17 ENCOUNTER — ANESTHESIA EVENT (OUTPATIENT)
Dept: OPERATING ROOM | Age: 74
End: 2024-01-17
Payer: MEDICARE

## 2024-01-17 VITALS
WEIGHT: 192.1 LBS | SYSTOLIC BLOOD PRESSURE: 139 MMHG | BODY MASS INDEX: 28.45 KG/M2 | HEIGHT: 69 IN | DIASTOLIC BLOOD PRESSURE: 82 MMHG | HEART RATE: 75 BPM | TEMPERATURE: 97.2 F | OXYGEN SATURATION: 94 % | RESPIRATION RATE: 12 BRPM

## 2024-01-17 DIAGNOSIS — Z86.19 HX OF HEPATITIS C: ICD-10-CM

## 2024-01-17 DIAGNOSIS — E11.65 TYPE 2 DIABETES MELLITUS WITH HYPERGLYCEMIA, WITHOUT LONG-TERM CURRENT USE OF INSULIN (HCC): ICD-10-CM

## 2024-01-17 DIAGNOSIS — K43.2 INCISIONAL HERNIA, WITHOUT OBSTRUCTION OR GANGRENE: ICD-10-CM

## 2024-01-17 DIAGNOSIS — K21.9 GASTROESOPHAGEAL REFLUX DISEASE, UNSPECIFIED WHETHER ESOPHAGITIS PRESENT: ICD-10-CM

## 2024-01-17 DIAGNOSIS — D50.8 OTHER IRON DEFICIENCY ANEMIA: ICD-10-CM

## 2024-01-17 DIAGNOSIS — C67.2 MALIGNANT NEOPLASM OF LATERAL WALL OF URINARY BLADDER (HCC): ICD-10-CM

## 2024-01-17 LAB — GLUCOSE BLD-MCNC: 107 MG/DL (ref 75–110)

## 2024-01-17 PROCEDURE — 2500000003 HC RX 250 WO HCPCS: Performed by: SPECIALIST

## 2024-01-17 PROCEDURE — 2709999900 HC NON-CHARGEABLE SUPPLY: Performed by: INTERNAL MEDICINE

## 2024-01-17 PROCEDURE — 3700000000 HC ANESTHESIA ATTENDED CARE: Performed by: INTERNAL MEDICINE

## 2024-01-17 PROCEDURE — 82947 ASSAY GLUCOSE BLOOD QUANT: CPT

## 2024-01-17 PROCEDURE — 88342 IMHCHEM/IMCYTCHM 1ST ANTB: CPT

## 2024-01-17 PROCEDURE — 7100000010 HC PHASE II RECOVERY - FIRST 15 MIN: Performed by: INTERNAL MEDICINE

## 2024-01-17 PROCEDURE — 6360000002 HC RX W HCPCS: Performed by: SPECIALIST

## 2024-01-17 PROCEDURE — 88305 TISSUE EXAM BY PATHOLOGIST: CPT

## 2024-01-17 PROCEDURE — 3609012400 HC EGD TRANSORAL BIOPSY SINGLE/MULTIPLE: Performed by: INTERNAL MEDICINE

## 2024-01-17 PROCEDURE — 3700000001 HC ADD 15 MINUTES (ANESTHESIA): Performed by: INTERNAL MEDICINE

## 2024-01-17 PROCEDURE — 7100000011 HC PHASE II RECOVERY - ADDTL 15 MIN: Performed by: INTERNAL MEDICINE

## 2024-01-17 PROCEDURE — 2580000003 HC RX 258: Performed by: ANESTHESIOLOGY

## 2024-01-17 RX ORDER — METFORMIN HYDROCHLORIDE 500 MG/1
TABLET, EXTENDED RELEASE ORAL
Qty: 180 TABLET | Refills: 1 | Status: SHIPPED | OUTPATIENT
Start: 2024-01-17

## 2024-01-17 RX ORDER — LIDOCAINE HYDROCHLORIDE 20 MG/ML
INJECTION, SOLUTION EPIDURAL; INFILTRATION; INTRACAUDAL; PERINEURAL PRN
Status: DISCONTINUED | OUTPATIENT
Start: 2024-01-17 | End: 2024-01-17 | Stop reason: SDUPTHER

## 2024-01-17 RX ORDER — SODIUM CHLORIDE, SODIUM LACTATE, POTASSIUM CHLORIDE, CALCIUM CHLORIDE 600; 310; 30; 20 MG/100ML; MG/100ML; MG/100ML; MG/100ML
INJECTION, SOLUTION INTRAVENOUS CONTINUOUS
Status: DISCONTINUED | OUTPATIENT
Start: 2024-01-17 | End: 2024-01-17 | Stop reason: HOSPADM

## 2024-01-17 RX ORDER — PROPOFOL 10 MG/ML
INJECTION, EMULSION INTRAVENOUS PRN
Status: DISCONTINUED | OUTPATIENT
Start: 2024-01-17 | End: 2024-01-17 | Stop reason: SDUPTHER

## 2024-01-17 RX ADMIN — PROPOFOL 50 MG: 10 INJECTION, EMULSION INTRAVENOUS at 11:46

## 2024-01-17 RX ADMIN — SODIUM CHLORIDE, POTASSIUM CHLORIDE, SODIUM LACTATE AND CALCIUM CHLORIDE: 600; 310; 30; 20 INJECTION, SOLUTION INTRAVENOUS at 11:38

## 2024-01-17 RX ADMIN — PROPOFOL 50 MG: 10 INJECTION, EMULSION INTRAVENOUS at 11:49

## 2024-01-17 RX ADMIN — PROPOFOL 50 MG: 10 INJECTION, EMULSION INTRAVENOUS at 11:50

## 2024-01-17 RX ADMIN — LIDOCAINE HYDROCHLORIDE 100 MG: 20 INJECTION, SOLUTION EPIDURAL; INFILTRATION; INTRACAUDAL at 11:43

## 2024-01-17 RX ADMIN — PROPOFOL 100 MG: 10 INJECTION, EMULSION INTRAVENOUS at 11:43

## 2024-01-17 ASSESSMENT — PAIN - FUNCTIONAL ASSESSMENT: PAIN_FUNCTIONAL_ASSESSMENT: 0-10

## 2024-01-17 NOTE — ANESTHESIA POSTPROCEDURE EVALUATION
Department of Anesthesiology  Postprocedure Note    Patient: Koby Otero  MRN: [id number]  YOB: 1950  Date of evaluation: 1/17/2024    Procedure Summary       Date: 01/17/24 Room / Location: [address]    Anesthesia Start: 1143 Anesthesia Stop: 1158    Procedure: EGD BIOPSY Diagnosis:       Other iron deficiency anemia      Gastroesophageal reflux disease, unspecified whether esophagitis present      Malignant neoplasm of lateral wall of urinary bladder (HCC)      Incisional hernia, without obstruction or gangrene      Hx of hepatitis C      (Other iron deficiency anemia [D50.8])      (Gastroesophageal reflux disease, unspecified whether esophagitis present [K21.9])      (Malignant neoplasm of lateral wall of urinary bladder (HCC) [C67.2])      (Incisional hernia, without obstruction or gangrene [K43.2])      (Hx of hepatitis C [Z86.19])    Surgeons: Mariah Reid MD Responsible Provider: Steven Maldonado DO    Anesthesia Type: MAC, general ASA Status: 3            Anesthesia Type: No value filed.    Be Phase I:      Be Phase II: Be Score: 10    Anesthesia Post Evaluation    Patient location during evaluation: PACU  Patient participation: complete - patient participated  Level of consciousness: awake and alert  Airway patency: patent  Nausea & Vomiting: no nausea and no vomiting  Cardiovascular status: hemodynamically stable  Respiratory status: acceptable  Hydration status: stable  Pain management: adequate    No notable events documented.

## 2024-01-17 NOTE — OP NOTE
PROCEDURE NOTE    DATE OF PROCEDURE: 1/17/2024     SURGEON: Mariah Reid MD    ASSISTANT: None    PREOPERATIVE DIAGNOSIS: GERD  NAUSEA  ABD PAINS    POSTOPERATIVE DIAGNOSIS: As described below    OPERATION: Upper GI endoscopy with Biopsy    ANESTHESIA: MAC PER ANESTHESIA     ESTIMATED BLOOD LOSS: Less than 50 ml    COMPLICATIONS: None.     SPECIMENS:  Was Obtained:     HISTORY: The patient is a 73 y.o. year old male with history of above preop diagnosis.  I recommended esophagogastroduodenoscopy with possible biopsy and I explained the risk, benefits, expected outcome, and alternatives to the procedure.  Risks included but are not limited to bleeding, infection, respiratory distress, hypotension, and perforation of the esophagus, stomach, or duodenum.  Patient understands and is in agreement.    PROCEDURE: The patient was given IV conscious sedation.  The patient's SPO2 remained above 90% throughout the procedure. The gastroscope was inserted orally and advanced under direct vision through the esophagus, through the stomach, through the pylorus, and into the descending duodenum.      Findings:    Retropharyngeal area was grossly normal appearing    Esophagus: abnormal: IRREGULAR SJC WITH 5MM TO ONE CM SMALL TONGUES  BIOPSIES AND PICTURES WERE TAKEN     Stomach:    Fundus: normal    Body: abnormal: MOD DIFFUSE GASTRITIS     Antrum: abnormal: AS ABOVE BIOPSIES WERE TAKEN     Duodenum:     Descending: normal    Bulb: normal    The scope was removed and the patient tolerated the procedure well.     Recommendations/Plan:   F/U Biopsies  F/U In Office in 3-4 weeks  Discussed with the family  Post sedation patient was stable with stable vital signs and stable O2 saturations    Electronically signed by Mariah Reid MD  on 1/17/2024 at 11:52 AM

## 2024-01-17 NOTE — ANESTHESIA PRE PROCEDURE
positive     History of bleeding peptic ulcer     History of blood transfusion     no reactions    History of hepatitis C     completed treatment for this    History of migraine headaches     History of stress test 07/2020    \"low risk\"    Hyperlipidemia     IBS (irritable bowel syndrome)     diarrhea    Iron (Fe) deficiency anemia     getting iron infusions    Lung nodule     stable per CT 10/2022    Neuropathy     Poor historian     states his wife takes care of all medical information    Positive FIT (fecal immunochemical test)     Snores     Type 2 diabetes mellitus with microalbuminuria, without long-term current use of insulin (HCC) 07/13/2020    managed by Dr. Sheree Dick    Under care of service provider     pcp-Dr Yamila fuentes-last virtual visit jan 2023    Under care of service provider     hematology-Dr Brock-last seen 2/24/2023    Under care of service provider     urology-Dr fairchild-last visit feb 2023    Under care of service provider     GI - DR. REID - last seen 3/24/2023    Under care of service provider     PAIN MANAGEMENT - [facility] - last seen 3/23/2023    Under care of service provider     PODIATRY - DR. YE - last visit - 5/2022    Under care of team     Cardiology, [facility]- last visit 6/2022,    Wears dentures     Wears glasses        Past Surgical History:        Procedure Laterality Date    CERVICAL SPINE SURGERY  1977    cervical spine three times, has plate    CHOLECYSTECTOMY  03/22/2019    COLONOSCOPY  01/25/2015    10 yr recall, hemorrhoids    COLONOSCOPY N/A 10/08/2019    tubular adenoma x2    COLONOSCOPY N/A 06/07/2022    COLONOSCOPY DIAGNOSTIC performed by Mariah Reid MD at [facility] ENDO    CYSTO W/BIOPSY (WITHOUG FULGURATION) N/A 04/14/2023    CYSTOSCOPY BLADDER BIOPSY, FULGURATION    CYSTOSCOPY Right 04/29/2021    CYSTOSCOPY TUR BLADDER TUMOR, GYRUS, RIGHT STENT PLACEMENT AND RIGHT STENT REMOVAL performed by Joseph Lozano MD at [facility] OR    CYSTOSCOPY

## 2024-01-17 NOTE — DISCHARGE INSTRUCTIONS
Upper GI Endoscopy: What to Expect at Home  Your Recovery  You may have a sore throat for a day or two after the test.  How can you care for yourself at home?  Activity  Rest as much as you need to after you go home.  You should be able to go back to your usual activities the day after the test.  Diet  Drink plenty of fluids (unless your doctor has told you not to).  Follow-up care is a key part of your treatment and safety. Be sure to make and go to all appointments, and call your doctor if you are having problems.  When should you call for help?  Call 911 anytime you think you may need emergency care. For example, call if:  You passed out (lost consciousness).  You cough up blood.  You vomit blood or what looks like coffee grounds.  You pass maroon or very bloody stools.  Call your doctor now or seek immediate medical care if:  You have trouble swallowing.  You have belly pain.  Your stools are black and tarlike or have streaks of blood.  You are sick to your stomach or cannot keep fluids down.  Watch closely for changes in your health, and be sure to contact your doctor if:  Your throat still hurts after a day or two.  You do not get better as expected.   Where can you learn more?   Go to [email or web address] and sign in to your [employer] account. Enter J454 in the Search Health Information box to learn more about “Upper GI Endoscopy: What to Expect at Home.”    .     © 3316-1051 [employer]. Care instructions adapted under license by [employer]. This care instruction is for use with your licensed healthcare professional. If you have questions about a medical condition or this instruction, always ask your healthcare professional. [employer] disclaims any warranty or liability for your use of this information.  Content Version: 9.9.347770; Last Revised: February 20, 2013

## 2024-01-17 NOTE — H&P
1    cyanocobalamin 1000 MCG/ML injection, Inject 1 mL into the muscle every 30 days Call for next refill which will be monthly for life, Disp: 3 mL, Rfl: 3    metFORMIN (GLUCOPHAGE-XR) 500 MG extended release tablet, TAKE ONE TABLET BY MOUTH TWICE A DAY (IN THE MORNING AND AT BEDTIME), Disp: 180 tablet, Rfl: 1    pravastatin (PRAVACHOL) 40 MG tablet, Take 1 tablet by mouth at bedtime, Disp: 90 tablet, Rfl: 3    metoprolol tartrate (LOPRESSOR) 25 MG tablet, TAKE ONE TABLET BY MOUTH TWICE A DAY, Disp: 180 tablet, Rfl: 3    blood glucose test strips (TRUE METRIX BLOOD GLUCOSE TEST) strip, TEST DAILY, Disp: 100 strip, Rfl: 3    JANUVIA 50 MG tablet, TAKE ONE TABLET BY MOUTH DAILY, Disp: 90 tablet, Rfl: 3    dicyclomine (BENTYL) 20 MG tablet, Take 1 tablet by mouth every 6 hours as needed (Abdominal pain), Disp: 30 tablet, Rfl: 0    ondansetron (ZOFRAN-ODT) 4 MG disintegrating tablet, Take 1 tablet by mouth 3 times daily as needed for Nausea or Vomiting, Disp: 21 tablet, Rfl: 0    lisinopril (PRINIVIL;ZESTRIL) 10 MG tablet, TAKE ONE TABLET BY MOUTH DAILY, STOP LISINOPRIL-HYDROCHLOROTHIAZIDE (Patient taking differently: Take 1 tablet by mouth daily), Disp: 90 tablet, Rfl: 3    dapagliflozin (FARXIGA) 10 MG tablet, Take 1 tablet by mouth every morning For diabetes, Disp: 90 tablet, Rfl: 3    NEEDLE, DISP, 25 G (B-D DISP NEEDLE 25GX1\") 25G X 1\" MISC, To use with syringe, Disp: 50 each, Rfl: 3    promethazine (PHENERGAN) 25 MG tablet, TAKE ONE TABLET BY MOUTH THREE TIMES A DAY AS NEEDED FOR NAUSEA, Disp: 21 tablet, Rfl: 0    glucose monitoring (FREESTYLE FREEDOM) kit, Please supply Patient with a glucose monitoring kit that insurance will cover., Disp: 1 kit, Rfl: 0    Lancets 30G MISC, Testing once a day.  Please dispense according to patients insurance., Disp: 100 each, Rfl: 3    Alcohol Swabs (B-D SINGLE USE SWABS REGULAR) PADS, USE TO CHECK BLOOD SUGAR TWICE A DAY, Disp: 200 each, Rfl: 3    Syringe/Needle, Disp,

## 2024-01-18 LAB
GLUCOSE BLD-MCNC: 108 MG/DL (ref 75–110)
SURGICAL PATHOLOGY REPORT: NORMAL

## 2024-01-18 NOTE — RESULT ENCOUNTER NOTE
Management per GI, has appointment to discuss the results    Future Appointments  1/19/2024  1:00 PM    [facility] ONC CHAIR 01    [facility]        [place]  2/5/2024   3:00 PM    Mariah Reid MD         [facility]        [facility]  2/14/2024  10:30 AM   Bruno Brock MD         [facility]        [facility]  2/16/2024  8:30 AM    [facility]    [facility]  3/11/2024  9:30 AM    Joseph Lozano MD        [facility]           [facility]  3/12/2024  10:30 AM   [facility] CT RM 2 FAST SCANNER   [facility] CT SCAN        [facility] Radiolog  4/2/2024   11:15 AM   Mohamud Garay MD         [facility]        [facility]  10/15/2024 9:00 AM    Greta Bentley MD    [facility]

## 2024-01-19 ENCOUNTER — HOSPITAL ENCOUNTER (OUTPATIENT)
Dept: INFUSION THERAPY | Age: 74
Discharge: HOME | End: 2024-01-19
Payer: MEDICARE

## 2024-01-19 VITALS
SYSTOLIC BLOOD PRESSURE: 136 MMHG | TEMPERATURE: 97.3 F | DIASTOLIC BLOOD PRESSURE: 74 MMHG | RESPIRATION RATE: 16 BRPM | HEART RATE: 79 BPM

## 2024-01-19 DIAGNOSIS — D64.9 ANEMIA, UNSPECIFIED TYPE: Primary | ICD-10-CM

## 2024-01-19 DIAGNOSIS — D50.8 OTHER IRON DEFICIENCY ANEMIA: ICD-10-CM

## 2024-01-19 PROCEDURE — 6360000002 HC RX W HCPCS: Performed by: INTERNAL MEDICINE

## 2024-01-19 PROCEDURE — 96365 THER/PROPH/DIAG IV INF INIT: CPT

## 2024-01-19 PROCEDURE — 2580000003 HC RX 258: Performed by: INTERNAL MEDICINE

## 2024-01-19 RX ORDER — SODIUM CHLORIDE 9 MG/ML
5-250 INJECTION, SOLUTION INTRAVENOUS PRN
Status: DISCONTINUED | OUTPATIENT
Start: 2024-01-19 | End: 2024-01-20 | Stop reason: HOSPADM

## 2024-01-19 RX ORDER — FAMOTIDINE 10 MG/ML
20 INJECTION, SOLUTION INTRAVENOUS
OUTPATIENT
Start: 2024-01-26

## 2024-01-19 RX ORDER — SODIUM CHLORIDE 9 MG/ML
5-250 INJECTION, SOLUTION INTRAVENOUS PRN
OUTPATIENT
Start: 2024-01-26

## 2024-01-19 RX ORDER — EPINEPHRINE 1 MG/ML
0.3 INJECTION, SOLUTION, CONCENTRATE INTRAVENOUS PRN
OUTPATIENT
Start: 2024-01-26

## 2024-01-19 RX ORDER — SODIUM CHLORIDE 0.9 % (FLUSH) 0.9 %
5-40 SYRINGE (ML) INJECTION PRN
OUTPATIENT
Start: 2024-01-26

## 2024-01-19 RX ORDER — ACETAMINOPHEN 325 MG/1
650 TABLET ORAL
OUTPATIENT
Start: 2024-01-26

## 2024-01-19 RX ORDER — SODIUM CHLORIDE 9 MG/ML
INJECTION, SOLUTION INTRAVENOUS CONTINUOUS
OUTPATIENT
Start: 2024-01-26

## 2024-01-19 RX ORDER — SODIUM CHLORIDE 9 MG/ML
5-250 INJECTION, SOLUTION INTRAVENOUS PRN
Status: CANCELLED | OUTPATIENT
Start: 2024-01-26

## 2024-01-19 RX ORDER — DIPHENHYDRAMINE HYDROCHLORIDE 50 MG/ML
50 INJECTION INTRAMUSCULAR; INTRAVENOUS
OUTPATIENT
Start: 2024-01-26

## 2024-01-19 RX ORDER — ONDANSETRON 2 MG/ML
8 INJECTION INTRAMUSCULAR; INTRAVENOUS
OUTPATIENT
Start: 2024-01-26

## 2024-01-19 RX ORDER — ALBUTEROL SULFATE 90 UG/1
4 AEROSOL, METERED RESPIRATORY (INHALATION) PRN
OUTPATIENT
Start: 2024-01-26

## 2024-01-19 RX ORDER — HEPARIN 100 UNIT/ML
500 SYRINGE INTRAVENOUS PRN
OUTPATIENT
Start: 2024-01-26

## 2024-01-19 RX ADMIN — SODIUM CHLORIDE 100 ML/HR: 9 INJECTION, SOLUTION INTRAVENOUS at 13:11

## 2024-01-19 RX ADMIN — FERRIC CARBOXYMALTOSE INJECTION 750 MG: 50 INJECTION, SOLUTION INTRAVENOUS at 13:16

## 2024-01-19 NOTE — PROGRESS NOTES
Pt arrives ambulatory for Injectafer Infusion  Pt denies complaints or concerns  IV access obtained without issue  Pt tolerated treatment without incident and discharged in stable condition  Pt monitored  30 min post- treatment   Next appt 2/14 MD visit

## 2024-01-19 NOTE — PLAN OF CARE
Problem: Chronic Conditions and Co-morbidities  Goal: Patient's chronic conditions and co-morbidity symptoms are monitored and maintained or improved  Outcome: Completed

## 2024-01-20 DIAGNOSIS — E11.65 TYPE 2 DIABETES MELLITUS WITH HYPERGLYCEMIA, WITHOUT LONG-TERM CURRENT USE OF INSULIN (HCC): ICD-10-CM

## 2024-01-22 RX ORDER — DAPAGLIFLOZIN 10 MG/1
TABLET, FILM COATED ORAL
Qty: 90 TABLET | Refills: 3 | Status: SHIPPED | OUTPATIENT
Start: 2024-01-22

## 2024-01-22 NOTE — TELEPHONE ENCOUNTER
Please Approve or Refuse.  Send to Pharmacy per Pt's Request: daniel     Next Visit Date:  2/16/2024   Last Visit Date: 10/12/2023    Hemoglobin A1C (%)   Date Value   08/18/2023 5.9   02/15/2023 8.5 (H)   08/19/2022 6.6 (H)             ( goal A1C is < 7)   BP Readings from Last 3 Encounters:   01/19/24 136/74   01/17/24 139/82   01/12/24 117/67          (goal 120/80)  BUN   Date Value Ref Range Status   10/27/2023 11 8 - 23 mg/dL Final     Creatinine   Date Value Ref Range Status   10/27/2023 0.7 0.7 - 1.2 mg/dL Final     Potassium   Date Value Ref Range Status   10/27/2023 4.9 3.7 - 5.3 mmol/L Final

## 2024-01-29 DIAGNOSIS — R63.4 WEIGHT LOSS: ICD-10-CM

## 2024-01-29 DIAGNOSIS — R11.2 NAUSEA AND VOMITING, UNSPECIFIED VOMITING TYPE: ICD-10-CM

## 2024-01-29 DIAGNOSIS — K43.2 INCISIONAL HERNIA WITHOUT OBSTRUCTION OR GANGRENE: ICD-10-CM

## 2024-01-29 DIAGNOSIS — K21.9 GASTROESOPHAGEAL REFLUX DISEASE, UNSPECIFIED WHETHER ESOPHAGITIS PRESENT: ICD-10-CM

## 2024-01-29 DIAGNOSIS — C67.2 MALIGNANT NEOPLASM OF LATERAL WALL OF URINARY BLADDER (HCC): ICD-10-CM

## 2024-01-29 DIAGNOSIS — Z86.19 HX OF HEPATITIS C: ICD-10-CM

## 2024-01-29 DIAGNOSIS — D50.8 OTHER IRON DEFICIENCY ANEMIA: ICD-10-CM

## 2024-02-05 ENCOUNTER — OFFICE VISIT (OUTPATIENT)
Dept: GASTROENTEROLOGY | Age: 74
End: 2024-02-05
Payer: MEDICARE

## 2024-02-05 VITALS
SYSTOLIC BLOOD PRESSURE: 147 MMHG | DIASTOLIC BLOOD PRESSURE: 88 MMHG | WEIGHT: 195 LBS | BODY MASS INDEX: 28.8 KG/M2 | TEMPERATURE: 97.5 F

## 2024-02-05 DIAGNOSIS — D49.0 IPMN (INTRADUCTAL PAPILLARY MUCINOUS NEOPLASM): ICD-10-CM

## 2024-02-05 DIAGNOSIS — F41.9 ANXIETY: ICD-10-CM

## 2024-02-05 DIAGNOSIS — R10.13 ABDOMINAL PAIN, EPIGASTRIC: ICD-10-CM

## 2024-02-05 DIAGNOSIS — K22.70 BARRETT'S ESOPHAGUS WITHOUT DYSPLASIA: ICD-10-CM

## 2024-02-05 DIAGNOSIS — Z86.19 HX OF HEPATITIS C: Primary | ICD-10-CM

## 2024-02-05 PROCEDURE — G8417 CALC BMI ABV UP PARAM F/U: HCPCS | Performed by: INTERNAL MEDICINE

## 2024-02-05 PROCEDURE — 99214 OFFICE O/P EST MOD 30 MIN: CPT | Performed by: INTERNAL MEDICINE

## 2024-02-05 PROCEDURE — 1036F TOBACCO NON-USER: CPT | Performed by: INTERNAL MEDICINE

## 2024-02-05 PROCEDURE — G8427 DOCREV CUR MEDS BY ELIG CLIN: HCPCS | Performed by: INTERNAL MEDICINE

## 2024-02-05 PROCEDURE — 1123F ACP DISCUSS/DSCN MKR DOCD: CPT | Performed by: INTERNAL MEDICINE

## 2024-02-05 PROCEDURE — G8484 FLU IMMUNIZE NO ADMIN: HCPCS | Performed by: INTERNAL MEDICINE

## 2024-02-05 PROCEDURE — 3017F COLORECTAL CA SCREEN DOC REV: CPT | Performed by: INTERNAL MEDICINE

## 2024-02-05 PROCEDURE — 3079F DIAST BP 80-89 MM HG: CPT | Performed by: INTERNAL MEDICINE

## 2024-02-05 PROCEDURE — 3077F SYST BP >= 140 MM HG: CPT | Performed by: INTERNAL MEDICINE

## 2024-02-05 RX ORDER — ONDANSETRON 4 MG/1
4 TABLET, FILM COATED ORAL DAILY PRN
Qty: 30 TABLET | Refills: 0 | Status: SHIPPED | OUTPATIENT
Start: 2024-02-05

## 2024-02-05 RX ORDER — HYOSCYAMINE SULFATE 0.12 MG/1
1 TABLET SUBLINGUAL 3 TIMES DAILY PRN
Qty: 120 EACH | Refills: 3 | Status: SHIPPED | OUTPATIENT
Start: 2024-02-05

## 2024-02-05 ASSESSMENT — ENCOUNTER SYMPTOMS
SORE THROAT: 0
ABDOMINAL DISTENTION: 0
DIARRHEA: 0
ANAL BLEEDING: 0
TROUBLE SWALLOWING: 0
ABDOMINAL PAIN: 1
VOMITING: 1
CONSTIPATION: 0
BLOOD IN STOOL: 0
NAUSEA: 1
COUGH: 0
SHORTNESS OF BREATH: 0
RECTAL PAIN: 0
WHEEZING: 0
COLOR CHANGE: 0
CHOKING: 0

## 2024-02-05 NOTE — PROGRESS NOTES
12/09/2023    EOSABS 0.10 12/09/2023    BASOSABS 0.10 12/09/2023         DIAGNOSTIC TESTING:     NM GASTRIC EMPTYING    Result Date: 1/17/2024  EXAMINATION: NUCLEAR MEDICINE GASTRIC EMPTYING STUDY 1/15/2024 8:09 am TECHNIQUE: Following ingestion of a standard liquid meal labeled with 1.1 mCi Tc 99m sulfur colloid, planar images of the chest and abdomen were obtained over 1 hour anterior and posterior projections.  Regions of interest were drawn around the stomach and geometric means were calculated.  Half time gastric emptying was calculated. All medications capable of altering motility were held. COMPARISON: None. HISTORY: ORDERING SYSTEM PROVIDED HISTORY: Other iron deficiency anemia TECHNOLOGIST PROVIDED HISTORY: Reason for Exam: Other iron deficiency anemia, Gastroesophageal reflux disease, unspecified whether esophagitis present, Malignant neoplasm of lateral wall of urinary bladder (HCC), Incisional hernia without obstruction or gangrene, Hx of hepatitis C, Nausea and vomiting, unspecified vomiting type, Weight loss Additional signs and symptoms: Heartburn off and on, Nausea/vomiting last week, Abdominal pain, bloating, Diarrhea, diabetic FINDINGS: Liquid phase half time gastric emptying is 23.46 minutes, normal.  Normal range is 25 minutes +/-5 minutes.     Normal gastric emptying of liquids.          Assessment  1. Hx of hepatitis C    2. Anxiety    3. 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 Abdominal pain, epigastric    5. IPMN (intraductal papillary mucinous neoplasm)        Plan    Will get MRCP for continued abdominal pains and Hx of IPMN    Trial of of Levsin    PRN Zofran    Cont PPI    Pt was discussed in detail about the possible side effects of proton pump inhibiter therapy.    He was explained about the possibility of calcium and magnesium malabsorption and was advised to start taking calcium supplements with Vit D. Some over the counter regimens were explained to patient. Some dietary advices

## 2024-02-08 NOTE — PATIENT INSTRUCTIONS
Dapsone Counseling: I discussed with the patient the risks of dapsone including but not limited to hemolytic anemia, agranulocytosis, rashes, methemoglobinemia, kidney failure, peripheral neuropathy, headaches, GI upset, and liver toxicity.  Patients who start dapsone require monitoring including baseline LFTs and weekly CBCs for the first month, then every month thereafter.  The patient verbalized understanding of the proper use and possible adverse effects of dapsone.  All of the patient's questions and concerns were addressed. Iv injectafer as soon approved  Rv in 2 months with labs couple of days prior Winlevi Counseling:  I discussed with the patient the risks of topical clascoterone including but not limited to erythema, scaling, itching, and stinging. Patient voiced their understanding. High Dose Vitamin A Pregnancy And Lactation Text: High dose vitamin A therapy is contraindicated during pregnancy and breast feeding. Tazorac Pregnancy And Lactation Text: This medication is not safe during pregnancy. It is unknown if this medication is excreted in breast milk. Aklief counseling:  Patient advised to apply a pea-sized amount only at bedtime and wait 30 minutes after washing their face before applying.  If too drying, patient may add a non-comedogenic moisturizer.  The most commonly reported side effects including irritation, redness, scaling, dryness, stinging, burning, itching, and increased risk of sunburn.  The patient verbalized understanding of the proper use and possible adverse effects of retinoids.  All of the patient's questions and concerns were addressed. Erythromycin Counseling:  I discussed with the patient the risks of erythromycin including but not limited to GI upset, allergic reaction, drug rash, diarrhea, increase in liver enzymes, and yeast infections. Azithromycin Pregnancy And Lactation Text: This medication is considered safe during pregnancy and is also secreted in breast milk. Sarecycline Pregnancy And Lactation Text: This medication is Pregnancy Category D and not consider safe during pregnancy. It is also excreted in breast milk. Benzoyl Peroxide Counseling: Patient counseled that medicine may cause skin irritation and bleach clothing.  In the event of skin irritation, the patient was advised to reduce the amount of the drug applied or use it less frequently.   The patient verbalized understanding of the proper use and possible adverse effects of benzoyl peroxide.  All of the patient's questions and concerns were addressed. Topical Sulfur Applications Pregnancy And Lactation Text: This medication is Pregnancy Category C and has an unknown safety profile during pregnancy. It is unknown if this topical medication is excreted in breast milk. Birth Control Pills Pregnancy And Lactation Text: This medication should be avoided if pregnant and for the first 30 days post-partum. High Dose Vitamin A Counseling: Side effects reviewed, pt to contact office should one occur. Use Enhanced Medication Counseling?: No Sarecycline Counseling: Patient advised regarding possible photosensitivity and discoloration of the teeth, skin, lips, tongue and gums.  Patient instructed to avoid sunlight, if possible.  When exposed to sunlight, patients should wear protective clothing, sunglasses, and sunscreen.  The patient was instructed to call the office immediately if the following severe adverse effects occur:  hearing changes, easy bruising/bleeding, severe headache, or vision changes.  The patient verbalized understanding of the proper use and possible adverse effects of sarecycline.  All of the patient's questions and concerns were addressed. Azithromycin Counseling:  I discussed with the patient the risks of azithromycin including but not limited to GI upset, allergic reaction, drug rash, diarrhea, and yeast infections. Tazorac Counseling:  Patient advised that medication is irritating and drying.  Patient may need to apply sparingly and wash off after an hour before eventually leaving it on overnight.  The patient verbalized understanding of the proper use and possible adverse effects of tazorac.  All of the patient's questions and concerns were addressed. Doxycycline Pregnancy And Lactation Text: This medication is Pregnancy Category D and not consider safe during pregnancy. It is also excreted in breast milk but is considered safe for shorter treatment courses. Azelaic Acid Pregnancy And Lactation Text: This medication is considered safe during pregnancy and breast feeding. Birth Control Pills Counseling: Birth Control Pill Counseling: I discussed with the patient the potential side effects of OCPs including but not limited to increased risk of stroke, heart attack, thrombophlebitis, deep venous thrombosis, hepatic adenomas, breast changes, GI upset, headaches, and depression.  The patient verbalized understanding of the proper use and possible adverse effects of OCPs. All of the patient's questions and concerns were addressed. Topical Sulfur Applications Counseling: Topical Sulfur Counseling: Patient counseled that this medication may cause skin irritation or allergic reactions.  In the event of skin irritation, the patient was advised to reduce the amount of the drug applied or use it less frequently.   The patient verbalized understanding of the proper use and possible adverse effects of topical sulfur application.  All of the patient's questions and concerns were addressed. Isotretinoin Pregnancy And Lactation Text: This medication is Pregnancy Category X and is considered extremely dangerous during pregnancy. It is unknown if it is excreted in breast milk. Tetracycline Counseling: Patient counseled regarding possible photosensitivity and increased risk for sunburn.  Patient instructed to avoid sunlight, if possible.  When exposed to sunlight, patients should wear protective clothing, sunglasses, and sunscreen.  The patient was instructed to call the office immediately if the following severe adverse effects occur:  hearing changes, easy bruising/bleeding, severe headache, or vision changes.  The patient verbalized understanding of the proper use and possible adverse effects of tetracycline.  All of the patient's questions and concerns were addressed. Patient understands to avoid pregnancy while on therapy due to potential birth defects. Topical Retinoid Pregnancy And Lactation Text: This medication is Pregnancy Category C. It is unknown if this medication is excreted in breast milk. Azelaic Acid Counseling: Patient counseled that medicine may cause skin irritation and to avoid applying near the eyes.  In the event of skin irritation, the patient was advised to reduce the amount of the drug applied or use it less frequently.   The patient verbalized understanding of the proper use and possible adverse effects of azelaic acid.  All of the patient's questions and concerns were addressed. Doxycycline Counseling:  Patient counseled regarding possible photosensitivity and increased risk for sunburn.  Patient instructed to avoid sunlight, if possible.  When exposed to sunlight, patients should wear protective clothing, sunglasses, and sunscreen.  The patient was instructed to call the office immediately if the following severe adverse effects occur:  hearing changes, easy bruising/bleeding, severe headache, or vision changes.  The patient verbalized understanding of the proper use and possible adverse effects of doxycycline.  All of the patient's questions and concerns were addressed. Bactrim Pregnancy And Lactation Text: This medication is Pregnancy Category D and is known to cause fetal risk.  It is also excreted in breast milk. Topical Clindamycin Pregnancy And Lactation Text: This medication is Pregnancy Category B and is considered safe during pregnancy. It is unknown if it is excreted in breast milk. Isotretinoin Counseling: Patient should get monthly blood tests, not donate blood, not drive at night if vision affected, not share medication, and not undergo elective surgery for 6 months after tx completed. Side effects reviewed, pt to contact office should one occur. Detail Level: Zone Spironolactone Pregnancy And Lactation Text: This medication can cause feminization of the male fetus and should be avoided during pregnancy. The active metabolite is also found in breast milk. Topical Retinoid counseling:  Patient advised to apply a pea-sized amount only at bedtime and wait 30 minutes after washing their face before applying.  If too drying, patient may add a non-comedogenic moisturizer. The patient verbalized understanding of the proper use and possible adverse effects of retinoids.  All of the patient's questions and concerns were addressed. Winlevi Pregnancy And Lactation Text: This medication is considered safe during pregnancy and breastfeeding. Aklief Pregnancy And Lactation Text: It is unknown if this medication is safe to use during pregnancy.  It is unknown if this medication is excreted in breast milk.  Breastfeeding women should use the topical cream on the smallest area of the skin for the shortest time needed while breastfeeding.  Do not apply to nipple and areola. Topical Clindamycin Counseling: Patient counseled that this medication may cause skin irritation or allergic reactions.  In the event of skin irritation, the patient was advised to reduce the amount of the drug applied or use it less frequently.   The patient verbalized understanding of the proper use and possible adverse effects of clindamycin.  All of the patient's questions and concerns were addressed. Dapsone Pregnancy And Lactation Text: This medication is Pregnancy Category C and is not considered safe during pregnancy or breast feeding. Bactrim Counseling:  I discussed with the patient the risks of sulfa antibiotics including but not limited to GI upset, allergic reaction, drug rash, diarrhea, dizziness, photosensitivity, and yeast infections.  Rarely, more serious reactions can occur including but not limited to aplastic anemia, agranulocytosis, methemoglobinemia, blood dyscrasias, liver or kidney failure, lung infiltrates or desquamative/blistering drug rashes. Minocycline Counseling: Patient advised regarding possible photosensitivity and discoloration of the teeth, skin, lips, tongue and gums.  Patient instructed to avoid sunlight, if possible.  When exposed to sunlight, patients should wear protective clothing, sunglasses, and sunscreen.  The patient was instructed to call the office immediately if the following severe adverse effects occur:  hearing changes, easy bruising/bleeding, severe headache, or vision changes.  The patient verbalized understanding of the proper use and possible adverse effects of minocycline.  All of the patient's questions and concerns were addressed. Erythromycin Pregnancy And Lactation Text: This medication is Pregnancy Category B and is considered safe during pregnancy. It is also excreted in breast milk. Spironolactone Counseling: Patient advised regarding risks of diarrhea, abdominal pain, hyperkalemia, birth defects (for female patients), liver toxicity and renal toxicity. The patient may need blood work to monitor liver and kidney function and potassium levels while on therapy. The patient verbalized understanding of the proper use and possible adverse effects of spironolactone.  All of the patient's questions and concerns were addressed. Benzoyl Peroxide Pregnancy And Lactation Text: This medication is Pregnancy Category C. It is unknown if benzoyl peroxide is excreted in breast milk. Detail Level: Detailed

## 2024-02-09 DIAGNOSIS — I10 ESSENTIAL HYPERTENSION: ICD-10-CM

## 2024-02-09 RX ORDER — LISINOPRIL 10 MG/1
10 TABLET ORAL DAILY
Qty: 90 TABLET | Refills: 3 | Status: SHIPPED | OUTPATIENT
Start: 2024-02-09

## 2024-02-09 NOTE — TELEPHONE ENCOUNTER
Please Approve or Refuse.  Send to Pharmacy per Pt's Request:      Next Visit Date:  2/19/2024   Last Visit Date: 10/12/2023    Hemoglobin A1C (%)   Date Value   08/18/2023 5.9   02/15/2023 8.5 (H)   08/19/2022 6.6 (H)             ( goal A1C is < 7)   BP Readings from Last 3 Encounters:   02/05/24 (!) 147/88   01/19/24 136/74   01/17/24 139/82          (goal 120/80)  BUN   Date Value Ref Range Status   10/27/2023 11 8 - 23 mg/dL Final     Creatinine   Date Value Ref Range Status   10/27/2023 0.7 0.7 - 1.2 mg/dL Final     Potassium   Date Value Ref Range Status   10/27/2023 4.9 3.7 - 5.3 mmol/L Final

## 2024-02-10 ENCOUNTER — HOSPITAL ENCOUNTER (OUTPATIENT)
Age: 74
Discharge: HOME | End: 2024-02-10
Payer: MEDICARE

## 2024-02-10 DIAGNOSIS — E53.8 B12 DEFICIENCY: ICD-10-CM

## 2024-02-10 DIAGNOSIS — D50.8 IRON DEFICIENCY ANEMIA SECONDARY TO INADEQUATE DIETARY IRON INTAKE: ICD-10-CM

## 2024-02-10 DIAGNOSIS — C67.2 MALIGNANT NEOPLASM OF LATERAL WALL OF URINARY BLADDER (HCC): ICD-10-CM

## 2024-02-10 LAB
BASOPHILS # BLD: 0 K/UL (ref 0–0.2)
BASOPHILS NFR BLD: 1 % (ref 0–2)
EOSINOPHIL # BLD: 0.1 K/UL (ref 0–0.4)
EOSINOPHILS RELATIVE PERCENT: 1 % (ref 0–4)
ERYTHROCYTE [DISTWIDTH] IN BLOOD BY AUTOMATED COUNT: 19.3 % (ref 11.5–14.9)
FERRITIN SERPL-MCNC: 174 NG/ML (ref 30–400)
FOLATE SERPL-MCNC: >20 NG/ML (ref 4.8–24.2)
HCT VFR BLD AUTO: 49.8 % (ref 41–53)
HGB BLD-MCNC: 16.5 G/DL (ref 13.5–17.5)
IRON SATN MFR SERPL: 31 % (ref 20–55)
IRON SERPL-MCNC: 85 UG/DL (ref 59–158)
LYMPHOCYTES NFR BLD: 1.2 K/UL (ref 1–4.8)
LYMPHOCYTES RELATIVE PERCENT: 23 % (ref 24–44)
MCH RBC QN AUTO: 29.3 PG (ref 26–34)
MCHC RBC AUTO-ENTMCNC: 33.1 G/DL (ref 31–37)
MCV RBC AUTO: 88.7 FL (ref 80–100)
MONOCYTES NFR BLD: 0.4 K/UL (ref 0.1–1.3)
MONOCYTES NFR BLD: 8 % (ref 1–7)
NEUTROPHILS NFR BLD: 67 % (ref 36–66)
NEUTS SEG NFR BLD: 3.6 K/UL (ref 1.3–9.1)
PLATELET # BLD AUTO: 203 K/UL (ref 150–450)
PMV BLD AUTO: 7.2 FL (ref 6–12)
RBC # BLD AUTO: 5.62 M/UL (ref 4.5–5.9)
TIBC SERPL-MCNC: 276 UG/DL (ref 250–450)
UNSATURATED IRON BINDING CAPACITY: 191 UG/DL (ref 112–347)
VIT B12 SERPL-MCNC: 1154 PG/ML (ref 232–1245)
WBC OTHER # BLD: 5.4 K/UL (ref 3.5–11)

## 2024-02-10 PROCEDURE — 82607 VITAMIN B-12: CPT

## 2024-02-10 PROCEDURE — 82728 ASSAY OF FERRITIN: CPT

## 2024-02-10 PROCEDURE — 83540 ASSAY OF IRON: CPT

## 2024-02-10 PROCEDURE — 83550 IRON BINDING TEST: CPT

## 2024-02-10 PROCEDURE — 36415 COLL VENOUS BLD VENIPUNCTURE: CPT

## 2024-02-10 PROCEDURE — 85025 COMPLETE CBC W/AUTO DIFF WBC: CPT

## 2024-02-10 PROCEDURE — 82746 ASSAY OF FOLIC ACID SERUM: CPT

## 2024-02-14 ENCOUNTER — TELEPHONE (OUTPATIENT)
Dept: ONCOLOGY | Age: 74
End: 2024-02-14

## 2024-02-14 ENCOUNTER — OFFICE VISIT (OUTPATIENT)
Dept: ONCOLOGY | Age: 74
End: 2024-02-14
Payer: MEDICARE

## 2024-02-14 VITALS
WEIGHT: 194.2 LBS | DIASTOLIC BLOOD PRESSURE: 87 MMHG | TEMPERATURE: 97.6 F | HEART RATE: 72 BPM | SYSTOLIC BLOOD PRESSURE: 159 MMHG | BODY MASS INDEX: 28.68 KG/M2

## 2024-02-14 DIAGNOSIS — C67.2 MALIGNANT NEOPLASM OF LATERAL WALL OF URINARY BLADDER (HCC): ICD-10-CM

## 2024-02-14 DIAGNOSIS — D64.9 ANEMIA, UNSPECIFIED TYPE: Primary | ICD-10-CM

## 2024-02-14 DIAGNOSIS — E53.8 B12 DEFICIENCY: ICD-10-CM

## 2024-02-14 PROCEDURE — G8427 DOCREV CUR MEDS BY ELIG CLIN: HCPCS | Performed by: INTERNAL MEDICINE

## 2024-02-14 PROCEDURE — G8484 FLU IMMUNIZE NO ADMIN: HCPCS | Performed by: INTERNAL MEDICINE

## 2024-02-14 PROCEDURE — 3017F COLORECTAL CA SCREEN DOC REV: CPT | Performed by: INTERNAL MEDICINE

## 2024-02-14 PROCEDURE — G8417 CALC BMI ABV UP PARAM F/U: HCPCS | Performed by: INTERNAL MEDICINE

## 2024-02-14 PROCEDURE — 3077F SYST BP >= 140 MM HG: CPT | Performed by: INTERNAL MEDICINE

## 2024-02-14 PROCEDURE — 1123F ACP DISCUSS/DSCN MKR DOCD: CPT | Performed by: INTERNAL MEDICINE

## 2024-02-14 PROCEDURE — 99214 OFFICE O/P EST MOD 30 MIN: CPT | Performed by: INTERNAL MEDICINE

## 2024-02-14 PROCEDURE — 1036F TOBACCO NON-USER: CPT | Performed by: INTERNAL MEDICINE

## 2024-02-14 PROCEDURE — 3079F DIAST BP 80-89 MM HG: CPT | Performed by: INTERNAL MEDICINE

## 2024-02-14 PROCEDURE — 99211 OFF/OP EST MAY X REQ PHY/QHP: CPT | Performed by: INTERNAL MEDICINE

## 2024-02-14 NOTE — PROGRESS NOTES
Subjective:   PCP: Greta Bentley MD       Chief Complaint   Patient presents with    Follow-up     Review status of disease    Discuss Labs   Reviewed labs    INTERIM HISTORY:     Patient presents to the clinic for follow-up visit and to discuss results with lab workup.  Hemoglobin remains in good range.  Patient most recent cystoscopy did not show any recurrence of bladder cancer.  Patient has finished induction of BCG.  Scheduled for an MRCP later this month and CT chest in March.    During this visit patient's allergy, social, medical, surgical history and medications were reviewed and updated.    REVIEW OF SYSTEMS:   General: No fever or night sweats. Weight is stable.  +fatigue   ENT: No double or blurred vision, no hearing problem, no dysphagia or sore throat   Respiratory: No chest pain, no shortness of breath, no cough or hemoptysis.  Cardiovascular: Denies chest pain, PND or orthopnea. No L E swelling or palpitations.  Gastrointestinal: No nausea or vomiting, abdominal pain, or constipation, diarrhea  Genitourinary: Denies dysuria, frequency, urgency or incontinence.  +hematuria - resolved  Neurological: Denies headaches, decreased LOC, no sensory or motor focal deficits.   Musculoskeletal: No arthralgia no back pain or joint swelling.   Skin: There are no rashes or bleeding.  Psych: Denies hallucinations or intentions to harm self      PHYSICAL EXAM:   Vitals: BP (!) 159/87   Pulse 72   Temp 97.6 °F (36.4 °C) (Temporal)   Wt 88.1 kg (194 lb 3.2 oz)   BMI 28.68 kg/m²   General appearance: alert and cooperative with exam  HEENT: Head: Normocephalic, no lesions, without obvious abnormality.  Neck: no adenopathy  Lungs: clear to auscultation bilaterally  Heart: regular rate and rhythm, S1, S2 normal, no murmur, click, rub or gallop  Abdomen: soft, non-tender; bowel sounds normal; no masses,  no organomegaly  Extremities: extremities normal, atraumatic, no cyanosis or edema  Neurologic:

## 2024-02-14 NOTE — TELEPHONE ENCOUNTER
Rv in 2 months with labs audrey           RV scheduled for 4/10/24 @ 945am    Labs (cbc and iron studies) to be drawn one week prior to visit    An After Visit Summary was printed and given to the patient.    Electronically signed by Peg Barrios on 2/14/2024 at 11:50 AM

## 2024-02-15 DIAGNOSIS — R11.0 NAUSEA: Primary | ICD-10-CM

## 2024-02-15 RX ORDER — PROMETHAZINE HYDROCHLORIDE 25 MG/1
TABLET ORAL
Qty: 21 TABLET | Refills: 0 | Status: SHIPPED | OUTPATIENT
Start: 2024-02-15

## 2024-02-21 ENCOUNTER — HOSPITAL ENCOUNTER (OUTPATIENT)
Dept: MRI IMAGING | Age: 74
Discharge: HOME | End: 2024-02-23
Attending: INTERNAL MEDICINE
Payer: MEDICARE

## 2024-02-21 DIAGNOSIS — F41.9 ANXIETY: ICD-10-CM

## 2024-02-21 DIAGNOSIS — K22.70 BARRETT'S ESOPHAGUS WITHOUT DYSPLASIA: ICD-10-CM

## 2024-02-21 DIAGNOSIS — Z86.19 HX OF HEPATITIS C: ICD-10-CM

## 2024-02-21 DIAGNOSIS — R10.13 ABDOMINAL PAIN, EPIGASTRIC: ICD-10-CM

## 2024-02-21 DIAGNOSIS — D49.0 IPMN (INTRADUCTAL PAPILLARY MUCINOUS NEOPLASM): ICD-10-CM

## 2024-02-21 LAB
EGFR, POC: >60 ML/MIN/1.73M2
POC CREATININE: 0.6 MG/DL (ref 0.51–1.19)

## 2024-02-21 PROCEDURE — 2580000003 HC RX 258: Performed by: INTERNAL MEDICINE

## 2024-02-21 PROCEDURE — 76377 3D RENDER W/INTRP POSTPROCES: CPT

## 2024-02-21 PROCEDURE — 82565 ASSAY OF CREATININE: CPT

## 2024-02-21 PROCEDURE — A9579 GAD-BASE MR CONTRAST NOS,1ML: HCPCS | Performed by: INTERNAL MEDICINE

## 2024-02-21 PROCEDURE — 6360000004 HC RX CONTRAST MEDICATION: Performed by: INTERNAL MEDICINE

## 2024-02-21 RX ORDER — 0.9 % SODIUM CHLORIDE 0.9 %
40 INTRAVENOUS SOLUTION INTRAVENOUS ONCE
Status: COMPLETED | OUTPATIENT
Start: 2024-02-21 | End: 2024-02-21

## 2024-02-21 RX ORDER — SODIUM CHLORIDE 0.9 % (FLUSH) 0.9 %
10 SYRINGE (ML) INJECTION PRN
Status: DISCONTINUED | OUTPATIENT
Start: 2024-02-21 | End: 2024-02-24 | Stop reason: HOSPADM

## 2024-02-21 RX ADMIN — SODIUM CHLORIDE, PRESERVATIVE FREE 10 ML: 5 INJECTION INTRAVENOUS at 08:03

## 2024-02-21 RX ADMIN — SODIUM CHLORIDE 40 ML: 9 INJECTION, SOLUTION INTRAVENOUS at 08:03

## 2024-02-21 RX ADMIN — GADOTERIDOL 20 ML: 279.3 INJECTION, SOLUTION INTRAVENOUS at 08:02

## 2024-02-22 ENCOUNTER — TELEPHONE (OUTPATIENT)
Dept: FAMILY MEDICINE CLINIC | Age: 74
End: 2024-02-22

## 2024-03-04 ENCOUNTER — PROCEDURE VISIT (OUTPATIENT)
Dept: UROLOGY | Age: 74
End: 2024-03-04
Payer: MEDICARE

## 2024-03-04 VITALS — DIASTOLIC BLOOD PRESSURE: 75 MMHG | TEMPERATURE: 98.1 F | HEART RATE: 51 BPM | SYSTOLIC BLOOD PRESSURE: 121 MMHG

## 2024-03-04 DIAGNOSIS — C67.2 MALIGNANT NEOPLASM OF LATERAL WALL OF URINARY BLADDER (HCC): Primary | ICD-10-CM

## 2024-03-04 PROCEDURE — 52000 CYSTOURETHROSCOPY: CPT | Performed by: UROLOGY

## 2024-03-04 NOTE — PROGRESS NOTES
Cystoscopy Operative Note (3/4/24)  Surgeon: Joseph Lozano MD  Anesthesia: Urethral 2% Xylocaine   Indications: Bladder Cancer, finished BCG 11/2023  Position: Dorsal Lithotomy    Findings:   Risks and Benefits discussed with patient prior to procedure.  The patient was prepped and draped in the usual sterile fashion.  The flexible cystoscope was advanced through the urethra and into the bladder.  The bladder was thoroughly inspected and the following was noted:    Residual Urine: none  Urethra: normal appearing urethra with no masses, tenderness or lesions  Prostate: partially obstructing lateral lobes of prostate; median lobe present? no.   Bladder: No tumors or CIS noted.  No bladder diverticulum.  There was none trabeculation noted.  Ureters: Clear efflux from both ureters.  Orifices with normal configuration and location.    The cystoscope was removed.  The patient tolerated the procedure well.     Agree with the ROS entered by the MA.      Plan: f/u 6 mo cysto

## 2024-03-09 DIAGNOSIS — E55.9 VITAMIN D DEFICIENCY: ICD-10-CM

## 2024-03-11 RX ORDER — ERGOCALCIFEROL 1.25 MG/1
CAPSULE ORAL
Qty: 12 CAPSULE | Refills: 0 | Status: SHIPPED | OUTPATIENT
Start: 2024-03-11

## 2024-03-11 NOTE — TELEPHONE ENCOUNTER
Please Approve or Refuse.  Send to Pharmacy per Pt's Request:      Next Visit Date:  3/14/2024   Last Visit Date: 10/12/2023    Hemoglobin A1C (%)   Date Value   08/18/2023 5.9   02/15/2023 8.5 (H)   08/19/2022 6.6 (H)             ( goal A1C is < 7)   BP Readings from Last 3 Encounters:   03/04/24 121/75   02/14/24 (!) 159/87   02/05/24 (!) 147/88          (goal 120/80)  BUN   Date Value Ref Range Status   10/27/2023 11 8 - 23 mg/dL Final     Creatinine   Date Value Ref Range Status   10/27/2023 0.7 0.7 - 1.2 mg/dL Final     POC Creatinine   Date Value Ref Range Status   02/21/2024 0.6 0.51 - 1.19 mg/dL Final     Potassium   Date Value Ref Range Status   10/27/2023 4.9 3.7 - 5.3 mmol/L Final

## 2024-03-12 ENCOUNTER — HOSPITAL ENCOUNTER (OUTPATIENT)
Dept: CT IMAGING | Age: 74
Discharge: HOME | End: 2024-03-14
Attending: INTERNAL MEDICINE
Payer: MEDICARE

## 2024-03-12 DIAGNOSIS — R91.1 NODULE OF LOWER LOBE OF LEFT LUNG: ICD-10-CM

## 2024-03-12 PROCEDURE — 71250 CT THORAX DX C-: CPT

## 2024-03-15 DIAGNOSIS — E11.65 TYPE 2 DIABETES MELLITUS WITH HYPERGLYCEMIA, WITHOUT LONG-TERM CURRENT USE OF INSULIN (HCC): ICD-10-CM

## 2024-03-15 RX ORDER — SITAGLIPTIN 50 MG/1
TABLET, FILM COATED ORAL
Qty: 90 TABLET | Refills: 3 | Status: SHIPPED | OUTPATIENT
Start: 2024-03-15

## 2024-03-20 ENCOUNTER — PATIENT MESSAGE (OUTPATIENT)
Dept: FAMILY MEDICINE CLINIC | Age: 74
End: 2024-03-20

## 2024-03-20 DIAGNOSIS — K21.9 GASTROESOPHAGEAL REFLUX DISEASE WITHOUT ESOPHAGITIS: ICD-10-CM

## 2024-03-20 RX ORDER — PANTOPRAZOLE SODIUM 40 MG/1
40 TABLET, DELAYED RELEASE ORAL DAILY
Qty: 90 TABLET | Refills: 1 | OUTPATIENT
Start: 2024-03-20

## 2024-03-20 RX ORDER — PANTOPRAZOLE SODIUM 40 MG/1
40 TABLET, DELAYED RELEASE ORAL DAILY
Qty: 90 TABLET | Refills: 1 | Status: SHIPPED | OUTPATIENT
Start: 2024-03-20

## 2024-03-20 NOTE — TELEPHONE ENCOUNTER
Please Approve or Refuse.  Send to Pharmacy per Pt's Request: daniel     Next Visit Date:  6/13/2024   Last Visit Date: 10/12/2023    Hemoglobin A1C (%)   Date Value   08/18/2023 5.9   02/15/2023 8.5 (H)   08/19/2022 6.6 (H)             ( goal A1C is < 7)   BP Readings from Last 3 Encounters:   03/04/24 121/75   02/14/24 (!) 159/87   02/05/24 (!) 147/88          (goal 120/80)  BUN   Date Value Ref Range Status   10/27/2023 11 8 - 23 mg/dL Final     Creatinine   Date Value Ref Range Status   10/27/2023 0.7 0.7 - 1.2 mg/dL Final     POC Creatinine   Date Value Ref Range Status   02/21/2024 0.6 0.51 - 1.19 mg/dL Final     Potassium   Date Value Ref Range Status   10/27/2023 4.9 3.7 - 5.3 mmol/L Final

## 2024-03-20 NOTE — TELEPHONE ENCOUNTER
From: Koby Otero  To: Dr. Greta Bentley  Sent: 3/20/2024 11:02 AM EDT  Subject: Denial of Rx~pantoprizole 40mg     Koby is scheduled to see Dr Bentley on June 13 at 8:30a  Can you ask her if she can refill his Pantoprizole Rx that was denied until he made an appt?  Thank you, any questions please call [phone]  Kate for Koby Otero [id number]

## 2024-03-27 ENCOUNTER — TELEMEDICINE (OUTPATIENT)
Dept: FAMILY MEDICINE CLINIC | Age: 74
End: 2024-03-27

## 2024-03-27 DIAGNOSIS — J30.89 NON-SEASONAL ALLERGIC RHINITIS DUE TO OTHER ALLERGIC TRIGGER: ICD-10-CM

## 2024-03-27 DIAGNOSIS — E55.9 VITAMIN D DEFICIENCY: ICD-10-CM

## 2024-03-27 DIAGNOSIS — R53.83 OTHER FATIGUE: ICD-10-CM

## 2024-03-27 DIAGNOSIS — I10 ESSENTIAL HYPERTENSION: ICD-10-CM

## 2024-03-27 DIAGNOSIS — E11.42 TYPE 2 DIABETES MELLITUS WITH DIABETIC POLYNEUROPATHY, WITHOUT LONG-TERM CURRENT USE OF INSULIN (HCC): ICD-10-CM

## 2024-03-27 DIAGNOSIS — G89.29 OTHER CHRONIC PAIN: ICD-10-CM

## 2024-03-27 DIAGNOSIS — E78.5 HYPERLIPIDEMIA WITH TARGET LDL LESS THAN 100: ICD-10-CM

## 2024-03-27 DIAGNOSIS — M47.26 OSTEOARTHRITIS OF SPINE WITH RADICULOPATHY, LUMBAR REGION: ICD-10-CM

## 2024-03-27 DIAGNOSIS — M25.552 PAIN OF LEFT HIP: Primary | ICD-10-CM

## 2024-03-27 DIAGNOSIS — G62.9 PERIPHERAL POLYNEUROPATHY: ICD-10-CM

## 2024-03-27 DIAGNOSIS — G63 POLYNEUROPATHY IN DISEASES CLASSIFIED ELSEWHERE (HCC): ICD-10-CM

## 2024-03-27 RX ORDER — PRAVASTATIN SODIUM 40 MG
40 TABLET ORAL NIGHTLY
Qty: 90 TABLET | Refills: 3 | Status: SHIPPED | OUTPATIENT
Start: 2024-03-27

## 2024-03-27 RX ORDER — FLUTICASONE PROPIONATE 50 MCG
2 SPRAY, SUSPENSION (ML) NASAL DAILY
Qty: 16 G | Refills: 0 | Status: SHIPPED | OUTPATIENT
Start: 2024-03-27

## 2024-03-27 RX ORDER — CALCIUM CITRATE/VITAMIN D3 200MG-6.25
TABLET ORAL
Qty: 100 STRIP | Refills: 3 | Status: SHIPPED | OUTPATIENT
Start: 2024-03-27

## 2024-03-27 SDOH — ECONOMIC STABILITY: FOOD INSECURITY: WITHIN THE PAST 12 MONTHS, THE FOOD YOU BOUGHT JUST DIDN'T LAST AND YOU DIDN'T HAVE MONEY TO GET MORE.: NEVER TRUE

## 2024-03-27 SDOH — ECONOMIC STABILITY: FOOD INSECURITY: WITHIN THE PAST 12 MONTHS, YOU WORRIED THAT YOUR FOOD WOULD RUN OUT BEFORE YOU GOT MONEY TO BUY MORE.: NEVER TRUE

## 2024-03-27 SDOH — ECONOMIC STABILITY: INCOME INSECURITY: HOW HARD IS IT FOR YOU TO PAY FOR THE VERY BASICS LIKE FOOD, HOUSING, MEDICAL CARE, AND HEATING?: NOT HARD AT ALL

## 2024-03-27 ASSESSMENT — PATIENT HEALTH QUESTIONNAIRE - PHQ9
2. FEELING DOWN, DEPRESSED OR HOPELESS: SEVERAL DAYS
SUM OF ALL RESPONSES TO PHQ QUESTIONS 1-9: 2
1. LITTLE INTEREST OR PLEASURE IN DOING THINGS: SEVERAL DAYS
SUM OF ALL RESPONSES TO PHQ9 QUESTIONS 1 & 2: 2
SUM OF ALL RESPONSES TO PHQ QUESTIONS 1-9: 2

## 2024-03-27 NOTE — PROGRESS NOTES
10/27/2024    GFR test (Diabetes, CKD 3-4, OR last GFR 15-59)  10/27/2024    Depression Screen  02/18/2025    Colorectal Cancer Screen  06/07/2025    Hepatitis A vaccine  Completed    Flu vaccine  Completed    Pneumococcal 65+ years Vaccine  Completed    COVID-19 Vaccine  Completed    Hib vaccine  Aged Out    Polio vaccine  Aged Out    Meningococcal (ACWY) vaccine  Aged Out    Hepatitis B vaccine  Discontinued    AAA screen  Discontinued    Prostate Specific Antigen (PSA) Screening or Monitoring  Discontinued             
GLUCOSE TEST) strip     Sig: TEST DAILY     Dispense:  100 strip     Refill:  3    metoprolol tartrate (LOPRESSOR) 25 MG tablet     Sig: Take 1 tablet by mouth 2 times daily     Dispense:  180 tablet     Refill:  3    pravastatin (PRAVACHOL) 40 MG tablet     Sig: Take 1 tablet by mouth at bedtime     Dispense:  90 tablet     Refill:  3    fluticasone (FLONASE) 50 MCG/ACT nasal spray     Si sprays by Each Nostril route daily For 2 weeks     Dispense:  16 g     Refill:  0       Orders Placed This Encounter   Procedures    XR HIP 2-3 VW W PELVIS LEFT     Standing Status:   Future     Standing Expiration Date:   3/27/2025     Order Specific Question:   Reason for exam:     Answer:   left hip and groin pain    Hemoglobin A1c     Standing Status:   Future     Standing Expiration Date:   3/27/2025    CBC     Standing Status:   Future     Standing Expiration Date:   3/27/2025    Comprehensive Metabolic Panel     Standing Status:   Future     Standing Expiration Date:   3/27/2025    Urinalysis with Reflex to Culture     Standing Status:   Future     Standing Expiration Date:   3/27/2025     Order Specific Question:   SPECIFY(EX-CATH,MIDSTREAM,CYSTO,ETC)?     Answer:   MIDSTREAM    Magnesium     Standing Status:   Future     Standing Expiration Date:   3/27/2025    Phosphorus     Standing Status:   Future     Standing Expiration Date:   3/27/2025    Vitamin D 25 Hydroxy     Standing Status:   Future     Standing Expiration Date:   3/27/2025    Vitamin B12 & Folate     Standing Status:   Future     Standing Expiration Date:   3/27/2025    Osmany Vargas MD, Pain Management, [place]     Referral Priority:   Routine     Referral Type:   Eval and Treat     Referral Reason:   Specialty Services Required     Referred to Provider:   Osmany Hernadez MD     Requested Specialty:   Pain Management     Number of Visits Requested:   1    EMG     Standing Status:   Future     Standing Expiration Date:   2024     Order

## 2024-03-28 ASSESSMENT — ENCOUNTER SYMPTOMS
NAUSEA: 0
ABDOMINAL PAIN: 0
ABDOMINAL DISTENTION: 0
WHEEZING: 0
VOMITING: 0
SHORTNESS OF BREATH: 0
CHEST TIGHTNESS: 0
COUGH: 0
BACK PAIN: 1
CONSTIPATION: 0
DIARRHEA: 0

## 2024-04-02 ENCOUNTER — OFFICE VISIT (OUTPATIENT)
Dept: PULMONOLOGY | Age: 74
End: 2024-04-02
Payer: MEDICARE

## 2024-04-02 VITALS
HEIGHT: 69 IN | DIASTOLIC BLOOD PRESSURE: 81 MMHG | RESPIRATION RATE: 13 BRPM | SYSTOLIC BLOOD PRESSURE: 129 MMHG | TEMPERATURE: 98.6 F | OXYGEN SATURATION: 97 % | WEIGHT: 185 LBS | HEART RATE: 51 BPM | BODY MASS INDEX: 27.4 KG/M2

## 2024-04-02 DIAGNOSIS — C67.2 MALIGNANT NEOPLASM OF LATERAL WALL OF URINARY BLADDER (HCC): ICD-10-CM

## 2024-04-02 DIAGNOSIS — R91.1 NODULE OF LOWER LOBE OF LEFT LUNG: Primary | ICD-10-CM

## 2024-04-02 DIAGNOSIS — J43.2 CENTRILOBULAR EMPHYSEMA (HCC): ICD-10-CM

## 2024-04-02 PROCEDURE — 3023F SPIROM DOC REV: CPT | Performed by: INTERNAL MEDICINE

## 2024-04-02 PROCEDURE — G8428 CUR MEDS NOT DOCUMENT: HCPCS | Performed by: INTERNAL MEDICINE

## 2024-04-02 PROCEDURE — 1036F TOBACCO NON-USER: CPT | Performed by: INTERNAL MEDICINE

## 2024-04-02 PROCEDURE — 99214 OFFICE O/P EST MOD 30 MIN: CPT | Performed by: INTERNAL MEDICINE

## 2024-04-02 PROCEDURE — 3074F SYST BP LT 130 MM HG: CPT | Performed by: INTERNAL MEDICINE

## 2024-04-02 PROCEDURE — 3079F DIAST BP 80-89 MM HG: CPT | Performed by: INTERNAL MEDICINE

## 2024-04-02 PROCEDURE — G8417 CALC BMI ABV UP PARAM F/U: HCPCS | Performed by: INTERNAL MEDICINE

## 2024-04-02 PROCEDURE — 3017F COLORECTAL CA SCREEN DOC REV: CPT | Performed by: INTERNAL MEDICINE

## 2024-04-02 PROCEDURE — 1123F ACP DISCUSS/DSCN MKR DOCD: CPT | Performed by: INTERNAL MEDICINE

## 2024-04-02 NOTE — PROGRESS NOTES
REASON FOR THE CONSULTATION:  Chief Complaint   Patient presents with    Pulmonary Nodule     Follow up      HISTORY OF PRESENT ILLNESS:    Koby Otero is a 73 y.o. year old male   No cough , no fever , no hemoptysis , clear sputum .  No chest pain , no orthopnea , no PND   No nausea or abdominal pain .                Immunization   Immunization History   Administered Date(s) Administered    COVID-19, J&J, (age 18y+), IM, 0.5 mL 04/09/2021, 01/21/2022    COVID-19, MODERNA, (2023-24 formula), (age 12y+), IM, 50mcg/0.5mL 10/15/2023    Hep A, HAVRIX, VAQTA, (age 19y+), IM, 1mL 09/24/2019, 04/30/2020    Influenza Virus Vaccine 10/01/2015, 11/01/2016    Influenza, FLUAD, (age 65 y+), Adjuvanted, 0.5mL 09/28/2020, 09/24/2021, 09/27/2022, 10/12/2023    Pneumococcal, PCV-13, PREVNAR 13, (age 6w+), IM, 0.5mL 02/02/2016    Pneumococcal, PCV20, PREVNAR 20, (age 6w+), IM, 0.5mL 10/15/2023    Pneumococcal, PPSV23, PNEUMOVAX 23, (age 2y+), SC/IM, 0.5mL 09/28/2020    Zoster Live (Zostavax) 11/01/2015          PAST MEDICAL HISTORY:       Diagnosis Date    AAA (abdominal aortic aneurysm) (HCC)     \"stable\" 3 cm on CT 2021 & 2023.    Abnormal electrocardiogram (ECG) (EKG)     Arthritis     osteoarthritis    Bladder cancer (HCC) 03/2021    bladder    Bleeding ulcer 2015    Chronic neck pain     Colon polyps 10/08/2019    tubular adenoma x2    COVID-19 03/06/2023    ill x 1 day per wife    COVID-19 virus infection 01/10/2022    Slight cough.    Diabetic neuropathy (HCC)     Diarrhea     Enteritis 03/16/2023    pain x 1 day, resolved after meds in ER and started on bentyl    Essential hypertension 05/12/2020    managed by Dr. Bentley PCP    Fatty liver 02/15/2023    GERD (gastroesophageal reflux disease)     Hepatitis B core antibody positive     History of bleeding peptic ulcer     History of blood transfusion     no reactions    History of hepatitis C     completed treatment for this    History of migraine headaches     History of

## 2024-04-06 ENCOUNTER — HOSPITAL ENCOUNTER (OUTPATIENT)
Age: 74
Discharge: HOME | End: 2024-04-06
Payer: MEDICARE

## 2024-04-06 DIAGNOSIS — D64.9 ANEMIA, UNSPECIFIED TYPE: ICD-10-CM

## 2024-04-06 DIAGNOSIS — E55.9 VITAMIN D DEFICIENCY: ICD-10-CM

## 2024-04-06 DIAGNOSIS — I10 ESSENTIAL HYPERTENSION: ICD-10-CM

## 2024-04-06 DIAGNOSIS — C67.2 MALIGNANT NEOPLASM OF LATERAL WALL OF URINARY BLADDER (HCC): ICD-10-CM

## 2024-04-06 DIAGNOSIS — E53.8 B12 DEFICIENCY: ICD-10-CM

## 2024-04-06 DIAGNOSIS — E11.42 TYPE 2 DIABETES MELLITUS WITH DIABETIC POLYNEUROPATHY, WITHOUT LONG-TERM CURRENT USE OF INSULIN (HCC): ICD-10-CM

## 2024-04-06 DIAGNOSIS — R53.83 OTHER FATIGUE: ICD-10-CM

## 2024-04-06 LAB
25(OH)D3 SERPL-MCNC: 39.5 NG/ML (ref 30–100)
ALBUMIN SERPL-MCNC: 4.4 G/DL (ref 3.5–5.2)
ALP SERPL-CCNC: 79 U/L (ref 40–129)
ALT SERPL-CCNC: 20 U/L (ref 5–41)
ANION GAP SERPL CALCULATED.3IONS-SCNC: 14 MMOL/L (ref 9–17)
AST SERPL-CCNC: 18 U/L
BACTERIA URNS QL MICRO: ABNORMAL
BILIRUB SERPL-MCNC: 0.5 MG/DL (ref 0.3–1.2)
BILIRUB UR QL STRIP: NEGATIVE
BUN SERPL-MCNC: 13 MG/DL (ref 8–23)
CALCIUM SERPL-MCNC: 9.4 MG/DL (ref 8.6–10.4)
CASTS #/AREA URNS LPF: ABNORMAL /LPF
CHLORIDE SERPL-SCNC: 103 MMOL/L (ref 98–107)
CLARITY UR: CLEAR
CO2 SERPL-SCNC: 25 MMOL/L (ref 20–31)
COLOR UR: YELLOW
CREAT SERPL-MCNC: 0.7 MG/DL (ref 0.7–1.2)
EPI CELLS #/AREA URNS HPF: ABNORMAL /HPF
ERYTHROCYTE [DISTWIDTH] IN BLOOD BY AUTOMATED COUNT: 16 % (ref 11.5–14.9)
EST. AVERAGE GLUCOSE BLD GHB EST-MCNC: 114 MG/DL
FERRITIN SERPL-MCNC: 163 NG/ML (ref 30–400)
FOLATE SERPL-MCNC: >20 NG/ML (ref 4.8–24.2)
GFR SERPL CREATININE-BSD FRML MDRD: >90 ML/MIN/1.73M2
GLUCOSE SERPL-MCNC: 140 MG/DL (ref 70–99)
GLUCOSE UR STRIP-MCNC: ABNORMAL MG/DL
HBA1C MFR BLD: 5.6 % (ref 4–6)
HCT VFR BLD AUTO: 50.3 % (ref 41–53)
HGB BLD-MCNC: 16.8 G/DL (ref 13.5–17.5)
HGB UR QL STRIP.AUTO: NEGATIVE
IRON SATN MFR SERPL: 26 % (ref 20–55)
IRON SERPL-MCNC: 78 UG/DL (ref 61–157)
KETONES UR STRIP-MCNC: NEGATIVE MG/DL
LEUKOCYTE ESTERASE UR QL STRIP: NEGATIVE
MAGNESIUM SERPL-MCNC: 1.9 MG/DL (ref 1.6–2.6)
MCH RBC QN AUTO: 30.3 PG (ref 26–34)
MCHC RBC AUTO-ENTMCNC: 33.4 G/DL (ref 31–37)
MCV RBC AUTO: 90.9 FL (ref 80–100)
NITRITE UR QL STRIP: NEGATIVE
PH UR STRIP: 5 [PH] (ref 5–8)
PHOSPHATE SERPL-MCNC: 3.5 MG/DL (ref 2.5–4.5)
PLATELET # BLD AUTO: 228 K/UL (ref 150–450)
PMV BLD AUTO: 7.6 FL (ref 6–12)
POTASSIUM SERPL-SCNC: 4.1 MMOL/L (ref 3.7–5.3)
PROT SERPL-MCNC: 7.3 G/DL (ref 6.4–8.3)
PROT UR STRIP-MCNC: ABNORMAL MG/DL
RBC # BLD AUTO: 5.53 M/UL (ref 4.5–5.9)
RBC #/AREA URNS HPF: ABNORMAL /HPF
SODIUM SERPL-SCNC: 142 MMOL/L (ref 135–144)
SP GR UR STRIP: 1.04 (ref 1–1.03)
TIBC SERPL-MCNC: 299 UG/DL (ref 250–450)
UNSATURATED IRON BINDING CAPACITY: 221 UG/DL (ref 112–347)
UROBILINOGEN UR STRIP-ACNC: NORMAL EU/DL (ref 0–1)
VIT B12 SERPL-MCNC: 843 PG/ML (ref 232–1245)
WBC #/AREA URNS HPF: ABNORMAL /HPF
WBC OTHER # BLD: 6.8 K/UL (ref 3.5–11)

## 2024-04-06 PROCEDURE — 36415 COLL VENOUS BLD VENIPUNCTURE: CPT

## 2024-04-06 PROCEDURE — 82306 VITAMIN D 25 HYDROXY: CPT

## 2024-04-06 PROCEDURE — 83540 ASSAY OF IRON: CPT

## 2024-04-06 PROCEDURE — 82728 ASSAY OF FERRITIN: CPT

## 2024-04-06 PROCEDURE — 82746 ASSAY OF FOLIC ACID SERUM: CPT

## 2024-04-06 PROCEDURE — 83036 HEMOGLOBIN GLYCOSYLATED A1C: CPT

## 2024-04-06 PROCEDURE — 83735 ASSAY OF MAGNESIUM: CPT

## 2024-04-06 PROCEDURE — 85027 COMPLETE CBC AUTOMATED: CPT

## 2024-04-06 PROCEDURE — 84100 ASSAY OF PHOSPHORUS: CPT

## 2024-04-06 PROCEDURE — 81001 URINALYSIS AUTO W/SCOPE: CPT

## 2024-04-06 PROCEDURE — 80053 COMPREHEN METABOLIC PANEL: CPT

## 2024-04-06 PROCEDURE — 83550 IRON BINDING TEST: CPT

## 2024-04-06 PROCEDURE — 82607 VITAMIN B-12: CPT

## 2024-04-08 LAB
FERRITIN SERPL-MCNC: 163 NG/ML (ref 30–400)
IRON SATN MFR SERPL: 26 % (ref 20–55)
IRON SERPL-MCNC: 78 UG/DL (ref 61–157)
TIBC SERPL-MCNC: 299 UG/DL (ref 240–450)
UNSATURATED IRON BINDING CAPACITY: 221 UG/DL (ref 112–347)

## 2024-04-09 ENCOUNTER — PATIENT MESSAGE (OUTPATIENT)
Dept: INFECTIOUS DISEASES | Age: 74
End: 2024-04-09

## 2024-04-09 LAB
BASOPHILS # BLD: 0.07 K/UL (ref 0–0.2)
BASOPHILS NFR BLD: 1 % (ref 0–2)
EOSINOPHIL # BLD: 0.07 K/UL (ref 0–0.4)
EOSINOPHILS RELATIVE PERCENT: 1 % (ref 0–4)
ERYTHROCYTE [DISTWIDTH] IN BLOOD BY AUTOMATED COUNT: 16 % (ref 11.5–14.9)
HCT VFR BLD AUTO: 50.3 % (ref 41–53)
HGB BLD-MCNC: 16.8 G/DL (ref 13.5–17.5)
LYMPHOCYTES NFR BLD: 1.36 K/UL (ref 1–4.8)
LYMPHOCYTES RELATIVE PERCENT: 20 % (ref 24–44)
MCH RBC QN AUTO: 30.3 PG (ref 26–34)
MCHC RBC AUTO-ENTMCNC: 33.4 G/DL (ref 31–37)
MCV RBC AUTO: 90.9 FL (ref 80–100)
MONOCYTES NFR BLD: 0.41 K/UL (ref 0.1–1.3)
MONOCYTES NFR BLD: 6 % (ref 1–7)
MORPHOLOGY: ABNORMAL
NEUTROPHILS NFR BLD: 72 % (ref 36–66)
NEUTS SEG NFR BLD: 4.89 K/UL (ref 1.3–9.1)
PLATELET # BLD AUTO: 228 K/UL (ref 150–450)
PMV BLD AUTO: 7.6 FL (ref 6–12)
RBC # BLD AUTO: 5.53 M/UL (ref 4.5–5.9)
WBC OTHER # BLD: 6.8 K/UL (ref 3.5–11)

## 2024-04-10 ENCOUNTER — OFFICE VISIT (OUTPATIENT)
Dept: ONCOLOGY | Age: 74
End: 2024-04-10
Payer: MEDICARE

## 2024-04-10 ENCOUNTER — TELEPHONE (OUTPATIENT)
Dept: ONCOLOGY | Age: 74
End: 2024-04-10

## 2024-04-10 VITALS
SYSTOLIC BLOOD PRESSURE: 139 MMHG | OXYGEN SATURATION: 96 % | HEART RATE: 95 BPM | HEIGHT: 69 IN | DIASTOLIC BLOOD PRESSURE: 78 MMHG | WEIGHT: 190.8 LBS | BODY MASS INDEX: 28.26 KG/M2

## 2024-04-10 DIAGNOSIS — E53.8 B12 DEFICIENCY: ICD-10-CM

## 2024-04-10 DIAGNOSIS — C67.2 MALIGNANT NEOPLASM OF LATERAL WALL OF URINARY BLADDER (HCC): ICD-10-CM

## 2024-04-10 DIAGNOSIS — D64.9 ANEMIA, UNSPECIFIED TYPE: Primary | ICD-10-CM

## 2024-04-10 PROCEDURE — 3017F COLORECTAL CA SCREEN DOC REV: CPT | Performed by: INTERNAL MEDICINE

## 2024-04-10 PROCEDURE — 1123F ACP DISCUSS/DSCN MKR DOCD: CPT | Performed by: INTERNAL MEDICINE

## 2024-04-10 PROCEDURE — G8417 CALC BMI ABV UP PARAM F/U: HCPCS | Performed by: INTERNAL MEDICINE

## 2024-04-10 PROCEDURE — G8427 DOCREV CUR MEDS BY ELIG CLIN: HCPCS | Performed by: INTERNAL MEDICINE

## 2024-04-10 PROCEDURE — 1036F TOBACCO NON-USER: CPT | Performed by: INTERNAL MEDICINE

## 2024-04-10 PROCEDURE — 99214 OFFICE O/P EST MOD 30 MIN: CPT | Performed by: INTERNAL MEDICINE

## 2024-04-10 PROCEDURE — 3075F SYST BP GE 130 - 139MM HG: CPT | Performed by: INTERNAL MEDICINE

## 2024-04-10 PROCEDURE — 3078F DIAST BP <80 MM HG: CPT | Performed by: INTERNAL MEDICINE

## 2024-04-10 NOTE — TELEPHONE ENCOUNTER
Rv in 4 months with labs 1 week priror         Patient was scheduled on 8/7 with labs one week before. Patient was given lab orders to have done before appointment.      Patient was given AVS and schedule

## 2024-04-16 DIAGNOSIS — J30.89 NON-SEASONAL ALLERGIC RHINITIS DUE TO OTHER ALLERGIC TRIGGER: ICD-10-CM

## 2024-04-16 RX ORDER — LEVOCETIRIZINE DIHYDROCHLORIDE 5 MG/1
5 TABLET, FILM COATED ORAL NIGHTLY
Qty: 90 TABLET | Refills: 0 | Status: SHIPPED | OUTPATIENT
Start: 2024-04-16

## 2024-04-16 NOTE — TELEPHONE ENCOUNTER
Please Approve or Refuse.  Send to Pharmacy per Pt's Request:     Next Visit Date:  6/13/2024   Last Visit Date: 3/27/2024    Hemoglobin A1C (%)   Date Value   04/06/2024 5.6   08/18/2023 5.9   02/15/2023 8.5 (H)             ( goal A1C is < 7)   BP Readings from Last 3 Encounters:   04/10/24 139/78   04/02/24 129/81   03/04/24 121/75          (goal 120/80)  BUN   Date Value Ref Range Status   04/06/2024 13 8 - 23 mg/dL Final     Creatinine   Date Value Ref Range Status   04/06/2024 0.7 0.7 - 1.2 mg/dL Final     Potassium   Date Value Ref Range Status   04/06/2024 4.1 3.7 - 5.3 mmol/L Final

## 2024-04-26 NOTE — RESULT ENCOUNTER NOTE
HPI Notes    Name: Maricel Adams  : 1968         Chief Complaint:     Chief Complaint   Patient presents with    Wound Check     Pain worsened 7 day ago. Patient is unable to touch scalp without experiencing a lot of pain.       History of Present Illness:        HPI    This is a 55 year old woman presenting for evaluation of painful scalp. There is no particularly painful area; but she does experience frisson over the scalp as well. Touching the scalp is quite painful, but there are no visible lesions. Touching the hair is not painful. This began around seven days ago.     Past Medical History:     Past Medical History:   Diagnosis Date    Asthma     Glaucoma     Closed fior Glaucoma      Reviewed all health maintenance requirements and ordered appropriate tests  Health Maintenance Due   Topic Date Due    Hepatitis B vaccine (1 of 3 - 3-dose series) Never done    COVID-19 Vaccine (1) Never done    Breast cancer screen  Never done    Shingles vaccine (1 of 2) Never done       Past Surgical History:     Past Surgical History:   Procedure Laterality Date    CARPAL TUNNEL RELEASE      CHOLECYSTECTOMY      EYE SURGERY      HYSTERECTOMY (CERVIX STATUS UNKNOWN)      SHOULDER SURGERY      x 3, left    TUBAL LIGATION          Medications:       Prior to Admission medications    Medication Sig Start Date End Date Taking? Authorizing Provider   gabapentin (NEURONTIN) 100 MG capsule Take 1 capsule by mouth nightly for 14 days. Intended supply: 30 days 4/26/24 5/10/24 Yes Jorge Rodríguez DO   aspirin 325 MG tablet Take 1 tablet by mouth daily   Yes Rodriguez Leiva MD   Magnesium Cl-Calcium Carbonate (SLOW-MAG PO) Take by mouth Takes 1 tab in the am and 1 tab in the PM   Yes Rodriguez Leiva MD   latanoprost (XALATAN) 0.005 % ophthalmic solution  22  Yes Rodriguez Leiva MD   ibuprofen (ADVIL;MOTRIN) 600 MG tablet Take 1 tablet by mouth every 6 hours as needed   Yes Rodriguez Leiva MD  Noted, yes NEEDS IRON INFUSION  I did not send iron pill  Future Appointments  8/3/2021   10:20 AM   ELANA Pearson* [id number]  8/9/2021   9:15 AM    Vladimir Brown MD         [facility]           [facility]  8/11/2021  12:30 PM   Efrem Donohue MD          [address]  9/24/2021  11:30 AM   Nadja Suero MD     [facility]               [address] [facility]

## 2024-05-02 RX ORDER — FOLIC ACID 1 MG/1
1000 TABLET ORAL DAILY
Qty: 90 TABLET | Refills: 1 | Status: SHIPPED | OUTPATIENT
Start: 2024-05-02

## 2024-05-10 ENCOUNTER — OFFICE VISIT (OUTPATIENT)
Dept: GASTROENTEROLOGY | Age: 74
End: 2024-05-10
Payer: MEDICARE

## 2024-05-10 VITALS
DIASTOLIC BLOOD PRESSURE: 76 MMHG | SYSTOLIC BLOOD PRESSURE: 138 MMHG | TEMPERATURE: 97.5 F | BODY MASS INDEX: 28.49 KG/M2 | WEIGHT: 193 LBS

## 2024-05-10 DIAGNOSIS — K80.50 BILE DUCT CALCULUS WITHOUT CHOLECYSTITIS AND NO OBSTRUCTION: Primary | ICD-10-CM

## 2024-05-10 DIAGNOSIS — D49.0 IPMN (INTRADUCTAL PAPILLARY MUCINOUS NEOPLASM): ICD-10-CM

## 2024-05-10 DIAGNOSIS — Z86.19 HISTORY OF HEPATITIS C: ICD-10-CM

## 2024-05-10 DIAGNOSIS — R10.13 ABDOMINAL PAIN, EPIGASTRIC: ICD-10-CM

## 2024-05-10 PROCEDURE — G8417 CALC BMI ABV UP PARAM F/U: HCPCS | Performed by: INTERNAL MEDICINE

## 2024-05-10 PROCEDURE — 3078F DIAST BP <80 MM HG: CPT | Performed by: INTERNAL MEDICINE

## 2024-05-10 PROCEDURE — 3075F SYST BP GE 130 - 139MM HG: CPT | Performed by: INTERNAL MEDICINE

## 2024-05-10 PROCEDURE — G8427 DOCREV CUR MEDS BY ELIG CLIN: HCPCS | Performed by: INTERNAL MEDICINE

## 2024-05-10 PROCEDURE — 1123F ACP DISCUSS/DSCN MKR DOCD: CPT | Performed by: INTERNAL MEDICINE

## 2024-05-10 PROCEDURE — 99214 OFFICE O/P EST MOD 30 MIN: CPT | Performed by: INTERNAL MEDICINE

## 2024-05-10 PROCEDURE — 3017F COLORECTAL CA SCREEN DOC REV: CPT | Performed by: INTERNAL MEDICINE

## 2024-05-10 PROCEDURE — 1036F TOBACCO NON-USER: CPT | Performed by: INTERNAL MEDICINE

## 2024-05-10 ASSESSMENT — ENCOUNTER SYMPTOMS
CONSTIPATION: 0
BLOOD IN STOOL: 0
ABDOMINAL DISTENTION: 0
ABDOMINAL PAIN: 0
DIARRHEA: 1
ANAL BLEEDING: 0
SORE THROAT: 0
NAUSEA: 0
CHOKING: 0
SHORTNESS OF BREATH: 0
TROUBLE SWALLOWING: 0
COUGH: 1
COLOR CHANGE: 0
VOMITING: 0
RECTAL PAIN: 0
WHEEZING: 0

## 2024-05-10 NOTE — PROGRESS NOTES
GI CLINIC FOLLOW UP    NTERVAL HISTORY:   No referring provider defined for this encounter.    Chief Complaint   Patient presents with    Results     Pt is here today for a f/u from MRI results, last seen on 2/5/24 for h/o Hep C, abdominal pain, & IPMN.       1. Bile duct calculus without cholecystitis and no obstruction    2. Abdominal pain, epigastric    3. History of hepatitis C    4. IPMN (intraductal papillary mucinous neoplasm)        This patient is seen in my office as a f.u    He has history significant for small pancreatic cyst/IPMN  Has been complaining of some nonspecific upper abdominal pain discomfort    MRCP was ordered    This MRCP has revealed evidence of cholecystectomy but a small 4 mm stones noted in the common bile    Although his pain is better he has some bony pains back pains issues    Mild irritable bowel syndrome like symptoms     LFTs in the past have been normal    Denies rectal bleeding melena     His previous records were reviewed with him    HISTORY OF PRESENT ILLNESS: Mr.Jerry YONG Otero is a 73 y.o. male with a past history remarkable for , referred for evaluation of   Chief Complaint   Patient presents with    Results     Pt is here today for a f/u from MRI results, last seen on 2/5/24 for h/o Hep C, abdominal pain, & IPMN.   .    Past Medical,Family, and Social History reviewed and does contribute to the patient presenting condition.    Patient's PMH/PSH,SH,PSYCH Hx, MEDs, ALLERGIES, and ROS were all reviewed and updated in the appropriate sections.    PAST MEDICAL HISTORY:  Past Medical History:   Diagnosis Date    AAA (abdominal aortic aneurysm) (HCC)     \"stable\" 3 cm on CT 2021 & 2023.    Abnormal electrocardiogram (ECG) (EKG)     Arthritis     osteoarthritis    Bladder cancer (HCC) 03/2021    bladder    Bleeding ulcer 2015    Chronic neck pain     Colon polyps 10/08/2019    tubular adenoma x2    COVID-19 03/06/2023    ill x 1 day per wife    COVID-19 virus infection

## 2024-05-15 ENCOUNTER — TELEPHONE (OUTPATIENT)
Dept: GASTROENTEROLOGY | Age: 74
End: 2024-05-15

## 2024-05-15 NOTE — TELEPHONE ENCOUNTER
Procedure scheduled/Dr MADAY Reid  Procedure: ERCP 1 1/2 hour per doctor  Dx:  bile duct calculus without cholecystitis, abdominal pain epigastric  Date: 10/30/24   Time: 8 a.m.  Hospital: [facility]   Bowel Prep instructions given: N/A   In office/via phone: office   Clearance needed: yes  Cardiology - Dr. Nichole  Pulmonology- Dr Garay

## 2024-05-23 DIAGNOSIS — M47.816 LUMBAR SPONDYLOSIS: ICD-10-CM

## 2024-05-23 DIAGNOSIS — M54.16 LUMBAR RADICULOPATHY: ICD-10-CM

## 2024-05-23 DIAGNOSIS — M46.92 CERVICAL SPONDYLITIS (HCC): ICD-10-CM

## 2024-05-23 DIAGNOSIS — Z98.1 S/P CERVICAL SPINAL FUSION: ICD-10-CM

## 2024-05-23 DIAGNOSIS — M51.36 DEGENERATIVE DISC DISEASE, LUMBAR: ICD-10-CM

## 2024-05-23 RX ORDER — BACLOFEN 10 MG/1
10 TABLET ORAL 3 TIMES DAILY PRN
Qty: 90 TABLET | Refills: 0 | Status: SHIPPED | OUTPATIENT
Start: 2024-05-23

## 2024-05-23 RX ORDER — BACLOFEN 10 MG/1
TABLET ORAL
Qty: 270 TABLET | Refills: 1 | OUTPATIENT
Start: 2024-05-23

## 2024-05-23 NOTE — TELEPHONE ENCOUNTER
Please Approve or Refuse.  Send to Pharmacy per Pt's Request:      Next Visit Date:  6/13/2024   Last Visit Date: 3/27/2024    Hemoglobin A1C (%)   Date Value   04/06/2024 5.6   08/18/2023 5.9   02/15/2023 8.5 (H)             ( goal A1C is < 7)   BP Readings from Last 3 Encounters:   05/10/24 138/76   04/10/24 139/78   04/02/24 129/81          (goal 120/80)  BUN   Date Value Ref Range Status   04/06/2024 13 8 - 23 mg/dL Final     Creatinine   Date Value Ref Range Status   04/06/2024 0.7 0.7 - 1.2 mg/dL Final     Potassium   Date Value Ref Range Status   04/06/2024 4.1 3.7 - 5.3 mmol/L Final

## 2024-06-22 DIAGNOSIS — M46.92 CERVICAL SPONDYLITIS (HCC): ICD-10-CM

## 2024-06-22 DIAGNOSIS — M54.16 LUMBAR RADICULOPATHY: ICD-10-CM

## 2024-06-22 DIAGNOSIS — M51.36 DEGENERATIVE DISC DISEASE, LUMBAR: ICD-10-CM

## 2024-06-22 DIAGNOSIS — M47.816 LUMBAR SPONDYLOSIS: ICD-10-CM

## 2024-06-22 DIAGNOSIS — Z98.1 S/P CERVICAL SPINAL FUSION: ICD-10-CM

## 2024-06-24 RX ORDER — BACLOFEN 10 MG/1
TABLET ORAL
Qty: 90 TABLET | Refills: 0 | Status: SHIPPED | OUTPATIENT
Start: 2024-06-24

## 2024-06-24 NOTE — TELEPHONE ENCOUNTER
Please Approve or Refuse.  Send to Pharmacy per Pt's Request:      Next Visit Date:  10/15/2024   Last Visit Date: 3/27/2024    Hemoglobin A1C (%)   Date Value   04/06/2024 5.6   08/18/2023 5.9   02/15/2023 8.5 (H)             ( goal A1C is < 7)   BP Readings from Last 3 Encounters:   05/10/24 138/76   04/10/24 139/78   04/02/24 129/81          (goal 120/80)  BUN   Date Value Ref Range Status   04/06/2024 13 8 - 23 mg/dL Final     Creatinine   Date Value Ref Range Status   04/06/2024 0.7 0.7 - 1.2 mg/dL Final     Potassium   Date Value Ref Range Status   04/06/2024 4.1 3.7 - 5.3 mmol/L Final

## 2024-07-03 DIAGNOSIS — E11.42 TYPE 2 DIABETES MELLITUS WITH DIABETIC POLYNEUROPATHY, WITHOUT LONG-TERM CURRENT USE OF INSULIN (HCC): ICD-10-CM

## 2024-07-03 RX ORDER — BLOOD SUGAR DIAGNOSTIC
STRIP MISCELLANEOUS
Qty: 100 STRIP | Refills: 3 | Status: SHIPPED | OUTPATIENT
Start: 2024-07-03

## 2024-07-12 DIAGNOSIS — E11.65 TYPE 2 DIABETES MELLITUS WITH HYPERGLYCEMIA, WITHOUT LONG-TERM CURRENT USE OF INSULIN (HCC): ICD-10-CM

## 2024-07-12 RX ORDER — METFORMIN HYDROCHLORIDE 500 MG/1
TABLET, EXTENDED RELEASE ORAL
Qty: 180 TABLET | Refills: 3 | Status: SHIPPED | OUTPATIENT
Start: 2024-07-12

## 2024-07-12 NOTE — TELEPHONE ENCOUNTER
Future Appointments   Date Time Provider Department Center   8/7/2024  9:45 AM Bruno Brock MD [facility]   8/12/2024 10:15 AM [facility] CT RM 1 [facility] CT SCAN [facility]   8/20/2024  9:00 AM Mohamud Garay MD [facility]   9/9/2024  8:30 AM Joseph Lozano MD [facility]   10/15/2024  9:00 AM Greta Bentley MD fp sc [facility]

## 2024-07-12 NOTE — TELEPHONE ENCOUNTER
Please Approve or Refuse.  Send to Pharmacy per Pt's Request: PATRICIA     Next Visit Date:  10/15/2024   Last Visit Date: 3/27/2024    Hemoglobin A1C (%)   Date Value   04/06/2024 5.6   08/18/2023 5.9   02/15/2023 8.5 (H)             ( goal A1C is < 7)   BP Readings from Last 3 Encounters:   05/10/24 138/76   04/10/24 139/78   04/02/24 129/81          (goal 120/80)  BUN   Date Value Ref Range Status   04/06/2024 13 8 - 23 mg/dL Final     Creatinine   Date Value Ref Range Status   04/06/2024 0.7 0.7 - 1.2 mg/dL Final     Potassium   Date Value Ref Range Status   04/06/2024 4.1 3.7 - 5.3 mmol/L Final

## 2024-07-22 DIAGNOSIS — M47.816 LUMBAR SPONDYLOSIS: ICD-10-CM

## 2024-07-22 DIAGNOSIS — M51.36 DEGENERATIVE DISC DISEASE, LUMBAR: ICD-10-CM

## 2024-07-22 DIAGNOSIS — Z98.1 S/P CERVICAL SPINAL FUSION: ICD-10-CM

## 2024-07-22 DIAGNOSIS — M46.92 CERVICAL SPONDYLITIS (HCC): ICD-10-CM

## 2024-07-22 DIAGNOSIS — M54.16 LUMBAR RADICULOPATHY: ICD-10-CM

## 2024-07-22 RX ORDER — BACLOFEN 10 MG/1
TABLET ORAL
Qty: 90 TABLET | Refills: 0 | Status: SHIPPED | OUTPATIENT
Start: 2024-07-22

## 2024-07-29 ENCOUNTER — TELEPHONE (OUTPATIENT)
Dept: ONCOLOGY | Age: 74
End: 2024-07-29

## 2024-07-29 NOTE — TELEPHONE ENCOUNTER
Spoke with patient's wife to remind about appointment on 8/7 and to have labs drawn prior. Wife states labs will be done 7/31.

## 2024-08-05 ENCOUNTER — HOSPITAL ENCOUNTER (OUTPATIENT)
Age: 74
Discharge: HOME | End: 2024-08-05
Payer: MEDICARE

## 2024-08-05 DIAGNOSIS — D64.9 ANEMIA, UNSPECIFIED TYPE: ICD-10-CM

## 2024-08-05 DIAGNOSIS — C67.2 MALIGNANT NEOPLASM OF LATERAL WALL OF URINARY BLADDER (HCC): ICD-10-CM

## 2024-08-05 DIAGNOSIS — E53.8 B12 DEFICIENCY: ICD-10-CM

## 2024-08-05 LAB
BASOPHILS # BLD: 0 K/UL (ref 0–0.2)
BASOPHILS NFR BLD: 1 % (ref 0–2)
EOSINOPHIL # BLD: 0 K/UL (ref 0–0.4)
EOSINOPHILS RELATIVE PERCENT: 1 % (ref 0–4)
ERYTHROCYTE [DISTWIDTH] IN BLOOD BY AUTOMATED COUNT: 13.1 % (ref 11.5–14.9)
FERRITIN SERPL-MCNC: 27 NG/ML (ref 30–400)
HCT VFR BLD AUTO: 49.1 % (ref 41–53)
HGB BLD-MCNC: 16.2 G/DL (ref 13.5–17.5)
IRON SATN MFR SERPL: 15 % (ref 20–55)
IRON SERPL-MCNC: 53 UG/DL (ref 61–157)
LYMPHOCYTES NFR BLD: 1.4 K/UL (ref 1–4.8)
MCH RBC QN AUTO: 30.9 PG (ref 26–34)
MCHC RBC AUTO-ENTMCNC: 33 G/DL (ref 31–37)
MCV RBC AUTO: 93.7 FL (ref 80–100)
MONOCYTES NFR BLD: 0.5 K/UL (ref 0.1–1.3)
MONOCYTES NFR BLD: 6 % (ref 1–7)
NEUTROPHILS NFR BLD: 72 % (ref 36–66)
NEUTS SEG NFR BLD: 5.3 K/UL (ref 1.3–9.1)
PLATELET # BLD AUTO: 203 K/UL (ref 150–450)
PMV BLD AUTO: 7.3 FL (ref 6–12)
RBC # BLD AUTO: 5.24 M/UL (ref 4.5–5.9)
TIBC SERPL-MCNC: 352 UG/DL (ref 250–450)
UNSATURATED IRON BINDING CAPACITY: 299 UG/DL (ref 112–347)
WBC OTHER # BLD: 7.2 K/UL (ref 3.5–11)

## 2024-08-05 PROCEDURE — 85025 COMPLETE CBC W/AUTO DIFF WBC: CPT

## 2024-08-05 PROCEDURE — 83540 ASSAY OF IRON: CPT

## 2024-08-05 PROCEDURE — 82728 ASSAY OF FERRITIN: CPT

## 2024-08-05 PROCEDURE — 36415 COLL VENOUS BLD VENIPUNCTURE: CPT

## 2024-08-05 PROCEDURE — 83550 IRON BINDING TEST: CPT

## 2024-08-07 ENCOUNTER — OFFICE VISIT (OUTPATIENT)
Dept: ONCOLOGY | Age: 74
End: 2024-08-07
Payer: MEDICARE

## 2024-08-07 ENCOUNTER — TELEPHONE (OUTPATIENT)
Dept: ONCOLOGY | Age: 74
End: 2024-08-07

## 2024-08-07 VITALS
WEIGHT: 194.6 LBS | BODY MASS INDEX: 28.72 KG/M2 | TEMPERATURE: 97.8 F | DIASTOLIC BLOOD PRESSURE: 88 MMHG | HEART RATE: 74 BPM | SYSTOLIC BLOOD PRESSURE: 158 MMHG | OXYGEN SATURATION: 95 % | RESPIRATION RATE: 14 BRPM

## 2024-08-07 DIAGNOSIS — D64.9 ANEMIA, UNSPECIFIED TYPE: Primary | ICD-10-CM

## 2024-08-07 DIAGNOSIS — J30.89 NON-SEASONAL ALLERGIC RHINITIS DUE TO OTHER ALLERGIC TRIGGER: ICD-10-CM

## 2024-08-07 DIAGNOSIS — E53.8 B12 DEFICIENCY: ICD-10-CM

## 2024-08-07 DIAGNOSIS — D50.8 OTHER IRON DEFICIENCY ANEMIA: ICD-10-CM

## 2024-08-07 PROCEDURE — G8417 CALC BMI ABV UP PARAM F/U: HCPCS | Performed by: INTERNAL MEDICINE

## 2024-08-07 PROCEDURE — 3077F SYST BP >= 140 MM HG: CPT | Performed by: INTERNAL MEDICINE

## 2024-08-07 PROCEDURE — G8427 DOCREV CUR MEDS BY ELIG CLIN: HCPCS | Performed by: INTERNAL MEDICINE

## 2024-08-07 PROCEDURE — 99214 OFFICE O/P EST MOD 30 MIN: CPT | Performed by: INTERNAL MEDICINE

## 2024-08-07 PROCEDURE — 1123F ACP DISCUSS/DSCN MKR DOCD: CPT | Performed by: INTERNAL MEDICINE

## 2024-08-07 PROCEDURE — 99211 OFF/OP EST MAY X REQ PHY/QHP: CPT | Performed by: INTERNAL MEDICINE

## 2024-08-07 PROCEDURE — 3017F COLORECTAL CA SCREEN DOC REV: CPT | Performed by: INTERNAL MEDICINE

## 2024-08-07 PROCEDURE — 3079F DIAST BP 80-89 MM HG: CPT | Performed by: INTERNAL MEDICINE

## 2024-08-07 PROCEDURE — 1036F TOBACCO NON-USER: CPT | Performed by: INTERNAL MEDICINE

## 2024-08-07 RX ORDER — SODIUM CHLORIDE 9 MG/ML
5-250 INJECTION, SOLUTION INTRAVENOUS PRN
Status: CANCELLED | OUTPATIENT
Start: 2024-08-07

## 2024-08-07 RX ORDER — HEPARIN SODIUM (PORCINE) LOCK FLUSH IV SOLN 100 UNIT/ML 100 UNIT/ML
500 SOLUTION INTRAVENOUS PRN
Status: CANCELLED | OUTPATIENT
Start: 2024-08-07

## 2024-08-07 RX ORDER — SODIUM CHLORIDE 9 MG/ML
INJECTION, SOLUTION INTRAVENOUS CONTINUOUS
Status: CANCELLED | OUTPATIENT
Start: 2024-08-07

## 2024-08-07 RX ORDER — ALBUTEROL SULFATE 90 UG/1
4 AEROSOL, METERED RESPIRATORY (INHALATION) PRN
Status: CANCELLED | OUTPATIENT
Start: 2024-08-07

## 2024-08-07 RX ORDER — EPINEPHRINE 1 MG/ML
0.3 INJECTION, SOLUTION, CONCENTRATE INTRAVENOUS PRN
Status: CANCELLED | OUTPATIENT
Start: 2024-08-07

## 2024-08-07 RX ORDER — ACETAMINOPHEN 325 MG/1
650 TABLET ORAL
Status: CANCELLED | OUTPATIENT
Start: 2024-08-07

## 2024-08-07 RX ORDER — FAMOTIDINE 10 MG/ML
20 INJECTION, SOLUTION INTRAVENOUS
Status: CANCELLED | OUTPATIENT
Start: 2024-08-07

## 2024-08-07 RX ORDER — LEVOCETIRIZINE DIHYDROCHLORIDE 5 MG/1
5 TABLET, FILM COATED ORAL NIGHTLY
Qty: 90 TABLET | Refills: 0 | Status: SHIPPED | OUTPATIENT
Start: 2024-08-07

## 2024-08-07 RX ORDER — POLYETHYLENE GLYCOL 400 AND PROPYLENE GLYCOL 4; 3 MG/ML; MG/ML
1 SOLUTION/ DROPS OPHTHALMIC
Qty: 30 ML | Refills: 0 | Status: SHIPPED | OUTPATIENT
Start: 2024-08-07

## 2024-08-07 RX ORDER — SODIUM CHLORIDE 0.9 % (FLUSH) 0.9 %
5-40 SYRINGE (ML) INJECTION PRN
Status: CANCELLED | OUTPATIENT
Start: 2024-08-07

## 2024-08-07 RX ORDER — ONDANSETRON 2 MG/ML
8 INJECTION INTRAMUSCULAR; INTRAVENOUS
Status: CANCELLED | OUTPATIENT
Start: 2024-08-07

## 2024-08-07 RX ORDER — DIPHENHYDRAMINE HYDROCHLORIDE 50 MG/ML
50 INJECTION INTRAMUSCULAR; INTRAVENOUS
Status: CANCELLED | OUTPATIENT
Start: 2024-08-07

## 2024-08-07 NOTE — TELEPHONE ENCOUNTER
BALJIT HERE FOR FOLLOW UP   Injectafer infuison x 2 , as soon approved  Rv in 4 months with labs   LABS ORDERED: CBC FERRITIN IRON & TIBC VIT B12 & FOLATE   NEW ORDER PENDING PRECERT   MD VISIT: 12/11/24 @ 10:30AM   LABS PRINTED AND GIVEN ON EXIT   AVS PRINTED AND GIVEN ON EXIT

## 2024-08-07 NOTE — PROGRESS NOTES
hyperintense foci are seen within the pancreas, measuring 7 mm in size less, likely side-branch IPMNs.  Pancreas calcification seen on recent CT are not clearly visible by MRI.  No significant inflammatory stranding surround the pancreas. Other:  Adrenal glands are unremarkable.  No hydronephrosis on the right.  No hydronephrosis on the left.  Subcentimeter T2 hyperintense foci seen in the right and left kidney, likely cyst. No significant small or large bowel distention noted.  Scattered colonic diverticula are seen. There is borderline splenomegaly.  No perisplenic fluid No abnormally enhancing mass on postcontrast images Spurring is seen in the spine     No acute intra-abdominal abnormality. Surgically absent gallbladder with biliary ductal dilatation.  No retained common duct stone.  Mild signal loss seen in liver on out of phase images, compatible with fatty infiltration. No inflammatory stranding surrounding pancreas.  A few tiny T2 hyperintense foci seen within the pancreas likely side-branch IPMN RECOMMENDATIONS: ACR management recommendations for incidental cystic pancreatic masses in asymptomatic* patients on CT, MR, or US < 2 cm:  Single follow-up in 1 yr (MR preferred) *Stable:  Benign, no further follow-up *Growth:  As below based upon size ** None of the following:  Hyperamylasemia, recent onset diabetes, severe epigastric pain, weight loss, steatorrhea, or jaundice. Shilpa WYILE, et al.  Managing incidental findings on abdominal CT: white paper of the ACR Incidental Findings Committee.  J Am Silvia Radiol 2010; 7:754-773.          IMPRESSION:   69 year old male with history of bleeding ulcer back in 2015 and iron def, now with iron def anemia, and stool occult stools    -s/p IV iron with improvement in iron stores and hemoglobin  -EGD and colonoscopy 10/2019 did not show active bleeding, still no active source of iron def  -s/p GI in 12/3/19, concern for still blood loss, iron def, no plasns for repeat

## 2024-08-12 ENCOUNTER — HOSPITAL ENCOUNTER (OUTPATIENT)
Dept: CT IMAGING | Age: 74
Discharge: HOME | End: 2024-08-14
Attending: INTERNAL MEDICINE
Payer: MEDICARE

## 2024-08-12 VITALS — HEIGHT: 69 IN | BODY MASS INDEX: 28.73 KG/M2 | WEIGHT: 194 LBS

## 2024-08-12 DIAGNOSIS — R91.1 NODULE OF LOWER LOBE OF LEFT LUNG: ICD-10-CM

## 2024-08-12 PROCEDURE — 71250 CT THORAX DX C-: CPT

## 2024-08-19 ENCOUNTER — HOSPITAL ENCOUNTER (OUTPATIENT)
Dept: INFUSION THERAPY | Age: 74
Discharge: HOME | End: 2024-08-19
Payer: MEDICARE

## 2024-08-19 VITALS
RESPIRATION RATE: 16 BRPM | SYSTOLIC BLOOD PRESSURE: 148 MMHG | BODY MASS INDEX: 28.5 KG/M2 | DIASTOLIC BLOOD PRESSURE: 75 MMHG | HEART RATE: 81 BPM | WEIGHT: 193 LBS | TEMPERATURE: 98 F

## 2024-08-19 DIAGNOSIS — D50.8 OTHER IRON DEFICIENCY ANEMIA: ICD-10-CM

## 2024-08-19 DIAGNOSIS — D64.9 ANEMIA, UNSPECIFIED TYPE: Primary | ICD-10-CM

## 2024-08-19 PROCEDURE — 96365 THER/PROPH/DIAG IV INF INIT: CPT

## 2024-08-19 PROCEDURE — 2580000003 HC RX 258: Performed by: INTERNAL MEDICINE

## 2024-08-19 PROCEDURE — 6360000002 HC RX W HCPCS: Performed by: INTERNAL MEDICINE

## 2024-08-19 RX ORDER — EPINEPHRINE 1 MG/ML
0.3 INJECTION, SOLUTION, CONCENTRATE INTRAVENOUS PRN
Status: CANCELLED | OUTPATIENT
Start: 2024-08-26

## 2024-08-19 RX ORDER — ALBUTEROL SULFATE 90 UG/1
4 AEROSOL, METERED RESPIRATORY (INHALATION) PRN
Status: CANCELLED | OUTPATIENT
Start: 2024-08-26

## 2024-08-19 RX ORDER — SODIUM CHLORIDE 9 MG/ML
5-250 INJECTION, SOLUTION INTRAVENOUS PRN
Status: DISCONTINUED | OUTPATIENT
Start: 2024-08-19 | End: 2024-08-20 | Stop reason: HOSPADM

## 2024-08-19 RX ORDER — SODIUM CHLORIDE 9 MG/ML
5-250 INJECTION, SOLUTION INTRAVENOUS PRN
Status: CANCELLED | OUTPATIENT
Start: 2024-08-26

## 2024-08-19 RX ORDER — SODIUM CHLORIDE 9 MG/ML
INJECTION, SOLUTION INTRAVENOUS CONTINUOUS
Status: CANCELLED | OUTPATIENT
Start: 2024-08-26

## 2024-08-19 RX ORDER — ACETAMINOPHEN 325 MG/1
650 TABLET ORAL
Status: CANCELLED | OUTPATIENT
Start: 2024-08-26

## 2024-08-19 RX ORDER — FAMOTIDINE 10 MG/ML
20 INJECTION, SOLUTION INTRAVENOUS
Status: CANCELLED | OUTPATIENT
Start: 2024-08-26

## 2024-08-19 RX ORDER — SODIUM CHLORIDE 0.9 % (FLUSH) 0.9 %
5-40 SYRINGE (ML) INJECTION PRN
Status: CANCELLED | OUTPATIENT
Start: 2024-08-26

## 2024-08-19 RX ORDER — ONDANSETRON 2 MG/ML
8 INJECTION INTRAMUSCULAR; INTRAVENOUS
Status: CANCELLED | OUTPATIENT
Start: 2024-08-26

## 2024-08-19 RX ORDER — DIPHENHYDRAMINE HYDROCHLORIDE 50 MG/ML
50 INJECTION INTRAMUSCULAR; INTRAVENOUS
Status: CANCELLED | OUTPATIENT
Start: 2024-08-26

## 2024-08-19 RX ORDER — HEPARIN 100 UNIT/ML
500 SYRINGE INTRAVENOUS PRN
Status: CANCELLED | OUTPATIENT
Start: 2024-08-26

## 2024-08-19 RX ADMIN — SODIUM CHLORIDE 150 ML/HR: 9 INJECTION, SOLUTION INTRAVENOUS at 15:45

## 2024-08-19 RX ADMIN — FERRIC CARBOXYMALTOSE INJECTION 750 MG: 50 INJECTION, SOLUTION INTRAVENOUS at 15:46

## 2024-08-19 NOTE — PROGRESS NOTES
Patient arrived for injectafer 1 of 1.   Arrives ambulatory.  Denies any complaints.  Treatment completed without incident.  Patient kept for 30 minute post observation.  Discharged in stable condition.  Returns 8/26/2024 for injectafer 2 of 2.

## 2024-08-20 ENCOUNTER — OFFICE VISIT (OUTPATIENT)
Dept: PULMONOLOGY | Age: 74
End: 2024-08-20
Payer: MEDICARE

## 2024-08-20 VITALS
BODY MASS INDEX: 29.03 KG/M2 | HEART RATE: 82 BPM | OXYGEN SATURATION: 95 % | WEIGHT: 196 LBS | HEIGHT: 69 IN | DIASTOLIC BLOOD PRESSURE: 78 MMHG | SYSTOLIC BLOOD PRESSURE: 137 MMHG | RESPIRATION RATE: 16 BRPM

## 2024-08-20 DIAGNOSIS — C67.2 MALIGNANT NEOPLASM OF LATERAL WALL OF URINARY BLADDER (HCC): ICD-10-CM

## 2024-08-20 DIAGNOSIS — J43.2 CENTRILOBULAR EMPHYSEMA (HCC): ICD-10-CM

## 2024-08-20 DIAGNOSIS — R91.1 NODULE OF LOWER LOBE OF LEFT LUNG: Primary | ICD-10-CM

## 2024-08-20 PROCEDURE — 3078F DIAST BP <80 MM HG: CPT | Performed by: INTERNAL MEDICINE

## 2024-08-20 PROCEDURE — 1123F ACP DISCUSS/DSCN MKR DOCD: CPT | Performed by: INTERNAL MEDICINE

## 2024-08-20 PROCEDURE — 1036F TOBACCO NON-USER: CPT | Performed by: INTERNAL MEDICINE

## 2024-08-20 PROCEDURE — 3075F SYST BP GE 130 - 139MM HG: CPT | Performed by: INTERNAL MEDICINE

## 2024-08-20 PROCEDURE — 3017F COLORECTAL CA SCREEN DOC REV: CPT | Performed by: INTERNAL MEDICINE

## 2024-08-20 PROCEDURE — G8417 CALC BMI ABV UP PARAM F/U: HCPCS | Performed by: INTERNAL MEDICINE

## 2024-08-20 PROCEDURE — 99214 OFFICE O/P EST MOD 30 MIN: CPT | Performed by: INTERNAL MEDICINE

## 2024-08-20 PROCEDURE — 3023F SPIROM DOC REV: CPT | Performed by: INTERNAL MEDICINE

## 2024-08-20 PROCEDURE — G8427 DOCREV CUR MEDS BY ELIG CLIN: HCPCS | Performed by: INTERNAL MEDICINE

## 2024-08-20 NOTE — PROGRESS NOTES
Central  airways are patent.     Unchanged 8 x 5 mm left lower lobe nodule.  No new or suspicious nodule.     Upper Abdomen:  Limited visualization upper abdomen demonstrates no acute  findings.     Soft Tissues/Bones: Spinal degenerative changes with no acute findings.     IMPRESSION:  No new or suspicious pulmonary nodule.     LUNG RADS:  Lung-RADS 2 - Benign (v2022)     Management:  12 month screening LDCT              Exam Ended: 08/12/24 10:01 EDT        Impression      ICD-10-CM    1. Nodule of lower lobe of left lung - 8 mm x 5 mm superior segment - stable 10/23 , pet ct negative  R91.1 CT CHEST LOW DOSE (LDCT)      2. Centrilobular emphysema (HCC)  J43.2       3. Malignant neoplasm of lateral wall of urinary bladder (HCC)  C67.2            PLAN:      Left lower lobe lung nodule stable .   Repeat ct chest in 6 months - if stable then 1 year .  Reviewed with pt and his wife         Requested Prescriptions      No prescriptions requested or ordered in this encounter       There are no discontinued medications.    Koby received counseling on the following healthy behaviors: nutrition, exercise and medication adherence    Patient given educational materials : see patient instruction       Discussed use, benefit, and side effects of prescribed medications.  Barriers to medication compliance addressed.      All patient questions answered.  Pt voiced understanding.   I hope this updates you on my evaluation and clinical thinking. Thank you for allowing me to participate in his care.     Sincerely,    Electronically signed by GEOVANNA SAM MD on 8/20/2024 at 4:02 PM       Please note that this chart was generated using voice recognition Dragon dictation software.  Although every effort was made to ensure the accuracy of this automated transcription, some errors in transcription may have occurred.

## 2024-08-21 ENCOUNTER — OFFICE VISIT (OUTPATIENT)
Dept: FAMILY MEDICINE CLINIC | Age: 74
End: 2024-08-21
Payer: MEDICARE

## 2024-08-21 VITALS
SYSTOLIC BLOOD PRESSURE: 140 MMHG | DIASTOLIC BLOOD PRESSURE: 82 MMHG | HEIGHT: 69 IN | BODY MASS INDEX: 28.58 KG/M2 | WEIGHT: 193 LBS | OXYGEN SATURATION: 95 % | HEART RATE: 91 BPM

## 2024-08-21 DIAGNOSIS — M54.42 CHRONIC BILATERAL LOW BACK PAIN WITH BILATERAL SCIATICA: ICD-10-CM

## 2024-08-21 DIAGNOSIS — E11.65 TYPE 2 DIABETES MELLITUS WITH HYPERGLYCEMIA, WITHOUT LONG-TERM CURRENT USE OF INSULIN (HCC): Primary | ICD-10-CM

## 2024-08-21 DIAGNOSIS — M54.16 LUMBAR RADICULOPATHY: ICD-10-CM

## 2024-08-21 DIAGNOSIS — M54.41 CHRONIC BILATERAL LOW BACK PAIN WITH BILATERAL SCIATICA: ICD-10-CM

## 2024-08-21 DIAGNOSIS — G89.29 CHRONIC BILATERAL LOW BACK PAIN WITH BILATERAL SCIATICA: ICD-10-CM

## 2024-08-21 DIAGNOSIS — M47.816 LUMBAR SPONDYLOSIS: ICD-10-CM

## 2024-08-21 DIAGNOSIS — M46.92 CERVICAL SPONDYLITIS (HCC): ICD-10-CM

## 2024-08-21 DIAGNOSIS — M51.36 DEGENERATIVE DISC DISEASE, LUMBAR: ICD-10-CM

## 2024-08-21 DIAGNOSIS — S99.921A INJURY OF TOE ON RIGHT FOOT, INITIAL ENCOUNTER: ICD-10-CM

## 2024-08-21 DIAGNOSIS — S49.92XA INJURY OF LEFT SHOULDER, INITIAL ENCOUNTER: ICD-10-CM

## 2024-08-21 DIAGNOSIS — Z98.1 S/P CERVICAL SPINAL FUSION: ICD-10-CM

## 2024-08-21 LAB — HBA1C MFR BLD: 6 %

## 2024-08-21 PROCEDURE — 83036 HEMOGLOBIN GLYCOSYLATED A1C: CPT | Performed by: NURSE PRACTITIONER

## 2024-08-21 PROCEDURE — 3017F COLORECTAL CA SCREEN DOC REV: CPT | Performed by: NURSE PRACTITIONER

## 2024-08-21 PROCEDURE — 99214 OFFICE O/P EST MOD 30 MIN: CPT | Performed by: NURSE PRACTITIONER

## 2024-08-21 PROCEDURE — 3078F DIAST BP <80 MM HG: CPT | Performed by: NURSE PRACTITIONER

## 2024-08-21 PROCEDURE — 3077F SYST BP >= 140 MM HG: CPT | Performed by: NURSE PRACTITIONER

## 2024-08-21 PROCEDURE — 3044F HG A1C LEVEL LT 7.0%: CPT | Performed by: NURSE PRACTITIONER

## 2024-08-21 PROCEDURE — G8427 DOCREV CUR MEDS BY ELIG CLIN: HCPCS | Performed by: NURSE PRACTITIONER

## 2024-08-21 PROCEDURE — 2022F DILAT RTA XM EVC RTNOPTHY: CPT | Performed by: NURSE PRACTITIONER

## 2024-08-21 PROCEDURE — 1036F TOBACCO NON-USER: CPT | Performed by: NURSE PRACTITIONER

## 2024-08-21 PROCEDURE — 1123F ACP DISCUSS/DSCN MKR DOCD: CPT | Performed by: NURSE PRACTITIONER

## 2024-08-21 PROCEDURE — G8417 CALC BMI ABV UP PARAM F/U: HCPCS | Performed by: NURSE PRACTITIONER

## 2024-08-21 ASSESSMENT — PATIENT HEALTH QUESTIONNAIRE - PHQ9
9. THOUGHTS THAT YOU WOULD BE BETTER OFF DEAD, OR OF HURTING YOURSELF: NOT AT ALL
10. IF YOU CHECKED OFF ANY PROBLEMS, HOW DIFFICULT HAVE THESE PROBLEMS MADE IT FOR YOU TO DO YOUR WORK, TAKE CARE OF THINGS AT HOME, OR GET ALONG WITH OTHER PEOPLE: EXTREMELY DIFFICULT
2. FEELING DOWN, DEPRESSED OR HOPELESS: NEARLY EVERY DAY
8. MOVING OR SPEAKING SO SLOWLY THAT OTHER PEOPLE COULD HAVE NOTICED. OR THE OPPOSITE, BEING SO FIGETY OR RESTLESS THAT YOU HAVE BEEN MOVING AROUND A LOT MORE THAN USUAL: NEARLY EVERY DAY
3. TROUBLE FALLING OR STAYING ASLEEP: NEARLY EVERY DAY
7. TROUBLE CONCENTRATING ON THINGS, SUCH AS READING THE NEWSPAPER OR WATCHING TELEVISION: NEARLY EVERY DAY
SUM OF ALL RESPONSES TO PHQ QUESTIONS 1-9: 23
6. FEELING BAD ABOUT YOURSELF - OR THAT YOU ARE A FAILURE OR HAVE LET YOURSELF OR YOUR FAMILY DOWN: NEARLY EVERY DAY
SUM OF ALL RESPONSES TO PHQ QUESTIONS 1-9: 23
SUM OF ALL RESPONSES TO PHQ QUESTIONS 1-9: 23
4. FEELING TIRED OR HAVING LITTLE ENERGY: NEARLY EVERY DAY
5. POOR APPETITE OR OVEREATING: NEARLY EVERY DAY
1. LITTLE INTEREST OR PLEASURE IN DOING THINGS: MORE THAN HALF THE DAYS
SUM OF ALL RESPONSES TO PHQ9 QUESTIONS 1 & 2: 5
SUM OF ALL RESPONSES TO PHQ QUESTIONS 1-9: 23

## 2024-08-21 ASSESSMENT — COLUMBIA-SUICIDE SEVERITY RATING SCALE - C-SSRS
1. WITHIN THE PAST MONTH, HAVE YOU WISHED YOU WERE DEAD OR WISHED YOU COULD GO TO SLEEP AND NOT WAKE UP?: NO
6. HAVE YOU EVER DONE ANYTHING, STARTED TO DO ANYTHING, OR PREPARED TO DO ANYTHING TO END YOUR LIFE?: NO
2. HAVE YOU ACTUALLY HAD ANY THOUGHTS OF KILLING YOURSELF?: NO

## 2024-08-21 ASSESSMENT — ANXIETY QUESTIONNAIRES
4. TROUBLE RELAXING: SEVERAL DAYS
5. BEING SO RESTLESS THAT IT IS HARD TO SIT STILL: SEVERAL DAYS
3. WORRYING TOO MUCH ABOUT DIFFERENT THINGS: SEVERAL DAYS
6. BECOMING EASILY ANNOYED OR IRRITABLE: SEVERAL DAYS
2. NOT BEING ABLE TO STOP OR CONTROL WORRYING: SEVERAL DAYS
7. FEELING AFRAID AS IF SOMETHING AWFUL MIGHT HAPPEN: SEVERAL DAYS
IF YOU CHECKED OFF ANY PROBLEMS ON THIS QUESTIONNAIRE, HOW DIFFICULT HAVE THESE PROBLEMS MADE IT FOR YOU TO DO YOUR WORK, TAKE CARE OF THINGS AT HOME, OR GET ALONG WITH OTHER PEOPLE: EXTREMELY DIFFICULT
1. FEELING NERVOUS, ANXIOUS, OR ON EDGE: SEVERAL DAYS

## 2024-08-21 ASSESSMENT — ENCOUNTER SYMPTOMS
NAUSEA: 0
ABDOMINAL DISTENTION: 0
CONSTIPATION: 0
SHORTNESS OF BREATH: 0
DIARRHEA: 0
BACK PAIN: 0
ABDOMINAL PAIN: 0
CHEST TIGHTNESS: 0
VOMITING: 0
COUGH: 0
WHEEZING: 0

## 2024-08-22 ENCOUNTER — HOSPITAL ENCOUNTER (OUTPATIENT)
Age: 74
Discharge: HOME | End: 2024-08-24
Payer: MEDICARE

## 2024-08-22 ENCOUNTER — HOSPITAL ENCOUNTER (OUTPATIENT)
Dept: GENERAL RADIOLOGY | Age: 74
Discharge: HOME | End: 2024-08-24
Payer: MEDICARE

## 2024-08-22 DIAGNOSIS — S49.92XA INJURY OF LEFT SHOULDER, INITIAL ENCOUNTER: ICD-10-CM

## 2024-08-22 DIAGNOSIS — S99.921A INJURY OF TOE ON RIGHT FOOT, INITIAL ENCOUNTER: ICD-10-CM

## 2024-08-22 PROCEDURE — 73630 X-RAY EXAM OF FOOT: CPT

## 2024-08-22 PROCEDURE — 73030 X-RAY EXAM OF SHOULDER: CPT

## 2024-08-22 RX ORDER — BACLOFEN 10 MG/1
TABLET ORAL
Qty: 90 TABLET | Refills: 0 | Status: SHIPPED | OUTPATIENT
Start: 2024-08-22

## 2024-08-22 NOTE — TELEPHONE ENCOUNTER
Please Approve or Refuse.  Send to Pharmacy per Pt's Request:      Next Visit Date:  10/15/2024   Last Visit Date: 8/21/2024    Hemoglobin A1C (%)   Date Value   08/21/2024 6.0   04/06/2024 5.6   08/18/2023 5.9             ( goal A1C is < 7)   BP Readings from Last 3 Encounters:   08/21/24 (!) 140/82   08/20/24 137/78   08/19/24 (!) 148/75          (goal 120/80)  BUN   Date Value Ref Range Status   04/06/2024 13 8 - 23 mg/dL Final     Creatinine   Date Value Ref Range Status   04/06/2024 0.7 0.7 - 1.2 mg/dL Final     Potassium   Date Value Ref Range Status   04/06/2024 4.1 3.7 - 5.3 mmol/L Final            
EGD

## 2024-08-25 DIAGNOSIS — S92.911A: Primary | ICD-10-CM

## 2024-08-26 ENCOUNTER — HOSPITAL ENCOUNTER (OUTPATIENT)
Dept: INFUSION THERAPY | Age: 74
Discharge: HOME | End: 2024-08-26
Payer: MEDICARE

## 2024-08-26 VITALS
TEMPERATURE: 97.9 F | HEART RATE: 94 BPM | SYSTOLIC BLOOD PRESSURE: 115 MMHG | DIASTOLIC BLOOD PRESSURE: 67 MMHG | RESPIRATION RATE: 16 BRPM

## 2024-08-26 DIAGNOSIS — D64.9 ANEMIA, UNSPECIFIED TYPE: Primary | ICD-10-CM

## 2024-08-26 DIAGNOSIS — D50.8 OTHER IRON DEFICIENCY ANEMIA: ICD-10-CM

## 2024-08-26 PROCEDURE — 2580000003 HC RX 258: Performed by: INTERNAL MEDICINE

## 2024-08-26 PROCEDURE — 96365 THER/PROPH/DIAG IV INF INIT: CPT

## 2024-08-26 PROCEDURE — 6360000002 HC RX W HCPCS: Performed by: INTERNAL MEDICINE

## 2024-08-26 RX ORDER — SODIUM CHLORIDE 0.9 % (FLUSH) 0.9 %
5-40 SYRINGE (ML) INJECTION PRN
OUTPATIENT
Start: 2024-09-02

## 2024-08-26 RX ORDER — SODIUM CHLORIDE 9 MG/ML
INJECTION, SOLUTION INTRAVENOUS CONTINUOUS
OUTPATIENT
Start: 2024-09-02

## 2024-08-26 RX ORDER — EPINEPHRINE 1 MG/ML
0.3 INJECTION, SOLUTION, CONCENTRATE INTRAVENOUS PRN
OUTPATIENT
Start: 2024-09-02

## 2024-08-26 RX ORDER — FAMOTIDINE 10 MG/ML
20 INJECTION, SOLUTION INTRAVENOUS
OUTPATIENT
Start: 2024-09-02

## 2024-08-26 RX ORDER — ONDANSETRON 2 MG/ML
8 INJECTION INTRAMUSCULAR; INTRAVENOUS
OUTPATIENT
Start: 2024-09-02

## 2024-08-26 RX ORDER — ALBUTEROL SULFATE 90 UG/1
4 AEROSOL, METERED RESPIRATORY (INHALATION) PRN
OUTPATIENT
Start: 2024-09-02

## 2024-08-26 RX ORDER — HEPARIN 100 UNIT/ML
500 SYRINGE INTRAVENOUS PRN
OUTPATIENT
Start: 2024-09-02

## 2024-08-26 RX ORDER — DIPHENHYDRAMINE HYDROCHLORIDE 50 MG/ML
50 INJECTION INTRAMUSCULAR; INTRAVENOUS
OUTPATIENT
Start: 2024-09-02

## 2024-08-26 RX ORDER — SODIUM CHLORIDE 9 MG/ML
5-250 INJECTION, SOLUTION INTRAVENOUS PRN
Status: CANCELLED | OUTPATIENT
Start: 2024-09-02

## 2024-08-26 RX ORDER — ACETAMINOPHEN 325 MG/1
650 TABLET ORAL
OUTPATIENT
Start: 2024-09-02

## 2024-08-26 RX ORDER — SODIUM CHLORIDE 9 MG/ML
5-250 INJECTION, SOLUTION INTRAVENOUS PRN
Status: DISCONTINUED | OUTPATIENT
Start: 2024-08-26 | End: 2024-08-27 | Stop reason: HOSPADM

## 2024-08-26 RX ORDER — SODIUM CHLORIDE 9 MG/ML
5-250 INJECTION, SOLUTION INTRAVENOUS PRN
OUTPATIENT
Start: 2024-09-02

## 2024-08-26 RX ADMIN — SODIUM CHLORIDE 200 ML/HR: 9 INJECTION, SOLUTION INTRAVENOUS at 14:57

## 2024-08-26 RX ADMIN — FERRIC CARBOXYMALTOSE INJECTION 750 MG: 50 INJECTION, SOLUTION INTRAVENOUS at 15:00

## 2024-08-26 NOTE — PROGRESS NOTES
Patient arrived for 2 of 2 injectafer. Tolerated w/o incident and left instable condition. Returns 12/11 for

## 2024-09-09 ENCOUNTER — PROCEDURE VISIT (OUTPATIENT)
Dept: UROLOGY | Age: 74
End: 2024-09-09
Payer: MEDICARE

## 2024-09-09 VITALS — SYSTOLIC BLOOD PRESSURE: 138 MMHG | DIASTOLIC BLOOD PRESSURE: 85 MMHG | TEMPERATURE: 98.2 F | HEART RATE: 80 BPM

## 2024-09-09 DIAGNOSIS — C67.2 MALIGNANT NEOPLASM OF LATERAL WALL OF URINARY BLADDER (HCC): Primary | ICD-10-CM

## 2024-09-09 PROCEDURE — 52000 CYSTOURETHROSCOPY: CPT | Performed by: UROLOGY

## 2024-09-11 DIAGNOSIS — K21.9 GASTROESOPHAGEAL REFLUX DISEASE WITHOUT ESOPHAGITIS: ICD-10-CM

## 2024-09-11 RX ORDER — PANTOPRAZOLE SODIUM 40 MG/1
40 TABLET, DELAYED RELEASE ORAL DAILY
Qty: 90 TABLET | Refills: 1 | Status: SHIPPED | OUTPATIENT
Start: 2024-09-11

## 2024-09-22 DIAGNOSIS — M51.36 DEGENERATIVE DISC DISEASE, LUMBAR: ICD-10-CM

## 2024-09-22 DIAGNOSIS — M46.92 CERVICAL SPONDYLITIS (HCC): ICD-10-CM

## 2024-09-22 DIAGNOSIS — M47.816 LUMBAR SPONDYLOSIS: ICD-10-CM

## 2024-09-22 DIAGNOSIS — M54.16 LUMBAR RADICULOPATHY: ICD-10-CM

## 2024-09-22 DIAGNOSIS — Z98.1 S/P CERVICAL SPINAL FUSION: ICD-10-CM

## 2024-09-23 RX ORDER — BACLOFEN 10 MG/1
TABLET ORAL
Qty: 90 TABLET | Refills: 0 | Status: SHIPPED | OUTPATIENT
Start: 2024-09-23

## 2024-10-15 ENCOUNTER — OFFICE VISIT (OUTPATIENT)
Dept: FAMILY MEDICINE CLINIC | Age: 74
End: 2024-10-15

## 2024-10-15 VITALS
HEIGHT: 69 IN | BODY MASS INDEX: 28.17 KG/M2 | WEIGHT: 190.2 LBS | SYSTOLIC BLOOD PRESSURE: 136 MMHG | OXYGEN SATURATION: 96 % | HEART RATE: 91 BPM | DIASTOLIC BLOOD PRESSURE: 70 MMHG

## 2024-10-15 DIAGNOSIS — I10 ESSENTIAL HYPERTENSION: ICD-10-CM

## 2024-10-15 DIAGNOSIS — E11.29 TYPE 2 DIABETES MELLITUS WITH DIABETIC MICROALBUMINURIA, WITHOUT LONG-TERM CURRENT USE OF INSULIN (HCC): ICD-10-CM

## 2024-10-15 DIAGNOSIS — E78.5 HYPERLIPIDEMIA WITH TARGET LDL LESS THAN 100: ICD-10-CM

## 2024-10-15 DIAGNOSIS — I71.43 INFRARENAL ABDOMINAL AORTIC ANEURYSM (AAA) WITHOUT RUPTURE (HCC): ICD-10-CM

## 2024-10-15 DIAGNOSIS — B18.2 HEP C W/O COMA, CHRONIC (HCC): ICD-10-CM

## 2024-10-15 DIAGNOSIS — R80.9 TYPE 2 DIABETES MELLITUS WITH DIABETIC MICROALBUMINURIA, WITHOUT LONG-TERM CURRENT USE OF INSULIN (HCC): ICD-10-CM

## 2024-10-15 DIAGNOSIS — E11.42 TYPE 2 DIABETES MELLITUS WITH DIABETIC POLYNEUROPATHY, WITHOUT LONG-TERM CURRENT USE OF INSULIN (HCC): ICD-10-CM

## 2024-10-15 DIAGNOSIS — Z00.00 MEDICARE ANNUAL WELLNESS VISIT, SUBSEQUENT: Primary | ICD-10-CM

## 2024-10-15 DIAGNOSIS — M46.92 CERVICAL SPONDYLITIS (HCC): ICD-10-CM

## 2024-10-15 DIAGNOSIS — M75.82 TENDINITIS OF LEFT ROTATOR CUFF: ICD-10-CM

## 2024-10-15 PROBLEM — I73.9 CLAUDICATION OF BOTH LOWER EXTREMITIES (HCC): Status: RESOLVED | Noted: 2023-01-27 | Resolved: 2024-10-15

## 2024-10-15 PROBLEM — F11.93 OPIOID WITHDRAWAL (HCC): Status: RESOLVED | Noted: 2023-08-29 | Resolved: 2024-10-15

## 2024-10-15 RX ORDER — METHYLPREDNISOLONE 4 MG
TABLET, DOSE PACK ORAL
Qty: 1 KIT | Refills: 0 | Status: SHIPPED | OUTPATIENT
Start: 2024-10-15

## 2024-10-15 RX ORDER — METFORMIN HCL 500 MG
500 TABLET, EXTENDED RELEASE 24 HR ORAL
Qty: 180 TABLET | Refills: 3
Start: 2024-10-15

## 2024-10-15 SDOH — HEALTH STABILITY: PHYSICAL HEALTH: ON AVERAGE, HOW MANY MINUTES DO YOU ENGAGE IN EXERCISE AT THIS LEVEL?: 20 MIN

## 2024-10-15 SDOH — ECONOMIC STABILITY: FOOD INSECURITY: WITHIN THE PAST 12 MONTHS, YOU WORRIED THAT YOUR FOOD WOULD RUN OUT BEFORE YOU GOT MONEY TO BUY MORE.: NEVER TRUE

## 2024-10-15 SDOH — ECONOMIC STABILITY: INCOME INSECURITY: HOW HARD IS IT FOR YOU TO PAY FOR THE VERY BASICS LIKE FOOD, HOUSING, MEDICAL CARE, AND HEATING?: SOMEWHAT HARD

## 2024-10-15 SDOH — ECONOMIC STABILITY: FOOD INSECURITY: WITHIN THE PAST 12 MONTHS, THE FOOD YOU BOUGHT JUST DIDN'T LAST AND YOU DIDN'T HAVE MONEY TO GET MORE.: NEVER TRUE

## 2024-10-15 ASSESSMENT — LIFESTYLE VARIABLES
HOW OFTEN DO YOU HAVE A DRINK CONTAINING ALCOHOL: 1
HOW OFTEN DO YOU HAVE SIX OR MORE DRINKS ON ONE OCCASION: 1
HOW OFTEN DO YOU HAVE A DRINK CONTAINING ALCOHOL: NEVER
HOW MANY STANDARD DRINKS CONTAINING ALCOHOL DO YOU HAVE ON A TYPICAL DAY: PATIENT DOES NOT DRINK
HOW MANY STANDARD DRINKS CONTAINING ALCOHOL DO YOU HAVE ON A TYPICAL DAY: 0

## 2024-10-15 ASSESSMENT — ENCOUNTER SYMPTOMS
VOMITING: 0
COUGH: 0
ABDOMINAL DISTENTION: 0
DIARRHEA: 0
WHEEZING: 0
NAUSEA: 0
CONSTIPATION: 0
CHEST TIGHTNESS: 0
ABDOMINAL PAIN: 0
EYE DISCHARGE: 1
SHORTNESS OF BREATH: 0

## 2024-10-15 ASSESSMENT — PATIENT HEALTH QUESTIONNAIRE - PHQ9
SUM OF ALL RESPONSES TO PHQ QUESTIONS 1-9: 0
SUM OF ALL RESPONSES TO PHQ QUESTIONS 1-9: 0
2. FEELING DOWN, DEPRESSED OR HOPELESS: NOT AT ALL
1. LITTLE INTEREST OR PLEASURE IN DOING THINGS: NOT AT ALL
SUM OF ALL RESPONSES TO PHQ9 QUESTIONS 1 & 2: 0
SUM OF ALL RESPONSES TO PHQ QUESTIONS 1-9: 0
SUM OF ALL RESPONSES TO PHQ QUESTIONS 1-9: 0

## 2024-10-15 ASSESSMENT — VISUAL ACUITY
OD_CC: 20/50
OS_CC: 20/70

## 2024-10-15 NOTE — ASSESSMENT & PLAN NOTE
Chronic, failing to improve, he had to cancel prior appointment with Dr. Hunt and felt to make another appointment  We will give him a Medrol pack, he does have tendinitis of rotator cuff, and advised to increase range of motion and home exercising to avoid frozen shoulder  Orders:    [place] OFFICE OUTPATIENT VISIT 25 MINUTES [[id number]]    Isabel Zhao MD, Orthopaedic Surgery, [place]    methylPREDNISolone (MEDROL, MARIBELL,) 4 MG tablet; Take by mouth, with food. Keep low carb, low salt diet while taking it

## 2024-10-15 NOTE — ASSESSMENT & PLAN NOTE
Chronic, well-controlled, low-salt diet, self-monitoring blood pressure at least once a week, continue current medications lisinopril 10 Mg daily, metoprolol 25 Mg twice a day, will check labs    Orders:    [place] OFFICE OUTPATIENT VISIT 25 MINUTES [[id number]]    CBC with Auto Differential; Future    Comprehensive Metabolic Panel; Future    Urinalysis with Reflex to Culture; Future    Uric Acid; Future    TSH; Future    Magnesium; Future

## 2024-10-15 NOTE — ASSESSMENT & PLAN NOTE
Chronic, stable, he had treatment for hepatitis C in 2020  Check labs  Orders:    [place] OFFICE OUTPATIENT VISIT 25 MINUTES [[id number]]    CBC with Auto Differential; Future    Comprehensive Metabolic Panel; Future    Hepatitis C RNA, quantitative, PCR; Future

## 2024-10-15 NOTE — ASSESSMENT & PLAN NOTE
Chronic, at goal (stable), s/p cervical spinal fusion  Will continue to monitor  His off opiates and Lyrica previously given to him by pain management  Stretching, repositioning, heating pad Medrol pack will help his neck as well, baclofen as needed  Orders:    [place] OFFICE OUTPATIENT VISIT 25 MINUTES [[id number]]

## 2024-10-15 NOTE — ASSESSMENT & PLAN NOTE
Chronic, at goal (stable), continue current treatment plan, medication adherence emphasized, and lifestyle modifications recommended    Very well-controlled diabetes mellitus type 2  Polyneuropathy stable, but not improving  Continue vitamin B12 injections monthly, his wife has been giving it to him  Lab Results   Component Value Date    LABA1C 6.0 08/21/2024    LABA1C 5.6 04/06/2024    LABA1C 5.9 08/18/2023   Previously he was on Lyrica until 1 year ago  Will refer to podiatry  Orders:    [place] OFFICE OUTPATIENT VISIT 25 MINUTES [[id number]]    metFORMIN (GLUCOPHAGE-XR) 500 MG extended release tablet; Take 1 tablet by mouth daily (with breakfast) ** decreased frequency 10/15/2024 **    CBC with Auto Differential; Future    Comprehensive Metabolic Panel; Future    Uric Acid; Future    Hyacinth Foster DPM, Podiatry, [place]    -advised home blood glucose testing  daily  -daily feet exam, Foot care: advised to wash feet daily, pat dry and apply lotion at night, avoiding between toes. Need to look at feet daily and report to a physician any signs of inflammation or skin damage  -annual dilated eye exam  -Low carb, low fat diet, increase fruits and vegetables, and exercise 4-5 times a day 30-40 minutes a day discussed  Will decrease the dosage of metformin from twice a day to once a day, continue Farxiga 10 Mg daily, Januvia 50 Mg daily    -continue lisinopril ACEI and statin pravastatin

## 2024-10-15 NOTE — ASSESSMENT & PLAN NOTE
Chronic, improved  Continue pravastatin 40 Mg daily and recheck lipid panel  Lab Results   Component Value Date    LDL 36 10/27/2023    LDLDIRECT 43 02/15/2023         Orders:    [place] OFFICE OUTPATIENT VISIT 25 MINUTES [[id number]]    Lipid Panel; Future

## 2024-10-15 NOTE — PROGRESS NOTES
Visit Information    Have you changed or started any medications since your last visit including any over-the-counter medicines, vitamins, or herbal medicines? no   Have you stopped taking any of your medications? Is so, why? -  no  Are you having any side effects from any of your medications? - no    Have you seen any other physician or provider since your last visit?  yes -    Have you had any other diagnostic tests since your last visit?  yes -    Have you been seen in the emergency room and/or had an admission in a hospital since we last saw you?  no   Have you had your routine dental cleaning in the past 6 months?  no     Do you have an active MyChart account? If no, what is the barrier?  Yes    Patient Care Team:  Greta Bentley MD as PCP - General (Family Medicine)  Greta Bentley MD as PCP - Empaneled Provider  Mariah Reid MD as Consulting Physician (Gastroenterology)  Bruno Brock MD as Consulting Physician (Hematology and Oncology)  Jose Luis Hayes DO as Consulting Physician (Bariatric Surgery)  Sarthak Nichole DO as Consulting Physician (Cardiology)  Joseph Lozano MD as Consulting Physician (Urology)  Khurram Baker DO as Consulting Physician (Physical Medicine and Rehab)  Mohamud Garay MD as Consulting Physician (Pulmonary Disease)    Medical History Review  Past Medical, Family, and Social History reviewed and does contribute to the patient presenting condition    Health Maintenance   Topic Date Due    DTaP/Tdap/Td vaccine (1 - Tdap) Never done    Respiratory Syncytial Virus (RSV) Pregnant or age 60 yrs+ (1 - 1-dose 60+ series) Never done    Shingles vaccine (2 of 3) 12/27/2015    Diabetic retinal exam  09/22/2023    Annual Wellness Visit (Medicare Advantage)  01/01/2024    Flu vaccine (1) 08/01/2024    COVID-19 Vaccine (4 - 2023-24 season) 09/01/2024    Diabetic Alb to Cr ratio (uACR) test  10/27/2024    Lipids  10/27/2024    A1C test (Diabetic or Prediabetic)  11/21/2024    
MOUTH DAILY Yes Greta Bentley MD   promethazine (PHENERGAN) 25 MG tablet TAKE ONE TABLET BY MOUTH THREE TIMES A DAY AS NEEDED FOR NAUSEA Yes Bruno Brock MD   lisinopril (PRINIVIL;ZESTRIL) 10 MG tablet TAKE ONE TABLET BY MOUTH DAILY Yes Tobias Reid MD   Hyoscyamine Sulfate SL (LEVSIN/SL) 0.125 MG SUBL Place 1 tablet under the tongue 3 times daily as needed (for abd cramping) Yes Mariah Reid MD   FARXIGA 10 MG tablet TAKE ONE TABLET BY MOUTH EVERY MORNING FOR DIABETES Yes Greta Bentley MD   ondansetron (ZOFRAN) 4 MG tablet Take 1 tablet by mouth daily as needed for Nausea or Vomiting Yes Mariah Reid MD   lidocaine (LMX) 4 % cream 5 g topical 2-3 times a day as needed for pain, maximum 20 g/day Yes Greta Bentley MD   cyanocobalamin 1000 MCG/ML injection Inject 1 mL into the muscle every 30 days Call for next refill which will be monthly for life Yes Greta Bentley MD   dicyclomine (BENTYL) 20 MG tablet Take 1 tablet by mouth every 6 hours as needed (Abdominal pain) Yes Blane Mcmahan MD   NEEDLE, DISP, 25 G (B-D DISP NEEDLE 25GX1\") 25G X 1\" MISC To use with syringe Yes Greta Bentley MD   glucose monitoring (FREESTYLE FREEDOM) kit Please supply Patient with a glucose monitoring kit that insurance will cover. Yes Greta Bentley MD   Lancets 30G MISC Testing once a day.  Please dispense according to patients insurance. Yes Greta Bentley MD   Alcohol Swabs (B-D SINGLE USE SWABS REGULAR) PADS USE TO CHECK BLOOD SUGAR TWICE A DAY Yes Greta Bentley MD   Syringe/Needle, Disp, (SYRINGE 3CC/25GX1\") 25G X 1\" 3 ML MISC To be used with B12 injections Yes Greta Bentley MD   Probiotic Product (PROBIOTIC-10 PO) Take 1 capsule by mouth daily Yes ProviderPerez MD       [facility] (Including outside providers/suppliers regularly involved in providing care):   Patient Care Team:  Greta Bentley MD as PCP - General (Family Medicine)  Sheree,

## 2024-10-15 NOTE — ASSESSMENT & PLAN NOTE
Chronic, unsure if stable or not, he did quit smoking, praise given  Continue statin, pravastatin 40 Mg daily  He failed to follow-up with vascular, due to treatments for bladder cancer and multiple appointments, will refer back to vascular surgeon  Orders:    [place] OFFICE OUTPATIENT VISIT 25 MINUTES [[id number]]    Glenis Bradford MD, Vascular Surgery, [place]

## 2024-10-18 ENCOUNTER — TELEPHONE (OUTPATIENT)
Dept: ORTHOPEDIC SURGERY | Age: 74
End: 2024-10-18

## 2024-10-18 NOTE — TELEPHONE ENCOUNTER
Per request of Dr. Hunt, patient should be rescheduled as patient was erroneously double booked. Please contact office to reschedule.

## 2024-10-19 PROBLEM — R80.9 TYPE 2 DIABETES MELLITUS WITH DIABETIC MICROALBUMINURIA, WITHOUT LONG-TERM CURRENT USE OF INSULIN (HCC): Status: ACTIVE | Noted: 2024-10-19

## 2024-10-19 PROBLEM — R11.2 NAUSEA AND VOMITING: Status: RESOLVED | Noted: 2024-01-09 | Resolved: 2024-10-19

## 2024-10-19 PROBLEM — Z86.19 HX OF HEPATITIS C: Status: RESOLVED | Noted: 2022-08-11 | Resolved: 2024-10-19

## 2024-10-19 PROBLEM — R93.3 ABNORMAL FINDING ON GI TRACT IMAGING: Status: RESOLVED | Noted: 2023-03-24 | Resolved: 2024-10-19

## 2024-10-19 PROBLEM — Z79.891 CHRONIC USE OF OPIATE FOR THERAPEUTIC PURPOSE: Status: RESOLVED | Noted: 2022-05-31 | Resolved: 2024-10-19

## 2024-10-19 PROBLEM — K80.50: Status: RESOLVED | Noted: 2024-05-10 | Resolved: 2024-10-19

## 2024-10-19 PROBLEM — K63.5 COLON POLYPS: Status: RESOLVED | Noted: 2019-10-08 | Resolved: 2024-10-19

## 2024-10-19 PROBLEM — Z86.19 HISTORY OF HEPATITIS C: Status: RESOLVED | Noted: 2024-05-10 | Resolved: 2024-10-19

## 2024-10-19 PROBLEM — E11.65 TYPE 2 DIABETES MELLITUS WITH HYPERGLYCEMIA, WITHOUT LONG-TERM CURRENT USE OF INSULIN (HCC): Status: RESOLVED | Noted: 2020-05-12 | Resolved: 2024-10-19

## 2024-10-19 PROBLEM — I49.49 EXTRASYSTOLES: Status: RESOLVED | Noted: 2022-10-04 | Resolved: 2024-10-19

## 2024-10-19 PROBLEM — R10.13 ABDOMINAL PAIN, EPIGASTRIC: Status: RESOLVED | Noted: 2024-02-05 | Resolved: 2024-10-19

## 2024-10-19 PROBLEM — D36.9 ADENOMATOUS POLYP: Status: RESOLVED | Noted: 2022-03-31 | Resolved: 2024-10-19

## 2024-10-19 PROBLEM — E11.29 TYPE 2 DIABETES MELLITUS WITH DIABETIC MICROALBUMINURIA, WITHOUT LONG-TERM CURRENT USE OF INSULIN (HCC): Status: ACTIVE | Noted: 2024-10-19

## 2024-10-19 PROBLEM — R63.4 WEIGHT LOSS: Status: RESOLVED | Noted: 2024-01-09 | Resolved: 2024-10-19

## 2024-10-19 PROBLEM — K21.00 GASTROESOPHAGEAL REFLUX DISEASE WITH ESOPHAGITIS WITHOUT HEMORRHAGE: Status: ACTIVE | Noted: 2022-03-31

## 2024-10-19 PROBLEM — R19.7 DIARRHEA: Status: RESOLVED | Noted: 2022-08-11 | Resolved: 2024-10-19

## 2024-10-19 PROBLEM — R94.31 ABNORMAL EKG: Status: RESOLVED | Noted: 2020-05-13 | Resolved: 2024-10-19

## 2024-10-19 PROBLEM — Z86.0100 HISTORY OF COLON POLYPS: Status: ACTIVE | Noted: 2024-10-19

## 2024-10-19 ASSESSMENT — ENCOUNTER SYMPTOMS
RHINORRHEA: 1
EYE ITCHING: 1
BACK PAIN: 1

## 2024-10-19 NOTE — ASSESSMENT & PLAN NOTE
Chronic, at goal (stable), type 2 diabetes mellitus at goal, well-controlled  High microalbumin level, not at goal, continue lisinopril and Farxiga  check labs  Avoid NSAIDs, which she has been doing  Lab Results   Component Value Date    [facility]       Orders:    [place] OFFICE OUTPATIENT VISIT 25 MINUTES [[id number]]    metFORMIN (GLUCOPHAGE-XR) 500 MG extended release tablet; Take 1 tablet by mouth daily (with breakfast) ** decreased frequency 10/15/2024 **    CBC with Auto Differential; Future    Comprehensive Metabolic Panel; Future    Uric Acid; Future    TSH; Future    Microalbumin / Creatinine Urine Ratio; Future

## 2024-10-21 ENCOUNTER — OFFICE VISIT (OUTPATIENT)
Dept: ORTHOPEDIC SURGERY | Age: 74
End: 2024-10-21

## 2024-10-21 VITALS — HEIGHT: 69 IN | RESPIRATION RATE: 14 BRPM | BODY MASS INDEX: 28.14 KG/M2 | WEIGHT: 190 LBS

## 2024-10-21 DIAGNOSIS — M25.512 LEFT SHOULDER PAIN, UNSPECIFIED CHRONICITY: Primary | ICD-10-CM

## 2024-10-21 SDOH — HEALTH STABILITY: PHYSICAL HEALTH: ON AVERAGE, HOW MANY DAYS PER WEEK DO YOU ENGAGE IN MODERATE TO STRENUOUS EXERCISE (LIKE A BRISK WALK)?: 4 DAYS

## 2024-10-22 DIAGNOSIS — Z98.1 S/P CERVICAL SPINAL FUSION: ICD-10-CM

## 2024-10-22 DIAGNOSIS — M47.816 LUMBAR SPONDYLOSIS: ICD-10-CM

## 2024-10-22 DIAGNOSIS — M54.16 LUMBAR RADICULOPATHY: ICD-10-CM

## 2024-10-22 DIAGNOSIS — M51.369 DEGENERATIVE DISC DISEASE, LUMBAR: ICD-10-CM

## 2024-10-22 DIAGNOSIS — M46.92 CERVICAL SPONDYLITIS (HCC): ICD-10-CM

## 2024-10-22 RX ORDER — BACLOFEN 10 MG/1
TABLET ORAL
Qty: 90 TABLET | Refills: 0 | Status: SHIPPED | OUTPATIENT
Start: 2024-10-22

## 2024-10-22 NOTE — TELEPHONE ENCOUNTER
Please Approve or Refuse.  Send to Pharmacy per Pt's Request: daniel      Next Visit Date:  1/3/2025   Last Visit Date: 10/15/2024    Hemoglobin A1C (%)   Date Value   08/21/2024 6.0   04/06/2024 5.6   08/18/2023 5.9             ( goal A1C is < 7)   BP Readings from Last 3 Encounters:   10/15/24 136/70   09/27/24 112/64   09/09/24 138/85          (goal 120/80)  BUN   Date Value Ref Range Status   04/06/2024 13 8 - 23 mg/dL Final     Creatinine   Date Value Ref Range Status   04/06/2024 0.7 0.7 - 1.2 mg/dL Final     Potassium   Date Value Ref Range Status   04/06/2024 4.1 3.7 - 5.3 mmol/L Final

## 2024-10-22 NOTE — PROGRESS NOTES
(BENTYL) 20 MG tablet Take 1 tablet by mouth every 6 hours as needed (Abdominal pain) 30 tablet 0    NEEDLE, DISP, 25 G (B-D DISP NEEDLE 25GX1\") 25G X 1\" MISC To use with syringe 50 each 3    glucose monitoring (FREESTYLE FREEDOM) kit Please supply Patient with a glucose monitoring kit that insurance will cover. 1 kit 0    Lancets 30G MISC Testing once a day.  Please dispense according to patients insurance. 100 each 3    Alcohol Swabs (B-D SINGLE USE SWABS REGULAR) PADS USE TO CHECK BLOOD SUGAR TWICE A  each 3    Syringe/Needle, Disp, (SYRINGE 3CC/25GX1\") 25G X 1\" 3 ML MISC To be used with B12 injections 50 each 2    Probiotic Product (PROBIOTIC-10 PO) Take 1 capsule by mouth daily       No current facility-administered medications for this visit.       Allergies  Allergies have been reviewed.  Koby is allergic to asa [aspirin], ferrous sulfate, iron, and nsaids.    Social History  Koby  reports that he quit smoking about 8 years ago. His smoking use included cigarettes. He started smoking about 56 years ago. He has a 94.5 pack-year smoking history. He has never used smokeless tobacco. He reports that he does not currently use alcohol. He reports that he does not use drugs.    Family History  Koby's family history includes Diabetes in his mother; Heart Disease in his father; High Blood Pressure in his father.     Physical Exam:     Resp 14   Ht 1.74 m (5' 8.5\")   Wt 86.2 kg (190 lb)   BMI 28.47 kg/m²    Constitutional: Patient is oriented to person, place, and time. Patient appears well-developed and well nourished.   Mental Status/psychiatric: Behavior is normal. Thought content normal.  HENT: Negative otherwise noted  Head: Normocephalic and Atraumatic  Nose: Normal  Respiratory/Cardio: Effort normal. No respiratory distress.    Shoulder:    Skin: Skin is warm and dry; no swelling or obvious muscular atrophy.   Vasculature: 2+ radial pulses bilaterally  Neuro: Sensation grossly intact to light touch

## 2024-10-23 ENCOUNTER — HOSPITAL ENCOUNTER (OUTPATIENT)
Age: 74
Discharge: HOME | End: 2024-10-23
Payer: MEDICARE

## 2024-10-23 DIAGNOSIS — E78.5 HYPERLIPIDEMIA WITH TARGET LDL LESS THAN 100: ICD-10-CM

## 2024-10-23 DIAGNOSIS — D75.1 POLYCYTHEMIA: Primary | ICD-10-CM

## 2024-10-23 DIAGNOSIS — R80.9 TYPE 2 DIABETES MELLITUS WITH DIABETIC MICROALBUMINURIA, WITHOUT LONG-TERM CURRENT USE OF INSULIN (HCC): ICD-10-CM

## 2024-10-23 DIAGNOSIS — B18.2 HEP C W/O COMA, CHRONIC (HCC): ICD-10-CM

## 2024-10-23 DIAGNOSIS — R71.8 OTHER ABNORMALITY OF RED BLOOD CELLS: ICD-10-CM

## 2024-10-23 DIAGNOSIS — I10 ESSENTIAL HYPERTENSION: ICD-10-CM

## 2024-10-23 DIAGNOSIS — E11.29 TYPE 2 DIABETES MELLITUS WITH DIABETIC MICROALBUMINURIA, WITHOUT LONG-TERM CURRENT USE OF INSULIN (HCC): ICD-10-CM

## 2024-10-23 DIAGNOSIS — E11.42 TYPE 2 DIABETES MELLITUS WITH DIABETIC POLYNEUROPATHY, WITHOUT LONG-TERM CURRENT USE OF INSULIN (HCC): ICD-10-CM

## 2024-10-23 LAB
ALBUMIN SERPL-MCNC: 4.5 G/DL (ref 3.5–5.2)
ALP SERPL-CCNC: 92 U/L (ref 40–129)
ALT SERPL-CCNC: 26 U/L (ref 10–50)
ANION GAP SERPL CALCULATED.3IONS-SCNC: 14 MMOL/L (ref 9–16)
AST SERPL-CCNC: 24 U/L (ref 10–50)
BACTERIA URNS QL MICRO: ABNORMAL
BASOPHILS # BLD: 0.1 K/UL (ref 0–0.2)
BASOPHILS NFR BLD: 1 % (ref 0–2)
BILIRUB SERPL-MCNC: 0.7 MG/DL (ref 0–1.2)
BILIRUB UR QL STRIP: NEGATIVE
BUN SERPL-MCNC: 13 MG/DL (ref 8–23)
CALCIUM SERPL-MCNC: 9.4 MG/DL (ref 8.6–10.4)
CASTS #/AREA URNS LPF: ABNORMAL /LPF
CHLORIDE SERPL-SCNC: 102 MMOL/L (ref 98–107)
CHOLEST SERPL-MCNC: 137 MG/DL (ref 0–199)
CHOLESTEROL/HDL RATIO: 4.3
CLARITY UR: CLEAR
CO2 SERPL-SCNC: 24 MMOL/L (ref 20–31)
COLOR UR: YELLOW
CREAT SERPL-MCNC: 0.8 MG/DL (ref 0.7–1.2)
CREAT UR-MCNC: 147 MG/DL (ref 39–259)
EOSINOPHIL # BLD: 0.1 K/UL (ref 0–0.4)
EOSINOPHILS RELATIVE PERCENT: 1 % (ref 0–4)
EPI CELLS #/AREA URNS HPF: ABNORMAL /HPF
ERYTHROCYTE [DISTWIDTH] IN BLOOD BY AUTOMATED COUNT: 15 % (ref 11.5–14.9)
GFR, ESTIMATED: >90 ML/MIN/1.73M2
GLUCOSE SERPL-MCNC: 143 MG/DL (ref 74–99)
GLUCOSE UR STRIP-MCNC: ABNORMAL MG/DL
HCT VFR BLD AUTO: 55.6 % (ref 41–53)
HDLC SERPL-MCNC: 32 MG/DL
HGB BLD-MCNC: 18.7 G/DL (ref 13.5–17.5)
HGB UR QL STRIP.AUTO: NEGATIVE
KETONES UR STRIP-MCNC: NEGATIVE MG/DL
LDLC SERPL CALC-MCNC: 50 MG/DL (ref 0–100)
LEUKOCYTE ESTERASE UR QL STRIP: NEGATIVE
LYMPHOCYTES NFR BLD: 1.6 K/UL (ref 1–4.8)
LYMPHOCYTES RELATIVE PERCENT: 19 % (ref 24–44)
MAGNESIUM SERPL-MCNC: 2.1 MG/DL (ref 1.6–2.4)
MCH RBC QN AUTO: 32.2 PG (ref 26–34)
MCHC RBC AUTO-ENTMCNC: 33.7 G/DL (ref 31–37)
MCV RBC AUTO: 95.5 FL (ref 80–100)
MICROALBUMIN UR-MCNC: 147 MG/L (ref 0–20)
MICROALBUMIN/CREAT UR-RTO: 100 MCG/MG CREAT (ref 0–17)
MONOCYTES NFR BLD: 0.6 K/UL (ref 0.1–1.3)
MONOCYTES NFR BLD: 7 % (ref 1–7)
NEUTROPHILS NFR BLD: 72 % (ref 36–66)
NEUTS SEG NFR BLD: 6.2 K/UL (ref 1.3–9.1)
NITRITE UR QL STRIP: NEGATIVE
PH UR STRIP: 5 [PH] (ref 5–8)
PLATELET # BLD AUTO: 258 K/UL (ref 150–450)
PMV BLD AUTO: 7.6 FL (ref 6–12)
POTASSIUM SERPL-SCNC: 3.9 MMOL/L (ref 3.7–5.3)
PROT SERPL-MCNC: 7.6 G/DL (ref 6.6–8.7)
PROT UR STRIP-MCNC: ABNORMAL MG/DL
RBC # BLD AUTO: 5.82 M/UL (ref 4.5–5.9)
RBC #/AREA URNS HPF: ABNORMAL /HPF
SODIUM SERPL-SCNC: 140 MMOL/L (ref 136–145)
SP GR UR STRIP: 1.03 (ref 1–1.03)
TRIGL SERPL-MCNC: 274 MG/DL (ref 0–149)
TSH SERPL DL<=0.05 MIU/L-ACNC: 1.08 UIU/ML (ref 0.27–4.2)
URATE SERPL-MCNC: 5.5 MG/DL (ref 3.4–7)
UROBILINOGEN UR STRIP-ACNC: NORMAL EU/DL (ref 0–1)
WBC #/AREA URNS HPF: ABNORMAL /HPF
WBC OTHER # BLD: 8.5 K/UL (ref 3.5–11)

## 2024-10-23 PROCEDURE — 82043 UR ALBUMIN QUANTITATIVE: CPT

## 2024-10-23 PROCEDURE — 83735 ASSAY OF MAGNESIUM: CPT

## 2024-10-23 PROCEDURE — 80053 COMPREHEN METABOLIC PANEL: CPT

## 2024-10-23 PROCEDURE — 85025 COMPLETE CBC W/AUTO DIFF WBC: CPT

## 2024-10-23 PROCEDURE — 36415 COLL VENOUS BLD VENIPUNCTURE: CPT

## 2024-10-23 PROCEDURE — 87522 HEPATITIS C REVRS TRNSCRPJ: CPT

## 2024-10-23 PROCEDURE — 84443 ASSAY THYROID STIM HORMONE: CPT

## 2024-10-23 PROCEDURE — 84550 ASSAY OF BLOOD/URIC ACID: CPT

## 2024-10-23 PROCEDURE — 82570 ASSAY OF URINE CREATININE: CPT

## 2024-10-23 PROCEDURE — 80061 LIPID PANEL: CPT

## 2024-10-23 PROCEDURE — 81001 URINALYSIS AUTO W/SCOPE: CPT

## 2024-10-23 NOTE — RESULT ENCOUNTER NOTE
Please notify patient: Proteins in the urine from diabetes, lisinopril should help with that  Blood glucose 143  Kidney function normal  Liver function normal  Triglycerides are high, if he drinks any pop, to stop drinking pop  Urine seems to be concentrated, to make sure he drinks 64 ounce water daily    Red blood cells are very high compared with prior, either dehydration or something else  To check carbon monoxide monitor in the house    We should repeat CBC in 2 weeks, after good hydration, the bladder will be not fasting, new orders in the computer    Otherwise labs within normal limits  continue current treatment    Future Appointments  12/4/2024  11:15 AM   Isabel Hunt MD         [facility]        [facility]  12/11/2024 10:30 AM   Bruno Brock MD         [facility]        [facility]  1/3/2025   11:00 AM   Greta Bentley MD    [facility] DEP  2/25/2025  9:00 AM    Mohamud Garay MD         [facility]        [facility]  3/10/2025  8:30 AM    Joseph Lozano MD        [facility]           [facility]  4/16/2025  9:15 AM    Greta Bentley MD    [facility] DEP  10/17/2025 9:15 AM    Greta Bentley MD    [facility] DEP

## 2024-10-24 LAB
HCV RNA # SERPL NAA+PROBE: NOT DETECTED {COPIES}/ML
SPECIMEN SOURCE: NORMAL

## 2024-10-24 NOTE — RESULT ENCOUNTER NOTE
Please notify patient: Undetected hepatitis C virus, good  : Proteins in the urine from diabetes, lisinopril should help with that  Blood glucose 143  Kidney function normal  Liver function normal  Triglycerides are high, if he drinks any pop, to stop drinking pop  Urine seems to be concentrated, to make sure he drinks 64 ounce water daily    Red blood cells are very high compared with prior, either dehydration or something else  To check carbon monoxide monitor in the house  We should repeat CBC in 2 weeks, after good hydration, the bladder will be not fasting, new orders in the computer    Otherwise labs within normal limits  continue current treatment          Future Appointments  12/4/2024  11:15 AM   Isabel Hunt MD         [facility]        [facility]  12/11/2024 10:30 AM   Bruno Brock MD         [facility]        [facility]  1/3/2025   11:00 AM   Greta Bentley MD    [facility] E[facility] DEP  2/25/2025  9:00 AM    Mohamud Garay MD         [facility]        [facility]  3/10/2025  8:30 AM    Joseph Lozano MD        [facility]           [facility]  4/16/2025  9:15 AM    Greta Bentley MD    [facility] DEP  10/17/2025 9:15 AM    Greta Bentley MD    [facility] DEP

## 2024-10-27 DIAGNOSIS — E53.8 VITAMIN B 12 DEFICIENCY: ICD-10-CM

## 2024-10-28 RX ORDER — CYANOCOBALAMIN 1000 UG/ML
INJECTION, SOLUTION INTRAMUSCULAR; SUBCUTANEOUS
Qty: 3 ML | Refills: 3 | Status: SHIPPED | OUTPATIENT
Start: 2024-10-28

## 2024-10-28 RX ORDER — FOLIC ACID 1 MG/1
1000 TABLET ORAL DAILY
Qty: 90 TABLET | Refills: 1 | Status: SHIPPED | OUTPATIENT
Start: 2024-10-28

## 2024-10-29 ENCOUNTER — TELEPHONE (OUTPATIENT)
Dept: VASCULAR SURGERY | Age: 74
End: 2024-10-29

## 2024-10-29 DIAGNOSIS — E55.9 VITAMIN D DEFICIENCY: ICD-10-CM

## 2024-10-29 RX ORDER — ERGOCALCIFEROL 1.25 MG/1
50000 CAPSULE, LIQUID FILLED ORAL WEEKLY
Qty: 12 CAPSULE | Refills: 0 | Status: SHIPPED | OUTPATIENT
Start: 2024-10-29

## 2024-10-29 NOTE — TELEPHONE ENCOUNTER
Please Approve or Refuse.  Send to Pharmacy per Pt's Request:      Next Visit Date:  1/3/2025   Last Visit Date: 10/15/2024    Hemoglobin A1C (%)   Date Value   08/21/2024 6.0   04/06/2024 5.6   08/18/2023 5.9             ( goal A1C is < 7)   BP Readings from Last 3 Encounters:   10/15/24 136/70   09/27/24 112/64   09/09/24 138/85          (goal 120/80)  BUN   Date Value Ref Range Status   10/23/2024 13 8 - 23 mg/dL Final     Creatinine   Date Value Ref Range Status   10/23/2024 0.8 0.7 - 1.2 mg/dL Final     Potassium   Date Value Ref Range Status   10/23/2024 3.9 3.7 - 5.3 mmol/L Final

## 2024-10-30 ENCOUNTER — HOSPITAL ENCOUNTER (OUTPATIENT)
Age: 74
Setting detail: OUTPATIENT SURGERY
Discharge: HOME | End: 2024-10-30
Attending: INTERNAL MEDICINE | Admitting: INTERNAL MEDICINE
Payer: MEDICARE

## 2024-10-30 ENCOUNTER — ANESTHESIA (OUTPATIENT)
Dept: OPERATING ROOM | Age: 74
End: 2024-10-30
Payer: MEDICARE

## 2024-10-30 ENCOUNTER — ANESTHESIA EVENT (OUTPATIENT)
Dept: OPERATING ROOM | Age: 74
End: 2024-10-30
Payer: MEDICARE

## 2024-10-30 ENCOUNTER — APPOINTMENT (OUTPATIENT)
Dept: GENERAL RADIOLOGY | Age: 74
End: 2024-10-30
Attending: INTERNAL MEDICINE
Payer: MEDICARE

## 2024-10-30 VITALS
HEIGHT: 69 IN | WEIGHT: 190 LBS | OXYGEN SATURATION: 93 % | BODY MASS INDEX: 28.14 KG/M2 | RESPIRATION RATE: 17 BRPM | DIASTOLIC BLOOD PRESSURE: 83 MMHG | SYSTOLIC BLOOD PRESSURE: 148 MMHG | TEMPERATURE: 97.9 F | HEART RATE: 68 BPM

## 2024-10-30 LAB
GLUCOSE BLD-MCNC: 142 MG/DL (ref 75–110)
GLUCOSE BLD-MCNC: 151 MG/DL (ref 75–110)

## 2024-10-30 PROCEDURE — 43262 ENDO CHOLANGIOPANCREATOGRAPH: CPT | Performed by: INTERNAL MEDICINE

## 2024-10-30 PROCEDURE — 3700000000 HC ANESTHESIA ATTENDED CARE: Performed by: INTERNAL MEDICINE

## 2024-10-30 PROCEDURE — 2709999900 HC NON-CHARGEABLE SUPPLY: Performed by: INTERNAL MEDICINE

## 2024-10-30 PROCEDURE — 7100000010 HC PHASE II RECOVERY - FIRST 15 MIN: Performed by: INTERNAL MEDICINE

## 2024-10-30 PROCEDURE — 3609014900 HC ERCP W/SPHINCTEROTOMY &/OR PAPILLOTOMY: Performed by: INTERNAL MEDICINE

## 2024-10-30 PROCEDURE — 2720000010 HC SURG SUPPLY STERILE: Performed by: INTERNAL MEDICINE

## 2024-10-30 PROCEDURE — 2500000003 HC RX 250 WO HCPCS: Performed by: NURSE ANESTHETIST, CERTIFIED REGISTERED

## 2024-10-30 PROCEDURE — 6360000002 HC RX W HCPCS: Performed by: NURSE ANESTHETIST, CERTIFIED REGISTERED

## 2024-10-30 PROCEDURE — 3700000001 HC ADD 15 MINUTES (ANESTHESIA): Performed by: INTERNAL MEDICINE

## 2024-10-30 PROCEDURE — 82947 ASSAY GLUCOSE BLOOD QUANT: CPT

## 2024-10-30 PROCEDURE — 43264 ERCP REMOVE DUCT CALCULI: CPT | Performed by: INTERNAL MEDICINE

## 2024-10-30 PROCEDURE — 7100000001 HC PACU RECOVERY - ADDTL 15 MIN: Performed by: INTERNAL MEDICINE

## 2024-10-30 PROCEDURE — 7100000000 HC PACU RECOVERY - FIRST 15 MIN: Performed by: INTERNAL MEDICINE

## 2024-10-30 PROCEDURE — 7100000011 HC PHASE II RECOVERY - ADDTL 15 MIN: Performed by: INTERNAL MEDICINE

## 2024-10-30 PROCEDURE — C1769 GUIDE WIRE: HCPCS | Performed by: INTERNAL MEDICINE

## 2024-10-30 PROCEDURE — 2580000003 HC RX 258

## 2024-10-30 PROCEDURE — 6360000004 HC RX CONTRAST MEDICATION: Performed by: INTERNAL MEDICINE

## 2024-10-30 RX ORDER — LIDOCAINE HYDROCHLORIDE 20 MG/ML
INJECTION, SOLUTION EPIDURAL; INFILTRATION; INTRACAUDAL; PERINEURAL
Status: DISCONTINUED | OUTPATIENT
Start: 2024-10-30 | End: 2024-10-30 | Stop reason: SDUPTHER

## 2024-10-30 RX ORDER — SODIUM CHLORIDE, SODIUM LACTATE, POTASSIUM CHLORIDE, CALCIUM CHLORIDE 600; 310; 30; 20 MG/100ML; MG/100ML; MG/100ML; MG/100ML
INJECTION, SOLUTION INTRAVENOUS CONTINUOUS
Status: DISCONTINUED | OUTPATIENT
Start: 2024-10-30 | End: 2024-10-30 | Stop reason: HOSPADM

## 2024-10-30 RX ORDER — SODIUM CHLORIDE 9 MG/ML
INJECTION, SOLUTION INTRAVENOUS PRN
Status: DISCONTINUED | OUTPATIENT
Start: 2024-10-30 | End: 2024-10-30 | Stop reason: HOSPADM

## 2024-10-30 RX ORDER — SODIUM CHLORIDE 0.9 % (FLUSH) 0.9 %
5-40 SYRINGE (ML) INJECTION PRN
Status: DISCONTINUED | OUTPATIENT
Start: 2024-10-30 | End: 2024-10-30 | Stop reason: HOSPADM

## 2024-10-30 RX ORDER — ROCURONIUM BROMIDE 10 MG/ML
INJECTION, SOLUTION INTRAVENOUS
Status: DISCONTINUED | OUTPATIENT
Start: 2024-10-30 | End: 2024-10-30 | Stop reason: SDUPTHER

## 2024-10-30 RX ORDER — FENTANYL CITRATE 50 UG/ML
INJECTION, SOLUTION INTRAMUSCULAR; INTRAVENOUS
Status: DISCONTINUED | OUTPATIENT
Start: 2024-10-30 | End: 2024-10-30 | Stop reason: SDUPTHER

## 2024-10-30 RX ORDER — FENTANYL CITRATE 50 UG/ML
25 INJECTION, SOLUTION INTRAMUSCULAR; INTRAVENOUS EVERY 5 MIN PRN
Status: DISCONTINUED | OUTPATIENT
Start: 2024-10-30 | End: 2024-10-30 | Stop reason: HOSPADM

## 2024-10-30 RX ORDER — HALOPERIDOL 5 MG/ML
1 INJECTION INTRAMUSCULAR
Status: DISCONTINUED | OUTPATIENT
Start: 2024-10-30 | End: 2024-10-30 | Stop reason: HOSPADM

## 2024-10-30 RX ORDER — METOCLOPRAMIDE HYDROCHLORIDE 5 MG/ML
10 INJECTION INTRAMUSCULAR; INTRAVENOUS
Status: DISCONTINUED | OUTPATIENT
Start: 2024-10-30 | End: 2024-10-30 | Stop reason: HOSPADM

## 2024-10-30 RX ORDER — DEXAMETHASONE SODIUM PHOSPHATE 10 MG/ML
INJECTION, SOLUTION INTRAMUSCULAR; INTRAVENOUS
Status: DISCONTINUED | OUTPATIENT
Start: 2024-10-30 | End: 2024-10-30 | Stop reason: SDUPTHER

## 2024-10-30 RX ORDER — OXYCODONE HYDROCHLORIDE 5 MG/1
5 TABLET ORAL
Status: DISCONTINUED | OUTPATIENT
Start: 2024-10-30 | End: 2024-10-30 | Stop reason: HOSPADM

## 2024-10-30 RX ORDER — PROPOFOL 10 MG/ML
INJECTION, EMULSION INTRAVENOUS
Status: DISCONTINUED | OUTPATIENT
Start: 2024-10-30 | End: 2024-10-30 | Stop reason: SDUPTHER

## 2024-10-30 RX ORDER — NALOXONE HYDROCHLORIDE 0.4 MG/ML
INJECTION, SOLUTION INTRAMUSCULAR; INTRAVENOUS; SUBCUTANEOUS PRN
Status: DISCONTINUED | OUTPATIENT
Start: 2024-10-30 | End: 2024-10-30 | Stop reason: HOSPADM

## 2024-10-30 RX ORDER — HYDROMORPHONE HYDROCHLORIDE 1 MG/ML
0.5 INJECTION, SOLUTION INTRAMUSCULAR; INTRAVENOUS; SUBCUTANEOUS EVERY 5 MIN PRN
Status: DISCONTINUED | OUTPATIENT
Start: 2024-10-30 | End: 2024-10-30 | Stop reason: HOSPADM

## 2024-10-30 RX ORDER — SODIUM CHLORIDE 0.9 % (FLUSH) 0.9 %
5-40 SYRINGE (ML) INJECTION EVERY 12 HOURS SCHEDULED
Status: DISCONTINUED | OUTPATIENT
Start: 2024-10-30 | End: 2024-10-30 | Stop reason: HOSPADM

## 2024-10-30 RX ORDER — SODIUM CHLORIDE 9 MG/ML
INJECTION, SOLUTION INTRAVENOUS CONTINUOUS
Status: DISCONTINUED | OUTPATIENT
Start: 2024-10-30 | End: 2024-10-30 | Stop reason: HOSPADM

## 2024-10-30 RX ORDER — LIDOCAINE HYDROCHLORIDE 10 MG/ML
1 INJECTION, SOLUTION EPIDURAL; INFILTRATION; INTRACAUDAL; PERINEURAL
Status: DISCONTINUED | OUTPATIENT
Start: 2024-10-30 | End: 2024-10-30 | Stop reason: HOSPADM

## 2024-10-30 RX ORDER — ONDANSETRON 2 MG/ML
INJECTION INTRAMUSCULAR; INTRAVENOUS
Status: DISCONTINUED | OUTPATIENT
Start: 2024-10-30 | End: 2024-10-30 | Stop reason: SDUPTHER

## 2024-10-30 RX ADMIN — SUGAMMADEX 200 MG: 100 INJECTION, SOLUTION INTRAVENOUS at 08:45

## 2024-10-30 RX ADMIN — DEXAMETHASONE SODIUM PHOSPHATE 10 MG: 10 INJECTION, SOLUTION INTRAMUSCULAR; INTRAVENOUS at 08:22

## 2024-10-30 RX ADMIN — FENTANYL CITRATE 25 MCG: 50 INJECTION INTRAMUSCULAR; INTRAVENOUS at 08:52

## 2024-10-30 RX ADMIN — FENTANYL CITRATE 50 MCG: 50 INJECTION INTRAMUSCULAR; INTRAVENOUS at 08:17

## 2024-10-30 RX ADMIN — SODIUM CHLORIDE: 9 INJECTION, SOLUTION INTRAVENOUS at 07:51

## 2024-10-30 RX ADMIN — LIDOCAINE HYDROCHLORIDE 80 MG: 20 INJECTION, SOLUTION EPIDURAL; INFILTRATION; INTRACAUDAL; PERINEURAL at 08:17

## 2024-10-30 RX ADMIN — ONDANSETRON 4 MG: 2 INJECTION, SOLUTION INTRAMUSCULAR; INTRAVENOUS at 08:40

## 2024-10-30 RX ADMIN — PROPOFOL 140 MG: 10 INJECTION, EMULSION INTRAVENOUS at 08:17

## 2024-10-30 RX ADMIN — ROCURONIUM BROMIDE 50 MG: 10 INJECTION, SOLUTION INTRAVENOUS at 08:17

## 2024-10-30 RX ADMIN — FENTANYL CITRATE 25 MCG: 50 INJECTION INTRAMUSCULAR; INTRAVENOUS at 08:49

## 2024-10-30 ASSESSMENT — PAIN DESCRIPTION - DESCRIPTORS: DESCRIPTORS: ACHING

## 2024-10-30 ASSESSMENT — PAIN - FUNCTIONAL ASSESSMENT: PAIN_FUNCTIONAL_ASSESSMENT: 0-10

## 2024-10-30 NOTE — ANESTHESIA POSTPROCEDURE EVALUATION
Department of Anesthesiology  Postprocedure Note    Patient: Koby Otero  MRN: [id number]  YOB: 1950  Date of evaluation: 10/30/2024    Procedure Summary       Date: 10/30/24 Room / Location: [address]    Anesthesia Start: 0815 Anesthesia Stop: 0856    Procedure: ENDOSCOPIC RETROGRADE CHOLANGIOPANCREATOGRAPHY WITH SPHICTEROTOMY AND BALLOON SWEEEPING AND SLUDGE REMOVAL Diagnosis:       Bile duct calculus without cholecystitis and no obstruction      Epigastric pain      Hx of hepatitis C      IPMN (intraductal papillary mucinous neoplasm)      (Bile duct calculus without cholecystitis and no obstruction [K80.50])      (Epigastric pain [R10.13])      (Hx of hepatitis C [Z86.19])      (IPMN (intraductal papillary mucinous neoplasm) [D49.0])    Surgeons: Mariah Reid MD Responsible Provider: Sanchez Wolfe MD    Anesthesia Type: General ASA Status: 3            Anesthesia Type: General    Be Phase I: Be Score: 10    Be Phase II: Be Score: 10    Anesthesia Post Evaluation    Patient location during evaluation: PACU  Patient participation: complete - patient participated  Level of consciousness: awake  Airway patency: patent  Nausea & Vomiting: no nausea  Cardiovascular status: blood pressure returned to baseline  Respiratory status: acceptable  Hydration status: euvolemic  Comments: Multimodal analgesia pain management as indicated by procedure  Multimodal analgesia pain management approach  Pain management: adequate    No notable events documented.

## 2024-10-30 NOTE — ANESTHESIA PRE PROCEDURE
Department of Anesthesiology  Preprocedure Note       Name:  Koby Otero   Age:  74 y.o.  :  1950                                          MRN:  [id number]         Date:  10/30/2024      Surgeon: Surgeon(s):  Mariah Reid MD    Procedure: Procedure(s):  ENDOSCOPIC RETROGRADE CHOLANGIOPANCREATOGRAPHY    Medications prior to admission:   Prior to Admission medications    Medication Sig Start Date End Date Taking? Authorizing Provider   vitamin D (ERGOCALCIFEROL) 1.25 MG (90297 UT) CAPS capsule Take 1 capsule by mouth once a week TAKE ONE CAPSULE BY MOUTH ONCE WEEKLY * STOP DAILY VITAMIN-D* 10/29/24   Greta Bentley MD   folic acid (FOLVITE) 1 MG tablet TAKE 1 TABLET BY MOUTH DAILY 10/28/24   Bruno Brock MD   cyanocobalamin 1000 MCG/ML injection INJECT ONE ML INTO THE MUSCLE EVERY 30 DAYS 10/28/24   Greta Bentley MD   baclofen (LIORESAL) 10 MG tablet TAKE 1 TABLET BY MOUTH 3 TIMES A DAY AS NEEDED CAUSES SEDATION AND INCREASES RISK OF FALL, DO NOT DRIVE WHILE TAKING THIS MEDICATION 10/22/24   Greta Bentley MD   metFORMIN (GLUCOPHAGE-XR) 500 MG extended release tablet Take 1 tablet by mouth daily (with breakfast) ** decreased frequency 10/15/2024 ** 10/15/24   Greta Bentley MD   methylPREDNISolone (MEDROL, MARIBELL,) 4 MG tablet Take by mouth, with food. Keep low carb, low salt diet while taking it 10/15/24   Greta Bentley MD   pantoprazole (PROTONIX) 40 MG tablet TAKE 1 TABLET BY MOUTH DAILY 24   Greta Bentley MD   polyethyl glycol-propyl glycol 0.4-0.3 % (SYSTANE) 0.4-0.3 % ophthalmic solution Place 1 drop into both eyes every 3-4 hours as needed for Dry Eyes 24   Bruno Brock MD   levocetirizine (XYZAL) 5 MG tablet Take 1 tablet by mouth nightly 24   Bruno Brock MD   blood glucose test strips (ONETOUCH VERIO) strip USE TO TEST BLOOD SUGAR DAILY 7/3/24   Greta Bentley MD   metoprolol tartrate (LOPRESSOR) 25 MG tablet Take 1 tablet by mouth 2

## 2024-10-30 NOTE — OP NOTE
Operative Note      Patient: Koby Otero  YOB: 1950  MRN: [id number]    Date of Procedure: 10/30/2024    Pre-Op Diagnosis Codes:      * Bile duct calculus without cholecystitis and no obstruction [K80.50]     * Epigastric pain [R10.13]     * Hx of hepatitis C [Z86.19]     * IPMN (intraductal papillary mucinous neoplasm) [D49.0]    Post-Op Diagnosis:   Mildly dilated common bile duct  Papillotomy was made  Balloon sweeps done  Small amount of sludge extracted       Procedure(s):  ENDOSCOPIC RETROGRADE CHOLANGIOPANCREATOGRAPHY WITH SPHICTEROTOMY AND BALLOON SWEEEPING AND SLUDGE REMOVAL    Surgeon(s):  Mariah Reid MD    Assistant:   * No surgical staff found *    Anesthesia: General    Estimated Blood Loss (mL): Minimal    Complications: None    Specimens:   * No specimens in log *    Implants:  * No implants in log *      Drains: * No LDAs found *    Findings:  Infection Present At Time Of Surgery (PATOS) (choose all levels that have infection present):  No infection present  Other Findings: As above    Detailed Description of Procedure:     Procedure was explained to the patient and patient's wife all the risk-benefit and alternatives including bleeding perforation cardiorespiratory problem pancreatitis were explained to them    Patient was brought to the endoscopy suite placed in appropriate ERCP position    A side-viewing ERCP scope was passed to the back of the throat esophagus intubated descending duodenum was reached mild erythema of the pyloric area was noted    Ampulla was identified and appeared normal in caliber and configuration    Papillotome was then passed through the scope and common bile duct was cannulated guidewire was passed to secure the position contrast material was injected mild dilation was noted no obvious strictures large stones were seen    Over the guidewire approximately 1.2 centimeter papillotomy was made    Balloon sweeps were done small amount of sludge was  extracted    Occlusive cholangiogram did not reveal any evidence of obstruction or stone    Multiple Poloroid and fluoroscopic pictures were taken    Air was suctioned and scope was withdrawn      Recommendations    Follow-up LFTs    Follow-up in office in 3 to 4 weeks    Discussed with patient's family    Electronically signed by Mariah Reid MD on 10/30/2024 at 8:41 AM

## 2024-10-30 NOTE — H&P
Completed    Pneumococcal 65+ years Vaccine  Completed    Hib vaccine  Aged Out    Polio vaccine  Aged Out    Meningococcal (ACWY) vaccine  Aged Out    Hepatitis B vaccine  Discontinued    Depression Monitoring  Discontinued    AAA screen  Discontinued    Prostate Specific Antigen (PSA) Screening or Monitoring  Discontinued               On this date 10/15/2024 I have spent 39 minutes reviewing previous notes, test results and face to face with the patient discussing the diagnosis and importance of compliance with the treatment plan as well as documenting on the day of the visit.     This note was completed by using the assistance of a speech-recognition program. However, inadvertent computerized transcription errors may be present. Although every effort was made to ensure accuracy, no guarantees can be provided that every mistake has been identified and corrected by editing.    An electronic signature was used to authenticate this note.  Electronically signed by Greta Bentley MD on 10/19/2024 at 6:27 PM

## 2024-10-31 DIAGNOSIS — J30.89 NON-SEASONAL ALLERGIC RHINITIS DUE TO OTHER ALLERGIC TRIGGER: ICD-10-CM

## 2024-11-01 RX ORDER — FLUTICASONE PROPIONATE 50 MCG
SPRAY, SUSPENSION (ML) NASAL
Qty: 16 G | Refills: 3 | Status: SHIPPED | OUTPATIENT
Start: 2024-11-01

## 2024-11-01 NOTE — TELEPHONE ENCOUNTER
Please Approve or Refuse.  Send to Pharmacy per Pt's Request: PATRICIA      Next Visit Date:  1/3/2025   Last Visit Date: 10/15/2024    Hemoglobin A1C (%)   Date Value   08/21/2024 6.0   04/06/2024 5.6   08/18/2023 5.9             ( goal A1C is < 7)   BP Readings from Last 3 Encounters:   10/30/24 (!) 148/83   10/15/24 136/70   09/27/24 112/64          (goal 120/80)  BUN   Date Value Ref Range Status   10/23/2024 13 8 - 23 mg/dL Final     Creatinine   Date Value Ref Range Status   10/23/2024 0.8 0.7 - 1.2 mg/dL Final     Potassium   Date Value Ref Range Status   10/23/2024 3.9 3.7 - 5.3 mmol/L Final

## 2024-11-08 ENCOUNTER — PATIENT MESSAGE (OUTPATIENT)
Dept: PULMONOLOGY | Age: 74
End: 2024-11-08

## 2024-11-09 DIAGNOSIS — D50.8 OTHER IRON DEFICIENCY ANEMIA: ICD-10-CM

## 2024-11-09 DIAGNOSIS — E53.8 B12 DEFICIENCY: ICD-10-CM

## 2024-11-09 DIAGNOSIS — D64.9 ANEMIA, UNSPECIFIED TYPE: ICD-10-CM

## 2024-11-09 DIAGNOSIS — J30.89 NON-SEASONAL ALLERGIC RHINITIS DUE TO OTHER ALLERGIC TRIGGER: ICD-10-CM

## 2024-11-11 ENCOUNTER — TELEPHONE (OUTPATIENT)
Dept: VASCULAR SURGERY | Age: 74
End: 2024-11-11

## 2024-11-11 RX ORDER — LEVOCETIRIZINE DIHYDROCHLORIDE 5 MG/1
5 TABLET, FILM COATED ORAL NIGHTLY
Qty: 90 TABLET | Refills: 0 | Status: SHIPPED | OUTPATIENT
Start: 2024-11-11

## 2024-11-18 ENCOUNTER — TELEPHONE (OUTPATIENT)
Dept: VASCULAR SURGERY | Age: 74
End: 2024-11-18

## 2024-11-18 DIAGNOSIS — M51.369 DEGENERATIVE DISC DISEASE, LUMBAR: ICD-10-CM

## 2024-11-18 DIAGNOSIS — M46.92 CERVICAL SPONDYLITIS (HCC): ICD-10-CM

## 2024-11-18 DIAGNOSIS — M54.16 LUMBAR RADICULOPATHY: ICD-10-CM

## 2024-11-18 DIAGNOSIS — Z98.1 S/P CERVICAL SPINAL FUSION: ICD-10-CM

## 2024-11-18 DIAGNOSIS — M47.816 LUMBAR SPONDYLOSIS: ICD-10-CM

## 2024-11-19 RX ORDER — BACLOFEN 10 MG/1
TABLET ORAL
Qty: 90 TABLET | Refills: 0 | Status: SHIPPED | OUTPATIENT
Start: 2024-11-19

## 2024-11-19 NOTE — TELEPHONE ENCOUNTER
Please Approve or Refuse.  Send to Pharmacy per Pt's Request: PATRICIA     Next Visit Date:  11/19/2024   Last Visit Date: 10/15/2024    Hemoglobin A1C (%)   Date Value   08/21/2024 6.0   04/06/2024 5.6   08/18/2023 5.9             ( goal A1C is < 7)   BP Readings from Last 3 Encounters:   10/30/24 (!) 148/83   10/15/24 136/70   09/27/24 112/64          (goal 120/80)  BUN   Date Value Ref Range Status   10/23/2024 13 8 - 23 mg/dL Final     Creatinine   Date Value Ref Range Status   10/23/2024 0.8 0.7 - 1.2 mg/dL Final     Potassium   Date Value Ref Range Status   10/23/2024 3.9 3.7 - 5.3 mmol/L Final

## 2024-12-10 ENCOUNTER — HOSPITAL ENCOUNTER (OUTPATIENT)
Age: 74
Discharge: HOME | End: 2024-12-10
Payer: MEDICARE

## 2024-12-10 DIAGNOSIS — J30.89 NON-SEASONAL ALLERGIC RHINITIS DUE TO OTHER ALLERGIC TRIGGER: ICD-10-CM

## 2024-12-10 DIAGNOSIS — E53.8 B12 DEFICIENCY: ICD-10-CM

## 2024-12-10 DIAGNOSIS — D50.8 OTHER IRON DEFICIENCY ANEMIA: ICD-10-CM

## 2024-12-10 DIAGNOSIS — D64.9 ANEMIA, UNSPECIFIED TYPE: ICD-10-CM

## 2024-12-10 LAB
BASOPHILS # BLD: 0.1 K/UL (ref 0–0.2)
BASOPHILS NFR BLD: 1 % (ref 0–2)
EOSINOPHIL # BLD: 0.1 K/UL (ref 0–0.4)
EOSINOPHILS RELATIVE PERCENT: 1 % (ref 0–4)
ERYTHROCYTE [DISTWIDTH] IN BLOOD BY AUTOMATED COUNT: 13.4 % (ref 11.5–14.9)
FERRITIN SERPL-MCNC: 118 NG/ML
HCT VFR BLD AUTO: 51.4 % (ref 41–53)
HGB BLD-MCNC: 17.4 G/DL (ref 13.5–17.5)
IRON SATN MFR SERPL: 29 % (ref 20–55)
IRON SERPL-MCNC: 82 UG/DL (ref 61–157)
LYMPHOCYTES NFR BLD: 1.3 K/UL (ref 1–4.8)
LYMPHOCYTES RELATIVE PERCENT: 16 % (ref 24–44)
MCH RBC QN AUTO: 32.3 PG (ref 26–34)
MCHC RBC AUTO-ENTMCNC: 34 G/DL (ref 31–37)
MCV RBC AUTO: 95.1 FL (ref 80–100)
MONOCYTES NFR BLD: 0.5 K/UL (ref 0.1–1.3)
MONOCYTES NFR BLD: 6 % (ref 1–7)
NEUTROPHILS NFR BLD: 76 % (ref 36–66)
NEUTS SEG NFR BLD: 6.3 K/UL (ref 1.3–9.1)
PLATELET # BLD AUTO: 226 K/UL (ref 150–450)
PMV BLD AUTO: 7.6 FL (ref 6–12)
RBC # BLD AUTO: 5.4 M/UL (ref 4.5–5.9)
TIBC SERPL-MCNC: 286 UG/DL (ref 250–450)
UNSATURATED IRON BINDING CAPACITY: 204 UG/DL (ref 112–347)
VIT B12 SERPL-MCNC: 1118 PG/ML (ref 232–1245)
WBC OTHER # BLD: 8.2 K/UL (ref 3.5–11)

## 2024-12-10 PROCEDURE — 82728 ASSAY OF FERRITIN: CPT

## 2024-12-10 PROCEDURE — 83540 ASSAY OF IRON: CPT

## 2024-12-10 PROCEDURE — 83550 IRON BINDING TEST: CPT

## 2024-12-10 PROCEDURE — 85025 COMPLETE CBC W/AUTO DIFF WBC: CPT

## 2024-12-10 PROCEDURE — 82607 VITAMIN B-12: CPT

## 2024-12-10 PROCEDURE — 36415 COLL VENOUS BLD VENIPUNCTURE: CPT

## 2024-12-11 ENCOUNTER — OFFICE VISIT (OUTPATIENT)
Dept: ONCOLOGY | Age: 74
End: 2024-12-11
Payer: MEDICAID

## 2024-12-11 ENCOUNTER — TELEPHONE (OUTPATIENT)
Dept: ONCOLOGY | Age: 74
End: 2024-12-11

## 2024-12-11 VITALS
TEMPERATURE: 97.5 F | DIASTOLIC BLOOD PRESSURE: 77 MMHG | OXYGEN SATURATION: 96 % | BODY MASS INDEX: 28.37 KG/M2 | SYSTOLIC BLOOD PRESSURE: 135 MMHG | WEIGHT: 192.1 LBS | HEART RATE: 76 BPM | RESPIRATION RATE: 18 BRPM

## 2024-12-11 DIAGNOSIS — D64.9 ANEMIA, UNSPECIFIED TYPE: ICD-10-CM

## 2024-12-11 DIAGNOSIS — C67.2 MALIGNANT NEOPLASM OF LATERAL WALL OF URINARY BLADDER (HCC): Primary | ICD-10-CM

## 2024-12-11 PROCEDURE — 1036F TOBACCO NON-USER: CPT | Performed by: INTERNAL MEDICINE

## 2024-12-11 PROCEDURE — 3075F SYST BP GE 130 - 139MM HG: CPT | Performed by: INTERNAL MEDICINE

## 2024-12-11 PROCEDURE — 99211 OFF/OP EST MAY X REQ PHY/QHP: CPT | Performed by: INTERNAL MEDICINE

## 2024-12-11 PROCEDURE — 3017F COLORECTAL CA SCREEN DOC REV: CPT | Performed by: INTERNAL MEDICINE

## 2024-12-11 PROCEDURE — G8417 CALC BMI ABV UP PARAM F/U: HCPCS | Performed by: INTERNAL MEDICINE

## 2024-12-11 PROCEDURE — G8484 FLU IMMUNIZE NO ADMIN: HCPCS | Performed by: INTERNAL MEDICINE

## 2024-12-11 PROCEDURE — 1123F ACP DISCUSS/DSCN MKR DOCD: CPT | Performed by: INTERNAL MEDICINE

## 2024-12-11 PROCEDURE — G8427 DOCREV CUR MEDS BY ELIG CLIN: HCPCS | Performed by: INTERNAL MEDICINE

## 2024-12-11 PROCEDURE — 3078F DIAST BP <80 MM HG: CPT | Performed by: INTERNAL MEDICINE

## 2024-12-11 PROCEDURE — 99214 OFFICE O/P EST MOD 30 MIN: CPT | Performed by: INTERNAL MEDICINE

## 2024-12-11 NOTE — PROGRESS NOTES
Mental status: Alert, oriented, thought content appropriate      REVIEW OF LABORATORY DATA:   Lab Results   Component Value Date    WBC 8.2 12/10/2024    HGB 17.4 12/10/2024    HCT 51.4 12/10/2024    MCV 95.1 12/10/2024     12/10/2024       Chemistry        Component Value Date/Time     10/23/2024 0825    K 3.9 10/23/2024 0825     10/23/2024 0825    CO2 24 10/23/2024 0825    BUN 13 10/23/2024 0825    CREATININE 0.8 10/23/2024 0825        Component Value Date/Time    CALCIUM 9.4 10/23/2024 0825    ALKPHOS 92 10/23/2024 0825    AST 24 10/23/2024 0825    ALT 26 10/23/2024 0825    BILITOT 0.7 10/23/2024 0825        Lab Results   Component Value Date    BLAAPWEV24 1118 12/10/2024         Lab Results   Component Value Date    IRON 82 12/10/2024    TIBC 286 12/10/2024    FERRITIN 118 12/10/2024     No results found.    IMPRESSION:   69 year old male with history of bleeding ulcer back in 2015 and iron def, now with iron def anemia, and stool occult stools    -s/p IV iron with improvement in iron stores and hemoglobin  -EGD and colonoscopy 10/2019 did not show active bleeding, still no active source of iron def  -s/p GI in 12/3/19, concern for still blood loss, iron def, no plasns for repeat capsule video study, he has completed treatment for hepatitis C with [facility].  -repeat iron studies are consistent with iron deficiency  -transfuse if Hbg is less than 7.0    -B12 deficiency, patient started on B12 shots 7/2020    -Recurrent non-invasive urothelial carcinoma  -Induction BCG, 10/10/2023    -IPMN    -Abnormal CT lung screen, lung RADS 4B        PLAN:   Personally reviewed results of lab work-up recurrent clinical data.    Hemoglobin and iron stores within range.  Continue surveillance.  Recheck in 4 months.    Patient scheduled for repeat cystoscopy in March.  Follow-up on results.  Patient has history of noninvasive urothelial carcinoma status post BCG induction.    Continue B12 supplementation.  Patient

## 2024-12-11 NOTE — TELEPHONE ENCOUNTER
Instructions   from Dr. Bruno Brock MD    Rv in 4 months with labs 1 week before rv     Rv scheduled on 4/9/2025 @10:30; gave pt their AVS and their labs. I reminded pt to have their labs done one week prior to their RV.

## 2024-12-16 DIAGNOSIS — Z98.1 S/P CERVICAL SPINAL FUSION: ICD-10-CM

## 2024-12-16 DIAGNOSIS — M54.16 LUMBAR RADICULOPATHY: ICD-10-CM

## 2024-12-16 DIAGNOSIS — M51.369 DEGENERATIVE DISC DISEASE, LUMBAR: ICD-10-CM

## 2024-12-16 DIAGNOSIS — M46.92 CERVICAL SPONDYLITIS (HCC): ICD-10-CM

## 2024-12-16 DIAGNOSIS — M47.816 LUMBAR SPONDYLOSIS: ICD-10-CM

## 2024-12-16 RX ORDER — BACLOFEN 10 MG/1
TABLET ORAL
Qty: 90 TABLET | Refills: 0 | Status: SHIPPED | OUTPATIENT
Start: 2024-12-16

## 2024-12-16 RX ORDER — BACLOFEN 10 MG/1
TABLET ORAL
Qty: 90 TABLET | Refills: 0 | OUTPATIENT
Start: 2024-12-16

## 2024-12-16 NOTE — TELEPHONE ENCOUNTER
Please Approve or Refuse.  Send to Pharmacy per Pt's Request: daniel     Next Visit Date:  1/3/2025   Last Visit Date: 10/15/2024    Hemoglobin A1C (%)   Date Value   08/21/2024 6.0   04/06/2024 5.6   08/18/2023 5.9             ( goal A1C is < 7)   BP Readings from Last 3 Encounters:   12/11/24 135/77   10/30/24 (!) 148/83   10/15/24 136/70          (goal 120/80)  BUN   Date Value Ref Range Status   10/23/2024 13 8 - 23 mg/dL Final     Creatinine   Date Value Ref Range Status   10/23/2024 0.8 0.7 - 1.2 mg/dL Final     Potassium   Date Value Ref Range Status   10/23/2024 3.9 3.7 - 5.3 mmol/L Final

## 2025-01-03 ENCOUNTER — OFFICE VISIT (OUTPATIENT)
Dept: FAMILY MEDICINE CLINIC | Age: 75
End: 2025-01-03
Payer: MEDICARE

## 2025-01-03 VITALS
DIASTOLIC BLOOD PRESSURE: 76 MMHG | HEART RATE: 70 BPM | WEIGHT: 188 LBS | TEMPERATURE: 98.3 F | SYSTOLIC BLOOD PRESSURE: 132 MMHG | HEIGHT: 69 IN | BODY MASS INDEX: 27.85 KG/M2

## 2025-01-03 DIAGNOSIS — R80.9 TYPE 2 DIABETES MELLITUS WITH DIABETIC MICROALBUMINURIA, WITHOUT LONG-TERM CURRENT USE OF INSULIN (HCC): Primary | ICD-10-CM

## 2025-01-03 DIAGNOSIS — E11.42 TYPE 2 DIABETES MELLITUS WITH DIABETIC POLYNEUROPATHY, WITHOUT LONG-TERM CURRENT USE OF INSULIN (HCC): ICD-10-CM

## 2025-01-03 DIAGNOSIS — M79.674 TOE PAIN, CHRONIC, RIGHT: ICD-10-CM

## 2025-01-03 DIAGNOSIS — B18.2 HEP C W/O COMA, CHRONIC (HCC): ICD-10-CM

## 2025-01-03 DIAGNOSIS — C67.2 MALIGNANT NEOPLASM OF LATERAL WALL OF URINARY BLADDER (HCC): ICD-10-CM

## 2025-01-03 DIAGNOSIS — J43.2 CENTRILOBULAR EMPHYSEMA (HCC): ICD-10-CM

## 2025-01-03 DIAGNOSIS — E78.2 MIXED HYPERLIPIDEMIA: ICD-10-CM

## 2025-01-03 DIAGNOSIS — J01.80 ACUTE NON-RECURRENT SINUSITIS OF OTHER SINUS: ICD-10-CM

## 2025-01-03 DIAGNOSIS — E55.9 VITAMIN D DEFICIENCY: ICD-10-CM

## 2025-01-03 DIAGNOSIS — I10 ESSENTIAL HYPERTENSION: ICD-10-CM

## 2025-01-03 DIAGNOSIS — E11.29 TYPE 2 DIABETES MELLITUS WITH DIABETIC MICROALBUMINURIA, WITHOUT LONG-TERM CURRENT USE OF INSULIN (HCC): Primary | ICD-10-CM

## 2025-01-03 DIAGNOSIS — G89.29 TOE PAIN, CHRONIC, RIGHT: ICD-10-CM

## 2025-01-03 LAB — HBA1C MFR BLD: 5.3 %

## 2025-01-03 PROCEDURE — 1159F MED LIST DOCD IN RCRD: CPT | Performed by: FAMILY MEDICINE

## 2025-01-03 PROCEDURE — 3023F SPIROM DOC REV: CPT | Performed by: FAMILY MEDICINE

## 2025-01-03 PROCEDURE — 3078F DIAST BP <80 MM HG: CPT | Performed by: FAMILY MEDICINE

## 2025-01-03 PROCEDURE — G8417 CALC BMI ABV UP PARAM F/U: HCPCS | Performed by: FAMILY MEDICINE

## 2025-01-03 PROCEDURE — 3075F SYST BP GE 130 - 139MM HG: CPT | Performed by: FAMILY MEDICINE

## 2025-01-03 PROCEDURE — 2022F DILAT RTA XM EVC RTNOPTHY: CPT | Performed by: FAMILY MEDICINE

## 2025-01-03 PROCEDURE — 1125F AMNT PAIN NOTED PAIN PRSNT: CPT | Performed by: FAMILY MEDICINE

## 2025-01-03 PROCEDURE — 83036 HEMOGLOBIN GLYCOSYLATED A1C: CPT | Performed by: FAMILY MEDICINE

## 2025-01-03 PROCEDURE — 3044F HG A1C LEVEL LT 7.0%: CPT | Performed by: FAMILY MEDICINE

## 2025-01-03 PROCEDURE — 1160F RVW MEDS BY RX/DR IN RCRD: CPT | Performed by: FAMILY MEDICINE

## 2025-01-03 PROCEDURE — 1036F TOBACCO NON-USER: CPT | Performed by: FAMILY MEDICINE

## 2025-01-03 PROCEDURE — G8427 DOCREV CUR MEDS BY ELIG CLIN: HCPCS | Performed by: FAMILY MEDICINE

## 2025-01-03 PROCEDURE — 3017F COLORECTAL CA SCREEN DOC REV: CPT | Performed by: FAMILY MEDICINE

## 2025-01-03 PROCEDURE — 1123F ACP DISCUSS/DSCN MKR DOCD: CPT | Performed by: FAMILY MEDICINE

## 2025-01-03 PROCEDURE — 99214 OFFICE O/P EST MOD 30 MIN: CPT | Performed by: FAMILY MEDICINE

## 2025-01-03 RX ORDER — AZITHROMYCIN 250 MG/1
TABLET, FILM COATED ORAL
Qty: 6 TABLET | Refills: 0 | Status: SHIPPED | OUTPATIENT
Start: 2025-01-03 | End: 2025-01-08

## 2025-01-03 RX ORDER — ALBUTEROL SULFATE 90 UG/1
2 INHALANT RESPIRATORY (INHALATION) EVERY 6 HOURS PRN
Qty: 8 G | Refills: 3 | Status: SHIPPED | OUTPATIENT
Start: 2025-01-03

## 2025-01-03 SDOH — ECONOMIC STABILITY: FOOD INSECURITY: WITHIN THE PAST 12 MONTHS, YOU WORRIED THAT YOUR FOOD WOULD RUN OUT BEFORE YOU GOT MONEY TO BUY MORE.: NEVER TRUE

## 2025-01-03 SDOH — ECONOMIC STABILITY: FOOD INSECURITY: WITHIN THE PAST 12 MONTHS, THE FOOD YOU BOUGHT JUST DIDN'T LAST AND YOU DIDN'T HAVE MONEY TO GET MORE.: NEVER TRUE

## 2025-01-03 SDOH — ECONOMIC STABILITY: INCOME INSECURITY: HOW HARD IS IT FOR YOU TO PAY FOR THE VERY BASICS LIKE FOOD, HOUSING, MEDICAL CARE, AND HEATING?: PATIENT DECLINED

## 2025-01-03 ASSESSMENT — ANXIETY QUESTIONNAIRES
2. NOT BEING ABLE TO STOP OR CONTROL WORRYING: NOT AT ALL
IF YOU CHECKED OFF ANY PROBLEMS ON THIS QUESTIONNAIRE, HOW DIFFICULT HAVE THESE PROBLEMS MADE IT FOR YOU TO DO YOUR WORK, TAKE CARE OF THINGS AT HOME, OR GET ALONG WITH OTHER PEOPLE: NOT DIFFICULT AT ALL
7. FEELING AFRAID AS IF SOMETHING AWFUL MIGHT HAPPEN: NOT AT ALL
GAD7 TOTAL SCORE: 0
5. BEING SO RESTLESS THAT IT IS HARD TO SIT STILL: NOT AT ALL
4. TROUBLE RELAXING: NOT AT ALL
1. FEELING NERVOUS, ANXIOUS, OR ON EDGE: NOT AT ALL
6. BECOMING EASILY ANNOYED OR IRRITABLE: NOT AT ALL
3. WORRYING TOO MUCH ABOUT DIFFERENT THINGS: NOT AT ALL

## 2025-01-03 ASSESSMENT — PATIENT HEALTH QUESTIONNAIRE - PHQ9
1. LITTLE INTEREST OR PLEASURE IN DOING THINGS: NOT AT ALL
SUM OF ALL RESPONSES TO PHQ QUESTIONS 1-9: 0
2. FEELING DOWN, DEPRESSED OR HOPELESS: NOT AT ALL
SUM OF ALL RESPONSES TO PHQ9 QUESTIONS 1 & 2: 0

## 2025-01-03 NOTE — ASSESSMENT & PLAN NOTE
Chronic, improved, had antiviral treatment in 2020  Check ultrasound of the liver, hep C RNA 10/23/2024 was undetectable  Orders:    US LIVER; Future

## 2025-01-03 NOTE — ASSESSMENT & PLAN NOTE
Improving, continue high dosage vitamin D weekly, last vitamin D still borderline low  Vitamin D was 18.1 on 9/27/2022, then 23.8 on 2/15/2023.  Last vitamin D still borderline low    Orders:    Vitamin D 25 Hydroxy; Future

## 2025-01-03 NOTE — ASSESSMENT & PLAN NOTE
Chronic, improved since he quit smoking  Albuterol as needed  Orders:    albuterol sulfate HFA (PROVENTIL HFA) 108 (90 Base) MCG/ACT inhaler; Inhale 2 puffs into the lungs every 6 hours as needed for Wheezing or Shortness of Breath Okay to substitute

## 2025-01-03 NOTE — PROGRESS NOTES
Koby Otero (:  1950) is a 74 y.o. male,Established patient, here for evaluation of the following chief complaint(s): Diabetes (Stated BLOOD GLUCOSE ), Immunizations (Declines all vaccinations ), Head Congestion (Onset for a few weeks ), Cough (Ongoing cough from few weeks ago. ), Hypertension, Hyperlipidemia, and Toe Pain (Right second toe pain)      ASSESSMENT/PLAN:    Assessment & Plan  Type 2 diabetes mellitus with diabetic microalbuminuria, without long-term current use of insulin (HCC)   Chronic, at goal improving, hemoglobin A1c 5.3, well-controlled and improved type 2 diabetes mellitus    Urine microalbumin creatinine ratio 100 on 10/23/2020 1, it was 187 on 10/27/2023, chronic, improved, continue good diabetes control and lisinopril    Lab Results   Component Value Date    LABA1C 5.3 2025    LABA1C 6.0 2024    LABA1C 5.6 2024     Check labs  Orders:    POCT glycosylated hemoglobin (Hb A1C)    Comprehensive Metabolic Panel; Future    Vitamin B12 & Folate; Future    Albumin/Creatinine Ratio, Urine; Future    TSH; Future    AFL - Tru Durán DPM, Podiatry, [place]    -advised home blood glucose testing  daily  -daily feet exam, Foot care: advised to wash feet daily, pat dry and apply lotion at night, avoiding between toes. Need to look at feet daily and report to a physician any signs of inflammation or skin damage  -annual dilated eye exam  -Low carb, low fat diet, increase fruits and vegetables, and exercise 4-5 times a day 30-40 minutes a day discussed  -continue Farxiga 10 Mg daily, Januvia 50 Mg daily  Stop metformin  -continue lisinopril ACEI and statin pravastatin    Type 2 diabetes mellitus with diabetic polyneuropathy, without long-term current use of insulin (HCC)    Chronic type 2 diabetes mellitus, well-controlled and improved  Chronic polyneuropathy, stable  Continue vitamin B12 injections monthly, his wife is giving it to him because she was a

## 2025-01-03 NOTE — ASSESSMENT & PLAN NOTE
Chronic, improved, doing surveillance cystoscopies, he is scheduled for another cystoscopy coming up  Importance of compliance with follow-up cystoscopies discussed, patient understands

## 2025-01-03 NOTE — PROGRESS NOTES
Visit Information    Have you changed or started any medications since your last visit including any over-the-counter medicines, vitamins, or herbal medicines? yes -     Have you stopped taking any of your medications? Is so, why? -  no  Are you having any side effects from any of your medications? - no    Have you seen any other physician or provider since your last visit?  yes -     Have you had any other diagnostic tests since your last visit?  no   Have you been seen in the emergency room and/or had an admission in a hospital since we last saw you?  no   Have you had your routine dental cleaning in the past 6 months?  no     Do you have an active MyChart account? If no, what is the barrier?  Yes    Patient Care Team:  Greta Bentley MD as PCP - General (Family Medicine)  Greta Bentley MD as PCP - Empaneled Provider  Mariah Reid MD as Consulting Physician (Gastroenterology)  Bruno Brock MD as Consulting Physician (Hematology and Oncology)  Jose Luis Hayes DO as Consulting Physician (Bariatric Surgery)  Sarthak Nichole DO as Consulting Physician (Cardiology)  Joseph Lozano MD as Consulting Physician (Urology)  Khurram Baker DO as Consulting Physician (Physical Medicine and Rehab)  Mohamud Garay MD as Consulting Physician (Pulmonary Disease)  Kayleen Nair MD as Consulting Physician (Cardiology)  Isabel Hunt MD as Consulting Physician (Orthopedic Surgery)    Medical History Review  Past Medical, Family, and Social History reviewed and does contribute to the patient presenting condition    Health Maintenance   Topic Date Due    DTaP/Tdap/Td vaccine (1 - Tdap) Never done    Respiratory Syncytial Virus (RSV) Pregnant or age 60 yrs+ (1 - Risk 60-74 years 1-dose series) Never done    Shingles vaccine (2 of 3) 12/27/2015    Flu vaccine (1) 08/01/2024    COVID-19 Vaccine (4 - 2023-24 season) 09/01/2024    A1C test (Diabetic or Prediabetic)  11/21/2024    Annual Wellness Visit (Medicare

## 2025-01-03 NOTE — ASSESSMENT & PLAN NOTE
Chronic type 2 diabetes mellitus, well-controlled and improved  Chronic polyneuropathy, stable  Continue vitamin B12 injections monthly, his wife is giving it to him because she was a nurse before  Orders:    POCT glycosylated hemoglobin (Hb A1C)    Comprehensive Metabolic Panel; Future    Vitamin B12 & Folate; Future    Albumin/Creatinine Ratio, Urine; Future    TSH; Future    AFL - Tru Durán DPM, Podiatry, [place]

## 2025-01-03 NOTE — ASSESSMENT & PLAN NOTE
Chronic, well-controlled  Discussed low salt diet and BP and pulse monitoring.  Continue current treatment lisinopril 10 Mg daily, metoprolol tartrate 25 Mg twice a day    Orders:    Comprehensive Metabolic Panel; Future

## 2025-01-03 NOTE — RESULT ENCOUNTER NOTE
Addressed during office visit today, hemoglobin A1c 5.3, improved diabetes and very well-controlled, continue treatment recommended during the office visit.

## 2025-01-11 PROBLEM — E78.2 MIXED HYPERLIPIDEMIA: Status: ACTIVE | Noted: 2020-05-12

## 2025-01-11 NOTE — PROGRESS NOTES
Anesthesia Post-op Note    Patient: Fabienne De Los Santos  Procedure(s) Performed: Open Reduction Internal Fixation of  right ankle fracture (Right: Ankle)  Anesthesia type: General    Vitals Value Taken Time   Temp 36.4 01/10/25 1808   Pulse 64 01/10/25 1753   Resp 17 01/10/25 1753   SpO2 99 % 01/10/25 1753   /86 01/10/25 1753         Patient Location: PACU Phase 1  Post-op Vital Signs:stable  Level of Consciousness: awake and alert  Respiratory Status: spontaneous ventilation  Cardiovascular stable  Hydration: euvolemic  Pain Management: adequately controlled  Handoff: Handoff to receiving clinician was performed and questions were answered  Vomiting: none  Nausea: None  Airway Patency:patent  Post-op Assessment: no complications and patient tolerated procedure well      No notable events documented.                      
10/27/2024    Lipids  10/27/2024    Lung Cancer Screening &/or Counseling  02/12/2025    Depression Screen  03/27/2025    GFR test (Diabetes, CKD 3-4, OR last GFR 15-59)  04/06/2025    Colorectal Cancer Screen  06/07/2025    Hepatitis A vaccine  Completed    Pneumococcal 65+ years Vaccine  Completed    Hib vaccine  Aged Out    Polio vaccine  Aged Out    Meningococcal (ACWY) vaccine  Aged Out    Hepatitis B vaccine  Discontinued    AAA screen  Discontinued    Prostate Specific Antigen (PSA) Screening or Monitoring  Discontinued               
  Component Value Date    WBC 7.2 08/05/2024    HGB 16.2 08/05/2024    HCT 49.1 08/05/2024    MCV 93.7 08/05/2024     08/05/2024       Lab Results   Component Value Date/Time     04/06/2024 09:24 AM    K 4.1 04/06/2024 09:24 AM     04/06/2024 09:24 AM    CO2 25 04/06/2024 09:24 AM    BUN 13 04/06/2024 09:24 AM    CREATININE 0.7 04/06/2024 09:24 AM    GLUCOSE 140 04/06/2024 09:24 AM    CALCIUM 9.4 04/06/2024 09:24 AM        Lab Results   Component Value Date    ALT 20 04/06/2024    AST 18 04/06/2024    ALKPHOS 79 04/06/2024    BILITOT 0.5 04/06/2024       Lab Results   Component Value Date    TSH 1.23 10/27/2023       Lab Results   Component Value Date    CHOL 126 10/27/2023    CHOL 117 02/15/2023    CHOL 100 03/12/2022     Lab Results   Component Value Date    TRIG 302 (H) 10/27/2023    TRIG 467 (H) 02/15/2023    TRIG 285 (H) 03/12/2022     Lab Results   Component Value Date    HDL 30 (L) 10/27/2023    HDL 25 (L) 02/15/2023    HDL 24 (L) 03/12/2022     No components found for: \"LDLCALC\", \"LDLCHOLESTEROL\"  Lab Results   Component Value Date    CHOLHDLRATIO 4.2 10/27/2023    CHOLHDLRATIO 4.7 02/15/2023    CHOLHDLRATIO 4.2 03/12/2022         Lab Results   Component Value Date    KDUBOTTS21 843 04/06/2024     Lab Results   Component Value Date    FOLATE >20.0 04/06/2024     Lab Results   Component Value Date    VITD25 39.5 04/06/2024         No follow-ups on file.      This note was completed by using the assistance of a speech-recognition program. However, inadvertent computerized transcription errors may be present. Although every effort was made to ensure accuracy, no guarantees can be provided that every mistake has been identified and corrected by editing.    34 minutes spent on visit.    An electronic signature was used to authenticate this note.  Electronically signed by VEE Solis CNP on 8/21/2024 at 2:23 PM

## 2025-01-13 ENCOUNTER — INITIAL CONSULT (OUTPATIENT)
Dept: VASCULAR SURGERY | Age: 75
End: 2025-01-13
Payer: MEDICARE

## 2025-01-13 VITALS
RESPIRATION RATE: 16 BRPM | BODY MASS INDEX: 27.7 KG/M2 | SYSTOLIC BLOOD PRESSURE: 163 MMHG | HEART RATE: 79 BPM | OXYGEN SATURATION: 93 % | DIASTOLIC BLOOD PRESSURE: 88 MMHG | WEIGHT: 187 LBS | HEIGHT: 69 IN

## 2025-01-13 DIAGNOSIS — I71.43 INFRARENAL ABDOMINAL AORTIC ANEURYSM (AAA) WITHOUT RUPTURE (HCC): Primary | ICD-10-CM

## 2025-01-13 DIAGNOSIS — I72.4 POPLITEAL ARTERY ANEURYSM, BILATERAL (HCC): ICD-10-CM

## 2025-01-13 PROCEDURE — G8427 DOCREV CUR MEDS BY ELIG CLIN: HCPCS | Performed by: SURGERY

## 2025-01-13 PROCEDURE — G8417 CALC BMI ABV UP PARAM F/U: HCPCS | Performed by: SURGERY

## 2025-01-13 PROCEDURE — 3017F COLORECTAL CA SCREEN DOC REV: CPT | Performed by: SURGERY

## 2025-01-13 PROCEDURE — 3077F SYST BP >= 140 MM HG: CPT | Performed by: SURGERY

## 2025-01-13 PROCEDURE — 3079F DIAST BP 80-89 MM HG: CPT | Performed by: SURGERY

## 2025-01-13 PROCEDURE — 1123F ACP DISCUSS/DSCN MKR DOCD: CPT | Performed by: SURGERY

## 2025-01-13 PROCEDURE — 1159F MED LIST DOCD IN RCRD: CPT | Performed by: SURGERY

## 2025-01-13 PROCEDURE — 99203 OFFICE O/P NEW LOW 30 MIN: CPT | Performed by: SURGERY

## 2025-01-13 PROCEDURE — 1036F TOBACCO NON-USER: CPT | Performed by: SURGERY

## 2025-01-13 PROCEDURE — 1125F AMNT PAIN NOTED PAIN PRSNT: CPT | Performed by: SURGERY

## 2025-01-13 NOTE — PROGRESS NOTES
Arm   Position: Sitting   Pulse: 79   Resp: 16   SpO2: 93%   Weight: 84.8 kg (187 lb)   Height: 1.753 m (5' 9\")          Physical Exam  On examination his abdomen is soft nontender nondistended.  His aorta is palpable but not tender.  It is only minimally enlarged.  He has palpable femoral, popliteal, dorsalis pedis and posterior tibial pulses bilaterally.  His feet are warm and well-perfused without ulceration or gangrene.  His popliteal pulses are generous especially on the left and seems somewhat aneurysmal.    Assessment:  1. Infrarenal abdominal aortic aneurysm (AAA) without rupture (HCC)    2. Popliteal artery aneurysm, bilateral (HCC)          Plan:  At this time we will investigate his popliteal arteries with arterial duplex examination and JENAE and PVR for baseline.  If they are not aneurysmal then no further studies will be needed in the future.  If they are then we can either follow or treat accordingly.  His aorta will need to be followed at 2 years from its last investigation which is 1 year from now.  We will see him in several weeks for his popliteal arteries.    Electronically signed by:  Harry Morin MD

## 2025-01-16 DIAGNOSIS — E11.65 TYPE 2 DIABETES MELLITUS WITH HYPERGLYCEMIA, WITHOUT LONG-TERM CURRENT USE OF INSULIN (HCC): ICD-10-CM

## 2025-01-16 RX ORDER — DAPAGLIFLOZIN 10 MG/1
TABLET, FILM COATED ORAL
Qty: 90 TABLET | Refills: 3 | Status: SHIPPED | OUTPATIENT
Start: 2025-01-16

## 2025-01-17 DIAGNOSIS — M54.16 LUMBAR RADICULOPATHY: ICD-10-CM

## 2025-01-17 DIAGNOSIS — M47.816 LUMBAR SPONDYLOSIS: ICD-10-CM

## 2025-01-17 DIAGNOSIS — Z98.1 S/P CERVICAL SPINAL FUSION: ICD-10-CM

## 2025-01-17 DIAGNOSIS — M46.92 CERVICAL SPONDYLITIS (HCC): ICD-10-CM

## 2025-01-17 DIAGNOSIS — M51.369 DEGENERATIVE DISC DISEASE, LUMBAR: ICD-10-CM

## 2025-01-17 RX ORDER — BACLOFEN 10 MG/1
10 TABLET ORAL 3 TIMES DAILY PRN
Qty: 90 TABLET | Refills: 0 | Status: SHIPPED | OUTPATIENT
Start: 2025-01-17

## 2025-01-20 DIAGNOSIS — R11.0 NAUSEA: ICD-10-CM

## 2025-01-20 RX ORDER — PROMETHAZINE HYDROCHLORIDE 25 MG/1
TABLET ORAL
Qty: 21 TABLET | Refills: 0 | Status: SHIPPED | OUTPATIENT
Start: 2025-01-20

## 2025-01-23 ENCOUNTER — HOSPITAL ENCOUNTER (OUTPATIENT)
Age: 75
Discharge: HOME | End: 2025-01-23
Attending: SURGERY
Payer: MEDICARE

## 2025-01-23 ENCOUNTER — HOSPITAL ENCOUNTER (OUTPATIENT)
Dept: VASCULAR LAB | Age: 75
Discharge: HOME | End: 2025-01-25
Attending: SURGERY
Payer: MEDICARE

## 2025-01-23 ENCOUNTER — HOSPITAL ENCOUNTER (OUTPATIENT)
Dept: ULTRASOUND IMAGING | Age: 75
Discharge: HOME | End: 2025-01-25
Attending: FAMILY MEDICINE
Payer: MEDICARE

## 2025-01-23 DIAGNOSIS — I72.4 POPLITEAL ARTERY ANEURYSM, BILATERAL (HCC): ICD-10-CM

## 2025-01-23 DIAGNOSIS — B18.2 HEP C W/O COMA, CHRONIC (HCC): ICD-10-CM

## 2025-01-23 DIAGNOSIS — I71.43 INFRARENAL ABDOMINAL AORTIC ANEURYSM (AAA) WITHOUT RUPTURE (HCC): ICD-10-CM

## 2025-01-23 DIAGNOSIS — R71.8 OTHER ABNORMALITY OF RED BLOOD CELLS: ICD-10-CM

## 2025-01-23 DIAGNOSIS — I10 ESSENTIAL HYPERTENSION: ICD-10-CM

## 2025-01-23 DIAGNOSIS — D75.1 POLYCYTHEMIA: ICD-10-CM

## 2025-01-23 LAB
BASOPHILS # BLD: 0.1 K/UL (ref 0–0.2)
BASOPHILS NFR BLD: 1 % (ref 0–2)
CREAT UR-MCNC: 112 MG/DL (ref 39–259)
EOSINOPHIL # BLD: 0 K/UL (ref 0–0.4)
EOSINOPHILS RELATIVE PERCENT: 0 % (ref 0–4)
ERYTHROCYTE [DISTWIDTH] IN BLOOD BY AUTOMATED COUNT: 13.6 % (ref 11.5–14.9)
FERRITIN SERPL-MCNC: 108 NG/ML
HCT VFR BLD AUTO: 52.8 % (ref 41–53)
HGB BLD-MCNC: 17.9 G/DL (ref 13.5–17.5)
LDH SERPL-CCNC: 129 U/L (ref 135–225)
LYMPHOCYTES NFR BLD: 1.8 K/UL (ref 1–4.8)
LYMPHOCYTES RELATIVE PERCENT: 25 % (ref 24–44)
MAGNESIUM SERPL-MCNC: 2.2 MG/DL (ref 1.6–2.4)
MCH RBC QN AUTO: 31.4 PG (ref 26–34)
MCHC RBC AUTO-ENTMCNC: 33.9 G/DL (ref 31–37)
MCV RBC AUTO: 92.6 FL (ref 80–100)
MICROALBUMIN UR-MCNC: 142 MG/L (ref 0–20)
MICROALBUMIN/CREAT UR-RTO: 127 MCG/MG CREAT (ref 0–17)
MONOCYTES NFR BLD: 0.5 K/UL (ref 0.1–1.3)
MONOCYTES NFR BLD: 7 % (ref 1–7)
NEUTROPHILS NFR BLD: 67 % (ref 36–66)
NEUTS SEG NFR BLD: 5 K/UL (ref 1.3–9.1)
PLATELET # BLD AUTO: 219 K/UL (ref 150–450)
PMV BLD AUTO: 8 FL (ref 6–12)
RBC # BLD AUTO: 5.71 M/UL (ref 4.5–5.9)
RETICS # AUTO: 0.07 M/UL (ref 0.02–0.1)
RETICS/RBC NFR AUTO: 1.3 % (ref 0.5–2)
WBC OTHER # BLD: 7.4 K/UL (ref 3.5–11)

## 2025-01-23 PROCEDURE — 82728 ASSAY OF FERRITIN: CPT

## 2025-01-23 PROCEDURE — 85045 AUTOMATED RETICULOCYTE COUNT: CPT

## 2025-01-23 PROCEDURE — 36415 COLL VENOUS BLD VENIPUNCTURE: CPT

## 2025-01-23 PROCEDURE — 93923 UPR/LXTR ART STDY 3+ LVLS: CPT

## 2025-01-23 PROCEDURE — 76705 ECHO EXAM OF ABDOMEN: CPT

## 2025-01-23 PROCEDURE — 85025 COMPLETE CBC W/AUTO DIFF WBC: CPT

## 2025-01-23 PROCEDURE — 83615 LACTATE (LD) (LDH) ENZYME: CPT

## 2025-01-23 PROCEDURE — 93925 LOWER EXTREMITY STUDY: CPT

## 2025-01-23 PROCEDURE — 82043 UR ALBUMIN QUANTITATIVE: CPT

## 2025-01-23 PROCEDURE — 82570 ASSAY OF URINE CREATININE: CPT

## 2025-01-23 PROCEDURE — 83735 ASSAY OF MAGNESIUM: CPT

## 2025-01-23 PROCEDURE — 93979 VASCULAR STUDY: CPT

## 2025-01-23 NOTE — RESULT ENCOUNTER NOTE
Please notify patient: Ultrasound shows fatty liver, low-carb diet, low-fat diet, do not eat more than 1 egg a day      Blood pressure is very high, she needs a nurse visit for blood pressure recheck    Future Appointments  1/24/2025  1:00 PM    Harry Morin MD    heartvasc           [facility]  2/20/2025  10:15 AM   [facility] CT RM 1                [facility] CT SCAN        [facility] Radiolog  2/25/2025  9:00 AM    Mohamud Garay MD         [facility]        [facility]  3/10/2025  8:30 AM    Joseph Lozano MD        [facility]           [facility]  4/9/2025   10:30 AM   Bruno Brock MD         [facility]        [facility]  4/16/2025  9:15 AM    Greta Bentley MD    [facility] DEP  10/17/2025 9:15 AM    Greta Bentley MD    fp [facility] DEP

## 2025-01-24 ENCOUNTER — OFFICE VISIT (OUTPATIENT)
Dept: VASCULAR SURGERY | Age: 75
End: 2025-01-24
Payer: MEDICARE

## 2025-01-24 VITALS
HEART RATE: 83 BPM | BODY MASS INDEX: 27.7 KG/M2 | HEIGHT: 69 IN | DIASTOLIC BLOOD PRESSURE: 93 MMHG | WEIGHT: 187 LBS | OXYGEN SATURATION: 96 % | RESPIRATION RATE: 16 BRPM | SYSTOLIC BLOOD PRESSURE: 168 MMHG

## 2025-01-24 DIAGNOSIS — I71.43 INFRARENAL ABDOMINAL AORTIC ANEURYSM (AAA) WITHOUT RUPTURE (HCC): Primary | ICD-10-CM

## 2025-01-24 LAB
Lab: 0.32 CM
Lab: 0.32 CM
Lab: 0.34 CM
Lab: 0.36 CM
Lab: 0.46 CM
Lab: 0.52 CM
PATH REV BLD -IMP: NORMAL
SURGICAL PATHOLOGY REPORT: NORMAL
VAS AAA AP: 3 CM
VAS AAA TRANS: 3 CM
VAS AORTA DIST AP: 3 CM
VAS AORTA DIST PSV: 66.5 CM/S
VAS AORTA DIST TR: 3 CM
VAS AORTA MID AP: 1.55 CM
VAS AORTA MID PSV: 62.5 CM/S
VAS AORTA MID TRANS: 1.96 CM
VAS AORTA PROX AP: 2.61 CM
VAS AORTA PROX PSV: 43.7 CM/S
VAS AORTA PROX TR: 2.69 CM
VAS LEFT ABI: 1.25
VAS LEFT ARM BP: 126 MMHG
VAS LEFT ATA MID AP DIAM: 0.35 CM
VAS LEFT ATA MID TR DIAM: 0.45 CM
VAS LEFT CFA DIST AP DIAM: 1.22 CM
VAS LEFT CFA DIST PSV: 91.9 CM/S
VAS LEFT CFA DIST TR DIAM: 1.32 CM
VAS LEFT CFA PROX AP DIAM: 1.26 CM
VAS LEFT CFA PROX PSV: 159 CM/S
VAS LEFT CFA PROX TR DIAM: 1.22 CM
VAS LEFT COM ILIAC AP: 1.07 CM
VAS LEFT COM ILIAC PROX PSV: 84.7 CM/S
VAS LEFT COM ILIAC TRANS: 1.21 CM
VAS LEFT DORSALIS PEDIS BP: 157 MMHG
VAS LEFT PERONEAL DIST PSV: 78.8 CM/S
VAS LEFT PERONEAL PROX PSV: 54 CM/S
VAS LEFT PFA AP DIAM: 0.75 CM
VAS LEFT PFA PROX PSV: 59.4 CM/S
VAS LEFT PFA TR DIAM: 0.81 CM
VAS LEFT POP A DIST AP DIAM: 1.03 CM
VAS LEFT POP A DIST PSV: 50.2 CM/S
VAS LEFT POP A DIST TR DIAM: 1.29 CM
VAS LEFT POP A PROX AP DIAM: 1.02 CM
VAS LEFT POP A PROX PSV: 56.7 CM/S
VAS LEFT POP A PROX TR DIAM: 1.05 CM
VAS LEFT POP A PROX VEL RATIO: 1.17
VAS LEFT PTA BP: 151 MMHG
VAS LEFT SFA DIST AP DIAM: 0.85 CM
VAS LEFT SFA DIST PSV: 48.3 CM/S
VAS LEFT SFA DIST TR DIAM: 0.88 CM
VAS LEFT SFA DIST VEL RATIO: 0.44
VAS LEFT SFA MID AP DIAM: 0.86 CM
VAS LEFT SFA MID PSV: 109 CM/S
VAS LEFT SFA MID TR DIAM: 0.92 CM
VAS LEFT SFA MID VEL RATIO: 1.38
VAS LEFT SFA PROX AP DIAM: 0.94 CM
VAS LEFT SFA PROX PSV: 78.8 CM/S
VAS LEFT SFA PROX TR DIAM: 0.98 CM
VAS LEFT SFA PROX VEL RATIO: 0.5
VAS LEFT TBI: 0.96
VAS LEFT TOE PRESSURE: 121 MMHG
VAS RIGHT ABI: 1.21
VAS RIGHT ARM BP: 123 MMHG
VAS RIGHT ATA MID AP DIAM: 0.39 CM
VAS RIGHT ATA MID PSV: 95.4 CM/S
VAS RIGHT ATA MID TR DIAM: 0.41 CM
VAS RIGHT CFA DIST AP DIAM: 1.09 CM
VAS RIGHT CFA DIST TR DIAM: 1.24 CM
VAS RIGHT CFA PROX AP DIAM: 1.35 CM
VAS RIGHT CFA PROX PSV: 132 CM/S
VAS RIGHT CFA PROX TR DIAM: 1.28 CM
VAS RIGHT COM ILIAC AP: 1.21 CM
VAS RIGHT COM ILIAC PROX PSV: 133 CM/S
VAS RIGHT COM ILIAC TRANS: 1.21 CM
VAS RIGHT DORSALIS PEDIS BP: 152 MMHG
VAS RIGHT PERONEAL DIST PSV: 84 CM/S
VAS RIGHT PFA AP DIAM: 0.74 CM
VAS RIGHT PFA PROX PSV: 46.4 CM/S
VAS RIGHT PFA TR DIAM: 0.95 CM
VAS RIGHT POP A DIST AP DIAM: 0.97 CM
VAS RIGHT POP A DIST PSV: 41.5 CM/S
VAS RIGHT POP A DIST TR DIAM: 1.05 CM
VAS RIGHT POP A PROX AP DIAM: 0.92 CM
VAS RIGHT POP A PROX PSV: 59.5 CM/S
VAS RIGHT POP A PROX TR DIAM: 0.95 CM
VAS RIGHT POP A PROX VEL RATIO: 1.03
VAS RIGHT PTA BP: 147 MMHG
VAS RIGHT PTA DIST AP DIAM: 0.2 CM
VAS RIGHT PTA DIST PSV: 36 CM/S
VAS RIGHT PTA DIST TR DIAM: 0.22 CM
VAS RIGHT PTA PROX AP DIAM: 0.34 CM
VAS RIGHT PTA PROX PSV: 76.3 CM/S
VAS RIGHT PTA PROX TR DIAM: 0.38 CM
VAS RIGHT SFA DIST AP DIAM: 1.05 CM
VAS RIGHT SFA DIST PSV: 57.6 CM/S
VAS RIGHT SFA DIST TR DIAM: 1.04 CM
VAS RIGHT SFA DIST VEL RATIO: 0.69
VAS RIGHT SFA MID AP DIAM: 0.9 CM
VAS RIGHT SFA MID PSV: 82.9 CM/S
VAS RIGHT SFA MID TR DIAM: 0.96 CM
VAS RIGHT SFA MID VEL RATIO: 1
VAS RIGHT SFA PROX AP DIAM: 0.88 CM
VAS RIGHT SFA PROX PSV: 82.8 CM/S
VAS RIGHT SFA PROX TR DIAM: 1.04 CM
VAS RIGHT SFA PROX VEL RATIO: 0.6
VAS RIGHT TBI: 0.88
VAS RIGHT TOE PRESSURE: 111 MMHG

## 2025-01-24 PROCEDURE — 1036F TOBACCO NON-USER: CPT | Performed by: SURGERY

## 2025-01-24 PROCEDURE — 99213 OFFICE O/P EST LOW 20 MIN: CPT | Performed by: SURGERY

## 2025-01-24 PROCEDURE — 3080F DIAST BP >= 90 MM HG: CPT | Performed by: SURGERY

## 2025-01-24 PROCEDURE — G8427 DOCREV CUR MEDS BY ELIG CLIN: HCPCS | Performed by: SURGERY

## 2025-01-24 PROCEDURE — 1126F AMNT PAIN NOTED NONE PRSNT: CPT | Performed by: SURGERY

## 2025-01-24 PROCEDURE — 1123F ACP DISCUSS/DSCN MKR DOCD: CPT | Performed by: SURGERY

## 2025-01-24 PROCEDURE — 3077F SYST BP >= 140 MM HG: CPT | Performed by: SURGERY

## 2025-01-24 PROCEDURE — 3017F COLORECTAL CA SCREEN DOC REV: CPT | Performed by: SURGERY

## 2025-01-24 PROCEDURE — G8417 CALC BMI ABV UP PARAM F/U: HCPCS | Performed by: SURGERY

## 2025-01-24 PROCEDURE — 1159F MED LIST DOCD IN RCRD: CPT | Performed by: SURGERY

## 2025-01-24 NOTE — RESULT ENCOUNTER NOTE
Please notify patient: Proteins in the urine same as before, he is taking lisinopril for that    Otherwise labs within normal limits  continue current treatment    Future Appointments  1/24/2025  1:00 PM    Harry Morin MD    heartvasc           [facility]  2/20/2025  10:15 AM   [facility] CT RM 1                [facility]CZ CT SCAN        [facility] Radiolog  2/25/2025  9:00 AM    Mohamud Garay MD         [facility]        [facility]  3/10/2025  8:30 AM    Joseph Lozano MD        [facility]           [facility]  4/9/2025   10:30 AM   Bruno Brock MD         [facility]        [facility]  4/16/2025  9:15 AM    Greta Bentley MD    fp [facility] DEP  10/17/2025 9:15 AM    Greta Bentley MD    [facility] DEP

## 2025-01-24 NOTE — PROGRESS NOTES
[facility], [facility]  [address]  [place] [address]  Dept: [phone]     Patient: Koby Otero  : 1950  MRN: [id number]  DOS: 2025            HPI:  Koby Otero is a 74 y.o. male who returns to the office regarding his infrarenal abdominal aortic aneurysm as well as the suspicion for popliteal aneurysms.  His popliteal arteries measure 1 cm or less.  I would not consider these to be aneurysmal.  His common femoral arteries measure 1.3 cm.  His JENAE and PVR is normal.  His aortic duplex examination reveals a 3 cm infrarenal abdominal aortic aneurysm with no evidence of iliac aneurysmal disease.    Review of Systems    Vitals:    25 0956   BP: (!) 168/93   Site: Right Upper Arm   Position: Sitting   Cuff Size: Medium Adult   Pulse: 83   Resp: 16   SpO2: 96%   Weight: 84.8 kg (187 lb)   Height: 1.753 m (5' 9\")          Physical Exam  His exam is the same as it was the last time.  He still has generous femoral and popliteal pulses.  His feet are warm and well-perfused.  His aorta is palpable but minimally aneurysmal.    Assessment:  1. Infrarenal abdominal aortic aneurysm (AAA) without rupture (HCC)          Plan:  At this point we can follow him at 2 years with aortoiliac duplex examination to investigate the size of his infrarenal aorta.  He understands and agrees and we will see him back at that time    Electronically signed by:  Harry Morin MD

## 2025-01-24 NOTE — RESULT ENCOUNTER NOTE
Noris comment sent to patient.  Peripheral blood smear normal      Future Appointments  1/29/2025  9:00 AM    SCHEDULE, [facility] DEP  2/20/2025  10:15 AM   [facility] CT RM 1                [facility] CT SCAN        [facility] Radiolog  2/25/2025  9:00 AM    Mohamud Garay MD         [facility]        [facility]  3/10/2025  8:30 AM    Joseph Lozano MD        [facility]           [facility]  4/9/2025   10:30 AM   Bruno Brock MD         [facility]        [facility]  4/16/2025  9:15 AM    Greta Bentley MD fp [facility] DEP  10/17/2025 9:15 AM    Greta Bentley MD    [facility] DEP

## 2025-01-29 ENCOUNTER — OFFICE VISIT (OUTPATIENT)
Dept: FAMILY MEDICINE CLINIC | Age: 75
End: 2025-01-29
Payer: MEDICARE

## 2025-01-29 VITALS — HEART RATE: 70 BPM | SYSTOLIC BLOOD PRESSURE: 134 MMHG | OXYGEN SATURATION: 93 % | DIASTOLIC BLOOD PRESSURE: 64 MMHG

## 2025-01-29 DIAGNOSIS — I10 ESSENTIAL HYPERTENSION: Primary | ICD-10-CM

## 2025-01-29 PROCEDURE — 99211 OFF/OP EST MAY X REQ PHY/QHP: CPT | Performed by: FAMILY MEDICINE

## 2025-01-29 PROCEDURE — 3078F DIAST BP <80 MM HG: CPT | Performed by: FAMILY MEDICINE

## 2025-01-29 PROCEDURE — G8417 CALC BMI ABV UP PARAM F/U: HCPCS | Performed by: FAMILY MEDICINE

## 2025-01-29 PROCEDURE — 3075F SYST BP GE 130 - 139MM HG: CPT | Performed by: FAMILY MEDICINE

## 2025-01-29 PROCEDURE — G8427 DOCREV CUR MEDS BY ELIG CLIN: HCPCS | Performed by: FAMILY MEDICINE

## 2025-01-29 NOTE — PROGRESS NOTES
Koby Otero is being seen today for a Blood Pressure Recheck.     Koby Otero was seen 1/3/2025 and his Blood Pressure was:   BP Readings from Last 1 Encounters:   01/24/25 (!) 168/93     TODAY'S READING WAS: 134/64      Aleida Arellano

## 2025-01-29 NOTE — PROGRESS NOTES
Chief Complaint   Patient presents with    Blood Pressure Check        Patient is here for blood pressure recheck.      Assessment & Plan  Essential hypertension   Chronic, at goal (stable), continue current treatment plan              BP Readings from Last 3 Encounters:   01/29/25 134/64   01/24/25 (!) 168/93   01/13/25 (!) 163/88       Pulse Readings from Last 3 Encounters:   01/29/25 70   01/24/25 83   01/13/25 79       Future Appointments   Date Time Provider Department Center   2/20/2025 10:15 AM [facility] CT RM 1 [facility] CT SCAN [facility] Radiolog   2/25/2025  9:00 AM Mohamud Garay MD [facility]   3/10/2025  8:30 AM Joseph Lozano MD [facility]   4/9/2025 10:30 AM Bruno Brock MD [facility]   4/16/2025  9:15 AM Greta Bentley MD fp [facility] ECC DEP   10/17/2025  9:15 AM Greta Bentley MD fp [facility] DEP       Electronically signed by Greta Bentley MD on 1/29/25 at 9:16 AM EST

## 2025-02-04 DIAGNOSIS — I10 ESSENTIAL HYPERTENSION: ICD-10-CM

## 2025-02-04 DIAGNOSIS — R11.0 NAUSEA: ICD-10-CM

## 2025-02-04 DIAGNOSIS — E55.9 VITAMIN D DEFICIENCY: ICD-10-CM

## 2025-02-04 DIAGNOSIS — E53.8 VITAMIN B 12 DEFICIENCY: ICD-10-CM

## 2025-02-04 RX ORDER — ERGOCALCIFEROL 1.25 MG/1
CAPSULE, LIQUID FILLED ORAL
Qty: 12 CAPSULE | Refills: 0 | OUTPATIENT
Start: 2025-02-04

## 2025-02-04 RX ORDER — LISINOPRIL 10 MG/1
10 TABLET ORAL DAILY
Qty: 90 TABLET | Refills: 3 | Status: SHIPPED | OUTPATIENT
Start: 2025-02-04

## 2025-02-04 RX ORDER — SYRINGE W-NEEDLE,DISPOSAB,3 ML 25GX5/8"
SYRINGE, EMPTY DISPOSABLE MISCELLANEOUS
Qty: 50 EACH | Refills: 2 | Status: SHIPPED | OUTPATIENT
Start: 2025-02-04

## 2025-02-04 RX ORDER — PROMETHAZINE HYDROCHLORIDE 25 MG/1
TABLET ORAL
Qty: 21 TABLET | Refills: 0 | Status: SHIPPED | OUTPATIENT
Start: 2025-02-04

## 2025-02-04 RX ORDER — ERGOCALCIFEROL 1.25 MG/1
50000 CAPSULE, LIQUID FILLED ORAL WEEKLY
Qty: 12 CAPSULE | Refills: 0 | Status: SHIPPED | OUTPATIENT
Start: 2025-02-04

## 2025-02-13 DIAGNOSIS — D50.8 OTHER IRON DEFICIENCY ANEMIA: ICD-10-CM

## 2025-02-13 DIAGNOSIS — M46.92 CERVICAL SPONDYLITIS (HCC): ICD-10-CM

## 2025-02-13 DIAGNOSIS — R11.0 NAUSEA: ICD-10-CM

## 2025-02-13 DIAGNOSIS — M54.16 LUMBAR RADICULOPATHY: ICD-10-CM

## 2025-02-13 DIAGNOSIS — E53.8 B12 DEFICIENCY: ICD-10-CM

## 2025-02-13 DIAGNOSIS — Z98.1 S/P CERVICAL SPINAL FUSION: ICD-10-CM

## 2025-02-13 DIAGNOSIS — M47.816 LUMBAR SPONDYLOSIS: ICD-10-CM

## 2025-02-13 DIAGNOSIS — J30.89 NON-SEASONAL ALLERGIC RHINITIS DUE TO OTHER ALLERGIC TRIGGER: ICD-10-CM

## 2025-02-13 DIAGNOSIS — M51.369 DEGENERATIVE DISC DISEASE, LUMBAR: ICD-10-CM

## 2025-02-13 DIAGNOSIS — E11.65 TYPE 2 DIABETES MELLITUS WITH HYPERGLYCEMIA, WITHOUT LONG-TERM CURRENT USE OF INSULIN (HCC): ICD-10-CM

## 2025-02-13 DIAGNOSIS — D64.9 ANEMIA, UNSPECIFIED TYPE: ICD-10-CM

## 2025-02-13 DIAGNOSIS — K21.9 GASTROESOPHAGEAL REFLUX DISEASE WITHOUT ESOPHAGITIS: ICD-10-CM

## 2025-02-13 RX ORDER — BACLOFEN 10 MG/1
TABLET ORAL
Qty: 90 TABLET | Refills: 0 | Status: SHIPPED | OUTPATIENT
Start: 2025-02-13

## 2025-02-13 RX ORDER — PROMETHAZINE HYDROCHLORIDE 25 MG/1
TABLET ORAL
Qty: 21 TABLET | Refills: 0 | Status: SHIPPED | OUTPATIENT
Start: 2025-02-13

## 2025-02-13 RX ORDER — LEVOCETIRIZINE DIHYDROCHLORIDE 5 MG/1
5 TABLET, FILM COATED ORAL NIGHTLY
Qty: 90 TABLET | Refills: 0 | Status: SHIPPED | OUTPATIENT
Start: 2025-02-13

## 2025-02-13 RX ORDER — SITAGLIPTIN 50 MG/1
50 TABLET, FILM COATED ORAL DAILY
Qty: 90 TABLET | Refills: 3 | Status: SHIPPED | OUTPATIENT
Start: 2025-02-13

## 2025-02-13 RX ORDER — PANTOPRAZOLE SODIUM 40 MG/1
40 TABLET, DELAYED RELEASE ORAL DAILY
Qty: 90 TABLET | Refills: 1 | Status: SHIPPED | OUTPATIENT
Start: 2025-02-13

## 2025-02-13 NOTE — TELEPHONE ENCOUNTER
Please Approve or Refuse.  Send to Pharmacy per Pt's Request: [facility]      Next Visit Date:  4/16/2025   Last Visit Date: 1/29/2025    Hemoglobin A1C (%)   Date Value   01/03/2025 5.3   08/21/2024 6.0   04/06/2024 5.6             ( goal A1C is < 7)   BP Readings from Last 3 Encounters:   01/29/25 134/64   01/24/25 (!) 168/93   01/13/25 (!) 163/88          (goal 120/80)  BUN   Date Value Ref Range Status   10/23/2024 13 8 - 23 mg/dL Final     Creatinine   Date Value Ref Range Status   10/23/2024 0.8 0.7 - 1.2 mg/dL Final     Potassium   Date Value Ref Range Status   10/23/2024 3.9 3.7 - 5.3 mmol/L Final

## 2025-02-20 ENCOUNTER — HOSPITAL ENCOUNTER (OUTPATIENT)
Dept: CT IMAGING | Age: 75
Discharge: HOME | End: 2025-02-22
Attending: INTERNAL MEDICINE
Payer: MEDICARE

## 2025-02-20 DIAGNOSIS — R91.1 NODULE OF LOWER LOBE OF LEFT LUNG: ICD-10-CM

## 2025-02-20 PROCEDURE — 71250 CT THORAX DX C-: CPT

## 2025-02-20 NOTE — PATIENT INSTRUCTIONS
[facility], (p) [phone], to schedule any tests/ imaging that has been ordered for you.     If you require a follow up appointment with your provider, staff will call you, or send you a [employer] message, to schedule.

## 2025-02-25 ENCOUNTER — OFFICE VISIT (OUTPATIENT)
Dept: PULMONOLOGY | Age: 75
End: 2025-02-25
Payer: MEDICARE

## 2025-02-25 VITALS
DIASTOLIC BLOOD PRESSURE: 76 MMHG | BODY MASS INDEX: 27.99 KG/M2 | SYSTOLIC BLOOD PRESSURE: 134 MMHG | HEIGHT: 69 IN | HEART RATE: 83 BPM | RESPIRATION RATE: 16 BRPM | WEIGHT: 189 LBS | OXYGEN SATURATION: 95 %

## 2025-02-25 DIAGNOSIS — R91.1 NODULE OF LOWER LOBE OF LEFT LUNG: Primary | ICD-10-CM

## 2025-02-25 DIAGNOSIS — C67.2 MALIGNANT NEOPLASM OF LATERAL WALL OF URINARY BLADDER (HCC): ICD-10-CM

## 2025-02-25 DIAGNOSIS — J43.2 CENTRILOBULAR EMPHYSEMA (HCC): ICD-10-CM

## 2025-02-25 PROCEDURE — 99214 OFFICE O/P EST MOD 30 MIN: CPT | Performed by: INTERNAL MEDICINE

## 2025-02-25 PROCEDURE — 3078F DIAST BP <80 MM HG: CPT | Performed by: INTERNAL MEDICINE

## 2025-02-25 PROCEDURE — G8427 DOCREV CUR MEDS BY ELIG CLIN: HCPCS | Performed by: INTERNAL MEDICINE

## 2025-02-25 PROCEDURE — 1036F TOBACCO NON-USER: CPT | Performed by: INTERNAL MEDICINE

## 2025-02-25 PROCEDURE — 1123F ACP DISCUSS/DSCN MKR DOCD: CPT | Performed by: INTERNAL MEDICINE

## 2025-02-25 PROCEDURE — 1159F MED LIST DOCD IN RCRD: CPT | Performed by: INTERNAL MEDICINE

## 2025-02-25 PROCEDURE — 3023F SPIROM DOC REV: CPT | Performed by: INTERNAL MEDICINE

## 2025-02-25 PROCEDURE — 3017F COLORECTAL CA SCREEN DOC REV: CPT | Performed by: INTERNAL MEDICINE

## 2025-02-25 PROCEDURE — G8417 CALC BMI ABV UP PARAM F/U: HCPCS | Performed by: INTERNAL MEDICINE

## 2025-02-25 PROCEDURE — 3075F SYST BP GE 130 - 139MM HG: CPT | Performed by: INTERNAL MEDICINE

## 2025-02-25 NOTE — PROGRESS NOTES
CHECK BLOOD SUGAR TWICE A DAY 21  Yes Greta Bentley MD   Probiotic Product (PROBIOTIC-10 PO) Take 1 capsule by mouth daily   Yes ProviderPerez MD     Social History     Socioeconomic History    Marital status:      Spouse name: None    Number of children: None    Years of education: None    Highest education level: None   Occupational History    Occupation: disabled   Tobacco Use    Smoking status: Former     Current packs/day: 0.00     Average packs/day: 2.0 packs/day for 47.3 years (94.5 ttl pk-yrs)     Types: Cigarettes     Start date: 1968     Quit date: 2015     Years since quittin.2    Smokeless tobacco: Never   Vaping Use    Vaping status: Never Used   Substance and Sexual Activity    Alcohol use: Not Currently    Drug use: Never    Sexual activity: Yes     Partners: Female     Social Determinants of Health     Financial Resource Strain: Patient Declined (1/3/2025)    Overall Financial Resource Strain (CARDIA)     Difficulty of Paying Living Expenses: Patient declined   Recent Concern: Financial Resource Strain - Medium Risk (10/15/2024)    Overall Financial Resource Strain (CARDIA)     Difficulty of Paying Living Expenses: Somewhat hard   Food Insecurity: No Food Insecurity (1/3/2025)    Hunger Vital Sign     Worried About Running Out of Food in the Last Year: Never true     Ran Out of Food in the Last Year: Never true   Transportation Needs: Unknown (1/3/2025)    PRAPARE - Transportation     Lack of Transportation (Non-Medical): No   Physical Activity: Insufficiently Active (10/21/2024)    Exercise Vital Sign     Days of Exercise per Week: 4 days     Minutes of Exercise per Session: 20 min   Housing Stability: Unknown (1/3/2025)    Housing Stability Vital Sign     Homeless in the Last Year: No        Physical Exam  General Appearance: well-developed and well nourished  ENT: oropharynx clear and moist with normal mucous membranes  Pulmonary/Chest: Ventilating all lobes

## 2025-03-10 ENCOUNTER — HOSPITAL ENCOUNTER (OUTPATIENT)
Age: 75
Setting detail: SPECIMEN
Discharge: HOME | End: 2025-03-10

## 2025-03-10 ENCOUNTER — PROCEDURE VISIT (OUTPATIENT)
Dept: UROLOGY | Age: 75
End: 2025-03-10
Payer: MEDICARE

## 2025-03-10 ENCOUNTER — PREP FOR PROCEDURE (OUTPATIENT)
Dept: UROLOGY | Age: 75
End: 2025-03-10

## 2025-03-10 ENCOUNTER — TELEPHONE (OUTPATIENT)
Dept: UROLOGY | Age: 75
End: 2025-03-10

## 2025-03-10 VITALS — HEART RATE: 88 BPM | DIASTOLIC BLOOD PRESSURE: 88 MMHG | TEMPERATURE: 97.4 F | SYSTOLIC BLOOD PRESSURE: 138 MMHG

## 2025-03-10 DIAGNOSIS — C67.2 MALIGNANT NEOPLASM OF LATERAL WALL OF URINARY BLADDER (HCC): Primary | ICD-10-CM

## 2025-03-10 DIAGNOSIS — R31.0 GROSS HEMATURIA: ICD-10-CM

## 2025-03-10 PROCEDURE — 52000 CYSTOURETHROSCOPY: CPT | Performed by: UROLOGY

## 2025-03-10 NOTE — PROGRESS NOTES
Cystoscopy Operative Note (3/10/25)  Surgeon: Joseph Lozano MD  Anesthesia: Urethral 2% Xylocaine   Indications: Bladder Cancer  Position: Dorsal Lithotomy    Findings:   Risks and Benefits discussed with patient prior to procedure.  The patient was prepped and draped in the usual sterile fashion.  The flexible cystoscope was advanced through the urethra and into the bladder.  The bladder was thoroughly inspected and the following was noted:    Residual Urine: none  Urethra: normal appearing urethra with no masses, tenderness or lesions  Prostate: partially obstructing lateral lobes of prostate; median lobe present? no.   Bladder: Small tumor anterior bladder wall.  No bladder diverticulum.  There was none trabeculation noted.  Ureters: Clear efflux from both ureters.  Orifices with normal configuration and location.    The cystoscope was removed.  The patient tolerated the procedure well.     Agree with the ROS entered by the MA.    Plan: Cystoscopy, bladder biopsy and fulguration MAC

## 2025-03-10 NOTE — TELEPHONE ENCOUNTER
Cysto, bladder bx & fulguration @ [facility] 4/2/25 1:00pm **STOP BLOOD THINNERS 3/26/25**   PAT @ [facility] 3/21/25 9:30am       Spoke with patient in office, procedure info given to patient

## 2025-03-11 LAB
MICROORGANISM SPEC CULT: NO GROWTH
SERVICE CMNT-IMP: NORMAL
SPECIMEN DESCRIPTION: NORMAL

## 2025-03-12 DIAGNOSIS — Z98.1 S/P CERVICAL SPINAL FUSION: ICD-10-CM

## 2025-03-12 DIAGNOSIS — M54.16 LUMBAR RADICULOPATHY: ICD-10-CM

## 2025-03-12 DIAGNOSIS — M51.369 DEGENERATIVE DISC DISEASE, LUMBAR: ICD-10-CM

## 2025-03-12 DIAGNOSIS — M46.92 CERVICAL SPONDYLITIS: ICD-10-CM

## 2025-03-12 DIAGNOSIS — M47.816 LUMBAR SPONDYLOSIS: ICD-10-CM

## 2025-03-12 RX ORDER — BACLOFEN 10 MG/1
TABLET ORAL
Qty: 90 TABLET | Refills: 0 | Status: SHIPPED | OUTPATIENT
Start: 2025-03-12

## 2025-03-12 RX ORDER — BACLOFEN 10 MG/1
TABLET ORAL
Qty: 90 TABLET | Refills: 0 | OUTPATIENT
Start: 2025-03-12

## 2025-03-18 RX ORDER — SODIUM CHLORIDE, SODIUM LACTATE, POTASSIUM CHLORIDE, CALCIUM CHLORIDE 600; 310; 30; 20 MG/100ML; MG/100ML; MG/100ML; MG/100ML
INJECTION, SOLUTION INTRAVENOUS CONTINUOUS
OUTPATIENT
Start: 2025-03-18

## 2025-03-18 NOTE — DISCHARGE INSTRUCTIONS
Pre-operative Instructions    Please arrive at the surgery center by 11:00 AM on 4/2/2025  (or as directed by your surgeon's office). See Directons to Surgery Center below.                FASTING    NOTHING TO EAT OR DRINK AFTER MIDNIGHT the night prior to surgery (This includes gum, candy, mints, chewing tobacco, etc).               MEDICATIONS    What to STOP: ANY BLOOD THINNING MEDICATION(S) as directed by your surgeon or prescribing physician.  FAILURE TO STOP CERTAIN MEDICATIONS MAY INTERFERE WITH YOUR SCHEDULED SURGERY.      According to the medication list you provided today, PLEASE STOP:     2. What to CONTINUE leading up to your surgery:   Please take all your other daily medications except the medications listed above that you were instructed to hold.    3. What to TAKE MORNING OF SURGERY with SMALL SIP OF WATER: metoprolol (Lopressor), pantoprazole (Protonix)    Hold lisinopril 24 hours prior to surgery.     PLEASE NOTE: ANY \"STOPPED\" MEDICATIONS MAY BE DUE TO CLEANING UP MEDICATION LIST DURING THIS VISIT.                        IF APPLICABLE:  -If you have been given a blood band, you must bring it with you the day of surgery, unclasped.  -Use routine inhalers and bring inhalers the day of surgery.   -Bring C-Pap/Bi-pap with you morning of surgery if planning on staying in the hospital overnight.  -Do not take diabetic medications on the day of surgery.  -Any weekly antidiabetic injections must be stopped one week prior to surgery and plan discussed with prescribing provider.                             OTHER IMPORTANT REMINDERS    1) You may be required to provide a urine sample upon your arrival to the pre-op area, so please take this into consideration.     2) If  NOT planning on staying in the hospital overnight :    A.You will need an adult family member /friend to drive you home after your procedure. Taxi cabs or any form of public transportation ALONE is not acceptable.   -Your  must be

## 2025-03-21 ENCOUNTER — HOSPITAL ENCOUNTER (OUTPATIENT)
Dept: PREADMISSION TESTING | Age: 75
Discharge: HOME | End: 2025-03-25
Payer: MEDICARE

## 2025-03-21 VITALS
SYSTOLIC BLOOD PRESSURE: 137 MMHG | HEIGHT: 69 IN | WEIGHT: 192 LBS | OXYGEN SATURATION: 97 % | BODY MASS INDEX: 28.44 KG/M2 | DIASTOLIC BLOOD PRESSURE: 87 MMHG | TEMPERATURE: 97.3 F | HEART RATE: 78 BPM | RESPIRATION RATE: 18 BRPM

## 2025-03-21 LAB
ANION GAP SERPL CALCULATED.3IONS-SCNC: 12 MMOL/L (ref 9–16)
BUN SERPL-MCNC: 13 MG/DL (ref 8–23)
CALCIUM SERPL-MCNC: 9.6 MG/DL (ref 8.6–10.4)
CHLORIDE SERPL-SCNC: 103 MMOL/L (ref 98–107)
CO2 SERPL-SCNC: 25 MMOL/L (ref 20–31)
CREAT SERPL-MCNC: 0.9 MG/DL (ref 0.7–1.2)
ERYTHROCYTE [DISTWIDTH] IN BLOOD BY AUTOMATED COUNT: 13 % (ref 11.8–14.4)
GFR, ESTIMATED: >90 ML/MIN/1.73M2
GLUCOSE SERPL-MCNC: 151 MG/DL (ref 74–99)
HCT VFR BLD AUTO: 52.2 % (ref 40.7–50.3)
HGB BLD-MCNC: 17.2 G/DL (ref 13–17)
MCH RBC QN AUTO: 29.8 PG (ref 25.2–33.5)
MCHC RBC AUTO-ENTMCNC: 33 G/DL (ref 28.4–34.8)
MCV RBC AUTO: 90.3 FL (ref 82.6–102.9)
NRBC BLD-RTO: 0 PER 100 WBC
PLATELET # BLD AUTO: 218 K/UL (ref 138–453)
PMV BLD AUTO: 9.3 FL (ref 8.1–13.5)
POTASSIUM SERPL-SCNC: 4.4 MMOL/L (ref 3.7–5.3)
RBC # BLD AUTO: 5.78 M/UL (ref 4.21–5.77)
SODIUM SERPL-SCNC: 140 MMOL/L (ref 136–145)
WBC OTHER # BLD: 6.5 K/UL (ref 3.5–11.3)

## 2025-03-21 PROCEDURE — 87086 URINE CULTURE/COLONY COUNT: CPT

## 2025-03-21 PROCEDURE — 85027 COMPLETE CBC AUTOMATED: CPT

## 2025-03-21 PROCEDURE — 36415 COLL VENOUS BLD VENIPUNCTURE: CPT

## 2025-03-21 PROCEDURE — 80048 BASIC METABOLIC PNL TOTAL CA: CPT

## 2025-03-21 PROCEDURE — 93005 ELECTROCARDIOGRAM TRACING: CPT | Performed by: ANESTHESIOLOGY

## 2025-03-22 LAB
EKG ATRIAL RATE: 68 BPM
EKG P AXIS: 53 DEGREES
EKG P-R INTERVAL: 158 MS
EKG Q-T INTERVAL: 398 MS
EKG QRS DURATION: 82 MS
EKG QTC CALCULATION (BAZETT): 423 MS
EKG R AXIS: 53 DEGREES
EKG T AXIS: 76 DEGREES
EKG VENTRICULAR RATE: 68 BPM
MICROORGANISM SPEC CULT: NO GROWTH
SPECIMEN DESCRIPTION: NORMAL

## 2025-03-22 PROCEDURE — 93010 ELECTROCARDIOGRAM REPORT: CPT | Performed by: INTERNAL MEDICINE

## 2025-04-02 ENCOUNTER — HOSPITAL ENCOUNTER (OUTPATIENT)
Age: 75
Setting detail: OUTPATIENT SURGERY
Discharge: HOME | End: 2025-04-02
Attending: UROLOGY | Admitting: UROLOGY
Payer: MEDICARE

## 2025-04-02 ENCOUNTER — ANESTHESIA EVENT (OUTPATIENT)
Dept: OPERATING ROOM | Age: 75
End: 2025-04-02
Payer: MEDICARE

## 2025-04-02 ENCOUNTER — ANESTHESIA (OUTPATIENT)
Dept: OPERATING ROOM | Age: 75
End: 2025-04-02
Payer: MEDICARE

## 2025-04-02 VITALS
HEIGHT: 69 IN | DIASTOLIC BLOOD PRESSURE: 78 MMHG | WEIGHT: 192 LBS | TEMPERATURE: 97.5 F | HEART RATE: 79 BPM | OXYGEN SATURATION: 93 % | SYSTOLIC BLOOD PRESSURE: 144 MMHG | RESPIRATION RATE: 17 BRPM | BODY MASS INDEX: 28.44 KG/M2

## 2025-04-02 DIAGNOSIS — C67.2 MALIGNANT NEOPLASM OF LATERAL WALL OF URINARY BLADDER (HCC): ICD-10-CM

## 2025-04-02 LAB
GLUCOSE BLD-MCNC: 100 MG/DL (ref 75–110)
GLUCOSE BLD-MCNC: 121 MG/DL (ref 75–110)

## 2025-04-02 PROCEDURE — 3700000001 HC ADD 15 MINUTES (ANESTHESIA): Performed by: UROLOGY

## 2025-04-02 PROCEDURE — 2500000003 HC RX 250 WO HCPCS: Performed by: UROLOGY

## 2025-04-02 PROCEDURE — 6360000002 HC RX W HCPCS: Performed by: PHYSICIAN ASSISTANT

## 2025-04-02 PROCEDURE — 6360000002 HC RX W HCPCS

## 2025-04-02 PROCEDURE — 2709999900 HC NON-CHARGEABLE SUPPLY: Performed by: UROLOGY

## 2025-04-02 PROCEDURE — 3600000004 HC SURGERY LEVEL 4 BASE: Performed by: UROLOGY

## 2025-04-02 PROCEDURE — 88305 TISSUE EXAM BY PATHOLOGIST: CPT

## 2025-04-02 PROCEDURE — 2580000003 HC RX 258: Performed by: ANESTHESIOLOGY

## 2025-04-02 PROCEDURE — 6360000002 HC RX W HCPCS: Performed by: NURSE ANESTHETIST, CERTIFIED REGISTERED

## 2025-04-02 PROCEDURE — 3600000014 HC SURGERY LEVEL 4 ADDTL 15MIN: Performed by: UROLOGY

## 2025-04-02 PROCEDURE — 6370000000 HC RX 637 (ALT 250 FOR IP): Performed by: UROLOGY

## 2025-04-02 PROCEDURE — 3700000000 HC ANESTHESIA ATTENDED CARE: Performed by: UROLOGY

## 2025-04-02 PROCEDURE — 7100000010 HC PHASE II RECOVERY - FIRST 15 MIN: Performed by: UROLOGY

## 2025-04-02 PROCEDURE — 7100000011 HC PHASE II RECOVERY - ADDTL 15 MIN: Performed by: UROLOGY

## 2025-04-02 PROCEDURE — 82947 ASSAY GLUCOSE BLOOD QUANT: CPT

## 2025-04-02 PROCEDURE — 2500000003 HC RX 250 WO HCPCS

## 2025-04-02 PROCEDURE — 88342 IMHCHEM/IMCYTCHM 1ST ANTB: CPT

## 2025-04-02 RX ORDER — DEXAMETHASONE SODIUM PHOSPHATE 10 MG/ML
INJECTION, SOLUTION INTRA-ARTICULAR; INTRALESIONAL; INTRAMUSCULAR; INTRAVENOUS; SOFT TISSUE
Status: DISCONTINUED | OUTPATIENT
Start: 2025-04-02 | End: 2025-04-02 | Stop reason: SDUPTHER

## 2025-04-02 RX ORDER — PROPOFOL 10 MG/ML
INJECTION, EMULSION INTRAVENOUS
Status: DISCONTINUED | OUTPATIENT
Start: 2025-04-02 | End: 2025-04-02 | Stop reason: SDUPTHER

## 2025-04-02 RX ORDER — FENTANYL CITRATE 50 UG/ML
INJECTION, SOLUTION INTRAMUSCULAR; INTRAVENOUS
Status: DISCONTINUED | OUTPATIENT
Start: 2025-04-02 | End: 2025-04-02 | Stop reason: SDUPTHER

## 2025-04-02 RX ORDER — PHENYLEPHRINE HCL IN 0.9% NACL 1 MG/10 ML
SYRINGE (ML) INTRAVENOUS
Status: DISCONTINUED | OUTPATIENT
Start: 2025-04-02 | End: 2025-04-02 | Stop reason: SDUPTHER

## 2025-04-02 RX ORDER — SODIUM CHLORIDE 0.9 % (FLUSH) 0.9 %
5-40 SYRINGE (ML) INJECTION PRN
Status: CANCELLED | OUTPATIENT
Start: 2025-04-02

## 2025-04-02 RX ORDER — LIDOCAINE HYDROCHLORIDE 10 MG/ML
INJECTION, SOLUTION EPIDURAL; INFILTRATION; INTRACAUDAL; PERINEURAL
Status: DISCONTINUED | OUTPATIENT
Start: 2025-04-02 | End: 2025-04-02 | Stop reason: SDUPTHER

## 2025-04-02 RX ORDER — DROPERIDOL 2.5 MG/ML
0.62 INJECTION, SOLUTION INTRAMUSCULAR; INTRAVENOUS
Status: CANCELLED | OUTPATIENT
Start: 2025-04-02

## 2025-04-02 RX ORDER — HYDRALAZINE HYDROCHLORIDE 20 MG/ML
10 INJECTION INTRAMUSCULAR; INTRAVENOUS
Status: CANCELLED | OUTPATIENT
Start: 2025-04-02

## 2025-04-02 RX ORDER — SODIUM CHLORIDE 9 MG/ML
INJECTION, SOLUTION INTRAVENOUS PRN
Status: CANCELLED | OUTPATIENT
Start: 2025-04-02

## 2025-04-02 RX ORDER — MAGNESIUM HYDROXIDE 1200 MG/15ML
LIQUID ORAL CONTINUOUS PRN
Status: DISCONTINUED | OUTPATIENT
Start: 2025-04-02 | End: 2025-04-02 | Stop reason: HOSPADM

## 2025-04-02 RX ORDER — ULTRASOUND COUPLING MEDIUM
GEL (GRAM) TOPICAL PRN
Status: DISCONTINUED | OUTPATIENT
Start: 2025-04-02 | End: 2025-04-02 | Stop reason: HOSPADM

## 2025-04-02 RX ORDER — SODIUM CHLORIDE 0.9 % (FLUSH) 0.9 %
5-40 SYRINGE (ML) INJECTION EVERY 12 HOURS SCHEDULED
Status: CANCELLED | OUTPATIENT
Start: 2025-04-02

## 2025-04-02 RX ORDER — DIPHENHYDRAMINE HYDROCHLORIDE 50 MG/ML
12.5 INJECTION, SOLUTION INTRAMUSCULAR; INTRAVENOUS
Status: CANCELLED | OUTPATIENT
Start: 2025-04-02

## 2025-04-02 RX ORDER — MEPERIDINE HYDROCHLORIDE 50 MG/ML
12.5 INJECTION INTRAMUSCULAR; INTRAVENOUS; SUBCUTANEOUS EVERY 5 MIN PRN
Refills: 0 | Status: CANCELLED | OUTPATIENT
Start: 2025-04-02

## 2025-04-02 RX ORDER — PHENAZOPYRIDINE HYDROCHLORIDE 100 MG/1
100 TABLET, FILM COATED ORAL 3 TIMES DAILY PRN
Qty: 9 TABLET | Refills: 0 | Status: SHIPPED | OUTPATIENT
Start: 2025-04-02 | End: 2025-04-05

## 2025-04-02 RX ORDER — PHENAZOPYRIDINE HYDROCHLORIDE 100 MG/1
100 TABLET, FILM COATED ORAL 3 TIMES DAILY PRN
Qty: 9 TABLET | Refills: 0 | Status: SHIPPED | OUTPATIENT
Start: 2025-04-02 | End: 2025-04-02

## 2025-04-02 RX ORDER — ONDANSETRON 2 MG/ML
INJECTION INTRAMUSCULAR; INTRAVENOUS
Status: DISCONTINUED | OUTPATIENT
Start: 2025-04-02 | End: 2025-04-02 | Stop reason: SDUPTHER

## 2025-04-02 RX ORDER — EPHEDRINE SULFATE/0.9% NACL/PF 25 MG/5 ML
SYRINGE (ML) INTRAVENOUS
Status: DISCONTINUED | OUTPATIENT
Start: 2025-04-02 | End: 2025-04-02 | Stop reason: SDUPTHER

## 2025-04-02 RX ORDER — METOCLOPRAMIDE HYDROCHLORIDE 5 MG/ML
10 INJECTION INTRAMUSCULAR; INTRAVENOUS
Status: CANCELLED | OUTPATIENT
Start: 2025-04-02

## 2025-04-02 RX ORDER — MIDAZOLAM HYDROCHLORIDE 1 MG/ML
INJECTION, SOLUTION INTRAMUSCULAR; INTRAVENOUS
Status: DISCONTINUED | OUTPATIENT
Start: 2025-04-02 | End: 2025-04-02 | Stop reason: SDUPTHER

## 2025-04-02 RX ORDER — NALOXONE HYDROCHLORIDE 0.4 MG/ML
INJECTION, SOLUTION INTRAMUSCULAR; INTRAVENOUS; SUBCUTANEOUS PRN
Status: CANCELLED | OUTPATIENT
Start: 2025-04-02

## 2025-04-02 RX ORDER — SODIUM CHLORIDE, SODIUM LACTATE, POTASSIUM CHLORIDE, CALCIUM CHLORIDE 600; 310; 30; 20 MG/100ML; MG/100ML; MG/100ML; MG/100ML
INJECTION, SOLUTION INTRAVENOUS CONTINUOUS
Status: DISCONTINUED | OUTPATIENT
Start: 2025-04-02 | End: 2025-04-02 | Stop reason: HOSPADM

## 2025-04-02 RX ADMIN — Medication 100 MCG: at 14:10

## 2025-04-02 RX ADMIN — FENTANYL CITRATE 25 MCG: 50 INJECTION, SOLUTION INTRAMUSCULAR; INTRAVENOUS at 14:35

## 2025-04-02 RX ADMIN — LIDOCAINE HYDROCHLORIDE 50 MG: 10 INJECTION, SOLUTION EPIDURAL; INFILTRATION; INTRACAUDAL; PERINEURAL at 14:02

## 2025-04-02 RX ADMIN — FENTANYL CITRATE 50 MCG: 50 INJECTION, SOLUTION INTRAMUSCULAR; INTRAVENOUS at 14:02

## 2025-04-02 RX ADMIN — Medication 2000 MG: at 14:07

## 2025-04-02 RX ADMIN — MIDAZOLAM 2 MG: 1 INJECTION INTRAMUSCULAR; INTRAVENOUS at 13:59

## 2025-04-02 RX ADMIN — EPHEDRINE SULFATE 5 MG: 5 INJECTION INTRAVENOUS at 14:16

## 2025-04-02 RX ADMIN — FENTANYL CITRATE 25 MCG: 50 INJECTION, SOLUTION INTRAMUSCULAR; INTRAVENOUS at 14:30

## 2025-04-02 RX ADMIN — DEXAMETHASONE SODIUM PHOSPHATE 4 MG: 10 INJECTION INTRAMUSCULAR; INTRAVENOUS at 14:30

## 2025-04-02 RX ADMIN — Medication 100 MCG: at 14:40

## 2025-04-02 RX ADMIN — PROPOFOL 130 MG: 10 INJECTION, EMULSION INTRAVENOUS at 14:02

## 2025-04-02 RX ADMIN — SODIUM CHLORIDE, POTASSIUM CHLORIDE, SODIUM LACTATE AND CALCIUM CHLORIDE: 600; 310; 30; 20 INJECTION, SOLUTION INTRAVENOUS at 13:58

## 2025-04-02 RX ADMIN — Medication 100 MCG: at 14:21

## 2025-04-02 RX ADMIN — ONDANSETRON 4 MG: 2 INJECTION, SOLUTION INTRAMUSCULAR; INTRAVENOUS at 14:30

## 2025-04-02 ASSESSMENT — PAIN - FUNCTIONAL ASSESSMENT: PAIN_FUNCTIONAL_ASSESSMENT: NONE - DENIES PAIN

## 2025-04-02 NOTE — H&P
Pre-op History and Physical      Patient:  Koby Otero  MRN: [id number]  YOB: 1950    HISTORY OF PRESENT ILLNESS:     The patient is a 74 y.o. male who presents with history of bladder cancer. Cysto in office revealed small tumor on anterior bladder wall. Here for cystoscopy, bladder biopsy with fulguration.    Patient's old records, notes and chart reviewed and summarized above.     I independently reviewed the images and verified the radiology reports from:    No results found.      Past Medical History:    Past Medical History:   Diagnosis Date    AAA (abdominal aortic aneurysm)     \"stable\" 3 cm on CT    Abdominal pain, epigastric 02/05/2024    Abnormal EKG 05/13/2020    Abnormal electrocardiogram (ECG) (EKG)     Adenomatous polyp 03/31/2022    Arthritis     osteoarthritis    Bile duct calculus without cholecystitis and no obstruction 05/10/2024    Bladder cancer (HCC) 03/2021    bladder    Bleeding ulcer 2015    Chronic neck pain     Chronic use of opiate for therapeutic purpose 05/31/2022    Colon polyps 10/08/2019    tubular adenoma x2    COVID-19 03/06/2023    ill x 1 day per wife    COVID-19 virus infection 01/10/2022    Slight cough.    Diabetic neuropathy (HCC)     Diarrhea     Enteritis 03/16/2023    pain x 1 day, resolved after meds in ER and started on bentyl    Essential hypertension 05/12/2020    managed by Dr. Bentley PCP    Extrasystoles 10/04/2022    Fatty liver 02/15/2023    GERD (gastroesophageal reflux disease)     Hepatitis B core antibody positive     History of bleeding peptic ulcer     History of blood transfusion     last one 2021--no reactions    History of colon polyps 10/19/2024    History of hepatitis C     completed treatment for this    History of migraine headaches     History of stress test 07/2020    \"low risk\"    Hx of hepatitis C 08/11/2022    Hyperlipidemia     IBS (irritable bowel syndrome)     diarrhea    Incisional hernia without obstruction or gangrene   hepatosplenomegaly or hernia, CVA tenderness none  Bladder: Non-tender and non-disdended   : Non-tender, skin intact, no lesions       LABS:   No results for input(s): \"WBC\", \"HGB\", \"HCT\", \"MCV\", \"PLT\" in the last 72 hours.  No results for input(s): \"NA\", \"K\", \"CL\", \"CO2\", \"PHOS\", \"BUN\", \"CREATININE\" in the last 72 hours.    Invalid input(s): \"CA\"  Lab Results   Component Value Date    PSA 1.01 09/19/2022    PSA 0.65 08/19/2022    PSA 0.62 03/17/2021         Urinalysis: No results for input(s): \"COLORU\", \"PHUR\", \"LABCAST\", \"WBCUA\", \"RBCUA\", \"MUCUS\", \"TRICHOMONAS\", \"YEAST\", \"BACTERIA\", \"CLARITYU\", \"SPECGRAV\", \"LEUKOCYTESUR\", \"UROBILINOGEN\", \"BILIRUBINUR\", \"BLOODU\" in the last 72 hours.    Invalid input(s): \"NITRATE\", \"GLUCOSEUKETONESUAMORPHOUS\"     -----------------------------------------------------------------    ASSESSMENT AND PLAN:    Impression:    Bladder tumor        Plan: Cystoscopy, bladder biopsy with fulguration in OR today.    Consent obtained      Tamika Carmona PA-C  10:21 AM 4/2/2025

## 2025-04-02 NOTE — OP NOTE
Operative Note      Patient: Koby Otero  YOB: 1950  MRN: [id number]    Date of Procedure: 4/2/2025    Pre-Op Diagnosis Codes:      * Malignant neoplasm of lateral wall of urinary bladder (HCC) [C67.2]    Post-Op Diagnosis: Same       Procedure(s):  CYSTOSCOPY BLADDER BIOPSY, FULGURATION    Surgeon(s):  Joseph Lozano MD    Assistant:   Resident: Ulises Hathaway MD    Anesthesia: Monitor Anesthesia Care    Estimated Blood Loss (mL): Minimal    Complications: None    Specimens:   ID Type Source Tests Collected by Time Destination   A : BLADDER BIOPSY Tissue Bladder SURGICAL PATHOLOGY Joseph Lozano MD 4/2/2025 1435        Implants:  * No implants in log *      Drains: * No LDAs found *    Findings:  Cystoscopy with bladder biopsy and fulguration: Pan cystoscopy revealed moderate to severe bladder trabeculations with 1 larger diverticuli near the posterior bladder dome.  Small sessile 3 to 5 mm lesion located on the anterior bladder dome which was biopsied and sent for pathology.  Fulguration using Bugbee was for performed and hemostasis was excellent.    Indication: Koby Otero is a 74 y.o. male with history of bladder cancer status post multiple TURBTs, recently found to have small anterior bladder wall tumor on flexible in office cystoscopy. He is here today for biopsy of the prostate.Risks benefits and alternatives goals and possible complications of the procedure were explained to the patient for consent was obtained.  He elected to proceed.    Patient was brought back to the operating room and transferred onto the operating room table.  Patient was sterilely prepped and draped in a proper timeout was performed with all parties present and available.  We began by placing cystoscope on a 22 Croatian sheath into the urethral meatus and advanced into the urinary bladder.  Pan cystoscopy revealed moderate to severe bladder trabeculations with 1 large diverticuli near the posterior bladder dome.

## 2025-04-02 NOTE — ANESTHESIA PRE PROCEDURE
Problem List:    Patient Active Problem List   Diagnosis Code   • Degenerative disc disease, lumbar M51.369   • Lumbar spondylosis M47.816   • Lumbar radiculopathy M54.16   • Lumbar spinal stenosis M48.061   • Cervical spondylitis M46.92   • S/P cervical spinal fusion Z98.1   • Osteoarthritis of spine with radiculopathy, lumbar region M47.26   • Iron deficiency anemia D50.9   • Hep C w/o coma, chronic (HCC) B18.2   • Peripheral polyneuropathy G62.9   • Essential hypertension I10   • Mixed hyperlipidemia E78.2   • Vitamin D deficiency E55.9   • Vitamin B 12 deficiency E53.8   • Type 2 diabetes mellitus with diabetic polyneuropathy, without long-term current use of insulin (HCC) E11.42   • Irritable bowel syndrome with diarrhea K58.0   • Chronic hepatitis C without hepatic coma (HCC) B18.2   • Status post hernia repair Z98.890, Z87.19   • Malignant neoplasm of lateral wall of urinary bladder (HCC) C67.2   • Gastroesophageal reflux disease with esophagitis without hemorrhage K21.00   • Iron deficiency anemia due to chronic blood loss D50.0   • Infrarenal abdominal aortic aneurysm (AAA) without rupture I71.43   • Fatty liver K76.0   • Anemia D64.9   • Chronic bilateral low back pain with bilateral sciatica M54.42, M54.41, G89.29   • Anxiety F41.9   • Generalized osteoarthritis M15.9   • Onychomycosis due to dermatophyte B35.1   • Allergic rhinitis due to allergen J30.9   • IPMN (intraductal papillary mucinous neoplasm) D49.0   • Klein's esophagus without dysplasia K22.70   • Tendinitis of left rotator cuff M75.82   • History of colon polyps Z86.0100   • Type 2 diabetes mellitus with diabetic microalbuminuria, without long-term current use of insulin (HCC) E11.29, R80.9   • Centrilobular emphysema (HCC) J43.2       Past Medical History:        Diagnosis Date   • AAA (abdominal aortic aneurysm)     \"stable\" 3 cm on CT   • Abdominal pain, epigastric 02/05/2024   • Abnormal EKG 05/13/2020   • Abnormal

## 2025-04-02 NOTE — DISCHARGE INSTRUCTIONS
Discharge instructions: Cystoscopy  You may experience pain and/or burning with urination and see blood in the urine after your procedure. This should resolve over the next few days.    Ok to discharge home in good condition  No heavy lifting, >10 lbs for today  Patient should avoid strenuous activity for today  Patient should walk moderately at home   Ok to shower   Patient may resume diet as tolerated  Please call attending physician or hospital  with questions  Call or go to ED if fever (> 101F), intractable nausea vomiting or pain, inability to urinate  Please take prescriptions as directed if prescribed      Patient should follow up with Dr. Lozano, in 1-2 weeks for biopsy results, call to confirm appointment

## 2025-04-02 NOTE — ANESTHESIA POSTPROCEDURE EVALUATION
Department of Anesthesiology  Postprocedure Note    Patient: Koby Otero  MRN: [id number]  YOB: 1950  Date of evaluation: 4/2/2025    Procedure Summary       Date: 04/02/25 Room / Location: [address]    Anesthesia Start: 1358 Anesthesia Stop: 1450    Procedure: CYSTOSCOPY BLADDER BIOPSY, FULGURATION Diagnosis:       Malignant neoplasm of lateral wall of urinary bladder (HCC)      (Malignant neoplasm of lateral wall of urinary bladder (HCC) [C67.2])    Surgeons: Joseph Lozano MD Responsible Provider: Cornell Ashton MD    Anesthesia Type: general ASA Status: 3            Anesthesia Type: No value filed.    Be Phase I: Be Score: 10    Be Phase II:      Anesthesia Post Evaluation    Patient location during evaluation: bedside  Patient participation: complete - patient participated  Level of consciousness: awake and alert  Airway patency: patent  Nausea & Vomiting: no nausea and no vomiting  Cardiovascular status: hemodynamically stable  Respiratory status: acceptable  Hydration status: stable  Comments: /83   Pulse 78   Temp 97.5 °F (36.4 °C) (Temporal)   Resp 18   Ht 1.753 m (5' 9\")   Wt 87.1 kg (192 lb)   SpO2 96%   BMI 28.35 kg/m²     Pain management: adequate    No notable events documented.

## 2025-04-07 ENCOUNTER — RESULTS FOLLOW-UP (OUTPATIENT)
Dept: FAMILY MEDICINE CLINIC | Age: 75
End: 2025-04-07

## 2025-04-07 ENCOUNTER — HOSPITAL ENCOUNTER (OUTPATIENT)
Age: 75
Discharge: HOME | End: 2025-04-07
Payer: MEDICARE

## 2025-04-07 DIAGNOSIS — E11.42 TYPE 2 DIABETES MELLITUS WITH DIABETIC POLYNEUROPATHY, WITHOUT LONG-TERM CURRENT USE OF INSULIN (HCC): ICD-10-CM

## 2025-04-07 DIAGNOSIS — R80.9 TYPE 2 DIABETES MELLITUS WITH DIABETIC MICROALBUMINURIA, WITHOUT LONG-TERM CURRENT USE OF INSULIN (HCC): ICD-10-CM

## 2025-04-07 DIAGNOSIS — E78.2 MIXED HYPERLIPIDEMIA: ICD-10-CM

## 2025-04-07 DIAGNOSIS — D64.9 ANEMIA, UNSPECIFIED TYPE: ICD-10-CM

## 2025-04-07 DIAGNOSIS — E55.9 VITAMIN D DEFICIENCY: ICD-10-CM

## 2025-04-07 DIAGNOSIS — E11.29 TYPE 2 DIABETES MELLITUS WITH DIABETIC MICROALBUMINURIA, WITHOUT LONG-TERM CURRENT USE OF INSULIN (HCC): ICD-10-CM

## 2025-04-07 DIAGNOSIS — I10 ESSENTIAL HYPERTENSION: ICD-10-CM

## 2025-04-07 DIAGNOSIS — C67.2 MALIGNANT NEOPLASM OF LATERAL WALL OF URINARY BLADDER (HCC): ICD-10-CM

## 2025-04-07 LAB
25(OH)D3 SERPL-MCNC: 37.7 NG/ML (ref 30–100)
ALBUMIN SERPL-MCNC: 4.2 G/DL (ref 3.5–5.2)
ALP SERPL-CCNC: 82 U/L (ref 40–129)
ALT SERPL-CCNC: 17 U/L (ref 10–50)
ANION GAP SERPL CALCULATED.3IONS-SCNC: 11 MMOL/L (ref 9–16)
AST SERPL-CCNC: 19 U/L (ref 10–50)
BASOPHILS # BLD: 0.1 K/UL (ref 0–0.2)
BASOPHILS NFR BLD: 1 % (ref 0–2)
BILIRUB SERPL-MCNC: 1 MG/DL (ref 0–1.2)
BUN SERPL-MCNC: 18 MG/DL (ref 8–23)
CALCIUM SERPL-MCNC: 9 MG/DL (ref 8.6–10.4)
CHLORIDE SERPL-SCNC: 103 MMOL/L (ref 98–107)
CHOLEST SERPL-MCNC: 124 MG/DL (ref 0–199)
CHOLESTEROL/HDL RATIO: 4.6
CO2 SERPL-SCNC: 27 MMOL/L (ref 20–31)
CREAT SERPL-MCNC: 0.9 MG/DL (ref 0.7–1.2)
CREAT UR-MCNC: 128 MG/DL (ref 39–259)
EOSINOPHIL # BLD: 0 K/UL (ref 0–0.4)
EOSINOPHILS RELATIVE PERCENT: 1 % (ref 0–4)
ERYTHROCYTE [DISTWIDTH] IN BLOOD BY AUTOMATED COUNT: 13.8 % (ref 11.5–14.9)
FERRITIN SERPL-MCNC: 126 NG/ML
FOLATE SERPL-MCNC: >40 NG/ML (ref 4.8–24.2)
GFR, ESTIMATED: >90 ML/MIN/1.73M2
GLUCOSE SERPL-MCNC: 138 MG/DL (ref 74–99)
HCT VFR BLD AUTO: 50.7 % (ref 41–53)
HDLC SERPL-MCNC: 27 MG/DL
HGB BLD-MCNC: 17.3 G/DL (ref 13.5–17.5)
IRON SATN MFR SERPL: 34 % (ref 20–55)
IRON SERPL-MCNC: 97 UG/DL (ref 61–157)
LDLC SERPL CALC-MCNC: 39 MG/DL (ref 0–100)
LYMPHOCYTES NFR BLD: 1.3 K/UL (ref 1–4.8)
LYMPHOCYTES RELATIVE PERCENT: 16 % (ref 24–44)
MCH RBC QN AUTO: 30.8 PG (ref 26–34)
MCHC RBC AUTO-ENTMCNC: 34.1 G/DL (ref 31–37)
MCV RBC AUTO: 90.4 FL (ref 80–100)
MICROALBUMIN UR-MCNC: 64 MG/L (ref 0–20)
MICROALBUMIN/CREAT UR-RTO: 50 MCG/MG CREAT (ref 0–17)
MONOCYTES NFR BLD: 0.6 K/UL (ref 0.1–1.3)
MONOCYTES NFR BLD: 7 % (ref 1–7)
NEUTROPHILS NFR BLD: 75 % (ref 36–66)
NEUTS SEG NFR BLD: 6.1 K/UL (ref 1.3–9.1)
PLATELET # BLD AUTO: 200 K/UL (ref 150–450)
PMV BLD AUTO: 7.2 FL (ref 6–12)
POTASSIUM SERPL-SCNC: 3.9 MMOL/L (ref 3.7–5.3)
PROT SERPL-MCNC: 7.1 G/DL (ref 6.6–8.7)
RBC # BLD AUTO: 5.61 M/UL (ref 4.5–5.9)
SODIUM SERPL-SCNC: 141 MMOL/L (ref 136–145)
SURGICAL PATHOLOGY REPORT: NORMAL
TIBC SERPL-MCNC: 284 UG/DL (ref 250–450)
TRIGL SERPL-MCNC: 288 MG/DL (ref 0–149)
TSH SERPL DL<=0.05 MIU/L-ACNC: 1.14 UIU/ML (ref 0.27–4.2)
UNSATURATED IRON BINDING CAPACITY: 187 UG/DL (ref 112–347)
VIT B12 SERPL-MCNC: 502 PG/ML (ref 232–1245)
WBC OTHER # BLD: 8.2 K/UL (ref 3.5–11)

## 2025-04-07 PROCEDURE — 36415 COLL VENOUS BLD VENIPUNCTURE: CPT

## 2025-04-07 PROCEDURE — 82607 VITAMIN B-12: CPT

## 2025-04-07 PROCEDURE — 80061 LIPID PANEL: CPT

## 2025-04-07 PROCEDURE — 82306 VITAMIN D 25 HYDROXY: CPT

## 2025-04-07 PROCEDURE — 85025 COMPLETE CBC W/AUTO DIFF WBC: CPT

## 2025-04-07 PROCEDURE — 83540 ASSAY OF IRON: CPT

## 2025-04-07 PROCEDURE — 83550 IRON BINDING TEST: CPT

## 2025-04-07 PROCEDURE — 82570 ASSAY OF URINE CREATININE: CPT

## 2025-04-07 PROCEDURE — 80053 COMPREHEN METABOLIC PANEL: CPT

## 2025-04-07 PROCEDURE — 82043 UR ALBUMIN QUANTITATIVE: CPT

## 2025-04-07 PROCEDURE — 82746 ASSAY OF FOLIC ACID SERUM: CPT

## 2025-04-07 PROCEDURE — 82728 ASSAY OF FERRITIN: CPT

## 2025-04-07 PROCEDURE — 84443 ASSAY THYROID STIM HORMONE: CPT

## 2025-04-07 NOTE — RESULT ENCOUNTER NOTE
Please notify patient: Blood glucose 138 well-controlled diabetes  High triglycerides, cut down sweets, increase fruits and vegetables in diet, continue pravastatin 40 Mg daily  Improving proteins in the urine, continue lisinopril    Otherwise labs within normal limits  continue current treatment    Future Appointments  4/9/2025   10:30 AM   Bruno Brock MD         [facility]        [facility]  4/14/2025  8:10 AM    Joseph Lozano MD        [facility]           [facility]  4/16/2025  9:30 AM    Greta Bentley MD    [facility] DEP  10/17/2025 9:15 AM    Greta Bentley MD    [facility] DEP  11/4/2025  9:00 AM    Mohamud Garay MD         [facility]        [facility]

## 2025-04-08 DIAGNOSIS — Z98.1 S/P CERVICAL SPINAL FUSION: ICD-10-CM

## 2025-04-08 DIAGNOSIS — M51.369 DEGENERATIVE DISC DISEASE, LUMBAR: ICD-10-CM

## 2025-04-08 DIAGNOSIS — M54.16 LUMBAR RADICULOPATHY: ICD-10-CM

## 2025-04-08 DIAGNOSIS — M46.92 CERVICAL SPONDYLITIS: ICD-10-CM

## 2025-04-08 DIAGNOSIS — M47.816 LUMBAR SPONDYLOSIS: ICD-10-CM

## 2025-04-08 RX ORDER — BACLOFEN 10 MG/1
TABLET ORAL
Qty: 90 TABLET | Refills: 0 | Status: SHIPPED | OUTPATIENT
Start: 2025-04-08

## 2025-04-08 NOTE — TELEPHONE ENCOUNTER
Please Approve or Refuse.  Send to Pharmacy per Pt's Request:      Next Visit Date:  4/16/2025   Last Visit Date: 1/29/2025    Hemoglobin A1C (%)   Date Value   01/03/2025 5.3   08/21/2024 6.0   04/06/2024 5.6             ( goal A1C is < 7)   BP Readings from Last 3 Encounters:   04/02/25 (!) 144/78   03/21/25 137/87   03/10/25 138/88          (goal 120/80)  BUN   Date Value Ref Range Status   04/07/2025 18 8 - 23 mg/dL Final     Creatinine   Date Value Ref Range Status   04/07/2025 0.9 0.7 - 1.2 mg/dL Final     Potassium   Date Value Ref Range Status   04/07/2025 3.9 3.7 - 5.3 mmol/L Final

## 2025-04-09 ENCOUNTER — TELEPHONE (OUTPATIENT)
Dept: ONCOLOGY | Age: 75
End: 2025-04-09

## 2025-04-09 ENCOUNTER — RESULTS FOLLOW-UP (OUTPATIENT)
Dept: FAMILY MEDICINE CLINIC | Age: 75
End: 2025-04-09

## 2025-04-09 ENCOUNTER — OFFICE VISIT (OUTPATIENT)
Dept: ONCOLOGY | Age: 75
End: 2025-04-09
Payer: MEDICARE

## 2025-04-09 VITALS
RESPIRATION RATE: 18 BRPM | HEART RATE: 77 BPM | SYSTOLIC BLOOD PRESSURE: 128 MMHG | OXYGEN SATURATION: 96 % | TEMPERATURE: 98 F | DIASTOLIC BLOOD PRESSURE: 74 MMHG | BODY MASS INDEX: 27.87 KG/M2 | WEIGHT: 188.7 LBS

## 2025-04-09 DIAGNOSIS — E53.8 B12 DEFICIENCY: ICD-10-CM

## 2025-04-09 DIAGNOSIS — C67.2 MALIGNANT NEOPLASM OF LATERAL WALL OF URINARY BLADDER (HCC): ICD-10-CM

## 2025-04-09 DIAGNOSIS — D50.8 OTHER IRON DEFICIENCY ANEMIA: Primary | ICD-10-CM

## 2025-04-09 DIAGNOSIS — D64.9 ANEMIA, UNSPECIFIED TYPE: ICD-10-CM

## 2025-04-09 PROCEDURE — 3017F COLORECTAL CA SCREEN DOC REV: CPT | Performed by: INTERNAL MEDICINE

## 2025-04-09 PROCEDURE — 1123F ACP DISCUSS/DSCN MKR DOCD: CPT | Performed by: INTERNAL MEDICINE

## 2025-04-09 PROCEDURE — 3078F DIAST BP <80 MM HG: CPT | Performed by: INTERNAL MEDICINE

## 2025-04-09 PROCEDURE — 1036F TOBACCO NON-USER: CPT | Performed by: INTERNAL MEDICINE

## 2025-04-09 PROCEDURE — 99211 OFF/OP EST MAY X REQ PHY/QHP: CPT | Performed by: INTERNAL MEDICINE

## 2025-04-09 PROCEDURE — G8417 CALC BMI ABV UP PARAM F/U: HCPCS | Performed by: INTERNAL MEDICINE

## 2025-04-09 PROCEDURE — 1126F AMNT PAIN NOTED NONE PRSNT: CPT | Performed by: INTERNAL MEDICINE

## 2025-04-09 PROCEDURE — G8428 CUR MEDS NOT DOCUMENT: HCPCS | Performed by: INTERNAL MEDICINE

## 2025-04-09 PROCEDURE — 3074F SYST BP LT 130 MM HG: CPT | Performed by: INTERNAL MEDICINE

## 2025-04-09 PROCEDURE — 99214 OFFICE O/P EST MOD 30 MIN: CPT | Performed by: INTERNAL MEDICINE

## 2025-04-09 NOTE — RESULT ENCOUNTER NOTE
Management per urology, known bladder cancer, doing surveillance cystoscopy  Has upcoming appointment to discuss with urology    Future Appointments  4/14/2025  8:10 AM    Joseph Lozano MD        [facility]           TOP  4/16/2025  9:30 AM    Greta Bentley MD    fp [facility] DEP  8/13/2025  8:15 AM    Bruno Brock MD         [facility]        [facility]  10/17/2025 9:15 AM    Greta Bentley MD    [facility] DEP  11/4/2025  9:00 AM    Mohamud Garay MD         [facility]        [facility]

## 2025-04-09 NOTE — PROGRESS NOTES
Subjective:   PCP: Greta Bentley MD       Chief Complaint   Patient presents with    Follow-up     Review status of disease     Results     Bladder bx     Discuss Labs   Reviewed labs    INTERIM HISTORY:   History of Present Illness  The patient presents for evaluation of iron levels and vitamin B12 levels.    Biannual bladder biopsies have been conducted, with the most recent procedure performed on 04/02/2025. During the last visit, a small lesion measuring 3 to 5 mm was identified in the anterior bladder dome, which led to the biopsy. The pathology results of this biopsy are currently pending. The last BCG treatment was administered approximately one year ago.    The last iron infusion was administered in 08/2024. A regimen of monthly B12 injections is followed, with the most recent injection given on 04/08/2025.    During this visit patient's allergy, social, medical, surgical history and medications were reviewed and updated.    REVIEW OF SYSTEMS:   General: No fever or night sweats. Weight is stable.  +fatigue   ENT: No double or blurred vision, no hearing problem, no dysphagia or sore throat   Respiratory: No chest pain, no shortness of breath, no cough or hemoptysis.  Cardiovascular: Denies chest pain, PND or orthopnea. No L E swelling or palpitations.  Gastrointestinal: No nausea or vomiting, abdominal pain, or constipation, diarrhea  Genitourinary: Denies dysuria, frequency, urgency or incontinence.  +hematuria - resolved  Neurological: Denies headaches, decreased LOC, no sensory or motor focal deficits.   Musculoskeletal: No arthralgia no back pain or joint swelling.   Skin: There are no rashes or bleeding.  Psych: Denies hallucinations or intentions to harm self      PHYSICAL EXAM:   Vitals: /74   Pulse 77   Temp 98 °F (36.7 °C) (Temporal)   Resp 18   Wt 85.6 kg (188 lb 11.2 oz)   SpO2 96%   BMI 27.87 kg/m²   General appearance: alert and cooperative with exam  HEENT: Head:

## 2025-04-09 NOTE — TELEPHONE ENCOUNTER
Instructions   from Dr. Bruno Brock MD    Rv in 4 months with labs 1 week before rv         RV 8/13/25 at 8:15 am with labs to be done a week before.

## 2025-04-14 ENCOUNTER — OFFICE VISIT (OUTPATIENT)
Dept: UROLOGY | Age: 75
End: 2025-04-14
Payer: MEDICARE

## 2025-04-14 VITALS — SYSTOLIC BLOOD PRESSURE: 124 MMHG | TEMPERATURE: 98.1 F | HEART RATE: 82 BPM | DIASTOLIC BLOOD PRESSURE: 77 MMHG

## 2025-04-14 DIAGNOSIS — C67.2 MALIGNANT NEOPLASM OF LATERAL WALL OF URINARY BLADDER (HCC): Primary | ICD-10-CM

## 2025-04-14 PROCEDURE — 1123F ACP DISCUSS/DSCN MKR DOCD: CPT | Performed by: UROLOGY

## 2025-04-14 PROCEDURE — 99214 OFFICE O/P EST MOD 30 MIN: CPT | Performed by: UROLOGY

## 2025-04-14 PROCEDURE — 3078F DIAST BP <80 MM HG: CPT | Performed by: UROLOGY

## 2025-04-14 PROCEDURE — G8427 DOCREV CUR MEDS BY ELIG CLIN: HCPCS | Performed by: UROLOGY

## 2025-04-14 PROCEDURE — 3074F SYST BP LT 130 MM HG: CPT | Performed by: UROLOGY

## 2025-04-14 PROCEDURE — 1159F MED LIST DOCD IN RCRD: CPT | Performed by: UROLOGY

## 2025-04-14 PROCEDURE — G8417 CALC BMI ABV UP PARAM F/U: HCPCS | Performed by: UROLOGY

## 2025-04-14 PROCEDURE — 1036F TOBACCO NON-USER: CPT | Performed by: UROLOGY

## 2025-04-14 PROCEDURE — 3017F COLORECTAL CA SCREEN DOC REV: CPT | Performed by: UROLOGY

## 2025-04-14 ASSESSMENT — ENCOUNTER SYMPTOMS
VOMITING: 0
CONSTIPATION: 0
COUGH: 0
DIARRHEA: 0
ABDOMINAL PAIN: 0
EYE PAIN: 0
NAUSEA: 0
BACK PAIN: 0
SHORTNESS OF BREATH: 0
EYE REDNESS: 0
WHEEZING: 0

## 2025-04-14 NOTE — PROGRESS NOTES
Review of Systems   Constitutional:  Negative for chills, fatigue and fever.   Eyes:  Negative for pain, redness and visual disturbance.   Respiratory:  Negative for cough, shortness of breath and wheezing.    Cardiovascular:  Negative for chest pain and leg swelling.   Gastrointestinal:  Negative for abdominal pain, constipation, diarrhea, nausea and vomiting.   Genitourinary:  Negative for difficulty urinating, dysuria, flank pain, frequency, hematuria, scrotal swelling, testicular pain and urgency.   Musculoskeletal:  Negative for back pain, joint swelling and myalgias.   Skin:  Negative for rash and wound.   Neurological:  Negative for dizziness, tremors, weakness and numbness.   Hematological:  Does not bruise/bleed easily.     
extracted    ERCP N/A 10/30/2024    ENDOSCOPIC RETROGRADE CHOLANGIOPANCREATOGRAPHY WITH SPHICTEROTOMY AND BALLOON SWEEEPING AND SLUDGE REMOVAL performed by Mariah Reid MD at [facility] OR    HERNIA REPAIR N/A 2020    HERNIA INCISIONAL REPAIR LAPAROSCOPIC ROBOTIC WITH MESH performed by Jose Luis Hayes DO at [facility]    SHOULDER SURGERY Right     UMBILICAL HERNIA REPAIR      UPPER GASTROINTESTINAL ENDOSCOPY  2015    UPPER GASTROINTESTINAL ENDOSCOPY N/A 10/08/2019    EGD BIOPSY performed by Mariah Reid MD at [facility] ENDO    UPPER GASTROINTESTINAL ENDOSCOPY N/A 2022    EGD BIOPSY OF DUODENUM, GE JUNCTION AND GASTRIC ANTRUM performed by Mariah Reid MD at [facility] ENDO    UPPER GASTROINTESTINAL ENDOSCOPY N/A 2024    EGD BIOPSY performed by Mariah Reid MD at [facility] OR     Family History   Problem Relation Age of Onset    Diabetes Mother     Heart Disease Father     High Blood Pressure Father      No outpatient medications have been marked as taking for the 25 encounter (Office Visit) with Joseph Lozano MD.       Asa [aspirin], Ferrous sulfate, Iron, and Nsaids  Social History     Tobacco Use   Smoking Status Former    Current packs/day: 0.00    Average packs/day: 2.0 packs/day for 47.3 years (94.5 ttl pk-yrs)    Types: Cigarettes    Start date: 1968    Quit date: 2015    Years since quittin.3   Smokeless Tobacco Never     (Ifpatient a smoker, smoking cessation counseling offered)    Social History     Substance and Sexual Activity   Alcohol Use Not Currently       REVIEW OF SYSTEMS:  Review of Systems    Physical Exam:      Vitals:    25 0808   BP: 124/77   Pulse: 82   Temp: 98.1 °F (36.7 °C)     There is no height or weight on file to calculate BMI.  Patient is a 74 y.o. male in no acute distress and alert and oriented to person, place and time.  Physical Exam  Constitutional: Patient in no acute distress.  Neuro: Alert and oriented to person, place and

## 2025-04-17 DIAGNOSIS — I10 ESSENTIAL HYPERTENSION: ICD-10-CM

## 2025-04-17 RX ORDER — METOPROLOL TARTRATE 25 MG/1
25 TABLET, FILM COATED ORAL 2 TIMES DAILY
Qty: 180 TABLET | Refills: 3 | Status: SHIPPED | OUTPATIENT
Start: 2025-04-17

## 2025-04-27 DIAGNOSIS — E55.9 VITAMIN D DEFICIENCY: ICD-10-CM

## 2025-04-27 RX ORDER — ERGOCALCIFEROL 1.25 MG/1
CAPSULE, LIQUID FILLED ORAL
Qty: 12 CAPSULE | Refills: 0 | Status: SHIPPED | OUTPATIENT
Start: 2025-04-27

## 2025-04-28 RX ORDER — FOLIC ACID 1 MG/1
1000 TABLET ORAL DAILY
Qty: 90 TABLET | Refills: 1 | Status: SHIPPED | OUTPATIENT
Start: 2025-04-28

## 2025-04-30 DIAGNOSIS — M51.369 DEGENERATIVE DISC DISEASE, LUMBAR: ICD-10-CM

## 2025-04-30 DIAGNOSIS — M54.16 LUMBAR RADICULOPATHY: ICD-10-CM

## 2025-04-30 DIAGNOSIS — M46.92 CERVICAL SPONDYLITIS: ICD-10-CM

## 2025-04-30 DIAGNOSIS — M47.816 LUMBAR SPONDYLOSIS: ICD-10-CM

## 2025-04-30 DIAGNOSIS — Z98.1 S/P CERVICAL SPINAL FUSION: ICD-10-CM

## 2025-04-30 RX ORDER — BACLOFEN 10 MG/1
TABLET ORAL
Qty: 90 TABLET | Refills: 0 | Status: SHIPPED | OUTPATIENT
Start: 2025-04-30

## 2025-04-30 NOTE — TELEPHONE ENCOUNTER
Please Approve or Refuse.  Send to Pharmacy per Pt's Request:      Next Visit Date:  10/17/2025   Last Visit Date: 1/29/2025    Hemoglobin A1C (%)   Date Value   01/03/2025 5.3   08/21/2024 6.0   04/06/2024 5.6             ( goal A1C is < 7)   BP Readings from Last 3 Encounters:   04/14/25 124/77   04/09/25 128/74   04/02/25 (!) 144/78          (goal 120/80)  BUN   Date Value Ref Range Status   04/07/2025 18 8 - 23 mg/dL Final     Creatinine   Date Value Ref Range Status   04/07/2025 0.9 0.7 - 1.2 mg/dL Final     Potassium   Date Value Ref Range Status   04/07/2025 3.9 3.7 - 5.3 mmol/L Final

## 2025-05-21 DIAGNOSIS — D50.8 OTHER IRON DEFICIENCY ANEMIA: ICD-10-CM

## 2025-05-21 DIAGNOSIS — D64.9 ANEMIA, UNSPECIFIED TYPE: ICD-10-CM

## 2025-05-21 DIAGNOSIS — J30.89 NON-SEASONAL ALLERGIC RHINITIS DUE TO OTHER ALLERGIC TRIGGER: ICD-10-CM

## 2025-05-21 DIAGNOSIS — E53.8 B12 DEFICIENCY: ICD-10-CM

## 2025-05-27 RX ORDER — LEVOCETIRIZINE DIHYDROCHLORIDE 5 MG/1
5 TABLET, FILM COATED ORAL NIGHTLY
Qty: 90 TABLET | Refills: 0 | Status: SHIPPED | OUTPATIENT
Start: 2025-05-27

## 2025-06-02 DIAGNOSIS — M47.816 LUMBAR SPONDYLOSIS: ICD-10-CM

## 2025-06-02 DIAGNOSIS — M46.92 CERVICAL SPONDYLITIS: ICD-10-CM

## 2025-06-02 DIAGNOSIS — Z98.1 S/P CERVICAL SPINAL FUSION: ICD-10-CM

## 2025-06-02 DIAGNOSIS — M51.369 DEGENERATIVE DISC DISEASE, LUMBAR: ICD-10-CM

## 2025-06-02 DIAGNOSIS — M54.16 LUMBAR RADICULOPATHY: ICD-10-CM

## 2025-06-03 RX ORDER — BACLOFEN 10 MG/1
TABLET ORAL
Qty: 90 TABLET | Refills: 0 | Status: SHIPPED | OUTPATIENT
Start: 2025-06-03

## 2025-06-03 NOTE — TELEPHONE ENCOUNTER
Please Approve or Refuse.  Send to Pharmacy per Pt's Request: daniel      Next Visit Date:  10/17/2025   Last Visit Date: 1/29/2025    Hemoglobin A1C (%)   Date Value   01/03/2025 5.3   08/21/2024 6.0   04/06/2024 5.6             ( goal A1C is < 7)   BP Readings from Last 3 Encounters:   04/14/25 124/77   04/09/25 128/74   04/02/25 (!) 144/78          (goal 120/80)  BUN   Date Value Ref Range Status   04/07/2025 18 8 - 23 mg/dL Final     Creatinine   Date Value Ref Range Status   04/07/2025 0.9 0.7 - 1.2 mg/dL Final     Potassium   Date Value Ref Range Status   04/07/2025 3.9 3.7 - 5.3 mmol/L Final

## 2025-06-29 DIAGNOSIS — M51.369 DEGENERATIVE DISC DISEASE, LUMBAR: ICD-10-CM

## 2025-06-29 DIAGNOSIS — Z98.1 S/P CERVICAL SPINAL FUSION: ICD-10-CM

## 2025-06-29 DIAGNOSIS — M47.816 LUMBAR SPONDYLOSIS: ICD-10-CM

## 2025-06-29 DIAGNOSIS — M54.16 LUMBAR RADICULOPATHY: ICD-10-CM

## 2025-06-29 DIAGNOSIS — M46.92 CERVICAL SPONDYLITIS: ICD-10-CM

## 2025-06-30 DIAGNOSIS — E78.5 HYPERLIPIDEMIA WITH TARGET LDL LESS THAN 100: ICD-10-CM

## 2025-06-30 RX ORDER — PRAVASTATIN SODIUM 40 MG
40 TABLET ORAL NIGHTLY
Qty: 90 TABLET | Refills: 3 | Status: SHIPPED | OUTPATIENT
Start: 2025-06-30

## 2025-06-30 RX ORDER — PRAVASTATIN SODIUM 40 MG
40 TABLET ORAL NIGHTLY
Qty: 90 TABLET | Refills: 3 | Status: SHIPPED | OUTPATIENT
Start: 2025-06-30 | End: 2025-06-30 | Stop reason: SDUPTHER

## 2025-06-30 RX ORDER — BACLOFEN 10 MG/1
TABLET ORAL
Qty: 90 TABLET | Refills: 0 | Status: SHIPPED | OUTPATIENT
Start: 2025-06-30

## 2025-07-14 ENCOUNTER — PROCEDURE VISIT (OUTPATIENT)
Dept: UROLOGY | Age: 75
End: 2025-07-14
Payer: MEDICARE

## 2025-07-14 DIAGNOSIS — C67.2 MALIGNANT NEOPLASM OF LATERAL WALL OF URINARY BLADDER (HCC): Primary | ICD-10-CM

## 2025-07-14 PROCEDURE — 52000 CYSTOURETHROSCOPY: CPT | Performed by: UROLOGY

## 2025-07-14 NOTE — PROGRESS NOTES
Cystoscopy Operative Note (7/14/25)  Surgeon: Joseph Lozano MD   Anesthesia: Urethral 2% Xylocaine   Indications: Bladder Cancer  Position: Dorsal Lithotomy    Findings:   Risks and Benefits discussed with patient prior to procedure.  The patient was prepped and draped in the usual sterile fashion.  The flexible cystoscope was advanced through the urethra and into the bladder.  The bladder was thoroughly inspected and the following was noted:    Residual Urine: none  Urethra: normal appearing urethra with no masses, tenderness or lesions  Prostate: partially obstructing lateral lobes of prostate; median lobe present? no.   Bladder: No tumors or CIS noted.  No bladder diverticulum.  There was none trabeculation noted.  Ureters: Clear efflux from both ureters.  Orifices with normal configuration and location.    The cystoscope was removed.  The patient tolerated the procedure well.     Agree with the ROS entered by the MA.      Plan: cysto in 3 mo

## 2025-07-15 DIAGNOSIS — E55.9 VITAMIN D DEFICIENCY: ICD-10-CM

## 2025-07-15 RX ORDER — ERGOCALCIFEROL 1.25 MG/1
CAPSULE, LIQUID FILLED ORAL
Qty: 12 CAPSULE | Refills: 4 | Status: SHIPPED | OUTPATIENT
Start: 2025-07-15

## 2025-07-28 DIAGNOSIS — M54.16 LUMBAR RADICULOPATHY: ICD-10-CM

## 2025-07-28 DIAGNOSIS — M47.816 LUMBAR SPONDYLOSIS: ICD-10-CM

## 2025-07-28 DIAGNOSIS — M46.92 CERVICAL SPONDYLITIS: ICD-10-CM

## 2025-07-28 DIAGNOSIS — Z98.1 S/P CERVICAL SPINAL FUSION: ICD-10-CM

## 2025-07-28 DIAGNOSIS — M51.369 DEGENERATIVE DISC DISEASE, LUMBAR: ICD-10-CM

## 2025-07-29 RX ORDER — BACLOFEN 10 MG/1
TABLET ORAL
Qty: 90 TABLET | Refills: 0 | Status: SHIPPED | OUTPATIENT
Start: 2025-07-29

## 2025-08-09 ENCOUNTER — HOSPITAL ENCOUNTER (OUTPATIENT)
Dept: LAB | Age: 75
Discharge: HOME | End: 2025-08-09
Payer: MEDICARE

## 2025-08-09 DIAGNOSIS — C67.2 MALIGNANT NEOPLASM OF LATERAL WALL OF URINARY BLADDER (HCC): ICD-10-CM

## 2025-08-09 DIAGNOSIS — E53.8 B12 DEFICIENCY: ICD-10-CM

## 2025-08-09 DIAGNOSIS — D50.8 OTHER IRON DEFICIENCY ANEMIA: ICD-10-CM

## 2025-08-09 DIAGNOSIS — D64.9 ANEMIA, UNSPECIFIED TYPE: ICD-10-CM

## 2025-08-09 LAB
BASOPHILS # BLD: 0.06 K/UL (ref 0–0.2)
BASOPHILS NFR BLD: 1 % (ref 0–2)
EOSINOPHIL # BLD: 0.03 K/UL (ref 0–0.44)
EOSINOPHILS RELATIVE PERCENT: 1 % (ref 0–4)
ERYTHROCYTE [DISTWIDTH] IN BLOOD BY AUTOMATED COUNT: 13 % (ref 11.5–14.9)
FERRITIN SERPL-MCNC: 31 NG/ML
HCT VFR BLD AUTO: 50.8 % (ref 41–53)
HGB BLD-MCNC: 16.8 G/DL (ref 13.5–17.5)
IMM GRANULOCYTES # BLD AUTO: 0.04 K/UL (ref 0–0.3)
IMM GRANULOCYTES NFR BLD: 1 %
IRON SATN MFR SERPL: 18 % (ref 20–55)
IRON SERPL-MCNC: 62 UG/DL (ref 61–157)
LYMPHOCYTES NFR BLD: 1.25 K/UL (ref 1.1–3.7)
LYMPHOCYTES RELATIVE PERCENT: 20 % (ref 24–44)
MCH RBC QN AUTO: 30.1 PG (ref 26–34)
MCHC RBC AUTO-ENTMCNC: 33.1 G/DL (ref 31–37)
MCV RBC AUTO: 91 FL (ref 80–100)
MONOCYTES NFR BLD: 0.37 K/UL (ref 0.1–1.2)
MONOCYTES NFR BLD: 6 % (ref 3–12)
NEUTROPHILS NFR BLD: 71 % (ref 36–66)
NEUTS SEG NFR BLD: 4.54 K/UL (ref 1.5–8.1)
NRBC BLD-RTO: 0 PER 100 WBC
PLATELET # BLD AUTO: 199 K/UL (ref 150–450)
PMV BLD AUTO: 9.3 FL (ref 8–13.5)
RBC # BLD AUTO: 5.58 M/UL (ref 4.21–5.77)
TIBC SERPL-MCNC: 339 UG/DL (ref 250–450)
UNSATURATED IRON BINDING CAPACITY: 277 UG/DL (ref 112–347)
VIT B12 SERPL-MCNC: 1074 PG/ML (ref 232–1245)
WBC OTHER # BLD: 6.3 K/UL (ref 3.5–11)

## 2025-08-09 PROCEDURE — 82728 ASSAY OF FERRITIN: CPT

## 2025-08-09 PROCEDURE — 82607 VITAMIN B-12: CPT

## 2025-08-09 PROCEDURE — 83540 ASSAY OF IRON: CPT

## 2025-08-09 PROCEDURE — 85025 COMPLETE CBC W/AUTO DIFF WBC: CPT

## 2025-08-09 PROCEDURE — 36415 COLL VENOUS BLD VENIPUNCTURE: CPT

## 2025-08-09 PROCEDURE — 83550 IRON BINDING TEST: CPT

## 2025-08-10 DIAGNOSIS — K21.9 GASTROESOPHAGEAL REFLUX DISEASE WITHOUT ESOPHAGITIS: ICD-10-CM

## 2025-08-11 RX ORDER — PANTOPRAZOLE SODIUM 40 MG/1
40 TABLET, DELAYED RELEASE ORAL DAILY
Qty: 90 TABLET | Refills: 1 | Status: SHIPPED | OUTPATIENT
Start: 2025-08-11

## 2025-08-13 ENCOUNTER — OFFICE VISIT (OUTPATIENT)
Age: 75
End: 2025-08-13
Payer: MEDICARE

## 2025-08-13 ENCOUNTER — TELEPHONE (OUTPATIENT)
Age: 75
End: 2025-08-13

## 2025-08-13 VITALS
TEMPERATURE: 98.3 F | HEART RATE: 90 BPM | BODY MASS INDEX: 28.37 KG/M2 | SYSTOLIC BLOOD PRESSURE: 158 MMHG | WEIGHT: 192.1 LBS | OXYGEN SATURATION: 95 % | DIASTOLIC BLOOD PRESSURE: 90 MMHG

## 2025-08-13 DIAGNOSIS — D50.0 IRON DEFICIENCY ANEMIA DUE TO CHRONIC BLOOD LOSS: Primary | ICD-10-CM

## 2025-08-13 DIAGNOSIS — E53.8 B12 DEFICIENCY: ICD-10-CM

## 2025-08-13 DIAGNOSIS — C68.9 UROTHELIAL CANCER (HCC): ICD-10-CM

## 2025-08-13 PROCEDURE — 3080F DIAST BP >= 90 MM HG: CPT | Performed by: INTERNAL MEDICINE

## 2025-08-13 PROCEDURE — G8417 CALC BMI ABV UP PARAM F/U: HCPCS | Performed by: INTERNAL MEDICINE

## 2025-08-13 PROCEDURE — 3017F COLORECTAL CA SCREEN DOC REV: CPT | Performed by: INTERNAL MEDICINE

## 2025-08-13 PROCEDURE — G8427 DOCREV CUR MEDS BY ELIG CLIN: HCPCS | Performed by: INTERNAL MEDICINE

## 2025-08-13 PROCEDURE — 1126F AMNT PAIN NOTED NONE PRSNT: CPT | Performed by: INTERNAL MEDICINE

## 2025-08-13 PROCEDURE — 1159F MED LIST DOCD IN RCRD: CPT | Performed by: INTERNAL MEDICINE

## 2025-08-13 PROCEDURE — 3077F SYST BP >= 140 MM HG: CPT | Performed by: INTERNAL MEDICINE

## 2025-08-13 PROCEDURE — 99214 OFFICE O/P EST MOD 30 MIN: CPT | Performed by: INTERNAL MEDICINE

## 2025-08-13 PROCEDURE — 1036F TOBACCO NON-USER: CPT | Performed by: INTERNAL MEDICINE

## 2025-08-13 PROCEDURE — 1123F ACP DISCUSS/DSCN MKR DOCD: CPT | Performed by: INTERNAL MEDICINE

## 2025-08-13 RX ORDER — HEPARIN SODIUM (PORCINE) LOCK FLUSH IV SOLN 100 UNIT/ML 100 UNIT/ML
500 SOLUTION INTRAVENOUS PRN
OUTPATIENT
Start: 2025-08-13

## 2025-08-13 RX ORDER — SODIUM CHLORIDE 9 MG/ML
INJECTION, SOLUTION INTRAVENOUS PRN
OUTPATIENT
Start: 2025-08-13

## 2025-08-13 RX ORDER — SODIUM CHLORIDE 9 MG/ML
5-250 INJECTION, SOLUTION INTRAVENOUS PRN
OUTPATIENT
Start: 2025-08-13

## 2025-08-13 RX ORDER — ALBUTEROL SULFATE 90 UG/1
4 INHALANT RESPIRATORY (INHALATION) PRN
OUTPATIENT
Start: 2025-08-13

## 2025-08-13 RX ORDER — FAMOTIDINE 10 MG/ML
20 INJECTION, SOLUTION INTRAVENOUS
OUTPATIENT
Start: 2025-08-13

## 2025-08-13 RX ORDER — DIPHENHYDRAMINE HYDROCHLORIDE 50 MG/ML
50 INJECTION, SOLUTION INTRAMUSCULAR; INTRAVENOUS
OUTPATIENT
Start: 2025-08-13

## 2025-08-13 RX ORDER — HYDROCORTISONE SODIUM SUCCINATE 100 MG/2ML
100 INJECTION INTRAMUSCULAR; INTRAVENOUS
OUTPATIENT
Start: 2025-08-13

## 2025-08-13 RX ORDER — SODIUM CHLORIDE 0.9 % (FLUSH) 0.9 %
5-40 SYRINGE (ML) INJECTION PRN
OUTPATIENT
Start: 2025-08-13

## 2025-08-13 RX ORDER — ACETAMINOPHEN 325 MG/1
650 TABLET ORAL
OUTPATIENT
Start: 2025-08-13

## 2025-08-13 RX ORDER — ONDANSETRON 2 MG/ML
8 INJECTION INTRAMUSCULAR; INTRAVENOUS
OUTPATIENT
Start: 2025-08-13

## 2025-08-13 RX ORDER — EPINEPHRINE 1 MG/ML
0.3 INJECTION, SOLUTION, CONCENTRATE INTRAVENOUS PRN
OUTPATIENT
Start: 2025-08-13

## 2025-08-20 DIAGNOSIS — D64.9 ANEMIA, UNSPECIFIED TYPE: ICD-10-CM

## 2025-08-20 DIAGNOSIS — J30.89 NON-SEASONAL ALLERGIC RHINITIS DUE TO OTHER ALLERGIC TRIGGER: ICD-10-CM

## 2025-08-20 DIAGNOSIS — E53.8 B12 DEFICIENCY: ICD-10-CM

## 2025-08-20 DIAGNOSIS — D50.8 OTHER IRON DEFICIENCY ANEMIA: ICD-10-CM

## 2025-08-21 DIAGNOSIS — D50.8 OTHER IRON DEFICIENCY ANEMIA: ICD-10-CM

## 2025-08-21 DIAGNOSIS — J30.89 NON-SEASONAL ALLERGIC RHINITIS DUE TO OTHER ALLERGIC TRIGGER: ICD-10-CM

## 2025-08-21 DIAGNOSIS — E53.8 B12 DEFICIENCY: ICD-10-CM

## 2025-08-21 DIAGNOSIS — D64.9 ANEMIA, UNSPECIFIED TYPE: ICD-10-CM

## 2025-08-21 RX ORDER — LEVOCETIRIZINE DIHYDROCHLORIDE 5 MG/1
5 TABLET, FILM COATED ORAL NIGHTLY
Qty: 90 TABLET | Refills: 3 | Status: SHIPPED | OUTPATIENT
Start: 2025-08-21

## 2025-08-21 RX ORDER — LEVOCETIRIZINE DIHYDROCHLORIDE 5 MG/1
5 TABLET, FILM COATED ORAL NIGHTLY
Qty: 90 TABLET | Refills: 0 | OUTPATIENT
Start: 2025-08-21

## 2025-08-24 DIAGNOSIS — M46.92 CERVICAL SPONDYLITIS: ICD-10-CM

## 2025-08-24 DIAGNOSIS — Z98.1 S/P CERVICAL SPINAL FUSION: ICD-10-CM

## 2025-08-24 DIAGNOSIS — M51.369 DEGENERATIVE DISC DISEASE, LUMBAR: ICD-10-CM

## 2025-08-24 DIAGNOSIS — M47.816 LUMBAR SPONDYLOSIS: ICD-10-CM

## 2025-08-24 DIAGNOSIS — M54.16 LUMBAR RADICULOPATHY: ICD-10-CM

## 2025-08-25 RX ORDER — BACLOFEN 10 MG/1
TABLET ORAL
Qty: 90 TABLET | Refills: 0 | Status: SHIPPED | OUTPATIENT
Start: 2025-08-25

## 2025-08-26 ENCOUNTER — HOSPITAL ENCOUNTER (OUTPATIENT)
Dept: INFUSION THERAPY | Age: 75
Discharge: HOME | End: 2025-08-26
Payer: MEDICARE

## 2025-08-26 VITALS — SYSTOLIC BLOOD PRESSURE: 136 MMHG | DIASTOLIC BLOOD PRESSURE: 79 MMHG | HEART RATE: 90 BPM | TEMPERATURE: 98.2 F

## 2025-08-26 DIAGNOSIS — D50.8 OTHER IRON DEFICIENCY ANEMIA: ICD-10-CM

## 2025-08-26 DIAGNOSIS — D64.9 ANEMIA, UNSPECIFIED TYPE: Primary | ICD-10-CM

## 2025-08-26 PROCEDURE — 2500000003 HC RX 250 WO HCPCS: Performed by: INTERNAL MEDICINE

## 2025-08-26 PROCEDURE — 6360000002 HC RX W HCPCS: Performed by: INTERNAL MEDICINE

## 2025-08-26 PROCEDURE — 2580000003 HC RX 258: Performed by: INTERNAL MEDICINE

## 2025-08-26 PROCEDURE — 96365 THER/PROPH/DIAG IV INF INIT: CPT

## 2025-08-26 RX ORDER — HYDROCORTISONE SODIUM SUCCINATE 100 MG/2ML
100 INJECTION INTRAMUSCULAR; INTRAVENOUS
OUTPATIENT
Start: 2025-09-02

## 2025-08-26 RX ORDER — ONDANSETRON 2 MG/ML
8 INJECTION INTRAMUSCULAR; INTRAVENOUS
OUTPATIENT
Start: 2025-09-02

## 2025-08-26 RX ORDER — SODIUM CHLORIDE 9 MG/ML
5-250 INJECTION, SOLUTION INTRAVENOUS PRN
OUTPATIENT
Start: 2025-09-02

## 2025-08-26 RX ORDER — ALBUTEROL SULFATE 90 UG/1
4 INHALANT RESPIRATORY (INHALATION) PRN
OUTPATIENT
Start: 2025-09-02

## 2025-08-26 RX ORDER — DIPHENHYDRAMINE HYDROCHLORIDE 50 MG/ML
50 INJECTION, SOLUTION INTRAMUSCULAR; INTRAVENOUS
OUTPATIENT
Start: 2025-09-02

## 2025-08-26 RX ORDER — SODIUM CHLORIDE 0.9 % (FLUSH) 0.9 %
5-40 SYRINGE (ML) INJECTION PRN
OUTPATIENT
Start: 2025-09-02

## 2025-08-26 RX ORDER — ACETAMINOPHEN 325 MG/1
650 TABLET ORAL
OUTPATIENT
Start: 2025-09-02

## 2025-08-26 RX ORDER — SODIUM CHLORIDE 9 MG/ML
INJECTION, SOLUTION INTRAVENOUS PRN
OUTPATIENT
Start: 2025-09-02

## 2025-08-26 RX ORDER — EPINEPHRINE 1 MG/ML
0.3 INJECTION, SOLUTION, CONCENTRATE INTRAVENOUS PRN
OUTPATIENT
Start: 2025-09-02

## 2025-08-26 RX ORDER — SODIUM CHLORIDE 0.9 % (FLUSH) 0.9 %
5-40 SYRINGE (ML) INJECTION PRN
Status: DISCONTINUED | OUTPATIENT
Start: 2025-08-26 | End: 2025-08-27 | Stop reason: HOSPADM

## 2025-08-26 RX ORDER — FAMOTIDINE 10 MG/ML
20 INJECTION, SOLUTION INTRAVENOUS
OUTPATIENT
Start: 2025-09-02

## 2025-08-26 RX ORDER — HEPARIN 100 UNIT/ML
500 SYRINGE INTRAVENOUS PRN
OUTPATIENT
Start: 2025-09-02

## 2025-08-26 RX ORDER — SODIUM CHLORIDE 9 MG/ML
5-250 INJECTION, SOLUTION INTRAVENOUS PRN
Status: DISCONTINUED | OUTPATIENT
Start: 2025-08-26 | End: 2025-08-27 | Stop reason: HOSPADM

## 2025-08-26 RX ADMIN — FERRIC CARBOXYMALTOSE INJECTION 750 MG: 50 INJECTION, SOLUTION INTRAVENOUS at 14:33

## 2025-08-26 RX ADMIN — SODIUM CHLORIDE, PRESERVATIVE FREE 10 ML: 5 INJECTION INTRAVENOUS at 14:59

## 2025-08-26 RX ADMIN — SODIUM CHLORIDE 200 ML/HR: 0.9 INJECTION, SOLUTION INTRAVENOUS at 14:25

## (undated) DEVICE — ADHESIVE SKIN CLSR 0.7ML TOP DERMBND ADV

## (undated) DEVICE — ELECTRODE PT RET AD L9FT HI MOIST COND ADH HYDRGEL CORDED

## (undated) DEVICE — PLUMEPORT LAPAROSCOPIC SMOKE FILTRATION DEVICE: Brand: PLUMEPORT ACTIV

## (undated) DEVICE — GLOVE ORANGE PI 7   MSG9070

## (undated) DEVICE — HF-RESECTION ELECTRODE PLASMALOOP LOOP, MEDIUM, 24 FR., 12°/16°, ESG TURIS: Brand: OLYMPUS

## (undated) DEVICE — SYRINGE MED 10ML LUERLOCK TIP W/O SFTY DISP

## (undated) DEVICE — FORCEPS BX L240CM JAW DIA2.8MM L CAP W/ NDL MIC MESH TOOTH

## (undated) DEVICE — GLOVE ORANGE PI 8   MSG9080

## (undated) DEVICE — SPHINCTEROTOME: Brand: DREAMTOME™ RX 44

## (undated) DEVICE — SYRINGE CATH TIP 50ML

## (undated) DEVICE — CORD LAPAROSCOPIC MONOPOLAR

## (undated) DEVICE — BITEBLOCK 54FR W/ DENT RIM BLOX

## (undated) DEVICE — STRAP POS MP 30X3 IN HK LOOP CLOSURE FOAM DISP

## (undated) DEVICE — URETERAL STENT
Type: IMPLANTABLE DEVICE | Status: NON-FUNCTIONAL
Brand: POLARIS™ ULTRA
Removed: 2021-04-29

## (undated) DEVICE — PROTECTOR ULN NRV PUR FOAM HK LOOP STRP ANATOMICALLY

## (undated) DEVICE — GOWN,AURORA,NON-REINFORCED,SMALL: Brand: MEDLINE

## (undated) DEVICE — TOWEL,OR,DSP,ST,NATURAL,DLX,4/PK,20PK/CS: Brand: MEDLINE

## (undated) DEVICE — MHPB BASIC LAP PACK: Brand: MEDLINE INDUSTRIES, INC.

## (undated) DEVICE — DEVICE TRCR 12X9X3IN WHT CLSR DISP OMNICLOSE

## (undated) DEVICE — CORD ES L10FT BLU MPLR FT SWCH DISP ACMI

## (undated) DEVICE — ENDO KIT W/SYRINGE: Brand: MEDLINE INDUSTRIES, INC.

## (undated) DEVICE — PACK PROCEDURE SURG CYSTO SVMMC LF

## (undated) DEVICE — DRAINBAG,ANTI-REFLUX TOWER,L/F,2000ML,LL: Brand: MEDLINE

## (undated) DEVICE — CYSTO/BLADDER IRRIGATION SET, REGULATING CLAMP

## (undated) DEVICE — STAZ ENDO KIT: Brand: MEDLINE INDUSTRIES, INC.

## (undated) DEVICE — DEFENDO AIR WATER SUCTION AND BIOPSY VALVE KIT FOR  OLYMPUS: Brand: DEFENDO AIR/WATER/SUCTION AND BIOPSY VALVE

## (undated) DEVICE — GLOVE ORTHO 8   MSG9480

## (undated) DEVICE — FORCEPS BX L240CM JAW DIA2.4MM ORNG L CAP W/ NDL DISP RAD

## (undated) DEVICE — SUTURE V-LOC 180 SZ 0 L9IN ABSRB GRN GS-21 L37MM 1/2 CIR VLOCL0346

## (undated) DEVICE — HYPODERMIC SAFETY NEEDLE: Brand: MAGELLAN

## (undated) DEVICE — GLOVE ORANGE PI 7 1/2   MSG9075

## (undated) DEVICE — GOWN,SIRUS,NONRNF,SETINSLV,XL,20/CS: Brand: MEDLINE

## (undated) DEVICE — Z DISCONTINUED USE 2743520 GUIDEWIRE URO L150CM DIA0.035IN STIFF NIT HYDRPHLC STR TIP

## (undated) DEVICE — PAD N ADH W3XL4IN POLY COT SFT PERF FLM EASILY CUT ABSRB

## (undated) DEVICE — DRAPE,REIN 53X77,STERILE: Brand: MEDLINE

## (undated) DEVICE — JELLY,LUBE,STERILE,FLIP TOP,TUBE,2-OZ: Brand: MEDLINE

## (undated) DEVICE — SUTURE DEV SZ 2-0 WND CLSR ABSRB GS-22 VLOC COVIDIEN VLOCM2145

## (undated) DEVICE — Device

## (undated) DEVICE — TIP COVER ACCESSORY

## (undated) DEVICE — PLUG,CATHETER,DRAINAGE PROTECTOR,TUBE: Brand: MEDLINE

## (undated) DEVICE — BLADELESS OBTURATOR: Brand: WECK VISTA

## (undated) DEVICE — STRAP,POSITIONING,KNEE/BODY,FOAM,4X60": Brand: MEDLINE

## (undated) DEVICE — CATHETER,FOLEY,3-WAY,22FR,30ML,100%SILI: Brand: MEDLINE

## (undated) DEVICE — SUTURE MCRYL + SZ 4-0 L18IN ABSRB UD L19MM PS-2 3/8 CIR MCP496G

## (undated) DEVICE — BITEBLOCK ENDOSCP 60FR MAXI WHT POLYETH STURDY W/ VELC WVN

## (undated) DEVICE — TROCAR: Brand: KII FIOS FIRST ENTRY

## (undated) DEVICE — GLOVE SURG SZ 65 THK91MIL LTX FREE SYN POLYISOPRENE

## (undated) DEVICE — TOTAL TRAY, DB, 100% SILI FOLEY, 16FR 10: Brand: MEDLINE

## (undated) DEVICE — KENDALL SCD EXPRESS SLEEVES, KNEE LENGTH, MEDIUM: Brand: KENDALL SCD

## (undated) DEVICE — SINGLE USE DISTAL COVER MAJ-2315: Brand: SINGLE USE DISTAL COVER

## (undated) DEVICE — 4-PORT MANIFOLD: Brand: NEPTUNE 2

## (undated) DEVICE — SYRINGE MED 50ML LUERLOCK TIP

## (undated) DEVICE — SUTURE VCRL SZ 3-0 L27IN ABSRB UD L26MM SH 1/2 CIR J416H

## (undated) DEVICE — BINDER ABD 4 PANEL LG 12 IN 46-62 IN UNISX LINING ELASTIC

## (undated) DEVICE — RETRIEVAL BALLOON CATHETER: Brand: EXTRACTOR™ PRO RX

## (undated) DEVICE — ARM DRAPE

## (undated) DEVICE — PAD,NON-ADHERENT,3X8,STERILE,LF,1/PK: Brand: MEDLINE

## (undated) DEVICE — GOWN,AURORA,NONREINFORCED,LARGE: Brand: MEDLINE

## (undated) DEVICE — SOLUTION IV IRRIG POUR BRL 0.9% SODIUM CHL 2F7124

## (undated) DEVICE — SUTURE SZ 0 27IN 5/8 CIR UR-6  TAPER PT VIOLET ABSRB VICRYL J603H

## (undated) DEVICE — SUTURE VCRL + SZ 0 L27IN ABSRB VLT L26MM UR-6 5/8 CIR VCP603H

## (undated) DEVICE — Z DISCONTINUED GLOVE SURG SZ 7 L12IN FNGR THK13MIL WHT ISOLEX POLYISOPRENE

## (undated) DEVICE — SEAL

## (undated) DEVICE — CATHETER URETH 20FR BLLN 30CC 3 W F SPEC INF CTRL BARDX

## (undated) DEVICE — GOWN,POLY REINFORCED,LG: Brand: MEDLINE

## (undated) DEVICE — BLANKET WRM W40.2XL55.9IN IORT LO BODY + MISTRAL AIR

## (undated) DEVICE — GLOVE SURG 8 11.7IN BEAD CUF LIGHT BRN SENSICARE LTX FREE

## (undated) DEVICE — STRAP ARMBRD W1.5XL32IN FOAM STR YET SFT W/ HK AND LOOP

## (undated) DEVICE — COLUMN DRAPE

## (undated) DEVICE — SOLUTION IRRIG 3000ML STRL H2O USP UROMATIC PLAS CONT

## (undated) DEVICE — SOLUTION ANTIFOG VIS SYS CLEARIFY LAPSCP

## (undated) DEVICE — PENCIL ES L3M BTTN SWCH HOLSTER W/ BLDE ELECTRD EDGE

## (undated) DEVICE — CANNULA SEAL

## (undated) DEVICE — SYRINGE MED 20ML STD CLR PLAS LUERLOCK TIP N CTRL DISP

## (undated) DEVICE — GOWN,AURORA,NONRNF,XL,30/CS: Brand: MEDLINE

## (undated) DEVICE — GARMENT,MEDLINE,DVT,INT,CALF,MED, GEN2: Brand: MEDLINE

## (undated) DEVICE — CHLORAPREP 26ML ORANGE

## (undated) DEVICE — SUTURE VCRL SZ 3-0 L27IN ABSRB VLT L26MM SH 1/2 CIR J316H